# Patient Record
Sex: FEMALE | Race: WHITE | NOT HISPANIC OR LATINO | URBAN - METROPOLITAN AREA
[De-identification: names, ages, dates, MRNs, and addresses within clinical notes are randomized per-mention and may not be internally consistent; named-entity substitution may affect disease eponyms.]

---

## 2017-01-19 ENCOUNTER — OUTPATIENT (OUTPATIENT)
Dept: OUTPATIENT SERVICES | Facility: HOSPITAL | Age: 72
LOS: 1 days | Discharge: HOME | End: 2017-01-19

## 2017-03-08 ENCOUNTER — APPOINTMENT (OUTPATIENT)
Dept: VASCULAR SURGERY | Facility: CLINIC | Age: 72
End: 2017-03-08

## 2017-03-23 ENCOUNTER — RECORD ABSTRACTING (OUTPATIENT)
Age: 72
End: 2017-03-23

## 2017-03-23 DIAGNOSIS — Z87.09 PERSONAL HISTORY OF OTHER DISEASES OF THE RESPIRATORY SYSTEM: ICD-10-CM

## 2017-03-23 DIAGNOSIS — T14.90 INJURY, UNSPECIFIED: ICD-10-CM

## 2017-03-23 DIAGNOSIS — Z87.891 PERSONAL HISTORY OF NICOTINE DEPENDENCE: ICD-10-CM

## 2017-03-23 DIAGNOSIS — Z87.442 PERSONAL HISTORY OF URINARY CALCULI: ICD-10-CM

## 2017-03-28 ENCOUNTER — APPOINTMENT (OUTPATIENT)
Dept: VASCULAR SURGERY | Facility: CLINIC | Age: 72
End: 2017-03-28

## 2017-04-19 ENCOUNTER — APPOINTMENT (OUTPATIENT)
Dept: VASCULAR SURGERY | Facility: CLINIC | Age: 72
End: 2017-04-19

## 2017-04-19 VITALS
HEIGHT: 70 IN | WEIGHT: 126 LBS | DIASTOLIC BLOOD PRESSURE: 70 MMHG | SYSTOLIC BLOOD PRESSURE: 132 MMHG | BODY MASS INDEX: 18.04 KG/M2

## 2017-06-27 DIAGNOSIS — Z88.8 ALLERGY STATUS TO OTHER DRUGS, MEDICAMENTS AND BIOLOGICAL SUBSTANCES STATUS: ICD-10-CM

## 2017-06-27 DIAGNOSIS — Z88.0 ALLERGY STATUS TO PENICILLIN: ICD-10-CM

## 2017-06-27 DIAGNOSIS — J44.9 CHRONIC OBSTRUCTIVE PULMONARY DISEASE, UNSPECIFIED: ICD-10-CM

## 2017-06-27 DIAGNOSIS — I83.892 VARICOSE VEINS OF LEFT LOWER EXTREMITY WITH OTHER COMPLICATIONS: ICD-10-CM

## 2017-10-10 ENCOUNTER — OUTPATIENT (OUTPATIENT)
Dept: OUTPATIENT SERVICES | Facility: HOSPITAL | Age: 72
LOS: 1 days | Discharge: HOME | End: 2017-10-10

## 2017-10-10 DIAGNOSIS — M25.561 PAIN IN RIGHT KNEE: ICD-10-CM

## 2017-10-18 ENCOUNTER — APPOINTMENT (OUTPATIENT)
Age: 72
End: 2017-10-18
Payer: MEDICARE

## 2017-10-25 ENCOUNTER — APPOINTMENT (OUTPATIENT)
Dept: VASCULAR SURGERY | Facility: CLINIC | Age: 72
End: 2017-10-25
Payer: MEDICARE

## 2017-10-25 VITALS
DIASTOLIC BLOOD PRESSURE: 80 MMHG | SYSTOLIC BLOOD PRESSURE: 130 MMHG | WEIGHT: 128 LBS | HEIGHT: 70 IN | BODY MASS INDEX: 18.32 KG/M2

## 2017-10-25 PROCEDURE — 93971 EXTREMITY STUDY: CPT

## 2017-10-25 PROCEDURE — 99213 OFFICE O/P EST LOW 20 MIN: CPT

## 2017-10-25 RX ORDER — CEFDINIR 300 MG/1
300 CAPSULE ORAL
Qty: 14 | Refills: 0 | Status: ACTIVE | COMMUNITY
Start: 2017-05-02

## 2018-03-12 ENCOUNTER — OUTPATIENT (OUTPATIENT)
Dept: OUTPATIENT SERVICES | Facility: HOSPITAL | Age: 73
LOS: 1 days | Discharge: HOME | End: 2018-03-12

## 2018-03-12 DIAGNOSIS — E78.5 HYPERLIPIDEMIA, UNSPECIFIED: ICD-10-CM

## 2018-03-12 DIAGNOSIS — E11.9 TYPE 2 DIABETES MELLITUS WITHOUT COMPLICATIONS: ICD-10-CM

## 2018-03-12 DIAGNOSIS — I10 ESSENTIAL (PRIMARY) HYPERTENSION: ICD-10-CM

## 2018-08-24 ENCOUNTER — APPOINTMENT (OUTPATIENT)
Dept: VASCULAR SURGERY | Facility: CLINIC | Age: 73
End: 2018-08-24
Payer: MEDICARE

## 2018-08-24 VITALS — SYSTOLIC BLOOD PRESSURE: 120 MMHG | HEART RATE: 94 BPM | OXYGEN SATURATION: 97 % | DIASTOLIC BLOOD PRESSURE: 80 MMHG

## 2018-08-24 PROCEDURE — 99212 OFFICE O/P EST SF 10 MIN: CPT

## 2018-09-11 ENCOUNTER — FORM ENCOUNTER (OUTPATIENT)
Age: 73
End: 2018-09-11

## 2018-09-12 ENCOUNTER — OUTPATIENT (OUTPATIENT)
Dept: OUTPATIENT SERVICES | Facility: HOSPITAL | Age: 73
LOS: 1 days | Discharge: HOME | End: 2018-09-12

## 2018-09-12 VITALS
OXYGEN SATURATION: 100 % | DIASTOLIC BLOOD PRESSURE: 80 MMHG | HEIGHT: 70 IN | TEMPERATURE: 98 F | HEART RATE: 65 BPM | WEIGHT: 126.77 LBS | SYSTOLIC BLOOD PRESSURE: 144 MMHG | RESPIRATION RATE: 16 BRPM

## 2018-09-12 DIAGNOSIS — I83.893 VARICOSE VEINS OF BILATERAL LOWER EXTREMITIES WITH OTHER COMPLICATIONS: ICD-10-CM

## 2018-09-12 DIAGNOSIS — Z01.818 ENCOUNTER FOR OTHER PREPROCEDURAL EXAMINATION: ICD-10-CM

## 2018-09-12 DIAGNOSIS — Z98.890 OTHER SPECIFIED POSTPROCEDURAL STATES: Chronic | ICD-10-CM

## 2018-09-12 LAB
ALBUMIN SERPL ELPH-MCNC: 4.5 G/DL — SIGNIFICANT CHANGE UP (ref 3.5–5.2)
ALP SERPL-CCNC: 99 U/L — SIGNIFICANT CHANGE UP (ref 30–115)
ALT FLD-CCNC: 17 U/L — SIGNIFICANT CHANGE UP (ref 0–41)
ANION GAP SERPL CALC-SCNC: 12 MMOL/L — SIGNIFICANT CHANGE UP (ref 7–14)
APTT BLD: 38.9 SEC — SIGNIFICANT CHANGE UP (ref 27–39.2)
AST SERPL-CCNC: 21 U/L — SIGNIFICANT CHANGE UP (ref 0–41)
BASOPHILS # BLD AUTO: 0.02 K/UL — SIGNIFICANT CHANGE UP (ref 0–0.2)
BASOPHILS NFR BLD AUTO: 0.4 % — SIGNIFICANT CHANGE UP (ref 0–1)
BILIRUB SERPL-MCNC: 0.4 MG/DL — SIGNIFICANT CHANGE UP (ref 0.2–1.2)
BUN SERPL-MCNC: 23 MG/DL — HIGH (ref 10–20)
CALCIUM SERPL-MCNC: 9.3 MG/DL — SIGNIFICANT CHANGE UP (ref 8.5–10.1)
CHLORIDE SERPL-SCNC: 101 MMOL/L — SIGNIFICANT CHANGE UP (ref 98–110)
CO2 SERPL-SCNC: 30 MMOL/L — SIGNIFICANT CHANGE UP (ref 17–32)
CREAT SERPL-MCNC: 0.6 MG/DL — LOW (ref 0.7–1.5)
EOSINOPHIL # BLD AUTO: 0.04 K/UL — SIGNIFICANT CHANGE UP (ref 0–0.7)
EOSINOPHIL NFR BLD AUTO: 0.8 % — SIGNIFICANT CHANGE UP (ref 0–8)
GLUCOSE SERPL-MCNC: 85 MG/DL — SIGNIFICANT CHANGE UP (ref 70–99)
HCT VFR BLD CALC: 41.2 % — SIGNIFICANT CHANGE UP (ref 37–47)
HGB BLD-MCNC: 13.1 G/DL — SIGNIFICANT CHANGE UP (ref 12–16)
IMM GRANULOCYTES NFR BLD AUTO: 0.2 % — SIGNIFICANT CHANGE UP (ref 0.1–0.3)
INR BLD: 1.28 RATIO — SIGNIFICANT CHANGE UP (ref 0.65–1.3)
LYMPHOCYTES # BLD AUTO: 1.52 K/UL — SIGNIFICANT CHANGE UP (ref 1.2–3.4)
LYMPHOCYTES # BLD AUTO: 31.5 % — SIGNIFICANT CHANGE UP (ref 20.5–51.1)
MCHC RBC-ENTMCNC: 29.4 PG — SIGNIFICANT CHANGE UP (ref 27–31)
MCHC RBC-ENTMCNC: 31.8 G/DL — LOW (ref 32–37)
MCV RBC AUTO: 92.6 FL — SIGNIFICANT CHANGE UP (ref 81–99)
MONOCYTES # BLD AUTO: 0.41 K/UL — SIGNIFICANT CHANGE UP (ref 0.1–0.6)
MONOCYTES NFR BLD AUTO: 8.5 % — SIGNIFICANT CHANGE UP (ref 1.7–9.3)
NEUTROPHILS # BLD AUTO: 2.82 K/UL — SIGNIFICANT CHANGE UP (ref 1.4–6.5)
NEUTROPHILS NFR BLD AUTO: 58.6 % — SIGNIFICANT CHANGE UP (ref 42.2–75.2)
NRBC # BLD: 0 /100 WBCS — SIGNIFICANT CHANGE UP (ref 0–0)
PLATELET # BLD AUTO: 164 K/UL — SIGNIFICANT CHANGE UP (ref 130–400)
POTASSIUM SERPL-MCNC: 4.6 MMOL/L — SIGNIFICANT CHANGE UP (ref 3.5–5)
POTASSIUM SERPL-SCNC: 4.6 MMOL/L — SIGNIFICANT CHANGE UP (ref 3.5–5)
PROT SERPL-MCNC: 6.9 G/DL — SIGNIFICANT CHANGE UP (ref 6–8)
PROTHROM AB SERPL-ACNC: 13.8 SEC — HIGH (ref 9.95–12.87)
RBC # BLD: 4.45 M/UL — SIGNIFICANT CHANGE UP (ref 4.2–5.4)
RBC # FLD: 13.8 % — SIGNIFICANT CHANGE UP (ref 11.5–14.5)
SODIUM SERPL-SCNC: 143 MMOL/L — SIGNIFICANT CHANGE UP (ref 135–146)
WBC # BLD: 4.82 K/UL — SIGNIFICANT CHANGE UP (ref 4.8–10.8)
WBC # FLD AUTO: 4.82 K/UL — SIGNIFICANT CHANGE UP (ref 4.8–10.8)

## 2018-09-12 NOTE — H&P PST ADULT - HISTORY OF PRESENT ILLNESS
"HE'S OPERATING ON THE VEINS OF MY LEFT LEG"  PT CURRENTLY DENIES CHEST PAIN, PALPITATIONS, DYSURIA, RECENT ILLNESS  EXERCISE TOLERANCE 3-4 BLOCKS ONE FOS  PT DENIES ANY RASHES, ABRASION, OR OPEN WOUNDS OR CUTS    AS PER THE PT, THIS IS A COMPLETE MEDICAL AND SURGICAL HX, INCLUDING MEDICATIONS PRESCRIBED AND OVER THE COUNTER

## 2018-09-13 DIAGNOSIS — R01.0 BENIGN AND INNOCENT CARDIAC MURMURS: ICD-10-CM

## 2018-10-02 PROBLEM — R06.02 SHORTNESS OF BREATH: Chronic | Status: ACTIVE | Noted: 2018-09-12

## 2018-10-02 PROBLEM — R52 PAIN, UNSPECIFIED: Chronic | Status: ACTIVE | Noted: 2018-09-12

## 2018-10-16 ENCOUNTER — RESULT REVIEW (OUTPATIENT)
Age: 73
End: 2018-10-16

## 2018-10-16 ENCOUNTER — APPOINTMENT (OUTPATIENT)
Dept: VASCULAR SURGERY | Facility: HOSPITAL | Age: 73
End: 2018-10-16
Payer: MEDICARE

## 2018-10-16 ENCOUNTER — OUTPATIENT (OUTPATIENT)
Dept: OUTPATIENT SERVICES | Facility: HOSPITAL | Age: 73
LOS: 1 days | Discharge: HOME | End: 2018-10-16

## 2018-10-16 VITALS
WEIGHT: 126.99 LBS | RESPIRATION RATE: 18 BRPM | TEMPERATURE: 98 F | DIASTOLIC BLOOD PRESSURE: 84 MMHG | HEART RATE: 72 BPM | HEIGHT: 70 IN | SYSTOLIC BLOOD PRESSURE: 157 MMHG

## 2018-10-16 VITALS — SYSTOLIC BLOOD PRESSURE: 148 MMHG | DIASTOLIC BLOOD PRESSURE: 73 MMHG | RESPIRATION RATE: 18 BRPM | HEART RATE: 62 BPM

## 2018-10-16 DIAGNOSIS — Z98.890 OTHER SPECIFIED POSTPROCEDURAL STATES: Chronic | ICD-10-CM

## 2018-10-16 PROCEDURE — 37766 PHLEB VEINS - EXTREM 20+: CPT | Mod: LT

## 2018-10-16 RX ORDER — ONDANSETRON 8 MG/1
4 TABLET, FILM COATED ORAL ONCE
Qty: 0 | Refills: 0 | Status: COMPLETED | OUTPATIENT
Start: 2018-10-16 | End: 2018-10-16

## 2018-10-16 RX ORDER — OXYCODONE AND ACETAMINOPHEN 5; 325 MG/1; MG/1
2 TABLET ORAL ONCE
Qty: 0 | Refills: 0 | Status: DISCONTINUED | OUTPATIENT
Start: 2018-10-16 | End: 2018-10-17

## 2018-10-16 RX ORDER — MORPHINE SULFATE 50 MG/1
2 CAPSULE, EXTENDED RELEASE ORAL
Qty: 0 | Refills: 0 | Status: DISCONTINUED | OUTPATIENT
Start: 2018-10-16 | End: 2018-10-16

## 2018-10-16 RX ORDER — SODIUM CHLORIDE 9 MG/ML
1000 INJECTION, SOLUTION INTRAVENOUS
Qty: 0 | Refills: 0 | Status: DISCONTINUED | OUTPATIENT
Start: 2018-10-16 | End: 2018-10-17

## 2018-10-16 RX ADMIN — SODIUM CHLORIDE 75 MILLILITER(S): 9 INJECTION, SOLUTION INTRAVENOUS at 15:03

## 2018-10-16 RX ADMIN — MORPHINE SULFATE 2 MILLIGRAM(S): 50 CAPSULE, EXTENDED RELEASE ORAL at 14:15

## 2018-10-16 RX ADMIN — MORPHINE SULFATE 2 MILLIGRAM(S): 50 CAPSULE, EXTENDED RELEASE ORAL at 14:00

## 2018-10-16 RX ADMIN — ONDANSETRON 4 MILLIGRAM(S): 8 TABLET, FILM COATED ORAL at 16:13

## 2018-10-16 NOTE — ASU DISCHARGE PLAN (ADULT/PEDIATRIC). - DRESSING FT
Can take off dressing after 72 hrs. steristrips will fall off on their own. Rewarp ACE bandage. Can take off dressing after 72 hrs. steristrips will fall off on their own. Rewrap ACE bandage.

## 2018-10-16 NOTE — ASU DISCHARGE PLAN (ADULT/PEDIATRIC). - SPECIAL INSTRUCTIONS
Please call the office at [phone] to make an appointment for follow up with Dr. Carson in 2 weeks, if you do not have one already.   Take the outer dressing of ACE wrap and gauze off in 72 hours (3 days). Leave the white tape strips (called steri strips) alone, and these will fall off by themselves in 1-2 weeks.  When you take off the outer dressing, you can shower. Let the soapy water run over the strips and then pat dry, do not rub. Do not soak or swim.   When you take off the outer dressing, you should also start wearing the compression stockings (a prescription will be given to you before you leave) during the day. Take these off at night.   You may eat regularly today, but start lightly as anesthesia may make you nauseous.   Do not drive until off of pain medications and your motion is no longer limited by pain.     You are being given a prescription / a prescription was called in to your pharmacy for a pain medication called Percocet. It is a combination of acetaminophen (Tylenol) and oxycodone. You should take this as needed for pain not controlled by Tylenol alone.  You cannot drive while taking this medication.  Follow the directions on the bottle and given to you by your pharmacist. You may also take a stool softener like Colace (docusate) while you are taking this pain medicine as it may cause constipation.   You can switch to taking only Tylenol or other over the counter pain medications as your pain improves. Some people do not need to take Percocet at all.  Make sure the total amount of acetaminophen (Tylenol) you take is less than 3500mg total a day, so add up the amount in the Percocet and any Tylenol you may take.

## 2018-10-16 NOTE — ASU DISCHARGE PLAN (ADULT/PEDIATRIC). - ACTIVITY LEVEL
exercise elevate extremity/elevate your leg while sitting or laying down. Continue to walk daily/exercise

## 2018-10-16 NOTE — BRIEF OPERATIVE NOTE - PROCEDURE
<<-----Click on this checkbox to enter Procedure Phlebectomy, lower limb  10/16/2018  Stab phlebectomy of left lower limb.  Active  [id number]

## 2018-10-16 NOTE — ASU DISCHARGE PLAN (ADULT/PEDIATRIC). - NOTIFY
Numbness, color, or temperature change to extremity/Swelling that continues/Bleeding that does not stop Numbness, color, or temperature change to extremity/Fever greater than 101/Bleeding that does not stop/Anything else that concerns you/Swelling that continues

## 2018-10-16 NOTE — CHART NOTE - NSCHARTNOTEFT_GEN_A_CORE
PACU ANESTHESIA ADMISSION NOTE      Procedure: Phlebectomy, lower limb: Stab phlebectomy of left lower limb.    Post op diagnosis:  Varicose veins of left lower extremity with inflammation      ____  Intubated  TV:______       Rate: ______      FiO2: ______    ____  Patent Airway    ____  Full return of protective reflexes    ___x_  Full recovery from anesthesia / back to baseline     Vitals:   T:  97.4         R:  10                BP:  138/64                Sat: 98                  P: 67      Mental Status:  _x___ Awake   __x___ Alert   _____ Drowsy   _____ Sedated    Nausea/Vomiting:  x____ NO  ______Yes,   See Post - Op Orders          Pain Scale (0-10):  _____    Treatment: ____ None    _x___ See Post - Op/PCA Orders    Post - Operative Fluids:   ____ Oral   x____ See Post - Op Orders    Plan: Discharge:   __x__Home       _____Floor     _____Critical Care    _____  Other:_________________    Comments:

## 2018-10-19 LAB — SURGICAL PATHOLOGY STUDY: SIGNIFICANT CHANGE UP

## 2018-10-22 DIAGNOSIS — Z88.0 ALLERGY STATUS TO PENICILLIN: ICD-10-CM

## 2018-10-22 DIAGNOSIS — Z88.8 ALLERGY STATUS TO OTHER DRUGS, MEDICAMENTS AND BIOLOGICAL SUBSTANCES STATUS: ICD-10-CM

## 2018-10-22 DIAGNOSIS — I83.892 VARICOSE VEINS OF LEFT LOWER EXTREMITY WITH OTHER COMPLICATIONS: ICD-10-CM

## 2018-10-22 DIAGNOSIS — Z88.2 ALLERGY STATUS TO SULFONAMIDES: ICD-10-CM

## 2018-10-31 ENCOUNTER — APPOINTMENT (OUTPATIENT)
Dept: VASCULAR SURGERY | Facility: CLINIC | Age: 73
End: 2018-10-31
Payer: MEDICARE

## 2018-10-31 PROCEDURE — 99024 POSTOP FOLLOW-UP VISIT: CPT

## 2018-11-28 ENCOUNTER — APPOINTMENT (OUTPATIENT)
Dept: VASCULAR SURGERY | Facility: CLINIC | Age: 73
End: 2018-11-28
Payer: MEDICARE

## 2018-11-28 PROCEDURE — 29580 STRAPPING UNNA BOOT: CPT | Mod: 79,LT

## 2018-12-05 ENCOUNTER — APPOINTMENT (OUTPATIENT)
Dept: VASCULAR SURGERY | Facility: CLINIC | Age: 73
End: 2018-12-05
Payer: MEDICARE

## 2018-12-05 PROCEDURE — 29580 STRAPPING UNNA BOOT: CPT | Mod: 78,LT

## 2018-12-12 ENCOUNTER — APPOINTMENT (OUTPATIENT)
Dept: VASCULAR SURGERY | Facility: CLINIC | Age: 73
End: 2018-12-12
Payer: MEDICARE

## 2018-12-12 PROCEDURE — 29580 STRAPPING UNNA BOOT: CPT | Mod: 78,LT

## 2018-12-19 ENCOUNTER — APPOINTMENT (OUTPATIENT)
Dept: VASCULAR SURGERY | Facility: CLINIC | Age: 73
End: 2018-12-19
Payer: MEDICARE

## 2018-12-19 PROCEDURE — 29580 STRAPPING UNNA BOOT: CPT | Mod: 58,LT

## 2018-12-26 ENCOUNTER — APPOINTMENT (OUTPATIENT)
Dept: VASCULAR SURGERY | Facility: CLINIC | Age: 73
End: 2018-12-26
Payer: MEDICARE

## 2018-12-26 PROCEDURE — 29580 STRAPPING UNNA BOOT: CPT | Mod: 58,LT

## 2019-01-02 ENCOUNTER — APPOINTMENT (OUTPATIENT)
Dept: VASCULAR SURGERY | Facility: CLINIC | Age: 74
End: 2019-01-02
Payer: MEDICARE

## 2019-01-02 PROCEDURE — 99024 POSTOP FOLLOW-UP VISIT: CPT

## 2019-01-16 ENCOUNTER — OUTPATIENT (OUTPATIENT)
Dept: OUTPATIENT SERVICES | Facility: HOSPITAL | Age: 74
LOS: 1 days | Discharge: HOME | End: 2019-01-16

## 2019-01-16 DIAGNOSIS — E11.9 TYPE 2 DIABETES MELLITUS WITHOUT COMPLICATIONS: ICD-10-CM

## 2019-01-16 DIAGNOSIS — E55.9 VITAMIN D DEFICIENCY, UNSPECIFIED: ICD-10-CM

## 2019-01-16 DIAGNOSIS — N39.0 URINARY TRACT INFECTION, SITE NOT SPECIFIED: ICD-10-CM

## 2019-01-16 DIAGNOSIS — E78.00 PURE HYPERCHOLESTEROLEMIA, UNSPECIFIED: ICD-10-CM

## 2019-01-16 DIAGNOSIS — Z98.890 OTHER SPECIFIED POSTPROCEDURAL STATES: Chronic | ICD-10-CM

## 2019-01-24 ENCOUNTER — OUTPATIENT (OUTPATIENT)
Dept: OUTPATIENT SERVICES | Facility: HOSPITAL | Age: 74
LOS: 1 days | Discharge: HOME | End: 2019-01-24

## 2019-01-24 VITALS
SYSTOLIC BLOOD PRESSURE: 118 MMHG | RESPIRATION RATE: 18 BRPM | HEART RATE: 80 BPM | OXYGEN SATURATION: 97 % | TEMPERATURE: 99 F | DIASTOLIC BLOOD PRESSURE: 62 MMHG | WEIGHT: 126.32 LBS | HEIGHT: 70 IN

## 2019-01-24 DIAGNOSIS — Z01.818 ENCOUNTER FOR OTHER PREPROCEDURAL EXAMINATION: ICD-10-CM

## 2019-01-24 DIAGNOSIS — I83.893 VARICOSE VEINS OF BILATERAL LOWER EXTREMITIES WITH OTHER COMPLICATIONS: ICD-10-CM

## 2019-01-24 DIAGNOSIS — Z98.890 OTHER SPECIFIED POSTPROCEDURAL STATES: Chronic | ICD-10-CM

## 2019-01-24 LAB
ALBUMIN SERPL ELPH-MCNC: 4.3 G/DL — SIGNIFICANT CHANGE UP (ref 3.5–5.2)
ALP SERPL-CCNC: 104 U/L — SIGNIFICANT CHANGE UP (ref 30–115)
ALT FLD-CCNC: 20 U/L — SIGNIFICANT CHANGE UP (ref 0–41)
ANION GAP SERPL CALC-SCNC: 17 MMOL/L — HIGH (ref 7–14)
APTT BLD: 38.2 SEC — SIGNIFICANT CHANGE UP (ref 27–39.2)
AST SERPL-CCNC: 25 U/L — SIGNIFICANT CHANGE UP (ref 0–41)
BASOPHILS # BLD AUTO: 0.02 K/UL — SIGNIFICANT CHANGE UP (ref 0–0.2)
BASOPHILS NFR BLD AUTO: 0.4 % — SIGNIFICANT CHANGE UP (ref 0–1)
BILIRUB SERPL-MCNC: 0.3 MG/DL — SIGNIFICANT CHANGE UP (ref 0.2–1.2)
BUN SERPL-MCNC: 29 MG/DL — HIGH (ref 10–20)
CALCIUM SERPL-MCNC: 9.8 MG/DL — SIGNIFICANT CHANGE UP (ref 8.5–10.1)
CHLORIDE SERPL-SCNC: 103 MMOL/L — SIGNIFICANT CHANGE UP (ref 98–110)
CO2 SERPL-SCNC: 26 MMOL/L — SIGNIFICANT CHANGE UP (ref 17–32)
CREAT SERPL-MCNC: 0.6 MG/DL — LOW (ref 0.7–1.5)
EOSINOPHIL # BLD AUTO: 0.08 K/UL — SIGNIFICANT CHANGE UP (ref 0–0.7)
EOSINOPHIL NFR BLD AUTO: 1.7 % — SIGNIFICANT CHANGE UP (ref 0–8)
GLUCOSE SERPL-MCNC: 69 MG/DL — LOW (ref 70–99)
HCT VFR BLD CALC: 40 % — SIGNIFICANT CHANGE UP (ref 37–47)
HGB BLD-MCNC: 12.9 G/DL — SIGNIFICANT CHANGE UP (ref 12–16)
IMM GRANULOCYTES NFR BLD AUTO: 0.2 % — SIGNIFICANT CHANGE UP (ref 0.1–0.3)
INR BLD: 1.14 RATIO — SIGNIFICANT CHANGE UP (ref 0.65–1.3)
LYMPHOCYTES # BLD AUTO: 1.19 K/UL — LOW (ref 1.2–3.4)
LYMPHOCYTES # BLD AUTO: 25.8 % — SIGNIFICANT CHANGE UP (ref 20.5–51.1)
MCHC RBC-ENTMCNC: 29.7 PG — SIGNIFICANT CHANGE UP (ref 27–31)
MCHC RBC-ENTMCNC: 32.3 G/DL — SIGNIFICANT CHANGE UP (ref 32–37)
MCV RBC AUTO: 92.2 FL — SIGNIFICANT CHANGE UP (ref 81–99)
MONOCYTES # BLD AUTO: 0.44 K/UL — SIGNIFICANT CHANGE UP (ref 0.1–0.6)
MONOCYTES NFR BLD AUTO: 9.5 % — HIGH (ref 1.7–9.3)
NEUTROPHILS # BLD AUTO: 2.87 K/UL — SIGNIFICANT CHANGE UP (ref 1.4–6.5)
NEUTROPHILS NFR BLD AUTO: 62.4 % — SIGNIFICANT CHANGE UP (ref 42.2–75.2)
NRBC # BLD: 0 /100 WBCS — SIGNIFICANT CHANGE UP (ref 0–0)
PLATELET # BLD AUTO: 154 K/UL — SIGNIFICANT CHANGE UP (ref 130–400)
POTASSIUM SERPL-MCNC: 4.8 MMOL/L — SIGNIFICANT CHANGE UP (ref 3.5–5)
POTASSIUM SERPL-SCNC: 4.8 MMOL/L — SIGNIFICANT CHANGE UP (ref 3.5–5)
PROT SERPL-MCNC: 6.4 G/DL — SIGNIFICANT CHANGE UP (ref 6–8)
PROTHROM AB SERPL-ACNC: 13.1 SEC — HIGH (ref 9.95–12.87)
RBC # BLD: 4.34 M/UL — SIGNIFICANT CHANGE UP (ref 4.2–5.4)
RBC # FLD: 14 % — SIGNIFICANT CHANGE UP (ref 11.5–14.5)
SODIUM SERPL-SCNC: 146 MMOL/L — SIGNIFICANT CHANGE UP (ref 135–146)
WBC # BLD: 4.61 K/UL — LOW (ref 4.8–10.8)
WBC # FLD AUTO: 4.61 K/UL — LOW (ref 4.8–10.8)

## 2019-01-24 NOTE — H&P PST ADULT - PMH
Pain  knee  SOB (shortness of breath)    Varicose veins of both lower extremities, unspecified whether complicated

## 2019-01-24 NOTE — H&P PST ADULT - HISTORY OF PRESENT ILLNESS
Patient c/o varicose veins on BLE since 18 years old and progressively worsen now. On 10/2019 left Lower extremity Stab phlebectomy done. Patient denies any c/o cp, palpitations fever, cough or dysuria. Patient c/o BARAJAS. No CARLA.

## 2019-01-24 NOTE — H&P PST ADULT - ATTENDING COMMENTS
symptomatic varicose veins in the right leg   failed compression therapy  Plan: right leg stab phlebectomy

## 2019-01-24 NOTE — H&P PST ADULT - PSH
History of surgery  vascular surgery   ? variocose vein stripping?  Status post phlebectomy  10/2018 History of ovarian cystectomy  left  History of surgery  vascular surgery   ? variocose vein stripping?  Status post phlebectomy  10/2018

## 2019-01-24 NOTE — H&P PST ADULT - REASON FOR ADMISSION
74 yo f presents to PAST for Right lower extremity stab phlebectomy of varicose veins under GA on 2/7/2019 at [facility] by DR. Carson.

## 2019-01-31 PROBLEM — I83.93 ASYMPTOMATIC VARICOSE VEINS OF BILATERAL LOWER EXTREMITIES: Chronic | Status: ACTIVE | Noted: 2019-01-24

## 2019-02-07 ENCOUNTER — OUTPATIENT (OUTPATIENT)
Dept: OUTPATIENT SERVICES | Facility: HOSPITAL | Age: 74
LOS: 1 days | Discharge: HOME | End: 2019-02-07

## 2019-02-07 ENCOUNTER — APPOINTMENT (OUTPATIENT)
Dept: VASCULAR SURGERY | Facility: HOSPITAL | Age: 74
End: 2019-02-07
Payer: MEDICARE

## 2019-02-07 ENCOUNTER — RESULT REVIEW (OUTPATIENT)
Age: 74
End: 2019-02-07

## 2019-02-07 VITALS — DIASTOLIC BLOOD PRESSURE: 69 MMHG | SYSTOLIC BLOOD PRESSURE: 131 MMHG | RESPIRATION RATE: 18 BRPM | HEART RATE: 67 BPM

## 2019-02-07 VITALS
SYSTOLIC BLOOD PRESSURE: 140 MMHG | DIASTOLIC BLOOD PRESSURE: 77 MMHG | RESPIRATION RATE: 18 BRPM | TEMPERATURE: 98 F | HEIGHT: 70 IN | HEART RATE: 77 BPM | WEIGHT: 126.99 LBS

## 2019-02-07 DIAGNOSIS — Z98.890 OTHER SPECIFIED POSTPROCEDURAL STATES: Chronic | ICD-10-CM

## 2019-02-07 PROCEDURE — 37766 PHLEB VEINS - EXTREM 20+: CPT | Mod: RT

## 2019-02-07 RX ORDER — HYDROMORPHONE HYDROCHLORIDE 2 MG/ML
0.5 INJECTION INTRAMUSCULAR; INTRAVENOUS; SUBCUTANEOUS
Qty: 0 | Refills: 0 | Status: DISCONTINUED | OUTPATIENT
Start: 2019-02-07 | End: 2019-02-08

## 2019-02-07 RX ORDER — OXYCODONE HYDROCHLORIDE 5 MG/1
1 TABLET ORAL
Qty: 12 | Refills: 0
Start: 2019-02-07 | End: 2019-02-09

## 2019-02-07 RX ORDER — HYDROMORPHONE HYDROCHLORIDE 2 MG/ML
1 INJECTION INTRAMUSCULAR; INTRAVENOUS; SUBCUTANEOUS
Qty: 0 | Refills: 0 | Status: DISCONTINUED | OUTPATIENT
Start: 2019-02-07 | End: 2019-02-08

## 2019-02-07 RX ORDER — SODIUM CHLORIDE 9 MG/ML
1000 INJECTION, SOLUTION INTRAVENOUS
Qty: 0 | Refills: 0 | Status: DISCONTINUED | OUTPATIENT
Start: 2019-02-07 | End: 2019-02-08

## 2019-02-07 RX ADMIN — SODIUM CHLORIDE 100 MILLILITER(S): 9 INJECTION, SOLUTION INTRAVENOUS at 13:13

## 2019-02-07 NOTE — BRIEF OPERATIVE NOTE - OPERATION/FINDINGS
Stab phlebectomy of left lower extremity varicose veins, below knee anterior leg to ankle Stab phlebectomy of right lower extremity varicose veins, below knee anterior leg to ankle

## 2019-02-07 NOTE — ASU DISCHARGE PLAN (ADULT/PEDIATRIC). - ASU FOLLOWUP
[facility]:  [place] for Ambulatory Surgery... [facility]:  Endoscopy/Ambulatory Surgery [place]...

## 2019-02-07 NOTE — ASU DISCHARGE PLAN (ADULT/PEDIATRIC). - NOTIFY
Inability to Tolerate Liquids or Foods/redness, increasing pain, inability to move or feel your left leg, or anything else that concerns you/Numbness, color, or temperature change to extremity/Unable to Urinate/Pain not relieved by Medications/Swelling that continues/Bleeding that does not stop/Persistent Nausea and Vomiting/Fever greater than 101

## 2019-02-07 NOTE — BRIEF OPERATIVE NOTE - PROCEDURE
<<-----Click on this checkbox to enter Procedure Stab phlebectomy of varicose vein of lower extremity  02/07/2019  left leg  Active  ROMARIO Stab phlebectomy of varicose vein of lower extremity  02/07/2019  right leg  Active  Brenda Qureshi

## 2019-02-07 NOTE — ASU PATIENT PROFILE, ADULT - PSH
History of ovarian cystectomy  left  History of surgery  vascular surgery   ? variocose vein stripping?  Status post phlebectomy  10/2018

## 2019-02-07 NOTE — ASU DISCHARGE PLAN (ADULT/PEDIATRIC). - SPECIAL INSTRUCTIONS
You may shower after you take down your dressing in three days. Until then, you may use wet washcloths or wash but ensure the dressing stays dry. After you take off the outer dressing of the ACE compression wraps and gauze rolls and pads, you will see there are white tape strips called steri-strips underneath. These steri-strips will stay on until they fall off by themselves in 1-2 weeks.  You may shower after your remove the dressings in 3 days- let the soapy water run over your incisions and pat dry, do not rub.    When you remove the outer dressings in three days, you should start wearing your compression stockings again, every day. Remember to continue to walk daily, starting slowly today. Elevate your left leg whenever you are resting.     You may eat today, but start lightly as anesthesia may make you nauseous.   Call the office today to make an appointment for follow up with Dr. Carson in three weeks. You may also call with any questions or concerns and if the office is not open, seek medical attention for any concerns.     You should restart all your home medications.   A prescription was called in to your pharmacy for a pain medication called  oxycodone. You should take this as needed for pain not controlled by Tylenol alone.  You cannot drive while taking this medication.  Follow the directions on the bottle and given to you by your pharmacist. You may also take a stool softener like Colace (docusate) while you are taking this pain medicine as it may cause constipation.   You can switch to taking only Tylenol or other over the counter pain medications as your pain improves. Some people do not need to take oxycodone at all.  Make sure the total amount of acetaminophen (Tylenol) you take is less than 3500mg total a day- remember to add up the amount. You may shower after you take down your dressing in three days. Until then, you may use wet washcloths or wash but ensure the dressing stays dry. After you take off the outer dressing of the ACE compression wraps and gauze rolls and pads, you will see there are white tape strips called steri-strips underneath. These steri-strips will stay on until they fall off by themselves in 1-2 weeks.  You may shower after your remove the dressings in 3 days- let the soapy water run over your incisions and pat dry, do not rub.    When you remove the outer dressings in three days, you should start wearing your compression stockings again, every day. Remember to continue to walk daily, starting slowly today. Elevate your right leg whenever you are resting.     You may eat today, but start lightly as anesthesia may make you nauseous.   Call the office today to make an appointment for follow up with Dr. Carson in three weeks. You may also call with any questions or concerns and if the office is not open, seek medical attention for any concerns.     You should restart all your home medications.   A prescription was called in to your pharmacy for a pain medication called  oxycodone. You should take this as needed for pain not controlled by Tylenol alone.  You cannot drive while taking this medication.  Follow the directions on the bottle and given to you by your pharmacist. You may also take a stool softener like Colace (docusate) while you are taking this pain medicine as it may cause constipation.   You can switch to taking only Tylenol or other over the counter pain medications as your pain improves. Some people do not need to take oxycodone at all.  Make sure the total amount of acetaminophen (Tylenol) you take is less than 3500mg total a day- remember to add up the amount.

## 2019-02-07 NOTE — ASU DISCHARGE PLAN (ADULT/PEDIATRIC). - ITEMS TO FOLLOWUP WITH YOUR PHYSICIAN'S
Call Dr. Carson's office later today or tomorrow to make an appointment for follow up in three weeks at [phone]

## 2019-02-07 NOTE — ASU PATIENT PROFILE, ADULT - PAIN RATING AT REST
CHIEF COMPLAINT:   Patient presents with:  Cough: headache s/s for 2 weeks  Post Nasal Drip: congestion  Runny Nose      HPI:   Nicole Celis is a 50year old male who presents for cold symptoms for  2  weeks.  Patient was seen here 2 weeks ago initially /72 mmHg  Pulse 79  Temp(Src) 98.5 °F (36.9 °C) (Oral)  Resp 20  Ht 72\"  Wt 190 lb  BMI 25.76 kg/m2  SpO2 98%  GENERAL: well developed, well nourished,in no apparent distress  SKIN: no rashes,no suspicious lesions  HEAD: atraumatic, normocephalic, + The sinuses are air-filled spaces within the bones of the face. They connect to the inside of the nose. Sinusitis is an inflammation of the tissue lining the sinus cavity. Sinus inflammation can occur during a cold.  It can also be due to allergies to polle · Do not use nasal rinses or irrigation during an acute sinus infection, unless told to by your health care provider. Rinsing may spread the infection to other sinuses.   · Use acetaminophen or ibuprofen to control pain, unless another pain medicine was pre 0

## 2019-02-15 DIAGNOSIS — I83.811 VARICOSE VEINS OF RIGHT LOWER EXTREMITY WITH PAIN: ICD-10-CM

## 2019-02-15 DIAGNOSIS — Z88.5 ALLERGY STATUS TO NARCOTIC AGENT: ICD-10-CM

## 2019-02-15 DIAGNOSIS — Z88.2 ALLERGY STATUS TO SULFONAMIDES: ICD-10-CM

## 2019-02-15 DIAGNOSIS — Z88.1 ALLERGY STATUS TO OTHER ANTIBIOTIC AGENTS STATUS: ICD-10-CM

## 2019-02-27 ENCOUNTER — APPOINTMENT (OUTPATIENT)
Dept: VASCULAR SURGERY | Facility: CLINIC | Age: 74
End: 2019-02-27
Payer: MEDICARE

## 2019-02-27 DIAGNOSIS — I83.893 VARICOSE VEINS OF BILATERAL LOWER EXTREMITIES WITH OTHER COMPLICATIONS: ICD-10-CM

## 2019-02-27 PROCEDURE — 99024 POSTOP FOLLOW-UP VISIT: CPT

## 2019-03-20 ENCOUNTER — APPOINTMENT (OUTPATIENT)
Dept: VASCULAR SURGERY | Facility: CLINIC | Age: 74
End: 2019-03-20
Payer: MEDICARE

## 2019-03-20 PROCEDURE — 99024 POSTOP FOLLOW-UP VISIT: CPT

## 2019-06-05 ENCOUNTER — APPOINTMENT (OUTPATIENT)
Dept: VASCULAR SURGERY | Facility: CLINIC | Age: 74
End: 2019-06-05
Payer: MEDICARE

## 2019-06-05 DIAGNOSIS — I83.023 VARICOSE VEINS OF LEFT LOWER EXTREMITY WITH ULCER OF ANKLE: ICD-10-CM

## 2019-06-05 DIAGNOSIS — L97.329 VARICOSE VEINS OF LEFT LOWER EXTREMITY WITH ULCER OF ANKLE: ICD-10-CM

## 2019-06-05 PROCEDURE — 99212 OFFICE O/P EST SF 10 MIN: CPT

## 2019-07-17 ENCOUNTER — APPOINTMENT (OUTPATIENT)
Dept: VASCULAR SURGERY | Facility: CLINIC | Age: 74
End: 2019-07-17
Payer: MEDICARE

## 2019-07-17 DIAGNOSIS — I87.2 VENOUS INSUFFICIENCY (CHRONIC) (PERIPHERAL): ICD-10-CM

## 2019-07-17 PROCEDURE — 99212 OFFICE O/P EST SF 10 MIN: CPT

## 2021-01-01 ENCOUNTER — TRANSCRIPTION ENCOUNTER (OUTPATIENT)
Age: 76
End: 2021-01-01

## 2021-01-01 ENCOUNTER — APPOINTMENT (OUTPATIENT)
Dept: SURGERY | Facility: CLINIC | Age: 76
End: 2021-01-01

## 2021-01-01 ENCOUNTER — RESULT REVIEW (OUTPATIENT)
Age: 76
End: 2021-01-01

## 2021-01-01 ENCOUNTER — APPOINTMENT (OUTPATIENT)
Dept: SURGERY | Facility: CLINIC | Age: 76
End: 2021-01-01
Payer: MEDICARE

## 2021-01-01 ENCOUNTER — NON-APPOINTMENT (OUTPATIENT)
Age: 76
End: 2021-01-01

## 2021-01-01 ENCOUNTER — INPATIENT (INPATIENT)
Facility: HOSPITAL | Age: 76
LOS: 2 days | Discharge: HOME | End: 2021-09-06
Attending: HOSPITALIST | Admitting: HOSPITALIST
Payer: MEDICARE

## 2021-01-01 ENCOUNTER — APPOINTMENT (OUTPATIENT)
Dept: INFUSION THERAPY | Facility: CLINIC | Age: 76
End: 2021-01-01

## 2021-01-01 ENCOUNTER — APPOINTMENT (OUTPATIENT)
Dept: HEMATOLOGY ONCOLOGY | Facility: CLINIC | Age: 76
End: 2021-01-01

## 2021-01-01 ENCOUNTER — INPATIENT (INPATIENT)
Facility: HOSPITAL | Age: 76
LOS: 11 days | Discharge: NURSING, REHAB OR LONG-TERM CARE | End: 2021-12-04
Attending: STUDENT IN AN ORGANIZED HEALTH CARE EDUCATION/TRAINING PROGRAM | Admitting: STUDENT IN AN ORGANIZED HEALTH CARE EDUCATION/TRAINING PROGRAM
Payer: MEDICARE

## 2021-01-01 ENCOUNTER — OUTPATIENT (OUTPATIENT)
Dept: OUTPATIENT SERVICES | Facility: HOSPITAL | Age: 76
LOS: 1 days | Discharge: HOME | End: 2021-01-01
Payer: MEDICARE

## 2021-01-01 ENCOUNTER — INPATIENT (INPATIENT)
Facility: HOSPITAL | Age: 76
LOS: 8 days | Discharge: HOME HEALTH | End: 2021-10-09
Attending: INTERNAL MEDICINE | Admitting: INTERNAL MEDICINE
Payer: MEDICARE

## 2021-01-01 ENCOUNTER — LABORATORY RESULT (OUTPATIENT)
Age: 76
End: 2021-01-01

## 2021-01-01 ENCOUNTER — APPOINTMENT (OUTPATIENT)
Dept: PODIATRY | Facility: CLINIC | Age: 76
End: 2021-01-01
Payer: MEDICARE

## 2021-01-01 ENCOUNTER — APPOINTMENT (OUTPATIENT)
Dept: INFUSION THERAPY | Facility: CLINIC | Age: 76
End: 2021-01-01
Payer: MEDICARE

## 2021-01-01 ENCOUNTER — APPOINTMENT (OUTPATIENT)
Dept: HEMATOLOGY ONCOLOGY | Facility: CLINIC | Age: 76
End: 2021-01-01
Payer: MEDICARE

## 2021-01-01 ENCOUNTER — OUTPATIENT (OUTPATIENT)
Dept: OUTPATIENT SERVICES | Facility: HOSPITAL | Age: 76
LOS: 1 days | Discharge: HOME | End: 2021-01-01

## 2021-01-01 ENCOUNTER — APPOINTMENT (OUTPATIENT)
Dept: SURGERY | Facility: HOSPITAL | Age: 76
End: 2021-01-01

## 2021-01-01 ENCOUNTER — INPATIENT (INPATIENT)
Facility: HOSPITAL | Age: 76
LOS: 10 days | Discharge: NURSING, REHAB OR LONG-TERM CARE | End: 2021-08-13
Attending: SURGERY | Admitting: SURGERY
Payer: MEDICARE

## 2021-01-01 ENCOUNTER — INPATIENT (INPATIENT)
Facility: HOSPITAL | Age: 76
LOS: 9 days | Discharge: HOME HEALTH | End: 2021-08-23
Attending: PHYSICAL MEDICINE & REHABILITATION | Admitting: PHYSICAL MEDICINE & REHABILITATION
Payer: MEDICARE

## 2021-01-01 ENCOUNTER — INPATIENT (INPATIENT)
Facility: HOSPITAL | Age: 76
LOS: 6 days | Discharge: HOME HEALTH | End: 2021-09-14
Attending: HOSPITALIST | Admitting: HOSPITALIST
Payer: MEDICARE

## 2021-01-01 ENCOUNTER — APPOINTMENT (OUTPATIENT)
Dept: PODIATRY | Facility: CLINIC | Age: 76
End: 2021-01-01

## 2021-01-01 VITALS — HEIGHT: 67 IN | DIASTOLIC BLOOD PRESSURE: 80 MMHG | SYSTOLIC BLOOD PRESSURE: 99 MMHG | TEMPERATURE: 96.4 F

## 2021-01-01 VITALS
BODY MASS INDEX: 19.15 KG/M2 | HEIGHT: 67 IN | RESPIRATION RATE: 16 BRPM | WEIGHT: 122 LBS | HEART RATE: 100 BPM | TEMPERATURE: 97.9 F | SYSTOLIC BLOOD PRESSURE: 132 MMHG | DIASTOLIC BLOOD PRESSURE: 90 MMHG

## 2021-01-01 VITALS
HEART RATE: 100 BPM | DIASTOLIC BLOOD PRESSURE: 59 MMHG | TEMPERATURE: 98.2 F | SYSTOLIC BLOOD PRESSURE: 117 MMHG | WEIGHT: 115 LBS | BODY MASS INDEX: 18.05 KG/M2 | HEIGHT: 67 IN | RESPIRATION RATE: 14 BRPM

## 2021-01-01 VITALS
HEART RATE: 85 BPM | WEIGHT: 136.91 LBS | SYSTOLIC BLOOD PRESSURE: 110 MMHG | TEMPERATURE: 98 F | DIASTOLIC BLOOD PRESSURE: 59 MMHG | RESPIRATION RATE: 18 BRPM

## 2021-01-01 VITALS
DIASTOLIC BLOOD PRESSURE: 71 MMHG | HEART RATE: 92 BPM | TEMPERATURE: 97.1 F | RESPIRATION RATE: 18 BRPM | SYSTOLIC BLOOD PRESSURE: 134 MMHG

## 2021-01-01 VITALS
RESPIRATION RATE: 20 BRPM | HEIGHT: 68 IN | HEART RATE: 120 BPM | SYSTOLIC BLOOD PRESSURE: 133 MMHG | OXYGEN SATURATION: 99 % | TEMPERATURE: 97 F | DIASTOLIC BLOOD PRESSURE: 72 MMHG

## 2021-01-01 VITALS
WEIGHT: 124.34 LBS | HEIGHT: 68 IN | DIASTOLIC BLOOD PRESSURE: 78 MMHG | OXYGEN SATURATION: 98 % | RESPIRATION RATE: 15 BRPM | SYSTOLIC BLOOD PRESSURE: 134 MMHG | HEART RATE: 79 BPM | TEMPERATURE: 98 F

## 2021-01-01 VITALS
RESPIRATION RATE: 18 BRPM | DIASTOLIC BLOOD PRESSURE: 50 MMHG | TEMPERATURE: 98 F | HEART RATE: 64 BPM | SYSTOLIC BLOOD PRESSURE: 103 MMHG

## 2021-01-01 VITALS
DIASTOLIC BLOOD PRESSURE: 77 MMHG | BODY MASS INDEX: 19.62 KG/M2 | SYSTOLIC BLOOD PRESSURE: 132 MMHG | HEART RATE: 126 BPM | HEIGHT: 67 IN | WEIGHT: 125 LBS | WEIGHT: 125 LBS | TEMPERATURE: 98 F | DIASTOLIC BLOOD PRESSURE: 73 MMHG | BODY MASS INDEX: 19.62 KG/M2 | TEMPERATURE: 97.3 F | HEART RATE: 81 BPM | SYSTOLIC BLOOD PRESSURE: 120 MMHG | HEIGHT: 67 IN

## 2021-01-01 VITALS
HEART RATE: 71 BPM | SYSTOLIC BLOOD PRESSURE: 116 MMHG | DIASTOLIC BLOOD PRESSURE: 70 MMHG | WEIGHT: 125 LBS | HEIGHT: 67 IN | TEMPERATURE: 97.4 F | BODY MASS INDEX: 19.62 KG/M2

## 2021-01-01 VITALS
HEART RATE: 100 BPM | TEMPERATURE: 97.7 F | SYSTOLIC BLOOD PRESSURE: 155 MMHG | DIASTOLIC BLOOD PRESSURE: 82 MMHG | RESPIRATION RATE: 18 BRPM

## 2021-01-01 VITALS
OXYGEN SATURATION: 96 % | RESPIRATION RATE: 18 BRPM | WEIGHT: 125 LBS | HEART RATE: 100 BPM | SYSTOLIC BLOOD PRESSURE: 127 MMHG | HEIGHT: 68 IN | DIASTOLIC BLOOD PRESSURE: 66 MMHG | TEMPERATURE: 98 F

## 2021-01-01 VITALS
DIASTOLIC BLOOD PRESSURE: 70 MMHG | HEIGHT: 67 IN | WEIGHT: 125 LBS | SYSTOLIC BLOOD PRESSURE: 161 MMHG | HEART RATE: 100 BPM | WEIGHT: 125 LBS | SYSTOLIC BLOOD PRESSURE: 119 MMHG | BODY MASS INDEX: 19.62 KG/M2 | TEMPERATURE: 98 F | OXYGEN SATURATION: 96 % | TEMPERATURE: 97.8 F | HEART RATE: 112 BPM | DIASTOLIC BLOOD PRESSURE: 68 MMHG | RESPIRATION RATE: 18 BRPM | HEIGHT: 68 IN

## 2021-01-01 VITALS
HEIGHT: 67 IN | TEMPERATURE: 97.9 F | DIASTOLIC BLOOD PRESSURE: 84 MMHG | SYSTOLIC BLOOD PRESSURE: 122 MMHG | WEIGHT: 125 LBS | OXYGEN SATURATION: 90 % | HEART RATE: 120 BPM | BODY MASS INDEX: 19.62 KG/M2

## 2021-01-01 VITALS
HEART RATE: 80 BPM | TEMPERATURE: 98 F | RESPIRATION RATE: 18 BRPM | SYSTOLIC BLOOD PRESSURE: 102 MMHG | DIASTOLIC BLOOD PRESSURE: 58 MMHG

## 2021-01-01 VITALS
DIASTOLIC BLOOD PRESSURE: 76 MMHG | RESPIRATION RATE: 20 BRPM | TEMPERATURE: 96 F | HEART RATE: 113 BPM | SYSTOLIC BLOOD PRESSURE: 160 MMHG

## 2021-01-01 VITALS
TEMPERATURE: 96 F | HEART RATE: 94 BPM | SYSTOLIC BLOOD PRESSURE: 118 MMHG | DIASTOLIC BLOOD PRESSURE: 67 MMHG | RESPIRATION RATE: 20 BRPM

## 2021-01-01 VITALS
HEART RATE: 84 BPM | RESPIRATION RATE: 18 BRPM | SYSTOLIC BLOOD PRESSURE: 107 MMHG | TEMPERATURE: 96 F | DIASTOLIC BLOOD PRESSURE: 56 MMHG

## 2021-01-01 VITALS
SYSTOLIC BLOOD PRESSURE: 126 MMHG | OXYGEN SATURATION: 96 % | RESPIRATION RATE: 20 BRPM | TEMPERATURE: 97 F | HEART RATE: 103 BPM | DIASTOLIC BLOOD PRESSURE: 76 MMHG | HEIGHT: 68 IN

## 2021-01-01 VITALS
OXYGEN SATURATION: 98 % | TEMPERATURE: 97 F | RESPIRATION RATE: 20 BRPM | SYSTOLIC BLOOD PRESSURE: 101 MMHG | DIASTOLIC BLOOD PRESSURE: 68 MMHG | HEART RATE: 110 BPM

## 2021-01-01 VITALS
DIASTOLIC BLOOD PRESSURE: 81 MMHG | HEIGHT: 67 IN | TEMPERATURE: 96.2 F | HEART RATE: 101 BPM | SYSTOLIC BLOOD PRESSURE: 110 MMHG

## 2021-01-01 VITALS
RESPIRATION RATE: 18 BRPM | HEART RATE: 104 BPM | SYSTOLIC BLOOD PRESSURE: 106 MMHG | TEMPERATURE: 98 F | DIASTOLIC BLOOD PRESSURE: 81 MMHG

## 2021-01-01 DIAGNOSIS — R53.81 OTHER MALAISE: ICD-10-CM

## 2021-01-01 DIAGNOSIS — I10 ESSENTIAL (PRIMARY) HYPERTENSION: ICD-10-CM

## 2021-01-01 DIAGNOSIS — Z01.818 ENCOUNTER FOR OTHER PREPROCEDURAL EXAMINATION: ICD-10-CM

## 2021-01-01 DIAGNOSIS — N20.0 CALCULUS OF KIDNEY: Chronic | ICD-10-CM

## 2021-01-01 DIAGNOSIS — Z51.11 ENCOUNTER FOR ANTINEOPLASTIC CHEMOTHERAPY: ICD-10-CM

## 2021-01-01 DIAGNOSIS — R00.1 BRADYCARDIA, UNSPECIFIED: ICD-10-CM

## 2021-01-01 DIAGNOSIS — C25.0 MALIGNANT NEOPLASM OF HEAD OF PANCREAS: ICD-10-CM

## 2021-01-01 DIAGNOSIS — Z98.890 OTHER SPECIFIED POSTPROCEDURAL STATES: Chronic | ICD-10-CM

## 2021-01-01 DIAGNOSIS — Z79.01 LONG TERM (CURRENT) USE OF ANTICOAGULANTS: ICD-10-CM

## 2021-01-01 DIAGNOSIS — Z91.018 ALLERGY TO OTHER FOODS: ICD-10-CM

## 2021-01-01 DIAGNOSIS — M79.675 PAIN IN RIGHT TOE(S): ICD-10-CM

## 2021-01-01 DIAGNOSIS — E83.42 HYPOMAGNESEMIA: ICD-10-CM

## 2021-01-01 DIAGNOSIS — I48.20 CHRONIC ATRIAL FIBRILLATION, UNSPECIFIED: ICD-10-CM

## 2021-01-01 DIAGNOSIS — Z88.8 ALLERGY STATUS TO OTHER DRUGS, MEDICAMENTS AND BIOLOGICAL SUBSTANCES: ICD-10-CM

## 2021-01-01 DIAGNOSIS — Z85.07 PERSONAL HISTORY OF MALIGNANT NEOPLASM OF PANCREAS: ICD-10-CM

## 2021-01-01 DIAGNOSIS — E88.09 OTHER DISORDERS OF PLASMA-PROTEIN METABOLISM, NOT ELSEWHERE CLASSIFIED: ICD-10-CM

## 2021-01-01 DIAGNOSIS — R21 RASH AND OTHER NONSPECIFIC SKIN ERUPTION: ICD-10-CM

## 2021-01-01 DIAGNOSIS — I48.91 UNSPECIFIED ATRIAL FIBRILLATION: ICD-10-CM

## 2021-01-01 DIAGNOSIS — R33.9 RETENTION OF URINE, UNSPECIFIED: ICD-10-CM

## 2021-01-01 DIAGNOSIS — L89.151 PRESSURE ULCER OF SACRAL REGION, STAGE 1: ICD-10-CM

## 2021-01-01 DIAGNOSIS — Z90.89 ACQUIRED ABSENCE OF OTHER ORGANS: Chronic | ICD-10-CM

## 2021-01-01 DIAGNOSIS — Z88.9 ALLERGY STATUS TO UNSPECIFIED DRUGS, MEDICAMENTS AND BIOLOGICAL SUBSTANCES: ICD-10-CM

## 2021-01-01 DIAGNOSIS — L97.529 NON-PRESSURE CHRONIC ULCER OF OTHER PART OF LEFT FOOT WITH UNSPECIFIED SEVERITY: ICD-10-CM

## 2021-01-01 DIAGNOSIS — I83.015 VARICOSE VEINS OF RIGHT LOWER EXTREMITY WITH ULCER OTHER PART OF FOOT: ICD-10-CM

## 2021-01-01 DIAGNOSIS — D63.0 ANEMIA IN NEOPLASTIC DISEASE: ICD-10-CM

## 2021-01-01 DIAGNOSIS — D84.9 IMMUNODEFICIENCY, UNSPECIFIED: ICD-10-CM

## 2021-01-01 DIAGNOSIS — Z92.21 PERSONAL HISTORY OF ANTINEOPLASTIC CHEMOTHERAPY: ICD-10-CM

## 2021-01-01 DIAGNOSIS — L97.319 NON-PRESSURE CHRONIC ULCER OF RIGHT ANKLE WITH UNSPECIFIED SEVERITY: ICD-10-CM

## 2021-01-01 DIAGNOSIS — R63.8 OTHER SYMPTOMS AND SIGNS CONCERNING FOOD AND FLUID INTAKE: ICD-10-CM

## 2021-01-01 DIAGNOSIS — I83.93 ASYMPTOMATIC VARICOSE VEINS OF BILATERAL LOWER EXTREMITIES: ICD-10-CM

## 2021-01-01 DIAGNOSIS — N12 TUBULO-INTERSTITIAL NEPHRITIS, NOT SPECIFIED AS ACUTE OR CHRONIC: ICD-10-CM

## 2021-01-01 DIAGNOSIS — I83.025 VARICOSE VEINS OF LEFT LOWER EXTREMITY WITH ULCER OTHER PART OF FOOT: ICD-10-CM

## 2021-01-01 DIAGNOSIS — Z02.9 ENCOUNTER FOR ADMINISTRATIVE EXAMINATIONS, UNSPECIFIED: ICD-10-CM

## 2021-01-01 DIAGNOSIS — C25.9 MALIGNANT NEOPLASM OF PANCREAS, UNSPECIFIED: ICD-10-CM

## 2021-01-01 DIAGNOSIS — Z11.59 ENCOUNTER FOR SCREENING FOR OTHER VIRAL DISEASES: ICD-10-CM

## 2021-01-01 DIAGNOSIS — E87.6 HYPOKALEMIA: ICD-10-CM

## 2021-01-01 DIAGNOSIS — E87.2 ACIDOSIS: ICD-10-CM

## 2021-01-01 DIAGNOSIS — Z98.890 OTHER SPECIFIED POSTPROCEDURAL STATES: ICD-10-CM

## 2021-01-01 DIAGNOSIS — E46 UNSPECIFIED PROTEIN-CALORIE MALNUTRITION: ICD-10-CM

## 2021-01-01 DIAGNOSIS — E86.0 DEHYDRATION: ICD-10-CM

## 2021-01-01 DIAGNOSIS — R91.1 SOLITARY PULMONARY NODULE: ICD-10-CM

## 2021-01-01 DIAGNOSIS — A41.9 SEPSIS, UNSPECIFIED ORGANISM: ICD-10-CM

## 2021-01-01 DIAGNOSIS — N39.490 OVERFLOW INCONTINENCE: ICD-10-CM

## 2021-01-01 DIAGNOSIS — N20.0 CALCULUS OF KIDNEY: ICD-10-CM

## 2021-01-01 DIAGNOSIS — N39.0 URINARY TRACT INFECTION, SITE NOT SPECIFIED: ICD-10-CM

## 2021-01-01 DIAGNOSIS — C25.7 MALIGNANT NEOPLASM OF OTHER PARTS OF PANCREAS: ICD-10-CM

## 2021-01-01 DIAGNOSIS — Z90.49 ACQUIRED ABSENCE OF OTHER SPECIFIED PARTS OF DIGESTIVE TRACT: ICD-10-CM

## 2021-01-01 DIAGNOSIS — Z46.6 ENCOUNTER FOR FITTING AND ADJUSTMENT OF URINARY DEVICE: ICD-10-CM

## 2021-01-01 DIAGNOSIS — E87.1 HYPO-OSMOLALITY AND HYPONATREMIA: ICD-10-CM

## 2021-01-01 DIAGNOSIS — E43 UNSPECIFIED SEVERE PROTEIN-CALORIE MALNUTRITION: ICD-10-CM

## 2021-01-01 DIAGNOSIS — B37.7 CANDIDAL SEPSIS: ICD-10-CM

## 2021-01-01 DIAGNOSIS — Z88.1 ALLERGY STATUS TO OTHER ANTIBIOTIC AGENTS STATUS: ICD-10-CM

## 2021-01-01 DIAGNOSIS — E53.8 DEFICIENCY OF OTHER SPECIFIED B GROUP VITAMINS: ICD-10-CM

## 2021-01-01 DIAGNOSIS — M79.674 PAIN IN RIGHT TOE(S): ICD-10-CM

## 2021-01-01 DIAGNOSIS — E21.1 SECONDARY HYPERPARATHYROIDISM, NOT ELSEWHERE CLASSIFIED: ICD-10-CM

## 2021-01-01 DIAGNOSIS — R74.8 ABNORMAL LEVELS OF OTHER SERUM ENZYMES: ICD-10-CM

## 2021-01-01 DIAGNOSIS — A04.72 ENTEROCOLITIS DUE TO CLOSTRIDIUM DIFFICILE, NOT SPECIFIED AS RECURRENT: ICD-10-CM

## 2021-01-01 DIAGNOSIS — Z88.2 ALLERGY STATUS TO SULFONAMIDES: ICD-10-CM

## 2021-01-01 DIAGNOSIS — J90 PLEURAL EFFUSION, NOT ELSEWHERE CLASSIFIED: ICD-10-CM

## 2021-01-01 DIAGNOSIS — Z96.89 PRESENCE OF OTHER SPECIFIED FUNCTIONAL IMPLANTS: ICD-10-CM

## 2021-01-01 DIAGNOSIS — Z53.31 LAPAROSCOPIC SURGICAL PROCEDURE CONVERTED TO OPEN PROCEDURE: ICD-10-CM

## 2021-01-01 DIAGNOSIS — Z86.16 PERSONAL HISTORY OF COVID-19: ICD-10-CM

## 2021-01-01 DIAGNOSIS — R09.89 OTHER SPECIFIED SYMPTOMS AND SIGNS INVOLVING THE CIRCULATORY AND RESPIRATORY SYSTEMS: ICD-10-CM

## 2021-01-01 DIAGNOSIS — L97.519 NON-PRESSURE CHRONIC ULCER OF OTHER PART OF RIGHT FOOT WITH UNSPECIFIED SEVERITY: ICD-10-CM

## 2021-01-01 DIAGNOSIS — R18.8 OTHER ASCITES: ICD-10-CM

## 2021-01-01 DIAGNOSIS — Z20.822 CONTACT WITH AND (SUSPECTED) EXPOSURE TO COVID-19: ICD-10-CM

## 2021-01-01 DIAGNOSIS — Z88.8 ALLERGY STATUS TO OTHER DRUGS, MEDICAMENTS AND BIOLOGICAL SUBSTANCES STATUS: ICD-10-CM

## 2021-01-01 DIAGNOSIS — E55.9 VITAMIN D DEFICIENCY, UNSPECIFIED: ICD-10-CM

## 2021-01-01 DIAGNOSIS — I50.33 ACUTE ON CHRONIC DIASTOLIC (CONGESTIVE) HEART FAILURE: ICD-10-CM

## 2021-01-01 DIAGNOSIS — Z95.828 PRESENCE OF OTHER VASCULAR IMPLANTS AND GRAFTS: ICD-10-CM

## 2021-01-01 DIAGNOSIS — K76.89 OTHER SPECIFIED DISEASES OF LIVER: ICD-10-CM

## 2021-01-01 DIAGNOSIS — Z90.89 ACQUIRED ABSENCE OF OTHER ORGANS: ICD-10-CM

## 2021-01-01 DIAGNOSIS — Z87.442 PERSONAL HISTORY OF URINARY CALCULI: ICD-10-CM

## 2021-01-01 DIAGNOSIS — R73.03 PREDIABETES: ICD-10-CM

## 2021-01-01 DIAGNOSIS — I11.0 HYPERTENSIVE HEART DISEASE WITH HEART FAILURE: ICD-10-CM

## 2021-01-01 DIAGNOSIS — D64.9 ANEMIA, UNSPECIFIED: ICD-10-CM

## 2021-01-01 DIAGNOSIS — R73.9 HYPERGLYCEMIA, UNSPECIFIED: ICD-10-CM

## 2021-01-01 DIAGNOSIS — B35.1 TINEA UNGUIUM: ICD-10-CM

## 2021-01-01 DIAGNOSIS — B49 UNSPECIFIED MYCOSIS: ICD-10-CM

## 2021-01-01 DIAGNOSIS — R06.02 SHORTNESS OF BREATH: ICD-10-CM

## 2021-01-01 DIAGNOSIS — R22.33 LOCALIZED SWELLING, MASS AND LUMP, UPPER LIMB, BILATERAL: ICD-10-CM

## 2021-01-01 DIAGNOSIS — Z91.09 OTHER ALLERGY STATUS, OTHER THAN TO DRUGS AND BIOLOGICAL SUBSTANCES: ICD-10-CM

## 2021-01-01 DIAGNOSIS — M40.209 UNSPECIFIED KYPHOSIS, SITE UNSPECIFIED: ICD-10-CM

## 2021-01-01 DIAGNOSIS — Z82.49 FAMILY HISTORY OF ISCHEMIC HEART DISEASE AND OTHER DISEASES OF THE CIRCULATORY SYSTEM: ICD-10-CM

## 2021-01-01 DIAGNOSIS — C77.2 SECONDARY AND UNSPECIFIED MALIGNANT NEOPLASM OF INTRA-ABDOMINAL LYMPH NODES: ICD-10-CM

## 2021-01-01 DIAGNOSIS — K66.0 PERITONEAL ADHESIONS (POSTPROCEDURAL) (POSTINFECTION): ICD-10-CM

## 2021-01-01 DIAGNOSIS — R00.0 TACHYCARDIA, UNSPECIFIED: ICD-10-CM

## 2021-01-01 LAB
-  AMIKACIN: SIGNIFICANT CHANGE UP
-  AMOXICILLIN/CLAVULANIC ACID: SIGNIFICANT CHANGE UP
-  AMPHOTERICIN B: SIGNIFICANT CHANGE UP
-  AMPICILLIN/SULBACTAM: SIGNIFICANT CHANGE UP
-  AMPICILLIN: SIGNIFICANT CHANGE UP
-  ANIDULAFUNGIN: SIGNIFICANT CHANGE UP
-  AZTREONAM: SIGNIFICANT CHANGE UP
-  CANDIDA GLABRATA: SIGNIFICANT CHANGE UP
-  CASPOFUNGIN: SIGNIFICANT CHANGE UP
-  CEFAZOLIN: SIGNIFICANT CHANGE UP
-  CEFEPIME: SIGNIFICANT CHANGE UP
-  CEFOTAXIME: SIGNIFICANT CHANGE UP
-  CEFOXITIN: SIGNIFICANT CHANGE UP
-  CEFTAZIDIME: SIGNIFICANT CHANGE UP
-  CEFTRIAXONE: SIGNIFICANT CHANGE UP
-  CEFUROXIME: SIGNIFICANT CHANGE UP
-  CIPROFLOXACIN: SIGNIFICANT CHANGE UP
-  ERTAPENEM: SIGNIFICANT CHANGE UP
-  FLUCONAZOLE: SIGNIFICANT CHANGE UP
-  GENTAMICIN: SIGNIFICANT CHANGE UP
-  IMIPENEM: SIGNIFICANT CHANGE UP
-  INTERPRETATION: SIGNIFICANT CHANGE UP
-  LEVOFLOXACIN: SIGNIFICANT CHANGE UP
-  MEROPENEM: SIGNIFICANT CHANGE UP
-  MICAFUNGIN: SIGNIFICANT CHANGE UP
-  MINOCYCLINE: SIGNIFICANT CHANGE UP
-  NITROFURANTOIN: SIGNIFICANT CHANGE UP
-  PIPERACILLIN/TAZOBACTAM: SIGNIFICANT CHANGE UP
-  POSACONAZOLE: SIGNIFICANT CHANGE UP
-  TETRACYCLINE: SIGNIFICANT CHANGE UP
-  TIGECYCLINE: SIGNIFICANT CHANGE UP
-  TOBRAMYCIN: SIGNIFICANT CHANGE UP
-  TRIMETHOPRIM/SULFAMETHOXAZOLE: SIGNIFICANT CHANGE UP
-  VANCOMYCIN: SIGNIFICANT CHANGE UP
-  VORICONAZOLE: SIGNIFICANT CHANGE UP
24R-OH-CALCIDIOL SERPL-MCNC: 16 NG/ML — LOW (ref 30–80)
A1C WITH ESTIMATED AVERAGE GLUCOSE RESULT: 6.3 % — HIGH (ref 4–5.6)
ALBUMIN SERPL ELPH-MCNC: 1.8 G/DL — LOW (ref 3.5–5.2)
ALBUMIN SERPL ELPH-MCNC: 1.9 G/DL — LOW (ref 3.5–5.2)
ALBUMIN SERPL ELPH-MCNC: 1.9 G/DL — LOW (ref 3.5–5.2)
ALBUMIN SERPL ELPH-MCNC: 2 G/DL — LOW (ref 3.5–5.2)
ALBUMIN SERPL ELPH-MCNC: 2.1 G/DL
ALBUMIN SERPL ELPH-MCNC: 2.1 G/DL — LOW (ref 3.5–5.2)
ALBUMIN SERPL ELPH-MCNC: 2.2 G/DL — LOW (ref 3.5–5.2)
ALBUMIN SERPL ELPH-MCNC: 2.3 G/DL
ALBUMIN SERPL ELPH-MCNC: 2.3 G/DL — LOW (ref 3.5–5.2)
ALBUMIN SERPL ELPH-MCNC: 2.4 G/DL
ALBUMIN SERPL ELPH-MCNC: 2.4 G/DL — LOW (ref 3.5–5.2)
ALBUMIN SERPL ELPH-MCNC: 2.4 G/DL — LOW (ref 3.5–5.2)
ALBUMIN SERPL ELPH-MCNC: 2.5 G/DL — LOW (ref 3.5–5.2)
ALBUMIN SERPL ELPH-MCNC: 2.6 G/DL — LOW (ref 3.5–5.2)
ALBUMIN SERPL ELPH-MCNC: 2.7 G/DL — LOW (ref 3.5–5.2)
ALBUMIN SERPL ELPH-MCNC: 2.8 G/DL — LOW (ref 3.5–5.2)
ALBUMIN SERPL ELPH-MCNC: 2.8 G/DL — LOW (ref 3.5–5.2)
ALBUMIN SERPL ELPH-MCNC: 3 G/DL — LOW (ref 3.5–5.2)
ALBUMIN SERPL ELPH-MCNC: 3.4 G/DL
ALBUMIN SERPL ELPH-MCNC: 3.5 G/DL — SIGNIFICANT CHANGE UP (ref 3.5–5.2)
ALBUMIN SERPL ELPH-MCNC: 3.8 G/DL
ALBUMIN SERPL ELPH-MCNC: 3.9 G/DL
ALBUMIN SERPL ELPH-MCNC: 3.9 G/DL
ALP BLD-CCNC: 513 U/L
ALP BLD-CCNC: 545 U/L
ALP BLD-CCNC: 568 U/L
ALP BLD-CCNC: 612 U/L
ALP BLD-CCNC: 734 U/L
ALP BLD-CCNC: 742 U/L
ALP BLD-CCNC: 876 U/L
ALP SERPL-CCNC: 293 U/L — HIGH (ref 30–115)
ALP SERPL-CCNC: 304 U/L — HIGH (ref 30–115)
ALP SERPL-CCNC: 304 U/L — HIGH (ref 30–115)
ALP SERPL-CCNC: 330 U/L — HIGH (ref 30–115)
ALP SERPL-CCNC: 343 U/L — HIGH (ref 30–115)
ALP SERPL-CCNC: 355 U/L — HIGH (ref 30–115)
ALP SERPL-CCNC: 360 U/L — HIGH (ref 30–115)
ALP SERPL-CCNC: 368 U/L — HIGH (ref 30–115)
ALP SERPL-CCNC: 375 U/L — HIGH (ref 30–115)
ALP SERPL-CCNC: 378 U/L — HIGH (ref 30–115)
ALP SERPL-CCNC: 380 U/L — HIGH (ref 30–115)
ALP SERPL-CCNC: 398 U/L — HIGH (ref 30–115)
ALP SERPL-CCNC: 401 U/L — HIGH (ref 30–115)
ALP SERPL-CCNC: 413 U/L — HIGH (ref 30–115)
ALP SERPL-CCNC: 435 U/L — HIGH (ref 30–115)
ALP SERPL-CCNC: 450 U/L — HIGH (ref 30–115)
ALP SERPL-CCNC: 452 U/L — HIGH (ref 30–115)
ALP SERPL-CCNC: 454 U/L — HIGH (ref 30–115)
ALP SERPL-CCNC: 457 U/L — HIGH (ref 30–115)
ALP SERPL-CCNC: 459 U/L — HIGH (ref 30–115)
ALP SERPL-CCNC: 466 U/L — HIGH (ref 30–115)
ALP SERPL-CCNC: 467 U/L — HIGH (ref 30–115)
ALP SERPL-CCNC: 471 U/L — HIGH (ref 30–115)
ALP SERPL-CCNC: 471 U/L — HIGH (ref 30–115)
ALP SERPL-CCNC: 477 U/L — HIGH (ref 30–115)
ALP SERPL-CCNC: 478 U/L — HIGH (ref 30–115)
ALP SERPL-CCNC: 489 U/L — HIGH (ref 30–115)
ALP SERPL-CCNC: 506 U/L — HIGH (ref 30–115)
ALP SERPL-CCNC: 510 U/L — HIGH (ref 30–115)
ALP SERPL-CCNC: 513 U/L — HIGH (ref 30–115)
ALP SERPL-CCNC: 518 U/L — HIGH (ref 30–115)
ALP SERPL-CCNC: 526 U/L — HIGH (ref 30–115)
ALP SERPL-CCNC: 526 U/L — HIGH (ref 30–115)
ALP SERPL-CCNC: 541 U/L — HIGH (ref 30–115)
ALP SERPL-CCNC: 550 U/L — HIGH (ref 30–115)
ALP SERPL-CCNC: 556 U/L — HIGH (ref 30–115)
ALP SERPL-CCNC: 560 U/L — HIGH (ref 30–115)
ALP SERPL-CCNC: 594 U/L — HIGH (ref 30–115)
ALP SERPL-CCNC: 604 U/L — HIGH (ref 30–115)
ALP SERPL-CCNC: 621 U/L — HIGH (ref 30–115)
ALT FLD-CCNC: 11 U/L — SIGNIFICANT CHANGE UP (ref 0–41)
ALT FLD-CCNC: 13 U/L — SIGNIFICANT CHANGE UP (ref 0–41)
ALT FLD-CCNC: 14 U/L — SIGNIFICANT CHANGE UP (ref 0–41)
ALT FLD-CCNC: 15 U/L — SIGNIFICANT CHANGE UP (ref 0–41)
ALT FLD-CCNC: 16 U/L — SIGNIFICANT CHANGE UP (ref 0–41)
ALT FLD-CCNC: 17 U/L — SIGNIFICANT CHANGE UP (ref 0–41)
ALT FLD-CCNC: 17 U/L — SIGNIFICANT CHANGE UP (ref 0–41)
ALT FLD-CCNC: 18 U/L — SIGNIFICANT CHANGE UP (ref 0–41)
ALT FLD-CCNC: 19 U/L — SIGNIFICANT CHANGE UP (ref 0–41)
ALT FLD-CCNC: 20 U/L — SIGNIFICANT CHANGE UP (ref 0–41)
ALT FLD-CCNC: 21 U/L — SIGNIFICANT CHANGE UP (ref 0–41)
ALT FLD-CCNC: 23 U/L — SIGNIFICANT CHANGE UP (ref 0–41)
ALT FLD-CCNC: 23 U/L — SIGNIFICANT CHANGE UP (ref 0–41)
ALT FLD-CCNC: 25 U/L — SIGNIFICANT CHANGE UP (ref 0–41)
ALT FLD-CCNC: 27 U/L — SIGNIFICANT CHANGE UP (ref 0–41)
ALT FLD-CCNC: 28 U/L — SIGNIFICANT CHANGE UP (ref 0–41)
ALT FLD-CCNC: 31 U/L — SIGNIFICANT CHANGE UP (ref 0–41)
ALT FLD-CCNC: 42 U/L — HIGH (ref 0–41)
ALT FLD-CCNC: 43 U/L — HIGH (ref 0–41)
ALT FLD-CCNC: 44 U/L — HIGH (ref 0–41)
ALT SERPL-CCNC: 16 U/L
ALT SERPL-CCNC: 17 U/L
ALT SERPL-CCNC: 20 U/L
ALT SERPL-CCNC: 24 U/L
ALT SERPL-CCNC: 27 U/L
ALT SERPL-CCNC: 33 U/L
ALT SERPL-CCNC: 38 U/L
AMYLASE FLD-CCNC: 12 U/L — SIGNIFICANT CHANGE UP
AMYLASE FLD-CCNC: <3 U/L — SIGNIFICANT CHANGE UP
AMYLASE FLD-CCNC: <3 U/L — SIGNIFICANT CHANGE UP
ANION GAP SERPL CALC-SCNC: 10 MMOL/L — SIGNIFICANT CHANGE UP (ref 7–14)
ANION GAP SERPL CALC-SCNC: 11 MMOL/L
ANION GAP SERPL CALC-SCNC: 11 MMOL/L — SIGNIFICANT CHANGE UP (ref 7–14)
ANION GAP SERPL CALC-SCNC: 12 MMOL/L — SIGNIFICANT CHANGE UP (ref 7–14)
ANION GAP SERPL CALC-SCNC: 13 MMOL/L
ANION GAP SERPL CALC-SCNC: 13 MMOL/L — SIGNIFICANT CHANGE UP (ref 7–14)
ANION GAP SERPL CALC-SCNC: 14 MMOL/L — SIGNIFICANT CHANGE UP (ref 7–14)
ANION GAP SERPL CALC-SCNC: 15 MMOL/L — HIGH (ref 7–14)
ANION GAP SERPL CALC-SCNC: 16 MMOL/L — HIGH (ref 7–14)
ANION GAP SERPL CALC-SCNC: 16 MMOL/L — HIGH (ref 7–14)
ANION GAP SERPL CALC-SCNC: 17 MMOL/L
ANION GAP SERPL CALC-SCNC: 17 MMOL/L — HIGH (ref 7–14)
ANION GAP SERPL CALC-SCNC: 18 MMOL/L — HIGH (ref 7–14)
ANION GAP SERPL CALC-SCNC: 6 MMOL/L — LOW (ref 7–14)
ANION GAP SERPL CALC-SCNC: 6 MMOL/L — LOW (ref 7–14)
ANION GAP SERPL CALC-SCNC: 8 MMOL/L — SIGNIFICANT CHANGE UP (ref 7–14)
ANION GAP SERPL CALC-SCNC: 9 MMOL/L
ANION GAP SERPL CALC-SCNC: 9 MMOL/L — SIGNIFICANT CHANGE UP (ref 7–14)
ANISOCYTOSIS BLD QL: SLIGHT — SIGNIFICANT CHANGE UP
APPEARANCE UR: ABNORMAL
APPEARANCE UR: CLEAR — SIGNIFICANT CHANGE UP
APTT BLD: 29.7 SEC — SIGNIFICANT CHANGE UP (ref 27–39.2)
APTT BLD: 39 SEC — SIGNIFICANT CHANGE UP (ref 27–39.2)
APTT BLD: 40.3 SEC — HIGH (ref 27–39.2)
APTT BLD: 42.3 SEC — HIGH (ref 27–39.2)
APTT BLD: 43.3 SEC — HIGH (ref 27–39.2)
APTT BLD: 45.9 SEC — HIGH (ref 27–39.2)
AST SERPL-CCNC: 15 U/L — SIGNIFICANT CHANGE UP (ref 0–41)
AST SERPL-CCNC: 15 U/L — SIGNIFICANT CHANGE UP (ref 0–41)
AST SERPL-CCNC: 16 U/L — SIGNIFICANT CHANGE UP (ref 0–41)
AST SERPL-CCNC: 16 U/L — SIGNIFICANT CHANGE UP (ref 0–41)
AST SERPL-CCNC: 17 U/L — SIGNIFICANT CHANGE UP (ref 0–41)
AST SERPL-CCNC: 17 U/L — SIGNIFICANT CHANGE UP (ref 0–41)
AST SERPL-CCNC: 18 U/L
AST SERPL-CCNC: 18 U/L — SIGNIFICANT CHANGE UP (ref 0–41)
AST SERPL-CCNC: 19 U/L — SIGNIFICANT CHANGE UP (ref 0–41)
AST SERPL-CCNC: 20 U/L — SIGNIFICANT CHANGE UP (ref 0–41)
AST SERPL-CCNC: 20 U/L — SIGNIFICANT CHANGE UP (ref 0–41)
AST SERPL-CCNC: 22 U/L — SIGNIFICANT CHANGE UP (ref 0–41)
AST SERPL-CCNC: 22 U/L — SIGNIFICANT CHANGE UP (ref 0–41)
AST SERPL-CCNC: 23 U/L — SIGNIFICANT CHANGE UP (ref 0–41)
AST SERPL-CCNC: 24 U/L — SIGNIFICANT CHANGE UP (ref 0–41)
AST SERPL-CCNC: 24 U/L — SIGNIFICANT CHANGE UP (ref 0–41)
AST SERPL-CCNC: 25 U/L — SIGNIFICANT CHANGE UP (ref 0–41)
AST SERPL-CCNC: 26 U/L — SIGNIFICANT CHANGE UP (ref 0–41)
AST SERPL-CCNC: 27 U/L
AST SERPL-CCNC: 27 U/L — SIGNIFICANT CHANGE UP (ref 0–41)
AST SERPL-CCNC: 27 U/L — SIGNIFICANT CHANGE UP (ref 0–41)
AST SERPL-CCNC: 28 U/L
AST SERPL-CCNC: 28 U/L — SIGNIFICANT CHANGE UP (ref 0–41)
AST SERPL-CCNC: 29 U/L
AST SERPL-CCNC: 29 U/L — SIGNIFICANT CHANGE UP (ref 0–41)
AST SERPL-CCNC: 29 U/L — SIGNIFICANT CHANGE UP (ref 0–41)
AST SERPL-CCNC: 32 U/L — SIGNIFICANT CHANGE UP (ref 0–41)
AST SERPL-CCNC: 33 U/L
AST SERPL-CCNC: 34 U/L — SIGNIFICANT CHANGE UP (ref 0–41)
AST SERPL-CCNC: 40 U/L — SIGNIFICANT CHANGE UP (ref 0–41)
AST SERPL-CCNC: 48 U/L — HIGH (ref 0–41)
AST SERPL-CCNC: 54 U/L — HIGH (ref 0–41)
AST SERPL-CCNC: 58 U/L — HIGH (ref 0–41)
BACTERIA # UR AUTO: ABNORMAL
BASE EXCESS BLDV CALC-SCNC: 4.6 MMOL/L — HIGH (ref -2–3)
BASE EXCESS BLDV CALC-SCNC: 5.2 MMOL/L — HIGH (ref -2–3)
BASOPHILS # BLD AUTO: 0 K/UL — SIGNIFICANT CHANGE UP (ref 0–0.2)
BASOPHILS # BLD AUTO: 0 K/UL — SIGNIFICANT CHANGE UP (ref 0–0.2)
BASOPHILS # BLD AUTO: 0.01 K/UL — SIGNIFICANT CHANGE UP (ref 0–0.2)
BASOPHILS # BLD AUTO: 0.02 K/UL — SIGNIFICANT CHANGE UP (ref 0–0.2)
BASOPHILS # BLD AUTO: 0.03 K/UL
BASOPHILS # BLD AUTO: 0.03 K/UL — SIGNIFICANT CHANGE UP (ref 0–0.2)
BASOPHILS # BLD AUTO: 0.04 K/UL — SIGNIFICANT CHANGE UP (ref 0–0.2)
BASOPHILS # BLD AUTO: 0.05 K/UL
BASOPHILS # BLD AUTO: 0.05 K/UL — SIGNIFICANT CHANGE UP (ref 0–0.2)
BASOPHILS # BLD AUTO: 0.08 K/UL — SIGNIFICANT CHANGE UP (ref 0–0.2)
BASOPHILS # BLD AUTO: 0.09 K/UL — SIGNIFICANT CHANGE UP (ref 0–0.2)
BASOPHILS NFR BLD AUTO: 0 % — SIGNIFICANT CHANGE UP (ref 0–1)
BASOPHILS NFR BLD AUTO: 0 % — SIGNIFICANT CHANGE UP (ref 0–1)
BASOPHILS NFR BLD AUTO: 0.1 % — SIGNIFICANT CHANGE UP (ref 0–1)
BASOPHILS NFR BLD AUTO: 0.2 %
BASOPHILS NFR BLD AUTO: 0.2 % — SIGNIFICANT CHANGE UP (ref 0–1)
BASOPHILS NFR BLD AUTO: 0.3 % — SIGNIFICANT CHANGE UP (ref 0–1)
BASOPHILS NFR BLD AUTO: 0.4 % — SIGNIFICANT CHANGE UP (ref 0–1)
BASOPHILS NFR BLD AUTO: 0.5 %
BASOPHILS NFR BLD AUTO: 0.5 % — SIGNIFICANT CHANGE UP (ref 0–1)
BASOPHILS NFR BLD AUTO: 0.6 % — SIGNIFICANT CHANGE UP (ref 0–1)
BASOPHILS NFR BLD AUTO: 0.7 % — SIGNIFICANT CHANGE UP (ref 0–1)
BASOPHILS NFR BLD AUTO: 0.9 % — SIGNIFICANT CHANGE UP (ref 0–1)
BILIRUB DIRECT SERPL-MCNC: 0.2 MG/DL — SIGNIFICANT CHANGE UP (ref 0–0.2)
BILIRUB DIRECT SERPL-MCNC: 0.3 MG/DL
BILIRUB DIRECT SERPL-MCNC: 0.3 MG/DL
BILIRUB DIRECT SERPL-MCNC: 0.3 MG/DL — HIGH (ref 0–0.2)
BILIRUB DIRECT SERPL-MCNC: 0.3 MG/DL — HIGH (ref 0–0.2)
BILIRUB DIRECT SERPL-MCNC: 0.5 MG/DL
BILIRUB DIRECT SERPL-MCNC: 0.6 MG/DL — HIGH (ref 0–0.2)
BILIRUB DIRECT SERPL-MCNC: <0.2 MG/DL — SIGNIFICANT CHANGE UP (ref 0–0.2)
BILIRUB INDIRECT FLD-MCNC: 0.2 MG/DL — SIGNIFICANT CHANGE UP (ref 0.2–1.2)
BILIRUB INDIRECT FLD-MCNC: 0.2 MG/DL — SIGNIFICANT CHANGE UP (ref 0.2–1.2)
BILIRUB INDIRECT FLD-MCNC: 0.3 MG/DL — SIGNIFICANT CHANGE UP (ref 0.2–1.2)
BILIRUB INDIRECT FLD-MCNC: 0.3 MG/DL — SIGNIFICANT CHANGE UP (ref 0.2–1.2)
BILIRUB INDIRECT FLD-MCNC: 0.4 MG/DL — SIGNIFICANT CHANGE UP (ref 0.2–1.2)
BILIRUB INDIRECT FLD-MCNC: 0.5 MG/DL — SIGNIFICANT CHANGE UP (ref 0.2–1.2)
BILIRUB INDIRECT FLD-MCNC: 0.9 MG/DL — SIGNIFICANT CHANGE UP (ref 0.2–1.2)
BILIRUB INDIRECT FLD-MCNC: >0.1 MG/DL — LOW (ref 0.2–1.2)
BILIRUB INDIRECT SERPL-MCNC: 0.2 MG/DL
BILIRUB INDIRECT SERPL-MCNC: 0.3 MG/DL
BILIRUB INDIRECT SERPL-MCNC: 0.4 MG/DL
BILIRUB SERPL-MCNC: 0.2 MG/DL — SIGNIFICANT CHANGE UP (ref 0.2–1.2)
BILIRUB SERPL-MCNC: 0.3 MG/DL — SIGNIFICANT CHANGE UP (ref 0.2–1.2)
BILIRUB SERPL-MCNC: 0.4 MG/DL
BILIRUB SERPL-MCNC: 0.4 MG/DL — SIGNIFICANT CHANGE UP (ref 0.2–1.2)
BILIRUB SERPL-MCNC: 0.5 MG/DL
BILIRUB SERPL-MCNC: 0.5 MG/DL
BILIRUB SERPL-MCNC: 0.5 MG/DL — SIGNIFICANT CHANGE UP (ref 0.2–1.2)
BILIRUB SERPL-MCNC: 0.6 MG/DL
BILIRUB SERPL-MCNC: 0.6 MG/DL — SIGNIFICANT CHANGE UP (ref 0.2–1.2)
BILIRUB SERPL-MCNC: 0.6 MG/DL — SIGNIFICANT CHANGE UP (ref 0.2–1.2)
BILIRUB SERPL-MCNC: 0.7 MG/DL — SIGNIFICANT CHANGE UP (ref 0.2–1.2)
BILIRUB SERPL-MCNC: 0.9 MG/DL
BILIRUB SERPL-MCNC: 1.5 MG/DL — HIGH (ref 0.2–1.2)
BILIRUB SERPL-MCNC: <0.2 MG/DL — SIGNIFICANT CHANGE UP (ref 0.2–1.2)
BILIRUB UR-MCNC: NEGATIVE — SIGNIFICANT CHANGE UP
BLASTS # FLD: 0.9 % — HIGH (ref 0–0)
BLD GP AB SCN SERPL QL: SIGNIFICANT CHANGE UP
BLD GP AB SCN SERPL QL: SIGNIFICANT CHANGE UP
BUN SERPL-MCNC: 10 MG/DL — SIGNIFICANT CHANGE UP (ref 10–20)
BUN SERPL-MCNC: 11 MG/DL
BUN SERPL-MCNC: 11 MG/DL — SIGNIFICANT CHANGE UP (ref 10–20)
BUN SERPL-MCNC: 12 MG/DL — SIGNIFICANT CHANGE UP (ref 10–20)
BUN SERPL-MCNC: 13 MG/DL — SIGNIFICANT CHANGE UP (ref 10–20)
BUN SERPL-MCNC: 13 MG/DL — SIGNIFICANT CHANGE UP (ref 10–20)
BUN SERPL-MCNC: 14 MG/DL — SIGNIFICANT CHANGE UP (ref 10–20)
BUN SERPL-MCNC: 15 MG/DL — SIGNIFICANT CHANGE UP (ref 10–20)
BUN SERPL-MCNC: 16 MG/DL
BUN SERPL-MCNC: 16 MG/DL — SIGNIFICANT CHANGE UP (ref 10–20)
BUN SERPL-MCNC: 17 MG/DL
BUN SERPL-MCNC: 17 MG/DL — SIGNIFICANT CHANGE UP (ref 10–20)
BUN SERPL-MCNC: 17 MG/DL — SIGNIFICANT CHANGE UP (ref 10–20)
BUN SERPL-MCNC: 7 MG/DL — LOW (ref 10–20)
BUN SERPL-MCNC: 7 MG/DL — LOW (ref 10–20)
BUN SERPL-MCNC: 8 MG/DL — LOW (ref 10–20)
BUN SERPL-MCNC: 8 MG/DL — LOW (ref 10–20)
BUN SERPL-MCNC: 9 MG/DL
BUN SERPL-MCNC: 9 MG/DL — LOW (ref 10–20)
C DIFF BY PCR RESULT: POSITIVE
C DIFF TOX GENS STL QL NAA+PROBE: SIGNIFICANT CHANGE UP
CA-I SERPL-SCNC: 1.09 MMOL/L — LOW (ref 1.15–1.33)
CA-I SERPL-SCNC: 1.1 MMOL/L — LOW (ref 1.15–1.33)
CALCIUM SERPL-MCNC: 7.2 MG/DL — LOW (ref 8.5–10.1)
CALCIUM SERPL-MCNC: 7.3 MG/DL — LOW (ref 8.5–10.1)
CALCIUM SERPL-MCNC: 7.5 MG/DL — LOW (ref 8.4–10.5)
CALCIUM SERPL-MCNC: 7.5 MG/DL — LOW (ref 8.5–10.1)
CALCIUM SERPL-MCNC: 7.6 MG/DL — LOW (ref 8.5–10.1)
CALCIUM SERPL-MCNC: 7.7 MG/DL — LOW (ref 8.5–10.1)
CALCIUM SERPL-MCNC: 7.8 MG/DL
CALCIUM SERPL-MCNC: 7.8 MG/DL — LOW (ref 8.5–10.1)
CALCIUM SERPL-MCNC: 7.9 MG/DL — LOW (ref 8.5–10.1)
CALCIUM SERPL-MCNC: 8 MG/DL
CALCIUM SERPL-MCNC: 8 MG/DL — LOW (ref 8.5–10.1)
CALCIUM SERPL-MCNC: 8.1 MG/DL — LOW (ref 8.5–10.1)
CALCIUM SERPL-MCNC: 8.2 MG/DL
CALCIUM SERPL-MCNC: 8.2 MG/DL — LOW (ref 8.5–10.1)
CALCIUM SERPL-MCNC: 8.2 MG/DL — LOW (ref 8.5–10.1)
CALCIUM SERPL-MCNC: 8.3 MG/DL — LOW (ref 8.5–10.1)
CALCIUM SERPL-MCNC: 8.3 MG/DL — LOW (ref 8.5–10.1)
CALCIUM SERPL-MCNC: 8.4 MG/DL — LOW (ref 8.5–10.1)
CALCIUM SERPL-MCNC: 8.5 MG/DL — SIGNIFICANT CHANGE UP (ref 8.5–10.1)
CALCIUM SERPL-MCNC: 8.8 MG/DL — SIGNIFICANT CHANGE UP (ref 8.5–10.1)
CALCIUM SERPL-MCNC: 8.8 MG/DL — SIGNIFICANT CHANGE UP (ref 8.5–10.1)
CALCIUM SERPL-MCNC: 8.9 MG/DL
CANCER AG19-9 SERPL-ACNC: 73 U/ML
CANCER AG19-9 SERPL-ACNC: 92 U/ML
CEA SERPL-MCNC: 4.9 NG/ML
CHLORIDE SERPL-SCNC: 101 MMOL/L — SIGNIFICANT CHANGE UP (ref 98–110)
CHLORIDE SERPL-SCNC: 102 MMOL/L
CHLORIDE SERPL-SCNC: 102 MMOL/L
CHLORIDE SERPL-SCNC: 102 MMOL/L — SIGNIFICANT CHANGE UP (ref 98–110)
CHLORIDE SERPL-SCNC: 103 MMOL/L
CHLORIDE SERPL-SCNC: 103 MMOL/L
CHLORIDE SERPL-SCNC: 103 MMOL/L — SIGNIFICANT CHANGE UP (ref 98–110)
CHLORIDE SERPL-SCNC: 104 MMOL/L — SIGNIFICANT CHANGE UP (ref 98–110)
CHLORIDE SERPL-SCNC: 105 MMOL/L — SIGNIFICANT CHANGE UP (ref 98–110)
CHLORIDE SERPL-SCNC: 106 MMOL/L — SIGNIFICANT CHANGE UP (ref 98–110)
CHLORIDE SERPL-SCNC: 107 MMOL/L — SIGNIFICANT CHANGE UP (ref 98–110)
CHLORIDE SERPL-SCNC: 107 MMOL/L — SIGNIFICANT CHANGE UP (ref 98–110)
CHLORIDE SERPL-SCNC: 109 MMOL/L — SIGNIFICANT CHANGE UP (ref 98–110)
CHLORIDE SERPL-SCNC: 95 MMOL/L — LOW (ref 98–110)
CHLORIDE SERPL-SCNC: 97 MMOL/L — LOW (ref 98–110)
CHLORIDE SERPL-SCNC: 97 MMOL/L — LOW (ref 98–110)
CHLORIDE SERPL-SCNC: 99 MMOL/L — SIGNIFICANT CHANGE UP (ref 98–110)
CK MB CFR SERPL CALC: 3.8 NG/ML — SIGNIFICANT CHANGE UP (ref 0.6–6.3)
CK MB CFR SERPL CALC: 5.2 NG/ML — SIGNIFICANT CHANGE UP (ref 0.6–6.3)
CK MB CFR SERPL CALC: 5.2 NG/ML — SIGNIFICANT CHANGE UP (ref 0.6–6.3)
CK SERPL-CCNC: 137 U/L — SIGNIFICANT CHANGE UP (ref 0–225)
CK SERPL-CCNC: 327 U/L — HIGH (ref 0–225)
CK SERPL-CCNC: 342 U/L — HIGH (ref 0–225)
CK SERPL-CCNC: 392 U/L — HIGH (ref 0–225)
CO2 SERPL-SCNC: 15 MMOL/L — LOW (ref 17–32)
CO2 SERPL-SCNC: 19 MMOL/L — SIGNIFICANT CHANGE UP (ref 17–32)
CO2 SERPL-SCNC: 20 MMOL/L
CO2 SERPL-SCNC: 20 MMOL/L — SIGNIFICANT CHANGE UP (ref 17–32)
CO2 SERPL-SCNC: 20 MMOL/L — SIGNIFICANT CHANGE UP (ref 17–32)
CO2 SERPL-SCNC: 21 MMOL/L — SIGNIFICANT CHANGE UP (ref 17–32)
CO2 SERPL-SCNC: 22 MMOL/L — SIGNIFICANT CHANGE UP (ref 17–32)
CO2 SERPL-SCNC: 23 MMOL/L — SIGNIFICANT CHANGE UP (ref 17–32)
CO2 SERPL-SCNC: 24 MMOL/L
CO2 SERPL-SCNC: 24 MMOL/L — SIGNIFICANT CHANGE UP (ref 17–32)
CO2 SERPL-SCNC: 25 MMOL/L — SIGNIFICANT CHANGE UP (ref 17–32)
CO2 SERPL-SCNC: 26 MMOL/L — SIGNIFICANT CHANGE UP (ref 17–32)
CO2 SERPL-SCNC: 27 MMOL/L
CO2 SERPL-SCNC: 27 MMOL/L — SIGNIFICANT CHANGE UP (ref 17–32)
CO2 SERPL-SCNC: 27 MMOL/L — SIGNIFICANT CHANGE UP (ref 17–32)
CO2 SERPL-SCNC: 28 MMOL/L — SIGNIFICANT CHANGE UP (ref 17–32)
CO2 SERPL-SCNC: 29 MMOL/L
CO2 SERPL-SCNC: 29 MMOL/L — SIGNIFICANT CHANGE UP (ref 17–32)
CO2 SERPL-SCNC: 29 MMOL/L — SIGNIFICANT CHANGE UP (ref 17–32)
CO2 SERPL-SCNC: 31 MMOL/L — SIGNIFICANT CHANGE UP (ref 17–32)
COD CRY URNS QL: ABNORMAL
COLOR SPEC: SIGNIFICANT CHANGE UP
COLOR SPEC: YELLOW — SIGNIFICANT CHANGE UP
COMMENT - URINE: SIGNIFICANT CHANGE UP
COVID-19 NUCLEOCAPSID GAM AB INTERP: POSITIVE
COVID-19 NUCLEOCAPSID TOTAL GAM ANTIBODY RESULT: 95.4 INDEX — HIGH
COVID-19 SPIKE DOMAIN AB INTERP: POSITIVE
COVID-19 SPIKE DOMAIN ANTIBODY RESULT: 48.3 U/ML — HIGH
COVID-19 SPIKE DOMAIN ANTIBODY RESULT: 48.9 U/ML — HIGH
COVID-19 SPIKE DOMAIN ANTIBODY RESULT: 73.3 U/ML — HIGH
COVID-19 SPIKE DOMAIN ANTIBODY RESULT: 74 U/ML — HIGH
COVID-19 SPIKE DOMAIN ANTIBODY RESULT: 88.2 U/ML — HIGH
CREAT SERPL-MCNC: 0.5 MG/DL
CREAT SERPL-MCNC: 0.5 MG/DL
CREAT SERPL-MCNC: 0.5 MG/DL — LOW (ref 0.7–1.5)
CREAT SERPL-MCNC: 0.6 MG/DL
CREAT SERPL-MCNC: 0.6 MG/DL — LOW (ref 0.7–1.5)
CREAT SERPL-MCNC: 0.7 MG/DL — SIGNIFICANT CHANGE UP (ref 0.7–1.5)
CREAT SERPL-MCNC: 0.8 MG/DL — SIGNIFICANT CHANGE UP (ref 0.7–1.5)
CREAT SERPL-MCNC: <0.5 MG/DL
CREAT SERPL-MCNC: <0.5 MG/DL — LOW (ref 0.7–1.5)
CULTURE RESULTS: SIGNIFICANT CHANGE UP
DIFF PNL FLD: ABNORMAL
DIFF PNL FLD: NEGATIVE — SIGNIFICANT CHANGE UP
EOSINOPHIL # BLD AUTO: 0 K/UL — SIGNIFICANT CHANGE UP (ref 0–0.7)
EOSINOPHIL # BLD AUTO: 0.01 K/UL
EOSINOPHIL # BLD AUTO: 0.01 K/UL — SIGNIFICANT CHANGE UP (ref 0–0.7)
EOSINOPHIL # BLD AUTO: 0.03 K/UL — SIGNIFICANT CHANGE UP (ref 0–0.7)
EOSINOPHIL # BLD AUTO: 0.04 K/UL — SIGNIFICANT CHANGE UP (ref 0–0.7)
EOSINOPHIL # BLD AUTO: 0.05 K/UL — SIGNIFICANT CHANGE UP (ref 0–0.7)
EOSINOPHIL # BLD AUTO: 0.06 K/UL — SIGNIFICANT CHANGE UP (ref 0–0.7)
EOSINOPHIL # BLD AUTO: 0.07 K/UL — SIGNIFICANT CHANGE UP (ref 0–0.7)
EOSINOPHIL # BLD AUTO: 0.08 K/UL — SIGNIFICANT CHANGE UP (ref 0–0.7)
EOSINOPHIL # BLD AUTO: 0.08 K/UL — SIGNIFICANT CHANGE UP (ref 0–0.7)
EOSINOPHIL # BLD AUTO: 0.09 K/UL — SIGNIFICANT CHANGE UP (ref 0–0.7)
EOSINOPHIL # BLD AUTO: 0.09 K/UL — SIGNIFICANT CHANGE UP (ref 0–0.7)
EOSINOPHIL # BLD AUTO: 0.1 K/UL — SIGNIFICANT CHANGE UP (ref 0–0.7)
EOSINOPHIL # BLD AUTO: 0.1 K/UL — SIGNIFICANT CHANGE UP (ref 0–0.7)
EOSINOPHIL # BLD AUTO: 0.11 K/UL — SIGNIFICANT CHANGE UP (ref 0–0.7)
EOSINOPHIL # BLD AUTO: 0.12 K/UL
EOSINOPHIL # BLD AUTO: 0.13 K/UL — SIGNIFICANT CHANGE UP (ref 0–0.7)
EOSINOPHIL # BLD AUTO: 0.14 K/UL — SIGNIFICANT CHANGE UP (ref 0–0.7)
EOSINOPHIL # BLD AUTO: 0.15 K/UL — SIGNIFICANT CHANGE UP (ref 0–0.7)
EOSINOPHIL # BLD AUTO: 0.16 K/UL — SIGNIFICANT CHANGE UP (ref 0–0.7)
EOSINOPHIL # BLD AUTO: 0.16 K/UL — SIGNIFICANT CHANGE UP (ref 0–0.7)
EOSINOPHIL # BLD AUTO: 0.17 K/UL — SIGNIFICANT CHANGE UP (ref 0–0.7)
EOSINOPHIL # BLD AUTO: 0.18 K/UL — SIGNIFICANT CHANGE UP (ref 0–0.7)
EOSINOPHIL # BLD AUTO: 0.18 K/UL — SIGNIFICANT CHANGE UP (ref 0–0.7)
EOSINOPHIL # BLD AUTO: 0.19 K/UL — SIGNIFICANT CHANGE UP (ref 0–0.7)
EOSINOPHIL # BLD AUTO: 0.22 K/UL — SIGNIFICANT CHANGE UP (ref 0–0.7)
EOSINOPHIL # BLD AUTO: 0.23 K/UL — SIGNIFICANT CHANGE UP (ref 0–0.7)
EOSINOPHIL # BLD AUTO: 0.25 K/UL — SIGNIFICANT CHANGE UP (ref 0–0.7)
EOSINOPHIL # BLD AUTO: 0.28 K/UL — SIGNIFICANT CHANGE UP (ref 0–0.7)
EOSINOPHIL # BLD AUTO: 0.29 K/UL — SIGNIFICANT CHANGE UP (ref 0–0.7)
EOSINOPHIL # BLD AUTO: 0.33 K/UL — SIGNIFICANT CHANGE UP (ref 0–0.7)
EOSINOPHIL # BLD AUTO: 0.36 K/UL — SIGNIFICANT CHANGE UP (ref 0–0.7)
EOSINOPHIL # BLD AUTO: 0.38 K/UL — SIGNIFICANT CHANGE UP (ref 0–0.7)
EOSINOPHIL # BLD AUTO: 0.66 K/UL — SIGNIFICANT CHANGE UP (ref 0–0.7)
EOSINOPHIL # BLD AUTO: 0.69 K/UL — SIGNIFICANT CHANGE UP (ref 0–0.7)
EOSINOPHIL # BLD AUTO: 0.72 K/UL — HIGH (ref 0–0.7)
EOSINOPHIL # BLD AUTO: 0.74 K/UL — HIGH (ref 0–0.7)
EOSINOPHIL # BLD AUTO: 1.04 K/UL — HIGH (ref 0–0.7)
EOSINOPHIL # BLD AUTO: 1.19 K/UL — HIGH (ref 0–0.7)
EOSINOPHIL NFR BLD AUTO: 0 % — SIGNIFICANT CHANGE UP (ref 0–8)
EOSINOPHIL NFR BLD AUTO: 0.1 %
EOSINOPHIL NFR BLD AUTO: 0.1 % — SIGNIFICANT CHANGE UP (ref 0–8)
EOSINOPHIL NFR BLD AUTO: 0.3 % — SIGNIFICANT CHANGE UP (ref 0–8)
EOSINOPHIL NFR BLD AUTO: 0.6 % — SIGNIFICANT CHANGE UP (ref 0–8)
EOSINOPHIL NFR BLD AUTO: 0.7 % — SIGNIFICANT CHANGE UP (ref 0–8)
EOSINOPHIL NFR BLD AUTO: 0.7 % — SIGNIFICANT CHANGE UP (ref 0–8)
EOSINOPHIL NFR BLD AUTO: 0.8 % — SIGNIFICANT CHANGE UP (ref 0–8)
EOSINOPHIL NFR BLD AUTO: 0.8 % — SIGNIFICANT CHANGE UP (ref 0–8)
EOSINOPHIL NFR BLD AUTO: 0.9 % — SIGNIFICANT CHANGE UP (ref 0–8)
EOSINOPHIL NFR BLD AUTO: 0.9 % — SIGNIFICANT CHANGE UP (ref 0–8)
EOSINOPHIL NFR BLD AUTO: 1 % — SIGNIFICANT CHANGE UP (ref 0–8)
EOSINOPHIL NFR BLD AUTO: 1.1 % — SIGNIFICANT CHANGE UP (ref 0–8)
EOSINOPHIL NFR BLD AUTO: 1.3 %
EOSINOPHIL NFR BLD AUTO: 1.3 % — SIGNIFICANT CHANGE UP (ref 0–8)
EOSINOPHIL NFR BLD AUTO: 1.3 % — SIGNIFICANT CHANGE UP (ref 0–8)
EOSINOPHIL NFR BLD AUTO: 1.4 % — SIGNIFICANT CHANGE UP (ref 0–8)
EOSINOPHIL NFR BLD AUTO: 1.6 % — SIGNIFICANT CHANGE UP (ref 0–8)
EOSINOPHIL NFR BLD AUTO: 1.6 % — SIGNIFICANT CHANGE UP (ref 0–8)
EOSINOPHIL NFR BLD AUTO: 1.7 % — SIGNIFICANT CHANGE UP (ref 0–8)
EOSINOPHIL NFR BLD AUTO: 1.9 % — SIGNIFICANT CHANGE UP (ref 0–8)
EOSINOPHIL NFR BLD AUTO: 10.8 % — HIGH (ref 0–8)
EOSINOPHIL NFR BLD AUTO: 2 % — SIGNIFICANT CHANGE UP (ref 0–8)
EOSINOPHIL NFR BLD AUTO: 2.2 % — SIGNIFICANT CHANGE UP (ref 0–8)
EOSINOPHIL NFR BLD AUTO: 2.6 % — SIGNIFICANT CHANGE UP (ref 0–8)
EOSINOPHIL NFR BLD AUTO: 2.7 % — SIGNIFICANT CHANGE UP (ref 0–8)
EOSINOPHIL NFR BLD AUTO: 2.8 % — SIGNIFICANT CHANGE UP (ref 0–8)
EOSINOPHIL NFR BLD AUTO: 2.8 % — SIGNIFICANT CHANGE UP (ref 0–8)
EOSINOPHIL NFR BLD AUTO: 3 % — SIGNIFICANT CHANGE UP (ref 0–8)
EOSINOPHIL NFR BLD AUTO: 3 % — SIGNIFICANT CHANGE UP (ref 0–8)
EOSINOPHIL NFR BLD AUTO: 3.2 % — SIGNIFICANT CHANGE UP (ref 0–8)
EOSINOPHIL NFR BLD AUTO: 3.3 % — SIGNIFICANT CHANGE UP (ref 0–8)
EOSINOPHIL NFR BLD AUTO: 3.5 % — SIGNIFICANT CHANGE UP (ref 0–8)
EOSINOPHIL NFR BLD AUTO: 3.6 % — SIGNIFICANT CHANGE UP (ref 0–8)
EOSINOPHIL NFR BLD AUTO: 3.7 % — SIGNIFICANT CHANGE UP (ref 0–8)
EOSINOPHIL NFR BLD AUTO: 3.8 % — SIGNIFICANT CHANGE UP (ref 0–8)
EOSINOPHIL NFR BLD AUTO: 4 % — SIGNIFICANT CHANGE UP (ref 0–8)
EOSINOPHIL NFR BLD AUTO: 5 % — SIGNIFICANT CHANGE UP (ref 0–8)
EOSINOPHIL NFR BLD AUTO: 5.5 % — SIGNIFICANT CHANGE UP (ref 0–8)
EOSINOPHIL NFR BLD AUTO: 5.9 % — SIGNIFICANT CHANGE UP (ref 0–8)
EOSINOPHIL NFR BLD AUTO: 6.3 % — SIGNIFICANT CHANGE UP (ref 0–8)
EOSINOPHIL NFR BLD AUTO: 6.4 % — SIGNIFICANT CHANGE UP (ref 0–8)
EOSINOPHIL NFR BLD AUTO: 9.9 % — HIGH (ref 0–8)
EPI CELLS # UR: 0 /HPF — SIGNIFICANT CHANGE UP (ref 0–5)
EPI CELLS # UR: 1 /HPF — SIGNIFICANT CHANGE UP (ref 0–5)
EPI CELLS # UR: 1 /HPF — SIGNIFICANT CHANGE UP (ref 0–5)
EPI CELLS # UR: 2 /HPF — SIGNIFICANT CHANGE UP (ref 0–5)
ESTIMATED AVERAGE GLUCOSE: 134 MG/DL — HIGH (ref 68–114)
FOLATE SERPL-MCNC: 11.3 NG/ML
GAS PNL BLDA: SIGNIFICANT CHANGE UP
GAS PNL BLDA: SIGNIFICANT CHANGE UP
GAS PNL BLDV: 127 MMOL/L — LOW (ref 136–145)
GAS PNL BLDV: 132 MMOL/L — LOW (ref 136–145)
GAS PNL BLDV: SIGNIFICANT CHANGE UP
GAS PNL BLDV: SIGNIFICANT CHANGE UP
GGT SERPL-CCNC: 215 U/L — HIGH (ref 1–40)
GIANT PLATELETS BLD QL SMEAR: PRESENT — SIGNIFICANT CHANGE UP
GLUCOSE BLDC GLUCOMTR-MCNC: 100 MG/DL — HIGH (ref 70–99)
GLUCOSE BLDC GLUCOMTR-MCNC: 112 MG/DL — HIGH (ref 70–99)
GLUCOSE BLDC GLUCOMTR-MCNC: 120 MG/DL — HIGH (ref 70–99)
GLUCOSE BLDC GLUCOMTR-MCNC: 124 MG/DL — HIGH (ref 70–99)
GLUCOSE BLDC GLUCOMTR-MCNC: 125 MG/DL — HIGH (ref 70–99)
GLUCOSE BLDC GLUCOMTR-MCNC: 129 MG/DL — HIGH (ref 70–99)
GLUCOSE BLDC GLUCOMTR-MCNC: 130 MG/DL — HIGH (ref 70–99)
GLUCOSE BLDC GLUCOMTR-MCNC: 133 MG/DL — HIGH (ref 70–99)
GLUCOSE BLDC GLUCOMTR-MCNC: 133 MG/DL — HIGH (ref 70–99)
GLUCOSE BLDC GLUCOMTR-MCNC: 139 MG/DL — HIGH (ref 70–99)
GLUCOSE BLDC GLUCOMTR-MCNC: 140 MG/DL — HIGH (ref 70–99)
GLUCOSE BLDC GLUCOMTR-MCNC: 141 MG/DL — HIGH (ref 70–99)
GLUCOSE BLDC GLUCOMTR-MCNC: 144 MG/DL — HIGH (ref 70–99)
GLUCOSE BLDC GLUCOMTR-MCNC: 188 MG/DL — HIGH (ref 70–99)
GLUCOSE BLDC GLUCOMTR-MCNC: 79 MG/DL — SIGNIFICANT CHANGE UP (ref 70–99)
GLUCOSE BLDC GLUCOMTR-MCNC: 88 MG/DL — SIGNIFICANT CHANGE UP (ref 70–99)
GLUCOSE SERPL-MCNC: 100 MG/DL — HIGH (ref 70–99)
GLUCOSE SERPL-MCNC: 101 MG/DL
GLUCOSE SERPL-MCNC: 101 MG/DL — HIGH (ref 70–99)
GLUCOSE SERPL-MCNC: 102 MG/DL — HIGH (ref 70–99)
GLUCOSE SERPL-MCNC: 103 MG/DL — HIGH (ref 70–99)
GLUCOSE SERPL-MCNC: 104 MG/DL — HIGH (ref 70–99)
GLUCOSE SERPL-MCNC: 105 MG/DL — HIGH (ref 70–99)
GLUCOSE SERPL-MCNC: 111 MG/DL — HIGH (ref 70–99)
GLUCOSE SERPL-MCNC: 113 MG/DL
GLUCOSE SERPL-MCNC: 114 MG/DL — HIGH (ref 70–99)
GLUCOSE SERPL-MCNC: 116 MG/DL — HIGH (ref 70–99)
GLUCOSE SERPL-MCNC: 117 MG/DL — HIGH (ref 70–99)
GLUCOSE SERPL-MCNC: 117 MG/DL — HIGH (ref 70–99)
GLUCOSE SERPL-MCNC: 118 MG/DL — HIGH (ref 70–99)
GLUCOSE SERPL-MCNC: 118 MG/DL — HIGH (ref 70–99)
GLUCOSE SERPL-MCNC: 119 MG/DL — HIGH (ref 70–99)
GLUCOSE SERPL-MCNC: 120 MG/DL — HIGH (ref 70–99)
GLUCOSE SERPL-MCNC: 120 MG/DL — HIGH (ref 70–99)
GLUCOSE SERPL-MCNC: 125 MG/DL — HIGH (ref 70–99)
GLUCOSE SERPL-MCNC: 125 MG/DL — HIGH (ref 70–99)
GLUCOSE SERPL-MCNC: 126 MG/DL — HIGH (ref 70–99)
GLUCOSE SERPL-MCNC: 126 MG/DL — HIGH (ref 70–99)
GLUCOSE SERPL-MCNC: 129 MG/DL — HIGH (ref 70–99)
GLUCOSE SERPL-MCNC: 130 MG/DL
GLUCOSE SERPL-MCNC: 131 MG/DL — HIGH (ref 70–99)
GLUCOSE SERPL-MCNC: 131 MG/DL — HIGH (ref 70–99)
GLUCOSE SERPL-MCNC: 133 MG/DL — HIGH (ref 70–99)
GLUCOSE SERPL-MCNC: 136 MG/DL — HIGH (ref 70–99)
GLUCOSE SERPL-MCNC: 138 MG/DL — HIGH (ref 70–99)
GLUCOSE SERPL-MCNC: 139 MG/DL — HIGH (ref 70–99)
GLUCOSE SERPL-MCNC: 140 MG/DL — HIGH (ref 70–99)
GLUCOSE SERPL-MCNC: 145 MG/DL — HIGH (ref 70–99)
GLUCOSE SERPL-MCNC: 147 MG/DL
GLUCOSE SERPL-MCNC: 150 MG/DL — HIGH (ref 70–99)
GLUCOSE SERPL-MCNC: 162 MG/DL — HIGH (ref 70–99)
GLUCOSE SERPL-MCNC: 162 MG/DL — HIGH (ref 70–99)
GLUCOSE SERPL-MCNC: 163 MG/DL — HIGH (ref 70–99)
GLUCOSE SERPL-MCNC: 163 MG/DL — HIGH (ref 70–99)
GLUCOSE SERPL-MCNC: 81 MG/DL — SIGNIFICANT CHANGE UP (ref 70–99)
GLUCOSE SERPL-MCNC: 84 MG/DL — SIGNIFICANT CHANGE UP (ref 70–99)
GLUCOSE SERPL-MCNC: 87 MG/DL — SIGNIFICANT CHANGE UP (ref 70–99)
GLUCOSE SERPL-MCNC: 87 MG/DL — SIGNIFICANT CHANGE UP (ref 70–99)
GLUCOSE SERPL-MCNC: 92 MG/DL — SIGNIFICANT CHANGE UP (ref 70–99)
GLUCOSE SERPL-MCNC: 93 MG/DL — SIGNIFICANT CHANGE UP (ref 70–99)
GLUCOSE SERPL-MCNC: 94 MG/DL — SIGNIFICANT CHANGE UP (ref 70–99)
GLUCOSE SERPL-MCNC: 95 MG/DL — SIGNIFICANT CHANGE UP (ref 70–99)
GLUCOSE SERPL-MCNC: 97 MG/DL — SIGNIFICANT CHANGE UP (ref 70–99)
GLUCOSE SERPL-MCNC: 98 MG/DL — SIGNIFICANT CHANGE UP (ref 70–99)
GLUCOSE SERPL-MCNC: 98 MG/DL — SIGNIFICANT CHANGE UP (ref 70–99)
GLUCOSE SERPL-MCNC: 99 MG/DL — SIGNIFICANT CHANGE UP (ref 70–99)
GLUCOSE UR QL: NEGATIVE — SIGNIFICANT CHANGE UP
GRAM STN FLD: SIGNIFICANT CHANGE UP
HCO3 BLDV-SCNC: 31 MMOL/L — HIGH (ref 22–29)
HCO3 BLDV-SCNC: 31 MMOL/L — HIGH (ref 22–29)
HCT VFR BLD CALC: 29 % — LOW (ref 37–47)
HCT VFR BLD CALC: 29.3 %
HCT VFR BLD CALC: 30.6 %
HCT VFR BLD CALC: 31.7 % — LOW (ref 37–47)
HCT VFR BLD CALC: 32.4 %
HCT VFR BLD CALC: 32.9 % — LOW (ref 37–47)
HCT VFR BLD CALC: 33.4 % — LOW (ref 37–47)
HCT VFR BLD CALC: 33.6 % — LOW (ref 37–47)
HCT VFR BLD CALC: 33.7 % — LOW (ref 37–47)
HCT VFR BLD CALC: 33.8 % — LOW (ref 37–47)
HCT VFR BLD CALC: 33.9 %
HCT VFR BLD CALC: 33.9 % — LOW (ref 37–47)
HCT VFR BLD CALC: 34.1 % — LOW (ref 37–47)
HCT VFR BLD CALC: 34.4 % — LOW (ref 37–47)
HCT VFR BLD CALC: 34.4 % — LOW (ref 37–47)
HCT VFR BLD CALC: 34.5 % — LOW (ref 37–47)
HCT VFR BLD CALC: 34.6 % — LOW (ref 37–47)
HCT VFR BLD CALC: 34.7 %
HCT VFR BLD CALC: 35.2 %
HCT VFR BLD CALC: 35.3 % — LOW (ref 37–47)
HCT VFR BLD CALC: 35.4 % — LOW (ref 37–47)
HCT VFR BLD CALC: 35.7 % — LOW (ref 37–47)
HCT VFR BLD CALC: 36.1 % — LOW (ref 37–47)
HCT VFR BLD CALC: 36.1 % — LOW (ref 37–47)
HCT VFR BLD CALC: 36.3 % — LOW (ref 37–47)
HCT VFR BLD CALC: 36.5 % — LOW (ref 37–47)
HCT VFR BLD CALC: 36.8 % — LOW (ref 37–47)
HCT VFR BLD CALC: 36.9 % — LOW (ref 37–47)
HCT VFR BLD CALC: 37 % — SIGNIFICANT CHANGE UP (ref 37–47)
HCT VFR BLD CALC: 37.1 % — SIGNIFICANT CHANGE UP (ref 37–47)
HCT VFR BLD CALC: 37.2 % — SIGNIFICANT CHANGE UP (ref 37–47)
HCT VFR BLD CALC: 37.3 % — SIGNIFICANT CHANGE UP (ref 37–47)
HCT VFR BLD CALC: 37.4 % — SIGNIFICANT CHANGE UP (ref 37–47)
HCT VFR BLD CALC: 37.7 % — SIGNIFICANT CHANGE UP (ref 37–47)
HCT VFR BLD CALC: 37.8 %
HCT VFR BLD CALC: 37.8 % — SIGNIFICANT CHANGE UP (ref 37–47)
HCT VFR BLD CALC: 38 % — SIGNIFICANT CHANGE UP (ref 37–47)
HCT VFR BLD CALC: 38.1 % — SIGNIFICANT CHANGE UP (ref 37–47)
HCT VFR BLD CALC: 38.1 % — SIGNIFICANT CHANGE UP (ref 37–47)
HCT VFR BLD CALC: 38.2 %
HCT VFR BLD CALC: 38.3 % — SIGNIFICANT CHANGE UP (ref 37–47)
HCT VFR BLD CALC: 38.4 % — SIGNIFICANT CHANGE UP (ref 37–47)
HCT VFR BLD CALC: 38.5 % — SIGNIFICANT CHANGE UP (ref 37–47)
HCT VFR BLD CALC: 38.6 %
HCT VFR BLD CALC: 38.8 %
HCT VFR BLD CALC: 39 % — SIGNIFICANT CHANGE UP (ref 37–47)
HCT VFR BLD CALC: 39.2 % — SIGNIFICANT CHANGE UP (ref 37–47)
HCT VFR BLD CALC: 39.9 % — SIGNIFICANT CHANGE UP (ref 37–47)
HCT VFR BLD CALC: 40.5 % — SIGNIFICANT CHANGE UP (ref 37–47)
HCT VFR BLD CALC: 40.6 % — SIGNIFICANT CHANGE UP (ref 37–47)
HCT VFR BLD CALC: 40.7 % — SIGNIFICANT CHANGE UP (ref 37–47)
HCT VFR BLD CALC: 41.1 % — SIGNIFICANT CHANGE UP (ref 37–47)
HCT VFR BLD CALC: 41.1 % — SIGNIFICANT CHANGE UP (ref 37–47)
HCT VFR BLD CALC: 41.3 % — SIGNIFICANT CHANGE UP (ref 37–47)
HCT VFR BLD CALC: 41.4 % — SIGNIFICANT CHANGE UP (ref 37–47)
HCT VFR BLD CALC: 41.4 % — SIGNIFICANT CHANGE UP (ref 37–47)
HCT VFR BLD CALC: 42.5 % — SIGNIFICANT CHANGE UP (ref 37–47)
HCT VFR BLD CALC: 42.6 % — SIGNIFICANT CHANGE UP (ref 37–47)
HCT VFR BLD CALC: 42.7 % — SIGNIFICANT CHANGE UP (ref 37–47)
HCT VFR BLD CALC: 43.1 % — SIGNIFICANT CHANGE UP (ref 37–47)
HCT VFR BLD CALC: 44.3 % — SIGNIFICANT CHANGE UP (ref 37–47)
HCT VFR BLDA CALC: 35 % — SIGNIFICANT CHANGE UP (ref 34.5–46.5)
HCT VFR BLDA CALC: 44 % — SIGNIFICANT CHANGE UP (ref 34.5–46.5)
HGB BLD CALC-MCNC: 11.8 G/DL — SIGNIFICANT CHANGE UP (ref 11.7–16.1)
HGB BLD CALC-MCNC: 14.8 G/DL — SIGNIFICANT CHANGE UP (ref 11.7–16.1)
HGB BLD-MCNC: 10 G/DL
HGB BLD-MCNC: 10.2 G/DL — LOW (ref 12–16)
HGB BLD-MCNC: 10.4 G/DL
HGB BLD-MCNC: 10.4 G/DL — LOW (ref 12–16)
HGB BLD-MCNC: 10.5 G/DL — LOW (ref 12–16)
HGB BLD-MCNC: 10.6 G/DL — LOW (ref 12–16)
HGB BLD-MCNC: 10.6 G/DL — LOW (ref 12–16)
HGB BLD-MCNC: 10.7 G/DL — LOW (ref 12–16)
HGB BLD-MCNC: 10.8 G/DL
HGB BLD-MCNC: 10.8 G/DL — LOW (ref 12–16)
HGB BLD-MCNC: 10.9 G/DL
HGB BLD-MCNC: 10.9 G/DL — LOW (ref 12–16)
HGB BLD-MCNC: 10.9 G/DL — LOW (ref 12–16)
HGB BLD-MCNC: 11 G/DL — LOW (ref 12–16)
HGB BLD-MCNC: 11 G/DL — LOW (ref 12–16)
HGB BLD-MCNC: 11.1 G/DL — LOW (ref 12–16)
HGB BLD-MCNC: 11.2 G/DL
HGB BLD-MCNC: 11.2 G/DL — LOW (ref 12–16)
HGB BLD-MCNC: 11.5 G/DL — LOW (ref 12–16)
HGB BLD-MCNC: 11.6 G/DL
HGB BLD-MCNC: 11.6 G/DL — LOW (ref 12–16)
HGB BLD-MCNC: 11.7 G/DL — LOW (ref 12–16)
HGB BLD-MCNC: 11.8 G/DL — LOW (ref 12–16)
HGB BLD-MCNC: 11.9 G/DL — LOW (ref 12–16)
HGB BLD-MCNC: 12 G/DL — SIGNIFICANT CHANGE UP (ref 12–16)
HGB BLD-MCNC: 12.1 G/DL — SIGNIFICANT CHANGE UP (ref 12–16)
HGB BLD-MCNC: 12.2 G/DL
HGB BLD-MCNC: 12.2 G/DL
HGB BLD-MCNC: 12.3 G/DL
HGB BLD-MCNC: 12.4 G/DL — SIGNIFICANT CHANGE UP (ref 12–16)
HGB BLD-MCNC: 12.5 G/DL — SIGNIFICANT CHANGE UP (ref 12–16)
HGB BLD-MCNC: 12.7 G/DL — SIGNIFICANT CHANGE UP (ref 12–16)
HGB BLD-MCNC: 12.9 G/DL — SIGNIFICANT CHANGE UP (ref 12–16)
HGB BLD-MCNC: 13 G/DL — SIGNIFICANT CHANGE UP (ref 12–16)
HGB BLD-MCNC: 13.1 G/DL — SIGNIFICANT CHANGE UP (ref 12–16)
HGB BLD-MCNC: 13.2 G/DL — SIGNIFICANT CHANGE UP (ref 12–16)
HGB BLD-MCNC: 13.2 G/DL — SIGNIFICANT CHANGE UP (ref 12–16)
HGB BLD-MCNC: 13.5 G/DL — SIGNIFICANT CHANGE UP (ref 12–16)
HGB BLD-MCNC: 13.6 G/DL — SIGNIFICANT CHANGE UP (ref 12–16)
HGB BLD-MCNC: 13.9 G/DL — SIGNIFICANT CHANGE UP (ref 12–16)
HGB BLD-MCNC: 14 G/DL — SIGNIFICANT CHANGE UP (ref 12–16)
HGB BLD-MCNC: 8.8 G/DL — LOW (ref 12–16)
HGB BLD-MCNC: 9 G/DL
HGB BLD-MCNC: 9.8 G/DL — LOW (ref 12–16)
HYALINE CASTS # UR AUTO: 0 /LPF — SIGNIFICANT CHANGE UP (ref 0–7)
HYALINE CASTS # UR AUTO: 1 /LPF — SIGNIFICANT CHANGE UP (ref 0–7)
HYALINE CASTS # UR AUTO: 3 /LPF — SIGNIFICANT CHANGE UP (ref 0–7)
HYALINE CASTS # UR AUTO: 4 /LPF — SIGNIFICANT CHANGE UP (ref 0–7)
IMM GRANULOCYTES NFR BLD AUTO: 0.1 % — SIGNIFICANT CHANGE UP (ref 0.1–0.3)
IMM GRANULOCYTES NFR BLD AUTO: 0.2 % — SIGNIFICANT CHANGE UP (ref 0.1–0.3)
IMM GRANULOCYTES NFR BLD AUTO: 0.3 %
IMM GRANULOCYTES NFR BLD AUTO: 0.3 % — SIGNIFICANT CHANGE UP (ref 0.1–0.3)
IMM GRANULOCYTES NFR BLD AUTO: 0.4 %
IMM GRANULOCYTES NFR BLD AUTO: 0.4 % — HIGH (ref 0.1–0.3)
IMM GRANULOCYTES NFR BLD AUTO: 0.5 % — HIGH (ref 0.1–0.3)
IMM GRANULOCYTES NFR BLD AUTO: 0.6 % — HIGH (ref 0.1–0.3)
IMM GRANULOCYTES NFR BLD AUTO: 0.7 % — HIGH (ref 0.1–0.3)
IMM GRANULOCYTES NFR BLD AUTO: 0.8 % — HIGH (ref 0.1–0.3)
IMM GRANULOCYTES NFR BLD AUTO: 0.9 % — HIGH (ref 0.1–0.3)
INR BLD: 1.29 RATIO — SIGNIFICANT CHANGE UP (ref 0.65–1.3)
INR BLD: 1.82 RATIO — HIGH (ref 0.65–1.3)
INR BLD: 1.88 RATIO — HIGH (ref 0.65–1.3)
INR BLD: 1.98 RATIO — HIGH (ref 0.65–1.3)
INR BLD: 4.09 RATIO — HIGH (ref 0.65–1.3)
INR BLD: 4.09 RATIO — HIGH (ref 0.65–1.3)
KETONES UR-MCNC: NEGATIVE — SIGNIFICANT CHANGE UP
LACTATE BLDV-MCNC: 1.8 MMOL/L — SIGNIFICANT CHANGE UP (ref 0.5–2)
LACTATE BLDV-MCNC: 3 MMOL/L — HIGH (ref 0.5–2)
LACTATE SERPL-SCNC: 1.6 MMOL/L — SIGNIFICANT CHANGE UP (ref 0.7–2)
LACTATE SERPL-SCNC: 2.1 MMOL/L — HIGH (ref 0.7–2)
LACTATE SERPL-SCNC: 2.7 MMOL/L — HIGH (ref 0.7–2)
LEUKOCYTE ESTERASE UR-ACNC: ABNORMAL
LYMPHOCYTES # BLD AUTO: 0.71 K/UL — LOW (ref 1.2–3.4)
LYMPHOCYTES # BLD AUTO: 0.78 K/UL — LOW (ref 1.2–3.4)
LYMPHOCYTES # BLD AUTO: 0.8 K/UL — LOW (ref 1.2–3.4)
LYMPHOCYTES # BLD AUTO: 0.85 K/UL — LOW (ref 1.2–3.4)
LYMPHOCYTES # BLD AUTO: 0.94 K/UL — LOW (ref 1.2–3.4)
LYMPHOCYTES # BLD AUTO: 0.95 K/UL — LOW (ref 1.2–3.4)
LYMPHOCYTES # BLD AUTO: 0.96 K/UL — LOW (ref 1.2–3.4)
LYMPHOCYTES # BLD AUTO: 0.96 K/UL — LOW (ref 1.2–3.4)
LYMPHOCYTES # BLD AUTO: 1.02 K/UL — LOW (ref 1.2–3.4)
LYMPHOCYTES # BLD AUTO: 1.03 K/UL — LOW (ref 1.2–3.4)
LYMPHOCYTES # BLD AUTO: 1.06 K/UL — LOW (ref 1.2–3.4)
LYMPHOCYTES # BLD AUTO: 1.09 K/UL — LOW (ref 1.2–3.4)
LYMPHOCYTES # BLD AUTO: 1.1 K/UL — LOW (ref 1.2–3.4)
LYMPHOCYTES # BLD AUTO: 1.12 K/UL
LYMPHOCYTES # BLD AUTO: 1.14 K/UL — LOW (ref 1.2–3.4)
LYMPHOCYTES # BLD AUTO: 1.15 K/UL — LOW (ref 1.2–3.4)
LYMPHOCYTES # BLD AUTO: 1.16 K/UL — LOW (ref 1.2–3.4)
LYMPHOCYTES # BLD AUTO: 1.27 K/UL — SIGNIFICANT CHANGE UP (ref 1.2–3.4)
LYMPHOCYTES # BLD AUTO: 1.29 K/UL — SIGNIFICANT CHANGE UP (ref 1.2–3.4)
LYMPHOCYTES # BLD AUTO: 1.33 K/UL — SIGNIFICANT CHANGE UP (ref 1.2–3.4)
LYMPHOCYTES # BLD AUTO: 1.33 K/UL — SIGNIFICANT CHANGE UP (ref 1.2–3.4)
LYMPHOCYTES # BLD AUTO: 1.35 K/UL — SIGNIFICANT CHANGE UP (ref 1.2–3.4)
LYMPHOCYTES # BLD AUTO: 1.42 K/UL — SIGNIFICANT CHANGE UP (ref 1.2–3.4)
LYMPHOCYTES # BLD AUTO: 1.46 K/UL — SIGNIFICANT CHANGE UP (ref 1.2–3.4)
LYMPHOCYTES # BLD AUTO: 1.46 K/UL — SIGNIFICANT CHANGE UP (ref 1.2–3.4)
LYMPHOCYTES # BLD AUTO: 1.53 K/UL — SIGNIFICANT CHANGE UP (ref 1.2–3.4)
LYMPHOCYTES # BLD AUTO: 1.54 K/UL — SIGNIFICANT CHANGE UP (ref 1.2–3.4)
LYMPHOCYTES # BLD AUTO: 1.56 K/UL — SIGNIFICANT CHANGE UP (ref 1.2–3.4)
LYMPHOCYTES # BLD AUTO: 1.57 K/UL — SIGNIFICANT CHANGE UP (ref 1.2–3.4)
LYMPHOCYTES # BLD AUTO: 1.58 K/UL — SIGNIFICANT CHANGE UP (ref 1.2–3.4)
LYMPHOCYTES # BLD AUTO: 1.59 K/UL — SIGNIFICANT CHANGE UP (ref 1.2–3.4)
LYMPHOCYTES # BLD AUTO: 1.59 K/UL — SIGNIFICANT CHANGE UP (ref 1.2–3.4)
LYMPHOCYTES # BLD AUTO: 1.6 K/UL — SIGNIFICANT CHANGE UP (ref 1.2–3.4)
LYMPHOCYTES # BLD AUTO: 1.63 K/UL — SIGNIFICANT CHANGE UP (ref 1.2–3.4)
LYMPHOCYTES # BLD AUTO: 1.64 K/UL
LYMPHOCYTES # BLD AUTO: 1.66 K/UL — SIGNIFICANT CHANGE UP (ref 1.2–3.4)
LYMPHOCYTES # BLD AUTO: 1.69 K/UL — SIGNIFICANT CHANGE UP (ref 1.2–3.4)
LYMPHOCYTES # BLD AUTO: 1.74 K/UL — SIGNIFICANT CHANGE UP (ref 1.2–3.4)
LYMPHOCYTES # BLD AUTO: 1.75 K/UL — SIGNIFICANT CHANGE UP (ref 1.2–3.4)
LYMPHOCYTES # BLD AUTO: 1.76 K/UL — SIGNIFICANT CHANGE UP (ref 1.2–3.4)
LYMPHOCYTES # BLD AUTO: 1.8 K/UL — SIGNIFICANT CHANGE UP (ref 1.2–3.4)
LYMPHOCYTES # BLD AUTO: 1.81 K/UL — SIGNIFICANT CHANGE UP (ref 1.2–3.4)
LYMPHOCYTES # BLD AUTO: 1.91 K/UL — SIGNIFICANT CHANGE UP (ref 1.2–3.4)
LYMPHOCYTES # BLD AUTO: 10.6 % — LOW (ref 20.5–51.1)
LYMPHOCYTES # BLD AUTO: 11 % — LOW (ref 20.5–51.1)
LYMPHOCYTES # BLD AUTO: 15.3 % — LOW (ref 20.5–51.1)
LYMPHOCYTES # BLD AUTO: 15.8 % — LOW (ref 20.5–51.1)
LYMPHOCYTES # BLD AUTO: 16 % — LOW (ref 20.5–51.1)
LYMPHOCYTES # BLD AUTO: 16.2 % — LOW (ref 20.5–51.1)
LYMPHOCYTES # BLD AUTO: 17.7 % — LOW (ref 20.5–51.1)
LYMPHOCYTES # BLD AUTO: 17.9 % — LOW (ref 20.5–51.1)
LYMPHOCYTES # BLD AUTO: 19.7 % — LOW (ref 20.5–51.1)
LYMPHOCYTES # BLD AUTO: 2.02 K/UL — SIGNIFICANT CHANGE UP (ref 1.2–3.4)
LYMPHOCYTES # BLD AUTO: 2.02 K/UL — SIGNIFICANT CHANGE UP (ref 1.2–3.4)
LYMPHOCYTES # BLD AUTO: 2.17 K/UL — SIGNIFICANT CHANGE UP (ref 1.2–3.4)
LYMPHOCYTES # BLD AUTO: 2.24 K/UL — SIGNIFICANT CHANGE UP (ref 1.2–3.4)
LYMPHOCYTES # BLD AUTO: 20.3 % — LOW (ref 20.5–51.1)
LYMPHOCYTES # BLD AUTO: 20.3 % — LOW (ref 20.5–51.1)
LYMPHOCYTES # BLD AUTO: 21.2 % — SIGNIFICANT CHANGE UP (ref 20.5–51.1)
LYMPHOCYTES # BLD AUTO: 21.3 % — SIGNIFICANT CHANGE UP (ref 20.5–51.1)
LYMPHOCYTES # BLD AUTO: 21.8 % — SIGNIFICANT CHANGE UP (ref 20.5–51.1)
LYMPHOCYTES # BLD AUTO: 21.8 % — SIGNIFICANT CHANGE UP (ref 20.5–51.1)
LYMPHOCYTES # BLD AUTO: 22 % — SIGNIFICANT CHANGE UP (ref 20.5–51.1)
LYMPHOCYTES # BLD AUTO: 22.3 % — SIGNIFICANT CHANGE UP (ref 20.5–51.1)
LYMPHOCYTES # BLD AUTO: 24.1 % — SIGNIFICANT CHANGE UP (ref 20.5–51.1)
LYMPHOCYTES # BLD AUTO: 24.6 % — SIGNIFICANT CHANGE UP (ref 20.5–51.1)
LYMPHOCYTES # BLD AUTO: 24.8 % — SIGNIFICANT CHANGE UP (ref 20.5–51.1)
LYMPHOCYTES # BLD AUTO: 27.7 % — SIGNIFICANT CHANGE UP (ref 20.5–51.1)
LYMPHOCYTES # BLD AUTO: 28 % — SIGNIFICANT CHANGE UP (ref 20.5–51.1)
LYMPHOCYTES # BLD AUTO: 28 % — SIGNIFICANT CHANGE UP (ref 20.5–51.1)
LYMPHOCYTES # BLD AUTO: 29.3 % — SIGNIFICANT CHANGE UP (ref 20.5–51.1)
LYMPHOCYTES # BLD AUTO: 3.5 % — LOW (ref 20.5–51.1)
LYMPHOCYTES # BLD AUTO: 30.4 % — SIGNIFICANT CHANGE UP (ref 20.5–51.1)
LYMPHOCYTES # BLD AUTO: 30.6 % — SIGNIFICANT CHANGE UP (ref 20.5–51.1)
LYMPHOCYTES # BLD AUTO: 31.3 % — SIGNIFICANT CHANGE UP (ref 20.5–51.1)
LYMPHOCYTES # BLD AUTO: 31.6 % — SIGNIFICANT CHANGE UP (ref 20.5–51.1)
LYMPHOCYTES # BLD AUTO: 34 % — SIGNIFICANT CHANGE UP (ref 20.5–51.1)
LYMPHOCYTES # BLD AUTO: 34.5 % — SIGNIFICANT CHANGE UP (ref 20.5–51.1)
LYMPHOCYTES # BLD AUTO: 41.1 % — SIGNIFICANT CHANGE UP (ref 20.5–51.1)
LYMPHOCYTES # BLD AUTO: 42 % — SIGNIFICANT CHANGE UP (ref 20.5–51.1)
LYMPHOCYTES # BLD AUTO: 45.6 % — SIGNIFICANT CHANGE UP (ref 20.5–51.1)
LYMPHOCYTES # BLD AUTO: 5.2 % — LOW (ref 20.5–51.1)
LYMPHOCYTES # BLD AUTO: 6.4 % — LOW (ref 20.5–51.1)
LYMPHOCYTES # BLD AUTO: 7 % — LOW (ref 20.5–51.1)
LYMPHOCYTES # BLD AUTO: 7.7 % — LOW (ref 20.5–51.1)
LYMPHOCYTES # BLD AUTO: 8.5 % — LOW (ref 20.5–51.1)
LYMPHOCYTES # BLD AUTO: 8.7 % — LOW (ref 20.5–51.1)
LYMPHOCYTES # BLD AUTO: 9.1 % — LOW (ref 20.5–51.1)
LYMPHOCYTES # BLD AUTO: 9.2 % — LOW (ref 20.5–51.1)
LYMPHOCYTES # BLD AUTO: 9.6 % — LOW (ref 20.5–51.1)
LYMPHOCYTES # BLD AUTO: 9.6 % — LOW (ref 20.5–51.1)
LYMPHOCYTES # BLD AUTO: 9.9 % — LOW (ref 20.5–51.1)
LYMPHOCYTES NFR BLD AUTO: 18 %
LYMPHOCYTES NFR BLD AUTO: 7.6 %
MACROCYTES BLD QL: SLIGHT — SIGNIFICANT CHANGE UP
MAGNESIUM SERPL-MCNC: 1.5 MG/DL — LOW (ref 1.8–2.4)
MAGNESIUM SERPL-MCNC: 1.6 MG/DL — LOW (ref 1.8–2.4)
MAGNESIUM SERPL-MCNC: 1.7 MG/DL
MAGNESIUM SERPL-MCNC: 1.7 MG/DL — LOW (ref 1.8–2.4)
MAGNESIUM SERPL-MCNC: 1.8 MG/DL
MAGNESIUM SERPL-MCNC: 1.8 MG/DL — SIGNIFICANT CHANGE UP (ref 1.8–2.4)
MAGNESIUM SERPL-MCNC: 1.9 MG/DL — SIGNIFICANT CHANGE UP (ref 1.8–2.4)
MAGNESIUM SERPL-MCNC: 2 MG/DL — SIGNIFICANT CHANGE UP (ref 1.8–2.4)
MAGNESIUM SERPL-MCNC: 2.4 MG/DL — SIGNIFICANT CHANGE UP (ref 1.8–2.4)
MAN DIFF?: NORMAL
MAN DIFF?: NORMAL
MANUAL SMEAR VERIFICATION: SIGNIFICANT CHANGE UP
MCHC RBC-ENTMCNC: 25.8 PG — LOW (ref 27–31)
MCHC RBC-ENTMCNC: 26 PG — LOW (ref 27–31)
MCHC RBC-ENTMCNC: 26 PG — LOW (ref 27–31)
MCHC RBC-ENTMCNC: 26.2 PG — LOW (ref 27–31)
MCHC RBC-ENTMCNC: 26.2 PG — LOW (ref 27–31)
MCHC RBC-ENTMCNC: 26.3 PG — LOW (ref 27–31)
MCHC RBC-ENTMCNC: 26.3 PG — LOW (ref 27–31)
MCHC RBC-ENTMCNC: 26.4 PG — LOW (ref 27–31)
MCHC RBC-ENTMCNC: 26.4 PG — LOW (ref 27–31)
MCHC RBC-ENTMCNC: 26.5 PG — LOW (ref 27–31)
MCHC RBC-ENTMCNC: 26.6 PG — LOW (ref 27–31)
MCHC RBC-ENTMCNC: 26.7 PG — LOW (ref 27–31)
MCHC RBC-ENTMCNC: 26.7 PG — LOW (ref 27–31)
MCHC RBC-ENTMCNC: 26.8 PG — LOW (ref 27–31)
MCHC RBC-ENTMCNC: 26.8 PG — LOW (ref 27–31)
MCHC RBC-ENTMCNC: 27 PG — SIGNIFICANT CHANGE UP (ref 27–31)
MCHC RBC-ENTMCNC: 27.1 PG — SIGNIFICANT CHANGE UP (ref 27–31)
MCHC RBC-ENTMCNC: 27.2 PG
MCHC RBC-ENTMCNC: 27.2 PG — SIGNIFICANT CHANGE UP (ref 27–31)
MCHC RBC-ENTMCNC: 27.3 PG — SIGNIFICANT CHANGE UP (ref 27–31)
MCHC RBC-ENTMCNC: 27.5 PG — SIGNIFICANT CHANGE UP (ref 27–31)
MCHC RBC-ENTMCNC: 27.6 PG — SIGNIFICANT CHANGE UP (ref 27–31)
MCHC RBC-ENTMCNC: 27.7 PG
MCHC RBC-ENTMCNC: 27.7 PG — SIGNIFICANT CHANGE UP (ref 27–31)
MCHC RBC-ENTMCNC: 27.7 PG — SIGNIFICANT CHANGE UP (ref 27–31)
MCHC RBC-ENTMCNC: 27.8 PG — SIGNIFICANT CHANGE UP (ref 27–31)
MCHC RBC-ENTMCNC: 27.9 PG — SIGNIFICANT CHANGE UP (ref 27–31)
MCHC RBC-ENTMCNC: 27.9 PG — SIGNIFICANT CHANGE UP (ref 27–31)
MCHC RBC-ENTMCNC: 28 PG
MCHC RBC-ENTMCNC: 28 PG — SIGNIFICANT CHANGE UP (ref 27–31)
MCHC RBC-ENTMCNC: 28 PG — SIGNIFICANT CHANGE UP (ref 27–31)
MCHC RBC-ENTMCNC: 28.1 PG — SIGNIFICANT CHANGE UP (ref 27–31)
MCHC RBC-ENTMCNC: 28.3 PG — SIGNIFICANT CHANGE UP (ref 27–31)
MCHC RBC-ENTMCNC: 28.5 PG — SIGNIFICANT CHANGE UP (ref 27–31)
MCHC RBC-ENTMCNC: 28.6 PG — SIGNIFICANT CHANGE UP (ref 27–31)
MCHC RBC-ENTMCNC: 28.7 PG — SIGNIFICANT CHANGE UP (ref 27–31)
MCHC RBC-ENTMCNC: 28.7 PG — SIGNIFICANT CHANGE UP (ref 27–31)
MCHC RBC-ENTMCNC: 28.8 PG — SIGNIFICANT CHANGE UP (ref 27–31)
MCHC RBC-ENTMCNC: 28.8 PG — SIGNIFICANT CHANGE UP (ref 27–31)
MCHC RBC-ENTMCNC: 28.9 PG — SIGNIFICANT CHANGE UP (ref 27–31)
MCHC RBC-ENTMCNC: 29 PG — SIGNIFICANT CHANGE UP (ref 27–31)
MCHC RBC-ENTMCNC: 29 PG — SIGNIFICANT CHANGE UP (ref 27–31)
MCHC RBC-ENTMCNC: 29.4 PG — SIGNIFICANT CHANGE UP (ref 27–31)
MCHC RBC-ENTMCNC: 29.6 PG — SIGNIFICANT CHANGE UP (ref 27–31)
MCHC RBC-ENTMCNC: 29.7 PG
MCHC RBC-ENTMCNC: 29.9 PG
MCHC RBC-ENTMCNC: 30.2 PG
MCHC RBC-ENTMCNC: 30.3 G/DL — LOW (ref 32–37)
MCHC RBC-ENTMCNC: 30.4 G/DL
MCHC RBC-ENTMCNC: 30.4 G/DL — LOW (ref 32–37)
MCHC RBC-ENTMCNC: 30.4 PG
MCHC RBC-ENTMCNC: 30.5 G/DL — LOW (ref 32–37)
MCHC RBC-ENTMCNC: 30.7 G/DL
MCHC RBC-ENTMCNC: 30.7 G/DL — LOW (ref 32–37)
MCHC RBC-ENTMCNC: 30.8 G/DL — LOW (ref 32–37)
MCHC RBC-ENTMCNC: 30.9 G/DL — LOW (ref 32–37)
MCHC RBC-ENTMCNC: 31 G/DL — LOW (ref 32–37)
MCHC RBC-ENTMCNC: 31 G/DL — LOW (ref 32–37)
MCHC RBC-ENTMCNC: 31.1 G/DL
MCHC RBC-ENTMCNC: 31.1 G/DL — LOW (ref 32–37)
MCHC RBC-ENTMCNC: 31.1 G/DL — LOW (ref 32–37)
MCHC RBC-ENTMCNC: 31.2 G/DL — LOW (ref 32–37)
MCHC RBC-ENTMCNC: 31.3 G/DL — LOW (ref 32–37)
MCHC RBC-ENTMCNC: 31.4 G/DL — LOW (ref 32–37)
MCHC RBC-ENTMCNC: 31.5 G/DL — LOW (ref 32–37)
MCHC RBC-ENTMCNC: 31.6 G/DL
MCHC RBC-ENTMCNC: 31.6 G/DL — LOW (ref 32–37)
MCHC RBC-ENTMCNC: 31.7 G/DL
MCHC RBC-ENTMCNC: 31.7 G/DL — LOW (ref 32–37)
MCHC RBC-ENTMCNC: 31.7 G/DL — LOW (ref 32–37)
MCHC RBC-ENTMCNC: 31.8 G/DL
MCHC RBC-ENTMCNC: 31.8 G/DL — LOW (ref 32–37)
MCHC RBC-ENTMCNC: 31.9 G/DL — LOW (ref 32–37)
MCHC RBC-ENTMCNC: 32 G/DL — SIGNIFICANT CHANGE UP (ref 32–37)
MCHC RBC-ENTMCNC: 32 G/DL — SIGNIFICANT CHANGE UP (ref 32–37)
MCHC RBC-ENTMCNC: 32.1 G/DL
MCHC RBC-ENTMCNC: 32.2 G/DL
MCHC RBC-ENTMCNC: 32.3 G/DL
MCHC RBC-ENTMCNC: 32.3 G/DL — SIGNIFICANT CHANGE UP (ref 32–37)
MCHC RBC-ENTMCNC: 32.3 G/DL — SIGNIFICANT CHANGE UP (ref 32–37)
MCHC RBC-ENTMCNC: 32.4 G/DL — SIGNIFICANT CHANGE UP (ref 32–37)
MCHC RBC-ENTMCNC: 32.6 G/DL — SIGNIFICANT CHANGE UP (ref 32–37)
MCHC RBC-ENTMCNC: 32.7 G/DL
MCHC RBC-ENTMCNC: 32.8 G/DL — SIGNIFICANT CHANGE UP (ref 32–37)
MCV RBC AUTO: 80.7 FL — LOW (ref 81–99)
MCV RBC AUTO: 80.8 FL — LOW (ref 81–99)
MCV RBC AUTO: 81.2 FL — SIGNIFICANT CHANGE UP (ref 81–99)
MCV RBC AUTO: 81.2 FL — SIGNIFICANT CHANGE UP (ref 81–99)
MCV RBC AUTO: 81.3 FL — SIGNIFICANT CHANGE UP (ref 81–99)
MCV RBC AUTO: 81.7 FL — SIGNIFICANT CHANGE UP (ref 81–99)
MCV RBC AUTO: 81.9 FL — SIGNIFICANT CHANGE UP (ref 81–99)
MCV RBC AUTO: 82.1 FL — SIGNIFICANT CHANGE UP (ref 81–99)
MCV RBC AUTO: 82.5 FL — SIGNIFICANT CHANGE UP (ref 81–99)
MCV RBC AUTO: 82.5 FL — SIGNIFICANT CHANGE UP (ref 81–99)
MCV RBC AUTO: 83.4 FL — SIGNIFICANT CHANGE UP (ref 81–99)
MCV RBC AUTO: 85 FL — SIGNIFICANT CHANGE UP (ref 81–99)
MCV RBC AUTO: 85.1 FL — SIGNIFICANT CHANGE UP (ref 81–99)
MCV RBC AUTO: 85.4 FL — SIGNIFICANT CHANGE UP (ref 81–99)
MCV RBC AUTO: 85.7 FL — SIGNIFICANT CHANGE UP (ref 81–99)
MCV RBC AUTO: 85.7 FL — SIGNIFICANT CHANGE UP (ref 81–99)
MCV RBC AUTO: 85.8 FL — SIGNIFICANT CHANGE UP (ref 81–99)
MCV RBC AUTO: 85.9 FL — SIGNIFICANT CHANGE UP (ref 81–99)
MCV RBC AUTO: 86 FL
MCV RBC AUTO: 86.1 FL — SIGNIFICANT CHANGE UP (ref 81–99)
MCV RBC AUTO: 87.3 FL — SIGNIFICANT CHANGE UP (ref 81–99)
MCV RBC AUTO: 87.4 FL — SIGNIFICANT CHANGE UP (ref 81–99)
MCV RBC AUTO: 87.5 FL — SIGNIFICANT CHANGE UP (ref 81–99)
MCV RBC AUTO: 87.5 FL — SIGNIFICANT CHANGE UP (ref 81–99)
MCV RBC AUTO: 87.8 FL — SIGNIFICANT CHANGE UP (ref 81–99)
MCV RBC AUTO: 88 FL — SIGNIFICANT CHANGE UP (ref 81–99)
MCV RBC AUTO: 88.1 FL — SIGNIFICANT CHANGE UP (ref 81–99)
MCV RBC AUTO: 88.2 FL — SIGNIFICANT CHANGE UP (ref 81–99)
MCV RBC AUTO: 88.3 FL — SIGNIFICANT CHANGE UP (ref 81–99)
MCV RBC AUTO: 88.7 FL — SIGNIFICANT CHANGE UP (ref 81–99)
MCV RBC AUTO: 88.8 FL — SIGNIFICANT CHANGE UP (ref 81–99)
MCV RBC AUTO: 89 FL — SIGNIFICANT CHANGE UP (ref 81–99)
MCV RBC AUTO: 89.3 FL — SIGNIFICANT CHANGE UP (ref 81–99)
MCV RBC AUTO: 89.4 FL — SIGNIFICANT CHANGE UP (ref 81–99)
MCV RBC AUTO: 89.4 FL — SIGNIFICANT CHANGE UP (ref 81–99)
MCV RBC AUTO: 89.5 FL — SIGNIFICANT CHANGE UP (ref 81–99)
MCV RBC AUTO: 89.8 FL — SIGNIFICANT CHANGE UP (ref 81–99)
MCV RBC AUTO: 89.8 FL — SIGNIFICANT CHANGE UP (ref 81–99)
MCV RBC AUTO: 89.9 FL
MCV RBC AUTO: 89.9 FL — SIGNIFICANT CHANGE UP (ref 81–99)
MCV RBC AUTO: 90.4 FL — SIGNIFICANT CHANGE UP (ref 81–99)
MCV RBC AUTO: 90.9 FL — SIGNIFICANT CHANGE UP (ref 81–99)
MCV RBC AUTO: 90.9 FL — SIGNIFICANT CHANGE UP (ref 81–99)
MCV RBC AUTO: 91.2 FL
MCV RBC AUTO: 91.3 FL — SIGNIFICANT CHANGE UP (ref 81–99)
MCV RBC AUTO: 91.4 FL — SIGNIFICANT CHANGE UP (ref 81–99)
MCV RBC AUTO: 91.7 FL — SIGNIFICANT CHANGE UP (ref 81–99)
MCV RBC AUTO: 91.7 FL — SIGNIFICANT CHANGE UP (ref 81–99)
MCV RBC AUTO: 92 FL — SIGNIFICANT CHANGE UP (ref 81–99)
MCV RBC AUTO: 92.2 FL — SIGNIFICANT CHANGE UP (ref 81–99)
MCV RBC AUTO: 92.4 FL — SIGNIFICANT CHANGE UP (ref 81–99)
MCV RBC AUTO: 93 FL
MCV RBC AUTO: 93.1 FL
MCV RBC AUTO: 93.3 FL — SIGNIFICANT CHANGE UP (ref 81–99)
MCV RBC AUTO: 93.5 FL — SIGNIFICANT CHANGE UP (ref 81–99)
MCV RBC AUTO: 94.3 FL
MCV RBC AUTO: 94.4 FL
MCV RBC AUTO: 94.9 FL
MCV RBC AUTO: 95.8 FL
MCV RBC AUTO: 96.7 FL
MCV RBC AUTO: 97.6 FL — SIGNIFICANT CHANGE UP (ref 81–99)
METHOD TYPE: SIGNIFICANT CHANGE UP
MICROCYTES BLD QL: SLIGHT — SIGNIFICANT CHANGE UP
MONOCYTES # BLD AUTO: 0.38 K/UL — SIGNIFICANT CHANGE UP (ref 0.1–0.6)
MONOCYTES # BLD AUTO: 0.38 K/UL — SIGNIFICANT CHANGE UP (ref 0.1–0.6)
MONOCYTES # BLD AUTO: 0.39 K/UL — SIGNIFICANT CHANGE UP (ref 0.1–0.6)
MONOCYTES # BLD AUTO: 0.41 K/UL — SIGNIFICANT CHANGE UP (ref 0.1–0.6)
MONOCYTES # BLD AUTO: 0.43 K/UL — SIGNIFICANT CHANGE UP (ref 0.1–0.6)
MONOCYTES # BLD AUTO: 0.45 K/UL — SIGNIFICANT CHANGE UP (ref 0.1–0.6)
MONOCYTES # BLD AUTO: 0.46 K/UL — SIGNIFICANT CHANGE UP (ref 0.1–0.6)
MONOCYTES # BLD AUTO: 0.46 K/UL — SIGNIFICANT CHANGE UP (ref 0.1–0.6)
MONOCYTES # BLD AUTO: 0.47 K/UL — SIGNIFICANT CHANGE UP (ref 0.1–0.6)
MONOCYTES # BLD AUTO: 0.49 K/UL — SIGNIFICANT CHANGE UP (ref 0.1–0.6)
MONOCYTES # BLD AUTO: 0.52 K/UL — SIGNIFICANT CHANGE UP (ref 0.1–0.6)
MONOCYTES # BLD AUTO: 0.53 K/UL — SIGNIFICANT CHANGE UP (ref 0.1–0.6)
MONOCYTES # BLD AUTO: 0.54 K/UL — SIGNIFICANT CHANGE UP (ref 0.1–0.6)
MONOCYTES # BLD AUTO: 0.56 K/UL
MONOCYTES # BLD AUTO: 0.56 K/UL — SIGNIFICANT CHANGE UP (ref 0.1–0.6)
MONOCYTES # BLD AUTO: 0.57 K/UL — SIGNIFICANT CHANGE UP (ref 0.1–0.6)
MONOCYTES # BLD AUTO: 0.59 K/UL — SIGNIFICANT CHANGE UP (ref 0.1–0.6)
MONOCYTES # BLD AUTO: 0.6 K/UL — SIGNIFICANT CHANGE UP (ref 0.1–0.6)
MONOCYTES # BLD AUTO: 0.61 K/UL — HIGH (ref 0.1–0.6)
MONOCYTES # BLD AUTO: 0.64 K/UL — HIGH (ref 0.1–0.6)
MONOCYTES # BLD AUTO: 0.65 K/UL — HIGH (ref 0.1–0.6)
MONOCYTES # BLD AUTO: 0.65 K/UL — HIGH (ref 0.1–0.6)
MONOCYTES # BLD AUTO: 0.66 K/UL — HIGH (ref 0.1–0.6)
MONOCYTES # BLD AUTO: 0.68 K/UL — HIGH (ref 0.1–0.6)
MONOCYTES # BLD AUTO: 0.69 K/UL — HIGH (ref 0.1–0.6)
MONOCYTES # BLD AUTO: 0.7 K/UL
MONOCYTES # BLD AUTO: 0.71 K/UL — HIGH (ref 0.1–0.6)
MONOCYTES # BLD AUTO: 0.78 K/UL — HIGH (ref 0.1–0.6)
MONOCYTES # BLD AUTO: 0.82 K/UL — HIGH (ref 0.1–0.6)
MONOCYTES # BLD AUTO: 0.84 K/UL — HIGH (ref 0.1–0.6)
MONOCYTES # BLD AUTO: 0.9 K/UL — HIGH (ref 0.1–0.6)
MONOCYTES # BLD AUTO: 0.92 K/UL — HIGH (ref 0.1–0.6)
MONOCYTES # BLD AUTO: 1.02 K/UL — HIGH (ref 0.1–0.6)
MONOCYTES # BLD AUTO: 1.03 K/UL — HIGH (ref 0.1–0.6)
MONOCYTES # BLD AUTO: 1.03 K/UL — HIGH (ref 0.1–0.6)
MONOCYTES # BLD AUTO: 1.16 K/UL — HIGH (ref 0.1–0.6)
MONOCYTES # BLD AUTO: 1.18 K/UL — HIGH (ref 0.1–0.6)
MONOCYTES # BLD AUTO: 1.22 K/UL — HIGH (ref 0.1–0.6)
MONOCYTES # BLD AUTO: 1.34 K/UL — HIGH (ref 0.1–0.6)
MONOCYTES NFR BLD AUTO: 1.7 % — SIGNIFICANT CHANGE UP (ref 1.7–9.3)
MONOCYTES NFR BLD AUTO: 10 % — HIGH (ref 1.7–9.3)
MONOCYTES NFR BLD AUTO: 10.1 % — HIGH (ref 1.7–9.3)
MONOCYTES NFR BLD AUTO: 10.6 % — HIGH (ref 1.7–9.3)
MONOCYTES NFR BLD AUTO: 12.2 % — HIGH (ref 1.7–9.3)
MONOCYTES NFR BLD AUTO: 12.4 % — HIGH (ref 1.7–9.3)
MONOCYTES NFR BLD AUTO: 12.4 % — HIGH (ref 1.7–9.3)
MONOCYTES NFR BLD AUTO: 12.6 % — HIGH (ref 1.7–9.3)
MONOCYTES NFR BLD AUTO: 12.9 % — HIGH (ref 1.7–9.3)
MONOCYTES NFR BLD AUTO: 13.9 % — HIGH (ref 1.7–9.3)
MONOCYTES NFR BLD AUTO: 3.8 %
MONOCYTES NFR BLD AUTO: 4.5 % — SIGNIFICANT CHANGE UP (ref 1.7–9.3)
MONOCYTES NFR BLD AUTO: 5.1 % — SIGNIFICANT CHANGE UP (ref 1.7–9.3)
MONOCYTES NFR BLD AUTO: 5.4 % — SIGNIFICANT CHANGE UP (ref 1.7–9.3)
MONOCYTES NFR BLD AUTO: 6.1 % — SIGNIFICANT CHANGE UP (ref 1.7–9.3)
MONOCYTES NFR BLD AUTO: 6.3 % — SIGNIFICANT CHANGE UP (ref 1.7–9.3)
MONOCYTES NFR BLD AUTO: 6.3 % — SIGNIFICANT CHANGE UP (ref 1.7–9.3)
MONOCYTES NFR BLD AUTO: 6.4 % — SIGNIFICANT CHANGE UP (ref 1.7–9.3)
MONOCYTES NFR BLD AUTO: 6.5 % — SIGNIFICANT CHANGE UP (ref 1.7–9.3)
MONOCYTES NFR BLD AUTO: 6.9 % — SIGNIFICANT CHANGE UP (ref 1.7–9.3)
MONOCYTES NFR BLD AUTO: 7 % — SIGNIFICANT CHANGE UP (ref 1.7–9.3)
MONOCYTES NFR BLD AUTO: 7 % — SIGNIFICANT CHANGE UP (ref 1.7–9.3)
MONOCYTES NFR BLD AUTO: 7.2 % — SIGNIFICANT CHANGE UP (ref 1.7–9.3)
MONOCYTES NFR BLD AUTO: 7.4 % — SIGNIFICANT CHANGE UP (ref 1.7–9.3)
MONOCYTES NFR BLD AUTO: 7.5 % — SIGNIFICANT CHANGE UP (ref 1.7–9.3)
MONOCYTES NFR BLD AUTO: 7.7 %
MONOCYTES NFR BLD AUTO: 7.9 % — SIGNIFICANT CHANGE UP (ref 1.7–9.3)
MONOCYTES NFR BLD AUTO: 8 % — SIGNIFICANT CHANGE UP (ref 1.7–9.3)
MONOCYTES NFR BLD AUTO: 8 % — SIGNIFICANT CHANGE UP (ref 1.7–9.3)
MONOCYTES NFR BLD AUTO: 8.2 % — SIGNIFICANT CHANGE UP (ref 1.7–9.3)
MONOCYTES NFR BLD AUTO: 8.3 % — SIGNIFICANT CHANGE UP (ref 1.7–9.3)
MONOCYTES NFR BLD AUTO: 8.3 % — SIGNIFICANT CHANGE UP (ref 1.7–9.3)
MONOCYTES NFR BLD AUTO: 8.5 % — SIGNIFICANT CHANGE UP (ref 1.7–9.3)
MONOCYTES NFR BLD AUTO: 8.6 % — SIGNIFICANT CHANGE UP (ref 1.7–9.3)
MONOCYTES NFR BLD AUTO: 8.7 % — SIGNIFICANT CHANGE UP (ref 1.7–9.3)
MONOCYTES NFR BLD AUTO: 9 % — SIGNIFICANT CHANGE UP (ref 1.7–9.3)
MONOCYTES NFR BLD AUTO: 9.1 % — SIGNIFICANT CHANGE UP (ref 1.7–9.3)
MONOCYTES NFR BLD AUTO: 9.1 % — SIGNIFICANT CHANGE UP (ref 1.7–9.3)
MONOCYTES NFR BLD AUTO: 9.2 % — SIGNIFICANT CHANGE UP (ref 1.7–9.3)
MONOCYTES NFR BLD AUTO: 9.4 % — HIGH (ref 1.7–9.3)
MONOCYTES NFR BLD AUTO: 9.4 % — HIGH (ref 1.7–9.3)
MONOCYTES NFR BLD AUTO: 9.6 % — HIGH (ref 1.7–9.3)
MONOCYTES NFR BLD AUTO: 9.7 % — HIGH (ref 1.7–9.3)
MYELOCYTES NFR BLD: 1.7 % — HIGH (ref 0–0)
NEUTROPHILS # BLD AUTO: 1.59 K/UL — SIGNIFICANT CHANGE UP (ref 1.4–6.5)
NEUTROPHILS # BLD AUTO: 1.79 K/UL — SIGNIFICANT CHANGE UP (ref 1.4–6.5)
NEUTROPHILS # BLD AUTO: 12.4 K/UL — HIGH (ref 1.4–6.5)
NEUTROPHILS # BLD AUTO: 12.94 K/UL
NEUTROPHILS # BLD AUTO: 19.45 K/UL — HIGH (ref 1.4–6.5)
NEUTROPHILS # BLD AUTO: 2.2 K/UL — SIGNIFICANT CHANGE UP (ref 1.4–6.5)
NEUTROPHILS # BLD AUTO: 2.54 K/UL — SIGNIFICANT CHANGE UP (ref 1.4–6.5)
NEUTROPHILS # BLD AUTO: 2.92 K/UL — SIGNIFICANT CHANGE UP (ref 1.4–6.5)
NEUTROPHILS # BLD AUTO: 2.94 K/UL — SIGNIFICANT CHANGE UP (ref 1.4–6.5)
NEUTROPHILS # BLD AUTO: 21.22 K/UL — HIGH (ref 1.4–6.5)
NEUTROPHILS # BLD AUTO: 3.01 K/UL — SIGNIFICANT CHANGE UP (ref 1.4–6.5)
NEUTROPHILS # BLD AUTO: 3.04 K/UL — SIGNIFICANT CHANGE UP (ref 1.4–6.5)
NEUTROPHILS # BLD AUTO: 3.18 K/UL — SIGNIFICANT CHANGE UP (ref 1.4–6.5)
NEUTROPHILS # BLD AUTO: 3.3 K/UL — SIGNIFICANT CHANGE UP (ref 1.4–6.5)
NEUTROPHILS # BLD AUTO: 3.35 K/UL — SIGNIFICANT CHANGE UP (ref 1.4–6.5)
NEUTROPHILS # BLD AUTO: 3.37 K/UL — SIGNIFICANT CHANGE UP (ref 1.4–6.5)
NEUTROPHILS # BLD AUTO: 3.4 K/UL — SIGNIFICANT CHANGE UP (ref 1.4–6.5)
NEUTROPHILS # BLD AUTO: 4.21 K/UL — SIGNIFICANT CHANGE UP (ref 1.4–6.5)
NEUTROPHILS # BLD AUTO: 4.22 K/UL — SIGNIFICANT CHANGE UP (ref 1.4–6.5)
NEUTROPHILS # BLD AUTO: 4.24 K/UL — SIGNIFICANT CHANGE UP (ref 1.4–6.5)
NEUTROPHILS # BLD AUTO: 4.35 K/UL — SIGNIFICANT CHANGE UP (ref 1.4–6.5)
NEUTROPHILS # BLD AUTO: 4.54 K/UL — SIGNIFICANT CHANGE UP (ref 1.4–6.5)
NEUTROPHILS # BLD AUTO: 4.61 K/UL — SIGNIFICANT CHANGE UP (ref 1.4–6.5)
NEUTROPHILS # BLD AUTO: 4.64 K/UL — SIGNIFICANT CHANGE UP (ref 1.4–6.5)
NEUTROPHILS # BLD AUTO: 4.81 K/UL — SIGNIFICANT CHANGE UP (ref 1.4–6.5)
NEUTROPHILS # BLD AUTO: 4.96 K/UL — SIGNIFICANT CHANGE UP (ref 1.4–6.5)
NEUTROPHILS # BLD AUTO: 4.99 K/UL — SIGNIFICANT CHANGE UP (ref 1.4–6.5)
NEUTROPHILS # BLD AUTO: 5.22 K/UL — SIGNIFICANT CHANGE UP (ref 1.4–6.5)
NEUTROPHILS # BLD AUTO: 5.36 K/UL — SIGNIFICANT CHANGE UP (ref 1.4–6.5)
NEUTROPHILS # BLD AUTO: 5.56 K/UL — SIGNIFICANT CHANGE UP (ref 1.4–6.5)
NEUTROPHILS # BLD AUTO: 5.71 K/UL — SIGNIFICANT CHANGE UP (ref 1.4–6.5)
NEUTROPHILS # BLD AUTO: 6.19 K/UL — SIGNIFICANT CHANGE UP (ref 1.4–6.5)
NEUTROPHILS # BLD AUTO: 6.45 K/UL — SIGNIFICANT CHANGE UP (ref 1.4–6.5)
NEUTROPHILS # BLD AUTO: 6.55 K/UL — HIGH (ref 1.4–6.5)
NEUTROPHILS # BLD AUTO: 6.56 K/UL
NEUTROPHILS # BLD AUTO: 6.61 K/UL — HIGH (ref 1.4–6.5)
NEUTROPHILS # BLD AUTO: 6.99 K/UL — HIGH (ref 1.4–6.5)
NEUTROPHILS # BLD AUTO: 7.66 K/UL — HIGH (ref 1.4–6.5)
NEUTROPHILS # BLD AUTO: 7.87 K/UL — HIGH (ref 1.4–6.5)
NEUTROPHILS # BLD AUTO: 8.13 K/UL — HIGH (ref 1.4–6.5)
NEUTROPHILS # BLD AUTO: 8.19 K/UL — HIGH (ref 1.4–6.5)
NEUTROPHILS # BLD AUTO: 8.22 K/UL — HIGH (ref 1.4–6.5)
NEUTROPHILS # BLD AUTO: 8.4 K/UL — HIGH (ref 1.4–6.5)
NEUTROPHILS # BLD AUTO: 8.47 K/UL — HIGH (ref 1.4–6.5)
NEUTROPHILS # BLD AUTO: 8.79 K/UL — HIGH (ref 1.4–6.5)
NEUTROPHILS # BLD AUTO: 9.22 K/UL — HIGH (ref 1.4–6.5)
NEUTROPHILS # BLD AUTO: 9.31 K/UL — HIGH (ref 1.4–6.5)
NEUTROPHILS NFR BLD AUTO: 40.4 % — LOW (ref 42.2–75.2)
NEUTROPHILS NFR BLD AUTO: 41.9 % — LOW (ref 42.2–75.2)
NEUTROPHILS NFR BLD AUTO: 44.9 % — SIGNIFICANT CHANGE UP (ref 42.2–75.2)
NEUTROPHILS NFR BLD AUTO: 51.6 % — SIGNIFICANT CHANGE UP (ref 42.2–75.2)
NEUTROPHILS NFR BLD AUTO: 53.7 % — SIGNIFICANT CHANGE UP (ref 42.2–75.2)
NEUTROPHILS NFR BLD AUTO: 55.6 % — SIGNIFICANT CHANGE UP (ref 42.2–75.2)
NEUTROPHILS NFR BLD AUTO: 57 % — SIGNIFICANT CHANGE UP (ref 42.2–75.2)
NEUTROPHILS NFR BLD AUTO: 57 % — SIGNIFICANT CHANGE UP (ref 42.2–75.2)
NEUTROPHILS NFR BLD AUTO: 57.1 % — SIGNIFICANT CHANGE UP (ref 42.2–75.2)
NEUTROPHILS NFR BLD AUTO: 57.5 % — SIGNIFICANT CHANGE UP (ref 42.2–75.2)
NEUTROPHILS NFR BLD AUTO: 58.9 % — SIGNIFICANT CHANGE UP (ref 42.2–75.2)
NEUTROPHILS NFR BLD AUTO: 59.9 % — SIGNIFICANT CHANGE UP (ref 42.2–75.2)
NEUTROPHILS NFR BLD AUTO: 60.2 % — SIGNIFICANT CHANGE UP (ref 42.2–75.2)
NEUTROPHILS NFR BLD AUTO: 61.6 % — SIGNIFICANT CHANGE UP (ref 42.2–75.2)
NEUTROPHILS NFR BLD AUTO: 62.8 % — SIGNIFICANT CHANGE UP (ref 42.2–75.2)
NEUTROPHILS NFR BLD AUTO: 64 % — SIGNIFICANT CHANGE UP (ref 42.2–75.2)
NEUTROPHILS NFR BLD AUTO: 64.9 % — SIGNIFICANT CHANGE UP (ref 42.2–75.2)
NEUTROPHILS NFR BLD AUTO: 65.2 % — SIGNIFICANT CHANGE UP (ref 42.2–75.2)
NEUTROPHILS NFR BLD AUTO: 66.5 % — SIGNIFICANT CHANGE UP (ref 42.2–75.2)
NEUTROPHILS NFR BLD AUTO: 67.3 % — SIGNIFICANT CHANGE UP (ref 42.2–75.2)
NEUTROPHILS NFR BLD AUTO: 67.8 % — SIGNIFICANT CHANGE UP (ref 42.2–75.2)
NEUTROPHILS NFR BLD AUTO: 68.2 % — SIGNIFICANT CHANGE UP (ref 42.2–75.2)
NEUTROPHILS NFR BLD AUTO: 68.4 % — SIGNIFICANT CHANGE UP (ref 42.2–75.2)
NEUTROPHILS NFR BLD AUTO: 68.7 % — SIGNIFICANT CHANGE UP (ref 42.2–75.2)
NEUTROPHILS NFR BLD AUTO: 69.2 % — SIGNIFICANT CHANGE UP (ref 42.2–75.2)
NEUTROPHILS NFR BLD AUTO: 69.6 % — SIGNIFICANT CHANGE UP (ref 42.2–75.2)
NEUTROPHILS NFR BLD AUTO: 70.1 % — SIGNIFICANT CHANGE UP (ref 42.2–75.2)
NEUTROPHILS NFR BLD AUTO: 71.3 % — SIGNIFICANT CHANGE UP (ref 42.2–75.2)
NEUTROPHILS NFR BLD AUTO: 71.4 % — SIGNIFICANT CHANGE UP (ref 42.2–75.2)
NEUTROPHILS NFR BLD AUTO: 72.1 %
NEUTROPHILS NFR BLD AUTO: 73.1 % — SIGNIFICANT CHANGE UP (ref 42.2–75.2)
NEUTROPHILS NFR BLD AUTO: 74.1 % — SIGNIFICANT CHANGE UP (ref 42.2–75.2)
NEUTROPHILS NFR BLD AUTO: 74.7 % — SIGNIFICANT CHANGE UP (ref 42.2–75.2)
NEUTROPHILS NFR BLD AUTO: 74.7 % — SIGNIFICANT CHANGE UP (ref 42.2–75.2)
NEUTROPHILS NFR BLD AUTO: 76.6 % — HIGH (ref 42.2–75.2)
NEUTROPHILS NFR BLD AUTO: 76.7 % — HIGH (ref 42.2–75.2)
NEUTROPHILS NFR BLD AUTO: 76.8 % — HIGH (ref 42.2–75.2)
NEUTROPHILS NFR BLD AUTO: 76.9 % — HIGH (ref 42.2–75.2)
NEUTROPHILS NFR BLD AUTO: 78.5 % — HIGH (ref 42.2–75.2)
NEUTROPHILS NFR BLD AUTO: 80 % — HIGH (ref 42.2–75.2)
NEUTROPHILS NFR BLD AUTO: 80.6 % — HIGH (ref 42.2–75.2)
NEUTROPHILS NFR BLD AUTO: 83.3 % — HIGH (ref 42.2–75.2)
NEUTROPHILS NFR BLD AUTO: 83.8 % — HIGH (ref 42.2–75.2)
NEUTROPHILS NFR BLD AUTO: 86 % — HIGH (ref 42.2–75.2)
NEUTROPHILS NFR BLD AUTO: 87.8 % — HIGH (ref 42.2–75.2)
NEUTROPHILS NFR BLD AUTO: 88 %
NEUTROPHILS NFR BLD AUTO: 94.8 % — HIGH (ref 42.2–75.2)
NEUTS BAND # BLD: 0.9 % — SIGNIFICANT CHANGE UP (ref 0–6)
NITRITE UR-MCNC: NEGATIVE — SIGNIFICANT CHANGE UP
NITRITE UR-MCNC: POSITIVE
NRBC # BLD: 0 /100 WBCS — SIGNIFICANT CHANGE UP (ref 0–0)
NT-PROBNP SERPL-SCNC: 3664 PG/ML — HIGH (ref 0–300)
ORGANISM # SPEC MICROSCOPIC CNT: SIGNIFICANT CHANGE UP
PCO2 BLDV: 50 MMHG — HIGH (ref 39–42)
PCO2 BLDV: 51 MMHG — HIGH (ref 39–42)
PH BLDV: 7.39 — SIGNIFICANT CHANGE UP (ref 7.32–7.43)
PH BLDV: 7.4 — SIGNIFICANT CHANGE UP (ref 7.32–7.43)
PH UR: 5.5 — SIGNIFICANT CHANGE UP (ref 5–8)
PH UR: 6 — SIGNIFICANT CHANGE UP (ref 5–8)
PH UR: 6.5 — SIGNIFICANT CHANGE UP (ref 5–8)
PH UR: 7 — SIGNIFICANT CHANGE UP (ref 5–8)
PHOSPHATE SERPL-MCNC: 2.4 MG/DL — SIGNIFICANT CHANGE UP (ref 2.1–4.9)
PHOSPHATE SERPL-MCNC: 2.5 MG/DL — SIGNIFICANT CHANGE UP (ref 2.1–4.9)
PHOSPHATE SERPL-MCNC: 2.6 MG/DL — SIGNIFICANT CHANGE UP (ref 2.1–4.9)
PHOSPHATE SERPL-MCNC: 2.7 MG/DL — SIGNIFICANT CHANGE UP (ref 2.1–4.9)
PHOSPHATE SERPL-MCNC: 2.7 MG/DL — SIGNIFICANT CHANGE UP (ref 2.1–4.9)
PHOSPHATE SERPL-MCNC: 2.8 MG/DL — SIGNIFICANT CHANGE UP (ref 2.1–4.9)
PHOSPHATE SERPL-MCNC: 2.9 MG/DL — SIGNIFICANT CHANGE UP (ref 2.1–4.9)
PHOSPHATE SERPL-MCNC: 3 MG/DL — SIGNIFICANT CHANGE UP (ref 2.1–4.9)
PHOSPHATE SERPL-MCNC: 3.1 MG/DL — SIGNIFICANT CHANGE UP (ref 2.1–4.9)
PHOSPHATE SERPL-MCNC: 3.2 MG/DL — SIGNIFICANT CHANGE UP (ref 2.1–4.9)
PHOSPHATE SERPL-MCNC: 3.2 MG/DL — SIGNIFICANT CHANGE UP (ref 2.1–4.9)
PHOSPHATE SERPL-MCNC: 3.6 MG/DL
PHOSPHATE SERPL-MCNC: 3.7 MG/DL — SIGNIFICANT CHANGE UP (ref 2.1–4.9)
PHOSPHATE SERPL-MCNC: 3.8 MG/DL — SIGNIFICANT CHANGE UP (ref 2.1–4.9)
PHOSPHATE SERPL-MCNC: 3.9 MG/DL — SIGNIFICANT CHANGE UP (ref 2.1–4.9)
PHOSPHATE SERPL-MCNC: 4.4 MG/DL — SIGNIFICANT CHANGE UP (ref 2.1–4.9)
PHOSPHATE SERPL-MCNC: 4.4 MG/DL — SIGNIFICANT CHANGE UP (ref 2.1–4.9)
PLAT MORPH BLD: NORMAL — SIGNIFICANT CHANGE UP
PLATELET # BLD AUTO: 103 K/UL
PLATELET # BLD AUTO: 145 K/UL — SIGNIFICANT CHANGE UP (ref 130–400)
PLATELET # BLD AUTO: 164 K/UL — SIGNIFICANT CHANGE UP (ref 130–400)
PLATELET # BLD AUTO: 167 K/UL — SIGNIFICANT CHANGE UP (ref 130–400)
PLATELET # BLD AUTO: 180 K/UL — SIGNIFICANT CHANGE UP (ref 130–400)
PLATELET # BLD AUTO: 192 K/UL — SIGNIFICANT CHANGE UP (ref 130–400)
PLATELET # BLD AUTO: 196 K/UL — SIGNIFICANT CHANGE UP (ref 130–400)
PLATELET # BLD AUTO: 209 K/UL
PLATELET # BLD AUTO: 211 K/UL — SIGNIFICANT CHANGE UP (ref 130–400)
PLATELET # BLD AUTO: 214 K/UL
PLATELET # BLD AUTO: 214 K/UL — SIGNIFICANT CHANGE UP (ref 130–400)
PLATELET # BLD AUTO: 214 K/UL — SIGNIFICANT CHANGE UP (ref 130–400)
PLATELET # BLD AUTO: 215 K/UL — SIGNIFICANT CHANGE UP (ref 130–400)
PLATELET # BLD AUTO: 216 K/UL — SIGNIFICANT CHANGE UP (ref 130–400)
PLATELET # BLD AUTO: 216 K/UL — SIGNIFICANT CHANGE UP (ref 130–400)
PLATELET # BLD AUTO: 217 K/UL — SIGNIFICANT CHANGE UP (ref 130–400)
PLATELET # BLD AUTO: 219 K/UL — SIGNIFICANT CHANGE UP (ref 130–400)
PLATELET # BLD AUTO: 222 K/UL
PLATELET # BLD AUTO: 223 K/UL — SIGNIFICANT CHANGE UP (ref 130–400)
PLATELET # BLD AUTO: 225 K/UL — SIGNIFICANT CHANGE UP (ref 130–400)
PLATELET # BLD AUTO: 227 K/UL — SIGNIFICANT CHANGE UP (ref 130–400)
PLATELET # BLD AUTO: 228 K/UL
PLATELET # BLD AUTO: 229 K/UL — SIGNIFICANT CHANGE UP (ref 130–400)
PLATELET # BLD AUTO: 234 K/UL — SIGNIFICANT CHANGE UP (ref 130–400)
PLATELET # BLD AUTO: 235 K/UL — SIGNIFICANT CHANGE UP (ref 130–400)
PLATELET # BLD AUTO: 238 K/UL — SIGNIFICANT CHANGE UP (ref 130–400)
PLATELET # BLD AUTO: 241 K/UL — SIGNIFICANT CHANGE UP (ref 130–400)
PLATELET # BLD AUTO: 243 K/UL — SIGNIFICANT CHANGE UP (ref 130–400)
PLATELET # BLD AUTO: 246 K/UL — SIGNIFICANT CHANGE UP (ref 130–400)
PLATELET # BLD AUTO: 248 K/UL — SIGNIFICANT CHANGE UP (ref 130–400)
PLATELET # BLD AUTO: 255 K/UL
PLATELET # BLD AUTO: 257 K/UL — SIGNIFICANT CHANGE UP (ref 130–400)
PLATELET # BLD AUTO: 258 K/UL — SIGNIFICANT CHANGE UP (ref 130–400)
PLATELET # BLD AUTO: 263 K/UL — SIGNIFICANT CHANGE UP (ref 130–400)
PLATELET # BLD AUTO: 264 K/UL — SIGNIFICANT CHANGE UP (ref 130–400)
PLATELET # BLD AUTO: 264 K/UL — SIGNIFICANT CHANGE UP (ref 130–400)
PLATELET # BLD AUTO: 268 K/UL — SIGNIFICANT CHANGE UP (ref 130–400)
PLATELET # BLD AUTO: 269 K/UL — SIGNIFICANT CHANGE UP (ref 130–400)
PLATELET # BLD AUTO: 269 K/UL — SIGNIFICANT CHANGE UP (ref 130–400)
PLATELET # BLD AUTO: 271 K/UL — SIGNIFICANT CHANGE UP (ref 130–400)
PLATELET # BLD AUTO: 271 K/UL — SIGNIFICANT CHANGE UP (ref 130–400)
PLATELET # BLD AUTO: 272 K/UL — SIGNIFICANT CHANGE UP (ref 130–400)
PLATELET # BLD AUTO: 273 K/UL — SIGNIFICANT CHANGE UP (ref 130–400)
PLATELET # BLD AUTO: 275 K/UL — SIGNIFICANT CHANGE UP (ref 130–400)
PLATELET # BLD AUTO: 276 K/UL — SIGNIFICANT CHANGE UP (ref 130–400)
PLATELET # BLD AUTO: 277 K/UL — SIGNIFICANT CHANGE UP (ref 130–400)
PLATELET # BLD AUTO: 278 K/UL — SIGNIFICANT CHANGE UP (ref 130–400)
PLATELET # BLD AUTO: 280 K/UL — SIGNIFICANT CHANGE UP (ref 130–400)
PLATELET # BLD AUTO: 286 K/UL — SIGNIFICANT CHANGE UP (ref 130–400)
PLATELET # BLD AUTO: 292 K/UL — SIGNIFICANT CHANGE UP (ref 130–400)
PLATELET # BLD AUTO: 300 K/UL — SIGNIFICANT CHANGE UP (ref 130–400)
PLATELET # BLD AUTO: 304 K/UL — SIGNIFICANT CHANGE UP (ref 130–400)
PLATELET # BLD AUTO: 310 K/UL
PLATELET # BLD AUTO: 320 K/UL
PLATELET # BLD AUTO: 320 K/UL — SIGNIFICANT CHANGE UP (ref 130–400)
PLATELET # BLD AUTO: 321 K/UL — SIGNIFICANT CHANGE UP (ref 130–400)
PLATELET # BLD AUTO: 326 K/UL — SIGNIFICANT CHANGE UP (ref 130–400)
PLATELET # BLD AUTO: 328 K/UL — SIGNIFICANT CHANGE UP (ref 130–400)
PLATELET # BLD AUTO: 357 K/UL
PLATELET # BLD AUTO: 360 K/UL
PLATELET # BLD AUTO: 452 K/UL — HIGH (ref 130–400)
PMV BLD: 10.2 FL
PMV BLD: 10.2 FL
PMV BLD: 10.3 FL
PMV BLD: 10.6 FL
PMV BLD: 10.6 FL
PMV BLD: 9.4 FL
PMV BLD: 9.4 FL
PMV BLD: 9.8 FL
PO2 BLDV: 19 MMHG — SIGNIFICANT CHANGE UP
PO2 BLDV: 21 MMHG — SIGNIFICANT CHANGE UP
POIKILOCYTOSIS BLD QL AUTO: SLIGHT — SIGNIFICANT CHANGE UP
POLYCHROMASIA BLD QL SMEAR: SIGNIFICANT CHANGE UP
POLYCHROMASIA BLD QL SMEAR: SLIGHT — SIGNIFICANT CHANGE UP
POTASSIUM BLDV-SCNC: 3.8 MMOL/L — SIGNIFICANT CHANGE UP (ref 3.5–5.1)
POTASSIUM BLDV-SCNC: 3.8 MMOL/L — SIGNIFICANT CHANGE UP (ref 3.5–5.1)
POTASSIUM SERPL-MCNC: 3.2 MMOL/L — LOW (ref 3.5–5)
POTASSIUM SERPL-MCNC: 3.4 MMOL/L — LOW (ref 3.5–5)
POTASSIUM SERPL-MCNC: 3.5 MMOL/L — SIGNIFICANT CHANGE UP (ref 3.5–5)
POTASSIUM SERPL-MCNC: 3.7 MMOL/L — SIGNIFICANT CHANGE UP (ref 3.5–5)
POTASSIUM SERPL-MCNC: 3.8 MMOL/L — SIGNIFICANT CHANGE UP (ref 3.5–5)
POTASSIUM SERPL-MCNC: 3.9 MMOL/L — SIGNIFICANT CHANGE UP (ref 3.5–5)
POTASSIUM SERPL-MCNC: 4 MMOL/L — SIGNIFICANT CHANGE UP (ref 3.5–5)
POTASSIUM SERPL-MCNC: 4.1 MMOL/L — SIGNIFICANT CHANGE UP (ref 3.5–5)
POTASSIUM SERPL-MCNC: 4.2 MMOL/L — SIGNIFICANT CHANGE UP (ref 3.5–5)
POTASSIUM SERPL-MCNC: 4.4 MMOL/L — SIGNIFICANT CHANGE UP (ref 3.5–5)
POTASSIUM SERPL-MCNC: 4.4 MMOL/L — SIGNIFICANT CHANGE UP (ref 3.5–5)
POTASSIUM SERPL-MCNC: 4.5 MMOL/L — SIGNIFICANT CHANGE UP (ref 3.5–5)
POTASSIUM SERPL-MCNC: 4.5 MMOL/L — SIGNIFICANT CHANGE UP (ref 3.5–5)
POTASSIUM SERPL-MCNC: 4.6 MMOL/L — SIGNIFICANT CHANGE UP (ref 3.5–5)
POTASSIUM SERPL-MCNC: 4.6 MMOL/L — SIGNIFICANT CHANGE UP (ref 3.5–5)
POTASSIUM SERPL-MCNC: 4.7 MMOL/L — SIGNIFICANT CHANGE UP (ref 3.5–5)
POTASSIUM SERPL-MCNC: 4.9 MMOL/L — SIGNIFICANT CHANGE UP (ref 3.5–5)
POTASSIUM SERPL-MCNC: 4.9 MMOL/L — SIGNIFICANT CHANGE UP (ref 3.5–5)
POTASSIUM SERPL-MCNC: 5 MMOL/L — SIGNIFICANT CHANGE UP (ref 3.5–5)
POTASSIUM SERPL-MCNC: 5.3 MMOL/L — HIGH (ref 3.5–5)
POTASSIUM SERPL-MCNC: 6 MMOL/L — CRITICAL HIGH (ref 3.5–5)
POTASSIUM SERPL-MCNC: 6.1 MMOL/L — CRITICAL HIGH (ref 3.5–5)
POTASSIUM SERPL-SCNC: 3.2 MMOL/L — LOW (ref 3.5–5)
POTASSIUM SERPL-SCNC: 3.4 MMOL/L — LOW (ref 3.5–5)
POTASSIUM SERPL-SCNC: 3.5 MMOL/L
POTASSIUM SERPL-SCNC: 3.5 MMOL/L — SIGNIFICANT CHANGE UP (ref 3.5–5)
POTASSIUM SERPL-SCNC: 3.7 MMOL/L — SIGNIFICANT CHANGE UP (ref 3.5–5)
POTASSIUM SERPL-SCNC: 3.8 MMOL/L — SIGNIFICANT CHANGE UP (ref 3.5–5)
POTASSIUM SERPL-SCNC: 3.9 MMOL/L — SIGNIFICANT CHANGE UP (ref 3.5–5)
POTASSIUM SERPL-SCNC: 4 MMOL/L — SIGNIFICANT CHANGE UP (ref 3.5–5)
POTASSIUM SERPL-SCNC: 4.1 MMOL/L — SIGNIFICANT CHANGE UP (ref 3.5–5)
POTASSIUM SERPL-SCNC: 4.2 MMOL/L
POTASSIUM SERPL-SCNC: 4.2 MMOL/L
POTASSIUM SERPL-SCNC: 4.2 MMOL/L — SIGNIFICANT CHANGE UP (ref 3.5–5)
POTASSIUM SERPL-SCNC: 4.4 MMOL/L
POTASSIUM SERPL-SCNC: 4.4 MMOL/L — SIGNIFICANT CHANGE UP (ref 3.5–5)
POTASSIUM SERPL-SCNC: 4.4 MMOL/L — SIGNIFICANT CHANGE UP (ref 3.5–5)
POTASSIUM SERPL-SCNC: 4.5 MMOL/L — SIGNIFICANT CHANGE UP (ref 3.5–5)
POTASSIUM SERPL-SCNC: 4.5 MMOL/L — SIGNIFICANT CHANGE UP (ref 3.5–5)
POTASSIUM SERPL-SCNC: 4.6 MMOL/L — SIGNIFICANT CHANGE UP (ref 3.5–5)
POTASSIUM SERPL-SCNC: 4.6 MMOL/L — SIGNIFICANT CHANGE UP (ref 3.5–5)
POTASSIUM SERPL-SCNC: 4.7 MMOL/L — SIGNIFICANT CHANGE UP (ref 3.5–5)
POTASSIUM SERPL-SCNC: 4.9 MMOL/L — SIGNIFICANT CHANGE UP (ref 3.5–5)
POTASSIUM SERPL-SCNC: 4.9 MMOL/L — SIGNIFICANT CHANGE UP (ref 3.5–5)
POTASSIUM SERPL-SCNC: 5 MMOL/L — SIGNIFICANT CHANGE UP (ref 3.5–5)
POTASSIUM SERPL-SCNC: 5.3 MMOL/L — HIGH (ref 3.5–5)
POTASSIUM SERPL-SCNC: 6 MMOL/L — CRITICAL HIGH (ref 3.5–5)
POTASSIUM SERPL-SCNC: 6.1 MMOL/L — CRITICAL HIGH (ref 3.5–5)
PROT SERPL-MCNC: 3.9 G/DL — LOW (ref 6–8)
PROT SERPL-MCNC: 4 G/DL — LOW (ref 6–8)
PROT SERPL-MCNC: 4.1 G/DL — LOW (ref 6–8)
PROT SERPL-MCNC: 4.2 G/DL — LOW (ref 6–8)
PROT SERPL-MCNC: 4.3 G/DL — LOW (ref 6–8)
PROT SERPL-MCNC: 4.4 G/DL
PROT SERPL-MCNC: 4.4 G/DL — LOW (ref 6–8)
PROT SERPL-MCNC: 4.5 G/DL
PROT SERPL-MCNC: 4.5 G/DL — LOW (ref 6–8)
PROT SERPL-MCNC: 4.6 G/DL — LOW (ref 6–8)
PROT SERPL-MCNC: 4.6 G/DL — LOW (ref 6–8)
PROT SERPL-MCNC: 4.7 G/DL — LOW (ref 6–8)
PROT SERPL-MCNC: 4.8 G/DL — LOW (ref 6–8)
PROT SERPL-MCNC: 5 G/DL — LOW (ref 6–8)
PROT SERPL-MCNC: 5.1 G/DL — LOW (ref 6–8)
PROT SERPL-MCNC: 5.1 G/DL — LOW (ref 6–8)
PROT SERPL-MCNC: 5.2 G/DL
PROT SERPL-MCNC: 5.2 G/DL — LOW (ref 6–8)
PROT SERPL-MCNC: 5.6 G/DL — LOW (ref 6–8)
PROT SERPL-MCNC: 5.7 G/DL
PROT SERPL-MCNC: 5.9 G/DL
PROT SERPL-MCNC: 6 G/DL
PROT SERPL-MCNC: 6.1 G/DL
PROT UR-MCNC: ABNORMAL
PROT UR-MCNC: SIGNIFICANT CHANGE UP
PROTHROM AB SERPL-ACNC: 14.8 SEC — HIGH (ref 9.95–12.87)
PROTHROM AB SERPL-ACNC: 20.9 SEC — HIGH (ref 9.95–12.87)
PROTHROM AB SERPL-ACNC: 21.5 SEC — HIGH (ref 9.95–12.87)
PROTHROM AB SERPL-ACNC: 22.6 SEC — HIGH (ref 9.95–12.87)
PROTHROM AB SERPL-ACNC: >40 SEC — HIGH (ref 9.95–12.87)
PROTHROM AB SERPL-ACNC: >40 SEC — HIGH (ref 9.95–12.87)
PTH-INTACT FLD-MCNC: 96 PG/ML — HIGH (ref 15–65)
RBC # BLD: 2.97 M/UL — LOW (ref 4.2–5.4)
RBC # BLD: 3.03 M/UL
RBC # BLD: 3.29 M/UL
RBC # BLD: 3.44 M/UL — LOW (ref 4.2–5.4)
RBC # BLD: 3.48 M/UL
RBC # BLD: 3.56 M/UL — LOW (ref 4.2–5.4)
RBC # BLD: 3.59 M/UL
RBC # BLD: 3.63 M/UL — LOW (ref 4.2–5.4)
RBC # BLD: 3.68 M/UL — LOW (ref 4.2–5.4)
RBC # BLD: 3.71 M/UL
RBC # BLD: 3.71 M/UL — LOW (ref 4.2–5.4)
RBC # BLD: 3.72 M/UL — LOW (ref 4.2–5.4)
RBC # BLD: 3.72 M/UL — LOW (ref 4.2–5.4)
RBC # BLD: 3.73 M/UL — LOW (ref 4.2–5.4)
RBC # BLD: 3.75 M/UL — LOW (ref 4.2–5.4)
RBC # BLD: 3.82 M/UL — LOW (ref 4.2–5.4)
RBC # BLD: 3.83 M/UL — LOW (ref 4.2–5.4)
RBC # BLD: 3.84 M/UL — LOW (ref 4.2–5.4)
RBC # BLD: 3.86 M/UL
RBC # BLD: 3.86 M/UL — LOW (ref 4.2–5.4)
RBC # BLD: 4.01 M/UL
RBC # BLD: 4.01 M/UL — LOW (ref 4.2–5.4)
RBC # BLD: 4.05 M/UL
RBC # BLD: 4.06 M/UL — LOW (ref 4.2–5.4)
RBC # BLD: 4.09 M/UL — LOW (ref 4.2–5.4)
RBC # BLD: 4.11 M/UL — LOW (ref 4.2–5.4)
RBC # BLD: 4.12 M/UL — LOW (ref 4.2–5.4)
RBC # BLD: 4.14 M/UL — LOW (ref 4.2–5.4)
RBC # BLD: 4.17 M/UL — LOW (ref 4.2–5.4)
RBC # BLD: 4.18 M/UL — LOW (ref 4.2–5.4)
RBC # BLD: 4.19 M/UL
RBC # BLD: 4.19 M/UL — LOW (ref 4.2–5.4)
RBC # BLD: 4.22 M/UL — SIGNIFICANT CHANGE UP (ref 4.2–5.4)
RBC # BLD: 4.24 M/UL — SIGNIFICANT CHANGE UP (ref 4.2–5.4)
RBC # BLD: 4.28 M/UL — SIGNIFICANT CHANGE UP (ref 4.2–5.4)
RBC # BLD: 4.28 M/UL — SIGNIFICANT CHANGE UP (ref 4.2–5.4)
RBC # BLD: 4.32 M/UL — SIGNIFICANT CHANGE UP (ref 4.2–5.4)
RBC # BLD: 4.34 M/UL — SIGNIFICANT CHANGE UP (ref 4.2–5.4)
RBC # BLD: 4.35 M/UL — SIGNIFICANT CHANGE UP (ref 4.2–5.4)
RBC # BLD: 4.36 M/UL — SIGNIFICANT CHANGE UP (ref 4.2–5.4)
RBC # BLD: 4.37 M/UL — SIGNIFICANT CHANGE UP (ref 4.2–5.4)
RBC # BLD: 4.39 M/UL — SIGNIFICANT CHANGE UP (ref 4.2–5.4)
RBC # BLD: 4.39 M/UL — SIGNIFICANT CHANGE UP (ref 4.2–5.4)
RBC # BLD: 4.41 M/UL — SIGNIFICANT CHANGE UP (ref 4.2–5.4)
RBC # BLD: 4.49 M/UL
RBC # BLD: 4.5 M/UL — SIGNIFICANT CHANGE UP (ref 4.2–5.4)
RBC # BLD: 4.51 M/UL — SIGNIFICANT CHANGE UP (ref 4.2–5.4)
RBC # BLD: 4.57 M/UL — SIGNIFICANT CHANGE UP (ref 4.2–5.4)
RBC # BLD: 4.66 M/UL — SIGNIFICANT CHANGE UP (ref 4.2–5.4)
RBC # BLD: 4.83 M/UL — SIGNIFICANT CHANGE UP (ref 4.2–5.4)
RBC # BLD: 4.88 M/UL — SIGNIFICANT CHANGE UP (ref 4.2–5.4)
RBC # BLD: 4.92 M/UL — SIGNIFICANT CHANGE UP (ref 4.2–5.4)
RBC # BLD: 4.98 M/UL — SIGNIFICANT CHANGE UP (ref 4.2–5.4)
RBC # BLD: 4.99 M/UL — SIGNIFICANT CHANGE UP (ref 4.2–5.4)
RBC # BLD: 5.04 M/UL — SIGNIFICANT CHANGE UP (ref 4.2–5.4)
RBC # BLD: 5.07 M/UL — SIGNIFICANT CHANGE UP (ref 4.2–5.4)
RBC # BLD: 5.09 M/UL — SIGNIFICANT CHANGE UP (ref 4.2–5.4)
RBC # BLD: 5.2 M/UL — SIGNIFICANT CHANGE UP (ref 4.2–5.4)
RBC # BLD: 5.23 M/UL — SIGNIFICANT CHANGE UP (ref 4.2–5.4)
RBC # BLD: 5.25 M/UL — SIGNIFICANT CHANGE UP (ref 4.2–5.4)
RBC # BLD: 5.31 M/UL — SIGNIFICANT CHANGE UP (ref 4.2–5.4)
RBC # FLD: 13.5 %
RBC # FLD: 14.3 %
RBC # FLD: 15.1 %
RBC # FLD: 15.4 %
RBC # FLD: 16 %
RBC # FLD: 16.1 %
RBC # FLD: 16.9 % — HIGH (ref 11.5–14.5)
RBC # FLD: 16.9 % — HIGH (ref 11.5–14.5)
RBC # FLD: 17 % — HIGH (ref 11.5–14.5)
RBC # FLD: 17.1 % — HIGH (ref 11.5–14.5)
RBC # FLD: 17.2 % — HIGH (ref 11.5–14.5)
RBC # FLD: 17.2 % — HIGH (ref 11.5–14.5)
RBC # FLD: 17.4 % — HIGH (ref 11.5–14.5)
RBC # FLD: 17.5 % — HIGH (ref 11.5–14.5)
RBC # FLD: 17.5 % — HIGH (ref 11.5–14.5)
RBC # FLD: 17.7 %
RBC # FLD: 17.7 % — HIGH (ref 11.5–14.5)
RBC # FLD: 17.8 % — HIGH (ref 11.5–14.5)
RBC # FLD: 17.9 % — HIGH (ref 11.5–14.5)
RBC # FLD: 18 % — HIGH (ref 11.5–14.5)
RBC # FLD: 18.1 % — HIGH (ref 11.5–14.5)
RBC # FLD: 18.1 % — HIGH (ref 11.5–14.5)
RBC # FLD: 18.2 % — HIGH (ref 11.5–14.5)
RBC # FLD: 18.3 % — HIGH (ref 11.5–14.5)
RBC # FLD: 18.4 % — HIGH (ref 11.5–14.5)
RBC # FLD: 18.5 % — HIGH (ref 11.5–14.5)
RBC # FLD: 18.5 % — HIGH (ref 11.5–14.5)
RBC # FLD: 18.6 % — HIGH (ref 11.5–14.5)
RBC # FLD: 18.6 % — HIGH (ref 11.5–14.5)
RBC # FLD: 19.3 % — HIGH (ref 11.5–14.5)
RBC # FLD: 19.5 % — HIGH (ref 11.5–14.5)
RBC # FLD: 19.8 % — HIGH (ref 11.5–14.5)
RBC # FLD: 20.1 % — HIGH (ref 11.5–14.5)
RBC # FLD: 20.3 % — HIGH (ref 11.5–14.5)
RBC # FLD: 20.4 %
RBC # FLD: 20.5 % — HIGH (ref 11.5–14.5)
RBC # FLD: 20.7 % — HIGH (ref 11.5–14.5)
RBC # FLD: 20.8 % — HIGH (ref 11.5–14.5)
RBC # FLD: 20.8 % — HIGH (ref 11.5–14.5)
RBC # FLD: 21 % — HIGH (ref 11.5–14.5)
RBC # FLD: 21.2 %
RBC # FLD: 21.4 % — HIGH (ref 11.5–14.5)
RBC # FLD: 21.5 % — HIGH (ref 11.5–14.5)
RBC # FLD: 21.9 % — HIGH (ref 11.5–14.5)
RBC # FLD: 22.5 %
RBC BLD AUTO: ABNORMAL
RBC BLD AUTO: ABNORMAL
RBC BLD AUTO: NORMAL — SIGNIFICANT CHANGE UP
RBC CASTS # UR COMP ASSIST: 0 /HPF — SIGNIFICANT CHANGE UP (ref 0–4)
RBC CASTS # UR COMP ASSIST: 0 /HPF — SIGNIFICANT CHANGE UP (ref 0–4)
RBC CASTS # UR COMP ASSIST: 10 /HPF — HIGH (ref 0–4)
RBC CASTS # UR COMP ASSIST: 19 /HPF — HIGH (ref 0–4)
SAO2 % BLDV: 21.5 % — SIGNIFICANT CHANGE UP
SAO2 % BLDV: 27 % — SIGNIFICANT CHANGE UP
SARS-COV-2 IGG+IGM SERPL QL IA: 48.3 U/ML — HIGH
SARS-COV-2 IGG+IGM SERPL QL IA: 48.9 U/ML — HIGH
SARS-COV-2 IGG+IGM SERPL QL IA: 73.3 U/ML — HIGH
SARS-COV-2 IGG+IGM SERPL QL IA: 74 U/ML — HIGH
SARS-COV-2 IGG+IGM SERPL QL IA: 88.2 U/ML — HIGH
SARS-COV-2 IGG+IGM SERPL QL IA: 95.4 INDEX — HIGH
SARS-COV-2 IGG+IGM SERPL QL IA: POSITIVE
SARS-COV-2 RNA SPEC QL NAA+PROBE: SIGNIFICANT CHANGE UP
SODIUM SERPL-SCNC: 131 MMOL/L — LOW (ref 135–146)
SODIUM SERPL-SCNC: 132 MMOL/L — LOW (ref 135–146)
SODIUM SERPL-SCNC: 132 MMOL/L — LOW (ref 135–146)
SODIUM SERPL-SCNC: 133 MMOL/L — LOW (ref 135–146)
SODIUM SERPL-SCNC: 134 MMOL/L — LOW (ref 135–146)
SODIUM SERPL-SCNC: 135 MMOL/L — SIGNIFICANT CHANGE UP (ref 135–146)
SODIUM SERPL-SCNC: 136 MMOL/L — SIGNIFICANT CHANGE UP (ref 135–146)
SODIUM SERPL-SCNC: 137 MMOL/L
SODIUM SERPL-SCNC: 137 MMOL/L — SIGNIFICANT CHANGE UP (ref 135–146)
SODIUM SERPL-SCNC: 138 MMOL/L — SIGNIFICANT CHANGE UP (ref 135–146)
SODIUM SERPL-SCNC: 139 MMOL/L — SIGNIFICANT CHANGE UP (ref 135–146)
SODIUM SERPL-SCNC: 140 MMOL/L
SODIUM SERPL-SCNC: 140 MMOL/L — SIGNIFICANT CHANGE UP (ref 135–146)
SODIUM SERPL-SCNC: 141 MMOL/L
SODIUM SERPL-SCNC: 141 MMOL/L — SIGNIFICANT CHANGE UP (ref 135–146)
SODIUM SERPL-SCNC: 142 MMOL/L
SODIUM SERPL-SCNC: 142 MMOL/L — SIGNIFICANT CHANGE UP (ref 135–146)
SODIUM SERPL-SCNC: 145 MMOL/L — SIGNIFICANT CHANGE UP (ref 135–146)
SP GR SPEC: 1.01 — SIGNIFICANT CHANGE UP (ref 1.01–1.03)
SP GR SPEC: 1.02 — SIGNIFICANT CHANGE UP (ref 1.01–1.03)
SP GR SPEC: 1.02 — SIGNIFICANT CHANGE UP (ref 1.01–1.03)
SP GR SPEC: 1.04 — HIGH (ref 1.01–1.03)
SPECIMEN SOURCE: SIGNIFICANT CHANGE UP
SURGICAL PATHOLOGY STUDY: SIGNIFICANT CHANGE UP
TARGETS BLD QL SMEAR: SLIGHT — SIGNIFICANT CHANGE UP
TROPONIN T SERPL-MCNC: <0.01 NG/ML — SIGNIFICANT CHANGE UP
TSH SERPL-MCNC: 4.1 UIU/ML — SIGNIFICANT CHANGE UP (ref 0.27–4.2)
UROBILINOGEN FLD QL: SIGNIFICANT CHANGE UP
VIT B12 SERPL-MCNC: 1627 PG/ML
WBC # BLD: 10.21 K/UL — SIGNIFICANT CHANGE UP (ref 4.8–10.8)
WBC # BLD: 10.41 K/UL — SIGNIFICANT CHANGE UP (ref 4.8–10.8)
WBC # BLD: 10.57 K/UL — SIGNIFICANT CHANGE UP (ref 4.8–10.8)
WBC # BLD: 10.99 K/UL — HIGH (ref 4.8–10.8)
WBC # BLD: 11.6 K/UL — HIGH (ref 4.8–10.8)
WBC # BLD: 12.05 K/UL — HIGH (ref 4.8–10.8)
WBC # BLD: 12.07 K/UL — HIGH (ref 4.8–10.8)
WBC # BLD: 12.13 K/UL — HIGH (ref 4.8–10.8)
WBC # BLD: 14.49 K/UL — HIGH (ref 4.8–10.8)
WBC # BLD: 14.8 K/UL — HIGH (ref 4.8–10.8)
WBC # BLD: 15.91 K/UL — HIGH (ref 4.8–10.8)
WBC # BLD: 21.93 K/UL — HIGH (ref 4.8–10.8)
WBC # BLD: 22.38 K/UL — HIGH (ref 4.8–10.8)
WBC # BLD: 23.32 K/UL — HIGH (ref 4.8–10.8)
WBC # BLD: 28.7 K/UL — HIGH (ref 4.8–10.8)
WBC # BLD: 3.79 K/UL — LOW (ref 4.8–10.8)
WBC # BLD: 4.31 K/UL — LOW (ref 4.8–10.8)
WBC # BLD: 4.43 K/UL — LOW (ref 4.8–10.8)
WBC # BLD: 4.6 K/UL — LOW (ref 4.8–10.8)
WBC # BLD: 4.91 K/UL — SIGNIFICANT CHANGE UP (ref 4.8–10.8)
WBC # BLD: 5.07 K/UL — SIGNIFICANT CHANGE UP (ref 4.8–10.8)
WBC # BLD: 5.11 K/UL — SIGNIFICANT CHANGE UP (ref 4.8–10.8)
WBC # BLD: 5.16 K/UL — SIGNIFICANT CHANGE UP (ref 4.8–10.8)
WBC # BLD: 5.52 K/UL — SIGNIFICANT CHANGE UP (ref 4.8–10.8)
WBC # BLD: 5.6 K/UL — SIGNIFICANT CHANGE UP (ref 4.8–10.8)
WBC # BLD: 5.61 K/UL — SIGNIFICANT CHANGE UP (ref 4.8–10.8)
WBC # BLD: 5.73 K/UL — SIGNIFICANT CHANGE UP (ref 4.8–10.8)
WBC # BLD: 5.73 K/UL — SIGNIFICANT CHANGE UP (ref 4.8–10.8)
WBC # BLD: 6.27 K/UL — SIGNIFICANT CHANGE UP (ref 4.8–10.8)
WBC # BLD: 6.35 K/UL — SIGNIFICANT CHANGE UP (ref 4.8–10.8)
WBC # BLD: 6.49 K/UL — SIGNIFICANT CHANGE UP (ref 4.8–10.8)
WBC # BLD: 6.55 K/UL — SIGNIFICANT CHANGE UP (ref 4.8–10.8)
WBC # BLD: 6.72 K/UL — SIGNIFICANT CHANGE UP (ref 4.8–10.8)
WBC # BLD: 6.76 K/UL — SIGNIFICANT CHANGE UP (ref 4.8–10.8)
WBC # BLD: 7.17 K/UL — SIGNIFICANT CHANGE UP (ref 4.8–10.8)
WBC # BLD: 7.19 K/UL — SIGNIFICANT CHANGE UP (ref 4.8–10.8)
WBC # BLD: 7.19 K/UL — SIGNIFICANT CHANGE UP (ref 4.8–10.8)
WBC # BLD: 7.23 K/UL — SIGNIFICANT CHANGE UP (ref 4.8–10.8)
WBC # BLD: 7.25 K/UL — SIGNIFICANT CHANGE UP (ref 4.8–10.8)
WBC # BLD: 7.4 K/UL — SIGNIFICANT CHANGE UP (ref 4.8–10.8)
WBC # BLD: 7.63 K/UL — SIGNIFICANT CHANGE UP (ref 4.8–10.8)
WBC # BLD: 7.93 K/UL — SIGNIFICANT CHANGE UP (ref 4.8–10.8)
WBC # BLD: 7.99 K/UL — SIGNIFICANT CHANGE UP (ref 4.8–10.8)
WBC # BLD: 8.01 K/UL — SIGNIFICANT CHANGE UP (ref 4.8–10.8)
WBC # BLD: 8.42 K/UL — SIGNIFICANT CHANGE UP (ref 4.8–10.8)
WBC # BLD: 8.69 K/UL — SIGNIFICANT CHANGE UP (ref 4.8–10.8)
WBC # BLD: 8.71 K/UL — SIGNIFICANT CHANGE UP (ref 4.8–10.8)
WBC # BLD: 9.12 K/UL — SIGNIFICANT CHANGE UP (ref 4.8–10.8)
WBC # BLD: 9.21 K/UL — SIGNIFICANT CHANGE UP (ref 4.8–10.8)
WBC # BLD: 9.61 K/UL — SIGNIFICANT CHANGE UP (ref 4.8–10.8)
WBC # BLD: 9.84 K/UL — SIGNIFICANT CHANGE UP (ref 4.8–10.8)
WBC # FLD AUTO: 10.21 K/UL — SIGNIFICANT CHANGE UP (ref 4.8–10.8)
WBC # FLD AUTO: 10.41 K/UL — SIGNIFICANT CHANGE UP (ref 4.8–10.8)
WBC # FLD AUTO: 10.57 K/UL — SIGNIFICANT CHANGE UP (ref 4.8–10.8)
WBC # FLD AUTO: 10.99 K/UL — HIGH (ref 4.8–10.8)
WBC # FLD AUTO: 11.6 K/UL — HIGH (ref 4.8–10.8)
WBC # FLD AUTO: 12.05 K/UL — HIGH (ref 4.8–10.8)
WBC # FLD AUTO: 12.07 K/UL — HIGH (ref 4.8–10.8)
WBC # FLD AUTO: 12.13 K/UL — HIGH (ref 4.8–10.8)
WBC # FLD AUTO: 12.52 K/UL
WBC # FLD AUTO: 14.49 K/UL — HIGH (ref 4.8–10.8)
WBC # FLD AUTO: 14.71 K/UL
WBC # FLD AUTO: 14.8 K/UL — HIGH (ref 4.8–10.8)
WBC # FLD AUTO: 15.91 K/UL — HIGH (ref 4.8–10.8)
WBC # FLD AUTO: 21.93 K/UL — HIGH (ref 4.8–10.8)
WBC # FLD AUTO: 22.38 K/UL — HIGH (ref 4.8–10.8)
WBC # FLD AUTO: 22.95 K/UL
WBC # FLD AUTO: 23.32 K/UL — HIGH (ref 4.8–10.8)
WBC # FLD AUTO: 28.7 K/UL — HIGH (ref 4.8–10.8)
WBC # FLD AUTO: 3.79 K/UL — LOW (ref 4.8–10.8)
WBC # FLD AUTO: 4.31 K/UL — LOW (ref 4.8–10.8)
WBC # FLD AUTO: 4.41 K/UL
WBC # FLD AUTO: 4.43 K/UL — LOW (ref 4.8–10.8)
WBC # FLD AUTO: 4.6 K/UL — LOW (ref 4.8–10.8)
WBC # FLD AUTO: 4.91 K/UL — SIGNIFICANT CHANGE UP (ref 4.8–10.8)
WBC # FLD AUTO: 5.07 K/UL — SIGNIFICANT CHANGE UP (ref 4.8–10.8)
WBC # FLD AUTO: 5.09 K/UL
WBC # FLD AUTO: 5.11 K/UL — SIGNIFICANT CHANGE UP (ref 4.8–10.8)
WBC # FLD AUTO: 5.16 K/UL — SIGNIFICANT CHANGE UP (ref 4.8–10.8)
WBC # FLD AUTO: 5.26 K/UL
WBC # FLD AUTO: 5.52 K/UL — SIGNIFICANT CHANGE UP (ref 4.8–10.8)
WBC # FLD AUTO: 5.6 K/UL — SIGNIFICANT CHANGE UP (ref 4.8–10.8)
WBC # FLD AUTO: 5.61 K/UL — SIGNIFICANT CHANGE UP (ref 4.8–10.8)
WBC # FLD AUTO: 5.73 K/UL — SIGNIFICANT CHANGE UP (ref 4.8–10.8)
WBC # FLD AUTO: 5.73 K/UL — SIGNIFICANT CHANGE UP (ref 4.8–10.8)
WBC # FLD AUTO: 6.27 K/UL — SIGNIFICANT CHANGE UP (ref 4.8–10.8)
WBC # FLD AUTO: 6.35 K/UL — SIGNIFICANT CHANGE UP (ref 4.8–10.8)
WBC # FLD AUTO: 6.49 K/UL — SIGNIFICANT CHANGE UP (ref 4.8–10.8)
WBC # FLD AUTO: 6.55 K/UL — SIGNIFICANT CHANGE UP (ref 4.8–10.8)
WBC # FLD AUTO: 6.72 K/UL — SIGNIFICANT CHANGE UP (ref 4.8–10.8)
WBC # FLD AUTO: 6.76 K/UL — SIGNIFICANT CHANGE UP (ref 4.8–10.8)
WBC # FLD AUTO: 7.17 K/UL — SIGNIFICANT CHANGE UP (ref 4.8–10.8)
WBC # FLD AUTO: 7.19 K/UL — SIGNIFICANT CHANGE UP (ref 4.8–10.8)
WBC # FLD AUTO: 7.19 K/UL — SIGNIFICANT CHANGE UP (ref 4.8–10.8)
WBC # FLD AUTO: 7.23 K/UL — SIGNIFICANT CHANGE UP (ref 4.8–10.8)
WBC # FLD AUTO: 7.25 K/UL — SIGNIFICANT CHANGE UP (ref 4.8–10.8)
WBC # FLD AUTO: 7.4 K/UL — SIGNIFICANT CHANGE UP (ref 4.8–10.8)
WBC # FLD AUTO: 7.63 K/UL — SIGNIFICANT CHANGE UP (ref 4.8–10.8)
WBC # FLD AUTO: 7.93 K/UL — SIGNIFICANT CHANGE UP (ref 4.8–10.8)
WBC # FLD AUTO: 7.99 K/UL — SIGNIFICANT CHANGE UP (ref 4.8–10.8)
WBC # FLD AUTO: 8.01 K/UL — SIGNIFICANT CHANGE UP (ref 4.8–10.8)
WBC # FLD AUTO: 8.42 K/UL — SIGNIFICANT CHANGE UP (ref 4.8–10.8)
WBC # FLD AUTO: 8.46 K/UL
WBC # FLD AUTO: 8.59 K/UL
WBC # FLD AUTO: 8.69 K/UL — SIGNIFICANT CHANGE UP (ref 4.8–10.8)
WBC # FLD AUTO: 8.71 K/UL — SIGNIFICANT CHANGE UP (ref 4.8–10.8)
WBC # FLD AUTO: 9.11 K/UL
WBC # FLD AUTO: 9.12 K/UL — SIGNIFICANT CHANGE UP (ref 4.8–10.8)
WBC # FLD AUTO: 9.14 K/UL
WBC # FLD AUTO: 9.21 K/UL — SIGNIFICANT CHANGE UP (ref 4.8–10.8)
WBC # FLD AUTO: 9.61 K/UL — SIGNIFICANT CHANGE UP (ref 4.8–10.8)
WBC # FLD AUTO: 9.84 K/UL — SIGNIFICANT CHANGE UP (ref 4.8–10.8)
WBC UR QL: 13 /HPF — HIGH (ref 0–5)
WBC UR QL: 230 /HPF — HIGH (ref 0–5)
WBC UR QL: 49 /HPF — HIGH (ref 0–5)
WBC UR QL: 82 /HPF — HIGH (ref 0–5)
ZINC SERPL-MCNC: 34 UG/DL — LOW (ref 44–115)
ZINC SERPL-MCNC: 39 UG/DL — LOW (ref 44–115)

## 2021-01-01 PROCEDURE — 74178 CT ABD&PLV WO CNTR FLWD CNTR: CPT | Mod: 26,MH

## 2021-01-01 PROCEDURE — 99233 SBSQ HOSP IP/OBS HIGH 50: CPT

## 2021-01-01 PROCEDURE — 99024 POSTOP FOLLOW-UP VISIT: CPT

## 2021-01-01 PROCEDURE — 93010 ELECTROCARDIOGRAM REPORT: CPT

## 2021-01-01 PROCEDURE — 99291 CRITICAL CARE FIRST HOUR: CPT | Mod: 24,25

## 2021-01-01 PROCEDURE — 99215 OFFICE O/P EST HI 40 MIN: CPT

## 2021-01-01 PROCEDURE — 99221 1ST HOSP IP/OBS SF/LOW 40: CPT

## 2021-01-01 PROCEDURE — 99214 OFFICE O/P EST MOD 30 MIN: CPT

## 2021-01-01 PROCEDURE — 71045 X-RAY EXAM CHEST 1 VIEW: CPT | Mod: 26

## 2021-01-01 PROCEDURE — 74018 RADEX ABDOMEN 1 VIEW: CPT | Mod: 26

## 2021-01-01 PROCEDURE — 88313 SPECIAL STAINS GROUP 2: CPT | Mod: 26

## 2021-01-01 PROCEDURE — 99231 SBSQ HOSP IP/OBS SF/LOW 25: CPT

## 2021-01-01 PROCEDURE — 99231 SBSQ HOSP IP/OBS SF/LOW 25: CPT | Mod: 24

## 2021-01-01 PROCEDURE — 71045 X-RAY EXAM CHEST 1 VIEW: CPT | Mod: 26,76

## 2021-01-01 PROCEDURE — 93010 ELECTROCARDIOGRAM REPORT: CPT | Mod: 76

## 2021-01-01 PROCEDURE — 36573 INSJ PICC RS&I 5 YR+: CPT

## 2021-01-01 PROCEDURE — 99223 1ST HOSP IP/OBS HIGH 75: CPT | Mod: AI

## 2021-01-01 PROCEDURE — 88307 TISSUE EXAM BY PATHOLOGIST: CPT | Mod: 26

## 2021-01-01 PROCEDURE — 99232 SBSQ HOSP IP/OBS MODERATE 35: CPT

## 2021-01-01 PROCEDURE — 99497 ADVNCD CARE PLAN 30 MIN: CPT | Mod: 25

## 2021-01-01 PROCEDURE — 88305 TISSUE EXAM BY PATHOLOGIST: CPT | Mod: 26

## 2021-01-01 PROCEDURE — 74178 CT ABD&PLV WO CNTR FLWD CNTR: CPT | Mod: 26

## 2021-01-01 PROCEDURE — 93306 TTE W/DOPPLER COMPLETE: CPT | Mod: 26

## 2021-01-01 PROCEDURE — 47100 WEDGE BIOPSY OF LIVER: CPT

## 2021-01-01 PROCEDURE — 99239 HOSP IP/OBS DSCHRG MGMT >30: CPT

## 2021-01-01 PROCEDURE — 73630 X-RAY EXAM OF FOOT: CPT | Mod: 26,50

## 2021-01-01 PROCEDURE — 93308 TTE F-UP OR LMTD: CPT | Mod: 26

## 2021-01-01 PROCEDURE — 35221 RPR BLD VSL DIR INTRA-ABDL: CPT | Mod: 59

## 2021-01-01 PROCEDURE — 99285 EMERGENCY DEPT VISIT HI MDM: CPT | Mod: 25

## 2021-01-01 PROCEDURE — 99285 EMERGENCY DEPT VISIT HI MDM: CPT | Mod: 25,GC

## 2021-01-01 PROCEDURE — 99223 1ST HOSP IP/OBS HIGH 75: CPT

## 2021-01-01 PROCEDURE — 93970 EXTREMITY STUDY: CPT | Mod: 26

## 2021-01-01 PROCEDURE — 49905 OMENTAL FLAP INTRA-ABDOM: CPT

## 2021-01-01 PROCEDURE — 99222 1ST HOSP IP/OBS MODERATE 55: CPT

## 2021-01-01 PROCEDURE — 88309 TISSUE EXAM BY PATHOLOGIST: CPT | Mod: 26

## 2021-01-01 PROCEDURE — 99285 EMERGENCY DEPT VISIT HI MDM: CPT | Mod: CS

## 2021-01-01 PROCEDURE — 48150 PARTIAL REMOVAL OF PANCREAS: CPT | Mod: 22

## 2021-01-01 PROCEDURE — 99291 CRITICAL CARE FIRST HOUR: CPT | Mod: 24

## 2021-01-01 PROCEDURE — 36000 PLACE NEEDLE IN VEIN: CPT

## 2021-01-01 PROCEDURE — 93010 ELECTROCARDIOGRAM REPORT: CPT | Mod: 77

## 2021-01-01 PROCEDURE — 76705 ECHO EXAM OF ABDOMEN: CPT | Mod: 26

## 2021-01-01 PROCEDURE — 74177 CT ABD & PELVIS W/CONTRAST: CPT | Mod: 26,MA

## 2021-01-01 PROCEDURE — 11043 DBRDMT MUSC&/FSCA 1ST 20/<: CPT

## 2021-01-01 PROCEDURE — 74019 RADEX ABDOMEN 2 VIEWS: CPT | Mod: 26

## 2021-01-01 PROCEDURE — 99285 EMERGENCY DEPT VISIT HI MDM: CPT

## 2021-01-01 PROCEDURE — 71046 X-RAY EXAM CHEST 2 VIEWS: CPT | Mod: 26

## 2021-01-01 PROCEDURE — 88304 TISSUE EXAM BY PATHOLOGIST: CPT | Mod: 26

## 2021-01-01 PROCEDURE — 99232 SBSQ HOSP IP/OBS MODERATE 35: CPT | Mod: 24

## 2021-01-01 PROCEDURE — 93925 LOWER EXTREMITY STUDY: CPT | Mod: 26

## 2021-01-01 PROCEDURE — 99291 CRITICAL CARE FIRST HOUR: CPT | Mod: 25

## 2021-01-01 PROCEDURE — 78815 PET IMAGE W/CT SKULL-THIGH: CPT | Mod: 26,PS,MH

## 2021-01-01 PROCEDURE — 71260 CT THORAX DX C+: CPT | Mod: 26

## 2021-01-01 PROCEDURE — 99202 OFFICE O/P NEW SF 15 MIN: CPT

## 2021-01-01 RX ORDER — POTASSIUM CHLORIDE 20 MEQ
40 PACKET (EA) ORAL EVERY 4 HOURS
Refills: 0 | Status: COMPLETED | OUTPATIENT
Start: 2021-01-01 | End: 2021-01-01

## 2021-01-01 RX ORDER — MAGNESIUM SULFATE 500 MG/ML
2 VIAL (ML) INJECTION ONCE
Refills: 0 | Status: COMPLETED | OUTPATIENT
Start: 2021-01-01 | End: 2021-01-01

## 2021-01-01 RX ORDER — ERGOCALCIFEROL 1.25 MG/1
50000 CAPSULE ORAL
Refills: 0 | Status: DISCONTINUED | OUTPATIENT
Start: 2021-01-01 | End: 2021-01-01

## 2021-01-01 RX ORDER — DILTIAZEM HCL 120 MG
5 CAPSULE, EXT RELEASE 24 HR ORAL EVERY 8 HOURS
Refills: 0 | Status: DISCONTINUED | OUTPATIENT
Start: 2021-01-01 | End: 2021-01-01

## 2021-01-01 RX ORDER — METOCLOPRAMIDE HCL 10 MG
1 TABLET ORAL
Qty: 30 | Refills: 0
Start: 2021-01-01 | End: 2021-01-01

## 2021-01-01 RX ORDER — SACCHAROMYCES BOULARDII 250 MG
250 POWDER IN PACKET (EA) ORAL
Refills: 0 | Status: DISCONTINUED | OUTPATIENT
Start: 2021-01-01 | End: 2021-01-01

## 2021-01-01 RX ORDER — TAMSULOSIN HYDROCHLORIDE 0.4 MG/1
0.4 CAPSULE ORAL ONCE
Refills: 0 | Status: COMPLETED | OUTPATIENT
Start: 2021-01-01 | End: 2021-01-01

## 2021-01-01 RX ORDER — DILTIAZEM HCL 120 MG
30 CAPSULE, EXT RELEASE 24 HR ORAL EVERY 8 HOURS
Refills: 0 | Status: DISCONTINUED | OUTPATIENT
Start: 2021-01-01 | End: 2021-01-01

## 2021-01-01 RX ORDER — DEXAMETHASONE 0.5 MG/5ML
12 ELIXIR ORAL ONCE
Refills: 0 | Status: COMPLETED | OUTPATIENT
Start: 2021-01-01 | End: 2021-01-01

## 2021-01-01 RX ORDER — DILTIAZEM HCL 120 MG
20 CAPSULE, EXT RELEASE 24 HR ORAL EVERY 4 HOURS
Refills: 0 | Status: DISCONTINUED | OUTPATIENT
Start: 2021-01-01 | End: 2021-01-01

## 2021-01-01 RX ORDER — SODIUM CHLORIDE 9 MG/ML
500 INJECTION, SOLUTION INTRAVENOUS ONCE
Refills: 0 | Status: COMPLETED | OUTPATIENT
Start: 2021-01-01 | End: 2021-01-01

## 2021-01-01 RX ORDER — METRONIDAZOLE 500 MG
500 TABLET ORAL EVERY 8 HOURS
Refills: 0 | Status: COMPLETED | OUTPATIENT
Start: 2021-01-01 | End: 2021-01-01

## 2021-01-01 RX ORDER — POTASSIUM CHLORIDE 20 MEQ
20 PACKET (EA) ORAL ONCE
Refills: 0 | Status: COMPLETED | OUTPATIENT
Start: 2021-01-01 | End: 2021-01-01

## 2021-01-01 RX ORDER — TRAMADOL HYDROCHLORIDE 50 MG/1
25 TABLET ORAL ONCE
Refills: 0 | Status: DISCONTINUED | OUTPATIENT
Start: 2021-01-01 | End: 2021-01-01

## 2021-01-01 RX ORDER — METOCLOPRAMIDE HCL 10 MG
5 TABLET ORAL
Refills: 0 | Status: DISCONTINUED | OUTPATIENT
Start: 2021-01-01 | End: 2021-01-01

## 2021-01-01 RX ORDER — SODIUM CHLORIDE 9 MG/ML
1000 INJECTION, SOLUTION INTRAVENOUS
Refills: 0 | Status: DISCONTINUED | OUTPATIENT
Start: 2021-01-01 | End: 2021-01-01

## 2021-01-01 RX ORDER — CEFPODOXIME PROXETIL 100 MG
200 TABLET ORAL EVERY 12 HOURS
Refills: 0 | Status: COMPLETED | OUTPATIENT
Start: 2021-01-01 | End: 2021-01-01

## 2021-01-01 RX ORDER — OXYCODONE HYDROCHLORIDE 5 MG/1
5 TABLET ORAL EVERY 6 HOURS
Refills: 0 | Status: DISCONTINUED | OUTPATIENT
Start: 2021-01-01 | End: 2021-01-01

## 2021-01-01 RX ORDER — SODIUM CHLORIDE 9 MG/ML
1750 INJECTION INTRAMUSCULAR; INTRAVENOUS; SUBCUTANEOUS ONCE
Refills: 0 | Status: COMPLETED | OUTPATIENT
Start: 2021-01-01 | End: 2021-01-01

## 2021-01-01 RX ORDER — ACETAMINOPHEN 500 MG
650 TABLET ORAL EVERY 6 HOURS
Refills: 0 | Status: DISCONTINUED | OUTPATIENT
Start: 2021-01-01 | End: 2021-01-01

## 2021-01-01 RX ORDER — BUPIVACAINE 13.3 MG/ML
20 INJECTION, SUSPENSION, LIPOSOMAL INFILTRATION ONCE
Refills: 0 | Status: COMPLETED | OUTPATIENT
Start: 2021-01-01 | End: 2021-01-01

## 2021-01-01 RX ORDER — HEPARIN SODIUM 5000 [USP'U]/ML
0 INJECTION INTRAVENOUS; SUBCUTANEOUS
Qty: 0 | Refills: 0 | DISCHARGE
Start: 2021-01-01

## 2021-01-01 RX ORDER — FUROSEMIDE 40 MG
1 TABLET ORAL
Qty: 0 | Refills: 0 | DISCHARGE

## 2021-01-01 RX ORDER — CEFTRIAXONE 500 MG/1
2000 INJECTION, POWDER, FOR SOLUTION INTRAMUSCULAR; INTRAVENOUS ONCE
Refills: 0 | Status: DISCONTINUED | OUTPATIENT
Start: 2021-01-01 | End: 2021-01-01

## 2021-01-01 RX ORDER — PANTOPRAZOLE SODIUM 20 MG/1
1 TABLET, DELAYED RELEASE ORAL
Qty: 0 | Refills: 0 | DISCHARGE
Start: 2021-01-01

## 2021-01-01 RX ORDER — TRAMADOL HYDROCHLORIDE 50 MG/1
25 TABLET ORAL EVERY 6 HOURS
Refills: 0 | Status: DISCONTINUED | OUTPATIENT
Start: 2021-01-01 | End: 2021-01-01

## 2021-01-01 RX ORDER — PANTOPRAZOLE SODIUM 20 MG/1
40 TABLET, DELAYED RELEASE ORAL DAILY
Refills: 0 | Status: DISCONTINUED | OUTPATIENT
Start: 2021-01-01 | End: 2021-01-01

## 2021-01-01 RX ORDER — ACETAMINOPHEN 500 MG
1000 TABLET ORAL ONCE
Refills: 0 | Status: COMPLETED | OUTPATIENT
Start: 2021-01-01 | End: 2021-01-01

## 2021-01-01 RX ORDER — CEFTRIAXONE 500 MG/1
1000 INJECTION, POWDER, FOR SOLUTION INTRAMUSCULAR; INTRAVENOUS EVERY 24 HOURS
Refills: 0 | Status: DISCONTINUED | OUTPATIENT
Start: 2021-01-01 | End: 2021-01-01

## 2021-01-01 RX ORDER — CHLORHEXIDINE GLUCONATE 213 G/1000ML
1 SOLUTION TOPICAL
Refills: 0 | Status: DISCONTINUED | OUTPATIENT
Start: 2021-01-01 | End: 2021-01-01

## 2021-01-01 RX ORDER — CASPOFUNGIN ACETATE 7 MG/ML
50 INJECTION, POWDER, LYOPHILIZED, FOR SOLUTION INTRAVENOUS
Qty: 0 | Refills: 0 | DISCHARGE
Start: 2021-01-01 | End: 2021-01-01

## 2021-01-01 RX ORDER — PANTOPRAZOLE SODIUM 20 MG/1
40 TABLET, DELAYED RELEASE ORAL
Refills: 0 | Status: DISCONTINUED | OUTPATIENT
Start: 2021-01-01 | End: 2021-01-01

## 2021-01-01 RX ORDER — MAGNESIUM OXIDE 400 MG ORAL TABLET 241.3 MG
400 TABLET ORAL EVERY 4 HOURS
Refills: 0 | Status: DISCONTINUED | OUTPATIENT
Start: 2021-01-01 | End: 2021-01-01

## 2021-01-01 RX ORDER — POLYETHYLENE GLYCOL 3350 17 G/17G
17 POWDER, FOR SOLUTION ORAL DAILY
Refills: 0 | Status: DISCONTINUED | OUTPATIENT
Start: 2021-01-01 | End: 2021-01-01

## 2021-01-01 RX ORDER — SACCHAROMYCES BOULARDII 250 MG
1 POWDER IN PACKET (EA) ORAL
Qty: 0 | Refills: 0 | DISCHARGE
Start: 2021-01-01

## 2021-01-01 RX ORDER — TAMSULOSIN HYDROCHLORIDE 0.4 MG/1
0 CAPSULE ORAL
Qty: 0 | Refills: 0 | DISCHARGE
Start: 2021-01-01

## 2021-01-01 RX ORDER — ENOXAPARIN SODIUM 100 MG/ML
40 INJECTION SUBCUTANEOUS DAILY
Qty: 4 | Refills: 0 | Status: ACTIVE | COMMUNITY
Start: 2021-01-01 | End: 1900-01-01

## 2021-01-01 RX ORDER — GEMCITABINE 38 MG/ML
1350 INJECTION, SOLUTION INTRAVENOUS ONCE
Refills: 0 | Status: COMPLETED | OUTPATIENT
Start: 2021-01-01 | End: 2021-01-01

## 2021-01-01 RX ORDER — HYDROMORPHONE HYDROCHLORIDE 2 MG/ML
0.25 INJECTION INTRAMUSCULAR; INTRAVENOUS; SUBCUTANEOUS EVERY 6 HOURS
Refills: 0 | Status: DISCONTINUED | OUTPATIENT
Start: 2021-01-01 | End: 2021-01-01

## 2021-01-01 RX ORDER — POTASSIUM CHLORIDE 20 MEQ
20 PACKET (EA) ORAL
Refills: 0 | Status: COMPLETED | OUTPATIENT
Start: 2021-01-01 | End: 2021-01-01

## 2021-01-01 RX ORDER — LANOLIN ALCOHOL/MO/W.PET/CERES
5 CREAM (GRAM) TOPICAL AT BEDTIME
Refills: 0 | Status: COMPLETED | OUTPATIENT
Start: 2021-01-01 | End: 2021-01-01

## 2021-01-01 RX ORDER — MULTIVIT-MIN/FERROUS GLUCONATE 9 MG/15 ML
1 LIQUID (ML) ORAL DAILY
Refills: 0 | Status: DISCONTINUED | OUTPATIENT
Start: 2021-01-01 | End: 2021-01-01

## 2021-01-01 RX ORDER — HEPARIN SODIUM 5000 [USP'U]/ML
5000 INJECTION INTRAVENOUS; SUBCUTANEOUS EVERY 8 HOURS
Refills: 0 | Status: DISCONTINUED | OUTPATIENT
Start: 2021-01-01 | End: 2021-01-01

## 2021-01-01 RX ORDER — OXYCODONE HYDROCHLORIDE 5 MG/1
1 TABLET ORAL
Qty: 0 | Refills: 0 | DISCHARGE
Start: 2021-01-01

## 2021-01-01 RX ORDER — LIPASE/PROTEASE/AMYLASE 16-48-48K
1 CAPSULE,DELAYED RELEASE (ENTERIC COATED) ORAL
Qty: 90 | Refills: 0
Start: 2021-01-01 | End: 2021-01-01

## 2021-01-01 RX ORDER — FUROSEMIDE 40 MG
10 TABLET ORAL DAILY
Refills: 0 | Status: DISCONTINUED | OUTPATIENT
Start: 2021-01-01 | End: 2021-01-01

## 2021-01-01 RX ORDER — APIXABAN 2.5 MG/1
5 TABLET, FILM COATED ORAL
Refills: 0 | Status: DISCONTINUED | OUTPATIENT
Start: 2021-01-01 | End: 2021-01-01

## 2021-01-01 RX ORDER — SENNA PLUS 8.6 MG/1
2 TABLET ORAL AT BEDTIME
Refills: 0 | Status: DISCONTINUED | OUTPATIENT
Start: 2021-01-01 | End: 2021-01-01

## 2021-01-01 RX ORDER — DILTIAZEM HCL 120 MG
60 CAPSULE, EXT RELEASE 24 HR ORAL EVERY 6 HOURS
Refills: 0 | Status: DISCONTINUED | OUTPATIENT
Start: 2021-01-01 | End: 2021-01-01

## 2021-01-01 RX ORDER — TAMSULOSIN HYDROCHLORIDE 0.4 MG/1
0.4 CAPSULE ORAL AT BEDTIME
Refills: 0 | Status: DISCONTINUED | OUTPATIENT
Start: 2021-01-01 | End: 2021-01-01

## 2021-01-01 RX ORDER — DILTIAZEM HCL 120 MG
180 CAPSULE, EXT RELEASE 24 HR ORAL DAILY
Refills: 0 | Status: DISCONTINUED | OUTPATIENT
Start: 2021-01-01 | End: 2021-01-01

## 2021-01-01 RX ORDER — DILTIAZEM HCL 120 MG
120 CAPSULE, EXT RELEASE 24 HR ORAL DAILY
Refills: 0 | Status: DISCONTINUED | OUTPATIENT
Start: 2021-01-01 | End: 2021-01-01

## 2021-01-01 RX ORDER — PACLITAXEL 100 MG/20ML
150 INJECTION, POWDER, LYOPHILIZED, FOR SUSPENSION INTRAVENOUS ONCE
Refills: 0 | Status: COMPLETED | OUTPATIENT
Start: 2021-01-01 | End: 2021-01-01

## 2021-01-01 RX ORDER — TAMSULOSIN HYDROCHLORIDE 0.4 MG/1
CAPSULE ORAL
Refills: 0 | Status: DISCONTINUED | OUTPATIENT
Start: 2021-01-01 | End: 2021-01-01

## 2021-01-01 RX ORDER — MAGNESIUM OXIDE 400 MG ORAL TABLET 241.3 MG
1 TABLET ORAL
Qty: 60 | Refills: 0
Start: 2021-01-01 | End: 2021-01-01

## 2021-01-01 RX ORDER — CEFOTETAN DISODIUM 1 G
2 VIAL (EA) INJECTION EVERY 12 HOURS
Refills: 0 | Status: DISCONTINUED | OUTPATIENT
Start: 2021-01-01 | End: 2021-01-01

## 2021-01-01 RX ORDER — LACTOBACILLUS ACIDOPHILUS 100MM CELL
1 CAPSULE ORAL
Refills: 0 | Status: DISCONTINUED | OUTPATIENT
Start: 2021-01-01 | End: 2021-01-01

## 2021-01-01 RX ORDER — AZTREONAM 2 G
2000 VIAL (EA) INJECTION ONCE
Refills: 0 | Status: COMPLETED | OUTPATIENT
Start: 2021-01-01 | End: 2021-01-01

## 2021-01-01 RX ORDER — MAGNESIUM SULFATE 500 MG/ML
2 VIAL (ML) INJECTION EVERY 6 HOURS
Refills: 0 | Status: COMPLETED | OUTPATIENT
Start: 2021-01-01 | End: 2021-01-01

## 2021-01-01 RX ORDER — MAGNESIUM SULFATE 500 MG/ML
2 VIAL (ML) INJECTION ONCE
Refills: 0 | Status: DISCONTINUED | OUTPATIENT
Start: 2021-01-01 | End: 2021-01-01

## 2021-01-01 RX ORDER — IOHEXOL 300 MG/ML
50 INJECTION, SOLUTION INTRAVENOUS ONCE
Refills: 0 | Status: COMPLETED | OUTPATIENT
Start: 2021-01-01 | End: 2021-01-01

## 2021-01-01 RX ORDER — CHLORHEXIDINE GLUCONATE 213 G/1000ML
15 SOLUTION TOPICAL EVERY 12 HOURS
Refills: 0 | Status: DISCONTINUED | OUTPATIENT
Start: 2021-01-01 | End: 2021-01-01

## 2021-01-01 RX ORDER — CHOLECALCIFEROL (VITAMIN D3) 125 MCG
2000 CAPSULE ORAL DAILY
Refills: 0 | Status: DISCONTINUED | OUTPATIENT
Start: 2021-01-01 | End: 2021-01-01

## 2021-01-01 RX ORDER — APIXABAN 2.5 MG/1
1 TABLET, FILM COATED ORAL
Qty: 0 | Refills: 0 | DISCHARGE

## 2021-01-01 RX ORDER — DILTIAZEM HCL 120 MG
1 CAPSULE, EXT RELEASE 24 HR ORAL
Qty: 30 | Refills: 0
Start: 2021-01-01 | End: 2021-01-01

## 2021-01-01 RX ORDER — CASPOFUNGIN ACETATE 7 MG/ML
50 INJECTION, POWDER, LYOPHILIZED, FOR SOLUTION INTRAVENOUS EVERY 24 HOURS
Refills: 0 | Status: DISCONTINUED | OUTPATIENT
Start: 2021-01-01 | End: 2021-01-01

## 2021-01-01 RX ORDER — ZINC SULFATE TAB 220 MG (50 MG ZINC EQUIVALENT) 220 (50 ZN) MG
220 TAB ORAL DAILY
Refills: 0 | Status: DISCONTINUED | OUTPATIENT
Start: 2021-01-01 | End: 2021-01-01

## 2021-01-01 RX ORDER — CIPROFLOXACIN LACTATE 400MG/40ML
500 VIAL (ML) INTRAVENOUS EVERY 12 HOURS
Refills: 0 | Status: DISCONTINUED | OUTPATIENT
Start: 2021-01-01 | End: 2021-01-01

## 2021-01-01 RX ORDER — HEPARIN SODIUM 5000 [USP'U]/ML
5000 INJECTION INTRAVENOUS; SUBCUTANEOUS ONCE
Refills: 0 | Status: COMPLETED | OUTPATIENT
Start: 2021-01-01 | End: 2021-01-01

## 2021-01-01 RX ORDER — DILTIAZEM HCL 120 MG
20 CAPSULE, EXT RELEASE 24 HR ORAL ONCE
Refills: 0 | Status: COMPLETED | OUTPATIENT
Start: 2021-01-01 | End: 2021-01-01

## 2021-01-01 RX ORDER — CLINDAMYCIN HYDROCHLORIDE 300 MG/1
300 CAPSULE ORAL
Qty: 21 | Refills: 0 | Status: ACTIVE | COMMUNITY
Start: 2021-05-05

## 2021-01-01 RX ORDER — LACTOBACILLUS ACIDOPHILUS 100MM CELL
1 CAPSULE ORAL DAILY
Refills: 0 | Status: DISCONTINUED | OUTPATIENT
Start: 2021-01-01 | End: 2021-01-01

## 2021-01-01 RX ORDER — METRONIDAZOLE 500 MG
500 TABLET ORAL EVERY 8 HOURS
Refills: 0 | Status: DISCONTINUED | OUTPATIENT
Start: 2021-01-01 | End: 2021-01-01

## 2021-01-01 RX ORDER — DIATRIZOATE MEGLUMINE 180 MG/ML
30 INJECTION, SOLUTION INTRAVESICAL ONCE
Refills: 0 | Status: DISCONTINUED | OUTPATIENT
Start: 2021-01-01 | End: 2021-01-01

## 2021-01-01 RX ORDER — DILTIAZEM HCL 120 MG
1 CAPSULE, EXT RELEASE 24 HR ORAL
Qty: 0 | Refills: 0 | DISCHARGE

## 2021-01-01 RX ORDER — SACCHAROMYCES BOULARDII 250 MG
1 POWDER IN PACKET (EA) ORAL
Qty: 60 | Refills: 0
Start: 2021-01-01 | End: 2021-01-01

## 2021-01-01 RX ORDER — FUROSEMIDE 40 MG
1 TABLET ORAL
Qty: 30 | Refills: 0
Start: 2021-01-01 | End: 2021-01-01

## 2021-01-01 RX ORDER — PANTOPRAZOLE SODIUM 20 MG/1
40 TABLET, DELAYED RELEASE ORAL EVERY 24 HOURS
Refills: 0 | Status: DISCONTINUED | OUTPATIENT
Start: 2021-01-01 | End: 2021-01-01

## 2021-01-01 RX ORDER — FAMOTIDINE 10 MG/ML
20 INJECTION INTRAVENOUS ONCE
Refills: 0 | Status: COMPLETED | OUTPATIENT
Start: 2021-01-01 | End: 2021-01-01

## 2021-01-01 RX ORDER — FAMOTIDINE 10 MG/ML
20 INJECTION INTRAVENOUS DAILY
Refills: 0 | Status: DISCONTINUED | OUTPATIENT
Start: 2021-01-01 | End: 2021-01-01

## 2021-01-01 RX ORDER — CHOLECALCIFEROL (VITAMIN D3) 125 MCG
1 CAPSULE ORAL
Qty: 30 | Refills: 0
Start: 2021-01-01 | End: 2021-01-01

## 2021-01-01 RX ORDER — MORPHINE SULFATE 50 MG/1
2 CAPSULE, EXTENDED RELEASE ORAL ONCE
Refills: 0 | Status: DISCONTINUED | OUTPATIENT
Start: 2021-01-01 | End: 2021-01-01

## 2021-01-01 RX ORDER — ZINC SULFATE TAB 220 MG (50 MG ZINC EQUIVALENT) 220 (50 ZN) MG
1 TAB ORAL
Qty: 30 | Refills: 0
Start: 2021-01-01 | End: 2021-01-01

## 2021-01-01 RX ORDER — DILTIAZEM HCL 120 MG
30 CAPSULE, EXT RELEASE 24 HR ORAL
Refills: 0 | Status: COMPLETED | OUTPATIENT
Start: 2021-01-01 | End: 2021-01-01

## 2021-01-01 RX ORDER — CASPOFUNGIN ACETATE 7 MG/ML
INJECTION, POWDER, LYOPHILIZED, FOR SOLUTION INTRAVENOUS
Refills: 0 | Status: DISCONTINUED | OUTPATIENT
Start: 2021-01-01 | End: 2021-01-01

## 2021-01-01 RX ORDER — DILTIAZEM HCL 120 MG
80 CAPSULE, EXT RELEASE 24 HR ORAL EVERY 6 HOURS
Refills: 0 | Status: DISCONTINUED | OUTPATIENT
Start: 2021-01-01 | End: 2021-01-01

## 2021-01-01 RX ORDER — MAGNESIUM SULFATE 500 MG/ML
1 VIAL (ML) INJECTION ONCE
Refills: 0 | Status: COMPLETED | OUTPATIENT
Start: 2021-01-01 | End: 2021-01-01

## 2021-01-01 RX ORDER — ETHACRYNIC ACID 25 MG/1
50 TABLET ORAL ONCE
Refills: 0 | Status: COMPLETED | OUTPATIENT
Start: 2021-01-01 | End: 2021-01-01

## 2021-01-01 RX ORDER — PEGFILGRASTIM-CBQV 6 MG/.6ML
6 INJECTION, SOLUTION SUBCUTANEOUS ONCE
Refills: 0 | Status: COMPLETED | OUTPATIENT
Start: 2021-01-01 | End: 2021-01-01

## 2021-01-01 RX ORDER — LACTOBACILLUS ACIDOPHILUS 100MM CELL
1 CAPSULE ORAL
Qty: 0 | Refills: 0 | DISCHARGE
Start: 2021-01-01

## 2021-01-01 RX ORDER — SODIUM CHLORIDE 9 MG/ML
1000 INJECTION, SOLUTION INTRAVENOUS ONCE
Refills: 0 | Status: COMPLETED | OUTPATIENT
Start: 2021-01-01 | End: 2021-01-01

## 2021-01-01 RX ORDER — MORPHINE SULFATE 50 MG/1
2 CAPSULE, EXTENDED RELEASE ORAL EVERY 6 HOURS
Refills: 0 | Status: DISCONTINUED | OUTPATIENT
Start: 2021-01-01 | End: 2021-01-01

## 2021-01-01 RX ORDER — FUROSEMIDE 40 MG
20 TABLET ORAL ONCE
Refills: 0 | Status: COMPLETED | OUTPATIENT
Start: 2021-01-01 | End: 2021-01-01

## 2021-01-01 RX ORDER — DILTIAZEM HCL 120 MG
60 CAPSULE, EXT RELEASE 24 HR ORAL ONCE
Refills: 0 | Status: COMPLETED | OUTPATIENT
Start: 2021-01-01 | End: 2021-01-01

## 2021-01-01 RX ORDER — COLLAGENASE CLOSTRIDIUM HIST. 250 UNIT/G
1 OINTMENT (GRAM) TOPICAL DAILY
Refills: 0 | Status: DISCONTINUED | OUTPATIENT
Start: 2021-01-01 | End: 2021-01-01

## 2021-01-01 RX ORDER — FUROSEMIDE 40 MG
40 TABLET ORAL
Refills: 0 | Status: DISCONTINUED | OUTPATIENT
Start: 2021-01-01 | End: 2021-01-01

## 2021-01-01 RX ORDER — FUROSEMIDE 40 MG
20 TABLET ORAL DAILY
Refills: 0 | Status: DISCONTINUED | OUTPATIENT
Start: 2021-01-01 | End: 2021-01-01

## 2021-01-01 RX ORDER — DILTIAZEM HCL 120 MG
90 CAPSULE, EXT RELEASE 24 HR ORAL EVERY 6 HOURS
Refills: 0 | Status: DISCONTINUED | OUTPATIENT
Start: 2021-01-01 | End: 2021-01-01

## 2021-01-01 RX ORDER — LIPASE/PROTEASE/AMYLASE 16-48-48K
1 CAPSULE,DELAYED RELEASE (ENTERIC COATED) ORAL
Refills: 0 | Status: DISCONTINUED | OUTPATIENT
Start: 2021-01-01 | End: 2021-01-01

## 2021-01-01 RX ORDER — APIXABAN 2.5 MG/1
5 TABLET, FILM COATED ORAL EVERY 12 HOURS
Refills: 0 | Status: DISCONTINUED | OUTPATIENT
Start: 2021-01-01 | End: 2021-01-01

## 2021-01-01 RX ORDER — INFLUENZA VIRUS VACCINE 15; 15; 15; 15 UG/.5ML; UG/.5ML; UG/.5ML; UG/.5ML
0.5 SUSPENSION INTRAMUSCULAR ONCE
Refills: 0 | Status: DISCONTINUED | OUTPATIENT
Start: 2021-01-01 | End: 2021-01-01

## 2021-01-01 RX ORDER — POLYETHYLENE GLYCOL 3350 17 G/17G
17 POWDER, FOR SOLUTION ORAL
Qty: 1 | Refills: 0 | Status: ACTIVE | COMMUNITY
Start: 2021-01-01 | End: 1900-01-01

## 2021-01-01 RX ORDER — DILTIAZEM HCL 120 MG
30 CAPSULE, EXT RELEASE 24 HR ORAL THREE TIMES A DAY
Refills: 0 | Status: DISCONTINUED | OUTPATIENT
Start: 2021-01-01 | End: 2021-01-01

## 2021-01-01 RX ORDER — OCTREOTIDE ACETATE 200 UG/ML
50 INJECTION, SOLUTION INTRAVENOUS; SUBCUTANEOUS THREE TIMES A DAY
Refills: 0 | Status: DISCONTINUED | OUTPATIENT
Start: 2021-01-01 | End: 2021-01-01

## 2021-01-01 RX ORDER — LIPASE/PROTEASE/AMYLASE 16-48-48K
2 CAPSULE,DELAYED RELEASE (ENTERIC COATED) ORAL
Refills: 0 | Status: DISCONTINUED | OUTPATIENT
Start: 2021-01-01 | End: 2021-01-01

## 2021-01-01 RX ORDER — DILTIAZEM HCL 120 MG
1 CAPSULE, EXT RELEASE 24 HR ORAL
Qty: 0 | Refills: 0 | DISCHARGE
Start: 2021-01-01

## 2021-01-01 RX ORDER — DILTIAZEM HCL 120 MG
30 CAPSULE, EXT RELEASE 24 HR ORAL ONCE
Refills: 0 | Status: COMPLETED | OUTPATIENT
Start: 2021-01-01 | End: 2021-01-01

## 2021-01-01 RX ORDER — SODIUM CHLORIDE 9 MG/ML
250 INJECTION, SOLUTION INTRAVENOUS ONCE
Refills: 0 | Status: COMPLETED | OUTPATIENT
Start: 2021-01-01 | End: 2021-01-01

## 2021-01-01 RX ORDER — CEFTRIAXONE 500 MG/1
1000 INJECTION, POWDER, FOR SOLUTION INTRAMUSCULAR; INTRAVENOUS ONCE
Refills: 0 | Status: DISCONTINUED | OUTPATIENT
Start: 2021-01-01 | End: 2021-01-01

## 2021-01-01 RX ORDER — ERGOCALCIFEROL 1.25 MG/1
2000 CAPSULE ORAL
Qty: 60000 | Refills: 0
Start: 2021-01-01 | End: 2021-01-01

## 2021-01-01 RX ORDER — POTASSIUM CHLORIDE 20 MEQ
40 PACKET (EA) ORAL ONCE
Refills: 0 | Status: COMPLETED | OUTPATIENT
Start: 2021-01-01 | End: 2021-01-01

## 2021-01-01 RX ORDER — CASPOFUNGIN ACETATE 7 MG/ML
70 INJECTION, POWDER, LYOPHILIZED, FOR SOLUTION INTRAVENOUS ONCE
Refills: 0 | Status: COMPLETED | OUTPATIENT
Start: 2021-01-01 | End: 2021-01-01

## 2021-01-01 RX ORDER — CEFEPIME 1 G/1
2000 INJECTION, POWDER, FOR SOLUTION INTRAMUSCULAR; INTRAVENOUS EVERY 8 HOURS
Refills: 0 | Status: DISCONTINUED | OUTPATIENT
Start: 2021-01-01 | End: 2021-01-01

## 2021-01-01 RX ORDER — DILTIAZEM HCL 120 MG
5 CAPSULE, EXT RELEASE 24 HR ORAL
Qty: 125 | Refills: 0 | Status: DISCONTINUED | OUTPATIENT
Start: 2021-01-01 | End: 2021-01-01

## 2021-01-01 RX ORDER — HYDROMORPHONE HYDROCHLORIDE 2 MG/ML
0.5 INJECTION INTRAMUSCULAR; INTRAVENOUS; SUBCUTANEOUS
Refills: 0 | Status: DISCONTINUED | OUTPATIENT
Start: 2021-01-01 | End: 2021-01-01

## 2021-01-01 RX ORDER — POTASSIUM CHLORIDE 20 MEQ
20 PACKET (EA) ORAL ONCE
Refills: 0 | Status: DISCONTINUED | OUTPATIENT
Start: 2021-01-01 | End: 2021-01-01

## 2021-01-01 RX ORDER — LIPASE/PROTEASE/AMYLASE 16-48-48K
2 CAPSULE,DELAYED RELEASE (ENTERIC COATED) ORAL
Qty: 0 | Refills: 0 | DISCHARGE
Start: 2021-01-01

## 2021-01-01 RX ORDER — MAGNESIUM SULFATE 500 MG/ML
2 VIAL (ML) INJECTION
Refills: 0 | Status: DISCONTINUED | OUTPATIENT
Start: 2021-01-01 | End: 2021-01-01

## 2021-01-01 RX ORDER — HEPARIN SODIUM 5000 [USP'U]/ML
5000 INJECTION INTRAVENOUS; SUBCUTANEOUS EVERY 12 HOURS
Refills: 0 | Status: DISCONTINUED | OUTPATIENT
Start: 2021-01-01 | End: 2021-01-01

## 2021-01-01 RX ORDER — KETOROLAC TROMETHAMINE 30 MG/ML
15 SYRINGE (ML) INJECTION EVERY 8 HOURS
Refills: 0 | Status: DISCONTINUED | OUTPATIENT
Start: 2021-01-01 | End: 2021-01-01

## 2021-01-01 RX ORDER — CEFPODOXIME PROXETIL 100 MG
1 TABLET ORAL
Qty: 12 | Refills: 0
Start: 2021-01-01 | End: 2021-01-01

## 2021-01-01 RX ORDER — COLLAGENASE CLOSTRIDIUM HIST. 250 UNIT/G
1 OINTMENT (GRAM) TOPICAL
Qty: 0 | Refills: 0 | DISCHARGE
Start: 2021-01-01

## 2021-01-01 RX ORDER — METOCLOPRAMIDE HCL 10 MG
1 TABLET ORAL
Qty: 0 | Refills: 0 | DISCHARGE
Start: 2021-01-01

## 2021-01-01 RX ORDER — LACTOBACILLUS ACIDOPHILUS 100MM CELL
1 CAPSULE ORAL
Qty: 60 | Refills: 0
Start: 2021-01-01 | End: 2021-01-01

## 2021-01-01 RX ORDER — DILTIAZEM HYDROCHLORIDE 180 MG/1
180 CAPSULE, EXTENDED RELEASE ORAL
Qty: 30 | Refills: 0 | Status: ACTIVE | COMMUNITY
Start: 2021-01-01

## 2021-01-01 RX ORDER — PANTOPRAZOLE 40 MG/1
40 TABLET, DELAYED RELEASE ORAL DAILY
Qty: 30 | Refills: 5 | Status: ACTIVE | COMMUNITY
Start: 2021-01-01 | End: 1900-01-01

## 2021-01-01 RX ORDER — VANCOMYCIN HCL 1 G
1000 VIAL (EA) INTRAVENOUS ONCE
Refills: 0 | Status: COMPLETED | OUTPATIENT
Start: 2021-01-01 | End: 2021-01-01

## 2021-01-01 RX ORDER — ONDANSETRON 8 MG/1
4 TABLET, FILM COATED ORAL ONCE
Refills: 0 | Status: COMPLETED | OUTPATIENT
Start: 2021-01-01 | End: 2021-01-01

## 2021-01-01 RX ORDER — DILTIAZEM HCL 120 MG
180 CAPSULE, EXT RELEASE 24 HR ORAL ONCE
Refills: 0 | Status: COMPLETED | OUTPATIENT
Start: 2021-01-01 | End: 2021-01-01

## 2021-01-01 RX ORDER — FUROSEMIDE 40 MG
40 TABLET ORAL EVERY 24 HOURS
Refills: 0 | Status: DISCONTINUED | OUTPATIENT
Start: 2021-01-01 | End: 2021-01-01

## 2021-01-01 RX ORDER — HYDROMORPHONE HYDROCHLORIDE 2 MG/ML
0.25 INJECTION INTRAMUSCULAR; INTRAVENOUS; SUBCUTANEOUS EVERY 4 HOURS
Refills: 0 | Status: DISCONTINUED | OUTPATIENT
Start: 2021-01-01 | End: 2021-01-01

## 2021-01-01 RX ORDER — VERAPAMIL HCL 240 MG
1 CAPSULE, EXTENDED RELEASE PELLETS 24 HR ORAL
Qty: 60 | Refills: 0
Start: 2021-01-01 | End: 2022-01-01

## 2021-01-01 RX ORDER — MAGNESIUM OXIDE 400 MG ORAL TABLET 241.3 MG
400 TABLET ORAL
Refills: 0 | Status: DISCONTINUED | OUTPATIENT
Start: 2021-01-01 | End: 2021-01-01

## 2021-01-01 RX ORDER — DILTIAZEM HCL 120 MG
240 CAPSULE, EXT RELEASE 24 HR ORAL DAILY
Refills: 0 | Status: DISCONTINUED | OUTPATIENT
Start: 2021-01-01 | End: 2021-01-01

## 2021-01-01 RX ORDER — DILTIAZEM HCL 120 MG
30 CAPSULE, EXT RELEASE 24 HR ORAL EVERY 6 HOURS
Refills: 0 | Status: DISCONTINUED | OUTPATIENT
Start: 2021-01-01 | End: 2021-01-01

## 2021-01-01 RX ORDER — DILTIAZEM HCL 120 MG
5 CAPSULE, EXT RELEASE 24 HR ORAL ONCE
Refills: 0 | Status: COMPLETED | OUTPATIENT
Start: 2021-01-01 | End: 2021-01-01

## 2021-01-01 RX ORDER — DILTIAZEM HCL 120 MG
10 CAPSULE, EXT RELEASE 24 HR ORAL ONCE
Refills: 0 | Status: COMPLETED | OUTPATIENT
Start: 2021-01-01 | End: 2021-01-01

## 2021-01-01 RX ORDER — HYDROMORPHONE HYDROCHLORIDE 2 MG/ML
0.5 INJECTION INTRAMUSCULAR; INTRAVENOUS; SUBCUTANEOUS EVERY 6 HOURS
Refills: 0 | Status: DISCONTINUED | OUTPATIENT
Start: 2021-01-01 | End: 2021-01-01

## 2021-01-01 RX ORDER — VERAPAMIL HCL 240 MG
40 CAPSULE, EXTENDED RELEASE PELLETS 24 HR ORAL
Refills: 0 | Status: DISCONTINUED | OUTPATIENT
Start: 2021-01-01 | End: 2021-01-01

## 2021-01-01 RX ORDER — DILTIAZEM HYDROCHLORIDE 120 MG/1
120 CAPSULE, EXTENDED RELEASE ORAL
Qty: 90 | Refills: 0 | Status: ACTIVE | COMMUNITY
Start: 2021-01-01

## 2021-01-01 RX ORDER — TAMSULOSIN HYDROCHLORIDE 0.4 MG/1
1 CAPSULE ORAL
Qty: 0 | Refills: 0 | DISCHARGE
Start: 2021-01-01

## 2021-01-01 RX ORDER — POTASSIUM PHOSPHATE, MONOBASIC POTASSIUM PHOSPHATE, DIBASIC 236; 224 MG/ML; MG/ML
15 INJECTION, SOLUTION INTRAVENOUS ONCE
Refills: 0 | Status: COMPLETED | OUTPATIENT
Start: 2021-01-01 | End: 2021-01-01

## 2021-01-01 RX ORDER — PANTOPRAZOLE SODIUM 20 MG/1
1 TABLET, DELAYED RELEASE ORAL
Qty: 30 | Refills: 0
Start: 2021-01-01 | End: 2021-01-01

## 2021-01-01 RX ORDER — KETOROLAC TROMETHAMINE 30 MG/ML
15 SYRINGE (ML) INJECTION ONCE
Refills: 0 | Status: DISCONTINUED | OUTPATIENT
Start: 2021-01-01 | End: 2021-01-01

## 2021-01-01 RX ORDER — POTASSIUM CHLORIDE 20 MEQ
20 PACKET (EA) ORAL
Refills: 0 | Status: DISCONTINUED | OUTPATIENT
Start: 2021-01-01 | End: 2021-01-01

## 2021-01-01 RX ORDER — HYDROMORPHONE HYDROCHLORIDE 2 MG/ML
1 INJECTION INTRAMUSCULAR; INTRAVENOUS; SUBCUTANEOUS EVERY 4 HOURS
Refills: 0 | Status: DISCONTINUED | OUTPATIENT
Start: 2021-01-01 | End: 2021-01-01

## 2021-01-01 RX ORDER — VANCOMYCIN HCL 1 G
125 VIAL (EA) INTRAVENOUS EVERY 6 HOURS
Refills: 0 | Status: DISCONTINUED | OUTPATIENT
Start: 2021-01-01 | End: 2021-01-01

## 2021-01-01 RX ORDER — SODIUM CHLORIDE 9 MG/ML
500 INJECTION, SOLUTION INTRAVENOUS
Refills: 0 | Status: DISCONTINUED | OUTPATIENT
Start: 2021-01-01 | End: 2021-01-01

## 2021-01-01 RX ORDER — ENOXAPARIN SODIUM 100 MG/ML
40 INJECTION SUBCUTANEOUS DAILY
Refills: 0 | Status: DISCONTINUED | OUTPATIENT
Start: 2021-01-01 | End: 2021-01-01

## 2021-01-01 RX ORDER — METOCLOPRAMIDE HCL 10 MG
5 TABLET ORAL
Refills: 0 | Status: COMPLETED | OUTPATIENT
Start: 2021-01-01 | End: 2021-01-01

## 2021-01-01 RX ORDER — TRAMADOL HYDROCHLORIDE 50 MG/1
50 TABLET ORAL EVERY 6 HOURS
Refills: 0 | Status: DISCONTINUED | OUTPATIENT
Start: 2021-01-01 | End: 2021-01-01

## 2021-01-01 RX ORDER — VANCOMYCIN HCL 1 G
1 VIAL (EA) INTRAVENOUS
Qty: 28 | Refills: 0
Start: 2021-01-01 | End: 2021-01-01

## 2021-01-01 RX ORDER — VANCOMYCIN HYDROCHLORIDE 125 MG/1
125 CAPSULE ORAL
Qty: 28 | Refills: 0 | Status: ACTIVE | COMMUNITY
Start: 2021-01-01 | End: 1900-01-01

## 2021-01-01 RX ORDER — MAGNESIUM OXIDE 400 MG ORAL TABLET 241.3 MG
400 TABLET ORAL ONCE
Refills: 0 | Status: COMPLETED | OUTPATIENT
Start: 2021-01-01 | End: 2021-01-01

## 2021-01-01 RX ORDER — NITROFURANTOIN (MONOHYDRATE/MACROCRYSTALS) 25; 75 MG/1; MG/1
100 CAPSULE ORAL
Qty: 14 | Refills: 0 | Status: ACTIVE | COMMUNITY
Start: 2020-12-28

## 2021-01-01 RX ORDER — MULTIVIT-MIN/FERROUS GLUCONATE 9 MG/15 ML
1 LIQUID (ML) ORAL
Qty: 30 | Refills: 0
Start: 2021-01-01 | End: 2021-01-01

## 2021-01-01 RX ORDER — HYDROMORPHONE HYDROCHLORIDE 2 MG/ML
30 INJECTION INTRAMUSCULAR; INTRAVENOUS; SUBCUTANEOUS
Refills: 0 | Status: DISCONTINUED | OUTPATIENT
Start: 2021-01-01 | End: 2021-01-01

## 2021-01-01 RX ORDER — VERAPAMIL HCL 240 MG
1 CAPSULE, EXTENDED RELEASE PELLETS 24 HR ORAL
Qty: 60 | Refills: 0
Start: 2021-01-01 | End: 2021-01-01

## 2021-01-01 RX ORDER — VANCOMYCIN HCL 1 G
1 VIAL (EA) INTRAVENOUS
Qty: 40 | Refills: 0
Start: 2021-01-01 | End: 2021-01-01

## 2021-01-01 RX ORDER — HYDROMORPHONE HYDROCHLORIDE 2 MG/ML
1 INJECTION INTRAMUSCULAR; INTRAVENOUS; SUBCUTANEOUS
Refills: 0 | Status: DISCONTINUED | OUTPATIENT
Start: 2021-01-01 | End: 2021-01-01

## 2021-01-01 RX ORDER — METRONIDAZOLE 500 MG
500 TABLET ORAL ONCE
Refills: 0 | Status: COMPLETED | OUTPATIENT
Start: 2021-01-01 | End: 2021-01-01

## 2021-01-01 RX ORDER — CEFEPIME 1 G/1
2000 INJECTION, POWDER, FOR SOLUTION INTRAMUSCULAR; INTRAVENOUS ONCE
Refills: 0 | Status: COMPLETED | OUTPATIENT
Start: 2021-01-01 | End: 2021-01-01

## 2021-01-01 RX ORDER — HYDROMORPHONE HYDROCHLORIDE 2 MG/ML
0.5 INJECTION INTRAMUSCULAR; INTRAVENOUS; SUBCUTANEOUS ONCE
Refills: 0 | Status: DISCONTINUED | OUTPATIENT
Start: 2021-01-01 | End: 2021-01-01

## 2021-01-01 RX ORDER — MAGNESIUM SULFATE 500 MG/ML
2 VIAL (ML) INJECTION EVERY 8 HOURS
Refills: 0 | Status: COMPLETED | OUTPATIENT
Start: 2021-01-01 | End: 2021-01-01

## 2021-01-01 RX ADMIN — Medication 1 TABLET(S): at 11:36

## 2021-01-01 RX ADMIN — Medication 250 MILLIGRAM(S): at 06:14

## 2021-01-01 RX ADMIN — CHLORHEXIDINE GLUCONATE 1 APPLICATION(S): 213 SOLUTION TOPICAL at 05:26

## 2021-01-01 RX ADMIN — Medication 1000 MILLIGRAM(S): at 14:23

## 2021-01-01 RX ADMIN — Medication 1 TABLET(S): at 11:08

## 2021-01-01 RX ADMIN — SODIUM CHLORIDE 100 MILLILITER(S): 9 INJECTION, SOLUTION INTRAVENOUS at 06:33

## 2021-01-01 RX ADMIN — Medication 10 MILLIGRAM(S): at 18:11

## 2021-01-01 RX ADMIN — Medication 650 MILLIGRAM(S): at 18:06

## 2021-01-01 RX ADMIN — PANTOPRAZOLE SODIUM 40 MILLIGRAM(S): 20 TABLET, DELAYED RELEASE ORAL at 06:14

## 2021-01-01 RX ADMIN — PANTOPRAZOLE SODIUM 40 MILLIGRAM(S): 20 TABLET, DELAYED RELEASE ORAL at 05:07

## 2021-01-01 RX ADMIN — APIXABAN 5 MILLIGRAM(S): 2.5 TABLET, FILM COATED ORAL at 17:14

## 2021-01-01 RX ADMIN — Medication 1 APPLICATION(S): at 11:38

## 2021-01-01 RX ADMIN — OXYCODONE HYDROCHLORIDE 5 MILLIGRAM(S): 5 TABLET ORAL at 12:27

## 2021-01-01 RX ADMIN — APIXABAN 5 MILLIGRAM(S): 2.5 TABLET, FILM COATED ORAL at 05:10

## 2021-01-01 RX ADMIN — Medication 500 MILLIGRAM(S): at 21:39

## 2021-01-01 RX ADMIN — Medication 250 MILLIGRAM(S): at 06:37

## 2021-01-01 RX ADMIN — Medication 122 MILLIGRAM(S): at 10:23

## 2021-01-01 RX ADMIN — Medication 1 CAPSULE(S): at 12:25

## 2021-01-01 RX ADMIN — SODIUM CHLORIDE 100 MILLILITER(S): 9 INJECTION, SOLUTION INTRAVENOUS at 11:42

## 2021-01-01 RX ADMIN — CEFEPIME 100 MILLIGRAM(S): 1 INJECTION, POWDER, FOR SOLUTION INTRAMUSCULAR; INTRAVENOUS at 13:45

## 2021-01-01 RX ADMIN — Medication 250 MILLIGRAM(S): at 21:35

## 2021-01-01 RX ADMIN — Medication 120 MILLIGRAM(S): at 05:40

## 2021-01-01 RX ADMIN — Medication 15 MILLIGRAM(S): at 14:13

## 2021-01-01 RX ADMIN — APIXABAN 5 MILLIGRAM(S): 2.5 TABLET, FILM COATED ORAL at 05:07

## 2021-01-01 RX ADMIN — Medication 60 MILLIGRAM(S): at 13:53

## 2021-01-01 RX ADMIN — Medication 250 MILLIGRAM(S): at 05:55

## 2021-01-01 RX ADMIN — Medication 250 MILLIGRAM(S): at 06:22

## 2021-01-01 RX ADMIN — MAGNESIUM OXIDE 400 MG ORAL TABLET 400 MILLIGRAM(S): 241.3 TABLET ORAL at 08:35

## 2021-01-01 RX ADMIN — TRAMADOL HYDROCHLORIDE 25 MILLIGRAM(S): 50 TABLET ORAL at 22:45

## 2021-01-01 RX ADMIN — HYDROMORPHONE HYDROCHLORIDE 0.5 MILLIGRAM(S): 2 INJECTION INTRAMUSCULAR; INTRAVENOUS; SUBCUTANEOUS at 02:24

## 2021-01-01 RX ADMIN — Medication 30 MILLIGRAM(S): at 20:14

## 2021-01-01 RX ADMIN — TRAMADOL HYDROCHLORIDE 50 MILLIGRAM(S): 50 TABLET ORAL at 10:15

## 2021-01-01 RX ADMIN — GEMCITABINE 380.71 MILLIGRAM(S): 38 INJECTION, SOLUTION INTRAVENOUS at 13:10

## 2021-01-01 RX ADMIN — Medication 1000 MILLIGRAM(S): at 00:27

## 2021-01-01 RX ADMIN — PANTOPRAZOLE SODIUM 40 MILLIGRAM(S): 20 TABLET, DELAYED RELEASE ORAL at 06:49

## 2021-01-01 RX ADMIN — APIXABAN 5 MILLIGRAM(S): 2.5 TABLET, FILM COATED ORAL at 22:52

## 2021-01-01 RX ADMIN — HYDROMORPHONE HYDROCHLORIDE 0.25 MILLIGRAM(S): 2 INJECTION INTRAMUSCULAR; INTRAVENOUS; SUBCUTANEOUS at 03:07

## 2021-01-01 RX ADMIN — Medication 100 MILLIGRAM(S): at 05:08

## 2021-01-01 RX ADMIN — Medication 250 MILLIGRAM(S): at 05:34

## 2021-01-01 RX ADMIN — Medication 90 MILLIGRAM(S): at 12:09

## 2021-01-01 RX ADMIN — PANTOPRAZOLE SODIUM 40 MILLIGRAM(S): 20 TABLET, DELAYED RELEASE ORAL at 05:50

## 2021-01-01 RX ADMIN — APIXABAN 5 MILLIGRAM(S): 2.5 TABLET, FILM COATED ORAL at 06:03

## 2021-01-01 RX ADMIN — Medication 100 MILLIGRAM(S): at 22:06

## 2021-01-01 RX ADMIN — Medication 250 MILLIGRAM(S): at 06:32

## 2021-01-01 RX ADMIN — Medication 250 MILLIGRAM(S): at 18:02

## 2021-01-01 RX ADMIN — Medication 30 MILLIGRAM(S): at 12:10

## 2021-01-01 RX ADMIN — Medication 1 TABLET(S): at 11:45

## 2021-01-01 RX ADMIN — Medication 20 MILLIGRAM(S): at 05:44

## 2021-01-01 RX ADMIN — Medication 1 TABLET(S): at 18:09

## 2021-01-01 RX ADMIN — Medication 90 MILLIGRAM(S): at 06:42

## 2021-01-01 RX ADMIN — Medication 125 MILLIGRAM(S): at 05:07

## 2021-01-01 RX ADMIN — Medication 250 MILLIGRAM(S): at 17:05

## 2021-01-01 RX ADMIN — APIXABAN 5 MILLIGRAM(S): 2.5 TABLET, FILM COATED ORAL at 06:46

## 2021-01-01 RX ADMIN — CEFEPIME 100 MILLIGRAM(S): 1 INJECTION, POWDER, FOR SOLUTION INTRAMUSCULAR; INTRAVENOUS at 05:00

## 2021-01-01 RX ADMIN — Medication 50 GRAM(S): at 04:10

## 2021-01-01 RX ADMIN — Medication 650 MILLIGRAM(S): at 19:00

## 2021-01-01 RX ADMIN — MAGNESIUM OXIDE 400 MG ORAL TABLET 400 MILLIGRAM(S): 241.3 TABLET ORAL at 11:05

## 2021-01-01 RX ADMIN — Medication 1 CAPSULE(S): at 08:48

## 2021-01-01 RX ADMIN — PANTOPRAZOLE SODIUM 40 MILLIGRAM(S): 20 TABLET, DELAYED RELEASE ORAL at 18:37

## 2021-01-01 RX ADMIN — Medication 20 MILLIGRAM(S): at 02:45

## 2021-01-01 RX ADMIN — HEPARIN SODIUM 5000 UNIT(S): 5000 INJECTION INTRAVENOUS; SUBCUTANEOUS at 06:49

## 2021-01-01 RX ADMIN — CASPOFUNGIN ACETATE 260 MILLIGRAM(S): 7 INJECTION, POWDER, LYOPHILIZED, FOR SOLUTION INTRAVENOUS at 22:34

## 2021-01-01 RX ADMIN — Medication 1 TABLET(S): at 11:29

## 2021-01-01 RX ADMIN — Medication 1 CAPSULE(S): at 17:08

## 2021-01-01 RX ADMIN — Medication 200 MILLIGRAM(S): at 21:35

## 2021-01-01 RX ADMIN — CEFEPIME 100 MILLIGRAM(S): 1 INJECTION, POWDER, FOR SOLUTION INTRAMUSCULAR; INTRAVENOUS at 22:09

## 2021-01-01 RX ADMIN — Medication 200 MILLIGRAM(S): at 06:22

## 2021-01-01 RX ADMIN — Medication 2 CAPSULE(S): at 11:15

## 2021-01-01 RX ADMIN — Medication 250 MILLIGRAM(S): at 17:10

## 2021-01-01 RX ADMIN — APIXABAN 5 MILLIGRAM(S): 2.5 TABLET, FILM COATED ORAL at 17:48

## 2021-01-01 RX ADMIN — CEFEPIME 100 MILLIGRAM(S): 1 INJECTION, POWDER, FOR SOLUTION INTRAMUSCULAR; INTRAVENOUS at 05:51

## 2021-01-01 RX ADMIN — Medication 5 MILLIGRAM(S): at 23:34

## 2021-01-01 RX ADMIN — PANTOPRAZOLE SODIUM 40 MILLIGRAM(S): 20 TABLET, DELAYED RELEASE ORAL at 06:37

## 2021-01-01 RX ADMIN — PANTOPRAZOLE SODIUM 40 MILLIGRAM(S): 20 TABLET, DELAYED RELEASE ORAL at 05:40

## 2021-01-01 RX ADMIN — APIXABAN 5 MILLIGRAM(S): 2.5 TABLET, FILM COATED ORAL at 17:01

## 2021-01-01 RX ADMIN — APIXABAN 5 MILLIGRAM(S): 2.5 TABLET, FILM COATED ORAL at 17:22

## 2021-01-01 RX ADMIN — Medication 100 MILLIGRAM(S): at 13:16

## 2021-01-01 RX ADMIN — PANTOPRAZOLE SODIUM 40 MILLIGRAM(S): 20 TABLET, DELAYED RELEASE ORAL at 05:34

## 2021-01-01 RX ADMIN — APIXABAN 5 MILLIGRAM(S): 2.5 TABLET, FILM COATED ORAL at 06:44

## 2021-01-01 RX ADMIN — APIXABAN 5 MILLIGRAM(S): 2.5 TABLET, FILM COATED ORAL at 17:30

## 2021-01-01 RX ADMIN — MAGNESIUM OXIDE 400 MG ORAL TABLET 400 MILLIGRAM(S): 241.3 TABLET ORAL at 08:46

## 2021-01-01 RX ADMIN — Medication 5 MILLIGRAM(S): at 06:14

## 2021-01-01 RX ADMIN — SODIUM CHLORIDE 60 MILLILITER(S): 9 INJECTION, SOLUTION INTRAVENOUS at 22:51

## 2021-01-01 RX ADMIN — Medication 650 MILLIGRAM(S): at 05:11

## 2021-01-01 RX ADMIN — MAGNESIUM OXIDE 400 MG ORAL TABLET 400 MILLIGRAM(S): 241.3 TABLET ORAL at 17:36

## 2021-01-01 RX ADMIN — ZINC SULFATE TAB 220 MG (50 MG ZINC EQUIVALENT) 220 MILLIGRAM(S): 220 (50 ZN) TAB at 12:03

## 2021-01-01 RX ADMIN — Medication 90 MILLIGRAM(S): at 06:37

## 2021-01-01 RX ADMIN — Medication 180 MILLIGRAM(S): at 05:27

## 2021-01-01 RX ADMIN — MAGNESIUM OXIDE 400 MG ORAL TABLET 400 MILLIGRAM(S): 241.3 TABLET ORAL at 17:37

## 2021-01-01 RX ADMIN — MAGNESIUM OXIDE 400 MG ORAL TABLET 400 MILLIGRAM(S): 241.3 TABLET ORAL at 08:30

## 2021-01-01 RX ADMIN — Medication 125 MILLIGRAM(S): at 17:14

## 2021-01-01 RX ADMIN — CEFTRIAXONE 100 MILLIGRAM(S): 500 INJECTION, POWDER, FOR SOLUTION INTRAMUSCULAR; INTRAVENOUS at 05:34

## 2021-01-01 RX ADMIN — Medication 50 MILLIEQUIVALENT(S): at 02:30

## 2021-01-01 RX ADMIN — HEPARIN SODIUM 5000 UNIT(S): 5000 INJECTION INTRAVENOUS; SUBCUTANEOUS at 06:44

## 2021-01-01 RX ADMIN — Medication 180 MILLIGRAM(S): at 05:54

## 2021-01-01 RX ADMIN — Medication 1 TABLET(S): at 14:48

## 2021-01-01 RX ADMIN — PACLITAXEL 60 MILLIGRAM(S): 100 INJECTION, POWDER, LYOPHILIZED, FOR SUSPENSION INTRAVENOUS at 12:35

## 2021-01-01 RX ADMIN — Medication 1 TABLET(S): at 17:14

## 2021-01-01 RX ADMIN — Medication 1 TABLET(S): at 05:33

## 2021-01-01 RX ADMIN — APIXABAN 5 MILLIGRAM(S): 2.5 TABLET, FILM COATED ORAL at 06:17

## 2021-01-01 RX ADMIN — PANTOPRAZOLE SODIUM 40 MILLIGRAM(S): 20 TABLET, DELAYED RELEASE ORAL at 05:01

## 2021-01-01 RX ADMIN — Medication 5 MILLIGRAM(S): at 06:44

## 2021-01-01 RX ADMIN — Medication 1 TABLET(S): at 11:39

## 2021-01-01 RX ADMIN — Medication 1000 MILLIGRAM(S): at 01:19

## 2021-01-01 RX ADMIN — Medication 250 MILLIGRAM(S): at 17:37

## 2021-01-01 RX ADMIN — FAMOTIDINE 20 MILLIGRAM(S): 10 INJECTION INTRAVENOUS at 12:04

## 2021-01-01 RX ADMIN — PANTOPRAZOLE SODIUM 40 MILLIGRAM(S): 20 TABLET, DELAYED RELEASE ORAL at 05:09

## 2021-01-01 RX ADMIN — PANTOPRAZOLE SODIUM 40 MILLIGRAM(S): 20 TABLET, DELAYED RELEASE ORAL at 05:59

## 2021-01-01 RX ADMIN — Medication 250 MILLIGRAM(S): at 06:17

## 2021-01-01 RX ADMIN — Medication 90 MILLIGRAM(S): at 11:56

## 2021-01-01 RX ADMIN — Medication 500 MILLIGRAM(S): at 21:14

## 2021-01-01 RX ADMIN — HEPARIN SODIUM 5000 UNIT(S): 5000 INJECTION INTRAVENOUS; SUBCUTANEOUS at 06:23

## 2021-01-01 RX ADMIN — HEPARIN SODIUM 5000 UNIT(S): 5000 INJECTION INTRAVENOUS; SUBCUTANEOUS at 17:16

## 2021-01-01 RX ADMIN — Medication 250 MILLIGRAM(S): at 19:26

## 2021-01-01 RX ADMIN — Medication 1 APPLICATION(S): at 11:09

## 2021-01-01 RX ADMIN — Medication 90 MILLIGRAM(S): at 23:22

## 2021-01-01 RX ADMIN — PANTOPRAZOLE SODIUM 40 MILLIGRAM(S): 20 TABLET, DELAYED RELEASE ORAL at 06:19

## 2021-01-01 RX ADMIN — ZINC SULFATE TAB 220 MG (50 MG ZINC EQUIVALENT) 220 MILLIGRAM(S): 220 (50 ZN) TAB at 11:08

## 2021-01-01 RX ADMIN — Medication 25 GRAM(S): at 00:08

## 2021-01-01 RX ADMIN — Medication 1 TABLET(S): at 17:22

## 2021-01-01 RX ADMIN — Medication 2000 UNIT(S): at 11:07

## 2021-01-01 RX ADMIN — Medication 90 MILLIGRAM(S): at 23:42

## 2021-01-01 RX ADMIN — Medication 1 CAPSULE(S): at 12:27

## 2021-01-01 RX ADMIN — PANTOPRAZOLE SODIUM 40 MILLIGRAM(S): 20 TABLET, DELAYED RELEASE ORAL at 05:13

## 2021-01-01 RX ADMIN — CEFEPIME 100 MILLIGRAM(S): 1 INJECTION, POWDER, FOR SOLUTION INTRAMUSCULAR; INTRAVENOUS at 21:10

## 2021-01-01 RX ADMIN — Medication 20 MILLIGRAM(S): at 05:46

## 2021-01-01 RX ADMIN — Medication 50 GRAM(S): at 13:11

## 2021-01-01 RX ADMIN — TRAMADOL HYDROCHLORIDE 50 MILLIGRAM(S): 50 TABLET ORAL at 10:56

## 2021-01-01 RX ADMIN — Medication 90 MILLIGRAM(S): at 11:00

## 2021-01-01 RX ADMIN — CEFEPIME 100 MILLIGRAM(S): 1 INJECTION, POWDER, FOR SOLUTION INTRAMUSCULAR; INTRAVENOUS at 22:07

## 2021-01-01 RX ADMIN — Medication 1 TABLET(S): at 11:12

## 2021-01-01 RX ADMIN — Medication 100 MILLIGRAM(S): at 05:11

## 2021-01-01 RX ADMIN — POTASSIUM PHOSPHATE, MONOBASIC POTASSIUM PHOSPHATE, DIBASIC 62.5 MILLIMOLE(S): 236; 224 INJECTION, SOLUTION INTRAVENOUS at 02:10

## 2021-01-01 RX ADMIN — Medication 250 MILLIGRAM(S): at 06:48

## 2021-01-01 RX ADMIN — Medication 90 MILLIGRAM(S): at 18:06

## 2021-01-01 RX ADMIN — Medication 60 MILLIGRAM(S): at 05:30

## 2021-01-01 RX ADMIN — MORPHINE SULFATE 2 MILLIGRAM(S): 50 CAPSULE, EXTENDED RELEASE ORAL at 09:48

## 2021-01-01 RX ADMIN — Medication 250 MILLIGRAM(S): at 21:05

## 2021-01-01 RX ADMIN — Medication 250 MILLIGRAM(S): at 05:40

## 2021-01-01 RX ADMIN — Medication 1 TABLET(S): at 07:54

## 2021-01-01 RX ADMIN — Medication 200 MILLIGRAM(S): at 06:45

## 2021-01-01 RX ADMIN — Medication 2 CAPSULE(S): at 18:04

## 2021-01-01 RX ADMIN — Medication 1 APPLICATION(S): at 11:23

## 2021-01-01 RX ADMIN — Medication 650 MILLIGRAM(S): at 12:37

## 2021-01-01 RX ADMIN — APIXABAN 5 MILLIGRAM(S): 2.5 TABLET, FILM COATED ORAL at 17:03

## 2021-01-01 RX ADMIN — HEPARIN SODIUM 5000 UNIT(S): 5000 INJECTION INTRAVENOUS; SUBCUTANEOUS at 06:43

## 2021-01-01 RX ADMIN — OXYCODONE HYDROCHLORIDE 5 MILLIGRAM(S): 5 TABLET ORAL at 13:00

## 2021-01-01 RX ADMIN — Medication 250 MILLIGRAM(S): at 05:22

## 2021-01-01 RX ADMIN — Medication 1 TABLET(S): at 18:29

## 2021-01-01 RX ADMIN — Medication 1 CAPSULE(S): at 14:47

## 2021-01-01 RX ADMIN — Medication 1 TABLET(S): at 11:16

## 2021-01-01 RX ADMIN — Medication 1 CAPSULE(S): at 17:44

## 2021-01-01 RX ADMIN — SODIUM CHLORIDE 1000 MILLILITER(S): 9 INJECTION, SOLUTION INTRAVENOUS at 23:27

## 2021-01-01 RX ADMIN — MAGNESIUM OXIDE 400 MG ORAL TABLET 400 MILLIGRAM(S): 241.3 TABLET ORAL at 18:02

## 2021-01-01 RX ADMIN — Medication 250 MILLIGRAM(S): at 17:24

## 2021-01-01 RX ADMIN — TAMSULOSIN HYDROCHLORIDE 0.4 MILLIGRAM(S): 0.4 CAPSULE ORAL at 21:47

## 2021-01-01 RX ADMIN — Medication 60 MILLIGRAM(S): at 17:35

## 2021-01-01 RX ADMIN — Medication 1 APPLICATION(S): at 12:51

## 2021-01-01 RX ADMIN — Medication 12 MILLIGRAM(S): at 14:45

## 2021-01-01 RX ADMIN — ENOXAPARIN SODIUM 40 MILLIGRAM(S): 100 INJECTION SUBCUTANEOUS at 11:01

## 2021-01-01 RX ADMIN — TRAMADOL HYDROCHLORIDE 25 MILLIGRAM(S): 50 TABLET ORAL at 14:14

## 2021-01-01 RX ADMIN — Medication 5 MILLIGRAM(S): at 05:51

## 2021-01-01 RX ADMIN — MAGNESIUM OXIDE 400 MG ORAL TABLET 400 MILLIGRAM(S): 241.3 TABLET ORAL at 17:43

## 2021-01-01 RX ADMIN — Medication 20 MILLIGRAM(S): at 06:37

## 2021-01-01 RX ADMIN — OXYCODONE HYDROCHLORIDE 5 MILLIGRAM(S): 5 TABLET ORAL at 11:59

## 2021-01-01 RX ADMIN — Medication 1 CAPSULE(S): at 08:57

## 2021-01-01 RX ADMIN — TRAMADOL HYDROCHLORIDE 25 MILLIGRAM(S): 50 TABLET ORAL at 21:55

## 2021-01-01 RX ADMIN — CEFTRIAXONE 100 MILLIGRAM(S): 500 INJECTION, POWDER, FOR SOLUTION INTRAMUSCULAR; INTRAVENOUS at 05:13

## 2021-01-01 RX ADMIN — Medication 180 MILLIGRAM(S): at 05:08

## 2021-01-01 RX ADMIN — Medication 50 GRAM(S): at 06:25

## 2021-01-01 RX ADMIN — Medication 5 MILLIGRAM(S): at 11:00

## 2021-01-01 RX ADMIN — APIXABAN 5 MILLIGRAM(S): 2.5 TABLET, FILM COATED ORAL at 17:18

## 2021-01-01 RX ADMIN — ENOXAPARIN SODIUM 40 MILLIGRAM(S): 100 INJECTION SUBCUTANEOUS at 11:24

## 2021-01-01 RX ADMIN — OCTREOTIDE ACETATE 50 MICROGRAM(S): 200 INJECTION, SOLUTION INTRAVENOUS; SUBCUTANEOUS at 22:06

## 2021-01-01 RX ADMIN — Medication 60 MILLIGRAM(S): at 18:11

## 2021-01-01 RX ADMIN — Medication 100 GRAM(S): at 12:15

## 2021-01-01 RX ADMIN — Medication 1 TABLET(S): at 16:32

## 2021-01-01 RX ADMIN — Medication 1 TABLET(S): at 07:55

## 2021-01-01 RX ADMIN — MAGNESIUM OXIDE 400 MG ORAL TABLET 400 MILLIGRAM(S): 241.3 TABLET ORAL at 17:16

## 2021-01-01 RX ADMIN — Medication 60 MILLIGRAM(S): at 17:24

## 2021-01-01 RX ADMIN — HYDROMORPHONE HYDROCHLORIDE 1 MILLIGRAM(S): 2 INJECTION INTRAMUSCULAR; INTRAVENOUS; SUBCUTANEOUS at 16:51

## 2021-01-01 RX ADMIN — Medication 500 MILLIGRAM(S): at 15:17

## 2021-01-01 RX ADMIN — Medication 50 MILLIEQUIVALENT(S): at 01:58

## 2021-01-01 RX ADMIN — Medication 2 CAPSULE(S): at 12:07

## 2021-01-01 RX ADMIN — Medication 5 MILLIGRAM(S): at 12:31

## 2021-01-01 RX ADMIN — Medication 1 CAPSULE(S): at 18:09

## 2021-01-01 RX ADMIN — PANTOPRAZOLE SODIUM 40 MILLIGRAM(S): 20 TABLET, DELAYED RELEASE ORAL at 05:39

## 2021-01-01 RX ADMIN — Medication 25 GRAM(S): at 06:58

## 2021-01-01 RX ADMIN — Medication 100 MILLIGRAM(S): at 04:59

## 2021-01-01 RX ADMIN — Medication 1 TABLET(S): at 17:37

## 2021-01-01 RX ADMIN — Medication 100 MILLIGRAM(S): at 14:33

## 2021-01-01 RX ADMIN — PEGFILGRASTIM-CBQV 6 MILLIGRAM(S): 6 INJECTION, SOLUTION SUBCUTANEOUS at 15:45

## 2021-01-01 RX ADMIN — APIXABAN 5 MILLIGRAM(S): 2.5 TABLET, FILM COATED ORAL at 17:09

## 2021-01-01 RX ADMIN — Medication 125 MILLIGRAM(S): at 05:11

## 2021-01-01 RX ADMIN — Medication 100 MILLIGRAM(S): at 21:29

## 2021-01-01 RX ADMIN — ENOXAPARIN SODIUM 40 MILLIGRAM(S): 100 INJECTION SUBCUTANEOUS at 12:51

## 2021-01-01 RX ADMIN — Medication 90 MILLIGRAM(S): at 05:49

## 2021-01-01 RX ADMIN — Medication 1 APPLICATION(S): at 12:32

## 2021-01-01 RX ADMIN — Medication 1 APPLICATION(S): at 14:52

## 2021-01-01 RX ADMIN — Medication 100 MILLIGRAM(S): at 05:49

## 2021-01-01 RX ADMIN — Medication 200 MILLIGRAM(S): at 05:39

## 2021-01-01 RX ADMIN — Medication 122 MILLIGRAM(S): at 12:15

## 2021-01-01 RX ADMIN — TAMSULOSIN HYDROCHLORIDE 0.4 MILLIGRAM(S): 0.4 CAPSULE ORAL at 21:48

## 2021-01-01 RX ADMIN — Medication 250 MILLIGRAM(S): at 06:45

## 2021-01-01 RX ADMIN — TAMSULOSIN HYDROCHLORIDE 0.4 MILLIGRAM(S): 0.4 CAPSULE ORAL at 21:50

## 2021-01-01 RX ADMIN — Medication 1 CAPSULE(S): at 08:04

## 2021-01-01 RX ADMIN — Medication 90 MILLIGRAM(S): at 06:14

## 2021-01-01 RX ADMIN — APIXABAN 5 MILLIGRAM(S): 2.5 TABLET, FILM COATED ORAL at 05:16

## 2021-01-01 RX ADMIN — Medication 1 CAPSULE(S): at 17:01

## 2021-01-01 RX ADMIN — SODIUM CHLORIDE 100 MILLILITER(S): 9 INJECTION, SOLUTION INTRAVENOUS at 18:46

## 2021-01-01 RX ADMIN — Medication 1 CAPSULE(S): at 08:12

## 2021-01-01 RX ADMIN — MORPHINE SULFATE 2 MILLIGRAM(S): 50 CAPSULE, EXTENDED RELEASE ORAL at 08:08

## 2021-01-01 RX ADMIN — Medication 250 MILLIGRAM(S): at 16:57

## 2021-01-01 RX ADMIN — HYDROMORPHONE HYDROCHLORIDE 0.5 MILLIGRAM(S): 2 INJECTION INTRAMUSCULAR; INTRAVENOUS; SUBCUTANEOUS at 03:34

## 2021-01-01 RX ADMIN — OXYCODONE HYDROCHLORIDE 5 MILLIGRAM(S): 5 TABLET ORAL at 22:15

## 2021-01-01 RX ADMIN — HYDROMORPHONE HYDROCHLORIDE 0.25 MILLIGRAM(S): 2 INJECTION INTRAMUSCULAR; INTRAVENOUS; SUBCUTANEOUS at 07:01

## 2021-01-01 RX ADMIN — Medication 20 MILLIEQUIVALENT(S): at 23:54

## 2021-01-01 RX ADMIN — CEFEPIME 100 MILLIGRAM(S): 1 INJECTION, POWDER, FOR SOLUTION INTRAMUSCULAR; INTRAVENOUS at 13:16

## 2021-01-01 RX ADMIN — Medication 1 CAPSULE(S): at 18:02

## 2021-01-01 RX ADMIN — Medication 1 TABLET(S): at 12:27

## 2021-01-01 RX ADMIN — Medication 650 MILLIGRAM(S): at 14:12

## 2021-01-01 RX ADMIN — Medication 20 MILLIGRAM(S): at 05:53

## 2021-01-01 RX ADMIN — Medication 120 MILLIGRAM(S): at 05:30

## 2021-01-01 RX ADMIN — Medication 62.5 MILLIMOLE(S): at 07:17

## 2021-01-01 RX ADMIN — Medication 200 MILLIGRAM(S): at 18:09

## 2021-01-01 RX ADMIN — Medication 5 MILLIGRAM(S): at 15:36

## 2021-01-01 RX ADMIN — Medication 60 MILLIGRAM(S): at 05:05

## 2021-01-01 RX ADMIN — HEPARIN SODIUM 5000 UNIT(S): 5000 INJECTION INTRAVENOUS; SUBCUTANEOUS at 23:15

## 2021-01-01 RX ADMIN — SODIUM CHLORIDE 50 MILLILITER(S): 9 INJECTION, SOLUTION INTRAVENOUS at 22:40

## 2021-01-01 RX ADMIN — ONDANSETRON 4 MILLIGRAM(S): 8 TABLET, FILM COATED ORAL at 16:54

## 2021-01-01 RX ADMIN — Medication 50 GRAM(S): at 04:18

## 2021-01-01 RX ADMIN — CHLORHEXIDINE GLUCONATE 1 APPLICATION(S): 213 SOLUTION TOPICAL at 05:48

## 2021-01-01 RX ADMIN — Medication 20 MILLIGRAM(S): at 05:29

## 2021-01-01 RX ADMIN — Medication 400 MILLIGRAM(S): at 00:49

## 2021-01-01 RX ADMIN — HEPARIN SODIUM 5000 UNIT(S): 5000 INJECTION INTRAVENOUS; SUBCUTANEOUS at 07:18

## 2021-01-01 RX ADMIN — Medication 500 MILLIGRAM(S): at 15:34

## 2021-01-01 RX ADMIN — Medication 250 MILLIGRAM(S): at 05:59

## 2021-01-01 RX ADMIN — SODIUM CHLORIDE 1000 MILLILITER(S): 9 INJECTION, SOLUTION INTRAVENOUS at 19:01

## 2021-01-01 RX ADMIN — Medication 1 CAPSULE(S): at 17:20

## 2021-01-01 RX ADMIN — Medication 90 MILLIGRAM(S): at 17:37

## 2021-01-01 RX ADMIN — Medication 5 MILLIGRAM(S): at 11:26

## 2021-01-01 RX ADMIN — OXYCODONE HYDROCHLORIDE 5 MILLIGRAM(S): 5 TABLET ORAL at 00:05

## 2021-01-01 RX ADMIN — APIXABAN 5 MILLIGRAM(S): 2.5 TABLET, FILM COATED ORAL at 17:12

## 2021-01-01 RX ADMIN — Medication 90 MILLIGRAM(S): at 18:20

## 2021-01-01 RX ADMIN — SODIUM CHLORIDE 500 MILLILITER(S): 9 INJECTION, SOLUTION INTRAVENOUS at 12:53

## 2021-01-01 RX ADMIN — Medication 200 MILLIGRAM(S): at 19:26

## 2021-01-01 RX ADMIN — Medication 90 MILLIGRAM(S): at 12:36

## 2021-01-01 RX ADMIN — ZINC SULFATE TAB 220 MG (50 MG ZINC EQUIVALENT) 220 MILLIGRAM(S): 220 (50 ZN) TAB at 12:25

## 2021-01-01 RX ADMIN — CEFEPIME 100 MILLIGRAM(S): 1 INJECTION, POWDER, FOR SOLUTION INTRAMUSCULAR; INTRAVENOUS at 21:54

## 2021-01-01 RX ADMIN — TRAMADOL HYDROCHLORIDE 25 MILLIGRAM(S): 50 TABLET ORAL at 08:29

## 2021-01-01 RX ADMIN — Medication 180 MILLIGRAM(S): at 06:15

## 2021-01-01 RX ADMIN — Medication 90 MILLIGRAM(S): at 17:15

## 2021-01-01 RX ADMIN — APIXABAN 5 MILLIGRAM(S): 2.5 TABLET, FILM COATED ORAL at 06:15

## 2021-01-01 RX ADMIN — Medication 5 MILLIGRAM(S): at 06:27

## 2021-01-01 RX ADMIN — SODIUM CHLORIDE 3000 MILLILITER(S): 9 INJECTION, SOLUTION INTRAVENOUS at 23:27

## 2021-01-01 RX ADMIN — Medication 1 TABLET(S): at 07:56

## 2021-01-01 RX ADMIN — PANTOPRAZOLE SODIUM 40 MILLIGRAM(S): 20 TABLET, DELAYED RELEASE ORAL at 05:55

## 2021-01-01 RX ADMIN — Medication 1 TABLET(S): at 14:17

## 2021-01-01 RX ADMIN — Medication 500 MILLIGRAM(S): at 05:38

## 2021-01-01 RX ADMIN — Medication 1 APPLICATION(S): at 16:31

## 2021-01-01 RX ADMIN — Medication 250 MILLIGRAM(S): at 05:39

## 2021-01-01 RX ADMIN — CASPOFUNGIN ACETATE 260 MILLIGRAM(S): 7 INJECTION, POWDER, LYOPHILIZED, FOR SOLUTION INTRAVENOUS at 17:53

## 2021-01-01 RX ADMIN — Medication 250 MILLIGRAM(S): at 18:20

## 2021-01-01 RX ADMIN — Medication 1 CAPSULE(S): at 12:02

## 2021-01-01 RX ADMIN — Medication 1 CAPSULE(S): at 08:26

## 2021-01-01 RX ADMIN — Medication 100 MILLIGRAM(S): at 05:05

## 2021-01-01 RX ADMIN — Medication 1 TABLET(S): at 11:15

## 2021-01-01 RX ADMIN — APIXABAN 5 MILLIGRAM(S): 2.5 TABLET, FILM COATED ORAL at 17:24

## 2021-01-01 RX ADMIN — Medication 90 MILLIGRAM(S): at 18:09

## 2021-01-01 RX ADMIN — Medication 180 MILLIGRAM(S): at 05:07

## 2021-01-01 RX ADMIN — SODIUM CHLORIDE 75 MILLILITER(S): 9 INJECTION, SOLUTION INTRAVENOUS at 23:16

## 2021-01-01 RX ADMIN — PANTOPRAZOLE SODIUM 40 MILLIGRAM(S): 20 TABLET, DELAYED RELEASE ORAL at 05:53

## 2021-01-01 RX ADMIN — ZINC SULFATE TAB 220 MG (50 MG ZINC EQUIVALENT) 220 MILLIGRAM(S): 220 (50 ZN) TAB at 13:19

## 2021-01-01 RX ADMIN — Medication 125 MILLIGRAM(S): at 11:45

## 2021-01-01 RX ADMIN — CEFEPIME 100 MILLIGRAM(S): 1 INJECTION, POWDER, FOR SOLUTION INTRAMUSCULAR; INTRAVENOUS at 17:21

## 2021-01-01 RX ADMIN — Medication 125 MILLIGRAM(S): at 17:12

## 2021-01-01 RX ADMIN — Medication 1 TABLET(S): at 13:19

## 2021-01-01 RX ADMIN — HEPARIN SODIUM 5000 UNIT(S): 5000 INJECTION INTRAVENOUS; SUBCUTANEOUS at 05:39

## 2021-01-01 RX ADMIN — CHLORHEXIDINE GLUCONATE 1 APPLICATION(S): 213 SOLUTION TOPICAL at 05:08

## 2021-01-01 RX ADMIN — Medication 200 MILLIGRAM(S): at 18:11

## 2021-01-01 RX ADMIN — Medication 1 APPLICATION(S): at 11:39

## 2021-01-01 RX ADMIN — Medication 180 MILLIGRAM(S): at 05:59

## 2021-01-01 RX ADMIN — Medication 60 MILLIGRAM(S): at 00:18

## 2021-01-01 RX ADMIN — GEMCITABINE 380.71 MILLIGRAM(S): 38 INJECTION, SOLUTION INTRAVENOUS at 13:15

## 2021-01-01 RX ADMIN — Medication 250 MILLIGRAM(S): at 05:50

## 2021-01-01 RX ADMIN — CEFEPIME 100 MILLIGRAM(S): 1 INJECTION, POWDER, FOR SOLUTION INTRAMUSCULAR; INTRAVENOUS at 05:53

## 2021-01-01 RX ADMIN — Medication 250 MILLIGRAM(S): at 18:09

## 2021-01-01 RX ADMIN — Medication 5 MILLIGRAM(S): at 12:19

## 2021-01-01 RX ADMIN — TAMSULOSIN HYDROCHLORIDE 0.4 MILLIGRAM(S): 0.4 CAPSULE ORAL at 21:16

## 2021-01-01 RX ADMIN — Medication 1 CAPSULE(S): at 08:09

## 2021-01-01 RX ADMIN — Medication 200 MILLIGRAM(S): at 05:08

## 2021-01-01 RX ADMIN — Medication 20 MILLIGRAM(S): at 05:55

## 2021-01-01 RX ADMIN — Medication 1 TABLET(S): at 11:02

## 2021-01-01 RX ADMIN — Medication 1 TABLET(S): at 12:28

## 2021-01-01 RX ADMIN — Medication 1 TABLET(S): at 12:07

## 2021-01-01 RX ADMIN — OXYCODONE HYDROCHLORIDE 5 MILLIGRAM(S): 5 TABLET ORAL at 03:19

## 2021-01-01 RX ADMIN — Medication 1 TABLET(S): at 11:58

## 2021-01-01 RX ADMIN — Medication 5 MILLIGRAM(S): at 17:16

## 2021-01-01 RX ADMIN — Medication 120 MILLIGRAM(S): at 05:13

## 2021-01-01 RX ADMIN — APIXABAN 5 MILLIGRAM(S): 2.5 TABLET, FILM COATED ORAL at 17:05

## 2021-01-01 RX ADMIN — Medication 1 CAPSULE(S): at 17:16

## 2021-01-01 RX ADMIN — Medication 1 APPLICATION(S): at 11:32

## 2021-01-01 RX ADMIN — CASPOFUNGIN ACETATE 260 MILLIGRAM(S): 7 INJECTION, POWDER, LYOPHILIZED, FOR SOLUTION INTRAVENOUS at 17:18

## 2021-01-01 RX ADMIN — Medication 90 MILLIGRAM(S): at 11:24

## 2021-01-01 RX ADMIN — Medication 125 MILLIGRAM(S): at 11:02

## 2021-01-01 RX ADMIN — PANTOPRAZOLE SODIUM 40 MILLIGRAM(S): 20 TABLET, DELAYED RELEASE ORAL at 05:11

## 2021-01-01 RX ADMIN — Medication 60 MILLIGRAM(S): at 18:08

## 2021-01-01 RX ADMIN — Medication 1 TABLET(S): at 08:10

## 2021-01-01 RX ADMIN — Medication 125 MILLIGRAM(S): at 21:14

## 2021-01-01 RX ADMIN — MORPHINE SULFATE 2 MILLIGRAM(S): 50 CAPSULE, EXTENDED RELEASE ORAL at 02:36

## 2021-01-01 RX ADMIN — Medication 1 CAPSULE(S): at 17:14

## 2021-01-01 RX ADMIN — Medication 1 CAPSULE(S): at 13:19

## 2021-01-01 RX ADMIN — Medication 250 MILLIGRAM(S): at 05:43

## 2021-01-01 RX ADMIN — Medication 1 CAPSULE(S): at 11:39

## 2021-01-01 RX ADMIN — Medication 250 MILLIGRAM(S): at 06:27

## 2021-01-01 RX ADMIN — Medication 90 MILLIGRAM(S): at 21:26

## 2021-01-01 RX ADMIN — Medication 1 TABLET(S): at 17:24

## 2021-01-01 RX ADMIN — Medication 60 MILLIGRAM(S): at 13:09

## 2021-01-01 RX ADMIN — Medication 15 MILLIGRAM(S): at 14:51

## 2021-01-01 RX ADMIN — Medication 5 MILLIGRAM(S): at 16:32

## 2021-01-01 RX ADMIN — TRAMADOL HYDROCHLORIDE 25 MILLIGRAM(S): 50 TABLET ORAL at 14:29

## 2021-01-01 RX ADMIN — Medication 180 MILLIGRAM(S): at 05:29

## 2021-01-01 RX ADMIN — HEPARIN SODIUM 5000 UNIT(S): 5000 INJECTION INTRAVENOUS; SUBCUTANEOUS at 06:15

## 2021-01-01 RX ADMIN — HEPARIN SODIUM 5000 UNIT(S): 5000 INJECTION INTRAVENOUS; SUBCUTANEOUS at 06:18

## 2021-01-01 RX ADMIN — CEFEPIME 100 MILLIGRAM(S): 1 INJECTION, POWDER, FOR SOLUTION INTRAMUSCULAR; INTRAVENOUS at 22:45

## 2021-01-01 RX ADMIN — Medication 20 MILLIEQUIVALENT(S): at 21:40

## 2021-01-01 RX ADMIN — Medication 1 CAPSULE(S): at 17:02

## 2021-01-01 RX ADMIN — Medication 250 MILLIGRAM(S): at 05:07

## 2021-01-01 RX ADMIN — APIXABAN 5 MILLIGRAM(S): 2.5 TABLET, FILM COATED ORAL at 17:53

## 2021-01-01 RX ADMIN — Medication 90 MILLIGRAM(S): at 18:10

## 2021-01-01 RX ADMIN — Medication 100 MILLIGRAM(S): at 21:01

## 2021-01-01 RX ADMIN — GEMCITABINE 1350 MILLIGRAM(S): 38 INJECTION, SOLUTION INTRAVENOUS at 16:50

## 2021-01-01 RX ADMIN — Medication 1 APPLICATION(S): at 12:24

## 2021-01-01 RX ADMIN — Medication 2 CAPSULE(S): at 10:08

## 2021-01-01 RX ADMIN — CEFEPIME 100 MILLIGRAM(S): 1 INJECTION, POWDER, FOR SOLUTION INTRAMUSCULAR; INTRAVENOUS at 21:30

## 2021-01-01 RX ADMIN — Medication 250 MILLIGRAM(S): at 17:36

## 2021-01-01 RX ADMIN — APIXABAN 5 MILLIGRAM(S): 2.5 TABLET, FILM COATED ORAL at 17:36

## 2021-01-01 RX ADMIN — PANTOPRAZOLE SODIUM 40 MILLIGRAM(S): 20 TABLET, DELAYED RELEASE ORAL at 05:46

## 2021-01-01 RX ADMIN — Medication 15 MILLIGRAM(S): at 13:26

## 2021-01-01 RX ADMIN — Medication 100 MILLIGRAM(S): at 05:53

## 2021-01-01 RX ADMIN — ETHACRYNIC ACID 50 MILLIGRAM(S): 25 TABLET ORAL at 02:33

## 2021-01-01 RX ADMIN — MAGNESIUM OXIDE 400 MG ORAL TABLET 400 MILLIGRAM(S): 241.3 TABLET ORAL at 14:14

## 2021-01-01 RX ADMIN — Medication 100 MILLIGRAM(S): at 14:23

## 2021-01-01 RX ADMIN — Medication 50 GRAM(S): at 13:45

## 2021-01-01 RX ADMIN — Medication 100 MILLIGRAM(S): at 13:01

## 2021-01-01 RX ADMIN — CEFEPIME 100 MILLIGRAM(S): 1 INJECTION, POWDER, FOR SOLUTION INTRAMUSCULAR; INTRAVENOUS at 21:24

## 2021-01-01 RX ADMIN — PANTOPRAZOLE SODIUM 40 MILLIGRAM(S): 20 TABLET, DELAYED RELEASE ORAL at 06:45

## 2021-01-01 RX ADMIN — ENOXAPARIN SODIUM 40 MILLIGRAM(S): 100 INJECTION SUBCUTANEOUS at 13:57

## 2021-01-01 RX ADMIN — Medication 250 MILLIGRAM(S): at 05:48

## 2021-01-01 RX ADMIN — HYDROMORPHONE HYDROCHLORIDE 0.25 MILLIGRAM(S): 2 INJECTION INTRAMUSCULAR; INTRAVENOUS; SUBCUTANEOUS at 20:57

## 2021-01-01 RX ADMIN — OXYCODONE HYDROCHLORIDE 5 MILLIGRAM(S): 5 TABLET ORAL at 20:15

## 2021-01-01 RX ADMIN — Medication 1 TABLET(S): at 12:05

## 2021-01-01 RX ADMIN — Medication 250 MILLIGRAM(S): at 17:16

## 2021-01-01 RX ADMIN — OXYCODONE HYDROCHLORIDE 5 MILLIGRAM(S): 5 TABLET ORAL at 15:17

## 2021-01-01 RX ADMIN — CEFEPIME 100 MILLIGRAM(S): 1 INJECTION, POWDER, FOR SOLUTION INTRAMUSCULAR; INTRAVENOUS at 13:59

## 2021-01-01 RX ADMIN — Medication 100 MILLIGRAM(S): at 21:10

## 2021-01-01 RX ADMIN — APIXABAN 5 MILLIGRAM(S): 2.5 TABLET, FILM COATED ORAL at 17:37

## 2021-01-01 RX ADMIN — Medication 1 TABLET(S): at 08:15

## 2021-01-01 RX ADMIN — APIXABAN 5 MILLIGRAM(S): 2.5 TABLET, FILM COATED ORAL at 05:22

## 2021-01-01 RX ADMIN — Medication 125 MILLIGRAM(S): at 05:06

## 2021-01-01 RX ADMIN — CEFEPIME 100 MILLIGRAM(S): 1 INJECTION, POWDER, FOR SOLUTION INTRAMUSCULAR; INTRAVENOUS at 14:04

## 2021-01-01 RX ADMIN — Medication 1 TABLET(S): at 07:51

## 2021-01-01 RX ADMIN — Medication 30 MILLIGRAM(S): at 05:59

## 2021-01-01 RX ADMIN — APIXABAN 5 MILLIGRAM(S): 2.5 TABLET, FILM COATED ORAL at 05:42

## 2021-01-01 RX ADMIN — APIXABAN 5 MILLIGRAM(S): 2.5 TABLET, FILM COATED ORAL at 19:26

## 2021-01-01 RX ADMIN — APIXABAN 5 MILLIGRAM(S): 2.5 TABLET, FILM COATED ORAL at 05:40

## 2021-01-01 RX ADMIN — Medication 650 MILLIGRAM(S): at 06:42

## 2021-01-01 RX ADMIN — Medication 90 MILLIGRAM(S): at 23:59

## 2021-01-01 RX ADMIN — CHLORHEXIDINE GLUCONATE 1 APPLICATION(S): 213 SOLUTION TOPICAL at 05:42

## 2021-01-01 RX ADMIN — Medication 20 MILLIEQUIVALENT(S): at 17:25

## 2021-01-01 RX ADMIN — Medication 500 MILLIGRAM(S): at 21:26

## 2021-01-01 RX ADMIN — PACLITAXEL 150 MILLIGRAM(S): 100 INJECTION, POWDER, LYOPHILIZED, FOR SUSPENSION INTRAVENOUS at 13:10

## 2021-01-01 RX ADMIN — TAMSULOSIN HYDROCHLORIDE 0.4 MILLIGRAM(S): 0.4 CAPSULE ORAL at 22:12

## 2021-01-01 RX ADMIN — APIXABAN 5 MILLIGRAM(S): 2.5 TABLET, FILM COATED ORAL at 18:09

## 2021-01-01 RX ADMIN — Medication 1 CAPSULE(S): at 11:59

## 2021-01-01 RX ADMIN — Medication 5 MG/HR: at 12:01

## 2021-01-01 RX ADMIN — MAGNESIUM OXIDE 400 MG ORAL TABLET 400 MILLIGRAM(S): 241.3 TABLET ORAL at 10:08

## 2021-01-01 RX ADMIN — Medication 200 MILLIGRAM(S): at 18:19

## 2021-01-01 RX ADMIN — PACLITAXEL 60 MILLIGRAM(S): 100 INJECTION, POWDER, LYOPHILIZED, FOR SUSPENSION INTRAVENOUS at 14:45

## 2021-01-01 RX ADMIN — Medication 90 MILLIGRAM(S): at 06:22

## 2021-01-01 RX ADMIN — Medication 1 TABLET(S): at 17:15

## 2021-01-01 RX ADMIN — PANTOPRAZOLE SODIUM 40 MILLIGRAM(S): 20 TABLET, DELAYED RELEASE ORAL at 05:49

## 2021-01-01 RX ADMIN — Medication 20 MILLIGRAM(S): at 06:15

## 2021-01-01 RX ADMIN — Medication 60 MILLIGRAM(S): at 00:15

## 2021-01-01 RX ADMIN — Medication 90 MILLIGRAM(S): at 05:39

## 2021-01-01 RX ADMIN — Medication 90 MILLIGRAM(S): at 23:03

## 2021-01-01 RX ADMIN — Medication 200 MILLIGRAM(S): at 05:55

## 2021-01-01 RX ADMIN — Medication 1 TABLET(S): at 17:09

## 2021-01-01 RX ADMIN — PANTOPRAZOLE SODIUM 40 MILLIGRAM(S): 20 TABLET, DELAYED RELEASE ORAL at 05:33

## 2021-01-01 RX ADMIN — Medication 650 MILLIGRAM(S): at 06:15

## 2021-01-01 RX ADMIN — Medication 120 MILLIGRAM(S): at 06:44

## 2021-01-01 RX ADMIN — Medication 180 MILLIGRAM(S): at 05:06

## 2021-01-01 RX ADMIN — TAMSULOSIN HYDROCHLORIDE 0.4 MILLIGRAM(S): 0.4 CAPSULE ORAL at 21:14

## 2021-01-01 RX ADMIN — Medication 100 MILLIGRAM(S): at 05:40

## 2021-01-01 RX ADMIN — Medication 60 MILLIGRAM(S): at 00:10

## 2021-01-01 RX ADMIN — PANTOPRAZOLE SODIUM 40 MILLIGRAM(S): 20 TABLET, DELAYED RELEASE ORAL at 05:26

## 2021-01-01 RX ADMIN — Medication 125 MILLIGRAM(S): at 00:19

## 2021-01-01 RX ADMIN — ERGOCALCIFEROL 50000 UNIT(S): 1.25 CAPSULE ORAL at 11:37

## 2021-01-01 RX ADMIN — PACLITAXEL 150 MILLIGRAM(S): 100 INJECTION, POWDER, LYOPHILIZED, FOR SUSPENSION INTRAVENOUS at 15:45

## 2021-01-01 RX ADMIN — Medication 5 MILLIGRAM(S): at 11:24

## 2021-01-01 RX ADMIN — PEGFILGRASTIM-CBQV 6 MILLIGRAM(S): 6 INJECTION, SOLUTION SUBCUTANEOUS at 10:44

## 2021-01-01 RX ADMIN — MAGNESIUM OXIDE 400 MG ORAL TABLET 400 MILLIGRAM(S): 241.3 TABLET ORAL at 08:08

## 2021-01-01 RX ADMIN — PANTOPRAZOLE SODIUM 40 MILLIGRAM(S): 20 TABLET, DELAYED RELEASE ORAL at 05:51

## 2021-01-01 RX ADMIN — PANTOPRAZOLE SODIUM 40 MILLIGRAM(S): 20 TABLET, DELAYED RELEASE ORAL at 11:25

## 2021-01-01 RX ADMIN — Medication 15 MILLIGRAM(S): at 18:11

## 2021-01-01 RX ADMIN — APIXABAN 5 MILLIGRAM(S): 2.5 TABLET, FILM COATED ORAL at 05:26

## 2021-01-01 RX ADMIN — Medication 5 MG/HR: at 18:46

## 2021-01-01 RX ADMIN — Medication 250 MILLIGRAM(S): at 18:04

## 2021-01-01 RX ADMIN — Medication 1 TABLET(S): at 17:54

## 2021-01-01 RX ADMIN — Medication 650 MILLIGRAM(S): at 00:13

## 2021-01-01 RX ADMIN — Medication 250 MILLIGRAM(S): at 17:20

## 2021-01-01 RX ADMIN — Medication 40 MILLIGRAM(S): at 17:21

## 2021-01-01 RX ADMIN — CEFEPIME 100 MILLIGRAM(S): 1 INJECTION, POWDER, FOR SOLUTION INTRAMUSCULAR; INTRAVENOUS at 05:39

## 2021-01-01 RX ADMIN — Medication 650 MILLIGRAM(S): at 06:03

## 2021-01-01 RX ADMIN — PACLITAXEL 60 MILLIGRAM(S): 100 INJECTION, POWDER, LYOPHILIZED, FOR SUSPENSION INTRAVENOUS at 10:45

## 2021-01-01 RX ADMIN — HEPARIN SODIUM 5000 UNIT(S): 5000 INJECTION INTRAVENOUS; SUBCUTANEOUS at 06:27

## 2021-01-01 RX ADMIN — CASPOFUNGIN ACETATE 260 MILLIGRAM(S): 7 INJECTION, POWDER, LYOPHILIZED, FOR SOLUTION INTRAVENOUS at 19:43

## 2021-01-01 RX ADMIN — OXYCODONE HYDROCHLORIDE 5 MILLIGRAM(S): 5 TABLET ORAL at 21:29

## 2021-01-01 RX ADMIN — Medication 1 APPLICATION(S): at 11:00

## 2021-01-01 RX ADMIN — Medication 1 CAPSULE(S): at 17:12

## 2021-01-01 RX ADMIN — HEPARIN SODIUM 5000 UNIT(S): 5000 INJECTION INTRAVENOUS; SUBCUTANEOUS at 18:10

## 2021-01-01 RX ADMIN — CEFEPIME 100 MILLIGRAM(S): 1 INJECTION, POWDER, FOR SOLUTION INTRAMUSCULAR; INTRAVENOUS at 05:30

## 2021-01-01 RX ADMIN — Medication 1 TABLET(S): at 11:22

## 2021-01-01 RX ADMIN — Medication 1 CAPSULE(S): at 11:41

## 2021-01-01 RX ADMIN — Medication 1 TABLET(S): at 12:20

## 2021-01-01 RX ADMIN — Medication 12 MILLIGRAM(S): at 12:33

## 2021-01-01 RX ADMIN — Medication 40 MILLIGRAM(S): at 09:20

## 2021-01-01 RX ADMIN — Medication 2 CAPSULE(S): at 08:35

## 2021-01-01 RX ADMIN — PANTOPRAZOLE SODIUM 40 MILLIGRAM(S): 20 TABLET, DELAYED RELEASE ORAL at 07:41

## 2021-01-01 RX ADMIN — MAGNESIUM OXIDE 400 MG ORAL TABLET 400 MILLIGRAM(S): 241.3 TABLET ORAL at 21:05

## 2021-01-01 RX ADMIN — Medication 5 MILLIGRAM(S): at 23:54

## 2021-01-01 RX ADMIN — CEFEPIME 100 MILLIGRAM(S): 1 INJECTION, POWDER, FOR SOLUTION INTRAMUSCULAR; INTRAVENOUS at 14:33

## 2021-01-01 RX ADMIN — CASPOFUNGIN ACETATE 260 MILLIGRAM(S): 7 INJECTION, POWDER, LYOPHILIZED, FOR SOLUTION INTRAVENOUS at 17:26

## 2021-01-01 RX ADMIN — Medication 250 MILLIGRAM(S): at 06:46

## 2021-01-01 RX ADMIN — MAGNESIUM OXIDE 400 MG ORAL TABLET 400 MILLIGRAM(S): 241.3 TABLET ORAL at 08:58

## 2021-01-01 RX ADMIN — Medication 5 MG/HR: at 03:34

## 2021-01-01 RX ADMIN — Medication 50 GRAM(S): at 22:27

## 2021-01-01 RX ADMIN — CHLORHEXIDINE GLUCONATE 1 APPLICATION(S): 213 SOLUTION TOPICAL at 06:17

## 2021-01-01 RX ADMIN — HYDROMORPHONE HYDROCHLORIDE 0.25 MILLIGRAM(S): 2 INJECTION INTRAMUSCULAR; INTRAVENOUS; SUBCUTANEOUS at 14:54

## 2021-01-01 RX ADMIN — PANTOPRAZOLE SODIUM 40 MILLIGRAM(S): 20 TABLET, DELAYED RELEASE ORAL at 06:03

## 2021-01-01 RX ADMIN — Medication 200 MILLIGRAM(S): at 05:29

## 2021-01-01 RX ADMIN — Medication 5 MILLIGRAM(S): at 12:37

## 2021-01-01 RX ADMIN — SODIUM CHLORIDE 75 MILLILITER(S): 9 INJECTION, SOLUTION INTRAVENOUS at 22:06

## 2021-01-01 RX ADMIN — Medication 100 GRAM(S): at 05:21

## 2021-01-01 RX ADMIN — CHLORHEXIDINE GLUCONATE 1 APPLICATION(S): 213 SOLUTION TOPICAL at 05:10

## 2021-01-01 RX ADMIN — PANTOPRAZOLE SODIUM 40 MILLIGRAM(S): 20 TABLET, DELAYED RELEASE ORAL at 06:22

## 2021-01-01 RX ADMIN — Medication 500 MILLIGRAM(S): at 06:49

## 2021-01-01 RX ADMIN — ZINC SULFATE TAB 220 MG (50 MG ZINC EQUIVALENT) 220 MILLIGRAM(S): 220 (50 ZN) TAB at 14:48

## 2021-01-01 RX ADMIN — HEPARIN SODIUM 5000 UNIT(S): 5000 INJECTION INTRAVENOUS; SUBCUTANEOUS at 18:08

## 2021-01-01 RX ADMIN — Medication 1 TABLET(S): at 18:27

## 2021-01-01 RX ADMIN — CEFEPIME 100 MILLIGRAM(S): 1 INJECTION, POWDER, FOR SOLUTION INTRAMUSCULAR; INTRAVENOUS at 05:49

## 2021-01-01 RX ADMIN — APIXABAN 5 MILLIGRAM(S): 2.5 TABLET, FILM COATED ORAL at 05:06

## 2021-01-01 RX ADMIN — Medication 200 MILLIGRAM(S): at 17:19

## 2021-01-01 RX ADMIN — Medication 5 MILLIGRAM(S): at 11:46

## 2021-01-01 RX ADMIN — Medication 20 MILLIEQUIVALENT(S): at 05:11

## 2021-01-01 RX ADMIN — PANTOPRAZOLE SODIUM 40 MILLIGRAM(S): 20 TABLET, DELAYED RELEASE ORAL at 05:48

## 2021-01-01 RX ADMIN — Medication 200 MILLIGRAM(S): at 17:16

## 2021-01-01 RX ADMIN — Medication 1 TABLET(S): at 11:27

## 2021-01-01 RX ADMIN — CEFEPIME 100 MILLIGRAM(S): 1 INJECTION, POWDER, FOR SOLUTION INTRAMUSCULAR; INTRAVENOUS at 20:00

## 2021-01-01 RX ADMIN — Medication 180 MILLIGRAM(S): at 05:48

## 2021-01-01 RX ADMIN — Medication 650 MILLIGRAM(S): at 05:50

## 2021-01-01 RX ADMIN — Medication 60 MILLIGRAM(S): at 11:38

## 2021-01-01 RX ADMIN — MAGNESIUM OXIDE 400 MG ORAL TABLET 400 MILLIGRAM(S): 241.3 TABLET ORAL at 11:37

## 2021-01-01 RX ADMIN — Medication 5 MILLIGRAM(S): at 06:23

## 2021-01-01 RX ADMIN — Medication 5 MILLIGRAM(S): at 06:49

## 2021-01-01 RX ADMIN — CASPOFUNGIN ACETATE 260 MILLIGRAM(S): 7 INJECTION, POWDER, LYOPHILIZED, FOR SOLUTION INTRAVENOUS at 17:21

## 2021-01-01 RX ADMIN — Medication 1 APPLICATION(S): at 12:10

## 2021-01-01 RX ADMIN — SODIUM CHLORIDE 75 MILLILITER(S): 9 INJECTION, SOLUTION INTRAVENOUS at 12:01

## 2021-01-01 RX ADMIN — Medication 62.5 MILLIMOLE(S): at 05:09

## 2021-01-01 RX ADMIN — CEFEPIME 100 MILLIGRAM(S): 1 INJECTION, POWDER, FOR SOLUTION INTRAMUSCULAR; INTRAVENOUS at 21:14

## 2021-01-01 RX ADMIN — Medication 200 MILLIGRAM(S): at 06:17

## 2021-01-01 RX ADMIN — Medication 1 TABLET(S): at 08:13

## 2021-01-01 RX ADMIN — Medication 250 MILLIGRAM(S): at 05:08

## 2021-01-01 RX ADMIN — MAGNESIUM OXIDE 400 MG ORAL TABLET 400 MILLIGRAM(S): 241.3 TABLET ORAL at 17:09

## 2021-01-01 RX ADMIN — Medication 650 MILLIGRAM(S): at 00:23

## 2021-01-01 RX ADMIN — Medication 60 MILLIGRAM(S): at 05:07

## 2021-01-01 RX ADMIN — Medication 250 MILLIGRAM(S): at 17:09

## 2021-01-01 RX ADMIN — Medication 1 TABLET(S): at 05:13

## 2021-01-01 RX ADMIN — PANTOPRAZOLE SODIUM 40 MILLIGRAM(S): 20 TABLET, DELAYED RELEASE ORAL at 06:16

## 2021-01-01 RX ADMIN — PANTOPRAZOLE SODIUM 40 MILLIGRAM(S): 20 TABLET, DELAYED RELEASE ORAL at 05:41

## 2021-01-01 RX ADMIN — APIXABAN 5 MILLIGRAM(S): 2.5 TABLET, FILM COATED ORAL at 21:35

## 2021-01-01 RX ADMIN — Medication 1 TABLET(S): at 19:26

## 2021-01-01 RX ADMIN — Medication 25 GRAM(S): at 08:29

## 2021-01-01 RX ADMIN — Medication 200 MILLIGRAM(S): at 06:27

## 2021-01-01 RX ADMIN — Medication 2000 UNIT(S): at 11:14

## 2021-01-01 RX ADMIN — Medication 25 GRAM(S): at 17:37

## 2021-01-01 RX ADMIN — APIXABAN 5 MILLIGRAM(S): 2.5 TABLET, FILM COATED ORAL at 05:34

## 2021-01-01 RX ADMIN — Medication 650 MILLIGRAM(S): at 21:39

## 2021-01-01 RX ADMIN — PANTOPRAZOLE SODIUM 40 MILLIGRAM(S): 20 TABLET, DELAYED RELEASE ORAL at 05:30

## 2021-01-01 RX ADMIN — HYDROMORPHONE HYDROCHLORIDE 0.25 MILLIGRAM(S): 2 INJECTION INTRAMUSCULAR; INTRAVENOUS; SUBCUTANEOUS at 21:38

## 2021-01-01 RX ADMIN — Medication 90 MILLIGRAM(S): at 23:43

## 2021-01-01 RX ADMIN — Medication 90 MILLIGRAM(S): at 18:28

## 2021-01-01 RX ADMIN — MORPHINE SULFATE 2 MILLIGRAM(S): 50 CAPSULE, EXTENDED RELEASE ORAL at 18:59

## 2021-01-01 RX ADMIN — Medication 1 CAPSULE(S): at 11:37

## 2021-01-01 RX ADMIN — Medication 250 MILLIGRAM(S): at 05:29

## 2021-01-01 RX ADMIN — Medication 90 MILLIGRAM(S): at 06:27

## 2021-01-01 RX ADMIN — Medication 90 MILLIGRAM(S): at 06:32

## 2021-01-01 RX ADMIN — Medication 100 MILLIGRAM(S): at 13:22

## 2021-01-01 RX ADMIN — Medication 1 CAPSULE(S): at 11:38

## 2021-01-01 RX ADMIN — Medication 1 TABLET(S): at 10:57

## 2021-01-01 RX ADMIN — Medication 180 MILLIGRAM(S): at 08:29

## 2021-01-01 RX ADMIN — HEPARIN SODIUM 5000 UNIT(S): 5000 INJECTION INTRAVENOUS; SUBCUTANEOUS at 18:20

## 2021-01-01 RX ADMIN — Medication 90 MILLIGRAM(S): at 05:46

## 2021-01-01 RX ADMIN — Medication 50 GRAM(S): at 14:46

## 2021-01-01 RX ADMIN — HYDROMORPHONE HYDROCHLORIDE 0.5 MILLIGRAM(S): 2 INJECTION INTRAMUSCULAR; INTRAVENOUS; SUBCUTANEOUS at 02:28

## 2021-01-01 RX ADMIN — Medication 12 MILLIGRAM(S): at 12:36

## 2021-01-01 RX ADMIN — HEPARIN SODIUM 5000 UNIT(S): 5000 INJECTION INTRAVENOUS; SUBCUTANEOUS at 18:30

## 2021-01-01 RX ADMIN — Medication 1 TABLET(S): at 09:32

## 2021-01-01 RX ADMIN — Medication 100 MILLIGRAM(S): at 21:24

## 2021-01-01 RX ADMIN — Medication 125 MILLIGRAM(S): at 11:39

## 2021-01-01 RX ADMIN — Medication 125 MILLIGRAM(S): at 00:00

## 2021-01-01 RX ADMIN — Medication 1 APPLICATION(S): at 17:19

## 2021-01-01 RX ADMIN — Medication 90 MILLIGRAM(S): at 00:02

## 2021-01-01 RX ADMIN — APIXABAN 5 MILLIGRAM(S): 2.5 TABLET, FILM COATED ORAL at 18:04

## 2021-01-01 RX ADMIN — Medication 180 MILLIGRAM(S): at 05:26

## 2021-01-01 RX ADMIN — Medication 90 MILLIGRAM(S): at 23:13

## 2021-01-01 RX ADMIN — PACLITAXEL 60 MILLIGRAM(S): 100 INJECTION, POWDER, LYOPHILIZED, FOR SUSPENSION INTRAVENOUS at 12:37

## 2021-01-01 RX ADMIN — Medication 40 MILLIEQUIVALENT(S): at 17:12

## 2021-01-01 RX ADMIN — Medication 90 MILLIGRAM(S): at 23:12

## 2021-01-01 RX ADMIN — CEFEPIME 100 MILLIGRAM(S): 1 INJECTION, POWDER, FOR SOLUTION INTRAMUSCULAR; INTRAVENOUS at 05:11

## 2021-01-01 RX ADMIN — MORPHINE SULFATE 2 MILLIGRAM(S): 50 CAPSULE, EXTENDED RELEASE ORAL at 18:57

## 2021-01-01 RX ADMIN — Medication 100 MILLIGRAM(S): at 15:19

## 2021-01-01 RX ADMIN — Medication 200 MILLIGRAM(S): at 05:07

## 2021-01-01 RX ADMIN — APIXABAN 5 MILLIGRAM(S): 2.5 TABLET, FILM COATED ORAL at 17:08

## 2021-01-01 RX ADMIN — POTASSIUM PHOSPHATE, MONOBASIC POTASSIUM PHOSPHATE, DIBASIC 62.5 MILLIMOLE(S): 236; 224 INJECTION, SOLUTION INTRAVENOUS at 05:29

## 2021-01-01 RX ADMIN — Medication 180 MILLIGRAM(S): at 05:33

## 2021-01-01 RX ADMIN — PANTOPRAZOLE SODIUM 40 MILLIGRAM(S): 20 TABLET, DELAYED RELEASE ORAL at 05:16

## 2021-01-01 RX ADMIN — CASPOFUNGIN ACETATE 260 MILLIGRAM(S): 7 INJECTION, POWDER, LYOPHILIZED, FOR SOLUTION INTRAVENOUS at 17:38

## 2021-01-01 RX ADMIN — Medication 20 MILLIGRAM(S): at 05:22

## 2021-01-01 RX ADMIN — Medication 200 MILLIGRAM(S): at 15:33

## 2021-01-01 RX ADMIN — Medication 650 MILLIGRAM(S): at 11:01

## 2021-01-01 RX ADMIN — Medication 180 MILLIGRAM(S): at 06:16

## 2021-01-01 RX ADMIN — Medication 250 MILLIGRAM(S): at 05:46

## 2021-01-01 RX ADMIN — Medication 90 MILLIGRAM(S): at 12:16

## 2021-01-01 RX ADMIN — Medication 122 MILLIGRAM(S): at 12:02

## 2021-01-01 RX ADMIN — TRAMADOL HYDROCHLORIDE 25 MILLIGRAM(S): 50 TABLET ORAL at 09:01

## 2021-01-01 RX ADMIN — Medication 60 MILLIGRAM(S): at 11:25

## 2021-01-01 RX ADMIN — Medication 500 MILLIGRAM(S): at 13:18

## 2021-01-01 RX ADMIN — OXYCODONE HYDROCHLORIDE 5 MILLIGRAM(S): 5 TABLET ORAL at 03:57

## 2021-01-01 RX ADMIN — Medication 250 MILLIGRAM(S): at 05:13

## 2021-01-01 RX ADMIN — Medication 250 MILLIGRAM(S): at 12:53

## 2021-01-01 RX ADMIN — Medication 500 MILLIGRAM(S): at 06:27

## 2021-01-01 RX ADMIN — Medication 50 MILLIEQUIVALENT(S): at 00:30

## 2021-01-01 RX ADMIN — FAMOTIDINE 20 MILLIGRAM(S): 10 INJECTION INTRAVENOUS at 14:47

## 2021-01-01 RX ADMIN — PANTOPRAZOLE SODIUM 40 MILLIGRAM(S): 20 TABLET, DELAYED RELEASE ORAL at 06:51

## 2021-01-01 RX ADMIN — Medication 30 MILLIGRAM(S): at 21:40

## 2021-01-01 RX ADMIN — Medication 180 MILLIGRAM(S): at 06:52

## 2021-01-01 RX ADMIN — Medication 1 CAPSULE(S): at 08:23

## 2021-01-01 RX ADMIN — HYDROMORPHONE HYDROCHLORIDE 1 MILLIGRAM(S): 2 INJECTION INTRAMUSCULAR; INTRAVENOUS; SUBCUTANEOUS at 17:17

## 2021-01-01 RX ADMIN — APIXABAN 5 MILLIGRAM(S): 2.5 TABLET, FILM COATED ORAL at 06:16

## 2021-01-01 RX ADMIN — ZINC SULFATE TAB 220 MG (50 MG ZINC EQUIVALENT) 220 MILLIGRAM(S): 220 (50 ZN) TAB at 14:30

## 2021-01-01 RX ADMIN — Medication 200 MILLIGRAM(S): at 18:43

## 2021-01-01 RX ADMIN — Medication 125 MILLIGRAM(S): at 17:03

## 2021-01-01 RX ADMIN — HEPARIN SODIUM 5000 UNIT(S): 5000 INJECTION INTRAVENOUS; SUBCUTANEOUS at 17:15

## 2021-01-01 RX ADMIN — Medication 120 MILLIGRAM(S): at 05:42

## 2021-01-01 RX ADMIN — Medication 1 APPLICATION(S): at 13:20

## 2021-01-01 RX ADMIN — Medication 40 MILLIEQUIVALENT(S): at 12:16

## 2021-01-01 RX ADMIN — Medication 250 MILLIGRAM(S): at 18:29

## 2021-01-01 RX ADMIN — TRAMADOL HYDROCHLORIDE 25 MILLIGRAM(S): 50 TABLET ORAL at 14:30

## 2021-01-01 RX ADMIN — APIXABAN 5 MILLIGRAM(S): 2.5 TABLET, FILM COATED ORAL at 05:54

## 2021-01-01 RX ADMIN — Medication 20 MILLIGRAM(S): at 05:07

## 2021-01-01 RX ADMIN — Medication 5 MILLIGRAM(S): at 06:55

## 2021-01-01 RX ADMIN — Medication 250 MILLIGRAM(S): at 17:22

## 2021-01-01 RX ADMIN — Medication 100 MILLIGRAM(S): at 22:44

## 2021-01-01 RX ADMIN — Medication 20 MILLIGRAM(S): at 05:11

## 2021-01-01 RX ADMIN — CHLORHEXIDINE GLUCONATE 1 APPLICATION(S): 213 SOLUTION TOPICAL at 05:16

## 2021-01-01 RX ADMIN — PANTOPRAZOLE SODIUM 40 MILLIGRAM(S): 20 TABLET, DELAYED RELEASE ORAL at 06:27

## 2021-01-01 RX ADMIN — Medication 120 MILLIGRAM(S): at 05:22

## 2021-01-01 RX ADMIN — Medication 1 TABLET(S): at 18:21

## 2021-01-01 RX ADMIN — Medication 1 TABLET(S): at 13:33

## 2021-01-01 RX ADMIN — Medication 60 MILLIGRAM(S): at 05:40

## 2021-01-01 RX ADMIN — Medication 2000 UNIT(S): at 12:07

## 2021-01-01 RX ADMIN — Medication 1 TABLET(S): at 08:11

## 2021-01-01 RX ADMIN — APIXABAN 5 MILLIGRAM(S): 2.5 TABLET, FILM COATED ORAL at 17:20

## 2021-01-01 RX ADMIN — CEFEPIME 100 MILLIGRAM(S): 1 INJECTION, POWDER, FOR SOLUTION INTRAMUSCULAR; INTRAVENOUS at 13:01

## 2021-01-01 RX ADMIN — HYDROMORPHONE HYDROCHLORIDE 0.5 MILLIGRAM(S): 2 INJECTION INTRAMUSCULAR; INTRAVENOUS; SUBCUTANEOUS at 03:38

## 2021-01-01 RX ADMIN — Medication 15 MILLIGRAM(S): at 05:02

## 2021-01-01 RX ADMIN — Medication 125 MILLIGRAM(S): at 17:08

## 2021-01-01 RX ADMIN — Medication 40 MILLIGRAM(S): at 11:37

## 2021-01-01 RX ADMIN — Medication 1 TABLET(S): at 21:35

## 2021-01-01 RX ADMIN — APIXABAN 5 MILLIGRAM(S): 2.5 TABLET, FILM COATED ORAL at 05:29

## 2021-01-01 RX ADMIN — Medication 200 MILLIGRAM(S): at 05:42

## 2021-01-01 RX ADMIN — Medication 5 MILLIGRAM(S): at 14:08

## 2021-01-01 RX ADMIN — Medication 40 MILLIGRAM(S): at 05:22

## 2021-01-01 RX ADMIN — Medication 5 MILLIGRAM(S): at 11:41

## 2021-01-01 RX ADMIN — APIXABAN 5 MILLIGRAM(S): 2.5 TABLET, FILM COATED ORAL at 17:23

## 2021-01-01 RX ADMIN — Medication 5 MILLIGRAM(S): at 13:53

## 2021-01-01 RX ADMIN — Medication 1 CAPSULE(S): at 08:59

## 2021-01-01 RX ADMIN — Medication 500 MILLIGRAM(S): at 06:22

## 2021-01-01 RX ADMIN — Medication 650 MILLIGRAM(S): at 08:09

## 2021-01-01 RX ADMIN — Medication 5 MILLIGRAM(S): at 17:10

## 2021-01-01 RX ADMIN — Medication 90 MILLIGRAM(S): at 18:27

## 2021-01-01 RX ADMIN — Medication 40 MILLIGRAM(S): at 05:40

## 2021-01-01 RX ADMIN — Medication 25 GRAM(S): at 06:00

## 2021-01-01 RX ADMIN — Medication 50 GRAM(S): at 12:23

## 2021-01-01 RX ADMIN — Medication 50 GRAM(S): at 11:29

## 2021-01-01 RX ADMIN — Medication 1 TABLET(S): at 11:41

## 2021-01-01 RX ADMIN — ZINC SULFATE TAB 220 MG (50 MG ZINC EQUIVALENT) 220 MILLIGRAM(S): 220 (50 ZN) TAB at 11:12

## 2021-01-01 RX ADMIN — Medication 125 MILLIGRAM(S): at 11:41

## 2021-01-01 RX ADMIN — Medication 60 MILLIGRAM(S): at 12:02

## 2021-01-01 RX ADMIN — Medication 400 MILLIGRAM(S): at 22:47

## 2021-01-01 RX ADMIN — Medication 650 MILLIGRAM(S): at 23:13

## 2021-01-01 RX ADMIN — SODIUM CHLORIDE 500 MILLILITER(S): 9 INJECTION, SOLUTION INTRAVENOUS at 14:23

## 2021-01-01 RX ADMIN — Medication 120 MILLIGRAM(S): at 05:50

## 2021-01-01 RX ADMIN — APIXABAN 5 MILLIGRAM(S): 2.5 TABLET, FILM COATED ORAL at 05:41

## 2021-01-01 RX ADMIN — PANTOPRAZOLE SODIUM 40 MILLIGRAM(S): 20 TABLET, DELAYED RELEASE ORAL at 05:08

## 2021-01-01 RX ADMIN — Medication 90 MILLIGRAM(S): at 06:45

## 2021-01-01 RX ADMIN — Medication 500 MILLIGRAM(S): at 12:15

## 2021-01-01 RX ADMIN — Medication 500 MILLIGRAM(S): at 06:14

## 2021-01-01 RX ADMIN — APIXABAN 5 MILLIGRAM(S): 2.5 TABLET, FILM COATED ORAL at 05:56

## 2021-01-01 RX ADMIN — CEFEPIME 100 MILLIGRAM(S): 1 INJECTION, POWDER, FOR SOLUTION INTRAMUSCULAR; INTRAVENOUS at 05:05

## 2021-01-01 RX ADMIN — PEGFILGRASTIM-CBQV 6 MILLIGRAM(S): 6 INJECTION, SOLUTION SUBCUTANEOUS at 11:06

## 2021-01-01 RX ADMIN — Medication 120 MILLIGRAM(S): at 13:17

## 2021-01-01 RX ADMIN — Medication 200 MILLIGRAM(S): at 06:14

## 2021-01-01 RX ADMIN — Medication 125 MILLIGRAM(S): at 17:30

## 2021-01-01 RX ADMIN — Medication 122 MILLIGRAM(S): at 14:22

## 2021-01-01 RX ADMIN — Medication 50 GRAM(S): at 04:03

## 2021-01-01 RX ADMIN — IOHEXOL 30 MILLILITER(S): 300 INJECTION, SOLUTION INTRAVENOUS at 13:59

## 2021-01-01 RX ADMIN — Medication 90 MILLIGRAM(S): at 12:37

## 2021-01-01 RX ADMIN — POLYETHYLENE GLYCOL 3350 17 GRAM(S): 17 POWDER, FOR SOLUTION ORAL at 13:55

## 2021-01-01 RX ADMIN — Medication 650 MILLIGRAM(S): at 13:00

## 2021-01-01 RX ADMIN — APIXABAN 5 MILLIGRAM(S): 2.5 TABLET, FILM COATED ORAL at 05:48

## 2021-01-01 RX ADMIN — Medication 120 MILLIGRAM(S): at 05:54

## 2021-01-01 RX ADMIN — Medication 180 MILLIGRAM(S): at 10:15

## 2021-01-01 RX ADMIN — HEPARIN SODIUM 5000 UNIT(S): 5000 INJECTION INTRAVENOUS; SUBCUTANEOUS at 14:08

## 2021-01-01 RX ADMIN — Medication 5 MILLIGRAM(S): at 05:39

## 2021-01-01 RX ADMIN — Medication 100 MILLIGRAM(S): at 13:59

## 2021-01-01 RX ADMIN — Medication 30 MILLIGRAM(S): at 12:31

## 2021-01-01 RX ADMIN — Medication 1 TABLET(S): at 07:33

## 2021-01-01 RX ADMIN — Medication 1 APPLICATION(S): at 11:13

## 2021-01-01 RX ADMIN — GEMCITABINE 380.71 MILLIGRAM(S): 38 INJECTION, SOLUTION INTRAVENOUS at 10:58

## 2021-01-01 RX ADMIN — Medication 40 MILLIGRAM(S): at 12:15

## 2021-01-01 RX ADMIN — Medication 1 TABLET(S): at 12:24

## 2021-01-01 RX ADMIN — Medication 90 MILLIGRAM(S): at 16:57

## 2021-01-01 RX ADMIN — SODIUM CHLORIDE 500 MILLILITER(S): 9 INJECTION, SOLUTION INTRAVENOUS at 00:03

## 2021-01-01 RX ADMIN — Medication 125 MILLIGRAM(S): at 05:28

## 2021-01-01 RX ADMIN — Medication 90 MILLIGRAM(S): at 11:42

## 2021-01-01 RX ADMIN — Medication 1 TABLET(S): at 13:53

## 2021-01-01 RX ADMIN — OXYCODONE HYDROCHLORIDE 5 MILLIGRAM(S): 5 TABLET ORAL at 11:18

## 2021-01-01 RX ADMIN — SODIUM CHLORIDE 1750 MILLILITER(S): 9 INJECTION INTRAMUSCULAR; INTRAVENOUS; SUBCUTANEOUS at 16:09

## 2021-01-01 RX ADMIN — Medication 125 MILLIGRAM(S): at 05:26

## 2021-01-01 RX ADMIN — Medication 10 MILLIGRAM(S): at 05:05

## 2021-01-01 RX ADMIN — Medication 50 GRAM(S): at 01:58

## 2021-01-01 RX ADMIN — HEPARIN SODIUM 5000 UNIT(S): 5000 INJECTION INTRAVENOUS; SUBCUTANEOUS at 16:57

## 2021-01-01 RX ADMIN — APIXABAN 5 MILLIGRAM(S): 2.5 TABLET, FILM COATED ORAL at 05:09

## 2021-01-01 RX ADMIN — OXYCODONE HYDROCHLORIDE 5 MILLIGRAM(S): 5 TABLET ORAL at 11:05

## 2021-01-01 RX ADMIN — Medication 5 MILLIGRAM(S): at 16:25

## 2021-01-01 RX ADMIN — CHLORHEXIDINE GLUCONATE 1 APPLICATION(S): 213 SOLUTION TOPICAL at 05:56

## 2021-01-01 RX ADMIN — Medication 200 MILLIGRAM(S): at 17:53

## 2021-01-01 RX ADMIN — Medication 125 MILLIGRAM(S): at 11:55

## 2021-01-01 RX ADMIN — Medication 60 MILLIGRAM(S): at 21:23

## 2021-01-01 RX ADMIN — Medication 500 MILLIGRAM(S): at 14:43

## 2021-01-01 RX ADMIN — ENOXAPARIN SODIUM 40 MILLIGRAM(S): 100 INJECTION SUBCUTANEOUS at 11:38

## 2021-01-01 RX ADMIN — Medication 60 MILLIGRAM(S): at 23:13

## 2021-01-01 RX ADMIN — Medication 5 MILLIGRAM(S): at 06:29

## 2021-01-01 RX ADMIN — HYDROMORPHONE HYDROCHLORIDE 0.25 MILLIGRAM(S): 2 INJECTION INTRAMUSCULAR; INTRAVENOUS; SUBCUTANEOUS at 14:53

## 2021-01-01 RX ADMIN — HEPARIN SODIUM 5000 UNIT(S): 5000 INJECTION INTRAVENOUS; SUBCUTANEOUS at 05:47

## 2021-01-01 RX ADMIN — Medication 200 MILLIGRAM(S): at 17:37

## 2021-01-01 RX ADMIN — Medication 90 MILLIGRAM(S): at 06:49

## 2021-01-01 RX ADMIN — Medication 100 MILLIGRAM(S): at 22:09

## 2021-01-01 RX ADMIN — Medication 100 MILLIGRAM(S): at 05:51

## 2021-01-01 RX ADMIN — CEFEPIME 100 MILLIGRAM(S): 1 INJECTION, POWDER, FOR SOLUTION INTRAMUSCULAR; INTRAVENOUS at 14:01

## 2021-01-01 RX ADMIN — Medication 40 MILLIGRAM(S): at 05:49

## 2021-01-01 RX ADMIN — Medication 100 MILLIGRAM(S): at 21:54

## 2021-01-01 RX ADMIN — APIXABAN 5 MILLIGRAM(S): 2.5 TABLET, FILM COATED ORAL at 05:08

## 2021-01-01 RX ADMIN — OXYCODONE HYDROCHLORIDE 5 MILLIGRAM(S): 5 TABLET ORAL at 01:46

## 2021-01-01 RX ADMIN — TAMSULOSIN HYDROCHLORIDE 0.4 MILLIGRAM(S): 0.4 CAPSULE ORAL at 21:26

## 2021-01-01 RX ADMIN — Medication 30 MILLIGRAM(S): at 12:28

## 2021-01-01 RX ADMIN — Medication 2 CAPSULE(S): at 11:08

## 2021-01-01 RX ADMIN — Medication 30 MILLIGRAM(S): at 13:00

## 2021-01-01 RX ADMIN — CEFTRIAXONE 100 MILLIGRAM(S): 500 INJECTION, POWDER, FOR SOLUTION INTRAMUSCULAR; INTRAVENOUS at 23:41

## 2021-01-01 RX ADMIN — GEMCITABINE 1350 MILLIGRAM(S): 38 INJECTION, SOLUTION INTRAVENOUS at 14:15

## 2021-01-01 RX ADMIN — Medication 1 TABLET(S): at 17:05

## 2021-01-01 RX ADMIN — Medication 20 MILLIEQUIVALENT(S): at 14:44

## 2021-01-01 RX ADMIN — Medication 180 MILLIGRAM(S): at 05:09

## 2021-01-01 RX ADMIN — MAGNESIUM OXIDE 400 MG ORAL TABLET 400 MILLIGRAM(S): 241.3 TABLET ORAL at 08:26

## 2021-01-01 RX ADMIN — Medication 650 MILLIGRAM(S): at 17:35

## 2021-01-01 RX ADMIN — HEPARIN SODIUM 5000 UNIT(S): 5000 INJECTION INTRAVENOUS; SUBCUTANEOUS at 06:38

## 2021-01-01 RX ADMIN — HEPARIN SODIUM 5000 UNIT(S): 5000 INJECTION INTRAVENOUS; SUBCUTANEOUS at 05:22

## 2021-01-01 RX ADMIN — CHLORHEXIDINE GLUCONATE 15 MILLILITER(S): 213 SOLUTION TOPICAL at 17:17

## 2021-01-01 RX ADMIN — Medication 250 MILLIGRAM(S): at 06:16

## 2021-01-01 RX ADMIN — Medication 50 MILLIEQUIVALENT(S): at 06:40

## 2021-01-01 RX ADMIN — Medication 1 APPLICATION(S): at 11:14

## 2021-01-01 RX ADMIN — Medication 650 MILLIGRAM(S): at 06:19

## 2021-01-01 RX ADMIN — SODIUM CHLORIDE 90 MILLILITER(S): 9 INJECTION, SOLUTION INTRAVENOUS at 09:50

## 2021-01-01 RX ADMIN — APIXABAN 5 MILLIGRAM(S): 2.5 TABLET, FILM COATED ORAL at 05:13

## 2021-01-01 RX ADMIN — ENOXAPARIN SODIUM 40 MILLIGRAM(S): 100 INJECTION SUBCUTANEOUS at 13:10

## 2021-01-01 RX ADMIN — HYDROMORPHONE HYDROCHLORIDE 0.5 MILLIGRAM(S): 2 INJECTION INTRAMUSCULAR; INTRAVENOUS; SUBCUTANEOUS at 20:23

## 2021-01-01 RX ADMIN — Medication 400 MILLIGRAM(S): at 01:23

## 2021-01-01 RX ADMIN — Medication 1 TABLET(S): at 07:36

## 2021-01-01 RX ADMIN — Medication 90 MILLIGRAM(S): at 11:15

## 2021-01-01 RX ADMIN — Medication 90 MILLIGRAM(S): at 06:19

## 2021-01-01 RX ADMIN — Medication 5 MILLIGRAM(S): at 05:48

## 2021-01-01 RX ADMIN — Medication 650 MILLIGRAM(S): at 00:15

## 2021-01-01 RX ADMIN — ZINC SULFATE TAB 220 MG (50 MG ZINC EQUIVALENT) 220 MILLIGRAM(S): 220 (50 ZN) TAB at 12:07

## 2021-01-01 RX ADMIN — Medication 120 MILLIGRAM(S): at 05:56

## 2021-01-01 RX ADMIN — Medication 1 TABLET(S): at 16:57

## 2021-01-01 RX ADMIN — HYDROMORPHONE HYDROCHLORIDE 30 MILLILITER(S): 2 INJECTION INTRAMUSCULAR; INTRAVENOUS; SUBCUTANEOUS at 18:00

## 2021-01-01 RX ADMIN — Medication 500 MILLIGRAM(S): at 06:45

## 2021-01-01 RX ADMIN — Medication 100 MILLIGRAM(S): at 16:56

## 2021-01-01 RX ADMIN — Medication 650 MILLIGRAM(S): at 00:43

## 2021-01-01 RX ADMIN — Medication 250 MILLIGRAM(S): at 17:43

## 2021-01-01 RX ADMIN — GEMCITABINE 380.71 MILLIGRAM(S): 38 INJECTION, SOLUTION INTRAVENOUS at 15:50

## 2021-01-01 RX ADMIN — Medication 5 MILLIGRAM(S): at 11:15

## 2021-01-01 RX ADMIN — MAGNESIUM OXIDE 400 MG ORAL TABLET 400 MILLIGRAM(S): 241.3 TABLET ORAL at 18:04

## 2021-01-01 RX ADMIN — Medication 500 MILLIGRAM(S): at 21:48

## 2021-01-01 RX ADMIN — Medication 5 MILLIGRAM(S): at 21:14

## 2021-01-01 RX ADMIN — HEPARIN SODIUM 5000 UNIT(S): 5000 INJECTION INTRAVENOUS; SUBCUTANEOUS at 17:37

## 2021-01-01 RX ADMIN — Medication 650 MILLIGRAM(S): at 05:49

## 2021-01-01 RX ADMIN — ZINC SULFATE TAB 220 MG (50 MG ZINC EQUIVALENT) 220 MILLIGRAM(S): 220 (50 ZN) TAB at 11:37

## 2021-01-01 RX ADMIN — Medication 1 CAPSULE(S): at 08:08

## 2021-01-01 RX ADMIN — Medication 90 MILLIGRAM(S): at 12:15

## 2021-01-01 RX ADMIN — Medication 250 MILLIGRAM(S): at 05:30

## 2021-01-01 RX ADMIN — Medication 1 CAPSULE(S): at 17:37

## 2021-01-01 RX ADMIN — Medication 20 MILLIGRAM(S): at 12:19

## 2021-01-01 RX ADMIN — APIXABAN 5 MILLIGRAM(S): 2.5 TABLET, FILM COATED ORAL at 05:49

## 2021-01-01 RX ADMIN — Medication 1 TABLET(S): at 10:58

## 2021-01-01 RX ADMIN — PANTOPRAZOLE SODIUM 40 MILLIGRAM(S): 20 TABLET, DELAYED RELEASE ORAL at 06:42

## 2021-01-01 RX ADMIN — Medication 5 MILLIGRAM(S): at 17:35

## 2021-01-01 RX ADMIN — TAMSULOSIN HYDROCHLORIDE 0.4 MILLIGRAM(S): 0.4 CAPSULE ORAL at 18:08

## 2021-01-01 RX ADMIN — Medication 500 MILLIGRAM(S): at 21:50

## 2021-01-01 RX ADMIN — TAMSULOSIN HYDROCHLORIDE 0.4 MILLIGRAM(S): 0.4 CAPSULE ORAL at 21:49

## 2021-01-01 RX ADMIN — Medication 30 MILLIGRAM(S): at 18:16

## 2021-01-01 RX ADMIN — HEPARIN SODIUM 5000 UNIT(S): 5000 INJECTION INTRAVENOUS; SUBCUTANEOUS at 18:28

## 2021-01-01 RX ADMIN — CEFEPIME 100 MILLIGRAM(S): 1 INJECTION, POWDER, FOR SOLUTION INTRAMUSCULAR; INTRAVENOUS at 21:55

## 2021-01-01 RX ADMIN — Medication 15 MILLIGRAM(S): at 18:20

## 2021-01-01 RX ADMIN — Medication 90 MILLIGRAM(S): at 00:13

## 2021-01-01 RX ADMIN — Medication 1 TABLET(S): at 05:34

## 2021-01-01 RX ADMIN — ERGOCALCIFEROL 50000 UNIT(S): 1.25 CAPSULE ORAL at 11:12

## 2021-01-01 RX ADMIN — Medication 650 MILLIGRAM(S): at 07:00

## 2021-01-01 RX ADMIN — APIXABAN 5 MILLIGRAM(S): 2.5 TABLET, FILM COATED ORAL at 05:27

## 2021-01-01 RX ADMIN — PANTOPRAZOLE SODIUM 40 MILLIGRAM(S): 20 TABLET, DELAYED RELEASE ORAL at 05:28

## 2021-01-01 RX ADMIN — Medication 200 MILLIGRAM(S): at 06:49

## 2021-01-01 RX ADMIN — Medication 5 MILLIGRAM(S): at 16:39

## 2021-01-01 RX ADMIN — PANTOPRAZOLE SODIUM 40 MILLIGRAM(S): 20 TABLET, DELAYED RELEASE ORAL at 06:17

## 2021-01-01 RX ADMIN — Medication 650 MILLIGRAM(S): at 17:36

## 2021-01-01 RX ADMIN — Medication 122 MILLIGRAM(S): at 10:40

## 2021-01-01 RX ADMIN — Medication 1 TABLET(S): at 11:01

## 2021-01-01 RX ADMIN — Medication 1 APPLICATION(S): at 12:05

## 2021-01-01 RX ADMIN — PANTOPRAZOLE SODIUM 40 MILLIGRAM(S): 20 TABLET, DELAYED RELEASE ORAL at 08:10

## 2021-01-01 RX ADMIN — Medication 50 GRAM(S): at 17:22

## 2021-01-01 RX ADMIN — Medication 200 MILLIGRAM(S): at 21:50

## 2021-01-01 RX ADMIN — Medication 250 MILLIGRAM(S): at 17:18

## 2021-01-01 RX ADMIN — SODIUM CHLORIDE 3000 MILLILITER(S): 9 INJECTION, SOLUTION INTRAVENOUS at 05:14

## 2021-01-01 RX ADMIN — PANTOPRAZOLE SODIUM 40 MILLIGRAM(S): 20 TABLET, DELAYED RELEASE ORAL at 05:29

## 2021-01-01 RX ADMIN — CASPOFUNGIN ACETATE 260 MILLIGRAM(S): 7 INJECTION, POWDER, LYOPHILIZED, FOR SOLUTION INTRAVENOUS at 16:30

## 2021-01-01 RX ADMIN — Medication 20 MILLIGRAM(S): at 06:27

## 2021-01-01 RX ADMIN — Medication 5 MILLIGRAM(S): at 12:51

## 2021-01-01 RX ADMIN — HYDROMORPHONE HYDROCHLORIDE 0.5 MILLIGRAM(S): 2 INJECTION INTRAMUSCULAR; INTRAVENOUS; SUBCUTANEOUS at 00:08

## 2021-01-01 RX ADMIN — Medication 5 MG/HR: at 22:44

## 2021-01-01 RX ADMIN — Medication 25 GRAM(S): at 23:54

## 2021-01-01 RX ADMIN — Medication 40 MILLIGRAM(S): at 11:38

## 2021-01-01 RX ADMIN — Medication 250 MILLIGRAM(S): at 17:54

## 2021-01-01 RX ADMIN — APIXABAN 5 MILLIGRAM(S): 2.5 TABLET, FILM COATED ORAL at 05:59

## 2021-01-01 RX ADMIN — Medication 1 TABLET(S): at 10:48

## 2021-01-01 RX ADMIN — Medication 15 MILLIGRAM(S): at 22:45

## 2021-01-01 RX ADMIN — ENOXAPARIN SODIUM 40 MILLIGRAM(S): 100 INJECTION SUBCUTANEOUS at 21:13

## 2021-01-01 RX ADMIN — Medication 500 MILLIGRAM(S): at 05:46

## 2021-01-01 RX ADMIN — Medication 650 MILLIGRAM(S): at 13:54

## 2021-01-01 RX ADMIN — Medication 180 MILLIGRAM(S): at 05:16

## 2021-01-01 RX ADMIN — Medication 125 MILLIGRAM(S): at 05:16

## 2021-01-01 RX ADMIN — Medication 50 GRAM(S): at 14:19

## 2021-01-01 RX ADMIN — CHLORHEXIDINE GLUCONATE 1 APPLICATION(S): 213 SOLUTION TOPICAL at 05:09

## 2021-01-01 RX ADMIN — Medication 180 MILLIGRAM(S): at 05:11

## 2021-01-01 RX ADMIN — Medication 5 MILLIGRAM(S): at 05:29

## 2021-01-01 RX ADMIN — APIXABAN 5 MILLIGRAM(S): 2.5 TABLET, FILM COATED ORAL at 18:02

## 2021-01-01 RX ADMIN — TAMSULOSIN HYDROCHLORIDE 0.4 MILLIGRAM(S): 0.4 CAPSULE ORAL at 21:39

## 2021-01-01 RX ADMIN — Medication 5 MILLIGRAM(S): at 18:28

## 2021-01-01 RX ADMIN — Medication 250 MILLIGRAM(S): at 18:11

## 2021-01-01 RX ADMIN — Medication 250 MILLIGRAM(S): at 17:08

## 2021-01-01 RX ADMIN — Medication 20 MILLIGRAM(S): at 06:45

## 2021-01-01 RX ADMIN — ENOXAPARIN SODIUM 40 MILLIGRAM(S): 100 INJECTION SUBCUTANEOUS at 12:32

## 2021-01-01 RX ADMIN — Medication 125 MILLIGRAM(S): at 23:54

## 2021-01-01 RX ADMIN — APIXABAN 5 MILLIGRAM(S): 2.5 TABLET, FILM COATED ORAL at 05:33

## 2021-01-01 RX ADMIN — Medication 180 MILLIGRAM(S): at 06:17

## 2021-01-01 RX ADMIN — SODIUM CHLORIDE 1000 MILLILITER(S): 9 INJECTION, SOLUTION INTRAVENOUS at 15:19

## 2021-01-01 RX ADMIN — Medication 40 MILLIGRAM(S): at 12:02

## 2021-01-01 RX ADMIN — ENOXAPARIN SODIUM 40 MILLIGRAM(S): 100 INJECTION SUBCUTANEOUS at 11:36

## 2021-01-01 RX ADMIN — Medication 125 MILLIGRAM(S): at 11:16

## 2021-01-01 RX ADMIN — SODIUM CHLORIDE 1000 MILLILITER(S): 9 INJECTION, SOLUTION INTRAVENOUS at 13:55

## 2021-01-01 RX ADMIN — Medication 1 CAPSULE(S): at 11:45

## 2021-01-01 RX ADMIN — Medication 20 MILLIGRAM(S): at 05:40

## 2021-01-01 RX ADMIN — GEMCITABINE 380.71 MILLIGRAM(S): 38 INJECTION, SOLUTION INTRAVENOUS at 10:44

## 2021-01-01 RX ADMIN — PANTOPRAZOLE SODIUM 40 MILLIGRAM(S): 20 TABLET, DELAYED RELEASE ORAL at 05:22

## 2021-01-01 RX ADMIN — Medication 125 MILLIGRAM(S): at 11:22

## 2021-01-01 RX ADMIN — Medication 500 MILLIGRAM(S): at 15:36

## 2021-01-01 RX ADMIN — Medication 250 MILLIGRAM(S): at 20:12

## 2021-01-01 RX ADMIN — CEFEPIME 100 MILLIGRAM(S): 1 INJECTION, POWDER, FOR SOLUTION INTRAMUSCULAR; INTRAVENOUS at 05:08

## 2021-01-01 RX ADMIN — Medication 90 MILLIGRAM(S): at 21:24

## 2021-01-01 RX ADMIN — HYDROMORPHONE HYDROCHLORIDE 0.25 MILLIGRAM(S): 2 INJECTION INTRAMUSCULAR; INTRAVENOUS; SUBCUTANEOUS at 06:12

## 2021-01-01 RX ADMIN — Medication 50 GRAM(S): at 02:33

## 2021-01-01 RX ADMIN — Medication 5 MILLIGRAM(S): at 05:49

## 2021-01-01 RX ADMIN — Medication 50 GRAM(S): at 17:18

## 2021-01-01 RX ADMIN — Medication 250 MILLIGRAM(S): at 17:01

## 2021-01-01 RX ADMIN — CHLORHEXIDINE GLUCONATE 15 MILLILITER(S): 213 SOLUTION TOPICAL at 05:21

## 2021-01-01 RX ADMIN — Medication 250 MILLIGRAM(S): at 05:33

## 2021-01-01 RX ADMIN — APIXABAN 5 MILLIGRAM(S): 2.5 TABLET, FILM COATED ORAL at 17:43

## 2021-01-01 RX ADMIN — Medication 60 MILLIGRAM(S): at 11:01

## 2021-01-01 RX ADMIN — Medication 2 CAPSULE(S): at 17:36

## 2021-01-01 RX ADMIN — PACLITAXEL 60 MILLIGRAM(S): 100 INJECTION, POWDER, LYOPHILIZED, FOR SUSPENSION INTRAVENOUS at 11:00

## 2021-01-01 RX ADMIN — Medication 250 MILLIGRAM(S): at 06:52

## 2021-01-01 RX ADMIN — Medication 125 MILLIGRAM(S): at 17:54

## 2021-01-01 RX ADMIN — Medication 1 TABLET(S): at 17:18

## 2021-01-01 RX ADMIN — Medication 650 MILLIGRAM(S): at 01:00

## 2021-01-01 RX ADMIN — Medication 100 MILLIGRAM(S): at 13:45

## 2021-01-01 RX ADMIN — Medication 25 GRAM(S): at 12:14

## 2021-01-01 RX ADMIN — Medication 1 APPLICATION(S): at 18:09

## 2021-01-01 RX ADMIN — CHLORHEXIDINE GLUCONATE 1 APPLICATION(S): 213 SOLUTION TOPICAL at 05:27

## 2021-01-01 RX ADMIN — Medication 10 MILLIGRAM(S): at 17:16

## 2021-01-01 RX ADMIN — APIXABAN 5 MILLIGRAM(S): 2.5 TABLET, FILM COATED ORAL at 06:52

## 2021-01-01 RX ADMIN — Medication 20 MILLIGRAM(S): at 06:32

## 2021-01-01 RX ADMIN — Medication 1 TABLET(S): at 11:55

## 2021-01-01 RX ADMIN — Medication 1 CAPSULE(S): at 08:11

## 2021-01-01 RX ADMIN — Medication 125 MILLIGRAM(S): at 23:06

## 2021-01-01 RX ADMIN — Medication 90 MILLIGRAM(S): at 12:22

## 2021-01-01 RX ADMIN — Medication 100 MILLIGRAM(S): at 21:13

## 2021-01-01 RX ADMIN — OCTREOTIDE ACETATE 50 MICROGRAM(S): 200 INJECTION, SOLUTION INTRAVENOUS; SUBCUTANEOUS at 05:54

## 2021-01-01 RX ADMIN — APIXABAN 5 MILLIGRAM(S): 2.5 TABLET, FILM COATED ORAL at 17:16

## 2021-01-01 RX ADMIN — SODIUM CHLORIDE 90 MILLILITER(S): 9 INJECTION, SOLUTION INTRAVENOUS at 08:03

## 2021-01-01 RX ADMIN — Medication 40 MILLIGRAM(S): at 05:55

## 2021-01-01 RX ADMIN — Medication 1 APPLICATION(S): at 13:10

## 2021-01-01 RX ADMIN — Medication 1 CAPSULE(S): at 17:09

## 2021-01-01 RX ADMIN — Medication 60 MILLIGRAM(S): at 05:11

## 2021-01-01 RX ADMIN — Medication 20 MILLIGRAM(S): at 05:39

## 2021-01-01 RX ADMIN — POLYETHYLENE GLYCOL 3350 17 GRAM(S): 17 POWDER, FOR SOLUTION ORAL at 11:00

## 2021-01-01 RX ADMIN — APIXABAN 5 MILLIGRAM(S): 2.5 TABLET, FILM COATED ORAL at 17:19

## 2021-01-01 RX ADMIN — Medication 1 TABLET(S): at 11:38

## 2021-01-01 RX ADMIN — Medication 25 GRAM(S): at 21:47

## 2021-01-01 RX ADMIN — Medication 100 MILLIGRAM(S): at 13:08

## 2021-01-01 RX ADMIN — Medication 1 CAPSULE(S): at 08:30

## 2021-01-01 RX ADMIN — MAGNESIUM OXIDE 400 MG ORAL TABLET 400 MILLIGRAM(S): 241.3 TABLET ORAL at 08:48

## 2021-01-01 RX ADMIN — FAMOTIDINE 20 MILLIGRAM(S): 10 INJECTION INTRAVENOUS at 12:03

## 2021-01-01 RX ADMIN — SODIUM CHLORIDE 100 MILLILITER(S): 9 INJECTION, SOLUTION INTRAVENOUS at 12:15

## 2021-01-01 RX ADMIN — Medication 400 MILLIGRAM(S): at 23:32

## 2021-01-01 RX ADMIN — Medication 200 MILLIGRAM(S): at 17:24

## 2021-01-01 RX ADMIN — Medication 90 MILLIGRAM(S): at 23:28

## 2021-01-01 RX ADMIN — Medication 100 MILLIGRAM(S): at 05:28

## 2021-01-01 RX ADMIN — FAMOTIDINE 20 MILLIGRAM(S): 10 INJECTION INTRAVENOUS at 07:55

## 2021-01-01 RX ADMIN — PANTOPRAZOLE SODIUM 40 MILLIGRAM(S): 20 TABLET, DELAYED RELEASE ORAL at 06:32

## 2021-01-01 RX ADMIN — Medication 180 MILLIGRAM(S): at 05:41

## 2021-01-01 RX ADMIN — Medication 40 MILLIEQUIVALENT(S): at 12:23

## 2021-01-01 RX ADMIN — Medication 25 GRAM(S): at 18:27

## 2021-01-01 RX ADMIN — Medication 10 MILLIGRAM(S): at 05:28

## 2021-01-01 RX ADMIN — Medication 50 MILLIEQUIVALENT(S): at 03:52

## 2021-01-01 RX ADMIN — Medication 125 MILLIGRAM(S): at 23:20

## 2021-01-01 RX ADMIN — Medication 125 MILLIGRAM(S): at 17:19

## 2021-01-01 RX ADMIN — Medication 500 MILLIGRAM(S): at 21:47

## 2021-01-01 RX ADMIN — Medication 125 MILLIGRAM(S): at 05:09

## 2021-01-01 RX ADMIN — Medication 50 MILLIEQUIVALENT(S): at 04:08

## 2021-01-01 RX ADMIN — Medication 5 MILLIGRAM(S): at 16:57

## 2021-01-01 RX ADMIN — Medication 1000 MILLIGRAM(S): at 00:05

## 2021-01-01 RX ADMIN — APIXABAN 5 MILLIGRAM(S): 2.5 TABLET, FILM COATED ORAL at 08:29

## 2021-01-01 RX ADMIN — PACLITAXEL 150 MILLIGRAM(S): 100 INJECTION, POWDER, LYOPHILIZED, FOR SUSPENSION INTRAVENOUS at 13:05

## 2021-01-01 RX ADMIN — HYDROMORPHONE HYDROCHLORIDE 0.5 MILLIGRAM(S): 2 INJECTION INTRAMUSCULAR; INTRAVENOUS; SUBCUTANEOUS at 19:50

## 2021-01-01 RX ADMIN — Medication 100 MILLIGRAM(S): at 14:01

## 2021-01-01 RX ADMIN — Medication 500 MILLIGRAM(S): at 14:26

## 2021-01-01 RX ADMIN — Medication 1 TABLET(S): at 11:24

## 2021-03-01 ENCOUNTER — APPOINTMENT (OUTPATIENT)
Dept: UROLOGY | Facility: CLINIC | Age: 76
End: 2021-03-01

## 2021-03-05 ENCOUNTER — INPATIENT (INPATIENT)
Facility: HOSPITAL | Age: 76
LOS: 4 days | Discharge: HOME | End: 2021-03-10
Attending: HOSPITALIST | Admitting: HOSPITALIST
Payer: MEDICARE

## 2021-03-05 VITALS
WEIGHT: 128.09 LBS | RESPIRATION RATE: 18 BRPM | SYSTOLIC BLOOD PRESSURE: 121 MMHG | HEIGHT: 70 IN | TEMPERATURE: 98 F | OXYGEN SATURATION: 98 % | HEART RATE: 113 BPM | DIASTOLIC BLOOD PRESSURE: 70 MMHG

## 2021-03-05 DIAGNOSIS — Z98.890 OTHER SPECIFIED POSTPROCEDURAL STATES: Chronic | ICD-10-CM

## 2021-03-05 LAB
ALBUMIN SERPL ELPH-MCNC: 2.7 G/DL — LOW (ref 3.5–5.2)
ALP SERPL-CCNC: 1856 U/L — HIGH (ref 30–115)
ALT FLD-CCNC: 72 U/L — HIGH (ref 0–41)
ANION GAP SERPL CALC-SCNC: 12 MMOL/L — SIGNIFICANT CHANGE UP (ref 7–14)
APPEARANCE UR: CLEAR — SIGNIFICANT CHANGE UP
APTT BLD: 38.4 SEC — SIGNIFICANT CHANGE UP (ref 27–39.2)
AST SERPL-CCNC: 86 U/L — HIGH (ref 0–41)
BACTERIA # UR AUTO: NEGATIVE — SIGNIFICANT CHANGE UP
BASE EXCESS BLDV CALC-SCNC: 7.9 MMOL/L — HIGH (ref -2–2)
BASOPHILS # BLD AUTO: 0.02 K/UL — SIGNIFICANT CHANGE UP (ref 0–0.2)
BASOPHILS NFR BLD AUTO: 0.2 % — SIGNIFICANT CHANGE UP (ref 0–1)
BILIRUB DIRECT SERPL-MCNC: 8.6 MG/DL — HIGH (ref 0–0.2)
BILIRUB INDIRECT FLD-MCNC: 1.9 MG/DL — HIGH (ref 0.2–1.2)
BILIRUB SERPL-MCNC: 10.5 MG/DL — HIGH (ref 0.2–1.2)
BILIRUB UR-MCNC: ABNORMAL
BLD GP AB SCN SERPL QL: SIGNIFICANT CHANGE UP
BUN SERPL-MCNC: 11 MG/DL — SIGNIFICANT CHANGE UP (ref 10–20)
CA-I SERPL-SCNC: 1.13 MMOL/L — SIGNIFICANT CHANGE UP (ref 1.12–1.3)
CALCIUM SERPL-MCNC: 8.1 MG/DL — LOW (ref 8.5–10.1)
CHLORIDE SERPL-SCNC: 97 MMOL/L — LOW (ref 98–110)
CO2 SERPL-SCNC: 26 MMOL/L — SIGNIFICANT CHANGE UP (ref 17–32)
COLOR SPEC: ABNORMAL
CREAT SERPL-MCNC: <0.5 MG/DL — LOW (ref 0.7–1.5)
DIFF PNL FLD: NEGATIVE — SIGNIFICANT CHANGE UP
EOSINOPHIL # BLD AUTO: 0 K/UL — SIGNIFICANT CHANGE UP (ref 0–0.7)
EOSINOPHIL NFR BLD AUTO: 0 % — SIGNIFICANT CHANGE UP (ref 0–8)
EPI CELLS # UR: 8 /HPF — HIGH (ref 0–5)
GAS PNL BLDV: 135 MMOL/L — LOW (ref 136–145)
GAS PNL BLDV: SIGNIFICANT CHANGE UP
GAS PNL BLDV: SIGNIFICANT CHANGE UP
GLUCOSE SERPL-MCNC: 123 MG/DL — HIGH (ref 70–99)
GLUCOSE UR QL: NEGATIVE — SIGNIFICANT CHANGE UP
HCO3 BLDV-SCNC: 33 MMOL/L — HIGH (ref 22–29)
HCT VFR BLD CALC: 31.9 % — LOW (ref 37–47)
HCT VFR BLDA CALC: 35.4 % — SIGNIFICANT CHANGE UP (ref 34–44)
HGB BLD CALC-MCNC: 11.6 G/DL — LOW (ref 14–18)
HGB BLD-MCNC: 10.9 G/DL — LOW (ref 12–16)
HYALINE CASTS # UR AUTO: 6 /LPF — SIGNIFICANT CHANGE UP (ref 0–7)
IMM GRANULOCYTES NFR BLD AUTO: 0.6 % — HIGH (ref 0.1–0.3)
INR BLD: 2.81 RATIO — HIGH (ref 0.65–1.3)
KETONES UR-MCNC: NEGATIVE — SIGNIFICANT CHANGE UP
LACTATE BLDV-MCNC: 1 MMOL/L — SIGNIFICANT CHANGE UP (ref 0.5–1.6)
LACTATE SERPL-SCNC: 1.1 MMOL/L — SIGNIFICANT CHANGE UP (ref 0.7–2)
LEUKOCYTE ESTERASE UR-ACNC: NEGATIVE — SIGNIFICANT CHANGE UP
LIDOCAIN IGE QN: 9 U/L — SIGNIFICANT CHANGE UP (ref 7–60)
LYMPHOCYTES # BLD AUTO: 1 K/UL — LOW (ref 1.2–3.4)
LYMPHOCYTES # BLD AUTO: 7.7 % — LOW (ref 20.5–51.1)
MCHC RBC-ENTMCNC: 29.1 PG — SIGNIFICANT CHANGE UP (ref 27–31)
MCHC RBC-ENTMCNC: 34.2 G/DL — SIGNIFICANT CHANGE UP (ref 32–37)
MCV RBC AUTO: 85.1 FL — SIGNIFICANT CHANGE UP (ref 81–99)
MONOCYTES # BLD AUTO: 1.05 K/UL — HIGH (ref 0.1–0.6)
MONOCYTES NFR BLD AUTO: 8 % — SIGNIFICANT CHANGE UP (ref 1.7–9.3)
NEUTROPHILS # BLD AUTO: 10.9 K/UL — HIGH (ref 1.4–6.5)
NEUTROPHILS NFR BLD AUTO: 83.5 % — HIGH (ref 42.2–75.2)
NITRITE UR-MCNC: NEGATIVE — SIGNIFICANT CHANGE UP
NRBC # BLD: 0 /100 WBCS — SIGNIFICANT CHANGE UP (ref 0–0)
PCO2 BLDV: 46 MMHG — SIGNIFICANT CHANGE UP (ref 41–51)
PH BLDV: 7.46 — HIGH (ref 7.26–7.43)
PH UR: 7 — SIGNIFICANT CHANGE UP (ref 5–8)
PLATELET # BLD AUTO: 253 K/UL — SIGNIFICANT CHANGE UP (ref 130–400)
PO2 BLDV: 35 MMHG — SIGNIFICANT CHANGE UP (ref 20–40)
POTASSIUM BLDV-SCNC: 3 MMOL/L — LOW (ref 3.3–5.6)
POTASSIUM SERPL-MCNC: 3.3 MMOL/L — LOW (ref 3.5–5)
POTASSIUM SERPL-SCNC: 3.3 MMOL/L — LOW (ref 3.5–5)
PROT SERPL-MCNC: 5 G/DL — LOW (ref 6–8)
PROT UR-MCNC: SIGNIFICANT CHANGE UP
PROTHROM AB SERPL-ACNC: 32.3 SEC — HIGH (ref 9.95–12.87)
RBC # BLD: 3.75 M/UL — LOW (ref 4.2–5.4)
RBC # FLD: 18.6 % — HIGH (ref 11.5–14.5)
RBC CASTS # UR COMP ASSIST: 3 /HPF — SIGNIFICANT CHANGE UP (ref 0–4)
SAO2 % BLDV: 66 % — SIGNIFICANT CHANGE UP
SARS-COV-2 RNA SPEC QL NAA+PROBE: SIGNIFICANT CHANGE UP
SODIUM SERPL-SCNC: 135 MMOL/L — SIGNIFICANT CHANGE UP (ref 135–146)
SP GR SPEC: >1.05 (ref 1.01–1.03)
UROBILINOGEN FLD QL: ABNORMAL
WBC # BLD: 13.05 K/UL — HIGH (ref 4.8–10.8)
WBC # FLD AUTO: 13.05 K/UL — HIGH (ref 4.8–10.8)
WBC UR QL: 5 /HPF — SIGNIFICANT CHANGE UP (ref 0–5)

## 2021-03-05 PROCEDURE — 76705 ECHO EXAM OF ABDOMEN: CPT | Mod: 26

## 2021-03-05 PROCEDURE — 74177 CT ABD & PELVIS W/CONTRAST: CPT | Mod: 26

## 2021-03-05 PROCEDURE — 99223 1ST HOSP IP/OBS HIGH 75: CPT

## 2021-03-05 PROCEDURE — 99285 EMERGENCY DEPT VISIT HI MDM: CPT | Mod: CS,GC

## 2021-03-05 RX ORDER — SODIUM CHLORIDE 9 MG/ML
1000 INJECTION INTRAMUSCULAR; INTRAVENOUS; SUBCUTANEOUS ONCE
Refills: 0 | Status: COMPLETED | OUTPATIENT
Start: 2021-03-05 | End: 2021-03-05

## 2021-03-05 RX ORDER — CHLORHEXIDINE GLUCONATE 213 G/1000ML
1 SOLUTION TOPICAL
Refills: 0 | Status: DISCONTINUED | OUTPATIENT
Start: 2021-03-05 | End: 2021-03-10

## 2021-03-05 RX ORDER — ENOXAPARIN SODIUM 100 MG/ML
60 INJECTION SUBCUTANEOUS EVERY 12 HOURS
Refills: 0 | Status: DISCONTINUED | OUTPATIENT
Start: 2021-03-05 | End: 2021-03-10

## 2021-03-05 RX ORDER — MAGNESIUM SULFATE 500 MG/ML
2 VIAL (ML) INJECTION ONCE
Refills: 0 | Status: COMPLETED | OUTPATIENT
Start: 2021-03-05 | End: 2021-03-05

## 2021-03-05 RX ORDER — AMLODIPINE BESYLATE 2.5 MG/1
1 TABLET ORAL
Qty: 0 | Refills: 0 | DISCHARGE

## 2021-03-05 RX ORDER — PANTOPRAZOLE SODIUM 20 MG/1
40 TABLET, DELAYED RELEASE ORAL
Refills: 0 | Status: DISCONTINUED | OUTPATIENT
Start: 2021-03-05 | End: 2021-03-10

## 2021-03-05 RX ORDER — FUROSEMIDE 40 MG
20 TABLET ORAL DAILY
Refills: 0 | Status: DISCONTINUED | OUTPATIENT
Start: 2021-03-05 | End: 2021-03-10

## 2021-03-05 RX ORDER — POTASSIUM CHLORIDE 20 MEQ
20 PACKET (EA) ORAL ONCE
Refills: 0 | Status: COMPLETED | OUTPATIENT
Start: 2021-03-05 | End: 2021-03-05

## 2021-03-05 RX ORDER — APIXABAN 2.5 MG/1
5 TABLET, FILM COATED ORAL EVERY 12 HOURS
Refills: 0 | Status: DISCONTINUED | OUTPATIENT
Start: 2021-03-05 | End: 2021-03-05

## 2021-03-05 RX ORDER — DILTIAZEM HCL 120 MG
120 CAPSULE, EXT RELEASE 24 HR ORAL DAILY
Refills: 0 | Status: DISCONTINUED | OUTPATIENT
Start: 2021-03-05 | End: 2021-03-10

## 2021-03-05 RX ADMIN — SODIUM CHLORIDE 1000 MILLILITER(S): 9 INJECTION INTRAMUSCULAR; INTRAVENOUS; SUBCUTANEOUS at 10:57

## 2021-03-05 RX ADMIN — ENOXAPARIN SODIUM 60 MILLIGRAM(S): 100 INJECTION SUBCUTANEOUS at 18:02

## 2021-03-05 RX ADMIN — SODIUM CHLORIDE 1000 MILLILITER(S): 9 INJECTION INTRAMUSCULAR; INTRAVENOUS; SUBCUTANEOUS at 12:00

## 2021-03-05 RX ADMIN — Medication 50 GRAM(S): at 12:07

## 2021-03-05 RX ADMIN — Medication 50 MILLIEQUIVALENT(S): at 12:07

## 2021-03-05 RX ADMIN — Medication 2 GRAM(S): at 13:40

## 2021-03-05 NOTE — ED PROVIDER NOTE - NS ED ROS FT
Eyes:  No visual changes, eye pain or discharge. +jaundice eyes.   ENMT:  No hearing changes, pain, discharge or infections. No neck pain or stiffness.  Cardiac:  No chest pain, SOB or edema. No chest pain with exertion.  Respiratory:  No cough or respiratory distress. No hemoptysis. No history of asthma or RAD.  GI:  + nausea, no vomiting, no abdominal pain.  :  No dysuria, frequency or burning. +dark urine  MS:  No myalgia, muscle weakness, joint pain or back pain.  Neuro:  No headache.  No LOC.  Skin:  +jaundice skin  Endocrine: No history of thyroid disease or diabetes.

## 2021-03-05 NOTE — ED PROVIDER NOTE - OBJECTIVE STATEMENT
The patient is a 75 year old female with a history of afib (on eliquis), HTN, varicose veins presenting for "jaundice." Pt states she has been having progressively worsening jaundice which initially began ~12/2020. States she noticed her eyes were yellowing and her urine was darker. Saw PMD who thought it might be a UTI and treated her for it but UA was negative for UTI. States she had a recent appointment with cardiologist who noticed her jaundiced skin and sent her to be evaluated by GI (Dr. 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 Pt has a CT A/P (to r/o pancreateic cancer) and Abdomen US this week but patient states jaundice symptoms are getting much worse so came to ED. Associated with early satiety, sweats/chills, fatigue, and swelling around b/l ankles which have worsened x 1-2 weeks. Patient had episode of green loose stools this morning. Denies chest pain, abdominal pain. No alcohol use/smoking history.

## 2021-03-05 NOTE — H&P ADULT - HISTORY OF PRESENT ILLNESS
This is a 75 year old F patient with PMHx of Afib on eliquis ( Cardiologist VEE Medrano ), HTN, Varicose vein in the lower extremities, Nephrolithiasis presented to the ED for yellow discoloration of the skin. Patient started to notice this Jaundice in December 2020. It was associated with dark urine and light colored stool. Patient went to her PMD who treated her for UTI. Patient went to her cardiologist for her stress test and he noticed that she looks jaundiced and referred her to Dr. Grayson who performed an endoscopy on Wednesday 03/03/2021 which was basically normal, He ordered a CT scan to visualize the CT scan but it was never done. Patient stated that this discoloration got much worse over the last week. Patient endorsed having an intermittent change in the consistency of her stool ( Sometimes its solid other time its loose, and green in color ). She also complained of boating, nausea and early satiety but stated that she actually gained weight in the last few weeks. Patient also mentioned night sweats and chills for the last month that happens almost daily.     In the ED patient had a CT abdomen Severe intrahepatic and extrahepatic biliary ductal dilatation. Abrupt cut off ofthe CBD at the level of the pancreatic head. Pancreatic duct also dilated with abrupt cut off at the level of the pancreatic head. Pancreatic head/neck is ill-defined, raising suspicion for underlying ill-defined mass.   In the ED patient was seen and evaluated by the advance GI team who decided to proceed with EUS on Monday.     Vital Signs Last 24 Hrs  T(C): 36.8 (05 Mar 2021 16:06), Max: 36.8 (05 Mar 2021 16:06)  T(F): 98.2 (05 Mar 2021 16:06), Max: 98.2 (05 Mar 2021 16:06)  HR: 96 (05 Mar 2021 16:06) (96 - 113)  BP: 120/71 (05 Mar 2021 16:06) (120/71 - 121/70)  RR: 18 (05 Mar 2021 16:06) (18 - 18)  SpO2: 99% (05 Mar 2021 16:06) (98% - 99%)

## 2021-03-05 NOTE — H&P ADULT - ASSESSMENT
This is a 75 year old F patient with PMHx of Afib on eliquis ( Cardiologist VEE Medrano ), HTN, Varicose vein in the lower extremities, Nephrolithiasis presented to the ED for yellow discoloration of the skin.     #Painless jaundice   -DDx: Pancreatic cancer, Gallbladder cancer, PSC, PBC    -Patient is not anemic, will check hemolytic panel just to rule out Hemolytic anemia.   -Patient had a recent Endoscopy which was practically normal   -In the ED patient labs showed Direct hyperbilirubinemia, Severely elevated Alk phos  -CT head showed taper and then cut off at the pancreatic head of the CBD and pancreatic duct with poor visualization of the pancreatic head.   -Advanced GI team consulted, Will plan EUS and ERCP Monday or Tuesday    - Continue to hold Eliquis, consider Lovenox if needed  - INR 2.88, Daily INR  - Consider CT chest, CT abdomen and pelvis for Mets?     #Bilateral lower extremities edema   -Low suspicion for DVT   -Chronic skin changes are evident   -Continue Lasix 20 mg PO daily     #Afib   -On AC Eliquis 5 mg BID  -Rate controlled Diltiazem 120 mg PO daily     #HTN   -Continue Cardizem and Furosemide   -Normotensive on admission     #DVT ppx: Lovenox BID   #GI ppx: Protonix   #Diet: DASH   #Acute  This is a 75 year old F patient with PMHx of Afib on eliquis ( Cardiologist VEE Medrano ), HTN, Varicose vein in the lower extremities, Nephrolithiasis presented to the ED for yellow discoloration of the skin.     #Painless jaundice   -DDx: Pancreatic cancer, Gallbladder cancer, PSC, PBC    -Patient is not anemic, will check hemolytic panel just to rule out Hemolytic anemia.   -Patient had a recent Endoscopy which was practically normal   -In the ED patient labs showed Direct hyperbilirubinemia, Severely elevated Alk phos  -CT head showed taper and then cut off at the pancreatic head of the CBD and pancreatic duct with poor visualization of the pancreatic head.   -Advanced GI team consulted, Will plan EUS and ERCP Monday or Tuesday    - Continue to hold Eliquis, consider Lovenox if needed  - INR 2.88, Daily INR  - Consider CT chest, CT abdomen and pelvis for Mets?     #Hypokalemia   -On admission, it was 3.3, Repleted  -Follow up BMP    #Hypoalbuminemia   -CMP showed Albumin of 2.7, Hepatic dysfunction? INR is elevated as well. And elevated direct bilirubin   -Daily CMP     #Bilateral lower extremities edema   -Low suspicion for DVT   -Chronic skin changes are evident   -Continue Lasix 20 mg PO daily     #Afib   -On AC Eliquis 5 mg BID  -Rate controlled Diltiazem 120 mg PO daily     #HTN   -Continue Cardizem and Furosemide   -Normotensive on admission     #DVT ppx: Lovenox BID   #GI ppx: Protonix   #Diet: DASH   #Acute

## 2021-03-05 NOTE — ED ADULT NURSE NOTE - INTERVENTIONS DEFINITIONS
[place] to call system/Call bell, personal items and telephone within reach/Instruct patient to call for assistance/Non-slip footwear when patient is off stretcher/Physically safe environment: no spills, clutter or unnecessary equipment/Stretcher in lowest position, wheels locked, appropriate side rails in place/Provide visual cue, wrist band, yellow gown, etc./Monitor for mental status changes and reorient to person, place, and time/Review medications for side effects contributing to fall risk/Reinforce activity limits and safety measures with patient and family

## 2021-03-05 NOTE — CONSULT NOTE ADULT - ATTENDING COMMENTS
Patient seen and examined 3/5/21 at the ER bed 1A, spoke to her about findings and plan for EUS-ERCP

## 2021-03-05 NOTE — ED ADULT NURSE NOTE - OBJECTIVE STATEMENT
Pt. came to ED for c/o jaundice, decreased appetite, LE swelling, weakness x 1 week. Pt. c/o diarrhea today. Denies N/V, cp, sob, fever, chills.

## 2021-03-05 NOTE — H&P ADULT - NSHPPHYSICALEXAM_GEN_ALL_CORE
General: in bed, mild distress due to chills  head and neck: Jaundiced sclera, yellow discoloration of the skin, No JVD   Heart: S1,S2 appreciated, no added sounds  Lung: Bilateral decreased breath sounds at the bases, equal air movement bilaterally, No wheeze, crackles or crepitations   Abdomen: Soft, distended, nontender, BS+ve  LE: bilateral lower extremities swelling, Pitting, Tender.   Neuro: A&O*3, Nonfocal exam   Psych: normal affect

## 2021-03-05 NOTE — H&P ADULT - NSHPLABSRESULTS_GEN_ALL_CORE
10.9   13.05 )-----------( 253      ( 05 Mar 2021 10:28 )             31.9       03-05    135  |  97<L>  |  11  ----------------------------<  123<H>  3.3<L>   |  26  |  <0.5<L>    Ca    8.1<L>      05 Mar 2021 10:28    TPro  5.0<L>  /  Alb  2.7<L>  /  TBili  10.5<H>  /  DBili  8.6<H>  /  AST  86<H>  /  ALT  72<H>  /  AlkPhos  1856<H>  03-05              Urinalysis Basic - ( 05 Mar 2021 12:32 )    Color: Lacy / Appearance: Clear / SG: >1.050 / pH: x  Gluc: x / Ketone: Negative  / Bili: Moderate / Urobili: 3 mg/dL   Blood: x / Protein: Trace / Nitrite: Negative   Leuk Esterase: Negative / RBC: 3 /HPF / WBC 5 /HPF   Sq Epi: x / Non Sq Epi: 8 /HPF / Bacteria: Negative        PT/INR - ( 05 Mar 2021 10:28 )   PT: 32.30 sec;   INR: 2.81 ratio         PTT - ( 05 Mar 2021 10:28 )  PTT:38.4 sec      EXAM:  CT ABDOMEN AND PELVIS IC            PROCEDURE DATE:  03/05/2021            INTERPRETATION:  CLINICAL STATEMENT: Painless jaundice.    TECHNIQUE: Contiguous axial CT images were obtained from the lower chest to the pubic symphysis following administration of 100cc Isovue-370 intravenous contrast.  Oral contrast was not administered.  Reformatted images in the coronal and sagittal planes were acquired.    COMPARISON CT: CT abdomen/pelvis dated 1/27/2011.      FINDINGS:    LOWER CHEST: Cardiomegaly. Bibasilar atelectasis.    HEPATOBILIARY: Severe intrahepatic and extra hepatic biliary ductal dilatation. The CBD measures up to 1.9 cm with abrupt cut off at the level of the pancreatic head. Markedly distended gallbladder (601/17) measuring up to 16.6 cm craniocaudally. Left hepatic lobe cyst. Patent main portal vein.    SPLEEN: Unremarkable.    PANCREAS: Pancreatic duct dilated to 1.2 cm with abrupt cut off at the level of the pancreatic head. The pancreatic head/neck is not well delineated raising suspicion for underlying ill-defined mass (4/126). There is a hyperattenuating structure adjacent to duodenum (4/140), possibly representing portion of normal pancreatic tissue. If this is the case, appearance of the remainder of the pancreas would be considered to BE hypoenhancing.    ADRENAL GLANDS: Unremarkable.    KIDNEYS: Left renal cyst. Bilateral renal hypodensities too small to characterize. No hydronephrosis.    ABDOMINOPELVIC NODES: Limited evaluation due to generalized mesenteric congestion.    PELVIC ORGANS: Moderate volume pelvic ascites. Urinary bladder is unremarkable.    PERITONEUM/MESENTERY/BOWEL: Generalized mesenteric edema. Small volume ascites. Bowel is difficult to trace. No definite evidence of bowel obstruction or free air.    BONES/SOFT TISSUES: Anasarca. Scoliosis. Degenerative changes of the spine.    OTHER: Atherosclerotic calcifications of aorta.      IMPRESSION:    Severe intrahepatic and extrahepatic biliary ductal dilatation. Abrupt cut off ofthe CBD at the level of the pancreatic head. Pancreatic duct also dilated with abrupt cut off at the level of the pancreatic head. Pancreatic head/neck is ill-defined, raising suspicion for underlying ill-defined mass. GI consultation recommended.    Moderate volume ascites.

## 2021-03-05 NOTE — H&P ADULT - ATTENDING COMMENTS
I saw and evaluated the patient. I have reviewed and agree with the findings and plan of care as documented above in the resident’s note (unless indicated differently below). Any necessary changes were made in the body of the text.

## 2021-03-05 NOTE — ED PROVIDER NOTE - ATTENDING CONTRIBUTION TO CARE
74 y/o F with PMH of Afib on Eliquis and HTN p/w jaundice. Pt c/o new onset jaundice approximately 1 week ago associated with generalized fatigue, b/l LE swelling, and early satiety. Seen by GI 2 days ago for endoscopy which showed esophagitis and benign stomach neoplasm. No abdominal pain, CP, trouble breathing, f/c, n/v/d, or urinary SX.     CONSTITUTIONAL: Well-developed; well-nourished; in no acute distress, nontoxic appearing. SKIN: (+) diffuse jaundice, skin exam is warm and dry. HEAD: Normocephalic; atraumatic. EYES: (+) scleral icterus, PERRL, 3 mm bilateral, no nystagmus, EOM intact; conjunctiva and sclera clear. ENT: MMM, no nasal congestion. NECK: Supple; non tender.+ full passive ROM in all directions. No JVD. CARD: S1, S2 normal, no murmur. RESP: No wheezes, rales or rhonchi. Good air movement bilaterally. ABD: soft; non-distended; non-tender. No Rebound, No guarding. EXT: Normal ROM. No cyanosis. (+) b/l LE edema. Dp Pulses intact. NEURO: awake, alert, following commands, oriented, grossly unremarkable. No Focal deficits. GCS 15. PSYCH: Cooperative, appropriate.

## 2021-03-05 NOTE — CONSULT NOTE ADULT - ASSESSMENT
Patient is a 76 y/o with PMHx of HTN, A-Fib ( On Eliquis- Follows Dr Dillard- was due for Persantine stress test), prior varicose veins, nephrolithiasis, whom presented for jaundice. Please see my HPI above for time range and specifics. CT scan notable for CBD and PD dilation along with compression at level of Pancreatic head. This picture would be concerning for malignancy. Patient currently comfortable.    Jaundice/ CBD and PD dilation/ Cutoff- taper around Pancreatic head   - CBD 15mm and PD 8mm , concern for Pancreatic malignancy in region of Pancreatic head   - Continue to hold Eliquis, consider Lovenox if needed  - INR 2.88, please try to have to 1.5  - Will plan EUS and ERCP Monday or Tuesday   - Consider obtaining dedicated CT imaging to rule out additional mets sites  - Consider guided tap of ascitic fluid and can send for cytology- as may be malignant ascites  - Will continue to follow

## 2021-03-05 NOTE — H&P ADULT - NSICDXFAMILYHX_GEN_ALL_CORE_FT
FAMILY HISTORY:  Sibling  Still living? No  CAD (coronary artery disease), Age at diagnosis: Age Unknown

## 2021-03-05 NOTE — H&P ADULT - NSICDXPASTSURGICALHX_GEN_ALL_CORE_FT
PAST SURGICAL HISTORY:  History of ovarian cystectomy left    History of surgery vascular surgery   ? variocose vein stripping?    Status post phlebectomy 10/2018

## 2021-03-05 NOTE — ED PROVIDER NOTE - CLINICAL SUMMARY MEDICAL DECISION MAKING FREE TEXT BOX
I personally evaluated the patient. I reviewed the Resident’s or Physician Assistant’s note (as assigned above), and agree with the findings and plan except as documented in my note. Patient evaluated for jaundice. Labs, CXR, RUQ sono, CT abd/pelvis performed in ED. No abd pain or tenderness. GI/interventional GI consulted. Admitted to medicine for further evaluation and treatment.

## 2021-03-05 NOTE — ED PROVIDER NOTE - PHYSICAL EXAMINATION
CONSTITUTIONAL: elderly female laying in stretcher; in no acute distress.   SKIN: +jaundice of eyes, face, back, chest , b/l arms, abdomen  HEAD: Normocephalic; atraumatic.  EYES EOMI, +scleral icterus   ENT: No nasal discharge; airway clear.  NECK: Supple; non tender.  CARD: S1, S2 normal; no murmurs, gallops, or rubs. Regular rate and rhythm.   RESP: No wheezes, rales or rhonchi.  ABD: soft ntnd  EXT: Normal ROM.  No clubbing, cyanosis or edema.   LYMPH: No acute cervical adenopathy.  NEURO: Alert, oriented, grossly unremarkable  PSYCH: Cooperative, appropriate.

## 2021-03-05 NOTE — ED PROVIDER NOTE - PROGRESS NOTE DETAILS
TA: Spoke with GI Fellow Rosita; recommended speaking with intervential GI for CT/US showing pancreatic mass causing obstruction; spoke with interventional GI who will see patient in ED. TA: Spoke with intervential GI; stated to admit to medicine; patient will get EUS and ERCP. ATTENDING NOTE:   74 y/o F with PMH of Afib on Eliquis and HTN p/w jaundice. Pt c/o new onset jaundice approximately 1 week ago associated with generalized fatigue, b/l LE swelling, and early satiety. Seen by GI 2 days ago for endoscopy which showed esophagitis and benign stomach neoplasm. No abdominal pain, CP, trouble breathing, f/c, n/v/d, or urinary SX.   CONSTITUTIONAL: Well-developed; well-nourished; in no acute distress, nontoxic appearing. SKIN: (+) diffuse jaundice, skin exam is warm and dry. HEAD: Normocephalic; atraumatic. EYES: (+) scleral icterus, PERRL, 3 mm bilateral, no nystagmus, EOM intact; conjunctiva and sclera clear. ENT: MMM, no nasal congestion. NECK: Supple; non tender.+ full passive ROM in all directions. No JVD. CARD: S1, S2 normal, no murmur. RESP: No wheezes, rales or rhonchi. Good air movement bilaterally. ABD: soft; non-distended; non-tender. No Rebound, No guarding. EXT: Normal ROM. No cyanosis. (+) b/l LE edema. Dp Pulses intact. NEURO: awake, alert, following commands, oriented, grossly unremarkable. No Focal deficits. GCS 15. PSYCH: Cooperative, appropriate.

## 2021-03-05 NOTE — H&P ADULT - NSICDXPASTMEDICALHX_GEN_ALL_CORE_FT
PAST MEDICAL HISTORY:  Pain knee    SOB (shortness of breath)     Varicose veins of both lower extremities, unspecified whether complicated

## 2021-03-06 LAB
APTT BLD: 48.6 SEC — HIGH (ref 27–39.2)
CANCER AG125 SERPL-ACNC: 84 U/ML — HIGH
CANCER AG19-9 SERPL-ACNC: 2637 U/ML — HIGH
INR BLD: 2.26 RATIO — HIGH (ref 0.65–1.3)
PROTHROM AB SERPL-ACNC: 26 SEC — HIGH (ref 9.95–12.87)

## 2021-03-06 PROCEDURE — 74177 CT ABD & PELVIS W/CONTRAST: CPT | Mod: 26

## 2021-03-06 PROCEDURE — 71260 CT THORAX DX C+: CPT | Mod: 26

## 2021-03-06 PROCEDURE — 99233 SBSQ HOSP IP/OBS HIGH 50: CPT

## 2021-03-06 PROCEDURE — 99223 1ST HOSP IP/OBS HIGH 75: CPT

## 2021-03-06 PROCEDURE — 93970 EXTREMITY STUDY: CPT | Mod: 26

## 2021-03-06 RX ORDER — PHYTONADIONE (VIT K1) 5 MG
5 TABLET ORAL ONCE
Refills: 0 | Status: COMPLETED | OUTPATIENT
Start: 2021-03-06 | End: 2021-03-06

## 2021-03-06 RX ORDER — METOPROLOL TARTRATE 50 MG
50 TABLET ORAL ONCE
Refills: 0 | Status: COMPLETED | OUTPATIENT
Start: 2021-03-06 | End: 2021-03-06

## 2021-03-06 RX ORDER — IOHEXOL 300 MG/ML
30 INJECTION, SOLUTION INTRAVENOUS ONCE
Refills: 0 | Status: DISCONTINUED | OUTPATIENT
Start: 2021-03-06 | End: 2021-03-06

## 2021-03-06 RX ADMIN — ENOXAPARIN SODIUM 60 MILLIGRAM(S): 100 INJECTION SUBCUTANEOUS at 05:13

## 2021-03-06 RX ADMIN — PANTOPRAZOLE SODIUM 40 MILLIGRAM(S): 20 TABLET, DELAYED RELEASE ORAL at 05:13

## 2021-03-06 RX ADMIN — Medication 50 MILLIGRAM(S): at 22:11

## 2021-03-06 RX ADMIN — ENOXAPARIN SODIUM 60 MILLIGRAM(S): 100 INJECTION SUBCUTANEOUS at 17:24

## 2021-03-06 RX ADMIN — Medication 20 MILLIGRAM(S): at 05:13

## 2021-03-06 RX ADMIN — Medication 5 MILLIGRAM(S): at 14:58

## 2021-03-06 RX ADMIN — Medication 120 MILLIGRAM(S): at 05:12

## 2021-03-06 NOTE — PROGRESS NOTE ADULT - SUBJECTIVE AND OBJECTIVE BOX
75y old Female who presents with a chief complaint of Jaundice ,  admitted to medicine with the primary diagnosis of Pancreatic mass and painless jaundice.   Today pt is comfortable, she is very concerned about her skin color, denies abdominal pain, nausea and vomiting.        PAST MEDICAL & SURGICAL HISTORY  Varicose veins of both lower extremities, unspecified whether complicated    Pain  knee    SOB (shortness of breath)    History of ovarian cystectomy  left    Status post phlebectomy  10/2018    History of surgery  vascular surgery   ? variocose vein stripping?      ALLERGIES  Augmentin (Rash)  Novocain (Angioedema)  Steroids: Excessive swelling (Flushing; Other (Mild to Mod))  sulfa drugs (Fever)    Vital Signs Last 24 Hrs  T(C): 36.3 (06 Mar 2021 12:46), Max: 37.3 (05 Mar 2021 22:30)  T(F): 97.3 (06 Mar 2021 12:46), Max: 99.1 (05 Mar 2021 22:30)  HR: 117 (06 Mar 2021 12:46) (96 - 117)  BP: 101/57 (06 Mar 2021 12:46) (101/57 - 127/73)  BP(mean): --  RR: 18 (06 Mar 2021 12:46) (18 - 18)  SpO2: 97% (05 Mar 2021 22:30) (97% - 99%)           Physical Exam:   General: AAOx3 , pleasant with temporal muscle waisting, noticeable jaundice   head and neck: supple, no JDV  Heart: S1,S2 appreciated, no added sounds  Lung: Bilateral decreased breath sounds at the bases  Abdomen: Soft, distended, nontender, BS present , no rebound, no guarding   LE: bilateral lower extremities swelling, Pitting, Tender.   Neuro: AAOx 3 ,Nonfocal exam     Labs:                          10.9   13.05 )-----------( 253      ( 05 Mar 2021 10:28 )             31.9   03-05    135  |  97<L>  |  11  ----------------------------<  123<H>  3.3<L>   |  26  |  <0.5<L>    Ca    8.1<L>      05 Mar 2021 10:28    TPro  5.0<L>  /  Alb  2.7<L>  /  TBili  10.5<H>  /  DBili  8.6<H>  /  AST  86<H>  /  ALT  72<H>  /  AlkPhos  1856<H>  03-05  PT/INR - ( 06 Mar 2021 07:26 )   PT: 26.00 sec;   INR: 2.26 ratio         PTT - ( 06 Mar 2021 07:26 )  PTT:48.6 sec       PTT - ( 06 Mar 2021 07:26 )  PTT:48.6 sec  Urinalysis Basic - ( 05 Mar 2021 12:32 )    Color: Lacy / Appearance: Clear / SG: >1.050 / pH: x  Gluc: x / Ketone: Negative  / Bili: Moderate / Urobili: 3 mg/dL   Blood: x / Protein: Trace / Nitrite: Negative   Leuk Esterase: Negative / RBC: 3 /HPF / WBC 5 /HPF   Sq Epi: x / Non Sq Epi: 8 /HPF / Bacteria: Negative    RADIOLOGY:  < from: CT Abdomen and Pelvis w/ IV Cont (03.05.21 @ 11:22) >  IMPRESSION:    Severe intrahepatic and extrahepatic biliary ductal dilatation. Abrupt cut off ofthe CBD at the level of the pancreatic head. Pancreatic duct also dilated with abrupt cut off at the level of the pancreatic head. Pancreatic head/neck is ill-defined, raising suspicion for underlying ill-defined mass. GI consultation recommended.    Moderate volume ascites.    < end of copied text >    < from: US Abdomen Limited (03.05.21 @ 13:23) >  IMPRESSION:    Dilated common bile duct measuring 15 mm with moderate intrahepatic biliary duct dilatation and a dilated pancreatic duct measuring 8 mm.    Findings are highly suspicious for a pancreatic head mass causing obstruction, not well evaluated on this examination due to bowel gas    Mild perihepatic ascites.    < end of copied text >    MEDICATIONS  (STANDING):  chlorhexidine 4% Liquid 1 Application(s) Topical <User Schedule>  diltiazem    milliGRAM(s) Oral daily  enoxaparin Injectable 60 milliGRAM(s) SubCutaneous every 12 hours  furosemide    Tablet 20 milliGRAM(s) Oral daily  iohexol 300 mG (iodine)/mL Oral Solution 30 milliLiter(s) Oral once  pantoprazole    Tablet 40 milliGRAM(s) Oral before breakfast  phytonadione   Solution 5 milliGRAM(s) Oral once                -----------------------------------------------------------------------------------------------------------------------------------------------------------------------------------------------

## 2021-03-06 NOTE — PROGRESS NOTE ADULT - ASSESSMENT
This is a 75 year old F patient with PMHx of Afib on eliquis ( Cardiologist VEE Medrano ), HTN, Varicose vein in the lower extremities, Nephrolithiasis presented to the ED for yellow discoloration of the skin.     #Painless obstructive  jaundice / suspected pancreatic malignancy ( pancreatic mass)/ coagulopathy     - consulted by GI, EUS/ERCP was scheduled Monday or Tuesday   - monitor LFTs, TB and DB  - avoid hepatotoxic meds   - consulted by surgery, recommendations noted   - hold Eliquis   - give Vit K 5 mg x one dose to reverse INR   - check INR daily     #Hypokalemia   - replete potassium     #Hypoalbuminemia   - add Ensure and protein supplements to meals   - dietitian consult     #Bilateral lower extremities edema   - get LE Doppler   -Chronic skin changes are evident   -Continue Lasix 20 mg PO daily     #Afib   - Eliquis held for ERSP / EUS guided biopsy   -Rate controlled Diltiazem 120 mg PO daily     #HTN   -Continue Cardizem and Furosemide   -Normotensive on admission     #DVT ppx: Lovenox BID   #GI ppx: Protonix   #Diet: DASH       #Progress Note Handoff  Pending (specify): monitor LFTS, NPO on Sunday after midnight for ERSP/EUS on Monday ( confirm with GI), give one dose of Vit K 5 mg today, hold Eliquis, consult dietitian   Family discussion: I spoke with pt, she agreed with a plan of care   Disposition: Home_x __/SNF___/Other________/Unknown at this time________

## 2021-03-06 NOTE — PROGRESS NOTE ADULT - ASSESSMENT
This is a 75 year old F patient with PMHx of Afib on eliquis ( Cardiologist VEE Medrano ), HTN, Varicose vein in the lower extremities, Nephrolithiasis presented to the ED for yellow discoloration of the skin.     #Painless jaundice with weight loss, bloating,   -DDx: Pancreatic cancer, Gallbladder cancer, PSC, PBC    -Patient is not anemic, will check hemolytic panel just to rule out Hemolytic anemia.   -Patient had a recent Endoscopy as per Dr. Grayson which was practically normal   -In the ED patient labs showed Direct hyperbilirubinemia, Severely elevated Alk phos  -CT head showed taper and then cut off at the pancreatic head of the CBD and pancreatic duct with poor visualization of the pancreatic head.   - Continue to hold Eliquis, consider Lovenox if needed  - INR 2.88, Daily INR  _ GI consult appreciated:     #Hypokalemia   -On admission, it was 3.3, Repleted  -Follow up BMP    #Hypoalbuminemia   -CMP showed Albumin of 2.7, Hepatic dysfunction? INR is elevated as well. And elevated direct bilirubin   -Daily CMP     #Bilateral lower extremities edema   -Low suspicion for DVT   -Chronic skin changes are evident   -Continue Lasix 20 mg PO daily     #Afib   -On AC Eliquis 5 mg BID  -Rate controlled Diltiazem 120 mg PO daily     #HTN   -Continue Cardizem and Furosemide   -Normotensive on admission     #DVT ppx: Lovenox BID   #GI ppx: Protonix   #Diet: DASH   #Acute  This is a 75 year old F patient with PMHx of Afib on eliquis ( Cardiologist VEE Medrano ), HTN, Varicose vein in the lower extremities, Nephrolithiasis presented to the ED for yellow discoloration of the skin.     #Painless jaundice with weight loss, bloating,   -DDx: Pancreatic cancer, Gallbladder cancer, PSC, PBC    -Patient is not anemic, will check hemolytic panel just to rule out Hemolytic anemia.   -Patient had a recent Endoscopy as per Dr. Grayson which was practically normal   -In the ED patient labs showed Direct hyperbilirubinemia, Severely elevated Alk phos  -CT head showed taper and then cut off at the pancreatic head of the CBD and pancreatic duct with poor visualization of the pancreatic head.   - Continue to hold Eliquis, consider Lovenox if needed  - INR 2.88, Daily INR  _ GI consult appreciated: Plan for EUS and ERCP monday - Will plan EUS and ERCP Monday or Tuesday   - Consider obtaining dedicated CT imaging to rule out additional mets sites  - Consider guided tap of ascitic fluid and can send for cytology- as may be malignant ascites  - Surgical Oncology on board -> -F/U EUS and ERCP next week as well as pathology   -Trend LFTs  -Surgical Oncology to follow, and pt to follow-up in the office as outpatient for discussion and planning for surgical resection if possible       #Hypokalemia   -On admission, it was 3.3, Repleted  -Follow up BMP    #Hypoalbuminemia   -CMP showed Albumin of 2.7, Hepatic dysfunction? INR is elevated as well. And elevated direct bilirubin   -Daily CMP     #Bilateral lower extremities edema   -Low suspicion for DVT   -Chronic skin changes are evident   -Continue Lasix 20 mg PO daily     #Afib   -On AC Eliquis 5 mg BID  -Rate controlled Diltiazem 120 mg PO daily     #HTN   -Continue Cardizem and Furosemide   -Normotensive on admission     #DVT ppx: Lovenox BID   #GI ppx: Protonix   #Diet: DASH   #Acute

## 2021-03-06 NOTE — PROGRESS NOTE ADULT - SUBJECTIVE AND OBJECTIVE BOX
----------Daily Progress Note----------    HISTORY OF PRESENT ILLNESS:  Patient is a 75y old Female who presents with a chief complaint of Jaundice (06 Mar 2021 00:35)    Currently admitted to medicine with the primary diagnosis of Pancreatic mass       Today is hospital day 1d.     INTERVAL HOSPITAL COURSE / OVERNIGHT EVENTS:    Patient was examined and seen at bedside. This morning she is resting comfortably in bed and reports no new issues or overnight events.     Review of Systems: Otherwise unremarkable     <<<<<PAST MEDICAL & SURGICAL HISTORY>>>>>  Varicose veins of both lower extremities, unspecified whether complicated    Pain  knee    SOB (shortness of breath)    History of ovarian cystectomy  left    Status post phlebectomy  10/2018    History of surgery  vascular surgery   ? variocose vein stripping?      ALLERGIES  Augmentin (Rash)  Novocain (Angioedema)  Steroids: Excessive swelling (Flushing; Other (Mild to Mod))  sulfa drugs (Fever)    MEDICATIONS  STANDING MEDICATIONS  chlorhexidine 4% Liquid 1 Application(s) Topical <User Schedule>  diltiazem    milliGRAM(s) Oral daily  enoxaparin Injectable 60 milliGRAM(s) SubCutaneous every 12 hours  furosemide    Tablet 20 milliGRAM(s) Oral daily  pantoprazole    Tablet 40 milliGRAM(s) Oral before breakfast    PRN MEDICATIONS    VITALS:  T(F): 96.3  HR: 104  BP: 111/71  RR: 18  SpO2: 97%    <<<<<LABS>>>>>                        10.9   13.05 )-----------( 253      ( 05 Mar 2021 10:28 )             31.9     03-05    135  |  97<L>  |  11  ----------------------------<  123<H>  3.3<L>   |  26  |  <0.5<L>    Ca    8.1<L>      05 Mar 2021 10:28    TPro  5.0<L>  /  Alb  2.7<L>  /  TBili  10.5<H>  /  DBili  8.6<H>  /  AST  86<H>  /  ALT  72<H>  /  AlkPhos  1856<H>  03-05    PT/INR - ( 06 Mar 2021 07:26 )   PT: 26.00 sec;   INR: 2.26 ratio         PTT - ( 06 Mar 2021 07:26 )  PTT:48.6 sec  Urinalysis Basic - ( 05 Mar 2021 12:32 )    Color: Lacy / Appearance: Clear / SG: >1.050 / pH: x  Gluc: x / Ketone: Negative  / Bili: Moderate / Urobili: 3 mg/dL   Blood: x / Protein: Trace / Nitrite: Negative   Leuk Esterase: Negative / RBC: 3 /HPF / WBC 5 /HPF   Sq Epi: x / Non Sq Epi: 8 /HPF / Bacteria: Negative        Lactate, Blood: 1.1 mmol/L (03-05-21 @ 10:28)    [id number]        <<<<<RADIOLOGY>>>>>    <<<<<PHYSICAL EXAM>>>>>  GENERAL: Well developed, well nourished and in no acute distress. Resting comfortably in bed.  HEENT: Normocephalic, atraumatic, mucous membranes moist, EOMI, PERRLA, bilateral sclera anicteric, no conjunctival injection  Neck: Supple, non-tender, no lymphadenopathy.  PULMONARY: Clear to auscultation bilaterally. No rales, rhonchi, or wheezing.  CARDIOVASCULAR: Regular rate and rhythm, S1-S2, no murmurs  GASTROINTESTINAL: Soft, non-tender, non-distended, no guarding.  RENAL: No CVA tenderness.  SKIN/EXTREMITIES: No clubbing or edema  NEUROLOGIC/MUSCULOSKELETAL: AOx4, grossly moving all extremities, no focal deficits.      ----------------------------------------------------------------------------------------------------------------------------------------------------------------------------------------------- ----------Daily Progress Note----------    HISTORY OF PRESENT ILLNESS:  Patient is a 75y old Female who presents with a chief complaint of Jaundice (06 Mar 2021 00:35)    Currently admitted to medicine with the primary diagnosis of Pancreatic mass       Today is hospital day 1d.     INTERVAL HOSPITAL COURSE / OVERNIGHT EVENTS:    Patient was examined and seen at bedside. This morning she is resting comfortably in bed and reports no new issues or overnight events.     Review of Systems: Otherwise unremarkable     <<<<<PAST MEDICAL & SURGICAL HISTORY>>>>>  Varicose veins of both lower extremities, unspecified whether complicated    Pain  knee    SOB (shortness of breath)    History of ovarian cystectomy  left    Status post phlebectomy  10/2018    History of surgery  vascular surgery   ? variocose vein stripping?      ALLERGIES  Augmentin (Rash)  Novocain (Angioedema)  Steroids: Excessive swelling (Flushing; Other (Mild to Mod))  sulfa drugs (Fever)    MEDICATIONS  STANDING MEDICATIONS  chlorhexidine 4% Liquid 1 Application(s) Topical <User Schedule>  diltiazem    milliGRAM(s) Oral daily  enoxaparin Injectable 60 milliGRAM(s) SubCutaneous every 12 hours  furosemide    Tablet 20 milliGRAM(s) Oral daily  pantoprazole    Tablet 40 milliGRAM(s) Oral before breakfast    PRN MEDICATIONS    VITALS:  T(F): 96.3  HR: 104  BP: 111/71  RR: 18  SpO2: 97%    <<<<<LABS>>>>>                        10.9   13.05 )-----------( 253      ( 05 Mar 2021 10:28 )             31.9     03-05    135  |  97<L>  |  11  ----------------------------<  123<H>  3.3<L>   |  26  |  <0.5<L>    Ca    8.1<L>      05 Mar 2021 10:28    TPro  5.0<L>  /  Alb  2.7<L>  /  TBili  10.5<H>  /  DBili  8.6<H>  /  AST  86<H>  /  ALT  72<H>  /  AlkPhos  1856<H>  03-05    PT/INR - ( 06 Mar 2021 07:26 )   PT: 26.00 sec;   INR: 2.26 ratio         PTT - ( 06 Mar 2021 07:26 )  PTT:48.6 sec  Urinalysis Basic - ( 05 Mar 2021 12:32 )    Color: Lacy / Appearance: Clear / SG: >1.050 / pH: x  Gluc: x / Ketone: Negative  / Bili: Moderate / Urobili: 3 mg/dL   Blood: x / Protein: Trace / Nitrite: Negative   Leuk Esterase: Negative / RBC: 3 /HPF / WBC 5 /HPF   Sq Epi: x / Non Sq Epi: 8 /HPF / Bacteria: Negative        Lactate, Blood: 1.1 mmol/L (03-05-21 @ 10:28)    [id number]        <<<<<RADIOLOGY>>>>>  < from: CT Abdomen and Pelvis w/ IV Cont (03.05.21 @ 11:22) >  IMPRESSION:    Severe intrahepatic and extrahepatic biliary ductal dilatation. Abrupt cut off ofthe CBD at the level of the pancreatic head. Pancreatic duct also dilated with abrupt cut off at the level of the pancreatic head. Pancreatic head/neck is ill-defined, raising suspicion for underlying ill-defined mass. GI consultation recommended.    Moderate volume ascites.    < end of copied text >    < from: US Abdomen Limited (03.05.21 @ 13:23) >  IMPRESSION:    Dilated common bile duct measuring 15 mm with moderate intrahepatic biliary duct dilatation and a dilated pancreatic duct measuring 8 mm.    Findings are highly suspicious for a pancreatic head mass causing obstruction, not well evaluated on this examination due to bowel gas    Mild perihepatic ascites.    < end of copied text >        Physical Exam: General: in bed, mild distress due to chills  head and neck: Jaundiced sclera, yellow discoloration of the skin, No JVD   Heart: S1,S2 appreciated, no added sounds  Lung: Bilateral decreased breath sounds at the bases, equal air movement bilaterally, No wheeze, crackles or crepitations   Abdomen: Soft, distended, nontender, BS+ve  LE: bilateral lower extremities swelling, Pitting, Tender.   Neuro: A&O*3, Nonfocal exam   Psych: normal affect      -----------------------------------------------------------------------------------------------------------------------------------------------------------------------------------------------

## 2021-03-06 NOTE — CHART NOTE - NSCHARTNOTEFT_GEN_A_CORE
Patient is a 75 year old female with PMHx of Afib on eliquis ( Cardiologist VEE Medrano ), HTN, Varicose vein in the lower extremities, Nephrolithiasis presented to the ED for yellow discoloration of the skin. Patient started to notice this Jaundice in December 2020. It was associated with dark urine and light colored stool. On CT abdomen that demonstrated severe intrahepatic and extrahepatic biliary ductal dilatation. Abrupt cut off of the CBD at the level of the pancreatic head. Pancreatic duct also dilated with abrupt cut off at the level of the pancreatic head. Pancreatic head/neck is ill-defined, raising suspicion for underlying ill-defined mass.     Surgical oncology consulted for potential resectability. Patient is going with GI for EUS and ERCP on 3/8 or 3/9. We will follow-up the biopsy results, planning for a Whipple next month. While patient is in the hospital, recommend: CT chest to r/o metastasis, CT abdomen/pelvis with pancreatic protocol for further evaluation of mass, echo, bilateral lower extremity venous duplex, and MRSA swab.

## 2021-03-06 NOTE — CONSULT NOTE ADULT - ASSESSMENT
75 year old F patient with PMHx of Afib on eliquis, HTN, varicose veins in the lower extremities, nephrolithiasis, who presented to the ED for diffuse jaundice with imaging, laboratory values, and clinical presentation concerning for possible pancreatic head/neck mass.     Plan:   -F/U EUS and ERCP next week as well as pathology   -Appreciate GI recommendations  -Trend LFTs  -Surgical Oncology to follow, patient will follow-up in the office as outpatient for discussion and planning for surgical resection if possible   -Plan discussed with Dr. Nino  75 year old F patient with PMHx of Afib on eliquis, HTN, varicose veins in the lower extremities, nephrolithiasis, who presented to the ED for diffuse jaundice with imaging, laboratory values, and clinical presentation concerning for possible pancreatic head/neck mass.     Plan:   -F/U EUS and ERCP next week as well as pathology   -Appreciate GI recommendations  -Trend LFTs  -Surgical Oncology to follow, patient will follow-up in the office as outpatient for discussion and planning for surgical resection if possible   -Plan discussed with Dr. Nino       obtain CT chest/abdome/pelvis pancreas protocol with hydration to avoid contrast induced nephropathy  -need vit k 10mg iv/daily X 3 days  -obtain CA 19-9, will be good negative test, if markedly elevated we will need to repeat it after normalization of her bilirubin  -will follow up her ERCP/EUS  -Discussed possible etiology, possible malignancy and different treatment approach including neoadjuvant vs upfront surgery  -she was given my contact to see me as outpatient after ERCP/EUS

## 2021-03-07 LAB
ALBUMIN SERPL ELPH-MCNC: 2.5 G/DL — LOW (ref 3.5–5.2)
ALBUMIN SERPL ELPH-MCNC: 2.6 G/DL — LOW (ref 3.5–5.2)
ALP SERPL-CCNC: 1753 U/L — HIGH (ref 30–115)
ALP SERPL-CCNC: 1858 U/L — HIGH (ref 30–115)
ALT FLD-CCNC: 70 U/L — HIGH (ref 0–41)
ALT FLD-CCNC: 79 U/L — HIGH (ref 0–41)
ANION GAP SERPL CALC-SCNC: 12 MMOL/L — SIGNIFICANT CHANGE UP (ref 7–14)
ANION GAP SERPL CALC-SCNC: 12 MMOL/L — SIGNIFICANT CHANGE UP (ref 7–14)
APTT BLD: 42.8 SEC — HIGH (ref 27–39.2)
AST SERPL-CCNC: 92 U/L — HIGH (ref 0–41)
AST SERPL-CCNC: 93 U/L — HIGH (ref 0–41)
BASOPHILS # BLD AUTO: 0.03 K/UL — SIGNIFICANT CHANGE UP (ref 0–0.2)
BASOPHILS NFR BLD AUTO: 0.2 % — SIGNIFICANT CHANGE UP (ref 0–1)
BILIRUB SERPL-MCNC: 15.1 MG/DL — CRITICAL HIGH (ref 0.2–1.2)
BUN SERPL-MCNC: 12 MG/DL — SIGNIFICANT CHANGE UP (ref 10–20)
BUN SERPL-MCNC: 7 MG/DL — LOW (ref 10–20)
CALCIUM SERPL-MCNC: 8.1 MG/DL — LOW (ref 8.5–10.1)
CALCIUM SERPL-MCNC: 8.3 MG/DL — LOW (ref 8.5–10.1)
CHLORIDE SERPL-SCNC: 95 MMOL/L — LOW (ref 98–110)
CHLORIDE SERPL-SCNC: 95 MMOL/L — LOW (ref 98–110)
CO2 SERPL-SCNC: 26 MMOL/L — SIGNIFICANT CHANGE UP (ref 17–32)
CO2 SERPL-SCNC: 27 MMOL/L — SIGNIFICANT CHANGE UP (ref 17–32)
CREAT SERPL-MCNC: 0.6 MG/DL — LOW (ref 0.7–1.5)
CREAT SERPL-MCNC: <0.5 MG/DL — LOW (ref 0.7–1.5)
CULTURE RESULTS: SIGNIFICANT CHANGE UP
EOSINOPHIL # BLD AUTO: 0.04 K/UL — SIGNIFICANT CHANGE UP (ref 0–0.7)
EOSINOPHIL NFR BLD AUTO: 0.3 % — SIGNIFICANT CHANGE UP (ref 0–8)
GLUCOSE SERPL-MCNC: 164 MG/DL — HIGH (ref 70–99)
GLUCOSE SERPL-MCNC: 89 MG/DL — SIGNIFICANT CHANGE UP (ref 70–99)
HCT VFR BLD CALC: 35.8 % — LOW (ref 37–47)
HCT VFR BLD CALC: 37.7 % — SIGNIFICANT CHANGE UP (ref 37–47)
HGB BLD-MCNC: 12.4 G/DL — SIGNIFICANT CHANGE UP (ref 12–16)
HGB BLD-MCNC: 13.2 G/DL — SIGNIFICANT CHANGE UP (ref 12–16)
IMM GRANULOCYTES NFR BLD AUTO: 0.8 % — HIGH (ref 0.1–0.3)
INR BLD: 1.47 RATIO — HIGH (ref 0.65–1.3)
LYMPHOCYTES # BLD AUTO: 1.71 K/UL — SIGNIFICANT CHANGE UP (ref 1.2–3.4)
LYMPHOCYTES # BLD AUTO: 13.3 % — LOW (ref 20.5–51.1)
MAGNESIUM SERPL-MCNC: 1.7 MG/DL — LOW (ref 1.8–2.4)
MAGNESIUM SERPL-MCNC: 2.1 MG/DL — SIGNIFICANT CHANGE UP (ref 1.8–2.4)
MCHC RBC-ENTMCNC: 29.7 PG — SIGNIFICANT CHANGE UP (ref 27–31)
MCHC RBC-ENTMCNC: 29.7 PG — SIGNIFICANT CHANGE UP (ref 27–31)
MCHC RBC-ENTMCNC: 34.6 G/DL — SIGNIFICANT CHANGE UP (ref 32–37)
MCHC RBC-ENTMCNC: 35 G/DL — SIGNIFICANT CHANGE UP (ref 32–37)
MCV RBC AUTO: 84.7 FL — SIGNIFICANT CHANGE UP (ref 81–99)
MCV RBC AUTO: 85.6 FL — SIGNIFICANT CHANGE UP (ref 81–99)
MONOCYTES # BLD AUTO: 1.26 K/UL — HIGH (ref 0.1–0.6)
MONOCYTES NFR BLD AUTO: 9.8 % — HIGH (ref 1.7–9.3)
MRSA PCR RESULT.: NEGATIVE — SIGNIFICANT CHANGE UP
NEUTROPHILS # BLD AUTO: 9.67 K/UL — HIGH (ref 1.4–6.5)
NEUTROPHILS NFR BLD AUTO: 75.6 % — HIGH (ref 42.2–75.2)
NRBC # BLD: 0 /100 WBCS — SIGNIFICANT CHANGE UP (ref 0–0)
NRBC # BLD: 0 /100 WBCS — SIGNIFICANT CHANGE UP (ref 0–0)
PLATELET # BLD AUTO: 262 K/UL — SIGNIFICANT CHANGE UP (ref 130–400)
PLATELET # BLD AUTO: 299 K/UL — SIGNIFICANT CHANGE UP (ref 130–400)
POTASSIUM SERPL-MCNC: 3.1 MMOL/L — LOW (ref 3.5–5)
POTASSIUM SERPL-MCNC: 3.2 MMOL/L — LOW (ref 3.5–5)
POTASSIUM SERPL-SCNC: 3.1 MMOL/L — LOW (ref 3.5–5)
POTASSIUM SERPL-SCNC: 3.2 MMOL/L — LOW (ref 3.5–5)
PROT SERPL-MCNC: 4.7 G/DL — LOW (ref 6–8)
PROT SERPL-MCNC: 4.9 G/DL — LOW (ref 6–8)
PROTHROM AB SERPL-ACNC: 16.9 SEC — HIGH (ref 9.95–12.87)
RBC # BLD: 4.18 M/UL — LOW (ref 4.2–5.4)
RBC # BLD: 4.45 M/UL — SIGNIFICANT CHANGE UP (ref 4.2–5.4)
RBC # FLD: 19.4 % — HIGH (ref 11.5–14.5)
RBC # FLD: 19.4 % — HIGH (ref 11.5–14.5)
SODIUM SERPL-SCNC: 133 MMOL/L — LOW (ref 135–146)
SODIUM SERPL-SCNC: 134 MMOL/L — LOW (ref 135–146)
SPECIMEN SOURCE: SIGNIFICANT CHANGE UP
WBC # BLD: 12.81 K/UL — HIGH (ref 4.8–10.8)
WBC # BLD: 14.12 K/UL — HIGH (ref 4.8–10.8)
WBC # FLD AUTO: 12.81 K/UL — HIGH (ref 4.8–10.8)
WBC # FLD AUTO: 14.12 K/UL — HIGH (ref 4.8–10.8)

## 2021-03-07 PROCEDURE — 99233 SBSQ HOSP IP/OBS HIGH 50: CPT

## 2021-03-07 PROCEDURE — 93010 ELECTROCARDIOGRAM REPORT: CPT

## 2021-03-07 PROCEDURE — 93306 TTE W/DOPPLER COMPLETE: CPT | Mod: 26

## 2021-03-07 RX ORDER — SODIUM CHLORIDE 9 MG/ML
1000 INJECTION, SOLUTION INTRAVENOUS
Refills: 0 | Status: DISCONTINUED | OUTPATIENT
Start: 2021-03-07 | End: 2021-03-08

## 2021-03-07 RX ORDER — POTASSIUM CHLORIDE 20 MEQ
40 PACKET (EA) ORAL EVERY 4 HOURS
Refills: 0 | Status: COMPLETED | OUTPATIENT
Start: 2021-03-07 | End: 2021-03-07

## 2021-03-07 RX ORDER — MAGNESIUM SULFATE 500 MG/ML
2 VIAL (ML) INJECTION ONCE
Refills: 0 | Status: COMPLETED | OUTPATIENT
Start: 2021-03-07 | End: 2021-03-07

## 2021-03-07 RX ADMIN — ENOXAPARIN SODIUM 60 MILLIGRAM(S): 100 INJECTION SUBCUTANEOUS at 05:13

## 2021-03-07 RX ADMIN — Medication 40 MILLIEQUIVALENT(S): at 21:53

## 2021-03-07 RX ADMIN — Medication 120 MILLIGRAM(S): at 05:13

## 2021-03-07 RX ADMIN — Medication 20 MILLIGRAM(S): at 05:13

## 2021-03-07 RX ADMIN — Medication 50 GRAM(S): at 15:02

## 2021-03-07 RX ADMIN — ENOXAPARIN SODIUM 60 MILLIGRAM(S): 100 INJECTION SUBCUTANEOUS at 17:29

## 2021-03-07 RX ADMIN — Medication 40 MILLIEQUIVALENT(S): at 15:01

## 2021-03-07 RX ADMIN — PANTOPRAZOLE SODIUM 40 MILLIGRAM(S): 20 TABLET, DELAYED RELEASE ORAL at 05:13

## 2021-03-07 NOTE — PROGRESS NOTE ADULT - SUBJECTIVE AND OBJECTIVE BOX
75y old Female who presents with a chief complaint of Jaundice ,  admitted to medicine with the primary diagnosis of Pancreatic mass and painless jaundice.   Today pt is comfortable, denies any specific complaints, asking about procedure.        PAST MEDICAL & SURGICAL HISTORY  Varicose veins of both lower extremities, unspecified whether complicated    Pain  knee    SOB (shortness of breath)    History of ovarian cystectomy  left    Status post phlebectomy  10/2018    History of surgery  vascular surgery   ? variocose vein stripping?      ALLERGIES  Augmentin (Rash)  Novocain (Angioedema)  Steroids: Excessive swelling (Flushing; Other (Mild to Mod))  sulfa drugs (Fever)    Vital Signs Last 24 Hrs  T(C): 36.6 (07 Mar 2021 12:55), Max: 36.7 (06 Mar 2021 20:53)  T(F): 97.8 (07 Mar 2021 12:55), Max: 98.1 (06 Mar 2021 20:53)  HR: 103 (07 Mar 2021 12:55) (96 - 115)  BP: 104/64 (07 Mar 2021 12:55) (104/64 - 113/64)  BP(mean): --  RR: 18 (07 Mar 2021 12:55) (18 - 18)  SpO2: 97% (06 Mar 2021 19:43) (97% - 97%)           Physical Exam:   General: AAOx3 , pleasant with temporal muscle waisting, noticeable jaundice   head and neck: supple, no JDV  Heart: S1,S2 appreciated, no added sounds  Lung: Bilateral decreased breath sounds at the bases  Abdomen: Soft, distended, nontender, BS present , no rebound, no guarding   LE: bilateral lower extremities swelling, Pitting, Tender.   Neuro: AAOx 3 ,Nonfocal exam     Labs:                                     12.4   12.81 )-----------( 262      ( 07 Mar 2021 07:06 )             35.8   03-07    134<L>  |  95<L>  |  7<L>  ----------------------------<  89  3.1<L>   |  27  |  <0.5<L>    Ca    8.1<L>      07 Mar 2021 07:06  Mg     1.7     03-07    TPro  4.7<L>  /  Alb  2.5<L>  /  TBili  15.1<HH>  /  DBili  x   /  AST  93<H>  /  ALT  70<H>  /  AlkPhos  1753<H>  03-07      TPro  5.0<L>  /  Alb  2.7<L>  /  TBili  10.5<H>  /  DBili  8.6<H>  /  AST  86<H>  /  ALT  72<H>  /  AlkPhos  1856<H>  03-05  PT/INR - ( 06 Mar 2021 07:26 )   PT: 26.00 sec;   INR: 2.26 ratio         PTT - ( 06 Mar 2021 07:26 )  PTT:48.6 sec       PTT - ( 06 Mar 2021 07:26 )  PTT:48.6 sec  Urinalysis Basic - ( 05 Mar 2021 12:32 )    Color: Lacy / Appearance: Clear / SG: >1.050 / pH: x  Gluc: x / Ketone: Negative  / Bili: Moderate / Urobili: 3 mg/dL   Blood: x / Protein: Trace / Nitrite: Negative   Leuk Esterase: Negative / RBC: 3 /HPF / WBC 5 /HPF   Sq Epi: x / Non Sq Epi: 8 /HPF / Bacteria: Negative    RADIOLOGY:  < from: CT Abdomen and Pelvis w/ IV Cont (03.05.21 @ 11:22) >  IMPRESSION:    Severe intrahepatic and extrahepatic biliary ductal dilatation. Abrupt cut off ofthe CBD at the level of the pancreatic head. Pancreatic duct also dilated with abrupt cut off at the level of the pancreatic head. Pancreatic head/neck is ill-defined, raising suspicion for underlying ill-defined mass. GI consultation recommended.    Moderate volume ascites.    < end of copied text >    < from: US Abdomen Limited (03.05.21 @ 13:23) >  IMPRESSION:    Dilated common bile duct measuring 15 mm with moderate intrahepatic biliary duct dilatation and a dilated pancreatic duct measuring 8 mm.    Findings are highly suspicious for a pancreatic head mass causing obstruction, not well evaluated on this examination due to bowel gas    Mild perihepatic ascites.    < end of copied text >    < from: [facility] Duplex Lower Ext Vein Scan, Bilat (03.06.21 @ 16:02) >  Impression:    No evidence of deep venous thrombosis or superficial thrombophlebitis in the bilateral lower extremities.    < end of copied text >  MEDICATIONS  (STANDING):  chlorhexidine 4% Liquid 1 Application(s) Topical <User Schedule>  diltiazem    milliGRAM(s) Oral daily  enoxaparin Injectable 60 milliGRAM(s) SubCutaneous every 12 hours  furosemide    Tablet 20 milliGRAM(s) Oral daily  pantoprazole    Tablet 40 milliGRAM(s) Oral before breakfast  potassium chloride    Tablet ER 40 milliEquivalent(s) Oral every 4 hours

## 2021-03-07 NOTE — PROGRESS NOTE ADULT - ASSESSMENT
This is a 75 year old F patient with PMHx of Afib on eliquis ( Cardiologist VEE Medrano ), HTN, Varicose vein in the lower extremities, Nephrolithiasis presented to the ED for yellow discoloration of the skin.     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)     #Hypokalemia   - replete potassium     #Hypoalbuminemia   - add Ensure and protein supplements to meals   - dietitian consult     #Bilateral lower extremities edema   -  LE Doppler is negative for DVt   -Chronic skin changes are evident   -Continue Lasix 20 mg PO daily     #Afib   - Eliquis held for ERSP / EUS guided biopsy   -Rate controlled Diltiazem 120 mg PO daily     #HTN   -Continue Cardizem and Furosemide   -Normotensive on admission     #DVT ppx: Lovenox BID   #GI ppx: Protonix   #Diet: DASH       #Progress Note Handoff  Pending (specify): monitor LFTS, NPO  after midnight for ERSP/EUS on Monday , replete potassium, repeat blood work at 8 PM   Family discussion: I spoke with pt, she agreed with a plan of care   Disposition: Home_x __/SNF___/Other________/Unknown at this time________

## 2021-03-07 NOTE — PROGRESS NOTE ADULT - SUBJECTIVE AND OBJECTIVE BOX
Progress Note: General Surgery  Patient: VIKASH LI , 75y (1945)Female   MRN: [id number]  Location: [place] [address] A  Visit: 03-05-21 Inpatient  Date: 03-07-21 @ 02:31    Admit Diagnosis/Chief Complaint: abdominal pain and jaundice    Procedure/Diagnosis:  Planning whipple in April, f/u EUS on mOnday    Events/ 24h: No acute events overnight. Pain controlled.      T(C): 36.7 (03-06-21 @ 20:53), Max: 36.7 (03-06-21 @ 20:53)  HR: 115 (03-06-21 @ 20:53) (104 - 117)  BP: 113/64 (03-06-21 @ 20:53) (101/57 - 113/64)  RR: 18 (03-06-21 @ 12:46) (18 - 18)  SpO2: 97% (03-06-21 @ 19:43) (97% - 97%)    Diet: Diet, DASH/TLC:   Sodium & Cholesterol Restricted (03-05-21 @ 17:35)    IV Fluids:     Physical Examination:  General Appearance: NAD ***  HEENT: EOMI, sclera non-icteric.  Heart: RRR   Lungs: CTABL.   Abdomen:  Soft,  mildly tender, nondistended.   MSK/Extremities: Warm & well-perfused.   Skin: Warm, dry. jaundiced      Medications: [Standing]  chlorhexidine 4% Liquid 1 Application(s) Topical <User Schedule>  diltiazem    milliGRAM(s) Oral daily  enoxaparin Injectable 60 milliGRAM(s) SubCutaneous every 12 hours  furosemide    Tablet 20 milliGRAM(s) Oral daily  pantoprazole    Tablet 40 milliGRAM(s) Oral before breakfast    DVT Prophylaxis: enoxaparin Injectable 60 milliGRAM(s) SubCutaneous every 12 hours    GI Prophylaxis: pantoprazole    Tablet 40 milliGRAM(s) Oral before breakfast    Antibiotics:   Anticoagulation:   Medications:[PRN]      Labs:                        10.9   13.05 )-----------( 253      ( 05 Mar 2021 10:28 )             31.9     03-05    135  |  97<L>  |  11  ----------------------------<  123<H>  3.3<L>   |  26  |  <0.5<L>    Ca    8.1<L>      05 Mar 2021 10:28    TPro  5.0<L>  /  Alb  2.7<L>  /  TBili  10.5<H>  /  DBili  8.6<H>  /  AST  86<H>  /  ALT  72<H>  /  AlkPhos  1856<H>  03-05    LIVER FUNCTIONS - ( 05 Mar 2021 10:28 )  Alb: 2.7 g/dL / Pro: 5.0 g/dL / ALK PHOS: 1856 U/L / ALT: 72 U/L / AST: 86 U/L / GGT: x           PT/INR - ( 06 Mar 2021 07:26 )   PT: 26.00 sec;   INR: 2.26 ratio         PTT - ( 06 Mar 2021 07:26 )  PTT:48.6 sec          Urine/Micro:    Urinalysis Basic - ( 05 Mar 2021 12:32 )    Color: Lacy / Appearance: Clear / SG: >1.050 / pH: x  Gluc: x / Ketone: Negative  / Bili: Moderate / Urobili: 3 mg/dL   Blood: x / Protein: Trace / Nitrite: Negative   Leuk Esterase: Negative / RBC: 3 /HPF / WBC 5 /HPF   Sq Epi: x / Non Sq Epi: 8 /HPF / Bacteria: Negative        Imaging:   ***        < from: VA Duplex Lower Ext Vein Scan, Bilat (03.06.21 @ 16:02) >  Impression:    No evidence of deep venous thrombosis or superficial thrombophlebitis in the bilateral lower extremities.    < end of copied text >
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
## 2021-03-08 ENCOUNTER — TRANSCRIPTION ENCOUNTER (OUTPATIENT)
Age: 76
End: 2021-03-08

## 2021-03-08 ENCOUNTER — RESULT REVIEW (OUTPATIENT)
Age: 76
End: 2021-03-08

## 2021-03-08 LAB
ANION GAP SERPL CALC-SCNC: 9 MMOL/L — SIGNIFICANT CHANGE UP (ref 7–14)
BASOPHILS # BLD AUTO: 0.02 K/UL — SIGNIFICANT CHANGE UP (ref 0–0.2)
BASOPHILS NFR BLD AUTO: 0.2 % — SIGNIFICANT CHANGE UP (ref 0–1)
BILIRUB SERPL-MCNC: 16.3 MG/DL — CRITICAL HIGH (ref 0.2–1.2)
BUN SERPL-MCNC: 9 MG/DL — LOW (ref 10–20)
CALCIUM SERPL-MCNC: 8.1 MG/DL — LOW (ref 8.5–10.1)
CHLORIDE SERPL-SCNC: 99 MMOL/L — SIGNIFICANT CHANGE UP (ref 98–110)
CO2 SERPL-SCNC: 28 MMOL/L — SIGNIFICANT CHANGE UP (ref 17–32)
CREAT SERPL-MCNC: 0.5 MG/DL — LOW (ref 0.7–1.5)
EOSINOPHIL # BLD AUTO: 0.03 K/UL — SIGNIFICANT CHANGE UP (ref 0–0.7)
EOSINOPHIL NFR BLD AUTO: 0.2 % — SIGNIFICANT CHANGE UP (ref 0–8)
GLUCOSE SERPL-MCNC: 126 MG/DL — HIGH (ref 70–99)
HCT VFR BLD CALC: 32.3 % — LOW (ref 37–47)
HGB BLD-MCNC: 11.5 G/DL — LOW (ref 12–16)
IMM GRANULOCYTES NFR BLD AUTO: 0.8 % — HIGH (ref 0.1–0.3)
LYMPHOCYTES # BLD AUTO: 1.01 K/UL — LOW (ref 1.2–3.4)
LYMPHOCYTES # BLD AUTO: 8 % — LOW (ref 20.5–51.1)
MAGNESIUM SERPL-MCNC: 1.9 MG/DL — SIGNIFICANT CHANGE UP (ref 1.8–2.4)
MCHC RBC-ENTMCNC: 29.9 PG — SIGNIFICANT CHANGE UP (ref 27–31)
MCHC RBC-ENTMCNC: 35.6 G/DL — SIGNIFICANT CHANGE UP (ref 32–37)
MCV RBC AUTO: 83.9 FL — SIGNIFICANT CHANGE UP (ref 81–99)
MONOCYTES # BLD AUTO: 1.17 K/UL — HIGH (ref 0.1–0.6)
MONOCYTES NFR BLD AUTO: 9.3 % — SIGNIFICANT CHANGE UP (ref 1.7–9.3)
NEUTROPHILS # BLD AUTO: 10.31 K/UL — HIGH (ref 1.4–6.5)
NEUTROPHILS NFR BLD AUTO: 81.5 % — HIGH (ref 42.2–75.2)
NRBC # BLD: 0 /100 WBCS — SIGNIFICANT CHANGE UP (ref 0–0)
PLATELET # BLD AUTO: 270 K/UL — SIGNIFICANT CHANGE UP (ref 130–400)
POTASSIUM SERPL-MCNC: 4.2 MMOL/L — SIGNIFICANT CHANGE UP (ref 3.5–5)
POTASSIUM SERPL-SCNC: 4.2 MMOL/L — SIGNIFICANT CHANGE UP (ref 3.5–5)
RBC # BLD: 3.85 M/UL — LOW (ref 4.2–5.4)
RBC # FLD: 19 % — HIGH (ref 11.5–14.5)
SODIUM SERPL-SCNC: 136 MMOL/L — SIGNIFICANT CHANGE UP (ref 135–146)
WBC # BLD: 12.64 K/UL — HIGH (ref 4.8–10.8)
WBC # FLD AUTO: 12.64 K/UL — HIGH (ref 4.8–10.8)

## 2021-03-08 PROCEDURE — 99233 SBSQ HOSP IP/OBS HIGH 50: CPT

## 2021-03-08 PROCEDURE — 88173 CYTOPATH EVAL FNA REPORT: CPT | Mod: 26

## 2021-03-08 PROCEDURE — 99223 1ST HOSP IP/OBS HIGH 75: CPT | Mod: 25

## 2021-03-08 PROCEDURE — 88172 CYTP DX EVAL FNA 1ST EA SITE: CPT | Mod: 26

## 2021-03-08 PROCEDURE — 43274 ERCP DUCT STENT PLACEMENT: CPT

## 2021-03-08 PROCEDURE — 88305 TISSUE EXAM BY PATHOLOGIST: CPT | Mod: 26

## 2021-03-08 PROCEDURE — 74328 X-RAY BILE DUCT ENDOSCOPY: CPT | Mod: 26,XU

## 2021-03-08 PROCEDURE — 99232 SBSQ HOSP IP/OBS MODERATE 35: CPT

## 2021-03-08 PROCEDURE — 43262 ENDO CHOLANGIOPANCREATOGRAPH: CPT | Mod: XU

## 2021-03-08 PROCEDURE — 43238 EGD US FINE NEEDLE BX/ASPIR: CPT | Mod: XU

## 2021-03-08 RX ORDER — POTASSIUM CHLORIDE 20 MEQ
20 PACKET (EA) ORAL
Refills: 0 | Status: COMPLETED | OUTPATIENT
Start: 2021-03-08 | End: 2021-03-08

## 2021-03-08 RX ADMIN — Medication 50 MILLIEQUIVALENT(S): at 01:27

## 2021-03-08 RX ADMIN — Medication 20 MILLIGRAM(S): at 05:19

## 2021-03-08 RX ADMIN — Medication 50 MILLIEQUIVALENT(S): at 05:19

## 2021-03-08 RX ADMIN — Medication 120 MILLIGRAM(S): at 05:19

## 2021-03-08 RX ADMIN — PANTOPRAZOLE SODIUM 40 MILLIGRAM(S): 20 TABLET, DELAYED RELEASE ORAL at 05:19

## 2021-03-08 RX ADMIN — SODIUM CHLORIDE 75 MILLILITER(S): 9 INJECTION, SOLUTION INTRAVENOUS at 00:46

## 2021-03-08 NOTE — PROGRESS NOTE ADULT - ASSESSMENT
This is a 75 year old F patient with PMHx of Afib on eliquis ( Cardiologist VEE Medrano ), HTN, Varicose vein in the lower extremities, Nephrolithiasis presented to the ED for yellow discoloration of the skin.     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)   - will consult medical oncology after tissue biopsy     #Hypoalbuminemia / moderated malnutrition   - Ensure and protein supplements   - dietitian consulted, recommendations noted     #Bilateral lower extremities edema   -  LE Doppler is negative for DVt   -Chronic skin changes are evident   -Continue Lasix 20 mg PO daily     #Afib   - Eliquis held for ERSP / EUS guided biopsy   -Rate controlled Diltiazem 120 mg PO daily     #HTN   -Continue Cardizem and Furosemide   -Normotensive on admission     #DVT ppx: Lovenox BID   #GI ppx: Protonix   #Diet: DASH       #Progress Note Handoff  Pending (specify):  ERSP/EUS today, report is pending, monitor LFTs, GI and surgery follow up, consult medical oncology   Family discussion: I spoke with pt, she agreed with a plan of care   Disposition: Home_x __/SNF___/Other________/Unknown at this time________

## 2021-03-08 NOTE — DIETITIAN INITIAL EVALUATION ADULT. - ADD RECOMMEND
RD will monitor diet order, energy intake, nutrition related labs, body composition, NFPF (PO tolerance, GI s/s)

## 2021-03-08 NOTE — DIETITIAN INITIAL EVALUATION ADULT. - ORAL INTAKE PTA/DIET HISTORY
Pt reports at baseline she consumes 2 meals/day (breakfast & dinner) + multiple snacks between meals. Over last 1 month, pt unable to tolerate usual intake d/t worsening abd pain & nausea. Pt pescatarian at baseline x20yrs but was unable to tolerate usual intake during this time. Reports consuming mainly toast w. jelly, cheese, yogurt, pasta, etc. Allergy to chocolate (migraines). Takes one-a-day multivitamin for women. No cultural/Sikhism preferences.

## 2021-03-08 NOTE — DIETITIAN INITIAL EVALUATION ADULT. - OTHER INFO
Pt p/w worsening Jaundice, up-trending LFTs. Per MD: concern for possible pancreatic mass; s/p EUS/ERCP today w. GI, possible need for Whipple procedure in future. Hypoalbuminemia. Hypokalemia--improved s/p supplementation. Edema to b/l lower extremities--improving w. lasix in place. Afib. HTN.

## 2021-03-08 NOTE — DIETITIAN NUTRITION RISK NOTIFICATION - TREATMENT: THE FOLLOWING DIET HAS BEEN RECOMMENDED
Diet, DASH/TLC:   Sodium & Cholesterol Restricted  [employer] Vegetarian (Accepts Fish)     Qty per Day:  Vanilla Ensure Only  Supplement Feeding Modality:  Oral  Ensure Compact Cans or Servings Per Day:  1       Frequency:  Two Times a day (03-08-21 @ 15:01) [Pending Verification By Attending]  Diet, NPO after Midnight:      NPO Start Date: 07-Mar-2021,   NPO Start Time: 23:59 (03-07-21 @ 14:07) [Active]  Diet, DASH/TLC:   Sodium & Cholesterol Restricted (03-05-21 @ 17:35) [Active]

## 2021-03-08 NOTE — DIETITIAN INITIAL EVALUATION ADULT. - OTHER CALCULATIONS
est kcal needs = 4203-0115 kcal/day (MSJ x1.2-1.4 d/t moderate PCM w. concern for pancreatic mass/ r/o malignancy); est protein needs = 70-81g/day (1.2-1.4 g/kg CBW, reasons mentioned above); est fluid needs = per LIP

## 2021-03-08 NOTE — PROGRESS NOTE ADULT - SUBJECTIVE AND OBJECTIVE BOX
GENERAL SURGERY PROGRESS NOTE     LI, VIKASH  75y  Female  Hospital day :3d  POD:  Procedure:   OVERNIGHT EVENTS: no acute events    T(F): 99 (03-08-21 @ 05:19), Max: 99 (03-08-21 @ 05:19)  HR: 103 (03-08-21 @ 05:19) (103 - 114)  BP: 121/75 (03-08-21 @ 05:19) (104/64 - 122/72)  ABP: --  ABP(mean): --  RR: 18 (03-08-21 @ 05:19) (18 - 18)  SpO2: 95% (03-07-21 @ 19:30) (95% - 95%)    DIET/FLUIDS: dextrose 5% + sodium chloride 0.45%. 1000 milliLiter(s) IV Continuous <Continuous>    NG:                                                                              DRAINS:   BM:   EMESIS:   URINE:    GI proph:  pantoprazole    Tablet 40 milliGRAM(s) Oral before breakfast    AC/ proph: enoxaparin Injectable 60 milliGRAM(s) SubCutaneous every 12 hours    ABx:     PHYSICAL EXAM:  GENERAL: NAD, well-appearing  CHEST/LUNG: Clear to auscultation bilaterally  HEART: Regular rate and rhythm  ABDOMEN: Soft, Nondistended  EXTREMITIES:  No clubbing, cyanosis, or edema      LABS  Labs:  CAPILLARY BLOOD GLUCOSE                              13.2   14.12 )-----------( 299      ( 07 Mar 2021 20:00 )             37.7         03-07    133<L>  |  95<L>  |  12  ----------------------------<  164<H>  3.2<L>   |  26  |  0.6<L>      Calcium, Total Serum: 8.3 mg/dL (03-07-21 @ 20:00)      LFTs:             4.9  | 16.3 | 92       ------------------[1858    ( 07 Mar 2021 20:00 )  2.6  | x    | 79          Lipase:x      Amylase:x         Lactate, Blood: 1.1 mmol/L (03-05-21 @ 10:28)  Blood Gas Venous - Lactate: 1.0 mmoL/L (03-05-21 @ 10:28)      Coags:     16.90  ----< 1.47    ( 07 Mar 2021 20:00 )     42.8                    Culture - Urine (collected 05 Mar 2021 12:32)  Source: .Urine Clean Catch (Midstream)  Final Report (07 Mar 2021 08:09):    >=3 organisms. Probable collection contamination.          RADIOLOGY & ADDITIONAL TESTS:

## 2021-03-08 NOTE — PROGRESS NOTE ADULT - ASSESSMENT
This is a 75 year old F patient with PMHx of Afib on eliquis ( Cardiologist VEE Medrano ), HTN, Varicose vein in the lower extremities, Nephrolithiasis presented to the ED for yellow discoloration of the skin.     #Painless jaundice with weight loss, bloating,   -DDx: Pancreatic cancer, Gallbladder cancer, PSC, PBC    -Patient is not anemic, will check hemolytic panel just to rule out Hemolytic anemia.   -Patient had a recent Endoscopy as per Dr. Grayson which was practically normal   -In the ED patient labs showed Direct hyperbilirubinemia, Severely elevated Alk phos  -CT head showed taper and then cut off at the pancreatic head of the CBD and pancreatic duct with poor visualization of the pancreatic head.   - Continue to hold Eliquis, consider Lovenox if needed  - INR 1.44 s/p 5 mg of vit K, Daily INR  _ GI consult appreciated: Plan for EUS and ERCP today - follow up results and recommendations   - F/u dedicated pancreatic CT protocol after GI procedure today to r/o additional mets sites  - Liver enzymes (ALK: 1858, AST: 92, ALT 79), bilirubin (16) uptrending, tumors markers significantly elevated correlating to clinical picture (Ca 19-9: 2637, Ca 125: 84)  - MRSA negative 3/6/2021   - Surgical oncology to follow  for possible surgical resection Whipple's at earliest time     #Hypokalemia   -On admission, it was 3.3, followed by yesterday 3.2, given 2 k riders  - f/u repeat BMP    #Hypoalbuminemia   -CMP showed Albumin of 2.7, Hepatic dysfunction? INR is elevated as well. And elevated direct bilirubin   -Daily CMP     #Bilateral lower extremities edema   -Low suspicion for DVT   -Chronic skin changes are evident   -Continue Lasix 20 mg PO daily   - Duplex LE negative 3/6/2021    #Afib   -On AC Eliquis 5 mg BID (currently on hold as INR supratherapeutiic)   -Rate controlled Diltiazem 120 mg PO daily   - ECHO 3/6: EF 56%, boderline pulm HTN and moderated MR    #HTN   -Continue Cardizem and Furosemide   -Normotensive on admission     #DVT ppx: lovenox  #GI ppx: Protonix   #Diet: DASH   #Acute from home: awaiting EUS/ECRP and surg oncology recommendations

## 2021-03-08 NOTE — DIETITIAN INITIAL EVALUATION ADULT. - PERSON TAUGHT/METHOD
RD discussed DASH/TLC diet w. recommendations for small/frequent meals. Pt reports this is usually how she eats at baseline so did not need full detailed diet ed. Agreeable to Ensure Compact. Denied need for informational packet at this time. no further d/c ed  at this time./verbal instruction/patient instructed

## 2021-03-08 NOTE — PROGRESS NOTE ADULT - SUBJECTIVE AND OBJECTIVE BOX
----------Daily Progress Note----------    HISTORY OF PRESENT ILLNESS:  Patient is a 75y old Female who presents with a chief complaint of Jaundice (08 Mar 2021 08:38)    Currently admitted to medicine with the primary diagnosis of Pancreatic mass       Today is hospital day 3d.     INTERVAL HOSPITAL COURSE / OVERNIGHT EVENTS:    Patient was examined and seen at bedside. This morning she is resting comfortably in bed and reports no new issues or overnight events. Patient NPO for EUS/ERCP today morning     Review of Systems: Otherwise unremarkable     <<<<<PAST MEDICAL & SURGICAL HISTORY>>>>>  Varicose veins of both lower extremities, unspecified whether complicated    Pain  knee    SOB (shortness of breath)    History of ovarian cystectomy  left    Status post phlebectomy  10/2018    History of surgery  vascular surgery   ? variocose vein stripping?      ALLERGIES  Augmentin (Rash)  Novocain (Angioedema)  Steroids: Excessive swelling (Flushing; Other (Mild to Mod))  sulfa drugs (Fever)    MEDICATIONS  STANDING MEDICATIONS  chlorhexidine 4% Liquid 1 Application(s) Topical <User Schedule>  diltiazem    milliGRAM(s) Oral daily  enoxaparin Injectable 60 milliGRAM(s) SubCutaneous every 12 hours  furosemide    Tablet 20 milliGRAM(s) Oral daily  pantoprazole    Tablet 40 milliGRAM(s) Oral before breakfast    PRN MEDICATIONS    VITALS:  T(F): 97.7  HR: 105  BP: 129/86  RR: 18  SpO2: 95%    <<<<<LABS>>>>>                        11.5   12.64 )-----------( 270      ( 08 Mar 2021 08:08 )             32.3     03-07    133<L>  |  95<L>  |  12  ----------------------------<  164<H>  3.2<L>   |  26  |  0.6<L>    Ca    8.3<L>      07 Mar 2021 20:00  Mg     2.1     03-07    TPro  4.9<L>  /  Alb  2.6<L>  /  TBili  16.3<HH>  /  DBili  x   /  AST  92<H>  /  ALT  79<H>  /  AlkPhos  1858<H>  03-07    PT/INR - ( 07 Mar 2021 20:00 )   PT: 16.90 sec;   INR: 1.47 ratio         PTT - ( 07 Mar 2021 20:00 )  PTT:42.8 sec          Culture - Urine (collected 05 Mar 2021 12:32)  Source: .Urine Clean Catch (Midstream)  Final Report (07 Mar 2021 08:09):    >=3 organisms. Probable collection contamination.    [id number]      <<<<<RADIOLOGY>>>>>  < from: CT Abdomen and Pelvis w/ IV Cont (03.05.21 @ 11:22) >  IMPRESSION:    Severe intrahepatic and extrahepatic biliary ductal dilatation. Abrupt cut off ofthe CBD at the level of the pancreatic head. Pancreatic duct also dilated with abrupt cut off at the level of the pancreatic head. Pancreatic head/neck is ill-defined, raising suspicion for underlying ill-defined mass. GI consultation recommended.    Moderate volume ascites.    < end of copied text >    < from: US Abdomen Limited (03.05.21 @ 13:23) >  IMPRESSION:    Dilated common bile duct measuring 15 mm with moderate intrahepatic biliary duct dilatation and a dilated pancreatic duct measuring 8 mm.    Findings are highly suspicious for a pancreatic head mass causing obstruction, not well evaluated on this examination due to bowel gas    Mild perihepatic ascites.    < end of copied text >        Physical Exam: General: in bed, mild distress due to chills  head and neck: Jaundiced sclera, yellow discoloration of the skin, No JVD   Heart: S1,S2 appreciated, no added sounds  Lung: Bilateral decreased breath sounds at the bases, equal air movement bilaterally, No wheeze, crackles or crepitations   Abdomen: Soft, distended, nontender, BS+ve  LE: bilateral lower extremities swelling, Pitting, Tender.   Neuro: A&O*3, Nonfocal exam   Psych: normal affect

## 2021-03-08 NOTE — DIETITIAN INITIAL EVALUATION ADULT. - PHYSCIAL ASSESSMENT
Pt reports -125#, noticed wt gain PTA likely d/t edema but prior to this pt reported wt loss. Ptr unable to quantify recent loss & no recent EMR wt hx. Edema likely masking loss. full NFPE completed, see below.  skin otherwise intact.

## 2021-03-08 NOTE — CHART NOTE - NSCHARTNOTEFT_GEN_A_CORE
PACU ANESTHESIA ADMISSION NOTE      Procedure:   Post op diagnosis:      ____  Intubated  TV:______       Rate: ______      FiO2: ______    __x__  Patent Airway    __x__  Full return of protective reflexes    __x__  Full recovery from anesthesia / back to baseline     Vitals:   See Anesthesia record  T- 98.4 P- 103 R- 18 B/P- 100/61 SpO2- 95%  on RA    Mental Status:  __x__ Awake   __x___ Alert   _____ Drowsy   _____ Sedated    Nausea/Vomiting:  __x__ NO  ______Yes,   See Post - Op Orders          Pain Scale (0-10):  _____0    Treatment: ____ None    ____ See Post - Op/PCA Orders    Post - Operative Fluids:   ____ Oral   __x__ See Post - Op Orders    Plan: Discharge:   ____Home       __x___Floor     _____Critical Care    _____  Other:_________________    Comments: No anesthesia complications/issues noted. Discharge/ READMIT to FLOOR when criteria met.

## 2021-03-08 NOTE — DIETITIAN INITIAL EVALUATION ADULT. - FACTORS AFF FOOD INTAKE
no trouble chewing/swallowing. jaundice observed. abd discomfort improved, denies n/v. +BM 3/7, denies diarrhea

## 2021-03-08 NOTE — PROGRESS NOTE ADULT - ASSESSMENT
A/P    Assessment:  75 year old F patient with PMHx of Afib on eliquis, HTN, varicose veins in the lower extremities, nephrolithiasis, who presented to the ED for diffuse jaundice with imaging, laboratory values, and clinical presentation concerning for possible pancreatic head/neck mass.     Plan:   -F/U EUS and ERCP next week as well as pathology   -GI recommendations for EUS/ERCP on 3/8  -f/u CT reads  -Plan for whipple next month

## 2021-03-08 NOTE — PROGRESS NOTE ADULT - SUBJECTIVE AND OBJECTIVE BOX
75y old Female who presents with a chief complaint of Jaundice ,  admitted to medicine with the primary diagnosis of Pancreatic mass and painless jaundice.   Today pt is comfortable, asking about procedure.        PAST MEDICAL & SURGICAL HISTORY  Varicose veins of both lower extremities, unspecified whether complicated    Pain  knee    SOB (shortness of breath)    History of ovarian cystectomy  left    Status post phlebectomy  10/2018    History of surgery  vascular surgery   ? variocose vein stripping?      ALLERGIES  Augmentin (Rash)  Novocain (Angioedema)  Steroids: Excessive swelling (Flushing; Other (Mild to Mod))  sulfa drugs (Fever)    Vital Signs Last 24 Hrs  T(C): 36.9 (08 Mar 2021 13:00), Max: 37.2 (08 Mar 2021 05:19)  T(F): 98.4 (08 Mar 2021 13:00), Max: 99 (08 Mar 2021 05:19)  HR: 100 (08 Mar 2021 13:00) (100 - 114)  BP: 96/57 (08 Mar 2021 13:00) (96/57 - 129/86)  BP(mean): --  RR: 19 (08 Mar 2021 13:00) (17 - 19)  SpO2: 95% (08 Mar 2021 13:00) (94% - 96%)           Physical Exam:   General: AAOx3 , pleasant with temporal muscle waisting, noticeable jaundice   head and neck: supple, no JDV  Heart: S1,S2 appreciated, no added sounds  Lung: Bilateral decreased breath sounds at the bases  Abdomen: Soft, distended, nontender, BS present , no rebound, no guarding   LE: bilateral lower extremities swelling, Pitting, Tender.   Neuro: AAOx 3 ,Nonfocal exam     Labs:                                              11.5   12.64 )-----------( 270      ( 08 Mar 2021 08:08 )             32.3   03-08    136  |  99  |  9<L>  ----------------------------<  126<H>  4.2   |  28  |  0.5<L>    Ca    8.1<L>      08 Mar 2021 08:08  Mg     1.9     03-08    TPro  4.9<L>  /  Alb  2.6<L>  /  TBili  16.3<HH>  /  DBili  x   /  AST  92<H>  /  ALT  79<H>  /  AlkPhos  1858<H>  03-07         PTT - ( 06 Mar 2021 07:26 )  PTT:48.6 sec       PTT - ( 06 Mar 2021 07:26 )  PTT:48.6 sec  Urinalysis Basic - ( 05 Mar 2021 12:32 )    Color: Lacy / Appearance: Clear / SG: >1.050 / pH: x  Gluc: x / Ketone: Negative  / Bili: Moderate / Urobili: 3 mg/dL   Blood: x / Protein: Trace / Nitrite: Negative   Leuk Esterase: Negative / RBC: 3 /HPF / WBC 5 /HPF   Sq Epi: x / Non Sq Epi: 8 /HPF / Bacteria: Negative    RADIOLOGY:  < from: CT Abdomen and Pelvis w/ IV Cont (03.05.21 @ 11:22) >  IMPRESSION:    Severe intrahepatic and extrahepatic biliary ductal dilatation. Abrupt cut off ofthe CBD at the level of the pancreatic head. Pancreatic duct also dilated with abrupt cut off at the level of the pancreatic head. Pancreatic head/neck is ill-defined, raising suspicion for underlying ill-defined mass. GI consultation recommended.    Moderate volume ascites.    < end of copied text >    < from: US Abdomen Limited (03.05.21 @ 13:23) >  IMPRESSION:    Dilated common bile duct measuring 15 mm with moderate intrahepatic biliary duct dilatation and a dilated pancreatic duct measuring 8 mm.    Findings are highly suspicious for a pancreatic head mass causing obstruction, not well evaluated on this examination due to bowel gas    Mild perihepatic ascites.    < end of copied text >    < from: VA Duplex Lower Ext Vein Scan, Bilat (03.06.21 @ 16:02) >  Impression:    No evidence of deep venous thrombosis or superficial thrombophlebitis in the bilateral lower extremities.        MEDICATIONS  (STANDING):  chlorhexidine 4% Liquid 1 Application(s) Topical <User Schedule>  diltiazem    milliGRAM(s) Oral daily  enoxaparin Injectable 60 milliGRAM(s) SubCutaneous every 12 hours  furosemide    Tablet 20 milliGRAM(s) Oral daily  pantoprazole    Tablet 40 milliGRAM(s) Oral before breakfast

## 2021-03-08 NOTE — DIETITIAN INITIAL EVALUATION ADULT. - CONTINUE CURRENT NUTRITION CARE PLAN
1. As medically able adjust diet to DASH/TLC, Pescatarian (accepts fish, eggs & dairy) w. emphasis on small/frequent meal intake for optimal tolerance. 2. Add Ensure compact (vanilla only) BID. 3. Consider antiemetic prior to meals if nausea persists.

## 2021-03-08 NOTE — DIETITIAN INITIAL EVALUATION ADULT. - MALNUTRITION
moderate PCM in setting of acute on suspected chronic illness (r/t jaundice, abd pain, concern for pancreatic mass) AEB <75% energy intake x1 month PTA, moderate muscle wasting (temporal, shoulder, thigh & calf regions) & moderate subcutaneous fat loss (orbital, buccal & tricep regions).

## 2021-03-08 NOTE — DIETITIAN INITIAL EVALUATION ADULT. - ENERGY INTAKE
Currently NPO < 5 days NPO this morning, cleared for intake as per pt. Pt received pudding, antonio lamb & ginger ale s/p procedure & tolerated w.out issue. Agreeable to RD recs at this time & Ensure compact. Recs d/w LIP (Dr. Renteria).

## 2021-03-09 ENCOUNTER — TRANSCRIPTION ENCOUNTER (OUTPATIENT)
Age: 76
End: 2021-03-09

## 2021-03-09 LAB
ALBUMIN SERPL ELPH-MCNC: 2.6 G/DL — LOW (ref 3.5–5.2)
ALP SERPL-CCNC: 1644 U/L — HIGH (ref 30–115)
ALT FLD-CCNC: 68 U/L — HIGH (ref 0–41)
ANION GAP SERPL CALC-SCNC: 10 MMOL/L — SIGNIFICANT CHANGE UP (ref 7–14)
AST SERPL-CCNC: 66 U/L — HIGH (ref 0–41)
BASOPHILS # BLD AUTO: 0.03 K/UL — SIGNIFICANT CHANGE UP (ref 0–0.2)
BASOPHILS NFR BLD AUTO: 0.3 % — SIGNIFICANT CHANGE UP (ref 0–1)
BILIRUB DIRECT SERPL-MCNC: 8.6 MG/DL — HIGH (ref 0–0.2)
BILIRUB INDIRECT FLD-MCNC: 3 MG/DL — HIGH (ref 0.2–1.2)
BILIRUB SERPL-MCNC: 11.6 MG/DL — HIGH (ref 0.2–1.2)
BUN SERPL-MCNC: 10 MG/DL — SIGNIFICANT CHANGE UP (ref 10–20)
CALCIUM SERPL-MCNC: 8 MG/DL — LOW (ref 8.5–10.1)
CEA SERPL-MCNC: 4.6 NG/ML — HIGH (ref 0–3.8)
CHLORIDE SERPL-SCNC: 99 MMOL/L — SIGNIFICANT CHANGE UP (ref 98–110)
CO2 SERPL-SCNC: 27 MMOL/L — SIGNIFICANT CHANGE UP (ref 17–32)
CREAT SERPL-MCNC: <0.5 MG/DL — LOW (ref 0.7–1.5)
EOSINOPHIL # BLD AUTO: 0.09 K/UL — SIGNIFICANT CHANGE UP (ref 0–0.7)
EOSINOPHIL NFR BLD AUTO: 0.8 % — SIGNIFICANT CHANGE UP (ref 0–8)
GLUCOSE SERPL-MCNC: 94 MG/DL — SIGNIFICANT CHANGE UP (ref 70–99)
HCT VFR BLD CALC: 35.8 % — LOW (ref 37–47)
HGB BLD-MCNC: 12.5 G/DL — SIGNIFICANT CHANGE UP (ref 12–16)
IMM GRANULOCYTES NFR BLD AUTO: 1.3 % — HIGH (ref 0.1–0.3)
LYMPHOCYTES # BLD AUTO: 1.84 K/UL — SIGNIFICANT CHANGE UP (ref 1.2–3.4)
LYMPHOCYTES # BLD AUTO: 15.3 % — LOW (ref 20.5–51.1)
MAGNESIUM SERPL-MCNC: 1.8 MG/DL — SIGNIFICANT CHANGE UP (ref 1.8–2.4)
MCHC RBC-ENTMCNC: 30.1 PG — SIGNIFICANT CHANGE UP (ref 27–31)
MCHC RBC-ENTMCNC: 34.9 G/DL — SIGNIFICANT CHANGE UP (ref 32–37)
MCV RBC AUTO: 86.3 FL — SIGNIFICANT CHANGE UP (ref 81–99)
MONOCYTES # BLD AUTO: 1.11 K/UL — HIGH (ref 0.1–0.6)
MONOCYTES NFR BLD AUTO: 9.3 % — SIGNIFICANT CHANGE UP (ref 1.7–9.3)
NEUTROPHILS # BLD AUTO: 8.78 K/UL — HIGH (ref 1.4–6.5)
NEUTROPHILS NFR BLD AUTO: 73 % — SIGNIFICANT CHANGE UP (ref 42.2–75.2)
NON-GYNECOLOGICAL CYTOLOGY STUDY: SIGNIFICANT CHANGE UP
NRBC # BLD: 0 /100 WBCS — SIGNIFICANT CHANGE UP (ref 0–0)
PLATELET # BLD AUTO: 277 K/UL — SIGNIFICANT CHANGE UP (ref 130–400)
POTASSIUM SERPL-MCNC: 4.3 MMOL/L — SIGNIFICANT CHANGE UP (ref 3.5–5)
POTASSIUM SERPL-SCNC: 4.3 MMOL/L — SIGNIFICANT CHANGE UP (ref 3.5–5)
PROT SERPL-MCNC: 4.9 G/DL — LOW (ref 6–8)
RBC # BLD: 4.15 M/UL — LOW (ref 4.2–5.4)
RBC # FLD: 19.9 % — HIGH (ref 11.5–14.5)
SODIUM SERPL-SCNC: 136 MMOL/L — SIGNIFICANT CHANGE UP (ref 135–146)
WBC # BLD: 12 K/UL — HIGH (ref 4.8–10.8)
WBC # FLD AUTO: 12 K/UL — HIGH (ref 4.8–10.8)

## 2021-03-09 PROCEDURE — 99233 SBSQ HOSP IP/OBS HIGH 50: CPT

## 2021-03-09 PROCEDURE — 99222 1ST HOSP IP/OBS MODERATE 55: CPT

## 2021-03-09 PROCEDURE — 99232 SBSQ HOSP IP/OBS MODERATE 35: CPT

## 2021-03-09 RX ADMIN — Medication 120 MILLIGRAM(S): at 05:48

## 2021-03-09 RX ADMIN — ENOXAPARIN SODIUM 60 MILLIGRAM(S): 100 INJECTION SUBCUTANEOUS at 05:48

## 2021-03-09 RX ADMIN — PANTOPRAZOLE SODIUM 40 MILLIGRAM(S): 20 TABLET, DELAYED RELEASE ORAL at 05:49

## 2021-03-09 RX ADMIN — CHLORHEXIDINE GLUCONATE 1 APPLICATION(S): 213 SOLUTION TOPICAL at 05:47

## 2021-03-09 RX ADMIN — ENOXAPARIN SODIUM 60 MILLIGRAM(S): 100 INJECTION SUBCUTANEOUS at 19:31

## 2021-03-09 RX ADMIN — Medication 20 MILLIGRAM(S): at 05:48

## 2021-03-09 NOTE — PROGRESS NOTE ADULT - ASSESSMENT
This is a 75 year old F patient with PMHx of Afib on eliquis ( Cardiologist VEE Medrano ), HTN, Varicose vein in the lower extremities, Nephrolithiasis presented to the ED for yellow discoloration of the skin.     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)   - will get abdominal US to evaluate ascites pt might need therapeutic tap before discharge ( if there is evidence of large ascites  , will hold Eliquis)       #Hypoalbuminemia / moderated malnutrition   - Ensure and protein supplements   - dietitian consulted, recommendations noted     #Bilateral lower extremities edema   -  LE Doppler is negative for DVt   -Chronic skin changes are evident   -Continue Lasix 20 mg PO daily     #Afib   - resume Eliquis   -Rate controlled Diltiazem 120 mg PO daily     #HTN   -Continue Cardizem and Furosemide   -Normotensive on admission     #DVT ppx: Lovenox BID   #GI ppx: Protonix   #Diet: DASH       #Progress Note Handoff  Pending (specify):  f/u pathology report  monitor LFTs ,consult medical oncology , obtain abdominal US to evaluate ascites   Family discussion: I spoke with pt, she agreed with a plan of care   Disposition: Home_x __/SNF___/Other________/Unknown at this time________

## 2021-03-09 NOTE — PROGRESS NOTE ADULT - SUBJECTIVE AND OBJECTIVE BOX
75y old Female who presents with a chief complaint of Jaundice ,  admitted to medicine with the primary diagnosis of Pancreatic mass and painless jaundice, pt was consulted by advanced GI, underwent ERCP with palliative stent placement and EUS guided biopsy.   Today pt feels OK, c/o worsening abdominal distention, likely due to ascites.         PAST MEDICAL & SURGICAL HISTORY  Varicose veins of both lower extremities, unspecified whether complicated  Pain  knee  SOB (shortness of breath)  History of ovarian cystectomy  left  Status post phlebectomy  10/2018  History of surgery  vascular surgery   ? variocose vein stripping?      ALLERGIES  Augmentin (Rash)  Novocain (Angioedema)  Steroids: Excessive swelling (Flushing; Other (Mild to Mod))  sulfa drugs (Fever)    Vital Signs Last 24 Hrs  T(C): 36.6 (09 Mar 2021 13:41), Max: 36.6 (09 Mar 2021 13:41)  T(F): 97.9 (09 Mar 2021 13:41), Max: 97.9 (09 Mar 2021 13:41)  HR: 94 (09 Mar 2021 13:41) (94 - 106)  BP: 101/61 (09 Mar 2021 13:41) (101/61 - 137/88)  BP(mean): --  RR: 18 (09 Mar 2021 13:41) (18 - 18)             Physical Exam:   General: AAOx3 , pleasant with temporal muscle waisting, noticeable jaundice   head and neck: supple, no JDV  Heart: S1,S2 appreciated, no added sounds  Lung: Bilateral decreased breath sounds at the bases  Abdomen: Soft, more  distended, nontender, BS present , no rebound, no guarding   LE: bilateral lower extremities swelling, Pitting, Tender.   Neuro: AAOx 3 ,Nonfocal exam     Labs:                          12.5   12.00 )-----------( 277      ( 09 Mar 2021 05:49 )             35.8   03-09    136  |  99  |  10  ----------------------------<  94  4.3   |  27  |  <0.5<L>    Ca    8.0<L>      09 Mar 2021 05:49  Mg     1.8     03-09    TPro  4.9<L>  /  Alb  2.6<L>  /  TBili  11.6<H>  /  DBili  8.6<H>  /  AST  66<H>  /  ALT  68<H>  /  AlkPhos  1644<H>  03-09       PTT - ( 06 Mar 2021 07:26 )  PTT:48.6 sec       PTT - ( 06 Mar 2021 07:26 )  PTT:48.6 sec  Urinalysis Basic - ( 05 Mar 2021 12:32 )    Color: Lacy / Appearance: Clear / SG: >1.050 / pH: x  Gluc: x / Ketone: Negative  / Bili: Moderate / Urobili: 3 mg/dL   Blood: x / Protein: Trace / Nitrite: Negative   Leuk Esterase: Negative / RBC: 3 /HPF / WBC 5 /HPF   Sq Epi: x / Non Sq Epi: 8 /HPF / Bacteria: Negative    RADIOLOGY:  < from: CT Abdomen and Pelvis w/ IV Cont (03.05.21 @ 11:22) >  IMPRESSION:    Severe intrahepatic and extrahepatic biliary ductal dilatation. Abrupt cut off ofthe CBD at the level of the pancreatic head. Pancreatic duct also dilated with abrupt cut off at the level of the pancreatic head. Pancreatic head/neck is ill-defined, raising suspicion for underlying ill-defined mass. GI consultation recommended.    Moderate volume ascites.    < end of copied text >    < from: US Abdomen Limited (03.05.21 @ 13:23) >  IMPRESSION:    Dilated common bile duct measuring 15 mm with moderate intrahepatic biliary duct dilatation and a dilated pancreatic duct measuring 8 mm.    Findings are highly suspicious for a pancreatic head mass causing obstruction, not well evaluated on this examination due to bowel gas    Mild perihepatic ascites.    < end of copied text >    < from: VA Duplex Lower Ext Vein Scan, Bilat (03.06.21 @ 16:02) >  Impression:    No evidence of deep venous thrombosis or superficial thrombophlebitis in the bilateral lower extremities.  ch    < from: TTE Echo Complete w/o Contrast w/ Doppler (03.07.21 @ 09:23) >    Summary:   1. Normal global left ventricular systolic function.   2. Moderate to severe right atrial enlargement.   3. LV Ejection Fraction by Nick's Method with a biplane EF of 56 %.   4. Moderately enlarged left atrium.  5. Mild Mitral valve prolapse.   6. Mild to moderate mitral valve regurgitation.   7. Moderate tricuspid regurgitation.   8. Mild aortic regurgitation.   9. Moderate aortic valve thickening with normal leaflet opening.  10. Mild pulmonic valve regurgitation.  11. Estimated pulmonary artery systolic pressure is 39.3 mmHg assuming a right atrial pressure of 3 mmHg, which is consistent with borderline pulmonary hypertension.    < end of copied text >    MEDICATIONS  (STANDING):  chlorhexidine 4% Liquid 1 Application(s) Topical <User Schedule>  diltiazem    milliGRAM(s) Oral daily  enoxaparin Injectable 60 milliGRAM(s) SubCutaneous every 12 hours  furosemide    Tablet 20 milliGRAM(s) Oral daily  pantoprazole    Tablet 40 milliGRAM(s) Oral before breakfast

## 2021-03-09 NOTE — ADVANCED PRACTICE NURSE CONSULT - RECOMMEDATIONS
1. Scab right foot- Apply Cavilon no-sting barrier film daily to provide protective layer, maintain clean, dry scab tissue and  allow scab to unroof naturally.  -Assess skin/wound qshift, report changes to primary provider.      Plan of Care: Primary RN Chandni made aware of above recs. Spoke w/ covering/primary MD Stanley (at [phone]) in regards to above. Signing off on patient, no further needs/recs from [facility] service at this time. Staff RN to perform routine skin/wound assessment and manage wound care. Questions or concerns or if wound worsens and reconsult needed, please contact [facility] #[phone].        -Continue turning/positioning patient from side-to-side q2h while in bed, q1h when/if OOB chair, or in accordance w/ pt's plan of care. Utilize pillows and/or z-george fluidized positioner to assist w/ turning/positioning. When/if OOB chair, utilize pillows or chair cushion to offload pressure.   -Continue to offload heels from bed surface with soft pillow under calfs or by applying offloading boots to BLEs.   -Continue applying Coloplast Jaci Protect moisture barrier cream to buttock and perineal area daily and prn after each incontinent episode.    -Continue utilizing one underpad underneath patient to contain incontinence episodes; change pad when saturated/soiled.   -Consider utilizing female incontinence device/PrimaFit (if patient candidate) to contain urinary incontinence.  -Consider utilizing condom catheter (if patient candidate) to contain any urinary incontinence.   -Consider utilizing external fecal  (if patient candidate) or fecal management system/rectal tube/DigniShield (if patient candidate; MD order required) to contain loose fecal incontinence.   -Continue nutrition consult for optimal wound healing & nutritional status.   -Assess skin/wound qshift, report changes to primary provider.     Plan of Care: Primary RN made aware of above recs. Spoke w/  covering/primary MD  (at ) in regards to above. No further needs/recs from [facility] service at this time. Staff RN to perform routine skin/wound assessment and manage wound care. Questions or concerns or if wound worsens and reconsult needed, please contact [employer] [facility] #[phone].

## 2021-03-09 NOTE — DISCHARGE NOTE PROVIDER - CARE PROVIDER_API CALL
Jeromy Nino)  Complex General Surgical Oncology; Surgery  [address] [place] [address]  Phone: [phone]  Fax: [phone]  Established Patient  Follow Up Time: 1 week    Cesar Gavin)  Gastroenterology; Internal Medicine  [address] [place] [address]  Phone: [phone]  Fax: [phone]  Established Patient  Follow Up Time: 1 week   Jeromy Nino)  Complex General Surgical Oncology; Surgery  [address] [place] [address]  Phone: [phone]  Fax: [phone]  Established Patient  Follow Up Time: 1 week    Cesar Gavin)  Gastroenterology; Internal Medicine  [address] [place] [address]  Phone: [phone]  Fax: [phone]  Established Patient  Follow Up Time: 1 week    Zena Jonhson)  Hematology; Internal Medicine; Medical Oncology  [address], [place] [address]  Phone: [phone]  Fax: [phone]  Established Patient  Follow Up Time: 1 week

## 2021-03-09 NOTE — PROGRESS NOTE ADULT - SUBJECTIVE AND OBJECTIVE BOX
----------Daily Progress Note----------    HISTORY OF PRESENT ILLNESS:  Patient is a 75y old Female who presents with a chief complaint of Jaundice (09 Mar 2021 08:55)    Currently admitted to medicine with the primary diagnosis of Pancreatic mass       Today is hospital day 4d.     INTERVAL HOSPITAL COURSE / OVERNIGHT EVENTS:    Patient was examined and seen at bedside. This morning she is resting comfortably in bed and reports no new issues or overnight events.     Review of Systems: Otherwise unremarkable     <<<<<PAST MEDICAL & SURGICAL HISTORY>>>>>  Varicose veins of both lower extremities, unspecified whether complicated    Pain  knee    SOB (shortness of breath)    History of ovarian cystectomy  left    Status post phlebectomy  10/2018    History of surgery  vascular surgery   ? variocose vein stripping?      ALLERGIES  Augmentin (Rash)  chocolate (Headache)  Novocain (Angioedema)  Steroids: Excessive swelling (Flushing; Other (Mild to Mod))  sulfa drugs (Fever)    MEDICATIONS  STANDING MEDICATIONS  chlorhexidine 4% Liquid 1 Application(s) Topical <User Schedule>  diltiazem    milliGRAM(s) Oral daily  enoxaparin Injectable 60 milliGRAM(s) SubCutaneous every 12 hours  furosemide    Tablet 20 milliGRAM(s) Oral daily  pantoprazole    Tablet 40 milliGRAM(s) Oral before breakfast    PRN MEDICATIONS    VITALS:  T(F): 96.9  HR: 96  BP: 118/77  RR: 18  SpO2: 95%    <<<<<LABS>>>>>                        12.5   12.00 )-----------( 277      ( 09 Mar 2021 05:49 )             35.8     03-09    136  |  99  |  10  ----------------------------<  94  4.3   |  27  |  <0.5<L>    Ca    8.0<L>      09 Mar 2021 05:49  Mg     1.8     03-09    TPro  4.9<L>  /  Alb  2.6<L>  /  TBili  11.6<H>  /  DBili  8.6<H>  /  AST  66<H>  /  ALT  68<H>  /  AlkPhos  1644<H>  03-09    PT/INR - ( 07 Mar 2021 20:00 )   PT: 16.90 sec;   INR: 1.47 ratio         PTT - ( 07 Mar 2021 20:00 )  PTT:42.8 sec        [id number]        <<<<<RADIOLOGY>>>>>  < from: CT Abdomen and Pelvis w/ IV Cont (03.05.21 @ 11:22) >  IMPRESSION:    Severe intrahepatic and extrahepatic biliary ductal dilatation. Abrupt cut off ofthe CBD at the level of the pancreatic head. Pancreatic duct also dilated with abrupt cut off at the level of the pancreatic head. Pancreatic head/neck is ill-defined, raising suspicion for underlying ill-defined mass. GI consultation recommended.    Moderate volume ascites.    < end of copied text >    < from: US Abdomen Limited (03.05.21 @ 13:23) >  IMPRESSION:    Dilated common bile duct measuring 15 mm with moderate intrahepatic biliary duct dilatation and a dilated pancreatic duct measuring 8 mm.    Findings are highly suspicious for a pancreatic head mass causing obstruction, not well evaluated on this examination due to bowel gas    Mild perihepatic ascites.    < end of copied text >        Physical Exam: General: in bed, mild distress due to chills  head and neck: Jaundiced sclera, yellow discoloration of the skin, No JVD   Heart: S1,S2 appreciated, no added sounds  Lung: Bilateral decreased breath sounds at the bases, equal air movement bilaterally, No wheeze, crackles or crepitations   Abdomen: Soft, distended, nontender, BS+ve  LE: bilateral lower extremities swelling, Pitting, Tender.   Neuro: A&O*3, Nonfocal exam   Psych: normal affect    -----------------------------------------------------------------------------------------------------------------------------------------------------------------------------------------------

## 2021-03-09 NOTE — ADVANCED PRACTICE NURSE CONSULT - ASSESSMENT
75 year old F patient with PMHx of Afib on eliquis ( Cardiologist VEE Medrano ), HTN, Varicose vein in the lower extremities, Nephrolithiasis presented to the ED (3/5) for yellow discoloration of the skin. Patient started to notice this Jaundice in December 2020. It was associated with dark urine and light colored stool. Patient went to her PMD who treated her for UTI. Patient went to her cardiologist for her stress test and he noticed that she looks jaundiced and referred her to Dr. Grayson who performed an endoscopy on Wednesday 03/03/2021 which was basically normal, He ordered a CT scan to visualize the CT scan but it was never done. Patient stated that this discoloration got much worse over the last week. Patient endorsed having an intermittent change in the consistency of her stool ( Sometimes its solid other time its loose, and green in color ). She also complained of boating, nausea and early satiety but stated that she actually gained weight in the last few weeks. Patient also mentioned night sweats and chills for the last month that happens almost daily.   In the ED patient had a CT abdomen Severe intrahepatic and extrahepatic biliary ductal dilatation. Abrupt cut off ofthe CBD at the level of the pancreatic head. Pancreatic duct also dilated with abrupt cut off at the level of the pancreatic head. Pancreatic head/neck is ill-defined, raising suspicion for underlying ill-defined mass.   Currently admitted to medicine with the primary diagnosis of Pancreatic mass; on 3B, being managed for Painless jaundice with weight loss, bloating; Hypokalemia; Hypoalbuminemia; Bilateral lower extremities edema; A-fib; HTN.    Received patient on 3B, sitting up in bed, awake, A&Ox3, jaundiced made aware of purpose of WOCN visit, agreeable to consult. Patient reports can of food fell on her right foot several weeks ago w/ resultant wound that has been healing. Patient reports wound previously managed at home w/ neosporin w/ significant improvement.    Scabbed over ulceration 0.5cm x 0.5cm x 0cm to anterior right foot, no open areas or drainage present. Periwound intact, no erythema present.

## 2021-03-09 NOTE — PROGRESS NOTE ADULT - SUBJECTIVE AND OBJECTIVE BOX
GENERAL SURGERY PROGRESS NOTE     VIKASH IL  75y  Female  Hospital day :4d  POD:  Procedure:   OVERNIGHT EVENTS: went with GI for EUS and ERCP    T(F): 96.9 (03-09-21 @ 04:38), Max: 98.4 (03-08-21 @ 12:45)  HR: 96 (03-09-21 @ 04:38) (96 - 106)  BP: 118/77 (03-09-21 @ 04:38) (96/57 - 137/88)  ABP: --  ABP(mean): --  RR: 18 (03-09-21 @ 04:38) (18 - 19)  SpO2: 95% (03-08-21 @ 13:00) (94% - 96%)    DIET/FLUIDS:   NG:                                                                              DRAINS:   BM:   EMESIS:   URINE:    GI proph:  pantoprazole    Tablet 40 milliGRAM(s) Oral before breakfast    AC/ proph: enoxaparin Injectable 60 milliGRAM(s) SubCutaneous every 12 hours    ABx:     PHYSICAL EXAM:  GENERAL: NAD, well-appearing  CHEST/LUNG: Clear to auscultation bilaterally  HEART: Regular rate and rhythm  ABDOMEN: Soft, Nontender, Nondistended;   EXTREMITIES:  No clubbing, cyanosis, or edema      LABS  Labs:  CAPILLARY BLOOD GLUCOSE                              12.5   12.00 )-----------( 277      ( 09 Mar 2021 05:49 )             35.8       Auto Immature Granulocyte %: 1.3 % (03-09-21 @ 05:49)  Auto Neutrophil %: 73.0 % (03-09-21 @ 05:49)    03-09    136  |  99  |  10  ----------------------------<  94  4.3   |  27  |  <0.5<L>      Calcium, Total Serum: 8.0 mg/dL (03-09-21 @ 05:49)      LFTs:             4.9  | 11.6 | 66       ------------------[1644    ( 09 Mar 2021 05:49 )  2.6  | 8.6  | 68          Lipase:x      Amylase:x             Coags:     16.90  ----< 1.47    ( 07 Mar 2021 20:00 )     42.8                        RADIOLOGY & ADDITIONAL TESTS:

## 2021-03-09 NOTE — DISCHARGE NOTE PROVIDER - HOSPITAL COURSE
This is a 75 year old F patient with PMHx of Afib on eliquis ( Cardiologist VEE Medrano ), HTN, Varicose vein in the lower extremities, Nephrolithiasis presented to the ED for yellow discoloration of the skin. Patient started to notice this Jaundice in December 2020. It was associated with dark urine and light colored stool. Patient went to her PMD who treated her for UTI. Patient went to her cardiologist for her stress test and he noticed that she looks jaundiced and referred her to Dr. Grayson who performed an endoscopy on Wednesday 03/03/2021 which was basically normal, He ordered a CT scan to visualize the CT scan but it was never done. Patient stated that this discoloration got much worse over the last week. Patient endorsed having an intermittent change in the consistency of her stool ( Sometimes its solid other time its loose, and green in color ). She also complained of boating, nausea and early satiety but stated that she actually gained weight in the last few weeks. Patient also mentioned night sweats and chills for the last month that happens almost daily.     In the ED patient had a CT abdomen Severe intrahepatic and extrahepatic biliary ductal dilatation. Abrupt cut off ofthe CBD at the level of the pancreatic head. Pancreatic duct also dilated with abrupt cut off at the level of the pancreatic head. Pancreatic head/neck is ill-defined, raising suspicion for underlying ill-defined mass.   Patient was admitted to medicine for further workup of possible pancreatic adenocarcinoma. During admission tumor markers elevated - Liver enzymes (ALK: 1858, AST: 92, ALT 79), bilirubin (16) uptrending, tumors markers significantly elevated correlating to clinical picture (Ca 19-9: 2637, Ca 125: 84). Patient had an EUS/ERCP on 3/9/2021  with sphincterotomy and large plastic CBD stent placement. Patient to follow up with Dr. Nino in 1 week for further surgical intervention recommendations.   This is a 75 year old F patient with PMHx of Afib on eliquis ( Cardiologist VEE Medrano ), HTN, Varicose vein in the lower extremities, Nephrolithiasis presented to the ED for yellow discoloration of the skin. Patient started to notice this Jaundice in December 2020. It was associated with dark urine and light colored stool. Patient went to her PMD who treated her for UTI. Patient went to her cardiologist for her stress test and he noticed that she looks jaundiced and referred her to Dr. Grayson who performed an endoscopy on Wednesday 03/03/2021 which was basically normal, He ordered a CT scan to visualize the CT scan but it was never done. Patient stated that this discoloration got much worse over the last week. Patient endorsed having an intermittent change in the consistency of her stool ( Sometimes its solid other time its loose, and green in color ). She also complained of boating, nausea and early satiety but stated that she actually gained weight in the last few weeks. Patient also mentioned night sweats and chills for the last month that happens almost daily.     In the ED patient had a CT abdomen Severe intrahepatic and extrahepatic biliary ductal dilatation. Abrupt cut off ofthe CBD at the level of the pancreatic head. Pancreatic duct also dilated with abrupt cut off at the level of the pancreatic head. Pancreatic head/neck is ill-defined, raising suspicion for underlying ill-defined mass.   Patient was admitted to medicine for further workup of possible pancreatic adenocarcinoma. During admission tumor markers elevated - Liver enzymes (ALK: 1858, AST: 92, ALT 79), bilirubin (16) uptrending, tumors markers significantly elevated correlating to clinical picture (Ca 19-9: 2637, Ca 125: 84). Patient had an EUS/ERCP on 3/9/2021  with sphincterotomy and large plastic CBD stent placement. Patient to follow up with Dr. Nino in 1 week for further surgical intervention recommendations.     Attending addendum: Patient seen and examined. Patient is stable for discharge with outpatient follow up with oncology and GI. Med rec reviewed.

## 2021-03-09 NOTE — DISCHARGE NOTE PROVIDER - NSDCCPCAREPLAN_GEN_ALL_CORE_FT
PRINCIPAL DISCHARGE DIAGNOSIS  Diagnosis: Pancreatic mass  Assessment and Plan of Treatment:        PRINCIPAL DISCHARGE DIAGNOSIS  Diagnosis: Pancreatic mass  Assessment and Plan of Treatment: You were admitted because you noticed worsening yellowing discoloration of your skin. We did a few blood test and imaging tests on you which were all significant correlated to possible pancreatic cancer. We had the surgical oncology, gastroenterology and oncology team on board in which you had a endoscopic ultrasound done and a stent was placed. You are to follow up with your surgical oncologist Dr. Nino in 1 week to discuss the results of the biopsy reports and possible surgical resection. Please follow up thorougly with the surgical oncologist, gastroenterologist, medical oncologist and your primary care physician all in 1-2 weeks outpatient. If you experience worsening symptoms please go to your nearest emergency department       PRINCIPAL DISCHARGE DIAGNOSIS  Diagnosis: Pancreatic mass  Assessment and Plan of Treatment: You were admitted because you noticed worsening yellowing discoloration of your skin. We did a few blood test and imaging tests on you which were all significant correlated to possible pancreatic cancer. We had the surgical oncology, gastroenterology and oncology team on board in which you had a endoscopic ultrasound done and a stent was placed. You are to follow up with your surgical oncologist Dr. Nino in 1 week to discuss the results of the biopsy reports and possible surgical resection. Please follow up thorougly with the surgical oncologist, gastroenterologist, medical oncologist and your primary care physician all in 1-2 weeks outpatient. Please get your PET SCAN done outpatient at earliest time available. If you experience worsening symptoms please go to your nearest emergency department.

## 2021-03-09 NOTE — DISCHARGE NOTE PROVIDER - NSDCMRMEDTOKEN_GEN_ALL_CORE_FT
calcium,magnesium and Zinc: 1 tab(s) orally once a day  dilTIAZem 120 mg/24 hours oral capsule, extended release: 1 cap(s) orally once a day  Eliquis 5 mg oral tablet: 1 tab(s) orally 2 times a day  furosemide 20 mg oral tablet: 1 tab(s) orally once a day  oxyCODONE 5 mg oral tablet: 1 tab(s) orally every 6 hours, As Needed -for moderate pain not controlled by Tylenol MDD:4  Women&#x27;s daily vitamins: 1 tab(s) orally once a day   apixaban 5 mg oral tablet: 1 tab(s) orally 2 times a day  calcium,magnesium and Zinc: 1 tab(s) orally once a day  dilTIAZem 120 mg/24 hours oral capsule, extended release: 1 cap(s) orally once a day  furosemide 20 mg oral tablet: 1 tab(s) orally once a day  oxyCODONE 5 mg oral tablet: 1 tab(s) orally every 6 hours, As Needed -for moderate pain not controlled by Tylenol MDD:4  Women&#x27;s daily vitamins: 1 tab(s) orally once a day

## 2021-03-09 NOTE — PROGRESS NOTE ADULT - ASSESSMENT
This is a 75 year old F patient with PMHx of Afib on eliquis ( Cardiologist VEE Medrano ), HTN, Varicose vein in the lower extremities, Nephrolithiasis presented to the ED for yellow discoloration of the skin.     #Painless jaundice with weight loss, bloating,   -DDx: Pancreatic cancer, Gallbladder cancer, PSC, PBC    -Patient is not anemic, will check hemolytic panel just to rule out Hemolytic anemia.   -Patient had a recent Endoscopy as per Dr. Grayson which was practically normal   -In the ED patient labs showed Direct hyperbilirubinemia, Severely elevated Alk phos  -CT head showed taper and then cut off at the pancreatic head of the CBD and pancreatic duct with poor visualization of the pancreatic head.   - Continue to hold Eliquis, consider Lovenox if needed  - INR 1.44 s/p 5 mg of vit K, Daily INR  _ GI consult appreciated: Plan for EUS and ERCP today - follow up results and recommendations   - F/u dedicated pancreatic CT protocol after GI procedure today to r/o additional mets sites  - Liver enzymes (ALK: 1858, AST: 92, ALT 79), bilirubin (16) uptrending, tumors markers significantly elevated correlating to clinical picture (Ca 19-9: 2637, Ca 125: 84)  - MRSA negative 3/6/2021   - S/P EUS with FNA of Pancreatic Head Mass- Await Pathology   - S/P ERCP with sphincterotomy and large plastic CBD stent placement  - Resume eliquis this evening   - f/u Heme/onc consult  - Patient to follow up with Dr. Nino in 1 week for further surgical intervention recommendations       #Hypokalemia   -On admission, it was 3.3, followed by yesterday 3.2, given 2 k riders  - f/u repeat BMP    #Hypoalbuminemia   -CMP showed Albumin of 2.7, Hepatic dysfunction? INR is elevated as well. And elevated direct bilirubin   -Daily CMP     #Bilateral lower extremities edema   -Low suspicion for DVT   -Chronic skin changes are evident   -Continue Lasix 20 mg PO daily   - Duplex LE negative 3/6/2021    #Afib   -On AC Eliquis 5 mg BID (currently on hold as INR supratherapeutiic)   -Rate controlled Diltiazem 120 mg PO daily   - ECHO 3/6: EF 56%, boderline pulm HTN and moderated MR    #HTN   -Continue Cardizem and Furosemide   -Normotensive on admission     #DVT ppx: resume eliquis today evening  #GI ppx: Protonix   #Diet: DASH   #Acute from home: awaiting heme/onc recommendations, possible discharge 24-48 hrs to home

## 2021-03-09 NOTE — PROGRESS NOTE ADULT - NSHPATTENDINGPLANDISCUSS_GEN_ALL_CORE
house staff and GI fellow
house staff, residents
house staff and medical residents during rounds
house staff and medical residents during rounds

## 2021-03-09 NOTE — DISCHARGE NOTE PROVIDER - PROVIDER TOKENS
PROVIDER:[TOKEN:[[id number]],FOLLOWUP:[1 week],ESTABLISHEDPATIENT:[T]],PROVIDER:[TOKEN:[[id number]],FOLLOWUP:[1 week],ESTABLISHEDPATIENT:[T]] PROVIDER:[TOKEN:[[id number]],FOLLOWUP:[1 week],ESTABLISHEDPATIENT:[T]],PROVIDER:[TOKEN:[[id number]],FOLLOWUP:[1 week],ESTABLISHEDPATIENT:[T]],PROVIDER:[TOKEN:[[id number]],FOLLOWUP:[1 week],ESTABLISHEDPATIENT:[T]]

## 2021-03-09 NOTE — PROGRESS NOTE ADULT - SUBJECTIVE AND OBJECTIVE BOX
Patient is a 76 y/o with PMHx of HTN, A-Fib ( On Eliquis- Follows Dr Dillard- was due for Persantine stress test), prior varicose veins, nephrolithiasis, whom presented for jaundice. Patient notes she first started noticing that her urine was dark in december ( She was treated for a UTI by her PMD at the time but told UA was negative). She than went to her Cardiologist who noticed her skin was a bit yellow and was referred to Dr Grayson ( GI- EGD done and relatively normal- he ordered CT scan ut never done). S/P EUS with FNA of Pancreatic Mass, and ERCP with sphincterotomy and placement of a large plastic CBD stent. Patient feels well this morning.       PAST MEDICAL & SURGICAL HISTORY:  Varicose veins of both lower extremities, unspecified whether complicated  HTN  A-Fib  SOB (shortness of breath)  History of ovarian cystectomy  Status post phlebectomy      Home medications  Diltiazem  Lasix  Eliquis         Allergies  Augmentin (Rash)  Novocain (Angioedema)  Steroids: Excessive swelling (Flushing; Other (Mild to Mod))  sulfa drugs (Fever)      Review of Systems  General:  See HPI  HEENT: Denies Trouble Swallowing ,Denies  Sore Throat , Denies Change in hearing/vision/speech ,Denies Dizziness    Cardio: Denies  Chest Pain , Palpitations    Respiratory: Denies worsening of SOB, Denies Cough  Abdomen: See detailed HPI  Neuro: Denies Headache Denies Dizziness, Denies Paresthesias  MSK: Denies pain in Bones/Joints/Muscles   Psych: Patient denies depression, denies suicidal or homicidal ideations  Integ: See HPI         Vital Signs   T(F): 96.9 (09 Mar 2021 04:38), Max: 98.4 (08 Mar 2021 12:45)  HR: 96 (09 Mar 2021 04:38) (96 - 106)  BP: 118/77 (09 Mar 2021 04:38) (96/57 - 137/88)  RR: 18 (09 Mar 2021 04:38) (17 - 19)  SpO2: 95% (08 Mar 2021 13:00) (94% - 96%)  Physical Exam  Gen: NAD  HEENT: NC/AT, Mucosal Membranes Dry, Icteric sclera   Cardio: S1/S2 Irregular  Resp: CTA B/L  Abdomen: Soft, ND/NT  Neuro: AAOx3  Extremities: FROM x 4  Skin: No jaundice       LABS:                        11.5   12.64 )-----------( 270      ( 08 Mar 2021 08:08 )             32.3     03-08    136  |  99  |  9<L>  ----------------------------<  126<H>  4.2   |  28  |  0.5<L>    Ca    8.1<L>      08 Mar 2021 08:08  Mg     1.9     03-08    TPro  4.9<L>  /  Alb  2.6<L>  /  TBili  16.3<HH>  /  DBili  x   /  AST  92<H>  /  ALT  79<H>  /  AlkPhos  1858<H>  03-07        RADIOLOGY & ADDITIONAL STUDIES:  US Abdomen Limited 03.05.21   IMPRESSION:    Dilated common bile duct measuring 15 mm with moderate intrahepatic biliary duct dilatation and a dilated pancreatic duct measuring 8 mm.    Findings are highly suspicious for a pancreatic head mass causing obstruction, not well evaluated on this examination due to bowel gas    Mild perihepatic ascites.      CT Abdomen and Pelvis w/ IV Cont 03.05.21   IMPRESSION:    Severe intrahepatic and extrahepatic biliary ductal dilatation. Abrupt cut off ofthe CBD at the level of the pancreatic head. Pancreatic duct also dilated with abrupt cut off at the level of the pancreatic head. Pancreatic head/neck is ill-defined, raising suspicion for underlying ill-defined mass. GI consultation recommended.    Moderate volume ascites.

## 2021-03-09 NOTE — PROGRESS NOTE ADULT - ASSESSMENT
Patient is a 76 y/o with PMHx of HTN, A-Fib ( On Eliquis- Follows Dr Dillard- was due for Persantine stress test), prior varicose veins, nephrolithiasis, whom presented for jaundice. Patient notes she first started noticing that her urine was dark in december ( She was treated for a UTI by her PMD at the time but told UA was negative). She than went to her Cardiologist who noticed her skin was a bit yellow and was referred to Dr Grayson ( GI- EGD done and relatively normal- he ordered CT scan ut never done). S/P EUS with FNA of Pancreatic Mass, and ERCP with sphincterotomy and placement of a large plastic CBD stent. Patient feels well this morning. Can resume AC this evening, can advanced to clear liquid diet and further advance latter today. Anticipate discharge soon.       Jaundice/ CBD and PD dilation/ Cutoff- taper around Pancreatic head   - S/P EUS with FNA of Pancreatic Head Mass- Await Pathology   - S/P ERCP with sphincterotomy and large plastic CBD stent placement  - Appreciate Surgical Oncology evaluation  - While there is no pathology back yet would get Heme/Onc so outpatient follow up can be arranged   - Eliquis can be resumed this evening   - May start clear liquid diet and advance as tolerated  - LFT today- if improved consider discharge latter today or tomorrow if the above done

## 2021-03-09 NOTE — DISCHARGE NOTE PROVIDER - DETAILS OF MALNUTRITION DIAGNOSIS/DIAGNOSES
This patient has been assessed with a concern for Malnutrition and was treated during this hospitalization for the following Nutrition diagnosis/diagnoses:     -  03/08/2021: Moderate protein-calorie malnutrition   -  03/08/2021: Underweight (BMI < 19)   This patient has been assessed with a concern for Malnutrition and was treated during this hospitalization for the following Nutrition diagnosis/diagnoses:     -  03/08/2021: Moderate protein-calorie malnutrition   -  03/08/2021: Underweight (BMI < 19)    This patient has been assessed with a concern for Malnutrition and was treated during this hospitalization for the following Nutrition diagnosis/diagnoses:     -  03/08/2021: Moderate protein-calorie malnutrition   -  03/08/2021: Underweight (BMI < 19)   This patient has been assessed with a concern for Malnutrition and was treated during this hospitalization for the following Nutrition diagnosis/diagnoses:     -  03/08/2021: Moderate protein-calorie malnutrition   -  03/08/2021: Underweight (BMI < 19)    This patient has been assessed with a concern for Malnutrition and was treated during this hospitalization for the following Nutrition diagnosis/diagnoses:     -  03/08/2021: Moderate protein-calorie malnutrition   -  03/08/2021: Underweight (BMI < 19)    This patient has been assessed with a concern for Malnutrition and was treated during this hospitalization for the following Nutrition diagnosis/diagnoses:     -  03/08/2021: Moderate protein-calorie malnutrition   -  03/08/2021: Underweight (BMI < 19)   This patient has been assessed with a concern for Malnutrition and was treated during this hospitalization for the following Nutrition diagnosis/diagnoses:     -  03/08/2021: Moderate protein-calorie malnutrition   -  03/08/2021: Underweight (BMI < 19)    This patient has been assessed with a concern for Malnutrition and was treated during this hospitalization for the following Nutrition diagnosis/diagnoses:     -  03/08/2021: Moderate protein-calorie malnutrition   -  03/08/2021: Underweight (BMI < 19)    This patient has been assessed with a concern for Malnutrition and was treated during this hospitalization for the following Nutrition diagnosis/diagnoses:     -  03/08/2021: Moderate protein-calorie malnutrition   -  03/08/2021: Underweight (BMI < 19)    This patient has been assessed with a concern for Malnutrition and was treated during this hospitalization for the following Nutrition diagnosis/diagnoses:     -  03/08/2021: Moderate protein-calorie malnutrition   -  03/08/2021: Underweight (BMI < 19)

## 2021-03-09 NOTE — DISCHARGE NOTE PROVIDER - CARE PROVIDERS DIRECT ADDRESSES
,DirectAddress_Unknown,[email or web address] ,DirectAddress_Unknown,[email or web address]

## 2021-03-10 ENCOUNTER — TRANSCRIPTION ENCOUNTER (OUTPATIENT)
Age: 76
End: 2021-03-10

## 2021-03-10 VITALS
SYSTOLIC BLOOD PRESSURE: 117 MMHG | TEMPERATURE: 97 F | HEART RATE: 100 BPM | DIASTOLIC BLOOD PRESSURE: 62 MMHG | RESPIRATION RATE: 18 BRPM

## 2021-03-10 LAB
ALBUMIN SERPL ELPH-MCNC: 2.7 G/DL — LOW (ref 3.5–5.2)
ALP SERPL-CCNC: 1600 U/L — HIGH (ref 30–115)
ALT FLD-CCNC: 59 U/L — HIGH (ref 0–41)
ANION GAP SERPL CALC-SCNC: 9 MMOL/L — SIGNIFICANT CHANGE UP (ref 7–14)
AST SERPL-CCNC: 45 U/L — HIGH (ref 0–41)
BASOPHILS # BLD AUTO: 0.06 K/UL — SIGNIFICANT CHANGE UP (ref 0–0.2)
BASOPHILS NFR BLD AUTO: 0.5 % — SIGNIFICANT CHANGE UP (ref 0–1)
BILIRUB DIRECT SERPL-MCNC: 5.8 MG/DL — HIGH (ref 0–0.2)
BILIRUB INDIRECT FLD-MCNC: 2.7 MG/DL — HIGH (ref 0.2–1.2)
BILIRUB SERPL-MCNC: 8.5 MG/DL — HIGH (ref 0.2–1.2)
BUN SERPL-MCNC: 10 MG/DL — SIGNIFICANT CHANGE UP (ref 10–20)
CALCIUM SERPL-MCNC: 8.3 MG/DL — LOW (ref 8.5–10.1)
CHLORIDE SERPL-SCNC: 99 MMOL/L — SIGNIFICANT CHANGE UP (ref 98–110)
CO2 SERPL-SCNC: 28 MMOL/L — SIGNIFICANT CHANGE UP (ref 17–32)
CREAT SERPL-MCNC: 0.5 MG/DL — LOW (ref 0.7–1.5)
EOSINOPHIL # BLD AUTO: 0.17 K/UL — SIGNIFICANT CHANGE UP (ref 0–0.7)
EOSINOPHIL NFR BLD AUTO: 1.4 % — SIGNIFICANT CHANGE UP (ref 0–8)
GLUCOSE SERPL-MCNC: 100 MG/DL — HIGH (ref 70–99)
HCT VFR BLD CALC: 36.3 % — LOW (ref 37–47)
HGB BLD-MCNC: 12 G/DL — SIGNIFICANT CHANGE UP (ref 12–16)
IMM GRANULOCYTES NFR BLD AUTO: 1.6 % — HIGH (ref 0.1–0.3)
LYMPHOCYTES # BLD AUTO: 1.84 K/UL — SIGNIFICANT CHANGE UP (ref 1.2–3.4)
LYMPHOCYTES # BLD AUTO: 14.8 % — LOW (ref 20.5–51.1)
MAGNESIUM SERPL-MCNC: 1.7 MG/DL — LOW (ref 1.8–2.4)
MCHC RBC-ENTMCNC: 29.2 PG — SIGNIFICANT CHANGE UP (ref 27–31)
MCHC RBC-ENTMCNC: 33.1 G/DL — SIGNIFICANT CHANGE UP (ref 32–37)
MCV RBC AUTO: 88.3 FL — SIGNIFICANT CHANGE UP (ref 81–99)
MONOCYTES # BLD AUTO: 0.93 K/UL — HIGH (ref 0.1–0.6)
MONOCYTES NFR BLD AUTO: 7.5 % — SIGNIFICANT CHANGE UP (ref 1.7–9.3)
NEUTROPHILS # BLD AUTO: 9.24 K/UL — HIGH (ref 1.4–6.5)
NEUTROPHILS NFR BLD AUTO: 74.2 % — SIGNIFICANT CHANGE UP (ref 42.2–75.2)
NRBC # BLD: 0 /100 WBCS — SIGNIFICANT CHANGE UP (ref 0–0)
PLATELET # BLD AUTO: 292 K/UL — SIGNIFICANT CHANGE UP (ref 130–400)
POTASSIUM SERPL-MCNC: 4.1 MMOL/L — SIGNIFICANT CHANGE UP (ref 3.5–5)
POTASSIUM SERPL-SCNC: 4.1 MMOL/L — SIGNIFICANT CHANGE UP (ref 3.5–5)
PROT SERPL-MCNC: 5.2 G/DL — LOW (ref 6–8)
RBC # BLD: 4.11 M/UL — LOW (ref 4.2–5.4)
RBC # FLD: 20 % — HIGH (ref 11.5–14.5)
SODIUM SERPL-SCNC: 136 MMOL/L — SIGNIFICANT CHANGE UP (ref 135–146)
WBC # BLD: 12.44 K/UL — HIGH (ref 4.8–10.8)
WBC # FLD AUTO: 12.44 K/UL — HIGH (ref 4.8–10.8)

## 2021-03-10 PROCEDURE — 76705 ECHO EXAM OF ABDOMEN: CPT | Mod: 26

## 2021-03-10 PROCEDURE — 99232 SBSQ HOSP IP/OBS MODERATE 35: CPT

## 2021-03-10 RX ORDER — APIXABAN 2.5 MG/1
5 TABLET, FILM COATED ORAL
Refills: 0 | Status: DISCONTINUED | OUTPATIENT
Start: 2021-03-10 | End: 2021-03-10

## 2021-03-10 RX ORDER — APIXABAN 2.5 MG/1
1 TABLET, FILM COATED ORAL
Qty: 0 | Refills: 0 | DISCHARGE

## 2021-03-10 RX ORDER — MAGNESIUM SULFATE 500 MG/ML
2 VIAL (ML) INJECTION ONCE
Refills: 0 | Status: COMPLETED | OUTPATIENT
Start: 2021-03-10 | End: 2021-03-10

## 2021-03-10 RX ORDER — APIXABAN 2.5 MG/1
1 TABLET, FILM COATED ORAL
Qty: 0 | Refills: 0 | DISCHARGE
Start: 2021-03-10

## 2021-03-10 RX ADMIN — Medication 120 MILLIGRAM(S): at 05:27

## 2021-03-10 RX ADMIN — APIXABAN 5 MILLIGRAM(S): 2.5 TABLET, FILM COATED ORAL at 17:27

## 2021-03-10 RX ADMIN — CHLORHEXIDINE GLUCONATE 1 APPLICATION(S): 213 SOLUTION TOPICAL at 05:27

## 2021-03-10 RX ADMIN — PANTOPRAZOLE SODIUM 40 MILLIGRAM(S): 20 TABLET, DELAYED RELEASE ORAL at 06:03

## 2021-03-10 RX ADMIN — Medication 20 MILLIGRAM(S): at 05:27

## 2021-03-10 RX ADMIN — ENOXAPARIN SODIUM 60 MILLIGRAM(S): 100 INJECTION SUBCUTANEOUS at 05:26

## 2021-03-10 NOTE — CONSULT NOTE ADULT - SUBJECTIVE AND OBJECTIVE BOX
Patient is a 75y old  Female who presents with a chief complaint of Jaundice (10 Mar 2021 11:11)      HPI:  This is a 75 year old F patient with PMHx of Afib on eliquis ( Cardiologist VEE Medrano ), HTN, Varicose vein in the lower extremities, Nephrolithiasis presented to the ED for painless jaundice and found to have pancreatic adenocarcinoma. She noticed dark urine in December 2020 and light colored stool. She went to her PMD who treated her for UTI, then developed jaundice and had EGD 3/3, he ordered a CT but it was not performed. Her jaundice significantly worsened over the past week, and also complains of boating, nausea and early satiety but stated that she actually gained weight in the last few weeks. Patient also mentioned night sweats and chills for the last month that happens almost daily.     Tbili sarah to 16.4, Alk phosph 1800, ca 19-9 = 2637, CT AP showed the below:    1.  Arterially enhancing 3.5 x 2.8 x 3.4 cm area in the pancreatic head with associated pancreatic and intra-/extra hepatic biliary ductal dilation, highly suspicious for a pancreatic head neoplasm; given enhancement pattern, pancreatic neuroendocrine tumor is a possibility. Endoscopic ultrasound and tissue sampling is recommended for further assessment.    2.  Nonspecific mild edema/stranding surrounding the celiac axis and SMA, possibly secondary to mesenteric edema; tumor encasement cannot be excluded.    3.  Few subcentimeter arterially enhancing foci in the liver, likely representing perfusional abnormalities/shunts. However, if neuroendocrine tumor is confirmed, arterially enhancing metastases would also be in the differential. Further evaluation can be obtained at that time with a liver protocol MRI.    4.  Prominent periportal lymph node.    Ct chest showed bilateral effusions but no suspicious LN or nodules.     She then underwent ERCP with stent placement. TB now improved to 8.5, DB 5.8    Pathology returned showing:  Cytopathology - Non Gyn Report:   ACCESSION No:  [id number]    VIKASH LI                       1        Cytopathology Report            Final Diagnosis  PANCREATIC HEAD MASS, EUS-GUIDED FINE NEEDLE ASPIRATION BIOPSY:  - POSITIVE FOR MALIGNANT CELLS.  - Adenocarcinoma.  - Cell block confirmatory.  - Dr. Gavin is notified.        Vital Signs Last 24 Hrs  T(C): 36.8 (05 Mar 2021 16:06), Max: 36.8 (05 Mar 2021 16:06)  T(F): 98.2 (05 Mar 2021 16:06), Max: 98.2 (05 Mar 2021 16:06)  HR: 96 (05 Mar 2021 16:06) (96 - 113)  BP: 120/71 (05 Mar 2021 16:06) (120/71 - 121/70)  RR: 18 (05 Mar 2021 16:06) (18 - 18)  SpO2: 99% (05 Mar 2021 16:06) (98% - 99%) (05 Mar 2021 17:16)       ROS negative except for the above.     PAST MEDICAL & SURGICAL HISTORY:  Varicose veins of both lower extremities, unspecified whether complicated    Pain  knee    SOB (shortness of breath)    History of ovarian cystectomy  left    Status post phlebectomy  10/2018    History of surgery  vascular surgery   ? variocose vein stripping?        SOCIAL HISTORY:    FAMILY HISTORY:  CAD (coronary artery disease) (Sibling)  3  siblings ( 2 living and 1 passed away)        MEDICATIONS  (STANDING):  apixaban 5 milliGRAM(s) Oral two times a day  chlorhexidine 4% Liquid 1 Application(s) Topical <User Schedule>  diltiazem    milliGRAM(s) Oral daily  furosemide    Tablet 20 milliGRAM(s) Oral daily  magnesium sulfate  IVPB 2 Gram(s) IV Intermittent once  pantoprazole    Tablet 40 milliGRAM(s) Oral before breakfast    MEDICATIONS  (PRN):      Allergies    Augmentin (Rash)  chocolate (Headache)  Novocain (Angioedema)  Steroids: Excessive swelling (Flushing; Other (Mild to Mod))  sulfa drugs (Fever)    Intolerances        Vital Signs Last 24 Hrs  T(C): 35.9 (10 Mar 2021 05:18), Max: 35.9 (10 Mar 2021 05:18)  T(F): 96.6 (10 Mar 2021 05:18), Max: 96.6 (10 Mar 2021 05:18)  HR: 102 (10 Mar 2021 05:18) (96 - 102)  BP: 123/70 (10 Mar 2021 05:18) (123/70 - 136/83)  BP(mean): --  RR: 18 (10 Mar 2021 05:18) (18 - 18)  SpO2: --    PHYSICAL EXAM  General: NAD  HEENT: NCAT, EOMI  Neck: supple  CV: Regular rate and rhythm  Lungs: Clear to ascultation bilaterally, no respiratory distress  Abdomen: soft non-tender non-distended  Ext: No edema  Skin: no rashes and no petechiae  Neuro: alert and oriented, no focal deficits      LABS:                          12.0   12.44 )-----------( 292      ( 10 Mar 2021 06:14 )             36.3         Mean Cell Volume : 88.3 fL  Mean Cell Hemoglobin : 29.2 pg  Mean Cell Hemoglobin Concentration : 33.1 g/dL  Auto Neutrophil # : 9.24 K/uL  Auto Lymphocyte # : 1.84 K/uL  Auto Monocyte # : 0.93 K/uL  Auto Eosinophil # : 0.17 K/uL  Auto Basophil # : 0.06 K/uL  Auto Neutrophil % : 74.2 %  Auto Lymphocyte % : 14.8 %  Auto Monocyte % : 7.5 %  Auto Eosinophil % : 1.4 %  Auto Basophil % : 0.5 %      Serial CBC's  03-10 @ 06:14  Hct-36.3 / Hgb-12.0 / Plat-292 / RBC-4.11 / WBC-12.44  Serial CBC's  03-09 @ 05:49  Hct-35.8 / Hgb-12.5 / Plat-277 / RBC-4.15 / WBC-12.00  Serial CBC's  03-08 @ 08:08  Hct-32.3 / Hgb-11.5 / Plat-270 / RBC-3.85 / WBC-12.64  Serial CBC's  03-07 @ 20:00  Hct-37.7 / Hgb-13.2 / Plat-299 / RBC-4.45 / WBC-14.12  Serial CBC's  03-07 @ 07:06  Hct-35.8 / Hgb-12.4 / Plat-262 / RBC-4.18 / WBC-12.81      03-10    136  |  99  |  10  ----------------------------<  100<H>  4.1   |  28  |  0.5<L>    Ca    8.3<L>      10 Mar 2021 06:14  Mg     1.7     03-10    TPro  5.2<L>  /  Alb  2.7<L>  /  TBili  8.5<H>  /  DBili  5.8<H>  /  AST  45<H>  /  ALT  59<H>  /  AlkPhos  1600<H>  03-10      BLOOD SMEAR INTERPRETATION:       RADIOLOGY & ADDITIONAL STUDIES:  < from: CT Abdomen and Pelvis w/ Oral Cont and w/ IV Cont (03.06.21 @ 18:29) >  FINDINGS:    HEPATOBILIARY: Severe intra and extrahepatic biliary ductal dilatation again noted. Dilated common bile duct measuring up to 1.9 cm with abrupt cut off at the level of the pancreatic head. Hydropicgallbladder. Few indeterminate punctate peripheral arterially enhancing foci seen in the liver, without definite corresponding abnormalities on the portal venous phase images.    SPLEEN: Unremarkable.    PANCREAS: A 3.5 x 2.8 x 3.4 cm arterially enhancing area seen in the region of the pancreatic head with upstream dilation of the main pancreatic duct to 1.2 cm. The lesion broadly contacts the medial duodenal wall. Patent superior mesenteric and splenic veins without evidence for tumor encasement. Mild edema/stranding seen surrounding the celiac and superior mesenteric arteries. Bilobed cystic lesion seen in the pancreatic uncinate process measuring 1.8 cm (series 22 image 17).    ADRENAL GLANDS: Unremarkable.    KIDNEYS: Left renal cyst. Bilateral symmetric renal enhancement. No hydronephrosis. Subcentimeter hypodensities in both kidneys, too small to characterize.    ABDOMINOPELVIC NODES: Nonspecific prominent periportal lymph node.    PELVIC ORGANS: Excreted contrast seen within the urinary bladder from previous administration.    PERITONEUM/MESENTERY/BOWEL: Diffuse generalized mesenteric edema. Small volume abdominopelvic ascites. No evidence of bowel obstruction or pneumoperitoneum.    BONES/SOFT TISSUES: Soft tissue anasarca. Osteopenia. No suspicious osseous lesions stable degenerative changes.    OTHER: Aortic calcification.    < from: CT Chest w/ IV Cont (03.06.21 @ 18:29) >  IMPRESSION:  No comparison CT chest available,    Bilateral pleural effusions moderate on the right and small on the left; with adjacent atelectasis/consolidation.    No suspicious appearing pulmonary noduleor mass as described above.    No enlarged mediastinal, hilar or axillary lymph nodes. Nonenlarged prevascular lymph nodes.    < end of copied text >      IMPRESSION:  1.  Arterially enhancing 3.5 x 2.8 x 3.4 cm area in the pancreatic head with associated pancreatic and intra-/extra hepatic biliary ductal dilation, highly suspicious for a pancreatic head neoplasm; given enhancement pattern, pancreatic neuroendocrine tumor is a possibility. Endoscopic ultrasound and tissue sampling is recommended for further assessment.    2.  Nonspecific mild edema/stranding surrounding the celiac axis and SMA, possibly secondary to mesenteric edema; tumor encasement cannot be excluded.    3.  Few subcentimeter arterially enhancing foci in the liver, likely representing perfusional abnormalities/shunts. However, if neuroendocrine tumor is confirmed, arterially enhancing metastases would also be in the differential. Further evaluation can be obtained at that time with a liver protocol MRI.    4.  Prominent periportal lymph node.    5.  Small volume abdominopelvic ascites and mesenteric edema.    6.  Hydropic gallbladder.    < end of copied text >    
VIKASH LI [id number]  75y Female  1d    HPI:  This is a 75 year old F patient with PMHx of Afib on eliquis ( Cardiologist VEE Medrano ), HTN, Varicose vein in the lower extremities, Nephrolithiasis presented to the ED for yellow discoloration of the skin. Patient started to notice this Jaundice in December 2020. It was associated with dark urine and light colored stool. Patient went to her PMD who treated her for UTI. Patient went to her cardiologist for her stress test and he noticed that she looks jaundiced and referred her to Dr. Grayson who performed an endoscopy on Wednesday 03/03/2021 which was basically normal, He ordered a CT scan to visualize the CT scan but it was never done. Patient stated that this discoloration got much worse over the last week. Patient endorsed having an intermittent change in the consistency of her stool ( Sometimes its solid other time its loose, and green in color ). She also complained of boating, nausea and early satiety but stated that she actually gained weight in the last few weeks. Patient also mentioned night sweats and chills for the last month that happens almost daily.     In the ED patient had a CT abdomen that demonstrated severe intrahepatic and extrahepatic biliary ductal dilatation. Abrupt cut off of the CBD at the level of the pancreatic head. Pancreatic duct also dilated with abrupt cut off at the level of the pancreatic head. Pancreatic head/neck is ill-defined, raising suspicion for underlying ill-defined mass. In the ED patient was seen and evaluated by the advance GI team who decided to proceed with EUS on Monday.     Reason for Consult: Finding of potential pancreatic head/neck mass in setting of painless jaundice and elevated hepatic function panel (Tbili 10.5, Dbili 8.6, alk phos 1856). Surgical Oncology consulted for recommendations regarding potential resectability.     Vital Signs Last 24 Hrs  T(C): 36.8 (05 Mar 2021 16:06), Max: 36.8 (05 Mar 2021 16:06)  T(F): 98.2 (05 Mar 2021 16:06), Max: 98.2 (05 Mar 2021 16:06)  HR: 96 (05 Mar 2021 16:06) (96 - 113)  BP: 120/71 (05 Mar 2021 16:06) (120/71 - 121/70)  RR: 18 (05 Mar 2021 16:06) (18 - 18)  SpO2: 99% (05 Mar 2021 16:06) (98% - 99%) (05 Mar 2021 17:16)    PAST MEDICAL & SURGICAL HISTORY:  Varicose veins of both lower extremities, unspecified whether complicated  Pain  knee  SOB (shortness of breath)  History of ovarian cystectomy  left  Status post phlebectomy  10/2018  History of surgery  vascular surgery   ? variocose vein stripping?    MEDICATIONS  (STANDING):  chlorhexidine 4% Liquid 1 Application(s) Topical <User Schedule>  diltiazem    milliGRAM(s) Oral daily  enoxaparin Injectable 60 milliGRAM(s) SubCutaneous every 12 hours  furosemide    Tablet 20 milliGRAM(s) Oral daily  pantoprazole    Tablet 40 milliGRAM(s) Oral before breakfast    MEDICATIONS  (PRN):    Allergies  Augmentin (Rash)  Novocain (Angioedema)  Steroids: Excessive swelling (Flushing; Other (Mild to Mod))  sulfa drugs (Fever)    REVIEW OF SYSTEMS    [ X ] A ten-point review of systems was otherwise negative except as noted.  [ ] Due to altered mental status/intubation, subjective information were not able to be obtained from the patient. History was obtained, to the extent possible, from review of the chart and collateral sources of information.    Vital Signs Last 24 Hrs  T(C): 37.3 (05 Mar 2021 22:30), Max: 37.3 (05 Mar 2021 22:30)  T(F): 99.1 (05 Mar 2021 22:30), Max: 99.1 (05 Mar 2021 22:30)  HR: 98 (05 Mar 2021 22:30) (96 - 113)  BP: 127/73 (05 Mar 2021 22:30) (120/71 - 127/73)  RR: 18 (05 Mar 2021 22:30) (18 - 18)  SpO2: 97% (05 Mar 2021 22:30) (97% - 99%)    PHYSICAL EXAM:  GENERAL: NAD, diffuse jaundice   CHEST/LUNG: Clear to auscultation bilaterally  HEART: Regular rate and rhythm  ABDOMEN: Soft, Nontender, nondistended   EXTREMITIES:  No clubbing, cyanosis, or edema    LABS:                        10.9   13.05 )-----------( 253      ( 05 Mar 2021 10:28 )             31.9       Auto Neutrophil %: 83.5 % (03-05-21 @ 10:28)  Auto Immature Granulocyte %: 0.6 % (03-05-21 @ 10:28)    03-05    135  |  97<L>  |  11  ----------------------------<  123<H>  3.3<L>   |  26  |  <0.5<L>    Calcium, Total Serum: 8.1 mg/dL (03-05-21 @ 10:28)    LFTs:             5.0  | 10.5 | 86       ------------------[1856    ( 05 Mar 2021 10:28 )  2.7  | 8.6  | 72          Lipase:9        Lactate, Blood: 1.1 mmol/L (03-05-21 @ 10:28)  Blood Gas Venous - Lactate: 1.0 mmoL/L (03-05-21 @ 10:28)    Coags:     32.30  ----< 2.81    ( 05 Mar 2021 10:28 )     38.4      Urinalysis Basic - ( 05 Mar 2021 12:32 )    Color: Lacy / Appearance: Clear / SG: >1.050 / pH: x  Gluc: x / Ketone: Negative  / Bili: Moderate / Urobili: 3 mg/dL   Blood: x / Protein: Trace / Nitrite: Negative   Leuk Esterase: Negative / RBC: 3 /HPF / WBC 5 /HPF   Sq Epi: x / Non Sq Epi: 8 /HPF / Bacteria: Negative    RADIOLOGY & ADDITIONAL STUDIES:  < from: CT Abdomen and Pelvis w/ IV Cont (03.05.21 @ 11:22) >  IMPRESSION:  Severe intrahepatic and extrahepatic biliary ductal dilatation. Abrupt cut off ofthe CBD at the level of the pancreatic head. Pancreatic duct also dilated with abrupt cut off at the level of the pancreatic head. Pancreatic head/neck is ill-defined, raising suspicion for underlying ill-defined mass. GI consultation recommended.  Moderate volume ascites.    < from: US Abdomen Limited (03.05.21 @ 13:23) >  IMPRESSION:  Dilated common bile duct measuring 15 mm with moderate intrahepatic biliary duct dilatation and a dilated pancreatic duct measuring 8 mm.  Findings are highly suspicious for a pancreatic head mass causing obstruction, not well evaluated on this examination due to bowel gas  Mild perihepatic ascites.      
Patient is a 76 y/o with PMHx of HTN, A-Fib ( On Eliquis- Follows Dr Dillard- was due for Persantine stress test), prior varicose veins, nephrolithiasis, whom presented for jaundice. Patient notes she first started noticing that her urine was dark in december ( She was treated for a UTI by her PMD at the time but told UA was negative). She than went to her Cardiologist who noticed her skin was a bit yellow and was referred to Dr Grayson ( GI- EGD done and relatively normal- he ordered CT scan ut never done). She noted worsening jaundice and presented to the ED. She notes some lack of appetite and nausea, occasional light stools.        PAST MEDICAL & SURGICAL HISTORY:  Varicose veins of both lower extremities, unspecified whether complicated  HTN  A-Fib  SOB (shortness of breath)  History of ovarian cystectomy  Status post phlebectomy      Home medications  Diltiazem  Lasix  Eliquis         Allergies  Augmentin (Rash)  Novocain (Angioedema)  Steroids: Excessive swelling (Flushing; Other (Mild to Mod))  sulfa drugs (Fever)      Review of Systems  General:  See HPI  HEENT: Denies Trouble Swallowing ,Denies  Sore Throat , Denies Change in hearing/vision/speech ,Denies Dizziness    Cardio: Denies  Chest Pain , Palpitations    Respiratory: Denies worsening of SOB, Denies Cough  Abdomen: See detailed HPI  Neuro: Denies Headache Denies Dizziness, Denies Paresthesias  MSK: Denies pain in Bones/Joints/Muscles   Psych: Patient denies depression, denies suicidal or homicidal ideations  Integ: See HPI         Vital Signs  T(F): 97.8 (05 Mar 2021 09:52), Max: 97.8 (05 Mar 2021 09:52)  HR: 113 (05 Mar 2021 09:52) (113 - 113)  BP: 121/70 (05 Mar 2021 09:52) (121/70 - 121/70)  RR: 18 (05 Mar 2021 09:52) (18 - 18)  SpO2: 98% (05 Mar 2021 09:52) (98% - 98%)  Physical Exam  Gen: NAD  HEENT: NC/AT, Mucosal Membranes Dry, Icteric sclera   Cardio: S1/S2 Irregular  Resp: CTA B/L  Abdomen: Soft, ND/NT  Neuro: AAOx3  Extremities: FROM x 4  Skin: No jaundice       LABS:  Labs:  CAPILLARY BLOOD GLUCOSE                              10.9   13.05 )-----------( 253      ( 05 Mar 2021 10:28 )             31.9       Auto Neutrophil %: 83.5 % (03-05-21 @ 10:28)  Auto Immature Granulocyte %: 0.6 % (03-05-21 @ 10:28)    03-05    135  |  97<L>  |  11  ----------------------------<  123<H>  3.3<L>   |  26  |  <0.5<L>      Calcium, Total Serum: 8.1 mg/dL (03-05-21 @ 10:28)      LFTs:             5.0  | 10.5 | 86       ------------------[1856    ( 05 Mar 2021 10:28 )  2.7  | 8.6  | 72          Lipase:9      Amylase:x         Lactate, Blood: 1.1 mmol/L (03-05-21 @ 10:28)  Blood Gas Venous - Lactate: 1.0 mmoL/L (03-05-21 @ 10:28)      Coags:     32.30  ----< 2.81    ( 05 Mar 2021 10:28 )     38.4        Urinalysis Basic - ( 05 Mar 2021 12:32 )    Color: Lacy / Appearance: Clear / SG: >1.050 / pH: x  Gluc: x / Ketone: Negative  / Bili: Moderate / Urobili: 3 mg/dL   Blood: x / Protein: Trace / Nitrite: Negative   Leuk Esterase: Negative / RBC: 3 /HPF / WBC 5 /HPF   Sq Epi: x / Non Sq Epi: 8 /HPF / Bacteria: Negative      RADIOLOGY & ADDITIONAL STUDIES:  US Abdomen Limited 03.05.21   IMPRESSION:    Dilated common bile duct measuring 15 mm with moderate intrahepatic biliary duct dilatation and a dilated pancreatic duct measuring 8 mm.    Findings are highly suspicious for a pancreatic head mass causing obstruction, not well evaluated on this examination due to bowel gas    Mild perihepatic ascites.      CT Abdomen and Pelvis w/ IV Cont 03.05.21   IMPRESSION:    Severe intrahepatic and extrahepatic biliary ductal dilatation. Abrupt cut off ofthe CBD at the level of the pancreatic head. Pancreatic duct also dilated with abrupt cut off at the level of the pancreatic head. Pancreatic head/neck is ill-defined, raising suspicion for underlying ill-defined mass. GI consultation recommended.    Moderate volume ascites.

## 2021-03-10 NOTE — CONSULT NOTE ADULT - CONSULT REASON
New pancreatic mass
Pancreatic ca
Jaundice   Abnormal Liver Function Studies   Abnormal Abdominal Imaging

## 2021-03-10 NOTE — PROGRESS NOTE ADULT - ASSESSMENT
This is a 75 year old F patient with PMHx of Afib on eliquis ( Cardiologist VEE Medrano ), HTN, Varicose vein in the lower extremities, Nephrolithiasis presented to the ED for yellow discoloration of the skin.     #Painless obstructive jaundice with hx of weight loss, popeye colored stools s/o pancreatic malignancy with associated small volume malignant ascites      - Reports symptoms started in December when she noticed discolored urine and skin discoloration  -Patient had a recent Endoscopy as per Dr. Grayson which was practically normal last month  -In the ED patient labs showed Direct hyperbilirubinemia, Severely elevated Alk phos  -CT head showed taper and then cut off at the pancreatic head of the CBD and pancreatic duct with poor visualization of the pancreatic head.   -INR on admission 2.81 (hx of afib on eliquis) repeat INR 1.44 s/p 5 mg of vit K for EUS  - MRSA negative 3/6/2021   - S/P EUS with FNA of Pancreatic Head Mass- with pathology reports significant for adenocarcinoma  - S/P ERCP with sphincterotomy and large plastic CBD stent placement 3/9/2021  - Liver enzymes downtrending today 3/10/2021 (ALK: 1600, AST: 66, ALT 68), bilirubin (8.5), tumors markers significantly elevated correlating to clinical picture (Ca 19-9: 2637, Ca 125: 84)  - f/u Heme/onc consult for newly diagnosed pancreatic adenocarcinoma to initiate plan   - Patient to follow up with Dr. Nino in 1 week for further surgical intervention recommendations and GI outpatient  - ABD ultrasound order to determine amount of ascites for possible therapeutic tap:  Mild abdominopelvic ascites; Bilateral pleural effusions  - Patient educated on importance of expediting and staying persistent on all medical visits and fully understands prognosis and plan    #hypomagnesemia  - 3/10/2021 mg 1.7   - s/p 2 mg IV magnesium sulphate  - f/u repeat mag levels and replete if needed     #Hypokalemia (resolved)  -On admission, it was 3.3, followed by yesterday 3.2, given 2 k riders  - f/u repeat BMP K 4.2    #Hypoalbuminemia   -CMP showed Albumin of 2.7, Hepatic dysfunction? INR is elevated as well. And elevated direct bilirubin   -Daily CMP     #Bilateral lower extremities edema   -Low suspicion for DVT   -Chronic skin changes are evident   -Continue Lasix 20 mg PO daily   - Duplex LE negative 3/6/2021    #Afib   -On AC Eliquis 5 mg BID (currently on hold for possible therapeutic tap)  -Rate controlled Diltiazem 120 mg PO daily   - ECHO 3/6: EF 56%, boderline pulm HTN and moderated MR    #HTN   -Continue Cardizem and Furosemide   -Normotensive on admission     #DVT ppx: resume eliquis today   #GI ppx: Protonix   #Diet: DASH   #Acute from home: awaiting heme/onc recommendations, possible discharge today

## 2021-03-10 NOTE — PROGRESS NOTE ADULT - ASSESSMENT
Patient is a 74 y/o with PMHx of HTN, A-Fib ( On Eliquis- Follows Dr Dillard- was due for Persantine stress test), prior varicose veins, nephrolithiasis, whom presented for jaundice. Patient notes she first started noticing that her urine was dark in december ( She was treated for a UTI by her PMD at the time but told UA was negative). She than went to her Cardiologist who noticed her skin was a bit yellow and was referred to Dr Grayson ( GI- EGD done and relatively normal- he ordered CT scan ut never done). S/P EUS with FNA of Pancreatic Mass, and ERCP with sphincterotomy and placement of a large plastic CBD stent. Patient feels well this morning. Would give Miralax for constipation. Pathology returned and concerns suspicion.       Jaundice/ CBD and PD dilation/ Cutoff- taper around Pancreatic head   - S/P EUS with FNA of Pancreatic Head Mass- Await Pathology   - S/P ERCP with sphincterotomy and large plastic CBD stent placement  - Appreciate Surgical Oncology evaluation  - Hematology follow up needed- Adenocarcinoma on FNA  - Miralax 17gram daily  - Recall GI as needed

## 2021-03-10 NOTE — PROGRESS NOTE ADULT - PROVIDER SPECIALTY LIST ADULT
Surgery
Gastroenterology
Internal Medicine
Surgery
Surgery
Gastroenterology
Hospitalist
Internal Medicine
Hospitalist

## 2021-03-10 NOTE — CONSULT NOTE ADULT - ASSESSMENT
This is a 75 year old F patient with PMHx of Afib on Eliquis, HTN, varicose vein in the lower extremities, nephrolithiasis presented to the ED for painless jaundice and found to have pancreatic adenocarcinoma.     Pancreatic adenocarcinoma: Needs PET scan to complete staging, if no evidence of additional disease then likely she will get upfront surgery followed by adjuvant chemo, however we will defer the decision for neoadjuvant treatment to surgical oncology. CT read  encasement of SMA cannot be ruled out, however this finding may possibly be from edema. Additionally, she should have her cardiac status optimized and if her pleural effusions are not improved, then would recommend pulmonology for thoracentesis to rule out malignant effusion which would make her stage IV. As of now she appears to be T3 (largest dimension 3.5cm), and possibly N1 (periportal LN?) without no evidence of metastatic disease.   - PET scan as outpatient  - Cardiac optimization (also will need cardiac clearance for surgery) and if no resolution of pleural effusion then she will need a thoracentesis  - Surgical oncology followup  - Medical oncology followup in 2 weeks    Pleural effusion: Either related to cardiac dysfunction vs malignancy  - Cardiac optimization, if not resolved then thoracentesis to r/o malignancy This is a 75 year old F patient with PMHx of Afib on Eliquis, HTN, varicose vein in the lower extremities, nephrolithiasis presented to the ED for painless jaundice and found to have pancreatic adenocarcinoma.     Pancreatic adenocarcinoma: Needs PET scan to complete staging, if no evidence of additional disease then likely she will get upfront surgery followed by adjuvant chemo,  CT read  encasement of SMA cannot be ruled out, however this finding may possibly be from edema. Additionally, she should have her cardiac status optimized and if her pleural effusions are not improved, then would recommend pulmonology for thoracentesis to rule out malignant effusion which would make her stage IV. As of now she appears to be T3 (largest dimension 3.5cm), and possibly N1 (periportal LN?) with no evidence of metastatic disease.   - PET scan as outpatient  - Cardiac optimization (also will need cardiac clearance for surgery) and if no resolution of pleural effusion then she will need a thoracentesis  - Surgical oncology followup  - Medical oncology followup in 2 weeks    Pleural effusion: Either related to cardiac dysfunction vs malignancy  - Cardiac optimization, if not resolved then thoracentesis to r/o malignancy

## 2021-03-10 NOTE — DISCHARGE NOTE NURSING/CASE MANAGEMENT/SOCIAL WORK - PATIENT PORTAL LINK FT
You can access the FollowMyHealth Patient Portal offered by [facility] by registering at the following website: [email or web address]. By joining [employer]’s FollowMyHealth portal, you will also be able to view your health information using other applications (apps) compatible with our system.

## 2021-03-10 NOTE — PROGRESS NOTE ADULT - SUBJECTIVE AND OBJECTIVE BOX
Patient is a 74 y/o with PMHx of HTN, A-Fib ( On Eliquis- Follows Dr Dillard- was due for Persantine stress test), prior varicose veins, nephrolithiasis, whom presented for jaundice. Patient notes she first started noticing that her urine was dark in december ( She was treated for a UTI by her PMD at the time but told UA was negative). She than went to her Cardiologist who noticed her skin was a bit yellow and was referred to Dr Grayson ( GI- EGD done and relatively normal- he ordered CT scan ut never done). S/P EUS with FNA of Pancreatic Mass, and ERCP with sphincterotomy and placement of a large plastic CBD stent. Patient felt bloated yesterday but improved. She notes her abdomen is soft now. Pathology back and notes Adenocarcinoma.       PAST MEDICAL & SURGICAL HISTORY:  Varicose veins of both lower extremities, unspecified whether complicated  HTN  A-Fib  SOB (shortness of breath)  History of ovarian cystectomy  Status post phlebectomy      Home medications  Diltiazem  Lasix  Eliquis         Allergies  Augmentin (Rash)  Novocain (Angioedema)  Steroids: Excessive swelling (Flushing; Other (Mild to Mod))  sulfa drugs (Fever)      Review of Systems  General:  See HPI  HEENT: Denies Trouble Swallowing ,Denies  Sore Throat , Denies Change in hearing/vision/speech ,Denies Dizziness    Cardio: Denies  Chest Pain , Palpitations    Respiratory: Denies worsening of SOB, Denies Cough  Abdomen: See detailed HPI  Neuro: Denies Headache Denies Dizziness, Denies Paresthesias  MSK: Denies pain in Bones/Joints/Muscles   Psych: Patient denies depression, denies suicidal or homicidal ideations  Integ: See HPI         Vital Signs   T(F): 96.6 (10 Mar 2021 05:18), Max: 97.9 (09 Mar 2021 13:41)  HR: 102 (10 Mar 2021 05:18) (94 - 102)  BP: 123/70 (10 Mar 2021 05:18) (101/61 - 136/83)  RR: 18 (10 Mar 2021 05:18) (18 - 18)  Physical Exam  Gen: NAD  HEENT: NC/AT, Mucosal Membranes Dry, Icteric sclera   Cardio: S1/S2 Irregular  Resp: CTA B/L  Abdomen: Soft, slight distended, non tender  Neuro: AAOx3  Extremities: FROM x 4  Skin: No jaundice       LABS:                        12.0   12.44 )-----------( 292      ( 10 Mar 2021 06:14 )             36.3     03-10    136  |  99  |  10  ----------------------------<  100<H>  4.1   |  28  |  0.5<L>    Ca    8.3<L>      10 Mar 2021 06:14  Mg     1.7     03-10    TPro  4.9<L>  /  Alb  2.6<L>  /  TBili  11.6<H>  /  DBili  8.6<H>  /  AST  66<H>  /  ALT  68<H>  /  AlkPhos  1644<H>  03-09        RADIOLOGY & ADDITIONAL STUDIES:  US Abdomen Limited 03.05.21   IMPRESSION:    Dilated common bile duct measuring 15 mm with moderate intrahepatic biliary duct dilatation and a dilated pancreatic duct measuring 8 mm.    Findings are highly suspicious for a pancreatic head mass causing obstruction, not well evaluated on this examination due to bowel gas    Mild perihepatic ascites.      CT Abdomen and Pelvis w/ IV Cont 03.05.21   IMPRESSION:    Severe intrahepatic and extrahepatic biliary ductal dilatation. Abrupt cut off ofthe CBD at the level of the pancreatic head. Pancreatic duct also dilated with abrupt cut off at the level of the pancreatic head. Pancreatic head/neck is ill-defined, raising suspicion for underlying ill-defined mass. GI consultation recommended.    Moderate volume ascites.

## 2021-03-10 NOTE — PROGRESS NOTE ADULT - REASON FOR ADMISSION
Jaundice

## 2021-03-16 ENCOUNTER — OUTPATIENT (OUTPATIENT)
Dept: OUTPATIENT SERVICES | Facility: HOSPITAL | Age: 76
LOS: 1 days | Discharge: HOME | End: 2021-03-16
Payer: MEDICARE

## 2021-03-16 ENCOUNTER — RESULT REVIEW (OUTPATIENT)
Age: 76
End: 2021-03-16

## 2021-03-16 DIAGNOSIS — C25.0 MALIGNANT NEOPLASM OF HEAD OF PANCREAS: ICD-10-CM

## 2021-03-16 DIAGNOSIS — Z98.890 OTHER SPECIFIED POSTPROCEDURAL STATES: Chronic | ICD-10-CM

## 2021-03-16 PROCEDURE — 78815 PET IMAGE W/CT SKULL-THIGH: CPT | Mod: 26,PI
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
## 2021-03-17 DIAGNOSIS — R18.0 MALIGNANT ASCITES: ICD-10-CM

## 2021-03-17 DIAGNOSIS — K86.9 DISEASE OF PANCREAS, UNSPECIFIED: ICD-10-CM

## 2021-03-17 DIAGNOSIS — Z88.8 ALLERGY STATUS TO OTHER DRUGS, MEDICAMENTS AND BIOLOGICAL SUBSTANCES: ICD-10-CM

## 2021-03-17 DIAGNOSIS — R79.1 ABNORMAL COAGULATION PROFILE: ICD-10-CM

## 2021-03-17 DIAGNOSIS — K83.1 OBSTRUCTION OF BILE DUCT: ICD-10-CM

## 2021-03-17 DIAGNOSIS — E87.6 HYPOKALEMIA: ICD-10-CM

## 2021-03-17 DIAGNOSIS — T45.515A ADVERSE EFFECT OF ANTICOAGULANTS, INITIAL ENCOUNTER: ICD-10-CM

## 2021-03-17 DIAGNOSIS — I10 ESSENTIAL (PRIMARY) HYPERTENSION: ICD-10-CM

## 2021-03-17 DIAGNOSIS — R91.8 OTHER NONSPECIFIC ABNORMAL FINDING OF LUNG FIELD: ICD-10-CM

## 2021-03-17 DIAGNOSIS — K86.89 OTHER SPECIFIED DISEASES OF PANCREAS: ICD-10-CM

## 2021-03-17 DIAGNOSIS — E44.0 MODERATE PROTEIN-CALORIE MALNUTRITION: ICD-10-CM

## 2021-03-17 DIAGNOSIS — K83.8 OTHER SPECIFIED DISEASES OF BILIARY TRACT: ICD-10-CM

## 2021-03-17 DIAGNOSIS — J90 PLEURAL EFFUSION, NOT ELSEWHERE CLASSIFIED: ICD-10-CM

## 2021-03-17 DIAGNOSIS — E88.09 OTHER DISORDERS OF PLASMA-PROTEIN METABOLISM, NOT ELSEWHERE CLASSIFIED: ICD-10-CM

## 2021-03-17 DIAGNOSIS — C25.0 MALIGNANT NEOPLASM OF HEAD OF PANCREAS: ICD-10-CM

## 2021-03-17 DIAGNOSIS — I48.91 UNSPECIFIED ATRIAL FIBRILLATION: ICD-10-CM

## 2021-03-17 DIAGNOSIS — Z88.4 ALLERGY STATUS TO ANESTHETIC AGENT: ICD-10-CM

## 2021-03-17 DIAGNOSIS — K44.9 DIAPHRAGMATIC HERNIA WITHOUT OBSTRUCTION OR GANGRENE: ICD-10-CM

## 2021-03-17 DIAGNOSIS — Z91.018 ALLERGY TO OTHER FOODS: ICD-10-CM

## 2021-03-17 DIAGNOSIS — Z88.2 ALLERGY STATUS TO SULFONAMIDES: ICD-10-CM

## 2021-03-17 DIAGNOSIS — I34.0 NONRHEUMATIC MITRAL (VALVE) INSUFFICIENCY: ICD-10-CM

## 2021-03-17 DIAGNOSIS — K20.90 ESOPHAGITIS, UNSPECIFIED WITHOUT BLEEDING: ICD-10-CM

## 2021-03-17 DIAGNOSIS — R19.7 DIARRHEA, UNSPECIFIED: ICD-10-CM

## 2021-03-17 DIAGNOSIS — Z88.1 ALLERGY STATUS TO OTHER ANTIBIOTIC AGENTS STATUS: ICD-10-CM

## 2021-03-17 DIAGNOSIS — E83.42 HYPOMAGNESEMIA: ICD-10-CM

## 2021-03-17 DIAGNOSIS — Z87.442 PERSONAL HISTORY OF URINARY CALCULI: ICD-10-CM

## 2021-03-17 DIAGNOSIS — Z79.01 LONG TERM (CURRENT) USE OF ANTICOAGULANTS: ICD-10-CM

## 2021-03-18 ENCOUNTER — APPOINTMENT (OUTPATIENT)
Dept: SURGERY | Facility: CLINIC | Age: 76
End: 2021-03-18
Payer: MEDICARE

## 2021-03-18 VITALS
SYSTOLIC BLOOD PRESSURE: 120 MMHG | HEIGHT: 70 IN | WEIGHT: 115 LBS | DIASTOLIC BLOOD PRESSURE: 90 MMHG | OXYGEN SATURATION: 98 % | TEMPERATURE: 97.4 F | HEART RATE: 94 BPM | BODY MASS INDEX: 16.46 KG/M2

## 2021-03-18 DIAGNOSIS — Z82.49 FAMILY HISTORY OF ISCHEMIC HEART DISEASE AND OTHER DISEASES OF THE CIRCULATORY SYSTEM: ICD-10-CM

## 2021-03-18 DIAGNOSIS — I10 ESSENTIAL (PRIMARY) HYPERTENSION: ICD-10-CM

## 2021-03-18 DIAGNOSIS — G89.3 NEOPLASM RELATED PAIN (ACUTE) (CHRONIC): ICD-10-CM

## 2021-03-18 DIAGNOSIS — I48.91 UNSPECIFIED ATRIAL FIBRILLATION: ICD-10-CM

## 2021-03-18 LAB
ALBUMIN SERPL ELPH-MCNC: 3.9 G/DL
ALP BLD-CCNC: 1199 U/L
ALT SERPL-CCNC: 41 U/L
AST SERPL-CCNC: 45 U/L
BILIRUB DIRECT SERPL-MCNC: 3.6 MG/DL
BILIRUB INDIRECT SERPL-MCNC: 1.8 MG/DL
BILIRUB SERPL-MCNC: 5.4 MG/DL
PROT SERPL-MCNC: 6.9 G/DL

## 2021-03-18 PROCEDURE — 99215 OFFICE O/P EST HI 40 MIN: CPT

## 2021-03-18 RX ORDER — APIXABAN 5 MG/1
5 TABLET, FILM COATED ORAL
Refills: 0 | Status: ACTIVE | COMMUNITY

## 2021-03-18 RX ORDER — DILTIAZEM HYDROCHLORIDE 120 MG/1
120 TABLET ORAL
Refills: 0 | Status: ACTIVE | COMMUNITY

## 2021-03-18 NOTE — ASSESSMENT
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 presented with obstructive jaundice\par s/p ERCP/plastic stent/EUS 3/9/2021\par trending down of her bilirubin\par \par PET showed left upper lung avid lesion, bilateral pleural effusion more on right side, hx of chronic lung disease and return to work shortly after 9/11 with possible dust exposure, she saw pulmonologist for long time.\par \par will arrange for IR left lung bx and right thoracentesis cytology, hold eliquis for now\par repeat LFT and obtain CA 19-9\par \par Discussed in length treatment options upfront surgery vs neoadjuvant approach\par \par will see her march 30 2021

## 2021-03-18 NOTE — REVIEW OF SYSTEMS
[Negative] : Heme/Lymph [FreeTextEntry2] : loss of weight and fatigue [FreeTextEntry8] : some abdominal discomfort

## 2021-03-18 NOTE — HISTORY OF PRESENT ILLNESS
[de-identified] : VIKASH LI  is a pleasant 75 year year old woman  who came in 03/18/2021 for newly diagnosed pancreas cancer 3/2021. She has cardiologist Dr Garrett Garvey for a fib. Dr Carson vascular surgeon for her varicose veins.\par \par she noticed jaundice in december 2020. treated for UTI. her cardiologist noticed jaundice during her stress test and send her to Dr Grant who scope her 3/3/2021 with negative results and ordered CT which was not done as outpatient. she came to [facility] ED with worsening jaundice and she had CT and EUS/ERCP and plastic stent by Dr Gavin with bx showed adenocarcinoma at head of pancreas. 3/9/2021 EUS showed resectable lesion and possible reactive LN. t mable 16 and went down after stent to 8\par \par \par

## 2021-03-20 LAB
CANCER AG19-9 SERPL-ACNC: 330 U/ML
INR PPP: 1.83 RATIO
PT BLD: 21 SEC

## 2021-03-30 ENCOUNTER — APPOINTMENT (OUTPATIENT)
Dept: SURGERY | Facility: CLINIC | Age: 76
End: 2021-03-30
Payer: MEDICARE

## 2021-03-30 VITALS
BODY MASS INDEX: 16.89 KG/M2 | HEIGHT: 70 IN | DIASTOLIC BLOOD PRESSURE: 78 MMHG | SYSTOLIC BLOOD PRESSURE: 120 MMHG | TEMPERATURE: 97.2 F | HEART RATE: 86 BPM | WEIGHT: 118 LBS | OXYGEN SATURATION: 97 %

## 2021-03-30 PROCEDURE — 99215 OFFICE O/P EST HI 40 MIN: CPT

## 2021-03-31 ENCOUNTER — APPOINTMENT (OUTPATIENT)
Dept: GASTROENTEROLOGY | Facility: CLINIC | Age: 76
End: 2021-03-31

## 2021-03-31 NOTE — HISTORY OF PRESENT ILLNESS
[de-identified] : VIKASH LI  is a pleasant 75 year year old woman  who came in 03/18/2021 for newly diagnosed pancreas cancer 3/2021. She has cardiologist Dr Garrett Garvey for a fib. Dr Carson vascular surgeon for her varicose veins.\par \par she noticed jaundice in december 2020. treated for UTI. her cardiologist noticed jaundice during her stress test and send her to Dr Grant who scope her 3/3/2021 with negative results and ordered CT which was not done as outpatient. she came to [facility] ED with worsening jaundice and she had CT and EUS/ERCP and plastic stent by Dr Gavin with bx showed adenocarcinoma at head of pancreas. 3/9/2021 EUS showed resectable lesion and possible reactive LN. t mable 16 and went down after stent to 8\par \par \par

## 2021-03-31 NOTE — ASSESSMENT
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 presented with obstructive jaundice\par s/p ERCP/plastic stent/EUS 3/9/2021\par trending down of her bilirubin\par \par PET showed left upper lung avid lesion, bilateral pleural effusion more on right side, hx of chronic lung disease and return to work shortly after 9/11 with possible dust exposure, she saw pulmonologist for long time.\par \par discussed with IR dr Mancuso her PET lung findings and given that was developed in 10 days, he suggested no bx and repeat imaging\par \par repeat LFT and obtain CA 19-9 showed bilirubin of 5 and CA 19-9 of 300s, will repeat again labs to get CA 19-9 with normalized bilirubin\par \par Discussed in length treatment options upfront surgery vs neoadjuvant approach\par \par she likes to proceed with neoadjuvant chemo, will see her in April 13 and arrange for mediport.\par she will see Dr Johnson to discuss her neoadjuvant

## 2021-03-31 NOTE — CONSULT LETTER
[Dear  ___] : Dear  [unfilled], [Consult Letter:] : I had the pleasure of evaluating your patient, [unfilled]. [Please see my note below.] : Please see my note below. [Consult Closing:] : Thank you very much for allowing me to participate in the care of this patient.  If you have any questions, please do not hesitate to contact me. [DrValentino  ___] : Dr. JAQUEZ

## 2021-04-05 ENCOUNTER — NON-APPOINTMENT (OUTPATIENT)
Age: 76
End: 2021-04-05

## 2021-04-08 ENCOUNTER — APPOINTMENT (OUTPATIENT)
Dept: HEMATOLOGY ONCOLOGY | Facility: CLINIC | Age: 76
End: 2021-04-08
Payer: MEDICARE

## 2021-04-08 ENCOUNTER — LABORATORY RESULT (OUTPATIENT)
Age: 76
End: 2021-04-08

## 2021-04-08 VITALS
TEMPERATURE: 97.9 F | WEIGHT: 121 LBS | DIASTOLIC BLOOD PRESSURE: 81 MMHG | HEIGHT: 70 IN | BODY MASS INDEX: 17.32 KG/M2 | HEART RATE: 110 BPM | SYSTOLIC BLOOD PRESSURE: 145 MMHG

## 2021-04-08 PROCEDURE — 99215 OFFICE O/P EST HI 40 MIN: CPT

## 2021-04-08 RX ORDER — FUROSEMIDE 20 MG/1
20 TABLET ORAL
Refills: 0 | Status: ACTIVE | COMMUNITY

## 2021-04-09 ENCOUNTER — TRANSCRIPTION ENCOUNTER (OUTPATIENT)
Age: 76
End: 2021-04-09

## 2021-04-09 LAB
ALBUMIN SERPL ELPH-MCNC: 4 G/DL
ALP BLD-CCNC: 772 U/L
ALT SERPL-CCNC: 34 U/L
ANION GAP SERPL CALC-SCNC: 13 MMOL/L
APTT BLD: 40.2 SEC
AST SERPL-CCNC: 32 U/L
BILIRUB SERPL-MCNC: 2.7 MG/DL
BUN SERPL-MCNC: 23 MG/DL
CALCIUM SERPL-MCNC: 9.3 MG/DL
CANCER AG19-9 SERPL-ACNC: 51 U/ML
CHLORIDE SERPL-SCNC: 105 MMOL/L
CO2 SERPL-SCNC: 26 MMOL/L
CREAT SERPL-MCNC: 0.6 MG/DL
GLUCOSE SERPL-MCNC: 76 MG/DL
HCT VFR BLD CALC: 39.3 %
HGB BLD-MCNC: 12.6 G/DL
INR PPP: 1.64 RATIO
MCHC RBC-ENTMCNC: 30.7 PG
MCHC RBC-ENTMCNC: 32.1 G/DL
MCV RBC AUTO: 95.6 FL
PLATELET # BLD AUTO: 204 K/UL
PMV BLD: 10.2 FL
POTASSIUM SERPL-SCNC: 4.2 MMOL/L
PROT SERPL-MCNC: 6.7 G/DL
PT BLD: 18.9 SEC
RBC # BLD: 4.11 M/UL
RBC # FLD: 17.2 %
SODIUM SERPL-SCNC: 144 MMOL/L
WBC # FLD AUTO: 9.54 K/UL

## 2021-04-09 RX ORDER — OMEPRAZOLE 40 MG/1
40 CAPSULE, DELAYED RELEASE ORAL
Qty: 30 | Refills: 2 | Status: ACTIVE | COMMUNITY
Start: 2021-04-09 | End: 1900-01-01

## 2021-04-09 RX ORDER — ONDANSETRON 8 MG/1
8 TABLET, ORALLY DISINTEGRATING ORAL EVERY 8 HOURS
Qty: 30 | Refills: 5 | Status: ACTIVE | COMMUNITY
Start: 2021-04-09 | End: 1900-01-01

## 2021-04-09 RX ORDER — PROCHLORPERAZINE MALEATE 10 MG/1
10 TABLET ORAL EVERY 6 HOURS
Qty: 60 | Refills: 1 | Status: ACTIVE | COMMUNITY
Start: 2021-04-09 | End: 1900-01-01

## 2021-04-09 NOTE — RESULTS/DATA
[FreeTextEntry1] : from: CT Abdomen and Pelvis w/ Oral Cont and w/ IV Cont (03.06.21 @ 18:29) >\par FINDINGS:\par \par HEPATOBILIARY: Severe intra and extrahepatic biliary ductal dilatation again\par noted. Dilated common bile duct measuring up to 1.9 cm with abrupt cut off at\par the level of the pancreatic head. Hydropicgallbladder. Few indeterminate\par punctate peripheral arterially enhancing foci seen in the liver, without\par definite corresponding abnormalities on the portal venous phase images.\par \par SPLEEN: Unremarkable.\par \par PANCREAS: A 3.5 x 2.8 x 3.4 cm arterially enhancing area seen in the region of\par the pancreatic head with upstream dilation of the main pancreatic duct to 1.2\par cm. The lesion broadly contacts the medial duodenal wall. Patent superior\par mesenteric and splenic veins without evidence for tumor encasement. Mild\par edema/stranding seen surrounding the celiac and superior mesenteric arteries.\par Bilobed cystic lesion seen in the pancreatic uncinate process measuring 1.8 cm\par (series 22 image 17).\par \par ADRENAL GLANDS: Unremarkable.\par KIDNEYS: Left renal cyst. Bilateral symmetric renal enhancement. No\par hydronephrosis. Subcentimeter hypodensities in both kidneys, too small to\par characterize.\par \par ABDOMINOPELVIC NODES: Nonspecific prominent periportal lymph node.\par \par PELVIC ORGANS: Excreted contrast seen within the urinary bladder from previous\par administration.\par \par PERITONEUM/MESENTERY/BOWEL: Diffuse generalized mesenteric edema. Small volume\par abdominopelvic ascites. No evidence of bowel obstruction or pneumoperitoneum.\par \par BONES/SOFT TISSUES: Soft tissue anasarca. Osteopenia. No suspicious osseous\par lesions stable degenerative changes.\par \par OTHER: Aortic calcification.\par \par < from: CT Chest w/ IV Cont (03.06.21 @ 18:29) >\par IMPRESSION:\par No comparison CT chest available,\par \par Bilateral pleural effusions moderate on the right and small on the left; with\par adjacent atelectasis/consolidation.\par \par No suspicious appearing pulmonary noduleor mass as described above.No enlarged mediastinal, hilar or axillary lymph nodes. Nonenlarged prevascular\par lymph nodes.\par \par < end of copied text >\par \par \par IMPRESSION:\par 1. Arterially enhancing 3.5 x 2.8 x 3.4 cm area in the pancreatic head with\par associated pancreatic and intra-/extra hepatic biliary ductal dilation, highly\par suspicious for a pancreatic head neoplasm; given enhancement pattern,\par pancreatic neuroendocrine tumor is a possibility. Endoscopic ultrasound and\par tissue sampling is recommended for further assessment.\par \par 2. Nonspecific mild edema/stranding surrounding the celiac axis and SMA,\par possibly secondary to mesenteric edema; tumor encasement cannot be excluded.\par \par 3. Few subcentimeter arterially enhancing foci in the liver, likely\par representing perfusional abnormalities/shunts. However, if neuroendocrine tumor\par is confirmed, arterially enhancing metastases would also be in the\par differential. Further evaluation can be obtained at that time with a liver\par protocol MRI.\par \par 4. Prominent periportal lymph node. Small volume abdominopelvic ascites and mesenteric edema.\par \par 6. Hydropic gallbladder

## 2021-04-09 NOTE — CONSULT LETTER
[DrValentino  ___] : Dr. JAQUEZ [DrValentino ___] : Dr. JAQUEZ [Dear  ___] : Dear  [unfilled], [Consult Letter:] : I had the pleasure of evaluating your patient, [unfilled]. [Please see my note below.] : Please see my note below. [Consult Closing:] : Thank you very much for allowing me to participate in the care of this patient.  If you have any questions, please do not hesitate to contact me. [Sincerely,] : Sincerely, [FreeTextEntry3] : Zena Johnson MD\par Professor [employer]\par Jeannette[facility]Promise\par Attending Physician\par [facility]\par [phone]\par \par

## 2021-04-09 NOTE — HISTORY OF PRESENT ILLNESS
[de-identified] : Patient is a 75y old Female who presented to ER with a chief complaint of Jaundice on 10 Mar 2021\par \par She has a  PMHx of Afib on eliquis , HTN, Varicose vein in the lower extremities, Nephrolithiasis presented to the ED for painless jaundice and found to have pancreatic\par adenocarcinoma. She noticed dark urine in December 2020 and light colored\par stool. She went to her PMD who treated her for UTI, then developed jaundice and\par had EGD 3/3, he ordered a CT but it was not performed. Her jaundice\par significantly worsened along with boating,nausea and early satiety. Patient also mentioned night sweats and chills for the last month\par that happens almost daily Tbili sarah to 16.4, Alk phosph 1800, ca 19-9 = 2637, CT AP showed the below:\par \par 1. Arterially enhancing 3.5 x 2.8 x 3.4 cm area in the pancreatic head with\par associated pancreatic and intra-/extra hepatic biliary ductal dilation, highly\par suspicious for a pancreatic head neoplasm; given enhancement pattern,\par pancreatic neuroendocrine tumor is a possibility. Endoscopic ultrasound and\par tissue sampling is recommended for further assessment.\par \par 2. Nonspecific mild edema/stranding surrounding the celiac axis and SMA,\par possibly secondary to mesenteric edema; tumor encasement cannot be excluded.\par \par 3. Few subcentimeter arterially enhancing foci in the liver, likely\par representing perfusional abnormalities/shunts. However, if neuroendocrine tumor\par is confirmed, arterially enhancing metastases would also be in the\par differential. Further evaluation can be obtained at that time with a liver\par protocol MRI.\par \par 4. Prominent periportal lymph node.\par \par Ct chest showed bilateral effusions but no suspicious LN or nodules.\par \par She then underwent ERCP with stent placement. TB now improved to 8.5, DB 5.8 \par \par Cytopathology Report\par \par Final Diagnosis\par PANCREATIC HEAD MASS, EUS-GUIDED FINE NEEDLE ASPIRATION BIOPSY:\par - POSITIVE FOR MALIGNANT CELLS.\par - Adenocarcinoma.\par \par She has since then had a PET scan. She feels better with jaundice improved.\par She was seen by Dr Nino and was recommended chemo before possible surgery.\par  [de-identified] : 4/8/21\par Pt denies any SOB, chest pain or palpitations.\par No fever

## 2021-04-09 NOTE — PHYSICAL EXAM
[Ambulatory and capable of all self care but unable to carry out any work activities] : Status 2- Ambulatory and capable of all self care but unable to carry out any work activities. Up and about more than 50% of waking hours [Normal] : affect appropriate [de-identified] : mild icterus [de-identified] : LLE chronic edema [de-identified] : irregular HR [de-identified] : distended mildly

## 2021-04-09 NOTE — ASSESSMENT
[FreeTextEntry1] : This is a 75 year old F patient with PMHx of Afib on Eliquis, HTN, varicose vein in the lower extremities, diagnosed with  pancreatic adenocarcinoma.\par PET scan showing a JEREMI nodular opacity hat is suspicious. In addition there is a moderate rt pleural effusion. Therefore not clear if pt has localized or disseminated disease.\par \par RECOMMENDATIONS\par I discussed the natural history, treatment and prognosis of pancreatic cancer.\par Given that there is concern for distant disease we will hold off on surgery and start pt on chemotherapy with Gemzar and Abraxane. Given her PS will need to lower the dose and modify the administration frequency to 2 weeks on 1 week off.\par Discussed expected side effects and their management with the pt incl but not limited to nausea, vomiting, hair loss, neuropathy, allergic reactions, myelosuppression, risk of sepsis, need for GCSF etc.\par \par Pt will need a port placement which is scheduled for next week.\par Labs were ordered today.\par Pt was informed that after 2-3 cycles of chemo imaging will be repeated to assess disease status and treatment response.\par The chemotherapy dose was adjusted according to pt's height, weight, labs and anticipated tolerance.\par The high risk of complications and complexity associated with antineoplastic therapy administration has been explained to the pt and family. Chemotherapy will be given with adequate precautions including premedications, hydration and close monitoring during treatment.\par Chemotherapy will be administered under my direct supervision\par \par SUPPORTIVE MEASURES\par Zofran 8mg Q 8 hrs prn for emesis and nausea.\par Compazine  Q 6hrs prn\par Encourage adequate fluid untake,\par GI prophylaxis with PPI\par \par Due to COVID 19, pre-visit patient instructions were explained to the patient and their symptoms were checked upon arrival. \par Masks were used by the health care providers and staff and the examination room was cleaned before and after the patient visit was completed.\par Encouraged pt to complete Covid vaccination\par \par \par

## 2021-04-12 ENCOUNTER — OUTPATIENT (OUTPATIENT)
Dept: OUTPATIENT SERVICES | Facility: HOSPITAL | Age: 76
LOS: 1 days | Discharge: HOME | End: 2021-04-12

## 2021-04-12 DIAGNOSIS — Z98.890 OTHER SPECIFIED POSTPROCEDURAL STATES: Chronic | ICD-10-CM

## 2021-04-12 DIAGNOSIS — Z11.59 ENCOUNTER FOR SCREENING FOR OTHER VIRAL DISEASES: ICD-10-CM

## 2021-04-13 ENCOUNTER — APPOINTMENT (OUTPATIENT)
Dept: SURGERY | Facility: CLINIC | Age: 76
End: 2021-04-13
Payer: MEDICARE

## 2021-04-13 VITALS
SYSTOLIC BLOOD PRESSURE: 118 MMHG | TEMPERATURE: 97.3 F | HEART RATE: 83 BPM | BODY MASS INDEX: 17.32 KG/M2 | HEIGHT: 70 IN | DIASTOLIC BLOOD PRESSURE: 88 MMHG | WEIGHT: 121 LBS

## 2021-04-13 PROCEDURE — 99215 OFFICE O/P EST HI 40 MIN: CPT

## 2021-04-13 RX ORDER — PANCRELIPASE 60000; 12000; 38000 [USP'U]/1; [USP'U]/1; [USP'U]/1
12000-38000 CAPSULE, DELAYED RELEASE PELLETS ORAL 3 TIMES DAILY
Qty: 90 | Refills: 5 | Status: ACTIVE | COMMUNITY
Start: 2021-04-13 | End: 1900-01-01

## 2021-04-16 ENCOUNTER — OUTPATIENT (OUTPATIENT)
Dept: OUTPATIENT SERVICES | Facility: HOSPITAL | Age: 76
LOS: 1 days | Discharge: HOME | End: 2021-04-16

## 2021-04-16 DIAGNOSIS — Z98.890 OTHER SPECIFIED POSTPROCEDURAL STATES: Chronic | ICD-10-CM

## 2021-04-16 DIAGNOSIS — Z11.59 ENCOUNTER FOR SCREENING FOR OTHER VIRAL DISEASES: ICD-10-CM

## 2021-04-17 LAB — SARS-COV-2 N GENE NPH QL NAA+PROBE: DETECTED

## 2021-04-17 NOTE — PHYSICAL EXAM
[Normal] : supple, no neck mass and thyroid not enlarged [Normal Supraclavicular Lymph Nodes] : normal supraclavicular lymph nodes [Normal Neck Lymph Nodes] : normal neck lymph nodes  [Normal Groin Lymph Nodes] : normal groin lymph nodes [Normal Axillary Lymph Nodes] : normal axillary lymph nodes [Normal] : oriented to person, place and time, with appropriate affect

## 2021-04-17 NOTE — ASSESSMENT
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 presented with obstructive jaundice\par s/p ERCP/plastic stent/EUS 3/9/2021\par trending down of her bilirubin\par \par PET showed left upper lung avid lesion, bilateral pleural effusion more on right side, hx of chronic lung disease and return to work shortly after 9/11 with possible dust exposure, she saw pulmonologist for long time.\par \par discussed with IR dr Mancuso her PET lung findings and given that was developed in 10 days, he suggested no bx and repeat imaging\par \par repeat LFT and obtain CA 19-9 showed bilirubin of 5 and CA 19-9 of 300s, will repeat again labs to get CA 19-9 with normalized bilirubin\par \par Discussed in length treatment options upfront surgery vs neoadjuvant approach\par \par she likes to proceed with neoadjuvant chemo, saw  her in April 13, 2021 with finding of recent covid test positive, will repeat covid test and arrange for mediport.\par she will see Dr Johnson to discuss her neoadjuvant

## 2021-04-17 NOTE — HISTORY OF PRESENT ILLNESS
[de-identified] : VIKASH LI  is a pleasant 75 year year old woman  who came in 03/18/2021 for newly diagnosed pancreas cancer 3/2021. She has cardiologist Dr Garrett Garvey for a fib. Dr Carson vascular surgeon for her varicose veins.\par \par she noticed jaundice in december 2020. treated for UTI. her cardiologist noticed jaundice during her stress test and send her to Dr Grant who scope her 3/3/2021 with negative results and ordered CT which was not done as outpatient. she came to [facility] ED with worsening jaundice and she had CT and EUS/ERCP and plastic stent by Dr Gavin with bx showed adenocarcinoma at head of pancreas. 3/9/2021 EUS showed resectable lesion and possible reactive LN. t mable 16 and went down after stent to 8\par \par \par

## 2021-04-17 NOTE — REVIEW OF SYSTEMS
[Negative] : Endocrine [FreeTextEntry2] : loss of weight and fatigue [FreeTextEntry8] : some abdominal discomfort

## 2021-04-21 ENCOUNTER — RESULT REVIEW (OUTPATIENT)
Age: 76
End: 2021-04-21

## 2021-04-21 ENCOUNTER — OUTPATIENT (OUTPATIENT)
Dept: OUTPATIENT SERVICES | Facility: HOSPITAL | Age: 76
LOS: 1 days | Discharge: HOME | End: 2021-04-21
Payer: MEDICARE

## 2021-04-21 ENCOUNTER — APPOINTMENT (OUTPATIENT)
Dept: SURGERY | Facility: HOSPITAL | Age: 76
End: 2021-04-21

## 2021-04-21 VITALS
HEART RATE: 98 BPM | DIASTOLIC BLOOD PRESSURE: 95 MMHG | RESPIRATION RATE: 16 BRPM | OXYGEN SATURATION: 97 % | SYSTOLIC BLOOD PRESSURE: 149 MMHG

## 2021-04-21 VITALS
TEMPERATURE: 97 F | SYSTOLIC BLOOD PRESSURE: 151 MMHG | DIASTOLIC BLOOD PRESSURE: 95 MMHG | WEIGHT: 125 LBS | OXYGEN SATURATION: 98 % | HEIGHT: 69 IN | RESPIRATION RATE: 17 BRPM | HEART RATE: 114 BPM

## 2021-04-21 DIAGNOSIS — Z90.89 ACQUIRED ABSENCE OF OTHER ORGANS: Chronic | ICD-10-CM

## 2021-04-21 DIAGNOSIS — N20.0 CALCULUS OF KIDNEY: Chronic | ICD-10-CM

## 2021-04-21 DIAGNOSIS — Z98.890 OTHER SPECIFIED POSTPROCEDURAL STATES: Chronic | ICD-10-CM

## 2021-04-21 PROCEDURE — 36561 INSERT TUNNELED CV CATH: CPT

## 2021-04-21 PROCEDURE — 71045 X-RAY EXAM CHEST 1 VIEW: CPT | Mod: 26

## 2021-04-21 RX ORDER — OXYCODONE AND ACETAMINOPHEN 5; 325 MG/1; MG/1
1 TABLET ORAL ONCE
Refills: 0 | Status: DISCONTINUED | OUTPATIENT
Start: 2021-04-21 | End: 2021-04-21

## 2021-04-21 RX ORDER — ONDANSETRON 8 MG/1
4 TABLET, FILM COATED ORAL ONCE
Refills: 0 | Status: DISCONTINUED | OUTPATIENT
Start: 2021-04-21 | End: 2021-04-21

## 2021-04-21 RX ORDER — SODIUM CHLORIDE 9 MG/ML
1000 INJECTION, SOLUTION INTRAVENOUS
Refills: 0 | Status: DISCONTINUED | OUTPATIENT
Start: 2021-04-21 | End: 2021-04-21

## 2021-04-21 RX ORDER — HYDROMORPHONE HYDROCHLORIDE 2 MG/ML
0.5 INJECTION INTRAMUSCULAR; INTRAVENOUS; SUBCUTANEOUS
Refills: 0 | Status: DISCONTINUED | OUTPATIENT
Start: 2021-04-21 | End: 2021-04-21

## 2021-04-21 NOTE — H&P ADULT - CLICK TO LAUNCH ORM
----- Message from ASHANTI Noriega, OD sent at 8/22/2019  4:28 PM CDT -----  Call to confirm if she ever got any ointment to use?  If so, continue ointment 2x/ daily into eye, and call Monday w/ report.    If no ointment,  I'll call in drops to use instead.        ----- Message -----  From: Kerrie Gamboa  Sent: 8/22/2019   4:11 PM  To: ASHANTI Noriega, ANUJ        ----- Message -----  From: Sharita Metzger  Sent: 8/22/2019   3:02 PM  To: Leann Bean Staff    Type: Needs Medical Advice    Who Called:  Patient  Pharmacy name and phone #:    [id number] - [place], [place] - [address] AT [facility] [address] [facility] [address]  [address]  [place] [address]  Phone: [phone] Fax: [phone]  Best Call Back Number: [phone]  Additional Information: tobramycin-dexamethasone 0.3-0.1% (TOBRADEX) 0.3-0.1 % Oint is on back order at her pharmacy, she has finished Bactrim on Monday, contact to advise what she should continue to do, eye is still red and itching    
.
lock

## 2021-04-21 NOTE — H&P ADULT - NSHPPHYSICALEXAM_GEN_ALL_CORE
VITALS:  T(F): 96.6 (04-21-21 @ 08:02), Max: 96.6 (04-21-21 @ 08:02)  HR: 114 (04-21-21 @ 09:01) (114 - 114)  BP: 151/95 (04-21-21 @ 09:01) (151/95 - 151/95)  RR: 17 (04-21-21 @ 09:01) (17 - 17)  SpO2: 98% (04-21-21 @ 09:01) (98% - 98%)    PHYSICAL EXAM:  General: NAD, AAOx3, calm and cooperative  HEENT: NCAT, JEFFRY, EOMI, Trachea ML, Neck supple  Cardiac: RRR S1, S2, no Murmurs, rubs or gallops  Respiratory: CTAB, normal respiratory effort, breath sounds equal BL, no wheeze, rhonchi or crackles  Abdomen: Soft, non-distended, non-tender, no rebound, no guarding. +BS.  Musculoskeletal: Strength 5/5 BL UE/LE, ROM intact, compartments soft  Neuro: Sensation grossly intact and equal throughout, no focal deficits  Vascular: Pulses 2+ throughout, extremities well perfused  Skin: Warm/dry, normal color, no jaundice  Incision/wound: healing well, dressings in place, clean, dry and intact

## 2021-04-21 NOTE — ASU DISCHARGE PLAN (ADULT/PEDIATRIC) - CALL YOUR DOCTOR IF YOU HAVE ANY OF THE FOLLOWING:
Bleeding that does not stop/Swelling that gets worse/Fever greater than (need to indicate Fahrenheit or Celsius)/Wound/Surgical Site with redness, or foul smelling discharge or pus/Numbness, tingling, color or temperature change to extremity/Nausea and vomiting that does not stop/Unable to urinate/Excessive diarrhea/Inability to tolerate liquids or foods/Increased irritability or sluggishness

## 2021-04-21 NOTE — H&P ADULT - NSICDXPASTMEDICALHX_GEN_ALL_CORE_FT
No PAST MEDICAL HISTORY:  Atrial fibrillation, unspecified type 2019 on Eliquis    Hypertension, unspecified type 2019    Jaundice March 5, 2021    Malignant neoplasm of pancreas, unspecified location of malignancy Pancreatic Cancer    Pain knee    SOB (shortness of breath)     Varicose veins of both lower extremities, unspecified whether complicated

## 2021-04-21 NOTE — H&P ADULT - NSICDXPASTSURGICALHX_GEN_ALL_CORE_FT
PAST SURGICAL HISTORY:  History of ovarian cystectomy left    History of surgery vascular surgery   ? variocose vein stripping?    History of tonsillectomy 1959    Kidney stone 2017    Status post phlebectomy 10/2018

## 2021-04-21 NOTE — ASU PREOP CHECKLIST - SELECT TESTS ORDERED
pt needs type and screen with confirmation/Results in MD note pt needs type and screen with confirmation/Results in MD note/COVID-19

## 2021-04-21 NOTE — H&P ADULT - ASSESSMENT
75 year old F patient with PMHx of Afib on Eliquis, HTN, varicose vein in the lower extremities, nephrolithiasis presented to the ED previously for painless jaundice and found to have pancreatic adenocarcinoma.       PLAN:  - placement of mediport

## 2021-04-21 NOTE — H&P ADULT - HISTORY OF PRESENT ILLNESS
75 year old F patient with PMHx of Afib on Eliquis, HTN, varicose vein in the lower extremities, nephrolithiasis presented to the ED for painless jaundice and found to have pancreatic adenocarcinoma.   Pancreatic adenocarcinoma: Needs PET scan to complete staging, if no evidence of additional disease then likely she will get upfront surgery followed by adjuvant chemo, however we will defer the decision for neoadjuvant treatment to surgical oncology. CT read encasement of SMA cannot be ruled out, however this finding may possibly be from edema. Additionally, she should have her cardiac status optimized and if her pleural effusions are not improved, then would recommend pulmonology for thoracentesis to rule out malignant effusion which would make her stage IV. As of now she appears to be T3 (largest dimension 3.5cm), and possibly N1 (periportal LN?) without no evidence of metastatic disease.

## 2021-04-21 NOTE — ASU PATIENT PROFILE, ADULT - PMH
Pain  knee  SOB (shortness of breath)    Varicose veins of both lower extremities, unspecified whether complicated     Atrial fibrillation, unspecified type  2019 on Eliquis  Hypertension, unspecified type  2019  Jaundice  March 5, 2021  Malignant neoplasm of pancreas, unspecified location of malignancy  Pancreatic Cancer  Pain  knee  SOB (shortness of breath)    Varicose veins of both lower extremities, unspecified whether complicated

## 2021-04-21 NOTE — ASU PATIENT PROFILE, ADULT - PSH
History of ovarian cystectomy  left  History of surgery  vascular surgery   ? variocose vein stripping?  Status post phlebectomy  10/2018   History of ovarian cystectomy  left  History of surgery  vascular surgery   ? variocose vein stripping?  History of tonsillectomy  1959  Kidney stone  2017  Status post phlebectomy  10/2018

## 2021-04-21 NOTE — ASU DISCHARGE PLAN (ADULT/PEDIATRIC) - CARE PROVIDER_API CALL
Jeromy Nino (MD)  Complex General Surgical Oncology; Surgery  [address] [facility], [address] [place] [address]  Phone: [phone]  Fax: [phone]  Follow Up Time:

## 2021-04-21 NOTE — BRIEF OPERATIVE NOTE - NSICDXBRIEFPROCEDURE_GEN_ALL_CORE_FT
PROCEDURES:  Assessment, insertion site of central venous catheter with subcutaneous port 21-Apr-2021 13:15:21  Dexter De La Fuente

## 2021-04-22 ENCOUNTER — APPOINTMENT (OUTPATIENT)
Dept: INFUSION THERAPY | Facility: CLINIC | Age: 76
End: 2021-04-22

## 2021-04-22 ENCOUNTER — APPOINTMENT (OUTPATIENT)
Dept: HEMATOLOGY ONCOLOGY | Facility: CLINIC | Age: 76
End: 2021-04-22

## 2021-04-22 PROBLEM — I48.91 UNSPECIFIED ATRIAL FIBRILLATION: Chronic | Status: ACTIVE | Noted: 2021-04-21

## 2021-04-22 PROBLEM — C25.9 MALIGNANT NEOPLASM OF PANCREAS, UNSPECIFIED: Chronic | Status: ACTIVE | Noted: 2021-04-21

## 2021-04-22 PROBLEM — R17 UNSPECIFIED JAUNDICE: Chronic | Status: ACTIVE | Noted: 2021-04-21

## 2021-04-22 PROBLEM — I10 ESSENTIAL (PRIMARY) HYPERTENSION: Chronic | Status: ACTIVE | Noted: 2021-04-21

## 2021-04-29 ENCOUNTER — APPOINTMENT (OUTPATIENT)
Dept: INFUSION THERAPY | Facility: CLINIC | Age: 76
End: 2021-04-29

## 2021-04-29 ENCOUNTER — LABORATORY RESULT (OUTPATIENT)
Age: 76
End: 2021-04-29

## 2021-04-29 VITALS — BODY MASS INDEX: 19.93 KG/M2 | WEIGHT: 127 LBS | HEIGHT: 67 IN

## 2021-04-29 DIAGNOSIS — Z00.00 ENCOUNTER FOR GENERAL ADULT MEDICAL EXAMINATION W/OUT ABNORMAL FINDINGS: ICD-10-CM

## 2021-04-29 LAB
HCT VFR BLD CALC: 39.3 %
HGB BLD-MCNC: 12.8 G/DL
MCHC RBC-ENTMCNC: 31.2 PG
MCHC RBC-ENTMCNC: 32.6 G/DL
MCV RBC AUTO: 95.9 FL
PLATELET # BLD AUTO: 147 K/UL
PMV BLD: 11.3 FL
RBC # BLD: 4.1 M/UL
RBC # FLD: 14.5 %
WBC # FLD AUTO: 7.71 K/UL

## 2021-04-29 RX ORDER — GEMCITABINE 38 MG/ML
1350 INJECTION, SOLUTION INTRAVENOUS ONCE
Refills: 0 | Status: COMPLETED | OUTPATIENT
Start: 2021-04-29 | End: 2021-04-29

## 2021-04-29 RX ORDER — DEXAMETHASONE 0.5 MG/5ML
12 ELIXIR ORAL ONCE
Refills: 0 | Status: COMPLETED | OUTPATIENT
Start: 2021-04-29 | End: 2021-04-29

## 2021-04-29 RX ORDER — PACLITAXEL 100 MG/20ML
150 INJECTION, POWDER, LYOPHILIZED, FOR SUSPENSION INTRAVENOUS ONCE
Refills: 0 | Status: COMPLETED | OUTPATIENT
Start: 2021-04-29 | End: 2021-04-29

## 2021-04-29 RX ADMIN — Medication 122 MILLIGRAM(S): at 11:05

## 2021-04-29 RX ADMIN — GEMCITABINE 380.71 MILLIGRAM(S): 38 INJECTION, SOLUTION INTRAVENOUS at 11:20

## 2021-04-29 RX ADMIN — PACLITAXEL 60 MILLIGRAM(S): 100 INJECTION, POWDER, LYOPHILIZED, FOR SUSPENSION INTRAVENOUS at 11:19

## 2021-04-30 DIAGNOSIS — Z88.8 ALLERGY STATUS TO OTHER DRUGS, MEDICAMENTS AND BIOLOGICAL SUBSTANCES STATUS: ICD-10-CM

## 2021-04-30 DIAGNOSIS — Z88.1 ALLERGY STATUS TO OTHER ANTIBIOTIC AGENTS STATUS: ICD-10-CM

## 2021-04-30 DIAGNOSIS — Z79.01 LONG TERM (CURRENT) USE OF ANTICOAGULANTS: ICD-10-CM

## 2021-04-30 DIAGNOSIS — Z88.4 ALLERGY STATUS TO ANESTHETIC AGENT: ICD-10-CM

## 2021-04-30 DIAGNOSIS — Z88.2 ALLERGY STATUS TO SULFONAMIDES: ICD-10-CM

## 2021-04-30 DIAGNOSIS — I48.91 UNSPECIFIED ATRIAL FIBRILLATION: ICD-10-CM

## 2021-04-30 DIAGNOSIS — C25.0 MALIGNANT NEOPLASM OF HEAD OF PANCREAS: ICD-10-CM

## 2021-04-30 DIAGNOSIS — I10 ESSENTIAL (PRIMARY) HYPERTENSION: ICD-10-CM

## 2021-04-30 DIAGNOSIS — Z87.442 PERSONAL HISTORY OF URINARY CALCULI: ICD-10-CM

## 2021-05-04 ENCOUNTER — INPATIENT (INPATIENT)
Facility: HOSPITAL | Age: 76
LOS: 0 days | Discharge: HOME | End: 2021-05-05
Attending: INTERNAL MEDICINE | Admitting: INTERNAL MEDICINE
Payer: MEDICARE

## 2021-05-04 ENCOUNTER — APPOINTMENT (OUTPATIENT)
Dept: SURGERY | Facility: CLINIC | Age: 76
End: 2021-05-04
Payer: MEDICARE

## 2021-05-04 ENCOUNTER — EMERGENCY (EMERGENCY)
Facility: HOSPITAL | Age: 76
LOS: 0 days | Discharge: HOME | End: 2021-05-04
Admitting: INTERNAL MEDICINE

## 2021-05-04 VITALS
DIASTOLIC BLOOD PRESSURE: 70 MMHG | OXYGEN SATURATION: 100 % | TEMPERATURE: 98 F | SYSTOLIC BLOOD PRESSURE: 131 MMHG | WEIGHT: 126.99 LBS | RESPIRATION RATE: 19 BRPM | HEART RATE: 111 BPM | HEIGHT: 69 IN

## 2021-05-04 VITALS
BODY MASS INDEX: 20.09 KG/M2 | HEIGHT: 67 IN | WEIGHT: 128 LBS | OXYGEN SATURATION: 97 % | DIASTOLIC BLOOD PRESSURE: 68 MMHG | SYSTOLIC BLOOD PRESSURE: 126 MMHG | TEMPERATURE: 97.9 F | HEART RATE: 123 BPM

## 2021-05-04 DIAGNOSIS — Z98.890 OTHER SPECIFIED POSTPROCEDURAL STATES: Chronic | ICD-10-CM

## 2021-05-04 DIAGNOSIS — C25.9 MALIGNANT NEOPLASM OF PANCREAS, UNSPECIFIED: ICD-10-CM

## 2021-05-04 DIAGNOSIS — Z79.01 LONG TERM (CURRENT) USE OF ANTICOAGULANTS: ICD-10-CM

## 2021-05-04 DIAGNOSIS — Z88.8 ALLERGY STATUS TO OTHER DRUGS, MEDICAMENTS AND BIOLOGICAL SUBSTANCES STATUS: ICD-10-CM

## 2021-05-04 DIAGNOSIS — I10 ESSENTIAL (PRIMARY) HYPERTENSION: ICD-10-CM

## 2021-05-04 DIAGNOSIS — Z92.21 PERSONAL HISTORY OF ANTINEOPLASTIC CHEMOTHERAPY: ICD-10-CM

## 2021-05-04 DIAGNOSIS — I48.91 UNSPECIFIED ATRIAL FIBRILLATION: ICD-10-CM

## 2021-05-04 DIAGNOSIS — Z88.2 ALLERGY STATUS TO SULFONAMIDES: ICD-10-CM

## 2021-05-04 DIAGNOSIS — U07.1 COVID-19: ICD-10-CM

## 2021-05-04 DIAGNOSIS — L03.116 CELLULITIS OF LEFT LOWER LIMB: ICD-10-CM

## 2021-05-04 DIAGNOSIS — N20.0 CALCULUS OF KIDNEY: Chronic | ICD-10-CM

## 2021-05-04 DIAGNOSIS — Z88.4 ALLERGY STATUS TO ANESTHETIC AGENT: ICD-10-CM

## 2021-05-04 DIAGNOSIS — Z90.89 ACQUIRED ABSENCE OF OTHER ORGANS: Chronic | ICD-10-CM

## 2021-05-04 DIAGNOSIS — L03.119 CELLULITIS OF UNSPECIFIED PART OF LIMB: ICD-10-CM

## 2021-05-04 DIAGNOSIS — R60.0 LOCALIZED EDEMA: ICD-10-CM

## 2021-05-04 DIAGNOSIS — Z88.1 ALLERGY STATUS TO OTHER ANTIBIOTIC AGENTS STATUS: ICD-10-CM

## 2021-05-04 DIAGNOSIS — Z85.07 PERSONAL HISTORY OF MALIGNANT NEOPLASM OF PANCREAS: ICD-10-CM

## 2021-05-04 DIAGNOSIS — I48.20 CHRONIC ATRIAL FIBRILLATION, UNSPECIFIED: ICD-10-CM

## 2021-05-04 LAB
ALBUMIN SERPL ELPH-MCNC: 3.8 G/DL — SIGNIFICANT CHANGE UP (ref 3.5–5.2)
ALP SERPL-CCNC: 451 U/L — HIGH (ref 30–115)
ALT FLD-CCNC: 28 U/L — SIGNIFICANT CHANGE UP (ref 0–41)
ANION GAP SERPL CALC-SCNC: 11 MMOL/L — SIGNIFICANT CHANGE UP (ref 7–14)
APTT BLD: 34.1 SEC — SIGNIFICANT CHANGE UP (ref 27–39.2)
AST SERPL-CCNC: 24 U/L — SIGNIFICANT CHANGE UP (ref 0–41)
BASOPHILS # BLD AUTO: 0.02 K/UL — SIGNIFICANT CHANGE UP (ref 0–0.2)
BASOPHILS NFR BLD AUTO: 0.2 % — SIGNIFICANT CHANGE UP (ref 0–1)
BILIRUB SERPL-MCNC: 1.7 MG/DL — HIGH (ref 0.2–1.2)
BUN SERPL-MCNC: 15 MG/DL — SIGNIFICANT CHANGE UP (ref 10–20)
CALCIUM SERPL-MCNC: 9 MG/DL — SIGNIFICANT CHANGE UP (ref 8.5–10.1)
CHLORIDE SERPL-SCNC: 101 MMOL/L — SIGNIFICANT CHANGE UP (ref 98–110)
CO2 SERPL-SCNC: 25 MMOL/L — SIGNIFICANT CHANGE UP (ref 17–32)
CREAT SERPL-MCNC: 0.5 MG/DL — LOW (ref 0.7–1.5)
EOSINOPHIL # BLD AUTO: 0.02 K/UL — SIGNIFICANT CHANGE UP (ref 0–0.7)
EOSINOPHIL NFR BLD AUTO: 0.2 % — SIGNIFICANT CHANGE UP (ref 0–8)
GLUCOSE SERPL-MCNC: 109 MG/DL — HIGH (ref 70–99)
HCT VFR BLD CALC: 38.2 % — SIGNIFICANT CHANGE UP (ref 37–47)
HGB BLD-MCNC: 12 G/DL — SIGNIFICANT CHANGE UP (ref 12–16)
IMM GRANULOCYTES NFR BLD AUTO: 0.4 % — HIGH (ref 0.1–0.3)
INR BLD: 2.63 RATIO — HIGH (ref 0.65–1.3)
LACTATE SERPL-SCNC: 1.2 MMOL/L — SIGNIFICANT CHANGE UP (ref 0.7–2)
LYMPHOCYTES # BLD AUTO: 1.23 K/UL — SIGNIFICANT CHANGE UP (ref 1.2–3.4)
LYMPHOCYTES # BLD AUTO: 13.4 % — LOW (ref 20.5–51.1)
MCHC RBC-ENTMCNC: 30 PG — SIGNIFICANT CHANGE UP (ref 27–31)
MCHC RBC-ENTMCNC: 31.4 G/DL — LOW (ref 32–37)
MCV RBC AUTO: 95.5 FL — SIGNIFICANT CHANGE UP (ref 81–99)
MONOCYTES # BLD AUTO: 0.09 K/UL — LOW (ref 0.1–0.6)
MONOCYTES NFR BLD AUTO: 1 % — LOW (ref 1.7–9.3)
NEUTROPHILS # BLD AUTO: 7.79 K/UL — HIGH (ref 1.4–6.5)
NEUTROPHILS NFR BLD AUTO: 84.8 % — HIGH (ref 42.2–75.2)
NRBC # BLD: 0 /100 WBCS — SIGNIFICANT CHANGE UP (ref 0–0)
PLATELET # BLD AUTO: 134 K/UL — SIGNIFICANT CHANGE UP (ref 130–400)
POTASSIUM SERPL-MCNC: 3.9 MMOL/L — SIGNIFICANT CHANGE UP (ref 3.5–5)
POTASSIUM SERPL-SCNC: 3.9 MMOL/L — SIGNIFICANT CHANGE UP (ref 3.5–5)
PROT SERPL-MCNC: 6.1 G/DL — SIGNIFICANT CHANGE UP (ref 6–8)
PROTHROM AB SERPL-ACNC: 30.3 SEC — HIGH (ref 9.95–12.87)
RBC # BLD: 4 M/UL — LOW (ref 4.2–5.4)
RBC # FLD: 13.4 % — SIGNIFICANT CHANGE UP (ref 11.5–14.5)
SARS-COV-2 RNA SPEC QL NAA+PROBE: DETECTED
SODIUM SERPL-SCNC: 137 MMOL/L — SIGNIFICANT CHANGE UP (ref 135–146)
WBC # BLD: 9.19 K/UL — SIGNIFICANT CHANGE UP (ref 4.8–10.8)
WBC # FLD AUTO: 9.19 K/UL — SIGNIFICANT CHANGE UP (ref 4.8–10.8)

## 2021-05-04 PROCEDURE — 71045 X-RAY EXAM CHEST 1 VIEW: CPT | Mod: 26

## 2021-05-04 PROCEDURE — 73590 X-RAY EXAM OF LOWER LEG: CPT | Mod: 26,LT

## 2021-05-04 PROCEDURE — 99285 EMERGENCY DEPT VISIT HI MDM: CPT | Mod: CS

## 2021-05-04 PROCEDURE — 93970 EXTREMITY STUDY: CPT | Mod: 26

## 2021-05-04 PROCEDURE — 99024 POSTOP FOLLOW-UP VISIT: CPT

## 2021-05-04 PROCEDURE — 99223 1ST HOSP IP/OBS HIGH 75: CPT

## 2021-05-04 RX ORDER — SODIUM CHLORIDE 9 MG/ML
1000 INJECTION, SOLUTION INTRAVENOUS ONCE
Refills: 0 | Status: COMPLETED | OUTPATIENT
Start: 2021-05-04 | End: 2021-05-04

## 2021-05-04 RX ORDER — CHLORHEXIDINE GLUCONATE 213 G/1000ML
1 SOLUTION TOPICAL
Refills: 0 | Status: DISCONTINUED | OUTPATIENT
Start: 2021-05-04 | End: 2021-05-05

## 2021-05-04 RX ORDER — VANCOMYCIN HCL 1 G
1000 VIAL (EA) INTRAVENOUS ONCE
Refills: 0 | Status: COMPLETED | OUTPATIENT
Start: 2021-05-04 | End: 2021-05-04

## 2021-05-04 RX ORDER — MULTIVIT-MIN/FERROUS GLUCONATE 9 MG/15 ML
1 LIQUID (ML) ORAL DAILY
Refills: 0 | Status: DISCONTINUED | OUTPATIENT
Start: 2021-05-04 | End: 2021-05-05

## 2021-05-04 RX ORDER — APIXABAN 2.5 MG/1
5 TABLET, FILM COATED ORAL
Refills: 0 | Status: DISCONTINUED | OUTPATIENT
Start: 2021-05-04 | End: 2021-05-05

## 2021-05-04 RX ORDER — CEFTRIAXONE 500 MG/1
INJECTION, POWDER, FOR SOLUTION INTRAMUSCULAR; INTRAVENOUS
Refills: 0 | Status: DISCONTINUED | OUTPATIENT
Start: 2021-05-04 | End: 2021-05-04

## 2021-05-04 RX ORDER — DILTIAZEM HCL 120 MG
120 CAPSULE, EXT RELEASE 24 HR ORAL DAILY
Refills: 0 | Status: DISCONTINUED | OUTPATIENT
Start: 2021-05-04 | End: 2021-05-05

## 2021-05-04 RX ORDER — ACETAMINOPHEN 500 MG
650 TABLET ORAL EVERY 6 HOURS
Refills: 0 | Status: DISCONTINUED | OUTPATIENT
Start: 2021-05-04 | End: 2021-05-05

## 2021-05-04 RX ORDER — CEFTRIAXONE 500 MG/1
1000 INJECTION, POWDER, FOR SOLUTION INTRAMUSCULAR; INTRAVENOUS EVERY 24 HOURS
Refills: 0 | Status: DISCONTINUED | OUTPATIENT
Start: 2021-05-04 | End: 2021-05-04

## 2021-05-04 RX ORDER — FUROSEMIDE 40 MG
20 TABLET ORAL DAILY
Refills: 0 | Status: DISCONTINUED | OUTPATIENT
Start: 2021-05-04 | End: 2021-05-05

## 2021-05-04 RX ADMIN — Medication 250 MILLIGRAM(S): at 15:16

## 2021-05-04 RX ADMIN — Medication 100 MILLIGRAM(S): at 22:08

## 2021-05-04 RX ADMIN — Medication 100 MILLIGRAM(S): at 16:34

## 2021-05-04 RX ADMIN — Medication 650 MILLIGRAM(S): at 16:34

## 2021-05-04 RX ADMIN — SODIUM CHLORIDE 1000 MILLILITER(S): 9 INJECTION, SOLUTION INTRAVENOUS at 12:24

## 2021-05-04 RX ADMIN — APIXABAN 5 MILLIGRAM(S): 2.5 TABLET, FILM COATED ORAL at 22:07

## 2021-05-04 NOTE — H&P ADULT - ATTENDING COMMENTS
Patient is a 75 year old F patient with PMHx of Afib on eliquis ( Cardiologist VEE Medrano ), HTN, Varicose vein in the lower extremities, Nephrolithiasis, pancreatic adenocarcinoma on Chemo being followed by Dr Johnson presented with LLE erythema and swelling x 4 days.    #LLE cellulitis  Sepsis ruled out  Pt endorses chronic wound in left foot after procedure for varicose vein  - Cont clindamycin IV 600mg TID. Monitor for sx resolution  - Tylenol for pain  - Monitor WBC, fevers    #Chronic Afib  - Cont cardizem CD 120mg qD  - Cont Eliquis    #HTN  - Cont cardizem as above  - Cont Lasix 20mg qD    #h/o varicose vein  - Outpt vascular f/u    #Pancreatic CA  - Outpt oncology f/u    DVT PPX, Eliquis    #Progress Note Handoff  Pending (specify): IV abx therapy and clinical resolution of cellulitis  Family discussion: d/w pt regarding cont IV clindamycin. Transition to PO when pain subsided and able to ambulate  Disposition: Home

## 2021-05-04 NOTE — H&P ADULT - NSHPPHYSICALEXAM_GEN_ALL_CORE
GENERAL: NAD, lying in bed comfortably  EYES: EOMI, PERRLA, sclera + jaundice  ENT: Moist mucous membranes  CHEST/LUNG: Clear to auscultation bilaterally; No rales, rhonchi, wheezing, or rubs. Unlabored respirations  HEART: Regular rate and rhythm; No murmurs, rubs, or gallops  ABDOMEN: Bowel sounds present; Soft, Nontender, Nondistended.   EXTREMITIES:  2+ Peripheral Pulses, brisk capillary refill. bilateral edema with varicose veins. LLE is diffusely swollen, erythematous and tender to the touch  NERVOUS SYSTEM:  Alert & Oriented X3, speech clear. No deficits   MSK: FROM all 4 extremities, full and equal strength

## 2021-05-04 NOTE — ED ADULT NURSE NOTE - OBJECTIVE STATEMENT
patient states had first dose chemo last week for pancreatic cancer via port. now has b/lle redness .. left leg worse with redness and warm and swelling. denies chest pain denies sob

## 2021-05-04 NOTE — H&P ADULT - NSHPLABSRESULTS_GEN_ALL_CORE
Labs:  CAPILLARY BLOOD GLUCOSE                              12.0   9.19  )-----------( 134      ( 04 May 2021 12:01 )             38.2       Auto Neutrophil %: 84.8 % (05-04-21 @ 12:01)  Auto Immature Granulocyte %: 0.4 % (05-04-21 @ 12:01)    05-04    137  |  101  |  15  ----------------------------<  109<H>  3.9   |  25  |  0.5<L>      Calcium, Total Serum: 9.0 mg/dL (05-04-21 @ 12:01)      LFTs:             6.1  | 1.7  | 24       ------------------[451     ( 04 May 2021 12:01 )  3.8  | x    | 28          Lipase:x      Amylase:x         Lactate, Blood: 1.2 mmol/L (05-04-21 @ 12:01)      Coags:     30.30  ----< 2.63    ( 04 May 2021 12:01 )     34.1                    < from: 12 Lead ECG (03.07.21 @ 22:17) >    Diagnosis Line Atrial fibrillation with rapid ventricular response  AbnormalECG< from: Xray Chest 1 View AP/PA (05.04.21 @ 13:27) >    Impression:    No consolidation, effusion or pneumothorax.    < end of copied text >    < from: Xray Tibia + Fibula 2 Views, Left (05.04.21 @ 13:28) >    IMPRESSION:    Soft tissue swelling without evidence for acute osseous abnormality.    < end of copied text >

## 2021-05-04 NOTE — ED PROVIDER NOTE - NS ED ROS FT
Constitutional: (-) fever  Eyes/ENT: (-) visual changes   Cardiovascular: (-) chest pain, (-) syncope  Respiratory: (-) cough, (-) shortness of breath  Gastrointestinal: (-) vomiting, (-) diarrhea  Genitourinary: (-) dysuria, (-) hesitancy, (-) frequency   Musculoskeletal: (-) neck pain, (-) back pain, (-) joint pain  Integumentary: (-) rash, (+) edema/erythema LLE  Neurological: (-) headache, (-) altered mental status  Allergic/Immunologic: (-) pruritus

## 2021-05-04 NOTE — H&P ADULT - ASSESSMENT
Patient is a 75 year old F patient with PMHx of Afib on eliquis ( Cardiologist VEE Medrano ), HTN, Varicose vein in the lower extremities, Nephrolithiasis, pancreatic adenocarcinoma on Chemo being followed by Dr Johnson presented with LLE erythema and swelling x 4 days.     #LLE erythema and swelling, likely cellulitis  -Sepsis ruled out on admission. No fever, WBC is normal. no neutropenia. tachy but she has afib  -DVT duplex on admission is negative for DVTs, LE xray showed no fracture but there is soft tissue swelling.  -tylenol PRN for pain  -no role for blood cultures in this pt. trend fever curve  -in light of recent chemo, would do IV Clindamycin 600mg q8 for abx (pt is allergic to augmentin).  consider ID eval if pt's clinical statues deteriorates.   -MRSA nares    #Pancreatic adenocarcinoma    - f/u Heme/onc Dr Johnson for chemo, has Chemo port by Dr Nino  - last chemo was last thursday (4/29)  - Oncology eval while inpatient. Follow up chemo planning    #Bilateral lower extremities edema   -Continue Lasix 20 mg PO daily   - Duplex LE negative on admission    #Afib   -c/w AC Eliquis 5 mg BID (currently on hold for possible therapeutic tap)  -Rate controlled Diltiazem 120 mg PO daily   - ECHO 3/6: EF 56%, boderline pulm HTN and moderated MR    #HTN   -Continue Cardizem and Furosemide   -Normotensive on admission     #DVT ppx: eliquis  #GI ppx: Protonix   #Diet: DASH   #code statues: full  #Acute from home: awaiting heme/onc recommendations, possible discharge today     Patient is a 75 year old F patient with PMHx of Afib on eliquis ( Cardiologist VEE Medrano ), HTN, Varicose vein in the lower extremities, Nephrolithiasis, pancreatic adenocarcinoma on Chemo being followed by Dr Johnson presented with LLE erythema and swelling x 4 days.     #LLE erythema and swelling, likely cellulitis  -Sepsis ruled out on admission. No fever, WBC is normal. no neutropenia. tachy but she has afib  -DVT duplex on admission is negative for DVTs, LE xray showed no fracture but there is soft tissue swelling.  -tylenol PRN for pain  -no role for blood cultures in this pt. trend fever curve  -in light of recent chemo, would do IV Clindamycin 600mg q8 for abx (pt is allergic to augmentin).  consider ID eval if pt's clinical statues deteriorates.   -MRSA nares    #Pancreatic adenocarcinoma    - f/u Heme/onc Dr Johnson for chemo, has Chemo port by Dr Nino  - last chemo was last thursday (4/29)  - Oncology eval while inpatient. Follow up chemo planning    #Bilateral lower extremities edema   -Continue Lasix 20 mg PO daily   - Duplex LE negative on admission    #Afib   -c/w AC Eliquis 5 mg BID (currently on hold for possible therapeutic tap)  -Rate controlled Diltiazem 120 mg PO daily   - ECHO 3/6: EF 56%, boderline pulm HTN and moderated MR    #HTN   -Continue Cardizem and Furosemide   -Normotensive on admission     #DVT ppx: eliquis  #GI ppx: not indicated  #Diet: DASH   #code statues: full  #Acute from home: awaiting heme/onc recommendations, possible discharge today     Patient is a 75 year old F patient with PMHx of Afib on eliquis ( Cardiologist VEE Medrano ), HTN, Varicose vein in the lower extremities, Nephrolithiasis, pancreatic adenocarcinoma on Chemo being followed by Dr Johnson presented with LLE erythema and swelling x 4 days.     #LLE erythema and swelling, likely cellulitis  -Sepsis ruled out on admission. No fever, WBC is normal. no neutropenia. tachy but she has afib  -DVT duplex on admission is negative for DVTs, LE xray showed no fracture but there is soft tissue swelling.  -tylenol PRN for pain  -no role for blood cultures in this pt. trend fever curve  -in light of recent chemo, would do IV Clindamycin 600mg q8 for abx (pt is allergic to augmentin).  consider ID eval if pt's clinical statues deteriorates.   -MRSA nares    #Pancreatic adenocarcinoma on chemo  - f/u Heme/onc Dr Johnson for chemo, has Chemo port by Dr Nino,   - last chemo was last thursday (4/29), next session thursday  - Oncology eval while inpatient. Follow up chemo planning    #Bilateral lower extremities edema   -Continue Lasix 20 mg PO daily   - Duplex LE negative on admission    #Afib   -c/w AC Eliquis 5 mg BID (currently on hold for possible therapeutic tap)  -Rate controlled Diltiazem 120 mg PO daily   - ECHO 3/6: EF 56%, boderline pulm HTN and moderated MR    #HTN   -Continue Cardizem and Furosemide   -Normotensive on admission     #DVT ppx: eliquis  #GI ppx: not indicated  #Diet: DASH   #code statues: full  #Acute from home: awaiting heme/onc recommendations, possible discharge today     Patient is a 75 year old F patient with PMHx of Afib on eliquis ( Cardiologist VEE Medrano ), HTN, Varicose vein in the lower extremities, Nephrolithiasis, pancreatic adenocarcinoma on Chemo being followed by Dr Johnson presented with LLE erythema and swelling x 4 days.     #LLE erythema and swelling, likely cellulitis  -Sepsis ruled out on admission. No fever, WBC is normal. no neutropenia. tachy but she has afib  -DVT duplex on admission is negative for DVTs, LE xray showed no fracture but there is soft tissue swelling.  -tylenol PRN for pain  -no role for blood cultures in this pt. trend fever curve  -in light of recent chemo, would do IV Clindamycin 600mg q8 for abx (pt is allergic to augmentin).  consider ID eval if pt's clinical statues deteriorates.   -MRSA nares    #Pancreatic adenocarcinoma on chemo  - f/u Heme/onc Dr Johnson for chemo, has Chemo port by Dr Nino,   - last chemo was last thursday (4/29), next session this thursday  - Oncology eval while inpatient. Follow up chemo planning    #Bilateral lower extremities edema   -Continue Lasix 20 mg PO daily   - Duplex LE negative on admission    #Afib   -c/w AC Eliquis 5 mg BID (currently on hold for possible therapeutic tap)  -Rate controlled Diltiazem 120 mg PO daily   - ECHO 3/6: EF 56%, boderline pulm HTN and moderated MR    #HTN   -Continue Cardizem and Furosemide   -Normotensive on admission     #DVT ppx: eliquis  #GI ppx: not indicated  #Diet: DASH   #code statues: full  #Acute from home: awaiting heme/onc recommendations, possible discharge today

## 2021-05-04 NOTE — ED PROVIDER NOTE - PHYSICAL EXAMINATION
Gen: NAD, AOx3  Head: NCAT  HEENT: PERRL, oral mucosa moist, normal conjunctiva, oropharynx clear without exudate or erythema  Lung: CTAB, no respiratory distress, no wheezing, rales, rhonchi  CV: normal s1/s2, rrr, Normal perfusion, pulses 2+ throughout  Abd: soft, NTND, no CVA tenderness  Genitourinary: no pelvic tenderness  MSK: LLE edema/erythema/streaking w/ no calf tenderness/TTP/drainage, no visible deformities, full range of motion in all 4 extremities  Neuro: No focal neurologic deficits  Skin: No rash   Psych: normal affect

## 2021-05-04 NOTE — ED PROVIDER NOTE - PROGRESS NOTE DETAILS
ATTENDING NOTE: I personally evaluated the patient. I reviewed the Physician Assistant’s note (as assigned above), and agree with the findings and plan except as documented in my note. 74 y/o F PMH as above, recent chemo, here with worsening swelling, redness and warmth to the L leg which now extends from the ankle above the knee posteriorly. Pt seen by her DR HOOD concern for DVT. On my exam I am concerned for cellulitis however will evaluate for DVT. Labs ultrasound XR and reevaluate. ATTENDING NOTE: I personally evaluated the patient. I reviewed the Physician Assistant’s note (as assigned above), and agree with the findings and plan except as documented in my note. 74 y/o F PMH as above, recent chemo, here with worsening swelling, redness and warmth to the L leg which now extends from the ankle above the knee posteriorly. Pt seen by her Dr HOOD concern for DVT. On my exam I am concerned for cellulitis however will evaluate for DVT. Labs ultrasound XR and reevaluate. prelim duplex for dvt is negative. Given immunocompromised state and extensive cellulitis will admit

## 2021-05-04 NOTE — ED PROVIDER NOTE - OBJECTIVE STATEMENT
76 yo female with a pmh of HTN, afib on eliquis, and pancreatic CA currently ungergoing chemotherapy presents s/o LLE edema/erythema for 5 days that has been gradually worsening. denies any other symptoms including fevers, chill, headache, recent illness/travel, cough, abdominal pain, chest pain, or SOB.

## 2021-05-04 NOTE — H&P ADULT - HISTORY OF PRESENT ILLNESS
Patient is a 75 year old F patient with PMHx of Afib on eliquis ( Cardiologist VEE Medrano ), HTN, Varicose vein in the lower extremities, Nephrolithiasis, pancreatic adenocarcinoma on Chemo being followed by Dr Johnson presented with LLE erythema and swelling x 4 days. Patient states she got her chemotherapy last Thursday, she tolerated it well. however on Saturday patient developed swelling, erythema and pain in the Left lower extremity. Her LLE swelling erythema and pain got progressively worse over the past couple of days which prompt her to go to her PCP. Patient was seen by her PCP for evaluation, her PCP send her to the ED for further evaluation. Patient denies any fever or chills in the past 4 days. No nausea/vomiting, SOB, palpitations.    In ED  T Max: 37.8 (05-04-21 @ 15:10)  HR: 108  BP: 138/67  RR: 17   SpO2: 99%   Pt c/o  LLE pain, Left lower extremity is diffusely erythematous and swollen. exquisitely tender to the touch. Patient received 1L of LR bolus and 1 dose of 1g Vancomycin in the ED

## 2021-05-04 NOTE — ASSESSMENT
[FreeTextEntry1] : Unable to complete full assessment due to urgent nature of pts LLE. pt presented with LLE swelling and redness x3 days. Pt denies chest pain or SOB. Updated pts son.  PT escorted to the emergency room via wheelchair with office staff and her son. notified Black box dr in the emergency room artem. instructed to consult Alli Maxwell and MD stone to see pt in the emergency room

## 2021-05-04 NOTE — ED ADULT NURSE NOTE - PMH
Atrial fibrillation, unspecified type  2019 on Eliquis  Hypertension, unspecified type  2019  Jaundice  March 5, 2021  Malignant neoplasm of pancreas, unspecified location of malignancy  Pancreatic Cancer  Pain  knee  Pancreatic cancer    SOB (shortness of breath)    Varicose veins of both lower extremities, unspecified whether complicated

## 2021-05-05 ENCOUNTER — TRANSCRIPTION ENCOUNTER (OUTPATIENT)
Age: 76
End: 2021-05-05

## 2021-05-05 VITALS
DIASTOLIC BLOOD PRESSURE: 60 MMHG | OXYGEN SATURATION: 99 % | TEMPERATURE: 98 F | RESPIRATION RATE: 17 BRPM | SYSTOLIC BLOOD PRESSURE: 125 MMHG | HEART RATE: 99 BPM

## 2021-05-05 LAB
ALBUMIN SERPL ELPH-MCNC: 3.1 G/DL — LOW (ref 3.5–5.2)
ALP SERPL-CCNC: 424 U/L — HIGH (ref 30–115)
ALT FLD-CCNC: 22 U/L — SIGNIFICANT CHANGE UP (ref 0–41)
ANION GAP SERPL CALC-SCNC: 10 MMOL/L — SIGNIFICANT CHANGE UP (ref 7–14)
APTT BLD: 38.9 SEC — SIGNIFICANT CHANGE UP (ref 27–39.2)
AST SERPL-CCNC: 21 U/L — SIGNIFICANT CHANGE UP (ref 0–41)
BASOPHILS # BLD AUTO: 0.02 K/UL — SIGNIFICANT CHANGE UP (ref 0–0.2)
BASOPHILS NFR BLD AUTO: 0.4 % — SIGNIFICANT CHANGE UP (ref 0–1)
BILIRUB SERPL-MCNC: 1.5 MG/DL — HIGH (ref 0.2–1.2)
BUN SERPL-MCNC: 11 MG/DL — SIGNIFICANT CHANGE UP (ref 10–20)
CALCIUM SERPL-MCNC: 8.2 MG/DL — LOW (ref 8.5–10.1)
CHLORIDE SERPL-SCNC: 102 MMOL/L — SIGNIFICANT CHANGE UP (ref 98–110)
CO2 SERPL-SCNC: 23 MMOL/L — SIGNIFICANT CHANGE UP (ref 17–32)
COVID-19 SPIKE DOMAIN AB INTERP: POSITIVE
COVID-19 SPIKE DOMAIN ANTIBODY RESULT: 32.8 U/ML — HIGH
CREAT SERPL-MCNC: <0.5 MG/DL — LOW (ref 0.7–1.5)
EOSINOPHIL # BLD AUTO: 0.05 K/UL — SIGNIFICANT CHANGE UP (ref 0–0.7)
EOSINOPHIL NFR BLD AUTO: 1.1 % — SIGNIFICANT CHANGE UP (ref 0–8)
GLUCOSE SERPL-MCNC: 105 MG/DL — HIGH (ref 70–99)
HCT VFR BLD CALC: 31.8 % — LOW (ref 37–47)
HGB BLD-MCNC: 10.3 G/DL — LOW (ref 12–16)
IMM GRANULOCYTES NFR BLD AUTO: 0.9 % — HIGH (ref 0.1–0.3)
INR BLD: 2.57 RATIO — HIGH (ref 0.65–1.3)
LYMPHOCYTES # BLD AUTO: 1.18 K/UL — LOW (ref 1.2–3.4)
LYMPHOCYTES # BLD AUTO: 26 % — SIGNIFICANT CHANGE UP (ref 20.5–51.1)
MAGNESIUM SERPL-MCNC: 1.6 MG/DL — LOW (ref 1.8–2.4)
MCHC RBC-ENTMCNC: 30.4 PG — SIGNIFICANT CHANGE UP (ref 27–31)
MCHC RBC-ENTMCNC: 32.4 G/DL — SIGNIFICANT CHANGE UP (ref 32–37)
MCV RBC AUTO: 93.8 FL — SIGNIFICANT CHANGE UP (ref 81–99)
MONOCYTES # BLD AUTO: 0.21 K/UL — SIGNIFICANT CHANGE UP (ref 0.1–0.6)
MONOCYTES NFR BLD AUTO: 4.6 % — SIGNIFICANT CHANGE UP (ref 1.7–9.3)
NEUTROPHILS # BLD AUTO: 3.04 K/UL — SIGNIFICANT CHANGE UP (ref 1.4–6.5)
NEUTROPHILS NFR BLD AUTO: 67 % — SIGNIFICANT CHANGE UP (ref 42.2–75.2)
NRBC # BLD: 0 /100 WBCS — SIGNIFICANT CHANGE UP (ref 0–0)
PHOSPHATE SERPL-MCNC: 2.7 MG/DL — SIGNIFICANT CHANGE UP (ref 2.1–4.9)
PLATELET # BLD AUTO: 109 K/UL — LOW (ref 130–400)
POTASSIUM SERPL-MCNC: 4.1 MMOL/L — SIGNIFICANT CHANGE UP (ref 3.5–5)
POTASSIUM SERPL-SCNC: 4.1 MMOL/L — SIGNIFICANT CHANGE UP (ref 3.5–5)
PROT SERPL-MCNC: 5.1 G/DL — LOW (ref 6–8)
PROTHROM AB SERPL-ACNC: 29.6 SEC — HIGH (ref 9.95–12.87)
RBC # BLD: 3.39 M/UL — LOW (ref 4.2–5.4)
RBC # FLD: 13.3 % — SIGNIFICANT CHANGE UP (ref 11.5–14.5)
SARS-COV-2 IGG+IGM SERPL QL IA: 32.8 U/ML — HIGH
SARS-COV-2 IGG+IGM SERPL QL IA: POSITIVE
SODIUM SERPL-SCNC: 135 MMOL/L — SIGNIFICANT CHANGE UP (ref 135–146)
WBC # BLD: 4.54 K/UL — LOW (ref 4.8–10.8)
WBC # FLD AUTO: 4.54 K/UL — LOW (ref 4.8–10.8)

## 2021-05-05 PROCEDURE — 99239 HOSP IP/OBS DSCHRG MGMT >30: CPT | Mod: CS

## 2021-05-05 RX ORDER — POLYETHYLENE GLYCOL 3350 17 G/17G
17 POWDER, FOR SOLUTION ORAL DAILY
Refills: 0 | Status: DISCONTINUED | OUTPATIENT
Start: 2021-05-05 | End: 2021-05-05

## 2021-05-05 RX ORDER — ACETAMINOPHEN 500 MG
2 TABLET ORAL
Qty: 0 | Refills: 0 | DISCHARGE
Start: 2021-05-05

## 2021-05-05 RX ORDER — SENNA PLUS 8.6 MG/1
2 TABLET ORAL AT BEDTIME
Refills: 0 | Status: DISCONTINUED | OUTPATIENT
Start: 2021-05-05 | End: 2021-05-05

## 2021-05-05 RX ORDER — MAGNESIUM SULFATE 500 MG/ML
2 VIAL (ML) INJECTION ONCE
Refills: 0 | Status: COMPLETED | OUTPATIENT
Start: 2021-05-05 | End: 2021-05-05

## 2021-05-05 RX ADMIN — Medication 100 MILLIGRAM(S): at 13:04

## 2021-05-05 RX ADMIN — Medication 25 GRAM(S): at 14:44

## 2021-05-05 RX ADMIN — APIXABAN 5 MILLIGRAM(S): 2.5 TABLET, FILM COATED ORAL at 05:24

## 2021-05-05 RX ADMIN — CHLORHEXIDINE GLUCONATE 1 APPLICATION(S): 213 SOLUTION TOPICAL at 05:24

## 2021-05-05 RX ADMIN — Medication 120 MILLIGRAM(S): at 05:24

## 2021-05-05 RX ADMIN — POLYETHYLENE GLYCOL 3350 17 GRAM(S): 17 POWDER, FOR SOLUTION ORAL at 11:11

## 2021-05-05 RX ADMIN — Medication 20 MILLIGRAM(S): at 05:24

## 2021-05-05 RX ADMIN — Medication 100 MILLIGRAM(S): at 05:24

## 2021-05-05 RX ADMIN — APIXABAN 5 MILLIGRAM(S): 2.5 TABLET, FILM COATED ORAL at 17:10

## 2021-05-05 NOTE — DISCHARGE NOTE NURSING/CASE MANAGEMENT/SOCIAL WORK - PATIENT PORTAL LINK FT
You can access the FollowMyHealth Patient Portal offered by [facility] by registering at the following website: [email or web address]. By joining [employer]’s FollowMyHealth portal, you will also be able to view your health information using other applications (apps) compatible with our system.

## 2021-05-05 NOTE — DISCHARGE NOTE PROVIDER - NSDCFUSCHEDAPPT_GEN_ALL_CORE_FT
LI, VIKASH ; 05/06/2021 ; [facility] [address] [facility]  LI, VIKASH ; 05/20/2021 ; [facility]c [address] [facility]  LI, VIKASH ; 05/20/2021 ; [facility] [address] [facility]  LI, VIKASH ; 05/27/2021 ; [facility] Chemo [facility] Inf[facility] [address]

## 2021-05-05 NOTE — DISCHARGE NOTE PROVIDER - NSDCCPCAREPLAN_GEN_ALL_CORE_FT
PRINCIPAL DISCHARGE DIAGNOSIS  Diagnosis: Cellulitis of leg  Assessment and Plan of Treatment: You were admitted to the hospital with cellulitis of the left leg. You were treated in the hospital with IV clindamycin, after the treatment with antibiotics in the hospital the swelling/redness has improved. You are being discharged with total 7 days course of oral clindamycin. Please take the medications exactly as prescribed.  FOLLOW UP WITH YOUR PRIMARY CARE PHYSICIAN IN 2 WEEKS.   SEEK IMMEDIATE MEDICAL ATTENTION IF YOU HAVE PERSISTENT FEVER, INCREASING SWELLING/PAIN/REDNESS OF THE LOWER EXTREMITY.         SECONDARY DISCHARGE DIAGNOSES  Diagnosis: 2019 novel coronavirus disease (COVID-19)  Assessment and Plan of Treatment: Coronavirus disease 2019 (COVID-19) is a respiratory illness  that can spread from person to person. The virus that causes  COVID-19 is a novel coronavirus that was first identified during  an investigation into an outbreak in [place], [place].  The virus that causes COVID-19 probably emerged from an  animal source, but is now spreading from person to person.  The virus is thought to spread mainly between people who  are in close contact with one another (within about 6 feet)  through respiratory droplets produced when an infected  person coughs or sneezes. It also may be possible that a person  can get COVID-19 by touching a surface or object that has  the virus on it and then touching their own mouth, nose, or  possibly their eyes, but this is not thought to be the main  way the virus spreads.  Please stay home and avoid contact with others for at least a week after symptoms resolve and follow government guidelines.   Patients with COVID-19 have had mild to severe respiratory  illness with symptoms of  • fever  • cough  • shortness of breath  People can help protect themselves from respiratory illness with  everyday preventive actions.    • Avoid close contact with people who are sick.  • Avoid touching your eyes, nose, and mouth with  unwashed hands.  • Wash your hands often with soap and water for at least 20   seconds. Use an alcohol-based hand  that contains at  least 60% alcohol if soap and water are not available.   Stay home when you are sick.  • Cover your cough or sneeze with a tissue, then throw the  tissue in the trash.  • Clean and disinfect frequently touched objects  and surfaces.  Call 911 and inform them you are covid positive before you decide to go to the emergency room if you have chest pain, difficulty breathing, high fevers, worsening of your symptoms, feel unwell, or have nausea and vomiting.     PRINCIPAL DISCHARGE DIAGNOSIS  Diagnosis: Cellulitis of leg  Assessment and Plan of Treatment: You were admitted to the hospital with cellulitis of the left leg. You were treated in the hospital with IV clindamycin, after the treatment with antibiotics in the hospital the swelling/redness has improved. You are being discharged with total 7 days course of oral clindamycin. Please take the medications exactly as prescribed.  FOLLOW UP WITH YOUR PRIMARY CARE PHYSICIAN IN 2 WEEKS.   SEEK IMMEDIATE MEDICAL ATTENTION IF YOU HAVE PERSISTENT FEVER, INCREASING SWELLING/PAIN/REDNESS OF THE LOWER EXTREMITY.         SECONDARY DISCHARGE DIAGNOSES  Diagnosis: 2019 novel coronavirus disease (COVID-19)  Assessment and Plan of Treatment: Coronavirus disease 2019 (COVID-19) is a respiratory illness  that can spread from person to person. The virus that causes  COVID-19 is a novel coronavirus that was first identified during  an investigation into an outbreak in [place], [place].  The virus that causes COVID-19 probably emerged from an  animal source, but is now spreading from person to person.  The virus is thought to spread mainly between people who  are in close contact with one another (within about 6 feet)  through respiratory droplets produced when an infected  person coughs or sneezes. It also may be possible that a person  can get COVID-19 by touching a surface or object that has  the virus on it and then touching their own mouth, nose, or  possibly their eyes, but this is not thought to be the main  way the virus spreads.  Please stay home and avoid contact with others for at least a week after symptoms resolve and follow government guidelines.   Patients with COVID-19 have had mild to severe respiratory  illness with symptoms of  • fever  • cough  • shortness of breath  People can help protect themselves from respiratory illness with  everyday preventive actions.    • Avoid close contact with people who are sick.  • Avoid touching your eyes, nose, and mouth with  unwashed hands.  • Wash your hands often with soap and water for at least 20   seconds. Use an alcohol-based hand  that contains at  least 60% alcohol if soap and water are not available.   Stay home when you are sick.  • Cover your cough or sneeze with a tissue, then throw the  tissue in the trash.  • Clean and disinfect frequently touched objects  and surfaces.  Call 911 and inform them you are covid positive before you decide to go to the emergency room if you have chest pain, difficulty breathing, high fevers, worsening of your symptoms, feel unwell, or have nausea and vomiting.    Diagnosis: Pancreatic adenocarcinoma  Assessment and Plan of Treatment: Follow up with your oncologist within one week.    Diagnosis: Onychomycosis  Assessment and Plan of Treatment: Please follow up outpatient with a podiatrist to have your feet evaluated.    Diagnosis: Atrial fibrillation  Assessment and Plan of Treatment: Continue home medications. Follow up with your cardiologist.    Diagnosis: Hypertension  Assessment and Plan of Treatment: Continue your antihypertensive medications at home. Follow up with your primary care physician.     PRINCIPAL DISCHARGE DIAGNOSIS  Diagnosis: Cellulitis of leg  Assessment and Plan of Treatment: You were admitted to the hospital with cellulitis of the left leg. You were treated in the hospital with IV clindamycin, after the treatment with antibiotics in the hospital the swelling/redness has improved. You are being discharged with total 7 days course of oral clindamycin. Please take the medications exactly as prescribed.  FOLLOW UP WITH YOUR PRIMARY CARE PHYSICIAN IN 2 WEEKS.   SEEK IMMEDIATE MEDICAL ATTENTION IF YOU HAVE PERSISTENT FEVER, INCREASING SWELLING/PAIN/REDNESS OF THE LOWER EXTREMITY.   First + Covid PCR (5/4). quarantine until 5/14.        SECONDARY DISCHARGE DIAGNOSES  Diagnosis: 2019 novel coronavirus disease (COVID-19)  Assessment and Plan of Treatment: Coronavirus disease 2019 (COVID-19) is a respiratory illness  that can spread from person to person. The virus that causes  COVID-19 is a novel coronavirus that was first identified during  an investigation into an outbreak in [place], [place].  The virus that causes COVID-19 probably emerged from an  animal source, but is now spreading from person to person.  The virus is thought to spread mainly between people who  are in close contact with one another (within about 6 feet)  through respiratory droplets produced when an infected  person coughs or sneezes. It also may be possible that a person  can get COVID-19 by touching a surface or object that has  the virus on it and then touching their own mouth, nose, or  possibly their eyes, but this is not thought to be the main  way the virus spreads.  Please stay home and avoid contact with others for at least a week after symptoms resolve and follow government guidelines.   Patients with COVID-19 have had mild to severe respiratory  illness with symptoms of  • fever  • cough  • shortness of breath  People can help protect themselves from respiratory illness with  everyday preventive actions.    • Avoid close contact with people who are sick.  • Avoid touching your eyes, nose, and mouth with  unwashed hands.  • Wash your hands often with soap and water for at least 20   seconds. Use an alcohol-based hand  that contains at  least 60% alcohol if soap and water are not available.   Stay home when you are sick.  • Cover your cough or sneeze with a tissue, then throw the  tissue in the trash.  • Clean and disinfect frequently touched objects  and surfaces.  Call 911 and inform them you are covid positive before you decide to go to the emergency room if you have chest pain, difficulty breathing, high fevers, worsening of your symptoms, feel unwell, or have nausea and vomiting.    Diagnosis: Onychomycosis  Assessment and Plan of Treatment: Please follow up outpatient with a podiatrist to have your feet evaluated.    Diagnosis: Atrial fibrillation  Assessment and Plan of Treatment: Continue home medications. Follow up with your cardiologist.    Diagnosis: Hypertension  Assessment and Plan of Treatment: Continue your antihypertensive medications at home. Follow up with your primary care physician.    Diagnosis: Pancreatic adenocarcinoma  Assessment and Plan of Treatment: Follow up with your oncologist within one week.

## 2021-05-05 NOTE — CONSULT NOTE ADULT - ASSESSMENT
Patient is a 75 year old F patient with PMHx of Afib on eliquis ( Cardiologist VEE Medrano ), HTN, Varicose vein in the lower extremities, Nephrolithiasis, pancreatic adenocarcinoma on Chemo being followed by Dr Johnson presented with LLE erythema and swelling x 4 days, admitted for cellulitis of the left lower extremity.    # Cellulitis of left lower extremity, improving, w/ possible underlying chronic venous stasis  COVID+ 5/4/21; WBC 4.5 <-- 9.2; Tmax 100.4 F  Per pt swelling in left leg is much better; she can now walk w/ cane w/o pain  VA duplex negative for evidence of DVT  Okay to c/w Lasix 20 mg PO once daily  C/w clindamycin 600 mg IV q8h, f/u ID  Encourage ambulation as tolerated    # Pancreatic adenocarcinoma s/p sphincterotomy and stent placement, s/p gemcitabine/nabpaclitaxel/Neulasta  Patient tolerated chemo well on Thursday; had been scheduled for second dose of chemo 5/6/21.  Will f/u to reschedule second dose.  Note incomplete, pending attending's revisions/additions. Thank you for the courtesy of this consult.

## 2021-05-05 NOTE — DISCHARGE NOTE PROVIDER - PROVIDER TOKENS
PROVIDER:[TOKEN:[[id number]],FOLLOWUP:[1 week]],PROVIDER:[TOKEN:[[id number]],FOLLOWUP:[1 month]]

## 2021-05-05 NOTE — DISCHARGE NOTE PROVIDER - CARE PROVIDERS DIRECT ADDRESSES
,[email or web address],DirectAddress_Unknown

## 2021-05-05 NOTE — PROGRESS NOTE ADULT - SUBJECTIVE AND OBJECTIVE BOX
** This is an incomplete note - pending AM rounds**     SUBJECTIVE:   LENGTH OF HOSPITAL STAY: 1d    CHIEF COMPLAINT:  Patient is a 75y old  Female who presents with a chief complaint of cellulitis (04 May 2021 15:35)      Events over the past 24 hours:      REVIEW OF SYSTEMS  Negative Except as mentioned above.    ALLERGIES:  Augmentin (Rash)  chocolate (Headache)  digoxin (Hives)  Metoprolol Succinate ER (Hives)  Novocain (Angioedema)  Steroids: Excessive swelling (Flushing; Other (Mild to Mod))  sulfa drugs (Fever)  Xarelto (Hives)        MEDICATIONS:  STANDING MEDICATIONS  apixaban 5 milliGRAM(s) Oral two times a day  chlorhexidine 4% Liquid 1 Application(s) Topical <User Schedule>  clindamycin IVPB 600 milliGRAM(s) IV Intermittent every 8 hours  clindamycin IVPB      diltiazem    milliGRAM(s) Oral daily  furosemide    Tablet 20 milliGRAM(s) Oral daily  multivitamin/minerals 1 Tablet(s) Oral daily    PRN MEDICATIONS  acetaminophen   Tablet .. 650 milliGRAM(s) Oral every 6 hours PRN        OBJECTIVE:  VITALS:   T(F): 100.4 (05-04-21 @ 23:00), Max: 100.4 (05-04-21 @ 23:00)  HR: 108 (05-04-21 @ 23:00) (82 - 111)  BP: 147/83 (05-04-21 @ 23:00) (126/77 - 147/83)  BP(mean): --  RR: 16 (05-04-21 @ 23:00) (16 - 19)  SpO2: 98% (05-04-21 @ 23:00) (98% - 100%)    I&O's:   I&O's Summary        PHYSICAL EXAM:      LABS:                        12.0   9.19  )-----------( 134      ( 04 May 2021 12:01 )             38.2             05-04    137  |  101  |  15  ----------------------------<  109<H>  3.9   |  25  |  0.5<L>    Ca    9.0      04 May 2021 12:01    TPro  6.1  /  Alb  3.8  /  TBili  1.7<H>  /  DBili  x   /  AST  24  /  ALT  28  /  AlkPhos  451<H>  05-04    LIVER FUNCTIONS - ( 04 May 2021 12:01 )  Alb: 3.8 g/dL / Pro: 6.1 g/dL / ALK PHOS: 451 U/L / ALT: 28 U/L / AST: 24 U/L / GGT: x                 PT/INR - ( 04 May 2021 12:01 )   PT: 30.30 sec;   INR: 2.63 ratio         PTT - ( 04 May 2021 12:01 )  PTT:34.1 sec            Blood Glucose:    Inflammatory Markers:  CRP:    Procalcitonin:    Ferritin:    LDH:    D-Dimer:    CK:     RADIOLOGY & ADDITIONAL TESTS:  Xray Chest 1 View AP/PA:   EXAM:  XR CHEST 1 VIEW            PROCEDURE DATE:  05/04/2021            INTERPRETATION:  Clinical History / Reason for exam: Tachycardia    Comparison : Chest radiograph April 21, 2021.    Technique/Positioning: Single AP view of the chest.    Findings:    Support devices: Right chest wall Mediport    Cardiac/mediastinum/hilum: Unchanged.    Lung parenchyma/Pleura: No consolidation, effusion or pneumothorax.    Skeleton/soft tissues: Unchanged.    Impression:    No consolidation, effusion or pneumothorax.                  ELLIOT LANDAU MD; Attending Radiologist  This document has been electronically signed. May  4 2021  1:38PM (05-04-21 @ 13:27)  Xray Chest 1 View- PORTABLE-Urgent:   EXAM:  XR CHEST PORTABLE URGENT 1V            PROCEDURE DATE:  04/21/2021            INTERPRETATION:  INDICATION: Post Mediport placement. Evaluation for pneumothorax.    TECHNIQUE/POSITIONING: Single frontal view of the chest was obtained.    CORRELATION: Chest radiograph 9/12/2018, PET CT 3/16/2021    FINDINGS:    SUPPORT DEVICES: Interval placement of a right subclavian Port-A-Cath.    CARDIAC/MEDIASTINUM/HILUM: Stable enlarged cardiac silhouette.    LUNG PARENCHYMA/PLEURA: No focal consolidation, pleural effusion, or pneumothorax.    SKELETON/SOFT TISSUES: Stable bony degenerative changes.      IMPRESSION:    Interval placement of a right subclavian Port-A-Cath. No pneumothorax.            GODFREY ARGUETA M.D., RESIDENT RADIOLOGIST  Thisdocument has been electronically signed.  ELLIOT LANDAU MD; Attending Radiologist  This document has been electronically signed. Apr 21 2021  4:44PM (04-21-21 @ 14:33)    CT Chest w/ IV Cont:   EXAM:  CT CHEST IC            PROCEDURE DATE:  03/06/2021            INTERPRETATION:  CLINICAL INDICATION: Metastasis    TECHNIQUE:  CT of the thorax was performed after administration of intravenous contrast. Sagittal and coronal reformats were performed as well as MIP reconstructions.    COMPARISON: None.    INTERPRETATION:    AIRWAYS, LUNGS AND PLEURA: The central tracheobronchial tree is patent. There are bilateral pleural effusions moderate on the right and small on the left; with adjacentatelectasis/consolidation. No suspicious appearing pulmonary nodule or mass in the aerated lung. Biapical scarring. There is no pneumothorax.    MEDIASTINUM: There are no enlarged mediastinal, hilar or axillary lymph nodes. Nonenlarged prevascular lymph nodes. The visualized portion of the thyroid gland is heterogeneous and nodular.    HEART AND VESSELS: The heart is enlarged. There is no pericardial effusion.    BONES AND SOFT TISSUES: There are degenerative changes of the spine.  The soft tissues are unremarkable. Thoracic curvature.    IMPRESSION:  No comparison CT chest available,    Bilateral pleural effusions moderate on the right and small on the left; with adjacent atelectasis/consolidation.    No suspicious appearing pulmonary noduleor mass as described above.    No enlarged mediastinal, hilar or axillary lymph nodes. Nonenlarged prevascular lymph nodes.   SUBJECTIVE:   LENGTH OF HOSPITAL STAY: 1d    CHIEF COMPLAINT:  Patient is a 75y old  Female who presents with a chief complaint of cellulitis (04 May 2021 15:35)    Events over the past 24 hours:  Patient was seen at the bedside this morning. She is lying comfortably on the bed. There were no acute events overnight.   She says the swelling and redness of leg has improved significantly.     REVIEW OF SYSTEMS  Negative Except as mentioned above.    ALLERGIES:  Augmentin (Rash)  chocolate (Headache)  digoxin (Hives)  Metoprolol Succinate ER (Hives)  Novocain (Angioedema)  Steroids: Excessive swelling (Flushing; Other (Mild to Mod))  sulfa drugs (Fever)  Xarelto (Hives)        MEDICATIONS:  STANDING MEDICATIONS  apixaban 5 milliGRAM(s) Oral two times a day  chlorhexidine 4% Liquid 1 Application(s) Topical <User Schedule>  clindamycin IVPB 600 milliGRAM(s) IV Intermittent every 8 hours  clindamycin IVPB      diltiazem    milliGRAM(s) Oral daily  furosemide    Tablet 20 milliGRAM(s) Oral daily  multivitamin/minerals 1 Tablet(s) Oral daily    PRN MEDICATIONS  acetaminophen   Tablet .. 650 milliGRAM(s) Oral every 6 hours PRN        OBJECTIVE:  VITALS:   T(F): 100.4 (05-04-21 @ 23:00), Max: 100.4 (05-04-21 @ 23:00)  HR: 108 (05-04-21 @ 23:00) (82 - 111)  BP: 147/83 (05-04-21 @ 23:00) (126/77 - 147/83)  BP(mean): --  RR: 16 (05-04-21 @ 23:00) (16 - 19)  SpO2: 98% (05-04-21 @ 23:00) (98% - 100%)    I&O's:   I&O's Summary        PHYSICAL EXAM:  General: Not in distress; Pallor (-), Icterus (-), Cyanosis (-), Clubbing (-)  HEENT: Pupils equal, round and reactive to light symmetrically, EOM - Normal bilaterally; Hearing - b/l normal; No external discharge noted, JVD (-), Lymphadenopathy(-)  PULM: Bilaterally equal and clear breath sounds, no wheeze, rubs or crackles.   CVS: Normal S1 and S2, no murmurs, rubs, or gallops.   GI: Soft, nondistended, nontender, BS +  MSK: Edema (-), no joint or muscle tenderness  SKIN: Warm and well perfused, Erythema and tenderness of the LLE extending from ankle to knee, increased local temp  NEURO:  Alert and Oriented x 3; No gross focal neurological deficit noted.      LABS:                        12.0   9.19  )-----------( 134      ( 04 May 2021 12:01 )             38.2             05-04    137  |  101  |  15  ----------------------------<  109<H>  3.9   |  25  |  0.5<L>    Ca    9.0      04 May 2021 12:01    TPro  6.1  /  Alb  3.8  /  TBili  1.7<H>  /  DBili  x   /  AST  24  /  ALT  28  /  AlkPhos  451<H>  05-04    LIVER FUNCTIONS - ( 04 May 2021 12:01 )  Alb: 3.8 g/dL / Pro: 6.1 g/dL / ALK PHOS: 451 U/L / ALT: 28 U/L / AST: 24 U/L / GGT: x                 PT/INR - ( 04 May 2021 12:01 )   PT: 30.30 sec;   INR: 2.63 ratio         PTT - ( 04 May 2021 12:01 )  PTT:34.1 sec            Blood Glucose:    Inflammatory Markers:  CRP:    Procalcitonin:    Ferritin:    LDH:    D-Dimer:    CK:     RADIOLOGY & ADDITIONAL TESTS:  Xray Chest 1 View AP/PA:   EXAM:  XR CHEST 1 VIEW            PROCEDURE DATE:  05/04/2021            INTERPRETATION:  Clinical History / Reason for exam: Tachycardia    Comparison : Chest radiograph April 21, 2021.    Technique/Positioning: Single AP view of the chest.    Findings:    Support devices: Right chest wall Mediport    Cardiac/mediastinum/hilum: Unchanged.    Lung parenchyma/Pleura: No consolidation, effusion or pneumothorax.    Skeleton/soft tissues: Unchanged.    Impression:    No consolidation, effusion or pneumothorax.                  ELLIOT LANDAU MD; Attending Radiologist  This document has been electronically signed. May  4 2021  1:38PM (05-04-21 @ 13:27)  Xray Chest 1 View- PORTABLE-Urgent:   EXAM:  XR CHEST PORTABLE URGENT 1V            PROCEDURE DATE:  04/21/2021            INTERPRETATION:  INDICATION: Post Mediport placement. Evaluation for pneumothorax.    TECHNIQUE/POSITIONING: Single frontal view of the chest was obtained.    CORRELATION: Chest radiograph 9/12/2018, PET CT 3/16/2021    FINDINGS:    SUPPORT DEVICES: Interval placement of a right subclavian Port-A-Cath.    CARDIAC/MEDIASTINUM/HILUM: Stable enlarged cardiac silhouette.    LUNG PARENCHYMA/PLEURA: No focal consolidation, pleural effusion, or pneumothorax.    SKELETON/SOFT TISSUES: Stable bony degenerative changes.      IMPRESSION:    Interval placement of a right subclavian Port-A-Cath. No pneumothorax.            GODFERY ARGUETA M.D., RESIDENT RADIOLOGIST  Thisdocument has been electronically signed.  ELLIOT LANDAU MD; Attending Radiologist  This document has been electronically signed. Apr 21 2021  4:44PM (04-21-21 @ 14:33)    CT Chest w/ IV Cont:   EXAM:  CT CHEST IC            PROCEDURE DATE:  03/06/2021            INTERPRETATION:  CLINICAL INDICATION: Metastasis    TECHNIQUE:  CT of the thorax was performed after administration of intravenous contrast. Sagittal and coronal reformats were performed as well as MIP reconstructions.    COMPARISON: None.    INTERPRETATION:    AIRWAYS, LUNGS AND PLEURA: The central tracheobronchial tree is patent. There are bilateral pleural effusions moderate on the right and small on the left; with adjacentatelectasis/consolidation. No suspicious appearing pulmonary nodule or mass in the aerated lung. Biapical scarring. There is no pneumothorax.    MEDIASTINUM: There are no enlarged mediastinal, hilar or axillary lymph nodes. Nonenlarged prevascular lymph nodes. The visualized portion of the thyroid gland is heterogeneous and nodular.    HEART AND VESSELS: The heart is enlarged. There is no pericardial effusion.    BONES AND SOFT TISSUES: There are degenerative changes of the spine.  The soft tissues are unremarkable. Thoracic curvature.    IMPRESSION:  No comparison CT chest available,    Bilateral pleural effusions moderate on the right and small on the left; with adjacent atelectasis/consolidation.    No suspicious appearing pulmonary noduleor mass as described above.    No enlarged mediastinal, hilar or axillary lymph nodes. Nonenlarged prevascular lymph nodes.

## 2021-05-05 NOTE — DISCHARGE NOTE PROVIDER - NSDCMRMEDTOKEN_GEN_ALL_CORE_FT
acetaminophen 325 mg oral tablet: 2 tab(s) orally every 6 hours, As needed, Temp greater or equal to 38C (100.4F), Mild Pain (1 - 3)  apixaban 5 mg oral tablet: 1 tab(s) orally 2 times a day  calcium,magnesium and Zinc: 1 tab(s) orally once a day  clindamycin 300 mg oral capsule: 1 cap(s) orally every 8 hours for 7 days  dilTIAZem 120 mg/24 hours oral capsule, extended release: 1 cap(s) orally once a day  furosemide 20 mg oral tablet: 1 tab(s) orally once a day  Women&#x27;s daily vitamins: 1 tab(s) orally once a day

## 2021-05-05 NOTE — PROGRESS NOTE ADULT - ASSESSMENT
Patient is a 75 year old F patient with PMHx of Afib on eliquis ( Cardiologist VEE Medrano ), HTN, Varicose vein in the lower extremities, Nephrolithiasis, pancreatic adenocarcinoma on Chemo being followed by Dr Johnson presented with LLE erythema and swelling x 4 days.     # LLE erythema and swelling, likely cellulitis  -Sepsis ruled out on admission. No fever, WBC is normal. no neutropenia. tachy but she has afib  - DVT duplex on admission is negative for DVTs, LE xray showed no fracture but there is soft tissue swelling.  - tylenol PRN for pain  - continue with IV Clindamycin 600mg q8 (pt is allergic to augmentin).   - MRSA nares  - Follow up Blood culture    # Pancreatic adenocarcinoma on chemo  - f/u Heme/onc Dr Johnson for chemo, has Chemo port by Dr Nino,   - last chemo was last thursday (4/29), next session this thursday  - Oncology eval while inpatient. Follow up chemo planning    # COVID 19 Infection  - asymptomatic  - CXR - No consolidation, effusion or pneumothorax  - Conservative management    # Bilateral lower extremities edema   - Continue Lasix 20 mg PO daily   - Duplex LE negative on admission    # Afib   - c/w AC Eliquis 5 mg BID  - Rate controlled Diltiazem  mg PO daily   - ECHO 3/6: EF 56%, boderline pulm HTN and moderated MR    # HTN   - Continue Cardizem and Furosemide   - Well controlled    # DVT ppx: eliquis  # GI ppx: not indicated  # Diet: DASH   # code statues: full  # Dispo: Acute   Patient is a 75 year old F patient with PMHx of Afib on eliquis ( Cardiologist VEE Medrano ), HTN, Varicose vein in the lower extremities, Nephrolithiasis, pancreatic adenocarcinoma on Chemo being followed by Dr Johnson presented with LLE erythema and swelling x 4 days.     # LLE erythema and swelling, likely cellulitis  -Sepsis ruled out on admission. No fever, WBC is normal. no neutropenia. tachy but she has afib  - DVT duplex on admission is negative for DVTs, LE xray showed no fracture but there is soft tissue swelling.  - tylenol PRN for pain  - continue with IV Clindamycin 600mg q8 (pt is allergic to augmentin).   - MRSA nares  - Follow up Blood culture    # Pancreatic adenocarcinoma on chemo  - f/u Heme/onc Dr Johnson for chemo, has Chemo port by Dr Nino,   - last chemo was last thursday (4/29), next session this thursday  - Oncology eval while inpatient. Follow up chemo planning    # COVID 19 Infection  - asymptomatic  - CXR - No consolidation, effusion or pneumothorax  - Conservative management    # Bilateral lower extremities edema   - Continue Lasix 20 mg PO daily   - Duplex LE negative on admission    # Afib   - c/w AC Eliquis 5 mg BID  - Rate controlled Diltiazem  mg PO daily   - ECHO 3/6: EF 56%, boderline pulm HTN and moderated MR    # HTN   - Continue Cardizem and Furosemide   - Well controlled    # H/o varicose vein  - outpt vascular Follow up    # DVT ppx: eliquis  # GI ppx: not indicated  # Diet: DASH   # code statues: full  # Dispo: Acute   Patient is a 75 year old F patient with PMHx of Afib on eliquis ( Cardiologist VEE Medrano ), HTN, Varicose vein in the lower extremities, Nephrolithiasis, pancreatic adenocarcinoma on Chemo being followed by Dr Johnson presented with LLE erythema and swelling x 4 days.     # LLE cellulitis - improved  - Sepsis ruled out on admission. No fever, WBC is normal. no neutropenia. tachy but she has afib  - DVT duplex on admission is negative for DVTs, LE xray showed no fracture but there is soft tissue swelling.  - tylenol PRN for pain  - continue with IV Clindamycin 600mg q8 (pt is allergic to augmentin) - may switch to doxy on discharge  - MRSA nares  - Follow up Blood culture    # Pancreatic adenocarcinoma on chemo  - f/u Heme/onc Dr Johnson for chemo, has Chemo port by Dr Nino,   - last chemo was last thursday (4/29), next session this thursday  - Oncology eval while inpatient. Follow up chemo planning    # COVID 19 Infection  - asymptomatic  - CXR - No consolidation, effusion or pneumothorax  - Conservative management    # Bilateral lower extremities edema   - Continue Lasix 20 mg PO daily   - Duplex LE negative on admission    # Afib   - c/w AC Eliquis 5 mg BID  - Rate controlled Diltiazem  mg PO daily   - ECHO 3/6: EF 56%, boderline pulm HTN and moderated MR    # HTN   - Continue Cardizem and Furosemide   - Well controlled    # H/o varicose vein  - outpt vascular Follow up    # DVT ppx: eliquis  # GI ppx: not indicated  # Diet: DASH   # code statues: full  # Dispo: planning

## 2021-05-05 NOTE — DISCHARGE NOTE PROVIDER - HOSPITAL COURSE
Patient is a 75 year old F patient with PMHx of Afib on eliquis ( Cardiologist VEE Medrano ), HTN, Varicose vein in the lower extremities, Nephrolithiasis, pancreatic adenocarcinoma on Chemo being followed by Dr Johnson presented with LLE erythema and swelling x 4 days.  Patient was seen by her PCP for evaluation, her PCP send her to the ED for further evaluation. Patient denies any fever or chills in the past 4 days. No nausea/vomiting, SOB, palpitations. Left lower extremity was diffusely erythematous and swollen and exquisitely tender to the touch.   Patient was admitted with diagnosis of LLE cellulitis which improved after IV clindamycin therapy. Patient being discharged on oral clindamycin for total course of 7 days. Duplex on admission is negative for DVTs, LE xray showed no fracture but there is soft tissue swelling.  Patient was found to be COVID + on admission but she is asymptomatic and didn't require any therapy for COVID. Patient is a 75 year old F patient with PMHx of Afib on eliquis ( Cardiologist VEE Medrano ), HTN, Varicose vein in the lower extremities, Nephrolithiasis, pancreatic adenocarcinoma on Chemo being followed by Dr Johnson presented with LLE erythema and swelling x 4 days.  Patient was seen by her PCP for evaluation, her PCP send her to the ED for further evaluation. Patient denies any fever or chills in the past 4 days. No nausea/vomiting, SOB, palpitations. Left lower extremity was diffusely erythematous and swollen and exquisitely tender to the touch. uplex on admission is negative for DVTs, LE xray showed no fracture but there is soft tissue swelling.  Patient was admitted with diagnosis of LLE cellulitis which improved after IV clindamycin therapy. Patient being discharged on oral clindamycin for total course of 7 days.   Patient was found to be COVID + on admission but she is asymptomatic and didn't require any therapy for COVID. Patient is a 75 year old F patient with PMHx of Afib on eliquis ( Cardiologist VEE Medrano ), HTN, Varicose vein in the lower extremities, Nephrolithiasis, pancreatic adenocarcinoma on Chemo being followed by Dr Johnson presented with LLE erythema and swelling x 4 days.  Patient was seen by her PCP for evaluation, her PCP send her to the ED for further evaluation. Patient denies any fever or chills in the past 4 days. No nausea/vomiting, SOB, palpitations. Left lower extremity was diffusely erythematous and swollen and exquisitely tender to the touch. uplex on admission is negative for DVTs, LE xray showed no fracture but there is soft tissue swelling.  Patient was admitted with diagnosis of LLE cellulitis which improved after IV clindamycin therapy. Patient being discharged on oral clindamycin for total course of 7 days.   Patient was found to be COVID + on admission but she is asymptomatic and didn't require any therapy for COVID.     Attending Note:  Patient seen and examined independently. I personally had a face-to-face encounter with the patient, examined the patient myself and reviewed the plan of care with the housestaff. Agree with resident's note but my note supersedes that of the resident in the matters hereby listed.   LLE cellulitis - d/c on po clinda. f/u PCP in 1-2 weeks.   First + Covid PCR (5/4). quarantine until 5/14.

## 2021-05-05 NOTE — DISCHARGE NOTE PROVIDER - CARE PROVIDER_API CALL
Zena Johnson)  Hematology; Internal Medicine; Medical Oncology  [address] [place] [address]  Phone: [phone]  Fax: [phone]  Follow Up Time: 1 week    Sg Beltran)  Podiatric Medicine and Surgery  [address], [address], [address]  [place] [address]  Phone: [phone]  Fax: [phone]  Follow Up Time: 1 month

## 2021-05-05 NOTE — CONSULT NOTE ADULT - SUBJECTIVE AND OBJECTIVE BOX
HEMATOLOGY ONCOLOGY CONSULT     Patient is a 75y old  Female who presents with a chief complaint of cellulitis (05 May 2021 05:40)      HPI:   Patient is a 75 year old F patient with PMHx of Afib on eliquis ( Cardiologist VEE Medrano ), HTN, Varicose vein in the lower extremities, Nephrolithiasis, pancreatic adenocarcinoma on Chemo being followed by Dr Johnson presented with LLE erythema and swelling x 4 days. Patient states she got her chemotherapy last Thursday, she tolerated it well. That day she began having diffuse back pain, bilateral wrist pain, and neck pain. On Saturday patient developed swelling, erythema and pain in the Left lower extremity. Her LLE swelling erythema and pain got progressively worse over the past couple of days which prompt her to go to her PCP. Patient was seen by her PCP for evaluation, her PCP send her to the ED for further evaluation. Patient denies any recent fever or chills, chest pain or pressure, difficulty breathing, or falls. No nausea/vomiting, SOB, palpitations.     She states both legs occasionally swell up which has been happening since treatment varicose veins. Notably, she tested positive for COVID-19 in April and was diagnosed with pancreatic adenocarcinoma after presenting to [facility] for painless jaundice. She follows w/ Dr. Johnson and had her first chemotherapy treatment 4/29/21 (Gemzar, Abraxane, Neulasta.)    In ED  T Max: 37.8 (05-04-21 @ 15:10)  HR: 108  BP: 138/67  RR: 17   SpO2: 99%   Pt c/o  LLE pain, Left lower extremity is diffusely erythematous and swollen. exquisitely tender to the touch. Patient received 1L of LR bolus and 1 dose of 1g Vancomycin in the ED   (04 May 2021 15:35)    < from: [facility] Duplex Lower Ext Vein Scan, Bilat (05.04.21 @ 13:21) >    Impression:    No evidence of deep venous thrombosis or superficial thrombophlebitis in the bilateral lower extremities.    ICD-10:M79.89    ROS:  Negative except for:    PAST MEDICAL & SURGICAL HISTORY:  SOB (shortness of breath)    Pain  knee    Varicose veins of both lower extremities, unspecified whether complicated    Atrial fibrillation, unspecified type  2019 on Eliquis    Jaundice  March 5, 2021    Malignant neoplasm of pancreas, unspecified location of malignancy  Pancreatic Cancer    Hypertension, unspecified type  2019    Pancreatic cancer    History of surgery  vascular surgery   ? variocose vein stripping?    Status post phlebectomy  10/2018    History of ovarian cystectomy  left    History of tonsillectomy  1959    Kidney stone  2017        SOCIAL HISTORY:    FAMILY HISTORY:  CAD (coronary artery disease) (Sibling)  3  siblings ( 2 living and 1 passed away)        MEDICATIONS  (STANDING):  apixaban 5 milliGRAM(s) Oral two times a day  chlorhexidine 4% Liquid 1 Application(s) Topical <User Schedule>  clindamycin IVPB 600 milliGRAM(s) IV Intermittent every 8 hours  clindamycin IVPB      diltiazem    milliGRAM(s) Oral daily  furosemide    Tablet 20 milliGRAM(s) Oral daily  magnesium sulfate  IVPB 2 Gram(s) IV Intermittent once  multivitamin/minerals 1 Tablet(s) Oral daily  polyethylene glycol 3350 17 Gram(s) Oral daily  senna 2 Tablet(s) Oral at bedtime    MEDICATIONS  (PRN):  acetaminophen   Tablet .. 650 milliGRAM(s) Oral every 6 hours PRN Temp greater or equal to 38C (100.4F), Mild Pain (1 - 3)    Allergies    Augmentin (Rash)  chocolate (Headache)  digoxin (Hives)  Metoprolol Succinate ER (Hives)  Novocain (Angioedema)  Steroids: Excessive swelling (Flushing; Other (Mild to Mod))  sulfa drugs (Fever)  Xarelto (Hives)    Intolerances    Vital Signs Last 24 Hrs  T(C): 36.6 (05 May 2021 12:49), Max: 38 (04 May 2021 23:00)  T(F): 97.8 (05 May 2021 12:49), Max: 100.4 (04 May 2021 23:00)  HR: 99 (05 May 2021 12:49) (82 - 112)  BP: 125/60 (05 May 2021 12:49) (125/60 - 147/83)  BP(mean): --  RR: 17 (05 May 2021 12:49) (16 - 18)  SpO2: 99% (05 May 2021 12:49) (98% - 99%)    PHYSICAL EXAM  General: NAD, thin body habitus, AAOx3  HEENT: clear oropharynx, anicteric sclerae, pink conjunctivae  Neck: supple  CV: normal S1/S2 with no murmur rubs or gallops  Lungs: positive air movement b/l ant lungs,clear to auscultation, no wheezes, no rales  Abdomen: soft, non-tender, non-distended, no hepatosplenomegaly  Ext: no clubbing cyanosis or edema; hyperpigmentation of bilateral distal lower extremities w/ erythema extending more proximally, left > right; no discharge or excoriation  Musculoskeletal: no focal atrophy  Neuro: alert and oriented X 4, no focal deficits      05-04-21 @ 07:01  -  05-05-21 @ 07:00  --------------------------------------------------------  IN: 50 mL / OUT: 0 mL / NET: 50 mL      LABS:                          10.3   4.54  )-----------( 109      ( 05 May 2021 07:02 )             31.8       Mean Cell Volume : 93.8 fL  Mean Cell Hemoglobin : 30.4 pg  Mean Cell Hemoglobin Concentration : 32.4 g/dL  Auto Neutrophil # : 3.04 K/uL  Auto Lymphocyte # : 1.18 K/uL  Auto Monocyte # : 0.21 K/uL  Auto Eosinophil # : 0.05 K/uL  Auto Basophil # : 0.02 K/uL  Auto Neutrophil % : 67.0 %  Auto Lymphocyte % : 26.0 %  Auto Monocyte % : 4.6 %  Auto Eosinophil % : 1.1 %  Auto Basophil % : 0.4 %      05-05    135  |  102  |  11  ----------------------------<  105<H>  4.1   |  23  |  <0.5<L>    Ca    8.2<L>      05 May 2021 07:02  Phos  2.7     05-05  Mg     1.6     05-05    TPro  5.1<L>  /  Alb  3.1<L>  /  TBili  1.5<H>  /  DBili  x   /  AST  21  /  ALT  22  /  AlkPhos  424<H>  05-05    PT/INR - ( 05 May 2021 07:02 )   PT: 29.60 sec;   INR: 2.57 ratio      PTT - ( 05 May 2021 07:02 )  PTT:38.9 sec    ---------------------------------------------------  RADIOLOGY & ADDITIONAL STUDIES:    < from: ERCP (03.08.21 @ 08:30) >    Impressions:    EUS: 2.9x2.9cm ill defined hypoechoic mass SP FNB with 22G core needle in 3  passes under doppler with GEORGETTE. There was associated CBD and PD dilation.    ERCP: successful ERCP with sphincetrotomy and placement of a [id number] plastic  biliary stent.     1< from: CT Abdomen and Pelvis w/ Oral Cont and w/ IV Cont (03.06.21 @ 18:29) >  IMPRESSION:  1.  Arterially enhancing 3.5 x 2.8 x 3.4 cm area in the pancreatic head with associated pancreatic and intra-/extra hepatic biliary ductal dilation, highly suspicious for a pancreatic head neoplasm; given enhancement pattern, pancreatic neuroendocrine tumor is a possibility. Endoscopic ultrasound and tissue sampling is recommended for further assessment.  2.  Nonspecific mild edema/stranding surrounding the celiac axis and SMA, possibly secondary to mesenteric edema; tumor encasement cannot be excluded.  3.  Few subcentimeter arterially enhancing foci in the liver, likely representing perfusional abnormalities/shunts. However, if neuroendocrine tumor is confirmed, arterially enhancing metastases would also be in the differential. Further evaluation can be obtained at that time with a liver protocol MRI.  4.  Prominent periportal lymph node.  5.  Small volume abdominopelvic ascites and mesenteric edema.  6.  Hydropic gallbladder.      < from: NM PET/CT Onc FDG Skull to Thigh, Inital (03.16.21 @ 14:59) >  IMPRESSION:  FDG uptake in the pancreatic head has an SUV max of 5.3 (axial image 136) corresponding to the lesion on prior CT.  Ill-defined linear appearing FDG uptake seen in the pancreatic body and tail, loosely correlating with the pancreatic duct, SUV max up to 5.4 (axial image 153). This is of uncertain clinical significance. No definite evidence of lymphadenopathy or hepatic metastatic disease in the abdomen and pelvis. Note that this study is not tailored to assess for vascular invasion.  FDG avid 1.2 cm left upper lobe nodular opacity, SUV max 3.7, is new since 3/6/2021, indeterminate for biologic tumor activity.  Small to moderate volume right pleural effusion is not reliably FDG avid.  A few bilateral borderline FDG avid cervical chain lymph nodes SUV max up to 5.3 on the right, measuring up to 6 mm. Favor inflammatory etiology.

## 2021-05-06 ENCOUNTER — TRANSCRIPTION ENCOUNTER (OUTPATIENT)
Age: 76
End: 2021-05-06

## 2021-05-13 PROBLEM — C25.9 MALIGNANT NEOPLASM OF PANCREAS, UNSPECIFIED: Chronic | Status: ACTIVE | Noted: 2021-05-04

## 2021-05-26 NOTE — PHYSICAL EXAM
[Ambulatory and capable of all self care but unable to carry out any work activities] : Status 2- Ambulatory and capable of all self care but unable to carry out any work activities. Up and about more than 50% of waking hours [Normal] : affect appropriate [de-identified] : mild icterus [de-identified] : irregular HR [de-identified] : LLE chronic edema [de-identified] : distended mildly

## 2021-05-26 NOTE — REVIEW OF SYSTEMS
[Negative] : Allergic/Immunologic [FreeTextEntry7] : Constipation occasionally [FreeTextEntry9] : Bone pain after neulasta injection

## 2021-05-26 NOTE — HISTORY OF PRESENT ILLNESS
[de-identified] : Patient is a 75y old Female who presented to ER with a chief complaint of Jaundice on 10 Mar 2021\par \par She has a  PMHx of Afib on eliquis , HTN, Varicose vein in the lower extremities, Nephrolithiasis presented to the ED for painless jaundice and found to have pancreatic\par adenocarcinoma. She noticed dark urine in December 2020 and light colored\par stool. She went to her PMD who treated her for UTI, then developed jaundice and\par had EGD 3/3, he ordered a CT but it was not performed. Her jaundice\par significantly worsened along with boating,nausea and early satiety. Patient also mentioned night sweats and chills for the last month\par that happens almost daily Tbili sarah to 16.4, Alk phosph 1800, ca 19-9 = 2637, CT AP showed the below:\par \par 1. Arterially enhancing 3.5 x 2.8 x 3.4 cm area in the pancreatic head with\par associated pancreatic and intra-/extra hepatic biliary ductal dilation, highly\par suspicious for a pancreatic head neoplasm; given enhancement pattern,\par pancreatic neuroendocrine tumor is a possibility. Endoscopic ultrasound and\par tissue sampling is recommended for further assessment.\par \par 2. Nonspecific mild edema/stranding surrounding the celiac axis and SMA,\par possibly secondary to mesenteric edema; tumor encasement cannot be excluded.\par \par 3. Few subcentimeter arterially enhancing foci in the liver, likely\par representing perfusional abnormalities/shunts. However, if neuroendocrine tumor\par is confirmed, arterially enhancing metastases would also be in the\par differential. Further evaluation can be obtained at that time with a liver\par protocol MRI.\par \par 4. Prominent periportal lymph node.\par \par Ct chest showed bilateral effusions but no suspicious LN or nodules.\par \par She then underwent ERCP with stent placement. TB now improved to 8.5, DB 5.8 \par \par Cytopathology Report\par \par Final Diagnosis\par PANCREATIC HEAD MASS, EUS-GUIDED FINE NEEDLE ASPIRATION BIOPSY:\par - POSITIVE FOR MALIGNANT CELLS.\par - Adenocarcinoma.\par \par She has since then had a PET scan. She feels better with jaundice improved.\par She was seen by Dr Nino and was recommended chemo before possible surgery.\par \par 5/26/2021: \par The patient is here for follow up..  [de-identified] : 4/8/21\par Pt denies any SOB, chest pain or palpitations.\par No fever\par \par 5/26/2021: The patient is here for follow up. She completed cycle 1 of [place]/abraxane . She reported fatigue and weakness few days after chemotherapy , and diffuse bony pain after neulasta. She feels better today , denies fever, chills, nausea , diarrhea or constipation , no bone pain. She was admitted to the hospital few weeks back for LLE cellulitis treated with clindamycin.

## 2021-05-26 NOTE — REASON FOR VISIT
Subjective:       Patient ID:  Haritha Valle is a 70 y.o. male who presents for   Chief Complaint   Patient presents with    Skin Check     nose, x 6 months, bled, scaly, growing, not healing, no tx.....face, x yrs, asym, no tx     HPI  69 yo M presents for evaluation of a spot on his nose x 6 months.  Last OV 5/5/17.  It has been growing, bleeding, and won't heal.  No prior treatments  Derm Hx: BCC on L forehead 2013  Past Medical History:   Diagnosis Date    Basal cell carcinoma     Depression     Hepatitis B     Hepatitis C antibody test positive     HIV infection     Hypertension     Immune disorder     Mild cognitive impairment 10/1/2010     Review of Systems   Skin: Positive for activity-related sunscreen use. Negative for daily sunscreen use and recent sunburn.   Hematologic/Lymphatic: Does not bruise/bleed easily.        Objective:    Physical Exam   Constitutional: He appears well-developed and well-nourished. No distress.   Neurological: He is alert and oriented to person, place, and time. He is not disoriented.   Psychiatric: He has a normal mood and affect.   Skin:   Areas Examined (abnormalities noted in diagram):   Head / Face Inspection Performed  Neck Inspection Performed               Diagram Legend     Erythematous scaling macule/papule c/w actinic keratosis       Vascular papule c/w angioma      Pigmented verrucoid papule/plaque c/w seborrheic keratosis      Yellow umbilicated papule c/w sebaceous hyperplasia      Irregularly shaped tan macule c/w lentigo     1-2 mm smooth white papules consistent with Milia      Movable subcutaneous cyst with punctum c/w epidermal inclusion cyst      Subcutaneous movable cyst c/w pilar cyst      Firm pink to brown papule c/w dermatofibroma      Pedunculated fleshy papule(s) c/w skin tag(s)      Evenly pigmented macule c/w junctional nevus     Mildly variegated pigmented, slightly irregular-bordered macule c/w mildly atypical nevus      Flesh colored  to evenly pigmented papule c/w intradermal nevus       Pink pearly papule/plaque c/w basal cell carcinoma      Erythematous hyperkeratotic cursted plaque c/w SCC      Surgical scar with no sign of skin cancer recurrence      Open and closed comedones      Inflammatory papules and pustules      Verrucoid papule consistent consistent with wart     Erythematous eczematous patches and plaques     Dystrophic onycholytic nail with subungual debris c/w onychomycosis     Umbilicated papule    Erythematous-base heme-crusted tan verrucoid plaque consistent with inflamed seborrheic keratosis     Erythematous Silvery Scaling Plaque c/w Psoriasis     See annotation      Assessment / Plan:      Pathology Orders:     Normal Orders This Visit    Tissue Specimen To Pathology, Dermatology     Questions:    Directional Terms:  Other(comment)    Clinical information:  r/o BCC    Specific Site:  L nasal tip        Neoplasm of uncertain behavior of skin  -     Tissue Specimen To Pathology, Dermatology    Rhytides  -     tretinoin (RETIN-A) 0.025 % cream; Apply topically every evening.  Dispense: 20 g; Refill: 1    Shave biopsy procedure note:    Shave biopsy performed after verbal consent including risk of infection, scar, recurrence, need for additional treatment of site. Area prepped with alcohol, anesthetized with approximately 1.0cc of 1% lidocaine with epinephrine. Lesional tissue shaved with razor blade. Hemostasis achieved with application of aluminum chloride followed by hyfrecation. No complications. Dressing applied. Wound care explained.    If +, Mohs         Follow-up for pending Pathology.   [Follow-Up Visit] : a follow-up

## 2021-05-26 NOTE — ASSESSMENT
[FreeTextEntry1] : This is a 75 year old F patient with PMHx of Afib on Eliquis, HTN, varicose vein in the lower extremities, diagnosed with  pancreatic adenocarcinoma.\par PET scan showing a JEREMI nodular opacity hat is suspicious. In addition there is a moderate rt pleural effusion. Therefore not clear if pt has localized or disseminated disease.\par \par RECOMMENDATIONS\par I discussed the natural history, treatment and prognosis of pancreatic cancer.\par Given that there is concern for distant disease we will hold off on surgery and start pt on chemotherapy with Gemzar and Abraxane. Given her PS will need to lower the dose and modify the administration frequency to 2 weeks on 1 week off.\par Discussed expected side effects and their management with the pt incl but not limited to nausea, vomiting, hair loss, neuropathy, allergic reactions, myelosuppression, risk of sepsis, need for GCSF etc.\par The patient received cycle 1 and tolerated fairly well with no major side effects. However she complained of bone pain after her neulasta patch. \par Her cbc reviewed. Her platelets were 103K . We will proceed with C2 D1 , and we will recheck cbc on day 8 . \par Pt was informed that after 2-3 cycles of chemo imaging will be repeated to assess disease status and treatment response.\par The chemotherapy dose was adjusted according to pt's height, weight, labs and anticipated tolerance.\par The high risk of complications and complexity associated with antineoplastic therapy administration has been explained to the pt and family. Chemotherapy will be given with adequate precautions including premedications, hydration and close monitoring during treatment.\par Chemotherapy will be administered under my direct supervision\par \par SUPPORTIVE MEASURES\par Zofran 8mg Q 8 hrs prn for emesis and nausea.\par Compazine  Q 6hrs prn\par Encourage adequate fluid untake,\par GI prophylaxis with PPI\par Tylenol for bone pain after neulasta\par \par Due to COVID 19, pre-visit patient instructions were explained to the patient and their symptoms were checked upon arrival. \par Masks were used by the health care providers and staff and the examination room was cleaned before and after the patient visit was completed.\par Encouraged pt to complete Covid vaccination\par \par The patient was seen and examined by Dr Kraft ( covering Dr Johnson) who agreed with the above plan. \par RTC in 3 weeks \par \par

## 2021-06-15 NOTE — PHYSICAL EXAM
[Ambulatory and capable of all self care but unable to carry out any work activities] : Status 2- Ambulatory and capable of all self care but unable to carry out any work activities. Up and about more than 50% of waking hours [Normal] : RRR, normal S1S2, no murmurs, rubs, gallops [de-identified] : LLE chronic edema [de-identified] : distended mildly

## 2021-06-15 NOTE — HISTORY OF PRESENT ILLNESS
[de-identified] : Patient is a 75y old Female who presented to ER with a chief complaint of Jaundice on 10 Mar 2021\par \par She has a  PMHx of Afib on eliquis , HTN, Varicose vein in the lower extremities, Nephrolithiasis presented to the ED for painless jaundice and found to have pancreatic\par adenocarcinoma. She noticed dark urine in December 2020 and light colored\par stool. She went to her PMD who treated her for UTI, then developed jaundice and\par had EGD 3/3, he ordered a CT but it was not performed. Her jaundice\par significantly worsened along with boating,nausea and early satiety. Patient also mentioned night sweats and chills for the last month\par that happens almost daily Tbili sarah to 16.4, Alk phosph 1800, ca 19-9 = 2637, CT AP showed the below:\par \par 1. Arterially enhancing 3.5 x 2.8 x 3.4 cm area in the pancreatic head with\par associated pancreatic and intra-/extra hepatic biliary ductal dilation, highly\par suspicious for a pancreatic head neoplasm; given enhancement pattern,\par pancreatic neuroendocrine tumor is a possibility. Endoscopic ultrasound and\par tissue sampling is recommended for further assessment.\par \par 2. Nonspecific mild edema/stranding surrounding the celiac axis and SMA,\par possibly secondary to mesenteric edema; tumor encasement cannot be excluded.\par \par 3. Few subcentimeter arterially enhancing foci in the liver, likely\par representing perfusional abnormalities/shunts. However, if neuroendocrine tumor\par is confirmed, arterially enhancing metastases would also be in the\par differential. Further evaluation can be obtained at that time with a liver\par protocol MRI.\par \par 4. Prominent periportal lymph node.\par \par Ct chest showed bilateral effusions but no suspicious LN or nodules.\par \par She then underwent ERCP with stent placement. TB now improved to 8.5, DB 5.8 \par \par Cytopathology Report\par \par Final Diagnosis\par PANCREATIC HEAD MASS, EUS-GUIDED FINE NEEDLE ASPIRATION BIOPSY:\par - POSITIVE FOR MALIGNANT CELLS.\par - Adenocarcinoma.\par \par She has since then had a PET scan. She feels better with jaundice improved.\par She was seen by Dr Nino and was recommended chemo before possible surgery.\par \par 5/26/2021: \par The patient is here for follow up..  [de-identified] : 4/8/21\par Pt denies any SOB, chest pain or palpitations.\par No fever\par \par 5/26/2021: The patient is here for follow up. She completed cycle 1 of [place]/abraxane . She reported fatigue and weakness few days after chemotherapy , and diffuse bony pain after neulasta. She feels better today , denies fever, chills, nausea , diarrhea or constipation , no bone pain. She was admitted to the hospital few weeks back for LLE cellulitis treated with clindamycin. \par \par 6/15/21\par Pt is here for a follow up.\par She is s/p 2 cycles of chemo.\par Has been feeling bloated. Appetite is reduced.\par C/O pain post Neulasta.\par Day 8 of chemo was postponed d/t cellulitis

## 2021-06-15 NOTE — ASSESSMENT
[FreeTextEntry1] : This is a 76 year old F patient with PMHx of Afib on Eliquis, HTN, varicose vein in the lower extremities, diagnosed with  pancreatic adenocarcinoma.\par PET scan showing a JEREMI nodular opacity that is suspicious. In addition there is a moderate rt pleural effusion. Therefore not clear if pt has localized or disseminated disease.\par \par RECOMMENDATIONS\par I discussed the natural history, treatment and prognosis of pancreatic cancer.\par Given that there is concern for distant disease we will hold off on surgery and start pt on chemotherapy with Gemzar and Abraxane. Given her PS we lowered the dose and modified the administration frequency to 2 weeks on 1 week off.\par Discussed expected side effects and their management with the pt incl but not limited to nausea, vomiting, hair loss, neuropathy, allergic reactions, myelosuppression, risk of sepsis, need for GCSF etc.\par The patient received cycle 2 and tolerated fairly well with no major side effects. However she complained of bone pain after neulasta  \par Her cbc reviewed is adequate.  She will proceed with C3 D1 ,next week.\par Pt was informed that after 3 cycles of chemo imaging will be repeated to assess disease status and treatment response.\par The chemotherapy dose was adjusted according to pt's height, weight, labs and anticipated tolerance.\par The high risk of complications and complexity associated with antineoplastic therapy administration has been explained to the pt and family. Chemotherapy will be given with adequate precautions including premedications, hydration and close monitoring during treatment.\par Chemotherapy will be administered under my direct supervision\par \par Monitoring will be with each cycle Q 3 weeks\par CBC (with differential and platelet count), serum electrolytes, serum creatinine and BUN, CrCl, LFTs; signs/symptoms of hypersensitivity reactions.\par \par Pt advised to go for PET scan prior to next follow up.\par \par SUPPORTIVE MEASURES\par Zofran 8mg Q 8 hrs prn for emesis and nausea.\par Compazine  Q 6hrs prn\par Encourage adequate fluid untake,\par GI prophylaxis with PPI\par Tylenol for bone pain after neulasta\par \par Due to COVID 19, pre-visit patient instructions were explained to the patient and their symptoms were checked upon arrival. \par Masks were used by the health care providers and staff and the examination room was cleaned before and after the patient visit was completed.\par Encouraged pt to complete Covid vaccination\par \par \par RTC in 3 weeks \par \par

## 2021-06-19 NOTE — ASU PREOP CHECKLIST - LATEX ALLERGY
Subjective:  Valeria is a healthy 6 yo female who presents for evaluation of cough and sore throat.  Symptoms started on Monday with a cough that is productive of white mucus.  She shortly thereafter developed sore throat and nasal discharge.  She has been having difficulty sleeping due to her cough and nasal congestion.  She denies any ear pain.  She has not had fevers, chills, nausea, vomiting, abdominal pain, diarrhea, eye irritation, rash.  She has been using ephedrine and Robitussin without much relief.  She is staying well-hydrated and has not had loss of appetite.  Both of her sisters are sick with the same symptoms.  They recently returned from a trip to [place] over the weekend where they spend much time swimming.  She is up-to-date on vaccinations.    Objective:  /74 (Patient Site: Right Arm, Patient Position: Sitting, Cuff Size: Adult Regular)  Pulse 85  Temp 97.9  F (36.6  C) (Tympanic)   Resp 20  Wt (!) 152 lb (68.9 kg)  SpO2 98%  Physical Exam   Constitutional: She appears well-developed. No distress.   HENT:   Right Ear: Tympanic membrane normal.   Left Ear: Tympanic membrane normal.   Mouth/Throat: Mucous membranes are moist. No tonsillar exudate. Oropharynx is clear.   Eyes: Pupils are equal, round, and reactive to light. Right eye exhibits no discharge. Left eye exhibits no discharge.   Neck: No adenopathy.   Cardiovascular: Regular rhythm, S1 normal and S2 normal.    Pulmonary/Chest: Effort normal and breath sounds normal.   Neurological: She is alert.   Skin: Skin is warm and dry. No rash noted.     Assessment & Plan:    Acute URI:    Her vitals are stable and she is nontoxic-appearing.  ROS negative for concerning symptoms.  History and exam is consistent with viral respiratory infection.  Recommended ongoing symptomatic cares, including use of Vicks vapor rub, warm liquids, soups, honey, cough syrup as needed.  Discussed that she is likely at the climax of symptoms and  things should improve from here on out.  Patient and her mother verbalized understanding and are in agreement with the treatment plan.    The patient was seen and assessed with the attending physician, Dr. Golden, who agrees with the assessment and plan.    Kaycee Lerner MD PGY 2    I have personally reviewed the history and examined the patient.  I agree with the above documentation.    Felicia Golden M.D.     yes

## 2021-06-28 NOTE — HISTORY OF PRESENT ILLNESS
[de-identified] : VIKASH LI  is a pleasant 75 year year old woman  who came in 03/18/2021 for newly diagnosed pancreas cancer 3/2021. She has cardiologist Dr Garrett Garvey for a fib. Dr Carson vascular surgeon for her varicose veins.\par \par she noticed jaundice in december 2020. treated for UTI. her cardiologist noticed jaundice during her stress test and send her to Dr Grant who scope her 3/3/2021 with negative results and ordered CT which was not done as outpatient. she came to [facility] ED with worsening jaundice and she had CT and EUS/ERCP and plastic stent by Dr Gavin with bx showed adenocarcinoma at head of pancreas. 3/9/2021 EUS showed resectable lesion and possible reactive LN. t mable 16 and went down after stent to 8\par \par she had three cycles of [place]/abraxane\par \par 6/24/2021 she reported lower ext ulcers and picture of dermatitis\par

## 2021-06-28 NOTE — ASSESSMENT
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 presented with obstructive jaundice\par s/p ERCP/plastic stent/EUS 3/9/2021\par trending down of her bilirubin\par \par PET showed left upper lung avid lesion, bilateral pleural effusion more on right side, hx of chronic lung disease and return to work shortly after 9/11 with possible dust exposure, she saw pulmonologist for long time.\par \par discussed with IR dr Mancuso her PET lung findings and given that was developed in 10 days, he suggested no bx and repeat imaging\par \par 6/24/20201 s/p 3 cycles gem/abraxane, will repeat PET/CT and evaluate her response to treatment, and decide for more chemo vs surgery, discussed with Dr Johnson\par

## 2021-07-16 NOTE — ASSESSMENT
[FreeTextEntry1] : This is a 76 year old F patient with PMHx of Afib on Eliquis, HTN, varicose vein in the lower extremities, diagnosed with  pancreatic adenocarcinoma.\par initial PET scan had shown  a JEREIM nodular opacity that is suspicious. In addition there was a moderate rt pleural effusion. Therefore not clear if pt had localized or disseminated disease.\par \par these findings have resolved on her most recent PET scan 7/21. In addition the pancreatic lesion also has improved.\par \par Pt has small skin ulcers on both ankles\par \par RECOMMENDATIONS\par I discussed the natural history, treatment and prognosis of pancreatic cancer.\par \par \par The results of PET scan and CT scan were d/w pt.\par She will proceed with surgery as advised by Surg ONC.\par Will hold off on chemo until after the procedure.\par \par Pt was advised to see Podiatry and will also be referred to wound care team.\par \par Advised to keep lower extremities elevated, reduce salt intake.\par \par Due to COVID 19, pre-visit patient instructions were explained to the patient and their symptoms were checked upon arrival. \par Masks were used by the health care providers and staff and the examination room was cleaned before and after the patient visit was completed.\par Encouraged pt to complete Covid vaccination\par \par \par RTC in 3 weeks \par \par

## 2021-07-16 NOTE — PHYSICAL EXAM
[Ambulatory and capable of all self care but unable to carry out any work activities] : Status 2- Ambulatory and capable of all self care but unable to carry out any work activities. Up and about more than 50% of waking hours [Normal] : affect appropriate [de-identified] : LLE chronic edema [de-identified] : distended mildly [de-identified] : an area of skin breakdown on both ankles. No e/o infection.

## 2021-07-16 NOTE — HISTORY OF PRESENT ILLNESS
[de-identified] : Patient is a 75y old Female who presented to ER with a chief complaint of Jaundice on 10 Mar 2021\par \par She has a  PMHx of Afib on eliquis , HTN, Varicose vein in the lower extremities, Nephrolithiasis presented to the ED for painless jaundice and found to have pancreatic\par adenocarcinoma. She noticed dark urine in December 2020 and light colored\par stool. She went to her PMD who treated her for UTI, then developed jaundice and\par had EGD 3/3, he ordered a CT but it was not performed. Her jaundice\par significantly worsened along with boating,nausea and early satiety. Patient also mentioned night sweats and chills for the last month\par that happens almost daily Tbili sarah to 16.4, Alk phosph 1800, ca 19-9 = 2637, CT AP showed the below:\par \par 1. Arterially enhancing 3.5 x 2.8 x 3.4 cm area in the pancreatic head with\par associated pancreatic and intra-/extra hepatic biliary ductal dilation, highly\par suspicious for a pancreatic head neoplasm; given enhancement pattern,\par pancreatic neuroendocrine tumor is a possibility. Endoscopic ultrasound and\par tissue sampling is recommended for further assessment.\par \par 2. Nonspecific mild edema/stranding surrounding the celiac axis and SMA,\par possibly secondary to mesenteric edema; tumor encasement cannot be excluded.\par \par 3. Few subcentimeter arterially enhancing foci in the liver, likely\par representing perfusional abnormalities/shunts. However, if neuroendocrine tumor\par is confirmed, arterially enhancing metastases would also be in the\par differential. Further evaluation can be obtained at that time with a liver\par protocol MRI.\par \par 4. Prominent periportal lymph node.\par \par Ct chest showed bilateral effusions but no suspicious LN or nodules.\par \par She then underwent ERCP with stent placement. TB now improved to 8.5, DB 5.8 \par \par Cytopathology Report\par \par Final Diagnosis\par PANCREATIC HEAD MASS, EUS-GUIDED FINE NEEDLE ASPIRATION BIOPSY:\par - POSITIVE FOR MALIGNANT CELLS.\par - Adenocarcinoma.\par \par She has since then had a PET scan. She feels better with jaundice improved.\par She was seen by Dr Nino and was recommended chemo before possible surgery.\par \par 5/26/2021: \par The patient is here for follow up..  [de-identified] : 4/8/21\par Pt denies any SOB, chest pain or palpitations.\par No fever\par \par 5/26/2021: The patient is here for follow up. She completed cycle 1 of [place]/abraxane . She reported fatigue and weakness few days after chemotherapy , and diffuse bony pain after neulasta. She feels better today , denies fever, chills, nausea , diarrhea or constipation , no bone pain. She was admitted to the hospital few weeks back for LLE cellulitis treated with clindamycin. \par \par 6/15/21\par Pt is here for a follow up.\par She is s/p 2 cycles of chemo.\par Has been feeling bloated. Appetite is reduced.\par C/O pain post Neulasta.\par Day 8 of chemo was postponed d/t cellulitis\par \par 7/15/21\par Pt is here for follow up.\par Feeling well except has small sores on both ankles.\par She had been evaluated by Dr Nino and was advised wound care which was going to be set up at home.\par No abdominal pain. No n, V, D.\par Has H/o varicose veins and frequently has LE edema and weeping sores.\par No fever.\par Had PET scan and Ct scan.\par Has met with surg Onc with plan to proceed with surgery in 8/21

## 2021-07-17 NOTE — HISTORY OF PRESENT ILLNESS
[de-identified] : VIKASH LI  is a pleasant 75 year year old woman  who came in 03/18/2021 for newly diagnosed pancreas cancer 3/2021. She has cardiologist Dr Garrett Garvey for a fib. Dr Carson vascular surgeon for her varicose veins.\par \par she noticed jaundice in december 2020. treated for UTI. her cardiologist noticed jaundice during her stress test and send her to Dr Grant who scope her 3/3/2021 with negative results and ordered CT which was not done as outpatient. she came to [facility] ED with worsening jaundice and she had CT and EUS/ERCP and plastic stent by Dr Gavin with bx showed adenocarcinoma at head of pancreas. 3/9/2021 EUS showed resectable lesion and possible reactive LN. t mable 16 and went down after stent to 8\par \par she had three cycles of [place]/abraxane\par \par 6/24/2021 she reported lower ext ulcers and picture of dermatitis\par 7/13/2021 she came to follow up after

## 2021-07-17 NOTE — ASSESSMENT
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 presented with obstructive jaundice\par s/p ERCP/plastic stent/EUS 3/9/2021\par trending down of her bilirubin\par \par 3/2021 PET showed left upper lung avid lesion, bilateral pleural effusion more on right side, hx of chronic lung disease and return to work shortly after 9/11 with possible dust exposure, she saw pulmonologist for long time.\par \par discussed with IR dr Mancuso her PET lung findings and given that was developed in 10 days, he suggested no bx and repeat imaging\par \par 6/24/20201 s/p 3 cycles gem/abraxane, will repeat PET/CT and evaluate her response to treatment, and decide for more chemo vs surgery, discussed with Dr Johnson\par \par 7/6/2021 PET showed no new lesion, resolved left upper lung avid lesion and pancreas region avidity\par \par 7/10/2021 CT no change in her pancreas head lesion\par \par will see her in 7/27/2021 to discuss her Whipple procedure, she needs local wound care for her LE wounds

## 2021-07-19 NOTE — H&P PST ADULT - EXTREMITIES COMMENTS
B/L KNEE  EDEMA- 2 + EDEMA    B/L ANKLE VARICOSE VEINS- LEAKING FLUID FOR 1 YEAR - NOT LEAKING PRESENTLY  COVERED BY 4 X4'S.  PT HAS APPOINTMENT WITH PMD AND IS FOLLOWING UP WITH A WOUND SPECIALIST.

## 2021-07-19 NOTE — H&P PST ADULT - HISTORY OF PRESENT ILLNESS
AS PER THE PT, THIS IS HIS/HER COMPLETE MEDICAL AND SURGICAL HX, INCLUDING MEDICATIONS PRESCRIBED AND OVER THE COUNTER  PT PRESENTS TO PAST WITH NO SOB, CP, PALPITATIONS, DYSURIA, UTI OR URI AT PRESENT.   PT ABLE TO WALK UP  1/2  FLIGHTS OF STEPS WITH NO SOB.- VERY SLOW  PT AMBULATES WITH A CANE.   pt denies any covid s/s,    YES- tested positive in the past -4/21.   pt advised self quarantine till day of procedure  denies travel outside the [place] in the past 30 days  Anesthesia Alert  NO--Difficult Airway  NO--History of neck surgery or radiation  NO--Limited ROM of neck  NO--History of Malignant hyperthermia  NO--Personal or family history of Pseudocholinesterase deficiency  NO--Prior Anesthesia Complication  NO--Latex Allergy  NO--Loose teeth  NO--History of Rheumatoid Arthritis  NO--CARLA  PT RECEIVED 5-6 DOSES OF CHEMO. STOPPED X1 WEEK AGO.  NO BLEEDING RISK  NO--Other_____

## 2021-07-19 NOTE — H&P PST ADULT - REASON FOR ADMISSION
Patient is a  76 year old  female presenting to PAST in preparation for diagnostic laparoscopy possible whipple  on 8/2/21  under   gen  anesthesia by Dr PABON  .  PT REPORTS--I HAVE A MASS ON MY PANCREASE.  I WENT FOR A CT SCAN WHEN IT WAS 1 WEEK AGO. I HAVE BEEN JAUNDICE FOR 3-4 MONTHS.   I HAVE JOINT PAIN THAT IS 7/10.  IT RADIATES TO MY KNEES AND BACK. IT IS A DULL LASTING PAIN.

## 2021-07-19 NOTE — H&P PST ADULT - MALLAMPATI CLASS
PT DENIES HAVING ANY LOOSE TEETH/Class III - visualization of the soft palate and the base of the uvula

## 2021-07-19 NOTE — H&P PST ADULT - NSICDXPASTMEDICALHX_GEN_ALL_CORE_FT
PAST MEDICAL HISTORY:  Atrial fibrillation, unspecified type 2019 on Eliquis    Hypertension, unspecified type 2019    Jaundice March 5, 2021    Kidney stones     Malignant neoplasm of pancreas, unspecified location of malignancy Pancreatic Cancer    Pain knee    Pancreatic cancer     SOB (shortness of breath)     Varicose veins of both lower extremities, unspecified whether complicated

## 2021-07-22 PROBLEM — N20.0 CALCULUS OF KIDNEY: Chronic | Status: ACTIVE | Noted: 2021-01-01

## 2021-07-27 PROBLEM — M79.674 PAIN IN TOES OF BOTH FEET: Status: ACTIVE | Noted: 2021-01-01

## 2021-07-27 PROBLEM — B35.1 ONYCHOMYCOSIS: Status: ACTIVE | Noted: 2021-01-01

## 2021-07-27 NOTE — HISTORY OF PRESENT ILLNESS
[FreeTextEntry1] : 76 year F presents to clinic with c/c of thickened dystrophic nails. Patient relates pain and discomfort secondary to nail thickness. \par

## 2021-07-27 NOTE — ASSESSMENT
[FreeTextEntry1] : -Aseptic debridement of all fungal nails.  Patient has pain about the toes secondary to nail pressure and relates improvement with periodic debridement.\par -return 2 months

## 2021-07-27 NOTE — PHYSICAL EXAM
[General Appearance - Alert] : alert [General Appearance - In No Acute Distress] : in no acute distress [Varicose Veins Of Lower Extremities] : present [Oriented To Time, Place, And Person] : oriented to person, place, and time [Impaired Insight] : insight and judgment were intact [de-identified] : right bunion, second crossover toe deformity  [FreeTextEntry1] : thick, dystrophic, discolored nails with subungual debris x 10\par \par \par

## 2021-07-31 NOTE — HISTORY OF PRESENT ILLNESS
[de-identified] : VIKASH LI  is a pleasant 75 year year old woman  who came in 03/18/2021 for newly diagnosed pancreas cancer 3/2021. She has cardiologist Dr Garrett Garvey for a fib. Dr Carson vascular surgeon for her varicose veins.\par \par she noticed jaundice in december 2020. treated for UTI. her cardiologist noticed jaundice during her stress test and send her to Dr Grant who scope her 3/3/2021 with negative results and ordered CT which was not done as outpatient. she came to [facility] ED with worsening jaundice and she had CT and EUS/ERCP and plastic stent by Dr Gavin with bx showed adenocarcinoma at head of pancreas. 3/9/2021 EUS showed resectable lesion and possible reactive LN. t mable 16 and went down after stent to 8\par \par she had three cycles of [place]/abraxane\par \par 6/24/2021 she reported lower ext ulcers and picture of dermatitis\par 7/13/2021 she came to f/u \par 7/27/2021 she came to discuss whipple procedure

## 2021-07-31 NOTE — REVIEW OF SYSTEMS
[Negative] : Heme/Lymph [FreeTextEntry8] : some abdominal discomfort [FreeTextEntry2] : loss of weight and fatigue

## 2021-07-31 NOTE — ASSESSMENT
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 presented with obstructive jaundice\par s/p ERCP/plastic stent/EUS 3/9/2021\par trending down of her bilirubin\par \par 3/2021 PET showed left upper lung avid lesion, bilateral pleural effusion more on right side, hx of chronic lung disease and return to work shortly after 9/11 with possible dust exposure, she saw pulmonologist for long time.\par \par discussed with IR dr Mancuso her PET lung findings and given that was developed in 10 days, he suggested no bx and repeat imaging\par \par 6/24/20201 s/p 3 cycles gem/abraxane, will repeat PET/CT and evaluate her response to treatment, and decide for more chemo vs surgery, discussed with Dr Johnson\par \par 7/6/2021 PET showed no new lesion, resolved left upper lung avid lesion and pancreas region avidity\par \par 7/10/2021 CT no change in her pancreas head lesion\par \par 7/27/2021 we discussed her Whipple procedure, she needs local wound care for her LE wounds

## 2021-08-02 NOTE — CONSULT NOTE ADULT - ASSESSMENT
Assessment & Plan    76y Female  s/p     NEURO:    Acute pain-controlled with     RESP:     Oxygenation-wean off NC to RA as tolerate    Activity-      08-02 @ 15:20 -- 7.38 / 36 / 273 / 21 / --        SAT  --  Lac 1.0   08-02 @ 12:42 -- 7.42 / 36 / 294 / 24 / --        SAT  --  Lac 0.9       Rx-      CARDS:     Imaging:     Labs:     Rx-    GI/NUTR:     Diet, NPO      Diet, NPO (08-02-21 @ 16:29) [Active]          GI Prophylaxis-    Bowel regimen-last bowel movement      Drug Dosing Weight  Height (cm): 172.7 (02 Aug 2021 07:17)  Weight (kg): 56.7 (02 Aug 2021 07:17)  BMI (kg/m2): 19 (02 Aug 2021 07:17)  BSA (m2): 1.67 (02 Aug 2021 07:17)          /RENAL:      Monitor UO-ariza in place  Volume Status:  Labs:          BUN/Cr-          Electrolytes-      HEME/ONC:       DVT prophylaxis-, SCDs    Labs: Hb/Hct:                       Plts:                  PTT/INR:                  T&S:    ID:  WBC-  Temp trend- 24hrs T(F): 97.8 (08-02 @ 07:12), Max: 97.8 (08-02 @ 07:12)  Antibiotics-  Cultures:           ENDO:         HA1C     LINES/DRAINS:  Juliette WAGGONER Foley     Advanced Directives: Presumed Full Code    HCP/Emergency Contact-    DISPO:    SICU Assessment & Plan    76y Female  s/p pancreaticoduodenectomy, 1 ISABELL in place    NEURO:    Acute pain-controlled with PCA    RESP:     Oxygenation-wean off NC to RA as tolerate    Activity-increase as tolerated      CARDS:     hx of Afib-cardizem gtt due to metoprolol allergy and strict NPO (home cardizem 120mg ER)    hx of HTN-continue lasix 10mg IV (PO 20mg at home)    Imaging: EKG afib    Labs: Trops 3 sets pending    GI/NUTR:     s/p whipple-strict NPO    Diet, NPO-NG tube in place    GI Prophylaxis-PPI    Bowel regimen-last bowel movement  unknown       /RENAL:      Monitor UO-ariza in place  Labs:          BUN/Cr- 10/0.6  -->          Electrolytes-Na 139 // K 4.0 // Mg 1.5 //  Phos 3.8 (08-02 @ 17:15)        HEME/ONC:       DVT prophylaxis-heparin   Injectable,, SCDs    Labs: Hb/Hct:  13.1/41.3                Plts:  269  -->                 PTT/INR:  29.7/1.29  (08-02 @ 17:15) --->                 T&S:    ID:  WBC- 21.93    Temp trend- 24hrs T(F): 97.5 (08-02 @ 16:35), Max: 97.8 (08-02 @ 07:12)  Antibiotics-Cefotetan 2g q12       ENDO:     FSG q6 while strict NPO      LINES/DRAINS:  PIV, [place], Ariza, right subclavian chemo port    Advanced Directives: Presumed Full Code    DISPO:    SICU

## 2021-08-02 NOTE — CHART NOTE - NSCHARTNOTEFT_GEN_A_CORE
Post Operative Note  Patient: VIKASH LI 76y (1945) Female   MRN: [id number]  [facility]  Visit: 08-02-21 Inpatient  Date: 08-02-21 @ 22:25    Procedure: POD 0  S/P Pylorus-sparing pancreaticoduodenectomy, Unroofing of hepatic cyst, Liver biopsy, wedge, Diagnostic laparoscopy, Open repair of superior mesenteric vein    Subjective:   Patient seen bedside in BICU, sleeping comfortably in bed and NAD, arousable but somnolent.  On cardizem gtt 2/2 afib.  A-line in place, currently tachy to 110s, pressures stable at 129/60, saturating 100% on 2LNC.  Patient with ISABELL drain in place, 65cc serosanguinous output post-op.  NGT to LCWS and w/ 30cc bilious output.  Ibarra in place with only 10cc brandi colored urine since arriving in BICU, discussed w/ ICU team and plan to increase fluids and follow closely.    Objective:  Vitals: T(F): 96.8 (08-02-21 @ 21:00), Max: 97.8 (08-02-21 @ 07:12)  HR: 110 (08-02-21 @ 22:00)  BP: 129/62 (08-02-21 @ 22:00) (124/63 - 166/75)  RR: 15 (08-02-21 @ 22:00)  SpO2: 99% (08-02-21 @ 22:00)  Vent Settings:     In:   08-02-21 @ 07:01  -  08-02-21 @ 22:25  --------------------------------------------------------  IN: 250 mL      IV Fluids: lactated ringers. 1000 milliLiter(s) (100 mL/Hr) IV Continuous <Continuous>      Out:   08-02-21 @ 07:01  -  08-02-21 @ 22:25  --------------------------------------------------------  OUT: 875 mL      EBL:     Voided Urine:   08-02-21 @ 07:01  -  08-02-21 @ 22:25  --------------------------------------------------------  OUT: 875 mL      Ibarra Catheter: yes no   Drains:   ISABELL:   08-02-21 @ 07:01  -  08-02-21 @ 22:25  --------------------------------------------------------  OUT: 25 mL           Physical Examination:  General Appearance: NAD  HEENT: EOMI, sclera non-icteric.  Heart: tachycardic  Lungs: unlabored breathing at rest  Abdomen:  Soft, appropriately tender, nondistended. No rigidity, guarding, or rebound tenderness. Abdominal binder in place.  MSK/Extremities: Warm & well-perfused. Peripheral pulses intact.  Skin: Warm, dry. No jaundice.   Incisions/Wounds: Dressings in place over midline incision and adjacent drain insertion site, clean, dry and intact, no signs of infection/active bleeding/drainage    Medications: [Standing]  cefoTEtan  IVPB 2 Gram(s) IV Intermittent every 12 hours  chlorhexidine 0.12% Liquid 15 milliLiter(s) Oral Mucosa every 12 hours  diltiazem Infusion 5 mG/Hr IV Continuous <Continuous>  furosemide   Injectable 10 milliGRAM(s) IV Push daily  heparin   Injectable 5000 Unit(s) SubCutaneous every 8 hours  HYDROmorphone PCA (1 mG/mL) 30 milliLiter(s) PCA Continuous PCA Continuous  lactated ringers. 1000 milliLiter(s) IV Continuous <Continuous>  pantoprazole  Injectable 40 milliGRAM(s) IV Push daily    Medications: [PRN]  cefoTEtan  IVPB 2 Gram(s) IV Intermittent every 12 hours  chlorhexidine 0.12% Liquid 15 milliLiter(s) Oral Mucosa every 12 hours  diltiazem Infusion 5 mG/Hr IV Continuous <Continuous>  furosemide   Injectable 10 milliGRAM(s) IV Push daily  heparin   Injectable 5000 Unit(s) SubCutaneous every 8 hours  HYDROmorphone PCA (1 mG/mL) 30 milliLiter(s) PCA Continuous PCA Continuous  lactated ringers. 1000 milliLiter(s) IV Continuous <Continuous>  pantoprazole  Injectable 40 milliGRAM(s) IV Push daily      DVT PROPHYLAXIS: heparin   Injectable 5000 Unit(s) SubCutaneous every 8 hours    GI PROPHYLAXIS: pantoprazole  Injectable 40 milliGRAM(s) IV Push daily    ANTICOAGULATION:   ANTIBIOTICS:  cefoTEtan  IVPB 2 Gram(s)        Labs:                        13.1   21.93 )-----------( 269      ( 02 Aug 2021 17:15 )             41.3     08-02    139  |  109  |  10  ----------------------------<  163<H>  4.0   |  19  |  0.6<L>    Ca    8.1<L>      02 Aug 2021 17:15  Phos  3.8     08-02  Mg     1.5     08-02      PT/INR - ( 02 Aug 2021 17:15 )   PT: 14.80 sec;   INR: 1.29 ratio         PTT - ( 02 Aug 2021 17:15 )  PTT:29.7 sec  CARDIAC MARKERS ( 02 Aug 2021 20:13 )  x     / <0.01 ng/mL / 137 U/L / x     / x      CARDIAC MARKERS ( 02 Aug 2021 17:15 )  x     / x     / x     / x     / 3.8 ng/mL      Assessment:  76F POD 0 s/p pylorus-sparing pancreaticoduodenectomy, admitted to ICU for post-operative monitoring.    Plan:    - FU UOP  - Monitor vitals  - Monitor post-op labs and replete as necessary  - FU NGT, ISABELL drain output  - Continue Pain Medications as ordered  - DVT and GI Prophylaxis  - Care as per BICU team      Date/Time: 08-02-21 @ 22:25

## 2021-08-02 NOTE — ASU PATIENT PROFILE, ADULT - PMH
Atrial fibrillation, unspecified type  2019 on Eliquis  Hypertension, unspecified type  2019  Jaundice  March 5, 2021  Kidney stones    Malignant neoplasm of pancreas, unspecified location of malignancy  Pancreatic Cancer  Pain  knee  Pancreatic cancer    SOB (shortness of breath)    Varicose veins of both lower extremities, unspecified whether complicated

## 2021-08-02 NOTE — CONSULT NOTE ADULT - SUBJECTIVE AND OBJECTIVE BOX
Podiatry Consult Note    Subjective:  VIKASH LI is a pleasant well-nourished, well developed 76y old  Female who presents with a chief complaint of BL feet chronic ulcer; unable to communicate;     HPI:  Consulted from [facility] for bilateral chronic ulcer; eval BL feet;     PAST MEDICAL & SURGICAL HISTORY:  SOB (shortness of breath)  Pain  knee  Varicose veins of both lower extremities, unspecified whether complicated  Atrial fibrillation, unspecified type  2019 on Eliquis  Jaundice  March 5, 2021  Malignant neoplasm of pancreas, unspecified location of malignancy  Pancreatic Cancer  Hypertension, unspecified type  2019  Pancreatic cancer  Kidney stones    History of surgery  vascular surgery   Status post phlebectomy  10/2018  History of ovarian cystectomy  left  History of tonsillectomy  1959  Kidney stone  2017    Objective:  Vital Signs Last 24 Hrs  T(C): 36 (02 Aug 2021 21:00), Max: 36.6 (02 Aug 2021 07:12)  T(F): 96.8 (02 Aug 2021 21:00), Max: 97.8 (02 Aug 2021 07:12)  HR: 110 (02 Aug 2021 22:00) (99 - 121)  BP: 129/62 (02 Aug 2021 22:00) (124/63 - 166/75)  BP(mean): 87 (02 Aug 2021 22:00) (86 - 114)  RR: 15 (02 Aug 2021 22:00) (10 - 18)  SpO2: 99% (02 Aug 2021 22:00) (96% - 100%)                        13.1   21.93 )-----------( 269      ( 02 Aug 2021 17:15 )             41.3                 08-02    139  |  109  |  10  ----------------------------<  163<H>  4.0   |  19  |  0.6<L>    Ca    8.1<L>      02 Aug 2021 17:15  Phos  3.8     08-02  Mg     1.5     08-02    Physical Exam - Lower Extremity Focused:   Derm:   R medial aspect of rearfoot and L lateral aspect of rearfoot superficial ulcer; fibrotic wound base without active bleeding or drainage; stable;    Vascular: DP and PT Pulses severely diminished; cold feet noted; bilateral LE spider vein noted;     Assessment:  R medial aspect rearfoot and L lateral aspect rearfoot superficial ulcer; stable;   BL lower extremities possible vascular insufficiency;     Plan:  Chart reviewed and Patient evaluated. All Questions and Concerns Addressed and Answered  Discussed diagnosis and treatment with patient  Wound Flushed w/ betadine / DSD / Kerlix; q24  Local Wound Care; As Stated Above; q24  Ordered BL feet xray; will f/u;   Ordered arterial duplex of BL LE; cold feet w/ week pulses; possible vascular insufficiency; if abnormality found, request vascular consult;   No sx intervention from podiatry; will continue w/ local wound care for now; will f/u w/ vascular;   Weight bearing status; WBAT BL feet;   Discussed Plan w/ Dr. Tovar;     Podiatry

## 2021-08-02 NOTE — CONSULT NOTE ADULT - ATTENDING COMMENTS
76y Female  s/p pancreaticoduodenectomy, 1 ISABELL in place, extubated         Acute post op pain- controlled with PCA    RESP:     Oxygenation-wean off NC to RA as tolerate    Activity-increase as tolerated           hx of Afib-   cardizem gtt due to metoprolol allergy and strict NPO (home cardizem 120mg ER)    hx of HTN-continue lasix 10mg IV (PO 20mg at home)    Imaging: EKG afib    Labs: Trops 3 sets pending      s/p whipple-strict NPO    Diet, NPO-NG tube in place    GI Prophylaxis-PPI    Bowel regimen-last bowel movement  unknown        Monitor UO-ariza in place           BUN/Cr- 10/0.6  -->          Electrolytes-Na 139 // K 4.0 // Mg 1.5 //  Phos 3.8 (08-02 @ 17:15)        DVT prophylaxis-heparin   Injectable,, SCDs     ID:  WBC- 21.93    Temp trend- 24hrs T(F): 97.5 (08-02 @ 16:35), Max: 97.8 (08-02 @ 07:12)  Antibiotics-Cefotetan 2g q12       FSG q6 while strict NPO      LINES/DRAINS:  PIV, [place], Ariza, right subclavian chemo port

## 2021-08-02 NOTE — BRIEF OPERATIVE NOTE - OPERATION/FINDINGS
Hepatic cyst deroofing, large pancreatic head mass, margin positive x 2 requiring resection of approximately 70% of pancreas, small accessory duct noted, pancreaticojejunostomy with invagination, pediatric feeding tube as stent secured with chromic, uncomplicated hepaticojejunostomy with minesh drain as stent secured with chromic, gastrojejunostomy performed w/ ANTON 80 antecolic fashion Hepatic cyst deroofing, large pancreatic head mass, margin positive x 2 requiring resection of approximately 70% of pancreas, small accessory duct noted, pancreaticojejunostomy with invagination, pediatric feeding tube as stent secured with chromic, uncomplicated hepaticojejunostomy with minesh drain as stent secured with chromic, gastrojejunostomy performed w/ ANTON 80 antecolic fashion, b/l TAP block

## 2021-08-02 NOTE — ASU PATIENT PROFILE, ADULT - PSH
History of ovarian cystectomy  left  History of surgery  vascular surgery   ? variocose vein stripping?  History of tonsillectomy  1959  Kidney stone  2017  Status post phlebectomy  10/2018

## 2021-08-02 NOTE — BRIEF OPERATIVE NOTE - NSICDXBRIEFPROCEDURE_GEN_ALL_CORE_FT
PROCEDURES:  Pylorus-sparing pancreaticoduodenectomy 02-Aug-2021 16:19:35  Frankie Jordan  Unroofing of hepatic cyst 02-Aug-2021 16:20:18  Frankie Jordan   PROCEDURES:  Pylorus-sparing pancreaticoduodenectomy 02-Aug-2021 16:19:35  Frankie Jordan  Unroofing of hepatic cyst 02-Aug-2021 16:20:18  Frankie Jordan  Liver biopsy, wedge 02-Aug-2021 16:48:23  Frankie Jordan  Diagnostic laparoscopy 02-Aug-2021 16:48:43  Frankie Jordan  Open repair of superior mesenteric vein 02-Aug-2021 16:49:49  Frankie Jordan

## 2021-08-02 NOTE — ASU PATIENT PROFILE, ADULT - BLOOD AVOIDANCE/RESTRICTIONS, PROFILE
SUBJECTIVE:  Pratibha is a 23 year old female who comes in for follow-up for medical conditions and medications. Has been having increased anxiety due to different things going on in her life. Is not able to get in with behavioral health until October. Was wondering if there was someone else that she could possibly see. Does not want medications at this time.    Patient would also like to try Wellbutrin instead of Chantix to help her quit smoking. Reports not being very good at taking medication daily, realizes that she would need to take the Wellbutrin daily as well. However, has also found that if she does not take Chantix with food makes her stomach upset. She does not always eat breakfast. Therefore, this is been difficult for her. She also has had a lot of issues with vivid dreams.    Has also followed up with the allergist. Feels that her allergies are quite a bit better at this time. Upcoming appointment in July.    REVIEW OF SYSTEMS:  Otherwise negative.    ALLERGIES:  No Known Allergies    Current Outpatient Medications   Medication Sig Dispense Refill   • ketotifen (ZADITOR) 0.025 % ophthalmic solution Place 1 drop into both eyes 2 times daily. 5 mL 4   • cetirizine (ZYRTEC ALLERGY) 10 MG tablet Take 1 tablet by mouth daily. 30 tablet 4   • fluticasone (FLONASE) 50 MCG/ACT nasal spray Spray 2 sprays in each nostril daily. 16 g 4   • albuterol 108 (90 Base) MCG/ACT inhaler Inhale 2 puffs into the lungs every 4 hours as needed for Shortness of Breath or Wheezing. As per Asthma Action Plan. 8.5 g 2   • IUD'S IU      • buPROPion (WELLBUTRIN SR) 150 MG 12 hr tablet Take 1 tablet by mouth daily for 3 days, then take 1 tablet by mouth twice daily. 60 tablet 1     No current facility-administered medications for this visit.        Past Medical History:   Diagnosis Date   • Bilateral arm fractures    • Morphea    • Psoriasis    • Vitiligo        No past surgical history on file.    Social History     Socioeconomic  History   • Marital status: Single     Spouse name: Not on file   • Number of children: Not on file   • Years of education: Not on file   • Highest education level: Not on file   Occupational History   • Not on file   Social Needs   • Financial resource strain: Not on file   • Food insecurity:     Worry: Not on file     Inability: Not on file   • Transportation needs:     Medical: Not on file     Non-medical: Not on file   Tobacco Use   • Smoking status: Former Smoker     Packs/day: 0.30     Years: 3.00     Pack years: 0.90     Types: Cigarettes     Last attempt to quit: 6/16/2019   • Smokeless tobacco: Never Used   Substance and Sexual Activity   • Alcohol use: Yes   • Drug use: No   • Sexual activity: Not on file   Lifestyle   • Physical activity:     Days per week: Not on file     Minutes per session: Not on file   • Stress: Not on file   Relationships   • Social connections:     Talks on phone: Not on file     Gets together: Not on file     Attends [employer] service: Not on file     Active member of club or organization: Not on file     Attends meetings of clubs or organizations: Not on file     Relationship status: Not on file   • Intimate partner violence:     Fear of current or ex partner: Not on file     Emotionally abused: Not on file     Physically abused: Not on file     Forced sexual activity: Not on file   Other Topics Concern   • Not on file   Social History Narrative   • Not on file       Family History   Problem Relation Age of Onset   • Hypertension Maternal Grandmother    • Diabetes Maternal Grandmother    • Cancer, Prostate Maternal Grandfather        OBJECTIVE:  Visit Vitals  /78 ([place] [facility] [place]itting, Cuff Size: Regular)   Pulse 72   Ht 5' 6\" (1.676 m)   BMI 37.12 kg/m²     GENERAL: Alert and oriented times 3; no apparent distress.  SKIN: Warm, dry to touch with good skin turgor.  HEART: S1 and S2 clear and regular. No murmur, thrills or extra heart sounds.  LUNGS:   Clear throughout to ascultation. No rales, wheezes or rhonchi. Breathing easily.  ABDOMEN: Active BS(bowel sounds) times 4. Soft, nontender. No hepatomegaly. No splenomegaly. No masses.  EXTREMITIES: No pedal or lower leg edema.  MOOD: Calm and appropriate.  NEUROLOGIC: Non-focal.     ASSESSMENT/PLAN:  1. Laboratory examination ordered as part of a routine general medical examination  Orders as noted below for in September, prior to physical exam.  - BASIC METABOLIC PANEL; Future  - LIPID PANEL WITH REFLEX; Future  - CBC NO DIFFERENTIAL; Future  - THYROID STIMULATING HORMONE REFLEX; Future  - SGPT; Future  - SGOT; Future    2. Non-seasonal allergic rhinitis due to other allergic trigger  Followed by allergist.    3. Anxiety and depression  Denies the medication. Like to get in with behavioral health sooner. Currently scheduled to see someone in [place], but not able to be seen until the end of October. Will give her referral to Dr. Pastora Alex.     4. Tobacco abuse  Try Chantix, difficulty with medication. Will prescribe Wellbutrin as noted below. Discussed with patient potential side effect of increasing her anxiety. Should this happen she will call some let us know.  - buPROPion (WELLBUTRIN SR) 150 MG 12 hr tablet; Take 1 tablet by mouth daily for 3 days, then take 1 tablet by mouth twice daily.  Dispense: 60 tablet; Refill: 1    Follow-up in September for annual exam with pre-clinic fasting labs; sooner if needed. Patient verbalized an understanding.    Ami Harden NP     none

## 2021-08-02 NOTE — CONSULT NOTE ADULT - SUBJECTIVE AND OBJECTIVE BOX
VIKASH LI     76y Female    MRN-[id number]    Admit Date: 08-02-21  Hospital LOS:   ICU Admission Date:  OR #1-        ============================   HPI   75 year old F patient with PMHx of Afib on eliquis ( Cardiologist VEE Medrano ), HTN, Varicose vein in the lower extremities, Nephrolithiasis presented to the ED March 2021 for yellow discoloration of the skin. Patient started to notice this Jaundice in December 2020. It was associated with dark urine and light colored stool. Patient went to her PMD who treated her for UTI. Patient went to her cardiologist for her stress test and he noticed that she looks jaundiced and referred her to Dr. Grayson who performed an endoscopy in March 2021 which was normal. Patient endorsed having an intermittent change in the consistency of her stool ( Sometimes its solid other time its loose, and green in color ). She also complained of boating, nausea and early satiety but stated that she actually gained weight in the last few weeks. Patient also mentioned night sweats and chills for the last month that happens almost daily.     In the ED patient had a CT abdomen that demonstrated severe intrahepatic and extrahepatic biliary ductal dilatation. Abrupt cut off of the CBD at the level of the pancreatic head. Pancreatic duct also dilated with abrupt cut off at the level of the pancreatic head. Pancreatic head/neck is ill-defined, raising suspicion for underlying ill-defined mass. In the ED patient was seen and evaluated by the advance GI team who decided to proceed with EUS on Monday.     Reason for Consult: Finding of potential pancreatic head/neck mass in setting of painless jaundice and elevated hepatic function panel (Tbili 10.5, Dbili 8.6, alk phos 1856). Surgical Oncology consulted for recommendations regarding potential resectability.     NM PET/CT Onc FDG Skull to Thigh, Subsq (07.06.21 @ 12:47) >  IMPRESSION:  Since March 16, 2021:  1. No new sites of pathologic FDG uptake.  2. Interval resolution of previously described abnormal pancreatic FDG uptake (prior max SUV 5.4) Residual mildly FDG avid focus, max SUV 3.2, previously 7.5 in peripancreatic region, may represent interposed bowel versus residual focus of biological tumor activity.  3. Previously seen FDG avid left upper lobe nodular opacity has resolvedand no longer present on CT.  4. Interval resolution of previously seen, likely reactive cervical lymph nodes to non-FDG avid status.  5. Interval resolution of right pleural effusion.    TTE Echo Complete w/o Contrast w/ Doppler (03.07.21 @ 09:23) >  Summary:   1. Normal global left ventricular systolic function.   2. Moderate to severe right atrial enlargement.   3. LV Ejection Fraction by Nick's Method with a biplane EF of 56 %.   4. Moderately enlarged left atrium.  5. Mild Mitral valve prolapse.   6. Mild to moderate mitral valve regurgitation.   7. Moderate tricuspid regurgitation.   8. Mild aortic regurgitation.   9. Moderate aortic valve thickening with normal leaflet opening.  10. Mild pulmonic valve regurgitation.  11. Estimated pulmonary artery systolic pressure is 39.3 mmHg assuming a right atrial pressure of 3 mmHg, which is consistent with borderline pulmonary hypertension.      CT Abdomen and Pelvis w/ Oral Cont and w/wo IV Cont (07.10.21 @ 10:44) >  IMPRESSION:  Overall, no significant change in size of the pancreatic head mass.  No definite evidence of distal metastatic disease.  Mild to moderate intra and extrahepatic biliary dilatation improved since the prior exam. The distal biliary stent terminates in the third portion of the duodenum.    Procedure:  OR Stats  OR Time:               EBL: 300cc          IV Fluids: 5L (500 albumin)       Blood Products:   2PBRC             UOP:      Findings-    PMH  PAST MEDICAL & SURGICAL HISTORY:  SOB (shortness of breath)    Pain  knee    Varicose veins of both lower extremities, unspecified whether complicated    Atrial fibrillation, unspecified type  2019 on Eliquis    Jaundice  March 5, 2021    Malignant neoplasm of pancreas, unspecified location of malignancy  Pancreatic Cancer    Hypertension, unspecified type  2019    Pancreatic cancer    Kidney stones    History of surgery  vascular surgery   ? variocose vein stripping?    Status post phlebectomy  10/2018    History of ovarian cystectomy  left    History of tonsillectomy  1959    Kidney stone  2017        Home Meds:  Home Medications:  acetaminophen 325 mg oral tablet: 2 tab(s) orally every 6 hours, As needed, Temp greater or equal to 38C (100.4F), Mild Pain (1 - 3) (02 Aug 2021 07:06)  calcium,magnesium and Zinc: 1 tab(s) orally once a day (02 Aug 2021 07:06)  dilTIAZem 120 mg/24 hours oral capsule, extended release: 1 cap(s) orally once a day (02 Aug 2021 07:06)  furosemide 20 mg oral tablet: 1 tab(s) orally once a day (02 Aug 2021 07:06)  Women&#x27;s daily vitamins: 1 tab(s) orally once a day (02 Aug 2021 07:06)       Allergies  Allergies    Augmentin (Rash)  chocolate (Headache)  digoxin (Hives)  Metoprolol Succinate ER (Hives)  Novocain (Angioedema)  Steroids: Excessive swelling (Flushing; Other (Mild to Mod))  sulfa drugs (Fever)  Xarelto (Hives)    Intolerances          24 Hour Events  -Admission under SICU service        [X] A ten-point review of systems was otherwise negative except as noted above.  [  ] Due to altered mental status/intubation, subjective information was not attained from the patient. History was obtained, to the extent possible, from review of the chart and collateral sources of information.    ==========================    Current Medications:  BUpivacaine liposome 1.3% Injectable 20 milliLiter(s) Local Injection once  chlorhexidine 0.12% Liquid 15 milliLiter(s) Oral Mucosa every 12 hours  HYDROmorphone  Injectable 0.5 milliGRAM(s) IV Push every 10 minutes PRN Moderate Pain (4 - 6)  HYDROmorphone  Injectable 1 milliGRAM(s) IV Push every 10 minutes PRN Severe Pain (7 - 10)  lactated ringers. 1000 milliLiter(s) (120 mL/Hr) IV Continuous <Continuous>  lactated ringers. 1000 milliLiter(s) (100 mL/Hr) IV Continuous <Continuous>  ondansetron Injectable 4 milliGRAM(s) IV Push once PRN Nausea and/or Vomiting      VITAL SIGNS, INS/OUTS (Last 24hours):    ICU Vital Signs Last 24 Hrs  T(C): 36.6 (02 Aug 2021 07:12), Max: 36.6 (02 Aug 2021 07:12)  T(F): 97.8 (02 Aug 2021 07:12), Max: 97.8 (02 Aug 2021 07:12)  HR: 100 (02 Aug 2021 07:12) (100 - 100)  BP: 127/66 (02 Aug 2021 07:12) (127/66 - 127/66)  BP(mean): --  ABP: --  ABP(mean): --  RR: 18 (02 Aug 2021 07:12) (18 - 18)  SpO2: 96% (02 Aug 2021 07:12) (96% - 96%)    I&O's Summary          Physical Exam:   ----------------------------------------------------------------------------------------------------------  GCS:      Exam: A&Ox3, no focal deficits    RESPIRATORY:  Normal expansion/effort  Mechanical Ventilation:     CARDIOVASCULAR:   S1/S2.  RRR  No peripheral edema    GASTROINTESTINAL:  Abdomen soft, non-tender, non-distended    MUSCULOSKELETAL:  Extremities warm, pink, well-perfused.    DERM:  No skin breakdown     :   Exam: Ibarra catheter in place.     Height (cm): 172.7 (08-02-21)  Weight (kg): 56.7 (08-02-21)  BMI (kg/m2): 19 (08-02-21)  BSA (m2): 1.67 (08-02-21)    Tubes/Lines/Drains   ----------------------------------------------------------------------------------------------------------  [x] Peripheral IV  [X] Central Venous Line          IJ/Femoral             Date Placed:    [X] Arterial Line		      Radial/Femoral    Date Placed:   [X] PICC:         	  [X] Midline		                                  Date Placed:   [X] Urinary Catheter Ibarra                                             Date Placed:       LABS  ----------------------------------------------------------------------------------------------------------       GUILLERMO, VIKASH     76y Female    MRN-[id number]    Admit Date: 08-02-21  Hospital LOS:   ICU Admission Date:  OR #1-s/p         ============================  HPI   75 year old F patient with PMHx of pancreatic ca (March 2021, s/p Chemo 2 rounds 6/2021 via R Subclavian chemo port),  Afib on eliquis ( Cardiologist VEE Medrano ), HTN, Varicose vein in the lower extremities, Nephrolithiasis presented to the ED March 2021 for yellow discoloration of the skin. Patient started to notice this Jaundice in December 2020. It was associated with dark urine and light colored stool. Patient went to her PMD who treated her for UTI. Patient went to her cardiologist for her stress test and he noticed that she looks jaundiced and referred her to Dr. Grayson who performed an endoscopy in March 2021 which was normal. CT abdomen that demonstrated severe intrahepatic and extrahepatic biliary ductal dilatation. Pancreatic head/neck is ill-defined, raising suspicion for underlying ill-defined mass.    GI EUS 3/2021-EUS with FNA of Pancreatic Mass, and ERCP with sphincterotomy and placement of a large plastic CBD stent. pathology significant for adenocarcinoma    Patient seen by surg-onc and underwent pancreaticoduodenectomy with Dr. Nino. Patient tolerated the procedure well with no known intraop complication. Patient extubated emilie to PACU. SICU consulted in the setting of extensive length of surgery, hemodynamic monitoring and known afib.      Procedure:  OR Stats  OR Time:               EBL: 300cc          IV Fluids: 5L (500 albumin)       Blood Products:   2PBRC             UOP:      Findings-Hepatic cyst deroofing, large pancreatic head mass, margin positive x 2 requiring resection of approximately 70% of pancreas, small accessory duct noted, pancreaticojejunostomy with invagination, pediatric feeding tube as stent secured with chromic, uncomplicated hepaticojejunostomy with minesh drain as stent secured with chromic, gastrojejunostomy performed w/ ANTON 80 antecolic fashion, b/l TAP block        IMAGING  NM PET/CT Onc FDG Skull to Thigh, Subsq (07.06.21 @ 12:47) >  IMPRESSION:  Since March 16, 2021:  1. No new sites of pathologic FDG uptake.  2. Interval resolution of previously described abnormal pancreatic FDG uptake (prior max SUV 5.4) Residual mildly FDG avid focus, max SUV 3.2, previously 7.5 in peripancreatic region, may represent interposed bowel versus residual focus of biological tumor activity.  3. Previously seen FDG avid left upper lobe nodular opacity has resolvedand no longer present on CT.  4. Interval resolution of previously seen, likely reactive cervical lymph nodes to non-FDG avid status.  5. Interval resolution of right pleural effusion.    TTE Echo Complete w/o Contrast w/ Doppler (03.07.21 @ 09:23) >  Summary:   1. Normal global left ventricular systolic function.   2. Moderate to severe right atrial enlargement.   3. LV Ejection Fraction by Nick's Method with a biplane EF of 56 %.   4. Moderately enlarged left atrium.  5. Mild Mitral valve prolapse.   6. Mild to moderate mitral valve regurgitation.   7. Moderate tricuspid regurgitation.   8. Mild aortic regurgitation.   9. Moderate aortic valve thickening with normal leaflet opening.  10. Mild pulmonic valve regurgitation.  11. Estimated pulmonary artery systolic pressure is 39.3 mmHg assuming a right atrial pressure of 3 mmHg, which is consistent with borderline pulmonary hypertension.      PMH  PAST MEDICAL & SURGICAL HISTORY:  SOB (shortness of breath)  Pain  knee  Varicose veins of both lower extremities, unspecified whether complicated  Atrial fibrillation, unspecified type  2019 on Eliquis  Jaundice  March 5, 2021  Malignant neoplasm of pancreas, unspecified location of malignancy  Pancreatic Cancer  Hypertension, unspecified type  2019  Pancreatic cancer  Kidney stones  History of surgery  vascular surgery   ? variocose vein stripping?  Status post phlebectomy  10/2018  History of ovarian cystectomy  left  History of tonsillectomy  1959  Kidney stone  2017    Home Meds:  Home Medications:  acetaminophen 325 mg oral tablet: 2 tab(s) orally every 6 hours, As needed, Temp greater or equal to 38C (100.4F), Mild Pain (1 - 3) (02 Aug 2021 07:06)  calcium,magnesium and Zinc: 1 tab(s) orally once a day (02 Aug 2021 07:06)  dilTIAZem 120 mg/24 hours oral capsule, extended release: 1 cap(s) orally once a day (02 Aug 2021 07:06)  furosemide 20 mg oral tablet: 1 tab(s) orally once a day (02 Aug 2021 07:06)  Women&#x27;s daily vitamins: 1 tab(s) orally once a day (02 Aug 2021 07:06)      Allergies  Augmentin (Rash)  chocolate (Headache)  digoxin (Hives)  Metoprolol Succinate ER (Hives)  Novocain (Angioedema)  Steroids: Excessive swelling (Flushing; Other (Mild to Mod))  sulfa drugs (Fever)  Xarelto (Hives)     24 Hour Events  -Admission under SICU service for hemodynamic monitoring        [X] A ten-point review of systems was otherwise negative except as noted above.  [  ] Due to altered mental status/intubation, subjective information was not attained from the patient. History was obtained, to the extent possible, from review of the chart and collateral sources of information.    ==========================    Current Medications:  BUpivacaine liposome 1.3% Injectable 20 milliLiter(s) Local Injection once  chlorhexidine 0.12% Liquid 15 milliLiter(s) Oral Mucosa every 12 hours  HYDROmorphone  Injectable 0.5 milliGRAM(s) IV Push every 10 minutes PRN Moderate Pain (4 - 6)  HYDROmorphone  Injectable 1 milliGRAM(s) IV Push every 10 minutes PRN Severe Pain (7 - 10)  lactated ringers. 1000 milliLiter(s) (120 mL/Hr) IV Continuous <Continuous>  lactated ringers. 1000 milliLiter(s) (100 mL/Hr) IV Continuous <Continuous>  ondansetron Injectable 4 milliGRAM(s) IV Push once PRN Nausea and/or Vomiting      VITAL SIGNS, INS/OUTS (Last 24hours):    ICU Vital Signs Last 24 Hrs  T(C): 36.6 (02 Aug 2021 07:12), Max: 36.6 (02 Aug 2021 07:12)  T(F): 97.8 (02 Aug 2021 07:12), Max: 97.8 (02 Aug 2021 07:12)  HR: 100 (02 Aug 2021 07:12) (100 - 100)  BP: 127/66 (02 Aug 2021 07:12) (127/66 - 127/66)  BP(mean): --  ABP: --  ABP(mean): --  RR: 18 (02 Aug 2021 07:12) (18 - 18)  SpO2: 96% (02 Aug 2021 07:12) (96% - 96%)    I&O's Summary          Physical Exam:   ----------------------------------------------------------------------------------------------------------  GCS:      Exam: A&Ox3, no focal deficits    RESPIRATORY:  Normal expansion/effort    CARDIOVASCULAR:  afib    GASTROINTESTINAL:  Abdomen appropriately tender, lower abdomen ISABELL drain in place, NG tube in place  soft, binder in place    MUSCULOSKELETAL:  Extremities warm, pink, well-perfused.    DERM:  lower extremity wounds    :   Exam: Ibarra catheter in place.     Height (cm): 172.7 (08-02-21)  Weight (kg): 56.7 (08-02-21)  BMI (kg/m2): 19 (08-02-21)  BSA (m2): 1.67 (08-02-21)  ---------------------------------------------------------------------------------------------------       LI, VIKASH     76y Female    MRN-[id number]    Admit Date: 08-02-21  Hospital LOS:   ICU Admission Date:  OR #1-s/p         ============================  HPI   75 year old F patient with PMHx of pancreatic ca (March 2021, s/p Chemo 2 rounds 6/2021 via R Subclavian chemo port),  Afib on eliquis ( Cardiologist VEE Medrano ), HTN, Varicose vein in the lower extremities, Nephrolithiasis presented to the ED March 2021 for yellow discoloration of the skin. Patient started to notice this Jaundice in December 2020. It was associated with dark urine and light colored stool. Patient went to her PMD who treated her for UTI. Patient went to her cardiologist for her stress test and he noticed that she looks jaundiced and referred her to Dr. Grayson who performed an endoscopy in March 2021 which was normal. CT abdomen that demonstrated severe intrahepatic and extrahepatic biliary ductal dilatation. Pancreatic head/neck is ill-defined, raising suspicion for underlying ill-defined mass.    GI EUS 3/2021-EUS with FNA of Pancreatic Mass, and ERCP with sphincterotomy and placement of a large plastic CBD stent. pathology significant for adenocarcinoma    Patient seen by surg-onc and underwent pancreaticoduodenectomy with Dr. Nino. Patient tolerated the procedure well with no known intraop complication. Patient extubated emilie to PACU. SICU consulted in the setting of extensive length of surgery, hemodynamic monitoring and known afib.      Procedure: s/p pancreaticoduodenectomy  OR Stats  OR Time:               EBL: 300cc          IV Fluids: 5L (500 albumin)       Blood Products:   2PBRC             UOP:      Findings-Hepatic cyst deroofing, large pancreatic head mass, margin positive x 2 requiring resection of approximately 70% of pancreas, small accessory duct noted, pancreaticojejunostomy with invagination, pediatric feeding tube as stent secured with chromic, uncomplicated hepaticojejunostomy with minesh drain as stent secured with chromic, gastrojejunostomy performed w/ ANTON 80 antecolic fashion, b/l TAP block        IMAGING  NM PET/CT Onc FDG Skull to Thigh, Subsq (07.06.21 @ 12:47) >  IMPRESSION:  Since March 16, 2021:  1. No new sites of pathologic FDG uptake.  2. Interval resolution of previously described abnormal pancreatic FDG uptake (prior max SUV 5.4) Residual mildly FDG avid focus, max SUV 3.2, previously 7.5 in peripancreatic region, may represent interposed bowel versus residual focus of biological tumor activity.  3. Previously seen FDG avid left upper lobe nodular opacity has resolvedand no longer present on CT.  4. Interval resolution of previously seen, likely reactive cervical lymph nodes to non-FDG avid status.  5. Interval resolution of right pleural effusion.    TTE Echo Complete w/o Contrast w/ Doppler (03.07.21 @ 09:23) >  Summary:   1. Normal global left ventricular systolic function.   2. Moderate to severe right atrial enlargement.   3. LV Ejection Fraction by Nick's Method with a biplane EF of 56 %.   4. Moderately enlarged left atrium.  5. Mild Mitral valve prolapse.   6. Mild to moderate mitral valve regurgitation.   7. Moderate tricuspid regurgitation.   8. Mild aortic regurgitation.   9. Moderate aortic valve thickening with normal leaflet opening.  10. Mild pulmonic valve regurgitation.  11. Estimated pulmonary artery systolic pressure is 39.3 mmHg assuming a right atrial pressure of 3 mmHg, which is consistent with borderline pulmonary hypertension.      PMH  PAST MEDICAL & SURGICAL HISTORY:  SOB (shortness of breath)  Pain  knee  Varicose veins of both lower extremities, unspecified whether complicated  Atrial fibrillation, unspecified type  2019 on Eliquis  Jaundice  March 5, 2021  Malignant neoplasm of pancreas, unspecified location of malignancy  Pancreatic Cancer  Hypertension, unspecified type  2019  Pancreatic cancer  Kidney stones  History of surgery  vascular surgery   ? variocose vein stripping?  Status post phlebectomy  10/2018  History of ovarian cystectomy  left  History of tonsillectomy  1959  Kidney stone  2017    Home Meds:  Home Medications:  acetaminophen 325 mg oral tablet: 2 tab(s) orally every 6 hours, As needed, Temp greater or equal to 38C (100.4F), Mild Pain (1 - 3) (02 Aug 2021 07:06)  calcium,magnesium and Zinc: 1 tab(s) orally once a day (02 Aug 2021 07:06)  dilTIAZem 120 mg/24 hours oral capsule, extended release: 1 cap(s) orally once a day (02 Aug 2021 07:06)  furosemide 20 mg oral tablet: 1 tab(s) orally once a day (02 Aug 2021 07:06)  Women&#x27;s daily vitamins: 1 tab(s) orally once a day (02 Aug 2021 07:06)      Allergies  Augmentin (Rash)  chocolate (Headache)  digoxin (Hives)  Metoprolol Succinate ER (Hives)  Novocain (Angioedema)  Steroids: Excessive swelling (Flushing; Other (Mild to Mod))  sulfa drugs (Fever)  Xarelto (Hives)     24 Hour Events  -Admission under SICU service for hemodynamic monitoring        [X] A ten-point review of systems was otherwise negative except as noted above.  [  ] Due to altered mental status/intubation, subjective information was not attained from the patient. History was obtained, to the extent possible, from review of the chart and collateral sources of information.    ==========================    Current Medications:  BUpivacaine liposome 1.3% Injectable 20 milliLiter(s) Local Injection once  chlorhexidine 0.12% Liquid 15 milliLiter(s) Oral Mucosa every 12 hours  HYDROmorphone  Injectable 0.5 milliGRAM(s) IV Push every 10 minutes PRN Moderate Pain (4 - 6)  HYDROmorphone  Injectable 1 milliGRAM(s) IV Push every 10 minutes PRN Severe Pain (7 - 10)  lactated ringers. 1000 milliLiter(s) (120 mL/Hr) IV Continuous <Continuous>  lactated ringers. 1000 milliLiter(s) (100 mL/Hr) IV Continuous <Continuous>  ondansetron Injectable 4 milliGRAM(s) IV Push once PRN Nausea and/or Vomiting      VITAL SIGNS, INS/OUTS (Last 24hours):    ICU Vital Signs Last 24 Hrs  T(C): 36.6 (02 Aug 2021 07:12), Max: 36.6 (02 Aug 2021 07:12)  T(F): 97.8 (02 Aug 2021 07:12), Max: 97.8 (02 Aug 2021 07:12)  HR: 100 (02 Aug 2021 07:12) (100 - 100)  BP: 127/66 (02 Aug 2021 07:12) (127/66 - 127/66)  BP(mean): --  ABP: --  ABP(mean): --  RR: 18 (02 Aug 2021 07:12) (18 - 18)  SpO2: 96% (02 Aug 2021 07:12) (96% - 96%)    I&O's Summary          Physical Exam:   ----------------------------------------------------------------------------------------------------------  GCS:      Exam: A&Ox3, no focal deficits    RESPIRATORY:  Normal expansion/effort    CARDIOVASCULAR:  afib    GASTROINTESTINAL:  Abdomen appropriately tender, lower abdomen ISABELL drain in place, NG tube in place  soft, binder in place    MUSCULOSKELETAL:  Extremities warm, pink, well-perfused.    DERM:  lower extremity wounds    :   Exam: Ibarra catheter in place.     Height (cm): 172.7 (08-02-21)  Weight (kg): 56.7 (08-02-21)  BMI (kg/m2): 19 (08-02-21)  BSA (m2): 1.67 (08-02-21)  ---------------------------------------------------------------------------------------------------       GUILLERMO, VIKASH     76y Female    MRN-[id number]    Admit Date: 08-02-21  Hospital LOS: 0  ICU Admission Date: 8/2  OR #1-s/p whipple procedure        ============================  HPI   75 year old F patient with PMHx of pancreatic ca (March 2021, s/p Chemo 2 rounds 6/2021 via R Subclavian chemo port),  Afib on eliquis ( Cardiologist VEE Medrano ), HTN, Varicose vein in the lower extremities, Nephrolithiasis presented to the ED March 2021 for yellow discoloration of the skin. Patient started to notice this Jaundice in December 2020. It was associated with dark urine and light colored stool. Patient went to her PMD who treated her for UTI. Patient went to her cardiologist for her stress test and he noticed that she looks jaundiced and referred her to Dr. Grayson who performed an endoscopy in March 2021 which was normal. CT abdomen that demonstrated severe intrahepatic and extrahepatic biliary ductal dilatation. Pancreatic head/neck is ill-defined, raising suspicion for underlying ill-defined mass.    GI EUS 3/2021-EUS with FNA of Pancreatic Mass, and ERCP with sphincterotomy and placement of a large plastic CBD stent. pathology significant for adenocarcinoma    Patient seen by surg-onc and underwent pancreaticoduodenectomy with Dr. Nino. Patient tolerated the procedure well with no known intraop complication. Patient extubated emilie to PACU. SICU consulted in the setting of extensive length of surgery, hemodynamic monitoring and known afib.      Procedure: s/p pancreaticoduodenectomy  OR Stats  OR Time:               EBL: 300cc          IV Fluids: 5L (500 albumin)       Blood Products:   2PBRC             UOP:      Findings-Hepatic cyst deroofing, large pancreatic head mass, margin positive x 2 requiring resection of approximately 70% of pancreas, small accessory duct noted, pancreaticojejunostomy with invagination, pediatric feeding tube as stent secured with chromic, uncomplicated hepaticojejunostomy with minesh drain as stent secured with chromic, gastrojejunostomy performed w/ ANTON 80 antecolic fashion, b/l TAP block        IMAGING  NM PET/CT Onc FDG Skull to Thigh, Subsq (07.06.21 @ 12:47) >  IMPRESSION:  Since March 16, 2021:  1. No new sites of pathologic FDG uptake.  2. Interval resolution of previously described abnormal pancreatic FDG uptake (prior max SUV 5.4) Residual mildly FDG avid focus, max SUV 3.2, previously 7.5 in peripancreatic region, may represent interposed bowel versus residual focus of biological tumor activity.  3. Previously seen FDG avid left upper lobe nodular opacity has resolvedand no longer present on CT.  4. Interval resolution of previously seen, likely reactive cervical lymph nodes to non-FDG avid status.  5. Interval resolution of right pleural effusion.    TTE Echo Complete w/o Contrast w/ Doppler (03.07.21 @ 09:23) >  Summary:   1. Normal global left ventricular systolic function.   2. Moderate to severe right atrial enlargement.   3. LV Ejection Fraction by Nick's Method with a biplane EF of 56 %.   4. Moderately enlarged left atrium.  5. Mild Mitral valve prolapse.   6. Mild to moderate mitral valve regurgitation.   7. Moderate tricuspid regurgitation.   8. Mild aortic regurgitation.   9. Moderate aortic valve thickening with normal leaflet opening.  10. Mild pulmonic valve regurgitation.  11. Estimated pulmonary artery systolic pressure is 39.3 mmHg assuming a right atrial pressure of 3 mmHg, which is consistent with borderline pulmonary hypertension.      PMH  PAST MEDICAL & SURGICAL HISTORY:  SOB (shortness of breath)  Pain  knee  Varicose veins of both lower extremities, unspecified whether complicated  Atrial fibrillation, unspecified type  2019 on Eliquis  Jaundice  March 5, 2021  Malignant neoplasm of pancreas, unspecified location of malignancy  Pancreatic Cancer  Hypertension, unspecified type  2019  Pancreatic cancer  Kidney stones  History of surgery  vascular surgery   ? variocose vein stripping?  Status post phlebectomy  10/2018  History of ovarian cystectomy  left  History of tonsillectomy  1959  Kidney stone  2017    Home Meds:  Home Medications:  acetaminophen 325 mg oral tablet: 2 tab(s) orally every 6 hours, As needed, Temp greater or equal to 38C (100.4F), Mild Pain (1 - 3) (02 Aug 2021 07:06)  calcium,magnesium and Zinc: 1 tab(s) orally once a day (02 Aug 2021 07:06)  dilTIAZem 120 mg/24 hours oral capsule, extended release: 1 cap(s) orally once a day (02 Aug 2021 07:06)  furosemide 20 mg oral tablet: 1 tab(s) orally once a day (02 Aug 2021 07:06)  Women&#x27;s daily vitamins: 1 tab(s) orally once a day (02 Aug 2021 07:06)      Allergies  Augmentin (Rash)  chocolate (Headache)  digoxin (Hives)  Metoprolol Succinate ER (Hives)  Novocain (Angioedema)  Steroids: Excessive swelling (Flushing; Other (Mild to Mod))  sulfa drugs (Fever)  Xarelto (Hives)     24 Hour Events  -Admission under SICU service for hemodynamic monitoring        [X] A ten-point review of systems was otherwise negative except as noted above.  [  ] Due to altered mental status/intubation, subjective information was not attained from the patient. History was obtained, to the extent possible, from review of the chart and collateral sources of information.    ==========================    Current Medications:  BUpivacaine liposome 1.3% Injectable 20 milliLiter(s) Local Injection once  chlorhexidine 0.12% Liquid 15 milliLiter(s) Oral Mucosa every 12 hours  HYDROmorphone  Injectable 0.5 milliGRAM(s) IV Push every 10 minutes PRN Moderate Pain (4 - 6)  HYDROmorphone  Injectable 1 milliGRAM(s) IV Push every 10 minutes PRN Severe Pain (7 - 10)  lactated ringers. 1000 milliLiter(s) (120 mL/Hr) IV Continuous <Continuous>  lactated ringers. 1000 milliLiter(s) (100 mL/Hr) IV Continuous <Continuous>  ondansetron Injectable 4 milliGRAM(s) IV Push once PRN Nausea and/or Vomiting      VITAL SIGNS, INS/OUTS (Last 24hours):    ICU Vital Signs Last 24 Hrs  T(C): 36.6 (02 Aug 2021 07:12), Max: 36.6 (02 Aug 2021 07:12)  T(F): 97.8 (02 Aug 2021 07:12), Max: 97.8 (02 Aug 2021 07:12)  HR: 100 (02 Aug 2021 07:12) (100 - 100)  BP: 127/66 (02 Aug 2021 07:12) (127/66 - 127/66)  BP(mean): --  ABP: --  ABP(mean): --  RR: 18 (02 Aug 2021 07:12) (18 - 18)  SpO2: 96% (02 Aug 2021 07:12) (96% - 96%)    I&O's Summary          Physical Exam:   ----------------------------------------------------------------------------------------------------------  GCS:      Exam: A&Ox3, no focal deficits    RESPIRATORY:  Normal expansion/effort    CARDIOVASCULAR:  afib    GASTROINTESTINAL:  Abdomen appropriately tender, lower abdomen ISABELL drain in place, NG tube in place  soft, binder in place    MUSCULOSKELETAL:  Extremities warm, pink, well-perfused.    DERM:  lower extremity wounds    :   Exam: Ibarra catheter in place.     Height (cm): 172.7 (08-02-21)  Weight (kg): 56.7 (08-02-21)  BMI (kg/m2): 19 (08-02-21)  BSA (m2): 1.67 (08-02-21)  ---------------------------------------------------------------------------------------------------

## 2021-08-03 NOTE — PHYSICAL THERAPY INITIAL EVALUATION ADULT - GENERAL OBSERVATIONS, REHAB EVAL
9:00-9:30. chart reviewed. Pt received semi-dotson at B/S, alert, oriented, able to follow multi-step instructions and agreeable to PT evaluation. + IV, + A -line, + monitoring, + NG tube, + foly cath, + ISABELL drain, + abdominal binder, + R chest wall chemo port. CC abdominal pain/Nausea, however, Pt is able to participate in PT. VSS, NAD.

## 2021-08-03 NOTE — PHYSICAL THERAPY INITIAL EVALUATION ADULT - GAIT TRAINING, PT EVAL
Pt will ambulate using RW or least restrictive AD for 100 ft with supervision by discharge to facilitate return to PLOF. negotiate 3 steps using 1 HR under supervision

## 2021-08-03 NOTE — PROGRESS NOTE ADULT - SUBJECTIVE AND OBJECTIVE BOX
GENERAL SURGERY PROGRESS NOTE    Patient: VIKASH LI , 76y ([birth year])Female   MRN: [id number]  [facility]  Visit: 08-02-21 Inpatient  Date: 08-03-21 @ 10:04    Hospital Day #: 2  Post-Op Day #: 1    Procedure/Dx/Injuries:   S/P DIAGONISTIC LAP, WHIPPLE, HEPATIC CYST DEROOFING, LIVER WEDGE RESECTION, ANTECOLIC GJ, HEPATICOJEJ W/ 19F BYRON STENT, PANCREATICOJEJ W/ PEDS FEEDING TUBE STENT    Events of past 24 hours:     24 Hour Events  8/2  Night  UOP 10--> NICOM 250 15% --> 500LR 38% --> 500 LR  UOP up to 30 cc/hr  Bc to 40 then tachy to 130 --> cards called, Vasovagal response to nausea/gag, DC gtt switch to IV push 5 q8  NICOM 10.4%  - EKG afib,  --> 2g Mg  - Trop (-) x3  - CK Trending up 392 <- 342 <- 137, f/u 11 AM      -DAY  > s/p Whipple, admitted to SICU  > Lasix ordered  > cardizem gtt ordered (pt on home cardizem, allergic to metoprolol, strict NPO), HR goal 90s      PAST MEDICAL & SURGICAL HISTORY:  SOB (shortness of breath)    Pain  knee    Varicose veins of both lower extremities, unspecified whether complicated    Atrial fibrillation, unspecified type  2019 on Eliquis    Jaundice  March 5, 2021    Malignant neoplasm of pancreas, unspecified location of malignancy  Pancreatic Cancer    Hypertension, unspecified type  2019    Pancreatic cancer    Kidney stones    History of surgery  vascular surgery   ? variocose vein stripping?    Status post phlebectomy  10/2018    History of ovarian cystectomy  left    History of tonsillectomy  1959    Kidney stone  2017        Vitals:   T(F): 96.3 (08-03-21 @ 08:00), Max: 97.5 (08-02-21 @ 16:35)  HR: 92 (08-03-21 @ 08:00)  BP: 121/59 (08-03-21 @ 08:00)  RR: 17 (08-03-21 @ 06:00)  SpO2: 100% (08-03-21 @ 08:00)      Diet, NPO      Fluids: lactated ringers.: Solution, 1000 milliLiter(s) infuse at 100 mL/Hr      I & O's:    08-02-21 @ 07:01  -  08-03-21 @ 07:00  --------------------------------------------------------  IN:    Diltiazem: 45 mL    IV PiggyBack: 50 mL    Lactated Ringers: 1050 mL    Lactated Ringers: 100 mL    Lactated Ringers Bolus: 1500 mL  Total IN: 2745 mL    OUT:    Bulb (mL): 340 mL    Indwelling Catheter - Urethral (mL): 1066 mL    Nasogastric/Oral tube (mL): 150 mL  Total OUT: 1556 mL    Total NET: 1189 mL        Bowel Movement: : [] YES [] NO  Flatus: : [] YES [] NO    PHYSICAL EXAM:  General: NAD, AAOx3, calm and cooperative  HEENT: NCAT, JEFFRY, EOMI, Trachea ML, Neck supple  Cardiac: RRR S1, S2, no Murmurs, rubs or gallops  Respiratory: CTAB, normal respiratory effort, breath sounds equal BL, no wheeze, rhonchi or crackles  Abdomen: Soft, non-distended, non-tender, no rebound, no guarding. +BS.  Rectal: Good tone, +stool, no blood, no zion-anal masses/lesions, no fistulas, fissures, hemorrhoids  Musculoskeletal: Strength 5/5 BL UE/LE, ROM intact, compartments soft  Neuro: Sensation grossly intact and equal throughout, no focal deficits  Vascular: Pulses 2+ throughout, extremities well perfused  Skin: Warm/dry, normal color, no jaundice  Incision/wound: healing well, dressings in place, clean, dry and intact    MEDICATIONS  (STANDING):  cefoTEtan  IVPB 2 Gram(s) IV Intermittent every 12 hours  diltiazem Injectable 5 milliGRAM(s) IV Push every 8 hours  famotidine Injectable 20 milliGRAM(s) IV Push daily  furosemide   Injectable 10 milliGRAM(s) IV Push daily  heparin   Injectable 5000 Unit(s) SubCutaneous every 8 hours  HYDROmorphone PCA (1 mG/mL) 30 milliLiter(s) PCA Continuous PCA Continuous  lactated ringers. 1000 milliLiter(s) (100 mL/Hr) IV Continuous <Continuous>    MEDICATIONS  (PRN):      DVT PROPHYLAXIS: heparin   Injectable 5000 Unit(s) SubCutaneous every 8 hours    GI PROPHYLAXIS:   ANTICOAGULATION:   ANTIBIOTICS:  cefoTEtan  IVPB 2 Gram(s)            LAB/STUDIES:  Labs:  CAPILLARY BLOOD GLUCOSE      POCT Blood Glucose.: 141 mg/dL (03 Aug 2021 06:41)  POCT Blood Glucose.: 139 mg/dL (02 Aug 2021 23:23)                          11.6   28.70 )-----------( 241      ( 03 Aug 2021 04:50 )             37.0       Auto Neutrophil %: 87.8 % (08-02-21 @ 17:15)  Band Neutrophils %: 0.9 % (08-02-21 @ 17:15)    08-03    139  |  107  |  11  ----------------------------<  145<H>  4.0   |  20  |  0.6<L>      Calcium, Total Serum: 8.4 mg/dL (08-03-21 @ 04:50)      LFTs:             4.4  | 1.5  | 54       ------------------[360     ( 03 Aug 2021 04:50 )  2.8  | 0.6  | 44          Lipase:x      Amylase:x         Lactate, Blood: 2.1 mmol/L (08-02-21 @ 17:07)  Blood Gas Arterial, Lactate: 1.0 mmoL/L (08-02-21 @ 15:20)  Blood Gas Arterial, Lactate: 0.9 mmoL/L (08-02-21 @ 12:42)    ABG - ( 02 Aug 2021 15:20 )  pH: 7.38  /  pCO2: 36    /  pO2: 273   / HCO3: 21    / Base Excess: -3.3  /  SaO2: x               ABG - ( 02 Aug 2021 12:42 )  pH: 7.42  /  pCO2: 36    /  pO2: 294   / HCO3: 24    / Base Excess: -0.8  /  SaO2: x                 Coags:     14.80  ----< 1.29    ( 02 Aug 2021 17:15 )     29.7        CARDIAC MARKERS ( 03 Aug 2021 04:50 )  x     / <0.01 ng/mL / 392 U/L / x     / 5.2 ng/mL  CARDIAC MARKERS ( 02 Aug 2021 23:32 )  x     / <0.01 ng/mL / 342 U/L / x     / 5.2 ng/mL  CARDIAC MARKERS ( 02 Aug 2021 20:13 )  x     / <0.01 ng/mL / 137 U/L / x     / x      CARDIAC MARKERS ( 02 Aug 2021 17:15 )  x     / x     / x     / x     / 3.8 ng/mL              IMAGING:        ACCESS/ DEVICES:  [x] Peripheral IV  [] Central Venous Line	[ ] R	[ ] L	[ ] IJ	[ ] Fem	[ ] SC	Placed:   [x] Arterial Line		[ ] R	[ ] L	[ ] Fem	[ ] Rad	[ ] Ax	Placed:   [ ] PICC:					[ ] Mediport  [x] Urinary Catheter,  Date Placed:   [ ] Chest tube: [ ] Right, [ ] Left  [x] ISABELL/Byron Drains  Mediport R subclavian

## 2021-08-03 NOTE — PHYSICAL THERAPY INITIAL EVALUATION ADULT - LEVEL OF INDEPENDENCE: SIT/STAND, REHAB EVAL
HISTORY OF PRESENT ILLNESS  Madison Maguire is a 60 y.o. female. HPI   Follow up on chronic medical problems. Overall feeling well. Hypertension Review:  Compliant w/ low salt diet, and some exercise. Tolerating Norvasc without problems. No home bp monitoring. No swelling, headache or dizziness. No chest pain, SOB, palpitations. Sleeping better with using the xanax. Glucose intolerance reveiw:  She has IGT. Diabetic ROS - further diabetic ROS: no polyuria or polydipsia, no chest pain, dyspnea or TIA's, no numbness, tingling or pain in extremities, no unusual visual symptoms. Lab review: orders written for new lab studies as appropriate; see orders. Recent b/l mastectomy for hx of breast cancer October 2015. Overall recovering well. 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  mometasone (NASONEX) 50 mcg/actuation nasal spray 2 Sprays by Both Nostrils route daily as needed.  CALCIUM CARBONATE/VITAMIN D3 (CALCIUM CHEWABLE PLUS PO) Take 1 Tab by mouth daily.  docusate sodium (COLACE) 100 mg capsule TAKE 1 CAPSULE BY MOUTH TWICE A DAY AS NEEDED FOR CONSTIPATION  0    acetaminophen (TYLENOL EXTRA STRENGTH) 500 mg tablet Take 1,000 mg by mouth every six (6) hours as needed for Pain.       LORATADINE/PSEUDOEPHEDRINE SUL (CLARITIN-D 24 HOUR PO) Take 1 Tab by mouth daily as needed. Allergies   Allergen Reactions    Compazine [Prochlorperazine] Itching    Fentanyl Nausea Only    Neulasta [Pegfilgrastim] Other (comments)     Severe pain    Percocet [Oxycodone-Acetaminophen] Other (comments)     Hallucinations    Reglan [Metoclopramide] Other (comments)     Muscle tension    Sulfa (Sulfonamide Antibiotics) Nausea Only    Zofran [Ondansetron Hcl (Pf)] Itching         Past Medical History:   Diagnosis Date    Breast cancer genetic susceptibility     [facility] 1991    right breast    Cancer ([facility] 1994    reoccurence in the lymph nodes.  Cancer [facility]) 2008    left breast    Chemotherapy     2008 and 1994    Hypertension          Past Surgical History:   Procedure Laterality Date    ENDOSCOPY, COLON, DIAGNOSTIC  4/10    manetas. repeat in5 yrs.     HC STEM CELL TRNSPL AUTOLOGOUS  1994    HX BREAST LUMPECTOMY  1994    right    HX BREAST LUMPECTOMY  2008    left breast    HX BREAST RECONSTRUCTION  10/5/2015    HX BREAST RECONSTRUCTION  12/07/2016    completed    HX MASTECTOMY  10/5/2015    Bilateral    HX OOPHORECTOMY  1/28/2013         Family History   Problem Relation Age of Onset    Heart Disease Mother     Hypertension Mother     No Known Problems Father     Diabetes Maternal Grandmother     No Known Problems Maternal Grandfather     No Known Problems Paternal Grandmother     No Known Problems Paternal Grandfather        Social History     Tobacco Use    Smoking status: Never Smoker    Smokeless tobacco: Never Used   Substance Use Topics    Alcohol use: No     Alcohol/week: 0.0 standard drinks        Lab Results   Component Value Date/Time    WBC 4.3 12/14/2018 08:54 AM    HGB 12.0 12/14/2018 08:54 AM    HCT 35.9 12/14/2018 08:54 AM    PLATELET 131 53/98/8078 08:54 AM    MCV 88 12/14/2018 08:54 AM       Lab Results   Component Value Date/Time    Cholesterol, total 201 (H) 06/11/2018 11:29 AM HDL Cholesterol 85 06/11/2018 11:29 AM    LDL, calculated 104 (H) 06/11/2018 11:29 AM    Triglyceride 58 06/11/2018 11:29 AM       Lab Results   Component Value Date/Time    Sodium 141 12/14/2018 08:54 AM    Potassium 4.8 12/14/2018 08:54 AM    Chloride 105 12/14/2018 08:54 AM    CO2 27 12/14/2018 08:54 AM    Glucose 89 12/14/2018 08:54 AM    BUN 13 12/14/2018 08:54 AM    Creatinine 0.72 12/14/2018 08:54 AM    BUN/Creatinine ratio 18 12/14/2018 08:54 AM    GFR est  12/14/2018 08:54 AM    GFR est non-AA 93 12/14/2018 08:54 AM    Calcium 9.7 12/14/2018 08:54 AM    Bilirubin, total 0.3 12/14/2018 08:54 AM    ALT (SGPT) 9 12/14/2018 08:54 AM    AST (SGOT) 18 12/14/2018 08:54 AM    Alk. phosphatase 68 12/14/2018 08:54 AM    Protein, total 6.8 12/14/2018 08:54 AM    Albumin 4.2 12/14/2018 08:54 AM    A-G Ratio 1.6 12/14/2018 08:54 AM      Lab Results   Component Value Date/Time    Hemoglobin A1c 5.9 (H) 02/25/2014 09:06 AM    Hemoglobin A1c (POC) 5.6 12/14/2018 08:54 AM         Review of Systems   Constitutional: Negative for malaise/fatigue. HENT: Negative for congestion. Eyes: Negative for blurred vision. Respiratory: Negative for cough and shortness of breath. Cardiovascular: Negative for chest pain, palpitations and leg swelling. Gastrointestinal: Negative for abdominal pain, constipation and heartburn. Genitourinary: Negative for dysuria, frequency and urgency. Musculoskeletal: Negative for back pain and joint pain. Neurological: Negative for dizziness, tingling and headaches. Endo/Heme/Allergies: Positive for environmental allergies. Psychiatric/Behavioral: Negative for depression. The patient does not have insomnia. Has been more irritable and mood has been up and down. Not feeling depressed. Sleeping well on most night. Has been having some hot flashes that comes and goes. Physical Exam   Constitutional: She appears well-developed and well-nourished.    /84 Pulse 66   Temp 96.6 °F (35.9 °C) (Oral)   Resp 16   Ht 5' 6\" (1.676 m)   Wt 197 lb (89.4 kg)   LMP 01/01/1994   SpO2 100%   BMI 31.80 kg/m²    HENT:   Right Ear: Tympanic membrane and ear canal normal.   Left Ear: Tympanic membrane and ear canal normal.   Nose: No mucosal edema or rhinorrhea. Mouth/Throat: Oropharynx is clear and moist and mucous membranes are normal.   Neck: Normal range of motion. Neck supple. No thyromegaly present. Cardiovascular: Normal rate, regular rhythm and normal heart sounds. Exam reveals no gallop. Pulmonary/Chest: Effort normal and breath sounds normal.   Abdominal: Soft. Normal appearance and bowel sounds are normal. She exhibits no mass. There is no tenderness. Musculoskeletal: Normal range of motion. She exhibits no edema. Lymphadenopathy:     She has no cervical adenopathy. Skin: Skin is warm and dry. Psychiatric: She has a normal mood and affect. Nursing note and vitals reviewed. ASSESSMENT and PLAN  Diagnoses and all orders for this visit:    1. Essential hypertension  Discussed sodium restriction, high k rich diet, maintaining ideal body weight and regular exercise program such as daily walking 30 min perday 4-5 times per week, as physiologic means to achieve blood pressure control.  Medication compliance advised. -     LIPID PANEL    2. IGT (impaired glucose tolerance)  -     AMB POC HEMOGLOBIN A1C    3. Environmental allergies  Stable with prn claritin and flonase as needed. 4. Primary insomnia  Stable with prn xanax    5. Encounter for medication monitoring  -     METABOLIC PANEL, COMPREHENSIVE  -     CBC W/O DIFF    6. Encounter for immunization  -     INFLUENZA VIRUS VAC QUAD,SPLIT,PRESV FREE SYRINGE IM  -     VT IMMUNIZ ADMIN,1 SINGLE/COMB VAC/TOXOID    7. Non morbid obesity  I have reviewed/discussed the above normal BMI with the patient.   I have recommended the following interventions: dietary management education, guidance, and counseling . 8. Post menopausal syndrome  We discussed meditation, increase exercise, sleep hygiene. Can  add black cohosh as she did not want any rx medication for now. 9. Need for shingles vaccine  -     varicella-zoster recombinant, PF, (SHINGRIX) 50 mcg/0.5 mL susr injection; 0.5 mL by IntraMUSCular route once for 1 dose. Repeat second dose in 6 months. Follow-up and Dispositions    · Return in about 6 months (around 4/15/2020). reviewed diet, exercise and weight control  cardiovascular risk and specific lipid/LDL goals reviewed  reviewed medications and side effects in detail  specific diabetic recommendations: low cholesterol diet, weight control and daily exercise discussed and glycohemoglobin and other lab monitoring discussed     I have discussed diagnosis listed in this note with pt and/or family. I have discussed treatment plans and options and the risk/benefit analysis of those options, including safe use of medications and possible medication side effects. Through the use of shared decision making we have agreed to the above plan. The patient has received an after-visit summary and questions were answered concerning future plans and follow up. Advise pt of any urgent changes then to proceed to the ER. moderate assist (50% patients effort)

## 2021-08-03 NOTE — PROGRESS NOTE ADULT - ASSESSMENT
ASSESSMENT:  76y F s/p Whipple procedure     PLAN:  - pain control   - EKG afib   - GI and DVT PPx   - ISABELL outpt   - monitor UOP  - monitor labs - replete Electrolytes as needed  - monitor NGT outpt  - Monitor vitals - pt current getting IV 5mg q8 push  - continue ABx Cefapine flagyl   - NPO       Lines/Tubes: PIV, Central Line, Byron/ISABELL drains, Ibarra Catheter.    BLUE TEAM SPECTRA: [id number]

## 2021-08-03 NOTE — PHYSICAL THERAPY INITIAL EVALUATION ADULT - DID THE PATIENT HAVE SURGERY?
Pylorus-sparing pancreaticoduodenectomy, Unroofing of hepatic cyst, Diagnostic laparoscopy, Open repair of superior mesenteric vein/yes

## 2021-08-03 NOTE — PHYSICAL THERAPY INITIAL EVALUATION ADULT - TRANSFER TRAINING, PT EVAL
Pt will transfer from bed to chair and reverse using RW with supervision to facilitate return to PLOF.

## 2021-08-03 NOTE — CONSULT NOTE ADULT - SUBJECTIVE AND OBJECTIVE BOX
HPI: 76y Female with pancreatic cancer admitted to [facility] on 8/2 and underwent  s/p pancreaticoduodenectomy, 1 ISABELL in place. Prior to hospitalization she was ambulating independent, uses cane outside. Post op rehab consulted for eval      PAST MEDICAL & SURGICAL HISTORY:  SOB (shortness of breath)    Pain  knee    Varicose veins of both lower extremities, unspecified whether complicated    Atrial fibrillation, unspecified type  2019 on Eliquis    Jaundice  March 5, 2021    Malignant neoplasm of pancreas, unspecified location of malignancy  Pancreatic Cancer    Hypertension, unspecified type  2019    Pancreatic cancer    Kidney stones    History of surgery  vascular surgery   ? varicose  vein stripping?    Status post phlebectomy  10/2018    History of ovarian cystectomy  left    History of tonsillectomy  1959    Kidney stone  2017        Hospital Course:    TODAY'S SUBJECTIVE & REVIEW OF SYMPTOMS:     Constitutional WNL   Cardio WNL   Resp WNL   GI WNL  Heme WNL  Endo WNL  Skin WNL  MSK pain  Neuro WNL  Cognitive WNL  Psych WNL      MEDICATIONS  (STANDING):  cefoTEtan  IVPB 2 Gram(s) IV Intermittent every 12 hours  diltiazem Injectable 5 milliGRAM(s) IV Push every 8 hours  famotidine Injectable 20 milliGRAM(s) IV Push daily  furosemide   Injectable 10 milliGRAM(s) IV Push daily  heparin   Injectable 5000 Unit(s) SubCutaneous every 8 hours  HYDROmorphone PCA (1 mG/mL) 30 milliLiter(s) PCA Continuous PCA Continuous  lactated ringers. 1000 milliLiter(s) (100 mL/Hr) IV Continuous <Continuous>    MEDICATIONS  (PRN):      FAMILY HISTORY:  CAD (coronary artery disease) (Sibling)  3  siblings ( 2 living and 1 passed away)        Allergies    Augmentin (Rash)  chocolate (Headache)  digoxin (Hives)  Metoprolol Succinate ER (Hives)  Novocain (Angioedema)  Steroids: Excessive swelling (Flushing; Other (Mild to Mod))  sulfa drugs (Fever)  Xarelto (Hives)    Intolerances        SOCIAL HISTORY:    [  ] Etoh  [  ] Smoking  [  ] Substance abuse     Home Environment:  [   ] Home Alone  [ x  ] Lives with Family  [   ] Home Health Aid    Dwelling:  [   ] Apartment  [ x  ] Private House  [   ] Adult Home  [   ] Skilled Nursing Facility      [   ] Short Term  [   ] Long Term  [x   ] Stairs       Elevator [   ]    FUNCTIONAL STATUS PTA: (Check all that apply)  Ambulation: [  x  ]Independent    [   ] Dependent     [   ] Non-Ambulatory  Assistive Device: [ x] SA Cane  [   ]  Q Cane  [   ] Walker  [   ]  Wheelchair  ADL : [ x  ] Independent  [    ]  Dependent       Vital Signs Last 24 Hrs  T(C): 35.7 (03 Aug 2021 08:00), Max: 36.4 (02 Aug 2021 16:35)  T(F): 96.3 (03 Aug 2021 08:00), Max: 97.5 (02 Aug 2021 16:35)  HR: 103 (03 Aug 2021 10:00) (86 - 121)  BP: 127/61 (03 Aug 2021 10:00) (96/53 - 166/75)  BP(mean): 85 (03 Aug 2021 10:00) (69 - 114)  RR: 17 (03 Aug 2021 06:00) (10 - 18)  SpO2: 98% (03 Aug 2021 10:00) (98% - 100%)      PHYSICAL EXAM: Awake & Alert  GENERAL: NAD  HEAD:  Normocephalic  CHEST/LUNG: Clear   HEART: S1S2+  ABDOMEN: Soft, Nontender  EXTREMITIES:  no calf tenderness    NERVOUS SYSTEM:  Cranial Nerves 2-12 intact [   ] Abnormal  [   ]  ROM: WFL all extremities [ x  ]  Abnormal [   ]  Motor Strength: WFL all extremities  [   ]  Abnormal [ x  ]4-5/5 all ext  Sensation: intact to light touch [ x  ] Abnormal [   ]    FUNCTIONAL STATUS:  Bed Mobility: Independent [   ]  Supervision [   ]  Needs Assistance [x   ]  N/A [   ]  Transfers: Independent [   ]  Supervision [   ]  Needs Assistance [  x ]  N/A [   ]   Ambulation: Independent [   ]  Supervision [   ]  Needs Assistance [ x  ]  N/A [   ]  ADL: Independent [   ] Requires Assistance [   ] N/A [   ]      LABS:                        11.6   28.70 )-----------( 241      ( 03 Aug 2021 04:50 )             37.0     08-03    139  |  107  |  11  ----------------------------<  145<H>  4.0   |  20  |  0.6<L>    Ca    8.4<L>      03 Aug 2021 04:50  Phos  4.4     08-03  Mg     1.8     08-03    TPro  4.4<L>  /  Alb  2.8<L>  /  TBili  1.5<H>  /  DBili  0.6<H>  /  AST  54<H>  /  ALT  44<H>  /  AlkPhos  360<H>  08-03    PT/INR - ( 02 Aug 2021 17:15 )   PT: 14.80 sec;   INR: 1.29 ratio         PTT - ( 02 Aug 2021 17:15 )  PTT:29.7 sec      RADIOLOGY & ADDITIONAL STUDIES:

## 2021-08-03 NOTE — PROGRESS NOTE ADULT - SUBJECTIVE AND OBJECTIVE BOX
76y Female    [id number]    Admit Date: 08-02-21  Hospital LOS: 0  ICU Admission Date: 8/2  OR #1-s/p whipple procedure        ============================  PMH  PAST MEDICAL & SURGICAL HISTORY:  SOB (shortness of breath)  Pain  knee  Varicose veins of both lower extremities, unspecified whether complicated  Atrial fibrillation, unspecified type  2019 on Eliquis  Jaundice  March 5, 2021  Malignant neoplasm of pancreas, unspecified location of malignancy  Pancreatic Cancer  Hypertension, unspecified type  2019  Pancreatic cancer  Kidney stones  History of surgery  vascular surgery   ? variocose vein stripping?  Status post phlebectomy  10/2018  History of ovarian cystectomy  left  History of tonsillectomy  1959  Kidney stone  2017    Home Meds:  Home Medications:  acetaminophen 325 mg oral tablet: 2 tab(s) orally every 6 hours, As needed, Temp greater or equal to 38C (100.4F), Mild Pain (1 - 3) (02 Aug 2021 07:06)  calcium,magnesium and Zinc: 1 tab(s) orally once a day (02 Aug 2021 07:06)  dilTIAZem 120 mg/24 hours oral capsule, extended release: 1 cap(s) orally once a day (02 Aug 2021 07:06)  furosemide 20 mg oral tablet: 1 tab(s) orally once a day (02 Aug 2021 07:06)  Women&#x27;s daily vitamins: 1 tab(s) orally once a day (02 Aug 2021 07:06)      Allergies  Augmentin (Rash)  chocolate (Headache)  digoxin (Hives)  Metoprolol Succinate ER (Hives)  Novocain (Angioedema)  Steroids: Excessive swelling (Flushing; Other (Mild to Mod))  sulfa drugs (Fever)  Xarelto (Hives)     24 Hour Events  8/2  Night  UOP 10--> NICOM 250 15% --> 500LR 38% --> 500 LR  UOP up to 30 cc/hr  Bc to 40 then tachy to 130 --> cards called, Vasovagal response to nausea/gag, DC gtt switch to IV push 5 q8  NICOM 10.4%  - EKG afib,  --> 2g Mg  - Trop (-) x3  - CK Trending up 392 <- 342 <- 137, f/u 11 AM      -DAY  > s/p Margarita, admitted to SICU  > Lasix ordered  > cardizem gtt ordered (pt on home cardizem, allergic to metoprolol, strict NPO), HR goal 90s        [X] A ten-point review of systems was otherwise negative except as noted above.  [  ] Due to altered mental status/intubation, subjective information was not attained from the patient. History was obtained, to the extent possible, from review of the chart and collateral sources of information.       76y Female    [id number]    Admit Date: 08-02-21  Hospital LOS: 0  ICU Admission Date: 8/2  OR #1-s/p whipple procedure        ============================  PMH  PAST MEDICAL & SURGICAL HISTORY:  SOB (shortness of breath)  Pain  knee  Varicose veins of both lower extremities, unspecified whether complicated  Atrial fibrillation, unspecified type  2019 on Eliquis  Jaundice  March 5, 2021  Malignant neoplasm of pancreas, unspecified location of malignancy  Pancreatic Cancer  Hypertension, unspecified type  2019  Pancreatic cancer  Kidney stones  History of surgery  vascular surgery   ? variocose vein stripping?  Status post phlebectomy  10/2018  History of ovarian cystectomy  left  History of tonsillectomy  1959  Kidney stone  2017    Home Meds:  Home Medications:  acetaminophen 325 mg oral tablet: 2 tab(s) orally every 6 hours, As needed, Temp greater or equal to 38C (100.4F), Mild Pain (1 - 3) (02 Aug 2021 07:06)  calcium,magnesium and Zinc: 1 tab(s) orally once a day (02 Aug 2021 07:06)  dilTIAZem 120 mg/24 hours oral capsule, extended release: 1 cap(s) orally once a day (02 Aug 2021 07:06)  furosemide 20 mg oral tablet: 1 tab(s) orally once a day (02 Aug 2021 07:06)  Women&#x27;s daily vitamins: 1 tab(s) orally once a day (02 Aug 2021 07:06)      Allergies  Augmentin (Rash)  chocolate (Headache)  digoxin (Hives)  Metoprolol Succinate ER (Hives)  Novocain (Angioedema)  Steroids: Excessive swelling (Flushing; Other (Mild to Mod))  sulfa drugs (Fever)  Xarelto (Hives)     24 Hour Events  8/2  Night  UOP 10--> NICOM 250 15% --> 500LR 38% --> 500 LR  UOP up to 30 cc/hr  Bc to 40 then tachy to 130 --> cards called, Vasovagal response to nausea/gag, DC gtt switch to IV push 5 q8  NICOM 10.4%  - EKG afib,  --> 2g Mg  - Trop (-) x3  - CK Trending up 392 <- 342 <- 137, f/u 11 AM      -DAY  > s/p Margarita, admitted to SICU  > Lasix ordered  > cardizem gtt ordered (pt on home cardizem, allergic to metoprolol, strict NPO), HR goal 90s        [X] A ten-point review of systems was otherwise negative except as noted above.  [  ] Due to altered mental status/intubation, subjective information was not attained from the patient. History was obtained, to the extent possible, from review of the chart and collateral sources of information.       76y Female    [id number]    Admit Date: 08-02-21  Hospital LOS: 0  ICU Admission Date: 8/2  OR #1-s/p whipple procedure        ============================  PMH  PAST MEDICAL & SURGICAL HISTORY:  SOB (shortness of breath)  Pain  knee  Varicose veins of both lower extremities, unspecified whether complicated  Atrial fibrillation, unspecified type  2019 on Eliquis  Jaundice  March 5, 2021  Malignant neoplasm of pancreas, unspecified location of malignancy  Pancreatic Cancer  Hypertension, unspecified type  2019  Pancreatic cancer  Kidney stones  History of surgery  vascular surgery   ? variocose vein stripping?  Status post phlebectomy  10/2018  History of ovarian cystectomy  left  History of tonsillectomy  1959  Kidney stone  2017    Home Meds:  Home Medications:  acetaminophen 325 mg oral tablet: 2 tab(s) orally every 6 hours, As needed, Temp greater or equal to 38C (100.4F), Mild Pain (1 - 3) (02 Aug 2021 07:06)  calcium,magnesium and Zinc: 1 tab(s) orally once a day (02 Aug 2021 07:06)  dilTIAZem 120 mg/24 hours oral capsule, extended release: 1 cap(s) orally once a day (02 Aug 2021 07:06)  furosemide 20 mg oral tablet: 1 tab(s) orally once a day (02 Aug 2021 07:06)  Women&#x27;s daily vitamins: 1 tab(s) orally once a day (02 Aug 2021 07:06)      Allergies  Augmentin (Rash)  chocolate (Headache)  digoxin (Hives)  Metoprolol Succinate ER (Hives)  Novocain (Angioedema)  Steroids: Excessive swelling (Flushing; Other (Mild to Mod))  sulfa drugs (Fever)  Xarelto (Hives)     24 Hour Events  8/2  Night  UOP 10--> NICOM 250 15% --> 500LR 38% --> 500 LR  UOP up to 30 cc/hr  Bc to 40 then tachy to 130 --> cards called, Vasovagal response to nausea/gag, DC gtt switch to IV push 5 q8  NICOM 10.4%  - EKG afib,  --> 2g Mg  - Trop (-) x3  - CK Trending up 392 <- 342 <- 137, f/u 11 AM      -DAY  > s/p Margarita, admitted to SICU  > Lasix ordered  > cardizem gtt ordered (pt on home cardizem, allergic to metoprolol, strict NPO), HR goal 90s        [X] A ten-point review of systems was otherwise negative except as noted above.  [  ] Due to altered mental status/intubation, subjective information was not attained from the patient. History was obtained, to the extent possible, from review of the chart and collateral sources of information.    Daily     Daily     Diet, NPO (08-02-21 @ 16:29)      CURRENT MEDS:  Neurologic Medications  HYDROmorphone PCA (1 mG/mL) 30 milliLiter(s) PCA Continuous PCA Continuous    Respiratory Medications    Cardiovascular Medications  diltiazem Injectable 5 milliGRAM(s) IV Push every 8 hours  furosemide   Injectable 10 milliGRAM(s) IV Push daily    Gastrointestinal Medications  famotidine Injectable 20 milliGRAM(s) IV Push daily  lactated ringers. 1000 milliLiter(s) IV Continuous <Continuous>    Genitourinary Medications    Hematologic/Oncologic Medications  heparin   Injectable 5000 Unit(s) SubCutaneous every 8 hours    Antimicrobial/Immunologic Medications  cefoTEtan  IVPB 2 Gram(s) IV Intermittent every 12 hours    Endocrine/Metabolic Medications    Topical/Other Medications  chlorhexidine 0.12% Liquid 15 milliLiter(s) Oral Mucosa every 12 hours      ICU Vital Signs Last 24 Hrs  T(C): 36.1 (02 Aug 2021 23:00), Max: 36.4 (02 Aug 2021 16:35)  T(F): 97 (02 Aug 2021 23:00), Max: 97.5 (02 Aug 2021 16:35)  HR: 86 (03 Aug 2021 07:00) (86 - 121)  BP: 119/58 (03 Aug 2021 07:00) (96/53 - 166/75)  BP(mean): 83 (03 Aug 2021 07:00) (69 - 114)  ABP: 118/58 (02 Aug 2021 19:30) (118/58 - 164/88)  ABP(mean): 82 (02 Aug 2021 19:30) (82 - 118)  RR: 17 (03 Aug 2021 06:00) (10 - 18)  SpO2: 100% (03 Aug 2021 07:00) (99% - 100%)      Adult Advanced Hemodynamics Last 24 Hrs  CVP(mm Hg): --  CVP(cm H2O): --  CO: --  CI: --  PA: --  PA(mean): --  PCWP: --  SVR: --  SVRI: --  PVR: --  PVRI: --      ABG - ( 02 Aug 2021 15:20 )  pH, Arterial: 7.38  pH, Blood: x     /  pCO2: 36    /  pO2: 273   / HCO3: 21    / Base Excess: -3.3  /  SaO2: x                   I&O's Summary    02 Aug 2021 07:01  -  03 Aug 2021 07:00  --------------------------------------------------------  IN: 2745 mL / OUT: 1556 mL / NET: 1189 mL      I&O's Detail    02 Aug 2021 07:01  -  03 Aug 2021 07:00  --------------------------------------------------------  IN:    Diltiazem: 45 mL    IV PiggyBack: 50 mL    Lactated Ringers: 1050 mL    Lactated Ringers: 100 mL    Lactated Ringers Bolus: 1500 mL  Total IN: 2745 mL    OUT:    Bulb (mL): 340 mL    Indwelling Catheter - Urethral (mL): 1066 mL    Nasogastric/Oral tube (mL): 150 mL  Total OUT: 1556 mL    Total NET: 1189 mL          PHYSICAL EXAM:     awake  follows commands, LAWSON  no acute distress  equal chest rise b/l  abdomen janeth drain in place, soft, incision c/d/i  extremities soft  urinary cath in place

## 2021-08-03 NOTE — PHYSICAL THERAPY INITIAL EVALUATION ADULT - TINETTI BALANCE TEST, REHAB EVAL
Area H Indication Text: Tumors in this location are included in Area H (eyelids, eyebrows, nose, lips, chin, ear, pre-auricular, post-auricular, temple, genitalia, hands, feet, ankles and areola).  Tissue conservation is critical in these anatomic locations. 12/28

## 2021-08-03 NOTE — CONSULT NOTE ADULT - ASSESSMENT
IMPRESSION: Rehab of deconditioning / s/p whipple for pancreatic CA    PRECAUTIONS: [   ] Cardiac  [   ] Respiratory  [   ] Seizures [   ] Contact Isolation  [   ] Droplet Isolation  [   ] Other    Weight Bearing Status:     RECOMMENDATION:    Out of Bed to Chair     DVT/Decubiti Prophylaxis    REHAB PLAN:     [  x  ] Bedside P/T 3-5 times a week   [  x  ]   Bedside O/T  2-3 times a week             [    ] Speech Therapy               [    ]  No Rehab Therapy Indicated   Conditioning/ROM                                    ADL  Bed Mobility                                               Conditioning/ROM  Transfers                                                     Bed Mobility  Sitting /Standing Balance                         Transfers                                        Gait Training                                               Sitting/Standing Balance  Stair Training [   ]Applicable                    Home equipment Eval                                                                        Splinting  [   ] Only      GOALS:   ADL   [  x  ]   Independent                    Transfers  [   x ] Independent                          Ambulation  [ x   ] Independent     [   x  ] With device                            [    ]  CG                                                         [    ]  CG                                                                  [    ] CG                            [    ] Min A                                                   [    ] Min A                                                              [    ] Min  A          DISCHARGE PLAN:   [    ]  Good candidate for Intensive Rehabilitation/Hospital based                                             Will tolerate 3hrs Intensive Rehab Daily                                       [     ]  Short Term Rehab in Skilled Nursing Facility                                       [     ]  Home with Outpatient or  services                                         [  x   ]  Possible Candidate for Intensive Hospital based Rehab

## 2021-08-03 NOTE — PHYSICAL THERAPY INITIAL EVALUATION ADULT - PERTINENT HX OF CURRENT PROBLEM, REHAB EVAL
75 year old F patient with PMHx of pancreatic ca (March 2021, s/p Chemo 2 rounds 6/2021 via R Subclavian chemo port),  Afib on eliquis ( Cardiologist VEE Medrano ), HTN, Varicose vein in the lower extremities, Nephrolithiasis presented to the ED March 2021 for yellow discoloration of the skin. Patient started to notice this Jaundice in December 2020.

## 2021-08-03 NOTE — PROGRESS NOTE ADULT - SUBJECTIVE AND OBJECTIVE BOX
Podiatry Consult Note    Subjective:  VIKASH LI is a pleasant well-nourished, well developed 76y Female in no acute distress, alert awake, and oriented to person, place and time.   Patient is a 76y old  Female who presents with a chief complaint of   HPI:      Past Medical History and Surgical History  PAST MEDICAL & SURGICAL HISTORY:  SOB (shortness of breath)    Pain  knee    Varicose veins of both lower extremities, unspecified whether complicated    Atrial fibrillation, unspecified type  2019 on Eliquis    Jaundice  March 5, 2021    Malignant neoplasm of pancreas, unspecified location of malignancy  Pancreatic Cancer    Hypertension, unspecified type  2019    Pancreatic cancer    Kidney stones    History of surgery  vascular surgery   ? variocose vein stripping?    Status post phlebectomy  10/2018    History of ovarian cystectomy  left    History of tonsillectomy  1959    Kidney stone  2017         Objective:  Vital Signs Last 24 Hrs  T(C): 35.7 (03 Aug 2021 08:00), Max: 36.4 (02 Aug 2021 16:35)  T(F): 96.3 (03 Aug 2021 08:00), Max: 97.5 (02 Aug 2021 16:35)  HR: 92 (03 Aug 2021 08:00) (86 - 121)  BP: 121/59 (03 Aug 2021 08:00) (96/53 - 166/75)  BP(mean): 83 (03 Aug 2021 08:00) (69 - 114)  RR: 17 (03 Aug 2021 06:00) (10 - 18)  SpO2: 100% (03 Aug 2021 08:00) (99% - 100%)                        11.6   28.70 )-----------( 241      ( 03 Aug 2021 04:50 )             37.0                 08-03    139  |  107  |  11  ----------------------------<  145<H>  4.0   |  20  |  0.6<L>    Ca    8.4<L>      03 Aug 2021 04:50  Phos  4.4     08-03  Mg     1.8     08-03    TPro  4.4<L>  /  Alb  2.8<L>  /  TBili  1.5<H>  /  DBili  0.6<H>  /  AST  54<H>  /  ALT  44<H>  /  AlkPhos  360<H>  08-03            Physical Exam - Lower Extremity Focused:   Derm:   R medial aspect of rearfoot and L lateral aspect of rearfoot superficial ulcer; fibrotic wound base without active bleeding or drainage; stable;    Vascular: DP and PT Pulses severely diminished; cold feet noted; bilateral LE spider vein noted;     Assessment:  R medial aspect rearfoot and L lateral aspect rearfoot superficial ulcer; stable;   BL lower extremities possible vascular insufficiency;     Plan:  Chart reviewed and Patient evaluated. All Questions and Concerns Addressed and Answered  Discussed diagnosis and treatment with patient  Wound Flushed w/ betadine / DSD / Kerlix; q24  Local Wound Care; As Stated Above; q24  Ordered BL feet xray; pending read.   Ordered arterial duplex of BL LE; cold feet w/ week pulses; possible vascular insufficiency; if abnormality found, request vascular consult;   No sx intervention from podiatry; will continue w/ local wound care for now; will f/u w/ vascular;   Weight bearing status; WBAT BL feet;   Discussed Plan w/ Dr. Tovar;     Podiatry         Podiatry        Podiatry Consult Note    Subjective:  VIKASH LI is a pleasant well-nourished, well developed 76y Female in no acute distress, alert awake, and oriented to person, place and time.   Patient is a 76y old  Female who presents with a chief complaint of   HPI:      Past Medical History and Surgical History  PAST MEDICAL & SURGICAL HISTORY:  SOB (shortness of breath)    Pain  knee    Varicose veins of both lower extremities, unspecified whether complicated    Atrial fibrillation, unspecified type  2019 on Eliquis    Jaundice  March 5, 2021    Malignant neoplasm of pancreas, unspecified location of malignancy  Pancreatic Cancer    Hypertension, unspecified type  2019    Pancreatic cancer    Kidney stones    History of surgery  vascular surgery   ? variocose vein stripping?    Status post phlebectomy  10/2018    History of ovarian cystectomy  left    History of tonsillectomy  1959    Kidney stone  2017         Objective:  Vital Signs Last 24 Hrs  T(C): 35.7 (03 Aug 2021 08:00), Max: 36.4 (02 Aug 2021 16:35)  T(F): 96.3 (03 Aug 2021 08:00), Max: 97.5 (02 Aug 2021 16:35)  HR: 92 (03 Aug 2021 08:00) (86 - 121)  BP: 121/59 (03 Aug 2021 08:00) (96/53 - 166/75)  BP(mean): 83 (03 Aug 2021 08:00) (69 - 114)  RR: 17 (03 Aug 2021 06:00) (10 - 18)  SpO2: 100% (03 Aug 2021 08:00) (99% - 100%)                        11.6   28.70 )-----------( 241      ( 03 Aug 2021 04:50 )             37.0                 08-03    139  |  107  |  11  ----------------------------<  145<H>  4.0   |  20  |  0.6<L>    Ca    8.4<L>      03 Aug 2021 04:50  Phos  4.4     08-03  Mg     1.8     08-03    TPro  4.4<L>  /  Alb  2.8<L>  /  TBili  1.5<H>  /  DBili  0.6<H>  /  AST  54<H>  /  ALT  44<H>  /  AlkPhos  360<H>  08-03            Physical Exam - Lower Extremity Focused:   Derm:   R medial aspect of rearfoot and L lateral aspect of rearfoot superficial ulcer; fibrotic wound base without active bleeding or drainage; stable;    Vascular: DP and PT Pulses severely diminished; cold feet noted; bilateral LE spider vein noted;     Assessment:  R medial aspect rearfoot and L lateral aspect rearfoot superficial ulcer; stable;       Plan:  Chart reviewed and Patient evaluated. All Questions and Concerns Addressed and Answered  Discussed diagnosis and treatment with patient  Wound Flushed w/ betadine / DSD / Kerlix; q24  Local Wound Care; As Stated Above; q24  Ordered BL feet xray; pending read.   Ordered arterial duplex of BL    No sx intervention from podiatry; will continue w/ local wound care for now; will f/u w/ vascular;   Weight bearing status; WBAT BL feet;       Podiatry         Podiatry

## 2021-08-03 NOTE — PROGRESS NOTE ADULT - ASSESSMENT
Assessment & Plan    76y Female  s/p pancreaticoduodenectomy, 1 ISABELL in place    NEURO:    Acute pain-controlled with PCA    RESP:     Oxygenation-wean off NC to RA as tolerate    Activity-increase as tolerated      CARDS:     hx of Afib-cardizem gtt DCed --> Cardizem 5mg q8 due to metoprolol allergy and strict NPO (home cardizem 120mg ER)   8/2- Bc to 40's called cards-> vasovagal response, DC cardizem gtt start IV push 5mg q8    hx of HTN-holding home lasix 10mg IV (PO 20mg at home)    Imaging: EKG afib    CE neg x2 f/u x1    GI/NUTR:     s/p whipple-strict NPO    Diet, NPO-NG tube in place    GI Prophylaxis-PPI    Bowel regimen-last bowel movement  unknown    1JP in place       /RENAL:      Monitor UO-ariza in place  Labs:          BUN/Cr- 10/0.6  -->,  11/0.6  -->          Electrolytes-Na 139 // K 4.0 // Mg 1.8 //  Phos 4.4 (08-03 @ 04:50)      HEME/ONC:       DVT prophylaxis-heparin   Injectable,, SCDs    Labs:   Hb/Hct:  13.1/41.3  -->,  11.6/37.0  -->  Plts:  269  -->,  241  -->    PTT/INR: 29.7/1.29 (08-02)    T&S:      ID:  WBC- 21.93  --->>,  28.70  --->>  Temp trend- 24hrs T(F): 97 (08-02 @ 23:00), Max: 97.8 (08-02 @ 07:12)  Antibiotics-Cefotetan 2g q12       ENDO:     FSG q6 while strict NPO      LINES/DRAINS:  PIV, Juliette, Ariza, right subclavian chemo port    Advanced Directives: Presumed Full Code    DISPO:    SICU       Assessment & Plan    76y Female  s/p pancreaticoduodenectomy, 1 ISABELL in place    NEURO:    Acute pain-controlled with dilaudid PCA, start Tylenol PO via NG tube if primary team advances diet    RESP:     Oxygenation-wean off NC to RA as tolerate    Activity-increase as tolerated      CARDS:     hx of Afib-cardizem gtt DCed --> Cardizem 5mg q8 IV push due to metoprolol allergy and strict NPO (home cardizem 120mg ER)    8/2- Bc to 40's called cards-> vasovagal response, DC cardizem gtt start IV push 5mg q8    hx of HTN-holding home lasix 10mg IV (PO 20mg at home)    Imaging: EKG afib    CE neg x3    GI/NUTR:     s/p whipple-strict NPO    Diet, NPO-NG tube in place    GI Prophylaxis-PPI    Bowel regimen-no     1JP in place       /RENAL:      Monitor UO-ariza in place  Labs:          BUN/Cr- 10/0.6  -->,  11/0.6  -->          Electrolytes-Na 139 // K 4.0 // Mg 1.8 //  Phos 4.4 (08-03 @ 04:50)      HEME/ONC:       DVT prophylaxis-heparin   Injectable,, SCDs    Labs:   Hb/Hct:  13.1/41.3  -->,  11.6/37.0  -->  Plts:  269  -->,  241  -->    PTT/INR: 29.7/1.29 (08-02)    T&S:      ID:  WBC- 21.93  --->>,  28.70  --->>  Temp trend- 24hrs T(F): 97 (08-02 @ 23:00), Max: 97.8 (08-02 @ 07:12)  Antibiotics-Cefotetan 2g q12       ENDO:     FSG q6 while strict NPO      LINES/DRAINS:  PIV, Juliette, Ariza, right subclavian chemo port    Advanced Directives: Presumed Full Code    DISPO:    SICU       Assessment & Plan    76y Female  s/p pancreaticoduodenectomy, 1 ISABELL in place    NEURO:    Acute pain-controlled with dilaudid PCA, start Tylenol PO via NG tube if primary team advances diet    RESP:     Oxygenation-wean off NC to RA as tolerate    Activity-increase as tolerated      CARDS:     hx of Afib-cardizem gtt DCed --> Cardizem 5mg q8 IV push due to metoprolol allergy and strict NPO (home cardizem 120mg ER)    8/2- Bc to 40's called cards-> vasovagal response, DC cardizem gtt start IV push 5mg q8    hx of HTN-holding home lasix 10mg IV (PO 20mg at home)    Imaging: EKG afib    CE neg x3    GI/NUTR:     s/p whipple-strict NPO    Diet, NPO-NG tube in place    GI Prophylaxis-PPI    Bowel regimen-no     1JP in place       /RENAL:      Monitor UO-ariza in place  Labs:          BUN/Cr- 10/0.6  -->,  11/0.6            Electrolytes-Na 139 // K 4.0 // Mg 1.8 //  Phos 4.4 (08-03 @ 04:50)          HEME/ONC:     DVT prophylaxis-heparin   Injectable  , SCDs    Labs: Hb/Hct:  13.1/41.3  -->,  11.6/37.0  -->                      Plts:  241  -->,  269  -->                 PTT/INR:  29.7/1.29  (08-02 @ 17:15) --->                 T&S:    ID:  WBC- 21.93  --->>,  28.70  --->>  Temp trend- 24hrs T(F): 96.3 (08-03 @ 08:00), Max: 97.5 (08-02 @ 16:35)  Antibiotics-cefoTEtan  IVPB 2 every 12 hours             ENDO:     FSG q6 while strict NPO      LINES/DRAINS:  PIV, Juliette, Ariza, right subclavian chemo port    Advanced Directives: Presumed Full Code    DISPO:    SICU

## 2021-08-04 NOTE — PROGRESS NOTE ADULT - ASSESSMENT
ASSESSMENT:  76y F s/p Whipple procedure     PLAN:  - pain control  - f/u cardiology  - f/u ID  - GI and DVT PPx   - ISABELL outpt   - monitor UOP  - monitor labs - replete Electrolytes as needed  - monitor NGT outpt  - continue ABx Cefapine flagyl   - NPO       Lines/Tubes: PIV, Central Line, Byron/ISABELL drains, Ibarra Catheter, midline    BLUE TEAM SPECTRA: [id number]

## 2021-08-04 NOTE — DIETITIAN INITIAL EVALUATION ADULT. - ADD RECOMMEND
1. Start clear liquid diet as soon as medically feasible and advance to regular diet as tolerated 2. beneprotein supplement TID 3. daily weight 1. Start clear liquid diet as soon as medically feasible and advance to regular diet as tolerated 2. Prosource gelatin SF supplement x2 BID 3. daily weight

## 2021-08-04 NOTE — DIETITIAN INITIAL EVALUATION ADULT. - OTHER INFO
Nutrition hx continued- She was infected with COVID in Apr 2021, with which she lost her sense of taste and smell. She lost interest in eating, but she has been able to maintain her weight at her UBW of 125# as she started consuming more high-kcal food such as cookies and cakes. Prior to the surgery, as instructed by MD, she increased her Ensure supplement intake to 4 bottles a day. She noted her allergy to chocolate a long time ago with allergic reaction being headache. She denies any cultural/Hindu food preferences. She denies baseline swallowing problem. She is edentulous but has well-fitting dentures to use when needed. She takes MVI and Mg-Ca-Zn supplements.    Pertinent medical info: 76y F diagnosed with pancreatic CA now s/p Whipple procedure.     Presently, pt remains NPO with NGT for decompression. +draining ISABELL with output 745ml (8/3-4). I/O 3575/2006. She denies n/v. No BMs, no gas.

## 2021-08-04 NOTE — DIETITIAN INITIAL EVALUATION ADULT. - PERSON TAUGHT/METHOD
Diet s/p rafal instructions provided/verbal instruction/written material/patient instructed/son instructed

## 2021-08-04 NOTE — OCCUPATIONAL THERAPY INITIAL EVALUATION ADULT - PERTINENT HX OF CURRENT PROBLEM, REHAB EVAL
76y Female with pancreatic cancer admitted to [facility] on 8/2 and underwent  s/p pancreaticoduodenectomy, 1 ISABELL in place. Prior to hospitalization she was ambulating independent, uses cane outside. Post op rehab consulted for eval

## 2021-08-04 NOTE — DIETITIAN INITIAL EVALUATION ADULT. - PHYSCIAL ASSESSMENT
Skin; surgical incision, no edema; GI: +ISABELL drain, hypoactive BS; Cognition: AAOx4 mild global muscle and fat wasting which pt and family report is baseline

## 2021-08-04 NOTE — DIETITIAN INITIAL EVALUATION ADULT. - ORAL INTAKE PTA/DIET HISTORY
Hx obtained from pt at bedside. Pt reports she has always been a picky eater and watches her salt intake. She has always been thin per pt and family present at interview. At baseline, she always takes Ensure Enlive in addition to her normal diet. Since Dec 2020, she started experiencing bloating when she ate yogurt and vegetables, so she has been avoiding to eat those foods.

## 2021-08-04 NOTE — PROGRESS NOTE ADULT - ASSESSMENT
Assessment & Plan    76y Female  s/p pancreaticoduodenectomy, 1 ISAEBLL in place    NEURO:    Acute pain-controlled with Dilaudid PCA, Tylenol IV    RESP:     Saturating well on RA    Activity-increase as tolerated      CARDS:     hx of Afib-cardizem gtt DCed --> Cardizem 5mg q8 due to metoprolol allergy and strict NPO (home cardizem 120mg ER). F/U Cards ([place]) for further reccs   8/2- Bc to 40's called cards-> vasovagal response, DC cardizem gtt start IV push 5mg q8    hx of HTN-  lasix 10mg IV (PO 20mg at home)    Imaging: EKG afib    CE neg x3    GI/NUTR:     s/p whipple-strict NPO    Diet, NPO-NG tube in place in jejunum     GI Prophylaxis-PPI    Bowel regimen-last bowel movement  unknown    1JP in place       /RENAL:      Monitor UO-ariza in place  Labs:          BUN/Cr- 10/0.6  -->,  11/0.6  -->,  17/0.6  -->          Electrolytes-Na 138 // K 4.1 // Mg 2.0 //  Phos 3.7 (08-03 @ 23:30)    HEME/ONC:     DVT prophylaxis-enoxaparin Injectable  , SCDs    Labs: Hb/Hct:  11.6/37.0  -->,  11.5/36.3  -->                      Plts:  234  -->,  241  -->    ID:  WBC- 21.93  --->>,  28.70  --->>,  23.32  --->>  Temp trend- 24hrs T(F): 96.2 (08-03 @ 20:00), Max: 98 (08-03 @ 15:23)  Antibiotics-cefepime   IVPB 2000 every 8 hours  metroNIDAZOLE  IVPB 500 every 8 hours    ENDO:     FSG q6 while strict NPO      LINES/DRAINS:  PIV, Brinkley, Ariza, right subclavian chemo port    Advanced Directives: Presumed Full Code    DISPO:    SICU

## 2021-08-04 NOTE — CONSULT NOTE ADULT - SUBJECTIVE AND OBJECTIVE BOX
LI, VIKASH  76y, Female  Allergy: Augmentin (Rash)  chocolate (Headache)  digoxin (Hives)  Metoprolol Succinate ER (Hives)  Novocain (Angioedema)  Steroids: Excessive swelling (Flushing; Other (Mild to Mod))  sulfa drugs (Fever)  Xarelto (Hives)      All historical available data reviewed.    HPI:     76y Female with pancreatic cancer admitted to [facility] on 8/2 and underwent  s/p pancreaticoduodenectomy, 1 ISABELL in place. Prior to hospitalization she was ambulating independent, uses cane outside. Post op rehab consulted for eval    FAMILY HISTORY:  CAD (coronary artery disease) (Sibling)  3  siblings ( 2 living and 1 passed away)      PAST MEDICAL & SURGICAL HISTORY:  SOB (shortness of breath)    Pain  knee    Varicose veins of both lower extremities, unspecified whether complicated    Atrial fibrillation, unspecified type  2019 on Eliquis    Jaundice  March 5, 2021    Malignant neoplasm of pancreas, unspecified location of malignancy  Pancreatic Cancer    Hypertension, unspecified type  2019    Pancreatic cancer    Kidney stones    History of surgery  vascular surgery   ? variocose vein stripping?    Status post phlebectomy  10/2018    History of ovarian cystectomy  left    History of tonsillectomy  1959    Kidney stone  2017          VITALS:  T(F): 95.8, Max: 96.7 (08-04-21 @ 07:28)  HR: 108  BP: 127/71  RR: 18Vital Signs Last 24 Hrs  T(C): 35.4 (04 Aug 2021 13:00), Max: 35.9 (04 Aug 2021 07:28)  T(F): 95.8 (04 Aug 2021 13:00), Max: 96.7 (04 Aug 2021 07:28)  HR: 108 (04 Aug 2021 14:00) (98 - 123)  BP: 127/71 (04 Aug 2021 14:00) (123/61 - 157/82)  BP(mean): 92 (04 Aug 2021 14:00) (82 - 111)  RR: 18 (04 Aug 2021 11:00) (16 - 20)  SpO2: 98% (04 Aug 2021 14:00) (96% - 99%)    TESTS & MEASUREMENTS:                        11.5   23.32 )-----------( 234      ( 03 Aug 2021 23:30 )             36.3     08-03    138  |  107  |  17  ----------------------------<  136<H>  4.1   |  23  |  0.6<L>    Ca    8.5      03 Aug 2021 23:30  Phos  3.7     08-03  Mg     2.0     08-03    TPro  4.6<L>  /  Alb  2.7<L>  /  TBili  0.7  /  DBili  0.3<H>  /  AST  40  /  ALT  42<H>  /  AlkPhos  304<H>  08-03    LIVER FUNCTIONS - ( 03 Aug 2021 23:30 )  Alb: 2.7 g/dL / Pro: 4.6 g/dL / ALK PHOS: 304 U/L / ALT: 42 U/L / AST: 40 U/L / GGT: x             Culture - Surgical Swab (collected 08-03-21 @ 09:30)  Source: .Surgical Swab None  Preliminary Report (08-04-21 @ 12:29):    Moderate Gram Negative Rods    Numerous Yeast like cells    Culture - Surgical Swab (collected 08-02-21 @ 09:49)  Source: .Surgical Swab 2) BILE DUCT CULTURE  Preliminary Report (08-04-21 @ 14:21):    Numerous Enterococcus casseliflavus    Rare Gram Negative Rods    Culture - Surgical Swab (collected 08-02-21 @ 09:49)  Source: .Surgical Swab 1) BIILE DUCT CULTURE  Preliminary Report (08-04-21 @ 14:02):    Numerous Enterococcus casseliflavus    Rare Mixed gram negative rods            RADIOLOGY & ADDITIONAL TESTS:  Personal review of radiological diagnostics performed  Echo and EKG results noted when applicable.     MEDICATIONS:  cefepime   IVPB 2000 milliGRAM(s) IV Intermittent every 8 hours  collagenase Ointment 1 Application(s) Topical daily  dextrose 5% + sodium chloride 0.45%. 1000 milliLiter(s) IV Continuous <Continuous>  diltiazem    Tablet 30 milliGRAM(s) Oral every 6 hours  enoxaparin Injectable 40 milliGRAM(s) SubCutaneous daily  famotidine Injectable 20 milliGRAM(s) IV Push daily  furosemide   Injectable 10 milliGRAM(s) IV Push daily  HYDROmorphone  Injectable 0.25 milliGRAM(s) IV Push every 4 hours PRN  HYDROmorphone  Injectable 0.5 milliGRAM(s) IV Push every 6 hours PRN  metroNIDAZOLE  IVPB 500 milliGRAM(s) IV Intermittent every 8 hours      ANTIBIOTICS:  cefepime   IVPB 2000 milliGRAM(s) IV Intermittent every 8 hours  metroNIDAZOLE  IVPB 500 milliGRAM(s) IV Intermittent every 8 hours

## 2021-08-04 NOTE — PROGRESS NOTE ADULT - SUBJECTIVE AND OBJECTIVE BOX
GENERAL SURGERY PROGRESS NOTE    Patient: VIKASH LI , 76y ([birth year])Female   MRN: [id number]  Location: [facility]  A  Visit: 08-02-21 Inpatient  Date: 08-03-21 @ 10:04    Hospital Day #: 3  Post-Op Day #: 2    Procedure/Dx/Injuries:   S/P DIAGONISTIC LAP, WHIPPLE, HEPATIC CYST DEROOFING, LIVER WEDGE RESECTION, ANTECOLIC GJ, HEPATICOJEJ W/ 19F ERIBERTO STENT, PANCREATICOJEJ W/ PEDS FEEDING TUBE STENT    Events of past 24 hours:    24 Hour Events  8/3  Night  - IV tylenol  - switch HSQ to lovenox per primary team  - Received additional 5 of Cardizem 2/2 tachycardia, minimal response, gave 500 LR  - f/u AM EKG for QTC    DAY  - per Dr. Nino, cefotetan switched to cefepime and flagyl  - ID consult  - Arterial duplex for ulcerations as per podiatry, normal arterial flow      PAST MEDICAL & SURGICAL HISTORY:  SOB (shortness of breath)    Pain  knee    Varicose veins of both lower extremities, unspecified whether complicated    Atrial fibrillation, unspecified type  2019 on Eliquis    Jaundice  March 5, 2021    Malignant neoplasm of pancreas, unspecified location of malignancy  Pancreatic Cancer    Hypertension, unspecified type  2019    Pancreatic cancer    Kidney stones    History of surgery  vascular surgery   ? variocose vein stripping?    Status post phlebectomy  10/2018    History of ovarian cystectomy  left    History of tonsillectomy  1959    Kidney stone  2017      Vitals:   T(F): 96.2 (08-03-21 @ 20:00), Max: 98 (08-03-21 @ 15:23)  HR: 120 (08-04-21 @ 07:00)  BP: 151/95 (08-04-21 @ 07:00)  RR: 19 (08-04-21 @ 04:00)  SpO2: 98% (08-04-21 @ 07:00)      Diet, NPO      Fluids:     I & O's:    08-03-21 @ 07:01  -  08-04-21 @ 07:00  --------------------------------------------------------  IN:    IV PiggyBack: 875 mL    IV PiggyBack: 200 mL    IV PiggyBack: 100 mL    Lactated Ringers: 2400 mL  Total IN: 3575 mL    OUT:    Bulb (mL): 745 mL    Indwelling Catheter - Urethral (mL): 1011 mL    Nasogastric/Oral tube (mL): 250 mL  Total OUT: 2006 mL    Total NET: 1569 mL        Bowel Movement: : [] YES [x] NO  Flatus: : [] YES [x] NO    PHYSICAL EXAM:  General Appearance: NAD  HEENT: EOMI, sclera non-icteric.  Heart: RRR   Lungs: No increased work of breathing or accessory muscle use. Symmetric chest wall rise and fall.   Abdomen:  Soft, nontender, nondistended.   MSK/Extremities: Warm & well-perfused.   Skin: Warm, dry. No jaundice.   Incision/wound: healing well, dressings in place, clean, dry and intact. ISABELL with SS drainage    MEDICATIONS  (STANDING):  cefepime   IVPB 2000 milliGRAM(s) IV Intermittent every 8 hours  collagenase Ointment 1 Application(s) Topical daily  diltiazem Injectable 5 milliGRAM(s) IV Push every 8 hours  enoxaparin Injectable 40 milliGRAM(s) SubCutaneous daily  famotidine Injectable 20 milliGRAM(s) IV Push daily  furosemide   Injectable 10 milliGRAM(s) IV Push daily  HYDROmorphone PCA (1 mG/mL) 30 milliLiter(s) PCA Continuous PCA Continuous  lactated ringers. 1000 milliLiter(s) (100 mL/Hr) IV Continuous <Continuous>  metroNIDAZOLE  IVPB 500 milliGRAM(s) IV Intermittent every 8 hours    MEDICATIONS  (PRN):      DVT PROPHYLAXIS: enoxaparin Injectable 40 milliGRAM(s) SubCutaneous daily    GI PROPHYLAXIS:   ANTICOAGULATION:   ANTIBIOTICS:  cefepime   IVPB 2000 milliGRAM(s)  metroNIDAZOLE  IVPB 500 milliGRAM(s)            LAB/STUDIES:  Labs:  CAPILLARY BLOOD GLUCOSE      POCT Blood Glucose.: 133 mg/dL (03 Aug 2021 23:30)  POCT Blood Glucose.: 140 mg/dL (03 Aug 2021 10:09)                          11.5   23.32 )-----------( 234      ( 03 Aug 2021 23:30 )             36.3         08-03    138  |  107  |  17  ----------------------------<  136<H>  4.1   |  23  |  0.6<L>      Calcium, Total Serum: 8.5 mg/dL (08-03-21 @ 23:30)      LFTs:             4.6  | 0.7  | 40       ------------------[304     ( 03 Aug 2021 23:30 )  2.7  | 0.3  | 42             Lactate, Blood: 2.1 mmol/L (08-02-21 @ 17:07)  Blood Gas Arterial, Lactate: 1.0 mmoL/L (08-02-21 @ 15:20)  Blood Gas Arterial, Lactate: 0.9 mmoL/L (08-02-21 @ 12:42)    ABG - ( 02 Aug 2021 15:20 )  pH: 7.38  /  pCO2: 36    /  pO2: 273   / HCO3: 21    / Base Excess: -3.3  /  SaO2: x               ABG - ( 02 Aug 2021 12:42 )  pH: 7.42  /  pCO2: 36    /  pO2: 294   / HCO3: 24    / Base Excess: -0.8  /  SaO2: x                 Coags:     14.80  ----< 1.29    ( 02 Aug 2021 17:15 )     29.7        CARDIAC MARKERS ( 03 Aug 2021 11:00 )  x     / x     / 327 U/L / x     / x      CARDIAC MARKERS ( 03 Aug 2021 04:50 )  x     / <0.01 ng/mL / 392 U/L / x     / 5.2 ng/mL  CARDIAC MARKERS ( 02 Aug 2021 23:32 )  x     / <0.01 ng/mL / 342 U/L / x     / 5.2 ng/mL  CARDIAC MARKERS ( 02 Aug 2021 20:13 )  x     / <0.01 ng/mL / 137 U/L / x     / x      CARDIAC MARKERS ( 02 Aug 2021 17:15 )  x     / x     / x     / x     / 3.8 ng/mL          BLUE TEAM SPECTRA #[id number]    ACCESS/ DEVICES:  [x] Peripheral IV  [] Central Venous Line	[ ] R	[ ] L	[ ] IJ	[ ] Fem	[ ] SC	Placed:   [x] Arterial Line		[ ] R	[ ] L	[ ] Fem	[ ] Rad	[ ] Ax	Placed:   [ ] PICC:					[x ] Mediport  [x] Urinary Catheter,  Date Placed:   [ ] Chest tube: [ ] Right, [ ] Left  [x] ISABELL/Eriberto Drains  Mediport R subclavian  [x] midline

## 2021-08-04 NOTE — CONSULT NOTE ADULT - ASSESSMENT
75 yo S/P DIAGONISTIC LAP, WHIPPLE, HEPATIC CYST DEROOFING, LIVER WEDGE RESECTION, ANTECOLIC GJ, HEPATICOJEJ W/ 19F ERIBERTO STENT, PANCREATICOJEJ W/ PEDS FEEDING TUBE STENT    IMPRESSION;  S/p whipple's  Peritonitis  no PNA    RECOMMENDATIONS;  F/u OR cultures  Cefepime 2 gm iv q8h  Flagyl 500 mg iv q8h

## 2021-08-04 NOTE — PROGRESS NOTE ADULT - SUBJECTIVE AND OBJECTIVE BOX
76y Female    [id number]    Admit Date: 08-02-21  Hospital LOS: 0  ICU Admission Date: 8/2  OR #1-s/p whipple procedure        ============================  PMH  PAST MEDICAL & SURGICAL HISTORY:  SOB (shortness of breath)  Pain  knee  Varicose veins of both lower extremities, unspecified whether complicated  Atrial fibrillation, unspecified type  2019 on Eliquis  Jaundice  March 5, 2021  Malignant neoplasm of pancreas, unspecified location of malignancy  Pancreatic Cancer  Hypertension, unspecified type  2019  Pancreatic cancer  Kidney stones  History of surgery  vascular surgery   ? variocose vein stripping?  Status post phlebectomy  10/2018  History of ovarian cystectomy  left  History of tonsillectomy  1959  Kidney stone  2017    Home Meds:  Home Medications:  acetaminophen 325 mg oral tablet: 2 tab(s) orally every 6 hours, As needed, Temp greater or equal to 38C (100.4F), Mild Pain (1 - 3) (02 Aug 2021 07:06)  calcium,magnesium and Zinc: 1 tab(s) orally once a day (02 Aug 2021 07:06)  dilTIAZem 120 mg/24 hours oral capsule, extended release: 1 cap(s) orally once a day (02 Aug 2021 07:06)  furosemide 20 mg oral tablet: 1 tab(s) orally once a day (02 Aug 2021 07:06)  Women&#x27;s daily vitamins: 1 tab(s) orally once a day (02 Aug 2021 07:06)      Allergies  Augmentin (Rash)  chocolate (Headache)  digoxin (Hives)  Metoprolol Succinate ER (Hives)  Novocain (Angioedema)  Steroids: Excessive swelling (Flushing; Other (Mild to Mod))  sulfa drugs (Fever)  Xarelto (Hives)     24 Hour Events  8/3  Night  - IV tylenol  - switch HSQ to lovenox per primary team  - Received additional 5 of Cardizem 2/2 tachycardia, minimal response, gave 500 LR  - f/u AM EKG for QTC    DAY  - per Dr. Nino, cefotetan switched to cefepime and flagyl  - ID consult  - Arterial duplex for ulcerations as per podiatry, normal arterial flow      [X] A ten-point review of systems was otherwise negative except as noted above.  [  ] Due to altered mental status/intubation, subjective information was not attained from the patient. History was obtained, to the extent possible, from review of the chart and collateral sources of information. 76y Female    [id number]    Admit Date: 08-02-21  Hospital LOS: 0  ICU Admission Date: 8/2  OR #1-s/p whipple procedure        ============================  PMH  PAST MEDICAL & SURGICAL HISTORY:  SOB (shortness of breath)  Pain  knee  Varicose veins of both lower extremities, unspecified whether complicated  Atrial fibrillation, unspecified type  2019 on Eliquis  Jaundice  March 5, 2021  Malignant neoplasm of pancreas, unspecified location of malignancy  Pancreatic Cancer  Hypertension, unspecified type  2019  Pancreatic cancer  Kidney stones  History of surgery  vascular surgery   ? variocose vein stripping?  Status post phlebectomy  10/2018  History of ovarian cystectomy  left  History of tonsillectomy  1959  Kidney stone  2017    Home Meds:  Home Medications:  acetaminophen 325 mg oral tablet: 2 tab(s) orally every 6 hours, As needed, Temp greater or equal to 38C (100.4F), Mild Pain (1 - 3) (02 Aug 2021 07:06)  calcium,magnesium and Zinc: 1 tab(s) orally once a day (02 Aug 2021 07:06)  dilTIAZem 120 mg/24 hours oral capsule, extended release: 1 cap(s) orally once a day (02 Aug 2021 07:06)  furosemide 20 mg oral tablet: 1 tab(s) orally once a day (02 Aug 2021 07:06)  Women&#x27;s daily vitamins: 1 tab(s) orally once a day (02 Aug 2021 07:06)      Allergies  Augmentin (Rash)  chocolate (Headache)  digoxin (Hives)  Metoprolol Succinate ER (Hives)  Novocain (Angioedema)  Steroids: Excessive swelling (Flushing; Other (Mild to Mod))  sulfa drugs (Fever)  Xarelto (Hives)     24 Hour Events  8/3  Night  - IV tylenol  - switch HSQ to lovenox per primary team  - Received additional 5 of Cardizem 2/2 tachycardia, minimal response, gave 500 LR  - f/u AM EKG for QTC    DAY  - per Dr. Nino, cefotetan switched to cefepime and flagyl  - ID consult  - Arterial duplex for ulcerations as per podiatry, normal arterial flow      [X] A ten-point review of systems was otherwise negative except as noted above.  [  ] Due to altered mental status/intubation, subjective information was not attained from the patient. History was obtained, to the extent possible, from review of the chart and collateral sources of information.          Daily     Daily     Diet, NPO (08-02-21 @ 16:29)      CURRENT MEDS:  Neurologic Medications  HYDROmorphone PCA (1 mG/mL) 30 milliLiter(s) PCA Continuous PCA Continuous    Respiratory Medications    Cardiovascular Medications  diltiazem Injectable 5 milliGRAM(s) IV Push every 8 hours  furosemide   Injectable 10 milliGRAM(s) IV Push daily    Gastrointestinal Medications  famotidine Injectable 20 milliGRAM(s) IV Push daily  lactated ringers. 1000 milliLiter(s) IV Continuous <Continuous>    Genitourinary Medications    Hematologic/Oncologic Medications  enoxaparin Injectable 40 milliGRAM(s) SubCutaneous daily    Antimicrobial/Immunologic Medications  cefepime   IVPB 2000 milliGRAM(s) IV Intermittent every 8 hours  metroNIDAZOLE  IVPB 500 milliGRAM(s) IV Intermittent every 8 hours    Endocrine/Metabolic Medications    Topical/Other Medications  collagenase Ointment 1 Application(s) Topical daily      ICU Vital Signs Last 24 Hrs  T(C): 35.7 (03 Aug 2021 20:00), Max: 36.7 (03 Aug 2021 15:23)  T(F): 96.2 (03 Aug 2021 20:00), Max: 98 (03 Aug 2021 15:23)  HR: 120 (04 Aug 2021 07:00) (92 - 123)  BP: 151/95 (04 Aug 2021 07:00) (121/59 - 157/82)  BP(mean): 111 (04 Aug 2021 07:00) (82 - 111)  ABP: 144/72 (04 Aug 2021 07:00) (126/61 - 155/77)  ABP(mean): 98 (04 Aug 2021 07:00) (83 - 106)  RR: 19 (04 Aug 2021 04:00) (16 - 20)  SpO2: 98% (04 Aug 2021 07:00) (96% - 100%)      Adult Advanced Hemodynamics Last 24 Hrs  CVP(mm Hg): --  CVP(cm H2O): --  CO: --  CI: --  PA: --  PA(mean): --  PCWP: --  SVR: --  SVRI: --  PVR: --  PVRI: --      ABG - ( 02 Aug 2021 15:20 )  pH, Arterial: 7.38  pH, Blood: x     /  pCO2: 36    /  pO2: 273   / HCO3: 21    / Base Excess: -3.3  /  SaO2: x                   I&O's Summary    03 Aug 2021 07:01  -  04 Aug 2021 07:00  --------------------------------------------------------  IN: 3575 mL / OUT: 2006 mL / NET: 1569 mL    04 Aug 2021 07:01  -  04 Aug 2021 07:36  --------------------------------------------------------  IN: 125 mL / OUT: 0 mL / NET: 125 mL      I&O's Detail    03 Aug 2021 07:01  -  04 Aug 2021 07:00  --------------------------------------------------------  IN:    IV PiggyBack: 875 mL    IV PiggyBack: 200 mL    IV PiggyBack: 100 mL    Lactated Ringers: 2400 mL  Total IN: 3575 mL    OUT:    Bulb (mL): 745 mL    Indwelling Catheter - Urethral (mL): 1011 mL    Nasogastric/Oral tube (mL): 250 mL  Total OUT: 2006 mL    Total NET: 1569 mL      04 Aug 2021 07:01  -  04 Aug 2021 07:36  --------------------------------------------------------  IN:    IV PiggyBack: 25 mL    Lactated Ringers: 100 mL  Total IN: 125 mL    OUT:  Total OUT: 0 mL    Total NET: 125 mL          PHYSICAL EXAM:     a&ox3  follows PADDY mazariegosE  no acute distress  equal chest rise b/l  abdomen soft, janeth drain in place,   ng tube in place  extremities soft  urinary cath in place

## 2021-08-04 NOTE — PROGRESS NOTE ADULT - SUBJECTIVE AND OBJECTIVE BOX
76y Female    [id number]    Admit Date: 08-02-21  Hospital LOS: 0  ICU Admission Date: 8/2  OR #1-s/p whipple procedure        ============================  PMH  PAST MEDICAL & SURGICAL HISTORY:  SOB (shortness of breath)  Pain  knee  Varicose veins of both lower extremities, unspecified whether complicated  Atrial fibrillation, unspecified type  2019 on Eliquis  Jaundice  March 5, 2021  Malignant neoplasm of pancreas, unspecified location of malignancy  Pancreatic Cancer  Hypertension, unspecified type  2019  Pancreatic cancer  Kidney stones  History of surgery  vascular surgery   ? variocose vein stripping?  Status post phlebectomy  10/2018  History of ovarian cystectomy  left  History of tonsillectomy  1959  Kidney stone  2017    Home Meds:  Home Medications:  acetaminophen 325 mg oral tablet: 2 tab(s) orally every 6 hours, As needed, Temp greater or equal to 38C (100.4F), Mild Pain (1 - 3) (02 Aug 2021 07:06)  calcium,magnesium and Zinc: 1 tab(s) orally once a day (02 Aug 2021 07:06)  dilTIAZem 120 mg/24 hours oral capsule, extended release: 1 cap(s) orally once a day (02 Aug 2021 07:06)  furosemide 20 mg oral tablet: 1 tab(s) orally once a day (02 Aug 2021 07:06)  Women&#x27;s daily vitamins: 1 tab(s) orally once a day (02 Aug 2021 07:06)      Allergies  Augmentin (Rash)  chocolate (Headache)  digoxin (Hives)  Metoprolol Succinate ER (Hives)  Novocain (Angioedema)  Steroids: Excessive swelling (Flushing; Other (Mild to Mod))  sulfa drugs (Fever)  Xarelto (Hives)     24 Hour Events  8/3  Night  - IV tylenol  - switch HSQ to lovenox per primary team  - Received additional 5 of Cardizem 2/2 tachycardia, minimal response, gave 500 LR    DAY  - per Dr. Nino, cefotetan switched to cefepime and flagyl  - ID consult  - CK Downtrended 327 <- 392      [X] A ten-point review of systems was otherwise negative except as noted above.  [  ] Due to altered mental status/intubation, subjective information was not attained from the patient. History was obtained, to the extent possible, from review of the chart and collateral sources of information.

## 2021-08-04 NOTE — CONSULT NOTE ADULT - ASSESSMENT
pancreatic  CA       AFIB RVR    started   on PO cardizem   on heparin      can add  metoprolol 25 Q12 if need better   HR  control

## 2021-08-04 NOTE — DIETITIAN INITIAL EVALUATION ADULT. - PERTINENT LABORATORY DATA
(8/5) FS [id number] [phone], gluc 136, Cr 0.6, ALT 42, alk phos 304, lytes WNL (8/4) FS [id number] [phone], gluc 136, Cr 0.6, ALT 42, alk phos 304, lytes WNL

## 2021-08-04 NOTE — DIETITIAN INITIAL EVALUATION ADULT. - OTHER CALCULATIONS
Est kcal needs: MSJ*SF 1.2-1.4= 4404-3834 (post-op, ISABELL drain); est protein needs: 1.2-1.4g/kg= 68-80g (as above); est fluid needs 1ml/kcal=1321-1541ml

## 2021-08-04 NOTE — PROGRESS NOTE ADULT - ASSESSMENT
Assessment & Plan    76y Female  s/p pancreaticoduodenectomy, 1 ISABELL in place    NEURO:    Acute pain-controlled with Dilaudid PCA, Tylenol IV    RESP:     Saturating well on RA    Activity-increase as tolerated      CARDS:     hx of Afib-cardizem gtt DCed --> Cardizem 5mg q8 due to metoprolol allergy and strict NPO (home cardizem 120mg ER). F/U Cards ([place]) for further reccs   8/2- Bc to 40's called cards-> vasovagal response, DC cardizem gtt start IV push 5mg q8    hx of HTN-  lasix 10mg IV (PO 20mg at home)    Imaging: EKG afib    CE neg x3    GI/NUTR:     s/p whipple-strict NPO    Diet, NPO-NG tube in place in jejunum     GI Prophylaxis-PPI    Bowel regimen-last bowel movement  unknown    1JP in place       /RENAL:      Monitor UO-ariza in place  Labs:          BUN/Cr- 10/0.6  -->,  11/0.6  -->,  17/0.6  -->          Electrolytes-Na 138 // K 4.1 // Mg 2.0 //  Phos 3.7 (08-03 @ 23:30)    HEME/ONC:     DVT prophylaxis-enoxaparin Injectable  , SCDs    Labs: Hb/Hct:  11.6/37.0  -->,  11.5/36.3  -->                      Plts:  234  -->,  241  -->    ID:  WBC- 21.93  --->>,  28.70  --->>,  23.32  --->>  Temp trend- 24hrs T(F): 96.2 (08-03 @ 20:00), Max: 98 (08-03 @ 15:23)  Antibiotics-cefepime   IVPB 2000 every 8 hours  metroNIDAZOLE  IVPB 500 every 8 hours    ENDO:     FSG q6 while strict NPO    DERM:  b/l ulcerations on the lateral aspect of the left foot/ankle and medial aspect of the right foot/ankle  fibrotic base. does not appear to be acutely infected      LINES/DRAINS:  PIV, [place], Ariza, right subclavian chemo port    Advanced Directives: Presumed Full Code    DISPO:    SICU     Assessment & Plan    76y Female  s/p pancreaticoduodenectomy, 1 ISABELL in place    NEURO:    Acute pain-controlled with Dilaudid PCA, Tylenol IV    RESP:     Saturating well on RA    Activity-increase as tolerated      CARDS:     hx of Afib-cardizem gtt DCed --> Cardizem 5mg q8 due to metoprolol allergy and strict NPO (home cardizem 120mg ER). F/U Cards ([place]ma) for further reccs   8/2- Bc to 40's called cards-> vasovagal response, DC cardizem gtt start IV push 5mg q8    hx of HTN-  lasix 10mg IV (PO 20mg at home)    Imaging: EKG afib    CE neg x3    Consults: Dr. Adler recalled for IV afib rate control recs    GI/NUTR:     s/p whipple-strict NPO    Diet, NPO-NG tube in place in jejunum     Output 250cc/hr     GI Prophylaxis-PPI    Bowel regimen-last bowel movement  unknown    1JP in place output 750cc/24 hrs increased from 300cc previous day       /RENAL:      Monitor UO-ariza in place, 40cc/hr  Labs:          BUN/Cr- 10/0.6  -->,  11/0.6  -->,  17/0.6  -->          Electrolytes-Na 138 // K 4.1 // Mg 2.0 //  Phos 3.7 (08-03 @ 23:30)    HEME/ONC:     DVT prophylaxis-enoxaparin Injectable, SCDs (previously Hep Sub Q)    Labs: Hb/Hct:  11.6/37.0  -->,  11.5/36.3  -->                      Plts:  234  -->,  241  -->                   ID:    C- 21.93  --->>,  28.70  --->>,  23.32  --->>    Temp trend- 24hrs T(F): 96.2 (08-03 @ 20:00), Max: 98 (08-03 @ 15:23)     Antibiotics-cefepime   IVPB 2000 every 8 hours (8/3)    metroNIDAZOLE  IVPB 500 every 8 hours (start 8/3)    ID Consult-awaiting recs    OR Cultures: receiving, no results      ENDO:     FSG q6 while strict NPO    DERM:  b/l ulcerations on the lateral aspect of the left foot/ankle and medial aspect of the right foot/ankle  fibrotic base. does not appear to be acutely infected      LINES/DRAINS:  PIV, [place], Ariza, right subclavian chemo port    Advanced Directives: FULL CODE    DISPO:    Downgrade

## 2021-08-04 NOTE — DIETITIAN INITIAL EVALUATION ADULT. - PERTINENT MEDS FT
Lovenox, cefepime, Flagyl Lovenox, cefepime, Flagyl, D5+.45NS @100ml/hr, cardizem, dilaudid, omnipaque, lasix D5+.45NS @100ml/hr, lasix

## 2021-08-04 NOTE — OCCUPATIONAL THERAPY INITIAL EVALUATION ADULT - PLANNED THERAPY INTERVENTIONS, OT EVAL
ADL retraining/IADL retraining/balance training/bed mobility training/parent/caregiver training.../transfer training

## 2021-08-04 NOTE — CONSULT NOTE ADULT - SUBJECTIVE AND OBJECTIVE BOX
Patient is a 76y old  Female who presents with a chief complaint of Diagnostic Laparoscopy possible whipple (03 Aug 2021 10:02)      HPI:      PAST MEDICAL & SURGICAL HISTORY:  SOB (shortness of breath)    Pain  knee    Varicose veins of both lower extremities, unspecified whether complicated    Atrial fibrillation, unspecified type  2019 on Eliquis    Jaundice  March 5, 2021    Malignant neoplasm of pancreas, unspecified location of malignancy  Pancreatic Cancer    Hypertension, unspecified type  2019    Pancreatic cancer    Kidney stones    History of surgery  vascular surgery   ? variocose vein stripping?    Status post phlebectomy  10/2018    History of ovarian cystectomy  left    History of tonsillectomy  1959    Kidney stone  2017        PREVIOUS DIAGNOSTIC TESTING:      ECHO  FINDINGS:    STRESS  FINDINGS:    CATHETERIZATION  FINDINGS:    MEDICATIONS  (STANDING):  cefepime   IVPB 2000 milliGRAM(s) IV Intermittent every 8 hours  collagenase Ointment 1 Application(s) Topical daily  dextrose 5% + sodium chloride 0.45%. 1000 milliLiter(s) (100 mL/Hr) IV Continuous <Continuous>  diltiazem    Tablet 30 milliGRAM(s) Oral every 6 hours  enoxaparin Injectable 40 milliGRAM(s) SubCutaneous daily  famotidine Injectable 20 milliGRAM(s) IV Push daily  furosemide   Injectable 10 milliGRAM(s) IV Push daily  iohexol 300 mG (iodine)/mL Oral Solution 50 milliLiter(s) Oral once  metroNIDAZOLE  IVPB 500 milliGRAM(s) IV Intermittent every 8 hours    MEDICATIONS  (PRN):  HYDROmorphone  Injectable 0.25 milliGRAM(s) IV Push every 4 hours PRN Mild Pain (1 - 3)  HYDROmorphone  Injectable 0.5 milliGRAM(s) IV Push every 6 hours PRN Moderate Pain (4 - 6)      FAMILY HISTORY:  CAD (coronary artery disease) (Sibling)  3  siblings ( 2 living and 1 passed away)        SOCIAL HISTORY:  CIGARETTES:    ALCOHOL:    REVIEW OF SYSTEMS:  CONSTITUTIONAL: No fever, weight loss, or fatigue  NECK: No pain or stiffness  RESPIRATORY: No cough, wheezing, chills or hemoptysis; No shortness of breath  CARDIOVASCULAR: No chest pain, palpitations, dizziness, or leg swelling  GASTROINTESTINAL: No abdominal or epigastric pain. No nausea, vomiting, or hematemesis; No diarrhea or constipation. No melena or hematochezia.  GENITOURINARY: No dysuria, frequency, hematuria, or incontinence  NEUROLOGICAL: No headaches, memory loss, loss of strength, numbness, or tremors  SKIN: No itching, burning, rashes, or lesions   ENDOCRINE: No heat or cold intolerance; No hair loss  MUSCULOSKELETAL: No joint pain or swelling; No muscle, back, or extremity pain  HEME/LYMPH: No easy bruising, or bleeding gums          Vital Signs Last 24 Hrs  T(C): 35.9 (04 Aug 2021 08:00), Max: 36.7 (03 Aug 2021 15:23)  T(F): 96.7 (04 Aug 2021 08:00), Max: 98 (03 Aug 2021 15:23)  HR: 115 (04 Aug 2021 12:00) (98 - 123)  BP: 135/72 (04 Aug 2021 12:00) (123/61 - 157/82)  BP(mean): 97 (04 Aug 2021 12:00) (82 - 111)  RR: 18 (04 Aug 2021 11:00) (16 - 20)  SpO2: 97% (04 Aug 2021 12:00) (96% - 99%)        PHYSICAL EXAM:  GENERAL: NAD, well-groomed, well-developed  HEAD:  Atraumatic, Normocephalic  NECK: Supple, No JVD, Normal thyroid  NERVOUS SYSTEM:  Alert & Oriented X3, Good concentration  CHEST/LUNG: Clear to percussion bilaterally; No rales, rhonchi, wheezing, or rubs  HEART: Regular rate and rhythm; No murmurs, rubs, or gallops  ABDOMEN: Soft, Nontender, Nondistended; Bowel sounds present  EXTREMITIES:  2+ Peripheral Pulses, No clubbing, cyanosis, or edema  SKIN: No rashes or lesions    INTERPRETATION OF TELEMETRY:    ECG:    I&O's Detail    03 Aug 2021 07:01  -  04 Aug 2021 07:00  --------------------------------------------------------  IN:    IV PiggyBack: 875 mL    IV PiggyBack: 200 mL    IV PiggyBack: 100 mL    Lactated Ringers: 2400 mL  Total IN: 3575 mL    OUT:    Bulb (mL): 745 mL    Indwelling Catheter - Urethral (mL): 1011 mL    Nasogastric/Oral tube (mL): 250 mL  Total OUT: 2006 mL    Total NET: 1569 mL      04 Aug 2021 07:01  -  04 Aug 2021 12:23  --------------------------------------------------------  IN:    IV PiggyBack: 50 mL    Lactated Ringers: 500 mL  Total IN: 550 mL    OUT:    Bulb (mL): 105 mL    Indwelling Catheter - Urethral (mL): 195 mL    Nasogastric/Oral tube (mL): 150 mL  Total OUT: 450 mL    Total NET: 100 mL          LABS:                        11.5   23.32 )-----------( 234      ( 03 Aug 2021 23:30 )             36.3     08-03    138  |  107  |  17  ----------------------------<  136<H>  4.1   |  23  |  0.6<L>    Ca    8.5      03 Aug 2021 23:30  Phos  3.7     08-03  Mg     2.0     08-03    TPro  4.6<L>  /  Alb  2.7<L>  /  TBili  0.7  /  DBili  0.3<H>  /  AST  40  /  ALT  42<H>  /  AlkPhos  304<H>  08-03    CARDIAC MARKERS ( 03 Aug 2021 11:00 )  x     / x     / 327 U/L / x     / x      CARDIAC MARKERS ( 03 Aug 2021 04:50 )  x     / <0.01 ng/mL / 392 U/L / x     / 5.2 ng/mL  CARDIAC MARKERS ( 02 Aug 2021 23:32 )  x     / <0.01 ng/mL / 342 U/L / x     / 5.2 ng/mL  CARDIAC MARKERS ( 02 Aug 2021 20:13 )  x     / <0.01 ng/mL / 137 U/L / x     / x      CARDIAC MARKERS ( 02 Aug 2021 17:15 )  x     / x     / x     / x     / 3.8 ng/mL      PT/INR - ( 02 Aug 2021 17:15 )   PT: 14.80 sec;   INR: 1.29 ratio         PTT - ( 02 Aug 2021 17:15 )  PTT:29.7 sec    I&O's Summary    03 Aug 2021 07:01  -  04 Aug 2021 07:00  --------------------------------------------------------  IN: 3575 mL / OUT: 2006 mL / NET: 1569 mL    04 Aug 2021 07:01  -  04 Aug 2021 12:23  --------------------------------------------------------  IN: 550 mL / OUT: 450 mL / NET: 100 mL        RADIOLOGY & ADDITIONAL STUDIES:

## 2021-08-05 NOTE — PROGRESS NOTE ADULT - ASSESSMENT
ASSESSMENT:  76y F s/p whipple procedure.    PLAN:  -Daily dressing change  -DC ariza, f/u TOV  -Will send ISABELL amylase  -Continue CLD, mIVF  -F/u podiatry  -F/u OR cx    Lines/Tubes: PIV, A-Line, Byron/ISABELL drains, Ariza Catheter.    BLUE TEAM SPECTRA [id number]

## 2021-08-05 NOTE — PROGRESS NOTE ADULT - SUBJECTIVE AND OBJECTIVE BOX
GENERAL SURGERY PROGRESS NOTE    Patient: VIKASH LI , 76y ([birth year])Female   MRN: [id number]  [facility]  Visit: 08-02-21 Inpatient  Date: 08-05-21 @ 10:23    Hospital Day #: 4  Post-Op Day #: 3    Procedure/Dx/Injuries: s/p diagnostic laparoscopy, Whipple, hepatic cyst deroofing, liver wedge resection, antecolic GJ, hepaticojejunostomy w/ 19F byron stent, pancreaticojejunostomy w/ pediatric feeding tube stent.    Events of past 24 hours: Dilaudid PCA discontinued, receiving Dilaudid pushes for pain prn. Echo done with EF 50-55%. On CLD, switched to maintenance IVFs. Gastrograffin study within normal limits. Electrolytes repleted overnight.    PAST MEDICAL & SURGICAL HISTORY:  SOB (shortness of breath)  Pain - knee  Varicose veins of both lower extremities, unspecified whether complicated  Atrial fibrillation, unspecified type - 2019 on Eliquis  Jaundice - March 5, 2021  Malignant neoplasm of pancreas, unspecified location of malignancy  Pancreatic Cancer  Hypertension, unspecified type 2019  Kidney stones  History of surgery - vascular surgery ? variocose vein stripping?  Status post phlebectomy - 10/2018  History of ovarian cystectomy - left  History of tonsillectomy - 1959  Kidney stone - 2017    Vitals:   T(F): 95.6 (08-05-21 @ 08:00), Max: 97 (08-04-21 @ 23:00)  HR: 95 (08-05-21 @ 10:00)  BP: 125/91 (08-04-21 @ 18:00)  RR: 18 (08-04-21 @ 11:00)  SpO2: 98% (08-05-21 @ 10:00)    Diet, Clear Liquid    Fluids: IVL    I & O's:  08-04-21 @ 07:01  -  08-05-21 @ 07:00  --------------------------------------------------------  IN:    dextrose 5% + sodium chloride 0.45%: 1700 mL    IV PiggyBack: 50 mL    IV PiggyBack: 150 mL    IV PiggyBack: 300 mL    IV PiggyBack: 200 mL    IV PiggyBack: 250 mL    Lactated Ringers: 400 mL    Oral Fluid: 450 mL  Total IN: 3500 mL    OUT:    Bulb (mL): 660 mL    Indwelling Catheter - Urethral (mL): 1742 mL    Nasogastric/Oral tube (mL): 150 mL  Total OUT: 2552 mL    Total NET: 948 mL    Bowel Movement: : [] YES [X] NO  Flatus: : [X] YES [] NO    PHYSICAL EXAM:  General: NAD, AAOx3, calm and cooperative.  HEENT: JEFFRY, EOMI.  Cardiac: Irregularly irregular.  Respiratory: CTAB, normal respiratory effort.  Abdomen: Soft, non-distended, non-tender, no rebound, no guarding. +BS.  Vascular: Pulses 2+ throughout, extremities well perfused  Skin: Warm/dry, normal color, no jaundice.  Incision/wound: healing well, dressing in place, mildly saturated and intact. ISABELL drain in place with serosanguinous output. Bilateral heels wrapped in kerlix.    MEDICATIONS  (STANDING):  cefepime   IVPB 2000 milliGRAM(s) IV Intermittent every 8 hours  collagenase Ointment 1 Application(s) Topical daily  diltiazem    Tablet 60 milliGRAM(s) Oral every 6 hours  enoxaparin Injectable 40 milliGRAM(s) SubCutaneous daily  famotidine Injectable 20 milliGRAM(s) IV Push daily  metroNIDAZOLE  IVPB 500 milliGRAM(s) IV Intermittent every 8 hours  multivitamin 1 Tablet(s) Oral daily    MEDICATIONS  (PRN):  HYDROmorphone  Injectable 0.25 milliGRAM(s) IV Push every 4 hours PRN Mild Pain (1 - 3)  HYDROmorphone  Injectable 0.5 milliGRAM(s) IV Push every 6 hours PRN Moderate Pain (4 - 6)    DVT PROPHYLAXIS: enoxaparin Injectable 40 milliGRAM(s) SubCutaneous daily    GI PROPHYLAXIS: PEPCID  ANTICOAGULATION: LOVENOX  ANTIBIOTICS:  cefepime   IVPB 2000 milliGRAM(s)  metroNIDAZOLE  IVPB 500 milliGRAM(s)    LAB/STUDIES:  CAPILLARY BLOOD GLUCOSE  POCT Blood Glucose.: 130 mg/dL (04 Aug 2021 23:04)                        11.7   15.91 )-----------( 225      ( 04 Aug 2021 23:00 )             36.8       08-04  137  |  105  |  16  ----------------------------<  163<H>  3.9   |  23  |  <0.5<L>    Calcium, Total Serum: 8.1 mg/dL (08-04-21 @ 23:00)    LFTs:          4.5  | 0.7  | 32       ------------------[293     ( 04 Aug 2021 23:00 )  2.7  | 0.2  | 43          Culture - Surgical Swab (collected 03 Aug 2021 09:30)  Source: .Surgical Swab None  Preliminary Report (04 Aug 2021 12:29):    Moderate Gram Negative Rods    Numerous Yeast like cells    IMAGING:  < from: Xray Chest 1 View- PORTABLE-Routine (Xray Chest 1 View- PORTABLE-Routine in AM.) (08.05.21 @ 06:20) >  Impression:    1.) Stable bilateral pleural effusion/opacities.  2.) Interval removal of enteric tube.  < end of copied text >    < from: Xray Kidney Ureter Bladder (08.04.21 @ 17:09) >  IMPRESSION:    1.) Nonobstructive bowel gas pattern.  2.) Enteric tube remains in place.  < end of copied text >    ACCESS/ DEVICES:  [ X ] Peripheral IV  [ ] Central Venous Line	[ ] R	[ ] L	[ ] IJ	[ ] Fem	[ ] SC	Placed:   [ X ] Arterial Line		[ ] R	[ ] L	[ ] Fem	[ ] Rad	[ ] Ax	Placed:   [ ] PICC:					[ X ] Mediport  [ X ] Urinary Catheter,  Date Placed:   [ ] Chest tube: [ ] Right, [ ] Left  [ X ] ISABELL/Byron Drains

## 2021-08-05 NOTE — PROGRESS NOTE ADULT - ASSESSMENT
77 yo S/P DIAGONISTIC LAP, WHIPPLE, HEPATIC CYST DEROOFING, LIVER WEDGE RESECTION, ANTECOLIC GJ, HEPATICOJEJ W/ 19F ERIBERTO STENT, PANCREATICOJEJ W/ PEDS FEEDING TUBE STENT    IMPRESSION;  S/p whipple's  Peritonitis  no PNA  8/2 WCx E casseiflavens, GNR, Yeast    RECOMMENDATIONS;  F/u final OR cultures  Cefepime 2 gm iv q8h  Flagyl 500 mg iv q8h  Add Diflucan 400 mg iv q24h

## 2021-08-05 NOTE — PROGRESS NOTE ADULT - ASSESSMENT
Assessment & Plan    76y Female  s/p pancreaticoduodenectomy, 1 ISABELL in place    NEURO:    Acute pain-controlled with Tylenol IV, Dilaudid PRN    RESP:     Saturating well on RA    Activity-increase as tolerated      CARDS:     hx of Afib-cardizem gtt DCed --> Cardizem 5mg q8 due to metoprolol allergy and strict NPO (home cardizem 120mg ER). F/U Cards ([place]) for further reccs   8/2- Bc to 40's called cards-> vasovagal response, DC cardizem gtt start IV push 5mg q8  8/4 - Stopped Cardizem pushes, now 60 q6 PO    hx of HTN-  lasix 10mg IV (PO 20mg at home)    Imaging: EKG afib    CE neg x3    GI/NUTR:     s/p whipple-strict NPO    Diet, NPO  NGT Removed 8/4    GI Prophylaxis-PPI    Bowel regimen- contraindicated  last bowel movement  unknown    1JP in place       /RENAL:      Monitor UO-ariza in place  Labs:          BUN/Cr- 11/0.6  -->,  17/0.6  -->,  16/<0.5  -->          Electrolytes-Na 137 // K 3.9 // Mg 1.8 //  Phos 2.5 (08-04 @ 23:00)    HEME/ONC:     DVT prophylaxis-enoxaparin Injectable  , SCDs    Labs: Hb/Hct:  11.5/36.3  -->,  11.7/36.8  -->                      Plts:  225  -->,  234  -->                 PTT/INR:                  T&S: (08-04)    ID:  WBC- 28.70  --->>,  23.32  --->>,  15.91  --->>  Temp trend- 24hrs T(F): 97 (08-04 @ 23:00), Max: 97 (08-04 @ 23:00)  Antibiotics-cefepime   IVPB 2000 every 8 hours  metroNIDAZOLE  IVPB 500 every 8 hours    ENDO:     FSG q6 while strict NPO    DERM:  b/l ulcerations on the lateral aspect of the left foot/ankle and medial aspect of the right foot/ankle  fibrotic base. does not appear to be acutely infected      LINES/DRAINS:  PIV, Midline Juliette, Ariza, right subclavian chemo port    Advanced Directives: Full Code    DISPO:    SICU

## 2021-08-05 NOTE — PROGRESS NOTE ADULT - SUBJECTIVE AND OBJECTIVE BOX
LI, VIKASH  76y, Female    All available historical data reviewed    OVERNIGHT EVENTS:  no fevers  feels well and has no complaints  no overt abdominal pain    ROS:  General: Denies rigors, nightsweats  HEENT: Denies headache, rhinorrhea, sore throat, eye pain  CV: Denies CP, palpitations  PULM: Denies wheezing, hemoptysis  GI: Denies hematemesis, hematochezia, melena  : Denies discharge, hematuria  MSK: Denies arthralgias, myalgias  SKIN: Denies rash, lesions  NEURO: Denies paresthesias, weakness  PSYCH: Denies depression, anxiety    VITALS:  T(F): 95.6, Max: 97 (08-04-21 @ 23:00)  HR: 93  BP: 125/91  RR: 18Vital Signs Last 24 Hrs  T(C): 35.3 (05 Aug 2021 08:00), Max: 36.1 (04 Aug 2021 23:00)  T(F): 95.6 (05 Aug 2021 08:00), Max: 97 (04 Aug 2021 23:00)  HR: 93 (05 Aug 2021 09:00) (93 - 129)  BP: 125/91 (04 Aug 2021 18:00) (125/91 - 141/80)  BP(mean): 103 (04 Aug 2021 18:00) (92 - 103)  RR: 18 (04 Aug 2021 11:00) (18 - 18)  SpO2: 100% (05 Aug 2021 09:00) (96% - 100%)    TESTS & MEASUREMENTS:                        11.7   15.91 )-----------( 225      ( 04 Aug 2021 23:00 )             36.8     08-04    137  |  105  |  16  ----------------------------<  163<H>  3.9   |  23  |  <0.5<L>    Ca    8.1<L>      04 Aug 2021 23:00  Phos  2.5     08-04  Mg     1.8     08-04    TPro  4.5<L>  /  Alb  2.7<L>  /  TBili  0.7  /  DBili  0.2  /  AST  32  /  ALT  43<H>  /  AlkPhos  293<H>  08-04    LIVER FUNCTIONS - ( 04 Aug 2021 23:00 )  Alb: 2.7 g/dL / Pro: 4.5 g/dL / ALK PHOS: 293 U/L / ALT: 43 U/L / AST: 32 U/L / GGT: x             Culture - Surgical Swab (collected 08-03-21 @ 09:30)  Source: .Surgical Swab None  Preliminary Report (08-04-21 @ 12:29):    Moderate Gram Negative Rods    Numerous Yeast like cells    Culture - Surgical Swab (collected 08-02-21 @ 09:49)  Source: .Surgical Swab 2) BILE DUCT CULTURE  Preliminary Report (08-04-21 @ 14:21):    Numerous Enterococcus casseliflavus    Rare Gram Negative Rods    Culture - Surgical Swab (collected 08-02-21 @ 09:49)  Source: .Surgical Swab 1) BIILE DUCT CULTURE  Preliminary Report (08-04-21 @ 14:02):    Numerous Enterococcus casseliflavus    Rare Mixed gram negative rods            RADIOLOGY & ADDITIONAL TESTS:  Personal review of radiological diagnostics performed  Echo and EKG results noted when applicable.     MEDICATIONS:  cefepime   IVPB 2000 milliGRAM(s) IV Intermittent every 8 hours  collagenase Ointment 1 Application(s) Topical daily  diltiazem    Tablet 60 milliGRAM(s) Oral every 6 hours  enoxaparin Injectable 40 milliGRAM(s) SubCutaneous daily  famotidine Injectable 20 milliGRAM(s) IV Push daily  HYDROmorphone  Injectable 0.25 milliGRAM(s) IV Push every 4 hours PRN  HYDROmorphone  Injectable 0.5 milliGRAM(s) IV Push every 6 hours PRN  metroNIDAZOLE  IVPB 500 milliGRAM(s) IV Intermittent every 8 hours  multivitamin 1 Tablet(s) Oral daily      ANTIBIOTICS:  cefepime   IVPB 2000 milliGRAM(s) IV Intermittent every 8 hours  metroNIDAZOLE  IVPB 500 milliGRAM(s) IV Intermittent every 8 hours

## 2021-08-05 NOTE — PROGRESS NOTE ADULT - SUBJECTIVE AND OBJECTIVE BOX
76y Female    MRN-[id number]    Admit Date: 08-02-21  Hospital LOS:   ICU Admission Date: 8/2  OR #1-s/p whipple procedure        ============================  PMH  PAST MEDICAL & SURGICAL HISTORY:  SOB (shortness of breath)  Pain  knee  Varicose veins of both lower extremities, unspecified whether complicated  Atrial fibrillation, unspecified type  2019 on Eliquis  Jaundice  March 5, 2021  Malignant neoplasm of pancreas, unspecified location of malignancy  Pancreatic Cancer  Hypertension, unspecified type  2019  Pancreatic cancer  Kidney stones  History of surgery  vascular surgery   ? variocose vein stripping?  Status post phlebectomy  10/2018  History of ovarian cystectomy  left  History of tonsillectomy  1959  Kidney stone  2017    Home Meds:  Home Medications:  acetaminophen 325 mg oral tablet: 2 tab(s) orally every 6 hours, As needed, Temp greater or equal to 38C (100.4F), Mild Pain (1 - 3) (02 Aug 2021 07:06)  calcium,magnesium and Zinc: 1 tab(s) orally once a day (02 Aug 2021 07:06)  dilTIAZem 120 mg/24 hours oral capsule, extended release: 1 cap(s) orally once a day (02 Aug 2021 07:06)  furosemide 20 mg oral tablet: 1 tab(s) orally once a day (02 Aug 2021 07:06)  Women&#x27;s daily vitamins: 1 tab(s) orally once a day (02 Aug 2021 07:06)      Allergies  Augmentin (Rash)  chocolate (Headache)  digoxin (Hives)  Metoprolol Succinate ER (Hives)  Novocain (Angioedema)  Steroids: Excessive swelling (Flushing; Other (Mild to Mod))  sulfa drugs (Fever)  Xarelto (Hives)     24 Hour Events  8/4   Night  - IV tylenol  - 15 Kphos, 2g Mg    DAY  -d/c PCA, start Dilaudid prn  -ECHO: EF 50-55%, mod-severe JERI, severe LAE, mildly reduced RV fcn, mild MVP, mild-mod MR, mod TR, mild AR, mild PA, borderline pHTN  -change IV to PO Cardizem 30mg q6h  -continue Lasix as IV  -change IVF to d5 1/2NS  -grastrograffin study  -->clear  -Sx swab-  GNR, num yeast like cells; Bile duct cx- GPC in pairs/chains, rare mixed GNR          [X] A ten-point review of systems was otherwise negative except as noted above.  [  ] Due to altered mental status/intubation, subjective information was not attained from the patient. History was obtained, to the extent possible, from review of the chart and collateral sources of information.           76y Female    [id number]    Admit Date: 08-02-21  Hospital LOS:   ICU Admission Date: 8/2  OR #1-s/p whipple procedure        ============================  PMH  PAST MEDICAL & SURGICAL HISTORY:  SOB (shortness of breath)  Pain  knee  Varicose veins of both lower extremities, unspecified whether complicated  Atrial fibrillation, unspecified type  2019 on Eliquis  Jaundice  March 5, 2021  Malignant neoplasm of pancreas, unspecified location of malignancy  Pancreatic Cancer  Hypertension, unspecified type  2019  Pancreatic cancer  Kidney stones  History of surgery  vascular surgery   ? variocose vein stripping?  Status post phlebectomy  10/2018  History of ovarian cystectomy  left  History of tonsillectomy  1959  Kidney stone  2017    Home Meds:  Home Medications:  acetaminophen 325 mg oral tablet: 2 tab(s) orally every 6 hours, As needed, Temp greater or equal to 38C (100.4F), Mild Pain (1 - 3) (02 Aug 2021 07:06)  calcium,magnesium and Zinc: 1 tab(s) orally once a day (02 Aug 2021 07:06)  dilTIAZem 120 mg/24 hours oral capsule, extended release: 1 cap(s) orally once a day (02 Aug 2021 07:06)  furosemide 20 mg oral tablet: 1 tab(s) orally once a day (02 Aug 2021 07:06)  Women&#x27;s daily vitamins: 1 tab(s) orally once a day (02 Aug 2021 07:06)      Allergies  Augmentin (Rash)  chocolate (Headache)  digoxin (Hives)  Metoprolol Succinate ER (Hives)  Novocain (Angioedema)  Steroids: Excessive swelling (Flushing; Other (Mild to Mod))  sulfa drugs (Fever)  Xarelto (Hives)     24 Hour Events  8/4   Night  - IV tylenol  - 15 Kphos, 2g Mg    DAY  -d/c PCA, start Dilaudid prn  -ECHO: EF 50-55%, mod-severe JERI, severe LAE, mildly reduced RV fcn, mild MVP, mild-mod MR, mod TR, mild AR, mild LA, borderline pHTN  -change IV to PO Cardizem 30mg q6h  -continue Lasix as IV  -change IVF to d5 1/2NS  -grastrograffin study  -->clear  -Sx swab-  GNR, num yeast like cells; Bile duct cx- GPC in pairs/chains, rare mixed GNR          [X] A ten-point review of systems was otherwise negative except as noted above.  [  ] Due to altered mental status/intubation, subjective information was not attained from the patient. History was obtained, to the extent possible, from review of the chart and collateral sources of information.      Daily Height in cm: 172.72 (04 Aug 2021 12:47)    Daily     Diet, Clear Liquid (08-04-21 @ 17:15)      CURRENT MEDS:  Neurologic Medications  HYDROmorphone  Injectable 0.25 milliGRAM(s) IV Push every 4 hours PRN Mild Pain (1 - 3)  HYDROmorphone  Injectable 0.5 milliGRAM(s) IV Push every 6 hours PRN Moderate Pain (4 - 6)    Respiratory Medications    Cardiovascular Medications  diltiazem    Tablet 60 milliGRAM(s) Oral every 6 hours  furosemide   Injectable 10 milliGRAM(s) IV Push daily    Gastrointestinal Medications  dextrose 5% + sodium chloride 0.45%. 1000 milliLiter(s) IV Continuous <Continuous>  famotidine Injectable 20 milliGRAM(s) IV Push daily    Genitourinary Medications    Hematologic/Oncologic Medications  enoxaparin Injectable 40 milliGRAM(s) SubCutaneous daily    Antimicrobial/Immunologic Medications  cefepime   IVPB 2000 milliGRAM(s) IV Intermittent every 8 hours  metroNIDAZOLE  IVPB 500 milliGRAM(s) IV Intermittent every 8 hours    Endocrine/Metabolic Medications    Topical/Other Medications  collagenase Ointment 1 Application(s) Topical daily      ICU Vital Signs Last 24 Hrs  T(C): 36.1 (04 Aug 2021 23:00), Max: 36.1 (04 Aug 2021 23:00)  T(F): 97 (04 Aug 2021 23:00), Max: 97 (04 Aug 2021 23:00)  HR: 97 (05 Aug 2021 08:00) (95 - 129)  BP: 125/91 (04 Aug 2021 18:00) (125/91 - 146/80)  BP(mean): 103 (04 Aug 2021 18:00) (92 - 103)  ABP: 147/75 (05 Aug 2021 08:00) (112/59 - 165/83)  ABP(mean): 102 (05 Aug 2021 08:00) (78 - 113)  RR: 18 (04 Aug 2021 11:00) (18 - 18)  SpO2: 98% (05 Aug 2021 08:00) (96% - 100%)      Adult Advanced Hemodynamics Last 24 Hrs  CVP(mm Hg): --  CVP(cm H2O): --  CO: --  CI: --  PA: --  PA(mean): --  PCWP: --  SVR: --  SVRI: --  PVR: --  PVRI: --          I&O's Summary    04 Aug 2021 07:01  -  05 Aug 2021 07:00  --------------------------------------------------------  IN: 3500 mL / OUT: 2552 mL / NET: 948 mL      I&O's Detail    04 Aug 2021 07:01  -  05 Aug 2021 07:00  --------------------------------------------------------  IN:    dextrose 5% + sodium chloride 0.45%: 1700 mL    IV PiggyBack: 50 mL    IV PiggyBack: 150 mL    IV PiggyBack: 300 mL    IV PiggyBack: 200 mL    IV PiggyBack: 250 mL    Lactated Ringers: 400 mL    Oral Fluid: 450 mL  Total IN: 3500 mL    OUT:    Bulb (mL): 660 mL    Indwelling Catheter - Urethral (mL): 1742 mL    Nasogastric/Oral tube (mL): 150 mL  Total OUT: 2552 mL    Total NET: 948 mL          PHYSICAL EXAM:    GEN: wdwn female in bed in NAD    NEURO: NAD, alert, oriented x 3, no focal deficits    RESP: lungs clear to auscultation, normal expansion/effort      CV: irregularly irregular. Afib on monitor. HR  on monitor    GI: abdomen soft, nontender, nondistended,    Current diet:  clears    EXTREMITIES: extremities warm, pink, well perfused    LABS:  CAPILLARY BLOOD GLUCOSE      POCT Blood Glucose.: 130 mg/dL (04 Aug 2021 23:04)  POCT Blood Glucose.: 144 mg/dL (04 Aug 2021 17:15)  POCT Blood Glucose.: 124 mg/dL (04 Aug 2021 13:22)  POCT Blood Glucose.: 120 mg/dL (04 Aug 2021 08:31)                          11.7   15.91 )-----------( 225      ( 04 Aug 2021 23:00 )             36.8       08-04    137  |  105  |  16  ----------------------------<  163<H>  3.9   |  23  |  <0.5<L>    Ca    8.1<L>      04 Aug 2021 23:00  Phos  2.5     08-04  Mg     1.8     08-04    TPro  4.5<L>  /  Alb  2.7<L>  /  TBili  0.7  /  DBili  0.2  /  AST  32  /  ALT  43<H>  /  AlkPhos  293<H>  08-04        CARDIAC MARKERS ( 03 Aug 2021 11:00 )  x     / x     / 327 U/L / x     / x              Culture - Surgical Swab (collected 03 Aug 2021 09:30)  Source: .Surgical Swab None  Preliminary Report (04 Aug 2021 12:29):    Moderate Gram Negative Rods    Numerous Yeast like cells    Culture - Surgical Swab (collected 02 Aug 2021 09:49)  Source: .Surgical Swab 2) BILE DUCT CULTURE  Preliminary Report (04 Aug 2021 14:21):    Numerous Enterococcus casseliflavus    Rare Gram Negative Rods    Culture - Surgical Swab (collected 02 Aug 2021 09:49)  Source: .Surgical Swab 1) BIILE DUCT CULTURE  Preliminary Report (04 Aug 2021 14:02):    Numerous Enterococcus casseliflavus    Rare Mixed gram negative rods

## 2021-08-05 NOTE — CHART NOTE - NSCHARTNOTEFT_GEN_A_CORE
** NOTE INCOMPLETE**    SICU Transfer Note:    Transfer from: SICU  Transfer to:  ( x ) Surgery    (  ) Telemetry    (  ) Medicine    (  ) Palliative    (  ) Stroke Unit    (  ) _______________    HPI / SICU COURSE:  76F with PMHx of pancreatic ca (March 2021, s/p Chemo 2 rounds 6/2021 via R Subclavian chemo port),  Afib on eliquis (cardiologist VEE Medrano), HTN, Varicose vein in the lower extremities, Nephrolithiasis presented to the ED March 2021 for yellow discoloration of the skin. Patient started to notice this Jaundice in December 2020. It was associated with dark urine and light colored stool. Patient went to her PMD who treated her for UTI. Patient went to her cardiologist for her stress test and he noticed that she looks jaundiced and referred her to Dr. Grayson who performed an endoscopy in March 2021 which was normal. CT abdomen that demonstrated severe intrahepatic and extrahepatic biliary ductal dilatation. Pancreatic head/neck is ill-defined, raising suspicion for underlying ill-defined mass.    GI EUS 3/2021-EUS with FNA of Pancreatic Mass, and ERCP with sphincterotomy and placement of a large plastic CBD stent. pathology significant for adenocarcinoma    Patient seen by surg-onc and underwent pancreaticoduodenectomy with Dr. Nino. Patient tolerated the procedure well with no known intraop complication. Patient extubated emilie to PACU. SICU consulted in the setting of extensive length of surgery, hemodynamic monitoring and known afib.      PAST MEDICAL & SURGICAL HISTORY:  Pancreatic adenocarcinoma  Afib on Eliquis  HTN  Varicose veins s/p phlebectomy  Nephrolithiasis  Hx of tonsillectomy  Hx of ovarian cystectomy (L)    Allergies  Augmentin (Rash)  chocolate (Headache)  digoxin (Hives)  Metoprolol Succinate ER (Hives)  Novocain (Angioedema)  Steroids: Excessive swelling (Flushing; Other (Mild to Mod))  sulfa drugs (Fever)  Xarelto (Hives)      MEDICATIONS  (STANDING):  cefepime   IVPB 2000 milliGRAM(s) IV Intermittent every 8 hours  collagenase Ointment 1 Application(s) Topical daily  diltiazem    Tablet 60 milliGRAM(s) Oral every 6 hours  enoxaparin Injectable 40 milliGRAM(s) SubCutaneous daily  famotidine Injectable 20 milliGRAM(s) IV Push daily  metroNIDAZOLE  IVPB 500 milliGRAM(s) IV Intermittent every 8 hours  multivitamin 1 Tablet(s) Oral daily    MEDICATIONS  (PRN):  HYDROmorphone  Injectable 0.25 milliGRAM(s) IV Push every 4 hours PRN Mild Pain (1 - 3)  HYDROmorphone  Injectable 0.5 milliGRAM(s) IV Push every 6 hours PRN Moderate Pain (4 - 6)    Vital Signs Last 24 Hrs  T(C): 35.3 (05 Aug 2021 08:00), Max: 36.1 (04 Aug 2021 23:00)  T(F): 95.6 (05 Aug 2021 08:00), Max: 97 (04 Aug 2021 23:00)  HR: 93 (05 Aug 2021 09:00) (93 - 129)  BP: 125/91 (04 Aug 2021 18:00) (125/91 - 141/80)  BP(mean): 103 (04 Aug 2021 18:00) (92 - 103)  RR: 18 (04 Aug 2021 11:00) (18 - 18)  SpO2: 100% (05 Aug 2021 09:00) (96% - 100%)  I&O's Summary    04 Aug 2021 07:01  -  05 Aug 2021 07:00  --------------------------------------------------------  IN: 3500 mL / OUT: 2552 mL / NET: 948 mL    05 Aug 2021 07:01  -  05 Aug 2021 09:47  --------------------------------------------------------  IN: 75 mL / OUT: 290 mL / NET: -215 mL      LABS                                            11.7                  Neurophils% (auto):   x      (08-04 @ 23:00):    15.91)-----------(225          Lymphocytes% (auto):  x                                             36.8                   Eosinphils% (auto):   x        Manual%: Neutrophils x    ; Lymphocytes x    ; Eosinophils x    ; Bands%: x    ; Blasts x                                    137    |  105    |  16                  Calcium: 8.1   / iCa: x      (08-04 @ 23:00)    ----------------------------<  163       Magnesium: 1.8                              3.9     |  23     |  <0.5             Phosphorous: 2.5      TPro  4.5    /  Alb  2.7    /  TBili  0.7    /  DBili  0.2    /  AST  32     /  ALT  43     /  AlkPhos  293    04 Aug 2021 23:00          ASSESSMENT/PLAN:   76F    Disposition:      For Follow-Up: ** NOTE INCOMPLETE**    SICU Transfer Note:    Transfer from: SICU  Transfer to:  ( x ) Surgery    (  ) Telemetry    (  ) Medicine    (  ) Palliative    (  ) Stroke Unit    (  ) _______________    HPI / SICU COURSE:  76F with PMHx of pancreatic ca (March 2021, s/p Chemo 2 rounds 6/2021 via R Subclavian chemo port),  Afib on eliquis (cardiologist VEE Medrano), HTN, Varicose vein in the lower extremities, Nephrolithiasis presented to the ED March 2021 for yellow discoloration of the skin. Patient started to notice this Jaundice in December 2020. It was associated with dark urine and light colored stool. Patient went to her PMD who treated her for UTI. Patient went to her cardiologist for her stress test and he noticed that she looks jaundiced and referred her to Dr. Grayson who performed an endoscopy in March 2021 which was normal. CT abdomen that demonstrated severe intrahepatic and extrahepatic biliary ductal dilatation. Pancreatic head/neck is ill-defined, raising suspicion for underlying ill-defined mass.    GI EUS 3/2021-EUS with FNA of Pancreatic Mass, and ERCP with sphincterotomy and placement of a large plastic CBD stent. pathology significant for adenocarcinoma    Patient seen by surg-onc and underwent pancreaticoduodenectomy with Dr. Nino. Patient tolerated the procedure well with no known intraop complication. Patient extubated emilie to PACU. SICU consulted in the setting of extensive length of surgery, hemodynamic monitoring and known afib.    In SICU, pt was initially started on cardizem gtt gien her NPO status and allergy to metoprolol. Cardiology was consulted.   On 8/4 pt finished gastrograffin study and was advanced to clear liquid diet. Surgery cultures grew gram negative rods; ID was consulted and pt is currently on cefepime and flagyl. Cardizem was s      8/5  DAY  > restart home lasix PO 20mg  > dc ariza - TOV  > dc yuliana  > dg    8/4   Night  - IV tylenol  - 15 Kphos, 2g Mg    DAY  -d/c PCA, start Dilaudid prn  -ECHO: EF 50-55%, mod-severe JERI, severe LAE, mildly reduced RV fcn, mild MVP, mild-mod MR, mod TR, mild AR, mild LA, borderline pHTN  -change IV to PO Cardizem 30mg q6h  -continue Lasix as IV  -change IVF to d5 1/2NS  -grastrograffin study  -->clear  -Sx swab-  GNR, num yeast like cells; Bile duct cx- GPC in pairs/chains, rare mixed GNR      8/3  Night  - IV tylenol  - switch HSQ to lovenox per primary team  - Received additional 5 of Cardizem 2/2 tachycardia, minimal response, gave 500 LR  - AM EKG for  --> 2g Mg    DAY  - per Dr. Nino, cefotetan switched to cefepime and flagyl  - ID consult  - Arterial duplex for ulcerations as per podiatry, normal arterial flow    8/2  Night  UOP 10--> NICOM 250 15% --> 500LR 38% --> 500 LR  UOP up to 30 cc/hr  Bc to 40 then tachy to 130 --> cards called, Vasovagal response to nausea/gag, DC gtt switch to IV push 5 q8  NICOM 10.4%  - EKG afib,  --> 2g Mg  - Trop (-) x3  - CK Trending up 392 <- 342 <- 137, f/u 11 AM    DAY  > s/p Whipple, admitted to SICU  > Lasix ordered  > cardizem gtt ordered (pt on home cardizem, allergic to metoprolol, strict NPO), HR goal 90s  -cefotetan 2/2 PCN allergy with pip-tazo        PAST MEDICAL & SURGICAL HISTORY:  Pancreatic adenocarcinoma  Afib on Eliquis  HTN  Varicose veins s/p phlebectomy  Nephrolithiasis  Hx of tonsillectomy  Hx of ovarian cystectomy (L)    Allergies  Augmentin (Rash)  chocolate (Headache)  digoxin (Hives)  Metoprolol Succinate ER (Hives)  Novocain (Angioedema)  Steroids: Excessive swelling (Flushing; Other (Mild to Mod))  sulfa drugs (Fever)  Xarelto (Hives)      MEDICATIONS  (STANDING):  cefepime   IVPB 2000 milliGRAM(s) IV Intermittent every 8 hours  collagenase Ointment 1 Application(s) Topical daily  diltiazem    Tablet 60 milliGRAM(s) Oral every 6 hours  enoxaparin Injectable 40 milliGRAM(s) SubCutaneous daily  famotidine Injectable 20 milliGRAM(s) IV Push daily  metroNIDAZOLE  IVPB 500 milliGRAM(s) IV Intermittent every 8 hours  multivitamin 1 Tablet(s) Oral daily    MEDICATIONS  (PRN):  HYDROmorphone  Injectable 0.25 milliGRAM(s) IV Push every 4 hours PRN Mild Pain (1 - 3)  HYDROmorphone  Injectable 0.5 milliGRAM(s) IV Push every 6 hours PRN Moderate Pain (4 - 6)    Vital Signs Last 24 Hrs  T(C): 35.3 (05 Aug 2021 08:00), Max: 36.1 (04 Aug 2021 23:00)  T(F): 95.6 (05 Aug 2021 08:00), Max: 97 (04 Aug 2021 23:00)  HR: 93 (05 Aug 2021 09:00) (93 - 129)  BP: 125/91 (04 Aug 2021 18:00) (125/91 - 141/80)  BP(mean): 103 (04 Aug 2021 18:00) (92 - 103)  RR: 18 (04 Aug 2021 11:00) (18 - 18)  SpO2: 100% (05 Aug 2021 09:00) (96% - 100%)  I&O's Summary    04 Aug 2021 07:01  -  05 Aug 2021 07:00  --------------------------------------------------------  IN: 3500 mL / OUT: 2552 mL / NET: 948 mL    05 Aug 2021 07:01  -  05 Aug 2021 09:47  --------------------------------------------------------  IN: 75 mL / OUT: 290 mL / NET: -215 mL      LABS                                            11.7                  Neurophils% (auto):   x      (08-04 @ 23:00):    15.91)-----------(225          Lymphocytes% (auto):  x                                             36.8                   Eosinphils% (auto):   x        Manual%: Neutrophils x    ; Lymphocytes x    ; Eosinophils x    ; Bands%: x    ; Blasts x                                    137    |  105    |  16                  Calcium: 8.1   / iCa: x      (08-04 @ 23:00)    ----------------------------<  163       Magnesium: 1.8                              3.9     |  23     |  <0.5             Phosphorous: 2.5      TPro  4.5    /  Alb  2.7    /  TBili  0.7    /  DBili  0.2    /  AST  32     /  ALT  43     /  AlkPhos  293    04 Aug 2021 23:00          ASSESSMENT/PLAN:   76F    Disposition:      For Follow-Up: ** NOTE INCOMPLETE**    SICU Transfer Note:    Transfer from: SICU  Transfer to:  ( x ) Surgery    (  ) Telemetry    (  ) Medicine    (  ) Palliative    (  ) Stroke Unit    (  ) _______________    HPI / SICU COURSE:  76F with PMHx of pancreatic ca (March 2021, s/p Chemo 2 rounds 6/2021 via R Subclavian chemo port),  Afib on eliquis (cardiologist VEE Medrano), HTN, Varicose vein in the lower extremities, Nephrolithiasis presented to the ED March 2021 for yellow discoloration of the skin. Patient started to notice this Jaundice in December 2020. It was associated with dark urine and light colored stool. Patient went to her PMD who treated her for UTI. Patient went to her cardiologist for her stress test and he noticed that she looks jaundiced and referred her to Dr. Grayson who performed an endoscopy in March 2021 which was normal. CT abdomen that demonstrated severe intrahepatic and extrahepatic biliary ductal dilatation. Pancreatic head/neck is ill-defined, raising suspicion for underlying ill-defined mass.    GI EUS 3/2021-EUS with FNA of Pancreatic Mass, and ERCP with sphincterotomy and placement of a large plastic CBD stent. pathology significant for adenocarcinoma    Patient seen by surg-onc and underwent pancreaticoduodenectomy with Dr. Nino. Patient tolerated the procedure well with no known intraop complication. Patient extubated emilie to PACU. SICU consulted in the setting of extensive length of surgery, hemodynamic monitoring and known afib.    In SICU, pt was initially started on cardizem drip for her known Afib, HR ranged 90s-130s. Pt on cardizem PO at home due to metoprolol allergy. Cardiology was consulted. CK initially uptrended 342 > 392 but then downtrended to 327.   DVT PPX was switched from HSQ to Lovenox.   on cardizem gtt gien her NPO status and allergy to metoprolol. Cardiology was consulted.  On 8/4 pt finished gastrograffin study and was advanced to clear liquid diet. Surgery cultures grew gram negative rods; ID was consulted and pt is currently on cefepime and flagyl. For pain control, pt's PCA was discontinued and she is currently on Tylenol with Dilauded prn. Pt was transitioned from cardizem IV to PO cardizem 30mg q6h on 8/4. Her echo revealed EF 50-55%. Pt has remained in stable Afib. Pt was transitioned from lasix 10mg iv to her home lasix 20mg PO.  Pt was seen and examined during rounds with SICU attending Dr. Cortez. She is stable and ready for downgrade.    PAST MEDICAL & SURGICAL HISTORY:  Pancreatic adenocarcinoma  Afib on Eliquis  HTN  Varicose veins s/p phlebectomy  Nephrolithiasis  Hx of tonsillectomy  Hx of ovarian cystectomy (L)    Allergies  Augmentin (Rash)  chocolate (Headache)  digoxin (Hives)  Metoprolol Succinate ER (Hives)  Novocain (Angioedema)  Steroids: Excessive swelling (Flushing; Other (Mild to Mod))  sulfa drugs (Fever)  Xarelto (Hives)      MEDICATIONS  (STANDING):  cefepime   IVPB 2000 milliGRAM(s) IV Intermittent every 8 hours  collagenase Ointment 1 Application(s) Topical daily  diltiazem    Tablet 60 milliGRAM(s) Oral every 6 hours  enoxaparin Injectable 40 milliGRAM(s) SubCutaneous daily  famotidine Injectable 20 milliGRAM(s) IV Push daily  metroNIDAZOLE  IVPB 500 milliGRAM(s) IV Intermittent every 8 hours  multivitamin 1 Tablet(s) Oral daily    MEDICATIONS  (PRN):  HYDROmorphone  Injectable 0.25 milliGRAM(s) IV Push every 4 hours PRN Mild Pain (1 - 3)  HYDROmorphone  Injectable 0.5 milliGRAM(s) IV Push every 6 hours PRN Moderate Pain (4 - 6)    Vital Signs Last 24 Hrs  T(C): 35.3 (05 Aug 2021 08:00), Max: 36.1 (04 Aug 2021 23:00)  T(F): 95.6 (05 Aug 2021 08:00), Max: 97 (04 Aug 2021 23:00)  HR: 93 (05 Aug 2021 09:00) (93 - 129)  BP: 125/91 (04 Aug 2021 18:00) (125/91 - 141/80)  BP(mean): 103 (04 Aug 2021 18:00) (92 - 103)  RR: 18 (04 Aug 2021 11:00) (18 - 18)  SpO2: 100% (05 Aug 2021 09:00) (96% - 100%)  I&O's Summary    04 Aug 2021 07:01  -  05 Aug 2021 07:00  --------------------------------------------------------  IN: 3500 mL / OUT: 2552 mL / NET: 948 mL    05 Aug 2021 07:01  -  05 Aug 2021 09:47  --------------------------------------------------------  IN: 75 mL / OUT: 290 mL / NET: -215 mL      LABS                                            11.7                  Neurophils% (auto):   x      (08-04 @ 23:00):    15.91)-----------(225          Lymphocytes% (auto):  x                                             36.8                   Eosinphils% (auto):   x        Manual%: Neutrophils x    ; Lymphocytes x    ; Eosinophils x    ; Bands%: x    ; Blasts x                                    137    |  105    |  16                  Calcium: 8.1   / iCa: x      (08-04 @ 23:00)    ----------------------------<  163       Magnesium: 1.8                              3.9     |  23     |  <0.5             Phosphorous: 2.5      TPro  4.5    /  Alb  2.7    /  TBili  0.7    /  DBili  0.2    /  AST  32     /  ALT  43     /  AlkPhos  293    04 Aug 2021 23:00          ASSESSMENT/PLAN:   76F    ECHO: EF 50-55%, mod-severe JERI, severe LAE, mildly reduced RV fcn, mild MVP, mild-mod MR, mod TR, mild AR, mild IL, borderline pHTN    Disposition:      For Follow-Up: ** NOTE INCOMPLETE**    SICU Transfer Note:    Transfer from: SICU  Transfer to:  ( x ) Surgery    (  ) Telemetry    (  ) Medicine    (  ) Palliative    (  ) Stroke Unit    (  ) _______________    HPI / SICU COURSE:  76F with PMHx of pancreatic ca (March 2021, s/p Chemo 2 rounds 6/2021 via R Subclavian chemo port),  Afib on eliquis (cardiologist VEE Medrano), HTN, Varicose vein in the lower extremities, Nephrolithiasis presented to the ED March 2021 for yellow discoloration of the skin. Patient started to notice this Jaundice in December 2020. It was associated with dark urine and light colored stool. Patient went to her PMD who treated her for UTI. Patient went to her cardiologist for her stress test and he noticed that she looks jaundiced and referred her to Dr. Grayson who performed an endoscopy in March 2021 which was normal. CT abdomen that demonstrated severe intrahepatic and extrahepatic biliary ductal dilatation. Pancreatic head/neck is ill-defined, raising suspicion for underlying ill-defined mass.    GI EUS 3/2021-EUS with FNA of Pancreatic Mass, and ERCP with sphincterotomy and placement of a large plastic CBD stent. pathology significant for adenocarcinoma    Patient seen by surg-onc and underwent pancreaticoduodenectomy with Dr. Nino. Patient tolerated the procedure well with no known intraop complication. Patient extubated emilie to PACU. SICU consulted in the setting of extensive length of surgery, hemodynamic monitoring and known afib.    In SICU, pt was initially started on cardizem drip for her known Afib, HR ranged 90s-130s. Pt on cardizem PO at home due to metoprolol allergy. Cardiology was consulted. CK initially uptrended 342 > 392 but then downtrended to 327.   DVT PPX was switched from HSQ to Lovenox.   on cardizem gtt gien her NPO status and allergy to metoprolol. Cardiology was consulted.  On 8/4 pt finished gastrograffin study and was advanced to clear liquid diet. Surgery cultures grew gram negative rods; ID was consulted and pt is currently on cefepime and flagyl. For pain control, pt's PCA was discontinued and she is currently on Tylenol with Dilauded prn. Pt was transitioned from cardizem IV to PO cardizem 30mg q6h on 8/4. Her echo revealed EF 50-55%. Pt has remained in stable Afib. Pt was transitioned from lasix 10mg iv to her home lasix 20mg PO.  Pt was seen and examined during rounds with SICU attending Dr. Cortez. She is stable and ready for downgrade.    PAST MEDICAL & SURGICAL HISTORY:  Pancreatic adenocarcinoma  Afib on Eliquis  HTN  Varicose veins s/p phlebectomy  Nephrolithiasis  Hx of tonsillectomy  Hx of ovarian cystectomy (L)    Allergies  Augmentin (Rash)  chocolate (Headache)  digoxin (Hives)  Metoprolol Succinate ER (Hives)  Novocain (Angioedema)  Steroids: Excessive swelling (Flushing; Other (Mild to Mod))  sulfa drugs (Fever)  Xarelto (Hives)      MEDICATIONS  (STANDING):  cefepime   IVPB 2000 milliGRAM(s) IV Intermittent every 8 hours  collagenase Ointment 1 Application(s) Topical daily  diltiazem    Tablet 60 milliGRAM(s) Oral every 6 hours  enoxaparin Injectable 40 milliGRAM(s) SubCutaneous daily  famotidine Injectable 20 milliGRAM(s) IV Push daily  metroNIDAZOLE  IVPB 500 milliGRAM(s) IV Intermittent every 8 hours  multivitamin 1 Tablet(s) Oral daily    MEDICATIONS  (PRN):  HYDROmorphone  Injectable 0.25 milliGRAM(s) IV Push every 4 hours PRN Mild Pain (1 - 3)  HYDROmorphone  Injectable 0.5 milliGRAM(s) IV Push every 6 hours PRN Moderate Pain (4 - 6)    Vital Signs Last 24 Hrs  T(C): 35.3 (05 Aug 2021 08:00), Max: 36.1 (04 Aug 2021 23:00)  T(F): 95.6 (05 Aug 2021 08:00), Max: 97 (04 Aug 2021 23:00)  HR: 93 (05 Aug 2021 09:00) (93 - 129)  BP: 125/91 (04 Aug 2021 18:00) (125/91 - 141/80)  BP(mean): 103 (04 Aug 2021 18:00) (92 - 103)  RR: 18 (04 Aug 2021 11:00) (18 - 18)  SpO2: 100% (05 Aug 2021 09:00) (96% - 100%)  I&O's Summary    04 Aug 2021 07:01  -  05 Aug 2021 07:00  --------------------------------------------------------  IN: 3500 mL / OUT: 2552 mL / NET: 948 mL    05 Aug 2021 07:01  -  05 Aug 2021 09:47  --------------------------------------------------------  IN: 75 mL / OUT: 290 mL / NET: -215 mL      LABS                                            11.7                  Neurophils% (auto):   x      (08-04 @ 23:00):    15.91)-----------(225          Lymphocytes% (auto):  x                                             36.8                   Eosinphils% (auto):   x        Manual%: Neutrophils x    ; Lymphocytes x    ; Eosinophils x    ; Bands%: x    ; Blasts x                                    137    |  105    |  16                  Calcium: 8.1   / iCa: x      (08-04 @ 23:00)    ----------------------------<  163       Magnesium: 1.8                              3.9     |  23     |  <0.5             Phosphorous: 2.5      TPro  4.5    /  Alb  2.7    /  TBili  0.7    /  DBili  0.2    /  AST  32     /  ALT  43     /  AlkPhos  293    04 Aug 2021 23:00          ASSESSMENT/PLAN:   76y Female  s/p pancreaticoduodenectomy, 1 ISABELL in place    NEURO:    Acute pain-controlled with Tylenol IV, Dilaudid PRN    RESP:     Saturating well on RA    Activity-increase as tolerated      CARDS:     hx of Afib-cardizem gtt DCed --> Cardizem 5mg q8 due to metoprolol allergy and strict NPO (home cardizem 120mg ER). F/U Cards (Gilma) for further reccs   8/2- Bc to 40's called cards-> vasovagal response, DC cardizem gtt start IV push 5mg q8  8/4 - Stopped Cardizem pushes, now 60 q6 PO    hx of HTN-  lasix 10mg IV (PO 20mg at home)    Imaging: EKG afib    CE neg x3    GI/NUTR:     s/p whipple-strict NPO    Diet, NPO  NGT Removed 8/4    GI Prophylaxis-PPI    Bowel regimen- contraindicated  last bowel movement  unknown    1JP in place       /RENAL:      Monitor UO-ariza in place  Labs:          BUN/Cr- 11/0.6  -->,  17/0.6  -->,  16/<0.5  -->          Electrolytes-Na 137 // K 3.9 // Mg 1.8 //  Phos 2.5 (08-04 @ 23:00)    HEME/ONC:     DVT prophylaxis-enoxaparin Injectable  , SCDs    Labs: Hb/Hct:  11.5/36.3  -->,  11.7/36.8  -->                      Plts:  225  -->,  234  -->                 PTT/INR:                  T&S: (08-04)    ID:  WBC- 28.70  --->>,  23.32  --->>,  15.91  --->>  Temp trend- 24hrs T(F): 97 (08-04 @ 23:00), Max: 97 (08-04 @ 23:00)  Antibiotics-cefepime   IVPB 2000 every 8 hours  metroNIDAZOLE  IVPB 500 every 8 hours    ENDO:     FSG q6 while strict NPO    DERM:  b/l ulcerations on the lateral aspect of the left foot/ankle and medial aspect of the right foot/ankle  fibrotic base. does not appear to be acutely infected      LINES/DRAINS:  PIV, Midline [place], [place], right subclavian chemo port    Advanced Directives: Full Code    DISPO:    SICU        ECHO: EF 50-55%, mod-severe JERI, severe LAE, mildly reduced RV fcn, mild MVP, mild-mod MR, mod TR, mild AR, mild RI, borderline pHTN    Disposition:      For Follow-Up: SICU Transfer Note:    Transfer from: SICU  Transfer to:  ( x ) Surgery    (  ) Telemetry    (  ) Medicine    (  ) Palliative    (  ) Stroke Unit    (  ) _______________    HPI / SICU COURSE:  76F with PMHx of pancreatic ca (March 2021, s/p Chemo 2 rounds 6/2021 via R Subclavian chemo port),  Afib on eliquis (cardiologist VEE Medrano), HTN, Varicose vein in the lower extremities, Nephrolithiasis presented to the ED March 2021 for yellow discoloration of the skin. Patient started to notice this Jaundice in December 2020. It was associated with dark urine and light colored stool. Patient went to her PMD who treated her for UTI. Patient went to her cardiologist for her stress test and he noticed that she looks jaundiced and referred her to Dr. Grayson who performed an endoscopy in March 2021 which was normal. CT abdomen that demonstrated severe intrahepatic and extrahepatic biliary ductal dilatation. Pancreatic head/neck is ill-defined, raising suspicion for underlying ill-defined mass.    GI EUS 3/2021-EUS with FNA of Pancreatic Mass, and ERCP with sphincterotomy and placement of a large plastic CBD stent. pathology significant for adenocarcinoma    Patient seen by surg-onc and underwent pancreaticoduodenectomy with Dr. Nino. Patient tolerated the procedure well with no known intraop complication. Patient extubated emilie to PACU. SICU consulted in the setting of extensive length of surgery, hemodynamic monitoring and known afib.    In SICU, pt was initially started on cardizem drip for her known Afib, HR ranged 90s-130s. Pt on cardizem PO at home due to metoprolol allergy. Cardiology was consulted. CK initially uptrended 342 > 392 but then downtrended to 327.   DVT PPX was switched from HSQ to Lovenox.   on cardizem gtt gien her NPO status and allergy to metoprolol. Cardiology was consulted.  On 8/4 pt finished gastrograffin study and was advanced to clear liquid diet. Surgery cultures grew gram negative rods; ID was consulted and pt is currently on cefepime and flagyl. For pain control, pt's PCA was discontinued and she is currently on Tylenol with Dilauded prn. Pt was transitioned from cardizem IV to PO cardizem 30mg q6h on 8/4. Her echo revealed EF 50-55%. Pt has remained in stable Afib. Pt was transitioned from lasix 10mg iv to her home lasix 20mg PO.  Pt was seen and examined during rounds with SICU attending Dr. Cortez. She is stable and ready for downgrade.    PAST MEDICAL & SURGICAL HISTORY:  Pancreatic adenocarcinoma  Afib on Eliquis  HTN  Varicose veins s/p phlebectomy  Nephrolithiasis  Hx of tonsillectomy  Hx of ovarian cystectomy (L)    Allergies  Augmentin (Rash)  chocolate (Headache)  digoxin (Hives)  Metoprolol Succinate ER (Hives)  Novocain (Angioedema)  Steroids: Excessive swelling (Flushing; Other (Mild to Mod))  sulfa drugs (Fever)  Xarelto (Hives)      MEDICATIONS  (STANDING):  cefepime   IVPB 2000 milliGRAM(s) IV Intermittent every 8 hours  collagenase Ointment 1 Application(s) Topical daily  diltiazem    Tablet 60 milliGRAM(s) Oral every 6 hours  enoxaparin Injectable 40 milliGRAM(s) SubCutaneous daily  famotidine Injectable 20 milliGRAM(s) IV Push daily  metroNIDAZOLE  IVPB 500 milliGRAM(s) IV Intermittent every 8 hours  multivitamin 1 Tablet(s) Oral daily    MEDICATIONS  (PRN):  HYDROmorphone  Injectable 0.25 milliGRAM(s) IV Push every 4 hours PRN Mild Pain (1 - 3)  HYDROmorphone  Injectable 0.5 milliGRAM(s) IV Push every 6 hours PRN Moderate Pain (4 - 6)    Vital Signs Last 24 Hrs  T(C): 35.3 (05 Aug 2021 08:00), Max: 36.1 (04 Aug 2021 23:00)  T(F): 95.6 (05 Aug 2021 08:00), Max: 97 (04 Aug 2021 23:00)  HR: 93 (05 Aug 2021 09:00) (93 - 129)  BP: 125/91 (04 Aug 2021 18:00) (125/91 - 141/80)  BP(mean): 103 (04 Aug 2021 18:00) (92 - 103)  RR: 18 (04 Aug 2021 11:00) (18 - 18)  SpO2: 100% (05 Aug 2021 09:00) (96% - 100%)  I&O's Summary    04 Aug 2021 07:01  -  05 Aug 2021 07:00  --------------------------------------------------------  IN: 3500 mL / OUT: 2552 mL / NET: 948 mL    05 Aug 2021 07:01  -  05 Aug 2021 09:47  --------------------------------------------------------  IN: 75 mL / OUT: 290 mL / NET: -215 mL      LABS                                            11.7                  Neurophils% (auto):   x      (08-04 @ 23:00):    15.91)-----------(225          Lymphocytes% (auto):  x                                             36.8                   Eosinphils% (auto):   x        Manual%: Neutrophils x    ; Lymphocytes x    ; Eosinophils x    ; Bands%: x    ; Blasts x                                    137    |  105    |  16                  Calcium: 8.1   / iCa: x      (08-04 @ 23:00)    ----------------------------<  163       Magnesium: 1.8                              3.9     |  23     |  <0.5             Phosphorous: 2.5      TPro  4.5    /  Alb  2.7    /  TBili  0.7    /  DBili  0.2    /  AST  32     /  ALT  43     /  AlkPhos  293    04 Aug 2021 23:00          ASSESSMENT/PLAN:   76y Female PMHx Afib, HTN, pancreatic adenocarcinoma s/p pancreaticoduodenectomy, 1 ISABELL in place    NEURO: no chronic disease  # Acute pain: controlled with Tylenol IV, Dilaudid PRN    RESP: no chronic disease  # Saturating 99% on RA    CARDS: hx of Afib on home cardizem (allergic to metoprolol), HTN  # Afib  > cardizem gtt > cardizem 5mg q8 iv > home cardizem 30mg q6h started 8/4  > remains in Afib, HR , controlled  > cardiologist Dr. Garvey consulted  > ECHO: EF 50-55%, mod-severe JERI, severe LAE, mildly reduced RV fcn, mild MVP, mild-mod MR, mod TR, mild AR, mild RI, borderline pHTN  # HTN  > lasix 10mg IVP > home lasix 20mg PO started today 8/5    hx of Afib-cardizem gtt DCed --> Cardizem 5mg q8 due to metoprolol allergy and strict NPO (home cardizem 120mg ER). [facility] [place] for further reccs  # post operative trop negative x3    GI/NUTR: hx of pancreatic adenocarcinoma  # s/p whipple  > completed gastrograffin study on 8/4 > transitioned to clear liquid diet  > NGT removed 8/4  # GI Prophylaxis  > Pepcid 20mg ivp daily  # Bowel regimen: contraindicated  # 1 ISABELL in place    /RENAL: no chronic disease  # Monitor UO: Ariza in place --> to be dc today  >fu TOV today 8/5  Labs:          BUN/Cr- 11/0.6  -->,  17/0.6  -->,  16/<0.5  -->          Electrolytes-Na 137 // K 3.9 // Mg 1.8 //  Phos 2.5 (08-04 @ 23:00)    HEME/ONC: no chronic disease  # DVT ppx: Lovenox, SCDs    Labs: Hb/Hct:  11.5/36.3  -->,  11.7/36.8  -->                      Plts:  225  -->,  234  -->                 PTT/INR:                  T&S: (08-04)    ID: no chronic disease  # WBC- 28.70  --->>,  23.32  --->>,  15.91  --->>  # afebrile - Temp trend- 24hrs T(F): 97 (08-04 @ 23:00), Max: 97 (08-04 @ 23:00)  # ID on consult: cefepime 2000 q8h and metronidazole 500 q8h  > 8/3 culture grew gram neg rods  > f/u final culture results    ENDO: no chronic disease  > FSG q6 while strict NPO    DERM:  b/l ulcerations on the lateral aspect of the left foot/ankle and medial aspect of the right foot/ankle  fibrotic base. does not appear to be acutely infected      LINES/DRAINS:  PIV, Midline, right subclavian chemo port    Advanced Directives: Full Code    DISPO:    Surgery floor    FOLLOW-UP:   > final 8/3 surgery cultures  > TOV (ariza removed 8/5 AM)    Patient was seen and examined with attending Dr. Cortez prior to transfer from SICU to floor.    T(F): 95.6 (08-05-21 @ 08:00), Max: 95.6 (08-05-21 @ 08:00)  HR: 95 (08-05-21 @ 10:00)  BP: --  RR: --  SpO2: 98% (08-05-21 @ 10:00)    Patient is stable for transfer, the receiving team was called at 08-05-21 @ 10:56  Provider notified:       For Follow-Up: SICU Transfer Note:    Transfer from: SICU  Transfer to:  ( x ) Surgery    (  ) Telemetry    (  ) Medicine    (  ) Palliative    (  ) Stroke Unit    (  ) _______________    HPI / SICU COURSE:  76F with PMHx of pancreatic ca (March 2021, s/p Chemo 2 rounds 6/2021 via R Subclavian chemo port),  Afib on eliquis (cardiologist VEE Medrano), HTN, Varicose vein in the lower extremities, Nephrolithiasis presented to the ED March 2021 for yellow discoloration of the skin. Patient started to notice this Jaundice in December 2020. It was associated with dark urine and light colored stool. Patient went to her PMD who treated her for UTI. Patient went to her cardiologist for her stress test and he noticed that she looks jaundiced and referred her to Dr. Grayson who performed an endoscopy in March 2021 which was normal. CT abdomen that demonstrated severe intrahepatic and extrahepatic biliary ductal dilatation. Pancreatic head/neck is ill-defined, raising suspicion for underlying ill-defined mass.    GI EUS 3/2021-EUS with FNA of Pancreatic Mass, and ERCP with sphincterotomy and placement of a large plastic CBD stent. pathology significant for adenocarcinoma    Patient seen by surg-onc and underwent pancreaticoduodenectomy with Dr. Nino. Patient tolerated the procedure well with no known intraop complication. Patient extubated emilie to PACU. SICU consulted in the setting of extensive length of surgery, hemodynamic monitoring and known afib.    In SICU, pt was initially started on cardizem drip for her known Afib, HR ranged 90s-130s. Pt on cardizem PO at home due to metoprolol allergy. Cardiology was consulted. CK initially uptrended 342 > 392 but then downtrended to 327.   DVT PPX was switched from HSQ to Lovenox.   on cardizem gtt gien her NPO status and allergy to metoprolol. Cardiology was consulted.  On 8/4 pt finished gastrograffin study and was advanced to clear liquid diet. Surgery cultures grew gram negative rods; ID was consulted and pt is currently on cefepime and flagyl. For pain control, pt's PCA was discontinued and she is currently on Tylenol with Dilauded prn. Pt was transitioned from cardizem IV to PO cardizem 30mg q6h on 8/4. Her echo revealed EF 50-55%. Pt has remained in stable Afib. Pt was transitioned from lasix 10mg iv to her home lasix 20mg PO.  Pt was seen and examined during rounds with SICU attending Dr. Cortez. She is stable and ready for downgrade.    PAST MEDICAL & SURGICAL HISTORY:  Pancreatic adenocarcinoma  Afib on Eliquis  HTN  Varicose veins s/p phlebectomy  Nephrolithiasis  Hx of tonsillectomy  Hx of ovarian cystectomy (L)    Allergies  Augmentin (Rash)  chocolate (Headache)  digoxin (Hives)  Metoprolol Succinate ER (Hives)  Novocain (Angioedema)  Steroids: Excessive swelling (Flushing; Other (Mild to Mod))  sulfa drugs (Fever)  Xarelto (Hives)      MEDICATIONS  (STANDING):  cefepime   IVPB 2000 milliGRAM(s) IV Intermittent every 8 hours  collagenase Ointment 1 Application(s) Topical daily  diltiazem    Tablet 60 milliGRAM(s) Oral every 6 hours  enoxaparin Injectable 40 milliGRAM(s) SubCutaneous daily  famotidine Injectable 20 milliGRAM(s) IV Push daily  metroNIDAZOLE  IVPB 500 milliGRAM(s) IV Intermittent every 8 hours  multivitamin 1 Tablet(s) Oral daily    MEDICATIONS  (PRN):  HYDROmorphone  Injectable 0.25 milliGRAM(s) IV Push every 4 hours PRN Mild Pain (1 - 3)  HYDROmorphone  Injectable 0.5 milliGRAM(s) IV Push every 6 hours PRN Moderate Pain (4 - 6)    Vital Signs Last 24 Hrs  T(C): 35.3 (05 Aug 2021 08:00), Max: 36.1 (04 Aug 2021 23:00)  T(F): 95.6 (05 Aug 2021 08:00), Max: 97 (04 Aug 2021 23:00)  HR: 93 (05 Aug 2021 09:00) (93 - 129)  BP: 125/91 (04 Aug 2021 18:00) (125/91 - 141/80)  BP(mean): 103 (04 Aug 2021 18:00) (92 - 103)  RR: 18 (04 Aug 2021 11:00) (18 - 18)  SpO2: 100% (05 Aug 2021 09:00) (96% - 100%)  I&O's Summary    04 Aug 2021 07:01  -  05 Aug 2021 07:00  --------------------------------------------------------  IN: 3500 mL / OUT: 2552 mL / NET: 948 mL    05 Aug 2021 07:01  -  05 Aug 2021 09:47  --------------------------------------------------------  IN: 75 mL / OUT: 290 mL / NET: -215 mL      LABS                                            11.7                  Neurophils% (auto):   x      (08-04 @ 23:00):    15.91)-----------(225          Lymphocytes% (auto):  x                                             36.8                   Eosinphils% (auto):   x        Manual%: Neutrophils x    ; Lymphocytes x    ; Eosinophils x    ; Bands%: x    ; Blasts x                                    137    |  105    |  16                  Calcium: 8.1   / iCa: x      (08-04 @ 23:00)    ----------------------------<  163       Magnesium: 1.8                              3.9     |  23     |  <0.5             Phosphorous: 2.5      TPro  4.5    /  Alb  2.7    /  TBili  0.7    /  DBili  0.2    /  AST  32     /  ALT  43     /  AlkPhos  293    04 Aug 2021 23:00          ASSESSMENT/PLAN:   76y Female PMHx Afib, HTN, pancreatic adenocarcinoma s/p pancreaticoduodenectomy, 1 ISABELL in place    NEURO: no chronic disease  # Acute pain: controlled with Tylenol IV, Dilaudid PRN    RESP: no chronic disease  # Saturating 99% on RA    CARDS: hx of Afib on home cardizem (allergic to metoprolol), HTN  # Afib  > cardizem gtt > cardizem 5mg q8 iv > home cardizem 30mg q6h started 8/4  > remains in Afib, HR , controlled  > cardiologist Dr. Garvey consulted  > ECHO: EF 50-55%, mod-severe JERI, severe LAE, mildly reduced RV fcn, mild MVP, mild-mod MR, mod TR, mild AR, mild FL, borderline pHTN  # HTN  > lasix 10mg IVP > home lasix 20mg PO started today 8/5    hx of Afib-cardizem gtt DCed --> Cardizem 5mg q8 due to metoprolol allergy and strict NPO (home cardizem 120mg ER). [facility] [place] for further reccs  # post operative trop negative x3    GI/NUTR: hx of pancreatic adenocarcinoma  # s/p whipple  > completed gastrograffin study on 8/4 > transitioned to clear liquid diet  > NGT removed 8/4  # GI Prophylaxis  > Pepcid 20mg ivp daily  # Bowel regimen: contraindicated  # 1 ISABELL in place    /RENAL: no chronic disease  # Monitor UO: Ariza in place --> to be dc today  >fu TOV today 8/5  Labs:          BUN/Cr- 11/0.6  -->,  17/0.6  -->,  16/<0.5  -->          Electrolytes-Na 137 // K 3.9 // Mg 1.8 //  Phos 2.5 (08-04 @ 23:00)    HEME/ONC: no chronic disease  # DVT ppx: Lovenox, SCDs    Labs: Hb/Hct:  11.5/36.3  -->,  11.7/36.8  -->                      Plts:  225  -->,  234  -->                 PTT/INR:                  T&S: (08-04)    ID: no chronic disease  # WBC- 28.70  --->>,  23.32  --->>,  15.91  --->>  # afebrile - Temp trend- 24hrs T(F): 97 (08-04 @ 23:00), Max: 97 (08-04 @ 23:00)  # ID on consult: cefepime 2000 q8h and metronidazole 500 q8h  > 8/3 culture grew gram neg rods  > f/u final culture results    ENDO: no chronic disease  > FSG q6 while strict NPO    DERM:  b/l ulcerations on the lateral aspect of the left foot/ankle and medial aspect of the right foot/ankle  fibrotic base. does not appear to be acutely infected      LINES/DRAINS:  PIV, Midline, right subclavian chemo port    Advanced Directives: Full Code    DISPO:    Surgery floor    FOLLOW-UP:   > final 8/3 surgery cultures  > TOV (ariza removed 8/5 AM)    Patient was seen and examined with attending Dr. Cortez prior to transfer from SICU to floor.    T(F): 95.6 (08-05-21 @ 08:00), Max: 95.6 (08-05-21 @ 08:00)  HR: 95 (08-05-21 @ 10:00)  BP: --  RR: --  SpO2: 98% (08-05-21 @ 10:00)    Patient is stable for transfer, the receiving team was called at 08-05-21 @ 10:56  Provider notified: Dr. Dixon

## 2021-08-06 NOTE — PROGRESS NOTE ADULT - SUBJECTIVE AND OBJECTIVE BOX
GENERAL SURGERY PROGRESS NOTE    Patient: VIKASH LI , 76y ([birth year])Female   MRN: [id number]  [facility]  Visit: 08-02-21 Inpatient    Hospital Day #: 5  Post-Op Day #: 4    Procedure/Dx/Injuries: s/p diagnostic laparoscopy, Whipple, hepatic cyst deroofing, liver wedge resection, antecolic GJ, hepaticojejunostomy w/ 19F minesh stent, pancreaticojejunostomy w/ pediatric feeding tube stent.    Events of past 24 hours: downgraded overnight, doing well tolerating liquids, pain controlled     PAST MEDICAL & SURGICAL HISTORY:  SOB (shortness of breath)  Pain - knee  Varicose veins of both lower extremities, unspecified whether complicated  Atrial fibrillation, unspecified type - 2019 on Eliquis  Jaundice - March 5, 2021  Malignant neoplasm of pancreas, unspecified location of malignancy  Pancreatic Cancer  Hypertension, unspecified type 2019  Kidney stones  History of surgery - vascular surgery ? variocose vein stripping?  Status post phlebectomy - 10/2018  History of ovarian cystectomy - left  History of tonsillectomy - 1959  Kidney stone - 2017        Vitals:   T(F): 97 (08-06-21 @ 07:39), Max: 98 (08-05-21 @ 22:56)  HR: 89 (08-06-21 @ 07:39)  BP: 124/68 (08-06-21 @ 07:39)  RR: 19 (08-06-21 @ 07:39)  SpO2: 98% (08-06-21 @ 05:54)      Diet, Soft:   DASH/TLC Sodium & Cholesterol Restricted      Fluids:     I & O's:    08-05-21 @ 07:01  -  08-06-21 @ 07:00  --------------------------------------------------------  IN:    dextrose 5% + sodium chloride 0.45%: 75 mL    dextrose 5% + sodium chloride 0.45%: 350 mL    IV PiggyBack: 200 mL    IV PiggyBack: 100 mL    Oral Fluid: 250 mL  Total IN: 975 mL    OUT:    Bulb (mL): 645 mL    Indwelling Catheter - Urethral (mL): 250 mL    Voided (mL): 300 mL  Total OUT: 1195 mL    Total NET: -220 mL        Bowel Movement: : [] YES [x] NO  Flatus: : [x] YES [] NO    PHYSICAL EXAM:  General Appearance: NAD  HEENT: EOMI, sclera non-icteric.  Heart: RRR   Lungs: No increased work of breathing or accessory muscle use. Symmetric chest wall rise and fall.   Abdomen:  Soft, nontender, nondistended.   MSK/Extremities: Warm & well-perfused.   Skin: Warm, dry. No jaundice.   Incision/wound: healing well, dressing in place, mildly saturated and intact. ISABELL drain in place with serous output. Bilateral heels wrapped in kerlix.    MEDICATIONS  (STANDING):  cefepime   IVPB 2000 milliGRAM(s) IV Intermittent every 8 hours  collagenase Ointment 1 Application(s) Topical daily  diltiazem    Tablet 60 milliGRAM(s) Oral every 6 hours  enoxaparin Injectable 40 milliGRAM(s) SubCutaneous daily  furosemide    Tablet 20 milliGRAM(s) Oral daily  metoclopramide 5 milliGRAM(s) Oral three times a day before meals  metroNIDAZOLE  IVPB 500 milliGRAM(s) IV Intermittent every 8 hours  multivitamin 1 Tablet(s) Oral daily  pantoprazole    Tablet 40 milliGRAM(s) Oral before breakfast    MEDICATIONS  (PRN):  HYDROmorphone  Injectable 0.25 milliGRAM(s) IV Push every 4 hours PRN Mild Pain (1 - 3)  HYDROmorphone  Injectable 0.5 milliGRAM(s) IV Push every 6 hours PRN Moderate Pain (4 - 6)      DVT PROPHYLAXIS: enoxaparin Injectable 40 milliGRAM(s) SubCutaneous daily    GI PROPHYLAXIS: pantoprazole    Tablet 40 milliGRAM(s) Oral before breakfast    ANTICOAGULATION:   ANTIBIOTICS:  cefepime   IVPB 2000 milliGRAM(s)  metroNIDAZOLE  IVPB 500 milliGRAM(s)            LAB/STUDIES:  Labs:  CAPILLARY BLOOD GLUCOSE  POCT Blood Glucose.: 125 mg/dL (05 Aug 2021 17:38)  POCT Blood Glucose.: 133 mg/dL (05 Aug 2021 12:47)                          11.6   14.49 )-----------( 216      ( 06 Aug 2021 01:05 )             37.7         08-06    135  |  102  |  12  ----------------------------<  117<H>  4.1   |  22  |  <0.5<L>      Calcium, Total Serum: 7.8 mg/dL (08-06-21 @ 01:05)      LFTs:             4.2  | 0.7  | 25       ------------------[304     ( 06 Aug 2021 01:05 )  2.5  | 0.3  | 31            ABG - ( 02 Aug 2021 15:20 )  pH: 7.38  /  pCO2: 36    /  pO2: 273   / HCO3: 21    / Base Excess: -3.3  /  SaO2: x         ABG - ( 02 Aug 2021 12:42 )  pH: 7.42  /  pCO2: 36    /  pO2: 294   / HCO3: 24    / Base Excess: -0.8  /  SaO2: x                               IMAGING:  < from: Xray Chest 1 View- PORTABLE-Routine (Xray Chest 1 View- PORTABLE-Routine in AM.) (08.05.21 @ 06:20) >  Impression:    Stable bilateral pleural effusion/opacities.  Interval removal of enteric tube.    --- End of Report ---    < end of copied text >      BLUE TEAM SPECTRA #[id number]      ACCESS/ DEVICES:  [ X ] Peripheral IV  [ ] Central Venous Line	[ ] R	[ ] L	[ ] IJ	[ ] Fem	[ ] SC	Placed:   [ X ] Arterial Line		[ ] R	[ ] L	[ ] Fem	[ ] Rad	[ ] Ax	Placed:   [ ] PICC:					[ X ] Mediport  [ X ] Urinary Catheter,  Date Placed:   [ ] Chest tube: [ ] Right, [ ] Left  [ X ] ISABELL/Minesh Drains

## 2021-08-06 NOTE — PROGRESS NOTE ADULT - ASSESSMENT
· Assessment	    75 yo S/P DIAGONISTIC LAP, WHIPPLE, HEPATIC CYST DEROOFING, LIVER WEDGE RESECTION, ANTECOLIC GJ, HEPATICOJEJ W/ 19F ERIBERTO STENT, PANCREATICOJEJ W/ PEDS FEEDING TUBE STENT    IMPRESSION;  S/p whipple's  Peritonitis  no PNA  8/2 WCx E casseiflavens, E coli, Klebsiella , Yeast    RECOMMENDATIONS;  D/c cefepime   Rocephin 2 gm iv q24h  Once cleared by Sx could change iv to po Vantin 200 mg q12h and po Flagyl 500 mg q8h till 8/20  recall prn please

## 2021-08-06 NOTE — PROGRESS NOTE ADULT - SUBJECTIVE AND OBJECTIVE BOX
LI, VIKASH  76y, Female    All available historical data reviewed    OVERNIGHT EVENTS:  no fevers  feels well and has no complaints     ROS:  General: Denies rigors, nightsweats  HEENT: Denies headache, rhinorrhea, sore throat, eye pain  CV: Denies CP, palpitations  PULM: Denies wheezing, hemoptysis  GI: Denies hematemesis, hematochezia, melena  : Denies discharge, hematuria  MSK: Denies arthralgias, myalgias  SKIN: Denies rash, lesions  NEURO: Denies paresthesias, weakness  PSYCH: Denies depression, anxiety    VITALS:  T(F): 97, Max: 98 (08-05-21 @ 22:56)  HR: 89  BP: 124/68  RR: 19Vital Signs Last 24 Hrs  T(C): 36.1 (06 Aug 2021 07:39), Max: 36.7 (05 Aug 2021 22:56)  T(F): 97 (06 Aug 2021 07:39), Max: 98 (05 Aug 2021 22:56)  HR: 89 (06 Aug 2021 07:39) (86 - 110)  BP: 124/68 (06 Aug 2021 07:39) (124/68 - 124/68)  BP(mean): --  RR: 19 (06 Aug 2021 07:39) (18 - 19)  SpO2: 98% (06 Aug 2021 05:54) (98% - 99%)    TESTS & MEASUREMENTS:                        11.6   14.49 )-----------( 216      ( 06 Aug 2021 01:05 )             37.7     08-06    135  |  102  |  12  ----------------------------<  117<H>  4.1   |  22  |  <0.5<L>    Ca    7.8<L>      06 Aug 2021 01:05  Phos  2.6     08-06  Mg     1.7     08-06    TPro  4.2<L>  /  Alb  2.5<L>  /  TBili  0.7  /  DBili  0.3<H>  /  AST  25  /  ALT  31  /  AlkPhos  304<H>  08-06    LIVER FUNCTIONS - ( 06 Aug 2021 01:05 )  Alb: 2.5 g/dL / Pro: 4.2 g/dL / ALK PHOS: 304 U/L / ALT: 31 U/L / AST: 25 U/L / GGT: x             Culture - Surgical Swab (collected 08-03-21 @ 09:30)  Source: .Surgical Swab None  Final Report (08-05-21 @ 10:38):    Culture yields >4 types of aerobic and/or anaerobic bacteria    Call client services within 7 days if further workup is clinically    indicated. Culture includes    Moderate Escherichia coli    Moderate Klebsiella pneumoniae    Moderate Bacteroides thetaiotamcron group "Susceptibilities not performed"    Numerous Candida glabrata "Susceptibilities not performed"  Organism: Escherichia coli  Klebsiella pneumoniae (08-05-21 @ 10:38)  Organism: Klebsiella pneumoniae (08-05-21 @ 10:38)      -  Amikacin: S <=16      -  Amoxicillin/Clavulanic Acid: S <=8/4      -  Ampicillin: R >16 These ampicillin results predict results for amoxicillin      -  Ampicillin/Sulbactam: S <=4/2 Enterobacter, Citrobacter, and Serratia may develop resistance during prolonged therapy (3-4 days)      -  Aztreonam: S <=4      -  Cefazolin: S <=2 Enterobacter, Citrobacter, and Serratia may develop resistance during prolonged therapy (3-4 days)      -  Cefepime: S <=2      -  Cefoxitin: S <=8      -  Ceftriaxone: S <=1 Enterobacter, Citrobacter, and Serratia may develop resistance during prolonged therapy      -  Ciprofloxacin: S <=0.25      -  Ertapenem: S <=0.5      -  Gentamicin: S <=2      -  Imipenem: S <=1      -  Levofloxacin: S <=0.5      -  Meropenem: S <=1      -  Piperacillin/Tazobactam: S <=8      -  Tobramycin: S <=2      -  Trimethoprim/Sulfamethoxazole: S <=0.5/9.5      Method Type: MAXINE  Organism: Escherichia coli (08-05-21 @ 10:38)      -  Amikacin: S <=16      -  Amoxicillin/Clavulanic Acid: S <=8/4      -  Ampicillin: S <=8 These ampicillin results predict results for amoxicillin      -  Ampicillin/Sulbactam: S <=4/2 Enterobacter, Citrobacter, and Serratia may develop resistance during prolonged therapy (3-4 days)      -  Aztreonam: S <=4      -  Cefazolin: S <=2 Enterobacter, Citrobacter, and Serratia may develop resistance during prolonged therapy (3-4 days)      -  Cefepime: S <=2      -  Cefoxitin: S <=8      -  Ceftriaxone: S <=1 Enterobacter, Citrobacter, and Serratia may develop resistance during prolonged therapy      -  Ciprofloxacin: S <=0.25      -  Ertapenem: S <=0.5      -  Gentamicin: S <=2      -  Imipenem: S <=1      -  Levofloxacin: S <=0.5      -  Meropenem: S <=1      -  Piperacillin/Tazobactam: S <=8      -  Tobramycin: S <=2      -  Trimethoprim/Sulfamethoxazole: S <=0.5/9.5      Method Type: MAXINE    Culture - Surgical Swab (collected 08-02-21 @ 09:49)  Source: .Surgical Swab 2) BILE DUCT CULTURE  Preliminary Report (08-06-21 @ 09:49):    Numerous Enterococcus casseliflavus Susceptibility to follow.    Rare Escherichia coli    Rare Klebsiella oxytoca/Raoutella ornithinolytica    Numerous Bacteroides thetaiotamcron group "Susceptibilities not performed"    Culture - Surgical Swab (collected 08-02-21 @ 09:49)  Source: .Surgical Swab 1) BIILE DUCT CULTURE  Final Report (08-05-21 @ 10:35):    Culture yields >4 types of aerobic and/or anaerobic bacteria    Call client services within 7 days if further workup is clinically    indicated. Culture includes    Numerous Enterococcus casseliflavus    Numerous Bacteroides thetaiotamcron group "Susceptibilities not performed"    Rare Mixed gram negative rods            RADIOLOGY & ADDITIONAL TESTS:  Personal review of radiological diagnostics performed  Echo and EKG results noted when applicable.     MEDICATIONS:  cefepime   IVPB 2000 milliGRAM(s) IV Intermittent every 8 hours  collagenase Ointment 1 Application(s) Topical daily  diltiazem    Tablet 60 milliGRAM(s) Oral every 6 hours  enoxaparin Injectable 40 milliGRAM(s) SubCutaneous daily  furosemide    Tablet 20 milliGRAM(s) Oral daily  HYDROmorphone  Injectable 0.25 milliGRAM(s) IV Push every 4 hours PRN  HYDROmorphone  Injectable 0.5 milliGRAM(s) IV Push every 6 hours PRN  metoclopramide 5 milliGRAM(s) Oral three times a day before meals  metroNIDAZOLE  IVPB 500 milliGRAM(s) IV Intermittent every 8 hours  multivitamin 1 Tablet(s) Oral daily  pantoprazole    Tablet 40 milliGRAM(s) Oral before breakfast      ANTIBIOTICS:  cefepime   IVPB 2000 milliGRAM(s) IV Intermittent every 8 hours  metroNIDAZOLE  IVPB 500 milliGRAM(s) IV Intermittent every 8 hours

## 2021-08-06 NOTE — PROGRESS NOTE ADULT - ASSESSMENT
ASSESSMENT:  76y F s/p whipple procedure POD# 4    PLAN:  -Daily dressing change  -f/u janeth amylase  -F/u podiatry  -F/u OR cx  - soft diet, IVL  - reglan   - ptx , d/c famotidine  - transfer to   - heel care/nursing wound care as instructed by podiatry    Lines/Tubes: PIV    BLUE TEAM SPECTRA [id number]

## 2021-08-07 NOTE — PROGRESS NOTE ADULT - ASSESSMENT
ASSESSMENT:  76y F s/p whipple procedure POD# 5    PLAN:  -Daily dressing change  -jnaeth amylase 12   -F/u podiatry   -F?U ID recommended changing ceferpime to rocephin 2gm iv q24h  -F/u OR cx  - soft diet, IVL  - reglan   - ptx , d/c famotidine  - heel care/nursing wound care as instructed by podiatry    Lines/Tubes: EDILSON    BLUE TEAM SPECTRA [id number]

## 2021-08-07 NOTE — PROGRESS NOTE ADULT - CRITICAL CARE SERVICES
Pt presents to ED c/o hiatal hernia that is hurting at this time. Pt denies recent change in diet.     LOC: Patient name and date of birth verified.  The patient is awake, alert and aware of environment with an appropriate affect, the patient is oriented x 3 and speaking appropriately.  Pt in NAD.    APPEARANCE: Patient resting comfortably and in no acute distress, patient is clean and well groomed, patient's clothing is properly fastened.  SKIN: The skin is warm and dry, color consistent with ethnicity, patient has normal skin turgor and moist mucus membranes, skin intact, no breakdown or brusing noted.  MUSCULOSKELETAL: Patient moving all extremities well, no obvious swelling or deformities noted.  RESPIRATORY: Airway is open and patent, respirations are spontaneous, patient has a normal effort and rate, no accessory muscle use noted.  CARDIAC: Patient has a normal rate and rhythm, no periphreal edema noted, capillary refill < 3 seconds.  ABDOMEN: Soft and non tender to palpation, no distention noted. Bowel sounds present in all four quadrants.  NEUROLOGIC: Eyes open spontaneously, behavior appropriate to situation, follows commands, facial expression symmetrical, bilateral hand grasp equal and even, purposeful motor response noted, normal sensation in all extremities when touched with a finger.    
36

## 2021-08-07 NOTE — PROGRESS NOTE ADULT - SUBJECTIVE AND OBJECTIVE BOX
GENERAL SURGERY PROGRESS NOTE    Patient: VIKASH LI , 76y ([birth year])Female   MRN: [id number]  Location: [address]  Visit: 08-02-21 Inpatient  Date: 08-07-21 @ 03:55    Hospital Day #: 6  Post-Op Day #: 5    Procedure/Dx/Injuries: s/p whipple procedure    Events of past 24 hours:  pt seen and examined at bedside no acute events overnight.     PAST MEDICAL & SURGICAL HISTORY:  SOB (shortness of breath)    Pain  knee    Varicose veins of both lower extremities, unspecified whether complicated    Atrial fibrillation, unspecified type  2019 on Eliquis    Jaundice  March 5, 2021    Malignant neoplasm of pancreas, unspecified location of malignancy  Pancreatic Cancer    Hypertension, unspecified type  2019    Pancreatic cancer    Kidney stones    History of surgery  vascular surgery   ? variocose vein stripping?    Status post phlebectomy  10/2018    History of ovarian cystectomy  left    History of tonsillectomy  1959    Kidney stone  2017        Vitals:   T(F): 96.8 (08-07-21 @ 01:33), Max: 97.8 (08-06-21 @ 20:53)  HR: 98 (08-07-21 @ 01:33)  BP: 149/68 (08-07-21 @ 01:33)  RR: 18 (08-07-21 @ 01:33)  SpO2: 96% (08-07-21 @ 01:33)      Diet, Soft:   DASH/TLC Sodium & Cholesterol Restricted      Fluids:     I & O's:    08-05-21 @ 07:01  -  08-06-21 @ 07:00  --------------------------------------------------------  IN:    dextrose 5% + sodium chloride 0.45%: 75 mL    dextrose 5% + sodium chloride 0.45%: 350 mL    IV PiggyBack: 200 mL    IV PiggyBack: 100 mL    Oral Fluid: 250 mL  Total IN: 975 mL    OUT:    Bulb (mL): 645 mL    Indwelling Catheter - Urethral (mL): 250 mL    Voided (mL): 300 mL  Total OUT: 1195 mL    Total NET: -220 mL          PHYSICAL EXAM:  General: NAD, AAOx3, calm and cooperative  Cardiac: RRR   Respiratory: CTAB, normal respiratory effort,   Abdomen: Soft, non-distended, mild midline tenderness around incision., no rebound, no guarding. +BS.  Skin: Warm/dry, normal color, no jaundice  Incision/wound: healing well, dressings in place, clean, dry and intact    MEDICATIONS  (STANDING):  cefepime   IVPB 2000 milliGRAM(s) IV Intermittent every 8 hours  collagenase Ointment 1 Application(s) Topical daily  diltiazem    Tablet 60 milliGRAM(s) Oral every 6 hours  enoxaparin Injectable 40 milliGRAM(s) SubCutaneous daily  furosemide    Tablet 20 milliGRAM(s) Oral daily  metoclopramide 5 milliGRAM(s) Oral three times a day before meals  metroNIDAZOLE  IVPB 500 milliGRAM(s) IV Intermittent every 8 hours  multivitamin 1 Tablet(s) Oral daily  pantoprazole    Tablet 40 milliGRAM(s) Oral before breakfast  sodium phosphate IVPB 15 milliMole(s) IV Intermittent once    MEDICATIONS  (PRN):  HYDROmorphone  Injectable 0.25 milliGRAM(s) IV Push every 4 hours PRN Mild Pain (1 - 3)  HYDROmorphone  Injectable 0.5 milliGRAM(s) IV Push every 6 hours PRN Moderate Pain (4 - 6)      DVT PROPHYLAXIS: enoxaparin Injectable 40 milliGRAM(s) SubCutaneous daily    GI PROPHYLAXIS: pantoprazole    Tablet 40 milliGRAM(s) Oral before breakfast    ANTICOAGULATION:   ANTIBIOTICS:  cefepime   IVPB 2000 milliGRAM(s)  metroNIDAZOLE  IVPB 500 milliGRAM(s)            LAB/STUDIES:  Labs:  CAPILLARY BLOOD GLUCOSE                              11.6   10.57 )-----------( 223      ( 06 Aug 2021 20:00 )             36.9       Auto Immature Granulocyte %: 0.4 % (08-06-21 @ 20:00)  Auto Neutrophil %: 76.9 % (08-06-21 @ 20:00)    08-06    141  |  104  |  9<L>  ----------------------------<  114<H>  3.4<L>   |  24  |  <0.5<L>      Calcium, Total Serum: 7.8 mg/dL (08-06-21 @ 20:00)      LFTs:             4.4  | 0.7  | 23       ------------------[330     ( 06 Aug 2021 20:00 )  2.7  | 0.2  | 28          Lipase:x      Amylase:x           ABG - ( 02 Aug 2021 15:20 )  pH: 7.38  /  pCO2: 36    /  pO2: 273   / HCO3: 21    / Base Excess: -3.3  /  SaO2: x               ABG - ( 02 Aug 2021 12:42 )  pH: 7.42  /  pCO2: 36    /  pO2: 294   / HCO3: 24    / Base Excess: -0.8  /  SaO2: x                 Coags:                        IMAGING:      ACCESS/ DEVICES:  [x] Peripheral IV  [ ] Central Venous Line	[ ] R	[ ] L	[ ] IJ	[ ] Fem	[ ] SC	Placed:   [ ] Arterial Line		[ ] R	[ ] L	[ ] Fem	[ ] Rad	[ ] Ax	Placed:   [ ] PICC:					[ ] Mediport  [ ] Urinary Catheter,  Date Placed:   [ ] Chest tube: [ ] Right, [ ] Left  [ ] ISABELL/Byron Drains

## 2021-08-08 NOTE — PROGRESS NOTE ADULT - ASSESSMENT
ASSESSMENT:  76y F s/p whipple procedure POD# 6    PLAN:  -Daily dressing change  -F/u podiatry   -F/u OR cx  - soft diet, IVL  - reglan   - ptx , d/c famotidine

## 2021-08-08 NOTE — PROGRESS NOTE ADULT - SUBJECTIVE AND OBJECTIVE BOX
`GENERAL SURGERY PROGRESS NOTE    Patient: GUILLERMO, VIKASH , 76y ([birth year])Female   MRN: [id number]  Location: [address]  Visit: 08-02-21 Inpatient  Date: 08-08-21 @ 07:13        Admitted :08-02-21 (6d)  LOS: 6d    Events of past 24 hours: Patient seen and examined at bedside. No acute events overnight. Afebrile, VSS. Patient complaining of pain, controlled after PRN pain medications.  -Gas, -BM.  ISABELL drain w/ 850cc ss output.    PAST MEDICAL & SURGICAL HISTORY:  SOB (shortness of breath)    Pain  knee    Varicose veins of both lower extremities, unspecified whether complicated    Atrial fibrillation, unspecified type  2019 on Eliquis    Jaundice  March 5, 2021    Malignant neoplasm of pancreas, unspecified location of malignancy  Pancreatic Cancer    Hypertension, unspecified type  2019    Pancreatic cancer    Kidney stones    History of surgery  vascular surgery   ? variocose vein stripping?    Status post phlebectomy  10/2018    History of ovarian cystectomy  left    History of tonsillectomy  1959    Kidney stone  2017        Vitals:   T(F): 96.3 (08-08-21 @ 04:30), Max: 98.5 (08-07-21 @ 13:42)  HR: 88 (08-08-21 @ 04:30)  BP: 134/77 (08-08-21 @ 04:30)  RR: 18 (08-08-21 @ 04:30)  SpO2: 95% (08-08-21 @ 04:30)      Diet, Soft:   DASH/TLC Sodium & Cholesterol Restricted      Fluids:     I & O's:    08-07-21 @ 07:01  -  08-08-21 @ 07:00  --------------------------------------------------------  IN:    Oral Fluid: 250 mL  Total IN: 250 mL    OUT:    Bulb (mL): 1180 mL    Stool (mL): 0 mL  Total OUT: 1180 mL    Total NET: -930 mL        Bowel Movement: :   Flatus: :     PHYSICAL EXAM:  General: NAD, AAOx3, calm and cooperative  Cardiac: RRR   Respiratory: CTAB, normal respiratory effort,   Abdomen: Soft, non-distended, mild midline tenderness around incision., no rebound, no guarding. +BS.  Skin: Warm/dry, normal color, no jaundice  Incision/wound: healing well, dressings in place, clean, dry and intact      MEDICATIONS  (STANDING):  acetaminophen   Tablet .. 650 milliGRAM(s) Oral every 6 hours  cefepime   IVPB 2000 milliGRAM(s) IV Intermittent every 8 hours  collagenase Ointment 1 Application(s) Topical daily  diltiazem    Tablet 60 milliGRAM(s) Oral every 6 hours  enoxaparin Injectable 40 milliGRAM(s) SubCutaneous daily  furosemide    Tablet 20 milliGRAM(s) Oral daily  metroNIDAZOLE  IVPB 500 milliGRAM(s) IV Intermittent every 8 hours  multivitamin 1 Tablet(s) Oral daily  pantoprazole    Tablet 40 milliGRAM(s) Oral before breakfast  polyethylene glycol 3350 17 Gram(s) Oral daily  potassium chloride    Tablet ER 20 milliEquivalent(s) Oral two times a day    MEDICATIONS  (PRN):  traMADol 25 milliGRAM(s) Oral every 6 hours PRN Moderate Pain (4 - 6)      DVT PROPHYLAXIS: enoxaparin Injectable 40 milliGRAM(s) SubCutaneous daily    GI PROPHYLAXIS: pantoprazole    Tablet 40 milliGRAM(s) Oral before breakfast    ANTICOAGULATION:   ANTIBIOTICS:  cefepime   IVPB 2000 milliGRAM(s)  metroNIDAZOLE  IVPB 500 milliGRAM(s)            LAB/STUDIES:  Labs:  CAPILLARY BLOOD GLUCOSE                              13.1   10.99 )-----------( 264      ( 07 Aug 2021 23:26 )             41.4       Auto Neutrophil %: 74.7 % (08-07-21 @ 23:26)  Auto Immature Granulocyte %: 0.4 % (08-07-21 @ 23:26)    08-07    139  |  105  |  9<L>  ----------------------------<  118<H>  3.5   |  23  |  0.5<L>      Calcium, Total Serum: 8.0 mg/dL (08-07-21 @ 23:26)      LFTs:             4.4  | 0.7  | 24       ------------------[380     ( 07 Aug 2021 23:26 )  2.7  | 0.2  | 25          Lipase:x      Amylase:x           ABG - ( 02 Aug 2021 15:20 )  pH: 7.38  /  pCO2: 36    /  pO2: 273   / HCO3: 21    / Base Excess: -3.3  /  SaO2: x               ABG - ( 02 Aug 2021 12:42 )  pH: 7.42  /  pCO2: 36    /  pO2: 294   / HCO3: 24    / Base Excess: -0.8  /  SaO2: x                     Lines/Tubes: PIV,     BLUE TEAM SPECTRA [id number]

## 2021-08-09 NOTE — PROGRESS NOTE ADULT - SUBJECTIVE AND OBJECTIVE BOX
GENERAL SURGERY PROGRESS NOTE    Patient: VIKASH LI , 76y ([birth year])Female   MRN: [id number]  Location: [address]   Visit: 08-02-21 Inpatient  Date: 08-09-21 @ 08:26    Hospital Day #: 8  Post-Op Day #: 7    Procedure/Dx/Injuries: s/p whipple procedure    Events of past 24 hours: Overnight patient complaining of worsening abdominal pain and bloating. Patient had NGT replaced. Patient was also hypertensive and tachycardic, EKG performed and found to be in afib with RVR. Patient was started on cardizem gtt and transferred to .     PAST MEDICAL & SURGICAL HISTORY:  SOB (shortness of breath)    Pain  knee    Varicose veins of both lower extremities, unspecified whether complicated    Atrial fibrillation, unspecified type  2019 on Eliquis    Jaundice  March 5, 2021    Malignant neoplasm of pancreas, unspecified location of malignancy  Pancreatic Cancer    Hypertension, unspecified type  2019    Pancreatic cancer    Kidney stones    History of surgery  vascular surgery   ? variocose vein stripping?    Status post phlebectomy  10/2018    History of ovarian cystectomy  left    History of tonsillectomy  1959    Kidney stone  2017        Vitals:   T(F): 96.8 (08-09-21 @ 05:09), Max: 96.8 (08-09-21 @ 05:09)  HR: 107 (08-09-21 @ 05:09)  BP: 128/77 (08-09-21 @ 05:09)  RR: 18 (08-09-21 @ 03:00)  SpO2: 94% (08-09-21 @ 03:00)      Diet, NPO:   Except Medications      Fluids: dextrose 5% + sodium chloride 0.45%.: Solution, 1000 milliLiter(s) infuse at 75 mL/Hr      I & O's:    08-08-21 @ 07:01  -  08-09-21 @ 07:00  --------------------------------------------------------  IN:    dextrose 5% + sodium chloride 0.45%: 600 mL    IV PiggyBack: 50 mL    IV PiggyBack: 100 mL    IV PiggyBack: 100 mL  Total IN: 850 mL    OUT:    Bulb (mL): 710 mL    Nasogastric/Oral tube (mL): 350 mL    Voided (mL): 300 mL  Total OUT: 1360 mL    Total NET: -510 mL        Bowel Movement: : [] YES [x] NO  Flatus: : [] YES [x] NO    PHYSICAL EXAM:  General: NAD, AAOx3, calm and cooperative  Abdomen: Soft, non-distended, RUQ tenderness, no rebound, no guarding. +BS.  Incision/wound: healing well, dressings in place, clean, dry and intact    MEDICATIONS  (STANDING):  cefepime   IVPB 2000 milliGRAM(s) IV Intermittent every 8 hours  collagenase Ointment 1 Application(s) Topical daily  dextrose 5% + sodium chloride 0.45%. 1000 milliLiter(s) (75 mL/Hr) IV Continuous <Continuous>  diltiazem Infusion 5 mG/Hr (5 mL/Hr) IV Continuous <Continuous>  enoxaparin Injectable 40 milliGRAM(s) SubCutaneous daily  ketorolac   Injectable 15 milliGRAM(s) IV Push every 8 hours  metroNIDAZOLE  IVPB 500 milliGRAM(s) IV Intermittent every 8 hours  pantoprazole  Injectable 40 milliGRAM(s) IV Push every 24 hours    MEDICATIONS  (PRN):      DVT PROPHYLAXIS: enoxaparin Injectable 40 milliGRAM(s) SubCutaneous daily    GI PROPHYLAXIS: pantoprazole  Injectable 40 milliGRAM(s) IV Push every 24 hours    ANTICOAGULATION:   ANTIBIOTICS:  cefepime   IVPB 2000 milliGRAM(s)  metroNIDAZOLE  IVPB 500 milliGRAM(s)      LAB/STUDIES:               13.6   11.60 )-----------( 292      ( 08 Aug 2021 20:57 )             42.7       Auto Neutrophil %: 73.1 % (08-08-21 @ 20:57)  Auto Immature Granulocyte %: 0.3 % (08-08-21 @ 20:57)    08-08    139  |  104  |  10  ----------------------------<  120<H>  3.5   |  22  |  0.5<L>      Calcium, Total Serum: 8.0 mg/dL (08-08-21 @ 20:57)      LFTs:             4.4  | 0.6  | 25       ------------------[435     ( 08 Aug 2021 20:57 )  2.5  | 0.2  | 23          Lipase:x      Amylase:x           ABG - ( 02 Aug 2021 15:20 )  pH: 7.38  /  pCO2: 36    /  pO2: 273   / HCO3: 21    / Base Excess: -3.3  /  SaO2: x               ABG - ( 02 Aug 2021 12:42 )  pH: 7.42  /  pCO2: 36    /  pO2: 294   / HCO3: 24    / Base Excess: -0.8  /  SaO2: x           IMAGING:  < from: Xray Chest 1 View-PORTABLE IMMEDIATE (Xray Chest 1 View-PORTABLE IMMEDIATE .) (08.08.21 @ 22:55) >  Impression:  Enteric tube courses below the diaphragm.  Stable bilateral opacities and effusions.  < end of copied text >      ACCESS/ DEVICES:  [x] Peripheral IV  [ ] Central Venous Line	[ ] R	[ ] L	[ ] IJ	[ ] Fem	[ ] SC	Placed:   [ ] Arterial Line		[ ] R	[ ] L	[ ] Fem	[ ] Rad	[ ] Ax	Placed:   [ ] PICC:					[ ] Mediport  [ ] Urinary Catheter,  Date Placed:   [ ] Chest tube: [ ] Right, [ ] Left  [x ] ISABELL/Byron Drains

## 2021-08-09 NOTE — CHART NOTE - NSCHARTNOTEFT_GEN_A_CORE
Called by surgery service for evaluating Afib w/ RVR. In brief, this is a 77yo F hx of pancreatic cancer and afib. Patient with afib RVR on EKG. Agree with starting Cardizem for additional HR control (no acute signs suggesting decompensated heart failure at present). Goal HR should be <110 optimally in this case. Patient will need full AC given elevated risk of CVA, when tolerating from surgery perspective. Will also benefit from transfer to tele for further monitoring.    Lenard Olivas PGY4  Cardiology Fellow

## 2021-08-09 NOTE — PHARMACOTHERAPY INTERVENTION NOTE - COMMENTS
s/w md - recommended to add duration to therapy (ex: stop after 1 or 2 doses) rather than keep as standing order

## 2021-08-09 NOTE — CHART NOTE - NSCHARTNOTEFT_GEN_A_CORE
Spoke with Dr. Garvey at 14:25 regarding patient going into afib with rvr overnight. Patient was placed on a cardizem gtt overnight. HR improved to low 100s throughout the day. Patient improved from surgical standpoint and plan to d/c to 4A tomorrow. Called Dr. Garvey in regards to converting cardizem gtt to PO. He recommended to switch to cardizem 30mg q8 hours.

## 2021-08-09 NOTE — PROVIDER CONTACT NOTE (OTHER) - ACTION/TREATMENT ORDERED:
MD Hill notified will get back to RN
MD Hill notified twice, awaiting to hear back
Maurice is aware, will see if we can order Zofran for nausea.
MD Hill notified

## 2021-08-09 NOTE — PROGRESS NOTE ADULT - ASSESSMENT
ASSESSMENT:  76y F s/p whipple procedure POD# 7.    PLAN:  - gastrograffin challenge  - KUB 1 hr after contrast  - daily EKG  - monitor HR  - pain control  daily dressing changes  - continue reglan  - d/c octreotide    Lines/Tubes: PIV    SPECTRA: [id number]

## 2021-08-09 NOTE — PROVIDER CONTACT NOTE (OTHER) - SITUATION
pt recent vitals /96  complaining of nausea and abd pain at a rate of 10
Pt is nauseous, refusing PO meds including cardizem, no zofran is currently ordered.
pt complaining of abd pain, PRN ordered for Tramadol, pt stated doesn't help
pt s/p NGT placement is due for PO midnight meds was experiencing nausea prior to NGT placement

## 2021-08-09 NOTE — CHART NOTE - NSCHARTNOTEFT_GEN_A_CORE
Registered Dietitian Follow-Up     Patient Profile Reviewed                           Yes [x]   No []     Nutrition History Previously Obtained        Yes [x]  No []       Pertinent Subjective Information: Patient was NPO now just advanced to clear liquids, will monitor tolerance of clear liquids. Would recommend to add Prousource gelatein  to optimize energy and protein intake      Pertinent Medical Interventions:   Per surgery PA note:  --- complaining of whipple surgery   --o-vernight patient complaining of worsening abdominal pain and bloating. Patient had NGT replaced. Patient was also hypertensive and tachycardic, EKG performed and found to be in afib with RVR. Patient was started on cardizem gtt and transferred to .     Diet order: Diet, Clear Liquid (08-09-21 @ 12:21) [Active]     Anthropometrics:  - Ht. 172.72 cm   - Wt. 54.2 kg --- dosing weight   8/5: 57.6 kg  8/8: 63.5 kg ?? accuracy of weight gain as patient has not been consuming meals and has poor PO intake   - %wt change  - BMI 18.2  - IBW 63.6 kg     Pertinent Lab Data: 8/8: Creatinine: 0.5, glucose: 120, calcium: 8.0    MEDICATIONS  (STANDING):  dextrose 5% + sodium chloride 0.45%. 1000 milliLiter(s) (75 mL/Hr) IV Continuous <Continuous>  pantoprazole  Injectable 40 milliGRAM(s) IV Push every 24 hours      Physical Findings:  - Appearance: alert and oriented x 4 per RN flow sheets   - GI function: abdominal fullness and discomfort , LBM: 8/9 per RN flow sheets   - Tubes: n/a  - Oral/Mouth cavity: No chewing/swallowing difficulty at this time   - Skin: skin intact/ no edema noted per RN flow sheets      Nutrition Requirements  Weight Used: Will use IBW due to inconsistent weights recorded --- 63.6 kg IBW     Estimated Energy Needs    Continue []  Adjust [X]  0722-8547  kcal/day ( 25-30 kcal/kg of IBW--- IBW used for reasons mentioned above)     Estimated Protein Needs    Continue []  Adjust [x]   63-77 gm/day (1.0-1.2 g/kg of IBW)    Estimated Fluid Needs        Continue [X]  Adjust []  1ML/KCAL OR per LIP       Nutrient Intake: Patient not meeting estimated energy and protein needs through diet order      [] Previous Nutrition Diagnosis:  Increased Nutrient Needs...             [x] Ongoing          [] Resolved       Nutrition Intervention meals and snacks, medical nutrition supplement, Vitamin/mineral supplement      Goal/Expected Outcome: Patients diet to advance when medically feasible and to meet ~75% of estimated energy and protein needs in the next 3 days      Indicator/Monitoring: RD to monitor: diet order, body composition, energy intake, nutrition focused physical finding: renal profile, glucose profile. at risk will f/u in 4 days. Discussed with LIP    Recommendations:    1) Continue current diet order of clear liquids and advance when medically feasible as tolerated to GI soft/ low residue/ low fat diet order  2) Add pro source gelatein TID to diet to optimize energy and protein intake ( suitable for clear liquid diet order)  3) Provide Multivitamin daily Registered Dietitian Follow-Up     Patient Profile Reviewed                           Yes [x]   No []     Nutrition History Previously Obtained        Yes [x]  No []       Pertinent Subjective Information: Patient was NPO now just advanced to full liquids, will monitor tolerance of clear liquids. Would recommend to add Prousource gelatein  to optimize energy and protein intake, If patient tolerates prosource gelatein will advance nutrition supplement to a more nutrition dense supplement      Pertinent Medical Interventions:   Per surgery PA note:  --- complaining of whipple surgery   --o-vernight patient complaining of worsening abdominal pain and bloating. Patient had NGT replaced. Patient was also hypertensive and tachycardic, EKG performed and found to be in afib with RVR. Patient was started on cardizem gtt and transferred to .     Diet order: full liquid      Anthropometrics:  - Ht. 172.72 cm   - Wt. 54.2 kg --- dosing weight   8/5: 57.6 kg  8/8: 63.5 kg ?? accuracy of weight gain as patient has not been consuming meals and has poor PO intake   - %wt change  - BMI 18.2  - IBW 63.6 kg     Pertinent Lab Data: 8/8: Creatinine: 0.5, glucose: 120, calcium: 8.0    MEDICATIONS  (STANDING):  dextrose 5% + sodium chloride 0.45%. 1000 milliLiter(s) (75 mL/Hr) IV Continuous <Continuous>  pantoprazole  Injectable 40 milliGRAM(s) IV Push every 24 hours      Physical Findings:  - Appearance: alert and oriented x 4 per RN flow sheets   - GI function: abdominal fullness and discomfort , LBM: 8/9 per RN flow sheets   - Tubes: n/a  - Oral/Mouth cavity: No chewing/swallowing difficulty at this time   - Skin: skin intact/ no edema noted per RN flow sheets      Nutrition Requirements  Weight Used: Will use IBW due to inconsistent weights recorded --- 63.6 kg IBW     Estimated Energy Needs    Continue []  Adjust [X]  8602-5583  kcal/day ( 25-30 kcal/kg of IBW--- IBW used for reasons mentioned above)     Estimated Protein Needs    Continue []  Adjust [x]   63-77 gm/day (1.0-1.2 g/kg of IBW)    Estimated Fluid Needs        Continue [X]  Adjust []  1ML/KCAL OR per LIP       Nutrient Intake: Patient not meeting estimated energy and protein needs through diet order      [] Previous Nutrition Diagnosis:  Increased Nutrient Needs...             [x] Ongoing          [] Resolved       Nutrition Intervention meals and snacks, medical nutrition supplement, Vitamin/mineral supplement      Goal/Expected Outcome: Patients diet to advance when medically feasible and to meet ~75% of estimated energy and protein needs in the next 3 days      Indicator/Monitoring: RD to monitor: diet order, body composition, energy intake, nutrition focused physical finding: renal profile, glucose profile. at risk will f/u in 4 days. Discussed with LIP    Recommendations:    1) Continue current diet order of  liquids and advance when medically feasible as tolerated to GI soft/ low residue/ low fat diet order  2) Add pro source gelatein TID to diet to optimize energy and protein intake ( suitable for clear liquid diet order)  3) Provide Multivitamin daily

## 2021-08-09 NOTE — PROVIDER CONTACT NOTE (OTHER) - REASON
tachycardia, hypertensive, nausea and pain
Pt refused cardizem and is nauseous
pain medication
PO meds for midnight

## 2021-08-10 NOTE — PROGRESS NOTE ADULT - ASSESSMENT
ASSESSMENT:  76y F s/p whipple procedure POD# 8 hd#9.    PLAN:  - adv diet  - daily EKG  - monitor HR  - pain control  - daily dressing changes  - continue reglan  - d/c octreotide  - dispo CM/SW, f/u 4a and outside rehabs    Lines/Tubes: PIV    SPECTRA: [id number]

## 2021-08-10 NOTE — PROGRESS NOTE ADULT - SUBJECTIVE AND OBJECTIVE BOX
Patient is a 76y old  Female who presents with a chief complaint of s/p whipple procedure       HPI: 76y F s/p whipple procedure for pancreatic CA          PHYSICAL EXAM    Vital Signs Last 24 Hrs  T(C): 35.8 (10 Aug 2021 07:59), Max: 36.6 (09 Aug 2021 20:38)  T(F): 96.4 (10 Aug 2021 07:59), Max: 97.9 (09 Aug 2021 20:38)  HR: 95 (10 Aug 2021 07:59) (95 - 107)  BP: 145/81 (10 Aug 2021 07:59) (114/79 - 154/83)  BP(mean): --  RR: 19 (10 Aug 2021 07:59) (18 - 19)  SpO2: 99% (10 Aug 2021 08:10) (95% - 99%)    Constitutional - NAD  Chest - CTA  Cardiovascular - S1S2+  Abdomen -  Soft  Extremities -  No calf tenderness   Function : bed mobility / transfer mod A                 ambulate 15'RW mod A    acetaminophen  Suppository .. 650 milliGRAM(s) Rectal every 6 hours  cefepime   IVPB 2000 milliGRAM(s) IV Intermittent every 8 hours  collagenase Ointment 1 Application(s) Topical daily  diltiazem Infusion 5 mG/Hr IV Continuous <Continuous>  enoxaparin Injectable 40 milliGRAM(s) SubCutaneous daily  ketorolac   Injectable 15 milliGRAM(s) IV Push every 8 hours  metoclopramide 5 milliGRAM(s) Oral three times a day before meals  metroNIDAZOLE  IVPB 500 milliGRAM(s) IV Intermittent every 8 hours  pantoprazole    Tablet 40 milliGRAM(s) Oral before breakfast      RECENT LABS/IMAGING                        12.5   12.07 )-----------( 300      ( 09 Aug 2021 21:20 )             39.9     08-09    137  |  103  |  11  ----------------------------<  126<H>  3.8   |  26  |  0.5<L>    Ca    7.9<L>      09 Aug 2021 21:20  Phos  3.0     08-09  Mg     1.8     08-09    TPro  4.3<L>  /  Alb  2.5<L>  /  TBili  0.4  /  DBili  0.2  /  AST  16  /  ALT  19  /  AlkPhos  355<H>  08-09

## 2021-08-10 NOTE — PROGRESS NOTE ADULT - SUBJECTIVE AND OBJECTIVE BOX
GENERAL SURGERY PROGRESS NOTE    Patient: VIKASH LI , 76y ([birth year])Female   MRN: [id number]  Location: [address]   Visit: 08-02-21 Inpatient    Hospital Day #: 9  Post-Op Day #: 8    Procedure/Dx/Injuries: s/p whipple procedure    Events of past 24 hours: Overnight patient complaining of worsening abdominal pain and bloating. Patient had NGT replaced. Patient was also hypertensive and tachycardic, EKG performed and found to be in afib with RVR. Patient was started on cardizem gtt and transferred to . still on drip    PAST MEDICAL & SURGICAL HISTORY:  SOB (shortness of breath)    Pain  knee    Varicose veins of both lower extremities, unspecified whether complicated    Atrial fibrillation, unspecified type  2019 on Eliquis    Jaundice  March 5, 2021    Malignant neoplasm of pancreas, unspecified location of malignancy  Pancreatic Cancer    Hypertension, unspecified type  2019    Pancreatic cancer    Kidney stones    History of surgery  vascular surgery   ? variocose vein stripping?    Status post phlebectomy  10/2018    History of ovarian cystectomy  left    History of tonsillectomy  1959    Kidney stone  2017      Vitals:   T(F): 97.5 (08-10-21 @ 00:57), Max: 97.9 (08-09-21 @ 20:38)  HR: 104 (08-10-21 @ 00:57)  BP: 125/76 (08-10-21 @ 00:57)  RR: 19 (08-10-21 @ 00:57)  SpO2: 95% (08-09-21 @ 20:56)      Diet, Full Liquid      Fluids:     I & O's:    08-08-21 @ 07:01  -  08-09-21 @ 07:00  --------------------------------------------------------  IN:    dextrose 5% + sodium chloride 0.45%: 600 mL    IV PiggyBack: 50 mL    IV PiggyBack: 100 mL    IV PiggyBack: 100 mL  Total IN: 850 mL    OUT:    Bulb (mL): 710 mL    Nasogastric/Oral tube (mL): 350 mL    Voided (mL): 300 mL  Total OUT: 1360 mL    Total NET: -510 mL        Bowel Movement: : [] YES [] NO  Flatus: : [] YES [] NO    PHYSICAL EXAM:  General Appearance: NAD  HEENT: EOMI, sclera non-icteric.  Heart: RRR   Lungs: No increased work of breathing or accessory muscle use. Symmetric chest wall rise and fall.   Abdomen:  Soft, nontender, nondistended.   MSK/Extremities: Warm & well-perfused.   Skin: Warm, dry. No jaundice.   Incision/wound: healing well, dressings in place, clean, dry and intact    MEDICATIONS  (STANDING):  acetaminophen  Suppository .. 650 milliGRAM(s) Rectal every 6 hours  cefepime   IVPB 2000 milliGRAM(s) IV Intermittent every 8 hours  collagenase Ointment 1 Application(s) Topical daily  diltiazem Infusion 5 mG/Hr (5 mL/Hr) IV Continuous <Continuous>  enoxaparin Injectable 40 milliGRAM(s) SubCutaneous daily  ketorolac   Injectable 15 milliGRAM(s) IV Push every 8 hours  metoclopramide 5 milliGRAM(s) Oral three times a day before meals  metroNIDAZOLE  IVPB 500 milliGRAM(s) IV Intermittent every 8 hours  pantoprazole  Injectable 40 milliGRAM(s) IV Push every 24 hours    MEDICATIONS  (PRN):      DVT PROPHYLAXIS: enoxaparin Injectable 40 milliGRAM(s) SubCutaneous daily    GI PROPHYLAXIS: pantoprazole  Injectable 40 milliGRAM(s) IV Push every 24 hours    ANTICOAGULATION:   ANTIBIOTICS:  cefepime   IVPB 2000 milliGRAM(s)  metroNIDAZOLE  IVPB 500 milliGRAM(s)            LAB/STUDIES:  Labs:  CAPILLARY BLOOD GLUCOSE                              12.5   12.07 )-----------( 300      ( 09 Aug 2021 21:20 )             39.9       Auto Neutrophil %: 67.8 % (08-09-21 @ 21:20)  Auto Immature Granulocyte %: 0.5 % (08-09-21 @ 21:20)    08-09    137  |  103  |  11  ----------------------------<  126<H>  3.8   |  26  |  0.5<L>      Calcium, Total Serum: 7.9 mg/dL (08-09-21 @ 21:20)      LFTs:             4.3  | 0.4  | 16       ------------------[355     ( 09 Aug 2021 21:20 )  2.5  | 0.2  | 19            BLUE TEAM SPECTRA #[id number]      ACCESS/ DEVICES:  [x] Peripheral IV  [ ] Central Venous Line	[ ] R	[ ] L	[ ] IJ	[ ] Fem	[ ] SC	Placed:   [ ] Arterial Line		[ ] R	[ ] L	[ ] Fem	[ ] Rad	[ ] Ax	Placed:   [ ] PICC:					[ ] Mediport  [ ] Urinary Catheter,  Date Placed:   [ ] Chest tube: [ ] Right, [ ] Left  [x ] ISABELL/Byron Drains

## 2021-08-10 NOTE — PROGRESS NOTE ADULT - ASSESSMENT
Imp : debilitation / s/p whipple / on tele / A fib  Plan : continue bedside therapy as tolerated           continue to advance diet / will follow

## 2021-08-10 NOTE — PROGRESS NOTE ADULT - NSICDXPILOT_GEN_ALL_CORE
[place]

## 2021-08-11 NOTE — PROGRESS NOTE ADULT - SUBJECTIVE AND OBJECTIVE BOX
INTERVAL HISTORY: No overnight events.  	  MEDICATIONS:  acetaminophen   Tablet .. 650 milliGRAM(s) Oral every 6 hours  cefepime   IVPB 2000 milliGRAM(s) IV Intermittent every 8 hours  collagenase Ointment 1 Application(s) Topical daily  diltiazem    Tablet 60 milliGRAM(s) Oral every 6 hours  enoxaparin Injectable 40 milliGRAM(s) SubCutaneous daily  magnesium sulfate  IVPB 2 Gram(s) IV Intermittent once  metoclopramide 5 milliGRAM(s) Oral three times a day before meals  metroNIDAZOLE  IVPB 500 milliGRAM(s) IV Intermittent every 8 hours  pantoprazole    Tablet 40 milliGRAM(s) Oral before breakfast      REVIEW OF SYSTEMS:  CONSTITUTIONAL: No fever, weight loss, or fatigue  NECK: No pain or stiffness  RESPIRATORY: No cough, wheezing, chills or hemoptysis; No shortness of breath  CARDIOVASCULAR: No chest pain, palpitations, dizziness, or leg swelling  GASTROINTESTINAL: No abdominal or epigastric pain. No nausea, vomiting, or hematemesis; No diarrhea or constipation. No melena or hematochezia.  GENITOURINARY: No dysuria, frequency, hematuria, or incontinence  NEUROLOGICAL: No headaches, memory loss, loss of strength, numbness, or tremors  SKIN: No itching, burning, rashes, or lesions   ENDOCRINE: No heat or cold intolerance; No hair loss  MUSCULOSKELETAL: No joint pain or swelling; No muscle, back, or extremity pain  HEME/LYMPH: No easy bruising, or bleeding gums    PHYSICAL EXAM:  T(C): 36.2 (08-11-21 @ 11:23), Max: 37.1 (08-10-21 @ 22:15)  HR: 110 (08-11-21 @ 11:23) (90 - 122)  BP: 133/62 (08-11-21 @ 11:23) (116/68 - 139/70)  RR: 18 (08-11-21 @ 11:23) (18 - 18)  SpO2: 98% (08-11-21 @ 09:52) (98% - 98%)  Wt(kg): --  I&O's Summary    10 Aug 2021 07:01  -  11 Aug 2021 07:00  --------------------------------------------------------  IN: 250 mL / OUT: 1801 mL / NET: -1551 mL    11 Aug 2021 07:01  -  11 Aug 2021 12:19  --------------------------------------------------------  IN: 0 mL / OUT: 430 mL / NET: -430 mL          GENERAL: NAD, well-groomed, well-developed  HEAD:  Atraumatic, Normocephalic  NECK: Supple, No JVD, Normal thyroid  NERVOUS SYSTEM:  Alert & Oriented X3, Good concentration  CHEST/LUNG: Clear to percussion bilaterally; No rales, rhonchi, wheezing, or rubs  HEART: Regular rate and rhythm; No murmurs, rubs, or gallops  ABDOMEN: Soft, Nontender, Nondistended; Bowel sounds present  EXTREMITIES:  2+ Peripheral Pulses, No clubbing, cyanosis, or edema  SKIN: No rashes or lesions    LABS:	 	                              11.8   10.41 )-----------( 248      ( 11 Aug 2021 01:10 )             38.0     08-11    139  |  105  |  16  ----------------------------<  125<H>  3.9   |  25  |  <0.5<L>    Ca    7.8<L>      11 Aug 2021 01:10  Phos  2.7     08-11  Mg     1.7     08-11    TPro  4.3<L>  /  Alb  2.5<L>  /  TBili  0.5  /  DBili  0.2  /  AST  19  /  ALT  16  /  AlkPhos  401<H>  08-11    proBNP:   Lipid Profile:

## 2021-08-11 NOTE — PROGRESS NOTE ADULT - REASON FOR ADMISSION
s/p rafal
s/p whipple procedure
Diagnostic Laparoscopy possible whipple
abd pain
debilitation / s/p rafal

## 2021-08-11 NOTE — PROGRESS NOTE ADULT - ASSESSMENT
Afib RVR    off  iv cardizem    on cardizem 60 Q8    can increase 60 Q8    if need better HR  control    anticoag

## 2021-08-11 NOTE — PROGRESS NOTE ADULT - ASSESSMENT
ASSESSMENT:  76y F s/p whipple procedure POD# 8 hd#9.    PLAN:  - Cardizem gtt d/c started PO 60mg q6hr  - adv diet  - daily EKG  - monitor HR  - pain control  - daily dressing changes  - continue reglan  - dispo CM/SW, f/u 4a and outside rehabs    Lines/Tubes: PIV    SPECTRA: [id number]

## 2021-08-11 NOTE — PROGRESS NOTE ADULT - SUBJECTIVE AND OBJECTIVE BOX
GENERAL SURGERY PROGRESS NOTE    Patient: VIKASH LI , 76y ([birth year])Female   MRN: [id number]  Location: [address]   Visit: 08-02-21 Inpatient  Date: 08-11-21 @ 03:32    Hospital Day #: 10  Post-Op Day #:  9    Procedure/Dx/Injuries:  s/p Whipple procedure    Events of past 24 hours:  Pt seen and examined at bedside. Pt had x2 dark bloody BM today as per PCA. Pt has been VSS,, hgb has been stable, without pain. Cardizem gtt stop and switched to PO 60mg q6hrs     PAST MEDICAL & SURGICAL HISTORY:  SOB (shortness of breath)    Pain  knee    Varicose veins of both lower extremities, unspecified whether complicated    Atrial fibrillation, unspecified type  2019 on Eliquis    Jaundice  March 5, 2021    Malignant neoplasm of pancreas, unspecified location of malignancy  Pancreatic Cancer    Hypertension, unspecified type  2019    Pancreatic cancer    Kidney stones    History of surgery  vascular surgery   ? variocose vein stripping?    Status post phlebectomy  10/2018    History of ovarian cystectomy  left    History of tonsillectomy  1959    Kidney stone  2017        Vitals:   T(F): 98.1 (08-11-21 @ 00:03), Max: 98.8 (08-10-21 @ 22:15)  HR: 100 (08-11-21 @ 00:03)  BP: 123/62 (08-11-21 @ 00:03)  RR: 18 (08-11-21 @ 00:03)  SpO2: 99% (08-10-21 @ 08:10)      Diet, Soft      Fluids:     I & O's:    08-09-21 @ 07:01  -  08-10-21 @ 07:00  --------------------------------------------------------  IN:    Diltiazem: 105 mL    IV PiggyBack: 50 mL    IV PiggyBack: 100 mL    IV PiggyBack: 50 mL  Total IN: 305 mL    OUT:    Bulb (mL): 1015 mL    Stool (mL): 4 mL    Voided (mL): 1050 mL  Total OUT: 2069 mL    Total NET: -1764 mL        Bowel Movement: : [x] YES [] NO  Flatus: : [x] YES [] NO    PHYSICAL EXAM:  General Appearance: NAD  HEENT: EOMI, sclera non-icteric.  Heart: RRR   Lungs: No increased work of breathing or accessory muscle use. Symmetric chest wall rise and fall.   Abdomen:  Soft, nontender, nondistended.   MSK/Extremities: Warm & well-perfused.   Skin: Warm, dry. No jaundice.   Incision/wound: healing well, dressings in place, clean, dry and intact      MEDICATIONS  (STANDING):  acetaminophen   Tablet .. 650 milliGRAM(s) Oral every 6 hours  cefepime   IVPB 2000 milliGRAM(s) IV Intermittent every 8 hours  collagenase Ointment 1 Application(s) Topical daily  diltiazem    Tablet 60 milliGRAM(s) Oral every 6 hours  enoxaparin Injectable 40 milliGRAM(s) SubCutaneous daily  magnesium sulfate  IVPB 2 Gram(s) IV Intermittent once  metoclopramide 5 milliGRAM(s) Oral three times a day before meals  metroNIDAZOLE  IVPB 500 milliGRAM(s) IV Intermittent every 8 hours  pantoprazole    Tablet 40 milliGRAM(s) Oral before breakfast  potassium phosphate IVPB 15 milliMole(s) IV Intermittent once    MEDICATIONS  (PRN):      DVT PROPHYLAXIS: enoxaparin Injectable 40 milliGRAM(s) SubCutaneous daily    GI PROPHYLAXIS: pantoprazole    Tablet 40 milliGRAM(s) Oral before breakfast    ANTICOAGULATION:   ANTIBIOTICS:  cefepime   IVPB 2000 milliGRAM(s)  metroNIDAZOLE  IVPB 500 milliGRAM(s)            LAB/STUDIES:  Labs:  CAPILLARY BLOOD GLUCOSE                              11.8   10.41 )-----------( 248      ( 11 Aug 2021 01:10 )             38.0       Auto Neutrophil %: 80.6 % (08-11-21 @ 01:10)  Auto Immature Granulocyte %: 0.6 % (08-11-21 @ 01:10)    08-11    139  |  105  |  16  ----------------------------<  125<H>  3.9   |  25  |  <0.5<L>      Calcium, Total Serum: 7.8 mg/dL (08-11-21 @ 01:10)      LFTs:             4.3  | 0.5  | 19       ------------------[401     ( 11 Aug 2021 01:10 )  2.5  | 0.2  | 16          Lipase:x      Amylase:x             Coags:              IMAGING:      ACCESS/ DEVICES:  [ ] Peripheral IV  [ ] Central Venous Line	[ ] R	[ ] L	[ ] IJ	[ ] Fem	[ ] SC	Placed:   [ ] Arterial Line		[ ] R	[ ] L	[ ] Fem	[ ] Rad	[ ] Ax	Placed:   [ ] PICC:					[ ] Mediport  [ ] Urinary Catheter,  Date Placed:   [ ] Chest tube: [ ] Right, [ ] Left  [ ] ISABELL/Byron Drains

## 2021-08-12 NOTE — PROGRESS NOTE ADULT - SUBJECTIVE AND OBJECTIVE BOX
GENERAL SURGERY PROGRESS NOTE    Patient: VIKASH LI , 76y ([birth year])Female   MRN: [id number]  Location: [address]   Visit: 08-02-21 Inpatient    Hospital Day #: 11  Post-Op Day #:  10    Procedure/Dx/Injuries:  s/p Whipple procedure    Events of past 24 hours:  Pt seen and examined at bedside. increased pain overnight. janeth out put now dark     PAST MEDICAL & SURGICAL HISTORY:  SOB (shortness of breath)  Pain  knee  Varicose veins of both lower extremities, unspecified whether complicated  Atrial fibrillation, unspecified type  2019 on Eliquis  Jaundice  March 5, 2021  Malignant neoplasm of pancreas, unspecified location of malignancy  Pancreatic Cancer  Hypertension, unspecified type  2019  Pancreatic cancer  Kidney stones  History of surgery  vascular surgery   ? variocose vein stripping?  Status post phlebectomy  10/2018  History of ovarian cystectomy  left  History of tonsillectomy  1959  Kidney stone  2017        Vitals:   T(F): 97 (08-12-21 @ 05:30), Max: 97.3 (08-11-21 @ 14:09)  HR: 94 (08-12-21 @ 05:30)  BP: 151/68 (08-12-21 @ 05:30)  RR: 18 (08-12-21 @ 05:30)  SpO2: --      Diet, NPO:   Except Medications      Fluids: dextrose 5% + sodium chloride 0.45%.: Solution, 1000 milliLiter(s) infuse at 90 mL/Hr      I & O's:    08-11-21 @ 07:01  -  08-12-21 @ 07:00  --------------------------------------------------------  IN:    Oral Fluid: 240 mL  Total IN: 240 mL    OUT:    Bulb (mL): 780 mL    Voided (mL): 1500 mL  Total OUT: 2280 mL    Total NET: -2040 mL        Bowel Movement: : [x] YES [] NO  Flatus: : [x] YES [] NO    PHYSICAL EXAM:  General Appearance: NAD  HEENT: EOMI, sclera non-icteric.  Heart: RRR   Lungs: No increased work of breathing or accessory muscle use. Symmetric chest wall rise and fall.   Abdomen:  tender diffusely, distentionin LLQ  MSK/Extremities: Warm & well-perfused.   Skin: Warm, dry. No jaundice.   Incision/wound: JANETH drain now with dark output from serous, and dressings soaked with serous fluid    MEDICATIONS  (STANDING):  acetaminophen   Tablet .. 650 milliGRAM(s) Oral every 6 hours  cefepime   IVPB 2000 milliGRAM(s) IV Intermittent every 8 hours  collagenase Ointment 1 Application(s) Topical daily  dextrose 5% + sodium chloride 0.45%. 1000 milliLiter(s) (90 mL/Hr) IV Continuous <Continuous>  diatrizoate meglumine/diatrizoate sodium. 30 milliLiter(s) Oral once  diltiazem    Tablet 90 milliGRAM(s) Oral every 6 hours  metoclopramide 5 milliGRAM(s) Oral three times a day before meals  metroNIDAZOLE  IVPB 500 milliGRAM(s) IV Intermittent every 8 hours  pantoprazole    Tablet 40 milliGRAM(s) Oral before breakfast    MEDICATIONS  (PRN):  oxyCODONE    IR 5 milliGRAM(s) Oral every 6 hours PRN Severe Pain (7 - 10)      DVT PROPHYLAXIS:   GI PROPHYLAXIS: pantoprazole    Tablet 40 milliGRAM(s) Oral before breakfast    ANTICOAGULATION:   ANTIBIOTICS:  cefepime   IVPB 2000 milliGRAM(s)  metroNIDAZOLE  IVPB 500 milliGRAM(s)            LAB/STUDIES:  Labs:  CAPILLARY BLOOD GLUCOSE      POCT Blood Glucose.: 129 mg/dL (11 Aug 2021 17:05)                          12.1   14.80 )-----------( 273      ( 11 Aug 2021 21:29 )             38.3       Auto Neutrophil %: 83.8 % (08-11-21 @ 21:29)  Auto Immature Granulocyte %: 0.8 % (08-11-21 @ 21:29)    08-11    138  |  102  |  13  ----------------------------<  139<H>  4.5   |  26  |  <0.5<L>      Calcium, Total Serum: 7.9 mg/dL (08-11-21 @ 21:29)      LFTs:             4.3  | 0.5  | 24       ------------------[454     ( 11 Aug 2021 21:29 )  2.6  | 0.2  | 17                            IMAGING:      ACCESS/ DEVICES:  [x] Peripheral IV  [ ] Central Venous Line	[ ] R	[ ] L	[ ] IJ	[ ] Fem	[ ] SC	Placed:   [ ] Arterial Line		[ ] R	[ ] L	[ ] Fem	[ ] Rad	[ ] Ax	Placed:   [ ] PICC:					[ ] Mediport  [ ] Urinary Catheter,  Date Placed:   [ ] Chest tube: [ ] Right, [ ] Left  [ ] JANETH/Byron Drains  [ ] Wound vac      BLUE TEAM SPECTRA #[id number]

## 2021-08-12 NOTE — PROGRESS NOTE ADULT - ASSESSMENT
ASSESSMENT:  76y F s/p whipple procedure POD #11 hd #10. increased pain overnight    PLAN:  - Cardizem 90 q6  - npo  - will get CT a/p and CT chest  - ariza for urinary retention  - daily EKG  - monitor HR  - pain control  - daily dressing changes      Lines/Tubes: PIV    SPECTRA: [id number]

## 2021-08-13 NOTE — H&P ADULT - NSHPREVIEWOFSYSTEMS_GEN_ALL_CORE
Constiutional:    [   ] WNL           [   ] poor appetite   [   ] insomnia   [   ] tired    [ X ]  Lethargy   Cardio:                [ X ] WNL           [   ] CP   [   ] BARAJAS   [   ] palpitations               Resp:                   [ X ] WNL           [   ] SOB   [   ] cough   [   ] wheezing   GI:                        [  X ] WNL           [   ] constipation   [   ] diarrhea   [   ] abdominal pain   [   ] nausea   [   ] emesis                                :                      [   ] WNL           [ X ] SMALLS  [   ] dysuria   [ X ] difficulty voiding             Endo:                   [  X ] WNL          [   ] polyuria   [   ] temperature intolerance                 Skin:                     [   ] WNL          [   ] pain   [  X  ] wound   [   ] rash   MSK:                    [ X  ] WNL          [   ] muscle pain   [   ] joint pain/ stiffness   [   ] muscle tenderness   [   ] swelling   Neuro:                 [X ] WNL          [   ] HA   [   ] change in vision   [   ] tremor   [   ] weakness   [   ]dysphagia              Cognitive:           [ X ] WNL           [   ]confusion      Psych:                  [ X ] WNL           [   ] hallucinations   [   ]agitation   [   ] delusion   [   ]depression Constiutional:    [   ] WNL           [   ] poor appetite   [   ] insomnia   [  x ] tired    [  ]  Lethargy   Cardio:                [ X ] WNL           [   ] CP   [   ] BARAJAS   [   ] palpitations               Resp:                   [ X ] WNL           [   ] SOB   [   ] cough   [   ] wheezing   GI:                        [  X ] WNL           [   ] constipation   [   ] diarrhea   [   ] abdominal pain   [   ] nausea   [   ] emesis                                :                      [   ] WNL           [ X ] SMALLS  [   ] dysuria   [ X ] difficulty voiding             Endo:                   [  X ] WNL          [   ] polyuria   [   ] temperature intolerance                 Skin:                     [   ] WNL          [   ] pain   [  X  ] wound   [   ] rash   MSK:                    [ X  ] WNL          [   ] muscle pain   [   ] joint pain/ stiffness   [   ] muscle tenderness   [   ] swelling   Neuro:                 [X ] WNL          [   ] HA   [   ] change in vision   [   ] tremor   [   ] weakness   [   ]dysphagia              Cognitive:           [ X ] WNL           [   ]confusion      Psych:                  [ X ] WNL           [   ] hallucinations   [   ]agitation   [   ] delusion   [   ]depression

## 2021-08-13 NOTE — DISCHARGE NOTE PROVIDER - PROVIDER TOKENS
PROVIDER:[TOKEN:[[id number]]],PROVIDER:[TOKEN:[[id number]]],PROVIDER:[TOKEN:[[id number]]]

## 2021-08-13 NOTE — DISCHARGE NOTE PROVIDER - CARE PROVIDER_API CALL
Jeromy Nino (MD)  Complex General Surgical Oncology; Surgery  [address] [facility] [place] [address]  Phone: [phone]  Fax: [phone]  Follow Up Time:     Garrett Garvey  CARDIOVASCULAR DISEASE  [address] [facility] [facility] [address]  [place] [address]  Phone: [phone]  Fax: [phone]  Follow Up Time:     Hao Tovar (JOSTIN)  Foot Surgery; Podiatric Medicine  [address] [facility] [place] [address]  Phone: [phone]  Fax: [phone]  Follow Up Time:

## 2021-08-13 NOTE — CHART NOTE - NSCHARTNOTESELECT_GEN_ALL_CORE
Cardiology event note/Event Note
Event Note
RD nutrition RD f/u/Event Note
Cardiology/Event Note
Event Note
Transfer Note

## 2021-08-13 NOTE — PROGRESS NOTE ADULT - PROVIDER SPECIALTY LIST ADULT
Podiatry
SICU
Surgery
Surgery
Physiatry
SICU
SICU
Surgery
SICU
Surgery
Cardiology
Surgery
Surgery
Infectious Disease
Infectious Disease

## 2021-08-13 NOTE — PROGRESS NOTE ADULT - SUBJECTIVE AND OBJECTIVE BOX
Procedure: s/p Whipple procedure  POD# 11    PAST MEDICAL & SURGICAL HISTORY:  SOB (shortness of breath)    Pain  knee    Varicose veins of both lower extremities, unspecified whether complicated    Atrial fibrillation, unspecified type  2019 on Eliquis    Jaundice  2021    Malignant neoplasm of pancreas, unspecified location of malignancy  Pancreatic Cancer    Hypertension, unspecified type  2019    Pancreatic cancer    Kidney stones    History of surgery  vascular surgery   ? variocose vein stripping?    Status post phlebectomy  10/2018    History of ovarian cystectomy  left    History of tonsillectomy      Kidney stone          24H EVENTS    Vital Signs Last 24 Hrs  T(C): 36.1 (13 Aug 2021 05:21), Max: 36.6 (12 Aug 2021 13:45)  T(F): 97 (13 Aug 2021 05:21), Max: 97.9 (12 Aug 2021 13:45)  HR: 80 (13 Aug 2021 06:50) (80 - 100)  BP: 105/57 (13 Aug 2021 06:50) (98/54 - 114/64)  BP(mean): --  RR: 18 (13 Aug 2021 05:21) (16 - 18)  SpO2: --        I&O's Detail    12 Aug 2021 07:01  -  13 Aug 2021 07:00  --------------------------------------------------------  IN:    dextrose 5% + sodium chloride 0.45%: 2070 mL    IV PiggyBack: 100 mL    Oral Fluid: 400 mL  Total IN: 2570 mL    OUT:    Bulb (mL): 78 mL    Indwelling Catheter - Urethral (mL): 600 mL    Voided (mL): 650 mL  Total OUT: 1328 mL    Total NET: 1242 mL      13 Aug 2021 07:01  -  13 Aug 2021 11:35  --------------------------------------------------------  IN:  Total IN: 0 mL    OUT:    Bulb (mL): 15 mL  Total OUT: 15 mL    Total NET: -15 mL                         10.4   9.61  )-----------( 214      (  @ 05:33 )             33.9                10.5   12.05 )-----------( 268      (  @ 22:05 )             34.5                12.1   14.80 )-----------( 273      (  @ 21:29 )             38.3                    134   |  103   |  14                 Ca: 7.9    BMP:   ----------------------------< 133    M.4   (21 @ 05:33)             4.4    |  23    | <0.5               Ph: x        LFT:     TPro: 4.2 / Alb: 2.3 / TBili: 0.3 / DBili: x / AST: 19 / ALT: 13 / AlkPhos: 343   (21 @ 05:33)      MEDICATIONS  (STANDING):  acetaminophen   Tablet .. 650 milliGRAM(s) Oral every 6 hours  collagenase Ointment 1 Application(s) Topical daily  dextrose 5% + sodium chloride 0.45%. 1000 milliLiter(s) (90 mL/Hr) IV Continuous <Continuous>  diatrizoate meglumine/diatrizoate sodium. 30 milliLiter(s) Oral once  diltiazem    Tablet 90 milliGRAM(s) Oral every 6 hours  heparin   Injectable 5000 Unit(s) SubCutaneous every 12 hours  metoclopramide 5 milliGRAM(s) Oral three times a day before meals  pantoprazole    Tablet 40 milliGRAM(s) Oral before breakfast  tamsulosin      tamsulosin 0.4 milliGRAM(s) Oral at bedtime    MEDICATIONS  (PRN):  oxyCODONE    IR 5 milliGRAM(s) Oral every 6 hours PRN Severe Pain (7 - 10)      Diet, Soft (21 @ 13:40)      PHYSICAL EXAM:  General Appearance:, NAD  Chest: Equal expansion bilaterally, equal breath sounds  CV: S1, S2, RRR  Abdomen: Soft, NT, ND +drain in place                    incision d/i   Extremities: + ROM  Neuro: A&Ox3

## 2021-08-13 NOTE — H&P ADULT - NSHPPHYSICALEXAM_GEN_ALL_CORE
PHYSICAL EXAMINATION   VItals: T(C): 36.4 (08-13-21 @ 14:28), Max: 36.4 (08-13-21 @ 14:28)  HR: 80 (08-13-21 @ 14:28) (80 - 91)  BP: 102/58 (08-13-21 @ 14:28) (98/54 - 111/57)  RR: 18 (08-13-21 @ 14:28) (18 - 18)  SpO2: --    General:[ X  ] NAD, Resting Comfortable,   [   ] other:                                HEENT: [   ] NC/AT, EOMI, PERRL , Normal Conjunctivae,   [ X  ] other: Slightly jaundice colored sclera.   Cardio: [   ] RRR, no murmer,   [ X  ] other:   Irregularly irregular HR with no murmurs, gallops, or rubs                            Pulm: [  X ] No Respiratory Distress,  Lungs CTAB,   [   ] other:                       Abdomen: [   ]ND/NT, Soft,   [  X ] other:  Right lateral ISABELL drain and midline surgical incision, Soft, NOn-Tender, decreased Bowel sounds, and no rigidity or guarding  : [   ] NO SMALLS CATHETER, [  X ] SMALLS CATHETER- no meatal tear, no discharge, [   ] other:                                            MSK: [ X  ] No joint swelling, Full ROM,   [   ] other:                                         Ext: [   ]No C/C/E, No calf tenderness,   [ X  ]other:  B/L LE varicose veins  Skin: [   ]intact,   [  X ] other:  B/L LE varicose veins & Right lateral ISABELL drain and midline surgical incision.    Neurological Examination:  Cognitive: [  X  ] AAO x 3,   [    ]  other:                                                                      Attention:  [  X  ] intact,   [    ]  other:                            Memory: [    ] intact,    [  X  ]  other:   Recalled only 2 of 3 words with delayed recall  Mood/Affect: [  X  ] wnl,    [    ]  other:                                                                             Communication: [ X   ]Fluent, no dysarthria, following commands:  [    ] other:   CN II - XII:  [  X ] intact,  [    ] other:                                                                                        Motor:   RIGHT UE: [   ] WNL,  [ X  ] other: 4+/5  LEFT    UE: [   ] WNL,  [ X  ] other: 4+/5  RIGHT LE: [   ] WNL,  [  X ] other: 4/5   LEFT    LE: [   ] WNL,  [ X  ] other:4/5    Tone: [  X  ] wnl,   [    ]  other:  DTRs: [  X ]symmetric, [   ] other:  Coordination:   [ X   ] intact,   [    ] other:                                                                           Sensory: [  X  ] Intact to light touch,   [    ] other: PHYSICAL EXAMINATION   VItals: T(C): 36.4 (08-13-21 @ 14:28), Max: 36.4 (08-13-21 @ 14:28)  HR: 80 (08-13-21 @ 14:28) (80 - 91)  BP: 102/58 (08-13-21 @ 14:28) (98/54 - 111/57)  RR: 18 (08-13-21 @ 14:28) (18 - 18)  SpO2: --    General:[ X  ] NAD, Resting Comfortable,   [   ] other:                                HEENT: [   ] NC/AT, EOMI, PERRL , Normal Conjunctivae,   [ X  ] other: Slightly jaundice colored sclera.   Cardio: [   ] RRR, no murmer,   [ X  ] other:   Irregularly irregular HR with no murmurs, gallops, or rubs                            Pulm: [  X ] No Respiratory Distress,  Lungs CTAB,   [   ] other:                       Abdomen: [   ]ND/NT, Soft,   [  X ] other:  Right lateral ISABELL drain and midline surgical incision, Soft, NOn-Tender, decreased Bowel sounds, and no rigidity or guarding  : [   ] NO SMALLS CATHETER, [  X ] SMALLS CATHETER- no meatal tear, no discharge, [   ] other:                                            MSK: [ X  ] No joint swelling, Full ROM,   [   ] other:                                         Ext: [   ]No C/C/E, No calf tenderness,   [ X  ]other:  B/L LE varicose veins  Skin: [   ]intact,   [  X ] other:  B/L LE varicose veins & Right lateral ISABELL drain and midline surgical incision.    Neurological Examination:  Cognitive: [  X  ] AAO x 3,   [    ]  other:                                                                      Attention:  [  X  ] intact,   [    ]  other:                            Memory: [    ] intact,    [  X  ]  other:   Recalled only 2 of 3 words with delayed recall  Mood/Affect: [  X  ] wnl,    [    ]  other:                                                                             Communication: [ X   ]Fluent, no dysarthria, following commands:  [    ] other:   CN II - XII:  [  X ] intact,  [    ] other:                                                                                        Motor:   RIGHT UE: [   ] WNL,  [ X  ] other: 4+/5  LEFT    UE: [   ] WNL,  [ X  ] other: 4+/5  RIGHT LE: [   ] WNL,  [  X ] other: 4/5 prox, 5/5 distally  LEFT    LE: [   ] WNL,  [ X  ] other:4/5 prox, 5/5 distally    Tone: [  X  ] wnl,   [    ]  other:  DTRs: [  X ]symmetric, [   ] other:  Coordination:   [ X   ] intact,   [    ] other:                                                                           Sensory: [  X  ] Intact to light touch,   [    ] other:

## 2021-08-13 NOTE — PROGRESS NOTE ADULT - ASSESSMENT
A/P 76y F s/p whipple procedure POD# 11 hd# 12  pt doing well.   Discharge to [facility] ( per Dr Nino)   pt advised to follow up with Dr Nino   resume home medications  continue drain care

## 2021-08-13 NOTE — DISCHARGE NOTE PROVIDER - NSDCFUSCHEDAPPT_GEN_ALL_CORE_FT
LI, VIKASH ; 08/17/2021 ; [facility] [address]  LI, VIKASH ; 09/22/2021 ; NPP [facility] [address]

## 2021-08-13 NOTE — H&P ADULT - REASON FOR ADMISSION
Rehab of Debility 2/2 Pancreaticoduodenectomy Rehab of Debility 2/2 Pancreatic Cancer/ Pancreaticoduodenectomy

## 2021-08-13 NOTE — DISCHARGE NOTE PROVIDER - NSDCMRMEDTOKEN_GEN_ALL_CORE_FT
acetaminophen 325 mg oral tablet: 2 tab(s) orally every 6 hours, As needed, Temp greater or equal to 38C (100.4F), Mild Pain (1 - 3)  calcium,magnesium and Zinc: 1 tab(s) orally once a day  dilTIAZem 120 mg/24 hours oral capsule, extended release: 1 cap(s) orally once a day  furosemide 20 mg oral tablet: 1 tab(s) orally once a day  Women&#x27;s daily vitamins: 1 tab(s) orally once a day   acetaminophen 325 mg oral tablet: 2 tab(s) orally every 6 hours, As needed, Temp greater or equal to 38C (100.4F), Mild Pain (1 - 3)  calcium,magnesium and Zinc: 1 tab(s) orally once a day  collagenase 250 units/g topical ointment: 1 application topically once a day  dilTIAZem 120 mg/24 hours oral capsule, extended release: 1 cap(s) orally once a day  furosemide 20 mg oral tablet: 1 tab(s) orally once a day  heparin:   metoclopramide 5 mg oral tablet: 1 tab(s) orally 3 times a day (before meals)  oxyCODONE 5 mg oral tablet: 1 tab(s) orally every 6 hours, As needed, Severe Pain (7 - 10)  pantoprazole 40 mg oral delayed release tablet: 1 tab(s) orally once a day (before a meal)  tamsulosin 0.4 mg oral capsule:  orally   Women&#x27;s daily vitamins: 1 tab(s) orally once a day

## 2021-08-13 NOTE — H&P ADULT - ASSESSMENT
ASSESSMENT/PLAN    Rehab of DEBILITY 2/2 pancreatoduodenectomy  -Patient to receive 3 hours of therapy daily with 90 minuites of PT and 90 minutes of OT for at least 5-6 days within a 7 day week period.  -Pain control  -Surgery team to change dressing and ISABELL drain removal when time.     #  Atrial fibrillation   - Monitor vitals   -Diltiazem 90mg [id number]     #Overflow Incontinence  - Indwelling ariza catheter   -Tamsulosin 0.4mg daily qhs    -Pain control: Oxycodone IR 5mg q6 hours PRN for severe pain, Tylenol 650mg [id number]     -GI/Bowel Mgmt: As needed    -Bladder management: Indwelling ariza catheter     -Skin:  ISABELL drain on right lateral abdominal wall and midline of anterior abdomen    -FEN   Monitor with IVF as D5 1/2NS    - Diet: Soft diet     Precautions / PROPHYLAXIS:    - Falls  - Ortho: Weight bearing status: WBAT  - DVT prophylaxis: Heparin 5000 units [id number] + SCDS      MEDICAL PROGNOSIS: GOOD            REHAB POTENTIAL: GOOD             ESTIMATED DISPOSITION: HOME WITH HOME CARE       [ x ]  The goals of the IRF admission were discussed with the patient and or family member, who agreed             ELOS:  [ X ] 7-14    [    ]  14-21    [    ]  Other    THERAPY ORDERS and INITIAL INDIVIDUALIZED PLAN OF CARE:  This initial individualized interdisciplinary plan of care, which was established by me (the attending physiatrist), is based on elements from the post admission evaluation. The interdisciplinary therapy program is to be at least 3 hrs a day, at least 5 days per week from from physical, occupational and/ or speech therapies as ordered by me below.      [ x  ] P.T. 90 mins. /day at least 5 out of 7 days:  [  x ] superficial  modalities prn, [ x  ] A/AAROM, [ x  ] PREs, [ x  ] transfer training,            [ x  ] progressive ambulation, [x   ] stairs                                               [ x  ] O.T. 90 mins. /day at least 5 out of 7 days::  [ x  ] modalities prn,  [ x  ]A/AAROM, [ x  ] PREs, [  x ] functional transfer training, [ x  ] ADL training           [   ] cognitive/ perceptual eval and training, [   ] splint eval                                                  [   ] S.L.P:  [   ] speech eval, [   ] swallow eval     [   ] Neuropsychology eval     [ x  ] Individualized rec. therapy      RATIONALE FOR INPATIENT ADMISSION - Patient demonstrates the following: (check all that apply)  [X] Medically appropriate for acute rehabilitation admission. Requires interdisiplinary therapy consisting of at least PT and OT, at least 3 hrs. a day at least 5 days a week  [X] Has attainable rehab goals with an appropriate initial discharge plan  [X] Has rehabilitation potential (expected to make a significant improvement within a reasonable period of time)  [X] Requires close medical management by a rehab physician, rehab nursing care,  and comprehensive interdisciplinary team (including PT, OT)    [X] Requires evaluation by a physiatrist at least 3 days a week to evaluate and manage and coordinate rehab and medical problems   ASSESSMENT/PLAN    Rehab of DEBILITY 2/2 pancreatic cancer/pancreatoduodenectomy  -Patient requires and to receive 3 hours of therapy daily with 90 minuites of PT and 90 minutes of OT for at least 5-6 days within a 7 day week period.  -Pain control  -Surgery team to change dressing and ISABELL drain removal when time.     #  Atrial fibrillation   - Monitor vitals   -Diltiazem 90mg [id number]   - Discuss risks and benefits of A/C with Cardiologist and Surgical team    # Urinary retention/ Overflow Incontinence  - Indwelling ariza catheter placed  -Tamsulosin 0.4mg daily qhs  - will give TOV and straight cath as needed    -Pain control: Oxycodone IR 5mg q6 hours PRN for severe pain, Tylenol 650mg [id number]     -GI/Bowel Mgmt: Stool Softeners As needed    - Anemia: likely chronic disease and acute loss. Monitor    - HTN: monitor     -Skin:  ISABELL drain on right lateral abdominal wall and midline of anterior abdomen  - monitor incision    -FEN   Monitor lytes    - Diet: Soft diet     Precautions / PROPHYLAXIS:    - Falls  - Ortho: Weight bearing status: WBAT  - DVT prophylaxis: Heparin 5000 units [id number] + SCDS      MEDICAL PROGNOSIS: GOOD            REHAB POTENTIAL: GOOD             ESTIMATED DISPOSITION: HOME WITH HOME CARE       [ x ]  The goals of the IRF admission were discussed with the patient and or family member, who agreed             ELOS:  [ X ] 7-14    [    ]  14-21    [    ]  Other    THERAPY ORDERS and INITIAL INDIVIDUALIZED PLAN OF CARE:  This initial individualized interdisciplinary plan of care, which was established by me (the attending physiatrist), is based on elements from the post admission evaluation. The interdisciplinary therapy program is to be at least 3 hrs a day, at least 5 days per week from from physical, occupational and/ or speech therapies as ordered by me below.      [ x  ] P.T. 90 mins. /day at least 5 out of 7 days:  [  x ] superficial  modalities prn, [ x  ] A/AAROM, [ x  ] PREs, [ x  ] transfer training,            [ x  ] progressive ambulation, [x   ] stairs                                               [ x  ] O.T. 90 mins. /day at least 5 out of 7 days::  [ x  ] modalities prn,  [ x  ]A/AAROM, [ x  ] PREs, [  x ] functional transfer training, [ x  ] ADL training           [   ] cognitive/ perceptual eval and training, [   ] splint eval                                                  [   ] S.L.P:  [   ] speech eval, [   ] swallow eval     [   ] Neuropsychology eval     [ x  ] Individualized rec. therapy      RATIONALE FOR INPATIENT ADMISSION - Patient demonstrates the following: (check all that apply)  [X] Medically appropriate for acute rehabilitation admission. Requires interdisiplinary therapy consisting of at least PT and OT, at least 3 hrs. a day at least 5 days a week  [X] Has attainable rehab goals with an appropriate initial discharge plan  [X] Has rehabilitation potential (expected to make a significant improvement within a reasonable period of time)  [X] Requires close medical management by a rehab physician, rehab nursing care,  and comprehensive interdisciplinary team (including PT, OT)    [X] Requires evaluation by a physiatrist at least 3 days a week to evaluate and manage and coordinate rehab and medical problems

## 2021-08-13 NOTE — H&P ADULT - NSHPSOCIALHISTORY_GEN_ALL_CORE
Lives With: children: Son in a private home, has a stair lift indoor and 3 steps to enter. Also has a cane to maneuveur around the outside of the house,    Previous Level of Function:     Ambulation Skills: Independent: Cane for outdoor ambulation  Transfer Skills: Independent  ADL Skills	 Independent    Tobacco Use: Denies   Alcohol Use: Deneis   Illicit Drug Use: Denies

## 2021-08-13 NOTE — H&P ADULT - ATTENDING COMMENTS
I reviewed the chart and examined the patient with the resident and we discussed the findings and treatment plan. I agree with the findings and treatment plan above, which I modified as indicated. The patient requires 3 hrs a day of acute inpatient rehab.    Rehab of DEBILITY 2/2 pancreatic cancer/pancreatoduodenectomy  -Patient requires and to receive 3 hours of therapy daily with 90 minutes of PT and 90 minutes of OT for at least 5-6 days within a 7 day week period.  -Pain control  -Surgery team to change dressing and ISABELL drain removal when time.     #  Atrial fibrillation   - Monitor vitals   -Diltiazem 90mg [id number]   - Discuss risks and benefits of A/C with Cardiologist and Surgical team    # Urinary retention/ Overflow Incontinence  - Indwelling ariza catheter placed  -Tamsulosin 0.4mg daily qhs  - will give TOV and straight cath as needed    -Pain control: Oxycodone IR 5mg q6 hours PRN for severe pain, Tylenol 650mg [id number]     -GI/Bowel Mgmt: Stool Softeners As needed    - Anemia: likely chronic disease and acute loss. Monitor    - HTN: monitor     -Skin:  ISABELL drain on right lateral abdominal wall and midline of anterior abdomen  - monitor incision    -FEN   Monitor lytes    - Diet: Soft diet     Precautions / PROPHYLAXIS:    - Falls  - Ortho: Weight bearing status: WBAT  - DVT prophylaxis: Heparin 5000 units [id number] + SCDS

## 2021-08-13 NOTE — H&P ADULT - NSHPLABSRESULTS_GEN_ALL_CORE
10.4   9.61  )-----------( 214      ( 13 Aug 2021 05:33 )             33.9     08-13    134<L>  |  103  |  14  ----------------------------<  133<H>  4.4   |  23  |  <0.5<L>    Ca    7.9<L>      13 Aug 2021 05:33  Phos  2.9     08-12  Mg     2.4     08-13    TPro  4.2<L>  /  Alb  2.3<L>  /  TBili  0.3  /  DBili  x   /  AST  19  /  ALT  13  /  AlkPhos  343<H>  08-13        POCT Blood Glucose.: 129 mg/dL (08-11-21 @ 17:05)  POCT Blood Glucose.: 112 mg/dL (08-11-21 @ 07:58)    8/12/2021  CT ABDOMEN AND PELVIS with PO and IV Contrast       COMPARISON: July 10, 2021.    FINDINGS:  HEPATOBILIARY/PANCREAS: Interval cholecystectomy and Whipple procedure. Expected pneumobilia noted. CBD stent in place. Unchanged left hepatic cysts. Mild hepatic capsular hyperenhancement noted which may be inflammatory. Pancreatic duct stent.  ADRENAL GLANDS: Unremarkable.  KIDNEYS: Excreted contrast is seen in bilateral Symmetric renal enhancement bilaterally. No hydronephrosis.  Subcentimeter hypodensities in both kidneys, too small to characterize.  ABDOMINOPELVIC NODES: No lymphadenopathy.  PELVIC ORGANS: Urinary bladder decompressed with a catheter in place.  PERITONEUM/MESENTERY/BOWEL: New pneumoperitoneum compatible with recent surgical history. No bowel obstruction. No definite evidence for contrast extravasation. New mesenteric edema.  BONES/SOFT TISSUES: Diffuse soft tissue anasarca. Degenerative changes of the spine noted. New abdominal skin staples and subcutaneous air.  OTHER: Atherosclerotic vascular calcifications.    IMPRESSION:  Since July 10, 2021, interval Whipple procedure with new pneumoperitoneum and additional postsurgical changes as above; no definite evidence of contrast extravasation.    8/12/2021   CT Chest with IV Contrast    COMPARISON CT: 3/6/2021    INTERPRETATION:  AIRWAYS, LUNGS AND PLEURA: There is partial opacification of the left lower lobe central bronchi compatible with secretions. The central tracheobronchial tree is otherwise patent. Biapical scarring. There are small to moderate bilateral pleural effusions with adjacent consolidation/atelectasis, right greater than left.  There is no pneumothorax.  MEDIASTINUM: There are no enlarged mediastinal, hilar or axillary lymph nodes. There are left thyroid lobe nodules which are incompletely characterized although without significant change since prior examination.  HEART AND VESSELS: The heart is stable in size with biatrial dilatation. There is no pericardial effusion.  BONES AND SOFT TISSUES: There are degenerative changes of the spine.  There is evidence of cachexia. Thoracic curvature. Thoracic vertebral body hemangioma.    IMPRESSION:  Small to moderate bilateral pleural effusions with adjacent consolidation/atelectasis, right greater than left.  Partial opacification of the left lower lobe central bronchi most compatible with secretions.    8/4/2021   TTE without contrast and with Doppler:  Summary:   1. Left ventricular ejection fraction, by visual estimation, is 50 to 55%.   2. Normal global left ventricular systolic function.   3. Moderate to severe right atrial enlargement.   4. Severely enlarged left atrium.   5. Mildly reduced RV systolic function.   6. Moderately enlarged right ventricle.   7. Degenerative mitral valve.   8. Mild Mitral valve prolapse.   9. Mild to moderate mitral valve regurgitation.  10. Thickening and calcification of the anterior and posterior mitral valve leaflets.  11. Moderate tricuspid regurgitation.  12. Mild aortic regurgitation.  13. Moderate aortic valve thickening with normal leaflet opening.  14. Mild pulmonic valve regurgitation.  15. Estimated pulmonary artery systolic pressure is 36.2 mmHg assuming a right atrial pressure of 3 mmHg, which is consistent with borderline pulmonary hypertension.

## 2021-08-13 NOTE — DISCHARGE NOTE PROVIDER - NSDCFUADDINST_GEN_ALL_CORE_FT
Follow up with Dr Nino in 1 week call office for appointment   follow up with Dr Wood ( cardiology) call office for appointment  follow up with podiatry /vascular  follow up with your PMD    no strenuous activity  keep wound clean and dry  continue drain and feet care  resume home medications    if experience fever, shortness of breath, chest pain, dizziness, vomiting, bleeding or drainage from wound call MD or return to [facility]

## 2021-08-13 NOTE — CHART NOTE - NSCHARTNOTEFT_GEN_A_CORE
S/w Dr. Zapata of cardiology at 11:19AM [phone]  discuss that patient is now on 90 q6h of Cardizem (increased from 60 q6h) and heart rate now in 80s and better controlled. Pt will be going off telemetry with D/C today to [facility] for rehab  he agrees with this plan and said pt to follow up after d/c from hospital  will give pt follow up instructions

## 2021-08-13 NOTE — DISCHARGE NOTE NURSING/CASE MANAGEMENT/SOCIAL WORK - PATIENT PORTAL LINK FT
You can access the FollowMyHealth Patient Portal offered by [facility] by registering at the following website: [email or web address]. By joining [facility]’s FollowMyHealth portal, you will also be able to view your health information using other applications (apps) compatible with our system.

## 2021-08-13 NOTE — H&P ADULT - HISTORY OF PRESENT ILLNESS
Patient is a 75 year old female patient who has a PMHx of pancreatic ca (March 2021, s/p Chemo 2 rounds 6/2021 via R Subclavian chemo port),  Afib on eliquis ( Cardiologist VEE Medrano ), HTN, Varicose veins in bilateral lower extremities, Nephrolithiasis, and being COVID + on 5/4/2021 via PCR with self quarantine till 5/14/2021. The patient originally presented to the ED March 2021 for yellow discoloration of the skin as her daughter was insisting she go to the hospital for having yellow colored skin and urine for days. Patient started to notice this Jaundice in December 2020. It was associated with dark urine and light colored stool. Patient went to her PMD who treated her for UTI by prescribing an antibiotic; but she had no relief. Patient states she then went to her cardiologist for her stress test and he noticed that she looks jaundiced and referred her to Dr. Grayson (Gastroenterologist) who performed an endoscopy in March 2021 which was normal. However, during a visit to the ED in 7/2021 she had a CT abdomen and Pelvis performed which demonstrated; severe intrahepatic and extrahepatic biliary ductal dilatation. Pancreatic head/neck is ill-defined, raising suspicion for underlying ill-defined mass.  GI EUS 3/2021-EUS with FNA of Pancreatic Mass, and ERCP with sphincterotomy and placement of a large plastic CBD stent. pathology significant for adenocarcinoma  Patient seen by surg-onc and underwent pancreaticoduodenectomy with Dr. Nino on 8/2/21 . Patient tolerated the procedure well received 2 units of pRBC, 500mL of Albumin, and 4L of Crystalloid IVF. Patient was extubated brought to PACU. SICU consulted in the setting of extensive length of surgery, hemodynamic monitoring and known afib.  Patient was also evaluated by podiatry for chronic feet ulcer, She was downgraded to floor 8/5/21. On 8/13/2021 patient verbalized no complaints and states she is tolerating her diet well and voided via ariza catheter.    She is tolerating bedside PT and OT and is able to perform bed mobility with moderate assistance using bed rails and demonstrated decreased strength and impaired balance. Additionally, she was minimum assist for transfer from sit to stand and rolling walking to bed transfers. She ambulated 10 feet twice with a RW and contact guard while demonstrating decreased sean;  decreased step length;  decreased strength;  pain;  decreased endurance     She was evaluated by the rehab team/ Physiatrist,  and she is deemed to be a good candidate for acute inpatient rehab admission to .

## 2021-08-13 NOTE — DISCHARGE NOTE PROVIDER - NSDCCPCAREPLAN_GEN_ALL_CORE_FT
PRINCIPAL DISCHARGE DIAGNOSIS  Diagnosis: Pancreatic cancer  Assessment and Plan of Treatment:

## 2021-08-13 NOTE — DISCHARGE NOTE PROVIDER - CARE PROVIDERS DIRECT ADDRESSES
,DirectAddress_Unknown,DirectAddress_Unknown,[email or web address]

## 2021-08-13 NOTE — DISCHARGE NOTE PROVIDER - HOSPITAL COURSE
75 year old F patient with PMHx of pancreatic ca (March 2021, s/p Chemo 2 rounds 6/2021 via R Subclavian chemo port),  Afib on eliquis ( Cardiologist VEE Medrano ), HTN, Varicose vein in the lower extremities, Nephrolithiasis presented to the ED March 2021 for yellow discoloration of the skin. Patient started to notice this Jaundice in December 2020. It was associated with dark urine and light colored stool. Patient went to her PMD who treated her for UTI. Patient went to her cardiologist for her stress test and he noticed that she looks jaundiced and referred her to Dr. Grayson who performed an endoscopy in March 2021 which was normal. CT abdomen that demonstrated severe intrahepatic and extrahepatic biliary ductal dilatation. Pancreatic head/neck is ill-defined, raising suspicion for underlying ill-defined mass.  GI EUS 3/2021-EUS with FNA of Pancreatic Mass, and ERCP with sphincterotomy and placement of a large plastic CBD stent. pathology significant for adenocarcinoma  Patient seen by surg-onc and underwent pancreaticoduodenectomy with Dr. Nino on 8/2/21 . Patient tolerated the procedure well with no known intraop complication. Patient extubated brought to PACU. SICU consulted in the setting of extensive length of surgery, hemodynamic monitoring and known afib.  pt was evaluated by podiatry for chronic feet ulcer, PT/rehab, cardiology,  and ID .  pt downgraded to floor 8/5/21. diet advanced as tolerated.   On 8/13/2021 pt without complaints. tolerated po diet, voided . vital signs stable and afebrile. labs reviewed. pt discharged to  in stable condition. pt advised to follow up with Dr Nino in 1 week, cardiology, and podiatry. Resume home medications. continue drain care and bilateral feet care. . Precaution provided.     75 year old F patient with PMHx of pancreatic ca (March 2021, s/p Chemo 2 rounds 6/2021 via R Subclavian chemo port),  Afib on eliquis ( Cardiologist VEE Medrano ), HTN, Varicose vein in the lower extremities, Nephrolithiasis presented to the ED March 2021 for yellow discoloration of the skin. Patient started to notice this Jaundice in December 2020. It was associated with dark urine and light colored stool. Patient went to her PMD who treated her for UTI. Patient went to her cardiologist for her stress test and he noticed that she looks jaundiced and referred her to Dr. Grayson who performed an endoscopy in March 2021 which was normal. CT abdomen that demonstrated severe intrahepatic and extrahepatic biliary ductal dilatation. Pancreatic head/neck is ill-defined, raising suspicion for underlying ill-defined mass.  GI EUS 3/2021-EUS with FNA of Pancreatic Mass, and ERCP with sphincterotomy and placement of a large plastic CBD stent. pathology significant for adenocarcinoma  Patient seen by surg-onc and underwent pancreaticoduodenectomy with Dr. Nino on 8/2/21 . Patient tolerated the procedure well with no known intraop complication. Patient extubated brought to PACU. SICU consulted in the setting of extensive length of surgery, hemodynamic monitoring and known afib.  pt was evaluated by podiatry for chronic feet ulcer, PT/rehab, cardiology,  and ID .  pt downgraded to floor 8/5/21. diet advanced as tolerated.   On 8/13/2021 pt without complaints. tolerated po diet, voided . vital signs stable and afebrile. labs reviewed. pt discharged to  for rehab in stable condition. pt advised to follow up with Dr Nino in 1 week, cardiology, and podiatry. Resume home medications. continue drain and foot care. . Precaution provided.

## 2021-08-13 NOTE — DISCHARGE NOTE PROVIDER - NSDCCPTREATMENT_GEN_ALL_CORE_FT
PRINCIPAL PROCEDURE  Procedure: Pylorus-sparing pancreaticoduodenectomy  Findings and Treatment:       SECONDARY PROCEDURE  Procedure: Unroofing of hepatic cyst  Findings and Treatment: liver biopsy wedge, diagnostic laparoscopy, open repair of superior mesenteric vein, unroofing of hepatic cyst

## 2021-08-14 NOTE — CHART NOTE - NSCHARTNOTEFT_GEN_A_CORE
Patient is a 75 year old female patient who has a PMHx of pancreatic ca (March 2021, s/p Chemo 2 rounds 6/2021 via R Subclavian chemo port),  Afib on eliquis ( Cardiologist VEE Garvey ), HTN, Varicose veins in bilateral lower extremities, Nephrolithiasis, and COVID + on 5/4/2021 via PCR with self quarantine till 5/14/2021. The patient originally presented to the ED March 2021 for yellow discoloration of the skin as her daughter was insisting she go to the hospital for having yellow colored skin and urine for days. Patient started to notice this Jaundice in December 2020. It was associated with dark urine and light colored stool. Patient went to her PMD who treated her for UTI by prescribing an antibiotic; but she had no relief. Patient stated she then went to her cardiologist for her stress test and he noticed that she looked jaundiced and referred her to Dr. Grayson (Gastroenterologist) who performed an endoscopy in March 2021 which was normal. However, during a visit to the ED in 7/2021 she had a CT abdomen and Pelvis performed which demonstrated; severe intrahepatic and extrahepatic biliary ductal dilatation. Pancreatic head/neck is ill-defined, raising suspicion for underlying ill-defined mass.  GI EUS 3/2021-EUS with FNA of Pancreatic Mass, and ERCP with sphincterotomy and placement of a large plastic CBD stent. Pathology was significant for adenocarcinoma  Patient was seen by surg-onc and underwent pancreaticoduodenectomy with Dr. Nino on 8/2/21 . Patient tolerated the procedure well received 2 units of pRBC, 500mL of Albumin, and 4L of Crystalloid IVF. Patient was extubated brought to PACU. SICU consulted in the setting of extensive length of surgery, hemodynamic monitoring and known afib (was on apixaban but now on sc heparin).  Patient was also evaluated by podiatry for chronic b/l foot ulcers, being treated with betadine-soaked gauze + DSD + kerlix daily.  She was downgraded to floor 8/5/21. On 8/13/2021 patient verbalized no complaints and states she is tolerating her diet well and voided via ariza catheter, which was replaced on 8/12/21 due to retention.    The patient was seen by ID on 8/4 and 8/6 Patient is a 75 year old female patient who has a PMHx of pancreatic ca (March 2021, s/p Chemo 2 rounds 6/2021 via R Subclavian chemo port),  Afib on eliquis ( Cardiologist VEE Garvey ), HTN, Varicose veins in bilateral lower extremities, Nephrolithiasis, and COVID + on 5/4/2021 via PCR with self quarantine till 5/14/2021. The patient originally presented to the ED March 2021 for yellow discoloration of the skin as her daughter was insisting she go to the hospital for having yellow colored skin and urine for days. Patient started to notice this Jaundice in December 2020. It was associated with dark urine and light colored stool. Patient went to her PMD who treated her for UTI by prescribing an antibiotic; but she had no relief. Patient stated she then went to her cardiologist for her stress test and he noticed that she looked jaundiced and referred her to Dr. Grayson (Gastroenterologist) who performed an endoscopy in March 2021 which was normal. However, during a visit to the ED in 7/2021 she had a CT abdomen and Pelvis performed which demonstrated; severe intrahepatic and extrahepatic biliary ductal dilatation. Pancreatic head/neck is ill-defined, raising suspicion for underlying ill-defined mass.  GI EUS 3/2021-EUS with FNA of Pancreatic Mass, and ERCP with sphincterotomy and placement of a large plastic CBD stent. Pathology was significant for adenocarcinoma  Patient was seen by surg-onc and underwent pancreaticoduodenectomy with Dr. Nino on 8/2/21 . Patient tolerated the procedure well received 2 units of pRBC, 500mL of Albumin, and 4L of Crystalloid IVF. Patient was extubated brought to PACU. SICU consulted in the setting of extensive length of surgery, hemodynamic monitoring and known afib (was on apixaban but now on sc heparin).    Patient was also evaluated by podiatry for chronic b/l foot ulcers, being treated with betadine-soaked gauze + DSD + kerlix daily.    She was downgraded to floor 8/5/21. On 8/13/2021 patient verbalized no complaints and states she is tolerating her diet well and voided via ariza catheter, which was replaced on 8/12/21 due to retention.  She was started on flomax on 8/12.    The patient was on IV ABX since 8/2 (cefotetan/flagyl); changed to cefepime/flagyl on 8/3; was seen by ID on 8/4 and 8/6; recommended changing cefepime to rocephin and then when cleared by Surgery,  change ABX to po Vantin 200mg q12h and po flagyl 500mg q8h until 8/20. However, cefepime and flagyl were continued until 8/12 and then d/c'd. She has leukocytosis today (WBC = 12.13 with 77% neutrophils, WBC was 9.61 yesterday). Spoke to ID today, who recommended continuing the po ABX, so started her today on the Vantin and Flagyl po as per his recommendations. Added probiotics as well. She is having bm's, had bm x2 today.    Ariza catheter was d/c'd today at 6AM; she has not voided yet but PVR is low < 200cc; she will try to increase po fluid intake. Her diet is being advanced slowly as tolerated, advised to eat and drink small   amounts at a time and more often. She does not have abdominal pain from the surgery, has a little pain after she eats, but felt better after having BM today.    Allergies    Augmentin (Rash)  chocolate (Headache)  digoxin (Hives)  Metoprolol Succinate ER (Hives)  Novocain (Angioedema)  Steroids: Excessive swelling (Flushing; Other (Mild to Mod))  sulfa drugs (Fever)  Xarelto (Hives)      MEDICATIONS  (STANDING):  acetaminophen   Tablet .. 650 milliGRAM(s) Oral every 6 hours  cefpodoxime 200 milliGRAM(s) Oral every 12 hours until 8/20/21  diltiazem    Tablet 90 milliGRAM(s) Oral every 6 hours  heparin   Injectable 5000 Unit(s) SubCutaneous every 12 hours  lactobacillus acidophilus 1 Tablet(s) Oral two times a day with meals  metoclopramide 5 milliGRAM(s) Oral three times a day before meals  metroNIDAZOLE    Tablet 500 milliGRAM(s) Oral every 8 hours until 8/20/21  pantoprazole    Tablet 40 milliGRAM(s) Oral before breakfast  saccharomyces boulardii 250 milliGRAM(s) Oral two times a day  tamsulosin 0.4 milliGRAM(s) Oral at bedtime    MEDICATIONS  (PRN):  oxyCODONE    IR 5 milliGRAM(s) Oral every 6 hours PRN Severe Pain (7 - 10)    Vital Signs Last 24 Hrs  T(C): 36.1 (14 Aug 2021 14:41), Max: 36.9 (13 Aug 2021 21:43)  T(F): 97 (14 Aug 2021 14:41), Max: 98.4 (13 Aug 2021 21:43)  HR: 85 (14 Aug 2021 14:41) (85 - 101)  BP: 105/60 (14 Aug 2021 14:41) (105/60 - 110/59)  BP(mean): --  RR: 18 (14 Aug 2021 14:41) (18 - 18)  SpO2: --    PE the patient is thin, looks chronically ill, but is alert, oriented x 3 and in NAD  Lungs clear  Chest--R chest harley cath  Heart  irregular  Abdomen soft, bs+, laparotomy incision is clean and dry with staples intact, non-tender; + ISABELL drain RUQ with clear yellow drainage,   ISABELL site is clean and dry  Extremities-- + Midline IV LUE (was placed on 8/4/21), mild b/l UE edema, stasis changes and varicose veins b/l LE's, small dry ulcer L lateral hindfoot area and open ulcer with yellow fibrous tissue  R medial hindfoot area    LABS:             10.9   12.13 )-----------( 321      ( 14 Aug 2021 08:37 )             35.4     08-14    136  |  102  |  10  ----------------------------<  114<H>  4.2   |  23  |  <0.5<L>    Ca    8.0<L>      14 Aug 2021 08:37  Phos  2.8     08-14  Mg     1.6     08-14  ****will give mag rider x 2 today***    TPro  4.3<L>  /  Alb  2.4<L>  /  TBili  0.4  /  DBili  0.2  /  AST  17  /  ALT  11  /  AlkPhos  398<H>  08-14      Assessment/Plans:  Rehab of DEBILITY 2/2 pancreatic cancer/pancreatoduodenectomy  -Patient requires and to receive 3 hours of therapy daily with 90 minutes of PT and 90 minutes of OT for at least 5-6 days within a 7 day week period.  -Pain control  - po Vantin and Flagyl until 8/20 as per ID  -Surgery team to change dressing and ISBAELL drain removal when time.     #  Atrial fibrillation   - Monitor vitals   -Diltiazem 90mg [id number]   - Discuss risks and benefits of A/C with Cardiologist and Surgical team    # Urinary retention/ Overflow Incontinence  - Indwelling ariza catheter placed 8/12--d/c'd today, having TOV  -Tamsulosin 0.4mg daily qhs started 8/12  - straight cath as needed for PVR over 300cc    -Pain control: Oxycodone IR 5mg q6 hours PRN for severe pain, Tylenol 650mg [id number]     -GI/Bowel Mgmt: Stool Softeners As needed    - Anemia: likely chronic disease and acute loss. Monitor    - HTN: monitor     -Skin:  ISABELL drain on right lateral abdominal wall and midline of anterior abdomen  - monitor incision    -FEN   Monitor lytes    - Diet: Soft diet, added yogurt and probiotics due to being on po ABX     Precautions / PROPHYLAXIS:    - Falls  - Ortho: Weight bearing status: WBAT  - DVT prophylaxis: Heparin 5000 units [id number] + SCDS.    Discussed with Dr. Katz

## 2021-08-14 NOTE — PROGRESS NOTE ADULT - SUBJECTIVE AND OBJECTIVE BOX
T(C): 35.6 (08-14-21 @ 06:17), Max: 36.9 (08-13-21 @ 21:43)  HR: 101 (08-14-21 @ 06:17) (80 - 101)  BP: 108/52 (08-14-21 @ 06:17) (102/58 - 111/57)  RR: 18 (08-14-21 @ 06:17) (18 - 18)  SpO2: --      Patient was stable overnight and expresses no new complaints     PE:    Alert   LUNGS- clear  COR- RRR  ABD- SOFT, NT  EXTR- w/o edema  NEURO- stable    08-13    134<L>  |  103  |  14  ----------------------------<  133<H>  4.4   |  23  |  <0.5<L>    Ca    7.9<L>      13 Aug 2021 05:33  Phos  2.9     08-12  Mg     2.4     08-13    TPro  4.2<L>  /  Alb  2.3<L>  /  TBili  0.3  /  DBili  x   /  AST  19  /  ALT  13  /  AlkPhos  343<H>  08-13                            10.4   9.61  )-----------( 214      ( 13 Aug 2021 05:33 )             33.9

## 2021-08-15 NOTE — PROGRESS NOTE ADULT - SUBJECTIVE AND OBJECTIVE BOX
GENERAL SURGERY PROGRESS NOTE    Patient: VIKASH LI , 76y ([birth year])Female   MRN: [id number]  Location: [address]  Visit: 08-13-21 Inpatient  Date: 08-15-21 @ 09:18    Hospital Day #: 14  Post-Op Day #: 13    Procedure/Dx/Injuries:  whipple procedure    Events of past 24 hours: WBC increased from 9 to 12. patient started on Vantin and Flagyl. Pain is well controlled, she is ambulating, tolerating soft diet    PAST MEDICAL & SURGICAL HISTORY:  SOB (shortness of breath)    Pain  knee    Varicose veins of both lower extremities, unspecified whether complicated    Atrial fibrillation, unspecified type  2019 on Eliquis    Jaundice  March 5, 2021    Malignant neoplasm of pancreas, unspecified location of malignancy  Pancreatic Cancer    Hypertension, unspecified type  2019    Pancreatic cancer    Kidney stones    History of surgery  vascular surgery   ? variocose vein stripping?    Status post phlebectomy  10/2018    History of ovarian cystectomy  left    History of tonsillectomy  1959    Kidney stone  2017        Vitals:   T(F): 97.3 (08-15-21 @ 06:08), Max: 97.3 (08-15-21 @ 06:08)  HR: 91 (08-15-21 @ 06:08)  BP: 116/60 (08-15-21 @ 06:08)  RR: 18 (08-15-21 @ 06:08)  SpO2: --      Diet, Soft:      Qty per Day:  YOGURT WITH MEALS     Qty per Day:  PLEASE      Fluids:     I & O's:    08-14-21 @ 07:01  -  08-15-21 @ 07:00  --------------------------------------------------------  IN:  Total IN: 0 mL    OUT:    Bulb (mL): 170 mL    Voided (mL): 1 mL  Total OUT: 171 mL    Total NET: -171 mL        Bowel Movement: : [] YES [] NO  Flatus: : [] YES [] NO    PHYSICAL EXAM:  General: NAD, AAOx3, calm and cooperative  Cardiac: S1, S2,   Respiratory: CTAB,   Abdomen: Soft, non-distended, non-tender, midline incision with staples, healing well, no discharge or erythema noted. janeth drain with serous fluid    MEDICATIONS  (STANDING):  acetaminophen   Tablet .. 650 milliGRAM(s) Oral every 6 hours  cefpodoxime 200 milliGRAM(s) Oral every 12 hours  diltiazem    Tablet 90 milliGRAM(s) Oral every 6 hours  heparin   Injectable 5000 Unit(s) SubCutaneous every 12 hours  lactobacillus acidophilus 1 Tablet(s) Oral two times a day with meals  magnesium sulfate  IVPB 2 Gram(s) IV Intermittent every 8 hours  metoclopramide 5 milliGRAM(s) Oral three times a day before meals  metroNIDAZOLE    Tablet 500 milliGRAM(s) Oral every 8 hours  pantoprazole    Tablet 40 milliGRAM(s) Oral before breakfast  saccharomyces boulardii 250 milliGRAM(s) Oral two times a day  tamsulosin 0.4 milliGRAM(s) Oral at bedtime    MEDICATIONS  (PRN):  oxyCODONE    IR 5 milliGRAM(s) Oral every 6 hours PRN Severe Pain (7 - 10)      DVT PROPHYLAXIS: heparin   Injectable 5000 Unit(s) SubCutaneous every 12 hours    GI PROPHYLAXIS: pantoprazole    Tablet 40 milliGRAM(s) Oral before breakfast    ANTICOAGULATION:   ANTIBIOTICS:  cefpodoxime 200 milliGRAM(s)  metroNIDAZOLE    Tablet 500 milliGRAM(s)      LAB/STUDIES:  Labs:  CAPILLARY BLOOD GLUCOSE                          10.9   12.13 )-----------( 321      ( 14 Aug 2021 08:37 )             35.4         08-14    136  |  102  |  10  ----------------------------<  114<H>  4.2   |  23  |  <0.5<L>          LFTs:             4.3  | 0.4  | 17       ------------------[398     ( 14 Aug 2021 08:37 )  2.4  | 0.2  | 11          Lipase:x      Amylase:x        IMAGING:  none    ACCESS/ DEVICES:  [x ] Peripheral IV  [ ] Central Venous Line	[ ] R	[ ] L	[ ] IJ	[ ] Fem	[ ] SC	Placed:   [ ] Arterial Line		[ ] R	[ ] L	[ ] Fem	[ ] Rad	[ ] Ax	Placed:   [ ] PICC:					[ ] Mediport  [ ] Urinary Catheter,  Date Placed:   [ ] Chest tube: [ ] Right, [ ] Left  [ ] JANETH/Byron Drains

## 2021-08-15 NOTE — PROGRESS NOTE ADULT - SUBJECTIVE AND OBJECTIVE BOX
T(C): 36.3 (08-15-21 @ 06:08), Max: 36.3 (08-15-21 @ 06:08)  HR: 91 (08-15-21 @ 06:08) (84 - 91)  BP: 116/60 (08-15-21 @ 06:08) (95/54 - 116/60)  RR: 18 (08-15-21 @ 06:08) (18 - 18)  SpO2: --      Patient was stable overnight and expresses no new complaints     PE:    Alert   LUNGS- clear  COR- RRR  ABD- SOFT, NT  EXTR- w/o edema  NEURO- stable    08-14    136  |  102  |  10  ----------------------------<  114<H>  4.2   |  23  |  <0.5<L>    Ca    8.0<L>      14 Aug 2021 08:37  Phos  2.8     08-14  Mg     1.6     08-14    TPro  4.3<L>  /  Alb  2.4<L>  /  TBili  0.4  /  DBili  0.2  /  AST  17  /  ALT  11  /  AlkPhos  398<H>  08-14                            10.9   12.13 )-----------( 321      ( 14 Aug 2021 08:37 )             35.4

## 2021-08-15 NOTE — PROGRESS NOTE ADULT - ASSESSMENT
ASSESSMENT:  75 y/o F s/p whipple procedure POD #13. Patient currently in rehab.  Pain is well controlled, she is ambulating, tolerating soft diet  WBC increased from 9 to 12. patient started on Vantin and Flagyl.    PLAN:  - trend CBC  - continue antibiotics as needed  - follow up janeth output  - will continue to follow    spectra [phone]

## 2021-08-15 NOTE — PROGRESS NOTE ADULT - ASSESSMENT
Rehab of debilitation / s/p [facility]    Continue acute rehab program.

## 2021-08-16 NOTE — PROGRESS NOTE ADULT - ASSESSMENT
75 y/o F s/p whipple procedure POD #14. Patient currently in rehab.  Pain is well controlled, she is ambulating, tolerating soft diet  WBC decreased to 7 from 6 from 12.  patient started on Vantin and Flagyl.    PLAN:  - trend CBC  - continue antibiotics as needed  - follow up janeth output  - will continue to follow    spectra [phone] 77 y/o F s/p whipple procedure POD #14. Patient currently in rehab.  Pain is well controlled, she is ambulating, tolerating soft diet  WBC decreased to 7 from 6 from 12.  patient started on Vantin and Flagyl.    PLAN:  - trend CBC  - continue antibiotics as needed  - follow up janeth output  - please change dressing around JANETH drain 1-2 times a day, with gauze and tape, avoid bulky dressings. please strip drain with dressing change so it doesn't get clogged  - restart home lasix  - will continue to follow    spectra [phone]

## 2021-08-16 NOTE — PROGRESS NOTE ADULT - ASSESSMENT
ASSESSMENT/PLAN    Rehab of DEBILITY 2/2 pancreatic cancer/pancreatoduodenectomy  -Patient requires and to receive 3 hours of therapy daily with 90 minuites of PT and 90 minutes of OT for at least 5-6 days within a 7 day week period.  -Pain control  -Surgery team to change dressing and ISABELL drain removal when time.     #  Atrial fibrillation   - Monitor vitals   -Diltiazem 90mg [id number]   - Discuss risks and benefits of A/C with Cardiologist and Surgical team    # Urinary retention/ Overflow Incontinence  - Indwelling ariza catheter placed  -Tamsulosin 0.4mg daily qhs  - will give TOV and straight cath as needed    -Pain control: Oxycodone IR 5mg q6 hours PRN for severe pain, Tylenol 650mg [id number]     -GI/Bowel Mgmt: Stool Softeners As needed    - Anemia: likely chronic disease and acute loss. Monitor    - HTN: monitor     -Skin:  - ISABELL drain on right lateral abdominal wall and midline of anterior abdomen  - monitor incision    -FEN   Monitor lytes    - Diet:  Dysphagia 2 Mechanical Soft with Thin Liquids     Precautions / PROPHYLAXIS:    - Falls  - Ortho: Weight bearing status: WBAT  - DVT prophylaxis: Heparin 5000 units [id number] + SCDS ASSESSMENT/PLAN    Rehab of DEBILITY 2/2 pancreatic cancer/pancreatoduodenectomy  -Patient requires and to receive 3 hours of therapy daily with 90 minuites of PT and 90 minutes of OT for at least 5-6 days within a 7 day week period.  -Pain control  -Surgery team to change dressing and ISABELL drain removal when time.     # Chronic Atrial fibrillation   - Monitor vitals   -Diltiazem 90mg [id number]   - Discuss risks and benefits of A/C with Cardiologist and Surgical team    # Urinary retention/ Overflow Incontinence  - Indwelling ariza catheter placed  -Tamsulosin 0.4mg daily qhs  - will give TOV and straight cath as needed    -Pain control: Oxycodone IR 5mg q6 hours PRN for severe pain, Tylenol 650mg [id number]     -GI/Bowel Mgmt: Stool Softeners As needed    - Anemia: likely chronic disease and acute loss. Monitor    - HTN: monitor     -Skin:  - ISABELL drain on right lateral abdominal wall and midline of anterior abdomen  - monitor incision    -FEN   Monitor lytes    - Diet:  Dysphagia 2 Mechanical Soft with Thin Liquids     Precautions / PROPHYLAXIS:    - Falls  - Ortho: Weight bearing status: WBAT  - DVT prophylaxis: Heparin 5000 units [id number] + SCDS

## 2021-08-16 NOTE — PROGRESS NOTE ADULT - SUBJECTIVE AND OBJECTIVE BOX
Hospital Day #: 15  Post-Op Day #: 14    Procedure/Dx/Injuries:  whipple procedure    Events of past 24 hours: WBC decreased to 7. Pain is well controlled, she is ambulating, tolerating soft diet      Vitals:   T(F): 96.9 (08-16-21 @ 06:42), Max: 97.3 (08-15-21 @ 12:59)  HR: 85 (08-16-21 @ 06:42)  BP: 115/54 (08-16-21 @ 06:42)  RR: 18 (08-16-21 @ 06:42)  SpO2: --      Diet, Soft:      Qty per Day:  YOGURT WITH MEALS     Qty per Day:  PLEASE      Fluids:     I & O's:    08-15-21 @ 07:01  -  08-16-21 @ 07:00  --------------------------------------------------------  IN:    Oral Fluid: 240 mL  Total IN: 240 mL    OUT:    Bulb (mL): 120 mL  Total OUT: 120 mL    Total NET: 120 mL        Bowel Movement: : [x] YES [] NO  Flatus: : [x] YES [] NO    PHYSICAL EXAM:  General Appearance: NAD  HEENT: EOMI, sclera non-icteric.  Heart: RRR   Lungs: No increased work of breathing or accessory muscle use. Symmetric chest wall rise and fall.   Abdomen:  Soft, nontender, nondistended.   MSK/Extremities: Warm & well-perfused.   Skin: Warm, dry. No jaundice.   Incision/wound: healing well, dressings in place, clean, dry and intact    MEDICATIONS  (STANDING):  acetaminophen   Tablet .. 650 milliGRAM(s) Oral every 6 hours  cefpodoxime 200 milliGRAM(s) Oral every 12 hours  diltiazem    Tablet 90 milliGRAM(s) Oral every 6 hours  heparin   Injectable 5000 Unit(s) SubCutaneous every 12 hours  lactobacillus acidophilus 1 Tablet(s) Oral two times a day with meals  metoclopramide 5 milliGRAM(s) Oral three times a day before meals  metroNIDAZOLE    Tablet 500 milliGRAM(s) Oral every 8 hours  pantoprazole    Tablet 40 milliGRAM(s) Oral before breakfast  saccharomyces boulardii 250 milliGRAM(s) Oral two times a day  tamsulosin 0.4 milliGRAM(s) Oral at bedtime    MEDICATIONS  (PRN):  oxyCODONE    IR 5 milliGRAM(s) Oral every 6 hours PRN Severe Pain (7 - 10)      DVT PROPHYLAXIS: heparin   Injectable 5000 Unit(s) SubCutaneous every 12 hours    GI PROPHYLAXIS: pantoprazole    Tablet 40 milliGRAM(s) Oral before breakfast    ANTICOAGULATION:   ANTIBIOTICS:  cefpodoxime 200 milliGRAM(s)  metroNIDAZOLE    Tablet 500 milliGRAM(s)            LAB/STUDIES:                        11.1   7.17  )-----------( 328      ( 16 Aug 2021 04:30 )             35.7            Magnesium, Serum: 1.9 mg/dL (08-16-21 @ 04:30)        BLUE TEAM SPECTRA #[id number]    ACCESS/ DEVICES:  [x ] Peripheral IV  [x ] ISABELL/Byron Drains

## 2021-08-16 NOTE — PROGRESS NOTE ADULT - SUBJECTIVE AND OBJECTIVE BOX
Patient is a 76y old  Female who presents with a chief complaint of Rehab of Debility 2/2 Pancreatic Cancer/ Pancreaticoduodenectomy (16 Aug 2021 08:40)      HPI:  Patient is a 75 year old female patient who has a PMHx of pancreatic ca (March 2021, s/p Chemo 2 rounds 6/2021 via R Subclavian chemo port),  Afib on eliquis ( Cardiologist VEE Medrano ), HTN, Varicose veins in bilateral lower extremities, Nephrolithiasis, and being COVID + on 5/4/2021 via PCR with self quarantine till 5/14/2021. The patient originally presented to the ED March 2021 for yellow discoloration of the skin as her daughter was insisting she go to the hospital for having yellow colored skin and urine for days. Patient started to notice this Jaundice in December 2020. It was associated with dark urine and light colored stool. Patient went to her PMD who treated her for UTI by prescribing an antibiotic; but she had no relief. Patient states she then went to her cardiologist for her stress test and he noticed that she looks jaundiced and referred her to Dr. Grayson (Gastroenterologist) who performed an endoscopy in March 2021 which was normal. However, during a visit to the ED in 7/2021 she had a CT abdomen and Pelvis performed which demonstrated; severe intrahepatic and extrahepatic biliary ductal dilatation. Pancreatic head/neck is ill-defined, raising suspicion for underlying ill-defined mass.  GI EUS 3/2021-EUS with FNA of Pancreatic Mass, and ERCP with sphincterotomy and placement of a large plastic CBD stent. pathology significant for adenocarcinoma  Patient seen by surg-onc and underwent pancreaticoduodenectomy with Dr. Nino on 8/2/21 . Patient tolerated the procedure well received 2 units of pRBC, 500mL of Albumin, and 4L of Crystalloid IVF. Patient was extubated brought to PACU. SICU consulted in the setting of extensive length of surgery, hemodynamic monitoring and known afib.  Patient was also evaluated by podiatry for chronic feet ulcer, She was downgraded to floor 8/5/21. On 8/13/2021 patient verbalized no complaints and states she is tolerating her diet well and voided via smalls catheter.    She is tolerating bedside PT and OT and is able to perform bed mobility with moderate assistance using bed rails and demonstrated decreased strength and impaired balance. Additionally, she was minimum assist for transfer from sit to stand and rolling walking to bed transfers. She ambulated 10 feet twice with a RW and contact guard while demonstrating decreased sean;  decreased step length;  decreased strength;  pain;  decreased endurance     She was evaluated by the rehab team/ Physiatrist,  and she is deemed to be a good candidate for acute inpatient rehab admission to .   (13 Aug 2021 17:19)      I examined the patient and reviewed the chart. There have been no significant changes since my history and physical except where documented below.    TODAY'S SUBJECTIVE & REVIEW OF SYMPTOMS:    Constiutional:    [   ] WNL           [   ] poor appetite   [   ] insomnia   [  x ] tired    [  ]  Lethargy   Cardio:                [ X ] WNL           [   ] CP   [   ] BARAJAS   [   ] palpitations               Resp:                   [ X ] WNL           [   ] SOB   [   ] cough   [   ] wheezing   GI:                        [  X ] WNL           [   ] constipation   [   ] diarrhea   [   ] abdominal pain   [   ] nausea   [   ] emesis                                :                      [   ] WNL           [ X ] SMALLS  [   ] dysuria   [ X ] difficulty voiding             Endo:                   [  X ] WNL          [   ] polyuria   [   ] temperature intolerance                 Skin:                     [   ] WNL          [   ] pain   [  X  ] wound   [   ] rash   MSK:                    [ X  ] WNL          [   ] muscle pain   [   ] joint pain/ stiffness   [   ] muscle tenderness   [   ] swelling   Neuro:                 [X ] WNL          [   ] HA   [   ] change in vision   [   ] tremor   [   ] weakness   [   ]dysphagia              Cognitive:           [ X ] WNL           [   ]confusion      Psych:                  [ X ] WNL           [   ] hallucinations   [   ]agitation   [   ] delusion   [   ]depression      PHYSICAL EXAM    Vital Signs Last 24 Hrs  T(C): 36.1 (16 Aug 2021 06:42), Max: 36.2 (15 Aug 2021 20:45)  T(F): 96.9 (16 Aug 2021 06:42), Max: 97.1 (15 Aug 2021 20:45)  HR: 95 (16 Aug 2021 12:23) (85 - 95)  BP: 108/55 (16 Aug 2021 12:23) (105/56 - 115/54)  BP(mean): --  RR: 18 (16 Aug 2021 06:42) (18 - 18)  SpO2: --    General:[ X  ] NAD, Resting Comfortable,   [   ] other:                                HEENT: [   ] NC/AT, EOMI, PERRL , Normal Conjunctivae,   [ X  ] other: Slightly jaundice colored sclera.   Cardio: [   ] RRR, no murmer,   [ X  ] other:   Irregularly irregular HR with no murmurs, gallops, or rubs                            Pulm: [  X ] No Respiratory Distress,  Lungs CTAB,   [   ] other:                       Abdomen: [   ]ND/NT, Soft,   [  X ] other:  Right lateral ISABELL drain noted to have clear fluid of 25cc and midline surgical incision, Soft, NOn-Tender, decreased Bowel sounds, and no rigidity or guarding  : [   ] NO SMALLS CATHETER, [  X ] SMALLS CATHETER- no meatal tear, no discharge, [   ] other:                                            MSK: [ X  ] No joint swelling, Full ROM,   [   ] other:                                         Ext: [   ]No C/C/E, No calf tenderness,   [ X  ]other:  B/L LE varicose veins  Skin: [   ]intact,   [  X ] other:  B/L LE varicose veins & Right lateral ISABELL drain and midline surgical incision.    Neurological Examination:  Cognitive: [  X  ] AAO x 3,   [    ]  other:                                                                      Attention:  [  X  ] intact,   [    ]  other:                            Memory: [    ] intact,    [  X  ]  other:   Recalled only 2 of 3 words with delayed recall  Mood/Affect: [  X  ] wnl,    [    ]  other:                                                                             Communication: [ X   ]Fluent, no dysarthria, following commands:  [    ] other:   CN II - XII:  [  X ] intact,  [    ] other:                                                                                        Motor:   RIGHT UE: [   ] WNL,  [ X  ] other: 4+/5  LEFT    UE: [   ] WNL,  [ X  ] other: 4+/5  RIGHT LE: [   ] WNL,  [  X ] other: 4/5 prox, 5/5 distally  LEFT    LE: [   ] WNL,  [ X  ] other:4/5 prox, 5/5 distally    Tone: [  X  ] wnl,   [    ]  other:  DTRs: [  X ]symmetric, [   ] other:  Coordination:   [ X   ] intact,   [    ] other:                                                                           Sensory: [  X  ] Intact to light touch,   [    ] other:                          11.1   7.17  )-----------( 328      ( 16 Aug 2021 04:30 )             35.7     08-16    137  |  101  |  7<L>  ----------------------------<  114<H>  4.5   |  25  |  <0.5<L>    Ca    7.9<L>      16 Aug 2021 04:30  Phos  3.2     08-16  Mg     1.9     08-16    TPro  4.5<L>  /  Alb  2.5<L>  /  TBili  0.3  /  DBili  <0.2  /  AST  23  /  ALT  13  /  AlkPhos  452<H>  08-16        MEDICATIONS  (STANDING):  acetaminophen   Tablet .. 650 milliGRAM(s) Oral every 6 hours  cefpodoxime 200 milliGRAM(s) Oral every 12 hours  diltiazem    Tablet 90 milliGRAM(s) Oral every 6 hours  furosemide    Tablet 20 milliGRAM(s) Oral daily  heparin   Injectable 5000 Unit(s) SubCutaneous every 12 hours  lactobacillus acidophilus 1 Tablet(s) Oral two times a day with meals  metoclopramide 5 milliGRAM(s) Oral three times a day before meals  metroNIDAZOLE    Tablet 500 milliGRAM(s) Oral every 8 hours  pantoprazole    Tablet 40 milliGRAM(s) Oral before breakfast  saccharomyces boulardii 250 milliGRAM(s) Oral two times a day  tamsulosin 0.4 milliGRAM(s) Oral at bedtime    MEDICATIONS  (PRN):  oxyCODONE    IR 5 milliGRAM(s) Oral every 6 hours PRN Severe Pain (7 - 10)

## 2021-08-17 NOTE — PROGRESS NOTE ADULT - ASSESSMENT
75 y/o F s/p whipple procedure POD #15. Patient currently in rehab.  Pain is well controlled, she is ambulating, tolerating soft diet    PLAN:  - trend CBC  - continue antibiotics as needed  - follow up janeth output  - please change dressing around JANETH drain 1-2 times a day, with gauze and tape, avoid bulky dressings. please strip drain with dressing change so it doesn't get clogged. if you have any questions please call  - restarted home Lasix  - will continue to follow    spectra [phone]

## 2021-08-17 NOTE — PROGRESS NOTE ADULT - SUBJECTIVE AND OBJECTIVE BOX
Hospital Day #: 16  Post-Op Day #: 15    Procedure/Dx/Injuries:  whipple procedure    Events of past 24 hours: Patient fell when on the commode, no acute injuries. feeling much better. drain with minimal output.      Vitals:   T(F): 97.1 (08-17-21 @ 05:51), Max: 97.1 (08-17-21 @ 05:51)  HR: 91 (08-17-21 @ 05:51)  BP: 136/75 (08-17-21 @ 05:51)  RR: 20 (08-17-21 @ 05:51)  SpO2: --      Diet, Dysphagia 2 Mechanical Soft-Thin Liquids:   Fiber/Residue Restricted  No Beef  No Carbonated Beverages  No Chocolate  No Fish  Beneprotein ([facility] Only)     Qty per Day:  2 each meal  Prosource Gelatein 20 Sugar Free     Qty per Day:  3     Qty per Day:  magic cup(van)x3/day  Supplement Feeding Modality:  Oral      Fluids:     I & O's:    08-16-21 @ 07:01  -  08-17-21 @ 07:00  --------------------------------------------------------  IN:    Oral Fluid: 460 mL  Total IN: 460 mL    OUT:    Blood Loss (mL): 15 mL    Bulb (mL): 95 mL  Total OUT: 110 mL    Total NET: 350 mL        Bowel Movement: : [x] YES [] NO  Flatus: : [x] YES [] NO    PHYSICAL EXAM:  General Appearance: NAD  HEENT: EOMI, sclera non-icteric.  Heart: RRR   Lungs: No increased work of breathing or accessory muscle use. Symmetric chest wall rise and fall.   Abdomen:  Soft, nontender, nondistended.   MSK/Extremities: Warm & well-perfused.   Skin: Warm, dry. No jaundice.   Incision/wound: healing well, dressings in place, clean, dry and intact. ISABELL drain with minimal serous output    MEDICATIONS  (STANDING):  acetaminophen   Tablet .. 650 milliGRAM(s) Oral every 6 hours  cefpodoxime 200 milliGRAM(s) Oral every 12 hours  diltiazem    Tablet 90 milliGRAM(s) Oral every 6 hours  furosemide    Tablet 20 milliGRAM(s) Oral daily  heparin   Injectable 5000 Unit(s) SubCutaneous every 12 hours  lactobacillus acidophilus 1 Tablet(s) Oral two times a day with meals  metoclopramide 5 milliGRAM(s) Oral three times a day before meals  metroNIDAZOLE    Tablet 500 milliGRAM(s) Oral every 8 hours  pantoprazole    Tablet 40 milliGRAM(s) Oral before breakfast  saccharomyces boulardii 250 milliGRAM(s) Oral two times a day  tamsulosin 0.4 milliGRAM(s) Oral at bedtime    MEDICATIONS  (PRN):  oxyCODONE    IR 5 milliGRAM(s) Oral every 6 hours PRN Severe Pain (7 - 10)      DVT PROPHYLAXIS: heparin   Injectable 5000 Unit(s) SubCutaneous every 12 hours    GI PROPHYLAXIS: pantoprazole    Tablet 40 milliGRAM(s) Oral before breakfast    ANTICOAGULATION:   ANTIBIOTICS:  cefpodoxime 200 milliGRAM(s)  metroNIDAZOLE    Tablet 500 milliGRAM(s)            LAB/STUDIES:                     11.1   7.17  )-----------( 328      ( 16 Aug 2021 04:30 )             35.7         08-16    137  |  101  |  7<L>  ----------------------------<  114<H>  4.5   |  25  |  <0.5<L>          LFTs:          4.5  | 0.3  | 23       ------------------[452     ( 16 Aug 2021 04:30 )  2.5  | <0.2 | 13              BLUE TEAM SPECTRA #[id number]    ACCESS/ DEVICES:  [x ] Peripheral IV  [x ] ISABELL/Byron Drains

## 2021-08-17 NOTE — PROGRESS NOTE ADULT - SUBJECTIVE AND OBJECTIVE BOX
Patient is a 76y old  Female who presents with a chief complaint of Rehab of Debility 2/2 Pancreatic Cancer/ Pancreaticoduodenectomy      HPI:  Patient is a 75 year old female patient who has a PMHx of pancreatic ca (March 2021, s/p Chemo 2 rounds 6/2021 via R Subclavian chemo port),  Afib on eliquis ( Cardiologist VEE Medrano ), HTN, Varicose veins in bilateral lower extremities, Nephrolithiasis, and being COVID + on 5/4/2021 via PCR with self quarantine till 5/14/2021. The patient originally presented to the ED March 2021 for yellow discoloration of the skin as her daughter was insisting she go to the hospital for having yellow colored skin and urine for days. Patient started to notice this Jaundice in December 2020. It was associated with dark urine and light colored stool. Patient went to her PMD who treated her for UTI by prescribing an antibiotic; but she had no relief. Patient states she then went to her cardiologist for her stress test and he noticed that she looks jaundiced and referred her to Dr. Grayson (Gastroenterologist) who performed an endoscopy in March 2021 which was normal. However, during a visit to the ED in 7/2021 she had a CT abdomen and Pelvis performed which demonstrated; severe intrahepatic and extrahepatic biliary ductal dilatation. Pancreatic head/neck is ill-defined, raising suspicion for underlying ill-defined mass.  GI EUS 3/2021-EUS with FNA of Pancreatic Mass, and ERCP with sphincterotomy and placement of a large plastic CBD stent. pathology significant for adenocarcinoma  Patient seen by surg-onc and underwent pancreaticoduodenectomy with Dr. Nino on 8/2/21 . Patient tolerated the procedure well received 2 units of pRBC, 500mL of Albumin, and 4L of Crystalloid IVF. Patient was extubated brought to PACU. SICU consulted in the setting of extensive length of surgery, hemodynamic monitoring and known afib.  Patient was also evaluated by podiatry for chronic feet ulcer, She was downgraded to floor 8/5/21. On 8/13/2021 patient verbalized no complaints and states she is tolerating her diet well and voided via smalls catheter.    She is tolerating bedside PT and OT and is able to perform bed mobility with moderate assistance using bed rails and demonstrated decreased strength and impaired balance. Additionally, she was minimum assist for transfer from sit to stand and rolling walking to bed transfers. She ambulated 10 feet twice with a RW and contact guard while demonstrating decreased sean;  decreased step length;  decreased strength;  pain;  decreased endurance     She was evaluated by the rehab team/ Physiatrist,  and she is deemed to be a good candidate for acute inpatient rehab admission to .    I examined the patient and reviewed the chart. There have been no significant changes since my history and physical except where documented below.    Today the patient was seen by the rehab team during rounds and had no acute complaints or concerns at this time other than that her right ISABELL drain (15cc output today) dressing was wet. RN was informed and dressing change was performed.     TODAY'S SUBJECTIVE & REVIEW OF SYMPTOMS:    Constiutional:    [   ] WNL           [   ] poor appetite   [   ] insomnia   [  x ] tired    [  ]  Lethargy   Cardio:                [ X ] WNL           [   ] CP   [   ] BARAJAS   [   ] palpitations               Resp:                   [ X ] WNL           [   ] SOB   [   ] cough   [   ] wheezing   GI:                        [  X ] WNL           [   ] constipation   [   ] diarrhea   [   ] abdominal pain   [   ] nausea   [   ] emesis                                :                      [   ] WNL           [ X ] SMALLS  [   ] dysuria   [ X ] difficulty voiding             Endo:                   [  X ] WNL          [   ] polyuria   [   ] temperature intolerance                 Skin:                     [   ] WNL          [   ] pain   [  X  ] wound   [   ] rash   MSK:                    [ X  ] WNL          [   ] muscle pain   [   ] joint pain/ stiffness   [   ] muscle tenderness   [   ] swelling   Neuro:                 [X ] WNL          [   ] HA   [   ] change in vision   [   ] tremor   [   ] weakness   [   ]dysphagia              Cognitive:           [ X ] WNL           [   ]confusion      Psych:                  [ X ] WNL           [   ] hallucinations   [   ]agitation   [   ] delusion   [   ]depression      PHYSICAL EXAM    Vital Signs Last 24 Hrs  T(C): 36.2 (17 Aug 2021 12:51), Max: 36.2 (17 Aug 2021 05:51)  T(F): 97.1 (17 Aug 2021 12:51), Max: 97.1 (17 Aug 2021 05:51)  HR: 94 (17 Aug 2021 12:51) (78 - 94)  BP: 114/65 (17 Aug 2021 12:51) (113/58 - 136/75)  BP(mean): --  RR: 18 (17 Aug 2021 12:51) (18 - 20)  SpO2: --    General:[ X  ] NAD, Resting Comfortable,   [   ] other:                                HEENT: [   ] NC/AT, EOMI, PERRL , Normal Conjunctivae,   [ X  ] other: Slightly jaundice colored sclera.   Cardio: [   ] RRR, no murmer,   [ X  ] other:   Irregularly irregular HR with no murmurs, gallops, or rubs                            Pulm: [  X ] No Respiratory Distress,  Lungs CTAB,   [   ] other:                       Abdomen: [   ]ND/NT, Soft,   [  X ] other:  Right lateral ISABELL drain noted to have clear fluid of 15cc and midline surgical incision, Soft, NOn-Tender, decreased Bowel sounds, and no rigidity or guarding  : [   ] NO SMALLS CATHETER, [  X ] SMLALS CATHETER- no meatal tear, no discharge, [   ] other:                                            MSK: [ X  ] No joint swelling, Full ROM,   [   ] other:                                         Ext: [   ]No C/C/E, No calf tenderness,   [ X  ]other:  B/L LE varicose veins  Skin: [   ]intact,   [  X ] other:  B/L LE varicose veins & Right lateral ISABELL drain and midline surgical incision.    Neurological Examination:  Cognitive: [  X  ] AAO x 3,   [    ]  other:                                                                      Attention:  [  X  ] intact,   [    ]  other:                            Memory: [    ] intact,    [  X  ]  other:   Recalled only 2 of 3 words with delayed recall  Mood/Affect: [  X  ] wnl,    [    ]  other:                                                                             Communication: [ X   ]Fluent, no dysarthria, following commands:  [    ] other:   CN II - XII:  [  X ] intact,  [    ] other:                                                                                        Motor:   RIGHT UE: [   ] WNL,  [ X  ] other: 4+/5  LEFT    UE: [   ] WNL,  [ X  ] other: 4+/5  RIGHT LE: [   ] WNL,  [  X ] other: 4/5 prox, 5/5 distally  LEFT    LE: [   ] WNL,  [ X  ] other:4/5 prox, 5/5 distally    Tone: [  X  ] wnl,   [    ]  other:  DTRs: [  X ]symmetric, [   ] other:  Coordination:   [ X   ] intact,   [    ] other:                                                                           Sensory: [  X  ] Intact to light touch,   [    ] other:                          11.1   7.17  )-----------( 109      ( 16 Aug 2021 04:30 )             35.7     08-16    137  |  101  |  7<L>  ----------------------------<  114<H>  4.5   |  25  |  <0.5<L>    Ca    7.9<L>      16 Aug 2021 04:30  Phos  3.2     08-16  Mg     1.9     08-16    TPro  4.5<L>  /  Alb  2.5<L>  /  TBili  0.3  /  DBili  <0.2  /  AST  23  /  ALT  13  /  AlkPhos  452<H>  08-16      MEDICATIONS  (STANDING):  acetaminophen   Tablet .. 650 milliGRAM(s) Oral every 6 hours  cefpodoxime 200 milliGRAM(s) Oral every 12 hours  diltiazem    Tablet 90 milliGRAM(s) Oral every 6 hours  furosemide    Tablet 20 milliGRAM(s) Oral daily  heparin   Injectable 5000 Unit(s) SubCutaneous every 12 hours  lactobacillus acidophilus 1 Tablet(s) Oral two times a day with meals  metoclopramide 5 milliGRAM(s) Oral three times a day before meals  metroNIDAZOLE    Tablet 500 milliGRAM(s) Oral every 8 hours  pantoprazole    Tablet 40 milliGRAM(s) Oral before breakfast  saccharomyces boulardii 250 milliGRAM(s) Oral two times a day  tamsulosin 0.4 milliGRAM(s) Oral at bedtime    MEDICATIONS  (PRN):  oxyCODONE    IR 5 milliGRAM(s) Oral every 6 hours PRN Severe Pain (7 - 10)

## 2021-08-17 NOTE — PROGRESS NOTE ADULT - ASSESSMENT
ASSESSMENT/PLAN    Rehab of DEBILITY 2/2 pancreatic cancer/pancreatoduodenectomy  -Patient requires and to receive 3 hours of therapy daily with 90 minuites of PT and 90 minutes of OT for at least 5-6 days within a 7 day week period.  -Pain control  -Surgery team to change dressing and ISABELL drain removal when time.     # Chronic Atrial fibrillation   - Monitor vitals   -Diltiazem 90mg [id number]   - Discuss risks and benefits of A/C with Cardiologist and Surgical team    # Urinary retention/ Overflow Incontinence  - Indwelling ariza catheter placed  -Tamsulosin 0.4mg daily qhs  - TOV passed    -Pain control: Oxycodone IR 5mg q6 hours PRN for severe pain, Tylenol 650mg [id number]     -GI/Bowel Mgmt: Stool Softeners As needed    - Anemia: likely chronic disease and acute loss. Monitor    - HTN: monitor     -Skin:  - ISABELL drain on right lateral abdominal wall and midline of anterior abdomen  - monitor incision    -FEN   Monitor lytes    - Diet:  Dysphagia 2 Mechanical Soft with Thin Liquids     Precautions / PROPHYLAXIS:    - Falls  - Ortho: Weight bearing status: WBAT  - DVT prophylaxis: Heparin 5000 units [id number] + SCDS

## 2021-08-18 NOTE — PROGRESS NOTE ADULT - ASSESSMENT
ASSESSMENT/PLAN    Rehab of DEBILITY 2/2 pancreatic cancer/pancreatoduodenectomy  -Patient requires and to receive 3 hours of therapy daily with 90 minuites of PT and 90 minutes of OT for at least 5-6 days within a 7 day week period.  -Pain control  -Surgery team to change dressing and ISABELL drain removal when time.     # Chronic Atrial fibrillation   - Monitor vitals   -Diltiazem 90mg [id number]   - Discuss risks and benefits of A/C with Cardiologist and Surgical team    # Urinary retention/ Overflow Incontinence  - Indwelling ariza catheter placed  -Tamsulosin 0.4mg daily qhs  - TOV passed    -Pain control: Oxycodone IR 5mg q6 hours PRN for severe pain, Tylenol 650mg [id number]     -GI/Bowel Mgmt: Stool Softeners As needed    - Anemia: likely chronic disease and acute loss. Monitor    - HTN: monitor     -Skin:  Midline vertical incision has staples removed and is C/D with intact skin & without any openings.    -FEN   Monitor lytes    - Diet:  Dysphagia 2 Mechanical Soft with Thin Liquids     Precautions / PROPHYLAXIS:    - Falls  - Ortho: Weight bearing status: WBAT  - DVT prophylaxis: Heparin 5000 units [id number] + SCDS

## 2021-08-18 NOTE — CONSULT NOTE ADULT - ASSESSMENT
CURRENT COGNITIVE FUNCTIONING  Orientation: to Self / Place / Date: Month-Date-Year-Day / Time / Event   Cognitive Domain 	Qualitative Range	Comments   	WFL	Mild 	Mod	Severe	   Attention	X	 	 	 	   Processing Speed	X	 	 	 	   Memory                         			X		0/4 spontaneous; 1/4 with semantic cues and 1/4 with multiple choice  Language                    Comprehension    Expression      Repetition		 	 	 	   	X	 	 	 	   	X				   	X				  Visuo-spatial /Constructional	X	 	 	 	  Executive Functioning	X	 	 	 	  Judgment	X	 	 	 	  WFL per treatment team for daily living activities.    Cognition: ? Baseline Functioning per patient, will follow up with family.  Diagnostic Impression:   R/O  Mild Neurocognitive Disorder Unspecified  secondary to medical issues/chemotherapy    TREATMENT PLAN AND DURATION:     ? Neuropsychology services recommended 1-2 / 2-3 times weekly 30 min - 120 min    TREATMENT GOALS:  Family Contact and Education  Cognitive Evaluation if indicated based on family contact    All Goals Mutually agreed upon with Patient: ? Yes             Prognosis: Good  Special Considerations: ? None Indicated

## 2021-08-18 NOTE — PROGRESS NOTE ADULT - SUBJECTIVE AND OBJECTIVE BOX
Patient is a 76y old  Female who presents with a chief complaint of Rehab of Debility 2/2 Pancreatic Cancer/ Pancreaticoduodenectomy    HPI:  Patient is a 75 year old female patient who has a PMHx of pancreatic ca (March 2021, s/p Chemo 2 rounds 6/2021 via R Subclavian chemo port),  Afib on eliquis ( Cardiologist VEE Medrano ), HTN, Varicose veins in bilateral lower extremities, Nephrolithiasis, and being COVID + on 5/4/2021 via PCR with self quarantine till 5/14/2021. The patient originally presented to the ED March 2021 for yellow discoloration of the skin as her daughter was insisting she go to the hospital for having yellow colored skin and urine for days. Patient started to notice this Jaundice in December 2020. It was associated with dark urine and light colored stool. Patient went to her PMD who treated her for UTI by prescribing an antibiotic; but she had no relief. Patient states she then went to her cardiologist for her stress test and he noticed that she looks jaundiced and referred her to Dr. Grayson (Gastroenterologist) who performed an endoscopy in March 2021 which was normal. However, during a visit to the ED in 7/2021 she had a CT abdomen and Pelvis performed which demonstrated; severe intrahepatic and extrahepatic biliary ductal dilatation. Pancreatic head/neck is ill-defined, raising suspicion for underlying ill-defined mass.  GI EUS 3/2021-EUS with FNA of Pancreatic Mass, and ERCP with sphincterotomy and placement of a large plastic CBD stent. pathology significant for adenocarcinoma  Patient seen by surg-onc and underwent pancreaticoduodenectomy with Dr. Nino on 8/2/21 . Patient tolerated the procedure well received 2 units of pRBC, 500mL of Albumin, and 4L of Crystalloid IVF. Patient was extubated brought to PACU. SICU consulted in the setting of extensive length of surgery, hemodynamic monitoring and known afib.  Patient was also evaluated by podiatry for chronic feet ulcer, She was downgraded to floor 8/5/21. On 8/13/2021 patient verbalized no complaints and states she is tolerating her diet well and voided via smalls catheter.    She is tolerating bedside PT and OT and is able to perform bed mobility with moderate assistance using bed rails and demonstrated decreased strength and impaired balance. Additionally, she was minimum assist for transfer from sit to stand and rolling walking to bed transfers. She ambulated 10 feet twice with a RW and contact guard while demonstrating decreased sean;  decreased step length;  decreased strength;  pain;  decreased endurance     She was evaluated by the rehab team/ Physiatrist,  and she is deemed to be a good candidate for acute inpatient rehab admission to .    I examined the patient and reviewed the chart. There have been no significant changes since my history and physical except where documented below.    On 8/17/2021 the patient was seen by the rehab team during rounds and had no acute complaints or concerns at this time.    On 8/18/2021 the patient was seen by the rehab team during rounds and had no acute complaints or concerns at this time; except that the area where her ISABELL Drain was removed from on her RLQ abdomen was slightly wet at the moment. Additionally she stated the surgical team removed the vertical staples on 8/17/2021. RN was informed and dressing change was performed. Additionally, after team meeting Neuropsychology was consulted to help patient with coping with her life after procedure.     TODAY'S SUBJECTIVE & REVIEW OF SYMPTOMS:    Constiutional:    [   ] WNL           [   ] poor appetite   [   ] insomnia   [  x ] tired    [  ]  Lethargy   Cardio:                [ X ] WNL           [   ] CP   [   ] BARAJAS   [   ] palpitations               Resp:                   [ X ] WNL           [   ] SOB   [   ] cough   [   ] wheezing   GI:                        [  X ] WNL           [   ] constipation   [   ] diarrhea   [   ] abdominal pain   [   ] nausea   [   ] emesis                                :                      [   ] WNL           [   ] SMALLS  [   ] dysuria   [ X ] difficulty voiding             Endo:                   [  X ] WNL          [   ] polyuria   [   ] temperature intolerance                 Skin:                     [   ] WNL          [   ] pain   [  X  ] wound   [   ] rash   MSK:                    [ X  ] WNL          [   ] muscle pain   [   ] joint pain/ stiffness   [   ] muscle tenderness   [   ] swelling   Neuro:                 [X ] WNL          [   ] HA   [   ] change in vision   [   ] tremor   [   ] weakness   [   ]dysphagia              Cognitive:           [ X ] WNL           [   ]confusion      Psych:                  [ X ] WNL           [   ] hallucinations   [   ]agitation   [   ] delusion   [   ]depression      PHYSICAL EXAM    Vital Signs Last 24 Hrs  T(C): 35.9 (18 Aug 2021 13:16), Max: 36.1 (17 Aug 2021 22:01)  T(F): 96.6 (18 Aug 2021 13:16), Max: 96.9 (17 Aug 2021 22:01)  HR: 88 (18 Aug 2021 13:16) (88 - 100)  BP: 103/60 (18 Aug 2021 13:16) (103/60 - 131/61)  BP(mean): --  RR: 18 (18 Aug 2021 13:16) (18 - 18)  SpO2: --    General:[ X  ] NAD, Resting Comfortable,   [   ] other:                                HEENT: [ X ] NC/AT, EOMI, PERRL , Normal Conjunctivae,   [   ] other:   Cardio: [   ] RRR, no murmer,   [ X  ] other:   Irregularly irregular HR with no murmurs, gallops, or rubs                            Pulm: [  X ] No Respiratory Distress,  Lungs CTAB,   [   ] other:                       Abdomen: [   ]ND/NT, Soft,   [  X ] other:  Normal bowel sounds auscultated and patient's   : [ X  ] NO SMALLS CATHETER, [   ] SMALLS CATHETER- no meatal tear, no discharge, [   ] other:                                            MSK: [ X  ] No joint swelling, Full ROM,   [   ] other:                                         Ext: [   ]No C/C/E, No calf tenderness,   [ X  ]other:  B/L LE varicose veins  Skin: [   ]intact,   [  X ] other:  B/L LE varicose veins & Right lateral ISABELL drain and midline surgical incision.    Neurological Examination:  Cognitive: [  X  ] AAO x 3,   [    ]  other:                                                                      Attention:  [  X  ] intact,   [    ]  other:                            Memory: [    ] intact,    [  X  ]  other:   Recalled only 2 of 3 words with delayed recall  Mood/Affect: [  X  ] wnl,    [    ]  other:                                                                             Communication: [ X   ]Fluent, no dysarthria, following commands:  [    ] other:   CN II - XII:  [  X ] intact,  [    ] other:                                                                                        Motor:   RIGHT UE: [   ] WNL,  [ X  ] other: 4+/5  LEFT    UE: [   ] WNL,  [ X  ] other: 4+/5  RIGHT LE: [   ] WNL,  [  X ] other: 4/5 prox, 5/5 distally  LEFT    LE: [   ] WNL,  [ X  ] other:4/5 prox, 5/5 distally    Tone: [  X  ] wnl,   [    ]  other:  DTRs: [  X ]symmetric, [   ] other:  Coordination:   [ X   ] intact,   [    ] other:                                                                           Sensory: [  X  ] Intact to light touch,   [    ] other:    Current Level of Function:  Bed Mobility: Touch Assistance   Transfers: Independent   Ambulation: 50 feet with RW and touch assist with reciprocal gait.    MEDICATIONS  (STANDING):  acetaminophen   Tablet .. 650 milliGRAM(s) Oral every 6 hours  cefpodoxime 200 milliGRAM(s) Oral every 12 hours  diltiazem    Tablet 90 milliGRAM(s) Oral every 6 hours  furosemide    Tablet 20 milliGRAM(s) Oral daily  heparin   Injectable 5000 Unit(s) SubCutaneous every 12 hours  lactobacillus acidophilus 1 Tablet(s) Oral two times a day with meals  metoclopramide 5 milliGRAM(s) Oral three times a day before meals  metroNIDAZOLE    Tablet 500 milliGRAM(s) Oral every 8 hours  pantoprazole    Tablet 40 milliGRAM(s) Oral before breakfast  saccharomyces boulardii 250 milliGRAM(s) Oral two times a day  tamsulosin 0.4 milliGRAM(s) Oral at bedtime    MEDICATIONS  (PRN):  oxyCODONE    IR 5 milliGRAM(s) Oral every 6 hours PRN Severe Pain (7 - 10)

## 2021-08-18 NOTE — CONSULT NOTE ADULT - SUBJECTIVE AND OBJECTIVE BOX
PRE-MORBID COGNITIVE AND PSYCHIATRIC FUNCTIONING  Prior Cognitive Functioning: WFL     ADLs: Independent prior  IADLs: Independent with all areas ?Cooks  ? Drives ? Pays Bills ?Manages Medication ? Manages appointments and schedule  Psychiatric History: Denied      Intervention: N/A  Previous Coping: Watches old movies, TV, reads, and finds comfort in [employer].   ETOH use: Denied                 Nicotine Use: Denied                     Drug Use: Denied  CURRENT BEHAVIORAL AND EMOTIONAL FUNCTIONING  Primary Language: English       Used: No        ID #: N/A  Pain: Location: Denied currently, sometimes has pain in the left lower rib area, MD aware  Current Intensity (0-10):   0/10             Intervention: N/A  Arousal Level: Alert    Affect Range:  WFL          Mood Congruent with Affect: Yes   Behavioral Observation: Pleasant and Co-operative         Insight into illness/deficits: Good   Emotional and Behavioral Functioning	Yes	No	Comments  Perceptual/Thought Disturbances		X	  Sadness/Depression		X	  Anxiety		X	  Hopelessness		X	  Suicidal Ideation/Plan 		X	    Current Stressors endorsed: Has been in the hospital since 2nd of August. She is coping better with the cancer diagnosis. She felt that the surgery was successful and felt positive. She is more concerned about world events than her personal health currently. Overall coping within functional limits.

## 2021-08-18 NOTE — PROGRESS NOTE ADULT - ASSESSMENT
75 y/o F s/p whipple procedure POD #16. Patient currently in rehab.  Pain is well controlled, she is ambulating, tolerating soft diet. stalpes removed 8/17 and ISABELL drain    PLAN:  - trend CBC  - continue antibiotics as needed  - please change dressing at least daily and PRN when soaked   - will continue to follow    BLUE TEAM spectra [id number]

## 2021-08-18 NOTE — CHART NOTE - NSCHARTNOTEFT_GEN_A_CORE
Registered Dietitian Follow-Up     Patient Profile Reviewed                           Yes [X]   No []     Nutrition History Previously Obtained        Yes [X]  No []       Pertinent Subjective Information: met pt during therapy, states appetite is much improved, accepting Magic Cup and ProSource Gelatein well, discussed how to properly add BeneProtein to her food, which she verbalized understanding of. No updated preferences reported at this time.     Pertinent Medical Interventions: Rehab of Debility 2/2 Pancreatic Cancer/ Pancreaticoduodenectomy. Post-op day 16. Per surgery progress note: patient seen & examined at bedside. In NAD. Tolerating diet, +ambulation, +BM/gas, voiding feeling well. ISABELL drain  removed yesterday.     Diet order:  Diet, Dysphagia 2 Mechanical Soft-Thin Liquids:   Fiber/Residue Restricted  No Beef  No Carbonated Beverages  No Chocolate  No Fish  Beneprotein ([facility] Only)     Qty per Day:  2 each meal  Prosource Gelatein 20 Sugar Free     Qty per Day:  3     Qty per Day:  magic cup(van)x3/day  Supplement Feeding Modality:  Oral (08-15-21 @ 13:36) [Active]     Anthropometrics:  - Ht.   - Wt.  - %wt change  - BMI  - IBW     Pertinent Lab Data:     Pertinent Meds:     Physical Findings:  - Appearance:  - GI function:  - Tubes:  - Oral/Mouth cavity:  - Skin:     Nutrition Requirements  Weight Used:     Estimated Energy Needs    Continue []  Adjust []  Adjusted Energy Recommendations:   kcal/day        Estimated Protein Needs    Continue []  Adjust []  Adjusted Protein Recommendations:   gm/day        Estimated Fluid Needs        Continue []  Adjust []  Adjusted Fluid Recommendations:   mL/day     Nutrient Intake:        [] Previous Nutrition Diagnosis:            [] Ongoing          [] Resolved    [] No active nutrition diagnosis identified at this time     Nutrition Diagnostic #1  Problem:  Etiology:  Statement:     Nutrition Diagnostic #2  Problem:  Etiology:  Statement:     Nutrition Intervention      Goal/Expected Outcome:     Indicator/Monitoring: Registered Dietitian Follow-Up     Patient Profile Reviewed                           Yes [X]   No []     Nutrition History Previously Obtained        Yes [X]  No []       Pertinent Subjective Information: met pt during therapy, states appetite is much improved, accepting Magic Cup and ProSource Gelatein well, discussed how to properly add BeneProtein to her food, which she verbalized understanding of. No updated preferences reported at this time.     Pertinent Medical Interventions: Rehab of Debility 2/2 Pancreatic Cancer/ Pancreaticoduodenectomy. Post-op day 16. Per surgery progress note: patient seen & examined at bedside. In NAD. Tolerating diet, +ambulation, +BM/gas, voiding feeling well. ISABELL drain  removed yesterday.     Diet order:  Diet, Dysphagia 2 Mechanical Soft-Thin Liquids:   Fiber/Residue Restricted  No Beef  No Carbonated Beverages  No Chocolate  No Fish  Beneprotein ([facility] Only)     Qty per Day:  2 each meal  Prosource Gelatein 20 Sugar Free     Qty per Day:  3     Qty per Day:  magic cup(van)x3/day  Supplement Feeding Modality:  Oral (08-15-21 @ 13:36) [Active]     Anthropometrics:  - Ht. 68 inches  - Wt. UBW 57 kg, CBW 61.2 kg per previous assessment, no updated weight  - IBW 63.6 kg     Pertinent Lab Data: 8/16 H/H 11.1/35.7 L, BUN 7 L, glu 114 H, Ca 7.9 L, alk phos 452 H     Pertinent Meds: heparin, cefpodoxime, flagyl, lasix, lactobacillus acidophilus, Florastor, Reglan, oxycodone     Physical Findings:  - Appearance: up in chair, participating in therapy, A/O x 4  - GI function: no N/V/D/C  - Tubes: ISABELL drain removed yesterday  - Oral/Mouth cavity: good dentition  - Skin: surgical site to abdomen     Nutrition Requirements  Weight Used: UBW 57 kg     Estimated Energy Needs    Continue [X]  Adjust []  Adjusted Energy Recommendations: 2178-7569  kcal/day  MSJ x AF 1.1-1.2      Estimated Protein Needs    Continue [X]  Adjust []  Adjusted Protein Recommendations:  68-80 gm/day  1.2-1.4 g/kg      Estimated Fluid Needs        Continue [X]  Adjust []  Adjusted Fluid Recommendations: 1710  mL/day  30 mL/kg or per LIP     Nutrient Intake: improving per pt        [] Previous Nutrition Diagnosis:            [X] Ongoing          [] Resolved    Nutrition Diagnostic Terminology #1: Inadequate kcal intake     Nutrition Intervention: no further intervention indicated at this time.     Goal/Expected Outcome: meal/supplement intake >50% in 3-5 days     Indicator/Monitoring: meal/supplement intake, diet order, weight, labs

## 2021-08-18 NOTE — PROGRESS NOTE ADULT - SUBJECTIVE AND OBJECTIVE BOX
Hospital Day #: 17  Post-Op Day #: 16    Procedure/Dx/Injuries:  whipple procedure    Events of past 24 hours:  Patient seen & examined at bedside. In NAD. Tolerating diet, +ambulation, +BM/gas, voiding feeling well. ISABELL drain  removed yesterday.        Vitals:   T(F): 96.9 (08-17-21 @ 22:01), Max: 97.1 (08-17-21 @ 05:51)  HR: 100 (08-17-21 @ 22:01)  BP: 130/74 (08-17-21 @ 22:01)  RR: 18 (08-17-21 @ 22:01)  SpO2: --      Diet, Dysphagia 2 Mechanical Soft-Thin Liquids:   Fiber/Residue Restricted  No Beef  No Carbonated Beverages  No Chocolate  No Fish  Beneprotein ([facility] Only)     Qty per Day:  2 each meal  Prosource Gelatein 20 Sugar Free     Qty per Day:  3     Qty per Day:  magic cup(van)x3/day  Supplement Feeding Modality:  Oral      Fluids:     I & O's:    08-16-21 @ 07:01  -  08-17-21 @ 07:00  --------------------------------------------------------  IN:    Oral Fluid: 460 mL  Total IN: 460 mL    OUT:    Blood Loss (mL): 15 mL    Bulb (mL): 95 mL  Total OUT: 110 mL    Total NET: 350 mL        Bowel Movement: : [x] YES [] NO  Flatus: : [x] YES [] NO    PHYSICAL EXAM:  General Appearance: NAD  HEENT: EOMI, sclera non-icteric.  Heart: RRR   Lungs: No increased work of breathing or accessory muscle use. Symmetric chest wall rise and fall.   Abdomen:  Soft, nontender, nondistended.   MSK/Extremities: Warm & well-perfused.   Skin: Warm, dry. No jaundice.   Incision/wound: healing well, dressings in place, clean, dry and intact    MEDICATIONS  (STANDING):  acetaminophen   Tablet .. 650 milliGRAM(s) Oral every 6 hours  cefpodoxime 200 milliGRAM(s) Oral every 12 hours  diltiazem    Tablet 90 milliGRAM(s) Oral every 6 hours  furosemide    Tablet 20 milliGRAM(s) Oral daily  heparin   Injectable 5000 Unit(s) SubCutaneous every 12 hours  lactobacillus acidophilus 1 Tablet(s) Oral two times a day with meals  metoclopramide 5 milliGRAM(s) Oral three times a day before meals  metroNIDAZOLE    Tablet 500 milliGRAM(s) Oral every 8 hours  pantoprazole    Tablet 40 milliGRAM(s) Oral before breakfast  saccharomyces boulardii 250 milliGRAM(s) Oral two times a day  tamsulosin 0.4 milliGRAM(s) Oral at bedtime    MEDICATIONS  (PRN):  oxyCODONE    IR 5 milliGRAM(s) Oral every 6 hours PRN Severe Pain (7 - 10)      DVT PROPHYLAXIS: heparin   Injectable 5000 Unit(s) SubCutaneous every 12 hours    GI PROPHYLAXIS: pantoprazole    Tablet 40 milliGRAM(s) Oral before breakfast    ANTICOAGULATION:   ANTIBIOTICS:  cefpodoxime 200 milliGRAM(s)  metroNIDAZOLE    Tablet 500 milliGRAM(s)            LAB/STUDIES:                        11.1   7.17  )-----------( 328      ( 16 Aug 2021 04:30 )             35.7         08-16    137  |  101  |  7<L>  ----------------------------<  114<H>  4.5   |  25  |  <0.5<L>        LFTs:           4.5  | 0.3  | 23       ------------------[452     ( 16 Aug 2021 04:30 )  2.5  | <0.2 | 13            BLUE [employer] SPECTRA #[id number]    ACCESS/ DEVICES:  [x ] Peripheral IV  [x ] ISABELL/Byron Drains

## 2021-08-18 NOTE — CHART NOTE - NSCHARTNOTEFT_GEN_A_CORE
Letter of medical necessity  for Hospital  Bed       Patient is a 75 year old female patient who has a PMHx of pancreatic ca (March 2021, s/p Chemo 2 rounds 6/2021 via R Subclavian chemo port),  Afib on eliquis  HTN, Varicose veins in bilateral lower extremities, Nephrolithiasis, and being COVID + on 5/4/2021 , She was found to  have Pancreatic Cancer and she underwent Pancreaticoduodenectomy.   SHe is Kyphotic and with sacrum with stage 1 decubiti and weight loss more than 20 lbs   and  also has chronic feet ulcers .   She is in Acute rehab due to debilty from above, unable to ambulate independently  also need  assistance for transfers and bed mobility .  She also need  head of bed elevated  more than 30 degress   most of the time   due to Lingering Sob  from Having Covid Pneumonia recently and  needs increased  respiratory effort  while lying supine  and  In  high risk for aspiration .  She also needs  frequent  positioning  of the body  in ways  not feasible   with an  ordinary bed  due to  sacral decubiti   and Kyphotic spine  to alleviate pain .  Pillows and wedges have been  considered and  ruled out .         Gel overlay mattress -  Patient will need a gel overlay  to help  with healing current  skin breakdown and to prevent further skin breakdown . It is medically  necessary  for patient to receive a hospital bed  and  gel overlay  due to  her  medical diagnosis

## 2021-08-19 NOTE — PROGRESS NOTE ADULT - ASSESSMENT
ASSESSMENT/PLAN    Rehab of DEBILITY 2/2 pancreatic cancer/pancreatoduodenectomy  -Patient requires and to receive 3 hours of therapy daily with 90 minuites of PT and 90 minutes of OT for at least 5-6 days within a 7 day week period.  -Pain control  -Surgery team to change dressing and ISABELL drain removal when time.     # Chronic Atrial fibrillation   - Monitor vitals   -Diltiazem 90mg [id number]   - Discuss risks and benefits of A/C with Cardiologist and Surgical team    # Urinary retention/ Overflow Incontinence  - Indwelling ariza catheter placed  -Tamsulosin 0.4mg daily qhs  - TOV passed    -Pain control: Oxycodone IR 5mg q6 hours PRN for severe pain, Tylenol 650mg [id number]     -GI/Bowel Mgmt: Stool Softeners As needed    - Anemia: likely chronic disease and acute loss. Monitor    - HTN: monitor     -Skin:  Midline vertical incision has staples removed and is C/D with intact skin & without any openings.    -FEN   Monitor lytes    - Diet:  Dysphagia 2 Mechanical Soft with Thin Liquids     Precautions / PROPHYLAXIS:    - Falls  - Ortho: Weight bearing status: WBAT  - DVT prophylaxis: Heparin 5000 units [id number] + SCDS

## 2021-08-19 NOTE — PROGRESS NOTE ADULT - ASSESSMENT
Primary Contact Name: Yuli Roger               Relationship: Daughter    Pertinent Social History: The daughter noted no concerns with cognition, mood, or behavior. Her only concern is about home care after discharge. The daughter indicated that she doesn’t live close enough to consistently assist the patient. Although the patient’s son is closer, the daughter had some reservations due to his neurodevelopmental history. Finding a home care aide was discussed. The daughter plans on reaching out to the  to discuss what the patient’s insurance will cover.    Premorbid Functioning:  ADLs: Independent  IADLs: Independent  Psychiatric History/Treatment: None.  Previous Coping: Family, reading, Advent, and watching old movies  Cognition: WFL  Substance Use: None    Current Status:  Mood Changes: ? Denied    Cognition Changes: ? Denied   Behavior Changes: ? Denied    Patient’s Primary Language: English  a) Changes in understanding primary language after Neurological event. ? No     b) Preferred language for health care information?     Family Education: Daughter was educated about the rehabilitation process, current status, and family education sessions. Discharge planning initiated.    Family Concerns: Having appropriate homecare when the patient is discharged.  Family Goals: To be a close to physically independent as she can before discharge.

## 2021-08-19 NOTE — PROGRESS NOTE ADULT - ASSESSMENT
75 y/o F s/p whipple procedure POD #16. Patient currently in rehab.  Pain is well controlled, she is ambulating, tolerating soft diet. stalpes removed 8/17 and ISABELL drain    PLAN:  - trend CBC  - continue antibiotics as needed  - please change dressing at least daily and PRN when soaked   - will continue to follow  -continue to work with rehab    BLUE TEAM spectra [id number]

## 2021-08-19 NOTE — CHART NOTE - NSCHARTNOTEFT_GEN_A_CORE
Letter of medical necessity  for Manual  wheel chair       Patient is a 75 year old female patient who has a PMH of pancreatic ca (March 2021, s/p Chemo 2 rounds 6/2021 via R Subclavian chemo port),  Afib on Eliquis  HTN, Varicose veins in bilateral lower extremities, Nephrolithiasis, and being COVID + on 5/4/2021.  She was found to  have Pancreatic Cancer and she underwent Pancreaticoduodenectomy.   She is Kyphotic and with sacrum with stage 1 decubiti and weight loss more than 20 lbs   and  also has chronic feet ulcers .   She is in Acute rehab due to debility from above, unable to ambulate independently  also need  assistance for transfers and bed mobility .    Because of Chronic ulcers and debility from cancer and due to recent surgery she has  mobility limitation that significantly  impairs her ability  to participate in  one or more  MRAD L's such as toileting, dressing  bathing  in customary  locations in the home .   Patient's home provides adequate access  between rooms for the use of manual  wheelchair  . The wheel chair will significantly improve her  ability to participate in MRAD L's  and will be used on a regular basis  in the home.  Patient's mobility limitation cannot be resolved by the use of a walker or cane . Patient has sufficient  upper extremity  function to safely propel the manual wheelchair  in the home during a typical day . The patient has agreed to use the manual wheelchair  that  will be provided  in  the home .      It is medically  necessary  for patient to receive a manual wheel chair

## 2021-08-19 NOTE — PROGRESS NOTE ADULT - SUBJECTIVE AND OBJECTIVE BOX
Patient is a 76y old  Female who presents with a chief complaint of Rehab of Debility 2/2 Pancreatic Cancer/ Pancreaticoduodenectomy    HPI:  Patient is a 75 year old female patient who has a PMHx of pancreatic ca (March 2021, s/p Chemo 2 rounds 6/2021 via R Subclavian chemo port),  Afib on eliquis ( Cardiologist VEE Medrano ), HTN, Varicose veins in bilateral lower extremities, Nephrolithiasis, and being COVID + on 5/4/2021 via PCR with self quarantine till 5/14/2021. The patient originally presented to the ED March 2021 for yellow discoloration of the skin as her daughter was insisting she go to the hospital for having yellow colored skin and urine for days. Patient started to notice this Jaundice in December 2020. It was associated with dark urine and light colored stool. Patient went to her PMD who treated her for UTI by prescribing an antibiotic; but she had no relief. Patient states she then went to her cardiologist for her stress test and he noticed that she looks jaundiced and referred her to Dr. Grayson (Gastroenterologist) who performed an endoscopy in March 2021 which was normal. However, during a visit to the ED in 7/2021 she had a CT abdomen and Pelvis performed which demonstrated; severe intrahepatic and extrahepatic biliary ductal dilatation. Pancreatic head/neck is ill-defined, raising suspicion for underlying ill-defined mass.  GI EUS 3/2021-EUS with FNA of Pancreatic Mass, and ERCP with sphincterotomy and placement of a large plastic CBD stent. pathology significant for adenocarcinoma  Patient seen by surg-onc and underwent pancreaticoduodenectomy with Dr. Nino on 8/2/21 . Patient tolerated the procedure well received 2 units of pRBC, 500mL of Albumin, and 4L of Crystalloid IVF. Patient was extubated brought to PACU. SICU consulted in the setting of extensive length of surgery, hemodynamic monitoring and known afib.  Patient was also evaluated by podiatry for chronic feet ulcer, She was downgraded to floor 8/5/21. On 8/13/2021 patient verbalized no complaints and states she is tolerating her diet well and voided via smalls catheter.    She is tolerating bedside PT and OT and is able to perform bed mobility with moderate assistance using bed rails and demonstrated decreased strength and impaired balance. Additionally, she was minimum assist for transfer from sit to stand and rolling walking to bed transfers. She ambulated 10 feet twice with a RW and contact guard while demonstrating decreased sean;  decreased step length;  decreased strength;  pain;  decreased endurance     She was evaluated by the rehab team/ Physiatrist,  and she is deemed to be a good candidate for acute inpatient rehab admission to .    I examined the patient and reviewed the chart. There have been no significant changes since my history and physical except where documented below.    On 8/17/2021 the patient was seen by the rehab team during rounds and had no acute complaints or concerns at this time.    On 8/18/2021 the patient was seen by the rehab team during rounds and had no acute complaints or concerns at this time; except that the area where her ISABELL Drain was removed from on her RLQ abdomen was slightly wet at the moment. Additionally she stated the surgical team removed the vertical staples on 8/17/2021. RN was informed and dressing change was performed. Additionally, after team meeting Neuropsychology was consulted to help patient with coping with her life after procedure.     On 8/19/2021 patient was seen and evalauted by rehab team during rounds. Of note surgical team addressed patients dressign in morning and noted less drainage. Patient herself is doing well.    TODAY'S SUBJECTIVE & REVIEW OF SYMPTOMS:    Constiutional:    [   ] WNL           [   ] poor appetite   [   ] insomnia   [  x ] tired    [  ]  Lethargy   Cardio:                [ X ] WNL           [   ] CP   [   ] BARAJAS   [   ] palpitations               Resp:                   [ X ] WNL           [   ] SOB   [   ] cough   [   ] wheezing   GI:                        [  X ] WNL           [   ] constipation   [   ] diarrhea   [   ] abdominal pain   [   ] nausea   [   ] emesis                                :                      [   ] WNL           [   ] SMALLS  [   ] dysuria   [ X ] difficulty voiding             Endo:                   [  X ] WNL          [   ] polyuria   [   ] temperature intolerance                 Skin:                     [   ] WNL          [   ] pain   [  X  ] wound   [   ] rash   MSK:                    [ X  ] WNL          [   ] muscle pain   [   ] joint pain/ stiffness   [   ] muscle tenderness   [   ] swelling   Neuro:                 [X ] WNL          [   ] HA   [   ] change in vision   [   ] tremor   [   ] weakness   [   ]dysphagia              Cognitive:           [ X ] WNL           [   ]confusion      Psych:                  [ X ] WNL           [   ] hallucinations   [   ]agitation   [   ] delusion   [   ]depression      PHYSICAL EXAM    Vital Signs Last 24 Hrs  T(C): 36.3 (19 Aug 2021 04:55), Max: 36.6 (18 Aug 2021 20:03)  T(F): 97.4 (19 Aug 2021 04:55), Max: 97.9 (18 Aug 2021 20:03)  HR: 86 (19 Aug 2021 04:55) (86 - 91)  BP: 117/64 (19 Aug 2021 04:55) (103/60 - 117/64)  BP(mean): --  RR: 19 (19 Aug 2021 04:55) (18 - 20)  SpO2: --    General:[ X  ] NAD, Resting Comfortable,   [   ] other:                                HEENT: [ X ] NC/AT, EOMI, PERRL , Normal Conjunctivae,   [   ] other:   Cardio: [   ] RRR, no murmer,   [ X  ] other:   Irregularly irregular HR with no murmurs, gallops, or rubs                            Pulm: [  X ] No Respiratory Distress,  Lungs CTAB,   [   ] other:                       Abdomen: [   ]ND/NT, Soft,   [  X ] other:  Normal bowel sounds auscultated and patient's   : [ X  ] NO SMALLS CATHETER, [   ] SMALLS CATHETER- no meatal tear, no discharge, [   ] other:                                            MSK: [ X  ] No joint swelling, Full ROM,   [   ] other:                                         Ext: [   ]No C/C/E, No calf tenderness,   [ X  ]other:  B/L LE varicose veins  Skin: [   ]intact,   [  X ] other:  B/L LE varicose veins & Right lateral ISABELL drain and midline surgical incision.    Neurological Examination:  Cognitive: [  X  ] AAO x 3,   [    ]  other:                                                                      Attention:  [  X  ] intact,   [    ]  other:                            Memory: [    ] intact,    [  X  ]  other:   Recalled only 2 of 3 words with delayed recall  Mood/Affect: [  X  ] wnl,    [    ]  other:                                                                             Communication: [ X   ]Fluent, no dysarthria, following commands:  [    ] other:   CN II - XII:  [  X ] intact,  [    ] other:                                                                                        Motor:   RIGHT UE: [   ] WNL,  [ X  ] other: 4+/5  LEFT    UE: [   ] WNL,  [ X  ] other: 4+/5  RIGHT LE: [   ] WNL,  [  X ] other: 4/5 prox, 5/5 distally  LEFT    LE: [   ] WNL,  [ X  ] other:4/5 prox, 5/5 distally    Tone: [  X  ] wnl,   [    ]  other:  DTRs: [  X ]symmetric, [   ] other:  Coordination:   [ X   ] intact,   [    ] other:                                                                           Sensory: [  X  ] Intact to light touch,   [    ] other:    Current Level of Function:  Bed Mobility: Touch Assistance   Transfers: Independent   Ambulation: 50 feet with RW and touch assist with reciprocal gait.    MEDICATIONS  (STANDING):  acetaminophen   Tablet .. 650 milliGRAM(s) Oral every 6 hours  cefpodoxime 200 milliGRAM(s) Oral every 12 hours  diltiazem    Tablet 90 milliGRAM(s) Oral every 6 hours  furosemide    Tablet 20 milliGRAM(s) Oral daily  heparin   Injectable 5000 Unit(s) SubCutaneous every 12 hours  lactobacillus acidophilus 1 Tablet(s) Oral two times a day with meals  metoclopramide 5 milliGRAM(s) Oral three times a day before meals  metroNIDAZOLE    Tablet 500 milliGRAM(s) Oral every 8 hours  pantoprazole    Tablet 40 milliGRAM(s) Oral before breakfast  saccharomyces boulardii 250 milliGRAM(s) Oral two times a day  tamsulosin 0.4 milliGRAM(s) Oral at bedtime    MEDICATIONS  (PRN):  oxyCODONE    IR 5 milliGRAM(s) Oral every 6 hours PRN Severe Pain (7 - 10)

## 2021-08-19 NOTE — PROGRESS NOTE ADULT - SUBJECTIVE AND OBJECTIVE BOX
GENERAL SURGERY PROGRESS NOTE    Patient: LI, VIKASH , 76y ([birth year])Female   MRN: [id number]  Location: [address]  Visit: 08-13-21 Inpatient  Date: 08-19-21 @ 02:10    Hospital Day #:18  Post-Op Day #:17    Procedure/Dx/Injuries: Whipple      Events of past 24 hours: Patient seen & examined at bedside. In NAD. Tolerating diet, +ambulation, +BM/gas, voiding feeling well.    PAST MEDICAL & SURGICAL HISTORY:  SOB (shortness of breath)    Pain  knee    Varicose veins of both lower extremities, unspecified whether complicated    Atrial fibrillation, unspecified type  2019 on Eliquis    Jaundice  March 5, 2021    Malignant neoplasm of pancreas, unspecified location of malignancy  Pancreatic Cancer    Hypertension, unspecified type  2019    Pancreatic cancer    Kidney stones    History of surgery  vascular surgery   ? variocose vein stripping?    Status post phlebectomy  10/2018    History of ovarian cystectomy  left    History of tonsillectomy  1959    Kidney stone  2017        Vitals:   T(F): 97.9 (08-18-21 @ 20:03), Max: 97.9 (08-18-21 @ 20:03)  HR: 91 (08-18-21 @ 20:03)  BP: 114/60 (08-18-21 @ 20:03)  RR: 20 (08-18-21 @ 20:03)  SpO2: --      Diet, Dysphagia 2 Mechanical Soft-Thin Liquids:   Fiber/Residue Restricted  No Beef  No Carbonated Beverages  No Chocolate  No Fish  Beneprotein ([facility] Only)     Qty per Day:  2 each meal  Prosource Gelatein 20 Sugar Free     Qty per Day:  3     Qty per Day:  magic cup(van)x3/day  Supplement Feeding Modality:  Oral      Fluids:     I & O's:    Bowel Movement: : [x] YES [] NO  Flatus: : [x] YES [] NO    PHYSICAL EXAM:  General Appearance: NAD  HEENT: EOMI, sclera non-icteric.  Heart: RRR   Lungs: No increased work of breathing or accessory muscle use. Symmetric chest wall rise and fall.   Abdomen:  Soft, nontender, nondistended.   MSK/Extremities: Warm & well-perfused.   Skin: Warm, dry. No jaundice.   Incision/wound: healing well, dressings in place, clean, dry and intact      MEDICATIONS  (STANDING):  acetaminophen   Tablet .. 650 milliGRAM(s) Oral every 6 hours  cefpodoxime 200 milliGRAM(s) Oral every 12 hours  diltiazem    Tablet 90 milliGRAM(s) Oral every 6 hours  furosemide    Tablet 20 milliGRAM(s) Oral daily  heparin   Injectable 5000 Unit(s) SubCutaneous every 12 hours  lactobacillus acidophilus 1 Tablet(s) Oral two times a day with meals  metoclopramide 5 milliGRAM(s) Oral three times a day before meals  metroNIDAZOLE    Tablet 500 milliGRAM(s) Oral every 8 hours  pantoprazole    Tablet 40 milliGRAM(s) Oral before breakfast  saccharomyces boulardii 250 milliGRAM(s) Oral two times a day  tamsulosin 0.4 milliGRAM(s) Oral at bedtime    MEDICATIONS  (PRN):  oxyCODONE    IR 5 milliGRAM(s) Oral every 6 hours PRN Severe Pain (7 - 10)      DVT PROPHYLAXIS: heparin   Injectable 5000 Unit(s) SubCutaneous every 12 hours    GI PROPHYLAXIS: pantoprazole    Tablet 40 milliGRAM(s) Oral before breakfast    ANTICOAGULATION:   ANTIBIOTICS:  cefpodoxime 200 milliGRAM(s)  metroNIDAZOLE    Tablet 500 milliGRAM(s)            LAB/STUDIES:  Labs:  CAPILLARY BLOOD GLUCOSE                      LFTs:         Coags:                        IMAGING:      ACCESS/ DEVICES:  [ ] Peripheral IV  [ ] Central Venous Line	[ ] R	[ ] L	[ ] IJ	[ ] Fem	[ ] SC	Placed:   [ ] Arterial Line		[ ] R	[ ] L	[ ] Fem	[ ] Rad	[ ] Ax	Placed:   [ ] PICC:					[ ] Mediport  [ ] Urinary Catheter,  Date Placed:   [ ] Chest tube: [ ] Right, [ ] Left  [ ] ISABELL/Byron Drains

## 2021-08-20 NOTE — PROGRESS NOTE ADULT - SUBJECTIVE AND OBJECTIVE BOX
Patient is a 76y old  Female who presents with a chief complaint of Rehab of Debility 2/2 Pancreatic Cancer/ Pancreaticoduodenectomy    HPI:  Patient is a 75 year old female patient who has a PMHx of pancreatic ca (March 2021, s/p Chemo 2 rounds 6/2021 via R Subclavian chemo port),  Afib on eliquis ( Cardiologist VEE Medrano ), HTN, Varicose veins in bilateral lower extremities, Nephrolithiasis, and being COVID + on 5/4/2021 via PCR with self quarantine till 5/14/2021. The patient originally presented to the ED March 2021 for yellow discoloration of the skin as her daughter was insisting she go to the hospital for having yellow colored skin and urine for days. Patient started to notice this Jaundice in December 2020. It was associated with dark urine and light colored stool. Patient went to her PMD who treated her for UTI by prescribing an antibiotic; but she had no relief. Patient states she then went to her cardiologist for her stress test and he noticed that she looks jaundiced and referred her to Dr. Grayson (Gastroenterologist) who performed an endoscopy in March 2021 which was normal. However, during a visit to the ED in 7/2021 she had a CT abdomen and Pelvis performed which demonstrated; severe intrahepatic and extrahepatic biliary ductal dilatation. Pancreatic head/neck is ill-defined, raising suspicion for underlying ill-defined mass.  GI EUS 3/2021-EUS with FNA of Pancreatic Mass, and ERCP with sphincterotomy and placement of a large plastic CBD stent. pathology significant for adenocarcinoma  Patient seen by surg-onc and underwent pancreaticoduodenectomy with Dr. Nino on 8/2/21 . Patient tolerated the procedure well received 2 units of pRBC, 500mL of Albumin, and 4L of Crystalloid IVF. Patient was extubated brought to PACU. SICU consulted in the setting of extensive length of surgery, hemodynamic monitoring and known afib.  Patient was also evaluated by podiatry for chronic feet ulcer, She was downgraded to floor 8/5/21. On 8/13/2021 patient verbalized no complaints and states she is tolerating her diet well and voided via smalls catheter.    She is tolerating bedside PT and OT and is able to perform bed mobility with moderate assistance using bed rails and demonstrated decreased strength and impaired balance. Additionally, she was minimum assist for transfer from sit to stand and rolling walking to bed transfers. She ambulated 10 feet twice with a RW and contact guard while demonstrating decreased sean;  decreased step length;  decreased strength;  pain;  decreased endurance     She was evaluated by the rehab team/ Physiatrist,  and she is deemed to be a good candidate for acute inpatient rehab admission to .    I examined the patient and reviewed the chart. There have been no significant changes since my history and physical except where documented below.    On 8/17/2021 the patient was seen by the rehab team during rounds and had no acute complaints or concerns at this time.    On 8/18/2021 the patient was seen by the rehab team during rounds and had no acute complaints or concerns at this time; except that the area where her ISABELL Drain was removed from on her RLQ abdomen was slightly wet at the moment. Additionally she stated the surgical team removed the vertical staples on 8/17/2021. RN was informed and dressing change was performed. Additionally, after team meeting Neuropsychology was consulted to help patient with coping with her life after procedure.     On 8/19/2021 patient was seen and evalauted by rehab team during rounds. Of note surgical team addressed patients dressign in morning and noted less drainage. Patient herself is doing well.    On 8/20/2021 patient was seen and evalauted by rehab team during rounds. Of note surgical team addressed patients dressing in morning and noted less drainage. Patient herself is doing well.    TODAY'S SUBJECTIVE & REVIEW OF SYMPTOMS:    Constiutional:    [   ] WNL           [   ] poor appetite   [   ] insomnia   [  x ] tired    [  ]  Lethargy   Cardio:                [ X ] WNL           [   ] CP   [   ] BARAJAS   [   ] palpitations               Resp:                   [ X ] WNL           [   ] SOB   [   ] cough   [   ] wheezing   GI:                        [  X ] WNL           [   ] constipation   [   ] diarrhea   [   ] abdominal pain   [   ] nausea   [   ] emesis                                :                      [   ] WNL           [   ] SMALLS  [   ] dysuria   [ X ] difficulty voiding             Endo:                   [  X ] WNL          [   ] polyuria   [   ] temperature intolerance                 Skin:                     [   ] WNL          [   ] pain   [  X  ] wound   [   ] rash   MSK:                    [ X  ] WNL          [   ] muscle pain   [   ] joint pain/ stiffness   [   ] muscle tenderness   [   ] swelling   Neuro:                 [X ] WNL          [   ] HA   [   ] change in vision   [   ] tremor   [   ] weakness   [   ]dysphagia              Cognitive:           [ X ] WNL           [   ]confusion      Psych:                  [ X ] WNL           [   ] hallucinations   [   ]agitation   [   ] delusion   [   ]depression      PHYSICAL EXAM    Vital Signs Last 24 Hrs  T(C): 35.9 (20 Aug 2021 05:08), Max: 36.4 (19 Aug 2021 13:22)  T(F): 96.6 (20 Aug 2021 05:08), Max: 97.5 (19 Aug 2021 13:22)  HR: 76 (20 Aug 2021 05:08) (76 - 90)  BP: 117/59 (20 Aug 2021 05:08) (117/59 - 130/66)  BP(mean): --  RR: 18 (20 Aug 2021 05:08) (18 - 18)  SpO2: --    General:[ X  ] NAD, Resting Comfortable,   [   ] other:                                HEENT: [ X ] NC/AT, EOMI, PERRL , Normal Conjunctivae,   [   ] other:   Cardio: [   ] RRR, no murmer,   [ X  ] other:   Irregularly irregular HR with no murmurs, gallops, or rubs                            Pulm: [  X ] No Respiratory Distress,  Lungs CTAB,   [   ] other:                       Abdomen: [   ]ND/NT, Soft,   [  X ] other:  Normal bowel sounds auscultated and patient's   : [ X  ] NO SMALLS CATHETER, [   ] SMALLS CATHETER- no meatal tear, no discharge, [   ] other:                                            MSK: [ X  ] No joint swelling, Full ROM,   [   ] other:                                         Ext: [   ]No C/C/E, No calf tenderness,   [ X  ]other:  B/L LE varicose veins  Skin: [   ]intact,   [  X ] other:  B/L LE varicose veins & Right lateral ISABELL drain and midline surgical incision.    Neurological Examination:  Cognitive: [  X  ] AAO x 3,   [    ]  other:                                                                      Attention:  [  X  ] intact,   [    ]  other:                            Memory: [    ] intact,    [  X  ]  other:   Recalled only 2 of 3 words with delayed recall  Mood/Affect: [  X  ] wnl,    [    ]  other:                                                                             Communication: [ X   ]Fluent, no dysarthria, following commands:  [    ] other:   CN II - XII:  [  X ] intact,  [    ] other:                                                                                        Motor:   RIGHT UE: [   ] WNL,  [ X  ] other: 4+/5  LEFT    UE: [   ] WNL,  [ X  ] other: 4+/5  RIGHT LE: [   ] WNL,  [  X ] other: 4/5 prox, 5/5 distally  LEFT    LE: [   ] WNL,  [ X  ] other:4/5 prox, 5/5 distally    Tone: [  X  ] wnl,   [    ]  other:  DTRs: [  X ]symmetric, [   ] other:  Coordination:   [ X   ] intact,   [    ] other:                                                                           Sensory: [  X  ] Intact to light touch,   [    ] other:    Current Level of Function:  Bed Mobility: Touch Assistance   Transfers: Independent   Ambulation: 50 feet with RW and touch assist with reciprocal gait.    MEDICATIONS  (STANDING):  acetaminophen   Tablet .. 650 milliGRAM(s) Oral every 6 hours  cefpodoxime 200 milliGRAM(s) Oral every 12 hours  diltiazem    Tablet 90 milliGRAM(s) Oral every 6 hours  furosemide    Tablet 20 milliGRAM(s) Oral daily  heparin   Injectable 5000 Unit(s) SubCutaneous every 12 hours  lactobacillus acidophilus 1 Tablet(s) Oral two times a day with meals  metoclopramide 5 milliGRAM(s) Oral three times a day before meals  metroNIDAZOLE    Tablet 500 milliGRAM(s) Oral every 8 hours  pantoprazole    Tablet 40 milliGRAM(s) Oral before breakfast  saccharomyces boulardii 250 milliGRAM(s) Oral two times a day  tamsulosin 0.4 milliGRAM(s) Oral at bedtime    MEDICATIONS  (PRN):  oxyCODONE    IR 5 milliGRAM(s) Oral every 6 hours PRN Severe Pain (7 - 10)

## 2021-08-20 NOTE — PROGRESS NOTE ADULT - ASSESSMENT
ASSESSMENT/PLAN    Rehab of DEBILITY 2/2 pancreatic cancer/pancreatoduodenectomy  -Patient requires and to receive 3 hours of therapy daily with 90 minuites of PT and 90 minutes of OT for at least 5-6 days within a 7 day week period.  -Pain control  -Surgery team to change dressing and ISABELL drain removal when time.     # Chronic Atrial fibrillation   - Monitor vitals   -Diltiazem 90mg [id number]   - Discuss risks and benefits of A/C with Cardiologist and Surgical team    # Urinary retention/ Overflow Incontinence  - Indwelling ariza catheter placed  -Tamsulosin 0.4mg daily qhs  - TOV passed    -Pain control: Oxycodone IR 5mg q6 hours PRN for severe pain, Tylenol 650mg [id number]     -GI/Bowel Mgmt: Stool Softeners As needed    - Anemia: likely chronic disease and acute loss. Monitor    - HTN: monitor     -Skin:  Midline vertical incision has staples removed and is C/D with intact skin & without any openings.    -FEN   Monitor lytes    - Diet:  Dysphagia 2 Mechanical Soft with Thin Liquids     Precautions / PROPHYLAXIS:    - Falls  - Ortho: Weight bearing status: WBAT  - DVT prophylaxis: Heparin 5000 units d89mgpvm + SCDS

## 2021-08-20 NOTE — PROGRESS NOTE ADULT - ASSESSMENT
75 y/o F s/p whipple procedure POD #18. Patient currently in rehab.  Pain is well controlled, she is ambulating, tolerating diet.       PLAN:  - continue antibiotics as needed  - please change dressing 2x daily and PRN when soaked   - will continue to follow  - continue to work with rehab  - OOB/ IS  - DVT and GI PPX    lines: PIV    BLUE TEAM spectra [id number]

## 2021-08-20 NOTE — CDI QUERY NOTE - NSCDIOTHERTXTBX_GEN_ALL_CORE_HH
________________________________________________________________    75  F with Diagnosis:  Adenocarcinoma of the Pancreas   Pathology  Report:  8/5/21:  -  Metastatic adenocarcinoma in five of twenty six regional lymph nodes (5/26).   Tumor staging: pyT2, pN2.  Frozen section: PROCEDURE:  Pancreaticoduodenectomy  TUMOR SITE:   Pancreatic head and uncinate process, HISTOLOGIC TYPE:  Ductal carcinoma  poorly differentiated  TUMOR SIZE:  4 cm    Based on your professional judgment and pathology report can the diagnosis of Adenocarcinoma of the Pancreas be further specified as:    [ ] Metastatic Adenocarcinoma of the Pancreas  to Regional Lymph nodes  [ ] Other (please specify)  [ ]  Clinically unable to determine    Thank you.

## 2021-08-20 NOTE — PROGRESS NOTE ADULT - SUBJECTIVE AND OBJECTIVE BOX
GENERAL SURGERY PROGRESS NOTE    Hospital Day #:19  Post-Op Day #:18    Procedure/Dx/Injuries: Whipple      Events of past 24 hours: Patient seen & examined at bedside. In NAD.   Tolerating diet, +ambulation, +BM/gas, voiding feeling well.    Vitals:   T(F): 96.6 (08-20-21 @ 05:08), Max: 97.5 (08-19-21 @ 13:22)  HR: 76 (08-20-21 @ 05:08)  BP: 117/59 (08-20-21 @ 05:08)  RR: 18 (08-20-21 @ 05:08)      Diet, Dysphagia 2 Mechanical Soft-Thin Liquids:   Fiber/Residue Restricted  No Beef  No Carbonated Beverages  No Chocolate  No Fish  Beneprotein ([facility] Only)     Qty per Day:  2 each meal  Prosource Gelatein 20 Sugar Free     Qty per Day:  3     Qty per Day:  magic cup(van)x3/day  Supplement Feeding Modality:  Oral      Bowel Movement: : [x] YES [] NO  Flatus: : [x] YES [] NO    PHYSICAL EXAM:  General Appearance: NAD  HEENT: EOMI, sclera non-icteric.  Heart: RRR   Lungs: No increased work of breathing or accessory muscle use. Symmetric chest wall rise and fall.   Abdomen:  Soft, nontender, nondistended.   MSK/Extremities: Warm & well-perfused.   Skin: Warm, dry. No jaundice.   Incision/wound: healing well, dressings in place, clean, dry and intact    MEDICATIONS  (STANDING):  acetaminophen   Tablet .. 650 milliGRAM(s) Oral every 6 hours  cefpodoxime 200 milliGRAM(s) Oral every 12 hours  diltiazem    Tablet 90 milliGRAM(s) Oral every 6 hours  furosemide    Tablet 20 milliGRAM(s) Oral daily  heparin   Injectable 5000 Unit(s) SubCutaneous every 12 hours  lactobacillus acidophilus 1 Tablet(s) Oral two times a day with meals  metoclopramide 5 milliGRAM(s) Oral three times a day before meals  metroNIDAZOLE    Tablet 500 milliGRAM(s) Oral every 8 hours  pantoprazole    Tablet 40 milliGRAM(s) Oral before breakfast  saccharomyces boulardii 250 milliGRAM(s) Oral two times a day  tamsulosin 0.4 milliGRAM(s) Oral at bedtime    MEDICATIONS  (PRN):  oxyCODONE    IR 5 milliGRAM(s) Oral every 6 hours PRN Severe Pain (7 - 10)      DVT PROPHYLAXIS: heparin   Injectable 5000 Unit(s) SubCutaneous every 12 hours    GI PROPHYLAXIS: pantoprazole    Tablet 40 milliGRAM(s) Oral before breakfast    ANTICOAGULATION:   ANTIBIOTICS:  cefpodoxime 200 milliGRAM(s)  metroNIDAZOLE    Tablet 500 milliGRAM(s)      LAB/STUDIES:  Labs: none      ACCESS/ DEVICES:  [ x ] Peripheral IV    BLUE TEAM SPECTRA #[id number]

## 2021-08-21 NOTE — PROGRESS NOTE ADULT - ASSESSMENT
ASSESSMENT:  77 y/o F s/p whipple procedure POD #18. Patient currently in rehab.  Pain is well controlled, she is ambulating, tolerating diet.       PLAN:  - Staples removed  - continue antibiotics as needed  - please change dressing 2x daily and PRN when soaked   - will continue to follow  - continue to work with rehab  - OOB/ IS  - DVT and GI PPX    BLUE TEAM SPECTRA [id number]

## 2021-08-21 NOTE — DISCHARGE NOTE PROVIDER - CARE PROVIDER_API CALL
Danie Chavarria)  [address]  [address] [place] [address]  Phone: [phone]  Fax: [phone]  Established Patient  Follow Up Time: 1 week    Jeromy Nino)  Complex General Surgical Oncology; Surgery  [address] [place] [address], [address] [place] [address]  Phone: [phone]  Fax: [phone]  Established Patient  Follow Up Time: 1 week    Garrett Garvey  CARDIOVASCULAR DISEASE  [address]  [place] [address]  Phone: [phone]  Fax: [phone]  Established Patient  Follow Up Time: 1 week    Zena Johnson)  Hematology; Internal Medicine; Medical Oncology  [address] [place] [address]  Phone: [phone]  Fax: [phone]  Established Patient  Follow Up Time: 2 weeks    Hamlet Grayson  GASTROENTEROLOGY  [address] [place] [address]  Phone: [phone]  Fax: [phone]  Established Patient  Follow Up Time: 1 month    Hao Tovar)  Foot Surgery; Podiatric Medicine  [address] [place] [address], [address] [place] [address]  Phone: [phone]  Fax: [phone]  Established Patient  Scheduled Appointment: 09/22/2021

## 2021-08-21 NOTE — DISCHARGE NOTE PROVIDER - NSDCCPCAREPLAN_GEN_ALL_CORE_FT
PRINCIPAL DISCHARGE DIAGNOSIS  Diagnosis: Cancer of pancreas  Assessment and Plan of Treatment:       SECONDARY DISCHARGE DIAGNOSES  Diagnosis: Atrial fibrillation  Assessment and Plan of Treatment:      PRINCIPAL DISCHARGE DIAGNOSIS  Diagnosis: Cancer of pancreas  Assessment and Plan of Treatment: you had surgery to remove pancreas  and adjusent organs, please follow up with your surgeon in 2 weeks  , and continue dressing on abdomen with dry donna and tape, notify doctor if delay in wound healing with or with out increase drainage or pain ,  fever  .. may take pain meds as nedded ,  pelase follow up with  surgical oncologist  , pmd in  2 to 4 weeks .      SECONDARY DISCHARGE DIAGNOSES  Diagnosis: Atrial fibrillation  Assessment and Plan of Treatment: continue eds as advised , diet as advised ,    Diagnosis: Hypertension  Assessment and Plan of Treatment: you neded an increase in meds to control blood pressure during hospitalization , buy today your blood pressure dropped  in the morning needed medication adjustment . please  follow up with your medical doctor/ cardiologist in a week or two .  may stop or hold water pill for  a week and may stop flomax for now , and may get help from tour doctor if any issue with urination     PRINCIPAL DISCHARGE DIAGNOSIS  Diagnosis: Cancer of pancreas  Assessment and Plan of Treatment: you had surgery to remove pancreas  and adjusent organs, please follow up with your surgeon in 2 weeks  , and continue dressing on abdomen with dry donna and tape, notify doctor if delay in wound healing with or with out increase drainage or pain ,  fever  .. may take pain meds as nedded ,  pelase follow up with  surgical oncologist  , pmd in  2 to 4 weeks .      SECONDARY DISCHARGE DIAGNOSES  Diagnosis: Atrial fibrillation  Assessment and Plan of Treatment: continue  meds   as advised. see  changes in meds , decreased cardizem dose  to 120 mg , please discuss with your cardiologist and surgeon regarding the benefit  and risks of blood thinner  elequis  before restarting  , continue  diet as advised.    Diagnosis: Hypertension  Assessment and Plan of Treatment: you neeed an increase in meds to control blood pressure during hospitalization , but  today your blood pressure dropped  in the morning needed medication adjustment . please  follow up with your medical doctor/ cardiologist in a week or two .  may stop or hold water pill for  a week and may stop flomax for now , and may get help from your doctor if any issue with urination

## 2021-08-21 NOTE — DISCHARGE NOTE PROVIDER - NSDCFUADDINST_GEN_ALL_CORE_FT
***PLEASE BRING THESE DISCHARGE PAPERS WITH YOU TO ALL DOCTOR APPOINTMENTS AND SHOW THEM TO ALL YOUR DOCTORS AND CAREGIVERS***    ***AMBULATE WITH THE ROLLING WALKER AND ASSISTANCE/SUPERVISION FOR YOUR SAFETY***    ***CHANGE THE ABDOMINAL DRESSING TWICE A DAY WITH DRY STERILE DRESSING; CHANGE MORE OFTEN IF NEEDED (IF IT BECOMES WET OR SOILED)***

## 2021-08-21 NOTE — DISCHARGE NOTE PROVIDER - CARE PROVIDERS DIRECT ADDRESSES
,DirectAddress_Unknown,DirectAddress_Unknown,DirectAddress_Unknown,[email or web address],DirectAddress_Unknown,[email or web address]

## 2021-08-21 NOTE — DISCHARGE NOTE PROVIDER - PROVIDER TOKENS
PROVIDER:[TOKEN:[[id number]],FOLLOWUP:[1 week],ESTABLISHEDPATIENT:[T]],PROVIDER:[TOKEN:[[id number]],FOLLOWUP:[1 week],ESTABLISHEDPATIENT:[T]],PROVIDER:[TOKEN:[[id number]],FOLLOWUP:[1 week],ESTABLISHEDPATIENT:[T]],PROVIDER:[TOKEN:[[id number]],FOLLOWUP:[2 weeks],ESTABLISHEDPATIENT:[T]],PROVIDER:[TOKEN:[[id number]],FOLLOWUP:[1 month],ESTABLISHEDPATIENT:[T]],PROVIDER:[TOKEN:[[id number]],SCHEDULEDAPPT:[09/22/2021],ESTABLISHEDPATIENT:[T]]

## 2021-08-21 NOTE — DISCHARGE NOTE PROVIDER - NSDCMRMEDTOKEN_GEN_ALL_CORE_FT
acetaminophen 325 mg oral tablet: 2 tab(s) orally every 6 hours, As needed, for fever or mild pain (1 to 3 out of 10)  calcium,magnesium and Zinc: 1 tab(s) orally once a day  dilTIAZem 90 mg oral tablet: 1 tab(s) orally every 6 hours  furosemide 20 mg oral tablet: 1 tab(s) orally once a day  lactobacillus acidophilus oral capsule: 1 cap(s) orally 2 times a day with meals  metoclopramide 5 mg oral tablet: 1 tab(s) orally 3 times a day (before meals)  oxyCODONE 5 mg oral tablet: 1 tab(s) orally every 6 hours, As needed, Severe Pain (7 - 10)  pantoprazole 40 mg oral delayed release tablet: 1 tab(s) orally once a day (before a meal)  saccharomyces boulardii lyo 250 mg oral capsule: 1 cap(s) orally 2 times a day with meals  tamsulosin 0.4 mg oral capsule: 1 cap(s) orally once a day (at bedtime)  Women&#x27;s daily vitamins: 1 tab(s) orally once a day   acetaminophen 325 mg oral tablet: 2 tab(s) orally every 6 hours, As needed, for fever or mild pain (1 to 3 out of 10)  calcium,magnesium and Zinc: 1 tab(s) orally once a day  dilTIAZem 120 mg/24 hours oral capsule, extended release: 1 cap(s) orally once a day  furosemide 20 mg oral tablet: 1 tab(s) orally once a day  , hold for a week and then as per your medical doctor restart it   lactobacillus acidophilus oral capsule: 1 cap(s) orally 2 times a day with meals  metoclopramide 5 mg oral tablet: 1 tab(s) orally 3 times a day (before meals)  oxyCODONE 5 mg oral tablet: 1 tab(s) orally every 6 hours, As needed, Severe Pain (7 - 10)  pantoprazole 40 mg oral delayed release tablet: 1 tab(s) orally once a day (before a meal)  saccharomyces boulardii lyo 250 mg oral capsule: 1 cap(s) orally 2 times a day with meals  tamsulosin 0.4 mg oral capsule: 1 cap(s) orally once a day (at bedtime)  Women&#x27;s daily vitamins: 1 tab(s) orally once a day   acetaminophen 325 mg oral tablet: 2 tab(s) orally every 6 hours, As needed, for fever or mild pain (1 to 3 out of 10)  calcium,magnesium and Zinc: 1 tab(s) orally once a day  dilTIAZem 120 mg/24 hours oral capsule, extended release: 1 cap(s) orally once a day  furosemide 20 mg oral tablet: 1 tab(s) orally once a day  , hold for a week and then as per your medical doctor restart it   lactobacillus acidophilus oral capsule: 1 cap(s) orally 2 times a day with meals  metoclopramide 5 mg oral tablet: 1 tab(s) orally 3 times a day (before meals), As Needed  for nausea and vomitting  oxyCODONE 5 mg oral tablet: 1 tab(s) orally every 6 hours, As needed, Severe Pain (7 - 10)  pantoprazole 40 mg oral delayed release tablet: 1 tab(s) orally once a day (before a meal)  saccharomyces boulardii lyo 250 mg oral capsule: 1 cap(s) orally 2 times a day with meals  Women&#x27;s daily vitamins: 1 tab(s) orally once a day

## 2021-08-21 NOTE — PROGRESS NOTE ADULT - SUBJECTIVE AND OBJECTIVE BOX
`GENERAL SURGERY PROGRESS NOTE    Patient: GUILLERMO, VIKASH , 76y ([birth year])Female   MRN: [id number]  Location: [address]  Visit: 08-13-21 Inpatient  Date: 08-21-21 @ 05:20        Admitted :08-13-21 (8d)  LOS: 8d      Events of past 24 hours: Patient seen and examined at bedside. No acute events overnight. Afebrile, VSS.    PAST MEDICAL & SURGICAL HISTORY:  SOB (shortness of breath)    Pain  knee    Varicose veins of both lower extremities, unspecified whether complicated    Atrial fibrillation, unspecified type  2019 on Eliquis    Jaundice  March 5, 2021    Malignant neoplasm of pancreas, unspecified location of malignancy  Pancreatic Cancer    Hypertension, unspecified type  2019    Pancreatic cancer    Kidney stones    History of surgery  vascular surgery   ? variocose vein stripping?    Status post phlebectomy  10/2018    History of ovarian cystectomy  left    History of tonsillectomy  1959    Kidney stone  2017        Vitals:   T(F): 97.2 (08-20-21 @ 20:31), Max: 97.2 (08-20-21 @ 20:31)  HR: 81 (08-20-21 @ 20:31)  BP: 115/59 (08-20-21 @ 20:31)  RR: 18 (08-20-21 @ 20:31)  SpO2: --      Diet, Dysphagia 2 Mechanical Soft-Thin Liquids:   Fiber/Residue Restricted  No Beef  No Carbonated Beverages  No Chocolate  No Fish  Beneprotein ([facility] Only)     Qty per Day:  2 each meal  Prosource Gelatein 20 Sugar Free     Qty per Day:  3     Qty per Day:  magic cup(van)x3/day  Supplement Feeding Modality:  Oral      Fluids:     I & O's:    Bowel Movement: :   Flatus: :     PHYSICAL EXAM:  General Appearance: NAD  HEENT: EOMI, sclera non-icteric.  Heart: RRR   Lungs: No increased work of breathing or accessory muscle use. Symmetric chest wall rise and fall.   Abdomen:  Soft, nontender, nondistended.       MEDICATIONS  (STANDING):  acetaminophen   Tablet .. 650 milliGRAM(s) Oral every 6 hours  diltiazem    Tablet 90 milliGRAM(s) Oral every 6 hours  furosemide    Tablet 20 milliGRAM(s) Oral daily  heparin   Injectable 5000 Unit(s) SubCutaneous every 12 hours  lactobacillus acidophilus 1 Tablet(s) Oral two times a day with meals  metoclopramide 5 milliGRAM(s) Oral three times a day before meals  metroNIDAZOLE    Tablet 500 milliGRAM(s) Oral every 8 hours  pantoprazole    Tablet 40 milliGRAM(s) Oral before breakfast  saccharomyces boulardii 250 milliGRAM(s) Oral two times a day  tamsulosin 0.4 milliGRAM(s) Oral at bedtime    MEDICATIONS  (PRN):  oxyCODONE    IR 5 milliGRAM(s) Oral every 6 hours PRN Severe Pain (7 - 10)      DVT PROPHYLAXIS: heparin   Injectable 5000 Unit(s) SubCutaneous every 12 hours    GI PROPHYLAXIS: pantoprazole    Tablet 40 milliGRAM(s) Oral before breakfast    ANTICOAGULATION:   ANTIBIOTICS:  metroNIDAZOLE    Tablet 500 milliGRAM(s)            LAB/STUDIES:  Labs:  CAPILLARY BLOOD GLUCOSE

## 2021-08-21 NOTE — PROGRESS NOTE ADULT - SUBJECTIVE AND OBJECTIVE BOX
T(C): 35.9 (08-21-21 @ 05:28), Max: 36.2 (08-20-21 @ 20:31)  HR: 100 (08-21-21 @ 05:28) (81 - 100)  BP: 119/73 (08-21-21 @ 05:28) (111/58 - 119/73)  RR: 18 (08-21-21 @ 05:28) (18 - 18)  SpO2: --      Patient was stable overnight and expresses no new complaints     PE: ptn  seen and exam at  bed  side no  new  c/o  doing  well    Alert ox3  LUNGS- clear  COR- RRR  ABD- SOFT, NT  EXTR- w/o edema  NEURO- stable    Assess:  Rehab of debility  pan  ca        Continue acute rehab program. pt ot and current care

## 2021-08-21 NOTE — DISCHARGE NOTE PROVIDER - HOSPITAL COURSE
Patient is a 75 year old female patient who has a PMHx of pancreatic ca (March 2021, s/p Chemo 2 rounds 6/2021 via R Subclavian chemo port),  Afib on eliquis ( Cardiologist VEE Garvey ), HTN, Varicose veins in bilateral lower extremities, Nephrolithiasis, and COVID + on 5/4/2021 via PCR with self quarantine till 5/14/2021. The patient originally presented to the ED March 2021 for yellow discoloration of the skin as her daughter was insisting she go to the hospital for having yellow colored skin and urine for days. Patient started to notice this Jaundice in December 2020. It was associated with dark urine and light colored stool. Patient went to her PMD who treated her for UTI by prescribing an antibiotic; but she had no relief. Patient stated she then went to her cardiologist for her stress test and he noticed that she looked jaundiced and referred her to Dr. Grayson (Gastroenterologist) who performed an endoscopy in March 2021 which was normal. However, during a visit to the ED in 7/2021 she had a CT abdomen and Pelvis performed which demonstrated severe intrahepatic and extrahepatic biliary ductal dilatation. Pancreatic head/neck is ill-defined, raising suspicion for underlying ill-defined mass.  GI EUS 3/2021-EUS with FNA of Pancreatic Mass, and ERCP with sphincterotomy and placement of a large plastic CBD stent. Pathology was significant for adenocarcinoma  Patient was seen by surg-onc and underwent pancreaticoduodenectomy with Dr. Nino on 8/2/21 . Patient tolerated the procedure well received 2 units of pRBC, 500mL of Albumin, and 4L of Crystalloid IVF. Patient was extubated brought to PACU. SICU consulted in the setting of extensive length of surgery, hemodynamic monitoring and known afib (was on apixaban but now on sc heparin).    Patient was also evaluated by podiatry for chronic b/l foot ulcers, being treated with betadine-soaked gauze + DSD + kerlix daily, and they are healing well.  The patient was admitted to Rehab medicine service on 8/13/21, where she participated in 3 hours of interdisciplinary therapy daily at least 5 days a week.  Current level of function:     Bed Mobility: Touch Assistance     Transfers: Independent     Ambulation: 50 feet with RW and touch assist with reciprocal gait.    The patient was treated with IV cefipime and IV flagyl while on surgery service, then after discussion with ID, ABX were continued po with Vantin and flagyl until 8/20/21.  She had a ariza catheter due to retention and was started on flomax on 8/12/21. The ariza catheter was d/c'd on 8/15 and she has been voiding well, no need for further CIC.    Allergies:  Augmentin (Rash)  chocolate (Headache)  digoxin (Hives)  Metoprolol Succinate ER (Hives)  Novocain (Angioedema)  Steroids: Excessive swelling (Flushing; Other (Mild to Mod))  sulfa drugs (Fever)  Xarelto (Hives)     ASSESSMENT/PLANS:  Rehab of DEBILITY 2/2 pancreatic cancer/pancreatoduodenectomy  -Patient requires and to receive 3 hours of therapy daily with 90 minutes of PT and 90 minutes of OT for at least 5-6 days within a 7 day week period.  -Pain control  -Surgery team removed ISABELL drain and staples on 8/17; continue dressing change twice a day and more often if needed     #  Atrial fibrillation   - Monitor vitals   -Diltiazem 90mg [id number]   - Discuss risks and benefits of A/C with Cardiologist and Surgical team    # Urinary retention/ Overflow Incontinence--resolved  -- Tamsulosin 0.4mg daily qhs  -  patient is voiding well    -Pain control: Oxycodone IR 5mg q6 hours PRN for severe pain, Tylenol 650mg [id number]     -GI/Bowel Mgmt: Stool Softeners As needed    - Anemia: likely chronic disease and acute loss. Monitor    - HTN: monitor     - Diet: Soft diet     Precautions / PROPHYLAXIS:    - Falls  - Ortho: Weight bearing status: WBAT  - DVT prophylaxis: Heparin 5000 units [id number] + SCDS.    The patient is being discharged home with home care on 8/23/21.  She will follow up with her PCP Dr. Chavarria, also with her Surgeon Dr. Nino, her Oncologist Dr. Johnson,  Cardiologist Dr. Garvey, GI Dr. Grayson,  and Podiatrist Dr. Tovar.  COVID swab was negative on 8/19/21.           Patient is a 75 year old female patient who has a PMHx of pancreatic ca (March 2021, s/p Chemo 2 rounds 6/2021 via R Subclavian chemo port),  Afib on eliquis ( Cardiologist VEE Garvey ), HTN, Varicose veins in bilateral lower extremities, Nephrolithiasis, and COVID + on 5/4/2021 via PCR with self quarantine till 5/14/2021. The patient originally presented to the ED March 2021 for yellow discoloration of the skin as her daughter was insisting she go to the hospital for having yellow colored skin and urine for days. Patient started to notice this Jaundice in December 2020. It was associated with dark urine and light colored stool. Patient went to her PMD who treated her for UTI by prescribing an antibiotic; but she had no relief. Patient stated she then went to her cardiologist for her stress test and he noticed that she looked jaundiced and referred her to Dr. Grayson (Gastroenterologist) who performed an endoscopy in March 2021 which was normal. However, during a visit to the ED in 7/2021 she had a CT abdomen and Pelvis performed which demonstrated severe intrahepatic and extrahepatic biliary ductal dilatation. Pancreatic head/neck is ill-defined, raising suspicion for underlying ill-defined mass.  GI EUS 3/2021-EUS with FNA of Pancreatic Mass, and ERCP with sphincterotomy and placement of a large plastic CBD stent. Pathology was significant for adenocarcinoma  Patient was seen by surg-onc and underwent pancreaticoduodenectomy with Dr. Nino on 8/2/21 . Patient tolerated the procedure well received 2 units of pRBC, 500mL of Albumin, and 4L of Crystalloid IVF. Patient was extubated brought to PACU. SICU consulted in the setting of extensive length of surgery, hemodynamic monitoring and known afib (was on apixaban but now on sc heparin).    Patient was also evaluated by podiatry for chronic b/l foot ulcers, being treated with betadine-soaked gauze + DSD + kerlix daily, and they are healing well.  The patient was admitted to Rehab medicine service on 8/13/21, where she participated in 3 hours of interdisciplinary therapy daily at least 5 days a week.  Current level of function:     Bed Mobility: Touch Assistance     Transfers: Independent     Ambulation: 50 feet with RW and touch assist with reciprocal gait.    The patient was treated with IV cefipime and IV flagyl while on surgery service, then after discussion with ID, ABX were continued po with Vantin and flagyl until 8/20/21.  She had a ariza catheter due to retention and was started on flomax on 8/12/21. The ariza catheter was d/c'd on 8/15 and she has been voiding well, no need for further CIC.  continued flomax  for now     Allergies:  Augmentin (Rash)  chocolate (Headache)  digoxin (Hives)  Metoprolol Succinate ER (Hives)  Novocain (Angioedema)  Steroids: Excessive swelling (Flushing; Other (Mild to Mod))  sulfa drugs (Fever)  Xarelto (Hives)     ASSESSMENT/PLANS:  Rehab of DEBILITY 2/2 pancreatic cancer/pancreatoduodenectomy  -Patient requires and to receive 3 hours of therapy daily with 90 minutes of PT and 90 minutes of OT for at least 5-6 days within a 7 day week period.  -Pain control  -Surgery team removed ISABELL drain and staples on 8/17; continue dressing change twice a day and more often if needed     #  Atrial fibrillation   - Monitor vitals   - Was on Diltiazem 90mg   q 6h , tody due to low bp with dizziness dose decreased to  cardizem cd 120 mg  per day( also will hold lasix for few days pt's home dose )  - Discuss risks and benefits of A/C with Cardiologist and Surgical team    # Urinary retention/ Overflow Incontinence--resolved  -- Tamsulosin 0.4mg daily qhs  -  patient is voiding well    -Pain control: Oxycodone IR 5mg q6 hours PRN for severe pain, Tylenol 650mg [id number]     -GI/Bowel Mgmt: Stool Softeners As needed    - Anemia: likely chronic disease and acute loss. Monitor    - HTN: monitor     - Diet: Soft diet     Precautions / PROPHYLAXIS:    - Falls  - Ortho: Weight bearing status: WBAT  - DVT prophylaxis: Heparin 5000 units [id number] + SCDS.    The patient is being discharged home with home care on 8/23/21.  She will follow up with her PCP Dr. Chavarria, also with her Surgeon Dr. Nino, her Oncologist Dr. Johnson,  Cardiologist Dr. Garvey, GI Dr. Grayson,  and Podiatrist Dr. Tovar.  COVID swab was negative on 8/19/21.

## 2021-08-22 NOTE — PROGRESS NOTE ADULT - ASSESSMENT
ASSESSMENT:  75 y/o F s/p whipple procedure POD #20. Patient currently in rehab.  Pain is well controlled, she is ambulating, tolerating diet. +bm, rehab planning to dc      PLAN:  - please change dressing 2x daily and PRN when soaked   - will continue to follow  - continue to work with rehab  - OOB/ IS  - DVT and GI PPX  - f/u dr sotne office    BLUE TEAM SPECTRA [id number]

## 2021-08-22 NOTE — PROGRESS NOTE ADULT - SUBJECTIVE AND OBJECTIVE BOX
T(C): 36.1 (08-22-21 @ 05:34), Max: 36.4 (08-21-21 @ 20:38)  HR: 89 (08-22-21 @ 05:34) (85 - 89)  BP: 123/71 (08-22-21 @ 05:34) (107/62 - 123/71)  RR: 18 (08-22-21 @ 05:34) (18 - 18)  SpO2: --      Patient was stable overnight and expresses no new complaints     PE:ptn  seen and exam at  bed  side with  rehab resident  oob  to  wc  doing  well    Alert   LUNGS- clear  COR- RRR  ABD- SOFT, NT  EXTR- w/o edema  NEURO- stable        Assess:  Rehab of debility        Continue acute rehab program. pt ot and  current care

## 2021-08-22 NOTE — PROGRESS NOTE ADULT - SUBJECTIVE AND OBJECTIVE BOX
GENERAL SURGERY PROGRESS NOTE    Events of past 24 hours: Patient seen and examined at bedside. No acute events overnight. Afebrile, VSS.    Vitals:   T(F): 97.6 (08-21-21 @ 20:38), Max: 97.6 (08-21-21 @ 20:38)  HR: 89 (08-21-21 @ 20:38)  BP: 108/58 (08-21-21 @ 20:38)  RR: 18 (08-21-21 @ 20:38)    Diet, Dysphagia 2 Mechanical Soft-Thin Liquids:   Fiber/Residue Restricted  No Beef  No Carbonated Beverages  No Chocolate  No Fish  Beneprotein ([facility] Only)     Qty per Day:  2 each meal  Prosource Gelatein 20 Sugar Free     Qty per Day:  3     Qty per Day:  magic cup(van)x3/day  Supplement Feeding Modality:  Oral      Bowel Movement: : [x] YES [] NO  Flatus: : [x] YES [] NO    PHYSICAL EXAM:  General Appearance: NAD  HEENT: EOMI, sclera non-icteric.  Heart: RRR   Lungs: No increased work of breathing or accessory muscle use. Symmetric chest wall rise and fall.   Abdomen:  Soft, nontender, nondistended.   MSK/Extremities: Warm & well-perfused.   Skin: Warm, dry. No jaundice.   Incision/wound: healing well, dressings in place, clean, dry and intact    MEDICATIONS  (STANDING):  diltiazem    Tablet 90 milliGRAM(s) Oral every 6 hours  furosemide    Tablet 20 milliGRAM(s) Oral daily  heparin   Injectable 5000 Unit(s) SubCutaneous every 12 hours  lactobacillus acidophilus 1 Tablet(s) Oral two times a day with meals  metoclopramide 5 milliGRAM(s) Oral three times a day before meals  pantoprazole    Tablet 40 milliGRAM(s) Oral before breakfast  saccharomyces boulardii 250 milliGRAM(s) Oral two times a day  tamsulosin 0.4 milliGRAM(s) Oral at bedtime    MEDICATIONS  (PRN):  acetaminophen   Tablet .. 650 milliGRAM(s) Oral every 6 hours PRN Temp greater or equal to 38C (100.4F), Mild Pain (1 - 3), Moderate Pain (4 - 6)  oxyCODONE    IR 5 milliGRAM(s) Oral every 6 hours PRN Severe Pain (7 - 10)    DVT PROPHYLAXIS: heparin   Injectable 5000 Unit(s) SubCutaneous every 12 hours    GI PROPHYLAXIS: pantoprazole    Tablet 40 milliGRAM(s) Oral before breakfast    LAB/STUDIES:  none     ACCESS/ DEVICES:  [x ] Peripheral IV    BLUE TEAM SPECTRA #[id number]

## 2021-08-23 NOTE — DISCHARGE NOTE NURSING/CASE MANAGEMENT/SOCIAL WORK - NSDCPEFALRISK_GEN_ALL_CORE
For information on Fall & injury Prevention, visit [email or web address]

## 2021-08-23 NOTE — PROGRESS NOTE ADULT - REASON FOR ADMISSION
Rehab of Debility 2/2 Pancreatic Cancer/ Pancreaticoduodenectomy

## 2021-08-23 NOTE — PROGRESS NOTE ADULT - ATTENDING COMMENTS
I reviewed the chart and examined the patient with the resident and we discussed the findings and treatment plan. I agree with the findings and treatment plan above, which I modified as indicated. The patient requires 3 hrs a day of acute inpatient rehab.    Rehab of DEBILITY 2/2 pancreatic cancer/pancreatoduodenectomy  -Patient requires and to receive 3 hours of therapy daily with 90 minutes of PT and 90 minutes of OT for at least 5-6 days within a 7 day week period.  -Pain control  -Surgery team to change dressing and ISABELL drain removal when time.     #  Atrial fibrillation   - Monitor vitals   -Diltiazem 90mg [id number]   - Discuss risks and benefits of A/C with Cardiologist and Surgical team    # Urinary retention/ Overflow Incontinence  - Indwelling ariza catheter placed  -Tamsulosin 0.4mg daily qhs  - will give TOV and straight cath as needed    -Pain control: Oxycodone IR 5mg q6 hours PRN for severe pain, Tylenol 650mg [id number]     -GI/Bowel Mgmt: Stool Softeners As needed    - Anemia: likely chronic disease and acute loss. Monitor    - HTN: monitor     -Skin:  ISABELL drain on right lateral abdominal wall and midline of anterior abdomen  - monitor incision    -FEN   Monitor lytes    - Diet: Soft diet     Precautions / PROPHYLAXIS:    - Falls  - Ortho: Weight bearing status: WBAT  - DVT prophylaxis: Heparin 5000 units [id number] + SCDS
I reviewed the chart and examined the patient with the resident and we discussed the findings and treatment plan. I agree with the findings and treatment plan above, which I modified as indicated. The patient requires 3 hrs a day of acute inpatient rehab.    Rehab of DEBILITY 2/2 pancreatic cancer/pancreatoduodenectomy  -Patient requires and to receive 3 hours of therapy daily with 90 minutes of PT and 90 minutes of OT for at least 5-6 days within a 7 day week period.  -Pain control  -Surgery team to change dressing and ISABELL drain removal when time.     #  Atrial fibrillation   - Monitor vitals   -Diltiazem 90mg [id number]   - Discuss risks and benefits of A/C with Cardiologist and Surgical team    # Urinary retention/ Overflow Incontinence  - Indwelling ariza catheter placed  -Tamsulosin 0.4mg daily qhs  - will give TOV and straight cath as needed    -Pain control: Oxycodone IR 5mg q6 hours PRN for severe pain, Tylenol 650mg [id number]     -GI/Bowel Mgmt: Stool Softeners As needed    - Anemia: likely chronic disease and acute loss. Monitor    - HTN: monitor     -Skin:  ISABELL drain on right lateral abdominal wall and midline of anterior abdomen  - monitor incision    -FEN   Monitor lytes    - Diet: Soft diet     Precautions / PROPHYLAXIS:    - Falls  - Ortho: Weight bearing status: WBAT  - DVT prophylaxis: Heparin 5000 units [id number] + SCDS
I reviewed the chart and examined the patient with the resident and we discussed the findings and treatment plan. I agree with the findings and treatment plan above, which I modified as indicated. The patient requires 3 hrs a day of acute inpatient rehab.    Rehab of DEBILITY 2/2 pancreatic cancer/pancreatoduodenectomy  -Patient requires and to receive 3 hours of therapy daily with 90 minutes of PT and 90 minutes of OT for at least 5-6 days within a 7 day week period.  -Pain control  -Surgery team to change dressing and ISABELL drain removal when time.     #  Atrial fibrillation   - Monitor vitals   -Diltiazem 90mg [id number]   - Discuss risks and benefits of A/C with Cardiologist and Surgical team    # Urinary retention/ Overflow Incontinence  - Indwelling ariza catheter placed  -Tamsulosin 0.4mg daily qhs  - will give TOV and straight cath as needed    -Pain control: Oxycodone IR 5mg q6 hours PRN for severe pain, Tylenol 650mg [id number]     -GI/Bowel Mgmt: Stool Softeners As needed    - Anemia: likely chronic disease and acute loss. Monitor    - HTN: monitor     -Skin:  ISABELL drain on right lateral abdominal wall and midline of anterior abdomen  - monitor incision    -FEN   Monitor lytes    - Diet: Soft diet     Precautions / PROPHYLAXIS:    - Falls  - Ortho: Weight bearing status: WBAT  - DVT prophylaxis: Heparin 5000 units [id number] + SCDS
I reviewed the chart and examined the patient with the resident and we discussed the findings and treatment plan. I agree with the findings and treatment plan above, which I modified as indicated. The patient requires 3 hrs a day of acute inpatient rehab.    Rehab of DEBILITY 2/2 pancreatic cancer/pancreatoduodenectomy  -Patient requires and to receive 3 hours of therapy daily with 90 minutes of PT and 90 minutes of OT for at least 5-6 days within a 7 day week period.  -Pain control  -Surgery team to change dressing and ISABELL drain removal when time.     #  Atrial fibrillation   - Monitor vitals   -Diltiazem 90mg [id number]   - Discuss risks and benefits of A/C with Cardiologist and Surgical team    # Urinary retention/ Overflow Incontinence  - Indwelling ariza catheter placed  -Tamsulosin 0.4mg daily qhs  - will give TOV and straight cath as needed    -Pain control: Oxycodone IR 5mg q6 hours PRN for severe pain, Tylenol 650mg [id number]     -GI/Bowel Mgmt: Stool Softeners As needed    - Anemia: likely chronic disease and acute loss. Monitor    - HTN: monitor     -Skin:  ISABELL drain on right lateral abdominal wall and midline of anterior abdomen  - monitor incision    -FEN   Monitor lytes    - Diet: Soft diet     Precautions / PROPHYLAXIS:    - Falls  - Ortho: Weight bearing status: WBAT  - DVT prophylaxis: Heparin 5000 units [id number] + SCDS
I reviewed the chart and examined the patient with the resident and we discussed the findings and treatment plan. I agree with the findings and treatment plan above, which I modified as indicated. The patient requires 3 hrs a day of acute inpatient rehab.    Rehab of DEBILITY 2/2 pancreatic cancer/pancreatoduodenectomy  -Patient requires and to receive 3 hours of therapy daily with 90 minutes of PT and 90 minutes of OT for at least 5-6 days within a 7 day week period.  -Pain control  -Surgery team to change dressing and ISABELL drain removal when time.     #  Atrial fibrillation   - Monitor vitals   -Diltiazem 90mg [id number]   - Discuss risks and benefits of A/C with Cardiologist and Surgical team    # Urinary retention/ Overflow Incontinence  - Indwelling ariza catheter placed  -Tamsulosin 0.4mg daily qhs  - will give TOV and straight cath as needed    -Pain control: Oxycodone IR 5mg q6 hours PRN for severe pain, Tylenol 650mg [id number]     -GI/Bowel Mgmt: Stool Softeners As needed    - Anemia: likely chronic disease and acute loss. Monitor    - HTN: monitor     -Skin:  ISABELL drain on right lateral abdominal wall and midline of anterior abdomen  - monitor incision    -FEN   Monitor lytes    - Diet: Soft diet     Precautions / PROPHYLAXIS:    - Falls  - Ortho: Weight bearing status: WBAT  - DVT prophylaxis: Heparin 5000 units [id number] + SCDS
Pt. seen and examined this am with surgical team.  Pt feels well, tolerating diet, afebrile, having bowel movements, no pain.  ISABELL serous.    AVSS  A&OX3, NAD  Abd soft, NT, ND, incision C/D/I, no erythema or drainage or fluctuance    WBC 12 yesterday, 9 the day before, but had been 12 and 14K the days prior.    No secondary signs of infection.  Pt is well-appearing.  Recheck CBC today.  Continue Abx as per primary team.
Patient is a 76F POD15 s/p whipple procedure for ductal adenocarcinoma no in rehab    Patient seen and examined.  No acute events over night.  Patient tolerating a diet    Abdomen - soft, non distended, appropriately tender.  Incision intact.  No erythema induration or purulence.  ISABELL drain with serous output    WBC7    PLAN:  - trend WBC  - antibiotics per primary team  - Continue soft diet  - surgery to follow
Patient seen and examined. We discussed the case. I have directed the care.  Rehab of DEBILITY 2/2 pancreatic cancer/pancreatoduodenectomy  -Patient requires and to receive 3 hours of therapy daily with 90 minuites of PT and 90 minutes of OT for at least 5-6 days within a 7 day week period.  -Pain control  -Surgery team following   -Dressing changes prn  -Preparing for discharge today    # Chronic Atrial fibrillation   - Monitor vitals   - Discuss risks and benefits of A/C with Cardiologist and Surgical team  - Diltiazem originally 90mg [id number], patient had episode of soft BP today and mild dizziness         - Diltiazem changed to 120mg qd. Subsequent BP has been stable and patient asymptomatic.      # Urinary retention/ Overflow Incontinence  - Indwelling ariza catheter placed previously   - Tamsulosin 0.4mg daily qhs  - TOV passed, patient voiding freely     -Pain control: Oxycodone IR 5mg q6 hours PRN for severe pain, Tylenol 650mg [id number]     -GI/Bowel Mgmt: Stool Softeners As needed    - Anemia: likely chronic disease and acute loss. Monitor    - HTN: monitor     -Skin:  Midline vertical incision has staples removed and is C/D with intact skin & without any openings.    -FEN   Monitor lytes    - Diet:  Dysphagia 2 Mechanical Soft with Thin Liquids     Precautions / PROPHYLAXIS:    - Falls  - Ortho: Weight bearing status: WBAT  - DVT prophylaxis: Heparin 5000 units [id number] + SCDS

## 2021-08-23 NOTE — PROGRESS NOTE ADULT - ASSESSMENT
ASSESSMENT/PLAN    Rehab of DEBILITY 2/2 pancreatic cancer/pancreatoduodenectomy  -Patient requires and to receive 3 hours of therapy daily with 90 minuites of PT and 90 minutes of OT for at least 5-6 days within a 7 day week period.  -Pain control  -Surgery team to change dressing and ISABELL drain removal when time.     # Chronic Atrial fibrillation   - Monitor vitals   -Diltiazem 90mg [id number]   - Discuss risks and benefits of A/C with Cardiologist and Surgical team    # Urinary retention/ Overflow Incontinence  - Indwelling ariza catheter placed  -Tamsulosin 0.4mg daily qhs  - TOV passed    -Pain control: Oxycodone IR 5mg q6 hours PRN for severe pain, Tylenol 650mg [id number]     -GI/Bowel Mgmt: Stool Softeners As needed    - Anemia: likely chronic disease and acute loss. Monitor    - HTN: monitor     -Skin:  Midline vertical incision has staples removed and is C/D with intact skin & without any openings.    -FEN   Monitor lytes    - Diet:  Dysphagia 2 Mechanical Soft with Thin Liquids     Precautions / PROPHYLAXIS:    - Falls  - Ortho: Weight bearing status: WBAT  - DVT prophylaxis: Heparin 5000 units [id number] + SCDS ASSESSMENT/PLAN    Rehab of DEBILITY 2/2 pancreatic cancer/pancreatoduodenectomy  -Patient requires and to receive 3 hours of therapy daily with 90 minuites of PT and 90 minutes of OT for at least 5-6 days within a 7 day week period.  -Pain control  -Surgery team following   -Dressing changes prn  -Preparing for discharge today    # Chronic Atrial fibrillation   - Monitor vitals   - Discuss risks and benefits of A/C with Cardiologist and Surgical team  - Diltiazem originally 90mg [id number], patient had episode of soft BP today and mild dizziness         - Diltiazem changed to 120mg qd. Subsequent BP has been stable and patient asymptomatic.      # Urinary retention/ Overflow Incontinence  - Indwelling ariza catheter placed previously   - Tamsulosin 0.4mg daily qhs  - TOV passed, patient voiding freely     -Pain control: Oxycodone IR 5mg q6 hours PRN for severe pain, Tylenol 650mg [id number]     -GI/Bowel Mgmt: Stool Softeners As needed    - Anemia: likely chronic disease and acute loss. Monitor    - HTN: monitor     -Skin:  Midline vertical incision has staples removed and is C/D with intact skin & without any openings.    -FEN   Monitor lytes    - Diet:  Dysphagia 2 Mechanical Soft with Thin Liquids     Precautions / PROPHYLAXIS:    - Falls  - Ortho: Weight bearing status: WBAT  - DVT prophylaxis: Heparin 5000 units [id number] + SCDS

## 2021-08-23 NOTE — PROGRESS NOTE ADULT - PROVIDER SPECIALTY LIST ADULT
Physiatry
Surgery
Surgery
Physiatry
Physiatry
Surgery
Physiatry
Surgery
Surgery
Neuropsychology
Physiatry
Surgery
Physiatry

## 2021-08-23 NOTE — DISCHARGE NOTE NURSING/CASE MANAGEMENT/SOCIAL WORK - PATIENT PORTAL LINK FT
You can access the FollowMyHealth Patient Portal offered by [facility] by registering at the following website: [email or web address]. By joining [employer]’s FollowMyHealth portal, you will also be able to view your health information using other applications (apps) compatible with our system.

## 2021-08-23 NOTE — PROGRESS NOTE ADULT - SUBJECTIVE AND OBJECTIVE BOX
Patient is a 76y old  Female who presents with a chief complaint of Rehab of Debility 2/2 Pancreatic Cancer/ Pancreaticoduodenectomy    HPI:  Patient is a 75 year old female patient who has a PMHx of pancreatic ca (March 2021, s/p Chemo 2 rounds 6/2021 via R Subclavian chemo port),  Afib on eliquis ( Cardiologist VEE Medrano ), HTN, Varicose veins in bilateral lower extremities, Nephrolithiasis, and being COVID + on 5/4/2021 via PCR with self quarantine till 5/14/2021. The patient originally presented to the ED March 2021 for yellow discoloration of the skin as her daughter was insisting she go to the hospital for having yellow colored skin and urine for days. Patient started to notice this Jaundice in December 2020. It was associated with dark urine and light colored stool. Patient went to her PMD who treated her for UTI by prescribing an antibiotic; but she had no relief. Patient states she then went to her cardiologist for her stress test and he noticed that she looks jaundiced and referred her to Dr. Grayson (Gastroenterologist) who performed an endoscopy in March 2021 which was normal. However, during a visit to the ED in 7/2021 she had a CT abdomen and Pelvis performed which demonstrated; severe intrahepatic and extrahepatic biliary ductal dilatation. Pancreatic head/neck is ill-defined, raising suspicion for underlying ill-defined mass.  GI EUS 3/2021-EUS with FNA of Pancreatic Mass, and ERCP with sphincterotomy and placement of a large plastic CBD stent. pathology significant for adenocarcinoma  Patient seen by surg-onc and underwent pancreaticoduodenectomy with Dr. Nino on 8/2/21 . Patient tolerated the procedure well received 2 units of pRBC, 500mL of Albumin, and 4L of Crystalloid IVF. Patient was extubated brought to PACU. SICU consulted in the setting of extensive length of surgery, hemodynamic monitoring and known afib.  Patient was also evaluated by podiatry for chronic feet ulcer, She was downgraded to floor 8/5/21. On 8/13/2021 patient verbalized no complaints and states she is tolerating her diet well and voided via smalls catheter.    She is tolerating bedside PT and OT and is able to perform bed mobility with moderate assistance using bed rails and demonstrated decreased strength and impaired balance. Additionally, she was minimum assist for transfer from sit to stand and rolling walking to bed transfers. She ambulated 10 feet twice with a RW and contact guard while demonstrating decreased sean;  decreased step length;  decreased strength;  pain;  decreased endurance     She was evaluated by the rehab team/ Physiatrist,  and she is deemed to be a good candidate for acute inpatient rehab admission to .    I examined the patient and reviewed the chart. There have been no significant changes since my history and physical except where documented below.    On 8/17/2021 the patient was seen by the rehab team during rounds and had no acute complaints or concerns at this time.    On 8/18/2021 the patient was seen by the rehab team during rounds and had no acute complaints or concerns at this time; except that the area where her ISABELL Drain was removed from on her RLQ abdomen was slightly wet at the moment. Additionally she stated the surgical team removed the vertical staples on 8/17/2021. RN was informed and dressing change was performed. Additionally, after team meeting Neuropsychology was consulted to help patient with coping with her life after procedure.     On 8/19/2021 patient was seen and evalauted by rehab team during rounds. Of note surgical team addressed patients dressign in morning and noted less drainage. Patient herself is doing well.    On 8/20/2021 patient was seen and evalauted by rehab team during rounds. Of note surgical team addressed patients dressing in morning and noted less drainage. Patient herself is doing well.    TODAY'S SUBJECTIVE & REVIEW OF SYMPTOMS:  Patient feeling well. No major complaints. Making gains in therapies. Preparing for discharge today.   No acute events.     Constiutional:    [   ] WNL           [   ] poor appetite   [   ] insomnia   [  x ] tired    [  ]  Lethargy   Cardio:                [ X ] WNL           [   ] CP   [   ] BARAJAS   [   ] palpitations               Resp:                   [ X ] WNL           [   ] SOB   [   ] cough   [   ] wheezing   GI:                        [  X ] WNL           [   ] constipation   [   ] diarrhea   [   ] abdominal pain   [   ] nausea   [   ] emesis                                :                      [   ] WNL           [   ] SMALLS  [   ] dysuria   [ X ] difficulty voiding             Endo:                   [  X ] WNL          [   ] polyuria   [   ] temperature intolerance                 Skin:                     [   ] WNL          [   ] pain   [  X  ] wound   [   ] rash   MSK:                    [ X  ] WNL          [   ] muscle pain   [   ] joint pain/ stiffness   [   ] muscle tenderness   [   ] swelling   Neuro:                 [X ] WNL          [   ] HA   [   ] change in vision   [   ] tremor   [   ] weakness   [   ]dysphagia              Cognitive:           [ X ] WNL           [   ]confusion      Psych:                  [ X ] WNL           [   ] hallucinations   [   ]agitation   [   ] delusion   [   ]depression      PHYSICAL EXAM    Vital Signs Last 24 Hrs  T(C): 35.9 (20 Aug 2021 05:08), Max: 36.4 (19 Aug 2021 13:22)  T(F): 96.6 (20 Aug 2021 05:08), Max: 97.5 (19 Aug 2021 13:22)  HR: 76 (20 Aug 2021 05:08) (76 - 90)  BP: 117/59 (20 Aug 2021 05:08) (117/59 - 130/66)  BP(mean): --  RR: 18 (20 Aug 2021 05:08) (18 - 18)  SpO2: --    General:[ X  ] NAD, Resting Comfortable,   [   ] other:                                HEENT: [ X ] NC/AT, EOMI, PERRL , Normal Conjunctivae,   [   ] other:   Cardio: [   ] RRR, no murmer,   [ X  ] other:   Irregularly irregular HR with no murmurs, gallops, or rubs                            Pulm: [  X ] No Respiratory Distress,  Lungs CTAB,   [   ] other:                       Abdomen: [   ]ND/NT, Soft,   [  X ] other:  Normal bowel sounds auscultated and patient's   : [ X  ] NO SMALLS CATHETER, [   ] SMALLS CATHETER- no meatal tear, no discharge, [   ] other:                                            MSK: [ X  ] No joint swelling, Full ROM,   [   ] other:                                         Ext: [   ]No C/C/E, No calf tenderness,   [ X  ]other:  B/L LE varicose veins  Skin: [   ]intact,   [  X ] other:  B/L LE varicose veins & Right lateral ISABELL drain and midline surgical incision.    Neurological Examination:  Cognitive: [  X  ] AAO x 3,   [    ]  other:                                                                      Attention:  [  X  ] intact,   [    ]  other:                            Memory: [    ] intact,    [  X  ]  other:   Recalled only 2 of 3 words with delayed recall  Mood/Affect: [  X  ] wnl,    [    ]  other:                                                                             Communication: [ X   ]Fluent, no dysarthria, following commands:  [    ] other:   CN II - XII:  [  X ] intact,  [    ] other:                                                                                        Motor:   RIGHT UE: [   ] WNL,  [ X  ] other: 4+/5  LEFT    UE: [   ] WNL,  [ X  ] other: 4+/5  RIGHT LE: [   ] WNL,  [  X ] other: 4/5 prox, 5/5 distally  LEFT    LE: [   ] WNL,  [ X  ] other:4/5 prox, 5/5 distally    Tone: [  X  ] wnl,   [    ]  other:  DTRs: [  X ]symmetric, [   ] other:  Coordination:   [ X   ] intact,   [    ] other:                                                                           Sensory: [  X  ] Intact to light touch,   [    ] other:    Current Level of Function:  Bed Mobility: Touch Assistance   Transfers: Independent   Ambulation: 50 feet with RW and touch assist with reciprocal gait.    MEDICATIONS  (STANDING):  acetaminophen   Tablet .. 650 milliGRAM(s) Oral every 6 hours  cefpodoxime 200 milliGRAM(s) Oral every 12 hours  diltiazem    Tablet 90 milliGRAM(s) Oral every 6 hours  furosemide    Tablet 20 milliGRAM(s) Oral daily  heparin   Injectable 5000 Unit(s) SubCutaneous every 12 hours  lactobacillus acidophilus 1 Tablet(s) Oral two times a day with meals  metoclopramide 5 milliGRAM(s) Oral three times a day before meals  metroNIDAZOLE    Tablet 500 milliGRAM(s) Oral every 8 hours  pantoprazole    Tablet 40 milliGRAM(s) Oral before breakfast  saccharomyces boulardii 250 milliGRAM(s) Oral two times a day  tamsulosin 0.4 milliGRAM(s) Oral at bedtime    MEDICATIONS  (PRN):  oxyCODONE    IR 5 milliGRAM(s) Oral every 6 hours PRN Severe Pain (7 - 10)     Patient is a 76y old  Female who presents with a chief complaint of Rehab of Debility 2/2 Pancreatic Cancer/ Pancreaticoduodenectomy    HPI:  Patient is a 75 year old female patient who has a PMHx of pancreatic ca (March 2021, s/p Chemo 2 rounds 6/2021 via R Subclavian chemo port),  Afib on eliquis ( Cardiologist VEE Medrano ), HTN, Varicose veins in bilateral lower extremities, Nephrolithiasis, and being COVID + on 5/4/2021 via PCR with self quarantine till 5/14/2021. The patient originally presented to the ED March 2021 for yellow discoloration of the skin as her daughter was insisting she go to the hospital for having yellow colored skin and urine for days. Patient started to notice this Jaundice in December 2020. It was associated with dark urine and light colored stool. Patient went to her PMD who treated her for UTI by prescribing an antibiotic; but she had no relief. Patient states she then went to her cardiologist for her stress test and he noticed that she looks jaundiced and referred her to Dr. Grayson (Gastroenterologist) who performed an endoscopy in March 2021 which was normal. However, during a visit to the ED in 7/2021 she had a CT abdomen and Pelvis performed which demonstrated; severe intrahepatic and extrahepatic biliary ductal dilatation. Pancreatic head/neck is ill-defined, raising suspicion for underlying ill-defined mass.  GI EUS 3/2021-EUS with FNA of Pancreatic Mass, and ERCP with sphincterotomy and placement of a large plastic CBD stent. pathology significant for adenocarcinoma  Patient seen by surg-onc and underwent pancreaticoduodenectomy with Dr. Nino on 8/2/21 . Patient tolerated the procedure well received 2 units of pRBC, 500mL of Albumin, and 4L of Crystalloid IVF. Patient was extubated brought to PACU. SICU consulted in the setting of extensive length of surgery, hemodynamic monitoring and known afib.  Patient was also evaluated by podiatry for chronic feet ulcer, She was downgraded to floor 8/5/21. On 8/13/2021 patient verbalized no complaints and states she is tolerating her diet well and voided via smalls catheter.    She is tolerating bedside PT and OT and is able to perform bed mobility with moderate assistance using bed rails and demonstrated decreased strength and impaired balance. Additionally, she was minimum assist for transfer from sit to stand and rolling walking to bed transfers. She ambulated 10 feet twice with a RW and contact guard while demonstrating decreased sean;  decreased step length;  decreased strength;  pain;  decreased endurance     She was evaluated by the rehab team/ Physiatrist,  and she is deemed to be a good candidate for acute inpatient rehab admission to .    I examined the patient and reviewed the chart. There have been no significant changes since my history and physical except where documented below.    On 8/17/2021 the patient was seen by the rehab team during rounds and had no acute complaints or concerns at this time.    On 8/18/2021 the patient was seen by the rehab team during rounds and had no acute complaints or concerns at this time; except that the area where her ISABELL Drain was removed from on her RLQ abdomen was slightly wet at the moment. Additionally she stated the surgical team removed the vertical staples on 8/17/2021. RN was informed and dressing change was performed. Additionally, after team meeting Neuropsychology was consulted to help patient with coping with her life after procedure.     On 8/19/2021 patient was seen and evalauted by rehab team during rounds. Of note surgical team addressed patients dressign in morning and noted less drainage. Patient herself is doing well.    On 8/20/2021 patient was seen and evalauted by rehab team during rounds. Of note surgical team addressed patients dressing in morning and noted less drainage. Patient herself is doing well.    TODAY'S SUBJECTIVE & REVIEW OF SYMPTOMS:  Patient feeling well. No major complaints. Making gains in therapies. Preparing for discharge today.   No acute events.     Constiutional:    [ x  ] WNL           [   ] poor appetite   [   ] insomnia   [   ] tired    [  ]  Lethargy   Cardio:                [ X ] WNL           [   ] CP   [   ] BARAJAS   [   ] palpitations               Resp:                   [ X ] WNL           [   ] SOB   [   ] cough   [   ] wheezing   GI:                        [  X ] WNL           [   ] constipation   [   ] diarrhea   [   ] abdominal pain   [   ] nausea   [   ] emesis                                :                      [   ] WNL           [   ] SMALLS  [   ] dysuria   [ X ] difficulty voiding             Endo:                   [  X ] WNL          [   ] polyuria   [   ] temperature intolerance                 Skin:                     [   ] WNL          [   ] pain   [  X  ] wound   [   ] rash   MSK:                    [ X  ] WNL          [   ] muscle pain   [   ] joint pain/ stiffness   [   ] muscle tenderness   [   ] swelling   Neuro:                 [X ] WNL          [   ] HA   [   ] change in vision   [   ] tremor   [   ] weakness   [   ]dysphagia              Cognitive:           [ X ] WNL           [   ]confusion      Psych:                  [ X ] WNL           [   ] hallucinations   [   ]agitation   [   ] delusion   [   ]depression      PHYSICAL EXAM    Vital Signs Last 24 Hrs  T(C): 35.6 (23 Aug 2021 13:42), Max: 36.1 (22 Aug 2021 20:14)  T(F): 96.1 (23 Aug 2021 13:42), Max: 96.9 (22 Aug 2021 20:14)  HR: 84 (23 Aug 2021 13:42) (84 - 91)  BP: 107/56 (23 Aug 2021 13:42) (107/56 - 129/76)  BP(mean): --  RR: 18 (23 Aug 2021 13:42) (18 - 18)  SpO2: --    General:[ X  ] NAD, Resting Comfortable,   [   ] other:                                HEENT: [ X ] NC/AT, EOMI, PERRL , Normal Conjunctivae,   [   ] other:   Cardio: [   ] RRR, no murmer,   [ X  ] other:   Irregularly irregular HR with no murmurs, gallops, or rubs                            Pulm: [  X ] No Respiratory Distress,  Lungs CTAB,   [   ] other:                       Abdomen: [   ]ND/NT, Soft,   [  X ] other:  Normal bowel sounds auscultated and patient's   : [ X  ] NO SMALLS CATHETER, [   ] SMALLS CATHETER- no meatal tear, no discharge, [   ] other:                                            MSK: [ X  ] No joint swelling, Full ROM,   [   ] other:                                         Ext: [   ]No C/C/E, No calf tenderness,   [ X  ]other:  B/L LE varicose veins  Skin: [   ]intact,   [  X ] other:  B/L LE varicose veins & Right lateral ISABELL drain and midline surgical incision.    Neurological Examination:  Cognitive: [  X  ] AAO x 3,   [    ]  other:                                                                      Attention:  [  X  ] intact,   [    ]  other:                            Memory: [    ] intact,    [  X  ]  other:   Recalled only 2 of 3 words with delayed recall  Mood/Affect: [  X  ] wnl,    [    ]  other:                                                                             Communication: [ X   ]Fluent, no dysarthria, following commands:  [    ] other:   CN II - XII:  [  X ] intact,  [    ] other:                                                                                        Motor:   RIGHT UE: [   ] WNL,  [ X  ] other: 4+/5  LEFT    UE: [   ] WNL,  [ X  ] other: 4+/5  RIGHT LE: [   ] WNL,  [  X ] other: 4/5 prox, 5/5 distally  LEFT    LE: [   ] WNL,  [ X  ] other:4/5 prox, 5/5 distally    Tone: [  X  ] wnl,   [    ]  other:  DTRs: [  X ]symmetric, [   ] other:  Coordination:   [ X   ] intact,   [    ] other:                                                                           Sensory: [  X  ] Intact to light touch,   [    ] other:    Current Level of Function:  Bed Mobility: Touch Assistance   Transfers: Independent   Ambulation: 50 feet with RW and touch assist with reciprocal gait.    MEDICATIONS  (STANDING):  diltiazem    milliGRAM(s) Oral daily  furosemide    Tablet 20 milliGRAM(s) Oral daily  heparin   Injectable 5000 Unit(s) SubCutaneous every 12 hours  lactobacillus acidophilus 1 Tablet(s) Oral two times a day with meals  metoclopramide 5 milliGRAM(s) Oral three times a day before meals  pantoprazole    Tablet 40 milliGRAM(s) Oral before breakfast  saccharomyces boulardii 250 milliGRAM(s) Oral two times a day  tamsulosin 0.4 milliGRAM(s) Oral at bedtime    MEDICATIONS  (PRN):  acetaminophen   Tablet .. 650 milliGRAM(s) Oral every 6 hours PRN Temp greater or equal to 38C (100.4F), Mild Pain (1 - 3), Moderate Pain (4 - 6)  oxyCODONE    IR 5 milliGRAM(s) Oral every 6 hours PRN Severe Pain (7 - 10)

## 2021-08-23 NOTE — CHART NOTE - NSCHARTNOTEFT_GEN_A_CORE
Registered Dietitian Follow-Up     Patient Profile Reviewed                           Yes [X]   No []     Nutrition History Previously Obtained        Yes [X]  No []       Pertinent Subjective Information: Pt states she is being dc'd today.     Diet order:  Diet, Dysphagia 2 Mechanical Soft-Thin Liquids:   Fiber/Residue Restricted  No Beef  No Carbonated Beverages  No Chocolate  No Fish  Beneprotein ([facility] Only)     Qty per Day:  2 each meal  [employer] Gelatein 20 Sugar Free     Qty per Day:  3     Qty per Day:  magic cup(van)x3/day  Supplement Feeding Modality:  Oral (08-15-21 @ 13:36) [Active]     Anthropometrics:  - Ht. 68 inches  - Wt. UBW 57 kg, CBW 61.2 kg per previous assessment, no updated weight  - IBW 63.6 kg     Pertinent Lab Data: No new labs     Pertinent Meds: heparin, flagyl, lasix, lactobacillus acidophilus, Florastor, Reglan, oxycodone     Physical Findings:  - Appearance: up in chair, participating in therapy, A/O x 4  - GI function: no N/V/D/C  - Tubes: n/a  - Oral/Mouth cavity: good dentition  - Skin: surgical site to abdomen     Nutrition Requirements  Weight Used: UBW 57 kg     Estimated Energy Needs    Continue [X]  Adjust []  Adjusted Energy Recommendations: 1051-2430  kcal/day  MSJ x AF 1.1-1.2      Estimated Protein Needs    Continue [X]  Adjust []  Adjusted Protein Recommendations:  68-80 gm/day  1.2-1.4 g/kg      Estimated Fluid Needs        Continue [X]  Adjust []  Adjusted Fluid Recommendations: 1710  mL/day  30 mL/kg or per LIP     Nutrient Intake: improving per pt        [] Previous Nutrition Diagnosis:            [] Ongoing          [X] Resolved     Goal/Expected Outcome: meal/supplement intake >50% in 3-5 days     Indicator/Monitoring: meal/supplement intake, diet order, weight, labs.    Recommendations: f/u PRN

## 2021-09-03 NOTE — ED PROVIDER NOTE - OBJECTIVE STATEMENT
Pt with PMH HTN, pancreatic adenocarcinoma diagnosed march 2021 s/p chemo x2, ERCP with CBD stent, pancreaticoduodenoectomy 8/21 with Dr. Nino, AF on eliquis (Dr. Wood) who presents to [facility] for chills last night, feeling "dehydrated" and noted low blood pressure 100/60s after taking her meds this morning. Had some orange juice at home and last meal was dinner last night. Reports nausea earlier this morning that resolved completely on its own. Denies CP, SOB, abd pain, vomiting, urinary sx. diarrhea. She has a f/u apt with Dr. Nino on Thursday,.

## 2021-09-03 NOTE — H&P ADULT - NSHPPHYSICALEXAM_GEN_ALL_CORE
PHYSICAL EXAM:  GENERAL: NAD, lying in bed comfortably  HEAD:  Atraumatic, Normocephalic  EYES: EOMI, PERRLA, conjunctiva and sclera clear  ENT: Moist mucous membranes  NECK: Supple, No JVD  CHEST/LUNG: Clear to auscultation bilaterally; No rales, rhonchi, wheezing, or rubs. Unlabored respirations  HEART: Regular rate and rhythm; No murmurs, rubs, or gallops  ABDOMEN: Bowel sounds present; Soft, Nontender, Nondistended. No hepatomegally  EXTREMITIES:  2+ Peripheral Pulses, brisk capillary refill. No clubbing, cyanosis, or edema  NERVOUS SYSTEM:  Alert & Oriented X3, speech clear. No deficits   MSK: FROM all 4 extremities, full and equal strength  SKIN: b/l chronic venous stasis changes; no evidence of cellulitis

## 2021-09-03 NOTE — ED PROVIDER NOTE - PHYSICAL EXAMINATION
Gen - NAD, Head - NCAT, Pharynx - clear, MMM, Heart - RRR, no m/g/r, Lungs - CTAB, no w/c/r, Abdomen - soft, NT, ND, Skin - No rash, Extremities - FROM, no edema, erythema, ecchymosis, brisk cap refill, Neuro - A&O x3, equal strength and sensation, non-focal exam

## 2021-09-03 NOTE — H&P ADULT - ATTENDING COMMENTS
Patient seen and examined on 09/03/2021 at 22:03    HPI:  75 yo woman with a pmhx of HTN, pancreatic adenocarcinoma diagnosed march 2021 s/p chemo x2, ERCP with CBD stent, s/p Whipple procedure  8/2/21 with Dr. Nino, AF on eliquis admitted for SIRS + Cystitis. She noted being hypotensive today, SBP 77 after taking her diltiazem and reported significant fatigue and malaise. On the night prior to admission she notes significant chills. Otherwise she denied and fevers, CP, SOB, palpitations, abdo pain, dysuria, urinary frequency, malodorous urine, cough, sick contacts, n/v/d.   Noted to have WBC 22 and  in ED with UA +     REVIEW OF SYSTEMS:  CONSTITUTIONAL:  +malaise, chills, and fatigue. No weakness, fevers, night sweats, weight loss  EYES/ENT: No visual changes. No vertigo or dysphagia  NECK: No neck pain or stiffness  RESPIRATORY: No cough, wheezing, hemoptysis. No shortness of breath  CARDIOVASCULAR: No chest pain or palpitations. No lower extremity edema  GASTROINTESTINAL: No abdominal pain. No nausea, vomiting, diarrhea, or hematemesis  GENITOURINARY: No dysuria or hematuria   NEUROLOGICAL: No focal numbness or weakness  SKIN: No rashes or itching  HEMATOLOGIC: No easy bruising or prolonged bleeding.      PHYSICAL EXAM:  GENERAL: NAD, thin, Non-toxic, stated age   HEAD:  Atraumatic, Normocephalic  EYES: EOMI, Sclera White   NECK: Supple, No JVD  CHEST/LUNG: Clear to auscultation bilaterally; No wheezing, rhonchi, or crackles  HEART: Regular rate and rhythm; s1, s2, No murmurs, rubs, or gallops  ABDOMEN: Soft, Nontender, Nondistended; Bowel sounds present, No rebound or guarding noted   EXTREMITIES:  No lower extremity edema or calf tenderness to palpation.  No clubbing or cyanosis  PSYCH: AAOx3, pleasant, cooperative, not anxious  NEUROLOGY: 5/5 strength in all extremities, no downward drift. Sensation grossly intact.   SKIN: No rashes or lesions      ASSESSMENT AND PLAN:  Cystitis: SIRS present on admission  Bacteruria   -Cont with rocephin 1g  -Follow up blood and urine cultures, procal    Hypotension: resolved  -s/p 2L IVF   -Reduced diltiazem to 60mg qD, and increase as tolerated     Normocytic anemia: check iron studies, b12, and folate  -No clinical bleeding     Hyperglycemia: check hba1c    Pancreatic Adenocarcinoma s/p Whipple.  Elevated Alk Phos   -Last chemo 5 weeks ago   -Out patient follow up with heme-onc and Surgery   -Cont with reglan PRN     Hyperkalemia: hemolyzed, normal on repeat VBG    Chronic Atrial fibrillation: on eliquis  -Diltiazem reduced to 60mg, can increase as tolerated     DVT ppx: on eliquis   GI ppx: Not indicated  GOC: Full code.    My note supersedes the residents in the event of a discrepancy. Patient seen and examined on 09/03/2021 at 22:03    HPI:  75 yo woman with a pmhx of HTN, pancreatic adenocarcinoma diagnosed march 2021 s/p chemo x2, ERCP with CBD stent, s/p Whipple procedure  8/2/21 with Dr. Nino, MARIO on eliquis admitted for SIRS + Cystitis. She noted being hypotensive today, SBP 77 after taking her diltiazem and reported significant fatigue and malaise. On the night prior to admission she notes significant chills. Otherwise she denied and fevers, CP, SOB, palpitations, abdo pain, dysuria, urinary frequency, malodorous urine, cough, sick contacts, n/v/d.   Noted to have WBC 22 and  in ED with UA +     REVIEW OF SYSTEMS:  CONSTITUTIONAL:  +malaise, chills, and fatigue. No weakness, fevers, night sweats, weight loss  EYES/ENT: No visual changes. No vertigo or dysphagia  NECK: No neck pain or stiffness  RESPIRATORY: No cough, wheezing, hemoptysis. No shortness of breath  CARDIOVASCULAR: No chest pain or palpitations. No lower extremity edema  GASTROINTESTINAL: No abdominal pain. No nausea, vomiting, diarrhea, or hematemesis  GENITOURINARY: No dysuria or hematuria   NEUROLOGICAL: No focal numbness or weakness  SKIN: No rashes or itching  HEMATOLOGIC: No easy bruising or prolonged bleeding.      PHYSICAL EXAM:  GENERAL: NAD, thin, Non-toxic, stated age   HEAD:  Atraumatic, Normocephalic  EYES: EOMI, Sclera White   NECK: Supple, No JVD  CHEST/LUNG: Clear to auscultation bilaterally; No wheezing, rhonchi, or crackles  HEART: Regular rate and rhythm; s1, s2, No murmurs, rubs, or gallops  ABDOMEN: Soft, Nontender, Nondistended; Bowel sounds present, No rebound or guarding noted   EXTREMITIES:  No lower extremity edema or calf tenderness to palpation.  No clubbing or cyanosis  PSYCH: AAOx3, pleasant, cooperative, not anxious  NEUROLOGY: 5/5 strength in all extremities, no downward drift. Sensation grossly intact.   SKIN: No rashes or lesions      ASSESSMENT AND PLAN:  Cystitis: SIRS present on admission  Bacteruria   -Cont with rocephin 1g  -Follow up blood and urine cultures, procal    Hypotension: resolved  -s/p 2L IVF     Normocytic anemia: check iron studies, b12, and folate  -No clinical bleeding     Hyperglycemia: check hba1c    Pancreatic Adenocarcinoma s/p Whipple.  Elevated Alk Phos   -Last chemo 5 weeks ago   -Out patient follow up with heme-onc and Surgery   -Cont with reglan PRN     Hyperkalemia: hemolyzed, normal on repeat VBG    Chronic Atrial fibrillation: on eliquis  -Diltiazem 120mg    DVT ppx: on eliquis   GI ppx: Not indicated  GOC: Full code.    My note supersedes the residents in the event of a discrepancy.

## 2021-09-03 NOTE — ED ADULT TRIAGE NOTE - CHIEF COMPLAINT QUOTE
pt felt light headed this am, didn't check BP prior to taking antihypertensive medications. pt in 90's systolic as per EMS initially

## 2021-09-03 NOTE — H&P ADULT - NSHPLABSRESULTS_GEN_ALL_CORE
LABS:  cret                        11.7   22.38 )-----------( 164      ( 03 Sep 2021 13:51 )             37.1     09-03    135  |  99  |  16  ----------------------------<  162<H>  6.1<HH>   |  26  |  0.8    Ca    8.0<L>      03 Sep 2021 13:51  Phos  4.4     09-03  Mg     1.8     09-03    TPro  5.1<L>  /  Alb  2.7<L>  /  TBili  0.4  /  DBili  x   /  AST  58<H>  /  ALT  15  /  AlkPhos  506<H>  09-03    imaging:   - CXR wnl   - EKG: afib

## 2021-09-03 NOTE — ED ADULT NURSE NOTE - OBJECTIVE STATEMENT
Patient presents to ED with complaints of dizziness that started this morning prior to taking BP meds. Patient reports recent surgery the last week of August and denies fever, drainage from wound site.

## 2021-09-03 NOTE — H&P ADULT - ASSESSMENT
75 yo F w/ pmhx of HTN, pancreatic adenocarcinoma diagnosed march 2021 s/p chemo x2, ERCP with CBD stent, pancreaticoduodenoectomy 8/21 with Dr. Nino, AF on eliquis presents to ED for chills and myalgias. Admitted for UTI.     #UTI, suspected pyelonephritis  #sepsis present on admission   - labs: WBC 23.38, u/a shows WENDI large, many bacteria, WBC 49, negative nitrites  - HR was as high as 110; has been afebrile   - s/p cefepime 2g, LR 2L boluses total   - f/u bcx, ucx  - c/w ceftriaxone 1g daily    #HTN   #afib  - anticoagulation: eliquis 5mg bid  - rate control: diltazem cd 120mg daily; do holding parameters due to sepsis    #pancreatic ca s/p ERCP w/ CBD stenta nd pancreaticoduodenoectomy   - f/u o/p heme onc and surgical onc   - c/w probiotics   - c/w protonix   - c/w zofran     DVT ppx: eliquis  GI ppx: protonix  Diet: dash   Code Status: full code   Dispo: from home, likely no needs; pending ucx/bcx

## 2021-09-03 NOTE — ED PROVIDER NOTE - NS ED ROS FT
Constitutional: See HPI.  Eyes: No visual changes, eye pain or discharge. No Photophobia  ENMT: No hearing changes, pain, discharge or infections. No neck pain or stiffness. No limited ROM  Cardiac: No SOB or edema. No chest pain with exertion.  Respiratory: No cough or respiratory distress. No hemoptysis. No history of asthma or RAD.  GI: + nausea, No vomiting, diarrhea or abdominal pain.  : No dysuria, frequency or burning. No Discharge  MS: No myalgia, muscle weakness, joint pain or back pain.  Neuro: No headache or weakness. No LOC.  Skin: No skin rash.  Except as documented in the HPI, all other systems are negative.

## 2021-09-03 NOTE — H&P ADULT - HISTORY OF PRESENT ILLNESS
75 yo F w/ pmhx of HTN, pancreatic adenocarcinoma diagnosed march 2021 s/p chemo x2, ERCP with CBD stent, pancreaticoduodenoectomy 8/21 with MARIO Gill on eliquis presents to ED for chills and myalgias. These constitutional symptoms started last night. Symptoms associated with nausea. She notes that her blood pressure was low 100/60s after taking her usual meds in the morning. Upon further questioning patient states she has had urinary frequency despite poor po intake and being off lasix. However, denies dysuria or hematuria.  Denies CP, SOB, cough, abd pain, vomiting, diarrhea.     ED course:   vitals: T98.9, /60, HR 89 (but was at 110 during ED), 98% RA   labs: WBC 23.38, u/a shows WENDI large, many bacteria, WBC 49, negative nitrites  imaging:   - CXR wnl   - EKG: afib   given: cefepime 2g, LR 2L boluses total

## 2021-09-03 NOTE — ED PROVIDER NOTE - CLINICAL SUMMARY MEDICAL DECISION MAKING FREE TEXT BOX
Pt with PMH HTN, pancreatic adenocarcinoma diagnosed march 2021 s/p chemo x2, ERCP with CBD stent, pancreaticoduodenoectomy 8/21 with Dr. Nino, AF on eliquis (Dr. Wood) who presents to [facility] for chills last night. On arrival patient tachycardic to 100-110bpm. Labs and imaging reviewed WBC noted, pt given cefepime 2g and admitted for concern for sepsis. BCx sent, LA 1.8. Pt updated at the bedside.

## 2021-09-04 NOTE — PROGRESS NOTE ADULT - SUBJECTIVE AND OBJECTIVE BOX
LI, VIKASH  76y  Female  ***My note supersedes ALL resident notes that I sign.  My corrections for their notes are in my note.***    I can be reached directly on [employer] [id number]. My office number is [phone]. My personal cell number is [phone].    INTERVAL EVENTS: Here for f/u of flank pain. Pain is gone. Dysuria is gone. Pt feeling better. Able to eat/drink. MS perfect. Able to walk.    T(F): 98.2 (21 @ 04:03), Max: 98.9 (21 @ 19:53)  HR: 95 (21 @ 04:03) (89 - 97)  BP: 107/61 (21 @ 04:03) (106/61 - 115/60)  RR: 18 (21 @ 04:03) (18 - 20)  SpO2: 98% (21 @ 19:53) (98% - 98%)    Gen: NAD  HEENT: PERRL, EOMI, mouth clr, nose clr  Neck: no nodes, no JVD, thyroid nl  lungs: clr  hrt: s1 s2 irreg, mild tachy, no murmur  abd: soft, NT/ND, no HS megaly; surg site well healed  back: no pain  ext: no edema, no c/c  neuro: aa ox3, cn intact, can move all 4 ext    LABS:                      11.8    (    89.0   9.21  )-----------( ---------      145      ( 04 Sep 2021 04:30 )             38.1    (    18.6     WBC Count: 9.21 K/uL (21 @ 04:30) - markedly better  WBC Count: 22.38 K/uL (21 @ 13:51)    131   (   97   (   87      21 @ 04:30  ----------------------               4.0   (   22   (   14                             -----                        0.6  Ca  8.0   Mg  1.6    P   --     LFT  4.3  (  0.4  (  34       21 @ 04:30  -------------------------  2.3  (  457  (  14    CARDIAC MARKERS ( 03 Sep 2021 13:51 )  x     / <0.01 ng/mL / x     / x     / x        Urinalysis Basic - ( 03 Sep 2021 19:07 )    Color: Yellow / Appearance: Slightly Turbid / S.022 / pH: x  Gluc: x / Ketone: Negative  / Bili: Negative / Urobili: <2 mg/dL   Blood: x / Protein: 30 mg/dL / Nitrite: Negative   Leuk Esterase: Large / RBC: 0 /HPF / WBC 49 /HPF   Sq Epi: x / Non Sq Epi: 1 /HPF / Bacteria: Many    CAPILLARY BLOOD GLUCOSE  POCT Blood Glucose.: 189 (21 @ 13:23)    RADIOLOGY & ADDITIONAL TESTS:  < from: Xray Chest 1 View- PORTABLE-Urgent (21 @ 14:13) >  Impression:    No focal consolidation, pneumothorax or pleural effusion.    < end of copied text >    MEDICATIONS:  cefTRIAXone   IVPB 1000 milliGRAM(s) IV Intermittent every 24 hours    acetaminophen   Tablet .. 650 milliGRAM(s) Oral every 6 hours PRN  apixaban 5 milliGRAM(s) Oral two times a day  chlorhexidine 4% Liquid 1 Application(s) Topical <User Schedule>  diltiazem    milliGRAM(s) Oral daily  lactobacillus acidophilus 1 Tablet(s) Oral two times a day  metoclopramide 5 milliGRAM(s) Oral three times a day before meals PRN  multivitamin 1 Tablet(s) Oral daily  pantoprazole    Tablet 40 milliGRAM(s) Oral before breakfast  saccharomyces boulardii 250 milliGRAM(s) Oral two times a day

## 2021-09-05 NOTE — DIETITIAN INITIAL EVALUATION ADULT. - OTHER INFO
pertinent medical information:   --77 yo F w/ pmhx of HTN, pancreatic adenocarcinoma diagnosed march 2021 s/p chemo x2, ERCP with CBD stent, pancreaticoduodenoectomy 8/21 with Dr. Nino, AF on eliquis presents to ED for chills and myalgias. Admitted for UTI.   # UTI, suspected pyelonephritis; sepsis present on admission -has been afebrile; little tachy still  # hyponatremia- can fluid restrict a little (1.5 - 2 L/day)    pertinent subjective information: Po/appetite <50% at admit as well. No chewing/swallowing difficulty reported. Discussed addition of nutritional supplements - agreeable to add.

## 2021-09-05 NOTE — CONSULT NOTE ADULT - SUBJECTIVE AND OBJECTIVE BOX
GENERAL SURGERY CONSULT NOTE    Patient: VIKASH LI , 76y (41)Female   MRN: [id number]  Location: [address]  Visit: 21 Inpatient  Date: 21 @ 09:21    HPI:  77 yo F w/ pmhx of HTN, pancreatic adenocarcinoma diagnosed 2021 s/p chemo x2, ERCP with CBD stent, pancreaticoduodenoectomy  with Dr. Nino, AF on eliquis presents to ED for chills and myalgias. These constitutional symptoms started last night. Symptoms associated with nausea. She notes that her blood pressure was low 100/60s after taking her usual meds in the morning. Upon further questioning patient states she has had urinary frequency despite poor po intake and being off lasix. However, denies dysuria or hematuria.  Denies CP, SOB, cough, abd pain, vomiting, diarrhea.     ED course:   vitals: T98.9, /60, HR 89 (but was at 110 during ED), 98% RA   labs: WBC 23.38, u/a shows WENDI large, many bacteria, WBC 49, negative nitrites  imaging:   - CXR wnl   - EKG: afib   given: cefepime 2g, LR 2L boluses total  (03 Sep 2021 23:33)    Surgical Oncology following patient is recently s/p whipple on 2021 she tolerated the procedure well received 2 units of pRBC, 500mL of albumin and 4L of crystalloid IVF. Patient was monitored in the SICU following her surgery. She was admitted to Rehab medicine service on 2021 and discharged home with home care on 2021.     PAST MEDICAL & SURGICAL HISTORY:  SOB (shortness of breath)    Pain  knee    Varicose veins of both lower extremities, unspecified whether complicated    Atrial fibrillation, unspecified type  2019 on Eliquis    Jaundice  2021    Malignant neoplasm of pancreas, unspecified location of malignancy  Pancreatic Cancer    Hypertension, unspecified type  2019    Pancreatic cancer    Kidney stones    History of surgery  vascular surgery   ? variocose vein stripping?    Status post phlebectomy  10/2018    History of ovarian cystectomy  left    History of tonsillectomy  1959    Kidney stone  2017    Home Medications:  calcium,magnesium and Zinc: 1 tab(s) orally once a day (03 Sep 2021 23:41)  Eliquis 5 mg oral tablet: 1 tab(s) orally 2 times a day (03 Sep 2021 23:41)  Women&#x27;s daily vitamins: 1 tab(s) orally once a day (03 Sep 2021 23:41)    VITALS:  T(F): 96.3 (21 @ 05:00), Max: 96.7 (21 @ 19:40)  HR: 99 (21 @ 05:00) (99 - 134)  BP: 118/78 (21 @ 05:00) (118/74 - 133/87)  RR: 18 (21 @ 05:00) (18 - 18)  SpO2: --    PHYSICAL EXAM:  General: NAD, AAOx3, calm and cooperative  HEENT: NCAT, JEFFRY, EOMI   Cardiac: RRR   Respiratory: CTAB, normal respiratory effort, no labored breathing   Abdomen: Soft, non-distended, non-tender, no rebound, no guarding.   Vascular: Pulses 2+ throughout, extremities well perfused  Skin: Warm/dry, normal color, no jaundice  Incision/wound: healing well, steri strips falling off appropriately      MEDICATIONS  (STANDING):  apixaban 5 milliGRAM(s) Oral two times a day  cefTRIAXone   IVPB 1000 milliGRAM(s) IV Intermittent every 24 hours  chlorhexidine 4% Liquid 1 Application(s) Topical <User Schedule>  diltiazem    milliGRAM(s) Oral daily  lactobacillus acidophilus 1 Tablet(s) Oral two times a day  multivitamin 1 Tablet(s) Oral daily  pantoprazole    Tablet 40 milliGRAM(s) Oral before breakfast  saccharomyces boulardii 250 milliGRAM(s) Oral two times a day    MEDICATIONS  (PRN):  acetaminophen   Tablet .. 650 milliGRAM(s) Oral every 6 hours PRN Temp greater or equal to 38.5C (101.3F), Mild Pain (1 - 3)  metoclopramide 5 milliGRAM(s) Oral three times a day before meals PRN nausea or vomiting    LAB/STUDIES:                        11.9   6.27  )-----------( 180      ( 05 Sep 2021 04:30 )             37.4         138  |  103  |  12  ----------------------------<  117<H>  3.4<L>   |  25  |  0.5<L>    Ca    7.9<L>      05 Sep 2021 04:30  Phos  4.4       Mg     2.0         TPro  4.3<L>  /  Alb  2.3<L>  /  TBili  0.3  /  DBili  x   /  AST  29  /  ALT  16  /  AlkPhos  477<H>        LIVER FUNCTIONS - ( 05 Sep 2021 04:30 )  Alb: 2.3 g/dL / Pro: 4.3 g/dL / ALK PHOS: 477 U/L / ALT: 16 U/L / AST: 29 U/L / GGT: x           Urinalysis Basic - ( 03 Sep 2021 19:07 )    Color: Yellow / Appearance: Slightly Turbid / S.022 / pH: x  Gluc: x / Ketone: Negative  / Bili: Negative / Urobili: <2 mg/dL   Blood: x / Protein: 30 mg/dL / Nitrite: Negative   Leuk Esterase: Large / RBC: 0 /HPF / WBC 49 /HPF   Sq Epi: x / Non Sq Epi: 1 /HPF / Bacteria: Many      CARDIAC MARKERS ( 03 Sep 2021 13:51 )  x     / <0.01 ng/mL / x     / x     / x        Culture - Blood (collected 03 Sep 2021 16:15)  Source: .Blood Blood-Peripheral  Preliminary Report (04 Sep 2021 23:02):    No growth to date.    Culture - Blood (collected 03 Sep 2021 16:15)  Source: .Blood Blood-Peripheral  Preliminary Report (04 Sep 2021 23:02):    No growth to date.      IMAGING:      ACCESS DEVICES:  [x] Peripheral IV  [ ] Central Venous Line	[ ] R	[ ] L	[ ] IJ	[ ] Fem	[ ] SC	Placed:   [ ] Arterial Line		[ ] R	[ ] L	[ ] Fem	[ ] Rad	[ ] Ax	Placed:   [ ] PICC:					[ ] Mediport  [ ] Urinary Catheter, Date Placed:       Lines/Tubes: PIV.

## 2021-09-05 NOTE — DIETITIAN NUTRITION RISK NOTIFICATION - ADDITIONAL COMMENTS/DIETITIAN RECOMMENDATIONS
PES: severe PCM in context of chronic illness r/t loss of appetite/poor po, loss of smell/taste 2/2 covid 19 and pancreatic adenocarcinoma AEB <75% of estimated energy in requirements in >1 month and severe clavicle and shoulder wasting    Recommendations: 1. Add ensure enlive daily (Only Vanilla/strawberry -- allergic to chocolate). 2. Add ensure pudding BID. 3. cont w/ current diet order. 4. cont w/ daily MVI    Goal: pt to consume/tolerate >75% of meals/snacks and po supplements in 4 days.

## 2021-09-05 NOTE — DIETITIAN INITIAL EVALUATION ADULT. - ADD RECOMMEND
1. Add ensure enlive daily (Only Vanilla/strawberry -- allergic to chocolate). 2. Add ensure pudding BID. 3. cont w/ current diet order. 4. cont w/ daily MVI

## 2021-09-05 NOTE — CONSULT NOTE ADULT - ASSESSMENT
ASSESSMENT:  76yF w/ PMHx of Afib on eliquis, HTN, pancreatic adenocarcinoma diagnosed march 2021 s/p chemo x2, ERCP with CBD stent, s/p whipple 8/02/2021 with Dr. Nino, who presented to ED for chills and myalgias. Upon further workup patient was found to have pyelonephritis. Patient's symptoms have improved and she is tolerating PO intake. Physical exam findings, imaging, and labs as documented above.     PLAN:  -Encourage PO intake with supplemental ensures  -Continue to monitor Afib  -Patient progressing appropriately after whipple - no surgical intervention at this time.     Above plan discussed with Attending Surgeon Dr. Nino , patient, patient family, and Primary team  09-05-21 @ 09:21   ASSESSMENT:  76yF w/ PMHx of Afib on eliquis, HTN, pancreatic adenocarcinoma diagnosed march 2021 s/p chemo x2, ERCP with CBD stent, s/p whipple 8/02/2021 with Dr. Nino, who presented to ED for chills and myalgias. Upon further workup patient was found to have pyelonephritis. Patient's symptoms have improved and she is tolerating PO intake. Physical exam findings, imaging, and labs as documented above.     PLAN:  -Encourage PO intake with supplemental ensures  -Continue to monitor Afib  -Patient progressing appropriately after whipple - no surgical intervention at this time.     Senior Resident Addendum  As above, admitted for pyelonephritis, patient progressing as expected s/p Whipple, abdomen soft, nondistended, surgical wounds healing appropriately, tolerating diet however with poor appetite which the patient partially attributes to loss of taste 2/2 COVID. Recommend encouraging PO intake w/ ensures, consider nutrition consult for adequate caloric intake.    No further recommendations from surgical team at this time. Please recall as necessary, [id number].  Above plan discussed with Attending Surgeon Dr. Nino , patient, patient family, and Primary team  09-05-21 @ 09:21

## 2021-09-05 NOTE — DIETITIAN INITIAL EVALUATION ADULT. - ORAL INTAKE PTA/DIET HISTORY
PTA po/appetite has been <50% overall since April 21 after being dx'd with covid-19. Pt reports losing her taste/smell and that has highly affected her appetite PTA po/appetite has been <50% overall since April 21 after being dx'd with covid-19. Pt reports losing her taste/smell and that has highly affected her appetite. Pt was also dx'd w/ cancer in april 21 and that might've affected appetite also. UBW: 56.8kg vs. wt at admit: [place] bedscale: Allergic to chocolate. takes daily multivitamins. reports drink 3-4 ensures on day she doesn't feel like eating. NO cul/rel or personal food rest/prefs. PTA po/appetite has been <50% overall since April 21 after being dx'd with covid-19. Pt reports losing her taste/smell and that has highly affected her appetite. Pt was also dx'd w/ cancer in april 21 and that might've affected appetite also. UBW: 56.8kg vs. wt at admit: Rd bedscale: 56.8kg -- no loss noted. Allergic to chocolate. takes daily multivitamins. reports drink 3-4 ensures on day she doesn't feel like eating. NO cul/rel or personal food rest/prefs.

## 2021-09-05 NOTE — DIETITIAN INITIAL EVALUATION ADULT. - OTHER CALCULATIONS
using bedscale wt 56.8kg; Energy: 1710-1995kcal (30-35kcal -- aim towards upper end d/t ca + PCM); Protein: 68-81g/day (1.2-1.5g/kg -- same reason as above); Fluid: 1mL/kcal or LIP

## 2021-09-05 NOTE — DIETITIAN INITIAL EVALUATION ADULT. - ETIOLOGY
r/t increased metabolic demand in setting of catabolic disease r/t loss of appetite 2/2 loss of smell/taste

## 2021-09-05 NOTE — DIETITIAN NUTRITION RISK NOTIFICATION - TREATMENT: THE FOLLOWING DIET HAS BEEN RECOMMENDED
Diet, DASH/TLC:   Sodium & Cholesterol Restricted  No Chocolate     Qty per Day:  Vanilla/Strawberry     Qty per Day:  ONLY  Supplement Feeding Modality:  Oral  Ensure Enlive Cans or Servings Per Day:  1       Frequency:  Two Times a day  Ensure Pudding Cans or Servings Per Day:  1       Frequency:  Two Times a day (09-05-21 @ 11:47) [Pending Verification By Attending]  Diet, DASH/TLC:   Sodium & Cholesterol Restricted  Supplement Feeding Modality:  Oral  Ensure Enlive Cans or Servings Per Day:  1       Frequency:  Three Times a day (09-05-21 @ 09:27) [Active]

## 2021-09-05 NOTE — DIETITIAN INITIAL EVALUATION ADULT. - PHYSCIAL ASSESSMENT
WDL; no edema noted; GI: no discomfort, LBM 9/3 per pt; skin: scar, WDL; WDL; no edema noted; GI: no discomfort, LBM 9/3 per pt; skin: scar, WDL; no other wts in EMR

## 2021-09-05 NOTE — DIETITIAN INITIAL EVALUATION ADULT. - MALNUTRITION
severe pCM in context of chronic illness r/t loss of appetite/poor po, loss of smell/taste 2/2 covid 19 and pancreatic adenocarcinoma AEB <75% of estimated energy in requirements in >1 month and severe clavicle and shoulder wasting.

## 2021-09-05 NOTE — PROGRESS NOTE ADULT - SUBJECTIVE AND OBJECTIVE BOX
LI, VIKASH  76y  Female  ***My note supersedes ALL resident notes that I sign.  My corrections for their notes are in my note.***    I can be reached directly on [id number]. My office number is [phone]. My personal cell number is [phone].    INTERVAL EVENTS: Here for f/u of UTI. Pt feeling much better. She'd like to go home tomorrow. PT does not feel incr HR. No complaints. Pt likes ENSURE.    T(F): 96.3 (21 @ 05:00), Max: 96.7 (21 @ 19:40)  HR: 101 (21 @ 12:23) (99 - 134)  BP: 125/78 (21 @ 12:23) (118/74 - 133/87)  RR: 18 (21 @ 05:00) (18 - 18)  SpO2: --    Gen: NAD  HEENT: PERRL, EOMI, mouth clr, nose clr  Neck: no nodes, no JVD, thyroid nl  lungs: clr  hrt: s1 s2 irreg, mild tachy, no murmur  abd: soft, NT/ND, no HS megaly; surg site well healed  back: no pain  ext: no edema, no c/c  neuro: aa ox3, cn intact, can move all 4 ext    LABS:                      11.9    (    85.8   6.27  )-----------( ---------      180      ( 05 Sep 2021 04:30 )             37.4    (    18.3     138   (   103   (   117      21 @ 04:30  ----------------------               3.4   (   25   (   12                             -----                        0.5  Ca  7.9   Mg  2.0    P   --     LFT  4.3  (  0.3  (  29       21 @ 04:30  -------------------------  2.3  (  477  (  16    CARDIAC MARKERS ( 03 Sep 2021 13:51 )  x     / <0.01 ng/mL / x     / x     / x        Urinalysis Basic - ( 03 Sep 2021 19:07 )    Color: Yellow / Appearance: Slightly Turbid / S.022 / pH: x  Gluc: x / Ketone: Negative  / Bili: Negative / Urobili: <2 mg/dL   Blood: x / Protein: 30 mg/dL / Nitrite: Negative   Leuk Esterase: Large / RBC: 0 /HPF / WBC 49 /HPF   Sq Epi: x / Non Sq Epi: 1 /HPF / Bacteria: Many    CAPILLARY BLOOD GLUCOSE  POCT Blood Glucose.: 189 (21 @ 13:23)    Culture - Blood (collected 21 @ 16:15)  Source: .Blood Blood-Peripheral  Preliminary Report (21 @ 23:02):    No growth to date.    Culture - Blood (collected 21 @ 16:15)  Source: .Blood Blood-Peripheral  Preliminary Report (21 @ 23:02):    No growth to date.    RADIOLOGY & ADDITIONAL TESTS:    MEDICATIONS:  cefTRIAXone   IVPB 1000 milliGRAM(s) IV Intermittent every 24 hours    acetaminophen   Tablet .. 650 milliGRAM(s) Oral every 6 hours PRN  apixaban 5 milliGRAM(s) Oral two times a day  chlorhexidine 4% Liquid 1 Application(s) Topical <User Schedule>  diltiazem    Tablet 30 milliGRAM(s) Oral <User Schedule>  lactobacillus acidophilus 1 Tablet(s) Oral two times a day  metoclopramide 5 milliGRAM(s) Oral three times a day before meals PRN  multivitamin 1 Tablet(s) Oral daily  pantoprazole    Tablet 40 milliGRAM(s) Oral before breakfast  saccharomyces boulardii 250 milliGRAM(s) Oral two times a day

## 2021-09-05 NOTE — DIETITIAN INITIAL EVALUATION ADULT. - PERTINENT MEDS FT
MEDICATIONS  (STANDING):  apixaban 5 milliGRAM(s) Oral two times a day  cefTRIAXone   IVPB 1000 milliGRAM(s) IV Intermittent every 24 hours  lactobacillus acidophilus 1 Tablet(s) Oral two times a day  multivitamin 1 Tablet(s) Oral daily  pantoprazole    Tablet 40 milliGRAM(s) Oral before breakfast  saccharomyces boulardii 250 milliGRAM(s) Oral two times a day    MEDICATIONS  (PRN):  metoclopramide 5 milliGRAM(s) Oral three times a day before meals PRN nausea or vomiting

## 2021-09-06 NOTE — DISCHARGE NOTE PROVIDER - NSDCMRMEDTOKEN_GEN_ALL_CORE_FT
calcium,magnesium and Zinc: 1 tab(s) orally once a day  Cardizem  mg/24 hours oral capsule, extended release: 1 cap(s) orally once a day   cefpodoxime 200 mg oral tablet: 1 tab(s) orally 2 times a day until 9/12  Eliquis 5 mg oral tablet: 1 tab(s) orally 2 times a day  lactobacillus acidophilus oral capsule: 1 cap(s) orally 2 times a day with meals  metoclopramide 5 mg oral tablet: 1 tab(s) orally 3 times a day (before meals), As Needed  for nausea and vomitting  pantoprazole 40 mg oral delayed release tablet: 1 tab(s) orally once a day (before a meal)  saccharomyces boulardii lyo 250 mg oral capsule: 1 cap(s) orally 2 times a day with meals  Women&#x27;s daily vitamins: 1 tab(s) orally once a day

## 2021-09-06 NOTE — DISCHARGE NOTE PROVIDER - NSDCFUSCHEDAPPT_GEN_ALL_CORE_FT
LI, VIKASH ; 09/09/2021 ; [facility] [address]  LI, VIKASH ; 09/22/2021 ; [facility] [address]

## 2021-09-06 NOTE — DISCHARGE NOTE PROVIDER - DETAILS OF MALNUTRITION DIAGNOSIS/DIAGNOSES
This patient has been assessed with a concern for Malnutrition and was treated during this hospitalization for the following Nutrition diagnosis/diagnoses:     -  09/05/2021: Severe protein-calorie malnutrition

## 2021-09-06 NOTE — DISCHARGE NOTE PROVIDER - CARE PROVIDER_API CALL
Danie Chavarria)  [facility]  [address] [place] [address]  Phone: [phone]  Fax: [phone]  Follow Up Time: 2 weeks

## 2021-09-06 NOTE — DISCHARGE NOTE PROVIDER - HOSPITAL COURSE
75 yo F w/ pmhx of HTN, pancreatic adenocarcinoma diagnosed March 2021 s/p chemo x2, ERCP with CBD stent, pancreaticoduodenoectomy 8/21 with Dr. Nino, AF on eliquis presents to ED for chills and myalgias. Admitted for UTI.     # UTI, suspected pyelonephritis; sepsis present on admission (WBC and HR)  - You were septic on admission  - Urinalysis was positive and you had symptoms  - Over the course of your stay, you have been afebrile; but still a little tachycardic  - You were started on antibiotic: rocephin 1gm IV daily. On discharge, take vantin 200mg orally every 12 hours until 9/12  - Your blood cultures were negative (twice)  - Your urine cultures were contaminated and unhelpful    On discharge:  Take Vantain 200mg orally every 12 hours until 9/12    # chr afib w/ mild RVR (HR better now); HTN    On discharge:  - anticoagulation: Take eliquis 5mg twice a daily  - rate control: increased diltazem cd 180mg daily (increased dose from your home medication)      # pancreatic ca s/p ERCP w/ CBD stenta and pancreaticoduodenoectomy   had emanuel adj chemo  Sx Onc 8/2/21  f/u o/p heme onc and surgical onc   c/w probiotics   c/w protonix   c/w reglan prn  c/w MVI    # hyponatremia - better  # hypomagnesemia - better  # hypokalemia - better  - can fluid restrict a little (1.5 - 2 L/day)  - k-dur 20 meq po q2 x3 doses    # high AP  has been elevated since March  was much worse in past  likely related to Panc Ca, prior chemo and recent sx and current infection  no further w/u needed for now

## 2021-09-06 NOTE — DISCHARGE NOTE NURSING/CASE MANAGEMENT/SOCIAL WORK - PATIENT PORTAL LINK FT
You can access the FollowMyHealth Patient Portal offered by [facility] by registering at the following website: [email or web address]. By joining [facility]’s FollowMyHealth portal, you will also be able to view your health information using other applications (apps) compatible with our system.

## 2021-09-06 NOTE — PROGRESS NOTE ADULT - ASSESSMENT
77 yo F w/ pmhx of HTN, pancreatic adenocarcinoma diagnosed March 2021 s/p chemo x2, ERCP with CBD stent, pancreaticoduodenoectomy 8/21 with Dr. Nino, AF on eliquis presents to ED for chills and myalgias. Admitted for UTI.     # UTI, suspected pyelonephritis; sepsis present on admission (WBC and HR)  too good a drop in WBC (wonder if first value was real)  U/A + and had symptoms  has been afebrile; little tachy still  abx: rocephin 1gm iv q24 - can prob do oral upon d/c  bcx neg x2  F/U Ucx    # chr afib w/ mild RVR; HTN  anticoagulation: eliquis 5mg bid  rate control: incr diltazem cd 180mg daily (will give card 30mg po q12 today to bring today's dose up to 180)  f/u HR  tele not needed - pt asymp and very stable    # pancreatic ca s/p ERCP w/ CBD stenta and pancreaticoduodenoectomy   had emanuel adj chemo  Sx Onc 8/2/21  f/u o/p heme onc and surgical onc   c/w probiotics   c/w protonix   c/w reglan prn  c/w MVI    # hyponatremia - better  can fluid restrict a little (1.5 - 2 L/day)    # hypomagnesemia - better    # hypokalemia  k-dur 20 meq po q2 x3 doses    # high AP  has been elevated since March  was much worse in past  likely related to Panc Ca, prior chemo and recent sx and current infection  no further w/u needed for now    # DVT ppx: eliquis    # GI ppx: protonix    # Code Status: full code     Dispo: pending ucx; IV abx; fix K;   eventually home w/ no needs on oral abx (likely) - prob d/c in 24 hrs or so  
77 yo F w/ pmhx of HTN, pancreatic adenocarcinoma diagnosed march 2021 s/p chemo x2, ERCP with CBD stent, pancreaticoduodenoectomy 8/21 with Dr. Nino, AF on eliquis presents to ED for chills and myalgias. Admitted for UTI.     # UTI, suspected pyelonephritis; sepsis present on admission (WBC and HR)  too good a drop in WBC (wonder if first value was real)  U/A + and had symptoms  has been afebrile; little tachy still  abx: rocephin 1gm iv q24 - can prob do oral upon d/c  f/u bcx, ucx    # chr afib w/ mild RVR; HTN  anticoagulation: eliquis 5mg bid  rate control: diltazem cd 120mg daily  f/u HR    # pancreatic ca s/p ERCP w/ CBD stenta and pancreaticoduodenoectomy   had emanuel adj chemo  Sx Onc 8/2/21  f/u o/p heme onc and surgical onc   c/w probiotics   c/w protonix   c/w reglan prn  c/w MVI    # hyponatremia  can fluid restrict a little (1.5 - 2 L/day)  rpt BMP    # hypomagnesemia  MG Sulf 2gm q6 x2 doses  f/u lvl    # high AP  has been elevated since March  was much worse in past  likely related to Panc Ca, prior chemo and recent sx and current infection  no further w/u needed for now    # DVT ppx: eliquis    # GI ppx: protonix    # Code Status: full code     Dispo: pending ucx/bcx; IV abx; fix lytes;   eventually home w/ no needs on oral abx (likely) - prob d/c in 48 hrs or so  
ASSESSMENT & PLAN    77 yo F w/ pmhx of HTN, pancreatic adenocarcinoma diagnosed March 2021 s/p chemo x2, ERCP with CBD stent, pancreaticoduodenoectomy 8/21 with Dr. Nino, AF on eliquis presents to ED for chills and myalgias. Admitted for UTI.     # UTI, suspected pyelonephritis; sepsis present on admission (WBC and HR): afebrile, not tachycardic now with abx and adjusted CB meds  U/A + and had symptoms, so far NGTD on Bcx x2  - c/w rocephin 1gm iv q24 - switch to vantin upon d/c  - F/U Ucx    # chr afib w/ mild RVR; HTN: HR controlled so far  anticoagulation: eliquis 5mg bid  c/w diltazem cd 180mg QD    # pancreatic ca s/p ERCP w/ CBD stenta and pancreaticoduodenoectomy   had emanuel adj chemo  Sx Onc 8/2/21  f/u o/p heme onc and surgical onc   c/w probiotics   c/w protonix   c/w reglan prn  c/w MVI    # hyponatremia - resolved: 140      # hypomagnesemia - better    # hypokalemia  - resolved to K = 4.7  s/p k-dur 20 meq po q2 x3 doses    # Elevated Alk Phos  - likely related to Panc Ca, prior chemo and recent sx and current infection. Was previously 3402-450s last month  no further w/u needed for now    # DVT ppx: eliquis    # GI ppx: protonix    # Code Status: full code     Dispo: pending ucx; IV abx;  eventually home w/ no needs on Vantin PO) - prob d/c in 24 hrs or so
77 yo F w/ pmhx of HTN, pancreatic adenocarcinoma diagnosed March 2021 s/p chemo x2, ERCP with CBD stent, pancreaticoduodenoectomy 8/21 with Dr. Nino, AF on eliquis presents to ED for chills and myalgias. Admitted for UTI.     # UTI, suspected pyelonephritis; sepsis present on admission (WBC and HR)  too good a drop in WBC (wonder if first value was real)  U/A + and had symptoms  has been afebrile; little tachy still  abx: rocephin 1gm iv q24 - can change to vantin 200mg po q12 and tx 10 days (9/3 - 9/12)  bcx neg x2  Ucx: contaminated and unhelpful    # chr afib w/ mild RVR (HR better now); HTN  anticoagulation: eliquis 5mg bid  rate control: incr diltazem cd 180mg daily  tele not needed - pt asymp and very stable    # pancreatic ca s/p ERCP w/ CBD stenta and pancreaticoduodenoectomy   had emanuel adj chemo  Sx Onc 8/2/21  f/u o/p heme onc and surgical onc   c/w probiotics   c/w protonix   c/w reglan prn  c/w MVI    # hyponatremia - better  can fluid restrict a little (1.5 - 2 L/day)    # hypomagnesemia - better    # hypokalemia - better  k-dur 20 meq po q2 x3 doses    # high AP  has been elevated since March  was much worse in past  likely related to Panc Ca, prior chemo and recent sx and current infection  no further w/u needed for now    # DVT ppx: eliquis    # GI ppx: protonix    # Code Status: full code     Dispo: abx as above; OK to send home today   case d/w PGY 1 on-call

## 2021-09-06 NOTE — DISCHARGE NOTE PROVIDER - NSDCCPCAREPLAN_GEN_ALL_CORE_FT
PRINCIPAL DISCHARGE DIAGNOSIS  Diagnosis: Sepsis  Assessment and Plan of Treatment: - You were septic on admission, you had a urinary tract infection  - Urinalysis was positive and you had symptoms  - Over the course of your stay, you have been afebrile; but still a little tachycardic  - You were started on antibiotic: rocephin 1gm IV daily. On discharge, take vantin 200mg orally every 12 hours until 9/12  - Your blood cultures were negative (twice)  - Your urine cultures were contaminated and unhelpful  On discharge:  Take Vantain 200mg orally every 12 hours until 9/12      SECONDARY DISCHARGE DIAGNOSES  Diagnosis: Chronic atrial fibrillation  Assessment and Plan of Treatment: Atrial fibrillation is a type of irregular heartbeat (arrhythmia) where the heart quivers continuously in a chaotic pattern that makes the heart unable to pump blood normally. This can increase the risk for stroke, heart failure, and other heart-related conditions. Atrial fibrillation can be caused by a variety of conditions and may be temporary, intermittent, or permanent. Symptoms include feeling that your heart is beating rapidly or irregularly, chest discomfort, shortness of breath, or dizziness/lightheadedness that may be worse with exertion.   On discharge:  - anticoagulation: Take eliquis 5mg twice a daily  - rate control: increased diltazem cd 180mg daily (increased dose from your home medication)  SEEK IMMEDIATE MEDICAL CARE IF YOU HAVE ANY OF THE FOLLOWING SYMPTOMS: chest pain, shortness of breath, abdominal pain, sweating, vomiting, blood in vomit/bowel movements/urine, dizziness/lightheadedness, weakness or numbness to face/arm/leg, trouble speaking or understanding, facial droop.

## 2021-09-06 NOTE — PROGRESS NOTE ADULT - SUBJECTIVE AND OBJECTIVE BOX
LI, VIKASH  76y  Female  ***My note supersedes ALL resident notes that I sign.  My corrections for their notes are in my note.***    I can be reached directly on [employer]. My office number is [phone]. My personal cell number is [phone].    INTERVAL EVENTS: Here for f/u of UTI. Pt feels well and is ready to go home. Eats/drinks. Able to walk.    T(F): 97.1 (09-06-21 @ 13:05), Max: 97.1 (09-06-21 @ 13:05)  HR: 92 (09-06-21 @ 13:05) (88 - 108)  BP: 124/70 (09-06-21 @ 13:05) (120/65 - 164/75)  RR: 18 (09-06-21 @ 13:05) (18 - 18)  SpO2: 97% (09-05-21 @ 19:30) (97% - 97%)    Gen: NAD  HEENT: PERRL, EOMI, mouth clr, nose clr  Neck: no nodes, no JVD, thyroid nl  lungs: clr  hrt: s1 s2 irreg, mild tachy, no murmur  abd: soft, NT/ND, no HS megaly; surg site well healed  back: no pain  ext: no edema, no c/c  neuro: aa ox3, cn intact, can move all 4 ext    LABS:                      11.1    (    87.8   5.73  )-----------( ---------      167      ( 06 Sep 2021 05:28 )             36.1    (    18.2     140   (   106   (   119      09-06-21 @ 05:28  ----------------------               4.7   (   25   (   10                             -----                        0.5  Ca  8.1   Mg  1.8    P   --     LFT  4.3  (  0.3  (  20       09-06-21 @ 05:28  -------------------------  2.4  (  489  (  14    Culture - Urine (collected 09-04-21 @ 21:50)  Source: Clean Catch Clean Catch (Midstream)  Final Report (09-06-21 @ 14:24):    >=3 organisms. Probable collection contamination.    Culture - Blood (collected 09-03-21 @ 16:15)  Source: .Blood Blood-Peripheral  Preliminary Report (09-04-21 @ 23:02):    No growth to date.    Culture - Blood (collected 09-03-21 @ 16:15)  Source: .Blood Blood-Peripheral  Preliminary Report (09-04-21 @ 23:02):    No growth to date.    RADIOLOGY & ADDITIONAL TESTS:    MEDICATIONS:  cefTRIAXone   IVPB 1000 milliGRAM(s) IV Intermittent every 24 hours    acetaminophen   Tablet .. 650 milliGRAM(s) Oral every 6 hours PRN  apixaban 5 milliGRAM(s) Oral two times a day  chlorhexidine 4% Liquid 1 Application(s) Topical <User Schedule>  diltiazem    milliGRAM(s) Oral daily  lactobacillus acidophilus 1 Tablet(s) Oral two times a day  metoclopramide 5 milliGRAM(s) Oral three times a day before meals PRN  multivitamin 1 Tablet(s) Oral daily  pantoprazole    Tablet 40 milliGRAM(s) Oral before breakfast  saccharomyces boulardii 250 milliGRAM(s) Oral two times a day

## 2021-09-06 NOTE — PROGRESS NOTE ADULT - SUBJECTIVE AND OBJECTIVE BOX
VIKASH LI 76y Female  MRN#: [id number]   CODE STATUS: full code    Hospital Day: 3d    Pt is currently admitted with the primary diagnosis of     SUBJECTIVE  Hospital Course    Overnight events     Subjective complaints     Present Today:   - Ibarra:  No [  ], Yes [   ] : Indication:     - Type of IV Access:       .. CVC/Piccline:  No [  ], Yes [   ] : Indication:       .. Midline: No [  ], Yes [   ] : Indication:                                             ----------------------------------------------------------  OBJECTIVE  PAST MEDICAL & SURGICAL HISTORY  SOB (shortness of breath)    Pain  knee    Varicose veins of both lower extremities, unspecified whether complicated    Atrial fibrillation, unspecified type  2019 on Eliquis    Jaundice  March 5, 2021    Malignant neoplasm of pancreas, unspecified location of malignancy  Pancreatic Cancer    Hypertension, unspecified type  2019    Pancreatic cancer    Kidney stones    History of surgery  vascular surgery   ? variocose vein stripping?    Status post phlebectomy  10/2018    History of ovarian cystectomy  left    History of tonsillectomy  1959    Kidney stone  2017                                              -----------------------------------------------------------  ALLERGIES:  Augmentin (Rash)  chocolate (Headache)  digoxin (Hives)  Metoprolol Succinate ER (Hives)  Novocain (Angioedema)  Steroids: Excessive swelling (Flushing; Other (Mild to Mod))  sulfa drugs (Fever)  Xarelto (Hives)                                            ------------------------------------------------------------    HOME MEDICATIONS  Home Medications:  calcium,magnesium and Zinc: 1 tab(s) orally once a day (03 Sep 2021 23:41)  Eliquis 5 mg oral tablet: 1 tab(s) orally 2 times a day (03 Sep 2021 23:41)  Women&#x27;s daily vitamins: 1 tab(s) orally once a day (03 Sep 2021 23:41)                           MEDICATIONS:  STANDING MEDICATIONS  apixaban 5 milliGRAM(s) Oral two times a day  cefTRIAXone   IVPB 1000 milliGRAM(s) IV Intermittent every 24 hours  chlorhexidine 4% Liquid 1 Application(s) Topical <User Schedule>  diltiazem    milliGRAM(s) Oral daily  lactobacillus acidophilus 1 Tablet(s) Oral two times a day  multivitamin 1 Tablet(s) Oral daily  pantoprazole    Tablet 40 milliGRAM(s) Oral before breakfast  saccharomyces boulardii 250 milliGRAM(s) Oral two times a day    PRN MEDICATIONS  acetaminophen   Tablet .. 650 milliGRAM(s) Oral every 6 hours PRN  metoclopramide 5 milliGRAM(s) Oral three times a day before meals PRN                                            ------------------------------------------------------------  VITAL SIGNS: Last 24 Hours  T(C): 35.8 (06 Sep 2021 05:33), Max: 36 (05 Sep 2021 19:45)  T(F): 96.5 (06 Sep 2021 05:33), Max: 96.8 (05 Sep 2021 19:45)  HR: 88 (06 Sep 2021 08:43) (88 - 108)  BP: 164/75 (06 Sep 2021 05:33) (120/65 - 164/75)  BP(mean): --  RR: 18 (06 Sep 2021 05:33) (18 - 18)  SpO2: 97% (05 Sep 2021 19:30) (97% - 97%)                                             --------------------------------------------------------------  LABS:                        11.1   5.73  )-----------( 167      ( 06 Sep 2021 05:28 )             36.1     09-06    140  |  106  |  10  ----------------------------<  119<H>  4.7   |  25  |  0.5<L>    Ca    8.1<L>      06 Sep 2021 05:28  Mg     1.8     09-06    TPro  4.3<L>  /  Alb  2.4<L>  /  TBili  0.3  /  DBili  x   /  AST  20  /  ALT  14  /  AlkPhos  489<H>  09-06                Culture - Blood (collected 03 Sep 2021 16:15)  Source: .Blood Blood-Peripheral  Preliminary Report (04 Sep 2021 23:02):    No growth to date.    Culture - Blood (collected 03 Sep 2021 16:15)  Source: .Blood Blood-Peripheral  Preliminary Report (04 Sep 2021 23:02):    No growth to date.                                                    -------------------------------------------------------------  RADIOLOGY:                                            --------------------------------------------------------------    PHYSICAL EXAM:  General:   HEENT:  LUNGS:  HEART:  ABDOMEN:  EXT:  NEURO:  SKIN:                                           --------------------------------------------------------------    ASSESSMENT & PLAN    Past medical history and hospital course                                                                                                           ----------------------------------------------------  # DVT prophylaxis     # GI prophylaxis     # Diet     # Activity Score (AM-PAC)    # Code status     # Disposition                                                                              --------------------------------------------------------    Jaci Munoz      VIKASH LI 76y Female  MRN#: [id number]   CODE STATUS: full code    Hospital Day: 3d    Pt is currently admitted with the primary diagnosis of UTI    SUBJECTIVE  Hospital Course    Overnight events: No acute events overnight. Patient rested comfortably in morning. Denied chest pain, abdominal pain, shortness of breath, dysuria    Subjective complaints     Present Today:   - Ibarra:  No [ X ], Yes [   ] : Indication:     - Type of IV Access:       .. CVC/Piccline:  No [ X ], Yes [   ] : Indication:       .. Midline: No [  X], Yes [   ] : Indication:                                             ----------------------------------------------------------  OBJECTIVE  PAST MEDICAL & SURGICAL HISTORY  SOB (shortness of breath)    Pain  knee    Varicose veins of both lower extremities, unspecified whether complicated    Atrial fibrillation, unspecified type  2019 on Eliquis    Jaundice  March 5, 2021    Malignant neoplasm of pancreas, unspecified location of malignancy  Pancreatic Cancer    Hypertension, unspecified type  2019    Pancreatic cancer    Kidney stones    History of surgery  vascular surgery   ? variocose vein stripping?    Status post phlebectomy  10/2018    History of ovarian cystectomy  left    History of tonsillectomy  1959    Kidney stone  2017                                              -----------------------------------------------------------  ALLERGIES:  Augmentin (Rash)  chocolate (Headache)  digoxin (Hives)  Metoprolol Succinate ER (Hives)  Novocain (Angioedema)  Steroids: Excessive swelling (Flushing; Other (Mild to Mod))  sulfa drugs (Fever)  Xarelto (Hives)                                            ------------------------------------------------------------    HOME MEDICATIONS  Home Medications:  calcium,magnesium and Zinc: 1 tab(s) orally once a day (03 Sep 2021 23:41)  Eliquis 5 mg oral tablet: 1 tab(s) orally 2 times a day (03 Sep 2021 23:41)  Women&#x27;s daily vitamins: 1 tab(s) orally once a day (03 Sep 2021 23:41)                           MEDICATIONS:  STANDING MEDICATIONS  apixaban 5 milliGRAM(s) Oral two times a day  cefTRIAXone   IVPB 1000 milliGRAM(s) IV Intermittent every 24 hours  chlorhexidine 4% Liquid 1 Application(s) Topical <User Schedule>  diltiazem    milliGRAM(s) Oral daily  lactobacillus acidophilus 1 Tablet(s) Oral two times a day  multivitamin 1 Tablet(s) Oral daily  pantoprazole    Tablet 40 milliGRAM(s) Oral before breakfast  saccharomyces boulardii 250 milliGRAM(s) Oral two times a day    PRN MEDICATIONS  acetaminophen   Tablet .. 650 milliGRAM(s) Oral every 6 hours PRN  metoclopramide 5 milliGRAM(s) Oral three times a day before meals PRN                                            ------------------------------------------------------------  VITAL SIGNS: Last 24 Hours  T(C): 35.8 (06 Sep 2021 05:33), Max: 36 (05 Sep 2021 19:45)  T(F): 96.5 (06 Sep 2021 05:33), Max: 96.8 (05 Sep 2021 19:45)  HR: 88 (06 Sep 2021 08:43) (88 - 108)  BP: 164/75 (06 Sep 2021 05:33) (120/65 - 164/75)  BP(mean): --  RR: 18 (06 Sep 2021 05:33) (18 - 18)  SpO2: 97% (05 Sep 2021 19:30) (97% - 97%)                                             --------------------------------------------------------------  LABS:                        11.1   5.73  )-----------( 167      ( 06 Sep 2021 05:28 )             36.1     09-06    140  |  106  |  10  ----------------------------<  119<H>  4.7   |  25  |  0.5<L>    Ca    8.1<L>      06 Sep 2021 05:28  Mg     1.8     09-06    TPro  4.3<L>  /  Alb  2.4<L>  /  TBili  0.3  /  DBili  x   /  AST  20  /  ALT  14  /  AlkPhos  489<H>  09-06                Culture - Blood (collected 03 Sep 2021 16:15)  Source: .Blood Blood-Peripheral  Preliminary Report (04 Sep 2021 23:02):    No growth to date.    Culture - Blood (collected 03 Sep 2021 16:15)  Source: .Blood Blood-Peripheral  Preliminary Report (04 Sep 2021 23:02):    No growth to date.                                                    -------------------------------------------------------------  RADIOLOGY:                                            --------------------------------------------------------------    PHYSICAL EXAM:  General: Well appearing, comfortable, not in acute distress   LUNGS: CTAB, on RA, no rales  HEART: irregular rhythm, RR, no murmur heard  ABDOMEN: NBS, soft, nontender to palpation  EXT: no peripheral pitting edema                                            --------------------------------------------------------------

## 2021-09-06 NOTE — PROGRESS NOTE ADULT - NUTRITIONAL ASSESSMENT
This patient has been assessed with a concern for Malnutrition and has been determined to have a diagnosis/diagnoses of Severe protein-calorie malnutrition.    This patient is being managed with:   Diet DASH/TLC-  Sodium & Cholesterol Restricted  No Chocolate     Qty per Day:  Vanilla/Strawberry     Qty per Day:  ONLY  Supplement Feeding Modality:  Oral  Ensure Enlive Cans or Servings Per Day:  1       Frequency:  Two Times a day  Ensure Pudding Cans or Servings Per Day:  1       Frequency:  Two Times a day  Entered: Sep  5 2021 11:47AM    
This patient has been assessed with a concern for Malnutrition and has been determined to have a diagnosis/diagnoses of Severe protein-calorie malnutrition.    This patient is being managed with:   Diet DASH/TLC-  Sodium & Cholesterol Restricted  No Chocolate     Qty per Day:  Vanilla/Strawberry     Qty per Day:  ONLY  Supplement Feeding Modality:  Oral  Ensure Enlive Cans or Servings Per Day:  1       Frequency:  Two Times a day  Ensure Pudding Cans or Servings Per Day:  1       Frequency:  Two Times a day  Entered: Sep  5 2021 11:47AM    Diet DASH/TLC-  Sodium & Cholesterol Restricted  Supplement Feeding Modality:  Oral  Ensure Enlive Cans or Servings Per Day:  1       Frequency:  Three Times a day  Entered: Sep  5 2021  9:27AM    The following pending diet order is being considered for treatment of Severe protein-calorie malnutrition:null
This patient has been assessed with a concern for Malnutrition and has been determined to have a diagnosis/diagnoses of Severe protein-calorie malnutrition.    This patient is being managed with:   Diet DASH/TLC-  Sodium & Cholesterol Restricted  No Chocolate     Qty per Day:  Vanilla/Strawberry     Qty per Day:  ONLY  Supplement Feeding Modality:  Oral  Ensure Enlive Cans or Servings Per Day:  1       Frequency:  Two Times a day  Ensure Pudding Cans or Servings Per Day:  1       Frequency:  Two Times a day  Entered: Sep  5 2021 11:47AM

## 2021-09-07 NOTE — ED PROVIDER NOTE - NS ED ROS FT
Constitutional: (-) fever (-) chills (-) (-) lightheadedness   Eyes/ENT: (-) blurry vision, (-) epistaxis (-) rhinorrhea (-) nasal congestion  Cardiovascular: (-) chest pain, (-) syncope (-) palpitations   Respiratory: (-) cough, (-) shortness of breath (-) pleurisy   Gastrointestinal: (-) vomiting, (-) diarrhea (-) abdominal pain (-) nausea (-) anorexia  Musculoskeletal: (-) neck pain, (-) back pain, (-) joint pain (-) joint swelling (-) painful ROM  Integumentary: (-) rash, (-) edema (-) lacerations (-) pruritis   Neurological: (-) headache, (-) altered mental status (-) LOC (-) dizziness (-) paresthesias (-) gait abnormalities

## 2021-09-07 NOTE — H&P ADULT - ATTENDING COMMENTS
75 YO F with a PMH of HTN, pancreatic adenoCA currently on CT s/p CBD stent, pancreaticoduodenectomy, chronic Afib (Apixaban) and recent admission to [facility] for possibly pyelo/UTI who was asked to present to the hospital for eval of positive fungal cultures (Candida g.). Pt currently denies any symptoms. ROS is negative except as above.    FMHx: Reviewed, not relevant    Physical exam shows pt in NAD. VSS, afebrile, not hypoxic on RA. A&Ox3. Neuro exam without deficits, motor/sensory intact, no dysarthria, no facial asymmetry. Muscle strength/sensation intact. CTA B/L with no W/C/R. RRR, no M/G/R. ABD is soft and non-tender, normoactive BSs. LEs without swelling. No rashes. Chemoport in place. Labs and radiology as above.     Positive fungal cultures growing Candida; No sepsis present on admission. Repeat cultures. Caspofungin. ID consult.     Hypoalbuminemia, from poor oral intake. Nutrition eval.     Hx of HTN, pancreatic adenoCA currently on CT s/p CBD stent, pancreaticoduodenectomy, chronic Afib (Apixaban). Restart home meds, except as stated above. DVT PPX. Inform PCP of pt's admission to hospital. My note supersedes the residents note.

## 2021-09-07 NOTE — H&P ADULT - NSHPSOCIALHISTORY_GEN_ALL_CORE
Tobacco Use: denies  Alcohol Use: denies  Drug Use: denies  Sexual History: declined to answer  Functional Status: fully functional in all ADLs and IADLs

## 2021-09-07 NOTE — H&P ADULT - ASSESSMENT
Patient is a 75yo female with PMH of hypertension, pancreatic adenocarcinoma diagnosed March 2021 s/p chemotherapy x2, ERCP with CBD stent, pancreaticoduodenectomy 8/2/2021 with Dr. Nino, and atrial fibrillation on Eliquis with recent admission to [facility] 9/3/2021-9/6/2021 for pyelonephritis who returned to the hospital after blood cultures drawn on 9/3/2021 grew Candida glabrata.     #Fungemia (Candida glabrata)  - No sepsis present on admission (no leukocytosis, tachycardia, no tachypnea, afebrile)  - ID consult placed  - Start caspofungin IV (70mg IV STAT then 50mg IV Q24H)  - Echo to evaluate for valvular vegetations  - Follow repeat blood cultures drawn on admission and tomorrow  - Patient's chemoport does not appear infected, but will need to discuss need for removal and replacement with ID    #UTI, suspected pyelonephritis (diagnosed on previous admission)  - Urine Cx 9/3/2021: contaminated  - Blood Cx 9/3/2021: Candida glabrata  - Patient denies any urinary symptoms today  - Continue with cefpodoxime 200mg PO Q12H until 9/12/2021    #Chronic trial fibrillation with rapid ventricular response  - Continue with Eliquis 5mg PO twice daily   - Patient states she has always been asymptomatic and does not know when she is in RVR  - Continue with diltiazem 180mg PO once daily (was increased from 120mg last admission)    #Pancreatic adenocarcinoma diagnosed March 2021 s/p chemotherapy x2, ERCP with CBD stent, pancreaticoduodenectomy 8/2/2021 with Dr. Nino  - S/p neoadjuvant chemotherapy  - Follow outpatient with oncology (Dr. Johnson) and surgical oncology (Dr. Nino)  - Continue with probiotics, multivitamin, and pantoprazole  - Continue with metoclopramide 5mg PO three times daily with meals PRN    #Elevated alkaline phosphatase  - Has been elevated since March 2021  - Likely secondary to pancreatic cancer, previous chemotherapy, and recent Whipple procedure  - No further workup indicated    #Misc  - DVT Prophylaxis: Eliquis 5mg PO twice daily   - GI Prophylaxis: pantoprazole 40mg PO once daily   - Diet: DASH/TLC with Ensure  - Activity: increase as tolerated  - IV Fluids: encourage PO intake  - Code Status: Full Code

## 2021-09-07 NOTE — ED PROVIDER NOTE - NSICDXPASTSURGICALHX_GEN_ALL_CORE_FT
PAST SURGICAL HISTORY:  History of ovarian cystectomy left    History of surgery vascular surgery   ? variocose vein stripping?    History of tonsillectomy 1959    Kidney stone 2017    Status post phlebectomy 10/2018     PAST SURGICAL HISTORY:  History of ovarian cystectomy left    History of surgery Whipple procedure 8/2/2021 with Dr. Nino at [facility]    History of tonsillectomy 1959    Kidney stone 2017    Status post phlebectomy 10/2018

## 2021-09-07 NOTE — ED PROVIDER NOTE - ATTENDING CONTRIBUTION TO CARE
77 yo female PMH a.fib, pancreatic cancer, kidney stones, HTN d/c from the hospital yesterday was called back to return for positive blood culture.   Patient was recently admitted for UTI, blood culture drawn on 9/3 grew yeast .  She denies any new complaints since her discharge.   Chronically ill appearing elderly female in NAD, head AT/NC, PERRL, pink conjunctivae,  mmm, nml oropharynx, nml phonation without drooling or trismus, supple neck without midline spine ttp, nml work of breathing, lungs CTA b/l, equal air entry, irreg irreg rate, well-perfused extremities, distal pulses intact, abdomen soft, NT/ND, BS present in all quadrants, no midline spine or CVA ttp, no leg edema or unilateral calf swelling, A&Ox3, no gross neuro deficits, nml mood and affect.  labs were sent, case d/w heme-onc advised to hold antifungals until evaluated by ID.  Admit.

## 2021-09-07 NOTE — ED PROVIDER NOTE - CLINICAL SUMMARY MEDICAL DECISION MAKING FREE TEXT BOX
75 yo female PMH a.fib, pancreatic cancer, kidney stones, HTN d/c from the hospital yesterday was called back to return for positive blood culture.   Patient was recently admitted for UTI, blood culture drawn on 9/3 grew yeast .  She denies any new complaints since her discharge.   Chronically ill appearing elderly female in NAD, head AT/NC, PERRL, pink conjunctivae,  mmm, nml oropharynx, nml phonation without drooling or trismus, supple neck without midline spine ttp, nml work of breathing, lungs CTA b/l, equal air entry, irreg irreg rate, well-perfused extremities, distal pulses intact, abdomen soft, NT/ND, BS present in all quadrants, no midline spine or CVA ttp, no leg edema or unilateral calf swelling, A&Ox3, no gross neuro deficits, nml mood and affect.  labs were sent, case d/w heme-onc advised to hold antifungals until evaluated by ID.  Admit.

## 2021-09-07 NOTE — ED PROVIDER NOTE - OBJECTIVE STATEMENT
Patient is a 75yo female with PMH of hypertension, pancreatic adenocarcinoma diagnosed March 2021 s/p chemotherapy x2, ERCP with CBD stent, pancreaticoduodenectomy 8/2/2021 with Dr. Nino, and atrial fibrillation on Eliquis with recent admission to [facility] 9/3/2021-9/6/2021 for pyelonephritis who returned to the hospital after blood cultures drawn on 9/3/2021 grew Candida glabrata. During the previous admission, blood cultures were negative as of 9/6/2021. She was discharged on a course of oral cefpodoxime for pyelonephritis. On 9/7/2021 at around 2am, the attending on call was notified that blood cultures were positive for Candida glabrata. The patient was informed of the results, and she decided to return to the hospital to be readmitted for treatment.

## 2021-09-07 NOTE — H&P ADULT - NSICDXPASTSURGICALHX_GEN_ALL_CORE_FT
PAST SURGICAL HISTORY:  History of ovarian cystectomy left    History of surgery Whipple procedure 8/2/2021 with Dr. Nino at [facility]    History of tonsillectomy 1959    Kidney stone 2017    Status post phlebectomy 10/2018

## 2021-09-07 NOTE — H&P ADULT - HISTORY OF PRESENT ILLNESS
Patient is a 75yo female with PMH of hypertension, pancreatic adenocarcinoma diagnosed March 2021 s/p chemotherapy x2, ERCP with CBD stent, pancreaticoduodenectomy 8/2/2021 with Dr. Nino, and atrial fibrillation on Eliquis with recent admission to [facility] 9/3/2021-9/6/2021 for pyelonephritis who returned to the hospital after blood cultures drawn on 9/3/2021 grew Candida glabrata. During the previous admission, blood cultures were negative as of 9/6/2021. She was discharged on a course of oral cefpodoxime for pyelonephritis. On 9/7/2021 at around 2am, the attending on call was notified that blood cultures were positive for Candida glabrata. The patient was informed of the results, and she decided to return to the hospital to be readmitted for treatment.     In the ED, vital signs were Tmax 98F, , /68, RR 18, and SpO2 96%. Labs were significant for alkaline phosphatase 604. She was given a 1.75L bolus of NS 0.9%. Repeat blood cultures were drawn.

## 2021-09-08 NOTE — PROGRESS NOTE ADULT - SUBJECTIVE AND OBJECTIVE BOX
SUBJECTIVE:    Patient is a 76y old Female who presents with a chief complaint of fungemia (07 Sep 2021 17:22)    Currently admitted to medicine with the primary diagnosis of Systemic fungal infection       Today is hospital day 1d. This morning she is resting comfortably in bed and reports no new issues or overnight events.     INTERVAL EVENTS:     PAST MEDICAL & SURGICAL HISTORY  SOB (shortness of breath)    Pain  knee    Varicose veins of both lower extremities, unspecified whether complicated    Atrial fibrillation, unspecified type  2019 on Eliquis    Jaundice  March 5, 2021    Malignant neoplasm of pancreas, unspecified location of malignancy  Pancreatic Cancer    Hypertension, unspecified type  2019    Pancreatic cancer    Kidney stones    History of surgery  Whipple procedure 8/2/2021 with Dr. Nino at [facility]    Status post phlebectomy  10/2018    History of ovarian cystectomy  left    History of tonsillectomy  1959    Kidney stone  2017        ALLERGIES:  Augmentin (Rash)  chocolate (Headache)  digoxin (Hives)  Metoprolol Succinate ER (Hives)  Novocain (Angioedema)  Steroids: Excessive swelling (Flushing; Other (Mild to Mod))  sulfa drugs (Fever)  Xarelto (Hives)    MEDICATIONS:  STANDING MEDICATIONS  apixaban 5 milliGRAM(s) Oral two times a day  caspofungin IVPB      caspofungin IVPB 50 milliGRAM(s) IV Intermittent every 24 hours  cefpodoxime 200 milliGRAM(s) Oral every 12 hours  chlorhexidine 4% Liquid 1 Application(s) Topical <User Schedule>  diltiazem    milliGRAM(s) Oral daily  lactobacillus acidophilus 1 Tablet(s) Oral two times a day with meals  multivitamin 1 Tablet(s) Oral daily  pantoprazole    Tablet 40 milliGRAM(s) Oral before breakfast  saccharomyces boulardii 250 milliGRAM(s) Oral two times a day    PRN MEDICATIONS  metoclopramide 5 milliGRAM(s) Oral three times a day before meals PRN    VITALS:   T(F): 96.7  HR: 98  BP: 128/72  RR: 18  SpO2: 100%    LABS:                        11.2   5.16  )-----------( 192      ( 08 Sep 2021 07:07 )             36.5     09-07    140  |  104  |  9<L>  ----------------------------<  103<H>  4.9   |  28  |  0.5<L>    Ca    8.8      07 Sep 2021 16:15    TPro  5.1<L>  /  Alb  3.0<L>  /  TBili  0.3  /  DBili  x   /  AST  23  /  ALT  18  /  AlkPhos  604<H>  09-07    PT/INR - ( 07 Sep 2021 16:15 )   PT: 22.60 sec;   INR: 1.98 ratio         PTT - ( 07 Sep 2021 16:15 )  PTT:40.3 sec              RADIOLOGY:    PHYSICAL EXAM:  CONSTITUTIONAL: No acute distress, thin, borderline cachectic, AAOx3, chemoport in right chest wall without sign of infection  HEAD: Atraumatic, normocephalic  EYES: EOM intact, PERRLA, conjunctiva and sclera clear  ENT: Supple, no masses, no thyromegaly, no bruits, no JVD; moist mucous membranes  PULMONARY: Clear to auscultation bilaterally; no wheezes, rales, or rhonchi  CARDIOVASCULAR: Irregularly irregular rate and rhythm; no murmurs, rubs, or gallops  GASTROINTESTINAL: Soft, non-tender, non-distended; bowel sounds present, multiple stitches present on abdominal wall with well healing surgical incision  MUSCULOSKELETAL: 2+ peripheral pulses; no clubbing, no cyanosis, no edema  NEUROLOGY: non-focal  SKIN: No rashes or lesions; warm and dry     SUBJECTIVE:    Patient is a 76y old Female who presents with a chief complaint of fungemia (07 Sep 2021 17:22) recently discharged and was called back due to fungemia reported in bc.     Currently admitted to medicine with the primary diagnosis of Systemic fungal infection     Today is hospital day 1d. This morning she is resting comfortably in bed and reports no new issues or overnight events.     INTERVAL EVENTS: no new events.    PAST MEDICAL & SURGICAL HISTORY  SOB (shortness of breath)    Pain  knee    Varicose veins of both lower extremities, unspecified whether complicated    Atrial fibrillation, unspecified type  2019 on Eliquis    Jaundice  March 5, 2021    Malignant neoplasm of pancreas, unspecified location of malignancy  Pancreatic Cancer    Hypertension, unspecified type  2019    Pancreatic cancer    Kidney stones    History of surgery  Whipple procedure 8/2/2021 with Dr. Nino at [facility]    Status post phlebectomy  10/2018    History of ovarian cystectomy  left    History of tonsillectomy  1959    Kidney stone  2017        ALLERGIES:  Augmentin (Rash)  chocolate (Headache)  digoxin (Hives)  Metoprolol Succinate ER (Hives)  Novocain (Angioedema)  Steroids: Excessive swelling (Flushing; Other (Mild to Mod))  sulfa drugs (Fever)  Xarelto (Hives)    MEDICATIONS:  STANDING MEDICATIONS  apixaban 5 milliGRAM(s) Oral two times a day  caspofungin IVPB      caspofungin IVPB 50 milliGRAM(s) IV Intermittent every 24 hours  cefpodoxime 200 milliGRAM(s) Oral every 12 hours  chlorhexidine 4% Liquid 1 Application(s) Topical <User Schedule>  diltiazem    milliGRAM(s) Oral daily  lactobacillus acidophilus 1 Tablet(s) Oral two times a day with meals  multivitamin 1 Tablet(s) Oral daily  pantoprazole    Tablet 40 milliGRAM(s) Oral before breakfast  saccharomyces boulardii 250 milliGRAM(s) Oral two times a day    PRN MEDICATIONS  metoclopramide 5 milliGRAM(s) Oral three times a day before meals PRN    VITALS:   T(F): 96.7  HR: 98  BP: 128/72  RR: 18  SpO2: 100%    LABS:                        11.2   5.16  )-----------( 192      ( 08 Sep 2021 07:07 )             36.5     09-07    140  |  104  |  9<L>  ----------------------------<  103<H>  4.9   |  28  |  0.5<L>    Ca    8.8      07 Sep 2021 16:15    TPro  5.1<L>  /  Alb  3.0<L>  /  TBili  0.3  /  DBili  x   /  AST  23  /  ALT  18  /  AlkPhos  604<H>  09-07    PT/INR - ( 07 Sep 2021 16:15 )   PT: 22.60 sec;   INR: 1.98 ratio         PTT - ( 07 Sep 2021 16:15 )  PTT:40.3 sec              RADIOLOGY:    PHYSICAL EXAM:  CONSTITUTIONAL: No acute distress, thin, borderline cachectic, AAOx3, chemoport in right chest wall without sign of infection  HEAD: Atraumatic, normocephalic  EYES: EOM intact, PERRLA, conjunctiva and sclera clear  ENT: Supple, no masses, no thyromegaly, no bruits, no JVD; moist mucous membranes  PULMONARY: Clear to auscultation bilaterally; no wheezes, rales, or rhonchi  CARDIOVASCULAR: Irregularly irregular rate and rhythm; no murmurs, rubs, or gallops  GASTROINTESTINAL: Soft, non-tender, non-distended; bowel sounds present, multiple stitches present on abdominal wall with well healing surgical incision  MUSCULOSKELETAL: 2+ peripheral pulses; no clubbing, no cyanosis, no edema  NEUROLOGY: non-focal  SKIN: No rashes or lesions; warm and dry

## 2021-09-08 NOTE — CONSULT NOTE ADULT - SUBJECTIVE AND OBJECTIVE BOX
HPI:  77 y/o female with PMHx of pancreatic adenocarcinoma diagnosed March 2021 s/p chemotherapy x2, ERCP with CBD stent, and pancreaticoduodenectomy 8/2/2021 with Dr. Nino, discharged from the hospital 2 days ago and called back to return yesterday for + blood cultures. Pt presented to the hospital on 9/3/2021 with constitutional SX (chills, myalgia), nausea, and urinary frequency. She had SIRS on admission and pyruia on UA so was admitted for cystitis. She had a 3 day hospital course (9/3/21 to 9/6/21) during which she received 3 days of Rocephin 1g IV q24h and was discharged home on PO Vantin 200 mg q12h. Yesterday her blood cultures drawn 9/3/21 grew Dania Glabrata. She has had no new complaints since her discharge. No fever, chills, HA, visual changes, odynophagia, or abd pain.     In the ED, VS stable. Labs remarkable only for alk phos of 602 (new baseline post whipple). Repeat blood cultures were drawn. She received 1 dose of Flagyl 500 mg IV and a loading dose of Caspofungin 70 mg IV. She is now receiving Caspofungin 50 mg IV q24h (started 9/8/21).       Past Medical & Surgical History:  Varicose veins of both lower extremities  Atrial fibrillation, unspecified type     2019 on Eliquis  Malignant neoplasm of pancreas, unspecified location of malignancy  Pancreatic Cancer  Hypertension  Kidney stones  Whipple procedure 8/2/2021 with Dr. Nino at [facility]  Status post phlebectomy 10/2018  History of left ovarian cystectomy  History of tonsillectomy 1959      Allergy:   Augmentin (Rash)  chocolate (Headache)  digoxin (Hives)  Metoprolol Succinate ER (Hives)  Novocain (Angioedema)  Steroids: Excessive swelling (Flushing; Other (Mild to Mod))  sulfa drugs (Fever)  Xarelto (Hives)    Social History:  Tobacco Use: denies  Alcohol Use: denies  Drug Use: denies  Sexual History: declined to answer  Functional Status: fully functional in all ADLs and IADLs (07 Sep 2021 17:22)      ROS  General: Denies rigors, nightsweats  HEENT: Denies headache, rhinorrhea, sore throat, eye pain  CV: Denies CP, palpitations  PULM: Denies wheezing, hemoptysis  GI: Denies hematemesis, hematochezia, melena  : Denies discharge, hematuria  MSK: Denies arthralgias, myalgias  SKIN: Denies rash, lesions  NEURO: Denies paresthesias, weakness  PSYCH: Denies depression, anxiety    VITALS:  T(F): 97.1, Max: 98 (09-07-21 @ 14:03)  HR: 89  BP: 119/71  RR: 18Vital Signs Last 24 Hrs  T(C): 36.2 (08 Sep 2021 08:11), Max: 36.7 (07 Sep 2021 14:03)  T(F): 97.1 (08 Sep 2021 08:11), Max: 98 (07 Sep 2021 14:03)  HR: 89 (08 Sep 2021 08:11) (89 - 112)  BP: 119/71 (08 Sep 2021 08:11) (119/71 - 161/68)  BP(mean): --  RR: 18 (08 Sep 2021 08:11) (18 - 18)  SpO2: 99% (08 Sep 2021 08:11) (96% - 100%)    PHYSICAL EXAM:  Gen: NAD, resting in bed  HEENT: Normocephalic, atraumatic  Neck: supple, no lymphadenopathy  CV: Regular rate & regular rhythm  Lungs: CTAB  Abdomen: Soft, BS present  Ext: Warm, well perfused  Neuro: non focal, awake  Skin: no rash, no lesions  Lines: no phlebitis    TESTS & MEASUREMENTS:                        11.2   5.16  )-----------( 192      ( 08 Sep 2021 07:07 )             36.5     09-08    138  |  104  |  8<L>  ----------------------------<  97  4.2   |  26  |  <0.5<L>    Ca    8.2<L>      08 Sep 2021 07:07  Mg     1.7     09-08    TPro  4.5<L>  /  Alb  2.6<L>  /  TBili  0.4  /  DBili  x   /  AST  25  /  ALT  16  /  AlkPhos  526<H>  09-08    eGFR if Non African American: 100 mL/min/1.73M2 (09-08-21 @ 07:07)  eGFR if : 116 mL/min/1.73M2 (09-08-21 @ 07:07)  eGFR if Non : 93 mL/min/1.73M2 (09-07-21 @ 16:15)  eGFR if : 107 mL/min/1.73M2 (09-07-21 @ 16:15)    LIVER FUNCTIONS - ( 08 Sep 2021 07:07 )  Alb: 2.6 g/dL / Pro: 4.5 g/dL / ALK PHOS: 526 U/L / ALT: 16 U/L / AST: 25 U/L / GGT: x               Culture - Urine (collected 09-04-21 @ 21:50)  Source: Clean Catch Clean Catch (Midstream)  Final Report (09-06-21 @ 14:24):    >=3 organisms. Probable collection contamination.    Culture - Blood (collected 09-03-21 @ 16:15)  Source: .Blood Blood-Peripheral  Preliminary Report (09-04-21 @ 23:02):    No growth to date.    Culture - Blood (collected 09-03-21 @ 16:15)  Source: .Blood Blood-Peripheral  Gram Stain (09-06-21 @ 20:50):    Growth in aerobic bottle: Yeast  Preliminary Report (09-07-21 @ 20:51):    Growth in aerobic bottle: Candida glabrata    Referred to Mycology    ***Blood Panel PCR results on this specimen are available    approximately 3 hours after the Gram stain result.***    Gram stain, PCR, and/or culture results may not always    correspond due to difference in methodologies.    ************************************************************    This PCR assay was performed by multiplex PCR. This    Assay tests for 66 bacterial and resistance gene targets.    Please refer to the [facility] Labstest directory    at [email or web address] for details.  Organism: Blood Culture PCR (09-07-21 @ 01:10)  Organism: Blood Culture PCR (09-07-21 @ 01:10)      -  Candida glabrata: Detec      Method Type: PCR        Blood Gas Venous - Lactate: 1.80 mmol/L (09-03-21 @ 15:05)      INFECTIOUS DISEASES TESTING  MRSA PCR Result.: Negative (03-06-21 @ 13:37)      RADIOLOGY & ADDITIONAL TESTS:  I have personally reviewed the last available Chest xray  CXR      CT      CARDIOLOGY TESTING  12 Lead ECG:   Ventricular Rate 111 BPM    Atrial Rate 141 BPM    QRS Duration 84 ms    Q-T Interval 364 ms    QTC Calculation(Bazett) 495 ms    R Axis 72 degrees    T Axis 81 degrees    Diagnosis Line Atrial fibrillation with rapid ventricular response  ST & T wave abnormality, consider anterior ischemia  Abnormal ECG    Confirmed by Torri Collins MD ([id number]) on 9/5/2021 8:16:02 AM (09-05-21 @ 01:46)  12 Lead ECG:   Ventricular Rate 100 BPM    Atrial Rate 102 BPM    QRS Duration 86 ms    Q-T Interval 352 ms    QTC Calculation(Bazett) 454 ms    R Axis 81 degrees    T Axis 70 degrees    Diagnosis Line Atrial fibrillation  Nonspecific T wave abnormality  AbnormalECG    Confirmed by Vinicius Rose ([id number]) on 9/3/2021 3:03:42 PM (09-03-21 @ 13:26)      All available historical records have been reviewed    MEDICATIONS  apixaban 5  caspofungin IVPB   caspofungin IVPB 50  cefpodoxime 200  chlorhexidine 4% Liquid 1  diltiazem     lactobacillus acidophilus 1  multivitamin 1  pantoprazole    Tablet 40  saccharomyces boulardii 250      ANTIBIOTICS:  caspofungin IVPB      caspofungin IVPB 50 milliGRAM(s) IV Intermittent every 24 hours  cefpodoxime 200 milliGRAM(s) Oral every 12 hours      All available historical data has been reviewed    ASSESSMENT  76yFemale with a PMH of....... (fill in)    IMPRESSION  # (?) Sepsis on admission (2 or more of the following T<96.8F, T>101F, Pulse>90, Resp Rate>20, WBC>12, wbc<4, Bands>10%), PLUS (+) lactic acidosis, OR metabolic encephalopathy, OR LORI, OR bacteremia . Otherwise just SIRS on admission, sepsis ruled out  #  #    RECOMMENDATIONS  - f/u pending cultures  - ***    This is a pended note. All final recommendations to follow pending discussion with ID Attending      HPI:  75 y/o female with PMHx of pancreatic adenocarcinoma diagnosed March 2021 s/p chemotherapy x2, ERCP with CBD stent, and pancreaticoduodenectomy 8/2/2021 with Dr. Nino, discharged from the hospital 2 days ago and called back to return yesterday for + blood cultures. Pt presented to the hospital on 9/3/2021 with constitutional SX (chills, myalgia), nausea, and urinary frequency. She had SIRS on admission and pyruia on UA so was admitted for cystitis. She had a 3 day hospital course (9/3/21 to 9/6/21) during which she received 3 days of Rocephin 1g IV q24h and was discharged home on PO Vantin 200 mg q12h. Yesterday her blood cultures drawn 9/3/21 grew Dania Glabrata. She has had no new complaints since her discharge. No fever, chills, HA, visual changes, odynophagia, or abd pain.     In the ED, VS stable. Labs remarkable only for alk phos of 602 (new baseline post whipple). Repeat blood cultures were drawn. She received 1 dose of Flagyl 500 mg IV and a loading dose of Caspofungin 70 mg IV. She is now receiving Caspofungin 50 mg IV q24h (started 9/8/21).       Past Medical & Surgical History:  Varicose veins of both lower extremities  Atrial fibrillation, unspecified type     2019 on Eliquis  Malignant neoplasm of pancreas, unspecified location of malignancy  Pancreatic Cancer  Hypertension  Kidney stones  Whipple procedure 8/2/2021 with Dr. Nino at [facility]  Status post phlebectomy 10/2018  History of left ovarian cystectomy  History of tonsillectomy 1959    Allergy:   Augmentin (Rash)  chocolate (Headache)  digoxin (Hives)  Metoprolol Succinate ER (Hives)  Novocain (Angioedema)  Steroids: Excessive swelling (Flushing; Other (Mild to Mod))  sulfa drugs (Fever)  Xarelto (Hives)    Social History:  Tobacco Use: denies  Alcohol Use: denies  Drug Use: denies  Sexual History: declined to answer  Functional Status: fully functional in all ADLs and IADLs (07 Sep 2021 17:22)    ROS  General: Denies rigors, nightsweats  HEENT: Denies headache, visual changes, sore throat, odynophagia  CV: Denies CP, palpitations  PULM: Denies SOB, wheezing, hemoptysis  GI: Denies abd pain, nausea, vomiting  : Denies dysuria or hematuria   SKIN: Denies rash, lesions    VITALS:  T(F): 97.1, Max: 98 (09-07-21 @ 14:03)  HR: 89  BP: 119/71  RR: 18Vital Signs Last 24 Hrs  T(C): 36.2 (08 Sep 2021 08:11), Max: 36.7 (07 Sep 2021 14:03)  T(F): 97.1 (08 Sep 2021 08:11), Max: 98 (07 Sep 2021 14:03)  HR: 89 (08 Sep 2021 08:11) (89 - 112)  BP: 119/71 (08 Sep 2021 08:11) (119/71 - 161/68)  BP(mean): --  RR: 18 (08 Sep 2021 08:11) (18 - 18)  SpO2: 99% (08 Sep 2021 08:11) (96% - 100%)    PHYSICAL EXAM:  Gen: NAD, cachetic female resting comfortably in bed  HEENT: No oropharyngeal erythema or white plaques   Neck: Supple, no lymphadenopathy  CV: Regular rate & regular rhythm  Lungs: CTAB  Abdomen: Soft, BS present, No tednerness  Ext: Warm, well perfused  Neuro: Non focal, awake  Skin: Chemoport in right upper chest wall no erythema or swelling; midline abdominal incision clean, no drainage or induration, soft mass palpated underneath incision  Lines: No phlebitis    TESTS & MEASUREMENTS:                        11.2   5.16  )-----------( 192      ( 08 Sep 2021 07:07 )             36.5     09-08    138  |  104  |  8<L>  ----------------------------<  97  4.2   |  26  |  <0.5<L>    Ca    8.2<L>      08 Sep 2021 07:07  Mg     1.7     09-08    TPro  4.5<L>  /  Alb  2.6<L>  /  TBili  0.4  /  DBili  x   /  AST  25  /  ALT  16  /  AlkPhos  526<H>  09-08    eGFR if Non African American: 100 mL/min/1.73M2 (09-08-21 @ 07:07)  eGFR if : 116 mL/min/1.73M2 (09-08-21 @ 07:07)  eGFR if Non : 93 mL/min/1.73M2 (09-07-21 @ 16:15)  eGFR if : 107 mL/min/1.73M2 (09-07-21 @ 16:15)    LIVER FUNCTIONS - ( 08 Sep 2021 07:07 )  Alb: 2.6 g/dL / Pro: 4.5 g/dL / ALK PHOS: 526 U/L / ALT: 16 U/L / AST: 25 U/L / GGT: x             Culture - Urine (collected 09-04-21 @ 21:50)  Source: Clean Catch Clean Catch (Midstream)  Final Report (09-06-21 @ 14:24):    >=3 organisms. Probable collection contamination.    Culture - Blood (collected 09-03-21 @ 16:15)  Source: .Blood Blood-Peripheral  Preliminary Report (09-04-21 @ 23:02):    No growth to date.    Culture - Blood (collected 09-03-21 @ 16:15)  Source: .Blood Blood-Peripheral  Gram Stain (09-06-21 @ 20:50):    Growth in aerobic bottle: Yeast  Preliminary Report (09-07-21 @ 20:51):    Growth in aerobic bottle: Candida glabrata    Referred to Mycology    ***Blood Panel PCR results on this specimen are available    approximately 3 hours after the Gram stain result.***    Gram stain, PCR, and/or culture results may not always    correspond due to difference in methodologies.    ************************************************************    This PCR assay was performed by multiplex PCR. This    Assay tests for 66 bacterial and resistance gene targets.    Please refer to the [facility] Labstest directory    at [email or web address] for details.  Organism: Blood Culture PCR (09-07-21 @ 01:10)  Organism: Blood Culture PCR (09-07-21 @ 01:10)      -  Candida glabrata: Detec      Method Type: PCR      Blood Gas Venous - Lactate: 1.80 mmol/L (09-03-21 @ 15:05)      INFECTIOUS DISEASES TESTING  MRSA PCR Result.: Negative (03-06-21 @ 13:37)      CARDIOLOGY TESTING  12 Lead ECG:   Ventricular Rate 111 BPM  Atrial Rate 141 BPM  QRS Duration 84 ms  Q-T Interval 364 ms  QTC Calculation(Bazett) 495 ms  R Axis 72 degrees  T Axis 81 degrees  Diagnosis Line Atrial fibrillation with rapid ventricular response  ST & T wave abnormality, consider anterior ischemia  Abnormal ECG      All available historical records have been reviewed    MEDICATIONS  apixaban 5  caspofungin IVPB   caspofungin IVPB 50  cefpodoxime 200  chlorhexidine 4% Liquid 1  diltiazem     lactobacillus acidophilus 1  multivitamin 1  pantoprazole    Tablet 40  saccharomyces boulardii 250      ANTIBIOTICS:  caspofungin IVPB      caspofungin IVPB 50 milliGRAM(s) IV Intermittent every 24 hours  cefpodoxime 200 milliGRAM(s) Oral every 12 hours      All available historical data has been reviewed

## 2021-09-08 NOTE — CONSULT NOTE ADULT - ASSESSMENT
75 y/o female with PMHx of pancreatic adenocarcinoma diagnosed March 2021 s/p chemotherapy x2, ERCP with CBD stent, and pancreaticoduodenectomy 8/2/2021,  recent admission to [facility] 9/3/2021-9/6/2021 for pyelonephritis who returned to the hospital after blood cultures drawn on 9/3/2021 grew Candida glabrata.    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  - Asymptomatic; no evidence of localized mucocutaneous infection or phlebitis; no clear identifiable foci of primary infection  - Must consider mediport and CBD stent as potential foci of infection   ·	Mediport on physical exam does not appear infected  ·	No abdominal pain or clinical/laboratory evidence of cholangitis to suggest CBD stent infection   - r/o endocarditis   - r/o endophthalmitis       RECOMMENDATIONS  - Repeat blood cultures  - Further recommendations of mediport removal will be based upon whether or not repeat blood cultures are positive (if blood cx still positive recommend removing mediport)  - Echocardiogram   - Opthalmology consult   - Continue Caspofungin 50 mg IV q24h; duration of therapy based upon resolution of candidemia --> minimum of 2 weeks from date of first negative blood culture    This is a pended note. All final recommendations to follow pending discussion with ID Attending      75 y/o female with PMHx of pancreatic adenocarcinoma diagnosed March 2021 s/p chemotherapy x2, ERCP with CBD stent, and pancreaticoduodenectomy 8/2/2021,  recent admission to [facility] 9/3/2021-9/6/2021 for pyelonephritis who returned to the hospital after blood cultures drawn on 9/3/2021 grew Candida glabrata.    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  - Asymptomatic; no evidence of localized mucocutaneous infection or phlebitis; no clear identifiable foci of primary infection  - Must consider mediport and CBD stent as potential foci of infection   ·	Mediport on physical exam does not appear infected  ·	No abdominal pain or clinical/laboratory evidence of cholangitis to suggest CBD stent infection   - r/o endocarditis   - r/o endophthalmitis       RECOMMENDATIONS  - Repeat blood cultures  - Further recommendations of mediport removal will be based upon whether or not repeat blood cultures are positive (if blood cx still positive recommend removing mediport)  - Echocardiogram   - Opthalmology consult   - Continue Caspofungin 50 mg IV q24h; duration of therapy based upon resolution of candidemia --> minimum of 2 weeks from date of first negative blood culture

## 2021-09-08 NOTE — PROGRESS NOTE ADULT - ASSESSMENT
Patient is a 75yo female with PMH of hypertension, pancreatic adenocarcinoma diagnosed March 2021 s/p chemotherapy x2, ERCP with CBD stent, pancreaticoduodenectomy 8/2/2021 with Dr. Nino, and atrial fibrillation on Eliquis with recent admission to [facility] 9/3/2021-9/6/2021 for pyelonephritis who returned to the hospital after blood cultures drawn on 9/3/2021 grew Candida glabrata.     #Fungemia (Candida glabrata)  - No sepsis present on admission (no leukocytosis, tachycardia, no tachypnea, afebrile)  - ID consult placed  - Start caspofungin IV (70mg IV STAT then 50mg IV Q24H)  - Echo to evaluate for valvular vegetations  - Follow repeat blood cultures drawn on admission and tomorrow  - Patient's chemoport does not appear infected, but will need to discuss need for removal and replacement with ID    #UTI, suspected pyelonephritis (diagnosed on previous admission)  - Urine Cx 9/3/2021: contaminated  - Blood Cx 9/3/2021: Candida glabrata  - Patient denies any urinary symptoms today  - Continue with cefpodoxime 200mg PO Q12H until 9/12/2021    #Chronic trial fibrillation with rapid ventricular response  - Continue with Eliquis 5mg PO twice daily   - Patient states she has always been asymptomatic and does not know when she is in RVR  - Continue with diltiazem 180mg PO once daily (was increased from 120mg last admission)    #Pancreatic adenocarcinoma diagnosed March 2021 s/p chemotherapy x2, ERCP with CBD stent, pancreaticoduodenectomy 8/2/2021 with Dr. Nino  - S/p neoadjuvant chemotherapy  - Follow outpatient with oncology (Dr. Johnson) and surgical oncology (Dr. Nino)  - Continue with probiotics, multivitamin, and pantoprazole  - Continue with metoclopramide 5mg PO three times daily with meals PRN    #Elevated alkaline phosphatase  - Has been elevated since March 2021  - Likely secondary to pancreatic cancer, previous chemotherapy, and recent Whipple procedure  - No further workup indicated    #Misc  - DVT Prophylaxis: Eliquis 5mg PO twice daily   - GI Prophylaxis: pantoprazole 40mg PO once daily   - Diet: DASH/TLC with Ensure  - Activity: increase as tolerated  - IV Fluids: encourage PO intake  - Code Status: Full Code

## 2021-09-09 NOTE — CONSULT NOTE ADULT - ASSESSMENT
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

## 2021-09-09 NOTE — CONSULT NOTE ADULT - SUBJECTIVE AND OBJECTIVE BOX
VIKASH LI  MRN-[id number]  76y Female      Patient is a 76y old  Female who presents with a chief complaint of fungemia (09 Sep 2021 11:11). Ophthalmology consulted to rule out candida endophthalmitis.     HEALTH ISSUES - R/O PROBLEM Dx:    HPI:  Patient is a 77yo female with PMH of hypertension, pancreatic adenocarcinoma diagnosed March 2021 s/p chemotherapy x2, ERCP with CBD stent, pancreaticoduodenectomy 8/2/2021 with Dr. Nino, and atrial fibrillation on Eliquis with recent admission to [facility] 9/3/2021-9/6/2021 for pyelonephritis who returned to the hospital after blood cultures drawn on 9/3/2021 grew Candida glabrata. During the previous admission, blood cultures were negative as of 9/6/2021. She was discharged on a course of oral cefpodoxime for pyelonephritis. On 9/7/2021 at around 2am, the attending on call was notified that blood cultures were positive for Candida glabrata. The patient was informed of the results, and she decided to return to the hospital to be readmitted for treatment.     In the ED, vital signs were Tmax 98F, , /68, RR 18, and SpO2 96%. Labs were significant for alkaline phosphatase 604. She was given a 1.75L bolus of NS 0.9%. Repeat blood cultures were drawn. (07 Sep 2021 17:22)      Extended HPI:  (-)burning, itching, redness, tearing OD/OS  (-)flashes, floaters, eye pain OD/OS    PAST MEDICAL & SURGICAL HISTORY:  SOB (shortness of breath)    Pain  knee    Varicose veins of both lower extremities, unspecified whether complicated    Atrial fibrillation, unspecified type  2019 on Eliquis    Jaundice  March 5, 2021    Malignant neoplasm of pancreas, unspecified location of malignancy  Pancreatic Cancer    Hypertension, unspecified type  2019    Pancreatic cancer    Kidney stones    History of surgery  Whipple procedure 8/2/2021 with Dr. Nino at [facility]    Status post phlebectomy  10/2018    History of ovarian cystectomy  left    History of tonsillectomy  1959    Kidney stone  2017      MEDICATIONS  (STANDING):  apixaban 5 milliGRAM(s) Oral two times a day  caspofungin IVPB      caspofungin IVPB 50 milliGRAM(s) IV Intermittent every 24 hours  cefpodoxime 200 milliGRAM(s) Oral every 12 hours  chlorhexidine 4% Liquid 1 Application(s) Topical <User Schedule>  diltiazem    milliGRAM(s) Oral daily  influenza   Vaccine 0.5 milliLiter(s) IntraMuscular once  lactobacillus acidophilus 1 Tablet(s) Oral two times a day with meals  magnesium sulfate  IVPB 2 Gram(s) IV Intermittent once  multivitamin 1 Tablet(s) Oral daily  pantoprazole    Tablet 40 milliGRAM(s) Oral before breakfast  saccharomyces boulardii 250 milliGRAM(s) Oral two times a day    MEDICATIONS  (PRN):  metoclopramide 5 milliGRAM(s) Oral three times a day before meals PRN Nausea and/or Vomiting    Allergies    Augmentin (Rash)  chocolate (Headache)  digoxin (Hives)  Metoprolol Succinate ER (Hives)  Novocain (Angioedema)  Steroids: Excessive swelling (Flushing; Other (Mild to Mod))  sulfa drugs (Fever)  Xarelto (Hives)    Intolerances        POHx:  WENDI: ~1 year ago  (+) s/p CE/PCIOL OU with YAG OD    (-)injuries    Ocular Medications: none    FOHx: Non-contributory    VISUAL ACUITY  OD: 20/25+1 sc PH: 20/20  OS 20/20 sc    NEURO TESTING  Pupils: 	PERRL, (-) APD OD/OS  EOMS: 	FULL OD/OS  CVF: 	Full to red light OD/OS    ANTERIOR SEGMENT EVALUATION:   lids/lashes: 	clear OD/OS  conjunctiva: 	clear OD/OS  cornea: 	clear OD/OS  anterior chamber: deep & quiet OD/OS  iris: 	flat and even OD/OS    INTRAOCULAR PRESSURE  [employer]  OD: 11 mmHg  OS: 11 mmHg  @3:00pm    POSTERIOR SEGMENT EVALUATION  Dilated: Tropicamide 1%, Phenylephrine 2.5% OD/OS  Lens: 		PCIOL, clear and centered OD/OS  Vitreous: 	clear OD/OS  Optic nerve:	distinct, pink and healthy OD/OS; c/d: 0.50R OD/OS   Macula: 	flat, even OD/OS  Vessels: 	2:3 OD/OS  Periphery: 	flat, (-)holes/breaks/tears 360 OD/OS

## 2021-09-09 NOTE — PROGRESS NOTE ADULT - SUBJECTIVE AND OBJECTIVE BOX
VIKASH LI 76y Female  MRN#: [id number]   CODE STATUS: Full    Hospital Day: 2d    Pt is currently admitted with the primary diagnosis of Fungemia    SUBJECTIVE  Hospital Course  Pt seen and examined at bedside. No complaints. BCx pending for repeat fungal cultures. Currently on caspo 50mg IVPB q24h. As per ID, if repeat BCx still +ve for candida will need to remove mediport. Pending Echo and ophthalmo consult. Pt needs to take caspofungin for a minimum of 2 weeks after the 1st -ve BCx.    Overnight events   None    Subjective complaints   None    Present Today:   - Ibarra:  No [x], Yes [   ] : Indication:     - Type of IV Access:       .. CVC/Piccline:  No [  ], Yes [   ] : Indication:       .. Midline: No [  ], Yes [   ] : Indication:                                             ----------------------------------------------------------  OBJECTIVE  PAST MEDICAL & SURGICAL HISTORY  SOB (shortness of breath)    Pain  knee    Varicose veins of both lower extremities, unspecified whether complicated    Atrial fibrillation, unspecified type  2019 on Eliquis    Jaundice  March 5, 2021    Malignant neoplasm of pancreas, unspecified location of malignancy  Pancreatic Cancer    Hypertension, unspecified type  2019    Pancreatic cancer    Kidney stones    History of surgery  Whipple procedure 8/2/2021 with Dr. Nino at [facility]    Status post phlebectomy  10/2018    History of ovarian cystectomy  left    History of tonsillectomy  1959    Kidney stone  2017                                              -----------------------------------------------------------  ALLERGIES:  Augmentin (Rash)  chocolate (Headache)  digoxin (Hives)  Metoprolol Succinate ER (Hives)  Novocain (Angioedema)  Steroids: Excessive swelling (Flushing; Other (Mild to Mod))  sulfa drugs (Fever)  Xarelto (Hives)                                            ------------------------------------------------------------    HOME MEDICATIONS  Home Medications:  calcium,magnesium and Zinc: 1 tab(s) orally once a day (03 Sep 2021 23:41)  Eliquis 5 mg oral tablet: 1 tab(s) orally 2 times a day (03 Sep 2021 23:41)  Women&#x27;s daily vitamins: 1 tab(s) orally once a day (03 Sep 2021 23:41)                           MEDICATIONS:  STANDING MEDICATIONS  apixaban 5 milliGRAM(s) Oral two times a day  caspofungin IVPB      caspofungin IVPB 50 milliGRAM(s) IV Intermittent every 24 hours  cefpodoxime 200 milliGRAM(s) Oral every 12 hours  chlorhexidine 4% Liquid 1 Application(s) Topical <User Schedule>  diltiazem    milliGRAM(s) Oral daily  influenza   Vaccine 0.5 milliLiter(s) IntraMuscular once  lactobacillus acidophilus 1 Tablet(s) Oral two times a day with meals  multivitamin 1 Tablet(s) Oral daily  pantoprazole    Tablet 40 milliGRAM(s) Oral before breakfast  saccharomyces boulardii 250 milliGRAM(s) Oral two times a day    PRN MEDICATIONS  metoclopramide 5 milliGRAM(s) Oral three times a day before meals PRN                                            ------------------------------------------------------------  VITAL SIGNS: Last 24 Hours  T(C): 36.2 (08 Sep 2021 22:55), Max: 36.9 (08 Sep 2021 20:52)  T(F): 97.1 (08 Sep 2021 22:55), Max: 98.5 (08 Sep 2021 20:52)  HR: 104 (09 Sep 2021 05:18) (99 - 104)  BP: 155/84 (09 Sep 2021 05:18) (131/74 - 155/84)  BP(mean): --  RR: 18 (09 Sep 2021 05:18) (17 - 18)  SpO2: 96% (08 Sep 2021 22:55) (96% - 99%)                                             --------------------------------------------------------------  LABS:                        11.7   5.07  )-----------( 227      ( 09 Sep 2021 07:46 )             37.2     09-09    141  |  104  |  7<L>  ----------------------------<  99  4.2   |  29  |  0.5<L>    Ca    8.4<L>      09 Sep 2021 07:46  Phos  3.7     09-09  Mg     1.6     09-09    TPro  4.7<L>  /  Alb  2.6<L>  /  TBili  0.4  /  DBili  x   /  AST  26  /  ALT  17  /  AlkPhos  526<H>  09-09    PT/INR - ( 07 Sep 2021 16:15 )   PT: 22.60 sec;   INR: 1.98 ratio         PTT - ( 07 Sep 2021 16:15 )  PTT:40.3 sec            Culture - Fungal, Blood (collected 08 Sep 2021 07:07)  Source: .Blood Blood  Preliminary Report (09 Sep 2021 10:35):    Testing in progress    Culture - Blood (collected 07 Sep 2021 16:15)  Source: .Blood Blood-Peripheral  Preliminary Report (08 Sep 2021 23:02):    No growth to date.    Culture - Blood (collected 07 Sep 2021 16:15)  Source: .Blood Blood-Peripheral  Preliminary Report (08 Sep 2021 23:02):    No growth to date.                                                    -------------------------------------------------------------  RADIOLOGY:                                            --------------------------------------------------------------    PHYSICAL EXAM:  General: NAD, AOx3   HEENT: Normocephalic, atraumatic  LUNGS: Normal breath sounds, no wheezes/crackles  HEART: Irregular rate and rhythm, no murmurs, rubs, or gallops  ABDOMEN: Soft, NT/ND. No rigidity/guarding. Chemoport in rt chest wall  EXT: Peripheral pulses +2, no cyanosis/edema.   NEURO: Grossly normal motor exam  SKIN: No rashes or bruises                                           --------------------------------------------------------------

## 2021-09-09 NOTE — PROGRESS NOTE ADULT - ASSESSMENT
ASSESSMENT & PLAN    Patient is a 75yo female with PMH of hypertension, pancreatic adenocarcinoma diagnosed March 2021 s/p chemotherapy x2, ERCP with CBD stent, pancreaticoduodenectomy 8/2/2021 with Dr. Nino, and atrial fibrillation on Eliquis with recent admission to [facility] 9/3/2021-9/6/2021 for pyelonephritis who returned to the hospital after blood cultures drawn on 9/3/2021 grew Candida glabrata.     #Fungemia (Candida glabrata)  - No sepsis present on admission (no leukocytosis, tachycardia, no tachypnea, afebrile)  - Started caspofungin IV (70mg IV STAT then 50mg IV Q24H)  - ID consult placed - Caspofungin for at least 2 weeks after 1st -ve BCx.   - Echo to evaluate for valvular vegetations  - Ophthalmo consult  - Follow repeat blood cultures drawn on admission and tomorrow  - Patient's chemoport does not appear infected, Will have to remove mediport if repeat BCx is +ve for candida again.    #UTI, suspected pyelonephritis (diagnosed on previous admission)  - Urine Cx 9/3/2021: contaminated  - Blood Cx 9/3/2021: Candida glabrata  - Patient denies any urinary symptoms  - Continue with cefpodoxime 200mg PO Q12H until 9/12/2021    #Chronic trial fibrillation with rapid ventricular response  - Continue with Eliquis 5mg PO twice daily   - Patient states she has always been asymptomatic and does not know when she is in RVR  - Continue with diltiazem 180mg PO once daily (was increased from 120mg last admission)    #Pancreatic adenocarcinoma diagnosed March 2021 s/p chemotherapy x2, ERCP with CBD stent, pancreaticoduodenectomy 8/2/2021 with Dr. Nino  - S/p neoadjuvant chemotherapy  - Follow outpatient with oncology (Dr. Johnson) and surgical oncology (Dr. Nino)  - Continue with probiotics, multivitamin, and pantoprazole  - Continue with metoclopramide 5mg PO three times daily with meals PRN    #Elevated alkaline phosphatase  - Has been elevated since March 2021  - Likely secondary to pancreatic cancer, previous chemotherapy, and recent Whipple procedure  - No further workup indicated      #Handoff: F/u repeat BCx to determine management as per ID. c/w caspofungin 50mg IVPB q24h. F/u Echo and ophthalmo consult.                                                                              ----------------------------------------------------  # DVT prophylaxis Eliquis    # GI prophylaxis Protonix PO    # Diet DASH/TLC + Ensure    # Activity Score (AM-PAC)    # Code status Full    # Disposition Acute

## 2021-09-09 NOTE — PROGRESS NOTE ADULT - ASSESSMENT
ASSESSMENT:  76Female PMHX of hypertension, pancreatic adenocarcinoma s/p Whipple 8/2/2021, atrial fibrillation on Eliquis, pyelonephritis presenting with positive blood cultures for Candida Glabrata.     PLAN:  - Continue management per primary team  - F/U echo  - F/u blood cultures  - Continue antifungal and abx  - Continue Eliquis and GI prophylaxis  - Strict ins and outs    Lines/Tubes: PIV    BLUE TEAM SPECTRA: [id number]

## 2021-09-09 NOTE — PROGRESS NOTE ADULT - SUBJECTIVE AND OBJECTIVE BOX
GENERAL SURGERY PROGRESS NOTE    Patient: VIKASH LI , 76y ([birth year])Female   MRN: [id number]  Location: [facility], [place] [id number]  Visit: 09-07-21 Inpatient  Date: 09-09-21 @ 12:38    Hospital Day #: 3  Post-Op Day #: none    Procedure/Dx/Injuries: 76F hx of Whipple done 8/2/2021 presenting with fungemia.    Events of past 24 hours: Echo and blood cultures ordered by primary team    PAST MEDICAL & SURGICAL HISTORY:  SOB (shortness of breath)    Pain  knee    Varicose veins of both lower extremities, unspecified whether complicated    Atrial fibrillation, unspecified type  2019 on Eliquis    Jaundice  March 5, 2021    Malignant neoplasm of pancreas, unspecified location of malignancy  Pancreatic Cancer    Hypertension, unspecified type  2019    Pancreatic cancer    Kidney stones    History of surgery  Whipple procedure 8/2/2021 with Dr. Nino at [facility]    Status post phlebectomy  10/2018    History of ovarian cystectomy  left    History of tonsillectomy  1959    Kidney stone  2017        Vitals:   T(F): 97.1 (09-08-21 @ 22:55), Max: 98.5 (09-08-21 @ 20:52)  HR: 104 (09-09-21 @ 05:18)  BP: 155/84 (09-09-21 @ 05:18)  RR: 18 (09-09-21 @ 05:18)  SpO2: 96% (09-08-21 @ 22:55)      Diet, DASH/TLC:   Sodium & Cholesterol Restricted  No Chocolate     Qty per Day:  Vanilla/Strawberry     Qty per Day:  ONLY  Supplement Feeding Modality:  Oral  Ensure Enlive Cans or Servings Per Day:  1       Frequency:  Two Times a day  Ensure Pudding Cans or Servings Per Day:  1       Frequency:  Two Times a day      Fluids:     I & O's:    Bowel Movement: : [x] YES [] NO  Flatus: : [x] YES [] NO    PHYSICAL EXAM:  General: NAD, AAOx3, calm and cooperative  HEENT: EOMI, Trachea ML, Neck supple  Cardiac: RRR S1, S2, no Murmurs, rubs or gallops  Respiratory: CTAB, normal respiratory effort  Abdomen: Soft, non-distended, non-tender, no rebound, no guarding  Vascular: Pulses 2+ throughout, extremities well perfused  Skin: Warm/dry, normal color, no jaundice    MEDICATIONS  (STANDING):  apixaban 5 milliGRAM(s) Oral two times a day  caspofungin IVPB      caspofungin IVPB 50 milliGRAM(s) IV Intermittent every 24 hours  cefpodoxime 200 milliGRAM(s) Oral every 12 hours  chlorhexidine 4% Liquid 1 Application(s) Topical <User Schedule>  diltiazem    milliGRAM(s) Oral daily  influenza   Vaccine 0.5 milliLiter(s) IntraMuscular once  lactobacillus acidophilus 1 Tablet(s) Oral two times a day with meals  multivitamin 1 Tablet(s) Oral daily  pantoprazole    Tablet 40 milliGRAM(s) Oral before breakfast  saccharomyces boulardii 250 milliGRAM(s) Oral two times a day    MEDICATIONS  (PRN):  metoclopramide 5 milliGRAM(s) Oral three times a day before meals PRN Nausea and/or Vomiting      DVT PROPHYLAXIS:   GI PROPHYLAXIS: pantoprazole    Tablet 40 milliGRAM(s) Oral before breakfast    ANTICOAGULATION:   ANTIBIOTICS:  caspofungin IVPB    caspofungin IVPB 50 milliGRAM(s)  cefpodoxime 200 milliGRAM(s)      LAB/STUDIES:  Labs:  CAPILLARY BLOOD GLUCOSE                          11.7   5.07  )-----------( 227      ( 09 Sep 2021 07:46 )             37.2       Auto Neutrophil %: 59.9 % (09-09-21 @ 07:46)  Auto Immature Granulocyte %: 0.4 % (09-09-21 @ 07:46)    09-09    141  |  104  |  7<L>  ----------------------------<  99  4.2   |  29  |  0.5<L>      Calcium, Total Serum: 8.4 mg/dL (09-09-21 @ 07:46)      LFTs:             4.7  | 0.4  | 26       ------------------[526     ( 09 Sep 2021 07:46 )  2.6  | x    | 17          Lipase:x      Amylase:x             Coags:     22.60  ----< 1.98    ( 07 Sep 2021 16:15 )     40.3        Culture - Fungal, Blood (collected 08 Sep 2021 07:07)  Source: .Blood Blood  Preliminary Report (09 Sep 2021 10:35):    Testing in progress    Culture - Blood (collected 07 Sep 2021 16:15)  Source: .Blood Blood-Peripheral  Preliminary Report (08 Sep 2021 23:02):    No growth to date.    Culture - Blood (collected 07 Sep 2021 16:15)  Source: .Blood Blood-Peripheral  Preliminary Report (08 Sep 2021 23:02):    No growth to date.    ACCESS/ DEVICES:  [x ] Peripheral IV

## 2021-09-10 NOTE — PROGRESS NOTE ADULT - ASSESSMENT
ASSESSMENT:  76Female PMHX of hypertension, pancreatic adenocarcinoma s/p Whipple 8/2/2021, atrial fibrillation on Eliquis, pyelonephritis presenting with positive blood cultures for Candida Glabrata.       PLAN:  - Continue management per primary team  - F/U echo  - F/u blood cultures  - Continue antifungal and abx  - Continue Eliquis and GI prophylaxis  - Strict ins and outs      Lines/Tubes: PIV    BLUE TEAM SPECTRA: [id number]

## 2021-09-10 NOTE — PROGRESS NOTE ADULT - ASSESSMENT
ASSESSMENT & PLAN    Patient is a 75yo female with PMH of hypertension, pancreatic adenocarcinoma diagnosed March 2021 s/p chemotherapy x2, ERCP with CBD stent, pancreaticoduodenectomy 8/2/2021 with Dr. Nino, and atrial fibrillation on Eliquis with recent admission to [facility] 9/3/2021-9/6/2021 for pyelonephritis who returned to the hospital after blood cultures drawn on 9/3/2021 grew Candida glabrata.     #Fungemia (Candida glabrata)  - No sepsis present on admission (no leukocytosis, tachycardia, no tachypnea, afebrile)  - Started caspofungin IV (70mg IV STAT then 50mg IV Q24H)  - ID consult placed - Caspofungin until 9/21  - Echo: EF 50%, bi-atrial enlargement, no vegetations. Recommended TRINIDAD if high suspicion for endocarditis; will wait for fungal cultures to determine if that is necessary (won't proceed if negative fungal BCx).  - Ophthalmo consult: No endophthalmitis  - Follow repeat blood cultures drawn on admission and tomorrow  - Patient's chemoport does not appear infected, Will have to remove mediport if repeat BCx is +ve for candida again.    #UTI, suspected pyelonephritis (diagnosed on previous admission)  - Urine Cx 9/3/2021: contaminated  - Blood Cx 9/3/2021: Candida glabrata  - BCx 9/7 -ve x2, Pending fungal BCx results  - Patient denies any urinary symptoms  - Continue with cefpodoxime 200mg PO Q12H until 9/12/2021    #Chronic trial fibrillation with rapid ventricular response  - Continue with Eliquis 5mg PO twice daily   - Patient states she has always been asymptomatic and does not know when she is in RVR  - Continue with diltiazem 180mg PO once daily (was increased from 120mg last admission)    #Pancreatic adenocarcinoma diagnosed March 2021 s/p chemotherapy x2, ERCP with CBD stent, pancreaticoduodenectomy 8/2/2021 with Dr. Nino  - S/p neoadjuvant chemotherapy  - Follow outpatient with oncology (Dr. Johnson) and surgical oncology (Dr. Nino)  - Continue with probiotics, multivitamin, and pantoprazole  - Continue with metoclopramide 5mg PO three times daily with meals PRN    #Elevated alkaline phosphatase  - Has been elevated since March 2021  - Likely secondary to pancreatic cancer, previous chemotherapy, and recent Whipple procedure  - No further workup indicated      #Handoff: F/u repeat fungal BCx to determine management (midport removal vs midline caspofungin for 2 weeks until 9/21 at home). c/w caspofungin 50mg IVPB q24h.                                                                            ----------------------------------------------------  # DVT prophylaxis Eliquis    # GI prophylaxis Protonix PO    # Diet DASH/TLC + Ensure    # Activity Score (AM-PAC)    # Code status Full    # Disposition Acute

## 2021-09-10 NOTE — PROGRESS NOTE ADULT - SUBJECTIVE AND OBJECTIVE BOX
VIKASH LI 76y Female  MRN#: [id number]   CODE STATUS: Full    Hospital Day: 3d    Pt is currently admitted with the primary diagnosis of fungemia    SUBJECTIVE  Hospital Course  Pt seen and examined at bedside. No complaints. Echo shows EF 50% and bi-atrial enlargement, no vegetations. Recommended TRINIDAD for better vegetation evaluation if clinical suspicion is high; will wait for fungal BCx before proceeding with TRINIDAD. Ophtho saw pt, no endophthalmitis. C/w caspofungin until fungal BCx come back to determine management (port removal vs midline caspo for 2 weeks starting 9/7)    Overnight events   None    Subjective complaints   None    Present Today:   - Ibarra:  No [x], Yes [   ] : Indication:     - Type of IV Access:       .. CVC/Piccline:  No [  ], Yes [   ] : Indication:       .. Midline: No [  ], Yes [   ] : Indication:                                             ----------------------------------------------------------  OBJECTIVE  PAST MEDICAL & SURGICAL HISTORY  SOB (shortness of breath)    Pain  knee    Varicose veins of both lower extremities, unspecified whether complicated    Atrial fibrillation, unspecified type  2019 on Eliquis    Jaundice  March 5, 2021    Malignant neoplasm of pancreas, unspecified location of malignancy  Pancreatic Cancer    Hypertension, unspecified type  2019    Pancreatic cancer    Kidney stones    History of surgery  Whipple procedure 8/2/2021 with Dr. Nino at [facility]    Status post phlebectomy  10/2018    History of ovarian cystectomy  left    History of tonsillectomy  1959    Kidney stone  2017                                              -----------------------------------------------------------  ALLERGIES:  Augmentin (Rash)  chocolate (Headache)  digoxin (Hives)  Metoprolol Succinate ER (Hives)  Novocain (Angioedema)  Steroids: Excessive swelling (Flushing; Other (Mild to Mod))  sulfa drugs (Fever)  Xarelto (Hives)                                            ------------------------------------------------------------    HOME MEDICATIONS  Home Medications:  calcium,magnesium and Zinc: 1 tab(s) orally once a day (03 Sep 2021 23:41)  Eliquis 5 mg oral tablet: 1 tab(s) orally 2 times a day (03 Sep 2021 23:41)  Women&#x27;s daily vitamins: 1 tab(s) orally once a day (03 Sep 2021 23:41)                           MEDICATIONS:  STANDING MEDICATIONS  apixaban 5 milliGRAM(s) Oral two times a day  caspofungin IVPB      caspofungin IVPB 50 milliGRAM(s) IV Intermittent every 24 hours  cefpodoxime 200 milliGRAM(s) Oral every 12 hours  chlorhexidine 4% Liquid 1 Application(s) Topical <User Schedule>  diltiazem    milliGRAM(s) Oral daily  influenza   Vaccine 0.5 milliLiter(s) IntraMuscular once  lactobacillus acidophilus 1 Tablet(s) Oral two times a day with meals  magnesium sulfate  IVPB 2 Gram(s) IV Intermittent once  multivitamin 1 Tablet(s) Oral daily  pantoprazole    Tablet 40 milliGRAM(s) Oral before breakfast  saccharomyces boulardii 250 milliGRAM(s) Oral two times a day    PRN MEDICATIONS  metoclopramide 5 milliGRAM(s) Oral three times a day before meals PRN                                            ------------------------------------------------------------  VITAL SIGNS: Last 24 Hours  T(C): 35.6 (10 Sep 2021 05:40), Max: 36.2 (09 Sep 2021 22:15)  T(F): 96.1 (10 Sep 2021 05:40), Max: 97.2 (09 Sep 2021 22:15)  HR: 94 (10 Sep 2021 05:40) (90 - 114)  BP: 140/73 (10 Sep 2021 05:40) (130/77 - 152/84)  BP(mean): --  RR: 18 (10 Sep 2021 05:40) (18 - 18)  SpO2: 97% (09 Sep 2021 19:30) (97% - 97%)      09-09-21 @ 07:01  -  09-10-21 @ 07:00  --------------------------------------------------------  IN: 0 mL / OUT: 2 mL / NET: -2 mL                                             --------------------------------------------------------------  LABS:                        12.0   5.73  )-----------( 246      ( 10 Sep 2021 04:30 )             38.5     09-10    141  |  105  |  8<L>  ----------------------------<  126<H>  3.9   |  27  |  0.5<L>    Ca    8.3<L>      10 Sep 2021 04:30  Phos  3.7     09-09  Mg     1.7     09-10    TPro  4.7<L>  /  Alb  2.6<L>  /  TBili  0.4  /  DBili  x   /  AST  26  /  ALT  17  /  AlkPhos  526<H>  09-09                Culture - Fungal, Blood (collected 08 Sep 2021 07:07)  Source: .Blood Blood  Preliminary Report (09 Sep 2021 10:35):    Testing in progress    Culture - Blood (collected 07 Sep 2021 16:15)  Source: .Blood Blood-Peripheral  Preliminary Report (08 Sep 2021 23:02):    No growth to date.    Culture - Blood (collected 07 Sep 2021 16:15)  Source: .Blood Blood-Peripheral  Preliminary Report (08 Sep 2021 23:02):    No growth to date.                                            -------------------------------------------------------------  RADIOLOGY:                                            --------------------------------------------------------------    PHYSICAL EXAM:  General: NAD, AOx3   HEENT: Normocephalic, atraumatic  LUNGS: Normal breath sounds, no wheezes/crackles  HEART: Irregular rate and rhythm, no murmurs, rubs, or gallops  ABDOMEN: Soft, NT/ND. No rigidity/guarding. Chemoport in rt chest wall  EXT: Peripheral pulses +2, no cyanosis/edema.   NEURO: Grossly normal motor exam  SKIN: No rashes or bruises                                           --------------------------------------------------------------     VIKASH LI 76y Female  MRN#: [id number]   CODE STATUS: Full    Hospital Day: 3d    Pt is currently admitted with the primary diagnosis of fungemia    SUBJECTIVE  Hospital Course  Pt seen and examined at bedside. No complaints. Echo shows EF 50% and bi-atrial enlargement, no vegetations. Recommended TRINIDAD for better vegetation evaluation if clinical suspicion is high; will wait for fungal BCx before proceeding with TRINIDAD. Ophtho saw pt, no endophthalmitis. C/w caspofungin until fungal BCx come back to determine management (port removal vs midline caspo for 2 weeks starting 9/7)    Overnight events   None    Subjective complaints   None    Present Today:   - Ibarra:  No [x], Yes [   ] : Indication:     - Type of IV Access:       .. CVC/Piccline:  No [  ], Yes [   ] : Indication:       .. Midline: No [  ], Yes [   ] : Indication:                                             ----------------------------------------------------------  OBJECTIVE  PAST MEDICAL & SURGICAL HISTORY  SOB (shortness of breath)    Pain  knee    Varicose veins of both lower extremities, unspecified whether complicated    Atrial fibrillation, unspecified type  2019 on Eliquis    Jaundice  March 5, 2021    Malignant neoplasm of pancreas, unspecified location of malignancy  Pancreatic Cancer    Hypertension, unspecified type  2019    Pancreatic cancer    Kidney stones    History of surgery  Whipple procedure 8/2/2021 with Dr. Nino at [facility]    Status post phlebectomy  10/2018    History of ovarian cystectomy  left    History of tonsillectomy  1959    Kidney stone  2017                                              -----------------------------------------------------------  ALLERGIES:  Augmentin (Rash)  chocolate (Headache)  digoxin (Hives)  Metoprolol Succinate ER (Hives)  Novocain (Angioedema)  Steroids: Excessive swelling (Flushing; Other (Mild to Mod))  sulfa drugs (Fever)  Xarelto (Hives)                                            ------------------------------------------------------------    HOME MEDICATIONS  Home Medications:  calcium,magnesium and Zinc: 1 tab(s) orally once a day (03 Sep 2021 23:41)  Eliquis 5 mg oral tablet: 1 tab(s) orally 2 times a day (03 Sep 2021 23:41)  Women&#x27;s daily vitamins: 1 tab(s) orally once a day (03 Sep 2021 23:41)                           MEDICATIONS:  STANDING MEDICATIONS  apixaban 5 milliGRAM(s) Oral two times a day  caspofungin IVPB      caspofungin IVPB 50 milliGRAM(s) IV Intermittent every 24 hours  cefpodoxime 200 milliGRAM(s) Oral every 12 hours  chlorhexidine 4% Liquid 1 Application(s) Topical <User Schedule>  diltiazem    milliGRAM(s) Oral daily  influenza   Vaccine 0.5 milliLiter(s) IntraMuscular once  lactobacillus acidophilus 1 Tablet(s) Oral two times a day with meals  magnesium sulfate  IVPB 2 Gram(s) IV Intermittent once  multivitamin 1 Tablet(s) Oral daily  pantoprazole    Tablet 40 milliGRAM(s) Oral before breakfast  saccharomyces boulardii 250 milliGRAM(s) Oral two times a day    PRN MEDICATIONS  metoclopramide 5 milliGRAM(s) Oral three times a day before meals PRN                                            ------------------------------------------------------------  VITAL SIGNS: Last 24 Hours  T(C): 35.6 (10 Sep 2021 05:40), Max: 36.2 (09 Sep 2021 22:15)  T(F): 96.1 (10 Sep 2021 05:40), Max: 97.2 (09 Sep 2021 22:15)  HR: 94 (10 Sep 2021 05:40) (90 - 114)  BP: 140/73 (10 Sep 2021 05:40) (130/77 - 152/84)  BP(mean): --  RR: 18 (10 Sep 2021 05:40) (18 - 18)  SpO2: 97% (09 Sep 2021 19:30) (97% - 97%)      09-09-21 @ 07:01  -  09-10-21 @ 07:00  --------------------------------------------------------  IN: 0 mL / OUT: 2 mL / NET: -2 mL                                             --------------------------------------------------------------  LABS:                        12.0   5.73  )-----------( 246      ( 10 Sep 2021 04:30 )             38.5     09-10    141  |  105  |  8<L>  ----------------------------<  126<H>  3.9   |  27  |  0.5<L>    Ca    8.3<L>      10 Sep 2021 04:30  Phos  3.7     09-09  Mg     1.7     09-10    TPro  4.7<L>  /  Alb  2.6<L>  /  TBili  0.4  /  DBili  x   /  AST  26  /  ALT  17  /  AlkPhos  526<H>  09-09                Culture - Fungal, Blood (collected 08 Sep 2021 07:07)  Source: .Blood Blood  Preliminary Report (09 Sep 2021 10:35):    Testing in progress    Culture - Blood (collected 07 Sep 2021 16:15)  Source: .Blood Blood-Peripheral  Preliminary Report (08 Sep 2021 23:02):    No growth to date.    Culture - Blood (collected 07 Sep 2021 16:15)  Source: .Blood Blood-Peripheral  Preliminary Report (08 Sep 2021 23:02):    No growth to date.                                            -------------------------------------------------------------  RADIOLOGY:                                            --------------------------------------------------------------    PHYSICAL EXAM:  General: NAD  HEENT: Normocephalic, atraumatic  LUNGS: Normal breath sounds, no wheezes/crackles  HEART: Irregular rate and rhythm, no murmurs, rubs, or gallops  ABDOMEN: Soft, NT/ND. No rigidity/guarding. Chemoport in rt chest wall  EXT: Peripheral pulses +2, no cyanosis/edema.   NEURO: Grossly normal motor exam  SKIN: No rashes or bruises  PSYCH: AAOx3                                           --------------------------------------------------------------

## 2021-09-10 NOTE — PROGRESS NOTE ADULT - SUBJECTIVE AND OBJECTIVE BOX
LI, VIKASH  76y, Female    All available historical data reviewed    OVERNIGHT EVENTS:  no fevers  feels well and has no new complaints     ROS:  General: Denies rigors, nightsweats  HEENT: Denies headache, rhinorrhea, sore throat, eye pain  CV: Denies CP, palpitations  PULM: Denies wheezing, hemoptysis  GI: Denies hematemesis, hematochezia, melena  : Denies discharge, hematuria  MSK: Denies arthralgias, myalgias  SKIN: Denies rash, lesions  NEURO: Denies paresthesias, weakness  PSYCH: Denies depression, anxiety    VITALS:  T(F): 96.1, Max: 97.2 (09-09-21 @ 22:15)  HR: 94  BP: 140/73  RR: 18Vital Signs Last 24 Hrs  T(C): 35.6 (10 Sep 2021 05:40), Max: 36.2 (09 Sep 2021 22:15)  T(F): 96.1 (10 Sep 2021 05:40), Max: 97.2 (09 Sep 2021 22:15)  HR: 94 (10 Sep 2021 05:40) (90 - 114)  BP: 140/73 (10 Sep 2021 05:40) (130/77 - 152/84)  BP(mean): --  RR: 18 (10 Sep 2021 05:40) (18 - 18)  SpO2: 97% (09 Sep 2021 19:30) (97% - 97%)    TESTS & MEASUREMENTS:                        12.0   5.73  )-----------( 246      ( 10 Sep 2021 04:30 )             38.5     09-09    141  |  104  |  7<L>  ----------------------------<  99  4.2   |  29  |  0.5<L>    Ca    8.4<L>      09 Sep 2021 07:46  Phos  3.7     09-09  Mg     1.6     09-09    TPro  4.7<L>  /  Alb  2.6<L>  /  TBili  0.4  /  DBili  x   /  AST  26  /  ALT  17  /  AlkPhos  526<H>  09-09    LIVER FUNCTIONS - ( 09 Sep 2021 07:46 )  Alb: 2.6 g/dL / Pro: 4.7 g/dL / ALK PHOS: 526 U/L / ALT: 17 U/L / AST: 26 U/L / GGT: x             Culture - Fungal, Blood (collected 09-08-21 @ 07:07)  Source: .Blood Blood  Preliminary Report (09-09-21 @ 10:35):    Testing in progress    Culture - Blood (collected 09-07-21 @ 16:15)  Source: .Blood Blood-Peripheral  Preliminary Report (09-08-21 @ 23:02):    No growth to date.    Culture - Blood (collected 09-07-21 @ 16:15)  Source: .Blood Blood-Peripheral  Preliminary Report (09-08-21 @ 23:02):    No growth to date.    Culture - Urine (collected 09-04-21 @ 21:50)  Source: Clean Catch Clean Catch (Midstream)  Final Report (09-06-21 @ 14:24):    >=3 organisms. Probable collection contamination.    Culture - Blood (collected 09-03-21 @ 16:15)  Source: .Blood Blood-Peripheral  Final Report (09-08-21 @ 23:01):    No Growth Final    Culture - Blood (collected 09-03-21 @ 16:15)  Source: .Blood Blood-Peripheral  Gram Stain (09-06-21 @ 20:50):    Growth in aerobic bottle: Yeast  Preliminary Report (09-07-21 @ 20:51):    Growth in aerobic bottle: Candida glabrata    Referred to Mycology    ***Blood Panel PCR results on this specimen are available    approximately 3 hours after the Gram stain result.***    Gram stain, PCR, and/or culture results may not always    correspond due to difference in methodologies.    ************************************************************    This PCR assay was performed by multiplex PCR. This    Assay tests for 66 bacterial and resistance gene targets.    Please refer to the [facility] directory    at [email or web address] for details.  Organism: Blood Culture PCR (09-07-21 @ 01:10)  Organism: Blood Culture PCR (09-07-21 @ 01:10)      -  Candida glabrata: Detec      Method Type: PCR            RADIOLOGY & ADDITIONAL TESTS:  Personal review of radiological diagnostics performed  Echo and EKG results noted when applicable.     MEDICATIONS:  apixaban 5 milliGRAM(s) Oral two times a day  caspofungin IVPB      caspofungin IVPB 50 milliGRAM(s) IV Intermittent every 24 hours  cefpodoxime 200 milliGRAM(s) Oral every 12 hours  chlorhexidine 4% Liquid 1 Application(s) Topical <User Schedule>  diltiazem    milliGRAM(s) Oral daily  influenza   Vaccine 0.5 milliLiter(s) IntraMuscular once  lactobacillus acidophilus 1 Tablet(s) Oral two times a day with meals  metoclopramide 5 milliGRAM(s) Oral three times a day before meals PRN  multivitamin 1 Tablet(s) Oral daily  pantoprazole    Tablet 40 milliGRAM(s) Oral before breakfast  saccharomyces boulardii 250 milliGRAM(s) Oral two times a day      ANTIBIOTICS:  caspofungin IVPB      caspofungin IVPB 50 milliGRAM(s) IV Intermittent every 24 hours  cefpodoxime 200 milliGRAM(s) Oral every 12 hours

## 2021-09-10 NOTE — PROGRESS NOTE ADULT - SUBJECTIVE AND OBJECTIVE BOX
GENERAL SURGERY PROGRESS NOTE    Patient: VIKASH LI , 76y ([birth year])Female   MRN: [id number]  Location: [facility] [id number] 017 A  Visit: 09-07-21 Inpatient  Date: 09-10-21 @ 04:36    Hospital Day #: 4    Procedure/Dx/Injuries: s/p whipple on 8/2/21    Events of past 24 hours: no acute events overnight. Patient stable and had no complaints.     PAST MEDICAL & SURGICAL HISTORY:  SOB (shortness of breath)    Pain  knee    Varicose veins of both lower extremities, unspecified whether complicated    Atrial fibrillation, unspecified type  2019 on Eliquis    Jaundice  March 5, 2021    Malignant neoplasm of pancreas, unspecified location of malignancy  Pancreatic Cancer    Hypertension, unspecified type  2019    Pancreatic cancer    Kidney stones    History of surgery  Whipple procedure 8/2/2021 with Dr. Nino at [facility]    Status post phlebectomy  10/2018    History of ovarian cystectomy  left    History of tonsillectomy  1959    Kidney stone  2017        Vitals:   T(F): 97.2 (09-09-21 @ 22:15), Max: 97.2 (09-09-21 @ 22:15)  HR: 90 (09-09-21 @ 23:28)  BP: 147/87 (09-09-21 @ 22:15)  RR: 18 (09-09-21 @ 22:15)  SpO2: 97% (09-09-21 @ 19:30)      Diet, DASH/TLC:   Sodium & Cholesterol Restricted  No Chocolate     Qty per Day:  Vanilla/Strawberry     Qty per Day:  ONLY  Supplement Feeding Modality:  Oral  Ensure Enlive Cans or Servings Per Day:  1       Frequency:  Two Times a day  Ensure Pudding Cans or Servings Per Day:  1       Frequency:  Two Times a day      Fluids:     I & O's:    Bowel Movement: : [+] YES [] NO  Flatus: : [+] YES [] NO    PHYSICAL EXAM:  General: NAD, AAOx3, calm and cooperative  HEENT: EOMI, Trachea ML, Neck supple  Cardiac: RRR S1, S2, no Murmurs, rubs or gallops  Respiratory: CTAB, normal respiratory effort  Abdomen: Soft, non-distended, non-tender, no rebound, no guarding  Vascular: Pulses 2+ throughout, extremities well perfused  Skin: Warm/dry, normal color, no jaundice      MEDICATIONS  (STANDING):  apixaban 5 milliGRAM(s) Oral two times a day  caspofungin IVPB      caspofungin IVPB 50 milliGRAM(s) IV Intermittent every 24 hours  cefpodoxime 200 milliGRAM(s) Oral every 12 hours  chlorhexidine 4% Liquid 1 Application(s) Topical <User Schedule>  diltiazem    milliGRAM(s) Oral daily  influenza   Vaccine 0.5 milliLiter(s) IntraMuscular once  lactobacillus acidophilus 1 Tablet(s) Oral two times a day with meals  multivitamin 1 Tablet(s) Oral daily  pantoprazole    Tablet 40 milliGRAM(s) Oral before breakfast  saccharomyces boulardii 250 milliGRAM(s) Oral two times a day    MEDICATIONS  (PRN):  metoclopramide 5 milliGRAM(s) Oral three times a day before meals PRN Nausea and/or Vomiting      DVT PROPHYLAXIS:   GI PROPHYLAXIS: pantoprazole    Tablet 40 milliGRAM(s) Oral before breakfast    ANTICOAGULATION:   ANTIBIOTICS:  caspofungin IVPB    caspofungin IVPB 50 milliGRAM(s)  cefpodoxime 200 milliGRAM(s)            LAB/STUDIES:  Labs:  CAPILLARY BLOOD GLUCOSE                              11.7   5.07  )-----------( 227      ( 09 Sep 2021 07:46 )             37.2       Auto Neutrophil %: 59.9 % (09-09-21 @ 07:46)  Auto Immature Granulocyte %: 0.4 % (09-09-21 @ 07:46)    09-09    141  |  104  |  7<L>  ----------------------------<  99  4.2   |  29  |  0.5<L>      Calcium, Total Serum: 8.4 mg/dL (09-09-21 @ 07:46)      LFTs:             4.7  | 0.4  | 26       ------------------[526     ( 09 Sep 2021 07:46 )  2.6  | x    | 17          Lipase:x      Amylase:x             Coags:                Culture - Fungal, Blood (collected 08 Sep 2021 07:07)  Source: .Blood Blood  Preliminary Report (09 Sep 2021 10:35):    Testing in progress    Culture - Blood (collected 07 Sep 2021 16:15)  Source: .Blood Blood-Peripheral  Preliminary Report (08 Sep 2021 23:02):    No growth to date.    Culture - Blood (collected 07 Sep 2021 16:15)  Source: .Blood Blood-Peripheral  Preliminary Report (08 Sep 2021 23:02):    No growth to date.          ACCESS/ DEVICES:  [ x] Peripheral IV

## 2021-09-10 NOTE — PROGRESS NOTE ADULT - ASSESSMENT
· Assessment	  75 y/o female with PMHx of pancreatic adenocarcinoma diagnosed March 2021 s/p chemotherapy x2, ERCP with CBD stent, and pancreaticoduodenectomy 8/2/2021,  recent admission to [facility] 9/3/2021-9/6/2021 for pyelonephritis who returned to the hospital after blood cultures drawn on 9/3/2021 grew Candida glabrata.    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 If then positive then the port will need to be removed  f/u with Dr Hendrix [phone] at [address] on tuesday via [email or web address] . Pt will be called  between 10-12.30 am.  [address]  [phone]  Fax [phone]  recall prn please

## 2021-09-11 NOTE — DIETITIAN INITIAL EVALUATION ADULT. - OTHER INFO
Dx: 77y/o female with pmhx noted above admitted with positive blood cultures for candida glabrata. Skin is intact (Ruben Score-19).

## 2021-09-11 NOTE — DIETITIAN INITIAL EVALUATION ADULT. - PHYSCIAL ASSESSMENT
The patient displays physical signs of severe muscle wasting located at the bitemporal, pectoralis, interosseous, therna, anterior thigh, patellar and posterior calf regions; severe fat loss located at the upper arm regions. BMI-19

## 2021-09-11 NOTE — DIETITIAN INITIAL EVALUATION ADULT. - COLLABORATION WITH OTHER PROVIDERS
I spoke to the patient's physician name Dr. Singh ([facility] #[id number]) regarding my nutritional recommendations.

## 2021-09-11 NOTE — DIETITIAN INITIAL EVALUATION ADULT. - PERTINENT LABORATORY DATA
(9/11) Na-141, K-4.1, CL-104, BUN-10, Cr<0.5, Glucose-93mg/dL, Corrected Ca-9.3 (WNL), Mg-1.8, H/H-11/35.3

## 2021-09-11 NOTE — PROGRESS NOTE ADULT - SUBJECTIVE AND OBJECTIVE BOX
GENERAL SURGERY PROGRESS NOTE    Patient: VIKASH LI , 76y ([birth year])Female   MRN: [id number]  Location: [facility] [id number] A  Visit: 09-07-21 Inpatient  Date: 09-11-21 @ 08:40    Hospital Day #: 5  Post-Op Day #:    Procedure/Dx/Injuries: s/p whipple 8/2/21    Events of past 24 hours: Patient doing well today tolerating diet, no nause/vomiting, denies pain, has had bowel movement.    PAST MEDICAL & SURGICAL HISTORY:  SOB (shortness of breath)    Pain  knee    Varicose veins of both lower extremities, unspecified whether complicated    Atrial fibrillation, unspecified type  2019 on Eliquis    Jaundice  March 5, 2021    Malignant neoplasm of pancreas, unspecified location of malignancy  Pancreatic Cancer    Hypertension, unspecified type  2019    Pancreatic cancer    Kidney stones    History of surgery  Whipple procedure 8/2/2021 with Dr. Nino at [facility]    Status post phlebectomy  10/2018    History of ovarian cystectomy  left    History of tonsillectomy  1959    Kidney stone  2017        Vitals:   T(F): 96.4 (09-11-21 @ 05:07), Max: 97 (09-10-21 @ 13:16)  HR: 96 (09-11-21 @ 04:26)  BP: 144/87 (09-11-21 @ 04:26)  RR: 18 (09-11-21 @ 04:26)  SpO2: 97% (09-10-21 @ 19:30)      Diet, DASH/TLC:   Sodium & Cholesterol Restricted  No Chocolate     Qty per Day:  Vanilla/Strawberry     Qty per Day:  ONLY  Supplement Feeding Modality:  Oral  Ensure Enlive Cans or Servings Per Day:  1       Frequency:  Two Times a day  Ensure Pudding Cans or Servings Per Day:  1       Frequency:  Two Times a day      Fluids:     I & O's:    Bowel Movement: : [x] YES [] NO  Flatus: : [x] YES [] NO    PHYSICAL EXAM:  General: NAD, AAOx3, calm and cooperative  HEENT: NCAT, JEFFRY, EOMI, Trachea ML, Neck supple  Cardiac: RRR S1, S2, no Murmurs, rubs or gallops  Respiratory: CTAB, normal respiratory effort, breath sounds equal BL, no wheeze, rhonchi or crackles  Abdomen: Soft, non-distended, non-tender    MEDICATIONS  (STANDING):  apixaban 5 milliGRAM(s) Oral two times a day  caspofungin IVPB      caspofungin IVPB 50 milliGRAM(s) IV Intermittent every 24 hours  cefpodoxime 200 milliGRAM(s) Oral every 12 hours  chlorhexidine 4% Liquid 1 Application(s) Topical <User Schedule>  diltiazem    milliGRAM(s) Oral daily  influenza   Vaccine 0.5 milliLiter(s) IntraMuscular once  lactobacillus acidophilus 1 Tablet(s) Oral two times a day with meals  multivitamin 1 Tablet(s) Oral daily  pantoprazole    Tablet 40 milliGRAM(s) Oral before breakfast  saccharomyces boulardii 250 milliGRAM(s) Oral two times a day    MEDICATIONS  (PRN):  metoclopramide 5 milliGRAM(s) Oral three times a day before meals PRN Nausea and/or Vomiting      DVT PROPHYLAXIS:   GI PROPHYLAXIS: pantoprazole    Tablet 40 milliGRAM(s) Oral before breakfast    ANTICOAGULATION:   ANTIBIOTICS:  caspofungin IVPB    caspofungin IVPB 50 milliGRAM(s)  cefpodoxime 200 milliGRAM(s)            LAB/STUDIES:  Labs:  CAPILLARY BLOOD GLUCOSE                              12.0   5.73  )-----------( 246      ( 10 Sep 2021 04:30 )             38.5         09-10    141  |  105  |  8<L>  ----------------------------<  126<H>  3.9   |  27  |  0.5<L>          LFTs:         Coags:                        IMAGING:      ACCESS/ DEVICES:  [X] Peripheral IV

## 2021-09-11 NOTE — PROGRESS NOTE ADULT - SUBJECTIVE AND OBJECTIVE BOX
VIKASH LI 76y Female  MRN#: [id number]   CODE STATUS: Full    Hospital Day: 4d    Pt is currently admitted with the primary diagnosis of Fungemia    SUBJECTIVE  Hospital Course  Pt seen and examined at bedside. No complaints. Went over the importance of getting a midline to get IV abx as there are no alternatives for fungemia. Pt was more agreeable today and asked to speak with CM to discuss options of SNF vs [facility]. She is worried she might mess up the abx administration if she does it herself even if a VNS teaches her how to do it. Will wait for CM follow up to decide d/c planning. Will place midline today.    Overnight events   None    Subjective complaints   None    Present Today:   - Ibarra:  No [x], Yes [   ] : Indication:     - Type of IV Access:       .. CVC/Piccline:  No [  ], Yes [   ] : Indication:       .. Midline: No [  ], Yes [   ] : Indication:                                             ----------------------------------------------------------  OBJECTIVE  PAST MEDICAL & SURGICAL HISTORY  SOB (shortness of breath)    Pain  knee    Varicose veins of both lower extremities, unspecified whether complicated    Atrial fibrillation, unspecified type  2019 on Eliquis    Jaundice  March 5, 2021    Malignant neoplasm of pancreas, unspecified location of malignancy  Pancreatic Cancer    Hypertension, unspecified type  2019    Pancreatic cancer    Kidney stones    History of surgery  Whipple procedure 8/2/2021 with Dr. Nino at [facility]    Status post phlebectomy  10/2018    History of ovarian cystectomy  left    History of tonsillectomy  1959    Kidney stone  2017                                              -----------------------------------------------------------  ALLERGIES:  Augmentin (Rash)  chocolate (Headache)  digoxin (Hives)  Metoprolol Succinate ER (Hives)  Novocain (Angioedema)  Steroids: Excessive swelling (Flushing; Other (Mild to Mod))  sulfa drugs (Fever)  Xarelto (Hives)                                            ------------------------------------------------------------    HOME MEDICATIONS  Home Medications:  calcium,magnesium and Zinc: 1 tab(s) orally once a day (03 Sep 2021 23:41)  Eliquis 5 mg oral tablet: 1 tab(s) orally 2 times a day (03 Sep 2021 23:41)  Women&#x27;s daily vitamins: 1 tab(s) orally once a day (03 Sep 2021 23:41)                           MEDICATIONS:  STANDING MEDICATIONS  apixaban 5 milliGRAM(s) Oral two times a day  caspofungin IVPB      caspofungin IVPB 50 milliGRAM(s) IV Intermittent every 24 hours  cefpodoxime 200 milliGRAM(s) Oral every 12 hours  chlorhexidine 4% Liquid 1 Application(s) Topical <User Schedule>  diltiazem    milliGRAM(s) Oral daily  influenza   Vaccine 0.5 milliLiter(s) IntraMuscular once  lactobacillus acidophilus 1 Tablet(s) Oral two times a day with meals  multivitamin 1 Tablet(s) Oral daily  pantoprazole    Tablet 40 milliGRAM(s) Oral before breakfast  saccharomyces boulardii 250 milliGRAM(s) Oral two times a day    PRN MEDICATIONS  metoclopramide 5 milliGRAM(s) Oral three times a day before meals PRN                                            ------------------------------------------------------------  VITAL SIGNS: Last 24 Hours  T(C): 35.8 (11 Sep 2021 05:07), Max: 36.1 (10 Sep 2021 13:16)  T(F): 96.4 (11 Sep 2021 05:07), Max: 97 (10 Sep 2021 13:16)  HR: 96 (11 Sep 2021 04:26) (89 - 99)  BP: 144/87 (11 Sep 2021 04:26) (121/67 - 144/87)  BP(mean): --  RR: 18 (11 Sep 2021 04:26) (18 - 18)  SpO2: 97% (10 Sep 2021 19:30) (97% - 97%)                                             --------------------------------------------------------------  LABS:                        11.0   5.60  )-----------( 263      ( 11 Sep 2021 08:09 )             35.3     09-11    141  |  104  |  10  ----------------------------<  93  4.1   |  31  |  <0.5<L>    Ca    8.2<L>      11 Sep 2021 08:09  Mg     1.8     09-11                                              -------------------------------------------------------------  RADIOLOGY:                                            --------------------------------------------------------------    PHYSICAL EXAM:  General: NAD, AOx3  HEENT: Normocephalic, atraumatic  LUNGS: Normal breath sounds, no wheezes/crackles  HEART: Irregular rate and rhythm, no murmurs, rubs, or gallops  ABDOMEN: Soft, NT/ND. No rigidity/guarding. Chemoport in rt chest wall  EXT: Peripheral pulses +2, no cyanosis/edema.   NEURO: Grossly normal motor exam  SKIN: No rashes or bruises                                           --------------------------------------------------------------     VIKASH LI 76y Female  MRN#: [id number]   CODE STATUS: Full    Hospital Day: 4d    Pt is currently admitted with the primary diagnosis of Fungemia    SUBJECTIVE  Hospital Course  Pt seen and examined at bedside. No complaints. Went over the importance of getting a midline to get IV abx as there are no alternatives for fungemia. Pt was more agreeable today and asked to speak with CM to discuss options of SNF vs [facility]. She is worried she might mess up the abx administration if she does it herself even if a VNS teaches her how to do it. Will wait for CM follow up to decide d/c planning. Will place midline today if ID defers waiting for final BCx results.    Overnight events   None    Subjective complaints   None    Present Today:   - Ibarra:  No [x], Yes [   ] : Indication:     - Type of IV Access:       .. CVC/Piccline:  No [  ], Yes [   ] : Indication:       .. Midline: No [  ], Yes [   ] : Indication:                                             ----------------------------------------------------------  OBJECTIVE  PAST MEDICAL & SURGICAL HISTORY  SOB (shortness of breath)    Pain  knee    Varicose veins of both lower extremities, unspecified whether complicated    Atrial fibrillation, unspecified type  2019 on Eliquis    Jaundice  March 5, 2021    Malignant neoplasm of pancreas, unspecified location of malignancy  Pancreatic Cancer    Hypertension, unspecified type  2019    Pancreatic cancer    Kidney stones    History of surgery  Whipple procedure 8/2/2021 with Dr. Nino at [facility]    Status post phlebectomy  10/2018    History of ovarian cystectomy  left    History of tonsillectomy  1959    Kidney stone  2017                                              -----------------------------------------------------------  ALLERGIES:  Augmentin (Rash)  chocolate (Headache)  digoxin (Hives)  Metoprolol Succinate ER (Hives)  Novocain (Angioedema)  Steroids: Excessive swelling (Flushing; Other (Mild to Mod))  sulfa drugs (Fever)  Xarelto (Hives)                                            ------------------------------------------------------------    HOME MEDICATIONS  Home Medications:  calcium,magnesium and Zinc: 1 tab(s) orally once a day (03 Sep 2021 23:41)  Eliquis 5 mg oral tablet: 1 tab(s) orally 2 times a day (03 Sep 2021 23:41)  Women&#x27;s daily vitamins: 1 tab(s) orally once a day (03 Sep 2021 23:41)                           MEDICATIONS:  STANDING MEDICATIONS  apixaban 5 milliGRAM(s) Oral two times a day  caspofungin IVPB      caspofungin IVPB 50 milliGRAM(s) IV Intermittent every 24 hours  cefpodoxime 200 milliGRAM(s) Oral every 12 hours  chlorhexidine 4% Liquid 1 Application(s) Topical <User Schedule>  diltiazem    milliGRAM(s) Oral daily  influenza   Vaccine 0.5 milliLiter(s) IntraMuscular once  lactobacillus acidophilus 1 Tablet(s) Oral two times a day with meals  multivitamin 1 Tablet(s) Oral daily  pantoprazole    Tablet 40 milliGRAM(s) Oral before breakfast  saccharomyces boulardii 250 milliGRAM(s) Oral two times a day    PRN MEDICATIONS  metoclopramide 5 milliGRAM(s) Oral three times a day before meals PRN                                            ------------------------------------------------------------  VITAL SIGNS: Last 24 Hours  T(C): 35.8 (11 Sep 2021 05:07), Max: 36.1 (10 Sep 2021 13:16)  T(F): 96.4 (11 Sep 2021 05:07), Max: 97 (10 Sep 2021 13:16)  HR: 96 (11 Sep 2021 04:26) (89 - 99)  BP: 144/87 (11 Sep 2021 04:26) (121/67 - 144/87)  BP(mean): --  RR: 18 (11 Sep 2021 04:26) (18 - 18)  SpO2: 97% (10 Sep 2021 19:30) (97% - 97%)                                             --------------------------------------------------------------  LABS:                        11.0   5.60  )-----------( 263      ( 11 Sep 2021 08:09 )             35.3     09-11    141  |  104  |  10  ----------------------------<  93  4.1   |  31  |  <0.5<L>    Ca    8.2<L>      11 Sep 2021 08:09  Mg     1.8     09-11                                              -------------------------------------------------------------  RADIOLOGY:                                            --------------------------------------------------------------    PHYSICAL EXAM:  General: NAD, AOx3  HEENT: Normocephalic, atraumatic  LUNGS: Normal breath sounds, no wheezes/crackles  HEART: Irregular rate and rhythm, no murmurs, rubs, or gallops  ABDOMEN: Soft, NT/ND. No rigidity/guarding. Chemoport in rt chest wall  EXT: Peripheral pulses +2, no cyanosis/edema.   NEURO: Grossly normal motor exam  SKIN: No rashes or bruises                                           --------------------------------------------------------------

## 2021-09-11 NOTE — DIETITIAN INITIAL EVALUATION ADULT. - FACTORS AFF FOOD INTAKE
The patient reports consuming >75% of meals and oral supplements w/o chewing and swallowing difficulty. Reports having a bowel movement (9/11).

## 2021-09-11 NOTE — DIETITIAN NUTRITION RISK NOTIFICATION - TREATMENT: THE FOLLOWING DIET HAS BEEN RECOMMENDED
Diet, Regular:   Supplement Feeding Modality:  Oral  Ensure Enlive Cans or Servings Per Day:  1       Frequency:  Three Times a day  Ensure Pudding Cans or Servings Per Day:  1       Frequency:  Three Times a day (09-11-21 @ 21:20) [Pending Verification By Attending]  Diet, DASH/TLC:   Sodium & Cholesterol Restricted  No Chocolate     Qty per Day:  Vanilla/Strawberry     Qty per Day:  ONLY  Supplement Feeding Modality:  Oral  Ensure Enlive Cans or Servings Per Day:  1       Frequency:  Two Times a day  Ensure Pudding Cans or Servings Per Day:  1       Frequency:  Two Times a day (09-07-21 @ 17:21) [Active]

## 2021-09-11 NOTE — PROGRESS NOTE ADULT - ASSESSMENT
ASSESSMENT & PLAN    Patient is a 77yo female with PMH of hypertension, pancreatic adenocarcinoma diagnosed March 2021 s/p chemotherapy x2, ERCP with CBD stent, pancreaticoduodenectomy 8/2/2021 with Dr. Nino, and atrial fibrillation on Eliquis with recent admission to [facility] 9/3/2021-9/6/2021 for pyelonephritis who returned to the hospital after blood cultures drawn on 9/3/2021 grew Candida glabrata.     #Fungemia (Candida glabrata)  - No sepsis present on admission (no leukocytosis, tachycardia, no tachypnea, afebrile)  - Started caspofungin IV (70mg IV STAT then 50mg IV Q24H)  - ID consult placed - Caspofungin until 9/21  - Echo: EF 50%, bi-atrial enlargement, no vegetations. Recommended TRINIDAD if high suspicion for endocarditis; will wait for fungal cultures to determine if that is necessary (won't proceed if negative fungal BCx).  - Ophthalmo consult: No endophthalmitis  - Follow repeat blood cultures drawn on admission and tomorrow  - Patient's chemoport does not appear infected, Will have to remove mediport if repeat BCx is +ve for candida again.    #UTI, suspected pyelonephritis (diagnosed on previous admission)  - Urine Cx 9/3/2021: contaminated  - Blood Cx 9/3/2021: Candida glabrata  - BCx 9/7 -ve x2, Pending fungal BCx results  - Patient denies any urinary symptoms  - Continue with cefpodoxime 200mg PO Q12H until 9/12/2021    #Chronic trial fibrillation with rapid ventricular response  - Continue with Eliquis 5mg PO twice daily   - Patient states she has always been asymptomatic and does not know when she is in RVR  - Continue with diltiazem 180mg PO once daily (was increased from 120mg last admission)    #Pancreatic adenocarcinoma diagnosed March 2021 s/p chemotherapy x2, ERCP with CBD stent, pancreaticoduodenectomy 8/2/2021 with Dr. Nino  - S/p neoadjuvant chemotherapy  - Follow outpatient with oncology (Dr. Johnson) and surgical oncology (Dr. Nino)  - Continue with probiotics, multivitamin, and pantoprazole  - Continue with metoclopramide 5mg PO three times daily with meals PRN    #Elevated alkaline phosphatase  - Has been elevated since March 2021  - Likely secondary to pancreatic cancer, previous chemotherapy, and recent Whipple procedure  - No further workup indicated    #Handoff: F/u repeat fungal BCx to determine management (midport removal vs midline caspofungin for 2 weeks until 9/21 outpatient). c/w caspofungin 50mg IVPB q24h. Discussed with pt importance of IV abx, she was agreeable today. Pt wants to speak to CM to discuss SNF vs home w/ home care with a VNS that teaches her how to self-administer the abx. Will place midline today.                                                                            ----------------------------------------------------  # DVT prophylaxis Eliquis    # GI prophylaxis Protonix PO    # Diet DASH/TLC + Ensure    # Activity Score (AM-PAC)    # Code status Full    # Disposition SNF vs home w/ home care   ASSESSMENT & PLAN    Patient is a 75yo female with PMH of hypertension, pancreatic adenocarcinoma diagnosed March 2021 s/p chemotherapy x2, ERCP with CBD stent, pancreaticoduodenectomy 8/2/2021 with Dr. Nino, and atrial fibrillation on Eliquis with recent admission to [facility] 9/3/2021-9/6/2021 for pyelonephritis who returned to the hospital after blood cultures drawn on 9/3/2021 grew Candida glabrata.     #Fungemia (Candida glabrata)  - No sepsis present on admission (no leukocytosis, tachycardia, no tachypnea, afebrile)  - Started caspofungin IV (70mg IV STAT then 50mg IV Q24H)  - ID consult placed - Caspofungin until 9/21  - Echo: EF 50%, bi-atrial enlargement, no vegetations. Recommended TRINIDAD if high suspicion for endocarditis; will wait for fungal cultures to determine if that is necessary (won't proceed if negative fungal BCx).  - Ophthalmo consult: No endophthalmitis  - Follow repeat blood cultures drawn on admission and tomorrow  - Patient's chemoport does not appear infected, Will have to remove mediport if repeat BCx is +ve for candida again.    #UTI, suspected pyelonephritis (diagnosed on previous admission)  - Urine Cx 9/3/2021: contaminated  - Blood Cx 9/3/2021: Candida glabrata  - BCx 9/7 -ve x2, Pending fungal BCx results  - Patient denies any urinary symptoms  - Continue with cefpodoxime 200mg PO Q12H until 9/12/2021    #Chronic trial fibrillation with rapid ventricular response  - Continue with Eliquis 5mg PO twice daily   - Patient states she has always been asymptomatic and does not know when she is in RVR  - Continue with diltiazem 180mg PO once daily (was increased from 120mg last admission)    #Pancreatic adenocarcinoma diagnosed March 2021 s/p chemotherapy x2, ERCP with CBD stent, pancreaticoduodenectomy 8/2/2021 with Dr. Nino  - S/p neoadjuvant chemotherapy  - Follow outpatient with oncology (Dr. Johnson) and surgical oncology (Dr. Nino)  - Continue with probiotics, multivitamin, and pantoprazole  - Continue with metoclopramide 5mg PO three times daily with meals PRN    #Elevated alkaline phosphatase  - Has been elevated since March 2021  - Likely secondary to pancreatic cancer, previous chemotherapy, and recent Whipple procedure  - No further workup indicated    #Handoff: F/u repeat fungal BCx to determine management (midport removal vs midline caspofungin for 2 weeks until 9/21 outpatient). c/w caspofungin 50mg IVPB q24h. Discussed with pt importance of IV abx, she was agreeable today. Pt wants to speak to CM to discuss SNF vs home w/ home care with a VNS that teaches her how to self-administer the abx. Will place midline today if ID wants to defer waiting for complete fungal BCx results.                                                                            ----------------------------------------------------  # DVT prophylaxis Eliquis    # GI prophylaxis Protonix PO    # Diet DASH/TLC + Ensure    # Activity Score (AM-PAC)    # Code status Full    # Disposition SNF vs home w/ home care

## 2021-09-11 NOTE — DIETITIAN INITIAL EVALUATION ADULT. - MALNUTRITION
Severe Protein Calorie Malnutrition in the context of chronic illness Severe protein calorie malnutrition in the context of chronic illness related to pancreatic cancer as evidenced by severe muscle wasting and fat loss.

## 2021-09-11 NOTE — DIETITIAN INITIAL EVALUATION ADULT. - ORAL INTAKE PTA/DIET HISTORY
The patient reports following a regular diet at home; consumed small frequent meals at >75%; took multivitamin gummies daily.

## 2021-09-11 NOTE — DIETITIAN INITIAL EVALUATION ADULT. - NS FNS WEIGHT CHANGE REASON
The patient reports unintentional weight loss but does not know the specific amount lost and time frame./unintentional
DISPLAY PLAN FREE TEXT

## 2021-09-11 NOTE — DIETITIAN INITIAL EVALUATION ADULT. - RD TO REMAIN AVAILABLE
Intervention: 1.Meals and Snacks 2.Medical Food Supplement    Monitor/Evaluate: 1.Diet order, energy intake, nutrition focused physical findings, anemia profile/yes

## 2021-09-11 NOTE — PROGRESS NOTE ADULT - ASSESSMENT
ASSESSMENT:  76y F w/ PMHx of pancreatic cancer s/p whipple 8/2/21, kidney stones, afib on eliquis, HTN, tonsillectomy, and phlebectomy is on hospital day 5 for candidemia.    PLAN:  - Patient will need midline for capsofungin  - Blood Cxs once off abx  - ECHO negative for source  - Continue with eliquis  - Management per primary team    Spectra: [phone]

## 2021-09-11 NOTE — PROGRESS NOTE ADULT - ATTENDING COMMENTS
follow up fungal culture
patient feels better, waiting ID input
port site showed no evidence of infection, f/u fungal culture
Pt seen and examined at bedside. Denies Pain. ROS otherwise unremarkable. Pending optho eval, echo. Subsequent cultures negative. Will likely need midline and IV caspofungin.    PHYSICAL EXAM:  GENERAL: NAD, speaks in full sentences, no signs of respiratory distress  HEAD:  Atraumatic, Normocephalic  EYES: EOMI, PERRLA, conjunctiva and sclera clear  NECK: Supple, No JVD  CHEST/LUNG: Clear to auscultation bilaterally; No wheeze; No crackles; No accessory muscles used  HEART: Regular rate and rhythm; No murmurs;   ABDOMEN: Soft, Nontender, Nondistended; Bowel sounds present; No guarding  EXTREMITIES:  2+ Peripheral Pulses, No cyanosis or edema  PSYCH: AAOx3  NEUROLOGY: non-focal  SKIN: No rashes or lesions    #Progress Note Handoff:  Pending (specify):  Consults_________, Tests________, Test Results_______, Other_________  Family discussion: Discussed pending optho, echo, likely midline with IV meds at home  Disposition: Home___/SNF___/Other________/Unknown at this time________
Pt seen and examined at bedside. Pending final culture. Will need midline for caspofungin. Pt states she will be unable to administer meds herself but is also declining SNF. CM to speak with pt for options.    #Progress Note Handoff:  Pending (specify):  Finalized cultures, midline  Family discussion: Discussed IV caspofungin at home vs SNF  Disposition: Home___/SNF___/Other________/Unknown at this time___x_____
Pt seen and examined at bedside. Pt requesting SNF placement. Awaiting final cultures. Denies Pain. ROS otherwise negative    PHYSICAL EXAM:  GENERAL: NAD, speaks in full sentences, no signs of respiratory distress  HEAD:  Atraumatic, Normocephalic  EYES: Anicteric  NECK: Supple, No JVD  CHEST/LUNG: Clear to auscultation bilaterally; No wheeze; No crackles; No accessory muscles used  HEART: Regular rate and rhythm; No murmurs;   ABDOMEN: Soft, Nontender, Nondistended; Bowel sounds present; No guarding  EXTREMITIES:  2+ Peripheral Pulses, No cyanosis or edema  PSYCH: AAOx3  NEUROLOGY: non-focal  SKIN: No rashes or lesions    Progress Note Handoff:  Pending (specify):  placement, final cultuers  Family discussion:  discussed dipso planning  Disposition: Home___/SNF_x__/Other________/Unknown at this time________
pt is asymptomatic, no fever, chills, no malaise.     dw ID, they will see pt, for now cont caspofungin, repeat bc.   on vantin for Pyelo until 9/12    #Progress Note Handoff  Pending (specify):  Consults__ID consult pending, Test: echo, repeat bc.   Family discussion: discussed plan of care with pt.   Disposition: Home_

## 2021-09-11 NOTE — DIETITIAN INITIAL EVALUATION ADULT. - OTHER CALCULATIONS
Estimated Calorie Needs: [id number] x AF 1.3-1.9=6043-9051apxe/day -Due to PCM and cancer;  Estimated Protein Needs: 74-85grams/day (1.3-1.5grams/kg of admit weight) -Due to PCM and cancer;  Estimated Fluid Needs: 1431-1652mL/day (1mL/kcal)

## 2021-09-12 NOTE — PROGRESS NOTE ADULT - ASSESSMENT
75 yo F PMHx hypertension, pancreatic adenocarcinoma diagnosed March 2021 s/p chemotherapy x2, ERCP with CBD stent, pancreaticoduodenectomy 8/2/2021 with Dr. Nino, and atrial fibrillation on Eliquis with recent admission to [facility] 9/3/2021-9/6/2021 for pyelonephritis who returned to the hospital after blood cultures drawn on 9/3/2021 grew Candida glabrata.     Candida glabrata fungemia  - No sepsis present on admission (no leukocytosis, tachycardia, no tachypnea, afebrile)  - Caspofungin until 9/21  - Echo: EF 50%, bi-atrial enlargement, no vegetations.   - Ophthalmo consult: No endophthalmitis  - Follow repeat blood cultures   - Patient's chemoport does not appear infected, Will have to remove mediport if repeat BCx is +ve for candida again.    UTI, suspected pyelonephritis (diagnosed on previous admission)  - Continue with cefpodoxime 200mg PO Q12H until 9/12/2021    Chronic afib   - Continue with Eliquis, cardizem    Pancreatic adenocarcinoma   diagnosed March 2021 s/p chemotherapy x2, E  RCP with CBD stent,   pancreaticoduodenectomy 8/2/2021 with Dr. Nino  - S/p neoadjuvant chemotherapy  - Follow outpatient with oncology (Dr. Johnson) and surgical oncology (Dr. Nino)  - Continue with probiotics, multivitamin, and pantoprazole  - Continue with metoclopramide 5mg PO three times daily with meals PRN  - as per pt no further chemo planned    Elevated alkaline phosphatase  - Has been elevated since March 2021  - Likely secondary to pancreatic cancer, previous chemotherapy, and recent Whipple procedure  - No further workup indicated    #Progress Note Handoff:  Pending (specify):  placement vs home  Family discussion: pt does not want to go home with abx, does not want to go to SNF. I psoke with hematology-they are unable to infuse abx in [place].   Disposition: Home___/SNF___/Other________/Unknown at this time__x______  x

## 2021-09-12 NOTE — PROGRESS NOTE ADULT - SUBJECTIVE AND OBJECTIVE BOX
CHIEF COMPLAINT:    Patient is a 76y old  Female who presents with a chief complaint of fungemia     INTERVAL HPI/OVERNIGHT EVENTS:    Patient seen and examined at bedside. No acute overnight events occurred.    ROS: Denies pain. All other systems are negative.    Vital Signs:    T(F): 96.6 (09-12-21 @ 14:16), Max: 97.4 (09-11-21 @ 20:00)  HR: 89 (09-12-21 @ 14:16) (88 - 89)  BP: 120/63 (09-12-21 @ 14:16) (120/63 - 152/86)  RR: 16 (09-12-21 @ 14:16) (16 - 18)  SpO2: --  I&O's Summary    Daily Height in cm: 172.72 (11 Sep 2021 20:53)    Daily   CAPILLARY BLOOD GLUCOSE          PHYSICAL EXAM:  GENERAL:  NAD  SKIN: No rashes or lesions  HEENT: Atraumatic. Normocephalic. Anicteric  NECK:  No JVD.   PULMONARY: Clear to ausculation bilaterally. No wheezing. No rales  CVS: Normal S1, S2. Regular rate and rhythm. No murmurs.  ABDOMEN/GI: Soft, Nontender, Nondistended; Bowel sounds are present  EXTREMITIES:  No edema B/L LE.  NEUROLOGIC:  No motor deficit.  PSYCH: Alert & oriented x 3, normal affect    Consultant(s) Notes Reviewed:  [x ] YES  [ ] NO  Care Discussed with Consultants/Other Providers [ x] YES  [ ] NO - hematology    LABS:                        11.0   5.60  )-----------( 263      ( 11 Sep 2021 08:09 )             35.3     09-11    141  |  104  |  10  ----------------------------<  93  4.1   |  31  |  <0.5<L>    Ca    8.2<L>      11 Sep 2021 08:09  Mg     1.8     09-11                RADIOLOGY & ADDITIONAL TESTS:      Medications:  Standing  apixaban 5 milliGRAM(s) Oral two times a day  caspofungin IVPB      caspofungin IVPB 50 milliGRAM(s) IV Intermittent every 24 hours  cefpodoxime 200 milliGRAM(s) Oral every 12 hours  chlorhexidine 4% Liquid 1 Application(s) Topical <User Schedule>  diltiazem    milliGRAM(s) Oral daily  influenza   Vaccine 0.5 milliLiter(s) IntraMuscular once  lactobacillus acidophilus 1 Tablet(s) Oral two times a day with meals  pantoprazole    Tablet 40 milliGRAM(s) Oral before breakfast  saccharomyces boulardii 250 milliGRAM(s) Oral two times a day    PRN Meds  metoclopramide 5 milliGRAM(s) Oral three times a day before meals PRN      Case discussed with resident  Care discussed with pt

## 2021-09-13 NOTE — PROGRESS NOTE ADULT - SUBJECTIVE AND OBJECTIVE BOX
CHIEF COMPLAINT:    Patient is a 76y old  Female who presents with a chief complaint of fungemia     INTERVAL HPI/OVERNIGHT EVENTS:    Patient seen and examined at bedside. No acute overnight events occurred.    ROS: Denies pain. All other systems are negative.    Vital Signs:    T(F): 97 (09-13-21 @ 05:00), Max: 98.1 (09-12-21 @ 21:14)  HR: 88 (09-13-21 @ 05:00) (85 - 89)  BP: 160/91 (09-13-21 @ 05:00) (118/61 - 160/91)  RR: 18 (09-13-21 @ 05:00) (16 - 18)      PHYSICAL EXAM:  GENERAL:  NAD  SKIN: No rashes or lesions  HEENT: Atraumatic. Normocephalic. Anicteric  NECK:  No JVD.   PULMONARY: Clear to ausculation bilaterally. No wheezing. No rales  CVS: Normal S1, S2. Regular rate and rhythm. No murmurs. chest wall port in place  ABDOMEN/GI: Soft, Nontender, Nondistended; Bowel sounds are present  EXTREMITIES:  No edema B/L LE.  NEUROLOGIC:  No motor deficit.  PSYCH: Alert & oriented x 3, normal affect      LABS:                        11.8   5.61  )-----------( 258      ( 13 Sep 2021 05:39 )             37.8     09-13    141  |  103  |  10  ----------------------------<  100<H>  3.7   |  28  |  0.5<L>    Ca    8.4<L>      13 Sep 2021 05:39  Mg     1.6     09-13          RADIOLOGY & ADDITIONAL TESTS:      Medications:  Standing  apixaban 5 milliGRAM(s) Oral two times a day  caspofungin IVPB      caspofungin IVPB 50 milliGRAM(s) IV Intermittent every 24 hours  chlorhexidine 4% Liquid 1 Application(s) Topical <User Schedule>  diltiazem    milliGRAM(s) Oral daily  influenza   Vaccine 0.5 milliLiter(s) IntraMuscular once  lactobacillus acidophilus 1 Tablet(s) Oral two times a day with meals  pantoprazole    Tablet 40 milliGRAM(s) Oral before breakfast  saccharomyces boulardii 250 milliGRAM(s) Oral two times a day    PRN Meds  metoclopramide 5 milliGRAM(s) Oral three times a day before meals PRN      Case discussed with resident  Care discussed with pt

## 2021-09-13 NOTE — DISCHARGE NOTE NURSING/CASE MANAGEMENT/SOCIAL WORK - PATIENT PORTAL LINK FT
You can access the FollowMyHealth Patient Portal offered by [facility] by registering at the following website: [email or web address]. By joining [facility]’s FollowMyHealth portal, you will also be able to view your health information using other applications (apps) compatible with our system.

## 2021-09-13 NOTE — PROGRESS NOTE ADULT - ASSESSMENT
Patient is a 77yo female with PMH of hypertension, pancreatic adenocarcinoma diagnosed March 2021 s/p chemotherapy x2, ERCP with CBD stent, pancreaticoduodenectomy 8/2/2021 with Dr. Nino, and atrial fibrillation on Eliquis with recent admission to [facility] 9/3/2021-9/6/2021 for pyelonephritis who returned to the hospital after blood cultures drawn on 9/3/2021 grew Candida glabrata.     Fungemia secondary to candida glabrata  -cultures on 9/7/21 positive for candida glabrata  -pt started on caspo IVPB 50 q24 hrs  -pt will be discharged, likely tomorrow, with PICC line with caspo until 9/21   Patient is a 75yo female with PMH of hypertension, pancreatic adenocarcinoma diagnosed March 2021 s/p chemotherapy x2, ERCP with CBD stent, pancreaticoduodenectomy 8/2/2021 with Dr. Nino, and atrial fibrillation on Eliquis with recent admission to [facility] 9/3/2021-9/6/2021 for pyelonephritis who returned to the hospital after blood cultures drawn on 9/3/2021 grew Candida glabrata.     Fungemia secondary to candida glabrata  -cultures on 9/7/21 positive for candida glabrata  -pt started on caspo IVPB 50 q24 hrs  -pt will be discharged, likely tomorrow, with PICC line with caspo until 9/21      Chronic atrial fibrillation  -continue eliquis  -continue cardizem    DVT ppx: eliquis  GI ppx: protonix

## 2021-09-13 NOTE — PROGRESS NOTE ADULT - PROVIDER SPECIALTY LIST ADULT
Surgery
Internal Medicine
Surgery
Surgery
Internal Medicine
Infectious Disease

## 2021-09-13 NOTE — PROGRESS NOTE ADULT - SUBJECTIVE AND OBJECTIVE BOX
----------Daily Progress Note----------    HISTORY OF PRESENT ILLNESS:  Patient is a 76y old Female who presents with a chief complaint of fungemia (13 Sep 2021 12:32)    Currently admitted to medicine with the primary diagnosis of Systemic fungal infection       Today is hospital day 6d.     INTERVAL HOSPITAL COURSE / OVERNIGHT EVENTS:    Patient was examined and seen at bedside. This morning she is resting comfortably in bed and reports no new issues or overnight events.     Review of Systems: Otherwise unremarkable     <<<<<PAST MEDICAL & SURGICAL HISTORY>>>>>  SOB (shortness of breath)    Pain  knee    Varicose veins of both lower extremities, unspecified whether complicated    Atrial fibrillation, unspecified type  2019 on Eliquis    Jaundice  March 5, 2021    Malignant neoplasm of pancreas, unspecified location of malignancy  Pancreatic Cancer    Hypertension, unspecified type  2019    Pancreatic cancer    Kidney stones    History of surgery  Whipple procedure 8/2/2021 with Dr. Nino at [facility]    Status post phlebectomy  10/2018    History of ovarian cystectomy  left    History of tonsillectomy  1959    Kidney stone  2017      ALLERGIES  Augmentin (Rash)  chocolate (Headache)  digoxin (Hives)  Metoprolol Succinate ER (Hives)  Novocain (Angioedema)  Steroids: Excessive swelling (Flushing; Other (Mild to Mod))  sulfa drugs (Fever)  Xarelto (Hives)      Home Medications:  calcium,magnesium and Zinc: 1 tab(s) orally once a day (03 Sep 2021 23:41)  Eliquis 5 mg oral tablet: 1 tab(s) orally 2 times a day (03 Sep 2021 23:41)  Women&#x27;s daily vitamins: 1 tab(s) orally once a day (03 Sep 2021 23:41)        MEDICATIONS  STANDING MEDICATIONS  apixaban 5 milliGRAM(s) Oral two times a day  caspofungin IVPB      caspofungin IVPB 50 milliGRAM(s) IV Intermittent every 24 hours  chlorhexidine 4% Liquid 1 Application(s) Topical <User Schedule>  diltiazem    milliGRAM(s) Oral daily  influenza   Vaccine 0.5 milliLiter(s) IntraMuscular once  lactobacillus acidophilus 1 Tablet(s) Oral two times a day with meals  pantoprazole    Tablet 40 milliGRAM(s) Oral before breakfast  saccharomyces boulardii 250 milliGRAM(s) Oral two times a day    PRN MEDICATIONS  metoclopramide 5 milliGRAM(s) Oral three times a day before meals PRN    VITALS:  T(F): 97.6  HR: 80  BP: 128/76  RR: 19  SpO2: --    <<<<<LABS>>>>>                        11.8   5.61  )-----------( 258      ( 13 Sep 2021 05:39 )             37.8     09-13    141  |  103  |  10  ----------------------------<  100<H>  3.7   |  28  |  0.5<L>    Ca    8.4<L>      13 Sep 2021 05:39  Mg     1.6     09-13              [id number]        <<<<<RADIOLOGY>>>>>    < from: Xray Chest 1 View- PORTABLE-Urgent (09.03.21 @ 14:13) >  EXAM:  XR CHEST PORTABLE URGENT 1V            PROCEDURE DATE:  09/03/2021            INTERPRETATION:  STUDY INDICATION: Shortness of breath hx pleural effusions    Comparison: 8/17/2021.    Technique/Positioning: Frontal view of the chest.    Findings:    Support devices: Unchanged right subclavian Mediport. Overlying telemetry leads.    Cardiac/mediastinum/hilum: Stable appearance of the cardiomediastinal silhouette.    Lung parenchyma/Pleura: No focal consolidation, pneumothorax or pleural effusion on frontal view.    Skeleton/soft tissues: Unchanged.    Impression:    No focal consolidation, pneumothorax or pleural effusion.    < end of copied text >      <<<<<PHYSICAL EXAM>>>>>  GENERAL: No acute distress. Resting comfortably in bed.  PULMONARY: Clear to auscultation bilaterally. No rales, rhonchi, or wheezing.  CARDIOVASCULAR: Regular rate and rhythm, S1-S2, no murmurs  GASTROINTESTINAL: Soft, non-tender, non-distended, no guarding.  EXTREMITIES: No LE edema b/l        -----------------------------------------------------------------------------------------------------------------------------------------------------------------------------------------------

## 2021-09-13 NOTE — PROGRESS NOTE ADULT - NUTRITIONAL ASSESSMENT
This patient has been assessed with a concern for Malnutrition and has been determined to have a diagnosis/diagnoses of Severe protein-calorie malnutrition.    This patient is being managed with:   Diet Regular-  Supplement Feeding Modality:  Oral  Ensure Enlive Cans or Servings Per Day:  1       Frequency:  Three Times a day  Ensure Pudding Cans or Servings Per Day:  1       Frequency:  Three Times a day  Entered: Sep 11 2021  9:30PM    Diet Regular-  Supplement Feeding Modality:  Oral  Ensure Enlive Cans or Servings Per Day:  1       Frequency:  Three Times a day  Ensure Pudding Cans or Servings Per Day:  1       Frequency:  Three Times a day  Entered: Sep 11 2021  9:20PM    The following pending diet order is being considered for treatment of Severe protein-calorie malnutrition:null

## 2021-09-13 NOTE — PROGRESS NOTE ADULT - ASSESSMENT
75 yo F PMHx hypertension, pancreatic adenocarcinoma diagnosed March 2021 s/p chemotherapy x2, ERCP with CBD stent, pancreaticoduodenectomy 8/2/2021 with Dr. Nino, and atrial fibrillation on Eliquis with recent admission to [facility] 9/3/2021-9/6/2021 for pyelonephritis who returned to the hospital after blood cultures drawn on 9/3/2021 grew Candida glabrata.     Candida glabrata fungemia  - No sepsis present on admission (no leukocytosis, tachycardia, no tachypnea, afebrile)  - Caspofungin until 9/21  - Echo: EF 50%, bi-atrial enlargement, no vegetations.   - Ophthalmo consult: No endophthalmitis  - Follow repeat blood cultures   - currently pending dc plan for antifungal administration (pt does not feel comfortable going home and does not want to go to SNF)    UTI, suspected pyelonephritis (diagnosed on previous admission)  - Continue with cefpodoxime 200mg PO Q12H until 9/12/2021    Chronic afib   - Continue with Eliquis, cardizem    Pancreatic adenocarcinoma   diagnosed March 2021 s/p chemotherapy x2,   RCP with CBD stent,   pancreaticoduodenectomy 8/2/2021 with Dr. Nino  - S/p neoadjuvant chemotherapy  - Follow outpatient with oncology (Dr. Johnson) and surgical oncology (Dr. Nino)  - Continue with probiotics, multivitamin, and pantoprazole  - Continue with metoclopramide 5mg PO three times daily with meals PRN  - as per pt no further chemo planned    Elevated alkaline phosphatase  - Has been elevated since March 2021  - Likely secondary to pancreatic cancer, previous chemotherapy, and recent Whipple procedure  - No further workup indicated    #Progress Note Handoff:  Pending (specify):  placement vs home  Family discussion: pt does not want to go home with abx, does not want to go to SNF.  Disposition: Home___/SNF___/Other________/Unknown at this time__x______  x

## 2021-09-14 NOTE — PROCEDURE NOTE - ADDITIONAL PROCEDURE DETAILS
Successful placement of 42cm 5fr single lumen Bard Powerpicc in left arm. Tip in SVC. PICC ok to use. Pt tolerated the procedure well.

## 2021-09-14 NOTE — PROCEDURE NOTE - NSPOSTPRCRAD_GEN_A_CORE
chest radiograph/depth of insertion/fluoroscopy/line adjusted to depth of insertion/line in appropriate postion/ultrasound

## 2021-09-14 NOTE — DISCHARGE NOTE PROVIDER - CARE PROVIDER_API CALL
Maribel Hendrix)  Internal Medicine  [address] [place] [id number]  Phone: [phone]  Fax: [phone]  Follow Up Time: 1-3 days

## 2021-09-14 NOTE — DISCHARGE NOTE PROVIDER - HOSPITAL COURSE
Patient is a 77yo female with PMH of hypertension, pancreatic adenocarcinoma diagnosed March 2021 s/p chemotherapy x2, ERCP with CBD stent, pancreaticoduodenectomy 8/2/2021 with Dr. Nino, and atrial fibrillation on Eliquis with recent admission to [facility] 9/3/2021-9/6/2021 for pyelonephritis who returned to the hospital after blood cultures drawn on 9/3/2021 grew Candida glabrata. During the previous admission, blood cultures were negative as of 9/6/2021. She was discharged on a course of oral cefpodoxime for pyelonephritis. On 9/7/2021 at around 2am, the attending on call was notified that blood cultures were positive for Candida glabrata. The patient was informed of the results, and she decided to return to the hospital to be readmitted for treatment.     In the ED, vital signs were Tmax 98F, , /68, RR 18, and SpO2 96%. Labs were significant for alkaline phosphatase 604. She was given a 1.75L bolus of NS 0.9%. Repeat blood cultures were drawn.    ID consulted, was started on caspofungin 50mg IV q24 hours. As per ID, will continue caspo 50mg q24 until 9/21, via PICC line which was inserted during admission. Pt stable at the time of discharge.      Patient is a 75yo female with PMH of hypertension, pancreatic adenocarcinoma diagnosed March 2021 s/p chemotherapy x2, ERCP with CBD stent, pancreaticoduodenectomy 8/2/2021 with Dr. Nino, and atrial fibrillation on Eliquis with recent admission to [facility] 9/3/2021-9/6/2021 for pyelonephritis who returned to the hospital after blood cultures drawn on 9/3/2021 grew Candida glabrata. During the previous admission, blood cultures were negative as of 9/6/2021. She was discharged on a course of oral cefpodoxime for pyelonephritis. On 9/7/2021 at around 2am, the attending on call was notified that blood cultures were positive for Candida glabrata. The patient was informed of the results, and she decided to return to the hospital to be readmitted for treatment.     In the ED, vital signs were Tmax 98F, , /68, RR 18, and SpO2 96%. Labs were significant for alkaline phosphatase 604. She was given a 1.75L bolus of NS 0.9%. Repeat blood cultures were drawn.    ID consulted, was started on caspofungin 50mg IV q24 hours. As per ID, will continue caspo 50mg q24 until 9/21, via PICC line which was inserted during admission. Pt stable at the time of discharge.       Attending addendum: Patient seen and examined. Patient is stable for discharge. Med rec reviewed.

## 2021-09-14 NOTE — DISCHARGE NOTE PROVIDER - DETAILS OF MALNUTRITION DIAGNOSIS/DIAGNOSES
This patient has been assessed with a concern for Malnutrition and was treated during this hospitalization for the following Nutrition diagnosis/diagnoses:     -  09/11/2021: Severe protein-calorie malnutrition

## 2021-09-14 NOTE — DISCHARGE NOTE PROVIDER - NSDCMRMEDTOKEN_GEN_ALL_CORE_FT
calcium,magnesium and Zinc: 1 tab(s) orally once a day  Cardizem  mg/24 hours oral capsule, extended release: 1 cap(s) orally once a day   cefpodoxime 200 mg oral tablet: 1 tab(s) orally 2 times a day until 9/12  Eliquis 5 mg oral tablet: 1 tab(s) orally 2 times a day  lactobacillus acidophilus oral capsule: 1 cap(s) orally 2 times a day with meals  metoclopramide 5 mg oral tablet: 1 tab(s) orally 3 times a day (before meals), As Needed  for nausea and vomitting  pantoprazole 40 mg oral delayed release tablet: 1 tab(s) orally once a day (before a meal)  saccharomyces boulardii lyo 250 mg oral capsule: 1 cap(s) orally 2 times a day with meals  Women&#x27;s daily vitamins: 1 tab(s) orally once a day   calcium,magnesium and Zinc: 1 tab(s) orally once a day  Cardizem  mg/24 hours oral capsule, extended release: 1 cap(s) orally once a day   caspofungin: 50 milligram(s) intravenous every 24 hours  Eliquis 5 mg oral tablet: 1 tab(s) orally 2 times a day  lactobacillus acidophilus oral capsule: 1 cap(s) orally 2 times a day with meals  metoclopramide 5 mg oral tablet: 1 tab(s) orally 3 times a day (before meals), As Needed  for nausea and vomitting  pantoprazole 40 mg oral delayed release tablet: 1 tab(s) orally once a day (before a meal)  saccharomyces boulardii lyo 250 mg oral capsule: 1 cap(s) orally 2 times a day with meals  Women&#x27;s daily vitamins: 1 tab(s) orally once a day

## 2021-09-14 NOTE — DISCHARGE NOTE PROVIDER - NSDCCPCAREPLAN_GEN_ALL_CORE_FT
PRINCIPAL DISCHARGE DIAGNOSIS  Diagnosis: Systemic fungal infection  Assessment and Plan of Treatment: After your previous discharge, you were found to have a fungal infection in your blood. Sometimes we are able to determine the source of infection, however we were not able to determine the source of your fungal infection. We have you on an antifungal medication that you will continue until 9/21. It is important to remain compliant with this medication for clearance of this infection. Please followup with you primary care doctor and Infectious diseases specialist for further managment and care.   SEEK IMMEDIATE MEDICAL CARE IF YOU HAVE ANY OF THE FOLLOWING SYMPTOMS: chest pain, shortness of breath, abdominal pain, sweating, vomiting, blood in vomit/bowel movements/urine, dizziness/lightheadedness, weakness or numbness to face/arm/leg, trouble speaking or understanding, facial droop.

## 2021-09-30 NOTE — ED PROVIDER NOTE - OBJECTIVE STATEMENT
76 y.o. female with a PMH of HTN, s/p colon resection for mass, s/p TAHBSO presented to the ER c/o sudden onset of vomiting since 1 AM.  Pt reports having had diffuse abdominal pain for the past 3-4 days that she was "hoping would go away."  Denies fever, chills, diarrhea, constipation, dysuria, hematuria, chest pain, dizziness, SOB.  Last BM this AM.  No other complaints. 76 y.o. female with a PMH of HTN, A.fib (compliant with Eliquis), and pancreatic ca. s/p whipple 8/21 (in remission- last chemo prior to sx) presented to the ER c/o worsening fatigue and diarrhea for the past 4 days.  Pt admitted on 9/3 and treated for fungicemia.  Blood cx (+) for candida glabrata.  Pt completed full course of IV meds for full 21 day course and PICC line d/c'd by visiting nurse.  Pt has not had the chance to f/u with Dr. Hendrix.  Pt states that she presented a month ago with fatigue and mild diarrhea.  Denies fever, chills, chest pain, dizziness, dysuria, hematuria, cough, SOB.  No other complaints.

## 2021-09-30 NOTE — H&P ADULT - HISTORY OF PRESENT ILLNESS
77 yo F with PMHx of HTN, AFib on Eliquis, Pancreatic Ca s/p whipple 8/21 (in remission) presents with worsening fatigue and diarrhea.  Pt recently admitted for fungemia, treated with IV ABx via PICC line, completed 9/21.  In the ED, T 96.7, HR 72, /72, RR 20, SpO2 99% on RA. Lab work revealed Lactate 2.7, positive UA. CT Abd revealed generalized anasarca with presence of ascites within abdomen; moderate R sided and trace L sided pleural effusion.  75 yo F with PMHx of Chronic AFib on Eliquis, Pancreatic Ca s/p whipple 8/1/21 (in remission) presents with worsening fatigue and diarrhea.  Pt recently admitted for fungemia, treated with IV ABx via PICC line, completed 9/21. She states that she has been having watery stools ever since her Whipple procedure, however, since the completion of her antifungal tx, the diarrhea has become more frequent and more severe. Reports watery stools immediately after any PO intake. Primarily eats a limited amount of bland foods due to poor appetite and minimal tolerance. Denies any blood or mucus in stools. Denies having abdominal pain but did feel slight discomfort on presentation to ED. Also reports nausea for past 1 day and vomited day prior after eating. Was advised by Dr. Nino to take Imodium, which she reports slightly reduced diarrhea.  In the ED, T 96.7, HR 72, /72, RR 20, SpO2 99% on RA. Lab work revealed Lactate 2.7, positive UA. CT Abd revealed generalized anasarca with presence of ascites within abdomen; moderate R sided and trace L sided pleural effusion. Given Vancomycin, Aztreonam and 1.5 LR fluid bolus in ED.

## 2021-09-30 NOTE — H&P ADULT - NSHPLABSRESULTS_GEN_ALL_CORE
LABS:                        13.9   10.21 )-----------( 271      ( 30 Sep 2021 11:54 )             43.1     09-30    134<L>  |  95<L>  |  16  ----------------------------<  140<H>  3.8   |  22  |  0.7    Ca    8.3<L>      30 Sep 2021 11:54    TPro  5.2<L>  /  Alb  2.8<L>  /  TBili  0.5  /  DBili  x   /  AST  28  /  ALT  18  /  AlkPhos  459<H>  09-30    PT/INR - ( 30 Sep 2021 11:54 )   PT: >40.00 sec;   INR: 4.09 ratio    PTT - ( 30 Sep 2021 11:54 )  PTT:43.3 sec    Lactate, Blood: 2.7: Elevated lactate. Consider ordering follow-up lactate to trend. mmol/L (09.30.21 @ 11:54)  Lactate, Blood: 1.6 mmol/L (09.30.21 @ 15:20)   Troponin T, Serum: <0.01 ng/mL (09.30.21 @ 15:20)     Urinalysis (09.30.21 @ 18:40)   pH Urine: 6.5   Glucose Qualitative, Urine: Negative   Blood, Urine: Small   Color: Yellow   Urine Appearance: Clear   Bilirubin: Negative   Ketone - Urine: Negative   Specific Gravity: 1.043   Protein, Urine: 30 mg/dL   Urobilinogen: <2 mg/dL   Nitrite: Positive   Leukocyte Esterase Concentration: Large Urine Microscopic-Add On (NC) (09.30.21 @ 18:40)   Bacteria: Many   Epithelial Cells: 1 /HPF   Red Blood Cell - Urine: 19 /HPF   White Blood Cell - Urine: 82 /HPF   Hyaline Casts: 4 /LPF < from:     Xray Chest 1 View- PORTABLE-Urgent (Xray Chest 1 View- PORTABLE-Urgent .) (09.30.21 @ 13:51)     Impression:    Increased right pleural effusion/basilar opacity.    CT Abdomen and Pelvis w/ IV Cont (09.30.21 @ 15:08)     IMPRESSION:    1. Status post Whipple procedure. No evidence of focal upper abdominal fluid collections oranastomotic strictures. No evidence of tumor recurrence (specifically in the region of the portal-superior mesenteric artery confluence).    2. Generalized anasarca is noted with presence of ascites seen within the abdomen or pelvis.

## 2021-09-30 NOTE — H&P ADULT - NSHPREVIEWOFSYSTEMS_GEN_ALL_CORE
REVIEW OF SYSTEMS:    CONSTITUTIONAL: + weakness, no fevers or chills  EYES/ENT: No visual changes;  No vertigo or throat pain   NECK: No pain or stiffness  RESPIRATORY: No cough, wheezing, hemoptysis; No shortness of breath  CARDIOVASCULAR: No chest pain or palpitations  GASTROINTESTINAL: mild abdominal pain, vomiting x1, no melena, hematochezia, hematemesis  GENITOURINARY: No dysuria, + retention  NEUROLOGICAL: No numbness or weakness  SKIN: No itching, rashes

## 2021-09-30 NOTE — H&P ADULT - ATTENDING COMMENTS
76 yr old female with hx of Chronic AFib on Eliquis, Pancreatic Ca s/p whipple 8/1/21 (in remission) admitted for diarrhoea.     # Diarrhea  # Lactic Acidosis - likely secondary to dehydration   - hemodynamically stable   -lactate 2.7 with improvement to 1.6  - pt endorses having 8 - 10 BM per day for past 4 days  - hold abx for now   - start NS@65  - check GI pcr     # Malnutrition  - dietitian consult    # Asymptomatic Bacteruria   - monitor for now     # Chronic AFib on Eliquis  - rate controlled   - rate controlled on Cardizem  - c/w Eliquis    # Pancreatic Ca s/p Whipple in August  - s/p chemotherapy tx, 6 cycles completed in July  - In remission  - outpt oncology f/u    # DVT ppx  - c/w Eliquis    # Diet: Soft    # Dispo  - anticipated for discharge to home with home care services     # Full code

## 2021-09-30 NOTE — ED PROVIDER NOTE - ATTENDING CONTRIBUTION TO CARE
75 yo f with pmh of pancreatic ca s/p whipple with dr. stone, presents with c/o weakness and dehydration.  pt says she has been having diarrhea for the past few weeks, but worse in the last 5 days.  pt admits feels bloated and has lower abd pain.  pt had been started on capsofungin for fungemia.  admits myalgias and chills.  no cp, no sob, no cough.  exam: nad, ncat, perrl, eomi, dry mm, reg tachy, ctab, abd soft, ttp RLQ, nd aox3, imp: pt with diarrhea and abd pain, low grade fever ,r/o sepsis, labs, ivf, abx and ct a/p

## 2021-09-30 NOTE — ED ADULT NURSE REASSESSMENT NOTE - NS ED NURSE REASSESS COMMENT FT1
pt assessed, A&Ox4, VSS. pt is resting on stretcher and denies pain or discomfort at this time. Pt states last BM was 4am this morning and states it was diarrhea, MD Multani [phone] notified and does not want to r/o C-diff. AND Jonathan also aware since prior admitting bed was taken away due to diarrhea concern.  Pt awaiting medicine bed. No distress noted at this time. Safety and comfort measures maintained. Will continue to monitor.

## 2021-09-30 NOTE — ED ADULT TRIAGE NOTE - SOURCE OF INFORMATION
No changes to her original H&P. Patient's diagnosis was again reviewed. All risks, benefits, complications and prognosis were discussed. Informed consent was obtained.   
EMS

## 2021-09-30 NOTE — ED ADULT NURSE NOTE - DRUG PRE-SCREENING (DAST -1)
I am recommending that you see Physical Medicine and Rehabilitation physician for a \"functional capacity assessment\" to determine you status and help plan goals. This will help us see if you are ultimately able to do things safely on your own. Statement Selected

## 2021-09-30 NOTE — H&P ADULT - ASSESSMENT
77 yo F with PMHx of Chronic AFib on Eliquis, Pancreatic Ca s/p whipple 8/1/21 (in remission) presents with worsening fatigue and diarrhea.    #Diarrhea  #Recently hospitalized for Candida fungemia, s/p Caspofungin tx completed 9/21  - Possibly infectious, will obtain GI PCR  - Rule out CDiff given recent hospitalizations   - If cause non infectious, may be related to Whipple procedure  - HAGMA on presentation, lactate 2.7 with improvement to 1.6  - s/p 1.5 LR fluid bolus, will c/w gentle hydration for now  - PO intake as tolerated    #Chronic AFib on Eliquis  #CHADSVASC 3  - Rate controlled on Cardizem  - C/w Eliquis    #Pancreatic Ca s/p Whipple in August  - Sees Dr. Johnson and Dr. Nino  - s/p chemotherapy tx, 6 cycles completed in July  - Never had radiation  - In remission    DVT ppx: Eliquis  GI ppx: PPI  Diet: Soft  Activity: AAT  Full Code  Dispo; Acute 75 yo F with PMHx of Chronic AFib on Eliquis, Pancreatic Ca s/p whipple 8/1/21 (in remission) presents with worsening fatigue and diarrhea.    #Diarrhea  #Recently hospitalized for Candida fungemia, s/p Caspofungin tx completed 9/21  - Possibly infectious, will obtain GI PCR  - Rule out CDiff given recent hospitalizations   - If cause non infectious, may be related to Whipple procedure  - HAGMA on presentation, lactate 2.7 with improvement to 1.6  - s/p 1.5 LR fluid bolus, will c/w gentle hydration for now  - PO intake as tolerated    #Acute Cystitis  - Pos nitrites, LE on UA  - Pt denies dysuria although did have difficulties urinating on presentation  - Will initiate Rocephin, f/u Urine and Blood Cx    #Chronic AFib on Eliquis  #CHADSVASC 3  - Rate controlled on Cardizem  - C/w Eliquis    #Pancreatic Ca s/p Whipple in August  - Sees Dr. Johnson and Dr. Nino  - s/p chemotherapy tx, 6 cycles completed in July  - Never had radiation  - In remission    DVT ppx: Eliquis  GI ppx: PPI  Diet: Soft  Activity: AAT  Full Code  Dispo; Acute

## 2021-09-30 NOTE — H&P ADULT - NSHPPHYSICALEXAM_GEN_ALL_CORE
VITALS:   T(C): 36.4 (09-30-21 @ 19:55), Max: 37.4 (09-30-21 @ 11:00)  HR: 94 (09-30-21 @ 19:55) (94 - 120)  BP: 95/55 (09-30-21 @ 19:55) (95/55 - 133/72)  RR: 18 (09-30-21 @ 19:55) (18 - 20)  SpO2: 97% (09-30-21 @ 19:55) (97% - 99%)    GENERAL: NAD, lying in bed comfortably, cachectic   HEAD:  Atraumatic, normocephalic  EYES: EOMI, PERRLA, conjunctiva and sclera clear  ENT: Dry, pale mucous membranes  NECK: Supple, no JVD  HEART: irregularly irreg, systolic murmur apprec  LUNGS: Unlabored respirations.  Clear to auscultation bilaterally, no crackles, wheezing, or rhonchi  ABDOMEN: Soft, nontender, nondistended, +BS  EXTREMITIES: 2+ peripheral pulses bilaterally. No clubbing, cyanosis, or edema  NERVOUS SYSTEM:  A&Ox4

## 2021-10-01 NOTE — ED ADULT NURSE REASSESSMENT NOTE - NS ED NURSE REASSESS COMMENT FT1
Pt aaox3, ambulatory with walker and one person assist. Respirations even and unlabored. Pt speaks clear, full sentences. Pt here for dehydration

## 2021-10-01 NOTE — PROGRESS NOTE ADULT - SUBJECTIVE AND OBJECTIVE BOX
VIKASH LI 76y Female  MRN#: [id number]   CODE STATUS: Full    Hospital Day: 1d    Pt is currently admitted with the primary diagnosis of Diarrhea s/p whipple procedure      SUBJECTIVE  Hospital Course  HPI:  75 yo F with PMHx of Chronic AFib on Eliquis, Pancreatic Ca s/p whipple 21 (in remission) presents with worsening fatigue and diarrhea.  Pt recently admitted for fungemia, treated with IV ABx via PICC line, completed . She states that she has been having watery stools ever since her Whipple procedure, however, since the completion of her antifungal tx, the diarrhea has become more frequent and more severe. Reports watery stools immediately after any PO intake. Primarily eats a limited amount of bland foods due to poor appetite and minimal tolerance. Denies any blood or mucus in stools. Denies having abdominal pain but did feel slight discomfort on presentation to ED. Also reports nausea for past 1 day and vomited day prior after eating. Was advised by Dr. Nino to take Imodium, which she reports slightly reduced diarrhea.  In the ED, T 96.7, HR 72, /72, RR 20, SpO2 99% on RA. Lab work revealed Lactate 2.7, positive UA. CT Abd revealed generalized anasarca with presence of ascites within abdomen; moderate R sided and trace L sided pleural effusion. Given Vancomycin, Aztreonam and 1.5 LR fluid bolus in ED.  (30 Sep 2021 20:25)      Overnight events/Subjective complaints  Patient was seen at the bedside this morning. She is lying comfortably on the bed. There were no acute events overnight. She denies any chest pain, shortness of breath, cough, fever, chills, abdominal pain, nausea, vomiting, diarrhea, dizziness or headache.                                                 ----------------------------------------------------------  OBJECTIVE  PAST MEDICAL & SURGICAL HISTORY  SOB (shortness of breath)    Pain  knee    Varicose veins of both lower extremities, unspecified whether complicated    Atrial fibrillation, unspecified type  2019 on Eliquis    Jaundice  2021    Malignant neoplasm of pancreas, unspecified location of malignancy  Pancreatic Cancer    Hypertension, unspecified type  2019    Pancreatic cancer    Kidney stones    History of surgery  Whipple procedure 2021 with Dr. Nino at [facility]    Status post phlebectomy  10/2018    History of ovarian cystectomy  left    History of tonsillectomy  1959    Kidney stone                                                -----------------------------------------------------------  ALLERGIES:  Augmentin (Rash)  chocolate (Headache)  digoxin (Hives)  Metoprolol Succinate ER (Hives)  Novocain (Angioedema)  Steroids: Excessive swelling (Flushing; Other (Mild to Mod))  sulfa drugs (Fever)  Xarelto (Hives)                                            ------------------------------------------------------------    HOME MEDICATIONS  Home Medications:  calcium,magnesium and Zinc: 1 tab(s) orally once a day (03 Sep 2021 23:41)  Eliquis 5 mg oral tablet: 1 tab(s) orally 2 times a day (03 Sep 2021 23:41)  Women&#x27;s daily vitamins: 1 tab(s) orally once a day (03 Sep 2021 23:41)                           MEDICATIONS:  STANDING MEDICATIONS  apixaban 5 milliGRAM(s) Oral two times a day  chlorhexidine 4% Liquid 1 Application(s) Topical <User Schedule>  diltiazem    milliGRAM(s) Oral daily  lactated ringers. 1000 milliLiter(s) IV Continuous <Continuous>  lactobacillus acidophilus 1 Tablet(s) Oral daily  pantoprazole    Tablet 40 milliGRAM(s) Oral before breakfast    PRN MEDICATIONS                                            ------------------------------------------------------------  VITAL SIGNS: Last 24 Hours  T(C): 36.2 (01 Oct 2021 13:35), Max: 36.6 (30 Sep 2021 15:32)  T(F): 97.1 (01 Oct 2021 13:35), Max: 97.8 (30 Sep 2021 15:32)  HR: 104 (01 Oct 2021 13:35) (94 - 112)  BP: 98/54 (01 Oct 2021 13:35) (95/55 - 124/78)  BP(mean): --  RR: 18 (01 Oct 2021 13:35) (18 - 20)  SpO2: 97% (01 Oct 2021 13:35) (96% - 99%)      21 @ 07:01  -  10-01- @ 07:00  --------------------------------------------------------  IN: 0 mL / OUT: 700 mL / NET: -700 mL        Daily Height in cm: 172.72 (30 Sep 2021 10:23), Height in cm: 172.72 (30 Sep 2021 10:23)    Daily                                         --------------------------------------------------------------  LABS:                        12.7   7.99  )-----------( 243      ( 01 Oct 2021 05:36 )             39.2                         13.9   10.21 )-----------( 271      ( 30 Sep 2021 11:54 )             43.1     10-01 @ 05:36    135  |  99  |  15  ----------------------------<  116<H>  4.0   |  24  |  0.6<L>   @ 11:54    134<L>  |  95<L>  |  16  ----------------------------<  140<H>  3.8   |  22  |  0.7    Ca    7.8<L>      10-01 @ 05:36  Ca    8.3<L>       @ 11:54  Phos  3.7      @ 07:46  Phos  4.4      @ 13:51  Mg     1.5     10-01    TPro  4.0<L>  /  Alb  2.2<L>  /  TBili  0.4  /  DBili  x   /  AST  16  /  ALT  15  /  AlkPhos  360<H>  10-01  TPro  5.2<L>  /  Alb  2.8<L>  /  TBili  0.5  /  DBili  x   /  AST  28  /  ALT  18  /  AlkPhos  459<H>    TPro  4.8<L>  /  Alb  2.7<L>  /  TBili  0.4  /  DBili  x   /  AST  29  /  ALT  23  /  AlkPhos  621<H>      PT/INR - ( 01 Oct 2021 05:36 )   PT: >40.00 sec;   INR: 4.09 ratio         PT/INR - ( 30 Sep 2021 11:54 )   PT: >40.00 sec;   INR: 4.09 ratio         PTT - ( 01 Oct 2021 05:36 )  PTT:45.9 sec, PTT - ( 30 Sep 2021 11:54 )  PTT:43.3 sec  Urinalysis Basic - ( 30 Sep 2021 18:40 )    Color: Yellow / Appearance: Clear / S.043 / pH: x  Gluc: x / Ketone: Negative  / Bili: Negative / Urobili: <2 mg/dL   Blood: x / Protein: 30 mg/dL / Nitrite: Positive   Leuk Esterase: Large / RBC: 19 /HPF / WBC 82 /HPF   Sq Epi: x / Non Sq Epi: 1 /HPF / Bacteria: Many        Lactate, Blood: 1.6 ( @ 15:20)          CARDIAC MARKERS ( 21 @ 15:20 )  x     / <0.01 ng/mL / x     / x     / x                                                  -------------------------------------------------------------  RADIOLOGY:  < from: CT Abdomen and Pelvis w/ IV Cont (21 @ 15:08) >  IMPRESSION:    1. Status post Whipple procedure. No evidence of focal upper abdominal fluid collections oranastomotic strictures. No evidence of tumor recurrence (specifically in the region of the portal-superior mesenteric artery confluence).    2. Generalized anasarca is noted with presence of ascites seen within the abdomen or pelvis.    < end of copied text >    < from: Xray Chest 1 View- PORTABLE-Urgent (Xray Chest 1 View- PORTABLE-Urgent .) (21 @ 13:51) >  Impression:    Increased right pleural effusion/basilar opacity.    < end of copied text >                                            --------------------------------------------------------------    PHYSICAL EXAM:  General: cachectic, No acute distress; Pallor (-), Icterus (-), Cyanosis (-), Clubbing (-)  HEENT: Normocephalic, atraumatic, PERRLA, EOMI  PULM: Bilaterally equal and clear breath sounds, wheeze (-), rubs (-), crackles (-)  CVS: Normal S1 and S2, murmurs (-), rubs (-), gallops (-)   GI: Soft, distended, nontender, BS +  MSK: Edema (-), no muscle, bone or joint tenderness noted  SKIN: Warm and well perfused, dry skin  NEURO:  Alert and Oriented x 3; No gross focal neurological deficit noted                                           --------------------------------------------------------------

## 2021-10-01 NOTE — PROGRESS NOTE ADULT - ASSESSMENT
75 yo F with PMHx of Chronic AFib on Eliquis, Pancreatic Ca s/p whipple 8/1/21 (in remission) presents with worsening fatigue and diarrhea.    # Diarrhea  # Recently hospitalized for Candida fungemia, s/p Caspofungin tx completed 9/21  - r/o infectious etiology - GI PCR/C. diff  - If cause non infectious, may be related to Whipple procedure  - If infection ruled out - start immodium PRN for diarrhea  - s/p 1.5 LR fluid bolus, will c/w gentle hydration for now  - PO intake as tolerated    # Hypoalbuminemia  # Abdominal anasarca  # Pleural effusion  - Likely malnutrition secondary to malignancy/whipple procedure  - Registered dietician macy    # Hypomagnesemia  - repleted  - Follow up AM Mg    # Asymptomatic bacteriuria  - Pos nitrites, LE on UA  - Pt denies dysuria/fever  - dc antibiotics    # Chronic AFib on Eliquis  # CHADSVASC 3  - Rate controlled on Cardizem  - C/w Eliquis    # Pancreatic Ca s/p Whipple in August  - Sees Dr. Johnson and Dr. Nino  - s/p chemotherapy tx, 6 cycles completed in July  - Never had radiation  - In remission    # DVT ppx: Eliquis  # GI ppx: PPI  # Diet: Soft  # Activity: IAT  # Full Code

## 2021-10-01 NOTE — PROGRESS NOTE ADULT - CONVERSATION DETAILS
Discussed the diagnosis, prognosis and treatment options with the patient. Discussed code status, described the process of CPR and intubation/mechanical ventilation. Patient understands and wishes to remain full code.

## 2021-10-02 NOTE — PROGRESS NOTE ADULT - ASSESSMENT
Ms Armendariz is a 76 yr old woman with hx of Chronic AFib on Eliquis, Pancreatic Ca s/p whipple 8/1/21 (in remission) admitted for diarrhea.     #Diarrhea 2/2 Clostridium Difficile   #Lactic Acidosis - likely secondary to dehydration   C. Diff PCR positive   hemodynamically stable   lactate 2.7 with improvement to 1.6  pt endorses having 8 - 10 BM per day for past 4 days  hold abx for now   start NS@65  Contact Isolation  start Po Vanc 125 q6    #Malnutrition  dietitian consult    #Asymptomatic Bacteruria   monitor for now     #Chronic AFib on Eliquis  rate controlled   rate controlled on Cardizem  c/w Eliquis    #Pancreatic Ca s/p Whipple in August  s/p chemotherapy tx, 6 cycles completed in July  In remission  outpt oncology f/u    #DVT ppx  c/w Eliquis    #Diet: Soft    #Dispo  anticipated for discharge to home with home care services     #Full code

## 2021-10-02 NOTE — PROGRESS NOTE ADULT - SUBJECTIVE AND OBJECTIVE BOX
Pt was seen and examined at the bedside.  reports having diarrhea.  otherwise resting comfortably in bed.     ROS: neg except as noted above    PAST MEDICAL & SURGICAL HISTORY:  SOB (shortness of breath)    Pain  knee    Varicose veins of both lower extremities, unspecified whether complicated    Atrial fibrillation, unspecified type  2019 on Eliquis    Jaundice  March 5, 2021    Malignant neoplasm of pancreas, unspecified location of malignancy  Pancreatic Cancer    Hypertension, unspecified type  2019    Pancreatic cancer    Kidney stones    History of surgery  Whipple procedure 8/2/2021 with Dr. Nino at [facility]    Status post phlebectomy  10/2018    History of ovarian cystectomy  left    History of tonsillectomy  1959    Kidney stone  2017    Allergies    Augmentin (Rash)  chocolate (Headache)  digoxin (Hives)  Metoprolol Succinate ER (Hives)  Novocain (Angioedema)  Steroids: Excessive swelling (Flushing; Other (Mild to Mod))  sulfa drugs (Fever)  Xarelto (Hives)    Intolerances    MEDICATIONS  (STANDING):  apixaban 5 milliGRAM(s) Oral two times a day  chlorhexidine 4% Liquid 1 Application(s) Topical <User Schedule>  diltiazem    milliGRAM(s) Oral daily  lactated ringers. 500 milliLiter(s) (100 mL/Hr) IV Continuous <Continuous>  lactated ringers. 1000 milliLiter(s) (60 mL/Hr) IV Continuous <Continuous>  lactobacillus acidophilus 1 Tablet(s) Oral daily  magnesium sulfate  IVPB 2 Gram(s) IV Intermittent once  pantoprazole    Tablet 40 milliGRAM(s) Oral before breakfast  vancomycin    Solution 125 milliGRAM(s) Oral every 6 hours    MEDICATIONS  (PRN):    Vital Signs Last 24 Hrs  T(C): 37 (02 Oct 2021 05:59), Max: 37 (02 Oct 2021 05:59)  T(F): 98.6 (02 Oct 2021 05:59), Max: 98.6 (02 Oct 2021 05:59)  HR: 127 (02 Oct 2021 10:04) (101 - 127)  BP: 105/67 (02 Oct 2021 10:04) (96/60 - 105/67)  BP(mean): --  RR: 17 (02 Oct 2021 10:04) (17 - 18)  SpO2: 99% (02 Oct 2021 10:04) (97% - 99%)    Physical Exam:  General: cachectic, No acute distress; Pallor (-), Icterus (-), Cyanosis (-), Clubbing (-)  HEENT: Normocephalic, atraumatic, PERRLA, EOMI  PULM: Bilaterally equal and clear breath sounds, wheeze (-), rubs (-), crackles (-)  CVS: Normal S1 and S2, murmurs (-), rubs (-), gallops (-)   GI: Soft, distended, nontender, BS +  MSK: Edema (-), no muscle, bone or joint tenderness noted  SKIN: Warm and well perfused, dry skin  NEURO:  Alert and Oriented x 3; No gross focal neurological deficit noted    Labs:  CBC                        13.5   6.55  )-----------( 229      ( 02 Oct 2021 05:54 )             41.1   CMP  10-02    132<L>  |  97<L>  |  15  ----------------------------<  120<H>  4.2   |  22  |  0.6<L>    Ca    7.8<L>      02 Oct 2021 05:54  Mg     1.7     10-02    TPro  4.1<L>  /  Alb  2.2<L>  /  TBili  0.5  /  DBili  x   /  AST  18  /  ALT  14  /  AlkPhos  360<H>  10-02    Radiology:  < from: Xray Chest 1 View- PORTABLE-Urgent (Xray Chest 1 View- PORTABLE-Urgent .) (09.30.21 @ 13:51) >  Impression:    Increased right pleural effusion/basilar opacity.EXAM:  XR CHEST PORTABLE URGENT 1V            PROCEDURE DATE:  09/30/2021      < end of copied text >

## 2021-10-03 NOTE — PROGRESS NOTE ADULT - ATTENDING COMMENTS
cont  C Diff treatment, no surgical intervention
76 yr old female with hx of Chronic AFib on Eliquis, Pancreatic Ca s/p whipple 8/1/21 (in remission) admitted for diarrhoea.     # Diarrhea  # Lactic Acidosis - likely secondary to dehydration   - hemodynamically stable   -lactate 2.7 with improvement to 1.6  - pt endorses having 8 - 10 BM per day for past 4 days  - hold abx for now   - start NS@65  - check GI pcr     # Malnutrition  - dietitian consult    # Asymptomatic Bacteruria   - monitor for now     # Chronic AFib on Eliquis  - rate controlled   - rate controlled on Cardizem  - c/w Eliquis    # Pancreatic Ca s/p Whipple in August  - s/p chemotherapy tx, 6 cycles completed in July  - In remission  - outpt oncology f/u    # DVT ppx  - c/w Eliquis    # Diet: Soft    # Dispo  - anticipated for discharge to home with home care services     # Full code

## 2021-10-03 NOTE — PROGRESS NOTE ADULT - SUBJECTIVE AND OBJECTIVE BOX
Patient is a 76y old  Female who presents with a chief complaint of dehydration (03 Oct 2021 13:04)    Patient was seen and examined.  C/o weakness  C/o diarrhea    PAST MEDICAL & SURGICAL HISTORY:  SOB (shortness of breath)  Varicose veins of both lower extremities, unspecified whether complicated  Atrial fibrillation, unspecified type, 2019 on Eliquis  Jaundice, March 5, 2021  Malignant neoplasm of pancreas, unspecified location of malignancy, Pancreatic Cancer  Hypertension, unspecified type, 2019  Kidney stones  History of surgery- Whipple procedure 8/2/2021 with Dr. Nino at [facility]  Status post phlebectomy- 10/2018  History of ovarian cystectomy, left  History of tonsillectomy, 1959    Allergies  Augmentin (Rash)  chocolate (Headache)  digoxin (Hives)  Metoprolol Succinate ER (Hives)  Novocain (Angioedema)  Steroids: Excessive swelling (Flushing; Other (Mild to Mod))  sulfa drugs (Fever)  Xarelto (Hives)    MEDICATIONS  (STANDING):  apixaban 5 milliGRAM(s) Oral two times a day  chlorhexidine 4% Liquid 1 Application(s) Topical <User Schedule>  diltiazem    milliGRAM(s) Oral daily  lactated ringers. 500 milliLiter(s) (100 mL/Hr) IV Continuous <Continuous>  lactated ringers. 1000 milliLiter(s) (60 mL/Hr) IV Continuous <Continuous>  lactobacillus acidophilus 1 Tablet(s) Oral daily  pantoprazole    Tablet 40 milliGRAM(s) Oral before breakfast  vancomycin    Solution 125 milliGRAM(s) Oral every 6 hours    MEDICATIONS  (PRN):    Vital Signs Last 24 Hrs  T(C): 35.6  T(F): 96.1  HR: 98  BP: 110/57  BP(mean): --  RR: 18  SpO2: 97%    O/E:  Awake, alert, not in distress.  HEENT: atraumatic, EOMI.  Chest: decreased breath sounds at base  CVS: SIS2 +, irregular no murmur.  P/A: Soft, BS+  CNS: awake, alert  Ext: no edema feet.  Skin: no rash, no ulcers.  All systems reviewed positive findings as above.                        13.0   5.11  )-----------( 219      ( 03 Oct 2021 05:31 )             40.7                         13.5   6.55  )-----------( 229      ( 02 Oct 2021 05:54 )             41.1     10-03    133<L>  |  99  |  15  ----------------------------<  111<H>  3.8   |  22  |  0.6<L>  10-02    132<L>  |  97<L>  |  15  ----------------------------<  120<H>  4.2   |  22  |  0.6<L>    Ca    7.6<L>      03 Oct 2021 05:31  Ca    7.8<L>      02 Oct 2021 05:54  Mg     1.6     10-03    TPro  3.9<L>  /  Alb  2.1<L>  /  TBili  0.5  /  DBili  x   /  AST  20  /  ALT  16  /  AlkPhos  378<H>  10-03  TPro  4.1<L>  /  Alb  2.2<L>  /  TBili  0.5  /  DBili  x   /  AST  18  /  ALT  14  /  AlkPhos  360<H>  10-02                GI PCR Panel, Stool (collected 02 Oct 2021 09:20)  Source: .Stool Feces  Final Report (02 Oct 2021 22:58):    GI PCR Results: NOT detected    *******Please Note:*******    GI panel PCR evaluates for:    Campylobacter, Plesiomonas shigelloides, Salmonella,    Vibrio, Yersinia enterocolitica, Enteroaggregative    Escherichia coli (EAEC), Enteropathogenic E.coli (EPEC),    Enterotoxigenic E. coli (ETEC) lt/st, Shiga-like    toxin-producing E. coli (STEC) stx1/stx2,    Shigella/ Enteroinvasive E. coli (EIEC), Cryptosporidium,    Cyclospora cayetanensis, Entamoeba histolytica,    Giardia lamblia, Adenovirus F 40/41, Astrovirus,    Norovirus GI/GII, Rotavirus A, Sapovirus    Culture - Urine (collected 30 Sep 2021 18:40)  Source: Clean Catch Clean Catch (Midstream)  Final Report (03 Oct 2021 14:50):    >100,000 CFU/ml Klebsiella pneumoniae    >100,000 CFU/ml Citrobacter freundii  Organism: Klebsiella pneumoniae  Citrobacter freundii (03 Oct 2021 14:50)  Organism: Citrobacter freundii (03 Oct 2021 14:50)      -  Amikacin: S <=16      -  Amoxicillin/Clavulanic Acid: R >16/8      -  Ampicillin: R >16 These ampicillin results predict results for amoxicillin      -  Ampicillin/Sulbactam: R >16/8 Enterobacter, Citrobacter, and Serratia may develop resistance during prolonged therapy (3-4 days)      -  Aztreonam: I 16      -  Cefazolin: R >16      -  Cefepime: S <=2      -  Cefotaxime: I 16      -  Cefoxitin: R >16      -  Ceftazidime: R >16      -  Ceftriaxone: R 32 Enterobacter, Citrobacter, and Serratia may develop resistance during prolonged therapy      -  Cefuroxime: R >16      -  Ciprofloxacin: S <=0.25      -  Ertapenem: S <=0.5      -  Gentamicin: S <=2      -  Imipenem: S <=1      -  Levofloxacin: S <=0.5      -  Meropenem: S <=1      -  Minocycline: S <=4      -  Nitrofurantoin: S <=32 Should not be used to treat pyelonephritis      -  Piperacillin/Tazobactam: S <=8      -  Tigecycline: S <=2      -  Tobramycin: S <=2      -  Trimethoprim/Sulfamethoxazole: S <=0.5/9.5      Method Type: MAXINE  Organism: Klebsiella pneumoniae (03 Oct 2021 14:50)      -  Amikacin: S <=16      -  Amoxicillin/Clavulanic Acid: S <=8/4      -  Ampicillin: R >16 These ampicillin results predict results for amoxicillin      -  Ampicillin/Sulbactam: S 8/4 Enterobacter, Citrobacter, and Serratia may develop resistance during prolonged therapy (3-4 days)      -  Aztreonam: S <=4      -  Cefazolin: S <=2 ([id number]) For uncomplicated UTI with K. pneumoniae, E. coli, or P. mirablis: MAXINE <=16 is sensitive and MAXINE >=32 is resistant. This also predicts results for oral agents cefaclor, cefdinir, cefpodoxime, cefprozil, cefuroxime axetil, cephalexin and locarbef for uncomplicated UTI. Note that some isolates may be susceptible to these agents while testing resistant to cefazolin.      -  Cefepime: S <=2      -  Cefoxitin: S <=8      -  Ceftriaxone: S <=1 Enterobacter, Citrobacter, and Serratia may develop resistance during prolonged therapy      -  Ciprofloxacin: S <=0.25      -  Ertapenem: S <=0.5      -  Gentamicin: S <=2      -  Imipenem: S <=1      -  Levofloxacin: S <=0.5      -  Meropenem: S <=1      -  Nitrofurantoin: S <=32 Should not be used to treat pyelonephritis      -  Piperacillin/Tazobactam: S <=8      -  Tigecycline: S <=2      -  Tobramycin: S <=2      -  Trimethoprim/Sulfamethoxazole: S <=0.5/9.5      Method Type: MAXINE

## 2021-10-03 NOTE — PROGRESS NOTE ADULT - ASSESSMENT
ASSESSMENT:  76y F w/ PMHx of HTN, Afib on eliquis, pancreatic cancer s/p whipple on 8/2/21, kidney stones, ovarian cystectomy, tonsillectomy, phlebectomy who is admitted for a C. diff infection.    PLAN:  - Continue PO Vancomycin  - Monitor bowel function  - Encourage OOBTC  - Continue AC  - GI PPX  - IVF  - Tolerating soft diet  - Continue probiotic  - Monitor for fevers, tachycardia    Spectra: [id number]

## 2021-10-03 NOTE — PROGRESS NOTE ADULT - ASSESSMENT
75 yo F with PMHx of Chronic AFib on Eliquis, Pancreatic Ca s/p whipple 8/1/21 (in remission) presents with worsening fatigue and diarrhea.    # Lactic acidosis sec to dehydration -resolved  # Clostridium colitis  - C Diff by PCR Result: Positive (10.02.21 @ 02:47)  - Lactate, Blood: 1.6 mmol/L (09.30.21 @ 15:20)  - Contact Isolation  - c/w vancomycin    # Hypomagnesium  - replete Mg    # Hyponatremia  - monitor Na    # Chronic afib- rate  controled  - c/w cardizem, eliquis    # H/o Pancreatic Ca s/p Whipple   - s/p chemotherapy tx, 6 cycles completed in July  - outpt F/u with oncology    # Full code    # Pending: clinical improvement  # Discussed plan of care with patient  # Disposition: Home when stable

## 2021-10-03 NOTE — PROGRESS NOTE ADULT - SUBJECTIVE AND OBJECTIVE BOX
GENERAL SURGERY PROGRESS NOTE    Patient: VIKASH LI , 76y ([birth year])Female   MRN: [id number]  Location: [address]  Visit: 09-30-21 Inpatient  Date: 10-03-21 @ 13:05    Hospital Day #: 4  Post-Op Day #:    Procedure/Dx/Injuries: C diff    Events of past 24 hours: Pt doing well with no fevers, having some diarrhea, no abdominal pain.    PAST MEDICAL & SURGICAL HISTORY:  SOB (shortness of breath)    Pain  knee    Varicose veins of both lower extremities, unspecified whether complicated    Atrial fibrillation, unspecified type  2019 on Eliquis    Jaundice  March 5, 2021    Malignant neoplasm of pancreas, unspecified location of malignancy  Pancreatic Cancer    Hypertension, unspecified type  2019    Pancreatic cancer    Kidney stones    History of surgery  Whipple procedure 8/2/2021 with Dr. Nino at [facility]    Status post phlebectomy  10/2018    History of ovarian cystectomy  left    History of tonsillectomy  1959    Kidney stone  2017        Vitals:   T(F): 96.1 (10-03-21 @ 08:30), Max: 96.9 (10-02-21 @ 16:54)  HR: 98 (10-03-21 @ 08:30)  BP: 110/57 (10-03-21 @ 08:30)  RR: 18 (10-03-21 @ 08:30)  SpO2: 97% (10-02-21 @ 15:59)      Diet, Soft      Fluids:     I & O's:    10-02-21 @ 07:01  -  10-03-21 @ 07:00  --------------------------------------------------------  IN:    Oral Fluid: 222 mL  Total IN: 222 mL    OUT:  Total OUT: 0 mL    Total NET: 222 mL        Bowel Movement: : [X] YES [] NO  Flatus: : [X] YES [] NO    PHYSICAL EXAM:  General: NAD, AAOx3, calm and cooperative  HEENT: NCAT, JEFFRY, EOMI, Trachea ML, Neck supple  Cardiac: RRR S1, S2, no Murmurs, rubs or gallops  Respiratory: CTAB, normal respiratory effort, breath sounds equal BL, no wheeze, rhonchi or crackles  Abdomen: Soft, non-distended, non-tender    MEDICATIONS  (STANDING):  apixaban 5 milliGRAM(s) Oral two times a day  chlorhexidine 4% Liquid 1 Application(s) Topical <User Schedule>  diltiazem    milliGRAM(s) Oral daily  lactated ringers. 500 milliLiter(s) (100 mL/Hr) IV Continuous <Continuous>  lactated ringers. 1000 milliLiter(s) (60 mL/Hr) IV Continuous <Continuous>  lactobacillus acidophilus 1 Tablet(s) Oral daily  magnesium oxide 400 milliGRAM(s) Oral every 4 hours  pantoprazole    Tablet 40 milliGRAM(s) Oral before breakfast  vancomycin    Solution 125 milliGRAM(s) Oral every 6 hours    MEDICATIONS  (PRN):      DVT PROPHYLAXIS:   GI PROPHYLAXIS: pantoprazole    Tablet 40 milliGRAM(s) Oral before breakfast    ANTICOAGULATION:   ANTIBIOTICS:  vancomycin    Solution 125 milliGRAM(s)        Isolation Precautions:     Isolation Type: CONTACT;  Indication for Isolation: Clostridium difficile (10-02-21 @ 13:19)      LAB/STUDIES:  Labs:  CAPILLARY BLOOD GLUCOSE                              13.0   5.11  )-----------( 219      ( 03 Oct 2021 05:31 )             40.7       Auto Neutrophil %: 57.1 % (10-03-21 @ 05:31)  Auto Immature Granulocyte %: 0.4 % (10-03-21 @ 05:31)    10-03    133<L>  |  99  |  15  ----------------------------<  111<H>  3.8   |  22  |  0.6<L>      Calcium, Total Serum: 7.6 mg/dL (10-03-21 @ 05:31)      LFTs:             3.9  | 0.5  | 20       ------------------[378     ( 03 Oct 2021 05:31 )  2.1  | x    | 16          Lipase:x      Amylase:x         Lactate, Blood: 1.6 mmol/L (09-30-21 @ 15:20)      Coags:                GI PCR Panel, Stool (collected 02 Oct 2021 09:20)  Source: .Stool Feces  Final Report (02 Oct 2021 22:58):    GI PCR Results: NOT detected    *******Please Note:*******    GI panel PCR evaluates for:    Campylobacter, Plesiomonas shigelloides, Salmonella,    Vibrio, Yersinia enterocolitica, Enteroaggregative    Escherichia coli (EAEC), Enteropathogenic E.coli (EPEC),    Enterotoxigenic E. coli (ETEC) lt/st, Shiga-like    toxin-producing E. coli (STEC) stx1/stx2,    Shigella/ Enteroinvasive E. coli (EIEC), Cryptosporidium,    Cyclospora cayetanensis, Entamoeba histolytica,    Giardia lamblia, Adenovirus F 40/41, Astrovirus,    Norovirus GI/GII, Rotavirus A, Sapovirus    Culture - Urine (collected 30 Sep 2021 18:40)  Source: Clean Catch Clean Catch (Midstream)  Preliminary Report (02 Oct 2021 14:57):    >100,000 CFU/ml Klebsiella pneumoniae              IMAGING:      ACCESS/ DEVICES:  [X] Peripheral IV

## 2021-10-04 NOTE — DIETITIAN INITIAL EVALUATION ADULT. - OTHER CALCULATIONS
Weight used: 52.2 kg -- dosing weight Energy needs: 6812-4305 kcal/day (MSJ 1.2-1.3 AF) Protein needs: 53-64 g/day ( 1.0-1.2 g/kg of dosing weight) Fluid needs: per LIP

## 2021-10-04 NOTE — DIETITIAN INITIAL EVALUATION ADULT. - OTHER INFO
Pertinent medical information: 75 yo F with PMHx of Chronic AFib on Eliquis, Pancreatic Ca s/p whipple 8/1/21 (in remission) presents with worsening fatigue and diarrhea. Per attending note, Lactic acidosis sec to dehydration -resolved, Clostridium colitis, Hypomagnesemia, repleted, Hyponatremia - noted.    Patients PO intake ranges from % of meals, reports shes been trying to do her best to eat.     Reports her UBW ~ february was ~125, current dosing weight is 115 lbs.

## 2021-10-04 NOTE — CONSULT NOTE ADULT - CONSULT REASON
15.9   12.3  )-----------( 221      ( 19 Oct 2018 21:53 )             43.1       LIVER FUNCTIONS - ( 19 Oct 2018 21:53 )  Alb: 3.7 g/dL / Pro: 7.2 g/dL / ALK PHOS: 77 U/L / ALT: 15 U/L / AST: 26 U/L / GGT: x             Urinalysis Basic - ( 19 Oct 2018 18:21 )    Color: Yellow / Appearance: Clear / S.025 / pH: x  Gluc: x / Ketone: 40 mg/dL  / Bili: Large / Urobili: 1.0 E.U./dL   Blood: x / Protein: 30 mg/dL / Nitrite: NEGATIVE   Leuk Esterase: NEGATIVE / RBC: x / WBC 5-10 /HPF   Sq Epi: x / Non Sq Epi: 0-5 /HPF / Bacteria: x      All imaging reviewed
CD colitis

## 2021-10-04 NOTE — DIETITIAN INITIAL EVALUATION ADULT. - ORAL INTAKE PTA/DIET HISTORY
Patient reports since she contracted covid-19 last february her appetite has never been back to baseline. Reports she still cant taste and smell which makes her lose her appetite. Does not follow any specific diet, tries to eat 3 meals per day but consumes very small portions. Drinks ensure 3xday. Takes Multivitamin. NKFA.

## 2021-10-04 NOTE — CONSULT NOTE ADULT - CONSULT REQUESTED DATE/TIME
OFFICE VISIT    HISTORY    Allison Fregoso is a 27 year old female who presents for:    # Sinusitis - 100% better  DURATION: 09/21/2019  HISTORY: tender and swollen lymph nodes  Was seen in the walk-in : 09/23/2019  PAIN: 0 / 10   MEDS tried for this issue: cefdinir     Physical exam    Diet: Vegetables, fruits, meats  Exercise: none    ADDITIONAL REVIEW OF SYSTEMS  Denies: Fever, chest pain, shortness of breath, fatigue, visual change, eye pain, nasal congestion, hearing change, ear pain, sore throat, cough, wheezing, palpitations, abdominal pain, nausea, vomiting, bloody stools, diarrhea, dysuria, hematuria, joint pain, rash, headache, facial droop, dizziness, numbness/tingling, syncope, temperature intolerance, polyuria, depression, anxiety, suicidal ideation.    Current Outpatient Medications   Medication Sig Dispense Refill   • cefdinir (OMNICEF) 300 MG capsule Take 1 capsule by mouth 2 times daily for 10 days. 20 capsule 0   • Fe Fum-Vit C-Vit B12--60-0.01-1 MG Cap Take one daily 30 capsule 0   • vitamin B-12 (CYANOCOBALAMIN) 1000 MCG tablet Take 1,000 mcg by mouth daily.       No current facility-administered medications for this visit.      ALLERGIES:  No Known Allergies  History reviewed. No pertinent past medical history.  History reviewed. No pertinent surgical history.  Social History     Socioeconomic History   • Marital status: /Civil Union     Spouse name: Not on file   • Number of children: Not on file   • Years of education: Not on file   • Highest education level: Not on file   Occupational History   • Not on file   Social Needs   • Financial resource strain: Not on file   • Food insecurity:     Worry: Not on file     Inability: Not on file   • Transportation needs:     Medical: Not on file     Non-medical: Not on file   Tobacco Use   • Smoking status: Never Smoker   • Smokeless tobacco: Never Used   Substance and Sexual Activity   • Alcohol use: Not Currently     Frequency: Never      Drinks per session: 1 or 2     Binge frequency: Never   • Drug use: Never   • Sexual activity: Not on file   Lifestyle   • Physical activity:     Days per week: Not on file     Minutes per session: Not on file   • Stress: Not on file   Relationships   • Social connections:     Talks on phone: Not on file     Gets together: Not on file     Attends Christian service: Not on file     Active member of club or organization: Not on file     Attends meetings of clubs or organizations: Not on file     Relationship status: Not on file   • Intimate partner violence:     Fear of current or ex partner: Not on file     Emotionally abused: Not on file     Physically abused: Not on file     Forced sexual activity: Not on file   Other Topics Concern   • Not on file   Social History Narrative   • Not on file     Family History   Problem Relation Age of Onset   • Systemic Lupus Erythematosus Mother    • Diabetes Maternal Aunt      Family Status   Relation Name Status   • Mo  Alive   • MAunt  (Not Specified)   • Fa  Alive       PHYSICAL EXAM    Vital Signs:    Visit Vitals  /68   Pulse 72   Temp 97.2 °F (36.2 °C) (Temporal)   Ht 5' 4\" (1.626 m)   Wt 77.2 kg   LMP 09/02/2019 (Approximate)   SpO2 99%   Breastfeeding? Yes   BMI 29.21 kg/m²     Constitutional:  No acute distress. Well-developed.  Skin:  Warm. Dry.  Eyes:  Normal conjunctivae.   Ears, nose, mouth and throat:  Oral mucosa moist. Normal posterior oropharynx. No tonsillar exudate. Normal external ears and auditory canals. Tympanic membranes gray, translucent bilaterally. Normal external nose. Inflamed nasal turbinates. Frontal and maxillary sinuses nontender to palpation.  Neck:  Supple. Nontender to palpation.  Lymphatic:  No cervical or submandibular lymphadenopathy.  Respiratory:  Lungs are clear to auscultation bilaterally. Respirations are nonlabored. Breath sounds are equal. No wheezing, rales, or rhonchi.   Cardiovascular:  Regular rate and rhythm. No murmurs.    Gastrointestinal:  Soft. Nontender. Nondistended. Normal bowel sounds.  Musculoskeletal:  Normal gait. Moves all 4 extremities spontaneously.  Neurologic:  No focal neurological deficits observed. Normal speech observed.  Psychiatric:  Alert and awake. Normal mood and affect. Responds appropriately to questions and commands.    ASSESSMENT & PLAN    Allison Fregoso is a 27 year old female who presents for:    Health Maintenance Summary     DTaP/Tdap/Td Vaccine (4 - Tdap)  Overdue since 12/7/2008    Influenza Vaccine (1)  Overdue since 9/1/2019    Cervical Cancer Screening (Every 3 Years)  Next due on 8/11/2020    Depression Screening (Yearly)  Next due on 10/1/2020    Meningococcal Vaccine   Aged Out    Pneumococcal Vaccine 0-64   Aged Out          Follow-up: No follow-ups on file. Sooner if worsening or not improving. Red flags discussed.    Instructed patient on correct medication dosing, usage and adverse effects (if applicable).  All questions and concerns discussed. Patient / representative agreeable to plan.    Sandro Rivera MD, MA  Family Medicine with Obstetrics  [id number] [place] [id number]  Telephone: [phone]           Fax: [phone]   04-Oct-2021 16:20

## 2021-10-04 NOTE — PROGRESS NOTE ADULT - SUBJECTIVE AND OBJECTIVE BOX
VIKASH LI 76y Female  MRN#: [id number]   CODE STATUS:________      SUBJECTIVE  Patient is a 76y old Female who presents with a chief complaint of dehydration (04 Oct 2021 11:38)  Currently admitted to medicine with the primary diagnosis of PNA (pneumonia)      Today is hospital day 4d, and this morning she is           OBJECTIVE  PAST MEDICAL & SURGICAL HISTORY  SOB (shortness of breath)    Pain  knee    Varicose veins of both lower extremities, unspecified whether complicated    Atrial fibrillation, unspecified type  2019 on Eliquis    Jaundice  March 5, 2021    Malignant neoplasm of pancreas, unspecified location of malignancy  Pancreatic Cancer    Hypertension, unspecified type  2019    Pancreatic cancer    Kidney stones    History of surgery  Whipple procedure 8/2/2021 with Dr. Nino at [facility]    Status post phlebectomy  10/2018    History of ovarian cystectomy  left    History of tonsillectomy  1959    Kidney stone  2017      ALLERGIES:  Augmentin (Rash)  chocolate (Headache)  digoxin (Hives)  Metoprolol Succinate ER (Hives)  Novocain (Angioedema)  Steroids: Excessive swelling (Flushing; Other (Mild to Mod))  sulfa drugs (Fever)  Xarelto (Hives)    MEDICATIONS:  STANDING MEDICATIONS  apixaban 5 milliGRAM(s) Oral two times a day  chlorhexidine 4% Liquid 1 Application(s) Topical <User Schedule>  ciprofloxacin     Tablet 500 milliGRAM(s) Oral every 12 hours  diltiazem    milliGRAM(s) Oral daily  lactated ringers. 500 milliLiter(s) IV Continuous <Continuous>  lactated ringers. 1000 milliLiter(s) IV Continuous <Continuous>  lactobacillus acidophilus 1 Tablet(s) Oral daily  pantoprazole    Tablet 40 milliGRAM(s) Oral before breakfast  vancomycin    Solution 125 milliGRAM(s) Oral every 6 hours    PRN MEDICATIONS      VITAL SIGNS: Last 24 Hours  T(C): 35.4 (04 Oct 2021 07:39), Max: 36.5 (03 Oct 2021 16:00)  T(F): 95.7 (04 Oct 2021 07:39), Max: 97.7 (03 Oct 2021 16:00)  HR: 111 (04 Oct 2021 07:39) (101 - 111)  BP: 138/61 (04 Oct 2021 07:39) (98/61 - 138/61)  BP(mean): --  RR: 18 (04 Oct 2021 07:39) (18 - 18)  SpO2: --    LABS:                        13.6   4.31  )-----------( 235      ( 04 Oct 2021 07:36 )             42.5     10-04    135  |  101  |  14  ----------------------------<  131<H>  4.2   |  22  |  0.5<L>    Ca    7.7<L>      04 Oct 2021 07:36  Mg     1.6     10-04    TPro  4.1<L>  /  Alb  2.2<L>  /  TBili  0.6  /  DBili  x   /  AST  18  /  ALT  18  /  AlkPhos  368<H>  10-04              Culture - Stool (collected 02 Oct 2021 09:20)  Source: .Stool Feces  Preliminary Report (03 Oct 2021 17:06):    No enteric pathogens to date: Final culture pending    GI PCR Panel, Stool (collected 02 Oct 2021 09:20)  Source: .Stool Feces  Final Report (02 Oct 2021 22:58):    GI PCR Results: NOT detected    *******Please Note:*******    GI panel PCR evaluates for:    Campylobacter, Plesiomonas shigelloides, Salmonella,    Vibrio, Yersinia enterocolitica, Enteroaggregative    Escherichia coli (EAEC), Enteropathogenic E.coli (EPEC),    Enterotoxigenic E. coli (ETEC) lt/st, Shiga-like    toxin-producing E. coli (STEC) stx1/stx2,    Shigella/ Enteroinvasive E. coli (EIEC), Cryptosporidium,    Cyclospora cayetanensis, Entamoeba histolytica,    Giardia lamblia, Adenovirus F 40/41, Astrovirus,    Norovirus GI/GII, Rotavirus A, Sapovirus      RADIOLOGY:      PHYSICAL EXAM:    GENERAL: NAD, well-developed, AAOx3  HEENT:  Atraumatic, Normocephalic. EOMI, PERRLA, conjunctiva and sclera clear, No JVD  PULMONARY: Clear to auscultation bilaterally; No wheeze  CARDIOVASCULAR: Regular rate and rhythm; No murmurs, rubs, or gallops  GASTROINTESTINAL: Soft, Nontender, Nondistended; Bowel sounds present  MUSCULOSKELETAL:  2+ Peripheral Pulses, No clubbing, cyanosis, or edema  NEUROLOGY: non-focal  SKIN: No rashes or lesions       VIKASH LI 76y Female  MRN#: [id number]   CODE STATUS:________      SUBJECTIVE  Patient is a 76y old Female who presents with a chief complaint of dehydration (04 Oct 2021 11:38)  Currently admitted to medicine with the primary diagnosis of PNA (pneumonia)      Today is hospital day 4d, and this morning she is complaining of persistent diarrhea. Notes that she had about 4 episodes of watery, loose diarrhea that has worsened since the onset of her diarrhea symptoms. Notes that after completing her course of antifungals for fungemia, she started to experience 4 episodes of diarrhea a day. Denies any abdominal pain, nausea, vomiting, fever, chills,       OBJECTIVE  PAST MEDICAL & SURGICAL HISTORY  SOB (shortness of breath)    Pain  knee    Varicose veins of both lower extremities, unspecified whether complicated    Atrial fibrillation, unspecified type  2019 on Eliquis    Jaundice  March 5, 2021    Malignant neoplasm of pancreas, unspecified location of malignancy  Pancreatic Cancer    Hypertension, unspecified type  2019    Pancreatic cancer    Kidney stones    History of surgery  Whipple procedure 8/2/2021 with Dr. Nino at [facility]    Status post phlebectomy  10/2018    History of ovarian cystectomy  left    History of tonsillectomy  1959    Kidney stone  2017      ALLERGIES:  Augmentin (Rash)  chocolate (Headache)  digoxin (Hives)  Metoprolol Succinate ER (Hives)  Novocain (Angioedema)  Steroids: Excessive swelling (Flushing; Other (Mild to Mod))  sulfa drugs (Fever)  Xarelto (Hives)    MEDICATIONS:  STANDING MEDICATIONS  apixaban 5 milliGRAM(s) Oral two times a day  chlorhexidine 4% Liquid 1 Application(s) Topical <User Schedule>  ciprofloxacin     Tablet 500 milliGRAM(s) Oral every 12 hours  diltiazem    milliGRAM(s) Oral daily  lactated ringers. 500 milliLiter(s) IV Continuous <Continuous>  lactated ringers. 1000 milliLiter(s) IV Continuous <Continuous>  lactobacillus acidophilus 1 Tablet(s) Oral daily  pantoprazole    Tablet 40 milliGRAM(s) Oral before breakfast  vancomycin    Solution 125 milliGRAM(s) Oral every 6 hours    PRN MEDICATIONS      VITAL SIGNS: Last 24 Hours  T(C): 35.4 (04 Oct 2021 07:39), Max: 36.5 (03 Oct 2021 16:00)  T(F): 95.7 (04 Oct 2021 07:39), Max: 97.7 (03 Oct 2021 16:00)  HR: 111 (04 Oct 2021 07:39) (101 - 111)  BP: 138/61 (04 Oct 2021 07:39) (98/61 - 138/61)  BP(mean): --  RR: 18 (04 Oct 2021 07:39) (18 - 18)  SpO2: --    LABS:                        13.6   4.31  )-----------( 235      ( 04 Oct 2021 07:36 )             42.5     10-04    135  |  101  |  14  ----------------------------<  131<H>  4.2   |  22  |  0.5<L>    Ca    7.7<L>      04 Oct 2021 07:36  Mg     1.6     10-04    TPro  4.1<L>  /  Alb  2.2<L>  /  TBili  0.6  /  DBili  x   /  AST  18  /  ALT  18  /  AlkPhos  368<H>  10-04        Culture - Stool (collected 02 Oct 2021 09:20)  Source: .Stool Feces  Preliminary Report (03 Oct 2021 17:06):    No enteric pathogens to date: Final culture pending    GI PCR Panel, Stool (collected 02 Oct 2021 09:20)  Source: .Stool Feces  Final Report (02 Oct 2021 22:58):    GI PCR Results: NOT detected    *******Please Note:*******    GI panel PCR evaluates for:    Campylobacter, Plesiomonas shigelloides, Salmonella,    Vibrio, Yersinia enterocolitica, Enteroaggregative    Escherichia coli (EAEC), Enteropathogenic E.coli (EPEC),    Enterotoxigenic E. coli (ETEC) lt/st, Shiga-like    toxin-producing E. coli (STEC) stx1/stx2,    Shigella/ Enteroinvasive E. coli (EIEC), Cryptosporidium,    Cyclospora cayetanensis, Entamoeba histolytica,    Giardia lamblia, Adenovirus F 40/41, Astrovirus,    Norovirus GI/GII, Rotavirus A, Sapovirus      RADIOLOGY:        PHYSICAL EXAM:    GENERAL: NAD, well-developed, AAOx3  HEENT:  Atraumatic, Normocephalic. EOMI, PERRLA, conjunctiva and sclera clear, No JVD  PULMONARY: Clear to auscultation bilaterally; No wheeze  CARDIOVASCULAR: Regular rate and rhythm; No murmurs, rubs, or gallops  GASTROINTESTINAL: Soft, Nontender, Nondistended; Bowel sounds present  MUSCULOSKELETAL:  2+ Peripheral Pulses, No clubbing, cyanosis, or edema  NEUROLOGY: non-focal  SKIN: No rashes or lesions

## 2021-10-04 NOTE — DIETITIAN INITIAL EVALUATION ADULT. - REASON
slight muscle wasting however consistent with advanced age and patient reports being always on the thinner side

## 2021-10-04 NOTE — CONSULT NOTE ADULT - SUBJECTIVE AND OBJECTIVE BOX
LI, VIKASH  76y, Female  Allergy: Augmentin (Rash)  chocolate (Headache)  digoxin (Hives)  Metoprolol Succinate ER (Hives)  Novocain (Angioedema)  Steroids: Excessive swelling (Flushing; Other (Mild to Mod))  sulfa drugs (Fever)  Xarelto (Hives)      All historical available data reviewed.    HPI:  75 yo F with PMHx of Chronic AFib on Eliquis, Pancreatic Ca s/p whipple 8/1/21 (in remission) presents with worsening fatigue and diarrhea.  Pt recently admitted for fungemia, treated with IV ABx via PICC line, completed 9/21. She states that she has been having watery stools ever since her Whipple procedure, however, since the completion of her antifungal tx, the diarrhea has become more frequent and more severe. Reports watery stools immediately after any PO intake. Primarily eats a limited amount of bland foods due to poor appetite and minimal tolerance. Denies any blood or mucus in stools. Denies having abdominal pain but did feel slight discomfort on presentation to ED. Also reports nausea for past 1 day and vomited day prior after eating. Was advised by Dr. Nino to take Imodium, which she reports slightly reduced diarrhea.  In the ED, T 96.7, HR 72, /72, RR 20, SpO2 99% on RA. Lab work revealed Lactate 2.7, positive UA. CT Abd revealed generalized anasarca with presence of ascites within abdomen; moderate R sided and trace L sided pleural effusion. Given Vancomycin, Aztreonam and 1.5 LR fluid bolus in ED.  (30 Sep 2021 20:25)    FAMILY HISTORY:  CAD (coronary artery disease) (Sibling)  3  siblings ( 2 living and 1 passed away)      PAST MEDICAL & SURGICAL HISTORY:  SOB (shortness of breath)    Pain  knee    Varicose veins of both lower extremities, unspecified whether complicated    Atrial fibrillation, unspecified type  2019 on Eliquis    Jaundice  March 5, 2021    Malignant neoplasm of pancreas, unspecified location of malignancy  Pancreatic Cancer    Hypertension, unspecified type  2019    Pancreatic cancer    Kidney stones    History of surgery  Whipple procedure 8/2/2021 with Dr. Nino at [facility]    Status post phlebectomy  10/2018    History of ovarian cystectomy  left    History of tonsillectomy  1959    Kidney stone  2017          VITALS:  T(F): 97, Max: 97 (10-04-21 @ 16:00)  HR: 108  BP: 116/59  RR: 17Vital Signs Last 24 Hrs  T(C): 36.1 (04 Oct 2021 16:00), Max: 36.1 (04 Oct 2021 16:00)  T(F): 97 (04 Oct 2021 16:00), Max: 97 (04 Oct 2021 16:00)  HR: 108 (04 Oct 2021 16:00) (101 - 111)  BP: 116/59 (04 Oct 2021 16:00) (98/61 - 138/61)  BP(mean): --  RR: 17 (04 Oct 2021 16:00) (17 - 18)  SpO2: --    TESTS & MEASUREMENTS:                        13.6   4.31  )-----------( 235      ( 04 Oct 2021 07:36 )             42.5     10-04    135  |  101  |  14  ----------------------------<  131<H>  4.2   |  22  |  0.5<L>    Ca    7.7<L>      04 Oct 2021 07:36  Mg     1.6     10-04    TPro  4.1<L>  /  Alb  2.2<L>  /  TBili  0.6  /  DBili  x   /  AST  18  /  ALT  18  /  AlkPhos  368<H>  10-04    LIVER FUNCTIONS - ( 04 Oct 2021 07:36 )  Alb: 2.2 g/dL / Pro: 4.1 g/dL / ALK PHOS: 368 U/L / ALT: 18 U/L / AST: 18 U/L / GGT: x             Culture - Stool (collected 10-02-21 @ 09:20)  Source: .Stool Feces  Preliminary Report (10-03-21 @ 17:06):    No enteric pathogens to date: Final culture pending    GI PCR Panel, Stool (collected 10-02-21 @ 09:20)  Source: .Stool Feces  Final Report (10-02-21 @ 22:58):    GI PCR Results: NOT detected    *******Please Note:*******    GI panel PCR evaluates for:    Campylobacter, Plesiomonas shigelloides, Salmonella,    Vibrio, Yersinia enterocolitica, Enteroaggregative    Escherichia coli (EAEC), Enteropathogenic E.coli (EPEC),    Enterotoxigenic E. coli (ETEC) lt/st, Shiga-like    toxin-producing E. coli (STEC) stx1/stx2,    Shigella/ Enteroinvasive E. coli (EIEC), Cryptosporidium,    Cyclospora cayetanensis, Entamoeba histolytica,    Giardia lamblia, Adenovirus F 40/41, Astrovirus,    Norovirus GI/GII, Rotavirus A, Sapovirus    Culture - Urine (collected 09-30-21 @ 18:40)  Source: Clean Catch Clean Catch (Midstream)  Final Report (10-03-21 @ 14:50):    >100,000 CFU/ml Klebsiella pneumoniae    >100,000 CFU/ml Citrobacter freundii  Organism: Klebsiella pneumoniae  Citrobacter freundii (10-03-21 @ 14:50)  Organism: Citrobacter freundii (10-03-21 @ 14:50)      -  Amikacin: S <=16      -  Amoxicillin/Clavulanic Acid: R >16/8      -  Ampicillin: R >16 These ampicillin results predict results for amoxicillin      -  Ampicillin/Sulbactam: R >16/8 Enterobacter, Citrobacter, and Serratia may develop resistance during prolonged therapy (3-4 days)      -  Aztreonam: I 16      -  Cefazolin: R >16      -  Cefepime: S <=2      -  Cefotaxime: I 16      -  Cefoxitin: R >16      -  Ceftazidime: R >16      -  Ceftriaxone: R 32 Enterobacter, Citrobacter, and Serratia may develop resistance during prolonged therapy      -  Cefuroxime: R >16      -  Ciprofloxacin: S <=0.25      -  Ertapenem: S <=0.5      -  Gentamicin: S <=2      -  Imipenem: S <=1      -  Levofloxacin: S <=0.5      -  Meropenem: S <=1      -  Minocycline: S <=4      -  Nitrofurantoin: S <=32 Should not be used to treat pyelonephritis      -  Piperacillin/Tazobactam: S <=8      -  Tigecycline: S <=2      -  Tobramycin: S <=2      -  Trimethoprim/Sulfamethoxazole: S <=0.5/9.5      Method Type: MAXINE  Organism: Klebsiella pneumoniae (10-03-21 @ 14:50)      -  Amikacin: S <=16      -  Amoxicillin/Clavulanic Acid: S <=8/4      -  Ampicillin: R >16 These ampicillin results predict results for amoxicillin      -  Ampicillin/Sulbactam: S 8/4 Enterobacter, Citrobacter, and Serratia may develop resistance during prolonged therapy (3-4 days)      -  Aztreonam: S <=4      -  Cefazolin: S <=2 ([id number]) For uncomplicated UTI with K. pneumoniae, E. coli, or P. mirablis: MAXINE <=16 is sensitive and MAXINE >=32 is resistant. This also predicts results for oral agents cefaclor, cefdinir, cefpodoxime, cefprozil, cefuroxime axetil, cephalexin and locarbef for uncomplicated UTI. Note that some isolates may be susceptible to these agents while testing resistant to cefazolin.      -  Cefepime: S <=2      -  Cefoxitin: S <=8      -  Ceftriaxone: S <=1 Enterobacter, Citrobacter, and Serratia may develop resistance during prolonged therapy      -  Ciprofloxacin: S <=0.25      -  Ertapenem: S <=0.5      -  Gentamicin: S <=2      -  Imipenem: S <=1      -  Levofloxacin: S <=0.5      -  Meropenem: S <=1      -  Nitrofurantoin: S <=32 Should not be used to treat pyelonephritis      -  Piperacillin/Tazobactam: S <=8      -  Tigecycline: S <=2      -  Tobramycin: S <=2      -  Trimethoprim/Sulfamethoxazole: S <=0.5/9.5      Method Type: MAXINE    Culture - Blood (collected 09-30-21 @ 11:54)  Source: .Blood Blood  Preliminary Report (10-01-21 @ 23:02):    No growth to date.    Culture - Blood (collected 09-30-21 @ 11:54)  Source: .Blood Blood  Preliminary Report (10-01-21 @ 23:02):    No growth to date.            RADIOLOGY & ADDITIONAL TESTS:  Personal review of radiological diagnostics performed  Echo and EKG results noted when applicable.     MEDICATIONS:  apixaban 5 milliGRAM(s) Oral two times a day  chlorhexidine 4% Liquid 1 Application(s) Topical <User Schedule>  ciprofloxacin     Tablet 500 milliGRAM(s) Oral every 12 hours  diltiazem    milliGRAM(s) Oral daily  lactated ringers. 500 milliLiter(s) IV Continuous <Continuous>  lactated ringers. 1000 milliLiter(s) IV Continuous <Continuous>  lactobacillus acidophilus 1 Tablet(s) Oral daily  pantoprazole    Tablet 40 milliGRAM(s) Oral before breakfast  vancomycin    Solution 125 milliGRAM(s) Oral every 6 hours      ANTIBIOTICS:  ciprofloxacin     Tablet 500 milliGRAM(s) Oral every 12 hours  vancomycin    Solution 125 milliGRAM(s) Oral every 6 hours

## 2021-10-04 NOTE — PROGRESS NOTE ADULT - SUBJECTIVE AND OBJECTIVE BOX
Patient is a 76y old  Female who presents with a chief complaint of dehydration (03 Oct 2021 13:04)    Patient was seen and examined.  C/o weakness  Had 5 episodes of watery diarrhea    PAST MEDICAL & SURGICAL HISTORY:  SOB (shortness of breath)  Varicose veins of both lower extremities, unspecified whether complicated  Atrial fibrillation, unspecified type, 2019 on Eliquis  Jaundice, March 5, 2021  Malignant neoplasm of pancreas, unspecified location of malignancy, Pancreatic Cancer  Hypertension, unspecified type, 2019  Kidney stones  History of surgery- Whipple procedure 8/2/2021 with Dr. Nino at [facility]  Status post phlebectomy- 10/2018  History of ovarian cystectomy, left  History of tonsillectomy, 1959    Allergies  Augmentin (Rash)  chocolate (Headache)  digoxin (Hives)  Metoprolol Succinate ER (Hives)  Novocain (Angioedema)  Steroids: Excessive swelling (Flushing; Other (Mild to Mod))  sulfa drugs (Fever)  Xarelto (Hives)    MEDICATIONS  (STANDING):  apixaban 5 milliGRAM(s) Oral two times a day  chlorhexidine 4% Liquid 1 Application(s) Topical <User Schedule>  ciprofloxacin     Tablet 500 milliGRAM(s) Oral every 12 hours  diltiazem    milliGRAM(s) Oral daily  lactated ringers. 500 milliLiter(s) (100 mL/Hr) IV Continuous <Continuous>  lactated ringers. 1000 milliLiter(s) (60 mL/Hr) IV Continuous <Continuous>  lactobacillus acidophilus 1 Tablet(s) Oral daily  pantoprazole    Tablet 40 milliGRAM(s) Oral before breakfast  vancomycin    Solution 125 milliGRAM(s) Oral every 6 hours    MEDICATIONS  (PRN):    T(C): 35.4 (10-04-21 @ 07:39), Max: 36.5 (10-03-21 @ 16:00)  HR: 111 (10-04-21 @ 07:39) (101 - 111)  BP: 138/61 (10-04-21 @ 07:39) (98/61 - 138/61)  RR: 18 (10-04-21 @ 07:39) (18 - 18)  SpO2: --    O/E:  Awake, alert, not in distress.  HEENT: atraumatic, EOMI.  Chest: decreased breath sounds at base  CVS: SIS2 +, irregular no murmur.  P/A: Soft, BS+  CNS: awake, alert  Ext: no edema feet.  Skin: no rash, no ulcers.  All systems reviewed positive findings as above.                                    13.6   4.31<L> )-----------( 235      ( 04 Oct 2021 07:36 )             42.5                         13.0   5.11  )-----------( 219      ( 03 Oct 2021 05:31 )             40.7   10-04    135  |  101  |  14  ----------------------------<  131<H>  4.2   |  22  |  0.5<L>  10-03    133<L>  |  99  |  15  ----------------------------<  111<H>  3.8   |  22  |  0.6<L>    Ca    7.7<L>      04 Oct 2021 07:36  Ca    7.6<L>      03 Oct 2021 05:31  Mg     1.6     10-04    TPro  4.1<L>  /  Alb  2.2<L>  /  TBili  0.6  /  DBili  x   /  AST  18  /  ALT  18  /  AlkPhos  368<H>  10-04  TPro  3.9<L>  /  Alb  2.1<L>  /  TBili  0.5  /  DBili  x   /  AST  20  /  ALT  16  /  AlkPhos  378<H>  10-03

## 2021-10-04 NOTE — DIETITIAN INITIAL EVALUATION ADULT. - PHYSCIAL ASSESSMENT
skin intact/ no edema noted  No GI s/s, however noted with multiple loose bowel movement  No chewing/swallowing difficulties per pt alert and oriented/underweight

## 2021-10-04 NOTE — PROGRESS NOTE ADULT - ASSESSMENT
75 yo F with PMHx of Chronic AFib on Eliquis, Pancreatic Ca s/p whipple 8/1/21 (in remission) presents with worsening fatigue and diarrhea.    # Lactic acidosis sec to dehydration -resolved  # Clostridium colitis  - C Diff by PCR Result: Positive (10.02.21 @ 02:47)  - Lactate, Blood: 1.6 mmol/L (09.30.21 @ 15:20)  - Contact Isolation  - c/w vancomycin    # UTI  - urine culture : Culture Results: >100,000 CFU/ml Klebsiella pneumoniae>100,000 CFU/ml Citrobacter freundii (09.30.21 @ 18:40)  - Start ciprofloxacin    # Hypomagnesium  - replete Mg    # Hyponatremia-resolved    # Chronic afib- rate  controlled  - c/w cardizem, eliquis    # H/o Pancreatic Ca s/p Whipple   - s/p chemotherapy tx, 6 cycles completed in July  - outpt F/u with oncology    # Full code    # Pending: clinical improvement  # Discussed plan of care with patient  # Disposition: Home when stable

## 2021-10-04 NOTE — DIETITIAN INITIAL EVALUATION ADULT. - PERSON TAUGHT/METHOD
Discussed with pt the importance of consuming adequate calories and protein and discussed diffferent nutrition oral supplements/verbal instruction/patient instructed

## 2021-10-04 NOTE — DIETITIAN INITIAL EVALUATION ADULT. - NAME AND PHONE
RD to monitor: diet order, body composition, energy intake, nutrition focused physical finding, electrolyte profile. at risk will f/u in 4-6 days

## 2021-10-04 NOTE — DIETITIAN INITIAL EVALUATION ADULT. - PERTINENT MEDS FT
MEDICATIONS  (STANDING):  lactated ringers. 500 milliLiter(s) (100 mL/Hr) IV Continuous <Continuous>  lactated ringers. 1000 milliLiter(s) (60 mL/Hr) IV Continuous <Continuous>  lactobacillus acidophilus 1 Tablet(s) Oral daily  pantoprazole    Tablet 40 milliGRAM(s) Oral before breakfast

## 2021-10-04 NOTE — PROGRESS NOTE ADULT - ASSESSMENT
77 yo F with PMHx of Chronic AFib on Eliquis, Pancreatic Ca s/p whipple 8/1/21 (in remission) presents with worsening fatigue and diarrhea. Pt was found to be positive for C. diff. During her previous admission, she was found to have fungemia and was treated with caspofungin with a PICC line completed on 9/21/21 and since then, she reports worsening diarrhea.     #C. Diff colitis:  - continue with vancomycin  - tolerating diet well   - currently not experiencing abdominal pain 77 yo F with PMHx of Chronic AFib on Eliquis, Pancreatic Ca s/p whipple 21 (in remission) presents with worsening fatigue and diarrhea. Pt was found to be positive for C. diff. During her previous admission, she was found to have fungemia and was treated with caspofungin with a PICC line completed on 21 and since then, she reports worsening diarrhea.     #C. Diff colitis:  - pt is having about 4-5 watery diarrhea episodes per day  - continue with vancomycin  - tolerating diet well   - currently not experiencing abdominal pain  - pt is currently on contact precautions     #Hypomagnesmia   - Ma.6   - magnesium repletion   - repeat magnesium levels in the AM tomorrow     #Chronic afib  - pt is on cardizem and eliquis - continue with this management     #Pancreatic Cancer s/p Whipple procedure - in remission   - follow up outpatient     #Lactic acidosis - resolved    - secondary to dehydration   - positive for c diff

## 2021-10-04 NOTE — CONSULT NOTE ADULT - ASSESSMENT
75 yo F with PMHx of Chronic AFib on Eliquis, Pancreatic Ca s/p whipple 8/1/21 (in remission) presents with worsening fatigue and diarrhea. Pt was found to be positive for C. diff. During her previous admission, she was found to have fungemia and was treated with caspofungin with a PICC line completed on 9/21/21 and since then, she reports worsening diarrhea.     IMPRESSION;   CD colitis  No pyelonephritis  No PNA  no intraabdominal abscess    RECOMMENDATIONS;  d/c cipro  po vancomycin 125 mg q6h for 2 weeks and then q12h for 2 more weeks  recall prn please

## 2021-10-04 NOTE — DIETITIAN INITIAL EVALUATION ADULT. - ADD RECOMMEND
1) Continue current diet order 2) Add ensure enlive TID to diet order 3) Will provide banatrol plus TID ; a nutrition supplement for loose stools and  diarrhea

## 2021-10-05 NOTE — PROGRESS NOTE ADULT - ASSESSMENT
77 yo F with PMHx of Chronic AFib on Eliquis, Pancreatic Ca s/p whipple 8/1/21 (in remission) presents with worsening fatigue and diarrhea.    #Decrease PO intake  - nutrition eval  - calorie count    # Lactic acidosis sec to dehydration -resolved  # Clostridium colitis  - C Diff by PCR Result: Positive (10.02.21 @ 02:47)  - Lactate, Blood: 1.6 mmol/L (09.30.21 @ 15:20)  - Contact Isolation  - c/w vancomycin 125 mg q6h for 2 weeks and then q12h for 2 more weeks    # NO UTI/ pyelonephritis  - urine culture : Culture Results: >100,000 CFU/ml Klebsiella pneumoniae>100,000 CFU/ml Citrobacter freundii (09.30.21 @ 18:40)  - d/c cipro as per  ID     # Hypomagnesemia  - replete Mg    # Hyponatremia-resolved    # Chronic afib- rate  controlled  - c/w cardizem, eliquis    # H/o Pancreatic Ca s/p Whipple   - s/p chemotherapy tx, 6 cycles completed in July  - outpt F/u with oncology    # Full code    # Pending: clinical improvement, calorie count  # Discussed plan of care with patient  # Disposition: Home when stable

## 2021-10-05 NOTE — PROGRESS NOTE ADULT - ASSESSMENT
77 yo F with PMHx of Chronic AFib on Eliquis, Pancreatic Ca s/p whipple 21 (in remission) presents with worsening fatigue and diarrhea. Pt was found to be positive for C. diff. During her previous admission, she was found to have fungemia and was treated with caspofungin with a PICC line completed on 21 and since then, she reports worsening diarrhea.     #C. Diff colitis:  - pt is having about 4-5 watery diarrhea episodes per day  - continue with vancomycin  - tolerating diet well - monitor for increase PO intake   - currently not experiencing abdominal pain  - pt is currently on contact precautions     #Positive UA:  - positive UA   - Urine culture shows multiple resistance   - pt started on cipro on 10/4  - ID consult: recommended d/c cipro  - pt is asymptomatic     #Hypomagnesmia   - Ma.6   - magnesium repletion   - repeat magnesium levels in the AM tomorrow     #Chronic afib  - pt is on cardizem and eliquis - continue with this management     #Pancreatic Cancer s/p Whipple procedure - in remission   - follow up outpatient     #Lactic acidosis - resolved    - secondary to dehydration   - positive for c diff     Dispo: possible discharge tomorrow depending on clinical status

## 2021-10-05 NOTE — PROGRESS NOTE ADULT - SUBJECTIVE AND OBJECTIVE BOX
Patient is a 76y old  Female who presents with a chief complaint of dehydration (03 Oct 2021 13:04)    Patient was seen and examined.  C/o weakness  C/o poor appetite  Has had 5 episodes watery diarrhea today    PAST MEDICAL & SURGICAL HISTORY:  SOB (shortness of breath)  Varicose veins of both lower extremities, unspecified whether complicated  Atrial fibrillation, unspecified type, 2019 on Eliquis  Jaundice, March 5, 2021  Malignant neoplasm of pancreas, unspecified location of malignancy, Pancreatic Cancer  Hypertension, unspecified type, 2019  Kidney stones  History of surgery- Whipple procedure 8/2/2021 with Dr. Nino at [facility]  Status post phlebectomy- 10/2018  History of ovarian cystectomy, left  History of tonsillectomy, 1959    Allergies  Augmentin (Rash)  chocolate (Headache)  digoxin (Hives)  Metoprolol Succinate ER (Hives)  Novocain (Angioedema)  Steroids: Excessive swelling (Flushing; Other (Mild to Mod))  sulfa drugs (Fever)  Xarelto (Hives)    MEDICATIONS  (STANDING):  apixaban 5 milliGRAM(s) Oral two times a day  chlorhexidine 4% Liquid 1 Application(s) Topical <User Schedule>  diltiazem    milliGRAM(s) Oral daily  lactated ringers. 500 milliLiter(s) (100 mL/Hr) IV Continuous <Continuous>  lactated ringers. 1000 milliLiter(s) (60 mL/Hr) IV Continuous <Continuous>  lactobacillus acidophilus 1 Tablet(s) Oral daily  pantoprazole    Tablet 40 milliGRAM(s) Oral before breakfast  vancomycin    Solution 125 milliGRAM(s) Oral every 6 hours    MEDICATIONS  (PRN):    T(C): 35.9 (10-05-21 @ 08:02), Max: 36.4 (10-04-21 @ 21:49)  HR: 94 (10-05-21 @ 08:02) (85 - 108)  BP: 114/69 (10-05-21 @ 08:02) (104/62 - 116/59)  RR: 18 (10-05-21 @ 08:02) (17 - 18)  SpO2: --    O/E:  Awake, alert, not in distress.  HEENT: atraumatic, EOMI.  Chest: decreased breath sounds at base  CVS: SIS2 +, irregular no murmur.  P/A: Soft, BS+  CNS: awake, alert  Ext: no edema feet.  Skin: no rash, no ulcers.  All systems reviewed positive findings as above.                                        13.2   3.79<L> )-----------( 215      ( 05 Oct 2021 06:59 )             40.5                         13.2   4.60<L> )-----------( 238      ( 04 Oct 2021 18:21 )             41.4   10-05    136  |  101  |  14  ----------------------------<  118<H>  4.6   |  22  |  0.6<L>  10-04    135  |  101  |  14  ----------------------------<  131<H>  4.2   |  22  |  0.5<L>    Ca    7.8<L>      05 Oct 2021 06:59  Ca    7.7<L>      04 Oct 2021 07:36  Mg     1.5     10-05    TPro  4.1<L>  /  Alb  2.2<L>  /  TBili  0.7  /  DBili  x   /  AST  22  /  ALT  19  /  AlkPhos  375<H>  10-05  TPro  4.1<L>  /  Alb  2.2<L>  /  TBili  0.6  /  DBili  x   /  AST  18  /  ALT  18  /  AlkPhos  368<H>  10-04

## 2021-10-05 NOTE — PROGRESS NOTE ADULT - SUBJECTIVE AND OBJECTIVE BOX
VIKASH LI 76y Female  MRN#: [id number]   CODE STATUS:____Full code____      SUBJECTIVE  Patient is a 76y old Female who presents with a chief complaint of dehydration (04 Oct 2021 16:19)  Currently admitted to medicine with the primary diagnosis of PNA (pneumonia)      Today is hospital day 5d, and this morning she is experiencing the same amount of diarrhea. States that she is having about 4-5 episodes of diarrhea per day and notes that in addition, she also experienced some dry heaving last night. Denies any vomiting. Notes that she has not been eating much. She states that she only ate yogurt, grilled cheese and soups yesterday.     Denies any urinary symptoms currently.     OBJECTIVE  PAST MEDICAL & SURGICAL HISTORY  SOB (shortness of breath)    Pain  knee    Varicose veins of both lower extremities, unspecified whether complicated    Atrial fibrillation, unspecified type  2019 on Eliquis    Jaundice  March 5, 2021    Malignant neoplasm of pancreas, unspecified location of malignancy  Pancreatic Cancer    Hypertension, unspecified type  2019    Pancreatic cancer    Kidney stones    History of surgery  Whipple procedure 8/2/2021 with Dr. Nino at [facility]    Status post phlebectomy  10/2018    History of ovarian cystectomy  left    History of tonsillectomy  1959    Kidney stone  2017      ALLERGIES:  Augmentin (Rash)  chocolate (Headache)  digoxin (Hives)  Metoprolol Succinate ER (Hives)  Novocain (Angioedema)  Steroids: Excessive swelling (Flushing; Other (Mild to Mod))  sulfa drugs (Fever)  Xarelto (Hives)    MEDICATIONS:  STANDING MEDICATIONS  apixaban 5 milliGRAM(s) Oral two times a day  chlorhexidine 4% Liquid 1 Application(s) Topical <User Schedule>  diltiazem    milliGRAM(s) Oral daily  lactated ringers. 500 milliLiter(s) IV Continuous <Continuous>  lactated ringers. 1000 milliLiter(s) IV Continuous <Continuous>  lactobacillus acidophilus 1 Tablet(s) Oral daily  magnesium oxide 400 milliGRAM(s) Oral three times a day with meals  pantoprazole    Tablet 40 milliGRAM(s) Oral before breakfast  vancomycin    Solution 125 milliGRAM(s) Oral every 6 hours    PRN MEDICATIONS      VITAL SIGNS: Last 24 Hours  T(C): 35.9 (05 Oct 2021 08:02), Max: 36.4 (04 Oct 2021 21:49)  T(F): 96.6 (05 Oct 2021 08:02), Max: 97.6 (04 Oct 2021 21:49)  HR: 94 (05 Oct 2021 08:02) (85 - 108)  BP: 114/69 (05 Oct 2021 08:02) (104/62 - 116/59)  BP(mean): --  RR: 18 (05 Oct 2021 08:02) (17 - 18)  SpO2: --    LABS:                        13.2   3.79  )-----------( 215      ( 05 Oct 2021 06:59 )             40.5     10-05    136  |  101  |  14  ----------------------------<  118<H>  4.6   |  22  |  0.6<L>    Ca    7.8<L>      05 Oct 2021 06:59  Mg     1.5     10-05    TPro  4.1<L>  /  Alb  2.2<L>  /  TBili  0.7  /  DBili  x   /  AST  22  /  ALT  19  /  AlkPhos  375<H>  10-05    RADIOLOGY:      PHYSICAL EXAM:    GENERAL: NAD, well-developed, AAOx3  HEENT:  Atraumatic, Normocephalic. EOMI, PERRLA, conjunctiva and sclera clear, No JVD  PULMONARY: Clear to auscultation bilaterally; No wheeze  CARDIOVASCULAR: Regular rate and rhythm; No murmurs, rubs, or gallops  GASTROINTESTINAL: Soft, Nontender, Nondistended; Bowel sounds present  MUSCULOSKELETAL:  2+ Peripheral Pulses, No clubbing, cyanosis, or edema  NEUROLOGY: non-focal  SKIN: No rashes or lesions

## 2021-10-06 NOTE — PROGRESS NOTE ADULT - SUBJECTIVE AND OBJECTIVE BOX
LI, VIKASH  76y  [facility] [id number] A      Patient is a 76y old  Female who presents with a chief complaint of dehydration (06 Oct 2021 10:54)      INTERVAL HPI/OVERNIGHT EVENTS:      still complaining of loose frequent bowel movements   REVIEW OF SYSTEMS:  CONSTITUTIONAL: +Fatigue   EYES: No eye pain, visual disturbances, or discharge  ENMT:  No difficulty hearing, tinnitus, vertigo; No sinus or throat pain  NECK: No pain or stiffness  BREASTS: No pain, masses, or nipple discharge  RESPIRATORY: No cough, wheezing, chills or hemoptysis; No shortness of breath  CARDIOVASCULAR: No chest pain, palpitations, dizziness, or leg swelling  GASTROINTESTINAL:+ diarrhea   GENITOURINARY: No dysuria, frequency, hematuria, or incontinence  NEUROLOGICAL: No headaches, memory loss, loss of strength, numbness, or tremors  SKIN: No itching, burning, rashes, or lesions   LYMPH NODES: No enlarged glands  ENDOCRINE: No heat or cold intolerance; No hair loss  MUSCULOSKELETAL: No joint pain or swelling; No muscle, back, or extremity pain  PSYCHIATRIC: No depression, anxiety, mood swings, or difficulty sleeping  HEME/LYMPH: No easy bruising, or bleeding gums  ALLERY AND IMMUNOLOGIC: No hives or eczema  FAMILY HISTORY:  CAD (coronary artery disease) (Sibling)  3  siblings ( 2 living and 1 passed away)      T(C): 36.5 (10-06-21 @ 08:03), Max: 36.5 (10-06-21 @ 08:03)  HR: 50 (10-06-21 @ 08:03) (50 - 102)  BP: 102/66 (10-06-21 @ 08:03) (101/68 - 119/63)  RR: 18 (10-06-21 @ 08:03) (17 - 18)  SpO2: --  Wt(kg): --Vital Signs Last 24 Hrs  T(C): 36.5 (06 Oct 2021 08:03), Max: 36.5 (06 Oct 2021 08:03)  T(F): 97.7 (06 Oct 2021 08:03), Max: 97.7 (06 Oct 2021 08:03)  HR: 50 (06 Oct 2021 08:03) (50 - 102)  BP: 102/66 (06 Oct 2021 08:03) (101/68 - 119/63)  BP(mean): --  RR: 18 (06 Oct 2021 08:03) (17 - 18)  SpO2: --    PHYSICAL EXAM:  GENERAL: NAD, thin  HEAD:  Atraumatic, Normocephalic  EYES: EOMI, PERRLA, conjunctiva and sclera clear  ENMT: No tonsillar erythema, exudates, or enlargement; Moist mucous membranes, Good dentition, No lesions  NECK: Supple, No JVD, Normal thyroid  NERVOUS SYSTEM:  Alert & Oriented X3, Good concentration; Motor Strength 5/5 B/L upper and lower extremities; DTRs 2+ intact and symmetric  PULM: Clear to auscultation bilaterally  CARDIAC: Regular rate and rhythm; No murmurs, rubs, or gallops  GI: Soft, Nontender, Nondistended; Bowel sounds present  EXTREMITIES:  2+ Peripheral Pulses, mild edema on bl antecubital fossa   LYMPH: No lymphadenopathy noted  SKIN: No rashes or lesions    Consultant(s) Notes Reviewed:  [x ] YES  [ ] NO  Care Discussed with Consultants/Other Providers [ x] YES  [ ] NO    LABS:                            13.1   4.43  )-----------( 217      ( 06 Oct 2021 05:25 )             41.1   10-06    132<L>  |  99  |  13  ----------------------------<  114<H>  4.6   |  24  |  0.5<L>    Ca    7.7<L>      06 Oct 2021 05:25  Mg     1.6     10-06    TPro  4.1<L>  /  Alb  2.2<L>  /  TBili  0.7  /  DBili  x   /  AST  25  /  ALT  21  /  AlkPhos  413<H>  10-06            apixaban 5 milliGRAM(s) Oral two times a day  chlorhexidine 4% Liquid 1 Application(s) Topical <User Schedule>  diltiazem    milliGRAM(s) Oral daily  lactated ringers. 500 milliLiter(s) IV Continuous <Continuous>  lactated ringers. 1000 milliLiter(s) IV Continuous <Continuous>  lactobacillus acidophilus 1 Tablet(s) Oral daily  pantoprazole    Tablet 40 milliGRAM(s) Oral before breakfast  vancomycin    Solution 125 milliGRAM(s) Oral every 6 hours      HEALTH ISSUES - PROBLEM Dx:          Case Discussed with House Staff   45 minutes spent on total encounter; more than 50% of the visit was spent counseling and/or coordinating care by the attending physician.   Spectra [phone]

## 2021-10-06 NOTE — DISCHARGE NOTE NURSING/CASE MANAGEMENT/SOCIAL WORK - PATIENT PORTAL LINK FT
You can access the FollowMyHealth Patient Portal offered by [facility] by registering at the following website: [email or web address]. By joining [facility]’s FollowMyHealth portal, you will also be able to view your health information using other applications (apps) compatible with our system.

## 2021-10-06 NOTE — PROGRESS NOTE ADULT - SUBJECTIVE AND OBJECTIVE BOX
VIKASH LI 76y Female  MRN#: [id number]   CODE STATUS:________      SUBJECTIVE  Patient is a 76y old Female who presents with a chief complaint of dehydration (05 Oct 2021 14:19)  Currently admitted to medicine with the primary diagnosis of PNA (pneumonia)      Today is hospital day 6d, and this morning she is           OBJECTIVE  PAST MEDICAL & SURGICAL HISTORY  SOB (shortness of breath)    Pain  knee    Varicose veins of both lower extremities, unspecified whether complicated    Atrial fibrillation, unspecified type  2019 on Eliquis    Jaundice  March 5, 2021    Malignant neoplasm of pancreas, unspecified location of malignancy  Pancreatic Cancer    Hypertension, unspecified type  2019    Pancreatic cancer    Kidney stones    History of surgery  Whipple procedure 8/2/2021 with Dr. Nino at [facility]    Status post phlebectomy  10/2018    History of ovarian cystectomy  left    History of tonsillectomy  1959    Kidney stone  2017      ALLERGIES:  Augmentin (Rash)  chocolate (Headache)  digoxin (Hives)  Metoprolol Succinate ER (Hives)  Novocain (Angioedema)  Steroids: Excessive swelling (Flushing; Other (Mild to Mod))  sulfa drugs (Fever)  Xarelto (Hives)    MEDICATIONS:  STANDING MEDICATIONS  apixaban 5 milliGRAM(s) Oral two times a day  chlorhexidine 4% Liquid 1 Application(s) Topical <User Schedule>  diltiazem    milliGRAM(s) Oral daily  lactated ringers. 500 milliLiter(s) IV Continuous <Continuous>  lactated ringers. 1000 milliLiter(s) IV Continuous <Continuous>  lactobacillus acidophilus 1 Tablet(s) Oral daily  pantoprazole    Tablet 40 milliGRAM(s) Oral before breakfast  vancomycin    Solution 125 milliGRAM(s) Oral every 6 hours    PRN MEDICATIONS      VITAL SIGNS: Last 24 Hours  T(C): 36.5 (06 Oct 2021 08:03), Max: 36.5 (06 Oct 2021 08:03)  T(F): 97.7 (06 Oct 2021 08:03), Max: 97.7 (06 Oct 2021 08:03)  HR: 50 (06 Oct 2021 08:03) (50 - 102)  BP: 102/66 (06 Oct 2021 08:03) (101/68 - 119/63)  BP(mean): --  RR: 18 (06 Oct 2021 08:03) (17 - 18)  SpO2: --    LABS:                        13.1   4.43  )-----------( 217      ( 06 Oct 2021 05:25 )             41.1     10-06    132<L>  |  99  |  13  ----------------------------<  114<H>  4.6   |  24  |  0.5<L>    Ca    7.7<L>      06 Oct 2021 05:25  Mg     1.6     10-06    TPro  4.1<L>  /  Alb  2.2<L>  /  TBili  0.7  /  DBili  x   /  AST  25  /  ALT  21  /  AlkPhos  413<H>  10-06                  RADIOLOGY:      PHYSICAL EXAM:    GENERAL: NAD, well-developed, AAOx3  HEENT:  Atraumatic, Normocephalic. EOMI, PERRLA, conjunctiva and sclera clear, No JVD  PULMONARY: Clear to auscultation bilaterally; No wheeze  CARDIOVASCULAR: Regular rate and rhythm; No murmurs, rubs, or gallops  GASTROINTESTINAL: Soft, Nontender, Nondistended; Bowel sounds present  MUSCULOSKELETAL:  2+ Peripheral Pulses, No clubbing, cyanosis, or edema  NEUROLOGY: non-focal  SKIN: No rashes or lesions       VIKASH LI 76y Female  MRN#: [id number]   CODE STATUS:________      SUBJECTIVE  Patient is a 76y old Female who presents with a chief complaint of dehydration (05 Oct 2021 14:19)  Currently admitted to medicine with the primary diagnosis of PNA (pneumonia)      Today is hospital day 6d, and this morning she is           OBJECTIVE  PAST MEDICAL & SURGICAL HISTORY  SOB (shortness of breath)    Pain  knee    Varicose veins of both lower extremities, unspecified whether complicated    Atrial fibrillation, unspecified type  2019 on Eliquis    Jaundice  March 5, 2021    Malignant neoplasm of pancreas, unspecified location of malignancy  Pancreatic Cancer    Hypertension, unspecified type  2019    Pancreatic cancer    Kidney stones    History of surgery  Whipple procedure 8/2/2021 with Dr. Nino at [facility]    Status post phlebectomy  10/2018    History of ovarian cystectomy  left    History of tonsillectomy  1959    Kidney stone  2017      ALLERGIES:  Augmentin (Rash)  chocolate (Headache)  digoxin (Hives)  Metoprolol Succinate ER (Hives)  Novocain (Angioedema)  Steroids: Excessive swelling (Flushing; Other (Mild to Mod))  sulfa drugs (Fever)  Xarelto (Hives)    MEDICATIONS:  STANDING MEDICATIONS  apixaban 5 milliGRAM(s) Oral two times a day  chlorhexidine 4% Liquid 1 Application(s) Topical <User Schedule>  diltiazem    milliGRAM(s) Oral daily  lactated ringers. 500 milliLiter(s) IV Continuous <Continuous>  lactated ringers. 1000 milliLiter(s) IV Continuous <Continuous>  lactobacillus acidophilus 1 Tablet(s) Oral daily  pantoprazole    Tablet 40 milliGRAM(s) Oral before breakfast  vancomycin    Solution 125 milliGRAM(s) Oral every 6 hours    PRN MEDICATIONS      VITAL SIGNS: Last 24 Hours  T(C): 36.5 (06 Oct 2021 08:03), Max: 36.5 (06 Oct 2021 08:03)  T(F): 97.7 (06 Oct 2021 08:03), Max: 97.7 (06 Oct 2021 08:03)  HR: 50 (06 Oct 2021 08:03) (50 - 102)  BP: 102/66 (06 Oct 2021 08:03) (101/68 - 119/63)  BP(mean): --  RR: 18 (06 Oct 2021 08:03) (17 - 18)  SpO2: --    LABS:                        13.1   4.43  )-----------( 217      ( 06 Oct 2021 05:25 )             41.1     10-06    132<L>  |  99  |  13  ----------------------------<  114<H>  4.6   |  24  |  0.5<L>    Ca    7.7<L>      06 Oct 2021 05:25  Mg     1.6     10-06    TPro  4.1<L>  /  Alb  2.2<L>  /  TBili  0.7  /  DBili  x   /  AST  25  /  ALT  21  /  AlkPhos  413<H>  10-06      RADIOLOGY:      PHYSICAL EXAM:    GENERAL: NAD, well-developed, AAOx3  HEENT:  Atraumatic, Normocephalic. EOMI, PERRLA, conjunctiva and sclera clear, No JVD  PULMONARY: Clear to auscultation bilaterally; No wheeze  CARDIOVASCULAR: Regular rate and rhythm; No murmurs, rubs, or gallops  GASTROINTESTINAL: Soft, Nontender, Nondistended; Bowel sounds present  MUSCULOSKELETAL:  2+ Peripheral Pulses, No clubbing, cyanosis, or edema  NEUROLOGY: non-focal  SKIN: No rashes or lesions       VIKASH LI 76y Female  MRN#: [id number]   CODE STATUS:________      SUBJECTIVE  Patient is a 76y old Female who presents with a chief complaint of dehydration (05 Oct 2021 14:19)  Currently admitted to medicine with the primary diagnosis of PNA (pneumonia)      Today is hospital day 6d, and this morning she is stating that her diarrhea episodes are unchanged. NOtes that she is still having about 4-5 episodes of watery, loose diarrhea. Denies any abdominal pain, nausea, vomiting. Notes that she is also having decreased PO intake. States that she is only eating about 25% of her meals due to her not being able to taste her food after having COVID.    Pt also notes that she has been experiencing bilateral arm swelling. States that it started one day ago, denies any trauma to her arms, denies having a recent Iv in place for the past 2 days. States that the swelling is only present from mid forearm to mid upper arm. She reported attempting to keep arms elevated but denies any improvement.      OBJECTIVE  PAST MEDICAL & SURGICAL HISTORY  SOB (shortness of breath)    Pain  knee    Varicose veins of both lower extremities, unspecified whether complicated    Atrial fibrillation, unspecified type  2019 on Eliquis    Jaundice  March 5, 2021    Malignant neoplasm of pancreas, unspecified location of malignancy  Pancreatic Cancer    Hypertension, unspecified type  2019    Pancreatic cancer    Kidney stones    History of surgery  Whipple procedure 8/2/2021 with Dr. Nino at [facility]    Status post phlebectomy  10/2018    History of ovarian cystectomy  left    History of tonsillectomy  1959    Kidney stone  2017      ALLERGIES:  Augmentin (Rash)  chocolate (Headache)  digoxin (Hives)  Metoprolol Succinate ER (Hives)  Novocain (Angioedema)  Steroids: Excessive swelling (Flushing; Other (Mild to Mod))  sulfa drugs (Fever)  Xarelto (Hives)    MEDICATIONS:  STANDING MEDICATIONS  apixaban 5 milliGRAM(s) Oral two times a day  chlorhexidine 4% Liquid 1 Application(s) Topical <User Schedule>  diltiazem    milliGRAM(s) Oral daily  lactated ringers. 500 milliLiter(s) IV Continuous <Continuous>  lactated ringers. 1000 milliLiter(s) IV Continuous <Continuous>  lactobacillus acidophilus 1 Tablet(s) Oral daily  pantoprazole    Tablet 40 milliGRAM(s) Oral before breakfast  vancomycin    Solution 125 milliGRAM(s) Oral every 6 hours    PRN MEDICATIONS      VITAL SIGNS: Last 24 Hours  T(C): 36.5 (06 Oct 2021 08:03), Max: 36.5 (06 Oct 2021 08:03)  T(F): 97.7 (06 Oct 2021 08:03), Max: 97.7 (06 Oct 2021 08:03)  HR: 50 (06 Oct 2021 08:03) (50 - 102)  BP: 102/66 (06 Oct 2021 08:03) (101/68 - 119/63)  BP(mean): --  RR: 18 (06 Oct 2021 08:03) (17 - 18)  SpO2: --    LABS:                        13.1   4.43  )-----------( 217      ( 06 Oct 2021 05:25 )             41.1     10-06    132<L>  |  99  |  13  ----------------------------<  114<H>  4.6   |  24  |  0.5<L>    Ca    7.7<L>      06 Oct 2021 05:25  Mg     1.6     10-06    TPro  4.1<L>  /  Alb  2.2<L>  /  TBili  0.7  /  DBili  x   /  AST  25  /  ALT  21  /  AlkPhos  413<H>  10-06      RADIOLOGY:      PHYSICAL EXAM:    GENERAL: NAD, well-developed, AAOx3  HEENT:  Atraumatic, Normocephalic. EOMI, PERRLA, conjunctiva and sclera clear, No JVD  PULMONARY: Clear to auscultation bilaterally; No wheeze  CARDIOVASCULAR: Regular rate and rhythm; No murmurs, rubs, or gallops  GASTROINTESTINAL: Soft, Nontender, Nondistended; Bowel sounds present  MUSCULOSKELETAL:  2+ Peripheral Pulses, No clubbing, cyanosis, or edema  NEUROLOGY: non-focal  SKIN: No rashes or lesions  EXTREMITY: +bilateral swelling from mid forearm to mid arm, +pitting to left arm, pulses present

## 2021-10-06 NOTE — PROGRESS NOTE ADULT - ASSESSMENT
HPI:  75 yo F with PMHx of Chronic AFib on Eliquis, Pancreatic Ca s/p whipple 8/1/21 (in remission) presents with worsening fatigue and diarrhea.  Pt recently admitted for fungemia, treated with IV ABx via PICC line, completed 9/21. She states that she has been having watery stools ever since her Whipple procedure, however, since the completion of her antifungal tx, the diarrhea has become more frequent and more severe. Reports watery stools immediately after any PO intake. Primarily eats a limited amount of bland foods due to poor appetite and minimal tolerance. Denies any blood or mucus in stools. Denies having abdominal pain but did feel slight discomfort on presentation to ED. Also reports nausea for past 1 day and vomited day prior after eating. Was advised by Dr. Stone to take Imodium, which she reports slightly reduced diarrhea.  In the ED, T 96.7, HR 72, /72, RR 20, SpO2 99% on RA. Lab work revealed Lactate 2.7, positive UA. CT Abd revealed generalized anasarca with presence of ascites within abdomen; moderate R sided and trace L sided pleural effusion. Given Vancomycin, Aztreonam and 1.5 LR fluid bolus in ED.  (30 Sep 2021 20:25)    #Acute diarrhea secondary to C dificile   on vancomycin   appreciate id consult     #Poor po intake   nutrition consult appreciated     #Pancreatic cancer sp rafal   dw dr stone will start creon     #Chronic atrial fibrillation rate controlled on eliquis     #Bilateral antecubital edema - duplex     #Hypomagnesemia - replete and check in am     # Hyponatremia - no intervention for now     #Prediabetes monitor finger sticks     PROGRESS NOTE HANDOFF    Pending:  monitor bowel movements , continue with vancomycin , monitor electrolytes     Family discussion: patient verbalized understanding and agreeable to plan of care     Disposition: Home

## 2021-10-07 NOTE — PROGRESS NOTE ADULT - SUBJECTIVE AND OBJECTIVE BOX
VIKASH LI 76y Female  MRN#: [id number]   CODE STATUS:__Full code______      SUBJECTIVE  Patient is a 76y old Female who presents with a chief complaint of dehydration (06 Oct 2021 14:58)  Currently admitted to medicine with the primary diagnosis of PNA (pneumonia)      Today is hospital day 7d, and this morning she is stating that she feels better. Notes that she is still having the same amount of diarrhea episodes (4 episodes per day), but notes improvement in urgency to pass bowels.     Notes that her arms are still swollen, but denies any pain in the area. States that she has full range of motion. She reports that it has not worsened or improved, but stayed the same for the past 2 days. She attempted to keep her arms elevated, but notes that the room was too cold to leave her arms outside of her blanket. Denies any recent trauma to arms      OBJECTIVE  PAST MEDICAL & SURGICAL HISTORY  SOB (shortness of breath)    Pain  knee    Varicose veins of both lower extremities, unspecified whether complicated    Atrial fibrillation, unspecified type  2019 on Eliquis    Jaundice  March 5, 2021    Malignant neoplasm of pancreas, unspecified location of malignancy  Pancreatic Cancer    Hypertension, unspecified type  2019    Pancreatic cancer    Kidney stones    History of surgery  Whipple procedure 8/2/2021 with Dr. Nino at [facility]    Status post phlebectomy  10/2018    History of ovarian cystectomy  left    History of tonsillectomy  1959    Kidney stone  2017      ALLERGIES:  Augmentin (Rash)  chocolate (Headache)  digoxin (Hives)  Metoprolol Succinate ER (Hives)  Novocain (Angioedema)  Steroids: Excessive swelling (Flushing; Other (Mild to Mod))  sulfa drugs (Fever)  Xarelto (Hives)    MEDICATIONS:  STANDING MEDICATIONS  apixaban 5 milliGRAM(s) Oral two times a day  chlorhexidine 4% Liquid 1 Application(s) Topical <User Schedule>  diltiazem    milliGRAM(s) Oral daily  lactated ringers. 1000 milliLiter(s) IV Continuous <Continuous>  lactated ringers. 500 milliLiter(s) IV Continuous <Continuous>  lactobacillus acidophilus 1 Tablet(s) Oral daily  pancrelipase  (CREON 12,000 Lipase Units) 1 Capsule(s) Oral three times a day with meals  pantoprazole    Tablet 40 milliGRAM(s) Oral before breakfast  vancomycin    Solution 125 milliGRAM(s) Oral every 6 hours    PRN MEDICATIONS      VITAL SIGNS: Last 24 Hours  T(C): 35.9 (06 Oct 2021 23:13), Max: 36.7 (06 Oct 2021 15:31)  T(F): 96.6 (06 Oct 2021 23:13), Max: 98.1 (06 Oct 2021 15:31)  HR: 97 (06 Oct 2021 23:13) (50 - 97)  BP: 103/63 (06 Oct 2021 23:13) (102/64 - 103/63)  BP(mean): --  RR: 17 (06 Oct 2021 23:13) (17 - 18)  SpO2: --    LABS:                        13.1   4.43  )-----------( 217      ( 06 Oct 2021 05:25 )             41.1     10-06    132<L>  |  99  |  13  ----------------------------<  114<H>  4.6   |  24  |  0.5<L>    Ca    7.7<L>      06 Oct 2021 05:25  Mg     1.6     10-06    TPro  4.1<L>  /  Alb  2.2<L>  /  TBili  0.7  /  DBili  x   /  AST  25  /  ALT  21  /  AlkPhos  413<H>  10-06      RADIOLOGY:    Duplex, Bilateral Upper Arms:   Impression:  No evidence of deep or superficial thrombosis in the bilateral upper extremities.    PHYSICAL EXAM:    GENERAL: NAD, well-developed, AAOx3  HEENT:  Atraumatic, Normocephalic. EOMI, PERRLA, conjunctiva and sclera clear, No JVD  PULMONARY: Clear to auscultation bilaterally; No wheeze  CARDIOVASCULAR: Regular rate and rhythm; No murmurs, rubs, or gallops  GASTROINTESTINAL: Soft, Nontender, Nondistended; Bowel sounds present, +healing scar from previous abdominal surgery  MUSCULOSKELETAL:  2+ Peripheral Pulses, No clubbing, cyanosis, or edema  NEUROLOGY: non-focal  SKIN: +ecchymosis seen on upper extremities   EXTREMITY: +Bilateral antecubital region edema with mild pitting of upper extremities, full range of motion to bilateral arms        VIKASH LI 76y Female  MRN#: [id number]   CODE STATUS:__Full code______      SUBJECTIVE  Patient is a 76y old Female who presents with a chief complaint of dehydration (06 Oct 2021 14:58)  Currently admitted to medicine with the primary diagnosis of PNA (pneumonia)      Today is hospital day 7d, and this morning she is stating that she feels better. Notes that she is still having the same amount of diarrhea episodes (4 episodes per day), but notes improvement in urgency to pass bowels. Notes that her bowel movements have also been more formed since introducing Banatrol to meals.     Notes that her arms are still swollen, but denies any pain in the area. States that she has full range of motion. She reports that it has not worsened or improved, but stayed the same for the past 2 days. She attempted to keep her arms elevated, but notes that the room was too cold to leave her arms outside of her blanket. Denies any recent trauma to arms      OBJECTIVE  PAST MEDICAL & SURGICAL HISTORY  SOB (shortness of breath)    Pain  knee    Varicose veins of both lower extremities, unspecified whether complicated    Atrial fibrillation, unspecified type  2019 on Eliquis    Jaundice  March 5, 2021    Malignant neoplasm of pancreas, unspecified location of malignancy  Pancreatic Cancer    Hypertension, unspecified type  2019    Pancreatic cancer    Kidney stones    History of surgery  Whipple procedure 8/2/2021 with Dr. Nino at [facility]    Status post phlebectomy  10/2018    History of ovarian cystectomy  left    History of tonsillectomy  1959    Kidney stone  2017      ALLERGIES:  Augmentin (Rash)  chocolate (Headache)  digoxin (Hives)  Metoprolol Succinate ER (Hives)  Novocain (Angioedema)  Steroids: Excessive swelling (Flushing; Other (Mild to Mod))  sulfa drugs (Fever)  Xarelto (Hives)    MEDICATIONS:  STANDING MEDICATIONS  apixaban 5 milliGRAM(s) Oral two times a day  chlorhexidine 4% Liquid 1 Application(s) Topical <User Schedule>  diltiazem    milliGRAM(s) Oral daily  lactated ringers. 1000 milliLiter(s) IV Continuous <Continuous>  lactated ringers. 500 milliLiter(s) IV Continuous <Continuous>  lactobacillus acidophilus 1 Tablet(s) Oral daily  pancrelipase  (CREON 12,000 Lipase Units) 1 Capsule(s) Oral three times a day with meals  pantoprazole    Tablet 40 milliGRAM(s) Oral before breakfast  vancomycin    Solution 125 milliGRAM(s) Oral every 6 hours    PRN MEDICATIONS      VITAL SIGNS: Last 24 Hours  T(C): 35.9 (06 Oct 2021 23:13), Max: 36.7 (06 Oct 2021 15:31)  T(F): 96.6 (06 Oct 2021 23:13), Max: 98.1 (06 Oct 2021 15:31)  HR: 97 (06 Oct 2021 23:13) (50 - 97)  BP: 103/63 (06 Oct 2021 23:13) (102/64 - 103/63)  BP(mean): --  RR: 17 (06 Oct 2021 23:13) (17 - 18)  SpO2: --    LABS:                        13.1   4.43  )-----------( 217      ( 06 Oct 2021 05:25 )             41.1     10-06    132<L>  |  99  |  13  ----------------------------<  114<H>  4.6   |  24  |  0.5<L>    Ca    7.7<L>      06 Oct 2021 05:25  Mg     1.6     10-06    TPro  4.1<L>  /  Alb  2.2<L>  /  TBili  0.7  /  DBili  x   /  AST  25  /  ALT  21  /  AlkPhos  413<H>  10-06      RADIOLOGY:    Duplex, Bilateral Upper Arms:   Impression:  No evidence of deep or superficial thrombosis in the bilateral upper extremities.    PHYSICAL EXAM:    GENERAL: NAD, well-developed, AAOx3  HEENT:  Atraumatic, Normocephalic. EOMI, PERRLA, conjunctiva and sclera clear, No JVD  PULMONARY: Clear to auscultation bilaterally; No wheeze  CARDIOVASCULAR: Regular rate and rhythm; No murmurs, rubs, or gallops  GASTROINTESTINAL: Soft, Nontender, Nondistended; Bowel sounds present, +healing scar from previous abdominal surgery  MUSCULOSKELETAL:  2+ Peripheral Pulses, No clubbing, cyanosis, or edema  NEUROLOGY: non-focal  SKIN: +ecchymosis seen on upper extremities   EXTREMITY: +Bilateral antecubital region edema with mild pitting of upper extremities, full range of motion to bilateral arms

## 2021-10-07 NOTE — PROGRESS NOTE ADULT - ASSESSMENT
75 yo F with PMHx of Chronic AFib on Eliquis, Pancreatic Ca s/p whipple 21 (in remission) presents with worsening fatigue and diarrhea. Pt was found to be positive for C. diff. During her previous admission, she was found to have fungemia and was treated with caspofungin with a PICC line completed on 21 and since then, she reports worsening diarrhea.     #Bilateral arm swelling:   - arm swelling present from mid arm to mid forearm  - Bilateral arm Duplex results; Impression: No evidence of deep or superficial thrombosis in the bilateral upper extremities.  - applied warm compresses to arms  - encouraged constant elevation of arms  - will monitor      #C. Diff colitis:  - pt is having about 4-5 watery diarrhea episodes per day  - continue with vancomycin 125mg q6h as per ID recommendations   - currently not experiencing abdominal pain  - pt is currently on contact precautions     #Decreased PO Intake:  #Malnutrition:  - as per dietician: 1) Continue current diet order 2) Add ensure enlive TID to diet order 3) Will provide banatrol plus TID ; a nutrition supplement for loose stools and diarrhea  - calorie count   - monitor PO intake for increase to about 75%  - pt is drinking well   - pt may be malabsorbing food given hx of Whipple procedure - discussed with Dr. Nino who recommends adding Creon [id number] TID with meals     #Positive UA:  - positive UA   - Urine culture shows multiple resistance   - pt started on cipro on 10/4  - ID consult: recommended d/c cipro  - pt is asymptomatic     #Hypomagnesmia   - Ma.6   - magnesium repletion needed but due to bilateral arm swelling, pt is refusing IV mag repletion  - oral mag oxalate not given due to possibility of increased diarrhea episodes   - repeat magnesium levels in the AM tomorrow   - pt is refusing IV access given arm swelling, will continue to monitor     #Chronic afib  - pt is on cardizem and eliquis - continue with this management     #Pancreatic Cancer s/p Whipple procedure - in remission   - follow up outpatient   - added Creon to medication regimen     #Lactic acidosis - resolved    - secondary to dehydration   - positive for c diff    75 yo F with PMHx of Chronic AFib on Eliquis, Pancreatic Ca s/p whipple 21 (in remission) presents with worsening fatigue and diarrhea. Pt was found to be positive for C. diff. During her previous admission, she was found to have fungemia and was treated with caspofungin with a PICC line completed on 21 and since then, she reports worsening diarrhea.     #Bilateral arm swelling:   - arm swelling present from mid arm to mid forearm  - Bilateral arm Duplex results; Impression: No evidence of deep or superficial thrombosis in the bilateral upper extremities.  - applied warm compresses to arms  - encouraged constant elevation of arms  - will monitor      #C. Diff colitis:  - pt is having about 4-5 watery diarrhea episodes per day  - continue with vancomycin 125mg q6h as per ID recommendations   - currently not experiencing abdominal pain  - pt is currently on contact precautions   - pt reports improvement to symptoms, stools are more formed and has less urgency     #Decreased PO Intake:  #Malnutrition:  - as per dietician: 1) Continue current diet order 2) Add ensure enlive TID to diet order 3) Will provide banatrol plus TID ; a nutrition supplement for loose stools and diarrhea  - calorie count   - monitor PO intake for increase to about 75%  - pt is drinking well   - pt may be malabsorbing food given hx of Whipple procedure - discussed with Dr. Nino who recommends adding Creon [id number] TID with meals     #Positive UA:  - positive UA   - Urine culture shows multiple resistance   - pt started on cipro on 10/4  - ID consult: recommended d/c cipro  - pt is asymptomatic     #Hypomagnesmia   - Ma.6   - magnesium repletion needed but due to bilateral arm swelling, pt is refusing IV mag repletion  - oral mag oxalate not given due to possibility of increased diarrhea episodes   - repeat magnesium levels in the AM tomorrow   - pt is refusing IV access given arm swelling, will continue to monitor     #Chronic afib  - pt is on cardizem and eliquis - continue with this management     #Pancreatic Cancer s/p Whipple procedure - in remission   - follow up outpatient   - added Creon to medication regimen     #Lactic acidosis - resolved    - secondary to dehydration   - positive for c diff

## 2021-10-07 NOTE — PROGRESS NOTE ADULT - SUBJECTIVE AND OBJECTIVE BOX
LI, VIKASH  76y  [id number]      Patient is a 76y old  Female who presents with a chief complaint of dehydration (07 Oct 2021 07:52)      INTERVAL HPI/OVERNIGHT EVENTS:    ate ensure yesterday , and has more formed stool    REVIEW OF SYSTEMS:  CONSTITUTIONAL: No fever, weight loss, or fatigue  EYES: No eye pain, visual disturbances, or discharge  ENMT:  No difficulty hearing, tinnitus, vertigo; No sinus or throat pain  NECK: No pain or stiffness  BREASTS: No pain, masses, or nipple discharge  RESPIRATORY: No cough, wheezing, chills or hemoptysis; No shortness of breath  CARDIOVASCULAR: No chest pain, palpitations, dizziness, or leg swelling  GASTROINTESTINAL: 4-5 bowel movements per day , and more formed   GENITOURINARY: No dysuria, frequency, hematuria, or incontinence  NEUROLOGICAL: No headaches, memory loss, loss of strength, numbness, or tremors  SKIN: No itching, burning, rashes, or lesions   LYMPH NODES: No enlarged glands  ENDOCRINE: No heat or cold intolerance; No hair loss  MUSCULOSKELETAL: No joint pain or swelling; No muscle, back, or extremity pain  PSYCHIATRIC: No depression, anxiety, mood swings, or difficulty sleeping  HEME/LYMPH: No easy bruising, or bleeding gums  ALLERY AND IMMUNOLOGIC: No hives or eczema  FAMILY HISTORY:  CAD (coronary artery disease) (Sibling)  3  siblings ( 2 living and 1 passed away)      T(C): 35.6 (10-07-21 @ 07:58), Max: 36.7 (10-06-21 @ 15:31)  HR: 97 (10-07-21 @ 07:58) (79 - 97)  BP: 111/79 (10-07-21 @ 07:58) (102/64 - 111/79)  RR: 18 (10-07-21 @ 07:58) (17 - 18)  SpO2: --  Wt(kg): --Vital Signs Last 24 Hrs  T(C): 35.6 (07 Oct 2021 07:58), Max: 36.7 (06 Oct 2021 15:31)  T(F): 96 (07 Oct 2021 07:58), Max: 98.1 (06 Oct 2021 15:31)  HR: 97 (07 Oct 2021 07:58) (79 - 97)  BP: 111/79 (07 Oct 2021 07:58) (102/64 - 111/79)  BP(mean): --  RR: 18 (07 Oct 2021 07:58) (17 - 18)  SpO2: --    PHYSICAL EXAM:  GEN Lying in no acute distress  HEENT Pupils equal and reactive to light and accommodationSupple Neck  PULM Clear to auscultation bilaterally  CV s1s2 regular rate and rhythm  GI + bowel sounds nontnender  EXT no cyanosis or edema  PSYCH awake alert and oriented x 3  INTEG No Lesions  NEURO Moves all extremities    Consultant(s) Notes Reviewed:  [x ] YES  [ ] NO  Care Discussed with Consultants/Other Providers [ x] YES  [ ] NO    LABS:                            13.6   4.91  )-----------( 216      ( 07 Oct 2021 09:23 )             42.6   10-07    134<L>  |  102  |  12  ----------------------------<  138<H>  4.9   |  20  |  0.5<L>    Ca    7.6<L>      07 Oct 2021 09:23  Phos  2.7     10-07  Mg     1.6     10-07    TPro  4.3<L>  /  Alb  2.2<L>  /  TBili  0.7  /  DBili  x   /  AST  48<H>  /  ALT  27  /  AlkPhos  541<H>  10-07            apixaban 5 milliGRAM(s) Oral two times a day  chlorhexidine 4% Liquid 1 Application(s) Topical <User Schedule>  diltiazem    milliGRAM(s) Oral daily  lactated ringers. 500 milliLiter(s) IV Continuous <Continuous>  lactated ringers. 1000 milliLiter(s) IV Continuous <Continuous>  lactobacillus acidophilus 1 Tablet(s) Oral daily  pancrelipase  (CREON 12,000 Lipase Units) 1 Capsule(s) Oral three times a day with meals  pantoprazole    Tablet 40 milliGRAM(s) Oral before breakfast  vancomycin    Solution 125 milliGRAM(s) Oral every 6 hours      HEALTH ISSUES - PROBLEM Dx:          Case Discussed with House Staff   45 minutes spent on total encounter; more than 50% of the visit was spent counseling and/or coordinating care by the attending physician including reviewing labs, and discussing with housestaff plan   Spectra [phone]

## 2021-10-07 NOTE — PROGRESS NOTE ADULT - ASSESSMENT
HPI:  77 yo F with PMHx of Chronic AFib on Eliquis, Pancreatic Ca s/p whipple 8/1/21 (in remission) presents with worsening fatigue and diarrhea.  Pt recently admitted for fungemia, treated with IV ABx via PICC line, completed 9/21. She states that she has been having watery stools ever since her Whipple procedure, however, since the completion of her antifungal tx, the diarrhea has become more frequent and more severe. Reports watery stools immediately after any PO intake. Primarily eats a limited amount of bland foods due to poor appetite and minimal tolerance. Denies any blood or mucus in stools. Denies having abdominal pain but did feel slight discomfort on presentation to ED. Also reports nausea for past 1 day and vomited day prior after eating. Was advised by Dr. Nino to take Imodium, which she reports slightly reduced diarrhea.  In the ED, T 96.7, HR 72, /72, RR 20, SpO2 99% on RA. Lab work revealed Lactate 2.7, positive UA. CT Abd revealed generalized anasarca with presence of ascites within abdomen; moderate R sided and trace L sided pleural effusion. Given Vancomycin, Aztreonam and 1.5 LR fluid bolus in ED.  (30 Sep 2021 20:25)    #Acute diarrhea secondary to C dificile   on vancomycin   improving     #Poor po intake   improving , continue with ensure supplementation     #Pancreatic cancer sp whipple   creon added    #Chronic atrial fibrillation rate controlled on eliquis     #Bilateral antecubital edema - no dvt on duplex, local compress    #Hypomagnesemia - persistent  , unable to plan in extremities iv , will place ej and replete , patient was refusing prior     # Hyponatremia - improved     #Prediabetes monitor finger sticks   Glucose, Serum: 138 mg/dL (10.07.21 @ 09:23)  controlled     PROGRESS NOTE HANDOFF    Pending:  monitor bowel movements  anticipate dc in 24 hours     Family discussion: patient verbalized understanding and agreeable to plan of care     Disposition: Home

## 2021-10-08 NOTE — CHART NOTE - NSCHARTNOTEFT_GEN_A_CORE
Pt discussed during rounds.     -- Followed up on Calorie count hanging in pt's room -- eating ~50% and also drinking ensure shakes. Pt's baseline PO at home however, is also around 50-75%. It has been like this since Feb 2021 after she had covid and lost sense of smell/taste, which hasn't completely returned to normal until now. She drinks 3 Ensure nutrition supplements daily along with small meals at home. Benatrol Plus has helped w/ forming of the stool, not completely watery anymore. Pt complains of persistent diarrhea at home s/p pancreatic ca + sx in past.     -- Discussed continuation of nutrition supplements at admit and once she returns home to maintain adequate nutrition.   -- Will provide pt w/ benatrol plus samples to take home and discussed how to order from home.   Overall, pt's PO is back to baseline and eating fair.     RD remains available: Magaly Elliott, RDN [phone]

## 2021-10-08 NOTE — PROGRESS NOTE ADULT - ASSESSMENT
75 yo F with PMHx of Chronic AFib on Eliquis, Pancreatic Ca s/p whipple 21 (in remission) presents with worsening fatigue and diarrhea. Pt was found to be positive for C. diff. During her previous admission, she was found to have fungemia and was treated with caspofungin with a PICC line completed on 21 and since then, she reports worsening diarrhea.     #Bilateral arm swelling:   - arm swelling present from mid arm to mid forearm  - Bilateral arm Duplex results; Impression: No evidence of deep or superficial thrombosis in the bilateral upper extremities.  - applied warm compresses to arms  - encouraged constant elevation of arms  - will monitor      #C. Diff colitis:  - pt is having about 4-5 watery diarrhea episodes per day  - continue with vancomycin 125mg q6h as per ID recommendations   - currently not experiencing abdominal pain  - pt is currently on contact precautions   - pt reports improvement to symptoms, stools are more formed and has less urgency     #Decreased PO Intake:  #Malnutrition:  - as per dietician: 1) Continue current diet order 2) Add ensure enlive TID to diet order 3) Will provide banatrol plus TID ; a nutrition supplement for loose stools and diarrhea  - calorie count   - monitor PO intake for increase to about 75%  - pt is drinking well   - pt may be malabsorbing food given hx of Whipple procedure - discussed with Dr. Nino who recommends adding Creon [id number] TID with meals     #Positive UA:  - positive UA   - Urine culture shows multiple resistance   - pt started on cipro on 10/4  - ID consult: recommended d/c cipro  - pt is asymptomatic     #Hypomagnesmia   - Ma.6   - magnesium repletion needed but due to bilateral arm swelling, pt is refusing IV mag repletion  - oral mag oxalate not given due to possibility of increased diarrhea episodes   - repeat magnesium levels in the AM tomorrow   - pt is refusing IV access given arm swelling, will continue to monitor     #Chronic afib  - pt is on cardizem and eliquis - continue with this management     #Pancreatic Cancer s/p Whipple procedure - in remission   - follow up outpatient   - added Creon to medication regimen     #Lactic acidosis - resolved    - secondary to dehydration   - positive for c diff

## 2021-10-08 NOTE — DISCHARGE NOTE PROVIDER - NSDCCPCAREPLAN_GEN_ALL_CORE_FT
PRINCIPAL DISCHARGE DIAGNOSIS  Diagnosis: C. difficile colitis  Assessment and Plan of Treatment: Follow these instructions at home:  Medicines   •Take over-the-counter and prescription medicines only as told by your health care provider  •Take antibiotic medicine as told by your health care provider. Do not stop taking the antibiotic even if you start to feel better.  • Do not treat diarrhea with medicines unless your health care provider tells you to.  General instructions   •Wash your hands often with soap and water for at least 20 seconds. Bathe or shower using soap and water daily.  •Return to your normal activities as told by your health care provider. Ask your health care provider what activities are safe for you.  •Be sure your home is clean before you leave the hospital or clinic to go home. Then continue daily cleaning for at least a week.  •Keep all follow-up visits. This is important.  How is this prevented?  Hand hygiene   •Wash your hands with soap and water for at least 20 seconds before preparing food and after using the bathroom. Make sure the people you live with also wash their hands often with soap and water for at least 20 seconds.  •If you are being treated at a hospital or clinic, make sure that all health care providers and visitors wash their hands with soap and water before touching you.  Contact precautions   •Tell your health care team right away if you develop diarrhea while in a hospital or long-term care facility.  •When visiting someone in a hospital or a long-term care facility, follow guidelines for wearing a gown, gloves, or other protective equipment.  •If possible, avoid contact with people who have diarrhea.  •Use a separate bathroom if you are sick and live with other people, if possible.  Clean environment   •Clean surfaces that are touched often every day. C. diff bacteria are killed only by cleaning products that contain 10% chlorine bleach solution.  •Clean frequently touched surfaces, such as toilet seats and flush handles, bathtubs, sinks, doorknobs, and work surfaces.  •If you are in the hospital, make sure that staff members clean the      SECONDARY DISCHARGE DIAGNOSES  Diagnosis: Sepsis  Assessment and Plan of Treatment:

## 2021-10-08 NOTE — PROGRESS NOTE ADULT - ASSESSMENT
HPI:  77 yo F with PMHx of Chronic AFib on Eliquis, Pancreatic Ca s/p whipple 8/1/21 (in remission) presents with worsening fatigue and diarrhea.  Pt recently admitted for fungemia, treated with IV ABx via PICC line, completed 9/21. She states that she has been having watery stools ever since her Whipple procedure, however, since the completion of her antifungal tx, the diarrhea has become more frequent and more severe. Reports watery stools immediately after any PO intake. Primarily eats a limited amount of bland foods due to poor appetite and minimal tolerance. Denies any blood or mucus in stools. Denies having abdominal pain but did feel slight discomfort on presentation to ED. Also reports nausea for past 1 day and vomited day prior after eating. Was advised by Dr. Nino to take Imodium, which she reports slightly reduced diarrhea.  In the ED, T 96.7, HR 72, /72, RR 20, SpO2 99% on RA. Lab work revealed Lactate 2.7, positive UA. CT Abd revealed generalized anasarca with presence of ascites within abdomen; moderate R sided and trace L sided pleural effusion. Given Vancomycin, Aztreonam and 1.5 LR fluid bolus in ED.  (30 Sep 2021 20:25)    #Acute diarrhea secondary to C dificile   on vancomycin   patient is having formed bowel movements    #Poor po intake   improving , continue with ensure supplementation , appreciate dietitian evaluation , patient eating adequately     #Pancreatic cancer sp whipple   creon added    #Chronic atrial fibrillation rate controlled on eliquis     #Bilateral antecubital edema - no dvt on duplex, local compress    #Hypomagnesemia - replete with one po mag ox as refusing iv     # Hyponatremia - improved     #Prediabetes monitor finger sticks   Glucose, Serum: 101 mg/dL (10.08.21 @ 04:30)  controlled     PROGRESS NOTE HANDOFF    Pending:  medically stable for dc     Family discussion: patient verbalized understanding and agreeable to plan of care     Disposition: Home

## 2021-10-08 NOTE — DISCHARGE NOTE PROVIDER - NSDCMRMEDTOKEN_GEN_ALL_CORE_FT
calcium,magnesium and Zinc: 1 tab(s) orally once a day  Cardizem  mg/24 hours oral capsule, extended release: 1 cap(s) orally once a day   Eliquis 5 mg oral tablet: 1 tab(s) orally 2 times a day  lactobacillus acidophilus oral capsule: 1 cap(s) orally 2 times a day with meals  metoclopramide 5 mg oral tablet: 1 tab(s) orally 3 times a day (before meals), As Needed  for nausea and vomitting  pantoprazole 40 mg oral delayed release tablet: 1 tab(s) orally once a day (before a meal)  saccharomyces boulardii lyo 250 mg oral capsule: 1 cap(s) orally 2 times a day with meals  Women&#x27;s daily vitamins: 1 tab(s) orally once a day   calcium,magnesium and Zinc: 1 tab(s) orally once a day  Cardizem  mg/24 hours oral capsule, extended release: 1 cap(s) orally once a day   Eliquis 5 mg oral tablet: 1 tab(s) orally 2 times a day  lactobacillus acidophilus oral capsule: 1 cap(s) orally 2 times a day with meals  metoclopramide 5 mg oral tablet: 1 tab(s) orally 3 times a day (before meals), As Needed  for nausea and vomitting  pancrelipase 12,000 units-38,000 units-60,000 units oral delayed release capsule: 1 cap(s) orally 3 times a day (with meals)  pantoprazole 40 mg oral delayed release tablet: 1 tab(s) orally once a day (before a meal)  saccharomyces boulardii lyo 250 mg oral capsule: 1 cap(s) orally 2 times a day with meals  vancomycin 125 mg oral capsule: 1 cap(s) orally every 6 hours  vancomycin 125 mg oral capsule: 1 cap(s) orally 2 times a day to be taken after vancomycin 4 times a day   Women&#x27;s daily vitamins: 1 tab(s) orally once a day

## 2021-10-08 NOTE — DISCHARGE NOTE PROVIDER - CARE PROVIDER_API CALL
Jeromy Nino (MD)  Complex General Surgical Oncology; Surgery  [address] [facility], [address]rd [place] [address]  Phone: [phone]  Fax: [phone]  Established Patient  Follow Up Time:    Jeromy Nino)  Complex General Surgical Oncology; Surgery  [address] [facility], [address]rd [place] [address]  Phone: [phone]  Fax: [phone]  Established Patient  Follow Up Time:     Danie Chavarria)  [address]  [address] [place] [address]  Phone: [phone]  Fax: [phone]  Follow Up Time:

## 2021-10-08 NOTE — PROGRESS NOTE ADULT - SUBJECTIVE AND OBJECTIVE BOX
LI, VIKASH  76y  [facility] F4-4B 017 A      Patient is a 76y old  Female who presents with a chief complaint of dehydration (08 Oct 2021 12:46)      INTERVAL HPI/OVERNIGHT EVENTS:    no acute events overnight     REVIEW OF SYSTEMS:    ROS neg    T(C): 36.2 (10-08-21 @ 08:00), Max: 36.2 (10-08-21 @ 08:00)  HR: 107 (10-08-21 @ 08:00) (85 - 107)  BP: 110/67 (10-08-21 @ 08:00) (100/70 - 126/72)  RR: 18 (10-08-21 @ 08:00) (18 - 18)  SpO2: 90% (10-08-21 @ 08:00) (90% - 90%)  Wt(kg): --Vital Signs Last 24 Hrs  T(C): 36.2 (08 Oct 2021 08:00), Max: 36.2 (08 Oct 2021 08:00)  T(F): 97.1 (08 Oct 2021 08:00), Max: 97.1 (08 Oct 2021 08:00)  HR: 107 (08 Oct 2021 08:00) (85 - 107)  BP: 110/67 (08 Oct 2021 08:00) (100/70 - 126/72)  BP(mean): 85 (08 Oct 2021 08:00) (85 - 85)  RR: 18 (08 Oct 2021 08:00) (18 - 18)  SpO2: 90% (08 Oct 2021 08:00) (90% - 90%)    PHYSICAL EXAM:  GENERAL: NAD, well-groomed, well-developed  HEAD:  Atraumatic, Normocephalic  EYES: EOMI, PERRLA, conjunctiva and sclera clear  ENMT: No tonsillar erythema, exudates, or enlargement; Moist mucous membranes, Good dentition, No lesions  NECK: Supple, No JVD, Normal thyroid  NERVOUS SYSTEM:  Alert & Oriented X3, Good concentration; Motor Strength 5/5 B/L upper and lower extremities; DTRs 2+ intact and symmetric  PULM: Clear to auscultation bilaterally  CARDIAC: Regular rate and rhythm; No murmurs, rubs, or gallops  GI: Soft, Nontender, Nondistended; Bowel sounds present  EXTREMITIES:  2+ Peripheral Pulses, No clubbing, cyanosis, or edema  LYMPH: No lymphadenopathy noted  SKIN: No rashes or lesions    Consultant(s) Notes Reviewed:  [x ] YES  [ ] NO  Care Discussed with Consultants/Other Providers [ x] YES  [ ] NO    LABS:                            12.9   7.40  )-----------( 211      ( 08 Oct 2021 04:30 )             40.6   10-08    137  |  104  |  10  ----------------------------<  101<H>  5.3<H>   |  21  |  0.5<L>    Ca    7.8<L>      08 Oct 2021 04:30  Phos  2.7     10-07  Mg     1.5     10-08    TPro  4.3<L>  /  Alb  2.2<L>  /  TBili  0.7  /  DBili  x   /  AST  48<H>  /  ALT  27  /  AlkPhos  541<H>  10-07            apixaban 5 milliGRAM(s) Oral two times a day  chlorhexidine 4% Liquid 1 Application(s) Topical <User Schedule>  diltiazem    milliGRAM(s) Oral daily  lactated ringers. 1000 milliLiter(s) IV Continuous <Continuous>  lactated ringers. 500 milliLiter(s) IV Continuous <Continuous>  lactobacillus acidophilus 1 Tablet(s) Oral daily  pancrelipase  (CREON 12,000 Lipase Units) 1 Capsule(s) Oral three times a day with meals  pantoprazole    Tablet 40 milliGRAM(s) Oral before breakfast  vancomycin    Solution 125 milliGRAM(s) Oral every 6 hours      HEALTH ISSUES - PROBLEM Dx:          Case Discussed with House Staff   Spectra [phone]

## 2021-10-08 NOTE — DISCHARGE NOTE PROVIDER - PROVIDER TOKENS
PROVIDER:[TOKEN:[[id number]],ESTABLISHEDPATIENT:[T]] PROVIDER:[TOKEN:[[id number]],ESTABLISHEDPATIENT:[T]],PROVIDER:[TOKEN:[[id number]]]

## 2021-10-08 NOTE — PROGRESS NOTE ADULT - SUBJECTIVE AND OBJECTIVE BOX
VIKASH LI 76y Female  MRN#: [id number]   CODE STATUS:________      SUBJECTIVE  Patient is a 76y old Female who presents with a chief complaint of dehydration (07 Oct 2021 12:42)  Currently admitted to medicine with the primary diagnosis of PNA (pneumonia)      Today is hospital day 8d, and this morning she is       OBJECTIVE  PAST MEDICAL & SURGICAL HISTORY  SOB (shortness of breath)    Pain  knee    Varicose veins of both lower extremities, unspecified whether complicated    Atrial fibrillation, unspecified type  2019 on Eliquis    Jaundice  March 5, 2021    Malignant neoplasm of pancreas, unspecified location of malignancy  Pancreatic Cancer    Hypertension, unspecified type  2019    Pancreatic cancer    Kidney stones    History of surgery  Whipple procedure 8/2/2021 with Dr. Nino at [facility]    Status post phlebectomy  10/2018    History of ovarian cystectomy  left    History of tonsillectomy  1959    Kidney stone  2017      ALLERGIES:  Augmentin (Rash)  chocolate (Headache)  digoxin (Hives)  Metoprolol Succinate ER (Hives)  Novocain (Angioedema)  Steroids: Excessive swelling (Flushing; Other (Mild to Mod))  sulfa drugs (Fever)  Xarelto (Hives)    MEDICATIONS:  STANDING MEDICATIONS  apixaban 5 milliGRAM(s) Oral two times a day  chlorhexidine 4% Liquid 1 Application(s) Topical <User Schedule>  diltiazem    milliGRAM(s) Oral daily  lactated ringers. 1000 milliLiter(s) IV Continuous <Continuous>  lactated ringers. 500 milliLiter(s) IV Continuous <Continuous>  lactobacillus acidophilus 1 Tablet(s) Oral daily  pancrelipase  (CREON 12,000 Lipase Units) 1 Capsule(s) Oral three times a day with meals  pantoprazole    Tablet 40 milliGRAM(s) Oral before breakfast  vancomycin    Solution 125 milliGRAM(s) Oral every 6 hours    PRN MEDICATIONS      VITAL SIGNS: Last 24 Hours  T(C): 36.2 (08 Oct 2021 08:00), Max: 36.2 (08 Oct 2021 08:00)  T(F): 97.1 (08 Oct 2021 08:00), Max: 97.1 (08 Oct 2021 08:00)  HR: 107 (08 Oct 2021 08:00) (85 - 107)  BP: 110/67 (08 Oct 2021 08:00) (100/70 - 126/72)  BP(mean): 85 (08 Oct 2021 08:00) (85 - 85)  RR: 18 (08 Oct 2021 08:00) (18 - 18)  SpO2: 90% (08 Oct 2021 08:00) (90% - 90%)    LABS:                        12.9   7.40  )-----------( 211      ( 08 Oct 2021 04:30 )             40.6     10-08    137  |  104  |  10  ----------------------------<  101<H>  5.3<H>   |  21  |  0.5<L>    Ca    7.8<L>      08 Oct 2021 04:30  Phos  2.7     10-07  Mg     1.5     10-08    TPro  4.3<L>  /  Alb  2.2<L>  /  TBili  0.7  /  DBili  x   /  AST  48<H>  /  ALT  27  /  AlkPhos  541<H>  10-07    RADIOLOGY:      PHYSICAL EXAM:    GENERAL: NAD, well-developed, AAOx3  HEENT:  Atraumatic, Normocephalic. EOMI, PERRLA, conjunctiva and sclera clear, No JVD  PULMONARY: Clear to auscultation bilaterally; No wheeze  CARDIOVASCULAR: Regular rate and rhythm; No murmurs, rubs, or gallops  GASTROINTESTINAL: Soft, Nontender, Nondistended; Bowel sounds present  MUSCULOSKELETAL:  2+ Peripheral Pulses, No clubbing, cyanosis, or edema  NEUROLOGY: non-focal  SKIN: No rashes or lesions       VIKASH LI 76y Female  MRN#: [id number]   CODE STATUS:________      SUBJECTIVE  Patient is a 76y old Female who presents with a chief complaint of dehydration (07 Oct 2021 12:42)  Currently admitted to medicine with the primary diagnosis of PNA (pneumonia)      Today is hospital day 8d, and this morning she is doing better overall. She states that her stools are more formed now with increased PO diet. Denies any abdominal pain    Swelling to bilateral arms, some improvement overnight. Continuing to elevate arms       OBJECTIVE  PAST MEDICAL & SURGICAL HISTORY  SOB (shortness of breath)    Pain  knee    Varicose veins of both lower extremities, unspecified whether complicated    Atrial fibrillation, unspecified type  2019 on Eliquis    Jaundice  March 5, 2021    Malignant neoplasm of pancreas, unspecified location of malignancy  Pancreatic Cancer    Hypertension, unspecified type  2019    Pancreatic cancer    Kidney stones    History of surgery  Whipple procedure 8/2/2021 with Dr. Nino at [facility]    Status post phlebectomy  10/2018    History of ovarian cystectomy  left    History of tonsillectomy  1959    Kidney stone  2017      ALLERGIES:  Augmentin (Rash)  chocolate (Headache)  digoxin (Hives)  Metoprolol Succinate ER (Hives)  Novocain (Angioedema)  Steroids: Excessive swelling (Flushing; Other (Mild to Mod))  sulfa drugs (Fever)  Xarelto (Hives)    MEDICATIONS:  STANDING MEDICATIONS  apixaban 5 milliGRAM(s) Oral two times a day  chlorhexidine 4% Liquid 1 Application(s) Topical <User Schedule>  diltiazem    milliGRAM(s) Oral daily  lactated ringers. 1000 milliLiter(s) IV Continuous <Continuous>  lactated ringers. 500 milliLiter(s) IV Continuous <Continuous>  lactobacillus acidophilus 1 Tablet(s) Oral daily  pancrelipase  (CREON 12,000 Lipase Units) 1 Capsule(s) Oral three times a day with meals  pantoprazole    Tablet 40 milliGRAM(s) Oral before breakfast  vancomycin    Solution 125 milliGRAM(s) Oral every 6 hours    PRN MEDICATIONS      VITAL SIGNS: Last 24 Hours  T(C): 36.2 (08 Oct 2021 08:00), Max: 36.2 (08 Oct 2021 08:00)  T(F): 97.1 (08 Oct 2021 08:00), Max: 97.1 (08 Oct 2021 08:00)  HR: 107 (08 Oct 2021 08:00) (85 - 107)  BP: 110/67 (08 Oct 2021 08:00) (100/70 - 126/72)  BP(mean): 85 (08 Oct 2021 08:00) (85 - 85)  RR: 18 (08 Oct 2021 08:00) (18 - 18)  SpO2: 90% (08 Oct 2021 08:00) (90% - 90%)    LABS:                        12.9   7.40  )-----------( 211      ( 08 Oct 2021 04:30 )             40.6     10-08    137  |  104  |  10  ----------------------------<  101<H>  5.3<H>   |  21  |  0.5<L>    Ca    7.8<L>      08 Oct 2021 04:30  Phos  2.7     10-07  Mg     1.5     10-08    TPro  4.3<L>  /  Alb  2.2<L>  /  TBili  0.7  /  DBili  x   /  AST  48<H>  /  ALT  27  /  AlkPhos  541<H>  10-07    RADIOLOGY:      PHYSICAL EXAM:    GENERAL: NAD, well-developed, AAOx3  HEENT:  Atraumatic, Normocephalic. EOMI, PERRLA, conjunctiva and sclera clear, No JVD  PULMONARY: Clear to auscultation bilaterally; No wheeze  CARDIOVASCULAR: Regular rate and rhythm; No murmurs, rubs, or gallops  GASTROINTESTINAL: Soft, Nontender, Nondistended; Bowel sounds present  MUSCULOSKELETAL:  2+ Peripheral Pulses, No clubbing, cyanosis, or edema  NEUROLOGY: non-focal  SKIN: No rashes or lesions

## 2021-10-08 NOTE — DISCHARGE NOTE PROVIDER - HOSPITAL COURSE
75 yo F with PMHx of Chronic AFib on Eliquis, Pancreatic Ca s/p whipple 8/1/21 (in remission) presents with worsening fatigue and diarrhea.  Pt recently admitted for fungemia, treated with IV ABx via PICC line, completed 9/21. She states that she has been having watery stools ever since her Whipple procedure, however, since the completion of her antifungal tx, the diarrhea has become more frequent and more severe. Reports watery stools immediately after any PO intake. Primarily eats a limited amount of bland foods due to poor appetite and minimal tolerance. Denies any blood or mucus in stools. Denies having abdominal pain but did feel slight discomfort on presentation to ED. Also reports nausea for past 1 day and vomited day prior after eating. Was advised by Dr. Nino to take Imodium, which she reports slightly reduced diarrhea.  In the ED, T 96.7, HR 72, /72, RR 20, SpO2 99% on RA. Lab work revealed Lactate 2.7, positive UA. CT Abd revealed generalized anasarca with presence of ascites within abdomen; moderate R sided and trace L sided pleural effusion. Given Vancomycin, Aztreonam and 1.5 LR fluid bolus in ED. She was found to be positive for C. Diff on stool test.     Pt was admitted for the medicine floor for further management of C. Diff colitis and rehydration. She was followed by Infectious disease who started her on treatment of Vancomycin 125mg q6 hours, which was continued throughout her admission. During treatment, she noticed improvement   77 yo F with PMHx of Chronic AFib on Eliquis, Pancreatic Ca s/p whipple 8/1/21 (in remission) presents with worsening fatigue and diarrhea.  Pt recently admitted for fungemia, treated with IV ABx via PICC line, completed 9/21. She states that she has been having watery stools ever since her Whipple procedure, however, since the completion of her antifungal tx, the diarrhea has become more frequent and more severe. Reports watery stools immediately after any PO intake. Primarily eats a limited amount of bland foods due to poor appetite and minimal tolerance. Denies any blood or mucus in stools. Denies having abdominal pain but did feel slight discomfort on presentation to ED. Also reports nausea for past 1 day and vomited day prior after eating. Was advised by Dr. Nino to take Imodium, which she reports slightly reduced diarrhea.  In the ED, T 96.7, HR 72, /72, RR 20, SpO2 99% on RA. Lab work revealed Lactate 2.7, positive UA. CT Abd revealed generalized anasarca with presence of ascites within abdomen; moderate R sided and trace L sided pleural effusion. Given Vancomycin, Aztreonam and 1.5 LR fluid bolus in ED. She was found to be positive for C. Diff on stool test.     Pt was admitted for the medicine floor for further management of C. Diff colitis and rehydration. She was followed by Infectious disease who started her on treatment of Vancomycin 125mg q6 hours, which was continued throughout her admission. During treatment, she noticed improvement to diarrhea urgency and consistency. PO intake was encouraged and she was followed by a dietician who started on a calorie count. With the calorie count and added Banatrol to her diet, pt was able to eat about 50% of her food which is her baseline. She also began to incorporate Ensure enlive to her diet.    During her stay, she started to experience bilateral arm swelling to her antecubital region. She noted that the swelling was unprovoked, as she did not have an IV present. She was encouraged to elevate her arms as well as apply warm compresses to them, which resulted in mild improvement to the swelling. Pt also had a duplex performed that resulted in no deep vein thrombosis.     Pt to be discharged home today, with continued treatment of vancomycin 125mg q6h.

## 2021-10-09 NOTE — PROGRESS NOTE ADULT - REASON FOR ADMISSION
dehydration

## 2021-10-09 NOTE — PROGRESS NOTE ADULT - PROVIDER SPECIALTY LIST ADULT
Internal Medicine
Hospitalist
Hospitalist
Internal Medicine
Surgery
Hospitalist
Internal Medicine
Hospitalist
Internal Medicine
Internal Medicine

## 2021-10-09 NOTE — PROGRESS NOTE ADULT - SUBJECTIVE AND OBJECTIVE BOX
LI[facility] VIKASH  76[facility]      Patient is a 76y old  Female who presents with a chief complaint of dehydration (08 Oct 2021 14:01)      INTERVAL HPI/OVERNIGHT EVENTS:    no acute overnight events    REVIEW OF SYSTEMS:  CONSTITUTIONAL: No fever, weight loss, or fatigue  EYES: No eye pain, visual disturbances, or discharge  ENMT:  No difficulty hearing, tinnitus, vertigo; No sinus or throat pain  NECK: No pain or stiffness  BREASTS: No pain, masses, or nipple discharge  RESPIRATORY: No cough, wheezing, chills or hemoptysis; No shortness of breath  CARDIOVASCULAR: No chest pain, palpitations, dizziness, or leg swelling  GASTROINTESTINAL: signficant decrease in bowel movement frequency per patient   GENITOURINARY: No dysuria, frequency, hematuria, or incontinence  NEUROLOGICAL: No headaches, memory loss, loss of strength, numbness, or tremors  SKIN: No itching, burning, rashes, or lesions   LYMPH NODES: No enlarged glands  ENDOCRINE: No heat or cold intolerance; No hair loss  MUSCULOSKELETAL: No joint pain or swelling; No muscle, back, or extremity pain  PSYCHIATRIC: No depression, anxiety, mood swings, or difficulty sleeping  HEME/LYMPH: No easy bruising, or bleeding gums  ALLERY AND IMMUNOLOGIC: No hives or eczema  FAMILY HISTORY:  CAD (coronary artery disease) (Sibling)  3  siblings ( 2 living and 1 passed away)      T(C): 36.1 (10-09-21 @ 00:00), Max: 36.2 (10-08-21 @ 08:00)  HR: 99 (10-09-21 @ 00:00) (99 - 107)  BP: 106/61 (10-09-21 @ 00:00) (101/57 - 110/67)  RR: 18 (10-09-21 @ 00:00) (18 - 18)  SpO2: 90% (10-08-21 @ 08:00) (90% - 90%)  Wt(kg): --Vital Signs Last 24 Hrs  T(C): 36.1 (09 Oct 2021 00:00), Max: 36.2 (08 Oct 2021 08:00)  T(F): 97 (09 Oct 2021 00:00), Max: 97.1 (08 Oct 2021 08:00)  HR: 99 (09 Oct 2021 00:00) (99 - 107)  BP: 106/61 (09 Oct 2021 00:00) (101/57 - 110/67)  BP(mean): 85 (08 Oct 2021 08:00) (85 - 85)  RR: 18 (09 Oct 2021 00:00) (18 - 18)  SpO2: 90% (08 Oct 2021 08:00) (90% - 90%)    PHYSICAL EXAM:  GENERAL: NAD, thin  HEAD:  Atraumatic, Normocephalic  EYES: EOMI, PERRLA, conjunctiva and sclera clear  ENMT: No tonsillar erythema, exudates, or enlargement; Moist mucous membranes, Good dentition, No lesions  NECK: Supple, No JVD, Normal thyroid  NERVOUS SYSTEM:  Alert & Oriented X3, Good concentration; Motor Strength 5/5 B/L upper and lower extremities; DTRs 2+ intact and symmetric  PULM: Clear to auscultation bilaterally  CARDIAC: Regular rate and rhythm; No murmurs, rubs, or gallops  GI: Soft, Nontender, Nondistended; Bowel sounds present  EXTREMITIES:  2+ Peripheral Pulses, No clubbing, cyanosis, or edema  LYMPH: No lymphadenopathy noted  SKIN: No rashes or lesions    Consultant(s) Notes Reviewed:  [x ] YES  [ ] NO  Care Discussed with Consultants/Other Providers [ x] YES  [ ] NO    LABS:                            12.9   7.40  )-----------( 211      ( 08 Oct 2021 04:30 )             40.6   10-08    137  |  104  |  10  ----------------------------<  101<H>  5.3<H>   |  21  |  0.5<L>    Ca    7.8<L>      08 Oct 2021 04:30  Phos  2.7     10-07  Mg     1.5     10-08    TPro  4.3<L>  /  Alb  2.2<L>  /  TBili  0.7  /  DBili  x   /  AST  48<H>  /  ALT  27  /  AlkPhos  541<H>  10-07            apixaban 5 milliGRAM(s) Oral two times a day  chlorhexidine 4% Liquid 1 Application(s) Topical <User Schedule>  diltiazem    milliGRAM(s) Oral daily  lactated ringers. 500 milliLiter(s) IV Continuous <Continuous>  lactated ringers. 1000 milliLiter(s) IV Continuous <Continuous>  lactobacillus acidophilus 1 Tablet(s) Oral daily  pancrelipase  (CREON 12,000 Lipase Units) 1 Capsule(s) Oral three times a day with meals  pantoprazole    Tablet 40 milliGRAM(s) Oral before breakfast  vancomycin    Solution 125 milliGRAM(s) Oral every 6 hours      HEALTH ISSUES - PROBLEM Dx:          Case Discussed with House Staff      Spectra [phone]

## 2021-10-09 NOTE — PROGRESS NOTE ADULT - ASSESSMENT
HPI:  75 yo F with PMHx of Chronic AFib on Eliquis, Pancreatic Ca s/p whipple 8/1/21 (in remission) presents with worsening fatigue and diarrhea.  Pt recently admitted for fungemia, treated with IV ABx via PICC line, completed 9/21. She states that she has been having watery stools ever since her Whipple procedure, however, since the completion of her antifungal tx, the diarrhea has become more frequent and more severe. Reports watery stools immediately after any PO intake. Primarily eats a limited amount of bland foods due to poor appetite and minimal tolerance. Denies any blood or mucus in stools. Denies having abdominal pain but did feel slight discomfort on presentation to ED. Also reports nausea for past 1 day and vomited day prior after eating. Was advised by Dr. Nino to take Imodium, which she reports slightly reduced diarrhea.  In the ED, T 96.7, HR 72, /72, RR 20, SpO2 99% on RA. Lab work revealed Lactate 2.7, positive UA. CT Abd revealed generalized anasarca with presence of ascites within abdomen; moderate R sided and trace L sided pleural effusion. Given Vancomycin, Aztreonam and 1.5 LR fluid bolus in ED.  (30 Sep 2021 20:25)    #Acute diarrhea secondary to C dificile   on vancomycin   bowel movements now formed     #Poor po intake   improved , cw supplementation     #Pancreatic cancer sp whipple   cw creon    #Chronic atrial fibrillation rate controlled on eliquis     #Bilateral antecubital edema - no dvt on duplex, local compress    #Hypomagnesemia - replete with one po mag ox as refusing iv     # Hyponatremia - improved     #Prediabetes outpatient follow up   controlled  during hospitalization    PROGRESS NOTE HANDOFF    Pending: dc     Family discussion: patient verbalized understanding and agreeable to plan of care including dc     Disposition: Home

## 2021-10-15 NOTE — CDI QUERY NOTE - NSCDIOTHERTXTBX_GEN_ALL_CORE_HH
CLINICAL INDICATORS    9/30 ED Provider Note: SEPSIS – was this patient treated for sepsis? Yes … pt with diarrhea and abd pain, low grade fever ,r/o sepsis, labs, ivf, abx and ct a/p.    9/30 ED ADULT Triage Note: , Temp 96.7    9/30 H&P Adult: Reason for Admission: dehydration … Lab work revealed Lactate 2.7 … Given Vancomycin, Aztreonam and 1.5 LR fluid bolus in ED. Attending supervision statement: # Diarrhea# Lactic Acidosis - likely secondary to dehydration -hemodynamically stable -lactate 2.7 with improvement to 1.6    10/4 Consult Note Adult-Infectious Disease Attending: Pt was found to be positive for C. diff … po vancomycin 125 mg q6h for 2 weeks and then q12h for 2 more weeks    10/1 Progress Note Adult-GO-Internal Medicin Resident/Attending: Diarrhea# Recently hospitalized for Candida fungemia, s/p Caspofungin tx completed 9/21- r/o infectious etiology - GI PCR/C. diff- If cause non infectious, may be related to Whipple procedure- If infection ruled out - start immodium PRN for diarrhea- s/p 1.5 LR fluid bolus, will c/w gentle hydration for now    10/2 Progress Note Adult-Internal Medicine Attending: #Diarrhea 2/2 Clostridium Difficile   #Lactic Acidosis - likely secondary to dehydration, C. Diff PCR positive … start Po Vanc 125 q6     10/9 Progress Note Adult-Hospitalist Attending: Acute diarrhea secondary to C dificile, on vancomycin     10/8 Discharge Note Provider: Pt was admitted for the medicine floor for further management of C. Diff colitis and rehydration. She was followed by Infectious disease who started her on treatment of Vancomycin 125mg q6 hours, which was continued throughout her admission … PRINCIPAL DISCHARGE DIAGNOSIS: C. difficile colitis … SECONDARY DISCHARGE DIAGNOSES: Sepsis    Per MAR: Vancomycin IVPB 1000mg IV x1 on 9/30, Indication: sepsis; Azactam 2000mg IVPB x1 on 9/30, Indication: sepsis; Vancomycin 125mg, oral, every 6 hours- from 10/2-10/9    Based on your professional judgment and the clinical indicators, please clarify if the documentation of sepsis by ED and by DC provider can be further specified as:  • Sepsis was evaluated and confirmed to be present on admission  • Sepsis was evaluated and ruled out   • Other (please specify):   • Clinically unable to determine     Thank you,  Loretta Huitron RN [facility] CCDS  [phone]

## 2021-10-19 PROBLEM — E53.8 B12 DEFICIENCY: Status: ACTIVE | Noted: 2021-01-01

## 2021-10-26 NOTE — HISTORY OF PRESENT ILLNESS
[de-identified] : VIKASH LI  is a pleasant 75 year year old woman  who came in 03/18/2021 for newly diagnosed pancreas cancer 3/2021. She has cardiologist Dr Garrett Garvey for a fib. Dr Carson vascular surgeon for her varicose veins.\par \par she noticed jaundice in december 2020. treated for UTI. her cardiologist noticed jaundice during her stress test and send her to Dr Grant who scope her 3/3/2021 with negative results and ordered CT which was not done as outpatient. she came to [facility] ED with worsening jaundice and she had CT and EUS/ERCP and plastic stent by Dr Gavin with bx showed adenocarcinoma at head of pancreas. 3/9/2021 EUS showed resectable lesion and possible reactive LN. t mable 16 and went down after stent to 8\par \par she had three cycles of Wolfe/abraxane\par \par 6/24/2021 she reported lower ext ulcers and picture of dermatitis\par 7/13/2021 she came to f/u \par 7/27/2021 she came to discuss whipple procedure\par 8/2/2021 she had Whipple with long course of rehab\par \par 10/19/2021 recently was discharged from hospital after being treated for C diff colitis

## 2021-10-26 NOTE — ASSESSMENT
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 presented with obstructive jaundice\par s/p ERCP/plastic stent/EUS 3/9/2021\par \par received neoadjuvant chemo with poor tolerance started 5/2021\par \par 8/2/2021 she had Whipple with negative margins but 5/26 LN involvement\par \par prolonged postop rehab course\par \par 10/19/2021 first postop visit at office with  recent discharge from hospital for  C diff colitis, she is eating and having bowel movement with slight abdominal discomfort , will cont vancomycin and PT, will see her in couple weeks

## 2021-11-20 PROBLEM — A04.72 C. DIFFICILE COLITIS: Status: ACTIVE | Noted: 2021-01-01

## 2021-11-20 PROBLEM — I48.91 ATRIAL FIBRILLATION, UNSPECIFIED TYPE: Status: ACTIVE | Noted: 2021-03-18

## 2021-11-20 NOTE — ASSESSMENT
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 presented with obstructive jaundice\par s/p ERCP/plastic stent/EUS 3/9/2021\par \par received neoadjuvant chemo with poor tolerance started 5/2021\par \par 8/2/2021 she had Whipple with negative margins but 5/26 LN involvement\par \par prolonged postop rehab course\par \par 10/19/2021 first postop visit at office with  recent discharge from hospital for  C diff colitis, she is eating and having bowel movement with slight abdominal discomfort , will cont vancomycin and PT, will see her in couple weeks\par \par 11/18/2021 diarrhea and hx of recent c diff, will cont vanco PO, add creon, need more protein drink to improve her edema, cont eliquis, dizzy-> need cardio eval  to adjust her a fib meds , need PPI, will see her in two weeks

## 2021-11-20 NOTE — REVIEW OF SYSTEMS
[FreeTextEntry8] : some abdominal discomfort, diarrhea [FreeTextEntry2] : loss of weight and fatigue [de-identified] : skin breaks in her LE, edema UE and LE

## 2021-11-20 NOTE — HISTORY OF PRESENT ILLNESS
[de-identified] : VIKASH LI  is a pleasant 75 year year old woman  who came in 03/18/2021 for newly diagnosed pancreas cancer 3/2021. She has cardiologist Dr Garrett Garvey for a fib. Dr Carson vascular surgeon for her varicose veins.\par \par she noticed jaundice in december 2020. treated for UTI. her cardiologist noticed jaundice during her stress test and send her to Dr Grant who scope her 3/3/2021 with negative results and ordered CT which was not done as outpatient. she came to [facility] ED with worsening jaundice and she had CT and EUS/ERCP and plastic stent by Dr Gavin with bx showed adenocarcinoma at head of pancreas. 3/9/2021 EUS showed resectable lesion and possible reactive LN. t mable 16 and went down after stent to 8\par \par she had three cycles of [place]/abraxane\par \par 6/24/2021 she reported lower ext ulcers and picture of dermatitis\par 7/13/2021 she came to f/u \par 7/27/2021 she came to discuss whipple procedure\par 8/2/2021 she had Whipple with long course of rehab\par \par 10/19/2021 recently was discharged from hospital after being treated for C diff colitis\par \par 11/18/2021 edema. LE wounds , edema of UE more on right side, she feels dizzy and she has frequent BM

## 2021-11-22 NOTE — ED PROVIDER NOTE - ATTENDING CONTRIBUTION TO CARE
77 y/o female h/o HTN, afib on Eliquis, pancreatic Ca s/p whipple 8/1/21 (in remission), lives at home with son, walks with walker at baseline, recently admitted now p/w SOB x 1 week, progressively worse, worse with exertion, + generalized weakness 9Has been unable to get out of bed), + palpitations / fast heartbeat, extrem swelling (since hospitalization, normal duplex as inpt), denies fever, hemoptysis, orthopnea, PND, chest pain, LE pain, recent immobilization or travel or other associated complaints at present.    Old chart reviewed.  I have reviewed and agree with the initial nursing note, except as documented in my note.    VSS, awake, alert, non-toxic appearing, oropharynx clear, no skin rash or lesions, no tracheal deviation, non-labored breathing without accessory muscle use apparent or pursed lip breathing, no retractions, speaks full sentences, chest CTAB, no w/r/r, +S1/S2, no m/r/g, abdomen soft, NT, ND, +BS, AO x 3, clear speech and steady gait.

## 2021-11-22 NOTE — ED PROVIDER NOTE - OBJECTIVE STATEMENT
77 yo female with a pmh of HTN, afib on Eliquis, pancreatic Ca s/p whipple 8/1/21 (in remission) presents for SOB. pt states to note worsening SOB with exertion and weakness over the past week. pt denies any other symptoms including fevers, chill, headache, recent illness/travel, cough, abdominal pain, chest pain.

## 2021-11-22 NOTE — ED ADULT TRIAGE NOTE - CHIEF COMPLAINT QUOTE
pt was admitted x 1 month ago for urosepsis, reports swelling to bilat upper and lower extremities while in the hospital but having worsening symptoms as well as exertional sob. completed chemo for pancreatic CA

## 2021-11-22 NOTE — ED ADULT NURSE NOTE - NSIMPLEMENTINTERV_GEN_ALL_ED
Implemented All Fall with Harm Risk Interventions:  [place] to call system. Call bell, personal items and telephone within reach. Instruct patient to call for assistance. Room bathroom lighting operational. Non-slip footwear when patient is off stretcher. Physically safe environment: no spills, clutter or unnecessary equipment. Stretcher in lowest position, wheels locked, appropriate side rails in place. Provide visual cue, wrist band, yellow gown, etc. Monitor gait and stability. Monitor for mental status changes and reorient to person, place, and time. Review medications for side effects contributing to fall risk. Reinforce activity limits and safety measures with patient and family. Provide visual clues: red socks.

## 2021-11-22 NOTE — ED PROVIDER NOTE - NSICDXPASTSURGICALHX_GEN_ALL_CORE_FT
PAST SURGICAL HISTORY:  History of ovarian cystectomy left    History of surgery Whipple procedure 8/2/2021 with Dr. Nino at [facility]    History of tonsillectomy 1959    Kidney stone 2017    Status post phlebectomy 10/2018

## 2021-11-22 NOTE — H&P ADULT - ASSESSMENT
#Pleural effusions and #LE edema 2+ secondary to suspected cardiac etiology, undiagnosed  - PMHx Afib--> possibly mediated by decreased cardiac output and subsequent venous congestion  - BNP >3.6K on admision, none to comparet to  - Sees Dr Liang outpatient, next appointment scheduled for Dec 1st  - Will consult Garth inpatient given new findings  - Will give Lasix 40mg IV x1 now, f/u am CXR  - ECHO ordered, f/u  - strict ins and outs  - f/u BMP in am    #Recurrent UTI  - s/p UTI inpatient 2 months ago- denies dysuria  now, denies urinary frequency  - Given Ceftriaxone in Ed, will continue empirically for now  - Urine cultures sent--> f/u results  - currently afebrile--> Tylenol PRN for fever if develops    #Chronic AFib on Eliquis  - EKG on admission shows pt in Afib now, no RVR  - c/w Eliquis for now  - note pt says she has tried many other AC meds, allergies manifest  - discuss with Dr Liang prior to initiating alternative AC    #Pancreatic Ca   - s/p whipple 8/1/21 (in remission)   - c/w Creon 12K TID before meals  - o/p management per heme/onc    #HCM  - not covid vaccinated, last tested positive April 2021  - says her PCP told her she does not have to get vaccine??  - Counseled on admission, open to discussing vaccination inpatient  - Primary team please discuss w patient over admission    #Misc  - DVT Prophylaxis: Eliquis  - Diet: Dash/TLC, fluid restriction 1500mL  - Activity: Ambulate as tolerated  - IV Fluids: none  - Code Status: full code   #Pleural effusions and #LE edema 2+ secondary to suspected cardiac etiology, undiagnosed  - PMHx Afib--> possibly mediated by decreased cardiac output and subsequent venous congestion  - BNP >3.6K on admision, none to comparet to  - Sees Dr Liang outpatient, next appointment scheduled for Dec 1st  - Will consult [facility] inpatient given new findings  - Will give Lasix 40mg IV x1 now, f/u am CXR  - ECHO ordered, f/u  - strict ins and outs  - f/u BMP in am    #Recurrent UTI  - s/p UTI inpatient 2 months ago (Klebsiella and Citrobacter)- denies dysuria  now, denies urinary frequency  - Given Ceftriaxone in Ed, will continue empirically for now  - Urine cultures sent--> f/u results  - currently afebrile--> Tylenol PRN for fever if develops    #Chronic AFib on Eliquis  - EKG on admission shows pt in Afib now, no RVR  - c/w Eliquis for now  - note pt says she has tried many other AC meds, allergies manifest  - discuss with Dr Liang prior to initiating alternative AC    #Pancreatic Ca   - s/p whipple 8/1/21 (in remission)   - c/w Creon 12K TID before meals  - o/p management per heme/onc    #HCM  - not covid vaccinated, last tested positive April 2021  - says her PCP told her she does not have to get vaccine??  - Counseled on admission, open to discussing vaccination inpatient  - Primary team please discuss w patient over admission    #Misc  - DVT Prophylaxis: Eliquis  - Diet: Dash/TLC, fluid restriction 1500mL  - Activity: Ambulate as tolerated  - IV Fluids: none  - Code Status: full code   #Pleural effusions and #LE edema 2+ secondary to suspected cardiac etiology, undiagnosed  - PMHx Afib--> possibly mediated by decreased cardiac output and subsequent venous congestion  - BNP >3.6K on admision, none to comparet to  - Sees Dr Liang outpatient, next appointment scheduled for Dec 1st  - Will consult Garth inpatient given new findings  - Will give Lasix 40mg IV x1 now, f/u am CXR  - ECHO ordered, f/u  - strict ins and outs  - f/u BMP in am    #Recurrent UTI  - s/p UTI inpatient 2 months ago (Klebsiella and Citrobacter)- denies dysuria  now, denies urinary frequency  - Given Ceftriaxone in Ed, will continue empirically for now  - Urine cultures sent--> f/u results  - currently afebrile--> Tylenol PRN for fever if develops    #Chronic AFib on Eliquis  - EKG on admission shows pt in Afib now, no RVR  - c/w Eliquis for now  - note pt says she has tried many other AC meds, allergies manifest  - discuss with Dr Liang prior to initiating alternative AC    #Pancreatic Ca   - s/p whipple 8/1/21 (in remission)   - c/w Creon 12K TID before meals  - o/p management per heme/onc    #L-Upper thigh rash  - non-pruritic macules, localized to area  - possibly drug allergy mediated  - no new prescriptions/ OTC meds  - developed after resuming Vancomycin PO  - hold Vanc PO  - consider adding vanc to list of allergies    #Hypomagnesemia  - on admission  - s/p 2mg IV Mg in Ed  - monitor Mg overnight  - appears to be chronic per lab records--> consider resuming Mg PO supplement on discharge    #HCM  - not covid vaccinated, last tested positive April 2021  - says her PCP told her she does not have to get vaccine??  - Counseled on admission, open to discussing vaccination inpatient  - Primary team please discuss w patient over admission    #Misc  - DVT Prophylaxis: Eliquis  - Diet: Dash/TLC, fluid restriction 1500mL  - Activity: Ambulate as tolerated  - IV Fluids: none  - Code Status: full code   #Pleural effusions and #LE edema 2+ secondary to suspected cardiac etiology, undiagnosed  - PMHx Afib--> possibly mediated by decreased cardiac output and subsequent venous congestion  - BNP >3.6K on admision, none to comparet to  - Sees Dr Liang outpatient, next appointment scheduled for Dec 1st  - Will consult [facility] inpatient given new findings  - Proven sulfa allergy, currently has rash from likely other drug rxn--> will hold off on Lasix for now  - Will give Ethacrynic acid PO for diursesis--> f/u urine output overnight (nurse at bedside informed to record)  - ECHO ordered, f/u  - strict ins and outs  - f/u BMP in am    #Recurrent UTI  - s/p UTI inpatient 2 months ago (Klebsiella and Citrobacter)- denies dysuria  now, denies urinary frequency  - Given Ceftriaxone in Ed, will continue empirically for now  - Urine cultures sent--> f/u results  - currently afebrile--> Tylenol PRN for fever if develops    #Chronic AFib on Eliquis  - EKG on admission shows pt in Afib now, no RVR  - c/w Eliquis for now  - note pt says she has tried many other AC meds, allergies manifest  - discuss with Dr Liang prior to initiating alternative AC    #Pancreatic Ca   - s/p whipple 8/1/21 (in remission)   - c/w Creon 12K TID before meals  - o/p management per heme/onc    #L-Upper thigh rash  - non-pruritic macules, localized to area  - possibly drug allergy mediated  - no new prescriptions/ OTC meds  - developed after resuming Vancomycin PO  - hold Vanc PO  - consider adding vanc to list of allergies    #Hypomagnesemia  - on admission  - s/p 2mg IV Mg in Ed  - monitor Mg overnight  - appears to be chronic per lab records--> consider resuming Mg PO supplement on discharge    #HCM  - not covid vaccinated, last tested positive April 2021  - says her PCP told her she does not have to get vaccine??  - Counseled on admission, open to discussing vaccination inpatient  - Primary team please discuss w patient over admission    #Misc  - DVT Prophylaxis: Eliquis  - Diet: Dash/TLC, fluid restriction 1500mL  - Activity: Ambulate as tolerated  - IV Fluids: none  - Code Status: full code   #Pleural effusions and #LE edema 2+ secondary to suspected cardiac etiology, possibly new HF, undiagnosed  - PMHx Afib--> possibly mediated by decreased cardiac output (tachycardia-related adverse hemodynamics?) and subsequent venous congestion  - BNP >3.6K on admision, none to compare it to  - Sees Dr Liang outpatient, next appointment scheduled for Dec 1st  - Will consult [facility] inpatient given new findings--> why is pt not on beta blocker?  - Proven sulfa allergy, currently has rash from likely other drug rxn--> will hold off on Lasix for now  - Will give Ethacrynic acid PO for diuresis f/u urine output overnight (nurse at bedside informed to record output)  - ECHO ordered, f/u  - strict ins and outs  - f/u BMP in am    #Recurrent UTI  - s/p UTI inpatient 2 months ago (Klebsiella and Citrobacter)- denies dysuria  now, denies urinary frequency  - Given Ceftriaxone in Ed, will continue empirically for now  - Urine cultures sent--> f/u results  - currently afebrile--> Tylenol PRN for fever if develops    #Chronic AFib on Eliquis  - EKG on admission shows pt in Afib now, no RVR  - c/w Eliquis for now  - note pt says she has tried many other AC meds, allergies manifest  - discuss with Dr Liang prior to initiating alternative AC    #Pancreatic Ca   - s/p whipple 8/1/21 (in remission)   - c/w Creon 12K TID before meals  - o/p management per heme/onc    #L-Upper thigh rash  - non-pruritic macules, localized to area  - possibly drug allergy mediated  - no new prescriptions/ OTC meds  - developed after resuming Vancomycin PO  - hold Vanc PO  - consider adding vanc to list of allergies    #Hypomagnesemia  - on admission  - s/p 2mg IV Mg in Ed  - monitor Mg overnight  - appears to be chronic per lab records--> consider resuming Mg PO supplement on discharge    #HCM  - not covid vaccinated, last tested positive April 2021  - says her PCP told her she does not have to get vaccine??  - Counseled on admission, open to discussing vaccination inpatient  - Primary team please discuss w patient over admission    #Misc  - DVT Prophylaxis: Eliquis  - Diet: Dash/TLC, fluid restriction 1500mL  - Activity: Ambulate as tolerated  - IV Fluids: none  - Code Status: full code

## 2021-11-22 NOTE — H&P ADULT - NSHPLABSRESULTS_GEN_ALL_CORE
LABS:                        12.4   7.19  )-----------( 275      ( 2021 15:27 )             39.0         140  |  104  |  9<L>  ----------------------------<  94  3.8   |  23  |  <0.5<L>    Ca    7.9<L>      2021 15:27  Mg     1.7         TPro  5.0<L>  /  Alb  2.2<L>  /  TBili  0.4  /  DBili  x   /  AST  25  /  ALT  16  /  AlkPhos  594<H>        Urinalysis Basic - ( 2021 17:37 )    Color: Yellow / Appearance: Slightly Turbid / S.020 / pH: x  Gluc: x / Ketone: Negative  / Bili: Negative / Urobili: <2 mg/dL   Blood: x / Protein: 30 mg/dL / Nitrite: Positive   Leuk Esterase: Large / RBC: 10 /HPF /  /HPF   Sq Epi: x / Non Sq Epi: 2 /HPF / Bacteria: Many    Troponin T, Serum: <0.01 ng/mL (21 @ 15:27)      CARDIAC MARKERS ( 2021 15:27 )  x     / <0.01 ng/mL / x     / x     / x          RADIOLOGY:    < from: Xray Chest 1 View- PORTABLE-Urgent (21 @ 14:49) >  Impression:    Pulmonary vascular congestion/interstitial edema with bilateral pleural effusions and opacities.    --- End of Report ---

## 2021-11-22 NOTE — H&P ADULT - HISTORY OF PRESENT ILLNESS
75 yo F with PMHx of Chronic AFib on Eliquis, Pancreatic Ca s/p whipple 8/1/21 (in remission) recent admission for fungemia (treated with IV ABx via PICC line, completed 9/21), recent admission for C diff colitis (treated with Vancomycin course completed Oct 17th) presenting with SOB. Pt states to note worsening SOB with exertion and weakness over the past week. SOB has progressively worsened, is aggravated by exertion, and associated with  generalized weakness to the point that she has been unable to get out of bed. ROS + for palpitations / fast heartbeat, extremity swelling (since hospitalization, normal duplex as inpt), denies fever, Says she has been compliant with her home Eliquis. Pt denies any other symptoms including orthopnea, hemoptysis, recent illness/travel, fevers, chill, headache, cough, abdominal pain, chest pain. Denies any ongoing diarrhea since completion of Vanc course.     In the ED VS were CXR showed pleural effusion, BNP was found to be 3.7K, UA (+) for UTI, cultures pending. Pt was started on Ceftriaxone in ED. Will admit to medicine for UTI in setting of recent hospitalization also with pleural effusions secondary to suspected volume overload requiring further workup and possible diuresis inpatient.   75 yo F with PMHx of Chronic AFib on Eliquis, Pancreatic Ca s/p whipple 8/1/21 (in remission) recent admission for fungemia (treated with IV ABx via PICC line, completed 9/21), recent admission for C diff colitis, presenting with SOB. Pt states to note upper and lower extremity swelling developing over her last admission (note LE duplex negative on discharge), not improving after discharge, acutely worsening over the last week accompanied by acutely worsening SOB and weakness. SOB  is aggravated by exertion to the point that she is now unable to get out of bed without help of her son. She sleeps at home with a rented hospital bed on the first floor of her house, head of bed propped up at 30 degree angle and additionally with two pillows. ROS + for palpitations / fast heartbeat, denies fever, Says she has been compliant with her home Eliquis. Pt denies any other symptoms including hemoptysis, travel, fevers, chill, headache, cough, abdominal pain, chest pain. Denies any ongoing diarrhea since completion of Vanc course. Per discharge documents she was meant to complete her 4x daily Vanc course on Oct 17th, however her o/p doctor, Dr Nino, renewed prescription with decreased BID frequency. She stopped taking  the Vanc for the last 10 days on her own, but due to current symptoms, Dr Nino told her to resume antibiotics so she did so the night before presentation and woke up with a pinpoint rash loaclized to anterior surface of her left lower thigh, which she describes as the usual appearance and location of her past drug allergy reactions.    In the ED VS were CXR showed pleural effusion, BNP was found to be 3.7K, UA (+) for UTI, cultures pending. Pt was started on Ceftriaxone in ED. Will admit to medicine for UTI in setting of recent hospitalization also with pleural effusions secondary to suspected volume overload requiring further workup and possible diuresis inpatient.

## 2021-11-22 NOTE — ED PROCEDURE NOTE - ATTENDING CONTRIBUTION TO CARE
[Chaperone Present] : A chaperone was present in the examining room during all aspects of the physical examination [Normal Appearance] : general appearance was normal [Atrophy] : atrophy [1] : 1 [Aa ____] : Aa [unfilled] [Ba ____] : Ba [unfilled] [C ____] : C [unfilled] [GH ____] : GH [unfilled] [PB ____] : PB [unfilled] [TVL ____] : TVL  [unfilled] [Ap ____] : Ap [unfilled] [Bp ____] : Bp [unfilled] [D ____] : D [unfilled] I personally supervised the study.  I reviewed the images and interpretation by the resident/ACP and have edited where appropriate. [Absent] : absent [Normal] : no abnormalities

## 2021-11-22 NOTE — ED PROVIDER NOTE - CARE PLAN
Principal Discharge DX:	Pleural effusion  Secondary Diagnosis:	Acute UTI   1 Principal Discharge DX:	Pleural effusion  Secondary Diagnosis:	Acute UTI  Secondary Diagnosis:	SOB (shortness of breath)

## 2021-11-22 NOTE — ED PROVIDER NOTE - PHYSICAL EXAMINATION
Gen: NAD, AOx3  Head: NCAT  HEENT: PERRL, oral mucosa moist, normal conjunctiva, oropharynx clear without exudate or erythema  Lung: CTAB, no respiratory distress, no wheezing, rales, rhonchi  CV: normal s1/s2, rrr, Normal perfusion, pulses 2+ throughout  Abd: soft, NTND, no CVA tenderness  Genitourinary: no pelvic tenderness  MSK: BLE/BUE edema, no visible deformities, full range of motion in all 4 extremities  Neuro: CN II-XII grossly intact, No focal neurologic deficits  Skin: No rash   Psych: normal affect

## 2021-11-22 NOTE — H&P ADULT - ATTENDING COMMENTS
75 yo F with PMHx of Chronic AFib on Eliquis, Pancreatic Ca s/p whipple 8/1/21 (in remission) recent admission for fungemia, recent admission for C diff colitis, presenting with SOB. Found to be hypervolemic.   Suspected acute on chronic diastolic CHF exacerbation and anasarca due to hypoalbuminemia.  Start diuresis, fluid restriction, repeat TTE.   Asymptomatic pyuria - no symptoms, no abx   Chronically elevated ALP, check ggt (s/p cholecystectomy) and if normal, check iPTH and TSH.   Rest of plan as above.

## 2021-11-23 NOTE — CONSULT NOTE ADULT - SUBJECTIVE AND OBJECTIVE BOX
Patient is a 76y old  Female who presents with a chief complaint of SOB & Bilateral UE and LE swelling (2021 09:41)      HPI:  77 yo F with PMHx of Chronic AFib on Eliquis, Pancreatic Ca s/p whipple 21 (in remission) recent admission for fungemia (treated with IV ABx via PICC line, completed ), recent admission for C diff colitis, presenting with SOB. Pt states to note upper and lower extremity swelling developing over her last admission (note LE duplex negative on discharge), not improving after discharge, acutely worsening over the last week accompanied by acutely worsening SOB and weakness. SOB  is aggravated by exertion to the point that she is now unable to get out of bed without help of her son. She sleeps at home with a rented hospital bed on the first floor of her house, head of bed propped up at 30 degree angle and additionally with two pillows. ROS + for palpitations / fast heartbeat, denies fever, Says she has been compliant with her home Eliquis. Pt denies any other symptoms including hemoptysis, travel, fevers, chill, headache, cough, abdominal pain, chest pain. Denies any ongoing diarrhea since completion of Vanc course. Per discharge documents she was meant to complete her 4x daily Vanc course on Oct 17th, however her o/p doctor, Dr Nino, renewed prescription with decreased BID frequency. She stopped taking  the Vanc for the last 10 days on her own, but due to current symptoms, Dr Nino told her to resume antibiotics so she did so the night before presentation and woke up with a pinpoint rash loaclized to anterior surface of her left lower thigh, which she describes as the usual appearance and location of her past drug allergy reactions.    In the ED VS were CXR showed pleural effusion, BNP was found to be 3.7K, UA (+) for UTI, cultures pending. Pt was started on Ceftriaxone in ED. Will admit to medicine for UTI in setting of recent hospitalization also with pleural effusions secondary to suspected volume overload requiring further workup and possible diuresis inpatient.   (2021 22:27)      PAST MEDICAL & SURGICAL HISTORY:  SOB (shortness of breath)    Pain  knee    Varicose veins of both lower extremities, unspecified whether complicated    Atrial fibrillation, unspecified type  2019 on Eliquis    Jaundice  2021    Malignant neoplasm of pancreas, unspecified location of malignancy  Pancreatic Cancer    Hypertension, unspecified type  2019    Pancreatic cancer    Kidney stones    History of surgery  Whipple procedure 2021 with Dr. Nino at [facility]    Status post phlebectomy  10/2018    History of ovarian cystectomy  left    History of tonsillectomy  89    Kidney stone          PREVIOUS DIAGNOSTIC TESTING:      ECHO  FINDINGS:    STRESS  FINDINGS:    CATHETERIZATION  FINDINGS:    MEDICATIONS  (STANDING):  apixaban 5 milliGRAM(s) Oral every 12 hours  collagenase Ointment 1 Application(s) Topical daily  diltiazem    milliGRAM(s) Oral daily  furosemide   Injectable 40 milliGRAM(s) IV Push two times a day  lactobacillus acidophilus 1 Tablet(s) Oral daily  multivitamin 1 Tablet(s) Oral daily  pancrelipase  (CREON 12,000 Lipase Units) 1 Capsule(s) Oral three times a day with meals  pantoprazole    Tablet 40 milliGRAM(s) Oral before breakfast  saccharomyces boulardii 250 milliGRAM(s) Oral two times a day    MEDICATIONS  (PRN):      FAMILY HISTORY:  CAD (coronary artery disease) (Sibling)  3  siblings ( 2 living and 1 passed away)        SOCIAL HISTORY:  CIGARETTES:    ALCOHOL:    REVIEW OF SYSTEMS:  CONSTITUTIONAL: No fever, weight loss, or fatigue  NECK: No pain or stiffness  RESPIRATORY: No cough, wheezing, chills or hemoptysis; No shortness of breath  CARDIOVASCULAR: No chest pain, palpitations, dizziness, or leg swelling  GASTROINTESTINAL: No abdominal or epigastric pain. No nausea, vomiting, or hematemesis; No diarrhea or constipation. No melena or hematochezia.  GENITOURINARY: No dysuria, frequency, hematuria, or incontinence  NEUROLOGICAL: No headaches, memory loss, loss of strength, numbness, or tremors  SKIN: No itching, burning, rashes, or lesions   ENDOCRINE: No heat or cold intolerance; No hair loss  MUSCULOSKELETAL: No joint pain or swelling; No muscle, back, or extremity pain  HEME/LYMPH: No easy bruising, or bleeding gums          Vital Signs Last 24 Hrs  T(C): 35.8 (2021 14:08), Max: 36.5 (2021 22:46)  T(F): 96.5 (2021 14:08), Max: 97.7 (2021 22:46)  HR: 98 (2021 16:26) (72 - 113)  BP: 101/67 (2021 14:08) (101/67 - 133/92)  BP(mean): --  RR: 18 (2021 14:08) (18 - 18)  SpO2: 99% (2021 16:26) (96% - 99%)        PHYSICAL EXAM:  GENERAL: NAD, well-groomed, well-developed  HEAD:  Atraumatic, Normocephalic  NECK: Supple, No JVD, Normal thyroid  NERVOUS SYSTEM:  Alert & Oriented X3, Good concentration  CHEST/LUNG: Clear to percussion bilaterally; No rales, rhonchi, wheezing, or rubs  HEART: Regular rate and rhythm; No murmurs, rubs, or gallops  ABDOMEN: Soft, Nontender, Nondistended; Bowel sounds present  EXTREMITIES:  2+ Peripheral Pulses, No clubbing, cyanosis, or edema  SKIN: No rashes or lesions    INTERPRETATION OF TELEMETRY:    ECG:    I&O's Detail    2021 07:01  -  2021 19:56  --------------------------------------------------------  IN:    Oral Fluid: 540 mL  Total IN: 540 mL    OUT:    Voided (mL): 500 mL  Total OUT: 500 mL    Total NET: 40 mL          LABS:                        12.0   9.12  )-----------( 304      ( 2021 11:58 )             38.1         140  |  105  |  10  ----------------------------<  131<H>  4.2   |  21  |  0.5<L>    Ca    7.9<L>      2021 11:58  Mg     1.8         TPro  4.5<L>  /  Alb  1.9<L>  /  TBili  0.4  /  DBili  x   /  AST  27  /  ALT  16  /  AlkPhos  550<H>      CARDIAC MARKERS ( 2021 15:27 )  x     / <0.01 ng/mL / x     / x     / x            Urinalysis Basic - ( 2021 17:37 )    Color: Yellow / Appearance: Slightly Turbid / S.020 / pH: x  Gluc: x / Ketone: Negative  / Bili: Negative / Urobili: <2 mg/dL   Blood: x / Protein: 30 mg/dL / Nitrite: Positive   Leuk Esterase: Large / RBC: 10 /HPF /  /HPF   Sq Epi: x / Non Sq Epi: 2 /HPF / Bacteria: Many      I&O's Summary    2021 07:01  -  2021 19:56  --------------------------------------------------------  IN: 540 mL / OUT: 500 mL / NET: 40 mL        RADIOLOGY & ADDITIONAL STUDIES:

## 2021-11-23 NOTE — PROGRESS NOTE ADULT - NUTRITIONAL ASSESSMENT
#Pleural effusions, LE edema 2+, SOB secondary to suspected cardiac etiology, possibly new CHF, undiagnosed  - Hx Afib currently on Eliquis  - BNP >3.6K on admission, none to compare it to  - Sees Dr Liang (cardiology) outpatient, next appointment scheduled for Dec 1st  - Per Ed, "Proven sulfa allergy, currently has rash from likely other drug rxn--> will hold off on Lasix for now and Ethacrynic acid PO for diuresis  - ECHO (9/21) shows EF 50%, enlarged LA & RA, RV systolic dysfunction, but normal LV thickness and size   - Prior admission shows use of lassix w/o complications. Will start on Lassix.  - Will consult cardiology   - strict ins and outs  - f/u BMP    #Asymptomatic pyuria and Hx Recurrent UTI  - s/p UTI inpatient 2 months ago (Klebsiella and Citrobacter)- denies dysuria now, denies urinary frequency  - Given Ceftriaxone in Ed  - Urine cultures show (+) leukocyte esterase, nitrites, WBCs  - Pt is asymptomatic, afebrile, and prior admission of C. difficile  - d/c Ceftriaxone and will give Tylenol PRN for fever if develops    #Ankle ulcer   - Well defined open ulcer above the posterior R ankle  - Will consult wound care and podiatry and f/u    #Chronic AFib on Eliquis  - EKG on admission shows pt in Afib now, no RVR  - c/w Eliquis for now  - note pt says she has tried many other AC meds, allergies manifest  - discuss with Dr Liang prior to initiating alternative AC    #Pancreatic Ca   - s/p whipple 8/1/21 (in remission)   - c/w Creon 12K TID before meals  - o/p management per heme/onc    #L-Upper thigh rash  - non-pruritic macules, localized to area  - possibly drug allergy mediated  - no new prescriptions/ OTC meds  - developed after resuming Vancomycin PO  - hold Vanc PO  - consider adding vanc to list of allergies    #Hypomagnesemia  - on admission  - s/p 2mg IV Mg in Ed  - monitor Mg overnight  - appears to be chronic per lab records--> consider resuming Mg PO supplement on discharge  - Mg2+ 1.7 (11/22)    #Health Care Maintenance  - not covid vaccinated, last tested positive April 2021  - says her PCP told her she does not have to get vaccine??  - Counseled on admission, open to discussing vaccination inpatient  - Primary team please discuss w patient over admission    #Misc  - DVT Prophylaxis: Eliquis  - Diet: Dash/TLC, fluid restriction 1500mL  - Dispo: Will start on lassix and consult cardio, stop Abx given asymptomatic and prior C.diff, and consult podiatry for ulcer and reassess #Pleural effusions, LE edema 2+, SOB secondary to suspected cardiac etiology, possibly new CHF, undiagnosed  - Hx Afib currently on Eliquis  - BNP >3.6K on admission, none to compare it to  - Sees Dr Liang (cardiology) outpatient, next appointment scheduled for Dec 1st  - Per Ed, "Proven sulfa allergy, currently has rash from likely other drug rxn--> will hold off on Lasix for now and Ethacrynic acid PO for diuresis  - ECHO (9/21) shows EF 50%, enlarged LA & RA, RV systolic dysfunction, but normal LV thickness and size   - Prior admission shows use of lassix w/o complications. Will start on Lassix.  - Will consult cardiology   - strict ins and outs  - f/u BMP    #Asymptomatic pyuria and Hx Recurrent UTI  - s/p UTI inpatient 2 months ago (Klebsiella and Citrobacter)- denies dysuria now, denies urinary frequency  - Given Ceftriaxone in Ed  - Urine cultures show (+) leukocyte esterase, nitrites, WBCs  - Pt is asymptomatic, afebrile, and prior admission of C. difficile  - d/c Ceftriaxone and will give Tylenol PRN for fever if develops    #Ankle ulcer   - Well defined open ulcer above the posterior R ankle  - Will consult wound care and podiatry and f/u  - Per podiatry pt refusing Sx intervention, will continue Santyl wound care, possible debridment on 11/29 if pt still in-house.  - Pt will f/u as outpatient w/ Dr. Tovar s/p 1week post d/c    #Chronic AFib on Eliquis  - EKG on admission shows pt in Afib now, no RVR  - c/w Eliquis for now  - note pt says she has tried many other AC meds, allergies manifest  - discuss with Dr Liang prior to initiating alternative AC    #Pancreatic Ca   - s/p whipple 8/1/21 (in remission)   - c/w Creon 12K TID before meals  - o/p management per heme/onc    #L-Upper thigh rash  - non-pruritic macules, localized to area  - possibly drug allergy mediated  - no new prescriptions/ OTC meds  - developed after resuming Vancomycin PO  - hold Vanc PO  - consider adding vanc to list of allergies    #Hypomagnesemia  - on admission  - s/p 2mg IV Mg in Ed  - monitor Mg overnight  - appears to be chronic per lab records--> consider resuming Mg PO supplement on discharge  - Mg2+ 1.7 (11/22)    #Health Care Maintenance  - not covid vaccinated, last tested positive April 2021  - says her PCP told her she does not have to get vaccine??  - Counseled on admission, open to discussing vaccination inpatient  - Primary team please discuss w patient over admission    #Misc  - DVT Prophylaxis: Eliquis  - Diet: Dash/TLC, fluid restriction 1500mL  - Dispo: Will start on lassix and consult cardio, stop Abx given asymptomatic and prior C.diff, and consult podiatry for ulcer and reassess #Pleural effusions, LE edema 2+, SOB secondary to suspected cardiac etiology, possibly new CHF, undiagnosed  - Hx Afib currently on Eliquis  - BNP >3.6K on admission, none to compare it to  - Sees Dr Liang (cardiology) outpatient, next appointment scheduled for Dec 1st  - Per Ed, "Proven sulfa allergy, currently has rash from likely other drug rxn--> will hold off on Lasix for now and Ethacrynic acid PO for diuresis  - ECHO (9/21) shows EF 50%, enlarged LA & RA, RV systolic dysfunction, but normal LV thickness and size   - Pending Echo (ordered 11/22)  - Prior admission shows use of lassix w/o complications. Will start on Lassix.  - strict ins and outs  - f/u BMP    #Asymptomatic pyuria and Hx Recurrent UTI  - s/p UTI inpatient 2 months ago (Klebsiella and Citrobacter)- denies dysuria now, denies urinary frequency  - Given Ceftriaxone in Ed  - Urine cultures show (+) leukocyte esterase, nitrites, WBCs  - Pt is asymptomatic, afebrile, and prior admission of C. difficile  - d/c Ceftriaxone and will give Tylenol PRN for fever if develops    #Ankle ulcer   - Well defined open ulcer above the posterior R ankle  - Will consult wound care and podiatry and f/u  - Per podiatry pt refusing Sx intervention, will continue Santyl wound care, possible debridment on 11/29 if pt still in-house.  - Pt will f/u as outpatient w/ Dr. Tovar s/p 1week post d/c    #Chronic AFib on Eliquis  - EKG on admission shows pt in Afib now, no RVR  - c/w Eliquis for now  - note pt says she has tried many other AC meds, allergies manifest  - discuss with Dr Liang prior to initiating alternative AC    #Pancreatic Ca   - s/p whipple 8/1/21 (in remission)   - c/w Creon 12K TID before meals  - o/p management per heme/onc    #L-Upper thigh rash  - non-pruritic macules, localized to area  - possibly drug allergy mediated  - no new prescriptions/ OTC meds  - developed after resuming Vancomycin PO  - hold Vanc PO  - consider adding vanc to list of allergies    #Hypomagnesemia  - on admission  - s/p 2mg IV Mg in Ed  - monitor Mg overnight  - appears to be chronic per lab records--> consider resuming Mg PO supplement on discharge  - Mg2+ 1.7 (11/22)    #Health Care Maintenance  - not covid vaccinated, last tested positive April 2021  - says her PCP told her she does not have to get vaccine??  - Counseled on admission, open to discussing vaccination inpatient  - Primary team please discuss w patient over admission    #Misc  - DVT Prophylaxis: Eliquis  - Diet: Dash/TLC, fluid restriction 1500mL  - Dispo: Will start on lassix, stop Abx given asymptomatic and prior C.diff, and consult podiatry for ulcer and reassess

## 2021-11-23 NOTE — CONSULT NOTE ADULT - SUBJECTIVE AND OBJECTIVE BOX
Podiatry Consult Note    Subjective:  VIKASH LI is a 76y old  Female who presents with a chief complaint of SOB & Bilateral UE and LE swelling (23 Nov 2021 09:41)    HPI:  77 yo F with PMHx of Chronic AFib on Eliquis, Pancreatic Ca s/p whipple 8/1/21 (in remission) recent admission for fungemia (treated with IV ABx via PICC line, completed 9/21), recent admission for C diff colitis, presenting with SOB. Pt states to note upper and lower extremity swelling developing over her last admission (note LE duplex negative on discharge), not improving after discharge, acutely worsening over the last week accompanied by acutely worsening SOB and weakness. SOB  is aggravated by exertion to the point that she is now unable to get out of bed without help of her son. She sleeps at home with a rented hospital bed on the first floor of her house, head of bed propped up at 30 degree angle and additionally with two pillows. ROS + for palpitations / fast heartbeat, denies fever, Says she has been compliant with her home Eliquis. Pt denies any other symptoms including hemoptysis, travel, fevers, chill, headache, cough, abdominal pain, chest pain. Denies any ongoing diarrhea since completion of Vanc course. Per discharge documents she was meant to complete her 4x daily Vanc course on Oct 17th, however her o/p doctor, Dr Nino, renewed prescription with decreased BID frequency. She stopped taking  the Vanc for the last 10 days on her own, but due to current symptoms, Dr Nino told her to resume antibiotics so she did so the night before presentation and woke up with a pinpoint rash loaclized to anterior surface of her left lower thigh, which she describes as the usual appearance and location of her past drug allergy reactions.    In the ED VS were CXR showed pleural effusion, BNP was found to be 3.7K, UA (+) for UTI, cultures pending. Pt was started on Ceftriaxone in ED. Will admit to medicine for UTI in setting of recent hospitalization also with pleural effusions secondary to suspected volume overload requiring further workup and possible diuresis inpatient.   (22 Nov 2021 22:27)    Seen by bedside w/ attending; evaluated BL feet;     PAST MEDICAL & SURGICAL HISTORY:  SOB (shortness of breath)  Pain  knee  Varicose veins of both lower extremities, unspecified whether complicated  Atrial fibrillation, unspecified type  2019 on Eliquis  Jaundice  March 5, 2021  Malignant neoplasm of pancreas, unspecified location of malignancy  Pancreatic Cancer  Hypertension, unspecified type  2019  Pancreatic cancer  Kidney stones  History of surgery  Whipple procedure 8/2/2021 with Dr. Nino at [facility]  Status post phlebectomy  10/2018  History of ovarian cystectomy  left  History of tonsillectomy  1959  Kidney stone  2017     Objective:  Vital Signs Last 24 Hrs  T(C): 36.1 (23 Nov 2021 05:48), Max: 36.5 (22 Nov 2021 22:46)  T(F): 97 (23 Nov 2021 05:48), Max: 97.7 (22 Nov 2021 22:46)  HR: 106 (23 Nov 2021 05:48) (72 - 106)  BP: 121/76 (23 Nov 2021 05:48) (102/75 - 133/92)  BP(mean): --  RR: 18 (23 Nov 2021 00:44) (18 - 20)  SpO2: 99% (23 Nov 2021 08:46) (96% - 99%)                        12.4   7.19  )-----------( 275      ( 22 Nov 2021 15:27 )             39.0                 11-22    140  |  104  |  9<L>  ----------------------------<  94  3.8   |  23  |  <0.5<L>    Ca    7.9<L>      22 Nov 2021 15:27  Mg     1.7     11-22    TPro  5.0<L>  /  Alb  2.2<L>  /  TBili  0.4  /  DBili  x   /  AST  25  /  ALT  16  /  AlkPhos  594<H>  11-22    Physical Exam - Lower Extremity Focused:   Derm:   Right achilles tendon exposed; fibrotic-necrotic;   Left foot multiple scab wound; stable;   Vascular: DP and PT Pulses Diminished; cold feet noted;   Neuro: Protective Sensation Diminished;    Assessment:  Right achilles tendon exposed;   Multiple stable scab wound on left foot;     Plan:  Chart reviewed and Patient evaluated. All Questions and Concerns Addressed and Answered  Discussed diagnosis and treatment with patient  Wound Flushed w/ normal saline; Santyl on right achilles tendon / WTD / Kerlix; q24  Local Wound Care; As Stated Above;  Pt refuses sx intervention from podiatry; will continue w/ Santyl local wound care;  Possible bedside achilles debridement on Mon 11/29 if pt still in-house;  Follow up as an outpt to Dr. Tovar at [address] [place] [address] [facility] a week post discharge;  Weight bearing status; WBAT BL feet;   Evaluated and Discussed Plan w/ Dr. Tovar;     Podiatry

## 2021-11-23 NOTE — PATIENT PROFILE ADULT - NSPROPTRIGHTCAREGIVER_GEN_A_NUR
PROCEDURE NOTE: Eylea 2mg OD. Diagnosis: Neovascular AMD with Active CNV. Prior to injection, risks/benefits/alternatives discussed including corneal abrasion, infection, loss of vision, hemorrhage, cataract, glaucoma, retinal tears or detachment. A written consent is on file, and the need for today’s injection was discussed and the patient is understanding and wishes to proceed. The entire vial of Eylea was drawn up into a syringe. The opened vial, remaining drug, and filtered needle were disposed of in a certified biohazard container. Betadine prep was performed. Topical anesthesia was induced with Alcaine. 4% lidocaine pledge. A lid speculum was used. A short 30g needle on a 1cc syringe was used with product that that had previously been prepared under sterile conditions. Injection site: 3-4 mm from the limbus. The used syringe/needle was transferred to a biohazard container. Lid speculum removed. Mask worn during procedure. Patient tolerated procedure well. Count fingers vision was verified. There were no complications. Patient was given the standard instruction sheet. Patient given office phone number/answering service number and advised to call immediately should there be loss of vision or pain, or should they have any other questions or concerns. Wendy Vallecillo PROCEDURE NOTE: Epilation Right Lower Lid. Aberrant lashes removed from 1 lid(s) using microforcep. Patient tolerated procedure well. There were no complications. Post-op instructions given. Wendy Vallecillo
PROCEDURE NOTE: Eylea 2mg OD. Diagnosis: Neovascular AMD with Active CNV. Prior to injection, risks/benefits/alternatives discussed including corneal abrasion, infection, loss of vision, hemorrhage, cataract, glaucoma, retinal tears or detachment. A written consent is on file, and the need for today’s injection was discussed and the patient is understanding and wishes to proceed. The entire vial of Eylea was drawn up into a syringe. The opened vial, remaining drug, and filtered needle were disposed of in a certified biohazard container. Betadine prep was performed. Topical anesthesia was induced with Alcaine. 4% lidocaine pledge. A lid speculum was used. A short 30g needle on a 1cc syringe was used with product that that had previously been prepared under sterile conditions. Injection site: 3-4 mm from the limbus. The used syringe/needle was transferred to a biohazard container. Lid speculum removed. Mask worn during procedure. Patient tolerated procedure well. Count fingers vision was verified. There were no complications. Patient was given the standard instruction sheet. Patient given office phone number/answering service number and advised to call immediately should there be loss of vision or pain, or should they have any other questions or concerns. Wendy Vallecillo PROCEDURE NOTE: Epilation Right Lower Lid. Aberrant lashes removed from 1 lid(s) using microforcep. Patient tolerated procedure well. There were no complications. Post-op instructions given. Wendy Vallecillo
declines

## 2021-11-23 NOTE — CONSULT NOTE ADULT - ASSESSMENT
ASSESSMENT:  76yF w/ PMHx of A-fib on eliquis and cardizam, pancreatic adenocarcinoma, h/o C-diff, h/o fungemia presents w/ SOB and weakness. Found to have UTI    PLAN:  - urine cultures  - ID for antibiotics  - Duplex U/S for Upper extremity w/ evaluation of internal jugular and subclavian vein  - Cards consult  - [facility] (Dr. Weldon) ->for eval for post-op Chemo    Above plan discussed with Attending Surgeon Dr. Nino  11-23-21 @ 18:53     ASSESSMENT:  76yF w/ PMHx of A-fib on eliquis and cardizam, pancreatic adenocarcinoma, h/o C-diff, h/o fungemia presents w/ SOB and weakness. Found to have UTI    PLAN:  - urine cultures  - ID for antibiotics  - Duplex U/S for Upper extremity w/ evaluation of internal jugular and subclavian vein  - Cards consult  - Podiatry for debridement of right heel wound  - [facility] (Dr. Weldon) ->for eval for post-op Chemo    Above plan discussed with Attending Surgeon Dr. Nino  11-23-21 @ 18:53

## 2021-11-23 NOTE — PROGRESS NOTE ADULT - ASSESSMENT
77 yo F with Hx of Chronic AFib on Eliquis, Pancreatic Ca s/p whipple 8/1/21 (in remission) recent admission for fungemia (treated with IV ABx via PICC line, completed 9/21), recent admission for C diff colitis (10/9) presenting with SOB and b/l extremity swelling. Pt history and findings (PE diminished breath sounds and perfuse 2+ pitting edema, CXR bilateral opacities) consistent with fluid overload and will work-up. Pt has no prior Hx of CHF. Pt shows asymptomatic pyuria and given prior C.diff admission, will d/c Abx. Pt also c/o ulcer on RLE, will f/u with podiatry.

## 2021-11-23 NOTE — PROGRESS NOTE ADULT - SUBJECTIVE AND OBJECTIVE BOX
77 yo F with Hx of Chronic AFib on Eliquis, Pancreatic Ca s/p whipple 8/1/21 (in remission) recent admission for fungemia (treated with IV ABx via PICC line, completed 9/21), recent admission for C diff colitis (10/9) presenting with SOB and b/l extremity swelling. Pt states upper and lower extremity swelling developed over her last admission (note LE duplex negative on discharge) and has been worsening over the last week accompanied by acutely worsening SOB and weakness. Pt notes SOB  is aggravated by exertion to the point that she is now unable to get out of bed without help of her son. She sleeps at home with a rented hospital bed on the first floor of her house, head of bed propped up at 30 degree angle and additionally with two pillows. Pt denies any other symptoms including fever, chills, chest pain, abdominal pain, N/V/D, constipation, burning urination. Denies any ongoing diarrhea since completion of Vanc course.     Per discharge documents she was meant to complete her 4x daily Vanc course on Oct 17th, however her o/p doctor, Dr Nino, renewed prescription with decreased BID frequency. She stopped taking  the Vanc for the last 10 days on her own, but due to current symptoms, Dr Nino told her to resume antibiotics so she did so the night before presentation and woke up with a pinpoint rash loaclized to anterior surface of her left lower thigh, which she describes as the usual appearance and location of her past drug allergy reactions.    In the ED VS were CXR showed pleural effusion, BNP was found to be 3.7K, UA (+) for UTI, cultures pending. Pt was started on Ceftriaxone in ED. Will admit to medicine for UTI in setting of recent hospitalization also with pleural effusions secondary to suspected volume overload requiring further workup and possible diuresis inpatient.   75 yo F with Hx of Chronic AFib on Eliquis, Pancreatic Ca s/p whipple 21 (in remission) recent admission for fungemia (treated with IV ABx via PICC line, completed ), recent admission for C diff colitis (10/9) presenting with SOB and b/l extremity swelling. Pt states upper and lower extremity swelling developed over her last admission (note LE duplex negative on discharge) and has been worsening over the last week accompanied by acutely worsening SOB and weakness. Pt notes SOB  is aggravated by exertion to the point that she is now unable to get out of bed without help of her son. She sleeps at home with a rented hospital bed on the first floor of her house, head of bed propped up at 30 degree angle and additionally with two pillows. Pt denies any other symptoms including fever, chills, chest pain, abdominal pain, N/V/D, constipation, burning urination. Denies any ongoing diarrhea since completion of Vanc course.     Per discharge documents she was meant to complete her 4x daily Vanc course on Oct 17th, however her o/p doctor, Dr Nino, renewed prescription with decreased BID frequency. She stopped taking  the Vanc for the last 10 days on her own, but due to current symptoms, Dr Nino told her to resume antibiotics so she did so the night before presentation and woke up with a pinpoint rash loaclized to anterior surface of her left lower thigh, which she describes as the usual appearance and location of her past drug allergy reactions.    In the ED: CXR showed pleural effusion, BNP was found to be 3.7K, UA (+) for UTI, cultures pending. Pt was started on Ceftriaxone in ED. Will admit to medicine for UTI in setting of recent hospitalization also with pleural effusions secondary to suspected volume overload requiring further workup and possible diuresis inpatient.    Subjective:  Overnight events: none  Complaints: SOB and extremity swelling    Objective:    Vital Signs Last 24 Hrs  T(C): 36.1 (2021 05:48), Max: 36.5 (2021 22:46)  T(F): 97 (2021 05:48), Max: 97.7 (2021 22:46)  HR: 106 (2021 05:48) (72 - 106)  BP: 121/76 (2021 05:48) (102/75 - 133/92)  BP(mean): --  RR: 18 (2021 00:44) (18 - 20)  SpO2: 99% (2021 08:46) (96% - 99%)    PHYSICAL EXAM:  VITAL SIGNS: I have reviewed nursing notes and confirm.  CONSTITUTIONAL: Well-developed; well-nourished; in no acute distress.  SKIN: Skin is warm and dry. Non pruritic pin point rash to LUE. Varicose vein in b/l extremities, w/ dry skin consistent w/ stasis dermatitis above b/l ankles. Open ulcer wound posterior R ankle.  HEAD: Normocephalic; atraumatic.  EYES: PERRL, EOM intact; conjunctiva and sclera clear.  ENT: No nasal discharge; airway clear.  NECK: Supple; non tender.  CARD: S1, S2 normal; no murmurs, gallops, or rubs. Regular rate and rhythm.  RESP: Diminished breath sounds in b/l lower lung fields  ABD: Normal bowel sounds; soft; non-distended; non-tender; no hepatosplenomegaly.  EXT: Perfuse swelling and 2+ pitting edema in b/l LE and UE. Ulcer noted to posterior R ankle.  NEURO: Alert, oriented. Grossly unremarkable.  PSYCH: Cooperative, appropriate.     MEDICATIONS  (STANDING):  apixaban 5 milliGRAM(s) Oral every 12 hours  diltiazem    milliGRAM(s) Oral daily  furosemide   Injectable 40 milliGRAM(s) IV Push two times a day  lactobacillus acidophilus 1 Tablet(s) Oral daily  multivitamin 1 Tablet(s) Oral daily  pancrelipase  (CREON 12,000 Lipase Units) 1 Capsule(s) Oral three times a day with meals  pantoprazole    Tablet 40 milliGRAM(s) Oral before breakfast  saccharomyces boulardii 250 milliGRAM(s) Oral two times a day    MEDICATIONS  (PRN):    Home Medications:  calcium,magnesium and Zinc: 1 tab(s) orally once a day (03 Sep 2021 23:41)  Cardizem  mg/24 hours oral capsule, extended release: 1 cap(s) orally once a day (2021 23:59)  Eliquis 5 mg oral tablet: 1 tab(s) orally 2 times a day (03 Sep 2021 23:41)  Women&#x27;s daily vitamins: 1 tab(s) orally once a day (03 Sep 2021 23:41)    Social History:  Denies tobacco, Etoh or illicit drug use  Living: Lives with son    Labs:                                  12.4   7.19  )-----------( 275      ( 2021 15:27 )             39.0     CBC Full  -  ( 2021 15:27 )  WBC Count : 7.19 K/uL  RBC Count : 4.34 M/uL  Hemoglobin : 12.4 g/dL  Hematocrit : 39.0 %  Platelet Count - Automated : 275 K/uL  Mean Cell Volume : 89.9 fL  Mean Cell Hemoglobin : 28.6 pg  Mean Cell Hemoglobin Concentration : 31.8 g/dL  Auto Neutrophil # : 5.36 K/uL  Auto Lymphocyte # : 1.29 K/uL  Auto Monocyte # : 0.47 K/uL  Auto Eosinophil # : 0.05 K/uL  Auto Basophil # : 0.01 K/uL  Auto Neutrophil % : 74.7 %  Auto Lymphocyte % : 17.9 %  Auto Monocyte % : 6.5 %  Auto Eosinophil % : 0.7 %  Auto Basophil % : 0.1 %        140  |  104  |  9<L>  ----------------------------<  94  3.8   |  23  |  <0.5<L>    Ca    7.9<L>      2021 15:27  Mg     1.7         TPro  5.0<L>  /  Alb  2.2<L>  /  TBili  0.4  /  DBili  x   /  AST  25  /  ALT  16  /  AlkPhos  594<H>      Urinalysis Basic - ( 2021 17:37 )  Color: Yellow / Appearance: Slightly Turbid / S.020 / pH: x  Gluc: x / Ketone: Negative  / Bili: Negative / Urobili: <2 mg/dL   Blood: x / Protein: 30 mg/dL / Nitrite: Positive   Leuk Esterase: Large / RBC: 10 /HPF /  /HPF   Sq Epi: x / Non Sq Epi: 2 /HPF / Bacteria: Many    Ventricular Rate 90 BPM    Atrial Rate 87 BPM    QRS Duration 74 ms    Q-T Interval 340 ms    QTC Calculation(Bazett) 415 ms    R Axis 97 degrees    T Axis 6 degrees    Diagnosis Line Atrial fibrillation  Rightward axis  Cannot rule out Anterior infarct , age undetermined  Abnormal ECG    Confirmed by DEENA GOMEZ MD ([id number]) on 2021 2:58:44 PM    Imaging:    EXAM:  XR FOOT COMP MIN 3 VIEWS BI        PROCEDURE DATE:  2021    IMPRESSION:  No definite radiographic evidence for osteomyelitis ineither foot. However, if there is clinical suspicion for osteomyelitis further evaluation can be obtained with a MRI.    EXAM:  XR CHEST PORTABLE URGENT 1V        PROCEDURE DATE:  2021    Impression:  Pulmonary vascular congestion/interstitial edema with bilateral pleural effusions and opacities.     75 yo F with Hx of Chronic AFib on Eliquis, Pancreatic Ca s/p whipple 21 (in remission) recent admission for fungemia (treated with IV ABx via PICC line, completed ), recent admission for C diff colitis (10/9) presenting with SOB and b/l extremity swelling. Pt states upper and lower extremity swelling developed over her last admission (note LE duplex negative on discharge) and has been worsening over the last week accompanied by acutely worsening SOB and weakness. Pt notes SOB  is aggravated by exertion to the point that she is now unable to get out of bed without help of her son. She sleeps at home with a rented hospital bed on the first floor of her house, head of bed propped up at 30 degree angle and additionally with two pillows. Pt denies any other symptoms including fever, chills, chest pain, abdominal pain, N/V/D, constipation, burning urination. Denies any ongoing diarrhea since completion of Vanc course.     Per discharge documents she was meant to complete her 4x daily Vanc course on Oct 17th, however her o/p doctor, Dr Nino, renewed prescription with decreased BID frequency. She stopped taking  the Vanc for the last 10 days on her own, but due to current symptoms, Dr Nino told her to resume antibiotics so she did so the night before presentation and woke up with a pinpoint rash loaclized to anterior surface of her left lower thigh, which she describes as the usual appearance and location of her past drug allergy reactions.    In the ED: CXR showed pleural effusion, BNP was found to be 3.7K, UA (+) for UTI, cultures pending. Pt was started on Ceftriaxone in ED. Will admit to medicine for UTI in setting of recent hospitalization also with pleural effusions secondary to suspected volume overload requiring further workup and possible diuresis inpatient.    Subjective:  Overnight events: none  Complaints: SOB and extremity swelling    Objective:    Vital Signs Last 24 Hrs  T(C): 36.1 (2021 05:48), Max: 36.5 (2021 22:46)  T(F): 97 (2021 05:48), Max: 97.7 (2021 22:46)  HR: 106 (2021 05:48) (72 - 106)  BP: 121/76 (2021 05:48) (102/75 - 133/92)  BP(mean): --  RR: 18 (2021 00:44) (18 - 20)  SpO2: 99% (2021 08:46) (96% - 99%)    PHYSICAL EXAM:  VITAL SIGNS: I have reviewed nursing notes and confirm.  CONSTITUTIONAL: Well-developed; well-nourished; in no acute distress.  SKIN: Skin is warm and dry. Non pruritic pin point rash to LUE. Varicose vein in b/l extremities, w/ dry skin consistent w/ stasis dermatitis above b/l ankles. Open ulcer wound posterior R ankle.  HEAD: Normocephalic; atraumatic.  EYES: PERRL, EOM intact; conjunctiva and sclera clear.  ENT: No nasal discharge; airway clear.  NECK: Supple; non tender.  CARD: S1, S2 normal; no murmurs, gallops, or rubs. Regular rate and rhythm.  RESP: Diminished breath sounds in b/l lower lung fields  ABD: Normal bowel sounds; soft; non-distended; non-tender; no hepatosplenomegaly.  EXT: Perfuse swelling and 2+ pitting edema in b/l LE and UE. Ulcer noted to posterior R ankle.  NEURO: Alert, oriented. Grossly unremarkable.  PSYCH: Cooperative, appropriate.     MEDICATIONS  (STANDING):  apixaban 5 milliGRAM(s) Oral every 12 hours  diltiazem    milliGRAM(s) Oral daily  furosemide   Injectable 40 milliGRAM(s) IV Push two times a day  lactobacillus acidophilus 1 Tablet(s) Oral daily  multivitamin 1 Tablet(s) Oral daily  pancrelipase  (CREON 12,000 Lipase Units) 1 Capsule(s) Oral three times a day with meals  pantoprazole    Tablet 40 milliGRAM(s) Oral before breakfast  saccharomyces boulardii 250 milliGRAM(s) Oral two times a day    MEDICATIONS  (PRN):    Home Medications:  calcium,magnesium and Zinc: 1 tab(s) orally once a day (03 Sep 2021 23:41)  Cardizem  mg/24 hours oral capsule, extended release: 1 cap(s) orally once a day (2021 23:59)  Eliquis 5 mg oral tablet: 1 tab(s) orally 2 times a day (03 Sep 2021 23:41)  Women&#x27;s daily vitamins: 1 tab(s) orally once a day (03 Sep 2021 23:41)    Social History:  Denies tobacco, Etoh or illicit drug use  Living: Lives with son    Labs:                                  12.4   7.19  )-----------( 275      ( 2021 15:27 )             39.0     CBC Full  -  ( 2021 15:27 )  WBC Count : 7.19 K/uL  RBC Count : 4.34 M/uL  Hemoglobin : 12.4 g/dL  Hematocrit : 39.0 %  Platelet Count - Automated : 275 K/uL  Mean Cell Volume : 89.9 fL  Mean Cell Hemoglobin : 28.6 pg  Mean Cell Hemoglobin Concentration : 31.8 g/dL  Auto Neutrophil # : 5.36 K/uL  Auto Lymphocyte # : 1.29 K/uL  Auto Monocyte # : 0.47 K/uL  Auto Eosinophil # : 0.05 K/uL  Auto Basophil # : 0.01 K/uL  Auto Neutrophil % : 74.7 %  Auto Lymphocyte % : 17.9 %  Auto Monocyte % : 6.5 %  Auto Eosinophil % : 0.7 %  Auto Basophil % : 0.1 %        140  |  104  |  9<L>  ----------------------------<  94  3.8   |  23  |  <0.5<L>    Ca    7.9<L>      2021 15:27  Mg     1.7         TPro  5.0<L>  /  Alb  2.2<L>  /  TBili  0.4  /  DBili  x   /  AST  25  /  ALT  16  /  AlkPhos  594<H>      Urinalysis Basic - ( 2021 17:37 )  Color: Yellow / Appearance: Slightly Turbid / S.020 / pH: x  Gluc: x / Ketone: Negative  / Bili: Negative / Urobili: <2 mg/dL   Blood: x / Protein: 30 mg/dL / Nitrite: Positive   Leuk Esterase: Large / RBC: 10 /HPF /  /HPF   Sq Epi: x / Non Sq Epi: 2 /HPF / Bacteria: Many    Ventricular Rate 90 BPM    Atrial Rate 87 BPM    QRS Duration 74 ms    Q-T Interval 340 ms    QTC Calculation(Bazett) 415 ms    R Axis 97 degrees    T Axis 6 degrees    Diagnosis Line Atrial fibrillation  Rightward axis  Cannot rule out Anterior infarct , age undetermined  Abnormal ECG    Confirmed by DEENA GOMEZ MD ([id number]) on 2021 2:58:44 PM    Imaging:    EXAM:  XR FOOT COMP MIN 3 VIEWS BI        PROCEDURE DATE:  2021    IMPRESSION:  No definite radiographic evidence for osteomyelitis ineither foot. However, if there is clinical suspicion for osteomyelitis further evaluation can be obtained with a MRI.    EXAM:  XR CHEST PORTABLE URGENT 1V        PROCEDURE DATE:  2021    Impression:  Pulmonary vascular congestion/interstitial edema with bilateral pleural effusions and opacities.    Consult:    Per Podiatry   Pt refuses sx intervention from podiatry; will continue w/ Santyl local wound care;  Possible bedside achilles debridement on  if pt still in-house;  Follow up as an outpt to Dr. Tovar at [address] [place] [address] [facility] a week post discharge;  Weight bearing status; WBAT BL feet;   Evaluated and Discussed Plan w/ Dr. Tovar;

## 2021-11-23 NOTE — CONSULT NOTE ADULT - SUBJECTIVE AND OBJECTIVE BOX
GENERAL SURGERY CONSULT NOTE    Patient: VIKASH LI , 76y (41)Female   MRN: [id number]  Location: [facility] T4-3B 021 B  Visit: 21 Inpatient  Date: 21 @ 18:53    HPI:  77 yo F with PMHx of Chronic AFib on Eliquis, Pancreatic Ca s/p whipple 21 (in remission) recent admission for fungemia (treated with IV ABx via PICC line, completed ), recent admission for C diff colitis, presenting with SOB. Pt states to note upper and lower extremity swelling developing over her last admission (note LE duplex negative on discharge), not improving after discharge, acutely worsening over the last week accompanied by acutely worsening SOB and weakness. SOB  is aggravated by exertion to the point that she is now unable to get out of bed without help of her son. She sleeps at home with a rented hospital bed on the first floor of her house, head of bed propped up at 30 degree angle and additionally with two pillows. ROS + for palpitations / fast heartbeat, denies fever, Says she has been compliant with her home Eliquis. Pt denies any other symptoms including hemoptysis, travel, fevers, chill, headache, cough, abdominal pain, chest pain. Denies any ongoing diarrhea since completion of Vanc course. Per discharge documents she was meant to complete her 4x daily Vanc course on Oct 17th, however her o/p doctor, Dr Nino, renewed prescription with decreased BID frequency. She stopped taking  the Vanc for the last 10 days on her own, but due to current symptoms, Dr Nino told her to resume antibiotics so she did so the night before presentation and woke up with a pinpoint rash loaclized to anterior surface of her left lower thigh, which she describes as the usual appearance and location of her past drug allergy reactions.    In the ED VS were CXR showed pleural effusion, BNP was found to be 3.7K, UA (+) for UTI, cultures pending. Pt was started on Ceftriaxone in ED. Will admit to medicine for UTI in setting of recent hospitalization also with pleural effusions secondary to suspected volume overload requiring further workup and possible diuresis inpatient.   (2021 22:27)    75 y/o F w/ PMH of Afib on eliquis, Pancreatic adenocarcinoma 21 w/ path report of neg bile duct margin, - liver cyst, - portal caval tissue, - hepatic LN 0/1, pancreatic adenocarcinoma w/o evidence of invasive Ca. patient recieved neoadjuvant chemo therapy (Dr. Weldon). Patient came in for SOB and upper and lower extremity swelling, also found to have Rt heel wound. Of note patient had prior admission for fungemia and C-diff treated w/ PO VANC. Now has a UA + for LE, nitrates and bacteria. Patient reports normal bowel function, tolerating diet, however porr appetite. No nausea, vomiting, diarrhea.     PAST MEDICAL & SURGICAL HISTORY:  SOB (shortness of breath)    Pain  knee    Varicose veins of both lower extremities, unspecified whether complicated    Atrial fibrillation, unspecified type  2019 on Eliquis    Jaundice  2021    Malignant neoplasm of pancreas, unspecified location of malignancy  Pancreatic Cancer    Hypertension, unspecified type  2019    Pancreatic cancer    Kidney stones    History of surgery  Whipple procedure 2021 with Dr. Nino at [facility]    Status post phlebectomy  10/2018    History of ovarian cystectomy  left    History of tonsillectomy  195    Kidney stone  2017        Home Medications:  calcium,magnesium and Zinc: 1 tab(s) orally once a day (03 Sep 2021 23:41)  Cardizem  mg/24 hours oral capsule, extended release: 1 cap(s) orally once a day (2021 23:59)  Eliquis 5 mg oral tablet: 1 tab(s) orally 2 times a day (03 Sep 2021 23:41)  Women&#x27;s daily vitamins: 1 tab(s) orally once a day (03 Sep 2021 23:41)        VITALS:  T(F): 96.5 (21 @ 14:08), Max: 97.7 (21 @ 22:46)  HR: 98 (21 @ 16:26) (72 - 113)  BP: 101/67 (21 @ 14:08) (101/67 - 133/92)  RR: 18 (21 @ 14:08) (18 - 18)  SpO2: 99% (21 @ 16:26) (96% - 99%)    PHYSICAL EXAM:  General: NAD, AAOx3, calm and cooperative  HEENT: NCAT, JEFFRY, EOMI, Trachea ML, Neck supple  Cardiac: RRR S1, S2, no Murmurs, rubs or gallops  Respiratory: CTAB, normal respiratory effort, breath sounds equal BL  Abdomen: Soft, non-distended, non-tender, no rebound, no guarding. +BS.  Musculoskeletal: b/l Upper and lower extremity swelling noted  Skin: Warm/dry, normal color, no jaundice  Incision/wound: healing well, dressings in place, clean, dry and intact      MEDICATIONS  (STANDING):  apixaban 5 milliGRAM(s) Oral every 12 hours  collagenase Ointment 1 Application(s) Topical daily  diltiazem    milliGRAM(s) Oral daily  furosemide   Injectable 40 milliGRAM(s) IV Push two times a day  lactobacillus acidophilus 1 Tablet(s) Oral daily  multivitamin 1 Tablet(s) Oral daily  pancrelipase  (CREON 12,000 Lipase Units) 1 Capsule(s) Oral three times a day with meals  pantoprazole    Tablet 40 milliGRAM(s) Oral before breakfast  saccharomyces boulardii 250 milliGRAM(s) Oral two times a day    MEDICATIONS  (PRN):      LAB/STUDIES:                        12.0   9.12  )-----------( 304      ( 2021 11:58 )             38.1         140  |  105  |  10  ----------------------------<  131<H>  4.2   |  21  |  0.5<L>    Ca    7.9<L>      2021 11:58  Mg     1.8         TPro  4.5<L>  /  Alb  1.9<L>  /  TBili  0.4  /  DBili  x   /  AST  27  /  ALT  16  /  AlkPhos  550<H>        LIVER FUNCTIONS - ( 2021 11:58 )  Alb: 1.9 g/dL / Pro: 4.5 g/dL / ALK PHOS: 550 U/L / ALT: 16 U/L / AST: 27 U/L / GGT: x           Urinalysis Basic - ( 2021 17:37 )    Color: Yellow / Appearance: Slightly Turbid / S.020 / pH: x  Gluc: x / Ketone: Negative  / Bili: Negative / Urobili: <2 mg/dL   Blood: x / Protein: 30 mg/dL / Nitrite: Positive   Leuk Esterase: Large / RBC: 10 /HPF /  /HPF   Sq Epi: x / Non Sq Epi: 2 /HPF / Bacteria: Many      CARDIAC MARKERS ( 2021 15:27 )  x     / <0.01 ng/mL / x     / x     / x                  IMAGING:  < from: Xray Chest 1 View- PORTABLE-Urgent (21 @ 14:49) >  Impression:    Pulmonary vascular congestion/interstitial edema with bilateral pleural effusions and opacities.    < end of copied text >  < from: Xray Chest 1 View- PORTABLE-Routine (Xray Chest 1 View- PORTABLE-Routine in AM.) (21 @ 05:44) >  Impression:    Bilateral opacifications and effusions without difference.    < end of copied text >

## 2021-11-24 NOTE — CONSULT NOTE ADULT - ATTENDING COMMENTS
right posterior heel ulcer w/ exposed achilles tendon. fibrotic wound base. no acute signs of infection  Pt would benefit from debridement and wound vac   Pt hesitant to go the OR for debridement.  Recommend santyl application daily. If patient is still in-house will perform bedside debridement on Monday. If patient is d/c then follow up as outpatient for debridement   hold off on wound vac until fibrotic tissue is removed    Thank you for allowing me to participate in your patient care.     My notes supersedes the above resident documentation in case of discrepancy. My corrections for their notes are in my note    Hao Tovar DPM
UTI, acute on chronic decompensated diastolic CHF  need podiatry eval for her LE wounds, need ID for her UTI/C diff  abdomin is soft, NT/ND  no need for surgical intervention from surg onc standpoint
Patient also seen and examined by myself. I agree with the medical student's note above. Situation discussed with her and the patient. Not much to add to the above assessment and plan.

## 2021-11-24 NOTE — CONSULT NOTE ADULT - SUBJECTIVE AND OBJECTIVE BOX
Patient is a 76y old  Female who presents with a chief complaint of SOB & Bilateral UE and LE swelling (23 Nov 2021 09:41)      HPI:  75 yo F with PMHx of Chronic AFib on Eliquis, Pancreatic Ca s/p whipple 8/1/21 (in remission) recent admission for fungemia (treated with IV ABx via PICC line, completed 9/21), recent admission for C diff colitis, presenting with SOB. Pt states to note upper and lower extremity swelling developing over her last admission (note LE duplex negative on discharge), not improving after discharge, acutely worsening over the last week accompanied by acutely worsening SOB and weakness. SOB  is aggravated by exertion to the point that she is now unable to get out of bed without help of her son. She sleeps at home with a rented hospital bed on the first floor of her house, head of bed propped up at 30 degree angle and additionally with two pillows. ROS + for palpitations / fast heartbeat, denies fever, Says she has been compliant with her home Eliquis. Pt denies any other symptoms including hemoptysis, travel, fevers, chill, headache, cough, abdominal pain, chest pain. Denies any ongoing diarrhea since completion of Vanc course. Per discharge documents she was meant to complete her 4x daily Vanc course on Oct 17th, however her o/p doctor, Dr Nino, renewed prescription with decreased BID frequency. She stopped taking  the Vanc for the last 10 days on her own, but due to current symptoms, Dr Nino told her to resume antibiotics so she did so the night before presentation and woke up with a pinpoint rash loaclized to anterior surface of her left lower thigh, which she describes as the usual appearance and location of her past drug allergy reactions.    In the ED VS were CXR showed pleural effusion, BNP was found to be 3.7K, UA (+) for UTI, cultures pending. Pt was started on Ceftriaxone in ED. Will admit to medicine for UTI in setting of recent hospitalization also with pleural effusions secondary to suspected volume overload requiring further workup and possible diuresis inpatient.   (22 Nov 2021 22:27)      ROS:  Negative except for:    PAST MEDICAL & SURGICAL HISTORY:  SOB (shortness of breath)    Pain  knee    Varicose veins of both lower extremities, unspecified whether complicated    Atrial fibrillation, unspecified type  2019 on Eliquis    Jaundice  March 5, 2021    Malignant neoplasm of pancreas, unspecified location of malignancy  Pancreatic Cancer    Hypertension, unspecified type  2019    Pancreatic cancer    Kidney stones    History of surgery  Whipple procedure 8/2/2021 with Dr. Nino at [facility]    Status post phlebectomy  10/2018    History of ovarian cystectomy  left    History of tonsillectomy  1959    Kidney stone  2017        SOCIAL HISTORY:    FAMILY HISTORY:  CAD (coronary artery disease) (Sibling)  3  siblings ( 2 living and 1 passed away)        MEDICATIONS  (STANDING):  apixaban 5 milliGRAM(s) Oral every 12 hours  collagenase Ointment 1 Application(s) Topical daily  diltiazem    milliGRAM(s) Oral daily  furosemide   Injectable 40 milliGRAM(s) IV Push two times a day  lactobacillus acidophilus 1 Tablet(s) Oral daily  multivitamin 1 Tablet(s) Oral daily  pancrelipase  (CREON 12,000 Lipase Units) 1 Capsule(s) Oral three times a day with meals  pantoprazole    Tablet 40 milliGRAM(s) Oral before breakfast  saccharomyces boulardii 250 milliGRAM(s) Oral two times a day    MEDICATIONS  (PRN):    Height (cm): 172.7 (11-23-21 @ 19:55)  Weight (kg): 56.8 (11-23-21 @ 19:55)  BMI (kg/m2): 19 (11-23-21 @ 19:55)  BSA (m2): 1.67 (11-23-21 @ 19:55)  Allergies    Augmentin (Rash)  chocolate (Headache)  digoxin (Hives)  Metoprolol Succinate ER (Hives)  Novocain (Angioedema)  Steroids: Excessive swelling (Flushing; Other (Mild to Mod))  sulfa drugs (Fever)  Xarelto (Hives)    Intolerances        Vital Signs Last 24 Hrs  T(C): 36.1 (24 Nov 2021 05:31), Max: 36.1 (24 Nov 2021 05:31)  T(F): 96.9 (24 Nov 2021 05:31), Max: 96.9 (24 Nov 2021 05:31)  HR: 95 (24 Nov 2021 05:31) (95 - 130)  BP: 103/69 (24 Nov 2021 05:31) (101/67 - 109/76)  BP(mean): 88 (23 Nov 2021 21:39) (88 - 88)  RR: 18 (24 Nov 2021 05:31) (18 - 18)  SpO2: 99% (23 Nov 2021 16:26) (99% - 99%)    PHYSICAL EXAM  General: adult in NAD  HEENT: clear oropharynx, anicteric sclera, pink conjunctiva  Neck: supple  CV: normal S1/S2 with no murmur rubs or gallops  Lungs: positive air movement b/l ant lungs,clear to auscultation, no wheezes, no rales  Abdomen: soft non-tender non-distended, no hepatosplenomegaly  Ext: no clubbing cyanosis or edema  Skin: no rashes and no petechiae  Neuro: alert and oriented X 4, no focal deficits      LABS:                          12.0   9.12  )-----------( 304      ( 23 Nov 2021 11:58 )             38.1         Mean Cell Volume : 91.4 fL  Mean Cell Hemoglobin : 28.8 pg  Mean Cell Hemoglobin Concentration : 31.5 g/dL  Auto Neutrophil # : 6.99 K/uL  Auto Lymphocyte # : 1.46 K/uL  Auto Monocyte # : 0.57 K/uL  Auto Eosinophil # : 0.03 K/uL  Auto Basophil # : 0.03 K/uL  Auto Neutrophil % : 76.7 %  Auto Lymphocyte % : 16.0 %  Auto Monocyte % : 6.3 %  Auto Eosinophil % : 0.3 %  Auto Basophil % : 0.3 %      Serial CBC's  11-23 @ 11:58  Hct-38.1 / Hgb-12.0 / Plat-304 / RBC-4.17 / WBC-9.12  Serial CBC's  11-22 @ 15:27  Hct-39.0 / Hgb-12.4 / Plat-275 / RBC-4.34 / WBC-7.19      11-23    140  |  105  |  10  ----------------------------<  131<H>  4.2   |  21  |  0.5<L>    Ca    7.9<L>      23 Nov 2021 11:58  Mg     1.8     11-23    TPro  4.5<L>  /  Alb  1.9<L>  /  TBili  0.4  /  DBili  x   /  AST  27  /  ALT  16  /  AlkPhos  550<H>  11-23                      BLOOD SMEAR INTERPRETATION:       RADIOLOGY & ADDITIONAL STUDIES:    < from: CT Abdomen and Pelvis w/ Oral Cont and w/wo IV Cont (08.12.21 @ 13:01) >  IMPRESSION:  Since July 10, 2021, interval Whipple procedure with new pneumoperitoneum and additional postsurgical changes as above; no definite evidence of contrast extravasation.    Please separately dictated report of concurrently performed CT chest for intrathoracic findings.    ---End of Report --    < end of copied text >   Patient is a 76y old  Female who presents with a chief complaint of SOB & Bilateral UE and LE swelling (23 Nov 2021 09:41)      HPI:  75 yo F with PMHx of Chronic AFib on Eliquis, Pancreatic Ca s/p whipple 8/1/21 (in remission) recent admission for fungemia (treated with IV ABx via PICC line, completed 9/21), recent admission for C diff colitis, presenting with SOB. Pt states to note upper and lower extremity swelling developing over her last admission (note LE duplex negative on discharge), not improving after discharge, acutely worsening over the last week accompanied by acutely worsening SOB and weakness. SOB  is aggravated by exertion to the point that she is now unable to get out of bed without help of her son. She sleeps at home with a rented hospital bed on the first floor of her house, head of bed propped up at 30 degree angle and additionally with two pillows. ROS + for palpitations / fast heartbeat, denies fever, Says she has been compliant with her home Eliquis. Pt denies any other symptoms including hemoptysis, travel, fevers, chill, diarrhea, headache, cough, abdominal pain, chest pain. Denies any ongoing diarrhea since completion of Vanc course. Per discharge documents she was meant to complete her 4x daily Vanc course on Oct 17th, however her o/p doctor, Dr Nino, renewed prescription with decreased BID frequency. She stopped taking  the Vanc for the last 10 days on her own, but due to current symptoms, Dr Nino told her to resume antibiotics so she did so the night before presentation and woke up with a pinpoint rash loaclized to anterior surface of her left lower thigh, which she describes as the usual appearance and location of her past drug allergy reactions.    In the ED VS were CXR showed pleural effusion, BNP was found to be 3.7K, UA (+) for UTI, cultures pending. Pt was started on Ceftriaxone in ED. Will admit to medicine for UTI in setting of recent hospitalization also with pleural effusions secondary to suspected volume overload requiring further workup and possible diuresis inpatient.   (22 Nov 2021 22:27)    Heme/onc is been consulted for post-op chemotherapy      ROS:  See HPI    PAST MEDICAL & SURGICAL HISTORY:  SOB (shortness of breath)    Pain  knee    Varicose veins of both lower extremities, unspecified whether complicated    Atrial fibrillation, unspecified type  2019 on Eliquis    Jaundice  March 5, 2021    Malignant neoplasm of pancreas, unspecified location of malignancy  Pancreatic Cancer    Hypertension, unspecified type  2019    Pancreatic cancer    Kidney stones    History of surgery  Whipple procedure 8/2/2021 with Dr. Nino at [facility]    Status post phlebectomy  10/2018    History of ovarian cystectomy  left    History of tonsillectomy  1959    Kidney stone  2017        SOCIAL HISTORY:  Lives at home with her son. Walks with a walker  Denies tobacco use    FAMILY HISTORY:  CAD (coronary artery disease) (Sibling)  3  siblings ( 2 living and 1 passed away)        MEDICATIONS  (STANDING):  apixaban 5 milliGRAM(s) Oral every 12 hours  collagenase Ointment 1 Application(s) Topical daily  diltiazem    milliGRAM(s) Oral daily  furosemide   Injectable 40 milliGRAM(s) IV Push two times a day  lactobacillus acidophilus 1 Tablet(s) Oral daily  multivitamin 1 Tablet(s) Oral daily  pancrelipase  (CREON 12,000 Lipase Units) 1 Capsule(s) Oral three times a day with meals  pantoprazole    Tablet 40 milliGRAM(s) Oral before breakfast  saccharomyces boulardii 250 milliGRAM(s) Oral two times a day    MEDICATIONS  (PRN):    Height (cm): 172.7 (11-23-21 @ 19:55)  Weight (kg): 56.8 (11-23-21 @ 19:55)  BMI (kg/m2): 19 (11-23-21 @ 19:55)  BSA (m2): 1.67 (11-23-21 @ 19:55)  Allergies    Augmentin (Rash)  chocolate (Headache)  digoxin (Hives)  Metoprolol Succinate ER (Hives)  Novocain (Angioedema)  Steroids: Excessive swelling (Flushing; Other (Mild to Mod))  sulfa drugs (Fever)  Xarelto (Hives)    Intolerances        Vital Signs Last 24 Hrs  T(C): 36.1 (24 Nov 2021 05:31), Max: 36.1 (24 Nov 2021 05:31)  T(F): 96.9 (24 Nov 2021 05:31), Max: 96.9 (24 Nov 2021 05:31)  HR: 95 (24 Nov 2021 05:31) (95 - 130)  BP: 103/69 (24 Nov 2021 05:31) (101/67 - 109/76)  BP(mean): 88 (23 Nov 2021 21:39) (88 - 88)  RR: 18 (24 Nov 2021 05:31) (18 - 18)  SpO2: 99% (23 Nov 2021 16:26) (99% - 99%)    PHYSICAL EXAM  General: adult in NAD  HEENT: clear oropharynx, anicteric sclera, pink conjunctiva  Neck: supple  CV: normal S1/S2 with no murmur rubs or gallops  Lungs: positive air movement b/l ant lungs,clear to auscultation, no wheezes, no rales  Abdomen: soft non-tender non-distended, no hepatosplenomegaly  Ext: dry ulcer behind the right ankle. B/L status status dermatitis  Skin: no rashes and no petechiae  Neuro: alert and oriented X 4, no focal deficits      LABS:                          12.0   9.12  )-----------( 304      ( 23 Nov 2021 11:58 )             38.1         Mean Cell Volume : 91.4 fL  Mean Cell Hemoglobin : 28.8 pg  Mean Cell Hemoglobin Concentration : 31.5 g/dL  Auto Neutrophil # : 6.99 K/uL  Auto Lymphocyte # : 1.46 K/uL  Auto Monocyte # : 0.57 K/uL  Auto Eosinophil # : 0.03 K/uL  Auto Basophil # : 0.03 K/uL  Auto Neutrophil % : 76.7 %  Auto Lymphocyte % : 16.0 %  Auto Monocyte % : 6.3 %  Auto Eosinophil % : 0.3 %  Auto Basophil % : 0.3 %      Serial CBC's  11-23 @ 11:58  Hct-38.1 / Hgb-12.0 / Plat-304 / RBC-4.17 / WBC-9.12  Serial CBC's  11-22 @ 15:27  < from: Xray Chest 1 View- PORTABLE-Routine (Xray Chest 1 View- PORTABLE-Routine in AM.) (11.23.21 @ 05:44) >  Impression:    Bilateral opacifications and effusions without difference.    < end of copied text >  Hct-39.0 / Hgb-12.4 / Plat-275 / RBC-4.34 / WBC-7.19      11-23    140  |  105  |  10  ----------------------------<  131<H>  4.2   |  21  |  0.5<L>    Ca    7.9<L>      23 Nov 2021 11:58  Mg     1.8     11-23    TPro  4.5<L>  /  Alb  1.9<L>  /  TBili  0.4  /  DBili  x   /  AST  27  /  ALT  16  /  AlkPhos  550<H>  11-23    RADIOLOGY & ADDITIONAL STUDIES:    -CT Abdomen and Pelvis w/ Oral Cont and w/wo IV Cont (08.12.21 @ 13:01) >  IMPRESSION:  Since July 10, 2021, interval Whipple procedure with new pneumoperitoneum and additional postsurgical changes as above; no definite evidence of contrast extravasation.    Please separately dictated report of concurrently performed CT chest for intrathoracic findings.    ---End of Report --    -CT Abdomen and Pelvis w/ IV Cont (09.30.21 @ 15:08) >  IMPRESSION:    1. Status post Whipple procedure. No evidence of focal upper abdominal fluid collections oranastomotic strictures. No evidence of tumor recurrence (specifically in the region of the portal-superior mesenteric artery confluence).    2. Generalized anasarca is noted with presence of ascites seen within the abdomen or pelvis.    --- End of Report ---    -Xray Chest 1 View- PORTABLE-Routine (Xray Chest 1 View- PORTABLE-Routine in AM.) (11.23.21 @ 05:44) >  Impression:    Bilateral opacifications and effusions without difference.    -VA Duplex Upper Ext Vein Scan, Bilat (11.23.21 @ 20:51) >  Impression:    No evidence of deep or superficial thrombosis in the visualized bilateral upper extremities.       Patient is a 76y old  Female who presents with a chief complaint of SOB & Bilateral UE and LE swelling (23 Nov 2021 09:41)      HPI:  77 yo F with PMHx of Chronic AFib on Eliquis, Pancreatic Ca s/p whipple 8/1/21 (in remission) recent admission for fungemia (treated with IV ABx via PICC line, completed 9/21), recent admission for C diff colitis, presenting with SOB. Pt states to note upper and lower extremity swelling developing over her last admission (note LE duplex negative on discharge), not improving after discharge, acutely worsening over the last week accompanied by acutely worsening SOB and weakness. SOB  is aggravated by exertion to the point that she is now unable to get out of bed without help of her son. She sleeps at home with a rented hospital bed on the first floor of her house, head of bed propped up at 30 degree angle and additionally with two pillows. ROS + for palpitations / fast heartbeat, denies fever, Says she has been compliant with her home Eliquis. Pt denies any other symptoms including hemoptysis, travel, fevers, chill, diarrhea, headache, cough, abdominal pain, chest pain. Denies any ongoing diarrhea since completion of Vanc course. Per discharge documents she was meant to complete her 4x daily Vanc course on Oct 17th, however her o/p doctor, Dr Nino, renewed prescription with decreased BID frequency. She stopped taking  the Vanc for the last 10 days on her own, but due to current symptoms, Dr Nino told her to resume antibiotics so she did so the night before presentation and woke up with a pinpoint rash loaclized to anterior surface of her left lower thigh, which she describes as the usual appearance and location of her past drug allergy reactions.    In the ED VS were CXR showed pleural effusion, BNP was found to be 3.7K, UA (+) for UTI, cultures pending. Pt was started on Ceftriaxone in ED. Will admit to medicine for UTI in setting of recent hospitalization also with pleural effusions secondary to suspected volume overload requiring further workup and possible diuresis inpatient.   (22 Nov 2021 22:27)    Heme/onc is been consulted for post-op chemotherapy. Pt said she last saw her oncologist Dr. Meyer 2 months ago and they never discussed post-op chemotherapy.      ROS:  See HPI    PAST MEDICAL & SURGICAL HISTORY:  SOB (shortness of breath)    Pain  knee    Varicose veins of both lower extremities, unspecified whether complicated    Atrial fibrillation, unspecified type  2019 on Eliquis    Jaundice  March 5, 2021    Malignant neoplasm of pancreas, unspecified location of malignancy  Pancreatic Cancer    Hypertension, unspecified type  2019    Pancreatic cancer    Kidney stones    History of surgery  Whipple procedure 8/2/2021 with Dr. Nino at [facility]    Status post phlebectomy  10/2018    History of ovarian cystectomy  left    History of tonsillectomy  1959    Kidney stone  2017        SOCIAL HISTORY:  Lives at home with her son. Walks with a walker  Denies tobacco use    FAMILY HISTORY:  CAD (coronary artery disease) (Sibling)  3  siblings ( 2 living and 1 passed away)        MEDICATIONS  (STANDING):  apixaban 5 milliGRAM(s) Oral every 12 hours  collagenase Ointment 1 Application(s) Topical daily  diltiazem    milliGRAM(s) Oral daily  furosemide   Injectable 40 milliGRAM(s) IV Push two times a day  lactobacillus acidophilus 1 Tablet(s) Oral daily  multivitamin 1 Tablet(s) Oral daily  pancrelipase  (CREON 12,000 Lipase Units) 1 Capsule(s) Oral three times a day with meals  pantoprazole    Tablet 40 milliGRAM(s) Oral before breakfast  saccharomyces boulardii 250 milliGRAM(s) Oral two times a day    MEDICATIONS  (PRN):    Height (cm): 172.7 (11-23-21 @ 19:55)  Weight (kg): 56.8 (11-23-21 @ 19:55)  BMI (kg/m2): 19 (11-23-21 @ 19:55)  BSA (m2): 1.67 (11-23-21 @ 19:55)  Allergies    Augmentin (Rash)  chocolate (Headache)  digoxin (Hives)  Metoprolol Succinate ER (Hives)  Novocain (Angioedema)  Steroids: Excessive swelling (Flushing; Other (Mild to Mod))  sulfa drugs (Fever)  Xarelto (Hives)    Intolerances        Vital Signs Last 24 Hrs  T(C): 36.1 (24 Nov 2021 05:31), Max: 36.1 (24 Nov 2021 05:31)  T(F): 96.9 (24 Nov 2021 05:31), Max: 96.9 (24 Nov 2021 05:31)  HR: 95 (24 Nov 2021 05:31) (95 - 130)  BP: 103/69 (24 Nov 2021 05:31) (101/67 - 109/76)  BP(mean): 88 (23 Nov 2021 21:39) (88 - 88)  RR: 18 (24 Nov 2021 05:31) (18 - 18)  SpO2: 99% (23 Nov 2021 16:26) (99% - 99%)    PHYSICAL EXAM  General: adult in NAD  HEENT: clear oropharynx, anicteric sclera, pink conjunctiva  Neck: supple  CV: normal S1/S2 with no murmur rubs or gallops  Lungs: positive air movement b/l ant lungs,clear to auscultation, no wheezes, no rales  Abdomen: soft non-tender non-distended, no hepatosplenomegaly  Ext: dry ulcer behind the right ankle. B/L status status dermatitis  Skin: no rashes and no petechiae  Neuro: alert and oriented X 4, no focal deficits      LABS:                          12.0   9.12  )-----------( 304      ( 23 Nov 2021 11:58 )             38.1         Mean Cell Volume : 91.4 fL  Mean Cell Hemoglobin : 28.8 pg  Mean Cell Hemoglobin Concentration : 31.5 g/dL  Auto Neutrophil # : 6.99 K/uL  Auto Lymphocyte # : 1.46 K/uL  Auto Monocyte # : 0.57 K/uL  Auto Eosinophil # : 0.03 K/uL  Auto Basophil # : 0.03 K/uL  Auto Neutrophil % : 76.7 %  Auto Lymphocyte % : 16.0 %  Auto Monocyte % : 6.3 %  Auto Eosinophil % : 0.3 %  Auto Basophil % : 0.3 %      Serial CBC's  11-23 @ 11:58  Hct-38.1 / Hgb-12.0 / Plat-304 / RBC-4.17 / WBC-9.12  Serial CBC's  11-22 @ 15:27  < from: Xray Chest 1 View- PORTABLE-Routine (Xray Chest 1 View- PORTABLE-Routine in AM.) (11.23.21 @ 05:44) >  Impression:    Bilateral opacifications and effusions without difference.    < end of copied text >  Hct-39.0 / Hgb-12.4 / Plat-275 / RBC-4.34 / WBC-7.19      11-23    140  |  105  |  10  ----------------------------<  131<H>  4.2   |  21  |  0.5<L>    Ca    7.9<L>      23 Nov 2021 11:58  Mg     1.8     11-23    TPro  4.5<L>  /  Alb  1.9<L>  /  TBili  0.4  /  DBili  x   /  AST  27  /  ALT  16  /  AlkPhos  550<H>  11-23    RADIOLOGY & ADDITIONAL STUDIES:    -CT Abdomen and Pelvis w/ Oral Cont and w/wo IV Cont (08.12.21 @ 13:01) >  IMPRESSION:  Since July 10, 2021, interval Whipple procedure with new pneumoperitoneum and additional postsurgical changes as above; no definite evidence of contrast extravasation.    Please separately dictated report of concurrently performed CT chest for intrathoracic findings.    ---End of Report --    -CT Abdomen and Pelvis w/ IV Cont (09.30.21 @ 15:08) >  IMPRESSION:    1. Status post Whipple procedure. No evidence of focal upper abdominal fluid collections oranastomotic strictures. No evidence of tumor recurrence (specifically in the region of the portal-superior mesenteric artery confluence).    2. Generalized anasarca is noted with presence of ascites seen within the abdomen or pelvis.    --- End of Report ---    -Xray Chest 1 View- PORTABLE-Routine (Xray Chest 1 View- PORTABLE-Routine in AM.) (11.23.21 @ 05:44) >  Impression:    Bilateral opacifications and effusions without difference.    -VA Duplex Upper Ext Vein Scan, Bilat (11.23.21 @ 20:51) >  Impression:    No evidence of deep or superficial thrombosis in the visualized bilateral upper extremities.       Patient is a 76y old  Female who presents with a chief complaint of SOB & Bilateral UE and LE swelling (23 Nov 2021 09:41)      HPI:  75 yo F with PMHx of Chronic AFib on Eliquis, Pancreatic Ca s/p whipple 8/1/21 (in remission) recent admission for fungemia (treated with IV ABx via PICC line, completed 9/21), recent admission for C diff colitis, presenting with SOB. Pt states to note upper and lower extremity swelling developing over her last admission (note LE duplex negative on discharge), not improving after discharge, acutely worsening over the last week accompanied by acutely worsening SOB and weakness. SOB  is aggravated by exertion to the point that she is now unable to get out of bed without help of her son. She sleeps at home with a rented hospital bed on the first floor of her house, head of bed propped up at 30 degree angle and additionally with two pillows. ROS + for palpitations / fast heartbeat, denies fever, Says she has been compliant with her home Eliquis. Pt denies any other symptoms including hemoptysis, travel, fevers, chill, diarrhea, headache, cough, abdominal pain, chest pain. Denies any ongoing diarrhea since completion of Vanc course. Per discharge documents she was meant to complete her 4x daily Vanc course on Oct 17th, however her o/p doctor, Dr Nino, renewed prescription with decreased BID frequency. She stopped taking  the Vancomycin for the last 10 days on her own, but due to current symptoms, Dr Nino told her to resume antibiotics so she did so the night before presentation and woke up with a pinpoint rash loaclized to anterior surface of her left lower thigh, which she describes as the usual appearance and location of her past drug allergy reactions.    In the ED VS were CXR showed pleural effusion, BNP was found to be 3.7K, UA (+) for UTI, cultures pending. Pt was started on Ceftriaxone in ED. Will admit to medicine for UTI in setting of recent hospitalization also with pleural effusions secondary to suspected volume overload requiring further workup and possible diuresis inpatient.   (22 Nov 2021 22:27)    Heme-Onc is been consulted for post-op chemotherapy. Pt said she last saw her oncologist Dr. Johnson 2 months ago and they never discussed post-op chemotherapy.      ROS:  See HPI    PAST MEDICAL & SURGICAL HISTORY:  SOB (shortness of breath)    Pain  knee    Varicose veins of both lower extremities, unspecified whether complicated    Atrial fibrillation, unspecified type  2019 on Eliquis    Jaundice  March 5, 2021    Malignant neoplasm of pancreas, unspecified location of malignancy  Pancreatic Cancer    Hypertension, unspecified type  2019    Pancreatic cancer    Kidney stones    History of surgery  Whipple procedure 8/2/2021 with Dr. Nino at [facility]    Status post phlebectomy  10/2018    History of ovarian cystectomy  left    History of tonsillectomy  1959    Kidney stone  2017        SOCIAL HISTORY:  Lives at home with her son. Walks with a walker  Denies tobacco use    FAMILY HISTORY:  CAD (coronary artery disease) (Sibling)  3  siblings ( 2 living and 1 passed away)        MEDICATIONS  (STANDING):  apixaban 5 milliGRAM(s) Oral every 12 hours  collagenase Ointment 1 Application(s) Topical daily  diltiazem    milliGRAM(s) Oral daily  furosemide   Injectable 40 milliGRAM(s) IV Push two times a day  lactobacillus acidophilus 1 Tablet(s) Oral daily  multivitamin 1 Tablet(s) Oral daily  pancrelipase  (CREON 12,000 Lipase Units) 1 Capsule(s) Oral three times a day with meals  pantoprazole    Tablet 40 milliGRAM(s) Oral before breakfast  saccharomyces boulardii 250 milliGRAM(s) Oral two times a day    MEDICATIONS  (PRN):    Height (cm): 172.7 (11-23-21 @ 19:55)  Weight (kg): 56.8 (11-23-21 @ 19:55)  BMI (kg/m2): 19 (11-23-21 @ 19:55)  BSA (m2): 1.67 (11-23-21 @ 19:55)  Allergies    Augmentin (Rash)  chocolate (Headache)  digoxin (Hives)  Metoprolol Succinate ER (Hives)  Novocain (Angioedema)  Steroids: Excessive swelling (Flushing; Other (Mild to Mod))  sulfa drugs (Fever)  Xarelto (Hives)    Intolerances        Vital Signs Last 24 Hrs  T(C): 36.1 (24 Nov 2021 05:31), Max: 36.1 (24 Nov 2021 05:31)  T(F): 96.9 (24 Nov 2021 05:31), Max: 96.9 (24 Nov 2021 05:31)  HR: 95 (24 Nov 2021 05:31) (95 - 130)  BP: 103/69 (24 Nov 2021 05:31) (101/67 - 109/76)  BP(mean): 88 (23 Nov 2021 21:39) (88 - 88)  RR: 18 (24 Nov 2021 05:31) (18 - 18)  SpO2: 99% (23 Nov 2021 16:26) (99% - 99%)    PHYSICAL EXAM  General: adult in NAD  HEENT: clear oropharynx, anicteric sclera, pink conjunctiva  Neck: supple  CV: normal S1/S2 with no murmur rubs or gallops  Lungs: positive air movement b/l ant lungs,clear to auscultation, no wheezes, no rales  Abdomen: soft non-tender non-distended, no hepatosplenomegaly  Ext: dry ulcer behind the right ankle. B/L status status dermatitis  Skin: no rashes and no petechiae  Neuro: alert and oriented X 4, no focal deficits      LABS:                          12.0   9.12  )-----------( 304      ( 23 Nov 2021 11:58 )             38.1         Mean Cell Volume : 91.4 fL  Mean Cell Hemoglobin : 28.8 pg  Mean Cell Hemoglobin Concentration : 31.5 g/dL  Auto Neutrophil # : 6.99 K/uL  Auto Lymphocyte # : 1.46 K/uL  Auto Monocyte # : 0.57 K/uL  Auto Eosinophil # : 0.03 K/uL  Auto Basophil # : 0.03 K/uL  Auto Neutrophil % : 76.7 %  Auto Lymphocyte % : 16.0 %  Auto Monocyte % : 6.3 %  Auto Eosinophil % : 0.3 %  Auto Basophil % : 0.3 %      Serial CBC's  11-23 @ 11:58  Hct-38.1 / Hgb-12.0 / Plat-304 / RBC-4.17 / WBC-9.12  Serial CBC's  11-22 @ 15:27  < from: Xray Chest 1 View- PORTABLE-Routine (Xray Chest 1 View- PORTABLE-Routine in AM.) (11.23.21 @ 05:44) >  Impression:    Bilateral opacifications and effusions without difference.    < end of copied text >  Hct-39.0 / Hgb-12.4 / Plat-275 / RBC-4.34 / WBC-7.19      11-23    140  |  105  |  10  ----------------------------<  131<H>  4.2   |  21  |  0.5<L>    Ca    7.9<L>      23 Nov 2021 11:58  Mg     1.8     11-23    TPro  4.5<L>  /  Alb  1.9<L>  /  TBili  0.4  /  DBili  x   /  AST  27  /  ALT  16  /  AlkPhos  550<H>  11-23    RADIOLOGY & ADDITIONAL STUDIES:    -CT Abdomen and Pelvis w/ Oral Cont and w/wo IV Cont (08.12.21 @ 13:01) >  IMPRESSION:  Since July 10, 2021, interval Whipple procedure with new pneumoperitoneum and additional postsurgical changes as above; no definite evidence of contrast extravasation.    Please separately dictated report of concurrently performed CT chest for intrathoracic findings.    ---End of Report --    -CT Abdomen and Pelvis w/ IV Cont (09.30.21 @ 15:08) >  IMPRESSION:    1. Status post Whipple procedure. No evidence of focal upper abdominal fluid collections oranastomotic strictures. No evidence of tumor recurrence (specifically in the region of the portal-superior mesenteric artery confluence).    2. Generalized anasarca is noted with presence of ascites seen within the abdomen or pelvis.    --- End of Report ---    -Xray Chest 1 View- PORTABLE-Routine (Xray Chest 1 View- PORTABLE-Routine in AM.) (11.23.21 @ 05:44) >  Impression:    Bilateral opacifications and effusions without difference.    -VA Duplex Upper Ext Vein Scan, Bilat (11.23.21 @ 20:51) >  Impression:    No evidence of deep or superficial thrombosis in the visualized bilateral upper extremities.

## 2021-11-24 NOTE — CONSULT NOTE ADULT - ASSESSMENT
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 De albin albumin synthesis will be suppressed by infection, inflammatory illness, or malignancy (all of which she's had in the past 4 months), regardless of protein intake.  - check zinc level; once sent, start 220 mg po zinc SO4 once daily. Revise dose and length of treatment course once level resulted  - send 25oh vitamin D level  - f/u phos level  - d/c Ensure Clear and add Ensure Enlive once a day. Add Prosource Gelatein once a day; add yogurt once a day.  - cont FloraStor until pt is off antibiotics  - d/c acidophilus  - change multivit to MV+ minerals (eg One a Day womens' equivalent, on discharge)  - calorie counts to assess adequacy of intake  - discussed with pt various foods and flavorings, r/t dysgeusia

## 2021-11-24 NOTE — PROGRESS NOTE ADULT - SUBJECTIVE AND OBJECTIVE BOX
LI, VIKASH  76y  Female      Patient is a 76y old  Female who presents with a chief complaint of SOB and b/l extremity swelling (2021 09:48)      INTERVAL HPI/OVERNIGHT EVENTS:    pt seen and examined at bedside this morning   -on IV diuresis; switch to oral Lasix in 24 hrs; ambulate patient with Physical therapy   -urine culture with GNR; follow official cultures   -no DVT Upper extremities   -daily weights and I/Os    REVIEW OF SYSTEMS:  generalized weakness     Vital Signs Last 24 Hrs  T(C): 36.1 (2021 05:31), Max: 36.1 (2021 05:31)  T(F): 96.9 (2021 05:31), Max: 96.9 (2021 05:31)  HR: 95 (2021 05:31) (95 - 130)  BP: 103/69 (2021 05:31) (101/67 - 109/76)  BP(mean): 88 (2021 21:39) (88 - 88)  RR: 18 (2021 05:31) (18 - 18)  SpO2: 99% (2021 16:26) (99% - 99%)    PHYSICAL EXAM:  GENERAL: NAD, speaking in full sentences   HEAD:  Atraumatic, Normocephalic  EYES: EOMI, PERRLA, conjunctiva and sclera clear  NERVOUS SYSTEM:  Alert & Oriented X 3  CHEST/LUNG: Bibasilar crackles   CV/HEART: Regular rate and rhythm; No murmurs, rubs, or gallops  GI/ABDOMEN: Soft, Nontender, Nondistended; Bowel sounds present  EXTREMITIES: no ankle edema   SKIN: No rashes or lesions    LAB:                        12.0   9.12  )-----------( 304      ( 2021 11:58 )             38.1     11-    140  |  105  |  10  ----------------------------<  131<H>  4.2   |  21  |  0.5<L>    Ca    7.9<L>      2021 11:58  Mg     1.8         TPro  4.5<L>  /  Alb  1.9<L>  /  TBili  0.4  /  DBili  x   /  AST  27  /  ALT  16  /  AlkPhos  550<H>  11-23    CARDIAC MARKERS ( 2021 15:27 )  x     / <0.01 ng/mL / x     / x     / x        Daily Height in cm: 172.72 (2021 19:55)    Daily   CAPILLARY BLOOD GLUCOSE    Urinalysis Basic - ( 2021 17:37 )    Color: Yellow / Appearance: Slightly Turbid / S.020 / pH: x  Gluc: x / Ketone: Negative  / Bili: Negative / Urobili: <2 mg/dL   Blood: x / Protein: 30 mg/dL / Nitrite: Positive   Leuk Esterase: Large / RBC: 10 /HPF /  /HPF   Sq Epi: x / Non Sq Epi: 2 /HPF / Bacteria: Many    LIVER FUNCTIONS - ( 2021 11:58 )  Alb: 1.9 g/dL / Pro: 4.5 g/dL / ALK PHOS: 550 U/L / ALT: 16 U/L / AST: 27 U/L / GGT: x           RADIOLOGY:    Imaging Personally visualized and Reviewed:  [ y ] YES  [ ] NO    HEALTH ISSUES - PROBLEM Dx:  Suspected deep vein thrombosis (DVT)    Suspected pulmonary embolism    MEDS:  apixaban 5 milliGRAM(s) Oral every 12 hours  collagenase Ointment 1 Application(s) Topical daily  diltiazem    milliGRAM(s) Oral daily  furosemide   Injectable 40 milliGRAM(s) IV Push two times a day  lactobacillus acidophilus 1 Tablet(s) Oral daily  multivitamin 1 Tablet(s) Oral daily  pancrelipase  (CREON 12,000 Lipase Units) 1 Capsule(s) Oral three times a day with meals  pantoprazole    Tablet 40 milliGRAM(s) Oral before breakfast  saccharomyces boulardii 250 milliGRAM(s) Oral two times a day

## 2021-11-24 NOTE — PROGRESS NOTE ADULT - SUBJECTIVE AND OBJECTIVE BOX
75 yo F with Hx of Chronic AFib on Eliquis, Pancreatic Ca s/p whipple 21 (in remission) recent admission for fungemia (treated with IV ABx via PICC line, completed ), recent admission for C diff colitis (10/9) presenting with SOB and b/l extremity swelling. Pt states upper and lower extremity swelling developed over her last admission (note LE duplex negative on discharge) and has been worsening over the last week accompanied by acutely worsening SOB and weakness. Pt notes SOB  is aggravated by exertion to the point that she is now unable to get out of bed without help of her son. She notes she sleeps with bed propped up at 30 degree angle and additionally with two pillows. Pt denies any other symptoms including fever, chills, chest pain, abdominal pain, N/V/D, constipation, burning urination. Denies any ongoing diarrhea since completion of Vanc course.     Per discharge documents she was meant to complete her 4x daily Vanc course on Oct 17th, however her o/p doctor, Dr Nino, renewed prescription with decreased BID frequency. She stopped taking  the Vanc for the last 10 days on her own, but due to current symptoms, Dr Nino told her to resume antibiotics so she did so the night before presentation and woke up with a pinpoint rash loaclized to anterior surface of her left lower thigh, which she describes as the usual appearance and location of her past drug allergy reactions.    In the ED: CXR showed pleural effusion, BNP was found to be 3.7K, UA (+) for UTI, cultures pending. Pt was started on Ceftriaxone in ED. Will admit to medicine for UTI in setting of recent hospitalization also with pleural effusions secondary to suspected volume overload requiring further workup and possible diuresis inpatient.    Subjective:  Overnight events: none  Complaints: SOB and extremity swelling    Objective:    Vital Signs Last 24 Hrs  T(C): 36.1 (2021 05:31), Max: 36.1 (2021 05:31)  T(F): 96.9 (2021 05:31), Max: 96.9 (2021 05:31)  HR: 95 (2021 05:31) (95 - 130)  BP: 103/69 (2021 05:31) (101/67 - 109/76)  BP(mean): 88 (2021 21:39) (88 - 88)  RR: 18 (2021 05:31) (18 - 18)  SpO2: 99% (2021 16:26) (99% - 99%)    PHYSICAL EXAM:  VITAL SIGNS: I have reviewed nursing notes and confirm.  CONSTITUTIONAL: Well-developed; well-nourished; in no acute distress.  SKIN: Skin is warm and dry. Non pruritic pin point rash to LUE. Varicose vein in b/l extremities, w/ dry skin consistent w/ stasis dermatitis above b/l ankles. Open ulcer wound posterior R ankle.  HEAD: Normocephalic; atraumatic.  EYES: PERRL, EOM intact; conjunctiva and sclera clear.  ENT: No nasal discharge; airway clear.  NECK: Supple; non tender.  CARD: S1, S2 normal; no murmurs, gallops, or rubs. Regular rate and rhythm.  RESP: Diminished breath sounds in b/l lower lung fields  ABD: Normal bowel sounds; soft; non-distended; non-tender; no hepatosplenomegaly.  EXT: Perfuse swelling and 2+ pitting edema in b/l LE and UE. Ulcer noted to posterior R ankle.  NEURO: Alert, oriented. Grossly unremarkable.  PSYCH: Cooperative, appropriate.     MEDICATIONS  (STANDING):  apixaban 5 milliGRAM(s) Oral every 12 hours  collagenase Ointment 1 Application(s) Topical daily  diltiazem    milliGRAM(s) Oral daily  furosemide   Injectable 40 milliGRAM(s) IV Push two times a day  lactobacillus acidophilus 1 Tablet(s) Oral daily  multivitamin 1 Tablet(s) Oral daily  pancrelipase  (CREON 12,000 Lipase Units) 1 Capsule(s) Oral three times a day with meals  pantoprazole    Tablet 40 milliGRAM(s) Oral before breakfast  saccharomyces boulardii 250 milliGRAM(s) Oral two times a day    MEDICATIONS  (PRN):    Home Medications:  calcium,magnesium and Zinc: 1 tab(s) orally once a day (03 Sep 2021 23:41)  Cardizem  mg/24 hours oral capsule, extended release: 1 cap(s) orally once a day (2021 23:59)  Eliquis 5 mg oral tablet: 1 tab(s) orally 2 times a day (03 Sep 2021 23:41)  Women&#x27;s daily vitamins: 1 tab(s) orally once a day (03 Sep 2021 23:41)    Social History:  Denies tobacco, Etoh or illicit drug use  Living: Lives with son    Labs:                                  12.0   9.12  )-----------( 304      ( 2021 11:58 )             38.1     CBC Full  -  ( 2021 11:58 )  WBC Count : 9.12 K/uL  RBC Count : 4.17 M/uL  Hemoglobin : 12.0 g/dL  Hematocrit : 38.1 %  Platelet Count - Automated : 304 K/uL  Mean Cell Volume : 91.4 fL  Mean Cell Hemoglobin : 28.8 pg  Mean Cell Hemoglobin Concentration : 31.5 g/dL  Auto Neutrophil # : 6.99 K/uL  Auto Lymphocyte # : 1.46 K/uL  Auto Monocyte # : 0.57 K/uL  Auto Eosinophil # : 0.03 K/uL  Auto Basophil # : 0.03 K/uL  Auto Neutrophil % : 76.7 %  Auto Lymphocyte % : 16.0 %  Auto Monocyte % : 6.3 %  Auto Eosinophil % : 0.3 %  Auto Basophil % : 0.3 %        140  |  105  |  10  ----------------------------<  131<H>  4.2   |  21  |  0.5<L>    Ca    7.9<L>      2021 11:58  Mg     1.8         TPro  4.5<L>  /  Alb  1.9<L>  /  TBili  0.4  /  DBili  x   /  AST  27  /  ALT  16  /  AlkPhos  550<H>      Urinalysis Basic - ( 2021 17:37 )  Color: Yellow / Appearance: Slightly Turbid / S.020 / pH: x  Gluc: x / Ketone: Negative  / Bili: Negative / Urobili: <2 mg/dL   Blood: x / Protein: 30 mg/dL / Nitrite: Positive   Leuk Esterase: Large / RBC: 10 /HPF /  /HPF   Sq Epi: x / Non Sq Epi: 2 /HPF / Bacteria: Many    EKG  2:03 pm  Ventricular Rate 125 BPM    Atrial Rate 133 BPM    QRS Duration 154 ms    Q-T Interval 318 ms    QTC Calculation(Bazett) 458 ms    R Axis 98 degrees    T Axis -60 degrees    Diagnosis Line Atrial fibrillation with rapid ventricular response with premature ventricular  or aberrantly conducted complexes  Rightward axis  Non-specific intra-ventricular conduction block  Abnormal ECG    Imaging:    EXAM:  XR FOOT COMP MIN 3 VIEWS BI        PROCEDURE DATE:  2021    IMPRESSION:  No definite radiographic evidence for osteomyelitis ineither foot. However, if there is clinical suspicion for osteomyelitis further evaluation can be obtained with a MRI.    EXAM:  XR CHEST PORTABLE ROUTINE 1V        PROCEDURE DATE:  2021    Impression:  Bilateral opacifications and effusions without difference.    EXAM:  DUPLEX SCAN EXT VEINS UPPER BI        PROCEDURE DATE:  2021    Impression:  No evidence of deep or superficial thrombosis in the visualized bilateral upper extremities.    Consult:    Per Heme/Onc consult :  IMPRESSION:  Since July 10, 2021, interval Whipple procedure with new pneumoperitoneum and additional postsurgical changes as above; no definite evidence of contrast extravasation.  Please separately dictated report of concurrently performed CT chest for intrathoracic findings.    Per ID consult :  No ABx for now  Off loading to prevent pressure sores and preventive measures to avoid aspiration   recall prn please       Per cardio consult   CHF   cont lasix        afib RVR on eliquis and cardizem  add  metoprolol   25 Q12    Per surgery consult   PLAN:  - urine cultures  - ID for antibiotics  - Duplex U/S for Upper extremity w/ evaluation of internal jugular and subclavian vein  - Cards consult  - Podiatry for debridement of right heel wound  - MED-ONC (Dr. Weldon) ->for eval for post-op Chemo    Per Podiatry   Pt refuses sx intervention from podiatry; will continue w/ Santyl local wound care;  Possible bedside achilles debridement on  if pt still in-house;  Follow up as an outpt to Dr. Tovar at [address] [place] [address] [facility] a week post discharge;  Weight bearing status; WBAT BL feet;   Evaluated and Discussed Plan w/ Dr. Tovar;      77 yo F with Hx of Chronic AFib on Eliquis, Pancreatic Ca s/p whipple 21 (in remission) recent admission for fungemia (treated with IV ABx via PICC line, completed ), recent admission for C diff colitis (10/9) presenting with SOB and b/l extremity swelling. Pt states upper and lower extremity swelling developed over her last admission (note LE duplex negative on discharge) and has been worsening over the last week accompanied by acutely worsening SOB and weakness. Pt notes SOB  is aggravated by exertion to the point that she is now unable to get out of bed without help of her son. She notes she sleeps with bed propped up at 30 degree angle and additionally with two pillows. Pt denies any other symptoms including fever, chills, chest pain, abdominal pain, N/V/D, constipation, burning urination. Denies any ongoing diarrhea since completion of Vanc course.     Per discharge documents she was meant to complete her 4x daily Vanc course on Oct 17th, however her o/p doctor, Dr Nino, renewed prescription with decreased BID frequency. She stopped taking  the Vanc for the last 10 days on her own, but due to current symptoms, Dr Nino told her to resume antibiotics so she did so the night before presentation and woke up with a pinpoint rash loaclized to anterior surface of her left lower thigh, which she describes as the usual appearance and location of her past drug allergy reactions.    In the ED: CXR showed pleural effusion, BNP was found to be 3.7K, UA (+) for UTI, cultures pending. Pt was started on Ceftriaxone in ED. Will admit to medicine for UTI in setting of recent hospitalization also with pleural effusions secondary to suspected volume overload requiring further workup and possible diuresis inpatient.    Subjective:  Overnight events: none  Complaints: SOB and extremity swelling    Objective:    Vital Signs Last 24 Hrs  T(C): 36.1 (2021 05:31), Max: 36.1 (2021 05:31)  T(F): 96.9 (2021 05:31), Max: 96.9 (2021 05:31)  HR: 95 (2021 05:31) (95 - 130)  BP: 103/69 (2021 05:31) (101/67 - 109/76)  BP(mean): 88 (2021 21:39) (88 - 88)  RR: 18 (2021 05:31) (18 - 18)  SpO2: 99% (2021 16:26) (99% - 99%)    PHYSICAL EXAM:  VITAL SIGNS: I have reviewed nursing notes and confirm.  CONSTITUTIONAL: Well-developed; well-nourished; in no acute distress.  SKIN: Skin is warm and dry. Non pruritic pin point rash to LUE. Varicose vein in b/l extremities, w/ dry skin consistent w/ stasis dermatitis above b/l ankles. Open ulcer wound posterior R ankle.  HEAD: Normocephalic; atraumatic.  EYES: PERRL, EOM intact; conjunctiva and sclera clear.  ENT: No nasal discharge; airway clear.  NECK: Supple; non tender.  CARD: S1, S2 normal; no murmurs, gallops, or rubs. Regular rate and rhythm.  RESP: Diminished breath sounds in b/l lower lung fields  ABD: Normal bowel sounds; soft; non-distended; non-tender; no hepatosplenomegaly.  EXT: Perfuse swelling and 2+ pitting edema in b/l LE. Ulcer noted to posterior R ankle.  NEURO: Alert, oriented. Grossly unremarkable.  PSYCH: Cooperative, appropriate.     MEDICATIONS  (STANDING):  apixaban 5 milliGRAM(s) Oral every 12 hours  collagenase Ointment 1 Application(s) Topical daily  diltiazem    milliGRAM(s) Oral daily  furosemide   Injectable 40 milliGRAM(s) IV Push two times a day  lactobacillus acidophilus 1 Tablet(s) Oral daily  multivitamin 1 Tablet(s) Oral daily  pancrelipase  (CREON 12,000 Lipase Units) 1 Capsule(s) Oral three times a day with meals  pantoprazole    Tablet 40 milliGRAM(s) Oral before breakfast  saccharomyces boulardii 250 milliGRAM(s) Oral two times a day    MEDICATIONS  (PRN):    Home Medications:  calcium,magnesium and Zinc: 1 tab(s) orally once a day (03 Sep 2021 23:41)  Cardizem  mg/24 hours oral capsule, extended release: 1 cap(s) orally once a day (2021 23:59)  Eliquis 5 mg oral tablet: 1 tab(s) orally 2 times a day (03 Sep 2021 23:41)  Women&#x27;s daily vitamins: 1 tab(s) orally once a day (03 Sep 2021 23:41)    Social History:  Denies tobacco, Etoh or illicit drug use  Living: Lives with son    Labs:                                  12.0   9.12  )-----------( 304      ( 2021 11:58 )             38.1     CBC Full  -  ( 2021 11:58 )  WBC Count : 9.12 K/uL  RBC Count : 4.17 M/uL  Hemoglobin : 12.0 g/dL  Hematocrit : 38.1 %  Platelet Count - Automated : 304 K/uL  Mean Cell Volume : 91.4 fL  Mean Cell Hemoglobin : 28.8 pg  Mean Cell Hemoglobin Concentration : 31.5 g/dL  Auto Neutrophil # : 6.99 K/uL  Auto Lymphocyte # : 1.46 K/uL  Auto Monocyte # : 0.57 K/uL  Auto Eosinophil # : 0.03 K/uL  Auto Basophil # : 0.03 K/uL  Auto Neutrophil % : 76.7 %  Auto Lymphocyte % : 16.0 %  Auto Monocyte % : 6.3 %  Auto Eosinophil % : 0.3 %  Auto Basophil % : 0.3 %        140  |  105  |  10  ----------------------------<  131<H>  4.2   |  21  |  0.5<L>    Ca    7.9<L>      2021 11:58  Mg     1.8         TPro  4.5<L>  /  Alb  1.9<L>  /  TBili  0.4  /  DBili  x   /  AST  27  /  ALT  16  /  AlkPhos  550<H>      Urinalysis Basic - ( 2021 17:37 )  Color: Yellow / Appearance: Slightly Turbid / S.020 / pH: x  Gluc: x / Ketone: Negative  / Bili: Negative / Urobili: <2 mg/dL   Blood: x / Protein: 30 mg/dL / Nitrite: Positive   Leuk Esterase: Large / RBC: 10 /HPF /  /HPF   Sq Epi: x / Non Sq Epi: 2 /HPF / Bacteria: Many    EKG  2:03 pm  Ventricular Rate 125 BPM    Atrial Rate 133 BPM    QRS Duration 154 ms    Q-T Interval 318 ms    QTC Calculation(Bazett) 458 ms    R Axis 98 degrees    T Axis -60 degrees    Diagnosis Line Atrial fibrillation with rapid ventricular response with premature ventricular  or aberrantly conducted complexes  Rightward axis  Non-specific intra-ventricular conduction block  Abnormal ECG    Imaging:    EXAM:  XR FOOT COMP MIN 3 VIEWS BI        PROCEDURE DATE:  2021    IMPRESSION:  No definite radiographic evidence for osteomyelitis ineither foot. However, if there is clinical suspicion for osteomyelitis further evaluation can be obtained with a MRI.    EXAM:  XR CHEST PORTABLE ROUTINE 1V        PROCEDURE DATE:  2021    Impression:  Bilateral opacifications and effusions without difference.    EXAM:  DUPLEX SCAN EXT VEINS UPPER BI        PROCEDURE DATE:  2021    Impression:  No evidence of deep or superficial thrombosis in the visualized bilateral upper extremities.    Consult:    Per Heme/Onc consult :  IMPRESSION:  Since July 10, 2021, interval Whipple procedure with new pneumoperitoneum and additional postsurgical changes as above; no definite evidence of contrast extravasation.  Please separately dictated report of concurrently performed CT chest for intrathoracic findings.    Per ID consult :  No ABx for now  Off loading to prevent pressure sores and preventive measures to avoid aspiration   recall prn please       Per cardio consult   CHF   cont lasix        afib RVR on eliquis and cardizem  add  metoprolol   25 Q12    Per surgery consult   PLAN:  - urine cultures  - ID for antibiotics  - Duplex U/S for Upper extremity w/ evaluation of internal jugular and subclavian vein  - Cards consult  - Podiatry for debridement of right heel wound  - MED-ONC (Dr. Weldon) ->for eval for post-op Chemo    Per Podiatry   Pt refuses sx intervention from podiatry; will continue w/ Santyl local wound care;  Possible bedside achilles debridement on  if pt still in-house;  Follow up as an outpt to Dr. Tovar at [address] [place] [address] [facility] a week post discharge;  Weight bearing status; WBAT BL feet;   Evaluated and Discussed Plan w/ Dr. Tovar;

## 2021-11-24 NOTE — CONSULT NOTE ADULT - EXTREMITIES COMMENTS
Left thigh laterally with non blanching erythematous/petechial lesions, no pain/induration  Right foot medially with small superficial non infected ischemic ulcer  No pedal pulses

## 2021-11-24 NOTE — PHYSICAL THERAPY INITIAL EVALUATION ADULT - SPECIFY REASON(S)
Chart reviewed. Pt. currently on bedrest. PT evaluation on hold pending activity orders as appropriate. Will follow.

## 2021-11-24 NOTE — PROGRESS NOTE ADULT - ASSESSMENT
patient with dyspnea     ·	Acute CHF exacerbation (mild) systolic dysfunction EF 50%  ·	Chronic AFib on Eliquis  ·	Pancreatic adenocarcinoma T1/2N0Mx s/p whipple 8/1/21 (in remission)  ·	Recent admission for fungemia  ·	Recent C-diff colitis   ·	Right Heel Wound    -BNP > 3600; CXR with effusions; extremities edema and dyspnea --- continue with IV diuresis 24hrs and switch to oral Lasix as clinically allowed  -continue with home meds  -follow up official urine cultures prelim with GNR  -no need for post-op chemo as per heme/onc consult; outpatient follow up in the community   -skin care/wound care as per nursing; Podiatry follow up for reccs   -oob to chair as tolerated       # Progress Note Handoff  PENDING as follows  consults: Physical therapy   Test: ECHO  Family discussion: discussed with patient who comprehends her medical care and agreeable for current plan of care   Disposition: ACUTE     Attending Physician Dr. Caitlin Edward # [id number]

## 2021-11-24 NOTE — PROGRESS NOTE ADULT - ASSESSMENT
77 yo F with Hx of Chronic AFib on Eliquis, Pancreatic Ca s/p whipple 8/1/21 (in remission) recent admission for fungemia (treated with IV ABx via PICC line, completed 9/21), recent admission for C diff colitis (10/9) presenting with SOB and b/l extremity swelling. Pt history and findings (PE diminished breath sounds and perfuse 2+ pitting edema, CXR bilateral opacities) consistent with fluid overload and will give lassix per cardio and echo will be performed today. Pt has no prior Hx of CHF.    #Pleural effusions, LE edema 2+, SOB secondary to suspected cardiac etiology, possibly new CHF, undiagnosed  - Hx Afib currently on Eliquis  - BNP >3.6K on admission, none to compare it to  - Sees Dr Liang (cardiology) outpatient, next appointment scheduled for Dec 1st  - Per Ed, "Proven sulfa allergy, currently has rash from likely other drug rxn--> will hold off on Lasix for now and Ethacrynic acid PO for diuresis  - ECHO (9/21) shows EF 50%, enlarged LA & RA, RV systolic dysfunction, but normal LV thickness and size   - Pending Echo results (performed 11/24)  - Prior admission shows use of lassix w/o complications. Will start on Lassix.  - strict ins and outs (last 24hrs -160mL)  - f/u BMP    #Asymptomatic pyuria and Hx Recurrent UTI  - s/p UTI inpatient 2 months ago (Klebsiella and Citrobacter)- denies dysuria now, denies urinary frequency  - Given Ceftriaxone in Ed  - Urine cultures show (+) leukocyte esterase, nitrites, WBCs  - Pt is asymptomatic, afebrile, and prior admission of C. difficile  - d/c Ceftriaxone and will give Tylenol PRN for fever if develops  - ID consulted and agrees to d/c Abx    #Ankle ulcer   - Well defined open ulcer above the posterior R ankle  - Will consult wound care and podiatry and f/u  - Per podiatry pt refusing Sx intervention, will continue Santyl wound care, possible debridment on 11/29 if pt still in-house.  - Pt will f/u as outpatient w/ Dr. Tovar s/p 1week post d/c    #Chronic AFib on Eliquis  - EKG on admission shows pt in Afib now, no RVR  - c/w Eliquis for now  - note pt says she has tried many other AC meds, allergies manifest  - discuss with Dr Liang prior to initiating alternative AC    #Pancreatic Ca   - s/p whipple 8/1/21 (in remission)   - c/w Creon 12K TID before meals  - o/p management per heme/onc    #L-Upper thigh rash  - non-pruritic macules, localized to area  - possibly drug allergy mediated  - no new prescriptions/ OTC meds  - developed after resuming Vancomycin PO  - hold Vanc PO  - consider adding vanc to list of allergies    #Hypomagnesemia  - on admission  - s/p 2mg IV Mg in Ed  - monitor Mg overnight  - appears to be chronic per lab records--> consider resuming Mg PO supplement on discharge  - Mg2+ 1.7 (11/22)    #Health Care Maintenance  - not covid vaccinated, last tested positive April 2021  - says her PCP told her she does not have to get vaccine??  - Counseled on admission, open to discussing vaccination inpatient  - Primary team please discuss w patient over admission    #Misc  - DVT Prophylaxis: Eliquis  - Diet: Dash/TLC, fluid restriction 1500mL  - Dispo: Will c/o lassix per cardio and pt to get echo today

## 2021-11-24 NOTE — CONSULT NOTE ADULT - SUBJECTIVE AND OBJECTIVE BOX
75 yo F with PMHx of Chronic AFib on Eliquis, Pancreatic Ca s/p whipple 8/1/21 (in remission) recent admission for fungemia (treated with IV ABx via PICC line, completed 9/21), recent admission for C diff colitis, presenting with SOB. Pt states to note upper and lower extremity swelling developing over her last admission (note LE duplex negative on discharge), not improving after discharge, acutely worsening over the last week accompanied by acutely worsening SOB and weakness. SOB  is aggravated by exertion to the point that she is now unable to get out of bed without help of her son. She sleeps at home with a rented hospital bed on the first floor of her house, head of bed propped up at 30 degree angle and additionally with two pillows. ROS + for palpitations / fast heartbeat, denies fever, Says she has been compliant with her home Eliquis. Pt denies any other symptoms including hemoptysis, travel, fevers, chill, diarrhea, headache, cough, abdominal pain, chest pain. Denies any ongoing diarrhea since completion of Vanc course. Per discharge documents she was meant to complete her 4x daily Vanc course on Oct 17th, however her o/p doctor, Dr Nino, renewed prescription with decreased BID frequency. She stopped taking  the Vanc for the last 10 days on her own, but due to current symptoms, Dr Nino told her to resume antibiotics so she did so the night before presentation and woke up with a pinpoint rash localized to anterior surface of her left lower thigh, which she describes as the usual appearance and location of her past drug allergy reactions.  pt c/o loss of taste acuity and sense of smell r/t covid several months ago.  pt admitted for UTI.    Vital Signs Last 24 Hrs  T(C): 35.6 (24 Nov 2021 14:17), Max: 36.1 (24 Nov 2021 05:31)  T(F): 96.1 (24 Nov 2021 14:17), Max: 96.9 (24 Nov 2021 05:31)  HR: 97 (24 Nov 2021 14:43) (95 - 130)  BP: 92/61 (24 Nov 2021 14:17) (92/61 - 109/76)  BP(mean): 88 (23 Nov 2021 21:39) (88 - 88)  RR: 18 (24 Nov 2021 14:17) (18 - 18)  SpO2: 95% (24 Nov 2021 14:51) (95% - 99%)  Drug Dosing Weight  Height (cm): 172.7 (23 Nov 2021 19:55)  Weight (kg): 56.8 (23 Nov 2021 19:55)  BMI (kg/m2): 19 (23 Nov 2021 19:55)  BSA (m2): 1.67 (23 Nov 2021 19:55)  A&O, skin turgor good, pale  tongue darker red, flattened markings, no mucositis or zion-oral lesions  abd soft, ND, NT, healed wound  +R chest port, not accessed  ++ forearm edema R>L, +R arm small ecchymosis from IV access  2+ bilat LE edema    MEDICATIONS  (STANDING):  apixaban 5 milliGRAM(s) Oral every 12 hours  collagenase Ointment 1 Application(s) Topical daily  diltiazem    milliGRAM(s) Oral daily  furosemide   Injectable 40 milliGRAM(s) IV Push two times a day  lactobacillus acidophilus 1 Tablet(s) Oral daily  multivitamin 1 Tablet(s) Oral daily  pancrelipase  (CREON 12,000 Lipase Units) 1 Capsule(s) Oral three times a day with meals  pantoprazole    Tablet 40 milliGRAM(s) Oral before breakfast  saccharomyces boulardii 250 milliGRAM(s) Oral two times a day                        12.0   9.12  )-----------( 304      ( 23 Nov 2021 11:58 )             38.1   MCV 91.4, RDW 21.9  11-23    140  |  105  |  10  ----------------------------<  131<H>  4.2   |  21  |  0.5<L>    Ca    7.9<L>      23 Nov 2021 11:58  Mg     1.8     11-23    TPro  4.5<L>  /  Alb  1.9<L>  /  TBili  0.4  /  DBili  x   /  AST  27  /  ALT  16  /  AlkPhos  550<H>  11-23  Phosphorus Level, Serum: 2.7 mg/dL (10.07.21 @ 09:23)   < from: VA Duplex Upper Ext Vein Scan, Bilat (11.23.21 @ 20:51) >  The bilateral internal jugular, left subclavian, axillary, brachial, radial and ulnar veins were visualized and were free of thrombus.  The veins were compressible with presence of spontaneous flow, augmentation with distal compression and phasicity. The right subclavian vein was not visualized due to indwelling MediPort.    The cephalic vein is not visualized bilaterally.  The basilic vein is patent.    Impression:    No evidence of deep or superficial thrombosis in the visualized bilateral upper extremities.    < end of copied text >    < from: Xray Chest 1 View- PORTABLE-Routine (Xray Chest 1 View- PORTABLE-Routine in AM.) (11.23.21 @ 05:44) >  Bilateral opacifications and effusions without difference.    < end of copied text >    < from: Xray Foot AP + Lateral + Oblique, Bilat (11.22.21 @ 14:50) >  No definite radiographic evidence for osteomyelitis ineither foot. However, if there is clinical suspicion for osteomyelitis further evaluation can be obtained with a MRI.    < end of copied text >

## 2021-11-24 NOTE — CONSULT NOTE ADULT - ASSESSMENT
67 yojustus whitman     # pancreatic adenocarcinoma 77 yo F with PMHx of Chronic AFib on Eliquis, Pancreatic Ca s/p whipple 8/1/21 (in remission) recent admission for fungemia (treated with IV ABx via PICC line, completed 9/21), recent admission for C diff colitis, presenting with SOB. Heme/Onc was consulted for possible post-op chemotherapy.    #pancreatic adenocarcinoma T1/2N0Mx  -Pt was diagnosed in Mar 2021 after a CT/EUS/ERCP w/ bx and stent placement  by Dr. Gavin showed pancreatic head adenocarcinoma. At the time pt was seen at [facility] for worsening jaundice  -Pt underwent 3 cycles of neoadjuvant therapy with Gemzar and Abraxane  -Whipple's procedure was performed on 8/1/2021 and pt is in remission  Plan:  -No indication for post-op chemotherapy at this time as pt is out of range for post-operative chemotherapy of 40-60 days.  -CT scan of chest to r/o PE as pt is high risk due to recent surgery, immobilization and hx of cancer  -Treat underlying CHF  -F/U out-patient with oncologist Dr. Meyer 77 yo F with PMHx of Chronic AFib on Eliquis, Pancreatic Ca s/p whipple 8/1/21 (in remission) recent admission for fungemia (treated with IV ABx via PICC line, completed 9/21), recent admission for C diff colitis, presenting with SOB. Heme/Onc was consulted for possible post-op chemotherapy.    #pancreatic adenocarcinoma T1/2N0Mx  -Pt was diagnosed in Mar 2021 after a CT/EUS/ERCP w/ bx and stent placement by Dr. Gavin showed pancreatic head adenocarcinoma. At that time pt was seen at [facility] for worsening jaundice.  -Pt underwent 3 cycles of neoadjuvant therapy with Gemzar and Abraxane  -Whipple's procedure was performed on 8/1/2021 and pt is in remission  Plan:  -No indication for post-op chemotherapy at this time as pt is out of range for post-operative chemotherapy of 40-60 days.  -CT scan of chest to r/o PE as pt is high risk due to recent surgery, immobilization and hx of cancer  -Treat underlying CHF  -F/U out-patient with oncologist Dr. Meyer 77 yo F with PMHx of Chronic AFib on Eliquis, Pancreatic Ca s/p whipple 8/1/21 (in remission) recent admission for fungemia (treated with IV ABx via PICC line, completed 9/21), recent admission for C diff colitis, presenting with SOB. Heme/Onc was consulted for possible post-op chemotherapy.    # Pancreatic adenocarcinoma T1/2N0Mx s/p neoadjuvant chemo with gemzar and abraxane and whipple procedure on 8/2021  - Diagnosed in Mar 2021 after a CT/EUS/ERCP w/ bx and stent placement by Dr. Gavin showed pancreatic head adenocarcinoma. At that time pt was seen at [facility] for worsening jaundice.  - s/p 3 cycles of neoadjuvant therapy with Gemzar and Abraxane  - Whipple's procedure was performed on 8/1/2021 and repeated CT AP shows no evidence of tumor recurrence     Plan:  - given pt almost 4 months post-op, no role for adjuvant chemotherapy at this time   - Treat underlying CHF, consider CT chest angio to r/o PE given SOB in a patient with hx of cancer   - F/U outpatient with oncologist Dr. Meyer 75 yo F with PMHx of Chronic AFib on Eliquis, Pancreatic Ca s/p Whipple 8/1/21 and no new lesions since then, recent admission for fungemia (treated with IV ABx via PICC line, completed 9/21), recent admission for C diff colitis, presenting with SOB. Heme/Onc was consulted for possible post-op chemotherapy.    # Pancreatic adenocarcinoma T1/2N0Mx s/p neoadjuvant chemo with gemzar and abraxane already followed by Whipple's procedure on 8/2021  - Diagnosed in Mar 2021 after a CT/EUS/ERCP w/ bx and stent placement by Dr. Gavin: pancreatic head adenocarcinoma. At that time pt was seen at [facility] for worsening jaundice.  - s/p 3 cycles of neoadjuvant therapy with Gemzar and Abraxane  - Whipple's procedure was performed on 8/1/2021 and repeated CT AP shows no evidence of tumor recurrence     Plan:  - given pt almost 4 months post-op, no role for adjuvant chemotherapy at this time especially that she had received already neoadjuvant chemotherapy.  - Treat underlying CHF, consider CT chest angio to r/o PE given SOB in a patient with hx of cancer.   - F/U outpatient with oncologist Dr. Johnson.

## 2021-11-24 NOTE — CONSULT NOTE ADULT - SUBJECTIVE AND OBJECTIVE BOX
LI, VIKASH  76y, Female  Allergy: Augmentin (Rash)  chocolate (Headache)  digoxin (Hives)  Metoprolol Succinate ER (Hives)  Novocain (Angioedema)  Steroids: Excessive swelling (Flushing; Other (Mild to Mod))  sulfa drugs (Fever)  Xarelto (Hives)      All historical available data reviewed.    HPI:  75 yo F with PMHx of Chronic AFib on Eliquis, Pancreatic Ca s/p whipple 21 (in remission) recent admission for fungemia (treated with IV ABx via PICC line, completed ), recent admission for C diff colitis, presenting with SOB. Pt states to note upper and lower extremity swelling developing over her last admission (note LE duplex negative on discharge), not improving after discharge, acutely worsening over the last week accompanied by acutely worsening SOB and weakness. SOB  is aggravated by exertion to the point that she is now unable to get out of bed without help of her son. She sleeps at home with a rented hospital bed on the first floor of her house, head of bed propped up at 30 degree angle and additionally with two pillows. ROS + for palpitations / fast heartbeat, denies fever, Says she has been compliant with her home Eliquis. Pt denies any other symptoms including hemoptysis, travel, fevers, chill, headache, cough, abdominal pain, chest pain. Denies any ongoing diarrhea since completion of Vanc course. Per discharge documents she was meant to complete her 4x daily Vanc course on Oct 17th, however her o/p doctor, Dr Nino, renewed prescription with decreased BID frequency. She stopped taking  the Vanc for the last 10 days on her own, but due to current symptoms, Dr Nino told her to resume antibiotics so she did so the night before presentation and woke up with a pinpoint rash loaclized to anterior surface of her left lower thigh, which she describes as the usual appearance and location of her past drug allergy reactions.    In the ED VS were CXR showed pleural effusion, BNP was found to be 3.7K, UA (+) for UTI, cultures pending. Pt was started on Ceftriaxone in ED. Will admit to medicine for UTI in setting of recent hospitalization also with pleural effusions secondary to suspected volume overload requiring further workup and possible diuresis inpatient.   (2021 22:27)  ID called for UTI    FAMILY HISTORY:  CAD (coronary artery disease) (Sibling)  3  siblings ( 2 living and 1 passed away)      PAST MEDICAL & SURGICAL HISTORY:  SOB (shortness of breath)    Pain  knee    Varicose veins of both lower extremities, unspecified whether complicated    Atrial fibrillation, unspecified type  2019 on Eliquis    Jaundice  2021    Malignant neoplasm of pancreas, unspecified location of malignancy  Pancreatic Cancer    Hypertension, unspecified type  2019    Pancreatic cancer    Kidney stones    History of surgery  Whipple procedure 2021 with Dr. Nino at [facility]    Status post phlebectomy  10/2018    History of ovarian cystectomy  left    History of tonsillectomy      Kidney stone            VITALS:  T(F): 96.9, Max: 96.9 (21 @ 05:31)  HR: 95  BP: 103/69  RR: 18Vital Signs Last 24 Hrs  T(C): 36.1 (2021 05:31), Max: 36.1 (2021 05:31)  T(F): 96.9 (2021 05:31), Max: 96.9 (2021 05:31)  HR: 95 (2021 05:31) (95 - 130)  BP: 103/69 (2021 05:31) (101/67 - 109/76)  BP(mean): 88 (2021 21:39) (88 - 88)  RR: 18 (2021 05:31) (18 - 18)  SpO2: 99% (2021 16:26) (99% - 99%)    TESTS & MEASUREMENTS:                        12.0   9.12  )-----------( 304      ( 2021 11:58 )             38.1         140  |  105  |  10  ----------------------------<  131<H>  4.2   |  21  |  0.5<L>    Ca    7.9<L>      2021 11:58  Mg     1.8         TPro  4.5<L>  /  Alb  1.9<L>  /  TBili  0.4  /  DBili  x   /  AST  27  /  ALT  16  /  AlkPhos  550<H>      LIVER FUNCTIONS - ( 2021 11:58 )  Alb: 1.9 g/dL / Pro: 4.5 g/dL / ALK PHOS: 550 U/L / ALT: 16 U/L / AST: 27 U/L / GGT: x             Culture - Urine (collected 21 @ 17:37)  Source: Clean Catch Clean Catch (Midstream)  Preliminary Report (21 @ 04:36):    >100,000 CFU/ml Gram Negative Rods      Urinalysis Basic - ( 2021 17:37 )    Color: Yellow / Appearance: Slightly Turbid / S.020 / pH: x  Gluc: x / Ketone: Negative  / Bili: Negative / Urobili: <2 mg/dL   Blood: x / Protein: 30 mg/dL / Nitrite: Positive   Leuk Esterase: Large / RBC: 10 /HPF /  /HPF   Sq Epi: x / Non Sq Epi: 2 /HPF / Bacteria: Many          RADIOLOGY & ADDITIONAL TESTS:  Personal review of radiological diagnostics performed  Echo and EKG results noted when applicable.     MEDICATIONS:  apixaban 5 milliGRAM(s) Oral every 12 hours  collagenase Ointment 1 Application(s) Topical daily  diltiazem    milliGRAM(s) Oral daily  furosemide   Injectable 40 milliGRAM(s) IV Push two times a day  lactobacillus acidophilus 1 Tablet(s) Oral daily  multivitamin 1 Tablet(s) Oral daily  pancrelipase  (CREON 12,000 Lipase Units) 1 Capsule(s) Oral three times a day with meals  pantoprazole    Tablet 40 milliGRAM(s) Oral before breakfast  saccharomyces boulardii 250 milliGRAM(s) Oral two times a day      ANTIBIOTICS:

## 2021-11-24 NOTE — CONSULT NOTE ADULT - ASSESSMENT
77 yo F with PMHx of Chronic AFib on Eliquis, Pancreatic Ca s/p whipple 8/1/21 (in remission) recent admission for fungemia (treated with IV ABx via PICC line, completed 9/21), recent admission for C diff colitis, presenting with SOB. Pt denies any other symptoms including hemoptysis, travel, fevers, chill, headache, cough, abdominal pain, chest pain. Denies any ongoing diarrhea since completion of Vanc course. Per discharge documents she was meant to complete her 4x daily Vanc course on Oct 17th, however her o/p doctor, Dr Nino, renewed prescription with decreased BID frequency. She stopped taking  the Vanc for the last 10 days on her own, but due to current symptoms, Dr Nino told her to resume antibiotics so she did so the night before presentation and woke up with a pinpoint rash loaclized to anterior surface of her left lower thigh, which she describes as the usual appearance and location of her past drug allergy reactions.    IMPRESSION;  Asymptomatic bacteria with GNR  No pyelonephritis  11/22 GNR  11/22 BCx NGTD  CXR compatible with CHF  Clinically no bacterial PNA  No ongoing CD colitis  Right foot with ischemic ulcer along medial aspect inferior to the medial malleolus  Left thigh laterally with rash > not infected    RECOMMENDATIONS;  No ABx for now  Off loading to prevent pressure sores and preventive measures to avoid aspiration   recall prn please

## 2021-11-25 NOTE — PROGRESS NOTE ADULT - ASSESSMENT
patient with dyspnea     ·	Acute CHF exacerbation (mild) systolic dysfunction EF 50%  ·	Chronic AFib on Eliquis  ·	Pancreatic adenocarcinoma T1/2N0Mx s/p whipple 8/1/21 (in remission)  ·	Recent admission for fungemia  ·	Recent C-diff colitis   ·	Right Heel Wound  ·	malnutrition/Underweight BMI 19  ·	acute hypokalemia   ·	suspected magnesium def     -switch to oral lasix; monitor BMP  -BNP > 3600; CXR with effusions; extremities edema and dyspnea  -continue with home meds  -follow up official urine cultures prelim with GNR  -no need for post-op chemo as per heme/onc consult; outpatient follow up in the community   -Tachycardia noted; hold second dose of lasix tonight; cardizem prn added (with parameters)  -skin care/wound care as per nursing; Podiatry follow up for reccs   -oob to chair as tolerated   -replete electrolytes; daily labs       # Progress Note Handoff  PENDING as follows  consults: Physical therapy   Test: K+ and Mg2+  Family discussion: discussed with patient who comprehends her medical care and agreeable for current plan of care   Disposition: ACUTE; not ready yet    Attending Physician Dr. Caitlin Edward # [id number]    patient with dyspnea     ·	Acute CHF exacerbation (mild) systolic dysfunction EF 50%  ·	Chronic AFib on Eliquis  ·	Pancreatic adenocarcinoma T1/2N0Mx s/p whipple 8/1/21 (in remission)  ·	Recent admission for fungemia  ·	Recent C-diff colitis   ·	Right Heel Wound  ·	malnutrition/Underweight BMI 19  ·	acute hypokalemia   ·	suspected magnesium def     -switch to oral lasix; monitor BMP  -BNP > 3600; CXR with effusions; extremities edema and dyspnea  -continue with home meds  -follow up official urine cultures prelim with GNR (asymptomatic; if leukocytosis and fevers, will add abx as per sensitvities)  -no need for post-op chemo as per heme/onc consult; outpatient follow up in the community   -Tachycardia noted; hold second dose of Lasix tonight; Cardizem prn added (with parameters)  -skin care/wound care as per nursing; Podiatry follow up for reccs   -oob to chair as tolerated   -replete electrolytes; daily labs       # Progress Note Handoff  PENDING as follows  consults: Physical therapy   Test: K+ and Mg2+  Family discussion: discussed with patient who comprehends her medical care and agreeable for current plan of care   Disposition: ACUTE; not ready yet    Attending Physician Dr. Caitlin Edward # [id number]    patient with dyspnea     ·	Acute CHF exacerbation (mild) systolic dysfunction EF 50%  ·	Chronic AFib on Eliquis  ·	Pancreatic adenocarcinoma T1/2N0Mx s/p whipple 8/1/21 (in remission)  ·	Recent admission for fungemia  ·	Recent C-diff colitis   ·	Right Heel Wound  ·	malnutrition/Underweight BMI 19  ·	acute hypokalemia   ·	suspected magnesium def     -switch to oral lasix; monitor BMP  -BNP > 3600; CXR with effusions; extremities edema and dyspnea  -continue with home meds  -follow up official urine cultures prelim with GNR (asymptomatic; if leukocytosis and fevers, will add abx as per sensitvities)  -no need for post-op chemo as per heme/onc consult; outpatient follow up in the community   -Tachycardia noted; hold second dose of Lasix tonight; Cardizem prn added (with parameters); ALREADY ON ANTICOAGULATION AND ON ROOM AIR WITH NO DYSPNEA AND OR HYPOXIA   -skin care/wound care as per nursing; Podiatry follow up for reccs   -oob to chair as tolerated   -replete electrolytes; daily labs       # Progress Note Handoff  PENDING as follows  consults: Physical therapy   Test: K+ and Mg2+  Family discussion: discussed with patient who comprehends her medical care and agreeable for current plan of care   Disposition: ACUTE; not ready yet    Attending Physician Dr. Caitlin Edward # [id number]

## 2021-11-25 NOTE — PROGRESS NOTE ADULT - SUBJECTIVE AND OBJECTIVE BOX
LI, VIKASH  76y  Female      Patient is a 76y old  Female who presents with a chief complaint of SOB and b/l extremity swelling (2021 09:48)      INTERVAL HPI/OVERNIGHT EVENTS:    pt seen and examined at bedside this morning   -switch to oral lasix once daily (start dose tomorrow)  -tachycardia noted; added prn cardizem with parameters  -replete electrolytes   -calorie count to be charted   -oob to chair to be encouraged; Physical therapy as tolerated     REVIEW OF SYSTEMS:  generalized weakness     Vital Signs Last 24 Hrs  T(C): 36 (2021 05:30), Max: 36.2 (2021 20:43)  T(F): 96.8 (2021 05:30), Max: 97.1 (2021 20:43)  HR: 115 (2021 05:30) (97 - 117)  BP: 150/78 (2021 05:30) (87/66 - 150/78)  RR: 19 (2021 07:57) (18 - 19)  SpO2: 96% (2021 07:57) (95% - 96%)    PHYSICAL EXAM:  GENERAL: NAD, speaking in full sentences   HEAD:  Atraumatic, Normocephalic  EYES: EOMI, PERRLA, conjunctiva and sclera clear  NERVOUS SYSTEM:  Alert & Oriented X 3  CHEST/LUNG: Bibasilar crackles   CV/HEART: Regular rate and rhythm; No murmurs, rubs, or gallops  GI/ABDOMEN: Soft, Nontender, Nondistended; Bowel sounds present  EXTREMITIES: no ankle edema; Upper thigh and arms swelling   SKIN: No rashes or lesions    LAB:                                 11.1   7.23  )-----------( 286      ( 2021 04:30 )             34.4   11-25    145  |  106  |  10  ----------------------------<  95  3.2<L>   |  28  |  <0.5<L>    Ca    7.6<L>      2021 04:30  Phos  3.7     11-24  Mg     1.5         TPro  4.4<L>  /  Alb  2.1<L>  /  TBili  0.3  /  DBili  x   /  AST  15  /  ALT  13  /  AlkPhos  478<H>  11-25    CARDIAC MARKERS ( 2021 15:27 )  x     / <0.01 ng/mL / x     / x     / x        Daily Height in cm: 172.72 (2021 19:55)    Daily   CAPILLARY BLOOD GLUCOSE    Urinalysis Basic - ( 2021 17:37 )    Color: Yellow / Appearance: Slightly Turbid / S.020 / pH: x  Gluc: x / Ketone: Negative  / Bili: Negative / Urobili: <2 mg/dL   Blood: x / Protein: 30 mg/dL / Nitrite: Positive   Leuk Esterase: Large / RBC: 10 /HPF /  /HPF   Sq Epi: x / Non Sq Epi: 2 /HPF / Bacteria: Many    LIVER FUNCTIONS - ( 2021 11:58 )  Alb: 1.9 g/dL / Pro: 4.5 g/dL / ALK PHOS: 550 U/L / ALT: 16 U/L / AST: 27 U/L / GGT: x           RADIOLOGY:    Imaging Personally visualized and Reviewed:  [ y ] YES  [ ] NO    HEALTH ISSUES - PROBLEM Dx:  Suspected deep vein thrombosis (DVT)    Suspected pulmonary embolism    MEDICATIONS  (STANDING):  apixaban 5 milliGRAM(s) Oral every 12 hours  collagenase Ointment 1 Application(s) Topical daily  diltiazem    milliGRAM(s) Oral daily  magnesium sulfate  IVPB 2 Gram(s) IV Intermittent once  multivitamin/minerals 1 Tablet(s) Oral daily  pancrelipase  (CREON 12,000 Lipase Units) 1 Capsule(s) Oral three times a day with meals  pantoprazole    Tablet 40 milliGRAM(s) Oral before breakfast  potassium chloride   Powder 40 milliEquivalent(s) Oral once  saccharomyces boulardii 250 milliGRAM(s) Oral two times a day  zinc sulfate 220 milliGRAM(s) Oral daily    MEDICATIONS  (PRN):  diltiazem    Tablet 30 milliGRAM(s) Oral every 8 hours PRN for HR > 110 and hold for SBP < 110

## 2021-11-26 NOTE — PROGRESS NOTE ADULT - SUBJECTIVE AND OBJECTIVE BOX
Patient is a 76y old  Female who presents with a chief complaint of SOB and b/l extremity swelling (26 Nov 2021 09:26)  pt seen and evaluated   alert, awake , talkative  on po diet, tolerate dinner last night and breakfast this morning   ICU Vital Signs Last 24 Hrs  T(C): 36.3 (26 Nov 2021 13:36), Max: 36.3 (26 Nov 2021 13:36)  T(F): 97.4 (26 Nov 2021 13:36), Max: 97.4 (26 Nov 2021 13:36)  HR: 107 (26 Nov 2021 13:36) (107 - 113)  BP: 124/77 (26 Nov 2021 13:36) (103/76 - 124/77)  RR: 18 (26 Nov 2021 13:36) (18 - 19)  Drug Dosing Weight  Height (cm): 172.7 (23 Nov 2021 19:55)  Weight (kg): 56.8 (23 Nov 2021 19:55)  BMI (kg/m2): 19 (23 Nov 2021 19:55)  BSA (m2): 1.67 (23 Nov 2021 19:55)  25 Nov 2021 07:01  -  26 Nov 2021 07:00  --------------------------------------------------------  IN:    IV PiggyBack: 50 mL    Oral Fluid: 360 mL  Total IN: 410 mL  OUT:    Voided (mL): 1000 mL  Total OUT: 1000 mL  Total NET: -590 mL    PHYSICAL EXAM:  Constitutional:  alert, awake, talkative  Gastrointestinal:  soft, n/t  MEDICATIONS  (STANDING):  apixaban 5 milliGRAM(s) Oral every 12 hours  collagenase Ointment 1 Application(s) Topical daily  diltiazem    milliGRAM(s) Oral daily  ergocalciferol 67932 Unit(s) Oral <User Schedule>  furosemide    Tablet 40 milliGRAM(s) Oral every 24 hours  multivitamin/minerals 1 Tablet(s) Oral daily  pancrelipase  (CREON 12,000 Lipase Units) 1 Capsule(s) Oral three times a day with meals  pantoprazole    Tablet 40 milliGRAM(s) Oral before breakfast  saccharomyces boulardii 250 milliGRAM(s) Oral two times a day  zinc sulfate 220 milliGRAM(s) Oral daily  Diet, DASH/TLC:   Sodium & Cholesterol Restricted  1500mL Fluid Restriction ([id number])  Prosource Gelatein Plus     Qty per Day:  1  Supplement Feeding Modality:  Oral  Ensure Enlive Cans or Servings Per Day:  3       Frequency:  Three Times a day  Probiotic Yogurt/Smoothie Cans or Servings Per Day:  1       Frequency:  Daily (11-24-21 @ 16:21)      LABS  11-25    145  |  106  |  10  ----------------------------<  95  3.2<L>   |  28  |  <0.5<L>    Ca    7.6<L>      25 Nov 2021 04:30  Phos  3.7     11-24  Mg     1.5     11-25    TPro  4.4<L>  /  Alb  2.1<L>  /  TBili  0.3  /  DBili  x   /  AST  15  /  ALT  13  /  AlkPhos  478<H>  11-25                        11.1   7.23  )-----------( 286      ( 25 Nov 2021 04:30 )             34.4   Vitamin D, 25-Hydroxy (11.24.21 @ 16:21)   Vitamin D, 25-Hydroxy: 16: 30 - 80 ng/mL Optimum Levels      Patient is a 76y old  Female who presents with a chief complaint of SOB and b/l extremity swelling   pt seen and evaluated   alert, awake , verbal  on po diet, tolerate dinner last night and breakfast this morning   ICU Vital Signs Last 24 Hrs  T(C): 36.3 (26 Nov 2021 13:36), Max: 36.3 (26 Nov 2021 13:36)  T(F): 97.4 (26 Nov 2021 13:36), Max: 97.4 (26 Nov 2021 13:36)  HR: 107 (26 Nov 2021 13:36) (107 - 113)  BP: 124/77 (26 Nov 2021 13:36) (103/76 - 124/77)  RR: 18 (26 Nov 2021 13:36) (18 - 19)  Drug Dosing Weight  Height (cm): 172.7 (23 Nov 2021 19:55)  Weight (kg): 56.8 (23 Nov 2021 19:55)  BMI (kg/m2): 19 (23 Nov 2021 19:55)  BSA (m2): 1.67 (23 Nov 2021 19:55)  25 Nov 2021 07:01  -  26 Nov 2021 07:00  --------------------------------------------------------  IN:    IV PiggyBack: 50 mL    Oral Fluid: 360 mL  Total IN: 410 mL  OUT:    Voided (mL): 1000 mL  Total OUT: 1000 mL  Total NET: -590 mL    PHYSICAL EXAM:  Constitutional: alert, awake, verbal  Gastrointestinal:  soft, n/t  MEDICATIONS  (STANDING):  apixaban 5 milliGRAM(s) Oral every 12 hours  collagenase Ointment 1 Application(s) Topical daily  diltiazem    milliGRAM(s) Oral daily  ergocalciferol 44040 Unit(s) Oral <User Schedule>  furosemide    Tablet 40 milliGRAM(s) Oral every 24 hours  multivitamin/minerals 1 Tablet(s) Oral daily  pancrelipase  (CREON 12,000 Lipase Units) 1 Capsule(s) Oral three times a day with meals  pantoprazole    Tablet 40 milliGRAM(s) Oral before breakfast  saccharomyces boulardii 250 milliGRAM(s) Oral two times a day  zinc sulfate 220 milliGRAM(s) Oral daily    Diet, DASH/TLC:   Sodium & Cholesterol Restricted  1500mL Fluid Restriction ([id number])  Prosource Gelatein Plus     Qty per Day:  1  Supplement Feeding Modality:  Oral  Ensure Enlive Cans or Servings Per Day:  3       Frequency:  Three Times a day  Probiotic Yogurt/Smoothie Cans or Servings Per Day:  1       Frequency:  Daily (11-24-21 @ 16:21)      LABS  11-25    145  |  106  |  10  ----------------------------<  95  3.2<L>   |  28  |  <0.5<L>    Ca    7.6<L>      25 Nov 2021 04:30  Phos  3.7     11-24  Mg     1.5     11-25    TPro  4.4<L>  /  Alb  2.1<L>  /  TBili  0.3  /  DBili  x   /  AST  15  /  ALT  13  /  AlkPhos  478<H>  11-25                        11.1   7.23  )-----------( 286      ( 25 Nov 2021 04:30 )             34.4   Vitamin D, 25-Hydroxy (11.24.21 @ 16:21)   Vitamin D, 25-Hydroxy: 16: 30 - 80 ng/mL Optimum Levels

## 2021-11-26 NOTE — PROGRESS NOTE ADULT - SUBJECTIVE AND OBJECTIVE BOX
LI, VIKASH  76y  Female      Patient is a 76y old  Female who presents with a chief complaint of SOB and b/l extremity swelling (2021 09:48)      INTERVAL HPI/OVERNIGHT EVENTS:    pt seen and examined at bedside this morning   -switched to oral Lasix  -tachycardia noted; prn Cardizem with parameters (will increase current long acting dose of cardizem)  -replete electrolytes as needed   -calorie count to be charted   -oob to chair to be encouraged; Physical therapy as tolerated   *** clarified with heme/onc for the need of CT chest (not necessary and patient also refused the test; already on anticoagulation)    REVIEW OF SYSTEMS:  generalized weakness     Vital Signs Last 24 Hrs  T(C): 36.3 (2021 13:36), Max: 36.3 (2021 13:36)  T(F): 97.4 (2021 13:36), Max: 97.4 (2021 13:36)  HR: 107 (2021 13:36) (107 - 113)  BP: 124/77 (2021 13:36) (103/76 - 124/77)  RR: 18 (2021 13:36) (18 - 19)    PHYSICAL EXAM:  GENERAL: NAD, speaking in full sentences   HEAD:  Atraumatic, Normocephalic  EYES: EOMI, PERRLA, conjunctiva and sclera clear  NERVOUS SYSTEM:  Alert & Oriented X 3  CHEST/LUNG: Bibasilar crackles   CV/HEART: Regular rate and rhythm; No murmurs, rubs, or gallops  GI/ABDOMEN: Soft, Nontender, Nondistended; Bowel sounds present  EXTREMITIES: no ankle edema; Upper thigh and arms swelling   SKIN: No rashes or lesions    LAB:                       11.1   7.23  )-----------( 286      ( 2021 04:30 )             34.4   11-25    145  |  106  |  10  ----------------------------<  95  3.2<L>   |  28  |  <0.5<L>    Ca    7.6<L>      2021 04:30  Phos  3.7     11-24  Mg     1.5     -    TPro  4.4<L>  /  Alb  2.1<L>  /  TBili  0.3  /  DBili  x   /  AST  15  /  ALT  13  /  AlkPhos  478<H>  11-25    CARDIAC MARKERS ( 2021 15:27 )  x     / <0.01 ng/mL / x     / x     / x        Daily Height in cm: 172.72 (2021 19:55)    Daily   CAPILLARY BLOOD GLUCOSE    Urinalysis Basic - ( 2021 17:37 )    Color: Yellow / Appearance: Slightly Turbid / S.020 / pH: x  Gluc: x / Ketone: Negative  / Bili: Negative / Urobili: <2 mg/dL   Blood: x / Protein: 30 mg/dL / Nitrite: Positive   Leuk Esterase: Large / RBC: 10 /HPF /  /HPF   Sq Epi: x / Non Sq Epi: 2 /HPF / Bacteria: Many    LIVER FUNCTIONS - ( 2021 11:58 )  Alb: 1.9 g/dL / Pro: 4.5 g/dL / ALK PHOS: 550 U/L / ALT: 16 U/L / AST: 27 U/L / GGT: x           RADIOLOGY:    Imaging Personally visualized and Reviewed:  [ y ] YES  [ ] NO    HEALTH ISSUES - PROBLEM Dx:  Suspected deep vein thrombosis (DVT)    Suspected pulmonary embolism    MEDICATIONS  (STANDING):  apixaban 5 milliGRAM(s) Oral every 12 hours  collagenase Ointment 1 Application(s) Topical daily  diltiazem    milliGRAM(s) Oral daily  ergocalciferol 53929 Unit(s) Oral <User Schedule>  furosemide    Tablet 40 milliGRAM(s) Oral every 24 hours  multivitamin/minerals 1 Tablet(s) Oral daily  pancrelipase  (CREON 12,000 Lipase Units) 1 Capsule(s) Oral three times a day with meals  pantoprazole    Tablet 40 milliGRAM(s) Oral before breakfast  saccharomyces boulardii 250 milliGRAM(s) Oral two times a day  zinc sulfate 220 milliGRAM(s) Oral daily    MEDICATIONS  (PRN):  diltiazem    Tablet 30 milliGRAM(s) Oral every 8 hours PRN for HR > 110 and hold for SBP < 110

## 2021-11-26 NOTE — PROGRESS NOTE ADULT - SUBJECTIVE AND OBJECTIVE BOX
GENERAL SURGERY PROGRESS NOTE    Patient: VIKASH LI , 76y ([birth year])Female   MRN: [id number]  Location: [facility] [id number] B  Visit: 11-22-21 Inpatient  Date: 11-26-21 @ 06:52    Events of past 24 hours: no acute events over night, +bm, +gas, +voiding    PAST MEDICAL & SURGICAL HISTORY:  SOB (shortness of breath)    Pain  knee    Varicose veins of both lower extremities, unspecified whether complicated    Atrial fibrillation, unspecified type  2019 on Eliquis    Jaundice  March 5, 2021    Malignant neoplasm of pancreas, unspecified location of malignancy  Pancreatic Cancer    Hypertension, unspecified type  2019    Pancreatic cancer    Kidney stones    History of surgery  Whipple procedure 8/2/2021 with Dr. Nino at [facility]    Status post phlebectomy  10/2018    History of ovarian cystectomy  left    History of tonsillectomy  1959    Kidney stone  2017        Vitals:   T(F): 96.8 (11-26-21 @ 05:26), Max: 96.8 (11-25-21 @ 20:50)  HR: 113 (11-26-21 @ 05:26)  BP: 118/80 (11-26-21 @ 05:26)  RR: 18 (11-26-21 @ 05:26)  SpO2: 96% (11-25-21 @ 07:57)      Diet, DASH/TLC:   Sodium & Cholesterol Restricted  1500mL Fluid Restriction ([id number])  Prosource Gelatein Plus     Qty per Day:  1  Supplement Feeding Modality:  Oral  Ensure Enlive Cans or Servings Per Day:  3       Frequency:  Three Times a day  Probiotic Yogurt/Smoothie Cans or Servings Per Day:  1       Frequency:  Daily      Fluids:     I & O's:    11-24-21 @ 07:01  -  11-25-21 @ 07:00  --------------------------------------------------------  IN:    IV PiggyBack: 540 mL  Total IN: 540 mL    OUT:    Voided (mL): 800 mL  Total OUT: 800 mL    Total NET: -260 mL          PHYSICAL EXAM:  General Appearance: AAOX3, NAD  Heart: RRR  Lungs: CTAx2  Abdomen:  non tender, soft, non distended  MSK/Extremities:  mobile, no edema    MEDICATIONS  (STANDING):  apixaban 5 milliGRAM(s) Oral every 12 hours  collagenase Ointment 1 Application(s) Topical daily  diltiazem    milliGRAM(s) Oral daily  furosemide    Tablet 40 milliGRAM(s) Oral every 24 hours  multivitamin/minerals 1 Tablet(s) Oral daily  pancrelipase  (CREON 12,000 Lipase Units) 1 Capsule(s) Oral three times a day with meals  pantoprazole    Tablet 40 milliGRAM(s) Oral before breakfast  saccharomyces boulardii 250 milliGRAM(s) Oral two times a day  zinc sulfate 220 milliGRAM(s) Oral daily    MEDICATIONS  (PRN):  diltiazem    Tablet 30 milliGRAM(s) Oral every 8 hours PRN for HR > 110 and hold for SBP < 110      DVT PROPHYLAXIS:   GI PROPHYLAXIS: pantoprazole    Tablet 40 milliGRAM(s) Oral before breakfast    ANTICOAGULATION:   ANTIBIOTICS:            LAB/STUDIES:  Labs:  CAPILLARY BLOOD GLUCOSE                              11.1   7.23  )-----------( 286      ( 25 Nov 2021 04:30 )             34.4         11-25    145  |  106  |  10  ----------------------------<  95  3.2<L>   |  28  |  <0.5<L>          LFTs:             4.4  | 0.3  | 15       ------------------[478     ( 25 Nov 2021 04:30 )  2.1  | x    | 13          Lipase:x      Amylase:x             Coags:        Serum Pro-Brain Natriuretic Peptide: 3664 pg/mL (11-22-21 @ 15:27)                  IMAGING:      ACCESS/ DEVICES:  [x ] Peripheral IV  [ ] Central Venous Line	[ ] R	[ ] L	[ ] IJ	[ ] Fem	[ ] SC	Placed:   [ ] Arterial Line		[ ] R	[ ] L	[ ] Fem	[ ] Rad	[ ] Ax	Placed:   [ ] PICC:					[ ] Mediport  [ ] Urinary Catheter,  Date Placed:   [ ] Chest tube: [ ] Right, [ ] Left  [ ] ISABELL/Byron Drains

## 2021-11-26 NOTE — PROGRESS NOTE ADULT - ASSESSMENT
patient with dyspnea     ·	Acute CHF exacerbation (mild) systolic dysfunction EF 50%  ·	Chronic AFib on Eliquis  ·	Pancreatic adenocarcinoma T1/2N0Mx s/p whipple 8/1/21 (in remission)  ·	Recent admission for fungemia  ·	Recent C-diff colitis   ·	Right Heel Wound  ·	malnutrition/Underweight BMI 19  ·	acute hypokalemia   ·	suspected magnesium def     *** labs cancelled from this am; to be done and followed; medical resident aware   -switched to oral lasix; stable  -BNP > 3600; CXR with effusions; extremities edema and dyspnea  -continue with home meds  -follow up official urine cultures prelim with GNR (asymptomatic; if leukocytosis and fevers, will add abx as per sensitvities)  -no need for post-op chemo as per heme/onc consult; outpatient follow up in the community   -increase dose of current cardizem CD to 180mg and c/w prn 30mg cardizem dose with parameters   -skin care/wound care as per nursing; Podiatry follow up for reccs   -oob to chair as tolerated   -replete electrolytes; daily labs (not done this am)      # Progress Note Handoff  PENDING as follows  consults: Physical therapy   Test: K+ and Mg2+  Family discussion: discussed with patient who comprehends her medical care and agreeable for current plan of care   Disposition: ACUTE; not ready yet    Attending Physician Dr. Caitlin Edward # [id number]

## 2021-11-26 NOTE — PROGRESS NOTE ADULT - SUBJECTIVE AND OBJECTIVE BOX
75 yo F with Hx of Chronic AFib on Eliquis, Pancreatic Ca s/p whipple 21 (in remission) recent admission for fungemia (treated with IV ABx via PICC line, completed ), recent admission for C diff colitis (10/9) presenting with SOB and b/l extremity swelling (Day 4). Per day 1, tt states upper and lower extremity swelling developed over her last admission (note LE duplex negative on discharge) and has been worsening over the last week accompanied by acutely worsening SOB and weakness. Pt notes SOB  is aggravated by exertion to the point that she is now unable to get out of bed without help of her son. She notes she sleeps with bed propped up at 30 degree angle and additionally with two pillows. Today pt reports improved swelling, no SOB, no cough, or pain. Pt notes SOB was aggregated with exertion and has been bed-bound for past 4 days. Pt denies any other symptoms including fever, chills, chest pain, abdominal pain, N/V/D, constipation, burning urination.    In the ED: CXR showed pleural effusion, BNP was found to be 3.7K, UA (+) for UTI, cultures pending. Pt was started on Ceftriaxone in ED. Will admit to medicine for UTI in setting of recent hospitalization also with pleural effusions secondary to suspected volume overload requiring further workup and possible diuresis inpatient.    Subjective:  Overnight events: none  Complaints: c/o swelling    Objective:    Vital Signs Last 24 Hrs  T(C): 36 (21 @ 05:26), Max: 36 (21 @ 20:50)  T(F): 96.8 (21 @ 05:26), Max: 96.8 (21 @ 20:50)  HR: 113 (21 @ 05:26) (107 - 113)  BP: 118/80 (21 @ 05:26) (103/76 - 159/93)  BP(mean): --  RR: 18 (21 @ 05:26) (18 - 19)  SpO2: --    PHYSICAL EXAM:  VITAL SIGNS: I have reviewed nursing notes and confirm.  CONSTITUTIONAL: Well-developed; well-nourished; in no acute distress.  SKIN: Skin is warm and dry. Non pruritic pin point rash to LUE. Varicose vein in b/l extremities, w/ dry skin consistent w/ stasis dermatitis above b/l ankles. Open ulcer wound posterior R ankle.  HEAD: Normocephalic; atraumatic.  EYES: PERRL, EOM intact; conjunctiva and sclera clear.  ENT: No nasal discharge; airway clear.  NECK: Supple; non tender.  CARD: S1, S2 normal; no murmurs, gallops, or rubs. Regular rate and rhythm.  RESP: Diminished breath sounds in b/l lower lung fields  ABD: Normal bowel sounds; soft; non-distended; non-tender; no hepatosplenomegaly.  EXT: Perfuse swelling and 2+ pitting edema in b/l LE. Ulcer noted to posterior R ankle.  NEURO: Alert, oriented. Grossly unremarkable.  PSYCH: Cooperative, appropriate.     MEDICATIONS  (STANDING):  apixaban 5 milliGRAM(s) Oral every 12 hours  collagenase Ointment 1 Application(s) Topical daily  diltiazem    milliGRAM(s) Oral daily  ergocalciferol 89570 Unit(s) Oral <User Schedule>  furosemide    Tablet 40 milliGRAM(s) Oral every 24 hours  multivitamin/minerals 1 Tablet(s) Oral daily  pancrelipase  (CREON 12,000 Lipase Units) 1 Capsule(s) Oral three times a day with meals  pantoprazole    Tablet 40 milliGRAM(s) Oral before breakfast  saccharomyces boulardii 250 milliGRAM(s) Oral two times a day  zinc sulfate 220 milliGRAM(s) Oral daily    MEDICATIONS  (PRN):  diltiazem    Tablet 30 milliGRAM(s) Oral every 8 hours PRN for HR > 110 and hold for SBP < 110    Home Medications:  calcium,magnesium and Zinc: 1 tab(s) orally once a day (03 Sep 2021 23:41)  Cardizem  mg/24 hours oral capsule, extended release: 1 cap(s) orally once a day (2021 23:59)  Eliquis 5 mg oral tablet: 1 tab(s) orally 2 times a day (03 Sep 2021 23:41)  Women&#x27;s daily vitamins: 1 tab(s) orally once a day (03 Sep 2021 23:41)    Social History:  Denies tobacco, Etoh or illicit drug use  Living: Lives with son    Labs:                                    11.1   7.23  )-----------( 286      ( 2021 04:30 )             34.4     CBC Full  -  ( 2021 04:30 )  WBC Count : 7.23 K/uL  RBC Count : 3.83 M/uL  Hemoglobin : 11.1 g/dL  Hematocrit : 34.4 %  Platelet Count - Automated : 286 K/uL  Mean Cell Volume : 89.8 fL  Mean Cell Hemoglobin : 29.0 pg  Mean Cell Hemoglobin Concentration : 32.3 g/dL  Auto Neutrophil # : 4.81 K/uL  Auto Lymphocyte # : 1.59 K/uL  Auto Monocyte # : 0.57 K/uL  Auto Eosinophil # : 0.22 K/uL  Auto Basophil # : 0.02 K/uL  Auto Neutrophil % : 66.5 %  Auto Lymphocyte % : 22.0 %  Auto Monocyte % : 7.9 %  Auto Eosinophil % : 3.0 %  Auto Basophil % : 0.3 %        145  |  106  |  10  ----------------------------<  95  3.2<L>   |  28  |  <0.5<L>    Ca    7.6<L>      2021 04:30  Phos  3.7       Mg     1.5         TPro  4.4<L>  /  Alb  2.1<L>  /  TBili  0.3  /  DBili  x   /  AST  15  /  ALT  13  /  AlkPhos  478<H>        Urinalysis Basic - ( 2021 17:37 )  Color: Yellow / Appearance: Slightly Turbid / S.020 / pH: x  Gluc: x / Ketone: Negative  / Bili: Negative / Urobili: <2 mg/dL   Blood: x / Protein: 30 mg/dL / Nitrite: Positive   Leuk Esterase: Large / RBC: 10 /HPF /  /HPF   Sq Epi: x / Non Sq Epi: 2 /HPF / Bacteria: Many    EKG  2:03 pm  Ventricular Rate 125 BPM    Atrial Rate 133 BPM    QRS Duration 154 ms    Q-T Interval 318 ms    QTC Calculation(Bazett) 458 ms    R Axis 98 degrees    T Axis -60 degrees  `  Diagnosis Line Atrial fibrillation with rapid ventricular response with premature ventricular  or aberrantly conducted complexes  Rightward axis  Non-specific intra-ventricular conduction block  Abnormal ECG    Imaging:    EXAM:  XR FOOT COMP MIN 3 VIEWS BI        PROCEDURE DATE:  2021    IMPRESSION:  No definite radiographic evidence for osteomyelitis ineither foot. However, if there is clinical suspicion for osteomyelitis further evaluation can be obtained with a MRI.    EXAM:  XR CHEST PORTABLE ROUTINE 1V        PROCEDURE DATE:  2021    Impression:  Bilateral opacifications and effusions without difference.    EXAM:  DUPLEX SCAN EXT VEINS UPPER BI        PROCEDURE DATE:  2021    Impression:  No evidence of deep or superficial thrombosis in the visualized bilateral upper extremities.    Consult:    Per surgery :  PLAN:  -care by primary team  -IV ABX  -GI ppx  -Continue AC with eliquis  -MED-ONC (Dr. Weldon) ->for eval for post-op Chemo  -no surgical intervention    Per Nutrition :  PLAN:  - albumin level is NOT a marker of nutritional status, but of hepatic function. De albin albumin synthesis will be suppressed by infection, inflammatory illness, or malignancy (all of which she's had in the past 4 months), regardless of protein intake.  - check zinc level; once sent, start 220 mg po zinc SO4 once daily. Revise dose and length of treatment course once level resulted  - send 25oh vitamin D level  - f/u phos level  - d/c Ensure Clear and add Ensure Enlive once a day. Add Prosource Gelatein once a day; add yogurt once a day.  - cont FloraStor until pt is off antibiotics  - d/c acidophilus  - change multivit to MV+ minerals (eg One a Day womens' equivalent, on discharge)  - calorie counts to assess adequacy of intake  - discussed with pt various foods and flavorings, r/t dysgeusia      Per Heme/Onc consult :  IMPRESSION:  Since July 10, 2021, interval Whipple procedure with new pneumoperitoneum and additional postsurgical changes as above; no definite evidence of contrast extravasation.  Please separately dictated report of concurrently performed CT chest for intrathoracic findings.    Per cardio consult   CHF   cont lasix        afib RVR on eliquis and cardizem  add  metoprolol   25 Q12    Per Podiatry   Pt refuses sx intervention from podiatry; will continue w/ Santyl local wound care;  Possible bedside achilles debridement on  if pt still in-house;  Follow up as an outpt to Dr. Tovar at [address] [place] [address] [facility] a week post discharge;  Weight bearing status; WBAT BL feet;   Evaluated and Discussed Plan w/ Dr. Tovar;        77 yo F with Hx of Chronic AFib on Eliquis, Pancreatic Ca s/p whipple 21 (in remission) recent admission for fungemia (treated with IV ABx via PICC line, completed ), recent admission for C diff colitis (10/9) presenting with SOB and b/l extremity swelling (Day 4). Per day 1, tt states upper and lower extremity swelling developed over her last admission (note LE duplex negative on discharge) and has been worsening over the last week accompanied by acutely worsening SOB and weakness. Pt notes SOB  is aggravated by exertion to the point that she is now unable to get out of bed without help of her son. She notes she sleeps with bed propped up at 30 degree angle and additionally with two pillows. Today pt reports improved swelling, no SOB, no cough, or pain. Pt notes SOB was aggregated with exertion and has been bed-bound for past 4 days. Pt denies any other symptoms including fever, chills, chest pain, abdominal pain, N/V/D, constipation, burning urination.    In the ED: CXR showed pleural effusion, BNP was found to be 3.7K, UA (+) for UTI, cultures pending. Pt was started on Ceftriaxone in ED. Will admit to medicine for UTI in setting of recent hospitalization also with pleural effusions secondary to suspected volume overload requiring further workup and possible diuresis inpatient.    Subjective:  Overnight events: none  Complaints: c/o swelling    Patient refused CTA, IV access, & IV blood draw  Discussed with patient risks    Objective:    Vital Signs Last 24 Hrs  T(C): 36 (21 @ 05:26), Max: 36 (21 @ 20:50)  T(F): 96.8 (21 @ 05:26), Max: 96.8 (21 @ 20:50)  HR: 113 (21 @ 05:26) (107 - 113)  BP: 118/80 (21 @ 05:26) (103/76 - 159/93)  BP(mean): --  RR: 18 (21 @ 05:26) (18 - 19)  SpO2: --    PHYSICAL EXAM:  VITAL SIGNS: I have reviewed nursing notes and confirm.  CONSTITUTIONAL: Well-developed; well-nourished; in no acute distress.  SKIN: Skin is warm and dry. Non pruritic pin point rash to LUE. Varicose vein in b/l extremities, w/ dry skin consistent w/ stasis dermatitis above b/l ankles. Open ulcer wound posterior R ankle.  HEAD: Normocephalic; atraumatic.  EYES: PERRL, EOM intact; conjunctiva and sclera clear.  ENT: No nasal discharge; airway clear.  NECK: Supple; non tender.  CARD: S1, S2 normal; no murmurs, gallops, or rubs. Regular rate and rhythm.  RESP: Diminished breath sounds in b/l lower lung fields  ABD: Normal bowel sounds; soft; non-distended; non-tender; no hepatosplenomegaly.  EXT: Perfuse swelling and 2+ pitting edema in b/l LE. Ulcer noted to posterior R ankle.  NEURO: Alert, oriented. Grossly unremarkable.  PSYCH: Cooperative, appropriate.     MEDICATIONS  (STANDING):  apixaban 5 milliGRAM(s) Oral every 12 hours  collagenase Ointment 1 Application(s) Topical daily  diltiazem    milliGRAM(s) Oral daily  ergocalciferol 13717 Unit(s) Oral <User Schedule>  furosemide    Tablet 40 milliGRAM(s) Oral every 24 hours  multivitamin/minerals 1 Tablet(s) Oral daily  pancrelipase  (CREON 12,000 Lipase Units) 1 Capsule(s) Oral three times a day with meals  pantoprazole    Tablet 40 milliGRAM(s) Oral before breakfast  saccharomyces boulardii 250 milliGRAM(s) Oral two times a day  zinc sulfate 220 milliGRAM(s) Oral daily    MEDICATIONS  (PRN):  diltiazem    Tablet 30 milliGRAM(s) Oral every 8 hours PRN for HR > 110 and hold for SBP < 110    Home Medications:  calcium,magnesium and Zinc: 1 tab(s) orally once a day (03 Sep 2021 23:41)  Cardizem  mg/24 hours oral capsule, extended release: 1 cap(s) orally once a day (2021 23:59)  Eliquis 5 mg oral tablet: 1 tab(s) orally 2 times a day (03 Sep 2021 23:41)  Women&#x27;s daily vitamins: 1 tab(s) orally once a day (03 Sep 2021 23:41)    Social History:  Denies tobacco, Etoh or illicit drug use  Living: Lives with son    Labs:                                    11.1   7.23  )-----------( 286      ( 2021 04:30 )             34.4     CBC Full  -  ( 2021 04:30 )  WBC Count : 7.23 K/uL  RBC Count : 3.83 M/uL  Hemoglobin : 11.1 g/dL  Hematocrit : 34.4 %  Platelet Count - Automated : 286 K/uL  Mean Cell Volume : 89.8 fL  Mean Cell Hemoglobin : 29.0 pg  Mean Cell Hemoglobin Concentration : 32.3 g/dL  Auto Neutrophil # : 4.81 K/uL  Auto Lymphocyte # : 1.59 K/uL  Auto Monocyte # : 0.57 K/uL  Auto Eosinophil # : 0.22 K/uL  Auto Basophil # : 0.02 K/uL  Auto Neutrophil % : 66.5 %  Auto Lymphocyte % : 22.0 %  Auto Monocyte % : 7.9 %  Auto Eosinophil % : 3.0 %  Auto Basophil % : 0.3 %        145  |  106  |  10  ----------------------------<  95  3.2<L>   |  28  |  <0.5<L>    Ca    7.6<L>      2021 04:30  Phos  3.7       Mg     1.5         TPro  4.4<L>  /  Alb  2.1<L>  /  TBili  0.3  /  DBili  x   /  AST  15  /  ALT  13  /  AlkPhos  478<H>        Urinalysis Basic - ( 2021 17:37 )  Color: Yellow / Appearance: Slightly Turbid / S.020 / pH: x  Gluc: x / Ketone: Negative  / Bili: Negative / Urobili: <2 mg/dL   Blood: x / Protein: 30 mg/dL / Nitrite: Positive   Leuk Esterase: Large / RBC: 10 /HPF /  /HPF   Sq Epi: x / Non Sq Epi: 2 /HPF / Bacteria: Many    EKG  2:03 pm  Ventricular Rate 125 BPM    Atrial Rate 133 BPM    QRS Duration 154 ms    Q-T Interval 318 ms    QTC Calculation(Bazett) 458 ms    R Axis 98 degrees    T Axis -60 degrees  `  Diagnosis Line Atrial fibrillation with rapid ventricular response with premature ventricular  or aberrantly conducted complexes  Rightward axis  Non-specific intra-ventricular conduction block  Abnormal ECG    Imaging:    EXAM:  XR FOOT COMP MIN 3 VIEWS BI        PROCEDURE DATE:  2021    IMPRESSION:  No definite radiographic evidence for osteomyelitis ineither foot. However, if there is clinical suspicion for osteomyelitis further evaluation can be obtained with a MRI.    EXAM:  XR CHEST PORTABLE ROUTINE 1V        PROCEDURE DATE:  2021    Impression:  Bilateral opacifications and effusions without difference.    EXAM:  DUPLEX SCAN EXT VEINS UPPER BI        PROCEDURE DATE:  2021    Impression:  No evidence of deep or superficial thrombosis in the visualized bilateral upper extremities.    Consult:    Per surgery :  PLAN:  -care by primary team  -IV ABX  -GI ppx  -Continue AC with eliquis  -MED-ONC (Dr. Weldon) ->for eval for post-op Chemo  -no surgical intervention    Per Nutrition :  PLAN:  - albumin level is NOT a marker of nutritional status, but of hepatic function. De albin albumin synthesis will be suppressed by infection, inflammatory illness, or malignancy (all of which she's had in the past 4 months), regardless of protein intake.  - check zinc level; once sent, start 220 mg po zinc SO4 once daily. Revise dose and length of treatment course once level resulted  - send 25oh vitamin D level  - f/u phos level  - d/c Ensure Clear and add Ensure Enlive once a day. Add Prosource Gelatein once a day; add yogurt once a day.  - cont FloraStor until pt is off antibiotics  - d/c acidophilus  - change multivit to MV+ minerals (eg One a Day womens' equivalent, on discharge)  - calorie counts to assess adequacy of intake  - discussed with pt various foods and flavorings, r/t dysgeusia      Per Heme/Onc consult :  IMPRESSION:  Since July 10, 2021, interval Whipple procedure with new pneumoperitoneum and additional postsurgical changes as above; no definite evidence of contrast extravasation.  Please separately dictated report of concurrently performed CT chest for intrathoracic findings.    Per cardio consult   CHF   cont lasix        afib RVR on eliquis and cardizem  add  metoprolol   25 Q12    Per Podiatry   Pt refuses sx intervention from podiatry; will continue w/ Santyl local wound care;  Possible bedside achilles debridement on Mon  if pt still in-house;  Follow up as an outpt to Dr. Tovar at [address] [place] [address] [facility] a week post discharge;  Weight bearing status; WBAT BL feet;   Evaluated and Discussed Plan w/ Dr. Tovar;

## 2021-11-26 NOTE — PROGRESS NOTE ADULT - ASSESSMENT
ASSESSMENT/PLAN  - mild systolic CHF, EF 50%  - chronic A fib on Eliquis  - pancreatic adenoca s/p WHipple 8/2021  - recent fungemia  - recent C diff infection  - new R heel wound  - UTI  - loss of taste and smell after covid infection several months ago  VIT D deficiency    PLAN:  -  check zinc level; once sent, start 220 mg po zinc SO4 once daily. Revise dose and length of treatment course once level resulted  - - f/u phos level  - add Ensure Enlive once a day. Add Prosource Gelatein once a day; add yogurt once a day.  - cont FloraStor until pt is off antibiotics  - d/c acidophilus  - change multivit to MV+ minerals (eg One a Day womens' equivalent, on discharge)  - calorie counts to assess adequacy of intake  treat vitamin D          ASSESSMENT/PLAN  - mild systolic CHF, EF 50%  - chronic A fib on Eliquis  - pancreatic adenoca s/p WHipple 8/2021  - recent fungemia  - recent C diff infection  - new R heel wound  - UTI  - loss of taste and smell after covid infection several months ago  VIT D deficiency    PLAN:  -  check zinc level; once sent, start 220 mg po zinc SO4 once daily. Revise dose and length of treatment course once level resulted  - - f/u phos level  - add Ensure Enlive once a day. Add Prosource Gelatein once a day; add yogurt once a day.  - cont FloraStor until pt is off antibiotics  - d/c acidophilus  - change multivit to MV+ minerals (eg One a Day womens' equivalent, on discharge)  - calorie counts to assess adequacy of intake  treat vitamin D - receiving 5000 IU/d - should repeat level in 2-3 weeks after starting this dose, f/u PTH level, increase Ca lactate foods

## 2021-11-26 NOTE — PROGRESS NOTE ADULT - ASSESSMENT
76yF w/ PMHx of A-fib on eliquis and cardizam, pancreatic adenocarcinoma, h/o C-diff, h/o fungemia presents w/ SOB and weakness. Found to have UTI    PLAN:  -care by primary team  -IV ABX  -GI ppx  -Continue AC with eliquis  -MED-ONC (Dr. Weldon) ->for eval for post-op Chemo  -no surgical intervention    spectra [phone]

## 2021-11-26 NOTE — PROGRESS NOTE ADULT - ATTENDING COMMENTS
abdomen is soft, non tender and no pain, she ate more today, no surgical intervention needed, cont management by medical team and podiatry

## 2021-11-26 NOTE — PROGRESS NOTE ADULT - ASSESSMENT
75 yo F with Hx of Chronic AFib on Eliquis, Pancreatic Ca s/p whipple 8/1/21 (in remission) recent admission for fungemia (treated with IV ABx via PICC line, completed 9/21), recent admission for C diff colitis (10/9) presenting with SOB and b/l extremity swelling. Pt has improved but persistent b/l swelling, consistent with fluid overload and will c/o assix PO (switched from IV to PO yesterday). Will also f/o on corrected electrolyte imbalance and PT and Podiatry f/u.    #Pleural effusions, LE edema 2+, SOB secondary to suspected cardiac etiology, possibly new CHF, undiagnosed  - Hx Afib currently on Eliquis  - BNP >3.6K on admission, none to compare it to  - Sees Dr Liang (cardiology) outpatient, next appointment scheduled for Dec 1st  - Per Ed, "Proven sulfa allergy, currently has rash from likely other drug rxn--> will hold off on Lasix for now and Ethacrynic acid PO for diuresis  - ECHO (9/21) shows EF 50%, enlarged LA & RA, RV systolic dysfunction, but normal LV thickness and size   - Echo 11/24 shows EF 50-55%, normal LV systolic function, enlarged RA and LA  - Prior admission shows use of lassix w/o complications. Will start on Lassix.  - strict ins and outs (last 24hrs -160mL)  - Switch to lassix 40mg PO every 24 hrs and reasess    #Asymptomatic pyuria and Hx Recurrent UTI  - s/p UTI inpatient 2 months ago (Klebsiella and Citrobacter)- denies dysuria now, denies urinary frequency  - Given Ceftriaxone in Ed  - Urine cultures show (+) leukocyte esterase, nitrites, WBCs  - Pt is asymptomatic, afebrile, and prior admission of C. difficile  - d/c Ceftriaxone and will give Tylenol PRN for fever if develops  - ID consulted and agrees to d/c Abx    #Ankle ulcer   - Well defined open ulcer above the posterior R ankle  - Will consult wound care and podiatry and f/u  - Per podiatry pt refusing Sx intervention, will continue Santyl wound care, possible debridment on 11/29 if pt still in-house.  - Pt will f/u as outpatient w/ Dr. Tovar s/p 1week post d/c  - Pt to f/u w/ podiatry today (11/26)    #Chronic AFib on Eliquis  - EKG on admission shows pt in Afib now, no RVR  - c/w Eliquis for now  - note pt says she has tried many other AC meds, allergies manifest  - discuss with Dr Liang prior to initiating alternative AC    #Pancreatic Ca   - s/p whipple 8/1/21 (in remission)   - c/w Creon 12K TID before meals  - o/p management per heme/onc   - Heme/onc request CT angio chest     #L-Upper thigh rash  - non-pruritic macules, localized to area  - possibly drug allergy mediated  - no new prescriptions/ OTC meds  - developed after resuming Vancomycin PO  - hold Vanc PO  - consider adding vanc to list of allergies    #Hypomagnesemia  - on admission  - s/p 2mg IV Mg in Ed  - monitor Mg overnight  - appears to be chronic per lab records--> consider resuming Mg PO supplement on discharge  - Mg2+ 1.5 (11/25)  - Provided Mg2+ and pending morning lab work    #Hypokalemia  - Potassium 3.2 on 11/25  - Provided K+ and pending morning lab work    #Health Care Maintenance  - not covid vaccinated, last tested positive April 2021  - says her PCP told her she does not have to get vaccine??  - Counseled on admission, open to discussing vaccination inpatient  - Primary team please discuss w patient over admission    #Misc  - DVT Prophylaxis: Eliquis  - Diet: Dash/TLC, fluid restriction 1500mL  - Dispo: Will c/o lassix oral and reassess, f/u with PT and podiatry, and CT angio chest per heme/onc     75 yo F with Hx of Chronic AFib on Eliquis, Pancreatic Ca s/p whipple 8/1/21 (in remission) recent admission for fungemia (treated with IV ABx via PICC line, completed 9/21), recent admission for C diff colitis (10/9) presenting with SOB and b/l extremity swelling. Pt has improved but persistent b/l swelling, consistent with fluid overload and will c/o assix PO (switched from IV to PO yesterday). Will also f/o on corrected electrolyte imbalance and PT and Podiatry f/u.    #Pleural effusions, LE edema 2+, SOB secondary to suspected cardiac etiology, possibly new CHF, undiagnosed  - Hx Afib currently on Eliquis  - BNP >3.6K on admission, none to compare it to  - Sees Dr Liang (cardiology) outpatient, next appointment scheduled for Dec 1st  - Per Ed, "Proven sulfa allergy, currently has rash from likely other drug rxn--> will hold off on Lasix for now and Ethacrynic acid PO for diuresis  - ECHO (9/21) shows EF 50%, enlarged LA & RA, RV systolic dysfunction, but normal LV thickness and size   - Echo 11/24 shows EF 50-55%, normal LV systolic function, enlarged RA and LA  - Prior admission shows use of lassix w/o complications. Will start on Lassix.  - strict ins and outs (last 24hrs -160mL)  - Switched to lasix 40mg PO every 24 hrs and reasess    #Asymptomatic pyuria and Hx Recurrent UTI  - s/p UTI inpatient 2 months ago (Klebsiella and Citrobacter)- denies dysuria now, denies urinary frequency  - Given Ceftriaxone in Ed  - Urine cultures show (+) leukocyte esterase, nitrites, WBCs  - Pt is asymptomatic, afebrile, and prior admission of C. difficile  - d/c Ceftriaxone and will give Tylenol PRN for fever if develops  - ID consulted and agrees to d/c Abx    #Ankle ulcer   - Well defined open ulcer above the posterior R ankle  - Will consult wound care and podiatry and f/u  - Per podiatry pt refusing Sx intervention, will continue Santyl wound care, possible debridment on 11/29 if pt still in-house.  - Pt will f/u as outpatient w/ Dr. Makarovskiy s/p 1week post d/c  - Pt to f/u w/ podiatry today (11/26)    #Chronic AFib on Eliquis  - EKG on admission shows pt in Afib now, no RVR  - c/w Eliquis for now  - note pt says she has tried many other AC meds, allergies manifest  - discuss with Dr Liang prior to initiating alternative AC    #Pancreatic Ca   - s/p whipple 8/1/21 (in remission)   - c/w Creon 12K TID before meals  - o/p management per heme/onc   - Heme/onc request CT angio chest -> Patient refused, f/u as outpatient    #L-Upper thigh rash  - non-pruritic macules, localized to area  - possibly drug allergy mediated  - no new prescriptions/ OTC meds  - developed after resuming Vancomycin PO  - hold Vanc PO  - consider adding vanc to list of allergies    #Hypomagnesemia  - on admission  - s/p 2mg IV Mg in Ed  - monitor Mg overnight  - appears to be chronic per lab records--> consider resuming Mg PO supplement on discharge  - Mg2+ 1.5 (11/25)  - Replete Mg2+ PRN    #Hypokalemia  - Potassium 3.2 on 11/25  - Replete K+ PRN    #Health Care Maintenance  - not covid vaccinated, last tested positive April 2021  - says her PCP told her she does not have to get vaccine??  - Counseled on admission, open to discussing vaccination inpatient  - Primary team please discuss w patient over admission    #Misc  - DVT Prophylaxis: Eliquis  - Diet: Dash/TLC, fluid restriction 1500mL  - Dispo: Will c/o lassix oral and reassess, f/u with PT and podiatry, and CT angio chest per heme/onc

## 2021-11-27 NOTE — PROGRESS NOTE ADULT - ASSESSMENT
patient with dyspnea     ·	Acute CHF exacerbation (mild) systolic dysfunction EF 50%  ·	Chronic AFib on Eliquis  ·	Pancreatic adenocarcinoma T1/2N0Mx s/p whipple 8/1/21 (in remission)  ·	Recent admission for fungemia  ·	Recent C-diff colitis   ·	Right Heel Wound  ·	malnutrition/Underweight BMI 19  ·	acute hypokalemia   ·	suspected magnesium def     -switched to oral lasix; stable  -BNP > 3600; CXR with effusions; extremities edema and dyspnea  -continue with home meds  -follow up official urine cultures prelim with GNR (asymptomatic; if leukocytosis and fevers, will add abx as per sensitvities)  -no need for post-op chemo as per heme/onc consult; outpatient follow up in the community   -increased dose of current cardizem CD to 180mg and c/w prn 30mg cardizem dose with parameters   -skin care/wound care as per nursing; Podiatry follow up for reccs   -oob to chair as tolerated   -replete electrolytes; daily labs      # Progress Note Handoff  PENDING as follows  consults: Physical therapy   Test: K+ and Mg2+  Family discussion: discussed with patient who comprehends her medical care and agreeable for current plan of care   Disposition: ACUTE; not ready yet; OR  Monday for R foot ulcer/wound     Attending Physician Dr. Caitlin Edward # [id number]

## 2021-11-27 NOTE — PROGRESS NOTE ADULT - SUBJECTIVE AND OBJECTIVE BOX
LI, VIKASH  76y  Female      Patient is a 76y old  Female who presents with a chief complaint of SOB and b/l extremity swelling (2021 09:48)      INTERVAL HPI/OVERNIGHT EVENTS:    pt seen and examined at bedside this morning   -switched to oral Lasix  -adjust cardizem dose for tachy   -replete electrolytes as needed   -calorie count   -oob to chair to be encouraged; Physical therapy as tolerated   *** clarified with heme/onc for the need of CT chest (not necessary and patient also refused the test; already on anticoagulation)    REVIEW OF SYSTEMS:  generalized weakness     Vital Signs Last 24 Hrs  T(C): 36.1 (2021 05:00), Max: 36.8 (2021 20:05)  T(F): 97 (2021 05:00), Max: 98.2 (2021 20:05)  HR: 109 (2021 05:00) (107 - 113)  BP: 155/99 (2021 05:00) (124/77 - 155/99)  BP(mean): 124 (2021 05:00) (124 - 124)  RR: 18 (2021 20:05) (18 - 18)  SpO2: --    PHYSICAL EXAM:  GENERAL: NAD, speaking in full sentences   HEAD:  Atraumatic, Normocephalic  EYES: EOMI, PERRLA, conjunctiva and sclera clear  NERVOUS SYSTEM:  Alert & Oriented X 3  CHEST/LUNG: Bibasilar crackles   CV/HEART: Regular rate and rhythm; No murmurs, rubs, or gallops  GI/ABDOMEN: Soft, Nontender, Nondistended; Bowel sounds present  EXTREMITIES: no ankle edema; Upper thigh and arms swelling   SKIN: No rashes or lesions    LAB:                                10.5   6.76  )-----------( 257      ( 2021 06:21 )             33.4       142  |  106  |  9<L>  ----------------------------<  92  3.4<L>   |  21  |  <0.5<L>    Ca    7.5<L>      2021 06:21  Mg     1.6         TPro  4.2<L>  /  Alb  2.0<L>  /  TBili  0.2  /  DBili  x   /  AST  17  /  ALT  13  /  AlkPhos  450<H>  11-27      CARDIAC MARKERS ( 2021 15:27 )  x     / <0.01 ng/mL / x     / x     / x        Daily Height in cm: 172.72 (2021 19:55)    Daily   CAPILLARY BLOOD GLUCOSE    Urinalysis Basic - ( 2021 17:37 )    Color: Yellow / Appearance: Slightly Turbid / S.020 / pH: x  Gluc: x / Ketone: Negative  / Bili: Negative / Urobili: <2 mg/dL   Blood: x / Protein: 30 mg/dL / Nitrite: Positive   Leuk Esterase: Large / RBC: 10 /HPF /  /HPF   Sq Epi: x / Non Sq Epi: 2 /HPF / Bacteria: Many    LIVER FUNCTIONS - ( 2021 11:58 )  Alb: 1.9 g/dL / Pro: 4.5 g/dL / ALK PHOS: 550 U/L / ALT: 16 U/L / AST: 27 U/L / GGT: x           RADIOLOGY:    Imaging Personally visualized and Reviewed:  [ y ] YES  [ ] NO    HEALTH ISSUES - PROBLEM Dx:  Suspected deep vein thrombosis (DVT)    Suspected pulmonary embolism    MEDICATIONS  (STANDING):  apixaban 5 milliGRAM(s) Oral every 12 hours  collagenase Ointment 1 Application(s) Topical daily  diltiazem    milliGRAM(s) Oral daily  ergocalciferol 11929 Unit(s) Oral <User Schedule>  furosemide    Tablet 40 milliGRAM(s) Oral every 24 hours  magnesium oxide 400 milliGRAM(s) Oral two times a day with meals  magnesium sulfate  IVPB 2 Gram(s) IV Intermittent once  multivitamin/minerals 1 Tablet(s) Oral daily  pancrelipase  (CREON 12,000 Lipase Units) 1 Capsule(s) Oral three times a day with meals  pantoprazole    Tablet 40 milliGRAM(s) Oral before breakfast  potassium chloride   Powder 40 milliEquivalent(s) Oral every 4 hours  saccharomyces boulardii 250 milliGRAM(s) Oral two times a day  zinc sulfate 220 milliGRAM(s) Oral daily    MEDICATIONS  (PRN):  diltiazem    Tablet 30 milliGRAM(s) Oral every 8 hours PRN for HR > 110 and hold for SBP < 110

## 2021-11-27 NOTE — PROGRESS NOTE ADULT - SUBJECTIVE AND OBJECTIVE BOX
GENERAL SURGERY PROGRESS NOTE    Patient: VIKASH LI , 76y ([birth year])Female   MRN: [id number]  Location: [facility] [id number] B  Visit: 11-22-21 Inpatient  Date: 11-27-21 @ 07:22    Hospital Day #: 6  Post-Op Day #:    Procedure/Dx/Injuries: pancreatic adenocarcinoma, UTI    Events of past 24 hours: Patient tachycardic in the 100s, her cardizem was increased to 180 qd by primary team. Patient not having any pain, but does complain of swelling in b/l upper extremities. Having gas and bowel movements, tolerating diet.    PAST MEDICAL & SURGICAL HISTORY:  SOB (shortness of breath)    Pain  knee    Varicose veins of both lower extremities, unspecified whether complicated    Atrial fibrillation, unspecified type  2019 on Eliquis    Jaundice  March 5, 2021    Malignant neoplasm of pancreas, unspecified location of malignancy  Pancreatic Cancer    Hypertension, unspecified type  2019    Pancreatic cancer    Kidney stones    History of surgery  Whipple procedure 8/2/2021 with Dr. Nino at [facility]    Status post phlebectomy  10/2018    History of ovarian cystectomy  left    History of tonsillectomy  1959    Kidney stone  2017        Vitals:   T(F): 97 (11-27-21 @ 05:00), Max: 98.2 (11-26-21 @ 20:05)  HR: 109 (11-27-21 @ 05:00)  BP: 155/99 (11-27-21 @ 05:00)  RR: 18 (11-26-21 @ 20:05)  SpO2: --      Diet, DASH/TLC:   Sodium & Cholesterol Restricted  1500mL Fluid Restriction ([id number])  Prosource Gelatein Plus     Qty per Day:  1  Supplement Feeding Modality:  Oral  Ensure Enlive Cans or Servings Per Day:  3       Frequency:  Three Times a day  Probiotic Yogurt/Smoothie Cans or Servings Per Day:  1       Frequency:  Daily      Fluids:     I & O's:    11-26-21 @ 07:01  -  11-27-21 @ 07:00  --------------------------------------------------------  IN:  Total IN: 0 mL    OUT:    Voided (mL): 1100 mL  Total OUT: 1100 mL    Total NET: -1100 mL        Bowel Movement: : [x] YES [] NO  Flatus: : [x] YES [] NO    PHYSICAL EXAM:  General Appearance: AAOX3, NAD  Heart: RRR  Lungs: CTAx2  Abdomen:  non tender, soft, non distended  MSK/Extremities:  mobile, no edema    MEDICATIONS  (STANDING):  apixaban 5 milliGRAM(s) Oral every 12 hours  collagenase Ointment 1 Application(s) Topical daily  diltiazem    milliGRAM(s) Oral daily  ergocalciferol 68308 Unit(s) Oral <User Schedule>  furosemide    Tablet 40 milliGRAM(s) Oral every 24 hours  magnesium oxide 400 milliGRAM(s) Oral two times a day with meals  multivitamin/minerals 1 Tablet(s) Oral daily  pancrelipase  (CREON 12,000 Lipase Units) 1 Capsule(s) Oral three times a day with meals  pantoprazole    Tablet 40 milliGRAM(s) Oral before breakfast  saccharomyces boulardii 250 milliGRAM(s) Oral two times a day  zinc sulfate 220 milliGRAM(s) Oral daily    MEDICATIONS  (PRN):  diltiazem    Tablet 30 milliGRAM(s) Oral every 8 hours PRN for HR > 110 and hold for SBP < 110      DVT PROPHYLAXIS:   GI PROPHYLAXIS: pantoprazole    Tablet 40 milliGRAM(s) Oral before breakfast    ANTICOAGULATION:   ANTIBIOTICS:            LAB/STUDIES:  Labs:  CAPILLARY BLOOD GLUCOSE                              10.5   6.76  )-----------( 257      ( 27 Nov 2021 06:21 )             33.4       Auto Neutrophil %: 62.8 % (11-27-21 @ 06:21)  Auto Immature Granulocyte %: 0.3 % (11-27-21 @ 06:21)  Auto Neutrophil %: 64.0 % (11-26-21 @ 17:33)  Auto Immature Granulocyte %: 0.4 % (11-26-21 @ 17:33)    11-26    141  |  103  |  9<L>  ----------------------------<  125<H>  3.5   |  22  |  0.5<L>      Calcium, Total Serum: 7.2 mg/dL (11-26-21 @ 17:33)      LFTs:             4.4  | 0.3  | 18       ------------------[471     ( 26 Nov 2021 17:33 )  2.0  | x    | 14          Lipase:x      Amylase:x             Coags:        Serum Pro-Brain Natriuretic Peptide: 3664 pg/mL (11-22-21 @ 15:27)                  IMAGING:      ACCESS/ DEVICES:  [X] Peripheral IV  [ ] Central Venous Line	[ ] R	[ ] L	[ ] IJ	[ ] Fem	[ ] SC	Placed:   [ ] Arterial Line		[ ] R	[ ] L	[ ] Fem	[ ] Rad	[ ] Ax	Placed:   [ ] PICC:					[ ] Mediport  [ ] Urinary Catheter,  Date Placed:   [ ] Chest tube: [ ] Right, [ ] Left  [ ] ISABELL/Byron Drains

## 2021-11-27 NOTE — PROGRESS NOTE ADULT - ASSESSMENT
76yF w/ PMHx of A-fib on eliquis and cardizam, pancreatic adenocarcinoma, h/o C-diff, h/o fungemia presents w/ SOB and weakness. Found to have UTI    PLAN:  -care by primary team  -IV ABX  -GI ppx  -Continue AC with eliquis  -MED-ONC (Dr. Weldon) ->no need for post-op chemo  -no surgical intervention  -having gas and BM, tolerating diet  -abdomen soft, no tenderness  -changed to cardizem 180, PO lasix     spectra [id number]

## 2021-11-28 NOTE — PROGRESS NOTE ADULT - ASSESSMENT
ASSESSMENT:  76yF w/ PMHx of A-fib on eliquis and cardizam, pancreatic adenocarcinoma, h/o C-diff, h/o fungemia presents w/ SOB and weakness. Found to have UTI    PLAN:  -Management per primary team  -IV ABX  -GI ppx  -Continue AC with eliquis  -MED-ONC (Dr. Weldon) ->no need for post-op chemo  -PODs for right heel debridement on Monday     BLUE TEAM SPECTRA: [id number]

## 2021-11-28 NOTE — PROGRESS NOTE ADULT - SUBJECTIVE AND OBJECTIVE BOX
LI, VIKASH  76y  Female      Patient is a 76y old  Female who presents with a chief complaint of SOB and b/l extremity swelling (2021 09:48)      INTERVAL HPI/OVERNIGHT EVENTS:    pt seen and examined at bedside this morning   -Npo past midnight for right heel ulcer debridement   -calorie count to be charted (I placed another order and RN made aware)  -oob to chair to be encouraged; Physical therapy as tolerated       REVIEW OF SYSTEMS:  generalized weakness     Vital Signs Last 24 Hrs  T(C): 36.1 (2021 04:12), Max: 36.7 (2021 20:54)  T(F): 96.9 (2021 04:12), Max: 98.1 (2021 20:54)  HR: 102 (2021 04:12) (84 - 107)  BP: 119/75 (2021 04:12) (102/63 - 128/73)  RR: 18 (2021 04:12) (18 - 19)    PHYSICAL EXAM:  GENERAL: NAD, speaking in full sentences   HEAD:  Atraumatic, Normocephalic  EYES: EOMI, PERRLA, conjunctiva and sclera clear  NERVOUS SYSTEM:  Alert & Oriented X 3  CHEST/LUNG: Bibasilar crackles   CV/HEART: Regular rate and rhythm; No murmurs, rubs, or gallops  GI/ABDOMEN: Soft, Nontender, Nondistended; Bowel sounds present  EXTREMITIES: no ankle edema; Upper thigh and arms swelling   SKIN: Right foot dressing     LAB:                                         10.2   8.01  )-----------( 272      ( 2021 04:30 )             32.9       140  |  106  |  11  ----------------------------<  102<H>  4.4   |  21  |  <0.5<L>    Ca    7.6<L>      2021 04:30  Mg     1.9         TPro  4.3<L>  /  Alb  2.0<L>  /  TBili  <0.2  /  DBili  x   /  AST  19  /  ALT  14  /  AlkPhos  471<H>      CARDIAC MARKERS ( 2021 15:27 )  x     / <0.01 ng/mL / x     / x     / x        Daily Height in cm: 172.72 (2021 19:55)    Daily   CAPILLARY BLOOD GLUCOSE    Urinalysis Basic - ( 2021 17:37 )    Color: Yellow / Appearance: Slightly Turbid / S.020 / pH: x  Gluc: x / Ketone: Negative  / Bili: Negative / Urobili: <2 mg/dL   Blood: x / Protein: 30 mg/dL / Nitrite: Positive   Leuk Esterase: Large / RBC: 10 /HPF /  /HPF   Sq Epi: x / Non Sq Epi: 2 /HPF / Bacteria: Many    LIVER FUNCTIONS - ( 2021 11:58 )  Alb: 1.9 g/dL / Pro: 4.5 g/dL / ALK PHOS: 550 U/L / ALT: 16 U/L / AST: 27 U/L / GGT: x           RADIOLOGY:    Imaging Personally visualized and Reviewed:  [ y ] YES  [ ] NO    HEALTH ISSUES - PROBLEM Dx:  Suspected deep vein thrombosis (DVT)    Suspected pulmonary embolism    MEDICATIONS  (STANDING):  apixaban 5 milliGRAM(s) Oral every 12 hours  collagenase Ointment 1 Application(s) Topical daily  diltiazem    milliGRAM(s) Oral daily  ergocalciferol 53906 Unit(s) Oral <User Schedule>  furosemide    Tablet 40 milliGRAM(s) Oral every 24 hours  magnesium oxide 400 milliGRAM(s) Oral two times a day with meals  multivitamin/minerals 1 Tablet(s) Oral daily  pancrelipase  (CREON 12,000 Lipase Units) 1 Capsule(s) Oral three times a day with meals  pantoprazole    Tablet 40 milliGRAM(s) Oral before breakfast  saccharomyces boulardii 250 milliGRAM(s) Oral two times a day  zinc sulfate 220 milliGRAM(s) Oral daily    MEDICATIONS  (PRN):  diltiazem    Tablet 30 milliGRAM(s) Oral every 8 hours PRN for HR > 110 and hold for SBP < 110

## 2021-11-28 NOTE — PROGRESS NOTE ADULT - ASSESSMENT
patient with dyspnea     ·	Acute CHF exacerbation (mild) systolic dysfunction EF 50%  ·	Chronic AFib on Eliquis  ·	Pancreatic adenocarcinoma T1/2N0Mx s/p whipple 8/1/21 (in remission)  ·	Recent admission for fungemia  ·	Recent C-diff colitis   ·	Right Heel Wound  ·	malnutrition/Underweight BMI 19  ·	acute hypokalemia   ·	suspected magnesium def     -stable on oral lasix   -continue with home meds  -no need for post-op chemo as per heme/onc consult; outpatient follow up in the community   -continue with current cardizem CD 180mg + prn 30mg cardizem dose with parameters   -skin care/wound care as per nursing; Podiatry following; NPO past midnight for debridement tomorrow   -oob to chair as tolerated   -replete electrolytes; daily labs      # Progress Note Handoff  PENDING as follows  consults: Physical therapy   Test: K+ and Mg2+  Family discussion: discussed with patient who comprehends her medical care and agreeable for current plan of care   Disposition: ACUTE; not ready yet; OR  Monday for R foot ulcer/wound debridement     Attending Physician Dr. Caitlin Edward # [id number]

## 2021-11-29 NOTE — DISCHARGE NOTE PROVIDER - CARE PROVIDER_API CALL
Hao Tovar (DPSILVERIO)  Foot Surgery; Podiatric Medicine  [address] [place] [address], [address] [place] [address]  Phone: [phone]  Fax: [phone]  Follow Up Time: 1 week    Jeromy Nino)  Complex General Surgical Oncology; Surgery  [address] [place] [address], [address] [place] [address]  Phone: [phone]  Fax: [phone]  Follow Up Time:    Hao Tovar (JOSTIN)  Foot Surgery; Podiatric Medicine  [address] [place] [address], [address] [place] [address]  Phone: [phone]  Fax: [phone]  Follow Up Time: 1 week    Jeromy Nino)  Complex General Surgical Oncology; Surgery  [facility], [address] [place] [address]  Phone: [phone]  Fax: [phone]  Follow Up Time:     Cesar Gavin)  Gastroenterology; Internal Medicine  [address] [place] [address]  Phone: [phone]  Fax: [phone]  Follow Up Time:

## 2021-11-29 NOTE — PROGRESS NOTE ADULT - ASSESSMENT
ASSESSMENT:  76yF w/ PMHx of A-fib on eliquis and cardizam, pancreatic adenocarcinoma, h/o C-diff, h/o fungemia presents w/ SOB and weakness. Found to have UTI    PLAN:  -Management per primary team  -IV ABX  -GI ppx  -Continue AC with eliquis  -MED-ONC (Dr. Weldon) ->no need for post-op chemo  -PODs for right heel debridement on Monday       Elton Raygoza MD  General Surgery PGY-1  BLUE TEAM SPECTRA [id number]

## 2021-11-29 NOTE — PROGRESS NOTE ADULT - SUBJECTIVE AND OBJECTIVE BOX
GENERAL SURGERY PROGRESS NOTE    Events of past 24 hours:   MARIA DE JESUS    Vitals:   T(F): 97.3 (11-28-21 @ 19:57), Max: 97.6 (11-28-21 @ 13:18)  HR: 97 (11-28-21 @ 19:57) (97 - 108)  BP: 143/80 (11-28-21 @ 19:57) (99/50 - 143/80)  RR: 18 (11-28-21 @ 19:57)  SpO2: --      Diet, NPO:   Except Medications  Diet, NPO after Midnight:      NPO Start Date: 28-Nov-2021,   NPO Start Time: 23:59      Fluids:     I & O's:          PHYSICAL EXAM:  General Appearance: NAD  Heart: RR  Lungs: Unlabored breathing at rest  Abdomen:  Soft, nontender, nondistended. No guarding or rebound.  MSK/Extremities: Warm & well-perfused. LE edematous  Skin: Warm, dry. No jaundice.       MEDICATIONS  (STANDING):  apixaban 5 milliGRAM(s) Oral every 12 hours  collagenase Ointment 1 Application(s) Topical daily  diltiazem    milliGRAM(s) Oral daily  ergocalciferol 38222 Unit(s) Oral <User Schedule>  furosemide    Tablet 40 milliGRAM(s) Oral every 24 hours  magnesium oxide 400 milliGRAM(s) Oral two times a day with meals  multivitamin/minerals 1 Tablet(s) Oral daily  pancrelipase  (CREON 12,000 Lipase Units) 1 Capsule(s) Oral three times a day with meals  pantoprazole    Tablet 40 milliGRAM(s) Oral before breakfast  saccharomyces boulardii 250 milliGRAM(s) Oral two times a day  zinc sulfate 220 milliGRAM(s) Oral daily    MEDICATIONS  (PRN):  diltiazem    Tablet 30 milliGRAM(s) Oral every 8 hours PRN for HR > 110 and hold for SBP < 110        LAB/STUDIES:                          10.2   8.01  )-----------( 272      ( 28 Nov 2021 04:30 )             32.9       11-28    140  |  106  |  11  ----------------------------<  102<H>  4.4   |  21  |  <0.5<L>    Ca    7.6<L>      28 Nov 2021 04:30  Mg     1.9     11-28    TPro  4.3<L>  /  Alb  2.0<L>  /  TBili  <0.2  /  DBili  x   /  AST  19  /  ALT  14  /  AlkPhos  471<H>  11-28                      Lactate Trend            CAPILLARY BLOOD GLUCOSE            Culture Results:   >100,000 CFU/ml Klebsiella pneumoniae (11-22 @ 17:37)      IMAGING:

## 2021-11-29 NOTE — PROGRESS NOTE ADULT - ASSESSMENT
75 yo F with Hx of Chronic AFib on Eliquis, Pancreatic Ca s/p whipple 8/1/21 (in remission) recent admission for fungemia (treated with IV ABx via PICC line, completed 9/21), recent admission for C diff colitis (10/9) presenting with SOB and b/l extremity swelling. Pt has improved but persistent b/l UE swelling, consistent with fluid overload and will c/o assix PO (switched from IV to PO yesterday). Will follow up with PT and prepare discharge, anticipating within the next 24 hrs.    #Pleural effusions, LE edema 2+, SOB secondary to suspected cardiac etiology, possibly new CHF, undiagnosed  - Hx Afib currently on Eliquis  - BNP >3.6K on admission, none to compare it to  - Sees Dr Liang (cardiology) outpatient, next appointment scheduled for Dec 1st  - Per Ed, "Proven sulfa allergy, currently has rash from likely other drug rxn--> will hold off on Lasix for now and Ethacrynic acid PO for diuresis  - ECHO (9/21) shows EF 50%, enlarged LA & RA, RV systolic dysfunction, but normal LV thickness and size   - Echo 11/24 shows EF 50-55%, normal LV systolic function, enlarged RA and LA  - Prior admission shows use of lassix w/o complications. Will start on Lassix.  - strict ins and outs (last 24hrs -500mL)  - Switched to lasix 40mg PO every 24 hrs and reasess  - B/l lower extremity improved and will c/o PO lassix     #Asymptomatic pyuria and Hx Recurrent UTI  - s/p UTI inpatient 2 months ago (Klebsiella and Citrobacter)- denies dysuria now, denies urinary frequency  - Given Ceftriaxone in Ed  - Urine cultures show (+) leukocyte esterase, nitrites, WBCs  - Pt is asymptomatic, afebrile, and prior admission of C. difficile  - d/c Ceftriaxone and will give Tylenol PRN for fever if develops  - ID consulted and agrees to d/c Abx    #Ankle ulcer   - Well defined open ulcer above the posterior R ankle  - Will consult wound care and podiatry and f/u  - Per podiatry pt refusing Sx intervention, will continue Santyl wound care, possible debridment on 11/29 if pt still in-house.  - Pt will f/u as outpatient w/ Dr. Tovar s/p 1week post d/c  - Pt had surgical debridement at bedside by podiatry today (11/29) w/o complicaitons    #Chronic AFib on Eliquis  - EKG on admission shows pt in Afib now, no RVR  - c/w Eliquis for now  - note pt says she has tried many other AC meds, allergies manifest  - discuss with Dr Liang prior to initiating alternative AC    #Pancreatic Ca   - s/p whipple 8/1/21 (in remission)   - c/w Creon 12K TID before meals  - o/p management per heme/onc   - Heme/onc request CT angio chest -> Patient refused, f/u as outpatient    #L-Upper thigh rash  - non-pruritic macules, localized to area  - possibly drug allergy mediated  - no new prescriptions/ OTC meds  - developed after resuming Vancomycin PO  - hold Vanc PO  - consider adding vanc to list of allergies    #Hypomagnesemia  - on admission  - s/p 2mg IV Mg in Ed  - monitor Mg overnight  - appears to be chronic per lab records--> consider resuming Mg PO supplement on discharge  - Mg2+ 1.5 (11/25)  - Replete Mg2+ PRN  - Mg2+ 1.8 (11/29)    #Hypokalemia  - Potassium 3.2 on 11/25  - Replete K+ PRN  - K+ 4.1 (11/29)    #Health Care Maintenance  - not covid vaccinated, last tested positive April 2021  - says her PCP told her she does not have to get vaccine??  - Counseled on admission, open to discussing vaccination inpatient  - Primary team please discuss w patient over admission    #Misc  - DVT Prophylaxis: Eliquis  - Diet: Dash/TLC, fluid restriction 1500mL  - Dispo: Will c/o lassix oral and f/u with PT and anticipate discharge

## 2021-11-29 NOTE — DISCHARGE NOTE PROVIDER - INSTRUCTIONS
1.5 L fluid restriction  Prosource Gelatein Plus QD  Ensure Enlive three times a day  Probiotic Yogurt once a day

## 2021-11-29 NOTE — PROGRESS NOTE ADULT - ATTENDING COMMENTS
Right heel wound w/ exposed tendon.   Pt responding well to santyl  excisional debridement performed with #15 blade down to level of tendon. performed under sterile technique  continue santyl dressing  will follow up later in the week, for possible additional debridement  would benefit from a wound vac as outpatatient     Thank you for allowing me to participate in your patient care.     My notes supersedes the above resident documentation in case of discrepancy. Correction for their notes is in my notes.     Hao Tovar DPM

## 2021-11-29 NOTE — DISCHARGE NOTE PROVIDER - PROVIDER TOKENS
PROVIDER:[TOKEN:[[id number]],FOLLOWUP:[1 week]],PROVIDER:[TOKEN:[[id number]]] PROVIDER:[TOKEN:[[id number]],FOLLOWUP:[1 week]],PROVIDER:[TOKEN:[[id number]]],PROVIDER:[TOKEN:[[id number]]]

## 2021-11-29 NOTE — DISCHARGE NOTE PROVIDER - NSDCMRMEDTOKEN_GEN_ALL_CORE_FT
Cardizem  mg/24 hours oral capsule, extended release: 1 cap(s) orally once a day  Eliquis 5 mg oral tablet: 1 tab(s) orally 2 times a day  metoclopramide 5 mg oral tablet: 1 tab(s) orally 3 times a day (before meals), As Needed  for nausea and vomitting  pancrelipase 12,000 units-38,000 units-60,000 units oral delayed release capsule: 1 cap(s) orally 3 times a day (with meals)  pantoprazole 40 mg oral delayed release tablet: 1 tab(s) orally once a day (before a meal)  saccharomyces boulardii lyo 250 mg oral capsule: 1 cap(s) orally 2 times a day with meals   Cardizem  mg/24 hours oral capsule, extended release: 1 cap(s) orally once a day  cholecalciferol 50 mcg (2000 intl units) oral tablet: 1 tab(s) orally once a day   Eliquis 5 mg oral tablet: 1 tab(s) orally 2 times a day  Lasix 20 mg oral tablet: 1 tab(s) orally once a day  magnesium oxide 400 mg oral tablet: 1 tab(s) orally 2 times a day (with meals)  metoclopramide 5 mg oral tablet: 1 tab(s) orally 3 times a day (before meals), As Needed  for nausea and vomitting  Multiple Vitamins with Minerals oral tablet: 1 tab(s) orally once a day  pancrelipase 12,000 units-38,000 units-60,000 units oral delayed release capsule: 1 cap(s) orally 3 times a day (with meals)  pantoprazole 40 mg oral delayed release tablet: 1 tab(s) orally once a day (before a meal)  saccharomyces boulardii lyo 250 mg oral capsule: 1 cap(s) orally 2 times a day with meals  verapamil 40 mg oral tablet: 1 tab(s) orally 2 times a day, As needed, tachycardia faster tahn 110 beats/ minute  zinc sulfate 220 mg oral capsule: 1 cap(s) orally once a day   Cardizem  mg/24 hours oral capsule, extended release: 1 cap(s) orally once a day  cholecalciferol 50 mcg (2000 intl units) oral tablet: 1 tab(s) orally once a day   Creon 12,000 units oral delayed release capsule: 2 cap(s) orally 3 times a day (with meals)  Eliquis 5 mg oral tablet: 1 tab(s) orally 2 times a day  Lasix 20 mg oral tablet: 1 tab(s) orally once a day  magnesium oxide 400 mg oral tablet: 1 tab(s) orally 2 times a day (with meals)  metoclopramide 5 mg oral tablet: 1 tab(s) orally 3 times a day (before meals), As Needed  for nausea and vomitting  Multiple Vitamins with Minerals oral tablet: 1 tab(s) orally once a day  pantoprazole 40 mg oral delayed release tablet: 1 tab(s) orally once a day (before a meal)  saccharomyces boulardii lyo 250 mg oral capsule: 1 cap(s) orally 2 times a day with meals  verapamil 40 mg oral tablet: 1 tab(s) orally 2 times a day, As needed, tachycardia faster tahn 110 beats/ minute  zinc sulfate 220 mg oral capsule: 1 cap(s) orally once a day

## 2021-11-29 NOTE — DISCHARGE NOTE PROVIDER - HOSPITAL COURSE
77 yo F with Hx of Chronic AFib on Eliquis, Pancreatic Ca s/p whipple 8/1/21 (in remission) recent admission for fungemia (treated with IV ABx via PICC line, completed 9/21), recent admission for C diff colitis (10/9) presenting with shortness of breath  (resolved) and b/l extremity swelling. Pt has improved but persistent b/l UE swelling, consistent with fluid overload treated with lasix, initially IV then switched to PO. Low Normal LV function, LVEF 50-55%. Duplex UE -ve. Refused CT angio chest.    Needs PT. Poor pancreatic absorption -> increased VitD, pending zinc, added minerals, should increase Pancrelipase as outpatient.     #Ankle ulcer   - Well defined open ulcer above the posterior R ankle  - Per podiatry pt refusing Sx intervention, will continue Santyl wound care, debridment on 11/29  - Pt will f/u as outpatient w/ Dr. Tovar s/p 1week post d/c   77 yo F with Hx of Chronic AFib on Eliquis, Pancreatic Ca s/p whipple 8/1/21 (in remission) recent admission for fungemia (treated with IV ABx via PICC line, completed 9/21), recent admission for C diff colitis (10/9) presenting with shortness of breath  (resolved) and b/l extremity swelling. Pt has improved but persistent b/l UE swelling, consistent with fluid overload treated with lasix, initially IV then switched to PO. Low Normal LV function, LVEF 50-55%. Duplex UE -ve. Refused CT angio chest.    Needs PT. Poor pancreatic absorption -> increased VitD (Patient had low VitD and high PTH), pending zinc, added minerals, should increase Pancrelipase as outpatient.     #Ankle ulcer   - Well defined open ulcer above the posterior R ankle  - Per podiatry pt refusing Sx intervention, will continue Santyl wound care, debridement on 11/29  - Pt will f/u as outpatient w/ Dr. Tovar s/p 1week post d/c   75 yo F with Hx of Chronic AFib on Eliquis, Pancreatic Ca s/p whipple 8/1/21 (in remission) recent admission for fungemia (treated with IV ABx via PICC line, completed 9/21), recent admission for C diff colitis (10/9) presenting with shortness of breath  (resolved) and b/l extremity swelling. Pt has improved but persistent b/l UE swelling, consistent with fluid overload treated with lasix, initially IV then switched to PO. Low Normal LV function, LVEF 50-55%. Duplex UE -ve. Refused CT angio chest.    Needs PT    Vitamin D deficiency  Poor pancreatic absorption -> increased VitD (Patient had low VitD and high PTH),     zinc deficiency  started supplements    Pancreatic malabsorption  , added minerals, should increase Pancrelipase as outpatient.     #Ankle ulcer   - Well defined open ulcer above the posterior R ankle  - Per podiatry pt refusing Sx intervention, will continue Santyl wound care, debridement on 11/29  - Pt will f/u as outpatient w/ Dr. Tovar s/p 1week post d/c    #Elevated ALP, GGT  - nl Bili  - MRCP r/o biliary stenosis -> Suboptimal study, Repeat as outpatient  - US RUQ -> 1.  Hepatic steatosis, pneumobilia, liver cysts.  2.  Status post cholecystectomy, unremarkable.  - F/U w/ GI as outpatient` 75 yo F with Hx of Chronic AFib on Eliquis, Pancreatic Ca s/p whipple 8/1/21 (in remission) recent admission for fungemia (treated with IV ABx via PICC line, completed 9/21), recent admission for C diff colitis (10/9) presenting with shortness of breath  (resolved) and b/l extremity swelling. Pt has improved but persistent b/l UE swelling, consistent with fluid overload treated with lasix, initially IV then switched to PO. Low Normal LV function, LVEF 50-55%. Duplex UE -ve. Refused CT angio chest.  Pt seen by podiatry for ankle ulcer s/p debridement and will need to follow up with podiatry outpatient.  Elevated ALP/GGT noted however normal bili, pt is status post cholecystectomy, inpatient MRCP inconclusive - will need GI follow up outpatient with repeat MRCP. Pt currently medically stable and ready for discharge.     75 yo F with Hx of Chronic AFib on Eliquis, Pancreatic Ca s/p whipple 8/1/21 (in remission) recent admission for fungemia (treated with IV ABx via PICC line, completed 9/21), recent admission for C diff colitis (10/9) presenting with shortness of breath  (resolved) and b/l extremity swelling. Pt has improved but persistent b/l UE swelling, consistent with fluid overload treated with lasix, initially IV then switched to PO. Low Normal LV function, LVEF 50-55%. Duplex UE -ve. Refused CT angio chest.  Pt seen by podiatry for ankle ulcer s/p debridement and will need to follow up with podiatry outpatient.  Elevated ALP/GGT noted however normal bili, pt is status post cholecystectomy, inpatient MRCP inconclusive - will need GI follow up outpatient with repeat MRCP. Pt currently medically stable and ready for discharge.      Attending addendum: Patient seen and examined. Patient is stable for discharge. Med rec reviewed.

## 2021-11-29 NOTE — DISCHARGE NOTE PROVIDER - NSDCCPCAREPLAN_GEN_ALL_CORE_FT
PRINCIPAL DISCHARGE DIAGNOSIS  Diagnosis: Volume overload  Assessment and Plan of Treatment: You had increased fluids in your body causing shortness of breath and edema. You were treated with lasix a diuretic that drains the excess fluid through urine.      SECONDARY DISCHARGE DIAGNOSES  Diagnosis: Deep tissue injury  Assessment and Plan of Treatment: You have an injury of your right ankle exposing your achellis tendon. Please follow up with your podiatrist for further debriedement.    Diagnosis: Pancreatic malabsorption  Assessment and Plan of Treatment: You have a history of whipple surgery that can be associated with malabsorbtion of certain vitamins and minerals. We have started you on replacement and maintanance here. Please follow up with your surgeon or primary care provider for further adjustments     PRINCIPAL DISCHARGE DIAGNOSIS  Diagnosis: Volume overload  Assessment and Plan of Treatment: You had increased fluids in your body causing shortness of breath and edema. You were treated with lasix a diuretic that drains the excess fluid through urine.      SECONDARY DISCHARGE DIAGNOSES  Diagnosis: Deep tissue injury  Assessment and Plan of Treatment: You have an injury of your right ankle exposing your achellis tendon. Please follow up with your podiatrist for further debriedement.    Diagnosis: Pancreatic malabsorption  Assessment and Plan of Treatment: You have a history of whipple surgery that can be associated with malabsorbtion of certain vitamins and minerals. We have started you on replacement and maintanance here. Please follow up with your surgeon or primary care provider for further adjustments    Diagnosis: Elevated alkaline phosphatase level  Assessment and Plan of Treatment: You have elevated biliary tract marker levels (gamma GT and alkaline phosphatase). The MRCP You did as inpatient was not optimal. You will need to follow up with a gastroenterologist as outpatient.    Diagnosis: Vitamin D deficiency  Assessment and Plan of Treatment: You have low vitamin D with likely compensatory elevated parathyroid hormone. You need to take vitamin D and followup Vit D level in 6 weeks    Diagnosis: Zinc deficiency  Assessment and Plan of Treatment: You have low zinc levels, kindly take the zinc supplements we prescribed and follow up with your primary care provider

## 2021-11-29 NOTE — PROGRESS NOTE ADULT - SUBJECTIVE AND OBJECTIVE BOX
77 yo F with Hx of Chronic AFib on Eliquis, Pancreatic Ca s/p whipple 21 (in remission) recent admission for fungemia (treated with IV ABx via PICC line, completed ), recent admission for C diff colitis (10/9) presenting with SOB and b/l extremity swelling (Day 7). Per day 1, tt states upper and lower extremity swelling developed over her last admission (note LE duplex negative on discharge) and has been worsening over the last week accompanied by acutely worsening SOB and weakness. Pt notes SOB  is aggravated by exertion to the point that she is now unable to get out of bed without help of her son. She notes she sleeps with bed propped up at 30 degree angle and additionally with two pillows. Today pt reports swelling in upper extremities. Pt notes improved swelling in LE, no SOB, no cough, or pain. Pt notes SOB was aggregated with exertion and has been bed-bound for past week. Pt denies any other symptoms including fever, chills, chest pain, abdominal pain, N/V/D, constipation, burning urination.    In the ED: CXR showed pleural effusion, BNP was found to be 3.7K, UA (+) for UTI, cultures pending. Pt was started on Ceftriaxone in ED. Will admit to medicine for UTI in setting of recent hospitalization also with pleural effusions secondary to suspected volume overload requiring further workup and possible diuresis inpatient.    Subjective:  Overnight events: none  Complaints: c/o swelling arms    Objective:    Vital Signs Last 24 Hrs  T(C): 36.2 (2021 06:45), Max: 36.4 (2021 13:18)  T(F): 97.1 (2021 06:45), Max: 97.6 (2021 13:18)  HR: 100 (2021 06:45) (97 - 108)  BP: 125/65 (2021 05:42) (99/50 - 143/80)  BP(mean): --  RR: 18 (2021 06:45) (18 - 18)  SpO2: 97% (2021 06:45) (97% - 97%)    PHYSICAL EXAM:  VITAL SIGNS: I have reviewed nursing notes and confirm.  CONSTITUTIONAL: Well-developed; well-nourished; in no acute distress.  SKIN: Skin is warm and dry. Non pruritic pin point rash to LUE. Varicose vein in b/l extremities, w/ dry skin consistent w/ stasis dermatitis above b/l ankles. Open ulcer wound posterior R ankle.  HEAD: Normocephalic; atraumatic.  EYES: PERRL, EOM intact; conjunctiva and sclera clear.  ENT: No nasal discharge; airway clear.  NECK: Supple; non tender.  CARD: S1, S2 normal; no murmurs, gallops, or rubs. Regular rate and rhythm.  RESP: Diminished breath sounds in b/l lower lung fields  ABD: Normal bowel sounds; soft; non-distended; non-tender; no hepatosplenomegaly.  EXT: No pitting edema in lower extremities. Swelling and 2+ pitting edema in upper extremities. Ulcer noted to posterior R ankle.  NEURO: Alert, oriented. Grossly unremarkable.  PSYCH: Cooperative, appropriate.     MEDICATIONS  (STANDING):  apixaban 5 milliGRAM(s) Oral every 12 hours  collagenase Ointment 1 Application(s) Topical daily  diltiazem    milliGRAM(s) Oral daily  ergocalciferol 35031 Unit(s) Oral <User Schedule>  furosemide    Tablet 40 milliGRAM(s) Oral every 24 hours  magnesium oxide 400 milliGRAM(s) Oral two times a day with meals  multivitamin/minerals 1 Tablet(s) Oral daily  pancrelipase  (CREON 12,000 Lipase Units) 1 Capsule(s) Oral three times a day with meals  pantoprazole    Tablet 40 milliGRAM(s) Oral before breakfast  saccharomyces boulardii 250 milliGRAM(s) Oral two times a day  zinc sulfate 220 milliGRAM(s) Oral daily    MEDICATIONS  (PRN):  diltiazem    Tablet 30 milliGRAM(s) Oral every 8 hours PRN for HR > 110 and hold for SBP < 110    Home Medications:  calcium,magnesium and Zinc: 1 tab(s) orally once a day (03 Sep 2021 23:41)  Cardizem  mg/24 hours oral capsule, extended release: 1 cap(s) orally once a day (2021 23:59)  Eliquis 5 mg oral tablet: 1 tab(s) orally 2 times a day (03 Sep 2021 23:41)  Women&#x27;s daily vitamins: 1 tab(s) orally once a day (03 Sep 2021 23:41)    Social History:  Denies tobacco, Etoh or illicit drug use  Living: Lives with son    Labs:                                    9.8    7.63  )-----------( 264      ( 2021 05:39 )             31.7     CBC Full  -  ( 2021 05:39 )  WBC Count : 7.63 K/uL  RBC Count : 3.44 M/uL  Hemoglobin : 9.8 g/dL  Hematocrit : 31.7 %  Platelet Count - Automated : 264 K/uL  Mean Cell Volume : 92.2 fL  Mean Cell Hemoglobin : 28.5 pg  Mean Cell Hemoglobin Concentration : 30.9 g/dL  Auto Neutrophil # : 4.96 K/uL  Auto Lymphocyte # : 1.66 K/uL  Auto Monocyte # : 0.66 K/uL  Auto Eosinophil # : 0.29 K/uL  Auto Basophil # : 0.03 K/uL  Auto Neutrophil % : 64.9 %  Auto Lymphocyte % : 21.8 %  Auto Monocyte % : 8.7 %  Auto Eosinophil % : 3.8 %  Auto Basophil % : 0.4 %        141  |  106  |  12  ----------------------------<  98  4.1   |  26  |  <0.5<L>    Ca    7.8<L>      2021 05:39  Mg     1.8         TPro  4.3<L>  /  Alb  1.8<L>  /  TBili  0.2  /  DBili  x   /  AST  19  /  ALT  14  /  AlkPhos  466<H>        Urinalysis Basic - ( 2021 17:37 )  Color: Yellow / Appearance: Slightly Turbid / S.020 / pH: x  Gluc: x / Ketone: Negative  / Bili: Negative / Urobili: <2 mg/dL   Blood: x / Protein: 30 mg/dL / Nitrite: Positive   Leuk Esterase: Large / RBC: 10 /HPF /  /HPF   Sq Epi: x / Non Sq Epi: 2 /HPF / Bacteria: Many    EKG  2:03 pm  Ventricular Rate 125 BPM    Atrial Rate 133 BPM    QRS Duration 154 ms    Q-T Interval 318 ms    QTC Calculation(Bazett) 458 ms    R Axis 98 degrees    T Axis -60 degrees  `  Diagnosis Line Atrial fibrillation with rapid ventricular response with premature ventricular  or aberrantly conducted complexes  Rightward axis  Non-specific intra-ventricular conduction block  Abnormal ECG    Imaging:    US: Abd RUQ  Procedure date: 2021  1.  Hepatic steatosis, pneumobilia, liver cysts.  2.  Status post cholecystectomy, unremarkable..  3.  Right renal 0.5 cm cyst  4.  Right upper quadrant mild ascites. Right pleural effusion.    EXAM:  XR FOOT COMP MIN 3 VIEWS BI        PROCEDURE DATE:  2021    IMPRESSION:  No definite radiographic evidence for osteomyelitis ineither foot. However, if there is clinical suspicion for osteomyelitis further evaluation can be obtained with a MRI.    EXAM:  XR CHEST PORTABLE ROUTINE 1V        PROCEDURE DATE:  2021    Impression:  Bilateral opacifications and effusions without difference.    EXAM:  DUPLEX SCAN EXT VEINS UPPER BI        PROCEDURE DATE:  2021    Impression:  No evidence of deep or superficial thrombosis in the visualized bilateral upper extremities.    Consult:    Per Podiatry :  Chart reviewed and Patient evaluated. All Questions and Concerns Addressed and Answered  Discussed diagnosis and treatment with patient  Bedside R achilles tendon debridement performed under aseptic technique; betadine prepped;  Pt tolerated procedure without local well;   Wound Flushed w/ normal saline; Santyl / WTD / Kerlix; q24  Local Wound Care; As Stated Above   If pt will be in-house on next week, podiatry will plan for possible debridement;   Follow up as an outpt to Dr. Tovar at [address] [place] [address] [facility] a week post discharge;   Weight bearing status; WBAT BL feet;   Evaluated and Discussed Plan w/ Dr. Tovar;     Per surgery :  PLAN:  -care by primary team  -IV ABX  -GI ppx  -Continue AC with eliquis  -MED-ONC (Dr. Weldon) ->for eval for post-op Chemo  -surgical debriment at bedside to be done Monday () as pt refuses OR Tx    Per Heme/Onc consult :  IMPRESSION:  Since July 10, 2021, interval Whipple procedure with new pneumoperitoneum and additional postsurgical changes as above; no definite evidence of contrast extravasation.  Please separately dictated report of concurrently performed CT chest for intrathoracic findings.

## 2021-11-29 NOTE — DISCHARGE NOTE PROVIDER - NSDCFUADDINST_GEN_ALL_CORE_FT
kindly follow up your vitamin d  in 6 weeks  Would recommend increasing pancrelipase, however that needs close monitoring. Kindly follow up with your primary care physician or your surgeon for dose adjustment  Kindly follow up with your primary care physician on your zinc level (currently pending) kindly follow up your vitamin d  in 6 weeks  Would recommend increasing pancrelipase, however that needs close monitoring. Kindly follow up with your primary care physician or your surgeon for dose adjustment  Kindly follow up with your primary care physician on your zinc level (currently pending)  Follow up with your oncologist for CT angio kindly follow up your vitamin d in 6 weeks  Would recommend increasing pancrelipase, however that needs close monitoring. Kindly follow up with your primary care physician or your surgeon for dose adjustment  Kindly follow up with your primary care physician on your zinc level  Follow up with your oncologist for CT angio  Follow up with your gastroenterologist for the elevated biliary tract markers (alkaline phosphatase and gamma GT) and for the hepatic steatosis (fatty liver)

## 2021-11-29 NOTE — DISCHARGE NOTE NURSING/CASE MANAGEMENT/SOCIAL WORK - PATIENT PORTAL LINK FT
You can access the FollowMyHealth Patient Portal offered by [facility] by registering at the following website: [email or web address]. By joining [facility]’s FollowMyHealth portal, you will also be able to view your health information using other applications (apps) compatible with our system.

## 2021-11-29 NOTE — PROGRESS NOTE ADULT - SUBJECTIVE AND OBJECTIVE BOX
Podiatry Progress Note    Subjective:   VIKASH LI is a 76y old  Female who presents with a chief complaint of UTI (27 Nov 2021 07:22)  Seen by bedside w/ attending; bedside debridement of right achilles performed;     PAST MEDICAL & SURGICAL HISTORY:  SOB (shortness of breath)  Pain  knee  Varicose veins of both lower extremities, unspecified whether complicated  Atrial fibrillation, unspecified type  2019 on Eliquis  Jaundice  March 5, 2021  Malignant neoplasm of pancreas, unspecified location of malignancy  Pancreatic Cancer  Hypertension, unspecified type  2019  Pancreatic cancer  Kidney stones  History of surgery  Whipple procedure 8/2/2021 with Dr. Nino at [facility]  Status post phlebectomy  10/2018  History of ovarian cystectomy  left  History of tonsillectomy  1959  Kidney stone  2017     Objective:  Vital Signs Last 24 Hrs  T(C): 36.2 (29 Nov 2021 06:45), Max: 36.4 (28 Nov 2021 13:18)  T(F): 97.1 (29 Nov 2021 06:45), Max: 97.6 (28 Nov 2021 13:18)  HR: 100 (29 Nov 2021 06:45) (97 - 108)  BP: 125/65 (29 Nov 2021 05:42) (99/50 - 143/80)  BP(mean): --  RR: 18 (29 Nov 2021 06:45) (18 - 18)  SpO2: 97% (29 Nov 2021 06:45) (97% - 97%)                        9.8    7.63  )-----------( 264      ( 29 Nov 2021 05:39 )             31.7                 11-29    141  |  106  |  12  ----------------------------<  98  4.1   |  26  |  <0.5<L>    Ca    7.8<L>      29 Nov 2021 05:39  Mg     1.8     11-29    TPro  4.3<L>  /  Alb  1.8<L>  /  TBili  0.2  /  DBili  x   /  AST  19  /  ALT  14  /  AlkPhos  466<H>  11-29    Physical Exam - Lower Extremity Focused:   Derm:   Right achilles tendon exposed; improving w/ santyl dressing, less fibrotic wound;   Vascular: DP and PT Pulses Diminished;   Neuro: Protective Sensation Diminished;    Assessment:   Right achilles tendon exposed wound;     Plan:  Chart reviewed and Patient evaluated. All Questions and Concerns Addressed and Answered  Discussed diagnosis and treatment with patient  Bedside R achilles tendon debridement performed under aseptic technique; betadine prepped;  Pt tolerated procedure without local well;   Wound Flushed w/ normal saline; Santyl / WTD / Kerlix; q24  Local Wound Care; As Stated Above   If pt will be in-house on next week, podiatry will plan for possible debridement;   Follow up as an outpt to Dr. Tovar at [address] [place] [address] Pod[facility]ry Clinic a week post discharge;   Weight bearing status; WBAT BL feet;   Evaluated and Discussed Plan w/ Dr. Tovar;     Podiatry

## 2021-11-29 NOTE — DISCHARGE NOTE PROVIDER - NSFOLLOWUPCLINICS_GEN_ALL_ED_FT
[facility]  Podiatry  .  [place]   Phone: [phone]  Fax:   Follow Up Time: 1 week

## 2021-11-29 NOTE — DISCHARGE NOTE PROVIDER - CARE PROVIDERS DIRECT ADDRESSES
[email or web address]

## 2021-11-29 NOTE — PROGRESS NOTE ADULT - SUBJECTIVE AND OBJECTIVE BOX
LI, VIKASH  76y  Female      Patient is a 76y old  Female who presents with a chief complaint of SOB and b/l extremity swelling (2021 09:48)      INTERVAL HPI/OVERNIGHT EVENTS:    pt seen and examined at bedside this morning   -Npo past midnight for right heel ulcer debridement today  -oob to chair to be encouraged; Physical therapy as tolerated for d/c planning; will anticipate for tomorrow   -no active medical concerns; not in resp distress   -skin care as per nursing       REVIEW OF SYSTEMS:  generalized weakness     Vital Signs Last 24 Hrs  T(C): 36.2 (2021 06:45), Max: 36.3 (2021 19:57)  T(F): 97.1 (2021 06:45), Max: 97.3 (2021 19:57)  HR: 98 (2021 12:09) (97 - 103)  BP: 125/65 (2021 05:42) (125/65 - 143/80)  RR: 19 (2021 12:09) (18 - 19)  SpO2: 97% (2021 12:09) (97% - 97%)    PHYSICAL EXAM:  GENERAL: NAD, speaking in full sentences   HEAD:  Atraumatic, Normocephalic  EYES: EOMI, PERRLA, conjunctiva and sclera clear  NERVOUS SYSTEM:  Alert & Oriented X 3  CHEST/LUNG: Bibasilar crackles   CV/HEART: Regular rate and rhythm; No murmurs, rubs, or gallops  GI/ABDOMEN: Soft, Nontender, Nondistended; Bowel sounds present  EXTREMITIES: no ankle edema; Upper thigh and arms swelling   SKIN: Right foot dressing     LAB:                                               9.8    7.63  )-----------( 264      ( 2021 05:39 )             31.7       141  |  106  |  12  ----------------------------<  98  4.1   |  26  |  <0.5<L>    Ca    7.8<L>      2021 05:39  Mg     1.8         TPro  4.3<L>  /  Alb  1.8<L>  /  TBili  0.2  /  DBili  x   /  AST  19  /  ALT  14  /  AlkPhos  466<H>  11-29              CARDIAC MARKERS ( 2021 15:27 )  x     / <0.01 ng/mL / x     / x     / x        Daily Height in cm: 172.72 (2021 19:55)    Daily   CAPILLARY BLOOD GLUCOSE    Urinalysis Basic - ( 2021 17:37 )    Color: Yellow / Appearance: Slightly Turbid / S.020 / pH: x  Gluc: x / Ketone: Negative  / Bili: Negative / Urobili: <2 mg/dL   Blood: x / Protein: 30 mg/dL / Nitrite: Positive   Leuk Esterase: Large / RBC: 10 /HPF /  /HPF   Sq Epi: x / Non Sq Epi: 2 /HPF / Bacteria: Many    LIVER FUNCTIONS - ( 2021 11:58 )  Alb: 1.9 g/dL / Pro: 4.5 g/dL / ALK PHOS: 550 U/L / ALT: 16 U/L / AST: 27 U/L / GGT: x           RADIOLOGY:    Imaging Personally visualized and Reviewed:  [ y ] YES  [ ] NO    HEALTH ISSUES - PROBLEM Dx:  Suspected deep vein thrombosis (DVT)    Suspected pulmonary embolism    MEDICATIONS  (STANDING):  apixaban 5 milliGRAM(s) Oral every 12 hours  collagenase Ointment 1 Application(s) Topical daily  diltiazem    milliGRAM(s) Oral daily  ergocalciferol 54383 Unit(s) Oral <User Schedule>  furosemide    Tablet 40 milliGRAM(s) Oral every 24 hours  magnesium oxide 400 milliGRAM(s) Oral two times a day with meals  multivitamin/minerals 1 Tablet(s) Oral daily  pancrelipase  (CREON 12,000 Lipase Units) 1 Capsule(s) Oral three times a day with meals  pantoprazole    Tablet 40 milliGRAM(s) Oral before breakfast  saccharomyces boulardii 250 milliGRAM(s) Oral two times a day  zinc sulfate 220 milliGRAM(s) Oral daily    MEDICATIONS  (PRN):  diltiazem    Tablet 30 milliGRAM(s) Oral every 8 hours PRN for HR > 110 and hold for SBP < 110

## 2021-11-29 NOTE — PROGRESS NOTE ADULT - ASSESSMENT
patient with dyspnea     ·	Acute CHF exacerbation (mild) systolic dysfunction EF 50%  ·	Chronic AFib on Eliquis  ·	Pancreatic adenocarcinoma T1/2N0Mx s/p whipple 8/1/21 (in remission)  ·	Recent admission for fungemia  ·	Recent C-diff colitis   ·	Right Heel Wound  ·	malnutrition/Underweight BMI 19  ·	acute hypokalemia   ·	suspected magnesium def     -stable on oral lasix   -continue with home meds  -no need for post-op chemo as per heme/onc consult; outpatient follow up in the community   -continue with current cardizem CD 180mg + prn 30mg cardizem dose with parameters   -skin care/wound care as per nursing; Podiatry following; NPO past midnight for debridement today (bedside as patient refused OR procedure)  -oob to chair as tolerated   -replete electrolytes; daily labs      # Progress Note Handoff  PENDING as follows  consults: Physical therapy for d/c planning   Family discussion: discussed with patient who comprehends her medical care and agreeable for current plan of care   Disposition: Anticipate d/c in 24 hrs    Attending Physician Dr. Caitlin Edward # [id number]

## 2021-11-30 NOTE — PROGRESS NOTE ADULT - SUBJECTIVE AND OBJECTIVE BOX
Subjective:  patient post pancreatectomy, recovering developed Afib with tachycardia her Cardizem dose was increased from 120 to 240, since has been lethargic, she was given direction to get out of bed and start ambulation, but b/o feeling dizzy light headed and sense of fainting avoided rehab.  Dr Nino asked me to see her.    MEDICATIONS  (STANDING):  apixaban 5 milliGRAM(s) Oral every 12 hours  collagenase Ointment 1 Application(s) Topical daily  diltiazem    milliGRAM(s) Oral daily  ergocalciferol 74192 Unit(s) Oral <User Schedule>  furosemide    Tablet 40 milliGRAM(s) Oral every 24 hours  magnesium oxide 400 milliGRAM(s) Oral two times a day with meals  multivitamin/minerals 1 Tablet(s) Oral daily  pancrelipase  (CREON 12,000 Lipase Units) 1 Capsule(s) Oral three times a day with meals  pantoprazole    Tablet 40 milliGRAM(s) Oral before breakfast  saccharomyces boulardii 250 milliGRAM(s) Oral two times a day  zinc sulfate 220 milliGRAM(s) Oral daily    MEDICATIONS  (PRN):  diltiazem    Tablet 30 milliGRAM(s) Oral every 8 hours PRN for HR > 110 and hold for SBP < 110            Vital Signs Last 24 Hrs  T(C): 35.9 (30 Nov 2021 14:04), Max: 36 (30 Nov 2021 04:50)  T(F): 96.6 (30 Nov 2021 14:04), Max: 96.8 (30 Nov 2021 04:50)  HR: 108 (30 Nov 2021 14:04) (103 - 115)  BP: 135/91 (30 Nov 2021 14:04) (122/73 - 135/91)  BP(mean): --  RR: 18 (30 Nov 2021 14:04) (18 - 18)  SpO2: 95% (30 Nov 2021 08:00) (95% - 95%)             REVIEW OF SYSTEMS:  CONSTITUTIONAL: no fever, no chills, no diaphoresis  CARDIOLOGY: no chest pain, no SOB, no palpitation,  RESPIRATORY: no dyspnea, no wheeze, no orthopnea  NEUROLOGICAL: dizziness, no headache  GI: no abdominal pain, no dyspepsia, no nausea, no vomiting, no diarrhea.    SKIN: no ecchymosis, no petechia             PHYSICAL EXAM:  · CONSTITUTIONAL: slender female in no respiratory distress,   . NECK: Supple, no JVD   · RESPIRATORY: Decrease air entry to lung base, no wheeze, no crackle  · CARDIOVASCULAR: S1, A2, P2, no murmur, irregular rate  · EXTREMITIES: No cyanosis, no clubbing, improved edema  · VASCULAR: Pulses are irregular,     ECG: < from: 12 Lead ECG (11.23.21 @ 07:50) >   Atrial fibrillation with rapid ventricular response with premature ventricular  or aberrantly conducted complexes  Rightward axis  Non-specific intra-ventricular conduction block    < end of copied text >      TTE: < from: TTE Echo Complete w/o Contrast w/ Doppler (11.24.21 @ 07:50) >   1. Left ventricular ejection fraction, by visual estimation, is 50 to 55%.   2. Moderately enlarged right ventricle.   3. Moderate thickening of the anterior and posterior mitral valve leaflets.   4. Mild to moderate mitral valve regurgitation.   5. Mild aortic regurgitation.   6. Moderate-severe tricuspid regurgitation.   7. Mild pulmonic valve regurgitation.   8. Moderate to severe left atrial enlargement.   9. Severely enlarged right atrium.  10. Large pleural effusion in the left lateral region.  11. Moderate ascites.    < end of copied text >      LABS:                        10.6   8.71  )-----------( 320      ( 30 Nov 2021 13:23 )             34.4     11-30    142  |  105  |  11  ----------------------------<  105<H>  4.0   |  25  |  <0.5<L>    Ca    7.7<L>      30 Nov 2021 13:23  Mg     1.8     11-30    TPro  4.6<L>  /  Alb  2.1<L>  /  TBili  0.3  /  DBili  x   /  AST  22  /  ALT  19  /  AlkPhos  513<H>  11-30            I&O's Summary    29 Nov 2021 07:01  -  30 Nov 2021 07:00  --------------------------------------------------------  IN: 0 mL / OUT: 500 mL / NET: -500 mL      BNP  RADIOLOGY & ADDITIONAL STUDIES:    IMPRESSION AND PLAN:               Subjective:  patient post pancreatectomy, recovering developed Afib with tachycardia her Cardizem dose was increased from 120 to 240, since has been lethargic, she was given direction to get out of bed and start ambulation, but b/o feeling dizzy light headed and sense of fainting avoided rehab.  Patient insists that Cardizem 240 caused her symptom, asked to decrease the dose, meanwhile states is allergic to Digoxin and Metoprolol   Dr Nino asked me to see her.    MEDICATIONS  (STANDING):  apixaban 5 milliGRAM(s) Oral every 12 hours  collagenase Ointment 1 Application(s) Topical daily  diltiazem    milliGRAM(s) Oral daily  ergocalciferol 20739 Unit(s) Oral <User Schedule>  furosemide    Tablet 40 milliGRAM(s) Oral every 24 hours  magnesium oxide 400 milliGRAM(s) Oral two times a day with meals  multivitamin/minerals 1 Tablet(s) Oral daily  pancrelipase  (CREON 12,000 Lipase Units) 1 Capsule(s) Oral three times a day with meals  pantoprazole    Tablet 40 milliGRAM(s) Oral before breakfast  saccharomyces boulardii 250 milliGRAM(s) Oral two times a day  zinc sulfate 220 milliGRAM(s) Oral daily    MEDICATIONS  (PRN):  diltiazem    Tablet 30 milliGRAM(s) Oral every 8 hours PRN for HR > 110 and hold for SBP < 110            Vital Signs Last 24 Hrs  T(C): 35.9 (30 Nov 2021 14:04), Max: 36 (30 Nov 2021 04:50)  T(F): 96.6 (30 Nov 2021 14:04), Max: 96.8 (30 Nov 2021 04:50)  HR: 108 (30 Nov 2021 14:04) (103 - 115)  BP: 135/91 (30 Nov 2021 14:04) (122/73 - 135/91)  BP(mean): --  RR: 18 (30 Nov 2021 14:04) (18 - 18)  SpO2: 95% (30 Nov 2021 08:00) (95% - 95%)             REVIEW OF SYSTEMS:  CONSTITUTIONAL: no fever, no chills, no diaphoresis  CARDIOLOGY: no chest pain, no SOB, no palpitation,  RESPIRATORY: no dyspnea, no wheeze, no orthopnea  NEUROLOGICAL: dizziness, no headache  GI: no abdominal pain, no dyspepsia, no nausea, no vomiting, no diarrhea.    SKIN: no ecchymosis, no petechia             PHYSICAL EXAM:  · CONSTITUTIONAL: slender female in no respiratory distress,   . NECK: Supple, no JVD   · RESPIRATORY: Decrease air entry to lung base, no wheeze, no crackle  · CARDIOVASCULAR: S1, A2, P2, no murmur, irregular rate  · EXTREMITIES: No cyanosis, no clubbing, improved edema  · VASCULAR: Pulses are irregular,     ECG: < from: 12 Lead ECG (11.23.21 @ 07:50) >   Atrial fibrillation with rapid ventricular response with premature ventricular  or aberrantly conducted complexes  Rightward axis  Non-specific intra-ventricular conduction block    < end of copied text >      TTE: < from: TTE Echo Complete w/o Contrast w/ Doppler (11.24.21 @ 07:50) >   1. Left ventricular ejection fraction, by visual estimation, is 50 to 55%.   2. Moderately enlarged right ventricle.   3. Moderate thickening of the anterior and posterior mitral valve leaflets.   4. Mild to moderate mitral valve regurgitation.   5. Mild aortic regurgitation.   6. Moderate-severe tricuspid regurgitation.   7. Mild pulmonic valve regurgitation.   8. Moderate to severe left atrial enlargement.   9. Severely enlarged right atrium.  10. Large pleural effusion in the left lateral region.  11. Moderate ascites.    < end of copied text >      LABS:                        10.6   8.71  )-----------( 320      ( 30 Nov 2021 13:23 )             34.4     11-30    142  |  105  |  11  ----------------------------<  105<H>  4.0   |  25  |  <0.5<L>    Ca    7.7<L>      30 Nov 2021 13:23  Mg     1.8     11-30    TPro  4.6<L>  /  Alb  2.1<L>  /  TBili  0.3  /  DBili  x   /  AST  22  /  ALT  19  /  AlkPhos  513<H>  11-30            I&O's Summary    29 Nov 2021 07:01  -  30 Nov 2021 07:00  --------------------------------------------------------  IN: 0 mL / OUT: 500 mL / NET: -500 mL      BNP  RADIOLOGY & ADDITIONAL STUDIES:    IMPRESSION AND PLAN:

## 2021-11-30 NOTE — PROGRESS NOTE ADULT - SUBJECTIVE AND OBJECTIVE BOX
GENERAL SURGERY PROGRESS NOTE    Patient: GUILLERMO, VIKASH , 76y ([birth year])Female   MRN: [id number]  Location: [facility] [id number] B  Visit: 11-22-21 Inpatient  Date: 11-30-21 @ 12:06    Events of past 24 hours: Patient s/p bedside debridement of LE ulcer 11/29, OOBTC however refused ambulating. otherwise no acute overnight events, tolerating diet, urinating, no N/V.    PAST MEDICAL & SURGICAL HISTORY:  SOB (shortness of breath)  Pain  knee  Varicose veins of both lower extremities, unspecified whether complicated  Atrial fibrillation, unspecified type  2019 on Eliquis  Jaundice  March 5, 2021  Malignant neoplasm of pancreas, unspecified location of malignancy  Pancreatic Cancer  Hypertension, unspecified type  2019  Pancreatic cancer  Kidney stones    History of surgery  Whipple procedure 8/2/2021 with Dr. Nino at [facility]  Status post phlebectomy  10/2018  History of ovarian cystectomy  left  History of tonsillectomy  1959  Kidney stone  2017    Vitals:   T(F): 96.8 (11-30-21 @ 04:50), Max: 96.8 (11-30-21 @ 04:50)  HR: 103 (11-30-21 @ 06:00)  BP: 122/82 (11-30-21 @ 04:50)  RR: 18 (11-30-21 @ 04:50)  SpO2: 95% (11-30-21 @ 08:00)    Diet, DASH/TLC:   Sodium & Cholesterol Restricted  1500mL Fluid Restriction ([id number])  Prosource Gelatein Plus     Qty per Day:  1  Supplement Feeding Modality:  Oral  Ensure Enlive Cans or Servings Per Day:  3       Frequency:  Three Times a day  Probiotic Yogurt/Smoothie Cans or Servings Per Day:  1       Frequency:  Daily    11-29-21 @ 07:01  -  11-30-21 @ 07:00  --------------------------------------------------------  IN:  Total IN: 0 mL    OUT:    Voided (mL): 500 mL  Total OUT: 500 mL    Total NET: -500 mL    Bowel Movement: : [x] YES [] NO  Flatus: : [x] YES [] NO    PHYSICAL EXAM:  General: NAD, AAOx3, calm and cooperative  Cardiac: RRR S1, S2, no Murmurs, rubs or gallops  Respiratory: CTAB, normal respiratory effort, breath sounds equal BL, no wheeze, rhonchi or crackles  Abdomen: Soft, non-distended, non-tender, no rebound, no guarding. +BS.    MEDICATIONS  (STANDING):  apixaban 5 milliGRAM(s) Oral every 12 hours  collagenase Ointment 1 Application(s) Topical daily  diltiazem    milliGRAM(s) Oral daily  ergocalciferol 56914 Unit(s) Oral <User Schedule>  furosemide    Tablet 40 milliGRAM(s) Oral every 24 hours  magnesium oxide 400 milliGRAM(s) Oral two times a day with meals  multivitamin/minerals 1 Tablet(s) Oral daily  pancrelipase  (CREON 12,000 Lipase Units) 1 Capsule(s) Oral three times a day with meals  pantoprazole    Tablet 40 milliGRAM(s) Oral before breakfast  saccharomyces boulardii 250 milliGRAM(s) Oral two times a day  zinc sulfate 220 milliGRAM(s) Oral daily    MEDICATIONS  (PRN):  diltiazem    Tablet 30 milliGRAM(s) Oral every 8 hours PRN for HR > 110 and hold for SBP < 110    DVT PROPHYLAXIS:   GI PROPHYLAXIS: pantoprazole    Tablet 40 milliGRAM(s) Oral before breakfast    LAB/STUDIES:             9.8    7.63  )-----------( 264      ( 29 Nov 2021 05:39 )             31.7      11-29    141  |  106  |  12  ----------------------------<  98  4.1   |  26  |  <0.5<L>    LFTs:             4.3  | 0.2  | 19       ------------------[466     ( 29 Nov 2021 05:39 )  1.8  | x    | 14          Lipase:x      Amylase:x      Coags:    Serum Pro-Brain Natriuretic Peptide: 3664 pg/mL (11-22-21 @ 15:27    ACCESS/ DEVICES:  [x ] Peripheral IV

## 2021-11-30 NOTE — PROGRESS NOTE ADULT - ASSESSMENT
75 yo F with Hx of Chronic AFib on Eliquis, Pancreatic Ca s/p whipple 8/1/21 (in remission) recent admission for fungemia (treated with IV ABx via PICC line, completed 9/21), recent admission for C diff colitis (10/9) presenting with SOB and b/l extremity swelling. Pt has improved swelling, with persistent UE swelling and c/o PO lassix. Pt needs to be consulted w/ PT before d/c. Will follow up with PT.    #Pleural effusions, LE edema 2+, SOB secondary to suspected cardiac etiology, possibly new CHF, undiagnosed  - Hx Afib currently on Eliquis  - BNP >3.6K on admission, none to compare it to  - Sees Dr Liang (cardiology) outpatient, next appointment scheduled for Dec 1st  - Per Ed, "Proven sulfa allergy, currently has rash from likely other drug rxn--> will hold off on Lasix for now and Ethacrynic acid PO for diuresis  - ECHO (9/21) shows EF 50%, enlarged LA & RA, RV systolic dysfunction, but normal LV thickness and size   - Echo 11/24 shows EF 50-55%, normal LV systolic function, enlarged RA and LA  - Prior admission shows use of lassix w/o complications. Will start on Lassix.  - strict ins and outs (last 24hrs -500mL)  - Switched to lasix 40mg PO every 24 hrs and reasess  - B/l lower extremity improved and will c/o PO lassix     #Asymptomatic pyuria and Hx Recurrent UTI  - s/p UTI inpatient 2 months ago (Klebsiella and Citrobacter)- denies dysuria now, denies urinary frequency  - Given Ceftriaxone in Ed  - Urine cultures show (+) leukocyte esterase, nitrites, WBCs  - Pt is asymptomatic, afebrile, and prior admission of C. difficile  - d/c Ceftriaxone and will give Tylenol PRN for fever if develops  - ID consulted and agrees to d/c Abx    #Ankle ulcer   - Well defined open ulcer above the posterior R ankle  - Will consult wound care and podiatry and f/u  - Per podiatry pt refusing Sx intervention, will continue Santyl wound care, possible debridment on 11/29 if pt still in-house.  - Pt will f/u as outpatient w/ Dr. Tovar s/p 1week post d/c  - Pt had surgical debridement at bedside by podiatry yesterday (11/29) w/o complicaitons    #Chronic AFib on Eliquis  - EKG on admission shows pt in Afib now, no RVR  - c/w Eliquis for now  - note pt says she has tried many other AC meds, allergies manifest  - discuss with Dr Liang prior to initiating alternative AC    #Pancreatic Ca   - s/p whipple 8/1/21 (in remission)   - c/w Creon 12K TID before meals  - o/p management per heme/onc   - Heme/onc request CT angio chest -> Patient refused, f/u as outpatient    #L-Upper thigh rash  - non-pruritic macules, localized to area  - possibly drug allergy mediated  - no new prescriptions/ OTC meds  - developed after resuming Vancomycin PO  - hold Vanc PO  - consider adding vanc to list of allergies    #Hypomagnesemia  - on admission  - s/p 2mg IV Mg in Ed  - monitor Mg overnight  - appears to be chronic per lab records--> consider resuming Mg PO supplement on discharge  - Mg2+ 1.5 (11/25)  - Replete Mg2+ PRN  - Mg2+ 1.8 (11/29)    #Hypokalemia  - Potassium 3.2 on 11/25  - Replete K+ PRN  - K+ 4.1 (11/29)    #Health Care Maintenance  - not covid vaccinated, last tested positive April 2021  - says her PCP told her she does not have to get vaccine??  - Counseled on admission, open to discussing vaccination inpatient  - Primary team please discuss w patient over admission    #Misc  - DVT Prophylaxis: Eliquis  - Diet: Dash/TLC, fluid restriction 1500mL  - Dispo: Will c/o lassix oral and f/u with PT 75 yo F with Hx of Chronic AFib on Eliquis, Pancreatic Ca s/p whipple 8/1/21 (in remission) recent admission for fungemia (treated with IV ABx via PICC line, completed 9/21), recent admission for C diff colitis (10/9) presenting with SOB and b/l extremity swelling. Pt has improved swelling, with persistent UE swelling and c/o PO lassix. Pt needs to be consulted w/ PT before d/c. Will follow up with PT.    #Pleural effusions, LE edema 2+, SOB secondary to suspected cardiac etiology, possibly new CHF, undiagnosed  - Hx Afib currently on Eliquis  - BNP >3.6K on admission, none to compare it to  - Sees Dr Liang (cardiology) outpatient, next appointment scheduled for Dec 1st  - Per Ed, "Proven sulfa allergy, currently has rash from likely other drug rxn--> will hold off on Lasix for now and Ethacrynic acid PO for diuresis  - ECHO (9/21) shows EF 50%, enlarged LA & RA, RV systolic dysfunction, but normal LV thickness and size   - Echo 11/24 shows EF 50-55%, normal LV systolic function, enlarged RA and LA  - Prior admission shows use of lassix w/o complications. Will start on Lassix.  - strict ins and outs (last 24hrs -500mL)  - Switched to lasix 40mg PO every 24 hrs and reasess  - B/l lower extremity improved and will c/o PO lassix     #Asymptomatic pyuria and Hx Recurrent UTI  - s/p UTI inpatient 2 months ago (Klebsiella and Citrobacter)- denies dysuria now, denies urinary frequency  - Given Ceftriaxone in Ed  - Urine cultures show (+) leukocyte esterase, nitrites, WBCs  - Pt is asymptomatic, afebrile, and prior admission of C. difficile  - d/c Ceftriaxone and will give Tylenol PRN for fever if develops  - ID consulted and agrees to d/c Abx    #Ankle ulcer   - Well defined open ulcer above the posterior R ankle  - Will consult wound care and podiatry and f/u  - Per podiatry pt refusing Sx intervention, will continue Santyl wound care, possible debridment on 11/29 if pt still in-house.  - Pt will f/u as outpatient w/ Dr. Tovar s/p 1week post d/c  - Pt had surgical debridement at bedside by podiatry yesterday (11/29) w/o complicaitons    #Chronic AFib on Eliquis  - EKG on admission shows pt in Afib now, no RVR  - Increase Cardizem Cd 240 QD  - c/w Eliquis for now  - note pt says she has tried many other AC meds, allergies manifest  - discuss with Dr Liang prior to initiating alternative AC    #Pancreatic Ca   - s/p whipple 8/1/21 (in remission)   - c/w Creon 12K TID before meals  - o/p management per heme/onc   - Heme/onc request CT angio chest -> Patient refused, f/u as outpatient    #L-Upper thigh rash  - non-pruritic macules, localized to area  - possibly drug allergy mediated  - no new prescriptions/ OTC meds  - developed after resuming Vancomycin PO  - hold Vanc PO  - consider adding vanc to list of allergies    #Hypomagnesemia  - on admission  - s/p 2mg IV Mg in Ed  - monitor Mg overnight  - appears to be chronic per lab records--> consider resuming Mg PO supplement on discharge  - Mg2+ 1.5 (11/25)  - Replete Mg2+ PRN  - Mg2+ 1.8 (11/29)    #Hypokalemia  - Potassium 3.2 on 11/25  - Replete K+ PRN  - K+ 4.1 (11/29)    #Health Care Maintenance  - not covid vaccinated, last tested positive April 2021  - says her PCP told her she does not have to get vaccine??  - Counseled on admission, open to discussing vaccination inpatient  - Primary team please discuss w patient over admission    #Misc  - DVT Prophylaxis: Eliquis  - Diet: Dash/TLC, fluid restriction 1500mL  - Dispo: Will c/o lassix oral and f/u with PT

## 2021-11-30 NOTE — PROGRESS NOTE ADULT - ASSESSMENT
Afib, rate controlled  Dizziness, light headed, fatigue, lethargy  Post Op, Pancreatic Ca, deconditioning     Advise to decrease Lasix to 20 daily  Decrease Cardizem to 120 daily   Start Metoprolol 25 bid ( to mix beta blocker with Ca blocker to keep the heart rate controlled  then out of bed top chair with assistance to see the effect  I had a long talk with the patient    Afib, still tachycardia,   Dizziness, light headed, fatigue, lethargy specially by standing or getting out of bed  Post Op, Pancreatic Ca, deconditioning     Advise to decrease Lasix to 20 daily  Decrease Cardizem to 120 daily, patient can not tolerate higher dose at thispoint  Can not Start Metoprolol due to allergy  Start Digoxin, due to low Albumin level will keep on the lower dose ( Digoxin is protein bound)  Digoxin 0.25 mg every 6 hours for 3 doses then 0.125 mg daily  then out of bed to chair with assistance to see the effect and to start ambulation  I had a long talk with the patient    Afib, still tachycardia,   Dizziness, light headed, fatigue, lethargy specially by standing or getting out of bed  Post Op, Pancreatic Ca, deconditioning   AS per patient ALLERGY to Digoxin and Metoprolol    Advise to decrease Lasix to 20 daily  Decrease Cardizem to 120 daily, patient can not tolerate higher dose at this point  D/C Cardizem 30 mg PRN and try Verapamil 40 bid as needed   Can not Start Metoprolol or Digoxin due to allergy, the only options are Cardizem- Verapamil and Amiodarone)  I do not think Amiodarone be a proper option considering the Pancreatic cancer and multiple S/Es of the Amio  then out of bed to chair with assistance to see the effect and to start ambulation  I had a long talk with the patient

## 2021-11-30 NOTE — PROGRESS NOTE ADULT - TIME BILLING
direct patient care  -coordinated current plan of care with Medical staff on MDR

## 2021-11-30 NOTE — PROGRESS NOTE ADULT - SUBJECTIVE AND OBJECTIVE BOX
LI, VIKASH  76y  Female      Patient is a 76y old  Female who presents with a chief complaint of SOB and b/l extremity swelling (2021 09:48)      INTERVAL HPI/OVERNIGHT EVENTS:    pt seen and examined at bedside this morning   -s/p right heel ulcer debridement yesterday  -oob to chair to be encouraged; Physical therapy as tolerated for d/c planning; spoke to PT in rounds (will notify me if patient refuses physical therapy)  -no active medical concerns; not in resp distress   -skin care as per nursing       REVIEW OF SYSTEMS:  generalized weakness     Vital Signs Last 24 Hrs  T(C): 36 (2021 04:50), Max: 36 (2021 04:50)  T(F): 96.8 (2021 04:50), Max: 96.8 (2021 04:50)  HR: 103 (2021 06:00) (103 - 115)  BP: 122/82 (2021 04:50) (122/73 - 128/65)  RR: 18 (2021 04:50) (18 - 18)  SpO2: 95% (2021 08:00) (95% - 95%)    PHYSICAL EXAM:  GENERAL: NAD, speaking in full sentences   HEAD:  Atraumatic, Normocephalic  EYES: EOMI, PERRLA, conjunctiva and sclera clear  NERVOUS SYSTEM:  Alert & Oriented X 3  CHEST/LUNG: Bibasilar crackles   CV/HEART: Regular rate and rhythm; No murmurs, rubs, or gallops  GI/ABDOMEN: Soft, Nontender, Nondistended; Bowel sounds present  EXTREMITIES: no ankle edema; Upper thigh and arms swelling   SKIN: Right foot dressing     LAB:                                                9.8    7.63  )-----------( 264      ( 2021 05:39 )             31.7   11-    141  |  106  |  12  ----------------------------<  98  4.1   |  26  |  <0.5<L>    Ca    7.8<L>      2021 05:39  Mg     1.8         TPro  4.3<L>  /  Alb  1.8<L>  /  TBili  0.2  /  DBili  x   /  AST  19  /  ALT  14  /  AlkPhos  466<H>  11-29              CARDIAC MARKERS ( 2021 15:27 )  x     / <0.01 ng/mL / x     / x     / x        Daily Height in cm: 172.72 (2021 19:55)    Daily   CAPILLARY BLOOD GLUCOSE    Urinalysis Basic - ( 2021 17:37 )    Color: Yellow / Appearance: Slightly Turbid / S.020 / pH: x  Gluc: x / Ketone: Negative  / Bili: Negative / Urobili: <2 mg/dL   Blood: x / Protein: 30 mg/dL / Nitrite: Positive   Leuk Esterase: Large / RBC: 10 /HPF /  /HPF   Sq Epi: x / Non Sq Epi: 2 /HPF / Bacteria: Many    LIVER FUNCTIONS - ( 2021 11:58 )  Alb: 1.9 g/dL / Pro: 4.5 g/dL / ALK PHOS: 550 U/L / ALT: 16 U/L / AST: 27 U/L / GGT: x           RADIOLOGY:    Imaging Personally visualized and Reviewed:  [ y ] YES  [ ] NO    HEALTH ISSUES - PROBLEM Dx:  Suspected deep vein thrombosis (DVT)    Suspected pulmonary embolism    MEDICATIONS  (STANDING):  apixaban 5 milliGRAM(s) Oral every 12 hours  collagenase Ointment 1 Application(s) Topical daily  diltiazem    milliGRAM(s) Oral daily  ergocalciferol 81201 Unit(s) Oral <User Schedule>  furosemide    Tablet 40 milliGRAM(s) Oral every 24 hours  magnesium oxide 400 milliGRAM(s) Oral two times a day with meals  multivitamin/minerals 1 Tablet(s) Oral daily  pancrelipase  (CREON 12,000 Lipase Units) 1 Capsule(s) Oral three times a day with meals  pantoprazole    Tablet 40 milliGRAM(s) Oral before breakfast  saccharomyces boulardii 250 milliGRAM(s) Oral two times a day  zinc sulfate 220 milliGRAM(s) Oral daily    MEDICATIONS  (PRN):  diltiazem    Tablet 30 milliGRAM(s) Oral every 8 hours PRN for HR > 110 and hold for SBP < 110

## 2021-11-30 NOTE — PROGRESS NOTE ADULT - ASSESSMENT
ASSESSMENT:  76yF w/ PMHx of A-fib on eliquis and cardizam, pancreatic adenocarcinoma, h/o C-diff, h/o fungemia presents w/ SOB and weakness. Found to have UTI    PLAN:  - Ambulate with PT  - no acute surgical intervention at this time  - routine OP f/u with Dr. Nino  - c/w management per primary team    BLUE TEAM SPECTRA [id number]  Lines/Tubes: PIV

## 2021-11-30 NOTE — PHYSICAL THERAPY INITIAL EVALUATION ADULT - GENERAL OBSERVATIONS, REHAB EVAL
patient refusing PT evaluation today, stating that today is not a good day just recently had a R ankle debridement. SHARMILA Carter (covering nurse) was notified.
Pt chart reviewed. Pt found semi-reclined in bed. +Ace wrap on right foot, +BUE swelling, +IV lock. No apparent distress. A&Ox3, Pt presenting anxious towards treatment.

## 2021-11-30 NOTE — CONSULT NOTE ADULT - CONSULT REASON
uti
low albumin, poor po intake
Post-op chemo
Right heel exposed achilles tendon;
gait dysfunction
s/p rafal 8/2/21
sob pleural effusion  afib

## 2021-11-30 NOTE — CONSULT NOTE ADULT - CONSULT REQUESTED DATE/TIME
24-Nov-2021 06:23
24-Nov-2021 14:52
30-Nov-2021 16:23
23-Nov-2021 11:45
23-Nov-2021 18:52
24-Nov-2021 09:04
23-Nov-2021

## 2021-11-30 NOTE — PHYSICAL THERAPY INITIAL EVALUATION ADULT - LEVEL OF INDEPENDENCE: SIT/STAND, REHAB EVAL
Attempted to stand x2 with 2 person assistance and rolling walker, Pt unable to perform due to "lack of feeling" on bottom of feet and pain./unable to perform

## 2021-11-30 NOTE — PROGRESS NOTE ADULT - SUBJECTIVE AND OBJECTIVE BOX
77 yo F with Hx of Chronic AFib on Eliquis, Pancreatic Ca s/p whipple 21 (in remission) recent admission for fungemia (treated with IV ABx via PICC line, completed ), recent admission for C diff colitis (10/9) presenting with SOB and b/l extremity swelling (Day 7). Per day 1, tt states upper and lower extremity swelling developed over her last admission (note LE duplex negative on discharge) and has been worsening over the last week accompanied by acutely worsening SOB and weakness. Pt notes SOB  is aggravated by exertion to the point that she is now unable to get out of bed without help of her son. She notes she sleeps with bed propped up at 30 degree angle and additionally with two pillows. Today pt reports swelling in upper extremities. Pt notes improved swelling in LE, no SOB, no cough, or pain. Pt notes SOB was aggregated with exertion and has been bed-bound for past week. Pt denies any other symptoms including fever, chills, chest pain, abdominal pain, N/V/D, constipation, burning urination.    In the ED: CXR showed pleural effusion, BNP was found to be 3.7K, UA (+) for UTI, cultures pending. Pt was started on Ceftriaxone in ED. Will admit to medicine for UTI in setting of recent hospitalization also with pleural effusions secondary to suspected volume overload requiring further workup and possible diuresis inpatient.    Subjective:  Overnight events: none  Complaints: c/o swelling arms    Objective:    Vital Signs Last 24 Hrs  T(C): 36 (2021 04:50), Max: 36 (2021 04:50)  T(F): 96.8 (2021 04:50), Max: 96.8 (2021 04:50)  HR: 103 (2021 06:00) (98 - 115)  BP: 122/82 (2021 04:50) (122/73 - 128/65)  BP(mean): --  RR: 18 (2021 04:50) (18 - 19)  SpO2: 97% (2021 12:09) (97% - 97%)    PHYSICAL EXAM:  VITAL SIGNS: I have reviewed nursing notes and confirm.  CONSTITUTIONAL: Well-developed; well-nourished; in no acute distress.  SKIN: Skin is warm and dry. Non pruritic pin point rash to LUE. Varicose vein in b/l extremities, w/ dry skin consistent w/ stasis dermatitis above b/l ankles. Open ulcer wound posterior R ankle.  HEAD: Normocephalic; atraumatic.  EYES: PERRL, EOM intact; conjunctiva and sclera clear.  ENT: No nasal discharge; airway clear.  NECK: Supple; non tender.  CARD: S1, S2 normal; no murmurs, gallops, or rubs. Regular rate and rhythm.  RESP: Diminished breath sounds in b/l lower lung fields  ABD: Normal bowel sounds; soft; non-distended; non-tender; no hepatosplenomegaly.  EXT: No pitting edema in lower extremities. Swelling and 2+ pitting edema in upper extremities. Ulcer noted to posterior R ankle.  NEURO: Alert, oriented. Grossly unremarkable.  PSYCH: Cooperative, appropriate.     MEDICATIONS  (STANDING):  apixaban 5 milliGRAM(s) Oral every 12 hours  collagenase Ointment 1 Application(s) Topical daily  diltiazem    milliGRAM(s) Oral daily  ergocalciferol 67437 Unit(s) Oral <User Schedule>  furosemide    Tablet 40 milliGRAM(s) Oral every 24 hours  magnesium oxide 400 milliGRAM(s) Oral two times a day with meals  multivitamin/minerals 1 Tablet(s) Oral daily  pancrelipase  (CREON 12,000 Lipase Units) 1 Capsule(s) Oral three times a day with meals  pantoprazole    Tablet 40 milliGRAM(s) Oral before breakfast  saccharomyces boulardii 250 milliGRAM(s) Oral two times a day  zinc sulfate 220 milliGRAM(s) Oral daily    MEDICATIONS  (PRN):  diltiazem    Tablet 30 milliGRAM(s) Oral every 8 hours PRN for HR > 110 and hold for SBP < 110    Home Medications:  Cardizem  mg/24 hours oral capsule, extended release: 1 cap(s) orally once a day (2021 23:59)  Eliquis 5 mg oral tablet: 1 tab(s) orally 2 times a day (03 Sep 2021 23:41)    Social History:  Denies tobacco, Etoh or illicit drug use  Living: Lives with son    Labs:                                             9.8    7.63  )-----------( 264      ( 2021 05:39 )             31.7     CBC Full  -  ( 2021 05:39 )  WBC Count : 7.63 K/uL  RBC Count : 3.44 M/uL  Hemoglobin : 9.8 g/dL  Hematocrit : 31.7 %  Platelet Count - Automated : 264 K/uL  Mean Cell Volume : 92.2 fL  Mean Cell Hemoglobin : 28.5 pg  Mean Cell Hemoglobin Concentration : 30.9 g/dL  Auto Neutrophil # : 4.96 K/uL  Auto Lymphocyte # : 1.66 K/uL  Auto Monocyte # : 0.66 K/uL  Auto Eosinophil # : 0.29 K/uL  Auto Basophil # : 0.03 K/uL  Auto Neutrophil % : 64.9 %  Auto Lymphocyte % : 21.8 %  Auto Monocyte % : 8.7 %  Auto Eosinophil % : 3.8 %  Auto Basophil % : 0.4 %        141  |  106  |  12  ----------------------------<  98  4.1   |  26  |  <0.5<L>    Ca    7.8<L>      2021 05:39  Mg     1.8         TPro  4.3<L>  /  Alb  1.8<L>  /  TBili  0.2  /  DBili  x   /  AST  19  /  ALT  14  /  AlkPhos  466<H>        Urinalysis Basic - ( 2021 17:37 )  Color: Yellow / Appearance: Slightly Turbid / S.020 / pH: x  Gluc: x / Ketone: Negative  / Bili: Negative / Urobili: <2 mg/dL   Blood: x / Protein: 30 mg/dL / Nitrite: Positive   Leuk Esterase: Large / RBC: 10 /HPF /  /HPF   Sq Epi: x / Non Sq Epi: 2 /HPF / Bacteria: Many    EKG  2:03 pm  Ventricular Rate 125 BPM    Atrial Rate 133 BPM    QRS Duration 154 ms    Q-T Interval 318 ms    QTC Calculation(Bazett) 458 ms    R Axis 98 degrees    T Axis -60 degrees  `  Diagnosis Line Atrial fibrillation with rapid ventricular response with premature ventricular  or aberrantly conducted complexes  Rightward axis  Non-specific intra-ventricular conduction block  Abnormal ECG    Imaging:    US: Abd RUQ  Procedure date: 2021  1.  Hepatic steatosis, pneumobilia, liver cysts.  2.  Status post cholecystectomy, unremarkable..  3.  Right renal 0.5 cm cyst  4.  Right upper quadrant mild ascites. Right pleural effusion.    EXAM:  XR FOOT COMP MIN 3 VIEWS BI        PROCEDURE DATE:  2021    IMPRESSION:  No definite radiographic evidence for osteomyelitis ineither foot. However, if there is clinical suspicion for osteomyelitis further evaluation can be obtained with a MRI.    EXAM:  XR CHEST PORTABLE ROUTINE 1V        PROCEDURE DATE:  2021    Impression:  Bilateral opacifications and effusions without difference.    EXAM:  DUPLEX SCAN EXT VEINS UPPER BI        PROCEDURE DATE:  2021    Impression:  No evidence of deep or superficial thrombosis in the visualized bilateral upper extremities.    Consult:    Per Podiatry :  Chart reviewed and Patient evaluated. All Questions and Concerns Addressed and Answered  Discussed diagnosis and treatment with patient  Bedside R achilles tendon debridement performed under aseptic technique; betadine prepped;  Pt tolerated procedure without local well;   Wound Flushed w/ normal saline; Santyl / WTD / Kerlix; q24  Local Wound Care; As Stated Above   If pt will be in-house on next week, podiatry will plan for possible debridement;   Follow up as an outpt to Dr. Tovar at [address] [place] [address] [facility] a week post discharge;   Weight bearing status; WBAT BL feet;   Evaluated and Discussed Plan w/ Dr. Tovar;     Per surgery :  PLAN:  -care by primary team  -IV ABX  -GI ppx  -Continue AC with eliquis  -MED-ONC (Dr. Weldon) ->for eval for post-op Chemo  -surgical debriment at bedside to be done Monday () as pt refuses OR Tx    Per Heme/Onc consult :  IMPRESSION:  Since July 10, 2021, interval Whipple procedure with new pneumoperitoneum and additional postsurgical changes as above; no definite evidence of contrast extravasation.  Please separately dictated report of concurrently performed CT chest for intrathoracic findings.

## 2021-11-30 NOTE — CONSULT NOTE ADULT - ASSESSMENT
IMPRESSION: Rehab of gait dysfunction / CHF    PRECAUTIONS: [   ] Cardiac  [   ] Respiratory  [   ] Seizures [   ] Contact Isolation  [   ] Droplet Isolation  [   ] Other    Weight Bearing Status:     RECOMMENDATION:    Out of Bed to Chair     DVT/Decubiti Prophylaxis    REHAB PLAN:     [ x   ] Bedside P/T 3-5 times a week   [    ]   Bedside O/T  2-3 times a week             [    ] Speech Therapy               [    ]  No Rehab Therapy Indicated   Conditioning/ROM                                    ADL  Bed Mobility                                               Conditioning/ROM  Transfers                                                     Bed Mobility  Sitting /Standing Balance                         Transfers                                        Gait Training                                               Sitting/Standing Balance  Stair Training [   ]Applicable                    Home equipment Eval                                                                        Splinting  [   ] Only      GOALS:   ADL   [    ]   Independent                    Transfers  [   x ] Independent                          Ambulation  [x    ] Independent     [   x  ] With device                            [    ]  CG                                                         [    ]  CG                                                                  [    ] CG                            [    ] Min A                                                   [    ] Min A                                                              [    ] Min  A          DISCHARGE PLAN:   [    ]  Good candidate for Intensive Rehabilitation/Hospital based                                             Will tolerate 3hrs Intensive Rehab Daily                                       [   x  ]  Short Term Rehab in Skilled Nursing Facility                          vs             [   x  ]  Home with Outpatient or VN services                                         [     ]  Possible Candidate for Intensive Hospital based Rehab

## 2021-11-30 NOTE — PHYSICAL THERAPY INITIAL EVALUATION ADULT - PERTINENT HX OF CURRENT PROBLEM, REHAB EVAL
77 yo F with Hx of Chronic AFib on Eliquis, Pancreatic Ca s/p whipple 8/1/21 (in remission) recent admission for fungemia (treated with IV ABx via PICC line, completed 9/21), recent admission for C diff colitis (10/9) presenting with SOB and b/l extremity swelling. Pt has improved swelling, with persistent UE swelling and c/o PO lassix. Referred to PT for evaluation and treatment.

## 2021-11-30 NOTE — PROGRESS NOTE ADULT - ASSESSMENT
patient with dyspnea     ·	Acute CHF exacerbation (mild) systolic dysfunction EF 50%  ·	Chronic AFib on Eliquis  ·	Pancreatic adenocarcinoma T1/2N0Mx s/p whipple 8/1/21 (in remission)  ·	Recent admission for fungemia  ·	Recent C-diff colitis   ·	Right Heel Wound  ·	malnutrition/Underweight BMI 19  ·	acute hypokalemia   ·	suspected magnesium def     -stable on oral lasix   -continue with home meds  -no need for post-op chemo as per heme/onc consult; outpatient follow up in the community   -increase Cardizem CD 240mg + prn 30mg Cardizem dose with parameters (outpatient follow up with Dr. Palmer)  -skin care/wound care as per nursing; Podiatry follow  -oob to chair as tolerated   -replete electrolytes as needed       # Progress Note Handoff  PENDING as follows  consults: Physical therapy for d/c planning   Family discussion: discussed with patient who comprehends her medical care and agreeable for current plan of care   Disposition: Anticipate d/c in 24 hrs (STR vs. home care d/c dependent on how patient does during physical therapy)    Attending Physician Dr. Caitlin Edward # [id number]

## 2021-11-30 NOTE — CONSULT NOTE ADULT - SUBJECTIVE AND OBJECTIVE BOX
HPI:  77 yo F with PMHx of Chronic AFib on Eliquis, Pancreatic Ca s/p whipple 8/1/21 (in remission) recent admission for fungemia (treated with IV ABx via PICC line, completed 9/21), recent admission for C diff colitis, presenting with SOB. Pt states to note upper and lower extremity swelling developing over her last admission (note LE duplex negative on discharge), not improving after discharge, acutely worsening over the last week accompanied by acutely worsening SOB and weakness. SOB  is aggravated by exertion to the point that she is now unable to get out of bed without help of her son. She sleeps at home with a rented hospital bed on the first floor of her house, head of bed propped up at 30 degree angle and additionally with two pillows. ROS + for palpitations / fast heartbeat, denies fever, Says she has been compliant with her home Eliquis. Pt denies any other symptoms including hemoptysis, travel, fevers, chill, headache, cough, abdominal pain, chest pain. Denies any ongoing diarrhea since completion of Vanc course. Per discharge documents she was meant to complete her 4x daily Vanc course on Oct 17th, however her o/p doctor, Dr Nino, renewed prescription with decreased BID frequency. She stopped taking  the Vanc for the last 10 days on her own, but due to current symptoms, Dr Nino told her to resume antibiotics so she did so the night before presentation and woke up with a pinpoint rash loaclized to anterior surface of her left lower thigh, which she describes as the usual appearance and location of her past drug allergy reactions.    In the ED VS were CXR showed pleural effusion, BNP was found to be 3.7K, UA (+) for UTI, cultures pending. Pt was started on Ceftriaxone in ED. Will admit to medicine for UTI in setting of recent hospitalization also with pleural effusions secondary to suspected volume overload requiring further workup and possible diuresis inpatient.   (22 Nov 2021 22:27)      PAST MEDICAL & SURGICAL HISTORY:  SOB (shortness of breath)    Pain  knee    Varicose veins of both lower extremities, unspecified whether complicated    Atrial fibrillation, unspecified type  2019 on Eliquis    Jaundice  March 5, 2021    Malignant neoplasm of pancreas, unspecified location of malignancy  Pancreatic Cancer    Hypertension, unspecified type  2019    Pancreatic cancer    Kidney stones    History of surgery  Whipple procedure 8/2/2021 with Dr. Nino at [facility]    Status post phlebectomy  10/2018    History of ovarian cystectomy  left    History of tonsillectomy  1959    Kidney stone  2017        Hospital Course:    TODAY'S SUBJECTIVE & REVIEW OF SYMPTOMS:     Constitutional WNL   Cardio WNL   Resp WNL   GI WNL  Heme WNL  Endo WNL  Skin WNL  MSK Weakness  Neuro WNL  Cognitive WNL  Psych WNL      MEDICATIONS  (STANDING):  apixaban 5 milliGRAM(s) Oral every 12 hours  collagenase Ointment 1 Application(s) Topical daily  diltiazem    milliGRAM(s) Oral daily  ergocalciferol 15419 Unit(s) Oral <User Schedule>  furosemide    Tablet 40 milliGRAM(s) Oral every 24 hours  magnesium oxide 400 milliGRAM(s) Oral two times a day with meals  multivitamin/minerals 1 Tablet(s) Oral daily  pancrelipase  (CREON 12,000 Lipase Units) 1 Capsule(s) Oral three times a day with meals  pantoprazole    Tablet 40 milliGRAM(s) Oral before breakfast  saccharomyces boulardii 250 milliGRAM(s) Oral two times a day  zinc sulfate 220 milliGRAM(s) Oral daily    MEDICATIONS  (PRN):  diltiazem    Tablet 30 milliGRAM(s) Oral every 8 hours PRN for HR > 110 and hold for SBP < 110      FAMILY HISTORY:  CAD (coronary artery disease) (Sibling)  3  siblings ( 2 living and 1 passed away)        Allergies    Augmentin (Rash)  chocolate (Headache)  digoxin (Hives)  Metoprolol Succinate ER (Hives)  Novocain (Angioedema)  Steroids: Excessive swelling (Flushing; Other (Mild to Mod))  sulfa drugs (Fever)  Xarelto (Hives)    Intolerances        SOCIAL HISTORY:    [  ] Etoh  [  ] Smoking  [  ] Substance abuse     Home Environment:  [   ] Home Alone  [ x  ] Lives with Family  [   ] Home Health Aid    Dwelling:  [   ] Apartment  [ x  ] Private House  [   ] Adult Home  [   ] Skilled Nursing Facility      [   ] Short Term  [   ] Long Term  [ x  ] Stairs       Elevator [   ]    FUNCTIONAL STATUS PTA: (Check all that apply)  Ambulation: [  x  ]Independent    [   ] Dependent     [   ] Non-Ambulatory  Assistive Device: [   ] SA Cane  [   ]  Q Cane  [ x  ] Walker  [   ]  Wheelchair  ADL : [ x  ] Independent  [    ]  Dependent       Vital Signs Last 24 Hrs  T(C): 35.9 (30 Nov 2021 14:04), Max: 36 (30 Nov 2021 04:50)  T(F): 96.6 (30 Nov 2021 14:04), Max: 96.8 (30 Nov 2021 04:50)  HR: 108 (30 Nov 2021 14:04) (103 - 115)  BP: 135/91 (30 Nov 2021 14:04) (122/73 - 135/91)  BP(mean): --  RR: 18 (30 Nov 2021 14:04) (18 - 18)  SpO2: 95% (30 Nov 2021 08:00) (95% - 95%)      PHYSICAL EXAM: Awake & Alert  GENERAL: NAD  HEAD:  Normocephalic  CHEST/LUNG: Clear   HEART: S1S2+  ABDOMEN: Soft, Nontender  EXTREMITIES:  no calf tenderness    NERVOUS SYSTEM:  Cranial Nerves 2-12 intact [   ] Abnormal  [   ]  ROM: WFL all extremities [  x ]  Abnormal [   ]  Motor Strength: WFL all extremities  [   ]  Abnormal [ x  ]3-4/5 all ext  Sensation: intact to light touch [  x ] Abnormal [   ]    FUNCTIONAL STATUS:  Bed Mobility: Independent [   ]  Supervision [ x  ]  Needs Assistance [   ]  N/A [   ]  Transfers: Independent [   ]  Supervision [   ]  Needs Assistance [   ]  N/A [   ]   Ambulation: Independent [   ]  Supervision [   ]  Needs Assistance [   ]  N/A [   ]  ADL: Independent [   ] Requires Assistance [   ] N/A [   ]      LABS:                        10.6   8.71  )-----------( 320      ( 30 Nov 2021 13:23 )             34.4     11-29    141  |  106  |  12  ----------------------------<  98  4.1   |  26  |  <0.5<L>    Ca    7.8<L>      29 Nov 2021 05:39  Mg     1.8     11-29    TPro  4.3<L>  /  Alb  1.8<L>  /  TBili  0.2  /  DBili  x   /  AST  19  /  ALT  14  /  AlkPhos  466<H>  11-29          RADIOLOGY & ADDITIONAL STUDIES:

## 2021-12-01 NOTE — PROGRESS NOTE ADULT - ASSESSMENT
75 yo F with Hx of Chronic AFib on Eliquis, Pancreatic Ca s/p whipple 8/1/21 (in remission) recent admission for fungemia (treated with IV ABx via PICC line, completed 9/21), recent admission for C diff colitis (10/9) presenting with SOB and b/l extremity swelling. Pt has improved swelling, with persistent UE swelling and c/o PO lassix. Per Physiatry and PT pt needs to be sent home with PT therapy 3-5/week. Pt refusing being sent ot rehab facility and requests at home PT services. Will plan services and plan d/c.    #Pleural effusions, LE edema 2+, SOB secondary to suspected cardiac etiology, possibly new CHF, undiagnosed  - Hx Afib currently on Eliquis  - BNP >3.6K on admission, none to compare it to  - Sees Dr Liang (cardiology) outpatient, next appointment scheduled for Dec 1st  - Per Ed, "Proven sulfa allergy, currently has rash from likely other drug rxn--> will hold off on Lasix for now and Ethacrynic acid PO for diuresis  - ECHO (9/21) shows EF 50%, enlarged LA & RA, RV systolic dysfunction, but normal LV thickness and size   - Echo 11/24 shows EF 50-55%, normal LV systolic function, enlarged RA and LA  - Prior admission shows use of lassix w/o complications. Will start on Lassix.  - strict ins and outs (last 24hrs -500mL)  - Switched to lasix 40mg PO every 24 hrs and reasess  - B/l lower extremity improved and will c/o PO lassix   - Per cardio will switch Lassix to 20mg PO     #Asymptomatic pyuria and Hx Recurrent UTI  - s/p UTI inpatient 2 months ago (Klebsiella and Citrobacter)- denies dysuria now, denies urinary frequency  - Given Ceftriaxone in Ed  - Urine cultures show (+) leukocyte esterase, nitrites, WBCs  - Pt is asymptomatic, afebrile, and prior admission of C. difficile  - d/c Ceftriaxone and will give Tylenol PRN for fever if develops  - ID consulted and agrees to d/c Abx    #Ankle ulcer   - Well defined open ulcer above the posterior R ankle  - Will consult wound care and podiatry and f/u  - Per podiatry pt refusing Sx intervention, will continue Santyl wound care, possible debridment on 11/29 if pt still in-house.  - Pt will f/u as outpatient w/ Dr. Tovar s/p 1week post d/c  - Pt had surgical debridement at bedside by podiatry yesterday (11/29) w/o complicaitons    #Chronic AFib on Eliquis  - EKG on admission shows pt in Afib now, no RVR  - Increase Cardizem Cd 240 QD  - c/w Eliquis for now  - note pt says she has tried many other AC meds, allergies manifest  - discuss with Dr Liang prior to initiating alternative AC  - Per cardio will switch Cardizem to 120 QD and d/c cardizam 30 PRN, switch to Verapamil 40 PRN    #Pancreatic Ca   - s/p whipple 8/1/21 (in remission)   - c/w Creon 12K TID before meals  - o/p management per heme/onc   - Heme/onc request CT angio chest -> Patient refused, f/u as outpatient    #L-Upper thigh rash  - non-pruritic macules, localized to area  - possibly drug allergy mediated  - no new prescriptions/ OTC meds  - developed after resuming Vancomycin PO  - hold Vanc PO  - consider adding vanc to list of allergies    #Hypomagnesemia  - on admission  - s/p 2mg IV Mg in Ed  - monitor Mg overnight  - appears to be chronic per lab records--> consider resuming Mg PO supplement on discharge  - Mg2+ 1.5 (11/25)  - Replete Mg2+ PRN  - Mg2+ 1.8 (11/29), 1.7 (12/1)    #Hypokalemia  - Potassium 3.2 on 11/25  - Replete K+ PRN  - K+ 4.1 (11/29), 3.9 (12/1)    #Health Care Maintenance  - not covid vaccinated, last tested positive April 2021  - says her PCP told her she does not have to get vaccine??  - Counseled on admission, open to discussing vaccination inpatient  - Primary team please discuss w patient over admission    #Misc  - DVT Prophylaxis: Eliquis  - Diet: Dash/TLC, fluid restriction 1500mL  - Dispo: Will adjust medications per cardio recommendation and plan d/c with at home therapy per pt request   75 yo F with Hx of Chronic AFib on Eliquis, Pancreatic Ca s/p whipple 8/1/21 (in remission) recent admission for fungemia (treated with IV ABx via PICC line, completed 9/21), recent admission for C diff colitis (10/9) presenting with SOB and b/l extremity swelling. Pt has improved swelling, with persistent UE swelling and c/o PO lassix. Per Physiatry and PT pt needs to be sent home with PT therapy 3-5/week. Pt refusing being sent ot rehab facility and requests at home PT services. Will plan services and plan d/c.    #Pleural effusions, LE edema 2+, SOB secondary to suspected cardiac etiology, possibly new CHF, undiagnosed  - Hx Afib currently on Eliquis  - BNP >3.6K on admission, none to compare it to  - Sees Dr Liang (cardiology) outpatient, next appointment scheduled for Dec 1st  - Per Ed, "Proven sulfa allergy, currently has rash from likely other drug rxn--> will hold off on Lasix for now and Ethacrynic acid PO for diuresis  - Echo 11/24 shows EF 50-55%, normal LV systolic function, enlarged RA and LA  - Prior admission shows use of lassix w/o complications. Will start on Lassix.  - strict ins and outs (last 24hrs -500mL)  - Switched to lasix 40mg PO every 24 hrs and reasess  - B/l lower extremity improved and will c/o PO lassix   - Per cardio will switch Lassix to 20mg PO     #Asymptomatic pyuria and Hx Recurrent UTI  - s/p UTI inpatient 2 months ago (Klebsiella and Citrobacter)- denies dysuria now, denies urinary frequency  - Given Ceftriaxone in Ed  - Urine cultures show (+) leukocyte esterase, nitrites, WBCs  - Pt was asymptomatic and afebrile  - d/c Ceftriaxone  - ID consulted and agrees to d/c Abx    #Ankle ulcer   - Well defined open ulcer above the posterior R ankle  - Per podiatry pt refusing Sx intervention, will continue Santyl wound care  - Pt will f/u as outpatient w/ Dr. Tovar s/p 1week post d/c  - Pt had surgical debridement at bedside by podiatry (11/29) w/o complications    #Chronic AFib on Eliquis  - EKG on admission shows pt in Afib now, no RVR  - Increase Cardizem Cd 240 QD  - c/w Eliquis for now  - note pt says she has tried many other AC meds, allergies manifest  - discuss with Dr Liang prior to initiating alternative AC  - Per cardio will switch Cardizem to 120 QD and d/c cardizam 30 PRN, switch to Verapamil 40 PRN  - d/c amiodarone (pancreatic cancer + side effects)    #Pancreatic Ca   - s/p whipple 8/1/21 (in remission)   - c/w Creon 12K TID before meals  - o/p management per heme/onc   - Heme/onc request CT angio chest -> Patient refused, f/u as outpatient    #L-Upper thigh rash  - non-pruritic macules, localized to area  - possibly drug allergy mediated  - no new prescriptions/ OTC meds  - developed after resuming Vancomycin PO  - hold Vanc PO  - consider adding vanc to list of allergies    #Hypomagnesemia  - Replete Mg2+ PRN    #Hypokalemia  - Replete K+ PRN    #Health Care Maintenance  - not covid vaccinated, last tested positive April 2021  - says her PCP told her she does not have to get vaccine??  - Counseled on admission, open to discussing vaccination inpatient  - Primary team please discuss w patient over admission    #Misc  - DVT Prophylaxis: Eliquis  - Diet: Dash/TLC, fluid restriction 1500mL  - Dispo: Possible SNF, patient needs 2 people to assist in walking

## 2021-12-01 NOTE — PROGRESS NOTE ADULT - ATTENDING COMMENTS
75 yo F with Hx of Chronic AFib on Eliquis, Pancreatic Ca s/p whipple 8/1/21 (in remission) recent admission for fungemia (treated with IV ABx via PICC line, completed 9/21), recent admission for C diff colitis (10/9) presenting with SOB and b/l extremity swelling.      #Pleural effusions, Anasarca   #Hypoalbuminemia   - c/w PO Lasix     #Debility - PT/Rehab     #Asymptomatic pyuria and Hx of Recurrent UTI  - monitor off abx     #Posterior right ankle ulcer   - s/p surgical debridement at bedside by podiatry      #Chronic AFib on Eliquis  - c/w Cardizem, Verapamil PRN   - c/w Eliquis      #Pancreatic Ca   - s/p whipple 8/1/21 (in remission)   - c/w Creon 12K TID before meals    #Hypomagnesemia  - replete     - DVT Prophylaxis: Eliquis     Pending: placement vs STR 77 yo F with Hx of Chronic AFib on Eliquis, Pancreatic Ca s/p whipple 8/1/21 (in remission) recent admission for fungemia (treated with IV ABx via PICC line, completed 9/21), recent admission for C diff colitis (10/9) presenting with SOB and b/l extremity swelling.      #Pleural effusions, Anasarca   #Hypoalbuminemia   - c/w PO Lasix     #Debility - PT/Rehab     #Asymptomatic pyuria and Hx of Recurrent UTI  - monitor off abx     #Chronic elevated ALP   - GGT elevated. S/p Whipple.   - RUQ sonogram showed hepatic steatosis, pneumobilia, liver cysts     #Posterior right ankle ulcer   - s/p surgical debridement at bedside by podiatry      #Chronic AFib on Eliquis  - c/w Cardizem, Verapamil PRN   - c/w Eliquis      #Pancreatic Ca   - s/p whipple 8/1/21 (in remission)   - c/w Creon 12K TID before meals     #Hypomagnesemia  - replete     #Vitamin d deficiency   #Secondary hyperparathyroidism   - c/w supplementation     - DVT Prophylaxis: Eliquis     Pending: Placement vs STR

## 2021-12-01 NOTE — PROGRESS NOTE ADULT - SUBJECTIVE AND OBJECTIVE BOX
Patient is a 76y old  Female who presents with a chief complaint of SOB and Extremity swelling (01 Dec 2021 09:24)  pt seen and evaluated   family at bedside  on po diet  ICU Vital Signs Last 24 Hrs  T(C): 35.7 (01 Dec 2021 13:45), Max: 36 (01 Dec 2021 05:34)  T(F): 96.2 (01 Dec 2021 13:45), Max: 96.8 (01 Dec 2021 05:34)  HR: 110 (01 Dec 2021 13:45) (101 - 125)  BP: 114/70 (01 Dec 2021 13:45) (114/70 - 125/83)  RR: 18 (01 Dec 2021 13:45) (18 - 18)  SpO2: 97% (01 Dec 2021 05:34) (97% - 97%)  Drug Dosing Weight  Height (cm): 172.7 (23 Nov 2021 19:55)  Weight (kg): 56.8 (23 Nov 2021 19:55)  BMI (kg/m2): 19 (23 Nov 2021 19:55)  BSA (m2): 1.67 (23 Nov 2021 19:55)  30 Nov 2021 07:01  -  01 Dec 2021 07:00  --------------------------------------------------------  IN:  Total IN: 0 mL  OUT:    Voided (mL): 250 mL  Total OUT: 250 mL  Total NET: -250 mL    PHYSICAL EXAM:  Constitutional:  alert, awake, talkative  Gastrointestinal:  soft, n/d  MEDICATIONS  (STANDING):  apixaban 5 milliGRAM(s) Oral every 12 hours  collagenase Ointment 1 Application(s) Topical daily  diltiazem    milliGRAM(s) Oral daily  ergocalciferol 62464 Unit(s) Oral <User Schedule>  furosemide    Tablet 20 milliGRAM(s) Oral daily  magnesium oxide 400 milliGRAM(s) Oral two times a day with meals  multivitamin/minerals 1 Tablet(s) Oral daily  pancrelipase  (CREON 12,000 Lipase Units) 1 Capsule(s) Oral three times a day with meals  pantoprazole    Tablet 40 milliGRAM(s) Oral before breakfast  saccharomyces boulardii 250 milliGRAM(s) Oral two times a day  zinc sulfate 220 milliGRAM(s) Oral daily  Diet, DASH/TLC:   Sodium & Cholesterol Restricted  1500mL Fluid Restriction ([id number])  Prosource Gelatein Plus     Qty per Day:  1  Supplement Feeding Modality:  Oral  Ensure Enlive Cans or Servings Per Day:  3       Frequency:  Three Times a day  Probiotic Yogurt/Smoothie Cans or Servings Per Day:  1       Frequency:  Daily (11-29-21 @ 08:51)      LABS  12-01    138  |  104  |  10  ----------------------------<  84  3.9   |  22  |  <0.5<L>    Ca    7.7<L>      01 Dec 2021 06:51  Mg     1.7     12-01    TPro  4.5<L>  /  Alb  2.1<L>  /  TBili  0.4  /  DBili  x   /  AST  25  /  ALT  20  /  AlkPhos  510<H>  12-01                          10.7   7.93  )-----------( 278      ( 01 Dec 2021 06:51 )             34.1      RADIOLOGY STUDIES  < from: US Abdomen Upper Quadrant Right (11.27.21 @ 10:37) >  IMPRESSION:  1.  Hepatic steatosis, pneumobilia, liver cysts.  2.  Status post cholecystectomy, unremarkable..  3.  Right renal 0.5 cm cyst  4.  Right upper quadrant mild ascites. Right pleural effusion.     Patient is a 76y old  Female who presents with a chief complaint of SOB and Extremity swelling   pt seen and evaluated - alert, verbal  family at bedside  pt receiving po diet, abd soft ND  ICU Vital Signs Last 24 Hrs  T(C): 35.7 (01 Dec 2021 13:45), Max: 36 (01 Dec 2021 05:34)  T(F): 96.2 (01 Dec 2021 13:45), Max: 96.8 (01 Dec 2021 05:34)  HR: 110 (01 Dec 2021 13:45) (101 - 125)  BP: 114/70 (01 Dec 2021 13:45) (114/70 - 125/83)  RR: 18 (01 Dec 2021 13:45) (18 - 18)  SpO2: 97% (01 Dec 2021 05:34) (97% - 97%)  Drug Dosing Weight  Height (cm): 172.7 (23 Nov 2021 19:55)  Weight (kg): 56.8 (23 Nov 2021 19:55)  BMI (kg/m2): 19 (23 Nov 2021 19:55)  BSA (m2): 1.67 (23 Nov 2021 19:55)  30 Nov 2021 07:01  -  01 Dec 2021 07:00  --------------------------------------------------------  IN:  Total IN: 0 mL - - ....+ intake, nothing documented  OUT:    Voided (mL): 250 mL  Total OUT: 250 mL  Total NET: -250 mL    MEDICATIONS  (STANDING):  apixaban 5 milliGRAM(s) Oral every 12 hours  collagenase Ointment 1 Application(s) Topical daily  diltiazem    milliGRAM(s) Oral daily  ergocalciferol 06618 Unit(s) Oral <User Schedule>  furosemide    Tablet 20 milliGRAM(s) Oral daily  magnesium oxide 400 milliGRAM(s) Oral two times a day with meals  multivitamin/minerals 1 Tablet(s) Oral daily  pancrelipase  (CREON 12,000 Lipase Units) 1 Capsule(s) Oral three times a day with meals  pantoprazole    Tablet 40 milliGRAM(s) Oral before breakfast  saccharomyces boulardii 250 milliGRAM(s) Oral two times a day  zinc sulfate 220 milliGRAM(s) Oral daily    Diet, DASH/TLC:   Sodium & Cholesterol Restricted  1500mL Fluid Restriction ([id number])  Prosource Gelatein Plus     Qty per Day:  1  Supplement Feeding Modality:  Oral  Ensure Enlive Cans or Servings Per Day:  3       Frequency:  Three Times a day  Probiotic Yogurt/Smoothie Cans or Servings Per Day:  1       Frequency:  Daily (11-29-21 @ 08:51)      LABS  12-01    138  |  104  |  10  ----------------------------<  84  3.9   |  22  |  <0.5<L>    Ca    7.7<L>      01 Dec 2021 06:51  Mg     1.7     12-01    TPro  4.5<L>  /  Alb  2.1<L>  /  TBili  0.4  /  DBili  x   /  AST  25  /  ALT  20  /  AlkPhos  510<H>  12-01                          10.7   7.93  )-----------( 278      ( 01 Dec 2021 06:51 )             34.1      RADIOLOGY STUDIES  < from: US Abdomen Upper Quadrant Right (11.27.21 @ 10:37) >  IMPRESSION:  1.  Hepatic steatosis, pneumobilia, liver cysts.  2.  Status post cholecystectomy, unremarkable..  3.  Right renal 0.5 cm cyst  4.  Right upper quadrant mild ascites. Right pleural effusion.  Vitamin D, 25-Hydroxy (11.24.21 @ 16:21)   Vitamin D, 25-Hydroxy: 16: 30 - 80 ng/mL Optimum Levels   Parathyroid Hormone Intact, Serum (11.24.21 @ 16:21)   Intact PTH: 96:

## 2021-12-01 NOTE — PROGRESS NOTE ADULT - SUBJECTIVE AND OBJECTIVE BOX
75 yo F with Hx of Chronic AFib on Eliquis, Pancreatic Ca s/p whipple 21 (in remission) recent admission for fungemia (treated with IV ABx via PICC line, completed ), recent admission for C diff colitis (10/9) presenting with SOB and b/l extremity swelling (Day 7). Per day 1, Pt states upper and lower extremity swelling developed over her last admission (note LE duplex negative on discharge) and has been worsening over the last week accompanied by acutely worsening SOB and weakness. Pt notes SOB  is aggravated by exertion to the point that she is now unable to get out of bed without help of her son. She notes she sleeps with bed propped up at 30 degree angle and additionally with two pillows. Today pt reports swelling in upper extremities. Pt notes improved swelling in LE, no SOB, no cough, or pain. Pt notes SOB was aggregated with exertion and has been bed-bound for past week and struggled to walk during PT evaluation yesterday. Pt denies any other symptoms including fever, chills, chest pain, abdominal pain, N/V/D, constipation, burning urination. Pt refusing at this time to go to a rehab facility, and would rather receive home PT.    In the ED: CXR showed pleural effusion, BNP was found to be 3.7K, UA (+) for UTI, cultures pending. Pt was started on Ceftriaxone in ED. Will admit to medicine for UTI in setting of recent hospitalization also with pleural effusions secondary to suspected volume overload requiring further workup and possible diuresis inpatient.    Subjective:  Overnight events: none  Complaints: c/o swelling arms    Objective:    Vital Signs Last 24 Hrs  T(C): 36 (01 Dec 2021 05:34), Max: 36 (01 Dec 2021 05:34)  T(F): 96.8 (01 Dec 2021 05:34), Max: 96.8 (01 Dec 2021 05:34)  HR: 113 (01 Dec 2021 06:22) (101 - 125)  BP: 125/83 (01 Dec 2021 05:34) (122/75 - 135/91)  BP(mean): --  RR: 18 (01 Dec 2021 05:34) (18 - 18)  SpO2: 97% (01 Dec 2021 05:34) (97% - 97%)    PHYSICAL EXAM:  VITAL SIGNS: I have reviewed nursing notes and confirm.  CONSTITUTIONAL: Well-developed; well-nourished; in no acute distress.  SKIN: Skin is warm and dry. Non pruritic pin point rash to LUE. Varicose vein in b/l extremities, w/ dry skin consistent w/ stasis dermatitis above b/l ankles. Open ulcer wound posterior R ankle.  HEAD: Normocephalic; atraumatic.  EYES: PERRL, EOM intact; conjunctiva and sclera clear.  ENT: No nasal discharge; airway clear.  NECK: Supple; non tender.  CARD: S1, S2 normal; no murmurs, gallops, or rubs. Regular rate and rhythm.  RESP: Diminished breath sounds in b/l lower lung fields  ABD: Normal bowel sounds; soft; non-distended; non-tender; no hepatosplenomegaly.  EXT: No pitting edema in lower extremities. Swelling and 2+ pitting edema in upper extremities. Ulcer noted to posterior R ankle.  NEURO: Alert, oriented. Grossly unremarkable.  PSYCH: Cooperative, appropriate.     MEDICATIONS  (STANDING):  apixaban 5 milliGRAM(s) Oral every 12 hours  collagenase Ointment 1 Application(s) Topical daily  diltiazem    milliGRAM(s) Oral daily  ergocalciferol 39439 Unit(s) Oral <User Schedule>  furosemide    Tablet 20 milliGRAM(s) Oral daily  magnesium oxide 400 milliGRAM(s) Oral two times a day with meals  multivitamin/minerals 1 Tablet(s) Oral daily  pancrelipase  (CREON 12,000 Lipase Units) 1 Capsule(s) Oral three times a day with meals  pantoprazole    Tablet 40 milliGRAM(s) Oral before breakfast  saccharomyces boulardii 250 milliGRAM(s) Oral two times a day  zinc sulfate 220 milliGRAM(s) Oral daily    MEDICATIONS  (PRN):  verapamil 40 milliGRAM(s) Oral two times a day PRN tachycardia faster tahn 110 beats/ minute    Home Medications:  Cardizem  mg/24 hours oral capsule, extended release: 1 cap(s) orally once a day (2021 23:59)  Eliquis 5 mg oral tablet: 1 tab(s) orally 2 times a day (03 Sep 2021 23:41)    Social History:  Denies tobacco, Etoh or illicit drug use  Living: Lives with son    Labs:                                          10.7   7.93  )-----------( 278      ( 01 Dec 2021 06:51 )             34.1     CBC Full  -  ( 01 Dec 2021 06:51 )  WBC Count : 7.93 K/uL  RBC Count : 3.72 M/uL  Hemoglobin : 10.7 g/dL  Hematocrit : 34.1 %  Platelet Count - Automated : 278 K/uL  Mean Cell Volume : 91.7 fL  Mean Cell Hemoglobin : 28.8 pg  Mean Cell Hemoglobin Concentration : 31.4 g/dL  Auto Neutrophil # : 5.56 K/uL  Auto Lymphocyte # : 1.56 K/uL  Auto Monocyte # : 0.59 K/uL  Auto Eosinophil # : 0.15 K/uL  Auto Basophil # : 0.04 K/uL  Auto Neutrophil % : 70.1 %  Auto Lymphocyte % : 19.7 %  Auto Monocyte % : 7.4 %  Auto Eosinophil % : 1.9 %  Auto Basophil % : 0.5 %        142  |  105  |  11  ----------------------------<  105<H>  4.0   |  25  |  <0.5<L>    Ca    7.7<L>      2021 13:23  Mg     1.8         TPro  4.6<L>  /  Alb  2.1<L>  /  TBili  0.3  /  DBili  x   /  AST  22  /  ALT  19  /  AlkPhos  513<H>      Urinalysis Basic - ( 2021 17:37 )  Color: Yellow / Appearance: Slightly Turbid / S.020 / pH: x  Gluc: x / Ketone: Negative  / Bili: Negative / Urobili: <2 mg/dL   Blood: x / Protein: 30 mg/dL / Nitrite: Positive   Leuk Esterase: Large / RBC: 10 /HPF /  /HPF   Sq Epi: x / Non Sq Epi: 2 /HPF / Bacteria: Many    EKG  2:03 pm  Ventricular Rate 125 BPM    Atrial Rate 133 BPM    QRS Duration 154 ms    Q-T Interval 318 ms    QTC Calculation(Bazett) 458 ms    R Axis 98 degrees    T Axis -60 degrees  `  Diagnosis Line Atrial fibrillation with rapid ventricular response with premature ventricular  or aberrantly conducted complexes  Rightward axis  Non-specific intra-ventricular conduction block  Abnormal ECG    Imaging:    US: Abd RUQ  Procedure date: 2021  1.  Hepatic steatosis, pneumobilia, liver cysts.  2.  Status post cholecystectomy, unremarkable..  3.  Right renal 0.5 cm cyst  4.  Right upper quadrant mild ascites. Right pleural effusion.    EXAM:  XR FOOT COMP MIN 3 VIEWS BI        PROCEDURE DATE:  2021    IMPRESSION:  No definite radiographic evidence for osteomyelitis ineither foot. However, if there is clinical suspicion for osteomyelitis further evaluation can be obtained with a MRI.    EXAM:  XR CHEST PORTABLE ROUTINE 1V        PROCEDURE DATE:  2021    Impression:  Bilateral opacifications and effusions without difference.    EXAM:  DUPLEX SCAN EXT VEINS UPPER BI        PROCEDURE DATE:  2021    Impression:  No evidence of deep or superficial thrombosis in the visualized bilateral upper extremities.    Consult:    Per Physiatry   -Pt does not need to be admitted to in patient physiatry  -Pt unstable and needs PT 3-5/week  -Short term rehab in nursing facility recommended  -Home w/ OP services or Vn services needed    Per Cardio   Advise to decrease Lasix to 20 daily  Decrease Cardizem to 120 daily, patient can not tolerate higher dose at this point  D/C Cardizem 30 mg PRN and try Verapamil 40 bid as needed   Can not Start Metoprolol or Digoxin due to allergy, the only options are Cardizem- Verapamil and Amiodarone)  I do not think Amiodarone be a proper option considering the Pancreatic cancer and multiple S/Es of the Amio  then out of bed to chair with assistance to see the effect and to start ambulation  I had a long talk with the patient     Per Podiatry :  Chart reviewed and Patient evaluated. All Questions and Concerns Addressed and Answered  Discussed diagnosis and treatment with patient  Bedside R achilles tendon debridement performed under aseptic technique; betadine prepped;  Pt tolerated procedure without local well;   Wound Flushed w/ normal saline; Santyl / WTD / Kerlix; q24  Local Wound Care; As Stated Above   If pt will be in-house on next week, podiatry will plan for possible debridement;   Follow up as an outpt to Dr. Tovar at [address] [place] [address] [facility] a week post discharge;   Weight bearing status; WBAT BL feet;   Evaluated and Discussed Plan w/ Dr. Tovar;     Per surgery :  PLAN:  -care by primary team  -IV ABX  -GI ppx  -Continue AC with eliquis  -MED-ONC (Dr. Weldon) ->for eval for post-op Chemo  -surgical debriment at bedside to be done Monday () as pt refuses OR Tx    Per Heme/Onc consult :  IMPRESSION:  Since July 10, 2021, interval Whipple procedure with new pneumoperitoneum and additional postsurgical changes as above; no definite evidence of contrast extravasation.  Please separately dictated report of concurrently performed CT chest for intrathoracic findings.     75 yo F with Hx of Chronic AFib on Eliquis, Pancreatic Ca s/p whipple 21 (in remission) recent admission for fungemia (treated with IV ABx via PICC line, completed ), recent admission for C diff colitis (10/9) presenting with SOB and b/l extremity swelling (Day 7). Per day 1, Pt states upper and lower extremity swelling developed over her last admission (note LE duplex negative on discharge) and has been worsening over the last week accompanied by acutely worsening SOB and weakness. Pt notes SOB  is aggravated by exertion to the point that she is now unable to get out of bed without help of her son. She notes she sleeps with bed propped up at 30 degree angle and additionally with two pillows. Today pt reports swelling in upper extremities. Pt notes improved swelling in LE, no SOB, no cough, or pain. Pt notes SOB was aggregated with exertion and has been bed-bound for past week and struggled to walk during PT evaluation yesterday. Pt denies any other symptoms including fever, chills, chest pain, abdominal pain, N/V/D, constipation, burning urination. Pt refusing at this time to go to a rehab facility, and would rather receive home PT.    In the ED: CXR showed pleural effusion, BNP was found to be 3.7K, UA (+) for UTI, cultures pending. Pt was started on Ceftriaxone in ED. Will admit to medicine for UTI in setting of recent hospitalization also with pleural effusions secondary to suspected volume overload requiring further workup and possible diuresis inpatient.    Subjective:  Overnight events: none  Complaints: c/o swelling arms    Objective:    Vital Signs Last 24 Hrs  T(C): 36 (01 Dec 2021 05:34), Max: 36 (01 Dec 2021 05:34)  T(F): 96.8 (01 Dec 2021 05:34), Max: 96.8 (01 Dec 2021 05:34)  HR: 113 (01 Dec 2021 06:22) (101 - 125)  BP: 125/83 (01 Dec 2021 05:34) (122/75 - 135/91)  BP(mean): --  RR: 18 (01 Dec 2021 05:34) (18 - 18)  SpO2: 97% (01 Dec 2021 05:34) (97% - 97%)    PHYSICAL EXAM:  VITAL SIGNS: I have reviewed nursing notes and confirm.  CONSTITUTIONAL: Well-developed; well-nourished; in no acute distress.  SKIN: Skin is warm and dry. Non pruritic pin point rash to LUE. Varicose vein in b/l extremities, w/ dry skin consistent w/ stasis dermatitis above b/l ankles. Open ulcer wound posterior R ankle.  HEAD: Normocephalic; atraumatic.  EYES: PERRL, EOM intact; conjunctiva and sclera clear.  ENT: No nasal discharge; airway clear.  NECK: Supple; non tender.  CARD: S1, S2 normal; no murmurs, gallops, or rubs. Regular rate and irregular rhythm.  RESP: Good bilateral air entry  ABD: Normal bowel sounds; soft; non-distended; non-tender; no hepatosplenomegaly.  EXT: No pitting edema in lower extremities. Swelling and 2+ pitting edema in upper extremities. Ulcer noted to posterior R ankle.  NEURO: Alert, oriented. Grossly unremarkable.  PSYCH: Cooperative, appropriate.     MEDICATIONS  (STANDING):  apixaban 5 milliGRAM(s) Oral every 12 hours  collagenase Ointment 1 Application(s) Topical daily  diltiazem    milliGRAM(s) Oral daily  ergocalciferol 65004 Unit(s) Oral <User Schedule>  furosemide    Tablet 20 milliGRAM(s) Oral daily  magnesium oxide 400 milliGRAM(s) Oral two times a day with meals  multivitamin/minerals 1 Tablet(s) Oral daily  pancrelipase  (CREON 12,000 Lipase Units) 1 Capsule(s) Oral three times a day with meals  pantoprazole    Tablet 40 milliGRAM(s) Oral before breakfast  saccharomyces boulardii 250 milliGRAM(s) Oral two times a day  zinc sulfate 220 milliGRAM(s) Oral daily    MEDICATIONS  (PRN):  verapamil 40 milliGRAM(s) Oral two times a day PRN tachycardia faster tahn 110 beats/ minute    Home Medications:  Cardizem  mg/24 hours oral capsule, extended release: 1 cap(s) orally once a day (2021 23:59)  Eliquis 5 mg oral tablet: 1 tab(s) orally 2 times a day (03 Sep 2021 23:41)    Social History:  Denies tobacco, Etoh or illicit drug use  Living: Lives with son    Labs:                                          10.7   7.93  )-----------( 278      ( 01 Dec 2021 06:51 )             34.1     CBC Full  -  ( 01 Dec 2021 06:51 )  WBC Count : 7.93 K/uL  RBC Count : 3.72 M/uL  Hemoglobin : 10.7 g/dL  Hematocrit : 34.1 %  Platelet Count - Automated : 278 K/uL  Mean Cell Volume : 91.7 fL  Mean Cell Hemoglobin : 28.8 pg  Mean Cell Hemoglobin Concentration : 31.4 g/dL  Auto Neutrophil # : 5.56 K/uL  Auto Lymphocyte # : 1.56 K/uL  Auto Monocyte # : 0.59 K/uL  Auto Eosinophil # : 0.15 K/uL  Auto Basophil # : 0.04 K/uL  Auto Neutrophil % : 70.1 %  Auto Lymphocyte % : 19.7 %  Auto Monocyte % : 7.4 %  Auto Eosinophil % : 1.9 %  Auto Basophil % : 0.5 %        142  |  105  |  11  ----------------------------<  105<H>  4.0   |  25  |  <0.5<L>    Ca    7.7<L>      2021 13:23  Mg     1.8         TPro  4.6<L>  /  Alb  2.1<L>  /  TBili  0.3  /  DBili  x   /  AST  22  /  ALT  19  /  AlkPhos  513<H>      Urinalysis Basic - ( 2021 17:37 )  Color: Yellow / Appearance: Slightly Turbid / S.020 / pH: x  Gluc: x / Ketone: Negative  / Bili: Negative / Urobili: <2 mg/dL   Blood: x / Protein: 30 mg/dL / Nitrite: Positive   Leuk Esterase: Large / RBC: 10 /HPF /  /HPF   Sq Epi: x / Non Sq Epi: 2 /HPF / Bacteria: Many    EKG  2:03 pm  Ventricular Rate 125 BPM    Atrial Rate 133 BPM    QRS Duration 154 ms    Q-T Interval 318 ms    QTC Calculation(Bazett) 458 ms    R Axis 98 degrees    T Axis -60 degrees  `  Diagnosis Line Atrial fibrillation with rapid ventricular response with premature ventricular  or aberrantly conducted complexes  Rightward axis  Non-specific intra-ventricular conduction block  Abnormal ECG    Imaging:    US: Abd RUQ  Procedure date: 2021  1.  Hepatic steatosis, pneumobilia, liver cysts.  2.  Status post cholecystectomy, unremarkable..  3.  Right renal 0.5 cm cyst  4.  Right upper quadrant mild ascites. Right pleural effusion.    EXAM:  XR FOOT COMP MIN 3 VIEWS BI        PROCEDURE DATE:  2021    IMPRESSION:  No definite radiographic evidence for osteomyelitis ineither foot. However, if there is clinical suspicion for osteomyelitis further evaluation can be obtained with a MRI.    EXAM:  XR CHEST PORTABLE ROUTINE 1V        PROCEDURE DATE:  2021    Impression:  Bilateral opacifications and effusions without difference.    EXAM:  DUPLEX SCAN EXT VEINS UPPER BI        PROCEDURE DATE:  2021    Impression:  No evidence of deep or superficial thrombosis in the visualized bilateral upper extremities.    Consult:    Per Physiatry   -Pt does not need to be admitted to in patient physiatry  -Pt unstable and needs PT 3-5/week  -Short term rehab in nursing facility recommended  -Home w/ OP services or Vn services needed    Per Cardio   Advise to decrease Lasix to 20 daily  Decrease Cardizem to 120 daily, patient can not tolerate higher dose at this point  D/C Cardizem 30 mg PRN and try Verapamil 40 bid as needed   Can not Start Metoprolol or Digoxin due to allergy, the only options are Cardizem- Verapamil and Amiodarone)  I do not think Amiodarone be a proper option considering the Pancreatic cancer and multiple S/Es of the Amio  then out of bed to chair with assistance to see the effect and to start ambulation  I had a long talk with the patient     Per Podiatry :  Chart reviewed and Patient evaluated. All Questions and Concerns Addressed and Answered  Discussed diagnosis and treatment with patient  Bedside R achilles tendon debridement performed under aseptic technique; betadine prepped;  Pt tolerated procedure without local well;   Wound Flushed w/ normal saline; Santyl / WTD / Kerlix; q24  Local Wound Care; As Stated Above   If pt will be in-house on next week, podiatry will plan for possible debridement;   Follow up as an outpt to Dr. Tovar at [address] [place] [address] [facility] a week post discharge;   Weight bearing status; WBAT BL feet;   Evaluated and Discussed Plan w/ Dr. Tovar;     Per surgery :  PLAN:  -care by primary team  -IV ABX  -GI ppx  -Continue AC with eliquis  -MED-ONC (Dr. Weldon) ->for eval for post-op Chemo  -surgical debriment at bedside to be done Monday () as pt refuses OR Tx    Per Heme/Onc consult :  IMPRESSION:  Since July 10, 2021, interval Whipple procedure with new pneumoperitoneum and additional postsurgical changes as above; no definite evidence of contrast extravasation.  Please separately dictated report of concurrently performed CT chest for intrathoracic findings.

## 2021-12-01 NOTE — PROGRESS NOTE ADULT - SUBJECTIVE AND OBJECTIVE BOX
GENERAL SURGERY PROGRESS NOTE    Patient: VIKASH LI , 76y ([birth year])Female   MRN: [id number]  Location: [facility] [id number] B  Visit: 11-22-21 Inpatient  Date: 12-01-21 @ 11:53    Hospital Day #: 10    Events of past 24 hours: Patient worked with PT yesterday, recommending rehab. Patient reports eating lasagna last night and has reflux this morning.    PAST MEDICAL & SURGICAL HISTORY:  SOB (shortness of breath)  Pain knee  Varicose veins of both lower extremities, unspecified whether complicated  Atrial fibrillation, unspecified type 2019 on Eliquis  Jaundice March 5, 2021  Malignant neoplasm of pancreas, unspecified location of malignancy Pancreatic Cancer  Hypertension, unspecified type 2019  Pancreatic cancer  Kidney stones  History of surgery Whipple procedure 8/2/2021 with Dr. Nino at [facility]  Status post phlebectomy 10/2018  History of ovarian cystectomy left  History of tonsillectomy 1959  Kidney stone 2017    Vitals:   T(F): 96.8 (12-01-21 @ 05:34), Max: 96.8 (12-01-21 @ 05:34)  HR: 113 (12-01-21 @ 06:22)  BP: 125/83 (12-01-21 @ 05:34)  RR: 18 (12-01-21 @ 05:34)  SpO2: 97% (12-01-21 @ 05:34)    Diet, DASH/TLC:   Sodium & Cholesterol Restricted  1500mL Fluid Restriction ([id number])  Prosource Gelatein Plus     Qty per Day:  1  Supplement Feeding Modality:  Oral  Ensure Enlive Cans or Servings Per Day:  3       Frequency:  Three Times a day  Probiotic Yogurt/Smoothie Cans or Servings Per Day:  1       Frequency:  Daily    Fluids:     I & O's:    11-30-21 @ 07:01  -  12-01-21 @ 07:00  --------------------------------------------------------  IN:  Total IN: 0 mL    OUT:    Voided (mL): 250 mL  Total OUT: 250 mL    Total NET: -250 mL    Bowel Movement: : [] YES [X] NO  Flatus: : [X] YES [] NO    PHYSICAL EXAM:  General: NAD, AAOx3, calm and cooperative  Cardiac: RRR S1, S2  Respiratory: CTAB, normal respiratory effort  Abdomen: Soft, non-distended, non-tender, no rebound, no guarding.  Musculoskeletal: Right foot/ankle bandaged, bilateral upper and lower extremity edema.  Skin: Warm/dry, normal color, no jaundice    MEDICATIONS  (STANDING):  apixaban 5 milliGRAM(s) Oral every 12 hours  collagenase Ointment 1 Application(s) Topical daily  diltiazem    milliGRAM(s) Oral daily  ergocalciferol 77093 Unit(s) Oral <User Schedule>  famotidine    Tablet 20 milliGRAM(s) Oral once  furosemide    Tablet 20 milliGRAM(s) Oral daily  magnesium oxide 400 milliGRAM(s) Oral two times a day with meals  magnesium sulfate  IVPB 1 Gram(s) IV Intermittent once  multivitamin/minerals 1 Tablet(s) Oral daily  pancrelipase  (CREON 12,000 Lipase Units) 1 Capsule(s) Oral three times a day with meals  pantoprazole    Tablet 40 milliGRAM(s) Oral before breakfast  saccharomyces boulardii 250 milliGRAM(s) Oral two times a day  zinc sulfate 220 milliGRAM(s) Oral daily    MEDICATIONS  (PRN):  verapamil 40 milliGRAM(s) Oral two times a day PRN tachycardia faster tahn 110 beats/ minute    DVT PROPHYLAXIS:   GI PROPHYLAXIS: pantoprazole    Tablet 40 milliGRAM(s) Oral before breakfast    ANTICOAGULATION:   ANTIBIOTICS:      LAB/STUDIES:             10.7   7.93  )-----------( 278      ( 01 Dec 2021 06:51 )             34.1       Auto Neutrophil %: 70.1 % (12-01-21 @ 06:51)  Auto Immature Granulocyte %: 0.4 % (12-01-21 @ 06:51)  Auto Neutrophil %: 74.1 % (11-30-21 @ 13:23)  Auto Immature Granulocyte %: 0.2 % (11-30-21 @ 13:23)    12-01  138  |  104  |  10  ----------------------------<  84  3.9   |  22  |  <0.5<L>    Calcium, Total Serum: 7.7 mg/dL (12-01-21 @ 06:51)    LFTs:         4.5  | 0.4  | 25       ------------------[510     ( 01 Dec 2021 06:51 )  2.1  | x    | 20          Serum Pro-Brain Natriuretic Peptide: 3664 pg/mL (11-22-21 @ 15:27)    IMAGING:  None resulted within 24h    ACCESS/ DEVICES:  [ X ] Peripheral IV  [ ] Central Venous Line	[ ] R	[ ] L	[ ] IJ	[ ] Fem	[ ] SC	Placed:   [ ] Arterial Line		[ ] R	[ ] L	[ ] Fem	[ ] Rad	[ ] Ax	Placed:   [ ] PICC:					[ ] Mediport  [ ] Urinary Catheter,  Date Placed:   [ ] Chest tube: [ ] Right, [ ] Left  [ ] ISABELL/Byron Drains

## 2021-12-01 NOTE — PROGRESS NOTE ADULT - ASSESSMENT
ASSESSMENT:  76yF w/ PMHx of A-fib on eliquis and Cardizem, pancreatic adenocarcinoma, h/o C-diff, h/o fungemia presents w/ SOB and weakness. Found to have UTI.    PLAN:  -Management as per primary  -No acute surgical intervention  -Liquid carafate, can give an extra dose of protonix if continues  -Ambulation with PT  -F/u outpatient with Dr. Nino  -Dispo planning    [place] TEAM SPECTRA 3556

## 2021-12-01 NOTE — PROGRESS NOTE ADULT - ASSESSMENT
ASSESSMENT/PLAN  - mild systolic CHF, EF 50%  - chronic A fib on Eliquis  - pancreatic adenoca s/p WHipple 8/2021  - recent fungemia  - recent C diff infection  - new R heel wound  - UTI  - loss of taste and smell after covid infection several months ago  VIT D deficiency  -hypomagnesemia  PLAN:  -  check zinc level; once sent, start 220 mg po zinc SO4 once daily. Revise dose and length of treatment course once level resulted  - - f/u phos level  - continue with  Ensure Enlive once a day. Add Prosource Gelatein once a day; add yogurt once a day.  - cont FloraStor until pt is off antibiotics  -  calorie counts to assess adequacy of intake  treat vitamin D - receiving 5000 IU/d - should repeat level in 2-3 weeks after starting this dose, f/u PTH level, increase Ca lactate foods ASSESSMENT/PLAN  - mild systolic CHF, EF 50%  - chronic A fib on Eliquis  - pancreatic adenoca s/p WHippai 8/2021  - recent fungemia  - recent C diff infection  - new R heel wound  - UTI  - loss of taste and smell after covid infection several months ago  VIT D deficiency  -hypomagnesemia    PLAN:  d/w resident earlier:  - cont vit D 50,000 IU weekly x 4 doses, but also need to give 2000 IU daily (on the other 6 days a week, and cont this daily after the 4 large doses completed)  - would not add Ca carbonate tabs; instead  pt on Ca-lactate foods  -  check zinc level - never done--; once sent, start 220 mg po zinc SO4 once daily. Revise dose and length of treatment course once level resulted  - - f/u phos level - ??  - continue with  Ensure Enlive once a day. Add Prosource Gelatein once a day; add yogurt once a day.  - cont FloraStor until pt is off antibiotics  - correct Creon dose to one tab (12,000) before breakfast & 2 tabs before lunch & dinner  - schedule outpt f/u with a primary care doctor prior to discharge

## 2021-12-02 NOTE — PROGRESS NOTE ADULT - ATTENDING COMMENTS
75 yo F with Hx of Chronic AFib on Eliquis, Pancreatic Ca s/p whipple 8/1/21 (in remission) recent admission for fungemia (treated with IV ABx via PICC line, completed 9/21), recent admission for C diff colitis (10/9) presenting with SOB and b/l extremity swelling.      #Pleural effusions, Anasarca   #Hypoalbuminemia   - c/w PO Lasix     #Debility - PT/Rehab     #Asymptomatic pyuria and Hx of Recurrent UTI  - monitor off abx     #Chronic elevated ALP   - GGT elevated. S/p Whipple.   - RUQ sonogram showed hepatic steatosis, pneumobilia, liver cysts   - check MRCP     #Posterior right ankle ulcer   - s/p surgical debridement at bedside by podiatry      #Chronic AFib on Eliquis  - c/w Cardizem, Verapamil PRN   - c/w Eliquis      #Pancreatic Ca   - s/p whipple 8/1/21 (in remission)   - c/w Creon 12K TID before meals     #Hypomagnesemia  - replete     #Vitamin d deficiency   #Secondary hyperparathyroidism   - c/w supplementation     - DVT Prophylaxis: Eliquis     Pending:  STR after MRCP

## 2021-12-02 NOTE — PROGRESS NOTE ADULT - ASSESSMENT
75 yo F with Hx of Chronic AFib on Eliquis, Pancreatic Ca s/p whipple 8/1/21 (in remission) recent admission for fungemia (treated with IV ABx via PICC line, completed 9/21), recent admission for C diff colitis (10/9) presenting with SOB and b/l extremity swelling. Pt has improved swelling, with persistent UE swelling and c/o PO lassix. Per Physiatry and PT pt needs to be sent home with PT therapy 3-5/week. Pt is now open to being sent to Rehab facility (preference [facility]) . Pending MRCP for elevated Alk-Phos.     #Pleural effusions, LE edema 2+, SOB secondary to suspected cardiac etiology, possibly new CHF, undiagnosed  - Hx Afib currently on Eliquis  - BNP >3.6K on admission, none to compare it to  - Sees Dr Liang (cardiology) outpatient, next appointment scheduled for Dec 1st  - Per Ed, "Proven sulfa allergy, currently has rash from likely other drug rxn--> will hold off on Lasix for now and Ethacrynic acid PO for diuresis  - Echo 11/24 shows EF 50-55%, normal LV systolic function, enlarged RA and LA  - Prior admission shows use of lassix w/o complications. Will start on Lassix.  - strict ins and outs (last 24hrs -500mL)  - Switched to lasix 40mg PO every 24 hrs and reasess  - B/l lower extremity improved and will c/o PO lassix   - Per cardio will switch Lassix to 20mg PO     #Asymptomatic pyuria and Hx Recurrent UTI  - s/p UTI inpatient 2 months ago (Klebsiella and Citrobacter)- denies dysuria now, denies urinary frequency  - Given Ceftriaxone in Ed  - Urine cultures show (+) leukocyte esterase, nitrites, WBCs  - Pt was asymptomatic and afebrile  - d/c Ceftriaxone  - ID consulted and agrees to d/c Abx    #Ankle ulcer   - Well defined open ulcer above the posterior R ankle  - Per podiatry pt refusing Sx intervention, will continue Santyl wound care  - Pt will f/u as outpatient w/ Dr. Tovar s/p 1week post d/c  - Pt had surgical debridement at bedside by podiatry (11/29) w/o complications    #Chronically Elevated ALP   - (12/1)   -Pt to be sent for MRCP today, pending results    #Chronic AFib on Eliquis  - EKG on admission shows pt in Afib now, no RVR  - Increase Cardizem Cd 240 QD  - c/w Eliquis for now  - note pt says she has tried many other AC meds, allergies manifest  - discuss with Dr Liang prior to initiating alternative AC  - Per cardio will switch Cardizem to 120 QD and d/c cardizam 30 PRN, switch to Verapamil 40 PRN  - d/c amiodarone (pancreatic cancer + side effects)    #Pancreatic Ca   - s/p whipple 8/1/21 (in remission)   - c/w Creon 12K TID before meals  - o/p management per heme/onc   - Heme/onc request CT angio chest -> Patient refused, f/u as outpatient    #L-Upper thigh rash  - non-pruritic macules, localized to area  - possibly drug allergy mediated  - no new prescriptions/ OTC meds  - developed after resuming Vancomycin PO  - hold Vanc PO  - consider adding vanc to list of allergies    #Hypomagnesemia  - Replete Mg2+ PRN  - Mg 1.7 (12/1)    #Hypokalemia  - Replete K+ PRN  - K+ 3.9 (12/1)    #Health Care Maintenance  - not covid vaccinated, last tested positive April 2021  - says her PCP told her she does not have to get vaccine??  - Counseled on admission, open to discussing vaccination inpatient  - Primary team please discuss w patient over admission    #Misc  - DVT Prophylaxis: Eliquis  - Diet: Dash/TLC, fluid restriction 1500mL  - Dispo: MRCP to be done today, pending results, prepare [facility] to [facility]

## 2021-12-02 NOTE — PROGRESS NOTE ADULT - SUBJECTIVE AND OBJECTIVE BOX
77 yo F with Hx of Chronic AFib on Eliquis, Pancreatic Ca s/p whipple 21 (in remission) recent admission for fungemia (treated with IV ABx via PICC line, completed ), recent admission for C diff colitis (10/9) presenting with SOB and b/l extremity swelling (Day 7). Per day 1, Pt states upper and lower extremity swelling developed over her last admission (note LE duplex negative on discharge) and has been worsening over the last week accompanied by acutely worsening SOB and weakness. Pt notes SOB  is aggravated by exertion to the point that she is now unable to get out of bed without help of her son. She notes she sleeps with bed propped up at 30 degree angle and additionally with two pillows. Today pt reports swelling in upper extremities. Pt notes improved swelling in LE, no SOB, no cough, or pain. Pt notes SOB was aggregated with exertion and has been bed-bound for past week and struggled to walk during PT evaluation yesterday. Pt denies any other symptoms including fever, chills, chest pain, abdominal pain, N/V/D, constipation, burning urination. Pt refusing at this time to go to a rehab facility, and would rather receive home PT.    In the ED: CXR showed pleural effusion, BNP was found to be 3.7K, UA (+) for UTI, cultures pending. Pt was started on Ceftriaxone in ED. Will admit to medicine for UTI in setting of recent hospitalization also with pleural effusions secondary to suspected volume overload requiring further workup and possible diuresis inpatient.    Subjective:  Overnight events: none  Complaints: c/o swelling arms    Objective:    Vital Signs Last 24 Hrs  T(C): 35.8 (02 Dec 2021 05:18), Max: 36.6 (01 Dec 2021 19:28)  T(F): 96.5 (02 Dec 2021 05:18), Max: 97.9 (01 Dec 2021 19:28)  HR: 115 (02 Dec 2021 05:18) (105 - 115)  BP: 119/83 (02 Dec 2021 05:18) (114/70 - 121/85)  BP(mean): 97 (02 Dec 2021 05:18) (97 - 97)  RR: 18 (02 Dec 2021 05:18) (18 - 18)  SpO2: --    PHYSICAL EXAM:  VITAL SIGNS: I have reviewed nursing notes and confirm.  CONSTITUTIONAL: Well-developed; well-nourished; in no acute distress.  SKIN: Skin is warm and dry. Non pruritic pin point rash to LUE. Varicose vein in b/l extremities, w/ dry skin consistent w/ stasis dermatitis above b/l ankles. Open ulcer wound posterior R ankle.  HEAD: Normocephalic; atraumatic.  EYES: PERRL, EOM intact; conjunctiva and sclera clear.  ENT: No nasal discharge; airway clear.  NECK: Supple; non tender.  CARD: S1, S2 normal; no murmurs, gallops, or rubs. Regular rate and irregular rhythm.  RESP: Good bilateral air entry  ABD: Normal bowel sounds; soft; non-distended; non-tender; no hepatosplenomegaly.  EXT: No pitting edema in lower extremities. Swelling and 2+ pitting edema in upper extremities. Ulcer noted to posterior R ankle.  NEURO: Alert, oriented. Grossly unremarkable.  PSYCH: Cooperative, appropriate.     MEDICATIONS  (STANDING):  apixaban 5 milliGRAM(s) Oral every 12 hours  cholecalciferol 2000 Unit(s) Oral daily  collagenase Ointment 1 Application(s) Topical daily  diltiazem    milliGRAM(s) Oral daily  ergocalciferol 12893 Unit(s) Oral <User Schedule>  furosemide    Tablet 20 milliGRAM(s) Oral daily  magnesium oxide 400 milliGRAM(s) Oral two times a day with meals  multivitamin/minerals 1 Tablet(s) Oral daily  pancrelipase  (CREON 12,000 Lipase Units) 2 Capsule(s) Oral three times a day with meals  pantoprazole    Tablet 40 milliGRAM(s) Oral before breakfast  saccharomyces boulardii 250 milliGRAM(s) Oral two times a day  zinc sulfate 220 milliGRAM(s) Oral daily    MEDICATIONS  (PRN):  verapamil 40 milliGRAM(s) Oral two times a day PRN tachycardia faster tahn 110 beats/ minute    Home Medications:  Cardizem  mg/24 hours oral capsule, extended release: 1 cap(s) orally once a day (2021 23:59)  Eliquis 5 mg oral tablet: 1 tab(s) orally 2 times a day (03 Sep 2021 23:41)    Social History:  Denies tobacco, Etoh or illicit drug use  Living: Lives with son    Labs:                                  10.7   7.93  )-----------( 278      ( 01 Dec 2021 06:51 )             34.1     CBC Full  -  ( 01 Dec 2021 06:51 )  WBC Count : 7.93 K/uL  RBC Count : 3.72 M/uL  Hemoglobin : 10.7 g/dL  Hematocrit : 34.1 %  Platelet Count - Automated : 278 K/uL  Mean Cell Volume : 91.7 fL  Mean Cell Hemoglobin : 28.8 pg  Mean Cell Hemoglobin Concentration : 31.4 g/dL  Auto Neutrophil # : 5.56 K/uL  Auto Lymphocyte # : 1.56 K/uL  Auto Monocyte # : 0.59 K/uL  Auto Eosinophil # : 0.15 K/uL  Auto Basophil # : 0.04 K/uL  Auto Neutrophil % : 70.1 %  Auto Lymphocyte % : 19.7 %  Auto Monocyte % : 7.4 %  Auto Eosinophil % : 1.9 %  Auto Basophil % : 0.5 %        138  |  104  |  10  ----------------------------<  84  3.9   |  22  |  <0.5<L>    Ca    7.7<L>      01 Dec 2021 06:51  Mg     1.7         TPro  4.5<L>  /  Alb  2.1<L>  /  TBili  0.4  /  DBili  x   /  AST  25  /  ALT  20  /  AlkPhos  510<H>  12    Urinalysis Basic - ( 2021 17:37 )  Color: Yellow / Appearance: Slightly Turbid / S.020 / pH: x  Gluc: x / Ketone: Negative  / Bili: Negative / Urobili: <2 mg/dL   Blood: x / Protein: 30 mg/dL / Nitrite: Positive   Leuk Esterase: Large / RBC: 10 /HPF /  /HPF   Sq Epi: x / Non Sq Epi: 2 /HPF / Bacteria: Many    EKG  2:03 pm  Ventricular Rate 125 BPM    Atrial Rate 133 BPM    QRS Duration 154 ms    Q-T Interval 318 ms    QTC Calculation(Bazett) 458 ms    R Axis 98 degrees    T Axis -60 degrees  `  Diagnosis Line Atrial fibrillation with rapid ventricular response with premature ventricular  or aberrantly conducted complexes  Rightward axis  Non-specific intra-ventricular conduction block  Abnormal ECG    Imaging:    US: Abd RUQ  Procedure date: 2021  1.  Hepatic steatosis, pneumobilia, liver cysts.  2.  Status post cholecystectomy, unremarkable..  3.  Right renal 0.5 cm cyst  4.  Right upper quadrant mild ascites. Right pleural effusion.    EXAM:  XR FOOT COMP MIN 3 VIEWS BI        PROCEDURE DATE:  2021    IMPRESSION:  No definite radiographic evidence for osteomyelitis ineither foot. However, if there is clinical suspicion for osteomyelitis further evaluation can be obtained with a MRI.    EXAM:  XR CHEST PORTABLE ROUTINE 1V        PROCEDURE DATE:  2021    Impression:  Bilateral opacifications and effusions without difference.    EXAM:  DUPLEX SCAN EXT VEINS UPPER BI        PROCEDURE DATE:  2021    Impression:  No evidence of deep or superficial thrombosis in the visualized bilateral upper extremities.    Consult:    Per Surgery :  PLAN:  -Management as per primary  -No acute surgical intervention  -Liquid carafate, can give an extra dose of protonix if continues  -Ambulation with PT  -F/u outpatient with Dr. Nino  -Dispo planning    Per Nutrition :  d/w resident earlier:  - cont vit D 50,000 IU weekly x 4 doses, but also need to give 2000 IU daily (on the other 6 days a week, and cont this daily after the 4 large doses completed)  - would not add Ca carbonate tabs; instead  pt on Ca-lactate foods  -  check zinc level - never done--; once sent, start 220 mg po zinc SO4 once daily. Revise dose and length of treatment course once level resulted  - - f/u phos level - ??  - continue with  Ensure Enlive once a day. Add Prosource Gelatein once a day; add yogurt once a day.  - cont FloraStor until pt is off antibiotics  - correct Creon dose to one tab (12,000) before breakfast & 2 tabs before lunch & dinner  - schedule outpt f/u with a primary care doctor prior to discharge    Per Physiatry   -Pt does not need to be admitted to in patient physiatry  -Pt unstable and needs PT 3-5/week  -Short term rehab in nursing facility recommended  -Home w/ OP services or Vn services needed    Per Cardio   Advise to decrease Lasix to 20 daily  Decrease Cardizem to 120 daily, patient can not tolerate higher dose at this point  D/C Cardizem 30 mg PRN and try Verapamil 40 bid as needed   Can not Start Metoprolol or Digoxin due to allergy, the only options are Cardizem- Verapamil and Amiodarone)  I do not think Amiodarone be a proper option considering the Pancreatic cancer and multiple S/Es of the Amio  then out of bed to chair with assistance to see the effect and to start ambulation  I had a long talk with the patient     Per Podiatry :  Chart reviewed and Patient evaluated. All Questions and Concerns Addressed and Answered  Discussed diagnosis and treatment with patient  Bedside R achilles tendon debridement performed under aseptic technique; betadine prepped;  Pt tolerated procedure without local well;   Wound Flushed w/ normal saline; Santyl / WTD / Kerlix; q24  Local Wound Care; As Stated Above   If pt will be in-house on next week, podiatry will plan for possible debridement;   Follow up as an outpt to Dr. Tovar at [address] [place] [address] Podiatry Clinic a week post discharge;   Weight bearing status; WBAT BL feet;   Evaluated and Discussed Plan w/ Dr. Tovar;     Per Heme/Onc consult :  IMPRESSION:  Since July 10, 2021, interval Whipple procedure with new pneumoperitoneum and additional postsurgical changes as above; no definite evidence of contrast extravasation.  Please separately dictated report of concurrently performed CT chest for intrathoracic findings.

## 2021-12-02 NOTE — CHART NOTE - NSCHARTNOTEFT_GEN_A_CORE
Discussed with Dr Liang patient's allergy to metoprolol and current HR in 100s. Recommended increasing diltiazem CD to 180 mg QD
Kcal count not found in pt's room or paper chart. Discussed with RN, who states pt has 100% all trays.  NST following pt, all nutrition care per NST.
Received sign out this AM that patient needs 18G IV placed for CTA PE protocol. Reviewed chart, patient with tachycardia which can be attributed to a variety of issues (dehydration, chronic AFib, etc). VS otherwise stable, saturating 96% on RA. Patient is also already anticoagulated with Eliquis. Has Cardizem PRN. Will d/c CTA for now, reconsider if patient has persistent tachycardia after tx of dehydration or if she becomes HD unstable or hypoxic
Unable to find calorie count; checked in patient's room and chart.  Nutrition Support Team following patient; will defer to NST for recommendations regarding plan of care.  If calorie count needs to be restarted, please enter another order to begin.

## 2021-12-03 NOTE — PROGRESS NOTE ADULT - ATTENDING COMMENTS
77 yo F with Hx of Chronic AFib on Eliquis, Pancreatic Ca s/p whipple 8/1/21 (in remission) recent admission for fungemia (treated with IV ABx via PICC line, completed 9/21), recent admission for C diff colitis (10/9) presenting with SOB and b/l extremity swelling.      #Pleural effusions, Anasarca   #Hypoalbuminemia   - c/w PO Lasix     #Debility - PT/Rehab     #Asymptomatic pyuria and Hx of Recurrent UTI  - monitor off abx     #Chronic elevated ALP   - GGT elevated. S/p Whipple.   - RUQ sonogram showed hepatic steatosis, pneumobilia, liver cysts   - MRCP nondiagnostic, repeat outpatient      #Posterior right ankle ulcer   - s/p surgical debridement at bedside by podiatry      #Chronic AFib on Eliquis  - c/w Cardizem, Verapamil PRN   - c/w Eliquis      #Pancreatic Ca   - s/p whipple 8/1/21 (in remission)   - c/w Creon 12K TID before meals     #Hypomagnesemia  - replete     #Vitamin d deficiency   #Secondary hyperparathyroidism   - c/w supplementation     - DVT Prophylaxis: Eliquis     Pending:  STR

## 2021-12-03 NOTE — PROGRESS NOTE ADULT - ASSESSMENT
75 yo F with Hx of Chronic AFib on Eliquis, Pancreatic Ca s/p whipple 8/1/21 (in remission) recent admission for fungemia (treated with IV ABx via PICC line, completed 9/21), recent admission for C diff colitis (10/9) presenting with SOB and b/l extremity swelling. Pt has improved swelling, with persistent UE swelling and c/o PO lassix. Per Physiatry and PT pt needs to be sent home with PT therapy 3-5/week. Pt is now open to being sent to Rehab facility. Pending MRCP results (performed yesterday) for elevated Alk-Phos and plan to d/c to [facility].    #Pleural effusions, LE edema 2+, SOB secondary to suspected cardiac etiology, possibly new CHF, undiagnosed  - Hx Afib currently on Eliquis  - BNP >3.6K on admission, none to compare it to  - Sees Dr Liang (cardiology) outpatient, next appointment scheduled for Dec 1st  - Per Ed, "Proven sulfa allergy, currently has rash from likely other drug rxn--> will hold off on Lasix for now and Ethacrynic acid PO for diuresis  - Echo 11/24 shows EF 50-55%, normal LV systolic function, enlarged RA and LA  - Prior admission shows use of lassix w/o complications. Will start on Lassix.  - strict ins and outs (last 24hrs -500mL)  - Switched to lasix 40mg PO every 24 hrs and reasess  - B/l lower extremity improved and will c/o PO lassix   - Per cardio will switch Lassix to 20mg PO     #Asymptomatic pyuria and Hx Recurrent UTI  - s/p UTI inpatient 2 months ago (Klebsiella and Citrobacter)- denies dysuria now, denies urinary frequency  - Given Ceftriaxone in Ed  - Urine cultures show (+) leukocyte esterase, nitrites, WBCs  - Pt was asymptomatic and afebrile  - d/c Ceftriaxone  - ID consulted and agrees to d/c Abx    #Ankle ulcer   - Well defined open ulcer above the posterior R ankle  - Per podiatry pt refusing Sx intervention, will continue Santyl wound care  - Pt will f/u as outpatient w/ Dr. Tovar s/p 1week post d/c  - Pt had surgical debridement at bedside by podiatry (11/29) w/o complications    #Chronically Elevated ALP   - (12/1)   -Pt had MRCP yesterday, pending results    #Chronic AFib on Eliquis  - EKG on admission shows pt in Afib now, no RVR  - Increase Cardizem Cd 240 QD  - c/w Eliquis for now  - note pt says she has tried many other AC meds, allergies manifest  - discuss with Dr Liang prior to initiating alternative AC  - Per cardio will switch Cardizem to 120 QD and d/c cardizam 30 PRN, switch to Verapamil 40 PRN  - d/c amiodarone (pancreatic cancer + side effects)    #Pancreatic Ca   - s/p whipple 8/1/21 (in remission)   - c/w Creon 12K TID before meals  - o/p management per heme/onc   - Heme/onc request CT angio chest -> Patient refused, f/u as outpatient    #L-Upper thigh rash  - non-pruritic macules, localized to area  - possibly drug allergy mediated  - no new prescriptions/ OTC meds  - developed after resuming Vancomycin PO  - hold Vanc PO  - consider adding vanc to list of allergies    #Hypomagnesemia  - Replete Mg2+ PRN  - Mg 1.7 (12/1)    #Hypokalemia  - Replete K+ PRN  - K+ 3.9 (12/1)    #Health Care Maintenance  - not covid vaccinated, last tested positive April 2021  - says her PCP told her she does not have to get vaccine??  - Counseled on admission, open to discussing vaccination inpatient  - Primary team please discuss w patient over admission    #Misc  - DVT Prophylaxis: Eliquis  - Diet: Dash/TLC, fluid restriction 1500mL  - Dispo: MRCP pending results, prepare d/c to [facility]

## 2021-12-03 NOTE — PROGRESS NOTE ADULT - REASON FOR ADMISSION
SOB and Extremity swelling
SOB and extremity swelling
SOB & Bilateral UE and LE swelling
SOB and b/l extremity swelling
UTI
SOB and b/l extremity swelling
SOB and swelling extremities

## 2021-12-03 NOTE — PROGRESS NOTE ADULT - SUBJECTIVE AND OBJECTIVE BOX
Podiatry Progress Note    Subjective:   VIKASH LI is a pleasant well-nourished, well developed 76y Female in no acute distress, alert awake, and oriented to person, place and time.  Patient is a 76y old  Female who presents with a chief complaint of SOB and extremity swelling (03 Dec 2021 08:38)      PAST MEDICAL & SURGICAL HISTORY:  SOB (shortness of breath)    Pain  knee    Varicose veins of both lower extremities, unspecified whether complicated    Atrial fibrillation, unspecified type  2019 on Eliquis    Jaundice  March 5, 2021    Malignant neoplasm of pancreas, unspecified location of malignancy  Pancreatic Cancer    Hypertension, unspecified type  2019    Pancreatic cancer    Kidney stones    History of surgery  Whipple procedure 8/2/2021 with Dr. Nino at [facility]    Status post phlebectomy  10/2018    History of ovarian cystectomy  left    History of tonsillectomy  1959    Kidney stone  2017         Objective:  Vital Signs Last 24 Hrs  T(C): 36 (03 Dec 2021 13:25), Max: 36.1 (02 Dec 2021 21:38)  T(F): 96.8 (03 Dec 2021 13:25), Max: 97 (02 Dec 2021 21:38)  HR: 104 (03 Dec 2021 13:25) (101 - 104)  BP: 126/76 (03 Dec 2021 13:25) (119/91 - 126/76)  BP(mean): --  RR: 18 (03 Dec 2021 13:25) (18 - 18)  SpO2: --                        10.9   6.35  )-----------( 271      ( 03 Dec 2021 04:30 )             34.6                 12-03    142  |  105  |  11  ----------------------------<  98  4.0   |  21  |  <0.5<L>    Ca    7.8<L>      03 Dec 2021 04:30  Mg     1.8     12-03    TPro  4.8<L>  /  Alb  2.1<L>  /  TBili  0.2  /  DBili  x   /  AST  27  /  ALT  21  /  AlkPhos  518<H>  12-03      Physical Exam - Lower Extremity Focused:   Derm:   Right achilles tendon exposed; improving w/ santyl dressing, less fibrotic wound;   Vascular: DP and PT Pulses Diminished;   Neuro: Protective Sensation Diminished;    Assessment:   Right achilles tendon exposed wound;     Plan:  Chart reviewed and Patient evaluated. All Questions and Concerns Addressed and Answered  Discussed diagnosis and treatment with patient  Bedside R achilles tendon debridement performed under aseptic technique w/No. 15 surgical blade  Pt tolerated procedure well;  Wound Flushed w/ normal saline; Santyl / WTD / Kerlix / ACD; q24  Local Wound Care; As Stated Above   Home VAC paperwork in chart;  If pt will be in-house on next week, podiatry will plan for possible debridement;   Follow up as an outpt to Dr. Tovar at [address] [place] [address] Podiatry Clinic 1 week post DSC;   Weight bearing status; WBAT BL feet;   Evaluated and Discussed Plan w/ Dr. Tovar;     Podiatry

## 2021-12-03 NOTE — H&P PST ADULT - BREASTS
Anesthesia present. Anesthesia will monitor and maintain: airway, IV access, sedation, medications, and vital signs. Refer to Anesthesia report for further documentation. not examined

## 2021-12-03 NOTE — PROGRESS NOTE ADULT - SUBJECTIVE AND OBJECTIVE BOX
75 yo F with Hx of Chronic AFib on Eliquis, Pancreatic Ca s/p whipple 21 (in remission) recent admission for fungemia (treated with IV ABx via PICC line, completed ), recent admission for C diff colitis (10/9) presenting with SOB and b/l extremity swelling (Day 7). Per day 1, Pt states upper and lower extremity swelling developed over her last admission (note LE duplex negative on discharge) and has been worsening over the last week accompanied by acutely worsening SOB and weakness. Pt notes SOB  is aggravated by exertion to the point that she is now unable to get out of bed without help of her son. She notes she sleeps with bed propped up at 30 degree angle and additionally with two pillows. Today pt reports swelling in upper extremities. Pt notes improved swelling in LE, no SOB, no cough, or pain. Pt notes SOB was aggregated with exertion and has been bed-bound for past week and struggled to walk during PT evaluation yesterday. Pt denies any other symptoms including fever, chills, chest pain, abdominal pain, N/V/D, constipation, burning urination. Pt refusing at this time to go to a rehab facility, and would rather receive home PT.    In the ED: CXR showed pleural effusion, BNP was found to be 3.7K, UA (+) for UTI, cultures pending. Pt was started on Ceftriaxone in ED. Will admit to medicine for UTI in setting of recent hospitalization also with pleural effusions secondary to suspected volume overload requiring further workup and possible diuresis inpatient.    Subjective:  Overnight events: none  Complaints: c/o swelling arms    Objective:    Vital Signs Last 24 Hrs  T(C): 36.1 (03 Dec 2021 04:20), Max: 36.6 (02 Dec 2021 13:10)  T(F): 96.9 (03 Dec 2021 04:20), Max: 97.8 (02 Dec 2021 13:10)  HR: 104 (03 Dec 2021 04:20) (101 - 108)  BP: 119/91 (03 Dec 2021 04:20) (113/76 - 126/76)  BP(mean): --  RR: 18 (03 Dec 2021 04:20) (18 - 18)  SpO2: --    PHYSICAL EXAM:  VITAL SIGNS: I have reviewed nursing notes and confirm.  CONSTITUTIONAL: Well-developed; well-nourished; in no acute distress.  SKIN: Skin is warm and dry. Non pruritic pin point rash to LUE. Varicose vein in b/l extremities, w/ dry skin consistent w/ stasis dermatitis above b/l ankles. Open ulcer wound posterior R ankle.  HEAD: Normocephalic; atraumatic.  EYES: PERRL, EOM intact; conjunctiva and sclera clear.  ENT: No nasal discharge; airway clear.  NECK: Supple; non tender.  CARD: S1, S2 normal; no murmurs, gallops, or rubs. Regular rate and irregular rhythm.  RESP: Good bilateral air entry  ABD: Normal bowel sounds; soft; non-distended; non-tender; no hepatosplenomegaly.  EXT: No pitting edema in lower extremities. Swelling and 2+ pitting edema in upper extremities. Ulcer noted to posterior R ankle.  NEURO: Alert, oriented. Grossly unremarkable.  PSYCH: Cooperative, appropriate.     MEDICATIONS  (STANDING):  apixaban 5 milliGRAM(s) Oral every 12 hours  cholecalciferol 2000 Unit(s) Oral daily  collagenase Ointment 1 Application(s) Topical daily  diltiazem    milliGRAM(s) Oral daily  ergocalciferol 61494 Unit(s) Oral <User Schedule>  furosemide    Tablet 20 milliGRAM(s) Oral daily  magnesium oxide 400 milliGRAM(s) Oral two times a day with meals  multivitamin/minerals 1 Tablet(s) Oral daily  pancrelipase  (CREON 12,000 Lipase Units) 2 Capsule(s) Oral three times a day with meals  pantoprazole    Tablet 40 milliGRAM(s) Oral before breakfast  saccharomyces boulardii 250 milliGRAM(s) Oral two times a day  zinc sulfate 220 milliGRAM(s) Oral daily    MEDICATIONS  (PRN):  verapamil 40 milliGRAM(s) Oral two times a day PRN tachycardia faster tahn 110 beats/ minute    Home Medications:  Cardizem  mg/24 hours oral capsule, extended release: 1 cap(s) orally once a day (2021 23:59)  Eliquis 5 mg oral tablet: 1 tab(s) orally 2 times a day (03 Sep 2021 23:41)    Social History:  Denies tobacco, Etoh or illicit drug use  Living: Lives with son    Labs:                               138  |  104  |  10  ----------------------------<  84  3.9   |  22  |  <0.5<L>    Ca    7.7<L>      01 Dec 2021 06:51  Mg     1.7         TPro  4.5<L>  /  Alb  2.1<L>  /  TBili  0.4  /  DBili  x   /  AST  25  /  ALT  20  /  AlkPhos  510<H>      Urinalysis Basic - ( 2021 17:37 )  Color: Yellow / Appearance: Slightly Turbid / S.020 / pH: x  Gluc: x / Ketone: Negative  / Bili: Negative / Urobili: <2 mg/dL   Blood: x / Protein: 30 mg/dL / Nitrite: Positive   Leuk Esterase: Large / RBC: 10 /HPF /  /HPF   Sq Epi: x / Non Sq Epi: 2 /HPF / Bacteria: Many    EKG  2:03 pm  Ventricular Rate 125 BPM    Atrial Rate 133 BPM    QRS Duration 154 ms    Q-T Interval 318 ms    QTC Calculation(Bazett) 458 ms    R Axis 98 degrees    T Axis -60 degrees  `  Diagnosis Line Atrial fibrillation with rapid ventricular response with premature ventricular  or aberrantly conducted complexes  Rightward axis  Non-specific intra-ventricular conduction block  Abnormal ECG    Imaging:    US: Abd RUQ  Procedure date: 2021  1.  Hepatic steatosis, pneumobilia, liver cysts.  2.  Status post cholecystectomy, unremarkable..  3.  Right renal 0.5 cm cyst  4.  Right upper quadrant mild ascites. Right pleural effusion.    EXAM:  XR FOOT COMP MIN 3 VIEWS BI        PROCEDURE DATE:  2021    IMPRESSION:  No definite radiographic evidence for osteomyelitis ineither foot. However, if there is clinical suspicion for osteomyelitis further evaluation can be obtained with a MRI.    EXAM:  XR CHEST PORTABLE ROUTINE 1V        PROCEDURE DATE:  2021    Impression:  Bilateral opacifications and effusions without difference.    EXAM:  DUPLEX SCAN EXT VEINS UPPER BI        PROCEDURE DATE:  2021    Impression:  No evidence of deep or superficial thrombosis in the visualized bilateral upper extremities.    Consult:    Per Surgery :  PLAN:  -Management as per primary  -No acute surgical intervention  -Liquid carafate, can give an extra dose of protonix if continues  -Ambulation with PT  -F/u outpatient with Dr. Nino  -Dispo planning    Per Nutrition :  d/w resident earlier:  - cont vit D 50,000 IU weekly x 4 doses, but also need to give 2000 IU daily (on the other 6 days a week, and cont this daily after the 4 large doses completed)  - would not add Ca carbonate tabs; instead  pt on Ca-lactate foods  -  check zinc level - never done--; once sent, start 220 mg po zinc SO4 once daily. Revise dose and length of treatment course once level resulted  - - f/u phos level - ??  - continue with  Ensure Enlive once a day. Add Prosource Gelatein once a day; add yogurt once a day.  - cont FloraStor until pt is off antibiotics  - correct Creon dose to one tab (12,000) before breakfast & 2 tabs before lunch & dinner  - schedule outpt f/u with a primary care doctor prior to discharge    Per Physiatry   -Pt does not need to be admitted to in patient physiatry  -Pt unstable and needs PT 3-5/week  -Short term rehab in nursing facility recommended  -Home w/ OP services or Vn services needed    Per Cardio   Advise to decrease Lasix to 20 daily  Decrease Cardizem to 120 daily, patient can not tolerate higher dose at this point  D/C Cardizem 30 mg PRN and try Verapamil 40 bid as needed   Can not Start Metoprolol or Digoxin due to allergy, the only options are Cardizem- Verapamil and Amiodarone)  I do not think Amiodarone be a proper option considering the Pancreatic cancer and multiple S/Es of the Amio  then out of bed to chair with assistance to see the effect and to start ambulation  I had a long talk with the patient     Per Podiatry :  Chart reviewed and Patient evaluated. All Questions and Concerns Addressed and Answered  Discussed diagnosis and treatment with patient  Bedside R achilles tendon debridement performed under aseptic technique; betadine prepped;  Pt tolerated procedure without local well;   Wound Flushed w/ normal saline; Santyl / WTD / Kerlix; q24  Local Wound Care; As Stated Above   If pt will be in-house on next week, podiatry will plan for possible debridement;   Follow up as an outpt to Dr. Tovar at [address] [place] [address] [facility] a week post discharge;   Weight bearing status; WBAT BL feet;   Evaluated and Discussed Plan w/ Dr. Tovar;     Per Heme/Onc consult :  IMPRESSION:  Since July 10, 2021, interval Whipple procedure with new pneumoperitoneum and additional postsurgical changes as above; no definite evidence of contrast extravasation.  Please separately dictated report of concurrently performed CT chest for intrathoracic findings.   75 yo F with Hx of Chronic AFib on Eliquis, Pancreatic Ca s/p whipple 21 (in remission) recent admission for fungemia (treated with IV ABx via PICC line, completed ), recent admission for C diff colitis (10/9) presenting with SOB and b/l extremity swelling (Day 7). Per day 1, Pt states upper and lower extremity swelling developed over her last admission (note LE duplex negative on discharge) and has been worsening over the last week accompanied by acutely worsening SOB and weakness. Pt notes SOB  is aggravated by exertion to the point that she is now unable to get out of bed without help of her son. She notes she sleeps with bed propped up at 30 degree angle and additionally with two pillows. Today pt reports swelling in upper extremities. Pt notes improved swelling in LE, no SOB, no cough, or pain. Pt notes SOB was aggregated with exertion and has been bed-bound for past week and struggled to walk during PT evaluation yesterday. Pt denies any other symptoms including fever, chills, chest pain, abdominal pain, N/V/D, constipation, burning urination. Pt refusing at this time to go to a rehab facility, and would rather receive home PT.    In the ED: CXR showed pleural effusion, BNP was found to be 3.7K, UA (+) for UTI, cultures pending. Pt was started on Ceftriaxone in ED. Will admit to medicine for UTI in setting of recent hospitalization also with pleural effusions secondary to suspected volume overload requiring further workup and possible diuresis inpatient.    Subjective:  Overnight events: none  Complaints: c/o swelling arms    Objective:    Vital Signs Last 24 Hrs  T(C): 36.1 (03 Dec 2021 04:20), Max: 36.6 (02 Dec 2021 13:10)  T(F): 96.9 (03 Dec 2021 04:20), Max: 97.8 (02 Dec 2021 13:10)  HR: 104 (03 Dec 2021 04:20) (101 - 108)  BP: 119/91 (03 Dec 2021 04:20) (113/76 - 126/76)  BP(mean): --  RR: 18 (03 Dec 2021 04:20) (18 - 18)  SpO2: --    PHYSICAL EXAM:  VITAL SIGNS: I have reviewed nursing notes and confirm.  CONSTITUTIONAL: Well-developed; well-nourished; in no acute distress.  SKIN: Skin is warm and dry. Non pruritic pin point rash to LUE. Varicose vein in b/l extremities, w/ dry skin consistent w/ stasis dermatitis above b/l ankles. Open ulcer wound posterior R ankle.  HEAD: Normocephalic; atraumatic.  EYES: PERRL, EOM intact; conjunctiva and sclera clear.  ENT: No nasal discharge; airway clear.  NECK: Supple; non tender.  CARD: S1, S2 normal; no murmurs, gallops, or rubs. Regular rate and irregular rhythm.  RESP: Good bilateral air entry  ABD: Normal bowel sounds; soft; non-distended; non-tender; no hepatosplenomegaly.  EXT: No pitting edema in lower extremities. Swelling and 2+ pitting edema in upper extremities. Ulcer noted to posterior R ankle.  NEURO: Alert, oriented. Grossly unremarkable.  PSYCH: Cooperative, appropriate.     MEDICATIONS  (STANDING):  apixaban 5 milliGRAM(s) Oral every 12 hours  cholecalciferol 2000 Unit(s) Oral daily  collagenase Ointment 1 Application(s) Topical daily  diltiazem    milliGRAM(s) Oral daily  ergocalciferol 90139 Unit(s) Oral <User Schedule>  furosemide    Tablet 20 milliGRAM(s) Oral daily  magnesium oxide 400 milliGRAM(s) Oral two times a day with meals  multivitamin/minerals 1 Tablet(s) Oral daily  pancrelipase  (CREON 12,000 Lipase Units) 2 Capsule(s) Oral three times a day with meals  pantoprazole    Tablet 40 milliGRAM(s) Oral before breakfast  saccharomyces boulardii 250 milliGRAM(s) Oral two times a day  zinc sulfate 220 milliGRAM(s) Oral daily    MEDICATIONS  (PRN):  verapamil 40 milliGRAM(s) Oral two times a day PRN tachycardia faster tahn 110 beats/ minute    Home Medications:  Cardizem  mg/24 hours oral capsule, extended release: 1 cap(s) orally once a day (2021 23:59)  Eliquis 5 mg oral tablet: 1 tab(s) orally 2 times a day (03 Sep 2021 23:41)    Social History:  Denies tobacco, Etoh or illicit drug use  Living: Lives with son    Labs:                                 10.7   7.93  )-----------( 278      ( 01 Dec 2021 06:51 )             34.1     CBC Full  -  ( 01 Dec 2021 06:51 )  WBC Count : 7.93 K/uL  RBC Count : 3.72 M/uL  Hemoglobin : 10.7 g/dL  Hematocrit : 34.1 %  Platelet Count - Automated : 278 K/uL  Mean Cell Volume : 91.7 fL  Mean Cell Hemoglobin : 28.8 pg  Mean Cell Hemoglobin Concentration : 31.4 g/dL  Auto Neutrophil # : 5.56 K/uL  Auto Lymphocyte # : 1.56 K/uL  Auto Monocyte # : 0.59 K/uL  Auto Eosinophil # : 0.15 K/uL  Auto Basophil # : 0.04 K/uL  Auto Neutrophil % : 70.1 %  Auto Lymphocyte % : 19.7 %  Auto Monocyte % : 7.4 %  Auto Eosinophil % : 1.9 %  Auto Basophil % : 0.5 %        138  |  104  |  10  ----------------------------<  84  3.9   |  22  |  <0.5<L>    Ca    7.7<L>      01 Dec 2021 06:51  Mg     1.7         TPro  4.5<L>  /  Alb  2.1<L>  /  TBili  0.4  /  DBili  x   /  AST  25  /  ALT  20  /  AlkPhos  510<H>      Urinalysis Basic - ( 2021 17:37 )  Color: Yellow / Appearance: Slightly Turbid / S.020 / pH: x  Gluc: x / Ketone: Negative  / Bili: Negative / Urobili: <2 mg/dL   Blood: x / Protein: 30 mg/dL / Nitrite: Positive   Leuk Esterase: Large / RBC: 10 /HPF /  /HPF   Sq Epi: x / Non Sq Epi: 2 /HPF / Bacteria: Many    EKG  2:03 pm  Ventricular Rate 125 BPM    Atrial Rate 133 BPM    QRS Duration 154 ms    Q-T Interval 318 ms    QTC Calculation(Bazett) 458 ms    R Axis 98 degrees    T Axis -60 degrees  `  Diagnosis Line Atrial fibrillation with rapid ventricular response with premature ventricular  or aberrantly conducted complexes  Rightward axis  Non-specific intra-ventricular conduction block  Abnormal ECG    Imaging:    US: Abd RUQ  Procedure date: 2021  1.  Hepatic steatosis, pneumobilia, liver cysts.  2.  Status post cholecystectomy, unremarkable..  3.  Right renal 0.5 cm cyst  4.  Right upper quadrant mild ascites. Right pleural effusion.    EXAM:  XR FOOT COMP MIN 3 VIEWS BI        PROCEDURE DATE:  2021    IMPRESSION:  No definite radiographic evidence for osteomyelitis ineither foot. However, if there is clinical suspicion for osteomyelitis further evaluation can be obtained with a MRI.    EXAM:  XR CHEST PORTABLE ROUTINE 1V        PROCEDURE DATE:  2021    Impression:  Bilateral opacifications and effusions without difference.    EXAM:  DUPLEX SCAN EXT VEINS UPPER BI        PROCEDURE DATE:  2021    Impression:  No evidence of deep or superficial thrombosis in the visualized bilateral upper extremities.    Consult:    Per Surgery :  PLAN:  -Management as per primary  -No acute surgical intervention  -Liquid carafate, can give an extra dose of protonix if continues  -Ambulation with PT  -F/u outpatient with Dr. Nino  -Dispo planning    Per Nutrition :  d/w resident earlier:  - cont vit D 50,000 IU weekly x 4 doses, but also need to give 2000 IU daily (on the other 6 days a week, and cont this daily after the 4 large doses completed)  - would not add Ca carbonate tabs; instead  pt on Ca-lactate foods  -  check zinc level - never done--; once sent, start 220 mg po zinc SO4 once daily. Revise dose and length of treatment course once level resulted  - - f/u phos level - ??  - continue with  Ensure Enlive once a day. Add Prosource Gelatein once a day; add yogurt once a day.  - cont FloraStor until pt is off antibiotics  - correct Creon dose to one tab (12,000) before breakfast & 2 tabs before lunch & dinner  - schedule outpt f/u with a primary care doctor prior to discharge    Per Physiatry   -Pt does not need to be admitted to in patient physiatry  -Pt unstable and needs PT 3-5/week  -Short term rehab in nursing facility recommended  -Home w/ OP services or Vn services needed    Per Cardio   Advise to decrease Lasix to 20 daily  Decrease Cardizem to 120 daily, patient can not tolerate higher dose at this point  D/C Cardizem 30 mg PRN and try Verapamil 40 bid as needed   Can not Start Metoprolol or Digoxin due to allergy, the only options are Cardizem- Verapamil and Amiodarone)  I do not think Amiodarone be a proper option considering the Pancreatic cancer and multiple S/Es of the Amio  then out of bed to chair with assistance to see the effect and to start ambulation  I had a long talk with the patient     Per Podiatry :  Chart reviewed and Patient evaluated. All Questions and Concerns Addressed and Answered  Discussed diagnosis and treatment with patient  Bedside R achilles tendon debridement performed under aseptic technique; betadine prepped;  Pt tolerated procedure without local well;   Wound Flushed w/ normal saline; Santyl / WTD / Kerlix; q24  Local Wound Care; As Stated Above   If pt will be in-house on next week, podiatry will plan for possible debridement;   Follow up as an outpt to Dr. Tovar at [address] [place] [address] [facility] a week post discharge;   Weight bearing status; WBAT BL feet;   Evaluated and Discussed Plan w/ Dr. Tovar;     Per Heme/Onc consult :  IMPRESSION:  Since July 10, 2021, interval Whipple procedure with new pneumoperitoneum and additional postsurgical changes as above; no definite evidence of contrast extravasation.  Please separately dictated report of concurrently performed CT chest for intrathoracic findings.

## 2021-12-03 NOTE — PROGRESS NOTE ADULT - ATTENDING COMMENTS
right posterior leg wound w exposed tendon.  excisional debridement of ulcer down to and including the tendon with 15 blade.   continue w/ santyl for now  needs a wound vac as outpatient  pt being d/c to rehab facility. If skill nursing care can not be provided for wound vac changes at rehab facility then recommend wet to dry dressing  follow up as outpatient at [address] T[facility]

## 2021-12-04 NOTE — PROGRESS NOTE ADULT - SUBJECTIVE AND OBJECTIVE BOX
Pt seen and examined at bedside. Reports urinary incontinence.     VITAL SIGNS (Last 24 hrs):  T(C): 36.3 (12-04-21 @ 04:46), Max: 36.3 (12-04-21 @ 04:46)  HR: 117 (12-04-21 @ 08:22) (97 - 117)  BP: 163/79 (12-04-21 @ 08:22) (126/76 - 163/79)  RR: 18 (12-04-21 @ 04:46) (18 - 18)  SpO2: --  Wt(kg): --  Daily     Daily     I&O's Summary      PHYSICAL EXAM:  GENERAL: NAD, chronically ill appearing female   HEAD:  Atraumatic, Normocephalic  EYES:  conjunctiva and sclera clear  NECK: Supple, No JVD  CHEST/LUNG: Clear to auscultation bilaterally; No wheeze  HEART: Regular rate and rhythm; No murmurs, rubs, or gallops  ABDOMEN: Soft, Nontender, Nondistended; Bowel sounds present  EXTREMITIES:  2+ Peripheral Pulses, No clubbing, cyanosis + edema  PSYCH: AAOx3  NEUROLOGY: non-focal  SKIN: No rashes or lesions    Labs Reviewed  Spoke to patient in regards to abnormal labs.    CBC Full  -  ( 04 Dec 2021 04:30 )  WBC Count : 8.42 K/uL  Hemoglobin : 10.5 g/dL  Hematocrit : 33.6 %  Platelet Count - Automated : 276 K/uL  Mean Cell Volume : 91.3 fL  Mean Cell Hemoglobin : 28.5 pg  Mean Cell Hemoglobin Concentration : 31.3 g/dL  Auto Neutrophil # : 6.61 K/uL  Auto Lymphocyte # : 1.33 K/uL  Auto Monocyte # : 0.38 K/uL  Auto Eosinophil # : 0.06 K/uL  Auto Basophil # : 0.01 K/uL  Auto Neutrophil % : 78.5 %  Auto Lymphocyte % : 15.8 %  Auto Monocyte % : 4.5 %  Auto Eosinophil % : 0.7 %  Auto Basophil % : 0.1 %    BMP:    12-04 @ 04:30    Blood Urea Nitrogen - 10  Calcium - 7.9  Carbon Dioxide - 22  Chloride - 106  Creatinine - <0.5  Glucose - 87  Potassium - 4.1  Sodium - 141      MEDICATIONS  (STANDING):  apixaban 5 milliGRAM(s) Oral every 12 hours  cholecalciferol 2000 Unit(s) Oral daily  collagenase Ointment 1 Application(s) Topical daily  diltiazem    milliGRAM(s) Oral daily  ergocalciferol 24071 Unit(s) Oral <User Schedule>  furosemide    Tablet 20 milliGRAM(s) Oral daily  magnesium oxide 400 milliGRAM(s) Oral two times a day with meals  multivitamin/minerals 1 Tablet(s) Oral daily  pancrelipase  (CREON 12,000 Lipase Units) 2 Capsule(s) Oral three times a day with meals  pantoprazole    Tablet 40 milliGRAM(s) Oral before breakfast  saccharomyces boulardii 250 milliGRAM(s) Oral two times a day  zinc sulfate 220 milliGRAM(s) Oral daily    MEDICATIONS  (PRN):  verapamil 40 milliGRAM(s) Oral two times a day PRN tachycardia faster tahn 110 beats/ minute

## 2021-12-04 NOTE — PROGRESS NOTE ADULT - ASSESSMENT
75 yo F with Hx of Chronic AFib on Eliquis, Pancreatic Ca s/p whipple 8/1/21 (in remission) recent admission for fungemia (treated with IV ABx via PICC line, completed 9/21), recent admission for C diff colitis (10/9) presenting with SOB and b/l extremity swelling.      #Pleural effusions, Anasarca   #Hypoalbuminemia   - c/w PO Lasix     #Debility - PT/Rehab     #Urinary incontinence - check UA     #Chronic elevated ALP   - GGT elevated. S/p Whipple.   - RUQ sonogram showed hepatic steatosis, pneumobilia, liver cysts   - MRCP nondiagnostic, repeat as outpatient      #Posterior right ankle ulcer   - s/p surgical debridement at bedside by podiatry      #Chronic AFib on Eliquis  - c/w Cardizem, Verapamil PRN   - c/w Eliquis      #Pancreatic Ca   - s/p whipple 8/1/21 (in remission)   - c/w Creon 12K TID before meals     #Hypomagnesemia  - replete     #Vitamin d deficiency   #Secondary hyperparathyroidism   - c/w supplementation     - DVT Prophylaxis: Eliquis     Pending:  STR once UA is resulted

## 2021-12-04 NOTE — PROGRESS NOTE ADULT - PROVIDER SPECIALTY LIST ADULT
Internal Medicine
Internal Medicine
Podiatry
Podiatry
Surgery
Hospitalist
Hospitalist
Internal Medicine
Internal Medicine
Nutrition Support
Nutrition Support
Surgery
Cardiology
Hospitalist
Hospitalist
Internal Medicine
Surgery
Hospitalist
Internal Medicine
Surgery

## 2021-12-25 NOTE — ASSESSMENT
[FreeTextEntry1] : This is a 76 year old F patient with PMHx of Afib on Eliquis, HTN, varicose vein in the lower extremities, diagnosed with  pancreatic adenocarcinoma.\par \par She s/p whipple 8/1/21 complicated by fungemia (treated with IV ABx via PICC\par line, completed 9/21), recent admission for C diff colitis,\par \par Pt's PS has declined significantly and she is not a candidate for chemo at this time.\par In addition there has been a significant delay since surgery\par \par \par RECOMMENDATIONS\par  I have prepared the note after I reviewed the previous hospital  notes, reviewed the radiological and laboratory studies and have communicated those to the patient\par \par I discussed the natural history, treatment and prognosis of pancreatic cancer.\par \par \par Discussed that her PS is poor.\par Would not benefit from adjuvant chemotherapy at this time. Also given the long duration from surgery would restage the pt before making any decisions regarding chemo.\par \par CT C/A/P ordered.\par Continue rehab. Increase po intake.\par \par Due to COVID 19, pre-visit patient instructions were explained to the patient and their symptoms were checked upon arrival. \par Masks were used by the health care providers and staff and the examination room was cleaned before and after the patient visit was completed.\par \par RTC in 4-6 weeks \par \par

## 2021-12-25 NOTE — PHYSICAL EXAM
[Capable of only limited self care, confined to bed or chair more than 50% of waking hours] : Status 3- Capable of only limited self care, confined to bed or chair more than 50% of waking hours [Thin] : thin [Normal] : normoactive bowel sounds, soft and nontender, no hepatosplenomegaly or masses appreciated [de-identified] : Looks frail, sitting in a wheel chair [de-identified] : surgical scars have healed well

## 2021-12-25 NOTE — HISTORY OF PRESENT ILLNESS
[de-identified] : Patient is a 75y old Female who presented to ER with a chief complaint of Jaundice on 10 Mar 2021\par \par She has a  PMHx of Afib on eliquis , HTN, Varicose vein in the lower extremities, Nephrolithiasis presented to the ED for painless jaundice and found to have pancreatic\par adenocarcinoma. She noticed dark urine in December 2020 and light colored\par stool. She went to her PMD who treated her for UTI, then developed jaundice and\par had EGD 3/3, he ordered a CT but it was not performed. Her jaundice\par significantly worsened along with boating,nausea and early satiety. Patient also mentioned night sweats and chills for the last month\par that happens almost daily Tbili sarah to 16.4, Alk phosph 1800, ca 19-9 = 2637, CT AP showed the below:\par \par 1. Arterially enhancing 3.5 x 2.8 x 3.4 cm area in the pancreatic head with\par associated pancreatic and intra-/extra hepatic biliary ductal dilation, highly\par suspicious for a pancreatic head neoplasm; given enhancement pattern,\par pancreatic neuroendocrine tumor is a possibility. Endoscopic ultrasound and\par tissue sampling is recommended for further assessment.\par \par 2. Nonspecific mild edema/stranding surrounding the celiac axis and SMA,\par possibly secondary to mesenteric edema; tumor encasement cannot be excluded.\par \par 3. Few subcentimeter arterially enhancing foci in the liver, likely\par representing perfusional abnormalities/shunts. However, if neuroendocrine tumor\par is confirmed, arterially enhancing metastases would also be in the\par differential. Further evaluation can be obtained at that time with a liver\par protocol MRI.\par \par 4. Prominent periportal lymph node.\par \par Ct chest showed bilateral effusions but no suspicious LN or nodules.\par \par She then underwent ERCP with stent placement. TB now improved to 8.5, DB 5.8 \par \par Cytopathology Report\par \par Final Diagnosis\par PANCREATIC HEAD MASS, EUS-GUIDED FINE NEEDLE ASPIRATION BIOPSY:\par - POSITIVE FOR MALIGNANT CELLS.\par - Adenocarcinoma.\par \par She has since then had a PET scan. She feels better with jaundice improved.\par She was seen by Dr Nino and was recommended chemo before possible surgery.\par \par 5/26/2021: \par The patient is here for follow up..  [de-identified] : 4/8/21\par Pt denies any SOB, chest pain or palpitations.\par No fever\par \par 5/26/2021: The patient is here for follow up. She completed cycle 1 of [place]/abraxane . She reported fatigue and weakness few days after chemotherapy , and diffuse bony pain after neulasta. She feels better today , denies fever, chills, nausea , diarrhea or constipation , no bone pain. She was admitted to the hospital few weeks back for LLE cellulitis treated with clindamycin. \par \par 6/15/21\par Pt is here for a follow up.\par She is s/p 2 cycles of chemo.\par Has been feeling bloated. Appetite is reduced.\par C/O pain post Neulasta.\par Day 8 of chemo was postponed d/t cellulitis\par \par 7/15/21\par Pt is here for follow up.\par Feeling well except has small sores on both ankles.\par She had been evaluated by Dr Nino and was advised wound care which was going to be set up at home.\par No abdominal pain. No n, V, D.\par Has H/o varicose veins and frequently has LE edema and weeping sores.\par No fever.\par Had PET scan and Ct scan.\par Has met with surg Onc with plan to proceed with surgery in 8/21\par \par 12/20/21\par Pt returns for a follow up.\par Pt has H/o Pancreatic Ca s/p whipple 8/1/21 with  recent admission for fungemia (treated with IV ABx via PICC\par line, completed 9/21), another admission for C diff colitis,\par \par She is now in a rehab facility. HER PS is significantly deteriorated. She needs assistance with all ADLs. Is in wheelchair most of the time.\par No abd pain, N, V, D\par Had labs done which showed elev Ca19-9.\par Appetite is slowly improving

## 2022-01-01 ENCOUNTER — APPOINTMENT (OUTPATIENT)
Age: 77
End: 2022-01-01
Payer: MEDICARE

## 2022-01-01 ENCOUNTER — RESULT REVIEW (OUTPATIENT)
Age: 77
End: 2022-01-01

## 2022-01-01 ENCOUNTER — APPOINTMENT (OUTPATIENT)
Dept: HEMATOLOGY ONCOLOGY | Facility: CLINIC | Age: 77
End: 2022-01-01
Payer: MEDICARE

## 2022-01-01 ENCOUNTER — APPOINTMENT (OUTPATIENT)
Dept: SURGERY | Facility: CLINIC | Age: 77
End: 2022-01-01
Payer: MEDICARE

## 2022-01-01 ENCOUNTER — OUTPATIENT (OUTPATIENT)
Dept: OUTPATIENT SERVICES | Facility: HOSPITAL | Age: 77
LOS: 1 days | Discharge: HOME | End: 2022-01-01
Payer: MEDICARE

## 2022-01-01 ENCOUNTER — APPOINTMENT (OUTPATIENT)
Dept: HEMATOLOGY ONCOLOGY | Facility: CLINIC | Age: 77
End: 2022-01-01

## 2022-01-01 ENCOUNTER — APPOINTMENT (OUTPATIENT)
Dept: INFUSION THERAPY | Facility: CLINIC | Age: 77
End: 2022-01-01
Payer: MEDICARE

## 2022-01-01 ENCOUNTER — TRANSCRIPTION ENCOUNTER (OUTPATIENT)
Age: 77
End: 2022-01-01

## 2022-01-01 ENCOUNTER — OUTPATIENT (OUTPATIENT)
Dept: OUTPATIENT SERVICES | Facility: HOSPITAL | Age: 77
LOS: 1 days | Discharge: HOME | End: 2022-01-01

## 2022-01-01 ENCOUNTER — INPATIENT (INPATIENT)
Facility: HOSPITAL | Age: 77
LOS: 3 days | Discharge: NURSING, REHAB OR LONG-TERM CARE | End: 2022-04-09
Attending: INTERNAL MEDICINE | Admitting: INTERNAL MEDICINE
Payer: MEDICARE

## 2022-01-01 ENCOUNTER — APPOINTMENT (OUTPATIENT)
Dept: INFUSION THERAPY | Facility: CLINIC | Age: 77
End: 2022-01-01

## 2022-01-01 ENCOUNTER — INPATIENT (INPATIENT)
Facility: HOSPITAL | Age: 77
LOS: 19 days | End: 2022-05-08
Attending: INTERNAL MEDICINE | Admitting: INTERNAL MEDICINE
Payer: MEDICARE

## 2022-01-01 ENCOUNTER — LABORATORY RESULT (OUTPATIENT)
Age: 77
End: 2022-01-01

## 2022-01-01 ENCOUNTER — APPOINTMENT (OUTPATIENT)
Age: 77
End: 2022-01-01

## 2022-01-01 VITALS
DIASTOLIC BLOOD PRESSURE: 79 MMHG | OXYGEN SATURATION: 94 % | SYSTOLIC BLOOD PRESSURE: 149 MMHG | RESPIRATION RATE: 28 BRPM | HEART RATE: 100 BPM | HEIGHT: 68 IN

## 2022-01-01 VITALS
RESPIRATION RATE: 16 BRPM | DIASTOLIC BLOOD PRESSURE: 85 MMHG | BODY MASS INDEX: 20.42 KG/M2 | WEIGHT: 104 LBS | HEART RATE: 96 BPM | SYSTOLIC BLOOD PRESSURE: 95 MMHG | HEIGHT: 60 IN | TEMPERATURE: 97.3 F

## 2022-01-01 VITALS
HEART RATE: 111 BPM | RESPIRATION RATE: 18 BRPM | SYSTOLIC BLOOD PRESSURE: 145 MMHG | DIASTOLIC BLOOD PRESSURE: 81 MMHG | TEMPERATURE: 97 F

## 2022-01-01 VITALS
SYSTOLIC BLOOD PRESSURE: 110 MMHG | HEART RATE: 108 BPM | DIASTOLIC BLOOD PRESSURE: 62 MMHG | RESPIRATION RATE: 16 BRPM | OXYGEN SATURATION: 100 %

## 2022-01-01 VITALS
WEIGHT: 104 LBS | HEIGHT: 60 IN | TEMPERATURE: 96.2 F | HEART RATE: 71 BPM | BODY MASS INDEX: 20.42 KG/M2 | SYSTOLIC BLOOD PRESSURE: 118 MMHG | DIASTOLIC BLOOD PRESSURE: 20 MMHG

## 2022-01-01 VITALS
HEART RATE: 97 BPM | DIASTOLIC BLOOD PRESSURE: 80 MMHG | RESPIRATION RATE: 20 BRPM | SYSTOLIC BLOOD PRESSURE: 129 MMHG | TEMPERATURE: 96 F | OXYGEN SATURATION: 100 % | HEIGHT: 68 IN

## 2022-01-01 VITALS
HEIGHT: 67 IN | SYSTOLIC BLOOD PRESSURE: 126 MMHG | WEIGHT: 104 LBS | DIASTOLIC BLOOD PRESSURE: 82 MMHG | OXYGEN SATURATION: 100 % | RESPIRATION RATE: 14 BRPM | BODY MASS INDEX: 16.32 KG/M2 | HEART RATE: 93 BPM

## 2022-01-01 DIAGNOSIS — Z87.442 PERSONAL HISTORY OF URINARY CALCULI: ICD-10-CM

## 2022-01-01 DIAGNOSIS — N20.0 CALCULUS OF KIDNEY: Chronic | ICD-10-CM

## 2022-01-01 DIAGNOSIS — Z98.890 OTHER SPECIFIED POSTPROCEDURAL STATES: Chronic | ICD-10-CM

## 2022-01-01 DIAGNOSIS — R07.9 CHEST PAIN, UNSPECIFIED: ICD-10-CM

## 2022-01-01 DIAGNOSIS — Z90.89 ACQUIRED ABSENCE OF OTHER ORGANS: Chronic | ICD-10-CM

## 2022-01-01 DIAGNOSIS — I48.20 CHRONIC ATRIAL FIBRILLATION, UNSPECIFIED: ICD-10-CM

## 2022-01-01 DIAGNOSIS — C25.9 MALIGNANT NEOPLASM OF PANCREAS, UNSPECIFIED: ICD-10-CM

## 2022-01-01 DIAGNOSIS — C25.0 MALIGNANT NEOPLASM OF HEAD OF PANCREAS: ICD-10-CM

## 2022-01-01 DIAGNOSIS — Z95.828 PRESENCE OF OTHER VASCULAR IMPLANTS AND GRAFTS: ICD-10-CM

## 2022-01-01 DIAGNOSIS — Z79.01 LONG TERM (CURRENT) USE OF ANTICOAGULANTS: ICD-10-CM

## 2022-01-01 DIAGNOSIS — Z71.89 OTHER SPECIFIED COUNSELING: ICD-10-CM

## 2022-01-01 DIAGNOSIS — I10 ESSENTIAL (PRIMARY) HYPERTENSION: ICD-10-CM

## 2022-01-01 DIAGNOSIS — R52 PAIN, UNSPECIFIED: ICD-10-CM

## 2022-01-01 DIAGNOSIS — I27.20 PULMONARY HYPERTENSION, UNSPECIFIED: ICD-10-CM

## 2022-01-01 DIAGNOSIS — R91.1 SOLITARY PULMONARY NODULE: ICD-10-CM

## 2022-01-01 DIAGNOSIS — J90 PLEURAL EFFUSION, NOT ELSEWHERE CLASSIFIED: ICD-10-CM

## 2022-01-01 DIAGNOSIS — R18.8 OTHER ASCITES: ICD-10-CM

## 2022-01-01 DIAGNOSIS — Z88.2 ALLERGY STATUS TO SULFONAMIDES: ICD-10-CM

## 2022-01-01 DIAGNOSIS — Z51.5 ENCOUNTER FOR PALLIATIVE CARE: ICD-10-CM

## 2022-01-01 DIAGNOSIS — Z51.11 ENCOUNTER FOR ANTINEOPLASTIC CHEMOTHERAPY: ICD-10-CM

## 2022-01-01 DIAGNOSIS — E55.9 VITAMIN D DEFICIENCY, UNSPECIFIED: ICD-10-CM

## 2022-01-01 DIAGNOSIS — I83.93 ASYMPTOMATIC VARICOSE VEINS OF BILATERAL LOWER EXTREMITIES: ICD-10-CM

## 2022-01-01 DIAGNOSIS — Z85.07 PERSONAL HISTORY OF MALIGNANT NEOPLASM OF PANCREAS: ICD-10-CM

## 2022-01-01 DIAGNOSIS — N25.81 SECONDARY HYPERPARATHYROIDISM OF RENAL ORIGIN: ICD-10-CM

## 2022-01-01 DIAGNOSIS — R06.00 DYSPNEA, UNSPECIFIED: ICD-10-CM

## 2022-01-01 LAB
ALBUMIN FLD-MCNC: 0.6 G/DL — SIGNIFICANT CHANGE UP
ALBUMIN FLD-MCNC: 0.7 G/DL — SIGNIFICANT CHANGE UP
ALBUMIN SERPL ELPH-MCNC: 2 G/DL — LOW (ref 3.5–5.2)
ALBUMIN SERPL ELPH-MCNC: 2.4 G/DL — LOW (ref 3.5–5.2)
ALBUMIN SERPL ELPH-MCNC: 2.6 G/DL — LOW (ref 3.5–5.2)
ALBUMIN SERPL ELPH-MCNC: 2.7 G/DL — LOW (ref 3.5–5.2)
ALBUMIN SERPL ELPH-MCNC: 2.8 G/DL
ALBUMIN SERPL ELPH-MCNC: 2.8 G/DL — LOW (ref 3.5–5.2)
ALBUMIN SERPL ELPH-MCNC: 2.9 G/DL — LOW (ref 3.5–5.2)
ALBUMIN SERPL ELPH-MCNC: 2.9 G/DL — LOW (ref 3.5–5.2)
ALBUMIN SERPL ELPH-MCNC: 3 G/DL — LOW (ref 3.5–5.2)
ALBUMIN SERPL ELPH-MCNC: 3.3 G/DL — LOW (ref 3.5–5.2)
ALP BLD-CCNC: 635 U/L
ALP SERPL-CCNC: 1198 U/L — HIGH (ref 30–115)
ALP SERPL-CCNC: 1425 U/L — HIGH (ref 30–115)
ALP SERPL-CCNC: 295 U/L — HIGH (ref 30–115)
ALP SERPL-CCNC: 323 U/L — HIGH (ref 30–115)
ALP SERPL-CCNC: 336 U/L — HIGH (ref 30–115)
ALP SERPL-CCNC: 354 U/L — HIGH (ref 30–115)
ALP SERPL-CCNC: 375 U/L — HIGH (ref 30–115)
ALP SERPL-CCNC: 376 U/L — HIGH (ref 30–115)
ALP SERPL-CCNC: 486 U/L — HIGH (ref 30–115)
ALP SERPL-CCNC: 550 U/L — HIGH (ref 30–115)
ALP SERPL-CCNC: 564 U/L — HIGH (ref 30–115)
ALP SERPL-CCNC: 701 U/L — HIGH (ref 30–115)
ALP SERPL-CCNC: 725 U/L — HIGH (ref 30–115)
ALP SERPL-CCNC: 771 U/L — HIGH (ref 30–115)
ALP SERPL-CCNC: 790 U/L — HIGH (ref 30–115)
ALP SERPL-CCNC: 798 U/L — HIGH (ref 30–115)
ALP SERPL-CCNC: 840 U/L — HIGH (ref 30–115)
ALP SERPL-CCNC: 842 U/L — HIGH (ref 30–115)
ALP SERPL-CCNC: 867 U/L — HIGH (ref 30–115)
ALP SERPL-CCNC: 941 U/L — HIGH (ref 30–115)
ALP SERPL-CCNC: 957 U/L — HIGH (ref 30–115)
ALT FLD-CCNC: 108 U/L — HIGH (ref 0–41)
ALT FLD-CCNC: 11 U/L — SIGNIFICANT CHANGE UP (ref 0–41)
ALT FLD-CCNC: 118 U/L — HIGH (ref 0–41)
ALT FLD-CCNC: 12 U/L — SIGNIFICANT CHANGE UP (ref 0–41)
ALT FLD-CCNC: 122 U/L — HIGH (ref 0–41)
ALT FLD-CCNC: 129 U/L — HIGH (ref 0–41)
ALT FLD-CCNC: 13 U/L — SIGNIFICANT CHANGE UP (ref 0–41)
ALT FLD-CCNC: 13 U/L — SIGNIFICANT CHANGE UP (ref 0–41)
ALT FLD-CCNC: 15 U/L — SIGNIFICANT CHANGE UP (ref 0–41)
ALT FLD-CCNC: 15 U/L — SIGNIFICANT CHANGE UP (ref 0–41)
ALT FLD-CCNC: 227 U/L — HIGH (ref 0–41)
ALT FLD-CCNC: 234 U/L — HIGH (ref 0–41)
ALT FLD-CCNC: 40 U/L — SIGNIFICANT CHANGE UP (ref 0–41)
ALT FLD-CCNC: 43 U/L — HIGH (ref 0–41)
ALT FLD-CCNC: 47 U/L — HIGH (ref 0–41)
ALT FLD-CCNC: 79 U/L — HIGH (ref 0–41)
ALT FLD-CCNC: 81 U/L — HIGH (ref 0–41)
ALT FLD-CCNC: 82 U/L — HIGH (ref 0–41)
ALT FLD-CCNC: 86 U/L — HIGH (ref 0–41)
ALT FLD-CCNC: 87 U/L — HIGH (ref 0–41)
ALT FLD-CCNC: 93 U/L — HIGH (ref 0–41)
ALT SERPL-CCNC: 33 U/L
AMMONIA BLD-MCNC: 33 UMOL/L — SIGNIFICANT CHANGE UP (ref 11–55)
AMMONIA BLD-MCNC: 38 UMOL/L — SIGNIFICANT CHANGE UP (ref 11–55)
AMMONIA BLD-MCNC: 63 UMOL/L — HIGH (ref 11–55)
AMMONIA BLD-MCNC: 88 UMOL/L — HIGH (ref 11–55)
ANION GAP SERPL CALC-SCNC: -6 MMOL/L — LOW (ref 7–14)
ANION GAP SERPL CALC-SCNC: 10 MMOL/L — SIGNIFICANT CHANGE UP (ref 7–14)
ANION GAP SERPL CALC-SCNC: 10 MMOL/L — SIGNIFICANT CHANGE UP (ref 7–14)
ANION GAP SERPL CALC-SCNC: 11 MMOL/L — SIGNIFICANT CHANGE UP (ref 7–14)
ANION GAP SERPL CALC-SCNC: 12 MMOL/L — SIGNIFICANT CHANGE UP (ref 7–14)
ANION GAP SERPL CALC-SCNC: 13 MMOL/L — SIGNIFICANT CHANGE UP (ref 7–14)
ANION GAP SERPL CALC-SCNC: 14 MMOL/L — SIGNIFICANT CHANGE UP (ref 7–14)
ANION GAP SERPL CALC-SCNC: 15 MMOL/L — HIGH (ref 7–14)
ANION GAP SERPL CALC-SCNC: 15 MMOL/L — HIGH (ref 7–14)
ANION GAP SERPL CALC-SCNC: 16 MMOL/L — HIGH (ref 7–14)
ANION GAP SERPL CALC-SCNC: 17 MMOL/L — HIGH (ref 7–14)
ANION GAP SERPL CALC-SCNC: 9 MMOL/L
APPEARANCE UR: ABNORMAL
APTT BLD: 28.6 SEC — SIGNIFICANT CHANGE UP (ref 27–39.2)
APTT BLD: 38.1 SEC — SIGNIFICANT CHANGE UP (ref 27–39.2)
APTT BLD: 38.2 SEC — SIGNIFICANT CHANGE UP (ref 27–39.2)
APTT BLD: 38.3 SEC — SIGNIFICANT CHANGE UP (ref 27–39.2)
APTT BLD: 39.4 SEC — HIGH (ref 27–39.2)
APTT BLD: 39.5 SEC — HIGH (ref 27–39.2)
AST SERPL-CCNC: 106 U/L — HIGH (ref 0–41)
AST SERPL-CCNC: 12 U/L — SIGNIFICANT CHANGE UP (ref 0–41)
AST SERPL-CCNC: 129 U/L — HIGH (ref 0–41)
AST SERPL-CCNC: 13 U/L — SIGNIFICANT CHANGE UP (ref 0–41)
AST SERPL-CCNC: 141 U/L — HIGH (ref 0–41)
AST SERPL-CCNC: 156 U/L — HIGH (ref 0–41)
AST SERPL-CCNC: 16 U/L — SIGNIFICANT CHANGE UP (ref 0–41)
AST SERPL-CCNC: 16 U/L — SIGNIFICANT CHANGE UP (ref 0–41)
AST SERPL-CCNC: 20 U/L — SIGNIFICANT CHANGE UP (ref 0–41)
AST SERPL-CCNC: 226 U/L — HIGH (ref 0–41)
AST SERPL-CCNC: 25 U/L — SIGNIFICANT CHANGE UP (ref 0–41)
AST SERPL-CCNC: 256 U/L — HIGH (ref 0–41)
AST SERPL-CCNC: 31 U/L
AST SERPL-CCNC: 368 U/L — HIGH (ref 0–41)
AST SERPL-CCNC: 37 U/L — SIGNIFICANT CHANGE UP (ref 0–41)
AST SERPL-CCNC: 42 U/L — HIGH (ref 0–41)
AST SERPL-CCNC: 55 U/L — HIGH (ref 0–41)
AST SERPL-CCNC: 61 U/L — HIGH (ref 0–41)
AST SERPL-CCNC: 64 U/L — HIGH (ref 0–41)
AST SERPL-CCNC: 72 U/L — HIGH (ref 0–41)
AST SERPL-CCNC: 76 U/L — HIGH (ref 0–41)
AST SERPL-CCNC: 77 U/L — HIGH (ref 0–41)
B PERT IGG+IGM PNL SER: ABNORMAL
B PERT IGG+IGM PNL SER: CLEAR — SIGNIFICANT CHANGE UP
BACTERIA # UR AUTO: NEGATIVE — SIGNIFICANT CHANGE UP
BASE EXCESS BLDV CALC-SCNC: 8.9 MMOL/L — HIGH (ref -2–3)
BASOPHILS # BLD AUTO: 0 K/UL — SIGNIFICANT CHANGE UP (ref 0–0.2)
BASOPHILS # BLD AUTO: 0 K/UL — SIGNIFICANT CHANGE UP (ref 0–0.2)
BASOPHILS # BLD AUTO: 0.01 K/UL — SIGNIFICANT CHANGE UP (ref 0–0.2)
BASOPHILS # BLD AUTO: 0.02 K/UL — SIGNIFICANT CHANGE UP (ref 0–0.2)
BASOPHILS # BLD AUTO: 0.02 K/UL — SIGNIFICANT CHANGE UP (ref 0–0.2)
BASOPHILS # BLD AUTO: 0.04 K/UL — SIGNIFICANT CHANGE UP (ref 0–0.2)
BASOPHILS # BLD AUTO: 0.05 K/UL — SIGNIFICANT CHANGE UP (ref 0–0.2)
BASOPHILS NFR BLD AUTO: 0 % — SIGNIFICANT CHANGE UP (ref 0–1)
BASOPHILS NFR BLD AUTO: 0 % — SIGNIFICANT CHANGE UP (ref 0–1)
BASOPHILS NFR BLD AUTO: 0.1 % — SIGNIFICANT CHANGE UP (ref 0–1)
BASOPHILS NFR BLD AUTO: 0.2 % — SIGNIFICANT CHANGE UP (ref 0–1)
BASOPHILS NFR BLD AUTO: 0.4 % — SIGNIFICANT CHANGE UP (ref 0–1)
BASOPHILS NFR BLD AUTO: 0.4 % — SIGNIFICANT CHANGE UP (ref 0–1)
BILIRUB SERPL-MCNC: 0.3 MG/DL
BILIRUB SERPL-MCNC: 0.4 MG/DL — SIGNIFICANT CHANGE UP (ref 0.2–1.2)
BILIRUB SERPL-MCNC: 0.4 MG/DL — SIGNIFICANT CHANGE UP (ref 0.2–1.2)
BILIRUB SERPL-MCNC: 0.5 MG/DL — SIGNIFICANT CHANGE UP (ref 0.2–1.2)
BILIRUB SERPL-MCNC: 0.6 MG/DL — SIGNIFICANT CHANGE UP (ref 0.2–1.2)
BILIRUB SERPL-MCNC: 0.7 MG/DL — SIGNIFICANT CHANGE UP (ref 0.2–1.2)
BILIRUB SERPL-MCNC: 0.8 MG/DL — SIGNIFICANT CHANGE UP (ref 0.2–1.2)
BILIRUB UR-MCNC: NEGATIVE — SIGNIFICANT CHANGE UP
BLD GP AB SCN SERPL QL: SIGNIFICANT CHANGE UP
BUN SERPL-MCNC: 12 MG/DL
BUN SERPL-MCNC: 15 MG/DL — SIGNIFICANT CHANGE UP (ref 10–20)
BUN SERPL-MCNC: 16 MG/DL — SIGNIFICANT CHANGE UP (ref 10–20)
BUN SERPL-MCNC: 17 MG/DL — SIGNIFICANT CHANGE UP (ref 10–20)
BUN SERPL-MCNC: 18 MG/DL — SIGNIFICANT CHANGE UP (ref 10–20)
BUN SERPL-MCNC: 18 MG/DL — SIGNIFICANT CHANGE UP (ref 10–20)
BUN SERPL-MCNC: 20 MG/DL — SIGNIFICANT CHANGE UP (ref 10–20)
BUN SERPL-MCNC: 26 MG/DL — HIGH (ref 10–20)
BUN SERPL-MCNC: 32 MG/DL — HIGH (ref 10–20)
BUN SERPL-MCNC: 33 MG/DL — HIGH (ref 10–20)
BUN SERPL-MCNC: 34 MG/DL — HIGH (ref 10–20)
BUN SERPL-MCNC: 39 MG/DL — HIGH (ref 10–20)
BUN SERPL-MCNC: 40 MG/DL — HIGH (ref 10–20)
BUN SERPL-MCNC: 41 MG/DL — HIGH (ref 10–20)
BUN SERPL-MCNC: 42 MG/DL — HIGH (ref 10–20)
BUN SERPL-MCNC: 43 MG/DL — HIGH (ref 10–20)
BUN SERPL-MCNC: 43 MG/DL — HIGH (ref 10–20)
BUN SERPL-MCNC: 45 MG/DL — HIGH (ref 10–20)
BUN SERPL-MCNC: 45 MG/DL — HIGH (ref 10–20)
BUN SERPL-MCNC: 46 MG/DL — HIGH (ref 10–20)
BUN SERPL-MCNC: 48 MG/DL — HIGH (ref 10–20)
BUN SERPL-MCNC: 52 MG/DL — HIGH (ref 10–20)
BUN SERPL-MCNC: 56 MG/DL — HIGH (ref 10–20)
BUN SERPL-MCNC: 58 MG/DL — HIGH (ref 10–20)
BUN SERPL-MCNC: 67 MG/DL — CRITICAL HIGH (ref 10–20)
CA-I SERPL-SCNC: 1.21 MMOL/L — SIGNIFICANT CHANGE UP (ref 1.15–1.33)
CALCIUM SERPL-MCNC: 7.9 MG/DL — LOW (ref 8.5–10.1)
CALCIUM SERPL-MCNC: 7.9 MG/DL — LOW (ref 8.5–10.1)
CALCIUM SERPL-MCNC: 8 MG/DL — LOW (ref 8.5–10.1)
CALCIUM SERPL-MCNC: 8.1 MG/DL — LOW (ref 8.5–10.1)
CALCIUM SERPL-MCNC: 8.2 MG/DL
CALCIUM SERPL-MCNC: 8.2 MG/DL — LOW (ref 8.5–10.1)
CALCIUM SERPL-MCNC: 8.3 MG/DL — LOW (ref 8.5–10.1)
CALCIUM SERPL-MCNC: 8.4 MG/DL — LOW (ref 8.5–10.1)
CALCIUM SERPL-MCNC: 8.5 MG/DL — SIGNIFICANT CHANGE UP (ref 8.5–10.1)
CALCIUM SERPL-MCNC: 8.6 MG/DL — SIGNIFICANT CHANGE UP (ref 8.5–10.1)
CALCIUM SERPL-MCNC: 8.7 MG/DL — SIGNIFICANT CHANGE UP (ref 8.5–10.1)
CALCIUM SERPL-MCNC: 8.7 MG/DL — SIGNIFICANT CHANGE UP (ref 8.5–10.1)
CALCIUM SERPL-MCNC: 8.8 MG/DL — SIGNIFICANT CHANGE UP (ref 8.5–10.1)
CALCIUM SERPL-MCNC: 8.9 MG/DL — SIGNIFICANT CHANGE UP (ref 8.5–10.1)
CALCIUM SERPL-MCNC: 9 MG/DL — SIGNIFICANT CHANGE UP (ref 8.5–10.1)
CANCER AG19-9 SERPL-ACNC: 38 U/ML — HIGH
CANCER AG19-9 SERPL-ACNC: 42 U/ML — HIGH
CANCER AG19-9 SERPL-ACNC: 57 U/ML
CHLORIDE SERPL-SCNC: 101 MMOL/L — SIGNIFICANT CHANGE UP (ref 98–110)
CHLORIDE SERPL-SCNC: 103 MMOL/L — SIGNIFICANT CHANGE UP (ref 98–110)
CHLORIDE SERPL-SCNC: 103 MMOL/L — SIGNIFICANT CHANGE UP (ref 98–110)
CHLORIDE SERPL-SCNC: 104 MMOL/L — SIGNIFICANT CHANGE UP (ref 98–110)
CHLORIDE SERPL-SCNC: 105 MMOL/L
CHLORIDE SERPL-SCNC: 105 MMOL/L — SIGNIFICANT CHANGE UP (ref 98–110)
CHLORIDE SERPL-SCNC: 107 MMOL/L — SIGNIFICANT CHANGE UP (ref 98–110)
CHLORIDE SERPL-SCNC: 107 MMOL/L — SIGNIFICANT CHANGE UP (ref 98–110)
CHLORIDE SERPL-SCNC: 108 MMOL/L — SIGNIFICANT CHANGE UP (ref 98–110)
CHLORIDE SERPL-SCNC: 108 MMOL/L — SIGNIFICANT CHANGE UP (ref 98–110)
CHLORIDE SERPL-SCNC: 109 MMOL/L — SIGNIFICANT CHANGE UP (ref 98–110)
CHLORIDE SERPL-SCNC: 110 MMOL/L — SIGNIFICANT CHANGE UP (ref 98–110)
CHLORIDE SERPL-SCNC: 110 MMOL/L — SIGNIFICANT CHANGE UP (ref 98–110)
CHLORIDE SERPL-SCNC: 112 MMOL/L — HIGH (ref 98–110)
CO2 SERPL-SCNC: 21 MMOL/L — SIGNIFICANT CHANGE UP (ref 17–32)
CO2 SERPL-SCNC: 22 MMOL/L — SIGNIFICANT CHANGE UP (ref 17–32)
CO2 SERPL-SCNC: 22 MMOL/L — SIGNIFICANT CHANGE UP (ref 17–32)
CO2 SERPL-SCNC: 23 MMOL/L — SIGNIFICANT CHANGE UP (ref 17–32)
CO2 SERPL-SCNC: 23 MMOL/L — SIGNIFICANT CHANGE UP (ref 17–32)
CO2 SERPL-SCNC: 24 MMOL/L — SIGNIFICANT CHANGE UP (ref 17–32)
CO2 SERPL-SCNC: 25 MMOL/L — SIGNIFICANT CHANGE UP (ref 17–32)
CO2 SERPL-SCNC: 26 MMOL/L — SIGNIFICANT CHANGE UP (ref 17–32)
CO2 SERPL-SCNC: 27 MMOL/L
CO2 SERPL-SCNC: 27 MMOL/L — SIGNIFICANT CHANGE UP (ref 17–32)
CO2 SERPL-SCNC: 28 MMOL/L — SIGNIFICANT CHANGE UP (ref 17–32)
COD CRY URNS QL: ABNORMAL
COLOR FLD: YELLOW — SIGNIFICANT CHANGE UP
COLOR FLD: YELLOW — SIGNIFICANT CHANGE UP
COLOR SPEC: YELLOW — SIGNIFICANT CHANGE UP
CREAT SERPL-MCNC: 0.5 MG/DL — LOW (ref 0.7–1.5)
CREAT SERPL-MCNC: 0.5 MG/DL — LOW (ref 0.7–1.5)
CREAT SERPL-MCNC: 0.6 MG/DL — LOW (ref 0.7–1.5)
CREAT SERPL-MCNC: 0.7 MG/DL — SIGNIFICANT CHANGE UP (ref 0.7–1.5)
CREAT SERPL-MCNC: 0.8 MG/DL — SIGNIFICANT CHANGE UP (ref 0.7–1.5)
CREAT SERPL-MCNC: <0.5 MG/DL
CULTURE RESULTS: SIGNIFICANT CHANGE UP
D DIMER BLD IA.RAPID-MCNC: 1104 NG/ML DDU — HIGH (ref 0–230)
DIFF PNL FLD: ABNORMAL
EGFR: 75 ML/MIN/1.73M2 — SIGNIFICANT CHANGE UP
EGFR: 88 ML/MIN/1.73M2 — SIGNIFICANT CHANGE UP
EGFR: 92 ML/MIN/1.73M2 — SIGNIFICANT CHANGE UP
EGFR: 96 ML/MIN/1.73M2 — SIGNIFICANT CHANGE UP
EGFR: 96 ML/MIN/1.73M2 — SIGNIFICANT CHANGE UP
EOSINOPHIL # BLD AUTO: 0.01 K/UL — SIGNIFICANT CHANGE UP (ref 0–0.7)
EOSINOPHIL # BLD AUTO: 0.01 K/UL — SIGNIFICANT CHANGE UP (ref 0–0.7)
EOSINOPHIL # BLD AUTO: 0.02 K/UL — SIGNIFICANT CHANGE UP (ref 0–0.7)
EOSINOPHIL # BLD AUTO: 0.03 K/UL — SIGNIFICANT CHANGE UP (ref 0–0.7)
EOSINOPHIL # BLD AUTO: 0.04 K/UL — SIGNIFICANT CHANGE UP (ref 0–0.7)
EOSINOPHIL # BLD AUTO: 0.04 K/UL — SIGNIFICANT CHANGE UP (ref 0–0.7)
EOSINOPHIL # BLD AUTO: 0.05 K/UL — SIGNIFICANT CHANGE UP (ref 0–0.7)
EOSINOPHIL # BLD AUTO: 0.07 K/UL — SIGNIFICANT CHANGE UP (ref 0–0.7)
EOSINOPHIL # BLD AUTO: 0.08 K/UL — SIGNIFICANT CHANGE UP (ref 0–0.7)
EOSINOPHIL # BLD AUTO: 0.08 K/UL — SIGNIFICANT CHANGE UP (ref 0–0.7)
EOSINOPHIL # BLD AUTO: 0.12 K/UL — SIGNIFICANT CHANGE UP (ref 0–0.7)
EOSINOPHIL NFR BLD AUTO: 0.1 % — SIGNIFICANT CHANGE UP (ref 0–8)
EOSINOPHIL NFR BLD AUTO: 0.1 % — SIGNIFICANT CHANGE UP (ref 0–8)
EOSINOPHIL NFR BLD AUTO: 0.2 % — SIGNIFICANT CHANGE UP (ref 0–8)
EOSINOPHIL NFR BLD AUTO: 0.3 % — SIGNIFICANT CHANGE UP (ref 0–8)
EOSINOPHIL NFR BLD AUTO: 0.3 % — SIGNIFICANT CHANGE UP (ref 0–8)
EOSINOPHIL NFR BLD AUTO: 0.4 % — SIGNIFICANT CHANGE UP (ref 0–8)
EOSINOPHIL NFR BLD AUTO: 0.5 % — SIGNIFICANT CHANGE UP (ref 0–8)
EOSINOPHIL NFR BLD AUTO: 0.8 % — SIGNIFICANT CHANGE UP (ref 0–8)
EOSINOPHIL NFR BLD AUTO: 0.9 % — SIGNIFICANT CHANGE UP (ref 0–8)
EOSINOPHIL NFR BLD AUTO: 1 % — SIGNIFICANT CHANGE UP (ref 0–8)
EOSINOPHIL NFR BLD AUTO: 1.1 % — SIGNIFICANT CHANGE UP (ref 0–8)
EOSINOPHIL NFR BLD AUTO: 1.3 % — SIGNIFICANT CHANGE UP (ref 0–8)
EPI CELLS # UR: 3 /HPF — SIGNIFICANT CHANGE UP (ref 0–5)
GAS PNL BLDA: SIGNIFICANT CHANGE UP
GAS PNL BLDV: 134 MMOL/L — LOW (ref 136–145)
GAS PNL BLDV: SIGNIFICANT CHANGE UP
GLUCOSE BLDC GLUCOMTR-MCNC: 109 MG/DL — HIGH (ref 70–99)
GLUCOSE BLDC GLUCOMTR-MCNC: 112 MG/DL — HIGH (ref 70–99)
GLUCOSE BLDC GLUCOMTR-MCNC: 113 MG/DL — HIGH (ref 70–99)
GLUCOSE BLDC GLUCOMTR-MCNC: 121 MG/DL — HIGH (ref 70–99)
GLUCOSE BLDC GLUCOMTR-MCNC: 122 MG/DL — HIGH (ref 70–99)
GLUCOSE BLDC GLUCOMTR-MCNC: 122 MG/DL — HIGH (ref 70–99)
GLUCOSE BLDC GLUCOMTR-MCNC: 124 MG/DL — HIGH (ref 70–99)
GLUCOSE BLDC GLUCOMTR-MCNC: 127 MG/DL — HIGH (ref 70–99)
GLUCOSE BLDC GLUCOMTR-MCNC: 139 MG/DL — HIGH (ref 70–99)
GLUCOSE BLDC GLUCOMTR-MCNC: 140 MG/DL — HIGH (ref 70–99)
GLUCOSE BLDC GLUCOMTR-MCNC: 160 MG/DL — HIGH (ref 70–99)
GLUCOSE BLDC GLUCOMTR-MCNC: 181 MG/DL — HIGH (ref 70–99)
GLUCOSE BLDC GLUCOMTR-MCNC: 182 MG/DL — HIGH (ref 70–99)
GLUCOSE BLDC GLUCOMTR-MCNC: 183 MG/DL — HIGH (ref 70–99)
GLUCOSE BLDC GLUCOMTR-MCNC: 188 MG/DL — HIGH (ref 70–99)
GLUCOSE BLDC GLUCOMTR-MCNC: 218 MG/DL — HIGH (ref 70–99)
GLUCOSE BLDC GLUCOMTR-MCNC: 228 MG/DL — HIGH (ref 70–99)
GLUCOSE BLDC GLUCOMTR-MCNC: 33 MG/DL — CRITICAL LOW (ref 70–99)
GLUCOSE BLDC GLUCOMTR-MCNC: 49 MG/DL — CRITICAL LOW (ref 70–99)
GLUCOSE BLDC GLUCOMTR-MCNC: 87 MG/DL — SIGNIFICANT CHANGE UP (ref 70–99)
GLUCOSE BLDC GLUCOMTR-MCNC: 88 MG/DL — SIGNIFICANT CHANGE UP (ref 70–99)
GLUCOSE BLDC GLUCOMTR-MCNC: 95 MG/DL — SIGNIFICANT CHANGE UP (ref 70–99)
GLUCOSE BLDC GLUCOMTR-MCNC: 96 MG/DL — SIGNIFICANT CHANGE UP (ref 70–99)
GLUCOSE FLD-MCNC: 118 MG/DL — SIGNIFICANT CHANGE UP
GLUCOSE FLD-MCNC: 135 MG/DL — SIGNIFICANT CHANGE UP
GLUCOSE SERPL-MCNC: 104 MG/DL — HIGH (ref 70–99)
GLUCOSE SERPL-MCNC: 106 MG/DL — HIGH (ref 70–99)
GLUCOSE SERPL-MCNC: 114 MG/DL — HIGH (ref 70–99)
GLUCOSE SERPL-MCNC: 115 MG/DL — HIGH (ref 70–99)
GLUCOSE SERPL-MCNC: 115 MG/DL — HIGH (ref 70–99)
GLUCOSE SERPL-MCNC: 119 MG/DL — HIGH (ref 70–99)
GLUCOSE SERPL-MCNC: 120 MG/DL — HIGH (ref 70–99)
GLUCOSE SERPL-MCNC: 124 MG/DL — HIGH (ref 70–99)
GLUCOSE SERPL-MCNC: 130 MG/DL — HIGH (ref 70–99)
GLUCOSE SERPL-MCNC: 150 MG/DL — HIGH (ref 70–99)
GLUCOSE SERPL-MCNC: 168 MG/DL — HIGH (ref 70–99)
GLUCOSE SERPL-MCNC: 71 MG/DL — SIGNIFICANT CHANGE UP (ref 70–99)
GLUCOSE SERPL-MCNC: 71 MG/DL — SIGNIFICANT CHANGE UP (ref 70–99)
GLUCOSE SERPL-MCNC: 73 MG/DL — SIGNIFICANT CHANGE UP (ref 70–99)
GLUCOSE SERPL-MCNC: 79 MG/DL — SIGNIFICANT CHANGE UP (ref 70–99)
GLUCOSE SERPL-MCNC: 84 MG/DL — SIGNIFICANT CHANGE UP (ref 70–99)
GLUCOSE SERPL-MCNC: 86 MG/DL — SIGNIFICANT CHANGE UP (ref 70–99)
GLUCOSE SERPL-MCNC: 87 MG/DL — SIGNIFICANT CHANGE UP (ref 70–99)
GLUCOSE SERPL-MCNC: 87 MG/DL — SIGNIFICANT CHANGE UP (ref 70–99)
GLUCOSE SERPL-MCNC: 88 MG/DL — SIGNIFICANT CHANGE UP (ref 70–99)
GLUCOSE SERPL-MCNC: 92 MG/DL — SIGNIFICANT CHANGE UP (ref 70–99)
GLUCOSE SERPL-MCNC: 92 MG/DL — SIGNIFICANT CHANGE UP (ref 70–99)
GLUCOSE SERPL-MCNC: 93 MG/DL — SIGNIFICANT CHANGE UP (ref 70–99)
GLUCOSE SERPL-MCNC: 94 MG/DL
GLUCOSE SERPL-MCNC: 95 MG/DL — SIGNIFICANT CHANGE UP (ref 70–99)
GLUCOSE UR QL: NEGATIVE — SIGNIFICANT CHANGE UP
GRAM STN FLD: SIGNIFICANT CHANGE UP
GRAM STN FLD: SIGNIFICANT CHANGE UP
HCO3 BLDV-SCNC: 37 MMOL/L — HIGH (ref 22–29)
HCT VFR BLD CALC: 30 % — LOW (ref 37–47)
HCT VFR BLD CALC: 31 % — LOW (ref 37–47)
HCT VFR BLD CALC: 32.5 % — LOW (ref 37–47)
HCT VFR BLD CALC: 32.6 % — LOW (ref 37–47)
HCT VFR BLD CALC: 32.6 % — LOW (ref 37–47)
HCT VFR BLD CALC: 32.9 % — LOW (ref 37–47)
HCT VFR BLD CALC: 33.5 % — LOW (ref 37–47)
HCT VFR BLD CALC: 33.8 %
HCT VFR BLD CALC: 34.1 % — LOW (ref 37–47)
HCT VFR BLD CALC: 34.4 % — LOW (ref 37–47)
HCT VFR BLD CALC: 34.4 % — LOW (ref 37–47)
HCT VFR BLD CALC: 34.8 % — LOW (ref 37–47)
HCT VFR BLD CALC: 35.2 % — LOW (ref 37–47)
HCT VFR BLD CALC: 35.4 % — LOW (ref 37–47)
HCT VFR BLD CALC: 35.4 % — LOW (ref 37–47)
HCT VFR BLD CALC: 36.4 % — LOW (ref 37–47)
HCT VFR BLD CALC: 36.5 % — LOW (ref 37–47)
HCT VFR BLD CALC: 38.4 % — SIGNIFICANT CHANGE UP (ref 37–47)
HCT VFR BLD CALC: 39.7 % — SIGNIFICANT CHANGE UP (ref 37–47)
HCT VFR BLD CALC: 39.8 % — SIGNIFICANT CHANGE UP (ref 37–47)
HCT VFR BLD CALC: 41.4 % — SIGNIFICANT CHANGE UP (ref 37–47)
HCT VFR BLD CALC: 45.3 % — SIGNIFICANT CHANGE UP (ref 37–47)
HCT VFR BLDA CALC: 38 % — LOW (ref 39–51)
HGB BLD CALC-MCNC: 12.8 G/DL — SIGNIFICANT CHANGE UP (ref 12.6–17.4)
HGB BLD-MCNC: 10 G/DL — LOW (ref 12–16)
HGB BLD-MCNC: 10.2 G/DL — LOW (ref 12–16)
HGB BLD-MCNC: 10.3 G/DL — LOW (ref 12–16)
HGB BLD-MCNC: 10.4 G/DL — LOW (ref 12–16)
HGB BLD-MCNC: 10.4 G/DL — LOW (ref 12–16)
HGB BLD-MCNC: 10.7 G/DL
HGB BLD-MCNC: 10.8 G/DL — LOW (ref 12–16)
HGB BLD-MCNC: 10.9 G/DL — LOW (ref 12–16)
HGB BLD-MCNC: 10.9 G/DL — LOW (ref 12–16)
HGB BLD-MCNC: 11 G/DL — LOW (ref 12–16)
HGB BLD-MCNC: 11.2 G/DL — LOW (ref 12–16)
HGB BLD-MCNC: 11.3 G/DL — LOW (ref 12–16)
HGB BLD-MCNC: 11.8 G/DL — LOW (ref 12–16)
HGB BLD-MCNC: 12.4 G/DL — SIGNIFICANT CHANGE UP (ref 12–16)
HGB BLD-MCNC: 12.5 G/DL — SIGNIFICANT CHANGE UP (ref 12–16)
HGB BLD-MCNC: 12.5 G/DL — SIGNIFICANT CHANGE UP (ref 12–16)
HGB BLD-MCNC: 14.1 G/DL — SIGNIFICANT CHANGE UP (ref 12–16)
HGB BLD-MCNC: 9.2 G/DL — LOW (ref 12–16)
HGB BLD-MCNC: 9.6 G/DL — LOW (ref 12–16)
HGB BLD-MCNC: 9.9 G/DL — LOW (ref 12–16)
HYALINE CASTS # UR AUTO: 24 /LPF — HIGH (ref 0–7)
IMM GRANULOCYTES NFR BLD AUTO: 0.3 % — SIGNIFICANT CHANGE UP (ref 0.1–0.3)
IMM GRANULOCYTES NFR BLD AUTO: 0.4 % — HIGH (ref 0.1–0.3)
IMM GRANULOCYTES NFR BLD AUTO: 0.5 % — HIGH (ref 0.1–0.3)
IMM GRANULOCYTES NFR BLD AUTO: 0.6 % — HIGH (ref 0.1–0.3)
IMM GRANULOCYTES NFR BLD AUTO: 0.7 % — HIGH (ref 0.1–0.3)
INR BLD: 1.56 RATIO — HIGH (ref 0.65–1.3)
INR BLD: 1.66 RATIO — HIGH (ref 0.65–1.3)
INR BLD: 1.7 RATIO — HIGH (ref 0.65–1.3)
INR BLD: 2.31 RATIO — HIGH (ref 0.65–1.3)
INR BLD: 2.36 RATIO — HIGH (ref 0.65–1.3)
INR BLD: 2.48 RATIO — HIGH (ref 0.65–1.3)
INR BLD: 2.93 RATIO — HIGH (ref 0.65–1.3)
KETONES UR-MCNC: NEGATIVE — SIGNIFICANT CHANGE UP
LACTATE BLDV-MCNC: 2.8 MMOL/L — HIGH (ref 0.5–2)
LDH SERPL L TO P-CCNC: 209 — SIGNIFICANT CHANGE UP (ref 50–242)
LDH SERPL L TO P-CCNC: 84 U/L — SIGNIFICANT CHANGE UP
LDH SERPL L TO P-CCNC: 85 U/L — SIGNIFICANT CHANGE UP
LEUKOCYTE ESTERASE UR-ACNC: ABNORMAL
LYMPHOCYTES # BLD AUTO: 1.04 K/UL — LOW (ref 1.2–3.4)
LYMPHOCYTES # BLD AUTO: 1.05 K/UL — LOW (ref 1.2–3.4)
LYMPHOCYTES # BLD AUTO: 1.1 K/UL — LOW (ref 1.2–3.4)
LYMPHOCYTES # BLD AUTO: 1.22 K/UL — SIGNIFICANT CHANGE UP (ref 1.2–3.4)
LYMPHOCYTES # BLD AUTO: 1.35 K/UL — SIGNIFICANT CHANGE UP (ref 1.2–3.4)
LYMPHOCYTES # BLD AUTO: 1.43 K/UL — SIGNIFICANT CHANGE UP (ref 1.2–3.4)
LYMPHOCYTES # BLD AUTO: 1.52 K/UL — SIGNIFICANT CHANGE UP (ref 1.2–3.4)
LYMPHOCYTES # BLD AUTO: 1.59 K/UL — SIGNIFICANT CHANGE UP (ref 1.2–3.4)
LYMPHOCYTES # BLD AUTO: 1.6 K/UL — SIGNIFICANT CHANGE UP (ref 1.2–3.4)
LYMPHOCYTES # BLD AUTO: 1.62 K/UL — SIGNIFICANT CHANGE UP (ref 1.2–3.4)
LYMPHOCYTES # BLD AUTO: 1.67 K/UL — SIGNIFICANT CHANGE UP (ref 1.2–3.4)
LYMPHOCYTES # BLD AUTO: 1.7 K/UL — SIGNIFICANT CHANGE UP (ref 1.2–3.4)
LYMPHOCYTES # BLD AUTO: 1.77 K/UL — SIGNIFICANT CHANGE UP (ref 1.2–3.4)
LYMPHOCYTES # BLD AUTO: 1.81 K/UL — SIGNIFICANT CHANGE UP (ref 1.2–3.4)
LYMPHOCYTES # BLD AUTO: 1.89 K/UL — SIGNIFICANT CHANGE UP (ref 1.2–3.4)
LYMPHOCYTES # BLD AUTO: 1.93 K/UL — SIGNIFICANT CHANGE UP (ref 1.2–3.4)
LYMPHOCYTES # BLD AUTO: 11.5 % — LOW (ref 20.5–51.1)
LYMPHOCYTES # BLD AUTO: 14.1 % — LOW (ref 20.5–51.1)
LYMPHOCYTES # BLD AUTO: 14.9 % — LOW (ref 20.5–51.1)
LYMPHOCYTES # BLD AUTO: 15 % — LOW (ref 20.5–51.1)
LYMPHOCYTES # BLD AUTO: 15.6 % — LOW (ref 20.5–51.1)
LYMPHOCYTES # BLD AUTO: 16.6 % — LOW (ref 20.5–51.1)
LYMPHOCYTES # BLD AUTO: 18.9 % — LOW (ref 20.5–51.1)
LYMPHOCYTES # BLD AUTO: 19.6 % — LOW (ref 20.5–51.1)
LYMPHOCYTES # BLD AUTO: 20.6 % — SIGNIFICANT CHANGE UP (ref 20.5–51.1)
LYMPHOCYTES # BLD AUTO: 21 % — SIGNIFICANT CHANGE UP (ref 20.5–51.1)
LYMPHOCYTES # BLD AUTO: 21.1 % — SIGNIFICANT CHANGE UP (ref 20.5–51.1)
LYMPHOCYTES # BLD AUTO: 21.2 % — SIGNIFICANT CHANGE UP (ref 20.5–51.1)
LYMPHOCYTES # BLD AUTO: 21.2 % — SIGNIFICANT CHANGE UP (ref 20.5–51.1)
LYMPHOCYTES # BLD AUTO: 23 % — SIGNIFICANT CHANGE UP (ref 20.5–51.1)
LYMPHOCYTES # BLD AUTO: 23.1 % — SIGNIFICANT CHANGE UP (ref 20.5–51.1)
LYMPHOCYTES # BLD AUTO: 8.4 % — LOW (ref 20.5–51.1)
LYMPHOCYTES # FLD: 24 — SIGNIFICANT CHANGE UP
LYMPHOCYTES # FLD: SIGNIFICANT CHANGE UP
MAGNESIUM SERPL-MCNC: 1.6 MG/DL — LOW (ref 1.8–2.4)
MAGNESIUM SERPL-MCNC: 2 MG/DL — SIGNIFICANT CHANGE UP (ref 1.8–2.4)
MAGNESIUM SERPL-MCNC: 2 MG/DL — SIGNIFICANT CHANGE UP (ref 1.8–2.4)
MAGNESIUM SERPL-MCNC: 2.1 MG/DL — SIGNIFICANT CHANGE UP (ref 1.8–2.4)
MAGNESIUM SERPL-MCNC: 2.2 MG/DL — SIGNIFICANT CHANGE UP (ref 1.8–2.4)
MAGNESIUM SERPL-MCNC: 2.3 MG/DL — SIGNIFICANT CHANGE UP (ref 1.8–2.4)
MAGNESIUM SERPL-MCNC: 2.4 MG/DL — SIGNIFICANT CHANGE UP (ref 1.8–2.4)
MAGNESIUM SERPL-MCNC: 2.5 MG/DL — HIGH (ref 1.8–2.4)
MCHC RBC-ENTMCNC: 24.6 PG — LOW (ref 27–31)
MCHC RBC-ENTMCNC: 24.8 PG — LOW (ref 27–31)
MCHC RBC-ENTMCNC: 24.8 PG — LOW (ref 27–31)
MCHC RBC-ENTMCNC: 24.9 PG — LOW (ref 27–31)
MCHC RBC-ENTMCNC: 25 PG — LOW (ref 27–31)
MCHC RBC-ENTMCNC: 25 PG — LOW (ref 27–31)
MCHC RBC-ENTMCNC: 25.1 PG — LOW (ref 27–31)
MCHC RBC-ENTMCNC: 25.3 PG — LOW (ref 27–31)
MCHC RBC-ENTMCNC: 25.5 PG — LOW (ref 27–31)
MCHC RBC-ENTMCNC: 25.6 PG — LOW (ref 27–31)
MCHC RBC-ENTMCNC: 25.7 PG — LOW (ref 27–31)
MCHC RBC-ENTMCNC: 25.8 PG — LOW (ref 27–31)
MCHC RBC-ENTMCNC: 25.9 PG — LOW (ref 27–31)
MCHC RBC-ENTMCNC: 27.4 PG
MCHC RBC-ENTMCNC: 29.9 G/DL — LOW (ref 32–37)
MCHC RBC-ENTMCNC: 30.2 G/DL — LOW (ref 32–37)
MCHC RBC-ENTMCNC: 30.2 G/DL — LOW (ref 32–37)
MCHC RBC-ENTMCNC: 30.4 G/DL — LOW (ref 32–37)
MCHC RBC-ENTMCNC: 30.7 G/DL — LOW (ref 32–37)
MCHC RBC-ENTMCNC: 31 G/DL — LOW (ref 32–37)
MCHC RBC-ENTMCNC: 31.1 G/DL — LOW (ref 32–37)
MCHC RBC-ENTMCNC: 31.1 G/DL — LOW (ref 32–37)
MCHC RBC-ENTMCNC: 31.2 G/DL — LOW (ref 32–37)
MCHC RBC-ENTMCNC: 31.3 G/DL — LOW (ref 32–37)
MCHC RBC-ENTMCNC: 31.3 G/DL — LOW (ref 32–37)
MCHC RBC-ENTMCNC: 31.4 G/DL — LOW (ref 32–37)
MCHC RBC-ENTMCNC: 31.6 G/DL — LOW (ref 32–37)
MCHC RBC-ENTMCNC: 31.7 G/DL
MCHC RBC-ENTMCNC: 31.7 G/DL — LOW (ref 32–37)
MCV RBC AUTO: 79.9 FL — LOW (ref 81–99)
MCV RBC AUTO: 80.2 FL — LOW (ref 81–99)
MCV RBC AUTO: 80.8 FL — LOW (ref 81–99)
MCV RBC AUTO: 80.9 FL — LOW (ref 81–99)
MCV RBC AUTO: 80.9 FL — LOW (ref 81–99)
MCV RBC AUTO: 81.2 FL — SIGNIFICANT CHANGE UP (ref 81–99)
MCV RBC AUTO: 81.3 FL — SIGNIFICANT CHANGE UP (ref 81–99)
MCV RBC AUTO: 81.3 FL — SIGNIFICANT CHANGE UP (ref 81–99)
MCV RBC AUTO: 81.4 FL — SIGNIFICANT CHANGE UP (ref 81–99)
MCV RBC AUTO: 81.5 FL — SIGNIFICANT CHANGE UP (ref 81–99)
MCV RBC AUTO: 81.6 FL — SIGNIFICANT CHANGE UP (ref 81–99)
MCV RBC AUTO: 81.6 FL — SIGNIFICANT CHANGE UP (ref 81–99)
MCV RBC AUTO: 81.7 FL — SIGNIFICANT CHANGE UP (ref 81–99)
MCV RBC AUTO: 81.8 FL — SIGNIFICANT CHANGE UP (ref 81–99)
MCV RBC AUTO: 81.9 FL — SIGNIFICANT CHANGE UP (ref 81–99)
MCV RBC AUTO: 81.9 FL — SIGNIFICANT CHANGE UP (ref 81–99)
MCV RBC AUTO: 82.3 FL — SIGNIFICANT CHANGE UP (ref 81–99)
MCV RBC AUTO: 82.5 FL — SIGNIFICANT CHANGE UP (ref 81–99)
MCV RBC AUTO: 82.6 FL — SIGNIFICANT CHANGE UP (ref 81–99)
MCV RBC AUTO: 83 FL — SIGNIFICANT CHANGE UP (ref 81–99)
MCV RBC AUTO: 83.1 FL — SIGNIFICANT CHANGE UP (ref 81–99)
MCV RBC AUTO: 86.4 FL
MESOTHL CELL # FLD: 2 % — SIGNIFICANT CHANGE UP
MESOTHL CELL # FLD: SIGNIFICANT CHANGE UP %
MONOCYTES # BLD AUTO: 0.41 K/UL — SIGNIFICANT CHANGE UP (ref 0.1–0.6)
MONOCYTES # BLD AUTO: 0.43 K/UL — SIGNIFICANT CHANGE UP (ref 0.1–0.6)
MONOCYTES # BLD AUTO: 0.56 K/UL — SIGNIFICANT CHANGE UP (ref 0.1–0.6)
MONOCYTES # BLD AUTO: 0.58 K/UL — SIGNIFICANT CHANGE UP (ref 0.1–0.6)
MONOCYTES # BLD AUTO: 0.58 K/UL — SIGNIFICANT CHANGE UP (ref 0.1–0.6)
MONOCYTES # BLD AUTO: 0.6 K/UL — SIGNIFICANT CHANGE UP (ref 0.1–0.6)
MONOCYTES # BLD AUTO: 0.66 K/UL — HIGH (ref 0.1–0.6)
MONOCYTES # BLD AUTO: 0.67 K/UL — HIGH (ref 0.1–0.6)
MONOCYTES # BLD AUTO: 0.67 K/UL — HIGH (ref 0.1–0.6)
MONOCYTES # BLD AUTO: 0.68 K/UL — HIGH (ref 0.1–0.6)
MONOCYTES # BLD AUTO: 0.69 K/UL — HIGH (ref 0.1–0.6)
MONOCYTES # BLD AUTO: 0.71 K/UL — HIGH (ref 0.1–0.6)
MONOCYTES # BLD AUTO: 0.75 K/UL — HIGH (ref 0.1–0.6)
MONOCYTES # BLD AUTO: 0.81 K/UL — HIGH (ref 0.1–0.6)
MONOCYTES # BLD AUTO: 0.89 K/UL — HIGH (ref 0.1–0.6)
MONOCYTES # BLD AUTO: 1.11 K/UL — HIGH (ref 0.1–0.6)
MONOCYTES NFR BLD AUTO: 4.9 % — SIGNIFICANT CHANGE UP (ref 1.7–9.3)
MONOCYTES NFR BLD AUTO: 5 % — SIGNIFICANT CHANGE UP (ref 1.7–9.3)
MONOCYTES NFR BLD AUTO: 6.5 % — SIGNIFICANT CHANGE UP (ref 1.7–9.3)
MONOCYTES NFR BLD AUTO: 6.9 % — SIGNIFICANT CHANGE UP (ref 1.7–9.3)
MONOCYTES NFR BLD AUTO: 7.4 % — SIGNIFICANT CHANGE UP (ref 1.7–9.3)
MONOCYTES NFR BLD AUTO: 7.6 % — SIGNIFICANT CHANGE UP (ref 1.7–9.3)
MONOCYTES NFR BLD AUTO: 7.6 % — SIGNIFICANT CHANGE UP (ref 1.7–9.3)
MONOCYTES NFR BLD AUTO: 7.8 % — SIGNIFICANT CHANGE UP (ref 1.7–9.3)
MONOCYTES NFR BLD AUTO: 7.9 % — SIGNIFICANT CHANGE UP (ref 1.7–9.3)
MONOCYTES NFR BLD AUTO: 8.2 % — SIGNIFICANT CHANGE UP (ref 1.7–9.3)
MONOCYTES NFR BLD AUTO: 8.6 % — SIGNIFICANT CHANGE UP (ref 1.7–9.3)
MONOCYTES NFR BLD AUTO: 8.8 % — SIGNIFICANT CHANGE UP (ref 1.7–9.3)
MONOCYTES NFR BLD AUTO: 9 % — SIGNIFICANT CHANGE UP (ref 1.7–9.3)
MONOCYTES NFR BLD AUTO: 9.2 % — SIGNIFICANT CHANGE UP (ref 1.7–9.3)
MONOCYTES NFR BLD AUTO: 9.3 % — SIGNIFICANT CHANGE UP (ref 1.7–9.3)
MONOCYTES NFR BLD AUTO: 9.5 % — HIGH (ref 1.7–9.3)
MONOS+MACROS # FLD: 59 % — SIGNIFICANT CHANGE UP
MONOS+MACROS # FLD: SIGNIFICANT CHANGE UP %
NEUTROPHILS # BLD AUTO: 12.1 K/UL — HIGH (ref 1.4–6.5)
NEUTROPHILS # BLD AUTO: 4.43 K/UL — SIGNIFICANT CHANGE UP (ref 1.4–6.5)
NEUTROPHILS # BLD AUTO: 4.69 K/UL — SIGNIFICANT CHANGE UP (ref 1.4–6.5)
NEUTROPHILS # BLD AUTO: 4.79 K/UL — SIGNIFICANT CHANGE UP (ref 1.4–6.5)
NEUTROPHILS # BLD AUTO: 5.2 K/UL — SIGNIFICANT CHANGE UP (ref 1.4–6.5)
NEUTROPHILS # BLD AUTO: 5.44 K/UL — SIGNIFICANT CHANGE UP (ref 1.4–6.5)
NEUTROPHILS # BLD AUTO: 5.7 K/UL — SIGNIFICANT CHANGE UP (ref 1.4–6.5)
NEUTROPHILS # BLD AUTO: 5.74 K/UL — SIGNIFICANT CHANGE UP (ref 1.4–6.5)
NEUTROPHILS # BLD AUTO: 6.03 K/UL — SIGNIFICANT CHANGE UP (ref 1.4–6.5)
NEUTROPHILS # BLD AUTO: 6.04 K/UL — SIGNIFICANT CHANGE UP (ref 1.4–6.5)
NEUTROPHILS # BLD AUTO: 6.12 K/UL — SIGNIFICANT CHANGE UP (ref 1.4–6.5)
NEUTROPHILS # BLD AUTO: 6.25 K/UL — SIGNIFICANT CHANGE UP (ref 1.4–6.5)
NEUTROPHILS # BLD AUTO: 6.53 K/UL — HIGH (ref 1.4–6.5)
NEUTROPHILS # BLD AUTO: 7.24 K/UL — HIGH (ref 1.4–6.5)
NEUTROPHILS # BLD AUTO: 8.86 K/UL — HIGH (ref 1.4–6.5)
NEUTROPHILS # BLD AUTO: 9.33 K/UL — HIGH (ref 1.4–6.5)
NEUTROPHILS NFR BLD AUTO: 67.3 % — SIGNIFICANT CHANGE UP (ref 42.2–75.2)
NEUTROPHILS NFR BLD AUTO: 68.4 % — SIGNIFICANT CHANGE UP (ref 42.2–75.2)
NEUTROPHILS NFR BLD AUTO: 68.8 % — SIGNIFICANT CHANGE UP (ref 42.2–75.2)
NEUTROPHILS NFR BLD AUTO: 69 % — SIGNIFICANT CHANGE UP (ref 42.2–75.2)
NEUTROPHILS NFR BLD AUTO: 69.9 % — SIGNIFICANT CHANGE UP (ref 42.2–75.2)
NEUTROPHILS NFR BLD AUTO: 70.2 % — SIGNIFICANT CHANGE UP (ref 42.2–75.2)
NEUTROPHILS NFR BLD AUTO: 70.4 % — SIGNIFICANT CHANGE UP (ref 42.2–75.2)
NEUTROPHILS NFR BLD AUTO: 70.7 % — SIGNIFICANT CHANGE UP (ref 42.2–75.2)
NEUTROPHILS NFR BLD AUTO: 73.2 % — SIGNIFICANT CHANGE UP (ref 42.2–75.2)
NEUTROPHILS NFR BLD AUTO: 74.3 % — SIGNIFICANT CHANGE UP (ref 42.2–75.2)
NEUTROPHILS NFR BLD AUTO: 75 % — SIGNIFICANT CHANGE UP (ref 42.2–75.2)
NEUTROPHILS NFR BLD AUTO: 75.8 % — HIGH (ref 42.2–75.2)
NEUTROPHILS NFR BLD AUTO: 77.7 % — HIGH (ref 42.2–75.2)
NEUTROPHILS NFR BLD AUTO: 78.8 % — HIGH (ref 42.2–75.2)
NEUTROPHILS NFR BLD AUTO: 79.6 % — HIGH (ref 42.2–75.2)
NEUTROPHILS NFR BLD AUTO: 83.1 % — HIGH (ref 42.2–75.2)
NEUTROPHILS-BODY FLUID: 15 % — SIGNIFICANT CHANGE UP
NEUTROPHILS-BODY FLUID: SIGNIFICANT CHANGE UP %
NITRITE UR-MCNC: NEGATIVE — SIGNIFICANT CHANGE UP
NON-GYNECOLOGICAL CYTOLOGY STUDY: SIGNIFICANT CHANGE UP
NRBC # BLD: 0 /100 WBCS — SIGNIFICANT CHANGE UP (ref 0–0)
NT-PROBNP SERPL-SCNC: 2151 PG/ML — HIGH (ref 0–300)
NT-PROBNP SERPL-SCNC: 5190 PG/ML — HIGH (ref 0–300)
PCO2 BLDV: 67 MMHG — HIGH (ref 39–42)
PH BLDV: 7.35 — SIGNIFICANT CHANGE UP (ref 7.32–7.43)
PH FLD: 7.8 — SIGNIFICANT CHANGE UP
PH FLD: 7.9 — SIGNIFICANT CHANGE UP
PH UR: 6 — SIGNIFICANT CHANGE UP (ref 5–8)
PHOSPHATE SERPL-MCNC: 2 MG/DL — LOW (ref 2.1–4.9)
PHOSPHATE SERPL-MCNC: 3 MG/DL — SIGNIFICANT CHANGE UP (ref 2.1–4.9)
PHOSPHATE SERPL-MCNC: 3.3 MG/DL — SIGNIFICANT CHANGE UP (ref 2.1–4.9)
PHOSPHATE SERPL-MCNC: 3.7 MG/DL — SIGNIFICANT CHANGE UP (ref 2.1–4.9)
PHOSPHATE SERPL-MCNC: 3.8 MG/DL — SIGNIFICANT CHANGE UP (ref 2.1–4.9)
PHOSPHATE SERPL-MCNC: 4 MG/DL — SIGNIFICANT CHANGE UP (ref 2.1–4.9)
PLATELET # BLD AUTO: 100 K/UL — LOW (ref 130–400)
PLATELET # BLD AUTO: 114 K/UL — LOW (ref 130–400)
PLATELET # BLD AUTO: 117 K/UL — LOW (ref 130–400)
PLATELET # BLD AUTO: 121 K/UL — LOW (ref 130–400)
PLATELET # BLD AUTO: 129 K/UL — LOW (ref 130–400)
PLATELET # BLD AUTO: 132 K/UL — SIGNIFICANT CHANGE UP (ref 130–400)
PLATELET # BLD AUTO: 140 K/UL — SIGNIFICANT CHANGE UP (ref 130–400)
PLATELET # BLD AUTO: 153 K/UL — SIGNIFICANT CHANGE UP (ref 130–400)
PLATELET # BLD AUTO: 158 K/UL — SIGNIFICANT CHANGE UP (ref 130–400)
PLATELET # BLD AUTO: 174 K/UL — SIGNIFICANT CHANGE UP (ref 130–400)
PLATELET # BLD AUTO: 181 K/UL — SIGNIFICANT CHANGE UP (ref 130–400)
PLATELET # BLD AUTO: 182 K/UL — SIGNIFICANT CHANGE UP (ref 130–400)
PLATELET # BLD AUTO: 184 K/UL — SIGNIFICANT CHANGE UP (ref 130–400)
PLATELET # BLD AUTO: 195 K/UL — SIGNIFICANT CHANGE UP (ref 130–400)
PLATELET # BLD AUTO: 198 K/UL — SIGNIFICANT CHANGE UP (ref 130–400)
PLATELET # BLD AUTO: 207 K/UL — SIGNIFICANT CHANGE UP (ref 130–400)
PLATELET # BLD AUTO: 221 K/UL — SIGNIFICANT CHANGE UP (ref 130–400)
PLATELET # BLD AUTO: 229 K/UL — SIGNIFICANT CHANGE UP (ref 130–400)
PLATELET # BLD AUTO: 240 K/UL — SIGNIFICANT CHANGE UP (ref 130–400)
PLATELET # BLD AUTO: 277 K/UL
PMV BLD: 9.8 FL
PO2 BLDV: 26 MMHG — SIGNIFICANT CHANGE UP
POTASSIUM BLDV-SCNC: 5.7 MMOL/L — HIGH (ref 3.5–5.1)
POTASSIUM SERPL-MCNC: 3.8 MMOL/L — SIGNIFICANT CHANGE UP (ref 3.5–5)
POTASSIUM SERPL-MCNC: 3.9 MMOL/L — SIGNIFICANT CHANGE UP (ref 3.5–5)
POTASSIUM SERPL-MCNC: 4.3 MMOL/L — SIGNIFICANT CHANGE UP (ref 3.5–5)
POTASSIUM SERPL-MCNC: 4.4 MMOL/L — SIGNIFICANT CHANGE UP (ref 3.5–5)
POTASSIUM SERPL-MCNC: 4.5 MMOL/L — SIGNIFICANT CHANGE UP (ref 3.5–5)
POTASSIUM SERPL-MCNC: 4.6 MMOL/L — SIGNIFICANT CHANGE UP (ref 3.5–5)
POTASSIUM SERPL-MCNC: 4.6 MMOL/L — SIGNIFICANT CHANGE UP (ref 3.5–5)
POTASSIUM SERPL-MCNC: 4.7 MMOL/L — SIGNIFICANT CHANGE UP (ref 3.5–5)
POTASSIUM SERPL-MCNC: 4.8 MMOL/L — SIGNIFICANT CHANGE UP (ref 3.5–5)
POTASSIUM SERPL-MCNC: 4.8 MMOL/L — SIGNIFICANT CHANGE UP (ref 3.5–5)
POTASSIUM SERPL-MCNC: 4.9 MMOL/L — SIGNIFICANT CHANGE UP (ref 3.5–5)
POTASSIUM SERPL-MCNC: 5 MMOL/L — SIGNIFICANT CHANGE UP (ref 3.5–5)
POTASSIUM SERPL-MCNC: 5.2 MMOL/L — HIGH (ref 3.5–5)
POTASSIUM SERPL-MCNC: 5.2 MMOL/L — HIGH (ref 3.5–5)
POTASSIUM SERPL-MCNC: 5.3 MMOL/L — HIGH (ref 3.5–5)
POTASSIUM SERPL-MCNC: 5.3 MMOL/L — HIGH (ref 3.5–5)
POTASSIUM SERPL-MCNC: 5.8 MMOL/L — HIGH (ref 3.5–5)
POTASSIUM SERPL-SCNC: 3.2 MMOL/L
POTASSIUM SERPL-SCNC: 3.8 MMOL/L — SIGNIFICANT CHANGE UP (ref 3.5–5)
POTASSIUM SERPL-SCNC: 3.9 MMOL/L — SIGNIFICANT CHANGE UP (ref 3.5–5)
POTASSIUM SERPL-SCNC: 4.3 MMOL/L — SIGNIFICANT CHANGE UP (ref 3.5–5)
POTASSIUM SERPL-SCNC: 4.4 MMOL/L — SIGNIFICANT CHANGE UP (ref 3.5–5)
POTASSIUM SERPL-SCNC: 4.5 MMOL/L — SIGNIFICANT CHANGE UP (ref 3.5–5)
POTASSIUM SERPL-SCNC: 4.6 MMOL/L — SIGNIFICANT CHANGE UP (ref 3.5–5)
POTASSIUM SERPL-SCNC: 4.6 MMOL/L — SIGNIFICANT CHANGE UP (ref 3.5–5)
POTASSIUM SERPL-SCNC: 4.7 MMOL/L — SIGNIFICANT CHANGE UP (ref 3.5–5)
POTASSIUM SERPL-SCNC: 4.8 MMOL/L — SIGNIFICANT CHANGE UP (ref 3.5–5)
POTASSIUM SERPL-SCNC: 4.8 MMOL/L — SIGNIFICANT CHANGE UP (ref 3.5–5)
POTASSIUM SERPL-SCNC: 4.9 MMOL/L — SIGNIFICANT CHANGE UP (ref 3.5–5)
POTASSIUM SERPL-SCNC: 5 MMOL/L — SIGNIFICANT CHANGE UP (ref 3.5–5)
POTASSIUM SERPL-SCNC: 5.2 MMOL/L — HIGH (ref 3.5–5)
POTASSIUM SERPL-SCNC: 5.2 MMOL/L — HIGH (ref 3.5–5)
POTASSIUM SERPL-SCNC: 5.3 MMOL/L — HIGH (ref 3.5–5)
POTASSIUM SERPL-SCNC: 5.3 MMOL/L — HIGH (ref 3.5–5)
POTASSIUM SERPL-SCNC: 5.8 MMOL/L — HIGH (ref 3.5–5)
PROCALCITONIN SERPL-MCNC: 0.08 NG/ML — SIGNIFICANT CHANGE UP (ref 0.02–0.1)
PROCALCITONIN SERPL-MCNC: 0.19 NG/ML — HIGH (ref 0.02–0.1)
PROT FLD-MCNC: 1.3 G/DL — SIGNIFICANT CHANGE UP
PROT FLD-MCNC: 1.5 G/DL — SIGNIFICANT CHANGE UP
PROT SERPL-MCNC: 4.4 G/DL — LOW (ref 6–8)
PROT SERPL-MCNC: 4.9 G/DL — LOW (ref 6–8)
PROT SERPL-MCNC: 5.2 G/DL — LOW (ref 6–8)
PROT SERPL-MCNC: 5.2 G/DL — LOW (ref 6–8)
PROT SERPL-MCNC: 5.3 G/DL
PROT SERPL-MCNC: 5.3 G/DL — LOW (ref 6–8)
PROT SERPL-MCNC: 5.3 G/DL — LOW (ref 6–8)
PROT SERPL-MCNC: 5.4 G/DL — LOW (ref 6–8)
PROT SERPL-MCNC: 5.4 G/DL — LOW (ref 6–8)
PROT SERPL-MCNC: 5.5 G/DL — LOW (ref 6–8)
PROT SERPL-MCNC: 5.5 G/DL — LOW (ref 6–8)
PROT SERPL-MCNC: 5.6 G/DL — LOW (ref 6–8)
PROT SERPL-MCNC: 5.7 G/DL — LOW (ref 6–8)
PROT SERPL-MCNC: 5.8 G/DL — LOW (ref 6–8)
PROT SERPL-MCNC: 6 G/DL — SIGNIFICANT CHANGE UP (ref 6–8)
PROT SERPL-MCNC: 6.5 G/DL — SIGNIFICANT CHANGE UP (ref 6–8)
PROT UR-MCNC: SIGNIFICANT CHANGE UP
PROTHROM AB SERPL-ACNC: 17.9 SEC — HIGH (ref 9.95–12.87)
PROTHROM AB SERPL-ACNC: 19 SEC — HIGH (ref 9.95–12.87)
PROTHROM AB SERPL-ACNC: 19.5 SEC — HIGH (ref 9.95–12.87)
PROTHROM AB SERPL-ACNC: 26.4 SEC — HIGH (ref 9.95–12.87)
PROTHROM AB SERPL-ACNC: 26.9 SEC — HIGH (ref 9.95–12.87)
PROTHROM AB SERPL-ACNC: 28.3 SEC — HIGH (ref 9.95–12.87)
PROTHROM AB SERPL-ACNC: 33.3 SEC — HIGH (ref 9.95–12.87)
RAPID RVP RESULT: SIGNIFICANT CHANGE UP
RBC # BLD: 3.71 M/UL — LOW (ref 4.2–5.4)
RBC # BLD: 3.8 M/UL — LOW (ref 4.2–5.4)
RBC # BLD: 3.91 M/UL
RBC # BLD: 3.95 M/UL — LOW (ref 4.2–5.4)
RBC # BLD: 3.97 M/UL — LOW (ref 4.2–5.4)
RBC # BLD: 4.01 M/UL — LOW (ref 4.2–5.4)
RBC # BLD: 4.1 M/UL — LOW (ref 4.2–5.4)
RBC # BLD: 4.14 M/UL — LOW (ref 4.2–5.4)
RBC # BLD: 4.17 M/UL — LOW (ref 4.2–5.4)
RBC # BLD: 4.22 M/UL — SIGNIFICANT CHANGE UP (ref 4.2–5.4)
RBC # BLD: 4.23 M/UL — SIGNIFICANT CHANGE UP (ref 4.2–5.4)
RBC # BLD: 4.23 M/UL — SIGNIFICANT CHANGE UP (ref 4.2–5.4)
RBC # BLD: 4.24 M/UL — SIGNIFICANT CHANGE UP (ref 4.2–5.4)
RBC # BLD: 4.32 M/UL — SIGNIFICANT CHANGE UP (ref 4.2–5.4)
RBC # BLD: 4.38 M/UL — SIGNIFICANT CHANGE UP (ref 4.2–5.4)
RBC # BLD: 4.39 M/UL — SIGNIFICANT CHANGE UP (ref 4.2–5.4)
RBC # BLD: 4.46 M/UL — SIGNIFICANT CHANGE UP (ref 4.2–5.4)
RBC # BLD: 4.72 M/UL — SIGNIFICANT CHANGE UP (ref 4.2–5.4)
RBC # BLD: 4.86 M/UL — SIGNIFICANT CHANGE UP (ref 4.2–5.4)
RBC # BLD: 4.98 M/UL — SIGNIFICANT CHANGE UP (ref 4.2–5.4)
RBC # BLD: 5.01 M/UL — SIGNIFICANT CHANGE UP (ref 4.2–5.4)
RBC # BLD: 5.58 M/UL — HIGH (ref 4.2–5.4)
RBC # FLD: 17.4 %
RBC # FLD: 18.2 % — HIGH (ref 11.5–14.5)
RBC # FLD: 18.4 % — HIGH (ref 11.5–14.5)
RBC # FLD: 18.6 % — HIGH (ref 11.5–14.5)
RBC # FLD: 18.9 % — HIGH (ref 11.5–14.5)
RBC # FLD: 19.3 % — HIGH (ref 11.5–14.5)
RBC # FLD: 20.5 % — HIGH (ref 11.5–14.5)
RBC # FLD: 20.6 % — HIGH (ref 11.5–14.5)
RBC # FLD: 20.8 % — HIGH (ref 11.5–14.5)
RBC # FLD: 21.1 % — HIGH (ref 11.5–14.5)
RBC # FLD: 21.1 % — HIGH (ref 11.5–14.5)
RBC # FLD: 21.2 % — HIGH (ref 11.5–14.5)
RBC # FLD: 21.3 % — HIGH (ref 11.5–14.5)
RBC # FLD: 21.4 % — HIGH (ref 11.5–14.5)
RBC # FLD: 21.5 % — HIGH (ref 11.5–14.5)
RBC # FLD: 21.8 % — HIGH (ref 11.5–14.5)
RBC # FLD: 22.4 % — HIGH (ref 11.5–14.5)
RBC # FLD: 22.5 % — HIGH (ref 11.5–14.5)
RBC # FLD: 22.5 % — HIGH (ref 11.5–14.5)
RBC # FLD: 22.7 % — HIGH (ref 11.5–14.5)
RBC # FLD: 22.9 % — HIGH (ref 11.5–14.5)
RBC # FLD: 23.2 % — HIGH (ref 11.5–14.5)
RBC CASTS # UR COMP ASSIST: 16 /HPF — HIGH (ref 0–4)
RCV VOL RI: 0 /UL — SIGNIFICANT CHANGE UP (ref 0–0)
RCV VOL RI: 1000 /UL — HIGH (ref 0–0)
SAO2 % BLDV: 34.2 % — SIGNIFICANT CHANGE UP
SARS-COV-2 RNA SPEC QL NAA+PROBE: SIGNIFICANT CHANGE UP
SODIUM SERPL-SCNC: 129 MMOL/L — LOW (ref 135–146)
SODIUM SERPL-SCNC: 138 MMOL/L — SIGNIFICANT CHANGE UP (ref 135–146)
SODIUM SERPL-SCNC: 140 MMOL/L — SIGNIFICANT CHANGE UP (ref 135–146)
SODIUM SERPL-SCNC: 140 MMOL/L — SIGNIFICANT CHANGE UP (ref 135–146)
SODIUM SERPL-SCNC: 141 MMOL/L
SODIUM SERPL-SCNC: 141 MMOL/L — SIGNIFICANT CHANGE UP (ref 135–146)
SODIUM SERPL-SCNC: 142 MMOL/L — SIGNIFICANT CHANGE UP (ref 135–146)
SODIUM SERPL-SCNC: 143 MMOL/L — SIGNIFICANT CHANGE UP (ref 135–146)
SODIUM SERPL-SCNC: 144 MMOL/L — SIGNIFICANT CHANGE UP (ref 135–146)
SODIUM SERPL-SCNC: 145 MMOL/L — SIGNIFICANT CHANGE UP (ref 135–146)
SODIUM SERPL-SCNC: 146 MMOL/L — SIGNIFICANT CHANGE UP (ref 135–146)
SODIUM SERPL-SCNC: 146 MMOL/L — SIGNIFICANT CHANGE UP (ref 135–146)
SODIUM SERPL-SCNC: 150 MMOL/L — HIGH (ref 135–146)
SODIUM SERPL-SCNC: 150 MMOL/L — HIGH (ref 135–146)
SP GR SPEC: 1.02 — SIGNIFICANT CHANGE UP (ref 1.01–1.03)
SPECIMEN SOURCE: SIGNIFICANT CHANGE UP
TOTAL NUCLEATED CELL COUNT, BODY FLUID: 129 /UL — SIGNIFICANT CHANGE UP
TOTAL NUCLEATED CELL COUNT, BODY FLUID: 235 /UL — SIGNIFICANT CHANGE UP
TROPONIN T SERPL-MCNC: <0.01 NG/ML — SIGNIFICANT CHANGE UP
UROBILINOGEN FLD QL: SIGNIFICANT CHANGE UP
WBC # BLD: 10.17 K/UL — SIGNIFICANT CHANGE UP (ref 4.8–10.8)
WBC # BLD: 10.26 K/UL — SIGNIFICANT CHANGE UP (ref 4.8–10.8)
WBC # BLD: 11.24 K/UL — HIGH (ref 4.8–10.8)
WBC # BLD: 11.39 K/UL — HIGH (ref 4.8–10.8)
WBC # BLD: 12.02 K/UL — HIGH (ref 4.8–10.8)
WBC # BLD: 14.56 K/UL — HIGH (ref 4.8–10.8)
WBC # BLD: 6.25 K/UL — SIGNIFICANT CHANGE UP (ref 4.8–10.8)
WBC # BLD: 6.58 K/UL — SIGNIFICANT CHANGE UP (ref 4.8–10.8)
WBC # BLD: 6.95 K/UL — SIGNIFICANT CHANGE UP (ref 4.8–10.8)
WBC # BLD: 7.32 K/UL — SIGNIFICANT CHANGE UP (ref 4.8–10.8)
WBC # BLD: 7.56 K/UL — SIGNIFICANT CHANGE UP (ref 4.8–10.8)
WBC # BLD: 8.06 K/UL — SIGNIFICANT CHANGE UP (ref 4.8–10.8)
WBC # BLD: 8.1 K/UL — SIGNIFICANT CHANGE UP (ref 4.8–10.8)
WBC # BLD: 8.37 K/UL — SIGNIFICANT CHANGE UP (ref 4.8–10.8)
WBC # BLD: 8.39 K/UL — SIGNIFICANT CHANGE UP (ref 4.8–10.8)
WBC # BLD: 8.55 K/UL — SIGNIFICANT CHANGE UP (ref 4.8–10.8)
WBC # BLD: 8.63 K/UL — SIGNIFICANT CHANGE UP (ref 4.8–10.8)
WBC # BLD: 8.63 K/UL — SIGNIFICANT CHANGE UP (ref 4.8–10.8)
WBC # BLD: 8.92 K/UL — SIGNIFICANT CHANGE UP (ref 4.8–10.8)
WBC # BLD: 9.1 K/UL — SIGNIFICANT CHANGE UP (ref 4.8–10.8)
WBC # BLD: 9.11 K/UL — SIGNIFICANT CHANGE UP (ref 4.8–10.8)
WBC # FLD AUTO: 10.17 K/UL — SIGNIFICANT CHANGE UP (ref 4.8–10.8)
WBC # FLD AUTO: 10.26 K/UL — SIGNIFICANT CHANGE UP (ref 4.8–10.8)
WBC # FLD AUTO: 11.24 K/UL — HIGH (ref 4.8–10.8)
WBC # FLD AUTO: 11.39 K/UL — HIGH (ref 4.8–10.8)
WBC # FLD AUTO: 12.02 K/UL — HIGH (ref 4.8–10.8)
WBC # FLD AUTO: 14.56 K/UL — HIGH (ref 4.8–10.8)
WBC # FLD AUTO: 5.51 K/UL
WBC # FLD AUTO: 6.25 K/UL — SIGNIFICANT CHANGE UP (ref 4.8–10.8)
WBC # FLD AUTO: 6.58 K/UL — SIGNIFICANT CHANGE UP (ref 4.8–10.8)
WBC # FLD AUTO: 6.95 K/UL — SIGNIFICANT CHANGE UP (ref 4.8–10.8)
WBC # FLD AUTO: 7.32 K/UL — SIGNIFICANT CHANGE UP (ref 4.8–10.8)
WBC # FLD AUTO: 7.56 K/UL — SIGNIFICANT CHANGE UP (ref 4.8–10.8)
WBC # FLD AUTO: 8.06 K/UL — SIGNIFICANT CHANGE UP (ref 4.8–10.8)
WBC # FLD AUTO: 8.1 K/UL — SIGNIFICANT CHANGE UP (ref 4.8–10.8)
WBC # FLD AUTO: 8.37 K/UL — SIGNIFICANT CHANGE UP (ref 4.8–10.8)
WBC # FLD AUTO: 8.39 K/UL — SIGNIFICANT CHANGE UP (ref 4.8–10.8)
WBC # FLD AUTO: 8.55 K/UL — SIGNIFICANT CHANGE UP (ref 4.8–10.8)
WBC # FLD AUTO: 8.63 K/UL — SIGNIFICANT CHANGE UP (ref 4.8–10.8)
WBC # FLD AUTO: 8.63 K/UL — SIGNIFICANT CHANGE UP (ref 4.8–10.8)
WBC # FLD AUTO: 8.92 K/UL — SIGNIFICANT CHANGE UP (ref 4.8–10.8)
WBC # FLD AUTO: 9.1 K/UL — SIGNIFICANT CHANGE UP (ref 4.8–10.8)
WBC # FLD AUTO: 9.11 K/UL — SIGNIFICANT CHANGE UP (ref 4.8–10.8)
WBC UR QL: 12 /HPF — HIGH (ref 0–5)

## 2022-01-01 PROCEDURE — 74177 CT ABD & PELVIS W/CONTRAST: CPT | Mod: 26

## 2022-01-01 PROCEDURE — 71045 X-RAY EXAM CHEST 1 VIEW: CPT | Mod: 26

## 2022-01-01 PROCEDURE — 99231 SBSQ HOSP IP/OBS SF/LOW 25: CPT

## 2022-01-01 PROCEDURE — 99232 SBSQ HOSP IP/OBS MODERATE 35: CPT

## 2022-01-01 PROCEDURE — 99223 1ST HOSP IP/OBS HIGH 75: CPT

## 2022-01-01 PROCEDURE — 99233 SBSQ HOSP IP/OBS HIGH 50: CPT

## 2022-01-01 PROCEDURE — 51702 INSERT TEMP BLADDER CATH: CPT

## 2022-01-01 PROCEDURE — 99214 OFFICE O/P EST MOD 30 MIN: CPT

## 2022-01-01 PROCEDURE — 76604 US EXAM CHEST: CPT | Mod: 26,GC

## 2022-01-01 PROCEDURE — 99232 SBSQ HOSP IP/OBS MODERATE 35: CPT | Mod: GC

## 2022-01-01 PROCEDURE — 99497 ADVNCD CARE PLAN 30 MIN: CPT

## 2022-01-01 PROCEDURE — 99498 ADVNCD CARE PLAN ADDL 30 MIN: CPT

## 2022-01-01 PROCEDURE — 74177 CT ABD & PELVIS W/CONTRAST: CPT | Mod: 26,MH

## 2022-01-01 PROCEDURE — 93010 ELECTROCARDIOGRAM REPORT: CPT

## 2022-01-01 PROCEDURE — 88305 TISSUE EXAM BY PATHOLOGIST: CPT | Mod: 26

## 2022-01-01 PROCEDURE — 71260 CT THORAX DX C+: CPT | Mod: 26,MH

## 2022-01-01 PROCEDURE — 71045 X-RAY EXAM CHEST 1 VIEW: CPT | Mod: 26,77

## 2022-01-01 PROCEDURE — 93971 EXTREMITY STUDY: CPT | Mod: 26,LT

## 2022-01-01 PROCEDURE — 99285 EMERGENCY DEPT VISIT HI MDM: CPT | Mod: FS

## 2022-01-01 PROCEDURE — 88112 CYTOPATH CELL ENHANCE TECH: CPT | Mod: 26

## 2022-01-01 PROCEDURE — 70450 CT HEAD/BRAIN W/O DYE: CPT | Mod: 26

## 2022-01-01 PROCEDURE — 93971 EXTREMITY STUDY: CPT | Mod: 26,RT

## 2022-01-01 PROCEDURE — 71275 CT ANGIOGRAPHY CHEST: CPT | Mod: 26,MA

## 2022-01-01 PROCEDURE — 78815 PET IMAGE W/CT SKULL-THIGH: CPT | Mod: 26,PS,MH

## 2022-01-01 PROCEDURE — 74018 RADEX ABDOMEN 1 VIEW: CPT | Mod: 26

## 2022-01-01 PROCEDURE — 93306 TTE W/DOPPLER COMPLETE: CPT | Mod: 26

## 2022-01-01 PROCEDURE — 99222 1ST HOSP IP/OBS MODERATE 55: CPT

## 2022-01-01 PROCEDURE — 49083 ABD PARACENTESIS W/IMAGING: CPT

## 2022-01-01 PROCEDURE — 99221 1ST HOSP IP/OBS SF/LOW 40: CPT

## 2022-01-01 PROCEDURE — 76705 ECHO EXAM OF ABDOMEN: CPT | Mod: 26

## 2022-01-01 PROCEDURE — 99291 CRITICAL CARE FIRST HOUR: CPT | Mod: FT,25,GC

## 2022-01-01 PROCEDURE — 99238 HOSP IP/OBS DSCHRG MGMT 30/<: CPT

## 2022-01-01 PROCEDURE — 99497 ADVNCD CARE PLAN 30 MIN: CPT | Mod: 25

## 2022-01-01 RX ORDER — VERAPAMIL HCL 240 MG
40 CAPSULE, EXTENDED RELEASE PELLETS 24 HR ORAL
Refills: 0 | Status: DISCONTINUED | OUTPATIENT
Start: 2022-01-01 | End: 2022-01-01

## 2022-01-01 RX ORDER — CEFTRIAXONE 500 MG/1
INJECTION, POWDER, FOR SOLUTION INTRAMUSCULAR; INTRAVENOUS
Refills: 0 | Status: DISCONTINUED | OUTPATIENT
Start: 2022-01-01 | End: 2022-01-01

## 2022-01-01 RX ORDER — FUROSEMIDE 40 MG
40 TABLET ORAL DAILY
Refills: 0 | Status: DISCONTINUED | OUTPATIENT
Start: 2022-01-01 | End: 2022-01-01

## 2022-01-01 RX ORDER — ACETAMINOPHEN 500 MG
650 TABLET ORAL EVERY 6 HOURS
Refills: 0 | Status: DISCONTINUED | OUTPATIENT
Start: 2022-01-01 | End: 2022-01-01

## 2022-01-01 RX ORDER — ENOXAPARIN SODIUM 100 MG/ML
40 INJECTION SUBCUTANEOUS EVERY 12 HOURS
Refills: 0 | Status: DISCONTINUED | OUTPATIENT
Start: 2022-01-01 | End: 2022-01-01

## 2022-01-01 RX ORDER — LACTULOSE 10 G/15ML
20 SOLUTION ORAL EVERY 12 HOURS
Refills: 0 | Status: DISCONTINUED | OUTPATIENT
Start: 2022-01-01 | End: 2022-01-01

## 2022-01-01 RX ORDER — MORPHINE SULFATE 50 MG/1
5 CAPSULE, EXTENDED RELEASE ORAL EVERY 6 HOURS
Refills: 0 | Status: DISCONTINUED | OUTPATIENT
Start: 2022-01-01 | End: 2022-01-01

## 2022-01-01 RX ORDER — MULTIVIT-MIN/FERROUS FUMARATE 15 MG
TABLET ORAL
Qty: 30 | Refills: 0 | Status: ACTIVE | COMMUNITY
Start: 2021-01-01

## 2022-01-01 RX ORDER — DRONABINOL 2.5 MG
5 CAPSULE ORAL
Refills: 0 | Status: DISCONTINUED | OUTPATIENT
Start: 2022-01-01 | End: 2022-01-01

## 2022-01-01 RX ORDER — LACTULOSE 10 G/15ML
20 SOLUTION ORAL EVERY 6 HOURS
Refills: 0 | Status: DISCONTINUED | OUTPATIENT
Start: 2022-01-01 | End: 2022-01-01

## 2022-01-01 RX ORDER — MIRTAZAPINE 45 MG/1
15 TABLET, ORALLY DISINTEGRATING ORAL AT BEDTIME
Refills: 0 | Status: DISCONTINUED | OUTPATIENT
Start: 2022-01-01 | End: 2022-01-01

## 2022-01-01 RX ORDER — PANTOPRAZOLE SODIUM 20 MG/1
40 TABLET, DELAYED RELEASE ORAL
Refills: 0 | Status: DISCONTINUED | OUTPATIENT
Start: 2022-01-01 | End: 2022-01-01

## 2022-01-01 RX ORDER — DILTIAZEM HCL 120 MG
10 CAPSULE, EXT RELEASE 24 HR ORAL ONCE
Refills: 0 | Status: COMPLETED | OUTPATIENT
Start: 2022-01-01 | End: 2022-01-01

## 2022-01-01 RX ORDER — APIXABAN 2.5 MG/1
5 TABLET, FILM COATED ORAL
Refills: 0 | Status: DISCONTINUED | OUTPATIENT
Start: 2022-01-01 | End: 2022-01-01

## 2022-01-01 RX ORDER — DM/PE/ACETAMINOPHEN/CHLORPHENR 10-5-325-2
50 MCG TABLET, SEQUENTIAL ORAL
Qty: 30 | Refills: 0 | Status: ACTIVE | COMMUNITY
Start: 2021-01-01

## 2022-01-01 RX ORDER — ACETAMINOPHEN 500 MG
975 TABLET ORAL ONCE
Refills: 0 | Status: COMPLETED | OUTPATIENT
Start: 2022-01-01 | End: 2022-01-01

## 2022-01-01 RX ORDER — FUROSEMIDE 40 MG
40 TABLET ORAL ONCE
Refills: 0 | Status: COMPLETED | OUTPATIENT
Start: 2022-01-01 | End: 2022-01-01

## 2022-01-01 RX ORDER — APIXABAN 2.5 MG/1
5 TABLET, FILM COATED ORAL EVERY 12 HOURS
Refills: 0 | Status: DISCONTINUED | OUTPATIENT
Start: 2022-01-01 | End: 2022-01-01

## 2022-01-01 RX ORDER — MORPHINE SULFATE 50 MG/1
1 CAPSULE, EXTENDED RELEASE ORAL ONCE
Refills: 0 | Status: DISCONTINUED | OUTPATIENT
Start: 2022-01-01 | End: 2022-01-01

## 2022-01-01 RX ORDER — ONDANSETRON 8 MG/1
4 TABLET, FILM COATED ORAL EVERY 8 HOURS
Refills: 0 | Status: DISCONTINUED | OUTPATIENT
Start: 2022-01-01 | End: 2022-01-01

## 2022-01-01 RX ORDER — MORPHINE SULFATE 50 MG/1
0.5 CAPSULE, EXTENDED RELEASE ORAL EVERY 4 HOURS
Refills: 0 | Status: DISCONTINUED | OUTPATIENT
Start: 2022-01-01 | End: 2022-01-01

## 2022-01-01 RX ORDER — PHYTONADIONE (VIT K1) 5 MG
2.5 TABLET ORAL ONCE
Refills: 0 | Status: COMPLETED | OUTPATIENT
Start: 2022-01-01 | End: 2022-01-01

## 2022-01-01 RX ORDER — MORPHINE SULFATE 50 MG/1
1 CAPSULE, EXTENDED RELEASE ORAL EVERY 6 HOURS
Refills: 0 | Status: DISCONTINUED | OUTPATIENT
Start: 2022-01-01 | End: 2022-01-01

## 2022-01-01 RX ORDER — LIPASE/PROTEASE/AMYLASE 16-48-48K
2 CAPSULE,DELAYED RELEASE (ENTERIC COATED) ORAL
Refills: 0 | Status: DISCONTINUED | OUTPATIENT
Start: 2022-01-01 | End: 2022-01-01

## 2022-01-01 RX ORDER — CEFTRIAXONE 500 MG/1
1000 INJECTION, POWDER, FOR SOLUTION INTRAMUSCULAR; INTRAVENOUS EVERY 24 HOURS
Refills: 0 | Status: DISCONTINUED | OUTPATIENT
Start: 2022-01-01 | End: 2022-01-01

## 2022-01-01 RX ORDER — MORPHINE SULFATE 50 MG/1
5 CAPSULE, EXTENDED RELEASE ORAL EVERY 4 HOURS
Refills: 0 | Status: DISCONTINUED | OUTPATIENT
Start: 2022-01-01 | End: 2022-01-01

## 2022-01-01 RX ORDER — CHLORHEXIDINE GLUCONATE 213 G/1000ML
1 SOLUTION TOPICAL
Refills: 0 | Status: DISCONTINUED | OUTPATIENT
Start: 2022-01-01 | End: 2022-01-01

## 2022-01-01 RX ORDER — DEXTROSE 50 % IN WATER 50 %
25 SYRINGE (ML) INTRAVENOUS ONCE
Refills: 0 | Status: COMPLETED | OUTPATIENT
Start: 2022-01-01 | End: 2022-01-01

## 2022-01-01 RX ORDER — MAGNESIUM OXIDE 400 MG ORAL TABLET 241.3 MG
400 TABLET ORAL
Refills: 0 | Status: DISCONTINUED | OUTPATIENT
Start: 2022-01-01 | End: 2022-01-01

## 2022-01-01 RX ORDER — OLANZAPINE 15 MG/1
2.5 TABLET, FILM COATED ORAL AT BEDTIME
Refills: 0 | Status: DISCONTINUED | OUTPATIENT
Start: 2022-01-01 | End: 2022-01-01

## 2022-01-01 RX ORDER — SODIUM CHLORIDE 9 MG/ML
1000 INJECTION, SOLUTION INTRAVENOUS
Refills: 0 | Status: DISCONTINUED | OUTPATIENT
Start: 2022-01-01 | End: 2022-01-01

## 2022-01-01 RX ORDER — DILTIAZEM HCL 120 MG
120 CAPSULE, EXT RELEASE 24 HR ORAL DAILY
Refills: 0 | Status: DISCONTINUED | OUTPATIENT
Start: 2022-01-01 | End: 2022-01-01

## 2022-01-01 RX ORDER — LANOLIN ALCOHOL/MO/W.PET/CERES
3 CREAM (GRAM) TOPICAL AT BEDTIME
Refills: 0 | Status: DISCONTINUED | OUTPATIENT
Start: 2022-01-01 | End: 2022-01-01

## 2022-01-01 RX ORDER — MAGNESIUM OXIDE 400 MG ORAL TABLET 241.3 MG
400 TABLET ORAL
Refills: 0 | Status: ACTIVE | OUTPATIENT
Start: 2022-01-01 | End: 2023-03-17

## 2022-01-01 RX ORDER — LANOLIN ALCOHOL/MO/W.PET/CERES
5 CREAM (GRAM) TOPICAL ONCE
Refills: 0 | Status: COMPLETED | OUTPATIENT
Start: 2022-01-01 | End: 2022-01-01

## 2022-01-01 RX ORDER — CHOLECALCIFEROL (VITAMIN D3) 125 MCG
2000 CAPSULE ORAL DAILY
Refills: 0 | Status: DISCONTINUED | OUTPATIENT
Start: 2022-01-01 | End: 2022-01-01

## 2022-01-01 RX ORDER — LIDOCAINE 4 G/100G
1 CREAM TOPICAL ONCE
Refills: 0 | Status: COMPLETED | OUTPATIENT
Start: 2022-01-01 | End: 2022-01-01

## 2022-01-01 RX ORDER — SODIUM CHLORIDE 9 MG/ML
500 INJECTION, SOLUTION INTRAVENOUS ONCE
Refills: 0 | Status: COMPLETED | OUTPATIENT
Start: 2022-01-01 | End: 2022-01-01

## 2022-01-01 RX ORDER — POLYETHYLENE GLYCOL 3350 17 G/17G
17 POWDER, FOR SOLUTION ORAL DAILY
Refills: 0 | Status: DISCONTINUED | OUTPATIENT
Start: 2022-01-01 | End: 2022-01-01

## 2022-01-01 RX ORDER — PHYTONADIONE (VIT K1) 5 MG
5 TABLET ORAL ONCE
Refills: 0 | Status: COMPLETED | OUTPATIENT
Start: 2022-01-01 | End: 2022-01-01

## 2022-01-01 RX ORDER — FUROSEMIDE 40 MG
20 TABLET ORAL DAILY
Refills: 0 | Status: DISCONTINUED | OUTPATIENT
Start: 2022-01-01 | End: 2022-01-01

## 2022-01-01 RX ORDER — PANTOPRAZOLE SODIUM 20 MG/1
40 TABLET, DELAYED RELEASE ORAL DAILY
Refills: 0 | Status: DISCONTINUED | OUTPATIENT
Start: 2022-01-01 | End: 2022-01-01

## 2022-01-01 RX ORDER — DRONABINOL 2.5 MG
2.5 CAPSULE ORAL
Refills: 0 | Status: DISCONTINUED | OUTPATIENT
Start: 2022-01-01 | End: 2022-01-01

## 2022-01-01 RX ORDER — SENNA PLUS 8.6 MG/1
2 TABLET ORAL AT BEDTIME
Refills: 0 | Status: DISCONTINUED | OUTPATIENT
Start: 2022-01-01 | End: 2022-01-01

## 2022-01-01 RX ORDER — ONDANSETRON 8 MG/1
4 TABLET, FILM COATED ORAL ONCE
Refills: 0 | Status: COMPLETED | OUTPATIENT
Start: 2022-01-01 | End: 2022-01-01

## 2022-01-01 RX ORDER — OXYCODONE HYDROCHLORIDE 5 MG/1
5 TABLET ORAL ONCE
Refills: 0 | Status: DISCONTINUED | OUTPATIENT
Start: 2022-01-01 | End: 2022-01-01

## 2022-01-01 RX ORDER — CEFTRIAXONE 500 MG/1
1000 INJECTION, POWDER, FOR SOLUTION INTRAMUSCULAR; INTRAVENOUS ONCE
Refills: 0 | Status: COMPLETED | OUTPATIENT
Start: 2022-01-01 | End: 2022-01-01

## 2022-01-01 RX ORDER — MAGNESIUM SULFATE 500 MG/ML
2 VIAL (ML) INJECTION ONCE
Refills: 0 | Status: COMPLETED | OUTPATIENT
Start: 2022-01-01 | End: 2022-01-01

## 2022-01-01 RX ORDER — KETOROLAC TROMETHAMINE 30 MG/ML
15 SYRINGE (ML) INJECTION ONCE
Refills: 0 | Status: DISCONTINUED | OUTPATIENT
Start: 2022-01-01 | End: 2022-01-01

## 2022-01-01 RX ORDER — SODIUM ZIRCONIUM CYCLOSILICATE 10 G/10G
5 POWDER, FOR SUSPENSION ORAL ONCE
Refills: 0 | Status: COMPLETED | OUTPATIENT
Start: 2022-01-01 | End: 2022-01-01

## 2022-01-01 RX ORDER — ENOXAPARIN SODIUM 100 MG/ML
36 INJECTION SUBCUTANEOUS EVERY 12 HOURS
Refills: 0 | Status: DISCONTINUED | OUTPATIENT
Start: 2022-01-01 | End: 2022-01-01

## 2022-01-01 RX ORDER — SIMETHICONE 80 MG/1
80 TABLET, CHEWABLE ORAL THREE TIMES A DAY
Refills: 0 | Status: DISCONTINUED | OUTPATIENT
Start: 2022-01-01 | End: 2022-01-01

## 2022-01-01 RX ORDER — SODIUM CHLORIDE 9 MG/ML
1000 INJECTION INTRAMUSCULAR; INTRAVENOUS; SUBCUTANEOUS ONCE
Refills: 0 | Status: COMPLETED | OUTPATIENT
Start: 2022-01-01 | End: 2022-01-01

## 2022-01-01 RX ADMIN — Medication 975 MILLIGRAM(S): at 01:14

## 2022-01-01 RX ADMIN — LACTULOSE 20 GRAM(S): 10 SOLUTION ORAL at 00:22

## 2022-01-01 RX ADMIN — PANTOPRAZOLE SODIUM 40 MILLIGRAM(S): 20 TABLET, DELAYED RELEASE ORAL at 08:28

## 2022-01-01 RX ADMIN — MORPHINE SULFATE 0.5 MILLIGRAM(S): 50 CAPSULE, EXTENDED RELEASE ORAL at 10:08

## 2022-01-01 RX ADMIN — Medication 25 GRAM(S): at 13:06

## 2022-01-01 RX ADMIN — ENOXAPARIN SODIUM 40 MILLIGRAM(S): 100 INJECTION SUBCUTANEOUS at 17:19

## 2022-01-01 RX ADMIN — Medication 2 CAPSULE(S): at 11:11

## 2022-01-01 RX ADMIN — MIRTAZAPINE 15 MILLIGRAM(S): 45 TABLET, ORALLY DISINTEGRATING ORAL at 21:24

## 2022-01-01 RX ADMIN — Medication 2 CAPSULE(S): at 08:28

## 2022-01-01 RX ADMIN — ENOXAPARIN SODIUM 40 MILLIGRAM(S): 100 INJECTION SUBCUTANEOUS at 06:13

## 2022-01-01 RX ADMIN — MIRTAZAPINE 15 MILLIGRAM(S): 45 TABLET, ORALLY DISINTEGRATING ORAL at 21:01

## 2022-01-01 RX ADMIN — MAGNESIUM OXIDE 400 MG ORAL TABLET 400 MILLIGRAM(S): 241.3 TABLET ORAL at 17:39

## 2022-01-01 RX ADMIN — MIRTAZAPINE 15 MILLIGRAM(S): 45 TABLET, ORALLY DISINTEGRATING ORAL at 21:31

## 2022-01-01 RX ADMIN — CHLORHEXIDINE GLUCONATE 1 APPLICATION(S): 213 SOLUTION TOPICAL at 05:26

## 2022-01-01 RX ADMIN — ENOXAPARIN SODIUM 40 MILLIGRAM(S): 100 INJECTION SUBCUTANEOUS at 05:00

## 2022-01-01 RX ADMIN — Medication 1 DROP(S): at 06:15

## 2022-01-01 RX ADMIN — Medication 120 MILLIGRAM(S): at 05:52

## 2022-01-01 RX ADMIN — Medication 120 MILLIGRAM(S): at 06:18

## 2022-01-01 RX ADMIN — MORPHINE SULFATE 5 MILLIGRAM(S): 50 CAPSULE, EXTENDED RELEASE ORAL at 14:10

## 2022-01-01 RX ADMIN — MORPHINE SULFATE 1 MILLIGRAM(S): 50 CAPSULE, EXTENDED RELEASE ORAL at 22:50

## 2022-01-01 RX ADMIN — PANTOPRAZOLE SODIUM 40 MILLIGRAM(S): 20 TABLET, DELAYED RELEASE ORAL at 11:02

## 2022-01-01 RX ADMIN — SENNA PLUS 2 TABLET(S): 8.6 TABLET ORAL at 21:24

## 2022-01-01 RX ADMIN — APIXABAN 5 MILLIGRAM(S): 2.5 TABLET, FILM COATED ORAL at 05:52

## 2022-01-01 RX ADMIN — MIRTAZAPINE 15 MILLIGRAM(S): 45 TABLET, ORALLY DISINTEGRATING ORAL at 22:38

## 2022-01-01 RX ADMIN — SENNA PLUS 2 TABLET(S): 8.6 TABLET ORAL at 23:18

## 2022-01-01 RX ADMIN — LACTULOSE 20 GRAM(S): 10 SOLUTION ORAL at 12:21

## 2022-01-01 RX ADMIN — CEFTRIAXONE 100 MILLIGRAM(S): 500 INJECTION, POWDER, FOR SOLUTION INTRAMUSCULAR; INTRAVENOUS at 12:25

## 2022-01-01 RX ADMIN — Medication 650 MILLIGRAM(S): at 03:51

## 2022-01-01 RX ADMIN — LACTULOSE 20 GRAM(S): 10 SOLUTION ORAL at 06:24

## 2022-01-01 RX ADMIN — Medication 25 GRAM(S): at 14:19

## 2022-01-01 RX ADMIN — Medication 1 DROP(S): at 13:05

## 2022-01-01 RX ADMIN — Medication 1 DROP(S): at 22:53

## 2022-01-01 RX ADMIN — LACTULOSE 20 GRAM(S): 10 SOLUTION ORAL at 05:20

## 2022-01-01 RX ADMIN — Medication 1 DROP(S): at 05:18

## 2022-01-01 RX ADMIN — LACTULOSE 20 GRAM(S): 10 SOLUTION ORAL at 18:41

## 2022-01-01 RX ADMIN — LACTULOSE 20 GRAM(S): 10 SOLUTION ORAL at 05:03

## 2022-01-01 RX ADMIN — MORPHINE SULFATE 5 MILLIGRAM(S): 50 CAPSULE, EXTENDED RELEASE ORAL at 13:41

## 2022-01-01 RX ADMIN — Medication 120 MILLIGRAM(S): at 05:19

## 2022-01-01 RX ADMIN — Medication 2.5 MILLIGRAM(S): at 23:28

## 2022-01-01 RX ADMIN — Medication 40 MILLIGRAM(S): at 03:35

## 2022-01-01 RX ADMIN — Medication 650 MILLIGRAM(S): at 05:15

## 2022-01-01 RX ADMIN — Medication 25 GRAM(S): at 11:35

## 2022-01-01 RX ADMIN — Medication 40 MILLIGRAM(S): at 05:52

## 2022-01-01 RX ADMIN — Medication 1 DROP(S): at 15:53

## 2022-01-01 RX ADMIN — LACTULOSE 20 GRAM(S): 10 SOLUTION ORAL at 17:46

## 2022-01-01 RX ADMIN — CHLORHEXIDINE GLUCONATE 1 APPLICATION(S): 213 SOLUTION TOPICAL at 07:05

## 2022-01-01 RX ADMIN — MORPHINE SULFATE 5 MILLIGRAM(S): 50 CAPSULE, EXTENDED RELEASE ORAL at 22:56

## 2022-01-01 RX ADMIN — Medication 2 CAPSULE(S): at 11:36

## 2022-01-01 RX ADMIN — LACTULOSE 20 GRAM(S): 10 SOLUTION ORAL at 18:53

## 2022-01-01 RX ADMIN — Medication 2 CAPSULE(S): at 12:23

## 2022-01-01 RX ADMIN — MIRTAZAPINE 15 MILLIGRAM(S): 45 TABLET, ORALLY DISINTEGRATING ORAL at 21:26

## 2022-01-01 RX ADMIN — Medication 650 MILLIGRAM(S): at 11:11

## 2022-01-01 RX ADMIN — ENOXAPARIN SODIUM 40 MILLIGRAM(S): 100 INJECTION SUBCUTANEOUS at 05:47

## 2022-01-01 RX ADMIN — Medication 1 DROP(S): at 05:05

## 2022-01-01 RX ADMIN — ENOXAPARIN SODIUM 40 MILLIGRAM(S): 100 INJECTION SUBCUTANEOUS at 17:18

## 2022-01-01 RX ADMIN — Medication 1 DROP(S): at 21:23

## 2022-01-01 RX ADMIN — CHLORHEXIDINE GLUCONATE 1 APPLICATION(S): 213 SOLUTION TOPICAL at 07:28

## 2022-01-01 RX ADMIN — Medication 2 CAPSULE(S): at 12:42

## 2022-01-01 RX ADMIN — PANTOPRAZOLE SODIUM 40 MILLIGRAM(S): 20 TABLET, DELAYED RELEASE ORAL at 11:26

## 2022-01-01 RX ADMIN — LACTULOSE 20 GRAM(S): 10 SOLUTION ORAL at 17:36

## 2022-01-01 RX ADMIN — MORPHINE SULFATE 1 MILLIGRAM(S): 50 CAPSULE, EXTENDED RELEASE ORAL at 10:59

## 2022-01-01 RX ADMIN — Medication 20 MILLIGRAM(S): at 05:44

## 2022-01-01 RX ADMIN — Medication 62.5 MILLIMOLE(S): at 13:53

## 2022-01-01 RX ADMIN — MAGNESIUM OXIDE 400 MG ORAL TABLET 400 MILLIGRAM(S): 241.3 TABLET ORAL at 17:53

## 2022-01-01 RX ADMIN — Medication 15 MILLIGRAM(S): at 01:21

## 2022-01-01 RX ADMIN — PANTOPRAZOLE SODIUM 40 MILLIGRAM(S): 20 TABLET, DELAYED RELEASE ORAL at 12:05

## 2022-01-01 RX ADMIN — ENOXAPARIN SODIUM 40 MILLIGRAM(S): 100 INJECTION SUBCUTANEOUS at 05:26

## 2022-01-01 RX ADMIN — Medication 40 MILLIGRAM(S): at 05:47

## 2022-01-01 RX ADMIN — POLYETHYLENE GLYCOL 3350 17 GRAM(S): 17 POWDER, FOR SOLUTION ORAL at 12:40

## 2022-01-01 RX ADMIN — LACTULOSE 20 GRAM(S): 10 SOLUTION ORAL at 06:14

## 2022-01-01 RX ADMIN — LACTULOSE 20 GRAM(S): 10 SOLUTION ORAL at 11:23

## 2022-01-01 RX ADMIN — LACTULOSE 20 GRAM(S): 10 SOLUTION ORAL at 00:41

## 2022-01-01 RX ADMIN — LACTULOSE 20 GRAM(S): 10 SOLUTION ORAL at 17:58

## 2022-01-01 RX ADMIN — POLYETHYLENE GLYCOL 3350 17 GRAM(S): 17 POWDER, FOR SOLUTION ORAL at 12:30

## 2022-01-01 RX ADMIN — Medication 1 DROP(S): at 12:29

## 2022-01-01 RX ADMIN — SENNA PLUS 2 TABLET(S): 8.6 TABLET ORAL at 21:31

## 2022-01-01 RX ADMIN — Medication 2 CAPSULE(S): at 17:27

## 2022-01-01 RX ADMIN — MAGNESIUM OXIDE 400 MG ORAL TABLET 400 MILLIGRAM(S): 241.3 TABLET ORAL at 17:27

## 2022-01-01 RX ADMIN — MIRTAZAPINE 15 MILLIGRAM(S): 45 TABLET, ORALLY DISINTEGRATING ORAL at 21:57

## 2022-01-01 RX ADMIN — MORPHINE SULFATE 0.5 MILLIGRAM(S): 50 CAPSULE, EXTENDED RELEASE ORAL at 09:29

## 2022-01-01 RX ADMIN — Medication 650 MILLIGRAM(S): at 05:55

## 2022-01-01 RX ADMIN — Medication 120 MILLIGRAM(S): at 06:14

## 2022-01-01 RX ADMIN — Medication 2 CAPSULE(S): at 17:03

## 2022-01-01 RX ADMIN — MIRTAZAPINE 15 MILLIGRAM(S): 45 TABLET, ORALLY DISINTEGRATING ORAL at 23:19

## 2022-01-01 RX ADMIN — MORPHINE SULFATE 1 MILLIGRAM(S): 50 CAPSULE, EXTENDED RELEASE ORAL at 05:05

## 2022-01-01 RX ADMIN — SENNA PLUS 2 TABLET(S): 8.6 TABLET ORAL at 21:54

## 2022-01-01 RX ADMIN — MAGNESIUM OXIDE 400 MG ORAL TABLET 400 MILLIGRAM(S): 241.3 TABLET ORAL at 08:28

## 2022-01-01 RX ADMIN — MORPHINE SULFATE 5 MILLIGRAM(S): 50 CAPSULE, EXTENDED RELEASE ORAL at 14:01

## 2022-01-01 RX ADMIN — SENNA PLUS 2 TABLET(S): 8.6 TABLET ORAL at 21:03

## 2022-01-01 RX ADMIN — Medication 650 MILLIGRAM(S): at 14:24

## 2022-01-01 RX ADMIN — LACTULOSE 20 GRAM(S): 10 SOLUTION ORAL at 12:13

## 2022-01-01 RX ADMIN — Medication 2 CAPSULE(S): at 16:57

## 2022-01-01 RX ADMIN — SENNA PLUS 2 TABLET(S): 8.6 TABLET ORAL at 21:27

## 2022-01-01 RX ADMIN — Medication 120 MILLIGRAM(S): at 05:25

## 2022-01-01 RX ADMIN — Medication 1 DROP(S): at 06:34

## 2022-01-01 RX ADMIN — MORPHINE SULFATE 5 MILLIGRAM(S): 50 CAPSULE, EXTENDED RELEASE ORAL at 23:16

## 2022-01-01 RX ADMIN — MORPHINE SULFATE 5 MILLIGRAM(S): 50 CAPSULE, EXTENDED RELEASE ORAL at 23:46

## 2022-01-01 RX ADMIN — Medication 2 CAPSULE(S): at 18:33

## 2022-01-01 RX ADMIN — POLYETHYLENE GLYCOL 3350 17 GRAM(S): 17 POWDER, FOR SOLUTION ORAL at 12:28

## 2022-01-01 RX ADMIN — PANTOPRAZOLE SODIUM 40 MILLIGRAM(S): 20 TABLET, DELAYED RELEASE ORAL at 12:30

## 2022-01-01 RX ADMIN — POLYETHYLENE GLYCOL 3350 17 GRAM(S): 17 POWDER, FOR SOLUTION ORAL at 11:11

## 2022-01-01 RX ADMIN — PANTOPRAZOLE SODIUM 40 MILLIGRAM(S): 20 TABLET, DELAYED RELEASE ORAL at 12:28

## 2022-01-01 RX ADMIN — Medication 650 MILLIGRAM(S): at 12:41

## 2022-01-01 RX ADMIN — MIRTAZAPINE 15 MILLIGRAM(S): 45 TABLET, ORALLY DISINTEGRATING ORAL at 21:23

## 2022-01-01 RX ADMIN — POLYETHYLENE GLYCOL 3350 17 GRAM(S): 17 POWDER, FOR SOLUTION ORAL at 11:14

## 2022-01-01 RX ADMIN — MAGNESIUM OXIDE 400 MG ORAL TABLET 400 MILLIGRAM(S): 241.3 TABLET ORAL at 09:06

## 2022-01-01 RX ADMIN — ONDANSETRON 4 MILLIGRAM(S): 8 TABLET, FILM COATED ORAL at 10:53

## 2022-01-01 RX ADMIN — MAGNESIUM OXIDE 400 MG ORAL TABLET 400 MILLIGRAM(S): 241.3 TABLET ORAL at 17:12

## 2022-01-01 RX ADMIN — MORPHINE SULFATE 1 MILLIGRAM(S): 50 CAPSULE, EXTENDED RELEASE ORAL at 14:36

## 2022-01-01 RX ADMIN — MIRTAZAPINE 15 MILLIGRAM(S): 45 TABLET, ORALLY DISINTEGRATING ORAL at 21:05

## 2022-01-01 RX ADMIN — Medication 5 MILLIGRAM(S): at 21:31

## 2022-01-01 RX ADMIN — Medication 10 MILLIGRAM(S): at 00:50

## 2022-01-01 RX ADMIN — MIRTAZAPINE 15 MILLIGRAM(S): 45 TABLET, ORALLY DISINTEGRATING ORAL at 21:15

## 2022-01-01 RX ADMIN — Medication 2000 UNIT(S): at 11:36

## 2022-01-01 RX ADMIN — MAGNESIUM OXIDE 400 MG ORAL TABLET 400 MILLIGRAM(S): 241.3 TABLET ORAL at 10:01

## 2022-01-01 RX ADMIN — LACTULOSE 20 GRAM(S): 10 SOLUTION ORAL at 17:19

## 2022-01-01 RX ADMIN — SODIUM ZIRCONIUM CYCLOSILICATE 5 GRAM(S): 10 POWDER, FOR SUSPENSION ORAL at 11:35

## 2022-01-01 RX ADMIN — LACTULOSE 20 GRAM(S): 10 SOLUTION ORAL at 06:22

## 2022-01-01 RX ADMIN — Medication 40 MILLIGRAM(S): at 08:28

## 2022-01-01 RX ADMIN — Medication 120 MILLIGRAM(S): at 06:24

## 2022-01-01 RX ADMIN — MIRTAZAPINE 15 MILLIGRAM(S): 45 TABLET, ORALLY DISINTEGRATING ORAL at 23:26

## 2022-01-01 RX ADMIN — OLANZAPINE 2.5 MILLIGRAM(S): 15 TABLET, FILM COATED ORAL at 21:27

## 2022-01-01 RX ADMIN — Medication 650 MILLIGRAM(S): at 03:34

## 2022-01-01 RX ADMIN — Medication 120 MILLIGRAM(S): at 05:05

## 2022-01-01 RX ADMIN — Medication 120 MILLIGRAM(S): at 05:27

## 2022-01-01 RX ADMIN — Medication 40 MILLIGRAM(S): at 22:38

## 2022-01-01 RX ADMIN — Medication 2.5 MILLIGRAM(S): at 17:19

## 2022-01-01 RX ADMIN — OXYCODONE HYDROCHLORIDE 5 MILLIGRAM(S): 5 TABLET ORAL at 12:42

## 2022-01-01 RX ADMIN — PANTOPRAZOLE SODIUM 40 MILLIGRAM(S): 20 TABLET, DELAYED RELEASE ORAL at 11:57

## 2022-01-01 RX ADMIN — Medication 120 MILLIGRAM(S): at 05:47

## 2022-01-01 RX ADMIN — ENOXAPARIN SODIUM 40 MILLIGRAM(S): 100 INJECTION SUBCUTANEOUS at 05:04

## 2022-01-01 RX ADMIN — SENNA PLUS 2 TABLET(S): 8.6 TABLET ORAL at 23:27

## 2022-01-01 RX ADMIN — ENOXAPARIN SODIUM 40 MILLIGRAM(S): 100 INJECTION SUBCUTANEOUS at 17:07

## 2022-01-01 RX ADMIN — LACTULOSE 20 GRAM(S): 10 SOLUTION ORAL at 06:37

## 2022-01-01 RX ADMIN — MAGNESIUM OXIDE 400 MG ORAL TABLET 400 MILLIGRAM(S): 241.3 TABLET ORAL at 18:41

## 2022-01-01 RX ADMIN — PANTOPRAZOLE SODIUM 40 MILLIGRAM(S): 20 TABLET, DELAYED RELEASE ORAL at 05:25

## 2022-01-01 RX ADMIN — Medication 40 MILLIGRAM(S): at 06:54

## 2022-01-01 RX ADMIN — MAGNESIUM OXIDE 400 MG ORAL TABLET 400 MILLIGRAM(S): 241.3 TABLET ORAL at 07:59

## 2022-01-01 RX ADMIN — SENNA PLUS 2 TABLET(S): 8.6 TABLET ORAL at 23:25

## 2022-01-01 RX ADMIN — Medication 2.5 MILLIGRAM(S): at 08:01

## 2022-01-01 RX ADMIN — PANTOPRAZOLE SODIUM 40 MILLIGRAM(S): 20 TABLET, DELAYED RELEASE ORAL at 05:27

## 2022-01-01 RX ADMIN — OLANZAPINE 2.5 MILLIGRAM(S): 15 TABLET, FILM COATED ORAL at 21:02

## 2022-01-01 RX ADMIN — Medication 5 MILLIGRAM(S): at 05:20

## 2022-01-01 RX ADMIN — OLANZAPINE 2.5 MILLIGRAM(S): 15 TABLET, FILM COATED ORAL at 23:29

## 2022-01-01 RX ADMIN — POLYETHYLENE GLYCOL 3350 17 GRAM(S): 17 POWDER, FOR SOLUTION ORAL at 11:42

## 2022-01-01 RX ADMIN — Medication 650 MILLIGRAM(S): at 12:23

## 2022-01-01 RX ADMIN — OLANZAPINE 2.5 MILLIGRAM(S): 15 TABLET, FILM COATED ORAL at 21:54

## 2022-01-01 RX ADMIN — Medication 2.5 MILLIGRAM(S): at 19:05

## 2022-01-01 RX ADMIN — MIRTAZAPINE 15 MILLIGRAM(S): 45 TABLET, ORALLY DISINTEGRATING ORAL at 21:28

## 2022-01-01 RX ADMIN — MAGNESIUM OXIDE 400 MG ORAL TABLET 400 MILLIGRAM(S): 241.3 TABLET ORAL at 08:40

## 2022-01-01 RX ADMIN — Medication 40 MILLIGRAM(S): at 23:10

## 2022-01-01 RX ADMIN — MORPHINE SULFATE 5 MILLIGRAM(S): 50 CAPSULE, EXTENDED RELEASE ORAL at 17:50

## 2022-01-01 RX ADMIN — MORPHINE SULFATE 5 MILLIGRAM(S): 50 CAPSULE, EXTENDED RELEASE ORAL at 22:26

## 2022-01-01 RX ADMIN — Medication 5 MILLIGRAM(S): at 19:09

## 2022-01-01 RX ADMIN — MORPHINE SULFATE 1 MILLIGRAM(S): 50 CAPSULE, EXTENDED RELEASE ORAL at 23:29

## 2022-01-01 RX ADMIN — Medication 650 MILLIGRAM(S): at 04:30

## 2022-01-01 RX ADMIN — Medication 120 MILLIGRAM(S): at 06:29

## 2022-01-01 RX ADMIN — MAGNESIUM OXIDE 400 MG ORAL TABLET 400 MILLIGRAM(S): 241.3 TABLET ORAL at 18:53

## 2022-01-01 RX ADMIN — Medication 650 MILLIGRAM(S): at 19:47

## 2022-01-01 RX ADMIN — MIRTAZAPINE 15 MILLIGRAM(S): 45 TABLET, ORALLY DISINTEGRATING ORAL at 21:53

## 2022-01-01 RX ADMIN — PANTOPRAZOLE SODIUM 40 MILLIGRAM(S): 20 TABLET, DELAYED RELEASE ORAL at 05:52

## 2022-01-01 RX ADMIN — MORPHINE SULFATE 1 MILLIGRAM(S): 50 CAPSULE, EXTENDED RELEASE ORAL at 18:28

## 2022-01-01 RX ADMIN — PANTOPRAZOLE SODIUM 40 MILLIGRAM(S): 20 TABLET, DELAYED RELEASE ORAL at 09:05

## 2022-01-01 RX ADMIN — Medication 650 MILLIGRAM(S): at 05:25

## 2022-01-01 RX ADMIN — SENNA PLUS 2 TABLET(S): 8.6 TABLET ORAL at 21:57

## 2022-01-01 RX ADMIN — Medication 650 MILLIGRAM(S): at 14:27

## 2022-01-01 RX ADMIN — MORPHINE SULFATE 5 MILLIGRAM(S): 50 CAPSULE, EXTENDED RELEASE ORAL at 10:00

## 2022-01-01 RX ADMIN — OLANZAPINE 2.5 MILLIGRAM(S): 15 TABLET, FILM COATED ORAL at 21:24

## 2022-01-01 RX ADMIN — CHLORHEXIDINE GLUCONATE 1 APPLICATION(S): 213 SOLUTION TOPICAL at 05:18

## 2022-01-01 RX ADMIN — LACTULOSE 20 GRAM(S): 10 SOLUTION ORAL at 17:51

## 2022-01-01 RX ADMIN — MORPHINE SULFATE 5 MILLIGRAM(S): 50 CAPSULE, EXTENDED RELEASE ORAL at 19:02

## 2022-01-01 RX ADMIN — POLYETHYLENE GLYCOL 3350 17 GRAM(S): 17 POWDER, FOR SOLUTION ORAL at 13:53

## 2022-01-01 RX ADMIN — LACTULOSE 20 GRAM(S): 10 SOLUTION ORAL at 05:04

## 2022-01-01 RX ADMIN — PANTOPRAZOLE SODIUM 40 MILLIGRAM(S): 20 TABLET, DELAYED RELEASE ORAL at 06:29

## 2022-01-01 RX ADMIN — Medication 650 MILLIGRAM(S): at 11:40

## 2022-01-01 RX ADMIN — PANTOPRAZOLE SODIUM 40 MILLIGRAM(S): 20 TABLET, DELAYED RELEASE ORAL at 11:53

## 2022-01-01 RX ADMIN — MORPHINE SULFATE 1 MILLIGRAM(S): 50 CAPSULE, EXTENDED RELEASE ORAL at 02:28

## 2022-01-01 RX ADMIN — Medication 120 MILLIGRAM(S): at 06:35

## 2022-01-01 RX ADMIN — PANTOPRAZOLE SODIUM 40 MILLIGRAM(S): 20 TABLET, DELAYED RELEASE ORAL at 11:14

## 2022-01-01 RX ADMIN — CHLORHEXIDINE GLUCONATE 1 APPLICATION(S): 213 SOLUTION TOPICAL at 06:34

## 2022-01-01 RX ADMIN — MORPHINE SULFATE 1 MILLIGRAM(S): 50 CAPSULE, EXTENDED RELEASE ORAL at 11:40

## 2022-01-01 RX ADMIN — Medication 1 DROP(S): at 22:05

## 2022-01-01 RX ADMIN — LACTULOSE 20 GRAM(S): 10 SOLUTION ORAL at 23:27

## 2022-01-01 RX ADMIN — MAGNESIUM OXIDE 400 MG ORAL TABLET 400 MILLIGRAM(S): 241.3 TABLET ORAL at 17:19

## 2022-01-01 RX ADMIN — Medication 120 MILLIGRAM(S): at 05:04

## 2022-01-01 RX ADMIN — MIRTAZAPINE 15 MILLIGRAM(S): 45 TABLET, ORALLY DISINTEGRATING ORAL at 21:38

## 2022-01-01 RX ADMIN — ENOXAPARIN SODIUM 40 MILLIGRAM(S): 100 INJECTION SUBCUTANEOUS at 05:19

## 2022-01-01 RX ADMIN — Medication 2 CAPSULE(S): at 17:12

## 2022-01-01 RX ADMIN — MAGNESIUM OXIDE 400 MG ORAL TABLET 400 MILLIGRAM(S): 241.3 TABLET ORAL at 09:28

## 2022-01-01 RX ADMIN — POLYETHYLENE GLYCOL 3350 17 GRAM(S): 17 POWDER, FOR SOLUTION ORAL at 11:25

## 2022-01-01 RX ADMIN — Medication 120 MILLIGRAM(S): at 06:23

## 2022-01-01 RX ADMIN — MAGNESIUM OXIDE 400 MG ORAL TABLET 400 MILLIGRAM(S): 241.3 TABLET ORAL at 08:03

## 2022-01-01 RX ADMIN — Medication 5 MILLIGRAM(S): at 06:18

## 2022-01-01 RX ADMIN — MAGNESIUM OXIDE 400 MG ORAL TABLET 400 MILLIGRAM(S): 241.3 TABLET ORAL at 08:06

## 2022-01-01 RX ADMIN — Medication 2 CAPSULE(S): at 07:59

## 2022-01-01 RX ADMIN — Medication 1 DROP(S): at 00:17

## 2022-01-01 RX ADMIN — POLYETHYLENE GLYCOL 3350 17 GRAM(S): 17 POWDER, FOR SOLUTION ORAL at 11:40

## 2022-01-01 RX ADMIN — LACTULOSE 20 GRAM(S): 10 SOLUTION ORAL at 18:42

## 2022-01-01 RX ADMIN — MAGNESIUM OXIDE 400 MG ORAL TABLET 400 MILLIGRAM(S): 241.3 TABLET ORAL at 08:48

## 2022-01-01 RX ADMIN — ENOXAPARIN SODIUM 40 MILLIGRAM(S): 100 INJECTION SUBCUTANEOUS at 17:45

## 2022-01-01 RX ADMIN — MAGNESIUM OXIDE 400 MG ORAL TABLET 400 MILLIGRAM(S): 241.3 TABLET ORAL at 09:17

## 2022-01-01 RX ADMIN — PANTOPRAZOLE SODIUM 40 MILLIGRAM(S): 20 TABLET, DELAYED RELEASE ORAL at 06:11

## 2022-01-01 RX ADMIN — SENNA PLUS 2 TABLET(S): 8.6 TABLET ORAL at 21:23

## 2022-01-01 RX ADMIN — LACTULOSE 20 GRAM(S): 10 SOLUTION ORAL at 11:41

## 2022-01-01 RX ADMIN — SENNA PLUS 2 TABLET(S): 8.6 TABLET ORAL at 21:25

## 2022-01-01 RX ADMIN — Medication 650 MILLIGRAM(S): at 22:05

## 2022-01-01 RX ADMIN — ENOXAPARIN SODIUM 40 MILLIGRAM(S): 100 INJECTION SUBCUTANEOUS at 06:35

## 2022-01-01 RX ADMIN — Medication 120 MILLIGRAM(S): at 06:11

## 2022-01-01 RX ADMIN — Medication 2 CAPSULE(S): at 12:48

## 2022-01-01 RX ADMIN — Medication 2 CAPSULE(S): at 07:54

## 2022-01-01 RX ADMIN — MIRTAZAPINE 15 MILLIGRAM(S): 45 TABLET, ORALLY DISINTEGRATING ORAL at 21:03

## 2022-01-01 RX ADMIN — LACTULOSE 20 GRAM(S): 10 SOLUTION ORAL at 17:37

## 2022-01-01 RX ADMIN — MAGNESIUM OXIDE 400 MG ORAL TABLET 400 MILLIGRAM(S): 241.3 TABLET ORAL at 17:38

## 2022-01-01 RX ADMIN — MAGNESIUM OXIDE 400 MG ORAL TABLET 400 MILLIGRAM(S): 241.3 TABLET ORAL at 18:54

## 2022-01-01 RX ADMIN — Medication 25 GRAM(S): at 07:58

## 2022-01-01 RX ADMIN — Medication 1 DROP(S): at 13:41

## 2022-01-01 RX ADMIN — Medication 650 MILLIGRAM(S): at 20:11

## 2022-01-01 RX ADMIN — Medication 20 MILLIGRAM(S): at 05:36

## 2022-01-01 RX ADMIN — MORPHINE SULFATE 1 MILLIGRAM(S): 50 CAPSULE, EXTENDED RELEASE ORAL at 12:24

## 2022-01-01 RX ADMIN — POLYETHYLENE GLYCOL 3350 17 GRAM(S): 17 POWDER, FOR SOLUTION ORAL at 12:21

## 2022-01-01 RX ADMIN — SODIUM CHLORIDE 500 MILLILITER(S): 9 INJECTION, SOLUTION INTRAVENOUS at 09:00

## 2022-01-01 RX ADMIN — ENOXAPARIN SODIUM 40 MILLIGRAM(S): 100 INJECTION SUBCUTANEOUS at 17:37

## 2022-01-01 RX ADMIN — Medication 120 MILLIGRAM(S): at 05:00

## 2022-01-01 RX ADMIN — MORPHINE SULFATE 1 MILLIGRAM(S): 50 CAPSULE, EXTENDED RELEASE ORAL at 18:42

## 2022-01-01 RX ADMIN — PANTOPRAZOLE SODIUM 40 MILLIGRAM(S): 20 TABLET, DELAYED RELEASE ORAL at 13:54

## 2022-01-01 RX ADMIN — CHLORHEXIDINE GLUCONATE 1 APPLICATION(S): 213 SOLUTION TOPICAL at 06:41

## 2022-01-01 RX ADMIN — Medication 650 MILLIGRAM(S): at 20:43

## 2022-01-01 RX ADMIN — APIXABAN 5 MILLIGRAM(S): 2.5 TABLET, FILM COATED ORAL at 17:37

## 2022-01-01 RX ADMIN — PANTOPRAZOLE SODIUM 40 MILLIGRAM(S): 20 TABLET, DELAYED RELEASE ORAL at 05:48

## 2022-01-01 RX ADMIN — Medication 1 DROP(S): at 21:04

## 2022-01-01 RX ADMIN — SENNA PLUS 2 TABLET(S): 8.6 TABLET ORAL at 21:02

## 2022-01-01 RX ADMIN — MIRTAZAPINE 15 MILLIGRAM(S): 45 TABLET, ORALLY DISINTEGRATING ORAL at 22:55

## 2022-01-01 RX ADMIN — Medication 2000 UNIT(S): at 11:50

## 2022-01-01 RX ADMIN — OXYCODONE HYDROCHLORIDE 5 MILLIGRAM(S): 5 TABLET ORAL at 12:13

## 2022-01-01 RX ADMIN — LACTULOSE 20 GRAM(S): 10 SOLUTION ORAL at 23:31

## 2022-01-01 RX ADMIN — ENOXAPARIN SODIUM 40 MILLIGRAM(S): 100 INJECTION SUBCUTANEOUS at 06:25

## 2022-01-01 RX ADMIN — CHLORHEXIDINE GLUCONATE 1 APPLICATION(S): 213 SOLUTION TOPICAL at 05:00

## 2022-01-01 RX ADMIN — ENOXAPARIN SODIUM 40 MILLIGRAM(S): 100 INJECTION SUBCUTANEOUS at 19:17

## 2022-01-01 RX ADMIN — PANTOPRAZOLE SODIUM 40 MILLIGRAM(S): 20 TABLET, DELAYED RELEASE ORAL at 05:38

## 2022-01-01 RX ADMIN — ENOXAPARIN SODIUM 40 MILLIGRAM(S): 100 INJECTION SUBCUTANEOUS at 18:41

## 2022-01-01 RX ADMIN — Medication 5 MILLIGRAM(S): at 06:15

## 2022-01-01 RX ADMIN — Medication 40 MILLIGRAM(S): at 11:36

## 2022-01-01 RX ADMIN — Medication 20 MILLIGRAM(S): at 06:29

## 2022-01-01 RX ADMIN — CHLORHEXIDINE GLUCONATE 1 APPLICATION(S): 213 SOLUTION TOPICAL at 05:03

## 2022-01-01 RX ADMIN — PANTOPRAZOLE SODIUM 40 MILLIGRAM(S): 20 TABLET, DELAYED RELEASE ORAL at 05:44

## 2022-01-01 RX ADMIN — Medication 40 MILLIGRAM(S): at 11:33

## 2022-01-01 RX ADMIN — MORPHINE SULFATE 1 MILLIGRAM(S): 50 CAPSULE, EXTENDED RELEASE ORAL at 03:30

## 2022-01-01 RX ADMIN — CHLORHEXIDINE GLUCONATE 1 APPLICATION(S): 213 SOLUTION TOPICAL at 06:24

## 2022-01-01 RX ADMIN — SENNA PLUS 2 TABLET(S): 8.6 TABLET ORAL at 21:15

## 2022-01-01 RX ADMIN — MORPHINE SULFATE 5 MILLIGRAM(S): 50 CAPSULE, EXTENDED RELEASE ORAL at 13:46

## 2022-01-01 RX ADMIN — MAGNESIUM OXIDE 400 MG ORAL TABLET 400 MILLIGRAM(S): 241.3 TABLET ORAL at 16:58

## 2022-01-01 RX ADMIN — SODIUM CHLORIDE 75 MILLILITER(S): 9 INJECTION, SOLUTION INTRAVENOUS at 13:14

## 2022-01-01 RX ADMIN — OLANZAPINE 2.5 MILLIGRAM(S): 15 TABLET, FILM COATED ORAL at 21:28

## 2022-01-01 RX ADMIN — Medication 2 CAPSULE(S): at 09:17

## 2022-01-01 RX ADMIN — Medication 120 MILLIGRAM(S): at 05:36

## 2022-01-01 RX ADMIN — ENOXAPARIN SODIUM 40 MILLIGRAM(S): 100 INJECTION SUBCUTANEOUS at 17:51

## 2022-01-01 RX ADMIN — Medication 1 DROP(S): at 06:54

## 2022-01-01 RX ADMIN — MAGNESIUM OXIDE 400 MG ORAL TABLET 400 MILLIGRAM(S): 241.3 TABLET ORAL at 07:48

## 2022-01-01 RX ADMIN — ENOXAPARIN SODIUM 40 MILLIGRAM(S): 100 INJECTION SUBCUTANEOUS at 17:53

## 2022-01-01 RX ADMIN — Medication 1 DROP(S): at 13:26

## 2022-01-01 RX ADMIN — CHLORHEXIDINE GLUCONATE 1 APPLICATION(S): 213 SOLUTION TOPICAL at 12:04

## 2022-01-01 RX ADMIN — Medication 650 MILLIGRAM(S): at 04:10

## 2022-01-01 RX ADMIN — ENOXAPARIN SODIUM 40 MILLIGRAM(S): 100 INJECTION SUBCUTANEOUS at 06:14

## 2022-01-01 RX ADMIN — POLYETHYLENE GLYCOL 3350 17 GRAM(S): 17 POWDER, FOR SOLUTION ORAL at 12:07

## 2022-01-01 RX ADMIN — ENOXAPARIN SODIUM 40 MILLIGRAM(S): 100 INJECTION SUBCUTANEOUS at 06:19

## 2022-01-01 RX ADMIN — ENOXAPARIN SODIUM 40 MILLIGRAM(S): 100 INJECTION SUBCUTANEOUS at 17:57

## 2022-01-01 RX ADMIN — Medication 1 DROP(S): at 05:01

## 2022-01-01 RX ADMIN — SODIUM CHLORIDE 1000 MILLILITER(S): 9 INJECTION INTRAMUSCULAR; INTRAVENOUS; SUBCUTANEOUS at 11:03

## 2022-01-01 RX ADMIN — APIXABAN 5 MILLIGRAM(S): 2.5 TABLET, FILM COATED ORAL at 17:05

## 2022-01-01 RX ADMIN — Medication 120 MILLIGRAM(S): at 05:44

## 2022-01-01 RX ADMIN — Medication 2000 UNIT(S): at 11:31

## 2022-01-01 RX ADMIN — Medication 5 MILLIGRAM(S): at 17:53

## 2022-01-01 RX ADMIN — CHLORHEXIDINE GLUCONATE 1 APPLICATION(S): 213 SOLUTION TOPICAL at 05:04

## 2022-01-01 RX ADMIN — LACTULOSE 20 GRAM(S): 10 SOLUTION ORAL at 17:39

## 2022-01-01 RX ADMIN — Medication 2 CAPSULE(S): at 08:03

## 2022-01-01 RX ADMIN — Medication 2 CAPSULE(S): at 11:31

## 2022-01-01 RX ADMIN — Medication 1 DROP(S): at 13:54

## 2022-01-01 RX ADMIN — Medication 650 MILLIGRAM(S): at 21:26

## 2022-01-01 RX ADMIN — Medication 2 CAPSULE(S): at 08:11

## 2022-01-01 RX ADMIN — ENOXAPARIN SODIUM 40 MILLIGRAM(S): 100 INJECTION SUBCUTANEOUS at 17:50

## 2022-01-01 RX ADMIN — ENOXAPARIN SODIUM 40 MILLIGRAM(S): 100 INJECTION SUBCUTANEOUS at 17:39

## 2022-01-01 RX ADMIN — LACTULOSE 20 GRAM(S): 10 SOLUTION ORAL at 17:53

## 2022-01-01 RX ADMIN — PANTOPRAZOLE SODIUM 40 MILLIGRAM(S): 20 TABLET, DELAYED RELEASE ORAL at 11:44

## 2022-01-01 RX ADMIN — Medication 120 MILLIGRAM(S): at 05:29

## 2022-01-01 RX ADMIN — Medication 2 CAPSULE(S): at 17:19

## 2022-01-01 RX ADMIN — PANTOPRAZOLE SODIUM 40 MILLIGRAM(S): 20 TABLET, DELAYED RELEASE ORAL at 12:22

## 2022-01-01 RX ADMIN — LACTULOSE 20 GRAM(S): 10 SOLUTION ORAL at 05:24

## 2022-01-01 RX ADMIN — Medication 975 MILLIGRAM(S): at 02:44

## 2022-01-01 RX ADMIN — MIRTAZAPINE 15 MILLIGRAM(S): 45 TABLET, ORALLY DISINTEGRATING ORAL at 23:27

## 2022-01-01 RX ADMIN — Medication 20 MILLIGRAM(S): at 06:11

## 2022-01-01 RX ADMIN — Medication 2000 UNIT(S): at 11:10

## 2022-01-01 RX ADMIN — MAGNESIUM OXIDE 400 MG ORAL TABLET 400 MILLIGRAM(S): 241.3 TABLET ORAL at 17:05

## 2022-01-01 RX ADMIN — Medication 40 MILLIGRAM(S): at 02:28

## 2022-01-01 RX ADMIN — MORPHINE SULFATE 1 MILLIGRAM(S): 50 CAPSULE, EXTENDED RELEASE ORAL at 05:30

## 2022-01-01 RX ADMIN — ENOXAPARIN SODIUM 40 MILLIGRAM(S): 100 INJECTION SUBCUTANEOUS at 18:54

## 2022-01-01 RX ADMIN — ENOXAPARIN SODIUM 40 MILLIGRAM(S): 100 INJECTION SUBCUTANEOUS at 05:03

## 2022-01-01 RX ADMIN — POLYETHYLENE GLYCOL 3350 17 GRAM(S): 17 POWDER, FOR SOLUTION ORAL at 11:02

## 2022-01-01 RX ADMIN — MORPHINE SULFATE 5 MILLIGRAM(S): 50 CAPSULE, EXTENDED RELEASE ORAL at 09:30
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
## 2022-01-18 NOTE — ASU PREOP CHECKLIST - BP NONINVASIVE SYSTOLIC (MM HG)
Product 50 Units: 0 Product 73 Price (In Dollars - Numeric Only, No Special Characters Or $): 0.00 Name Of Product 6: ELTA MD UV 40 tinted Name Of Product 1: Elta MD SPF 40 tinted Product 6 Price (In Dollars - Numeric Only, No Special Characters Or $): 33.00 Render Product Pricing In Note: Yes Name Of Product 2: Elta MD SPF 46 clear Name Of Product 11: Skinceuticals triple lipid Product 2 Price (In Dollars - Numeric Only, No Special Characters Or $): 34.00 Product 11 Price (In Dollars - Numeric Only, No Special Characters Or $): 130.00 Name Of Product 7: Isdin tinted Product 11 Units: 1 Product 7 Price (In Dollars - Numeric Only, No Special Characters Or $): 60.00 Name Of Product 3: Elta MD body SPF 30 Risk Of Complication Category: No MDM Assigning Risk Information: Per AMA, level of risk is based upon consequences of the problem(s) addressed at the encounter when appropriately treated. Risk also includes medical decision making related to the need to initiate or forego further testing, treatment and/or hospitalization. Over the counter medication are assigned a risk level of low. Prescription medication management is assigned a risk level of moderate. Name Of Product 4: Revision intellishade Product 4 Price (In Dollars - Numeric Only, No Special Characters Or $): 75.00 Detail Level: Zone Name Of Product 5: Nelson Product 5 Price (In Dollars - Numeric Only, No Special Characters Or $): 50.00 140

## 2022-01-25 NOTE — REASON FOR VISIT
[Follow-Up] : a follow-up visit [TextBox_44] : The patient was seen several years ago.  Since then she has been diagnosed with pancreatic cancer.  She was seen in chemotherapy.  She has been admitted to the hospital multiple times for various incidences of infection.\par \par She recently had a PET scan which showed very mild uptake in the surgical bed of the pancreas.  However she was also seen to have a right-sided pleural effusion which was deemed new.\par \par Upon reviewing old films she had bilateral pleural effusions off and on for some time.  The right-sided effusion became dramatic somewhere the end of August into early November.  She has had several films since then which document the effusion.  It appears unchanged.\par \par Today in the office she states that she feels fine though she is tremendously weak.  She just got out of a nursing facility for rehab.  She has no shortness of breath.  She wants to know how can she have water in her lung but feel absolutely nothing.  We had a long discussion regarding the effusion.

## 2022-01-25 NOTE — HISTORY OF PRESENT ILLNESS
[TextBox_4] : I reviewed my original evaluation  and last notes.\par \par \par I reviewed all the previous sleep test that are available on file.\par I reviewed my previous office notes.\par

## 2022-02-03 PROBLEM — R91.1 LUNG NODULE: Status: ACTIVE | Noted: 2021-03-18

## 2022-02-03 PROBLEM — J90 PLEURAL EFFUSION: Status: ACTIVE | Noted: 2022-01-01

## 2022-02-03 PROBLEM — C25.9 PANCREATIC CANCER: Status: ACTIVE | Noted: 2021-03-09

## 2022-02-03 PROBLEM — Z51.11 ENCOUNTER FOR CHEMOTHERAPY MANAGEMENT: Status: ACTIVE | Noted: 2021-04-09

## 2022-02-03 PROBLEM — Z95.828 PORT-A-CATH IN PLACE: Status: ACTIVE | Noted: 2021-05-04

## 2022-02-08 NOTE — RESULTS/DATA
[FreeTextEntry1] : \par  PET/CT FDG Skull to Thigh ONC Subsequent Treatment Strategy             Final\par \par No Documents Attached\par \par \par \par \par   ACC: [id number]     EXAM:  PETCT SKCANDACE-PHILOMENA ONC FDG SUBS\par \par PROCEDURE DATE:  01/20/2022\par \par \par \par INTERPRETATION:  CLINICAL STATEMENT: Pancreatic cancer.\par \par FDG PET CT STUDY, SUBSEQUENT TREATMENT STRATEGY\par REASON: TUMOR IMAGING - PET with concurrently acquired CT for attenuation correction and anatomic localization; skull base to mid - thigh / [id number]\par \par HISTORY: 76-year-old female with history of pancreatic cancer, status post Whipple procedure. Abdominopelvic CT dated January 5, 2020 2220 demonstrated an indeterminate density in the region of the pancreatic head between the SMV and SMA. PET/CT dated July 6, 2021 demonstrated no new site of pathologic FDG uptake and interval resolution of previously described abnormal pancreatic FDG uptake. Patient presents for follow-up PET/CT evaluation.\par \par Blood glucose pre injection 88 mg/dL.\par \par TECHNIQUE: Approximately 45 minutes after the intravenous administration of 11.6 mCi 18-Fluorine FDG, whole body PET images were acquired from base of skull to mid - thigh. In addition, non-contrast, low dose, non - diagnostic CT was acquired for attenuation correction and anatomic correlation purposes only.\par \par COMPARISON: PET/CT dated July 6, 2021. Correlation is made with abdominopelvic CT dated January 5, 2022\par \par FINDINGS:\par \par HEAD/FACE:  Physiologic FDG uptake is seen in the visualized regions of the brain, large salivary glands and oropharynx.\par \par NECK: Physiologic FDG uptake is seen in neck muscles.\par \par CHEST: Physiologic FDG avidity is seen in mediastinal blood pool, myocardium.\par \par LUNGS: No abnormal uptake.\par \par PLEURA/PERICARDIUM: No abnormal uptake.\par \par THORACIC NODES: No abnormal uptake.\par \par HEPATOBILIARY: No abnormal uptake.\par \par SPLEEN: No abnormal uptake.\par \par PANCREAS: Status post Whipple procedure. Increased mild focal FDG uptake in the region of the surgical bed, just anterior to the aorta and IVC (max SUV 3.4.)\par \par ADRENAL GLANDS: No abnormal uptake.\par \par KIDNEYS/URETERS/BLADDER: Excreted activity is seen.\par \par ABDOMINOPELVIC NODES: No abnormal uptake.\par \par BOWEL/PERITONEUM/MESENTERY: No abnormal uptake.\par \par PELVIC ORGANS: No abnormal uptake.\par \par BONES/SOFT TISSUES: No abnormal uptake.\par \par OTHER FINDINGS: New moderate to large size non-FDG avid right pleural effusion. Right chest wall port catheter in place. Status post Whipple procedure, with stable postoperative changes and bilobar pneumobilia. Left hepatic cyst. Left renal upper pole cyst. Unchanged right-sided extrarenal pelvis.Atherosclerotic vascular calcification.\par \par \par IMPRESSION:\par \par 1. Since July 6, 2021, no new site of pathologic FDG uptake.\par \par 2. Increased focal FDG uptake in the region of the pancreatectomy bed, just anterior to the aorta and IVC, with max SUV 3.4 (previously 2.6, which reflects a 31% increase), possibly reflecting biologic tumor activity.\par \par 3. New moderate to large size non-FDG avid right pleural effusion.\par \par --- End of Report ---\par \par \par \par \par \par CHRISTIANE ARGUELLO MD; Attending Radiologist\par This document has been electronically signed. Jan 20 2022  2:03PM\par \par  \par \par  Ordered by: BLANKA YEH       Collected/Examined: 20Jan2022 12:01PM       \par Verified by: BLANKA YEH 20Jan2022 03:38PM       \par  Result Communication: No patient communication needed at this time;\par Stage: Final       \par  Performed at: [facility]       Resulted: 20Jan2022 12:18PM       Last Updated: 20Jan2022 03:38PM       Accession: [id number]       \par \par \par CT AP 1/5/22:\par IMPRESSION:\par Indeterminate density in the region of the pancreatic head between the SMV and SMA measuring 2 cm; not definitively seen on prior examination\par An area of tumor recurrence is not excluded..\par Large right extrarenal pelvis versus hydronephrosis. This can be further evaluated with ultrasound if indicated.\par CT Chest\par INTERPRETATION:  Reason for study: Pancreatic cancer. Post Whipple procedure. Follow-up. Elevated CA-19-9\par \par Technique: Postcontrast CT scan of the thorax was performed from lung apices to adrenal glands.\par Sagittal and coronal reformatted images were generated.\par \par CT abdomen and pelvis  performed on the same day, see separate report.\par Comparison: CT chest-3/6/2021\par \par \par Findings:\par \par Tubes/Lines: Right Port-A-Cath satisfactory position\par Mediastinum/hilum: No adenopathy or mass.\par .\par \par Heart/Great Vessels:No pericardial effusions. Great vessels are normal in caliber. No pulmonary emboli. Cardiomegaly with biatrial enlargement\par \par \par \par Bones and soft tissues: Degenerative changes thoracic spine. Stable scoliosis\par \par Lungs, Pleura, and Airways:\par Patent central tracheobronchial tree. Large right, small left pleural effusions increased in volume from previous CT scan. Compressive atelectasis both lower lobes. Interval development of right middle lobe atelectasis\par Pulmonary nodules:\par No suspicious pulmonary nodules.\par \par IMPRESSION:\par \par Since  3/6/2021\par \par \par Large right, small left pleural effusions increased in volume from previous CT scan.\par \par Compressive atelectasis both lower lobes.\par \par Interval development of right middle lobe atelectasis (with patent proximal bronchus)\par No suspicious pulmonary nodules.\par \par \par CT abdomen and pelvis  performed on the same day, see separate report.\par \par \par \par  PET/CT FDG Skull to Thigh ONC Subsequent Treatment Strategy             Final\par \par No Documents Attached\par \par \par \par \par   ACC: [id number]     EXAM:  PETCT SKUL-THI ONC FDG SUBS\par \par PROCEDURE DATE:  01/20/2022\par \par \par \par INTERPRETATION:  CLINICAL STATEMENT: Pancreatic cancer.\par \par FDG PET CT STUDY, SUBSEQUENT TREATMENT STRATEGY\par REASON: TUMOR IMAGING - PET with concurrently acquired CT for attenuation correction and anatomic localization; skull base to mid - thigh / [id number]\par \par HISTORY: 76-year-old female with history of pancreatic cancer, status post Whipple procedure. Abdominopelvic CT dated January 5, 2020 2220 demonstrated an indeterminate density in the region of the pancreatic head between the SMV and SMA. PET/CT dated July 6, 2021 demonstrated no new site of pathologic FDG uptake and interval resolution of previously described abnormal pancreatic FDG uptake. Patient presents for follow-up PET/CT evaluation.\par \par Blood glucose pre injection 88 mg/dL.\par \par TECHNIQUE: Approximately 45 minutes after the intravenous administration of 11.6 mCi 18-Fluorine FDG, whole body PET images were acquired from base of skull to mid - thigh. In addition, non-contrast, low dose, non - diagnostic CT was acquired for attenuation correction and anatomic correlation purposes only.\par \par COMPARISON: PET/CT dated July 6, 2021. Correlation is made with abdominopelvic CT dated January 5, 2022\par \par FINDINGS:\par \par HEAD/FACE:  Physiologic FDG uptake is seen in the visualized regions of the brain, large salivary glands and oropharynx.\par \par NECK: Physiologic FDG uptake is seen in neck muscles.\par \par CHEST: Physiologic FDG avidity is seen in mediastinal blood pool, myocardium.\par \par LUNGS: No abnormal uptake.\par \par PLEURA/PERICARDIUM: No abnormal uptake.\par \par THORACIC NODES: No abnormal uptake.\par \par HEPATOBILIARY: No abnormal uptake.\par \par SPLEEN: No abnormal uptake.\par \par PANCREAS: Status post Whipple procedure. Increased mild focal FDG uptake in the region of the surgical bed, just anterior to the aorta and IVC (max SUV 3.4.)\par \par ADRENAL GLANDS: No abnormal uptake.\par \par KIDNEYS/URETERS/BLADDER: Excreted activity is seen.\par \par ABDOMINOPELVIC NODES: No abnormal uptake.\par \par BOWEL/PERITONEUM/MESENTERY: No abnormal uptake.\par \par PELVIC ORGANS: No abnormal uptake.\par \par BONES/SOFT TISSUES: No abnormal uptake.\par \par OTHER FINDINGS: New moderate to large size non-FDG avid right pleural effusion. Right chest wall port catheter in place. Status post Whipple procedure, with stable postoperative changes and bilobar pneumobilia. Left hepatic cyst. Left renal upper pole cyst. Unchanged right-sided extrarenal pelvis.Atherosclerotic vascular calcification.\par \par \par IMPRESSION:\par \par 1. Since July 6, 2021, no new site of pathologic FDG uptake.\par \par 2. Increased focal FDG uptake in the region of the pancreatectomy bed, just anterior to the aorta and IVC, with max SUV 3.4 (previously 2.6, which reflects a 31% increase), possibly reflecting biologic tumor activity.\par \par 3. New moderate to large size non-FDG avid right pleural effusion.\par \par --- End of Report ---\par CHRISTIANE ARGUELLO MD; Attending Radiologist\par This document has been electronically signed. Jan 20 2022  2:03PM\par \par  \par

## 2022-02-08 NOTE — REVIEW OF SYSTEMS
[Abdominal Pain] : abdominal pain [Negative] : Musculoskeletal [FreeTextEntry2] : weakness [FreeTextEntry7] : post prandial abdominal pain

## 2022-02-08 NOTE — HISTORY OF PRESENT ILLNESS
[de-identified] : Patient is a 75y old Female who presented to ER with a chief complaint of Jaundice on 10 Mar 2021\par \par She has a  PMHx of Afib on eliquis , HTN, Varicose vein in the lower extremities, Nephrolithiasis presented to the ED for painless jaundice and found to have pancreatic\par adenocarcinoma. She noticed dark urine in December 2020 and light colored\par stool. She went to her PMD who treated her for UTI, then developed jaundice and\par had EGD 3/3, he ordered a CT but it was not performed. Her jaundice\par significantly worsened along with boating,nausea and early satiety. Patient also mentioned night sweats and chills for the last month\par that happens almost daily Tbili sarah to 16.4, Alk phosph 1800, ca 19-9 = 2637, CT AP showed the below:\par \par 1. Arterially enhancing 3.5 x 2.8 x 3.4 cm area in the pancreatic head with\par associated pancreatic and intra-/extra hepatic biliary ductal dilation, highly\par suspicious for a pancreatic head neoplasm; given enhancement pattern,\par pancreatic neuroendocrine tumor is a possibility. Endoscopic ultrasound and\par tissue sampling is recommended for further assessment.\par \par 2. Nonspecific mild edema/stranding surrounding the celiac axis and SMA,\par possibly secondary to mesenteric edema; tumor encasement cannot be excluded.\par \par 3. Few subcentimeter arterially enhancing foci in the liver, likely\par representing perfusional abnormalities/shunts. However, if neuroendocrine tumor\par is confirmed, arterially enhancing metastases would also be in the\par differential. Further evaluation can be obtained at that time with a liver\par protocol MRI.\par \par 4. Prominent periportal lymph node.\par \par Ct chest showed bilateral effusions but no suspicious LN or nodules.\par \par She then underwent ERCP with stent placement. TB now improved to 8.5, DB 5.8 \par \par Cytopathology Report\par \par Final Diagnosis\par PANCREATIC HEAD MASS, EUS-GUIDED FINE NEEDLE ASPIRATION BIOPSY:\par - POSITIVE FOR MALIGNANT CELLS.\par - Adenocarcinoma.\par \par She has since then had a PET scan. She feels better with jaundice improved.\par She was seen by Dr Nino and was recommended chemo before possible surgery.\par \par 5/26/2021: \par The patient is here for follow up..  [de-identified] : 4/8/21\par Pt denies any SOB, chest pain or palpitations.\par No fever\par \par 5/26/2021: The patient is here for follow up. She completed cycle 1 of [place]/abraxane . She reported fatigue and weakness few days after chemotherapy , and diffuse bony pain after neulasta. She feels better today , denies fever, chills, nausea , diarrhea or constipation , no bone pain. She was admitted to the hospital few weeks back for LLE cellulitis treated with clindamycin. \par \par 6/15/21\par Pt is here for a follow up.\par She is s/p 2 cycles of chemo.\par Has been feeling bloated. Appetite is reduced.\par C/O pain post Neulasta.\par Day 8 of chemo was postponed d/t cellulitis\par \par 7/15/21\par Pt is here for follow up.\par Feeling well except has small sores on both ankles.\par She had been evaluated by Dr Nino and was advised wound care which was going to be set up at home.\par No abdominal pain. No n, V, D.\par Has H/o varicose veins and frequently has LE edema and weeping sores.\par No fever.\par Had PET scan and Ct scan.\par Has met with surg Onc with plan to proceed with surgery in 8/21\par \par 12/20/21\par Pt returns for a follow up.\par Pt has H/o Pancreatic Ca s/p whipple 8/1/21 with  recent admission for fungemia (treated with IV ABx via PICC\par line, completed 9/21), another admission for C diff colitis,\par \par She is now in a rehab facility. HER PS is significantly deteriorated. She needs assistance with all ADLs. Is in wheelchair most of the time.\par No abd pain, N, V, D\par Had labs done which showed elev Ca19-9.\par Appetite is slowly improving\par \par 1/31/22\par Patient returns for followup.\par She is having a lot of abdominal pain after eating \par She is home from rehab. She is walking with assistance but had a fall recently when trying to get to the sink, possibly lost consciousness but never went to the ED nor called 911.\par She is taking immodium daily\par Ca19.9 remains elevated but not rising\par She went for CT CAP which sowed a large right pleural effusion and small left effusion. \par CT chest 1/5/22\par IMPRESSION:\par Since  3/6/2021\par Large right, small left pleural effusions increased in volume from previous CT scan.\par Compressive atelectasis both lower lobes.\par Interval development of right middle lobe atelectasis (with patent proximal bronchus)\par No suspicious pulmonary nodules.\par CT AP 1/5/22:\par IMPRESSION:\par Indeterminate density in the region of the pancreatic head between the SMV and SMA measuring 2 cm; not definitively seen on prior examination\par An area of tumor recurrence is not excluded..\par Large right extrarenal pelvis versus hydronephrosis. This can be further evaluated with ultrasound if indicated.\par \par Additionally, she went for a PET scan on 1/20/22 which showed no new areas of pathologic uptake, however mild uptake at the pancreatectomy bed. \par IMPRESSION:\par 1. Since July 6, 2021, no new site of pathologic FDG uptake.\par 2. Increased focal FDG uptake in the region of the pancreatectomy bed, just anterior to the aorta and IVC, with max SUV 3.4 (previously 2.6, which reflects a 31% increase), possibly reflecting biologic tumor activity.\par 3. New moderate to large size non-FDG avid right pleural effusion.\par \par She then was seen by pulmonary, who offered a thoracentesis however patient declined it. \par \par

## 2022-02-08 NOTE — ASSESSMENT
[FreeTextEntry1] : This is a 76 year old F patient with PMHx of Afib on Eliquis, HTN, varicose vein in the lower extremities, diagnosed with  pancreatic adenocarcinoma.\par \par She had 3 cycles of neoadjuvant gem/abraxane\par She s/p whipple 8/1/21 complicated by fungemia (treated with IV ABx via PICC\par line, completed 9/21), recent admission for C diff colitis,\par \par Pt's PS has declined significantly and she is not a candidate for chemo at this time.\par In addition there has been a significant delay since surgery\par \par She went for CT CAP which showed a large right pleural effusion and small left effusion and atelectasis. Additionally, an indeterminate density in the region of the pancreatic head between the SMV and SMA measuring 2 cm; not definitively seen on prior examination\par An area of tumor recurrence is not excluded..\par Large right extrarenal pelvis versus hydronephrosis. This can be further evaluated with ultrasound if indicated.\par \par PET scan 1/20/22:\par IMPRESSION:\par 1. Since July 6, 2021, no new site of pathologic FDG uptake.\par 2. Increased focal FDG uptake in the region of the pancreatectomy bed, just anterior to the aorta and IVC, with max SUV 3.4 (previously 2.6, which reflects a 31% increase), possibly reflecting biologic tumor activity.\par 3. New moderate to large size non-FDG avid right pleural effusion.\par \par \par RECOMMENDATIONS\par  I have prepared the note after I reviewed the previous hospital  notes, reviewed the radiological and laboratory studies and have communicated those to the patient\par \par I discussed the natural history, treatment and prognosis of pancreatic cancer.\par \par Discussed that her PS is poor.\par \par Recommend thoracentesis for staging as it would change mangement. If negative, would recommend radiation oncology evaluation for consideration of treatment to the pancreatic bed that is FDG avid. If it is positive, she would be metastatic, in which case chemotherapy or comfort measures would likely be offered \par \par Recommend GI followup for her abdominal pain\par Start PPI\par Port flush\par Order CBC, CMP, Ca19.9\par RTC after thoracentesis, then will decide on chemotherapy vs radiation therapy. \par \par Due to COVID 19, pre-visit patient instructions were explained to the patient and their symptoms were checked upon arrival. \par Masks were used by the health care providers and staff and the examination room was cleaned before and after the patient visit was completed.\par \par RTC in 5 weeks \par \par

## 2022-02-08 NOTE — PHYSICAL EXAM
[Capable of only limited self care, confined to bed or chair more than 50% of waking hours] : Status 3- Capable of only limited self care, confined to bed or chair more than 50% of waking hours [Thin] : thin [Normal] : affect appropriate [de-identified] : Looks frail, sitting in a wheel chair [de-identified] : Air movement grater on L than right [de-identified] : surgical scars have healed well

## 2022-02-12 PROBLEM — C25.0 MALIGNANT NEOPLASM OF HEAD OF PANCREAS: Status: ACTIVE | Noted: 2021-03-09

## 2022-02-12 NOTE — ASSESSMENT
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 presented with obstructive jaundice\par s/p ERCP/plastic stent/EUS 3/9/2021\par \par received neoadjuvant chemo with poor tolerance started 5/2021\par \par 8/2/2021 she had Whipple with negative margins but 5/26 LN involvement\par \par prolonged postop rehab course\par \par 10/19/2021 first postop visit at office with  recent discharge from hospital for  C diff colitis, she is eating and having bowel movement with slight abdominal discomfort , will cont vancomycin and PT, will see her in couple weeks\par \par 11/18/2021 diarrhea and hx of recent c diff, will cont vanco PO, add creon, need more protein drink to improve her edema, cont eliquis, dizzy-> need cardio eval  to adjust her a fib meds , need PPI, will see her in two weeks\par \par 2/8/2022 reported less GI symptoms and gaining weight, reviewed CT and PET Jan 2022 suggestive of local recurrence, dr Johsnon is planning for thoracentesis to rule out distant disease , possible chemo vs radio per dr Johnson note\par discussed the need for ppi and creon\par CA 19-9 Jan 2022 = 50s\par the above plan of care with discussed in details to the patient and all questions were answered to patient satisfaction\par

## 2022-02-12 NOTE — REVIEW OF SYSTEMS
[Negative] : Heme/Lymph [FreeTextEntry2] :  fatigue [FreeTextEntry8] : some abdominal discomfort, diarrhea [de-identified] : skin breaks in her LE, edema UE and LE

## 2022-02-12 NOTE — PHYSICAL EXAM
[Normal] : supple, no neck mass and thyroid not enlarged [Normal Neck Lymph Nodes] : normal neck lymph nodes  [Normal Supraclavicular Lymph Nodes] : normal supraclavicular lymph nodes [Normal Groin Lymph Nodes] : normal groin lymph nodes [Normal Axillary Lymph Nodes] : normal axillary lymph nodes [Normal] : oriented to person, place and time, with appropriate affect [de-identified] : a fib [de-identified] : skin ulcer LE, edema

## 2022-02-12 NOTE — HISTORY OF PRESENT ILLNESS
[de-identified] : VIKASH LI  is a pleasant 75 year year old woman  who came in 03/18/2021 for newly diagnosed pancreas cancer 3/2021. She has cardiologist Dr Garrett Garvey for a fib. Dr Carson vascular surgeon for her varicose veins.\par \par she noticed jaundice in december 2020. treated for UTI. her cardiologist noticed jaundice during her stress test and send her to Dr Grant who scope her 3/3/2021 with negative results and ordered CT which was not done as outpatient. she came to [facility] ED with worsening jaundice and she had CT and EUS/ERCP and plastic stent by Dr Gavin with bx showed adenocarcinoma at head of pancreas. 3/9/2021 EUS showed resectable lesion and possible reactive LN. t mable 16 and went down after stent to 8\par \par she had three cycles of [place]/abraxane\par \par 6/24/2021 she reported lower ext ulcers and picture of dermatitis\par 7/13/2021 she came to f/u \par 7/27/2021 she came to discuss whipple procedure\par 8/2/2021 she had Whipple with long course of rehab\par \par 10/19/2021 recently was discharged from hospital after being treated for C diff colitis\par \par 11/18/2021 edema. LE wounds , edema of UE more on right side, she feels dizzy and she has frequent BM\par \par 2/8/2022:she still reporting frequent loose BM, gaining weight,

## 2022-04-05 NOTE — ED ADULT NURSE NOTE - NSICDXPASTSURGICALHX_GEN_ALL_CORE_FT
PAST SURGICAL HISTORY:  History of ovarian cystectomy left    History of surgery Whipple procedure 8/2/2021 with Dr. Nino at [facility]    History of tonsillectomy 1959    Kidney stone 2017    Status post phlebectomy 10/2018

## 2022-04-05 NOTE — ED ADULT NURSE REASSESSMENT NOTE - NS ED NURSE REASSESS COMMENT FT1
pt received from previous nurse. pt stable. no s.s of acute distress. pt on @2L of O2. pt a&ox3. will cont to monitor.

## 2022-04-05 NOTE — H&P ADULT - NSHPLABSRESULTS_GEN_ALL_CORE
10.4   14.56 )-----------( 182      ( 05 Apr 2022 17:31 )             33.5       04-05    140  |  108  |  20  ----------------------------<  114<H>  4.9   |  21  |  0.6<L>    Ca    8.5      05 Apr 2022 17:31    TPro  5.6<L>  /  Alb  2.8<L>  /  TBili  0.7  /  DBili  x   /  AST  25  /  ALT  15  /  AlkPhos  375<H>  04-05                      Lactate Trend      CARDIAC MARKERS ( 05 Apr 2022 17:31 )  x     / <0.01 ng/mL / x     / x     / x            CAPILLARY BLOOD GLUCOSE

## 2022-04-05 NOTE — ED PROVIDER NOTE - CARE PLAN
1 Principal Discharge DX:	Bilateral pleural effusion  Secondary Diagnosis:	Leukocytosis  Secondary Diagnosis:	Chest pain

## 2022-04-05 NOTE — ED PROVIDER NOTE - OBJECTIVE STATEMENT
76 year old female with pmhx of afib on eliquis, pancreatic cancer, s/p whipple , pleural effusions presents with sob and weakness x 2 days. 76 year old female with pmhx of afib on eliquis, pancreatic cancer, s/p whipple , pleural effusions presents with sob and weakness x 2 days. mild chest discomfort. pt is scheduled for a thoracentesis in 2 weeks with dr gallagher. No fever, cough, calf pain, swelling, abd pain or nausea, vomiting, diarrhea.

## 2022-04-05 NOTE — H&P ADULT - HISTORY OF PRESENT ILLNESS
76-year-old female with history of pancreatic CA, s/p Whipple 8/2021, A. fib on Eliquis, pleural effusion in ER with c/o SOB x2 days, generalized weakness.  Patient states she has been diagnosed previously with pleural effusion, is scheduled for thoracentesis later this month, has been having intermittent shortness of breath for months, but has felt worse for the past 2-3 days.  Denies any CP.  No cough.  No F/C.  No abdominal pain.  No LE pain/swelling.  No HA/syncope/near syncope. 76-year-old female with history of pancreatic CA, s/p Whipple 8/2021, A. fib on Eliquis, pleural effusion in ER with c/o SOB x2 days and generalized weakness.  Patient states she has been diagnosed previously with pleural effusion and was scheduled for thoracentesis this weekend but rescheduled as she had a family occasion. States she has been having intermittent shortness of breath for months, but has felt worse for the past 2-3 days.  Feels better when laying flat.  Denies CP, cough, fever, chills, abdominal pain, LE pain/swelling.    In the ED, pt hemodynamically stable saturating at 97% on 2L. Labs with WBC 14, Hb 10.4 at baseline

## 2022-04-05 NOTE — ED PROVIDER NOTE - PROGRESS NOTE DETAILS
Labs reviewed, wbc 14K, hgb 10, trop neg, bnp 5,190. CTA chest: no PE, + b/l pleural effusions, can not r/o superimposed infection.  pt given lasix, Iv abx, no resp distress in ER, + normal WOB. Pt admitted for pulm eval, will need eliquis held prior to thoracentesis.

## 2022-04-05 NOTE — ED ADULT TRIAGE NOTE - CHIEF COMPLAINT QUOTE
c/o SOB x 2 days, +generalized body weakness and back pain from sitting on the chair all day, Hx fluid in lungs, patient scheduled for a thoracentesis on 4/21

## 2022-04-05 NOTE — H&P ADULT - NSHPPHYSICALEXAM_GEN_ALL_CORE
GENERAL: NAD, speaks in full sentences, no signs of respiratory distress  HEAD:  Atraumatic, Normocephalic  EYES: EOMI, PERRLA, conjunctiva and sclera clear  NECK: Supple, No JVD  CHEST/LUNG: Clear to auscultation bilaterally; No wheeze; No crackles; No accessory muscles used  HEART: Regular rate and rhythm; No murmurs;   ABDOMEN: Soft, Nontender, Nondistended; Bowel sounds present; No guarding  EXTREMITIES:  2+ Peripheral Pulses, No cyanosis or edema  PSYCH: AAOx3  NEUROLOGY: non-focal GENERAL: NAD, speaks in full sentences, no signs of respiratory distress  HEAD:  Atraumatic, Normocephalic  EYES: EOMI, PERRLA, conjunctiva and sclera clear  NECK: Supple, No JVD  CHEST/LUNG: crackles b/l; No wheeze; No crackles; No accessory muscles used  HEART: Regular rate and rhythm; No murmurs;   ABDOMEN: Soft, Nontender, Nondistended; Bowel sounds present; No guarding  EXTREMITIES:  2+ Peripheral Pulses, No cyanosis or edema  PSYCH: AAOx3

## 2022-04-05 NOTE — H&P ADULT - NSICDXPASTSURGICALHX_GEN_ALL_CORE_FT
PAST SURGICAL HISTORY:  History of ovarian cystectomy left    History of surgery Whipple procedure 8/2/2021 with Dr. Nino at [facility]    History of tonsillectomy 1959    Kidney stone 2017    Status post phlebectomy 10/2018

## 2022-04-05 NOTE — ED PROVIDER NOTE - NSICDXPASTSURGICALHX_GEN_ALL_CORE_FT
PAST SURGICAL HISTORY:  History of ovarian cystectomy left    History of surgery Whipple procedure 8/2/2021 with Dr. Nino at [facility]    History of tonsillectomy 1959    Kidney stone 2017    Status post phlebectomy 10/2018

## 2022-04-05 NOTE — H&P ADULT - ASSESSMENT
#SOB likely secondary to b/l Pleural effusions  #PNA   -No sepsis POA   -s/p levaquin in the ED, cont   -BNP 5190  -Trop <0.01  - Echo Nov 2021. EF 50 to 55%. Severely enlarged right atrium.  - c/w PO Lasix   -CT chest : No pulmonary embolus. Increased moderate left pleural effusion with adjacent atelectasis and markedly decreased aeration of the left lower lobe. Scattered groundglass opacities within the left upper lobe may reflect atelectatic changes,   however superimposed infection or underlying neoplasm cannot be ruled out on imaging. Consider short-term 3 month follow-up examination to evaluate for resolution.  Stable large right pleural effusion with adjacent atelectasis that is mildly decreased within the right middle lobe.  -sat 97% on 2L, wean off O2, supplemental O2 PRN   -f/u pulm consult for thoracentesis   -obtain PT/INR   -Hold Eliquis, use lovenox for now     #Chronic AFib on Eliquis-Rate controlled  - c/w Cardizem, Verapamil PRN       #Pancreatic Ca   - s/p whipple 8/1/21 (in remission)   - c/w Creon 12K TID before meals       #Vitamin D deficiency   #Secondary hyperparathyroidism   - c/w supplementation     #DVT PPX : Hold Eliquis for now, switch to Lovenox   #Diet: DASH/TLC  #GI PPX: Protonix  #Activity:IAT   CODE  76-year-old female with history of pancreatic CA, s/p Whipple 8/2021, A. fib on Eliquis, pleural effusion in ER with c/o SOB x2 days and generalized weakness.  Patient states she has been diagnosed previously with pleural effusion and was scheduled for thoracentesis this weekend but rescheduled as she had a family occasion. States she has been having intermittent shortness of breath for months, but has felt worse for the past 2-3 days.  Feels better when laying flat.  Denies CP, cough, fever, chills, abdominal pain, LE pain/swelling.    In the ED, pt hemodynamically stable saturating at 97% on 2L. Labs with WBC 14, Hb 10.4 at baseline     #SOB likely secondary to b/l Pleural effusions  #PNA   -No sepsis POA, asymptomatic  -s/p levaquin in the ED, cont   -BNP 5190  -Trop <0.01  - Echo Nov 2021. EF 50 to 55%. Severely enlarged right atrium.  - c/w PO Lasix   -CT chest : No pulmonary embolus. Increased moderate left pleural effusion with adjacent atelectasis and markedly decreased aeration of the left lower lobe. Scattered groundglass opacities within the left upper lobe may reflect atelectatic changes, however superimposed infection or underlying neoplasm cannot be ruled out on imaging. Consider short-term 3 month follow-up examination to evaluate for resolution.  Stable large right pleural effusion with adjacent atelectasis that is mildly decreased within the right middle lobe.  -sat 97% on 2L, wean off O2, supplemental O2 PRN   -f/u pulm consult for thoracentesis   -obtain PT/INR   -Hold Eliquis, use lovenox for now     #Chronic AFib on Eliquis-Rate controlled  - c/w Cardizem, Verapamil PRN       #Pancreatic Ca   - s/p whipple 8/1/21 (in remission)   - c/w Creon 12K TID before meals       #Vitamin D deficiency   #Secondary hyperparathyroidism   - c/w supplementation     #DVT PPX : Hold Eliquis for now, switch to Lovenox   #Diet: DASH/TLC  #GI PPX: Protonix  #Activity:IAT   FULL CODE  76-year-old female with history of pancreatic CA, s/p Whipple 8/2021, A. fib on Eliquis, pleural effusion in ER with c/o SOB x2 days and generalized weakness.  Patient states she has been diagnosed previously with pleural effusion and was scheduled for thoracentesis this weekend but rescheduled as she had a family occasion. States she has been having intermittent shortness of breath for months, but has felt worse for the past 2-3 days.  Feels better when laying flat.  Denies CP, cough, fever, chills, abdominal pain, LE pain/swelling.    In the ED, pt hemodynamically stable saturating at 97% on 2L. Labs with WBC 14, Hb 10.4 at baseline     #SOB likely secondary to b/l Pleural effusions  #PNA   -No sepsis POA, asymptomatic  -s/p levaquin in the ED, cont   -BNP 5190  -Trop <0.01  - Echo Nov 2021. EF 50 to 55%. Severely enlarged right atrium.  - c/w PO Lasix. s/p IV lasix in the ED   -CT chest : No pulmonary embolus. Increased moderate left pleural effusion with adjacent atelectasis and markedly decreased aeration of the left lower lobe. Scattered groundglass opacities within the left upper lobe may reflect atelectatic changes, however superimposed infection or underlying neoplasm cannot be ruled out on imaging. Consider short-term 3 month follow-up examination to evaluate for resolution.  Stable large right pleural effusion with adjacent atelectasis that is mildly decreased within the right middle lobe.  -sat 97% on 2L, wean off O2, supplemental O2 PRN   -f/u pulm consult for thoracentesis   -obtain PT/INR   -Hold Eliquis, use lovenox for now, last Eliquis dose 4/5 AM. Hold AM lovenox if thoracentesis in the AM- covering team aware    #Chronic AFib on Eliquis-Rate controlled  - c/w Cardizem, Verapamil PRN       #Pancreatic Ca   - s/p whipple 8/1/21 (in remission)   - c/w Creon 12K TID before meals       #Vitamin D deficiency   #Secondary hyperparathyroidism   - c/w supplementation     #DVT PPX : Hold Eliquis for now, switch to Lovenox   #Diet: DASH/TLC  #GI PPX: Protonix  #Activity:IAT   FULL CODE

## 2022-04-05 NOTE — ED ADULT NURSE NOTE - NSIMPLEMENTINTERV_GEN_ALL_ED
Implemented All Fall with Harm Risk Interventions:  Sabin to call system. Call bell, personal items and telephone within reach. Instruct patient to call for assistance. Room bathroom lighting operational. Non-slip footwear when patient is off stretcher. Physically safe environment: no spills, clutter or unnecessary equipment. Stretcher in lowest position, wheels locked, appropriate side rails in place. Provide visual cue, wrist band, yellow gown, etc. Monitor gait and stability. Monitor for mental status changes and reorient to person, place, and time. Review medications for side effects contributing to fall risk. Reinforce activity limits and safety measures with patient and family. Provide visual clues: red socks.

## 2022-04-05 NOTE — ED PROVIDER NOTE - ATTENDING CONTRIBUTION TO CARE
76-year-old female with history of pancreatic CA, s/p Whipple 8/2021, A. fib on Eliquis, pleural effusion, in ER with c/o SOB x2 days, generalized weakness.  Patient states she has been diagnosed previously with pleural effusion, is scheduled for thoracentesis later this month, has been having intermittent shortness of breath for months, but has felt worse for the past 2-3 days.  Denies any CP.  No cough.  No F/C.  No abdominal pain.  No LE pain/swelling.  No HA/syncope/near syncope.  PE - nad, nc/at, eomi, perrl, op - clear, mmm, neck supple, no resp distress, normal WOB, b/l rhonchi, rrr, abd- soft, nt/nd, nabs, from x 4, no LE swelling/tenderness, A&O x 3, cn 2-12 intact, no focal motor/sensory deficits.   -check labs, cxr, cta chest

## 2022-04-06 NOTE — PROGRESS NOTE ADULT - ASSESSMENT
76-year-old female with history of pancreatic CA, s/p Whipple 8/2021, A. fib on Eliquis, pleural effusion in ER with c/o SOB x2 days and generalized weakness.  Patient states she has been diagnosed previously with pleural effusion and was scheduled for thoracentesis this weekend but rescheduled as she had a family occasion.  76-year-old female with history of pancreatic CA, s/p Whipple 8/2021, A. fib on Eliquis, pleural effusion in ER with c/o SOB x2 days and generalized weakness.  Patient states she has been diagnosed previously with pleural effusion and was scheduled for thoracentesis this weekend but rescheduled as she had a family occasion.       B/L pleural effusions likely due to metastatic pancreatic ca  H/O Pancreatic Ca S/P Whipple on 8/2021  Chronic A-Fib on Eliquis               PLAN:    ·	Cont to hold Eliquis. Will restart after the procedure.   ·	Care d/w the pulmonary. Will do thoracentesis today  ·	CTA chest reviewed. No PE. Scattered ground glass opacities within the RUL. B/L pleural effusion  ·	Doubt PNA. Can d/c Abx.   ·	Cont her other home meds.  ·	Full code.     Progress Note Handoff    Pending (specify):  Consults_________, Tests________, Test Results_______, Other_Thoracentesis today. Possible d/c after the procedure. ________  Family discussion:  Disposition: Home___/SNF___/Other________/Unknown at this time________    Sergo Young MD  Spectra: [id number]

## 2022-04-06 NOTE — CONSULT NOTE ADULT - ATTENDING COMMENTS
doubt paracentesis would help her, her thoracentesis resolves her SOB, if diarrhea we need to send stool C DIFF

## 2022-04-06 NOTE — PROGRESS NOTE ADULT - ASSESSMENT
Patient is a 76y old Female with history of pancreatic CA, s/p Whipple 8/2021, A. fib on Eliquis, pleural effusion in ER with c/o SOB x2 days and generalized weakness.    who presents with a chief complaint of Currently admitted to medicine with the primary diagnosis of Bilateral pleural effusion    #SOB likely secondary to b/l Pleural effusions  #PNA   -No sepsis POA, asymptomatic  -BNP 5190  -Trop <0.01  - Echo Nov 2021. EF 50 to 55%. Severely enlarged right atrium.  -CT chest : No pulmonary embolus. Increased moderate left pleural effusion with adjacent atelectasis and markedly decreased aeration of the left lower lobe. Scattered groundglass opacities within the left upper lobe may reflect atelectatic changes, however superimposed infection or underlying neoplasm cannot be ruled out on imaging. Consider short-term 3 month follow-up examination to evaluate for resolution.  Stable large right pleural effusion with adjacent atelectasis that is mildly decreased within the right middle lobe.  - sat 97% on 2L, wean off O2, supplemental O2 PRN   - Pulm consulted: s/p thoracenthesis (4/6)1.1L removed  - f/u pleural fluid analysis  - c/w lasix 40mg po  - c/w levoquin 750mg IV q24  - cont to hold anticoagulation for now    #Chronic AFib on Eliquis-Rate controlled  - c/w Verapamil PRN   - c/w diltiazem CD 120mg daily      #Hx Pancreatic Ca   - s/p whipple 8/1/21 (in remission)   - c/w Creon 12K TID before meals       #Vitamin D deficiency   #Secondary hyperparathyroidism   - c/w supplementation     #DVT PPX : Hold Eliquis for now, switch to Lovenox   #Diet: DASH/TLC  #GI PPX: Protonix  #Activity:IAT   FULL CODE    Dispo: prepare for dc, monitor overnight

## 2022-04-06 NOTE — PROGRESS NOTE ADULT - SUBJECTIVE AND OBJECTIVE BOX
LI, VIKASH  76y Female    CHIEF COMPLAINT:    Patient is a 76y old  Female who presents with a chief complaint of     INTERVAL HPI/OVERNIGHT EVENTS:    Patient seen and examined.    ROS: All other systems are negative.    Vital Signs:    T(F): 97.7 (22 @ 04:53), Max: 97.7 (22 @ 04:53)  HR: 99 (22 @ 04:53) (95 - 100)  BP: 115/78 (22 @ 04:53) (115/78 - 129/80)  RR: 18 (22 @ 04:53) (18 - 20)  SpO2: 94% (22 @ 02:03) (94% - 100%)  I&O's Summary    2022 07:01  -  2022 07:00  --------------------------------------------------------  IN: 60 mL / OUT: 1900 mL / NET: -1840 mL      Daily Height in cm: 172.72 (2022 16:55)    Daily Weight in k.8 (2022 04:53)  CAPILLARY BLOOD GLUCOSE          PHYSICAL EXAM:    GENERAL:  NAD  SKIN: No rashes or lesions  HENT: Atraumatic. Normocephalic. PERRL. Moist membranes.  NECK: Supple, No JVD. No lymphadenopathy.  PULMONARY: CTA B/L. No wheezing. No rales  CVS: Normal S1, S2. Rate and Rhythm are regular. No murmurs.  ABDOMEN/GI: Soft, Nontender, Nondistended; BS present  EXTREMITIES: Peripheral pulses intact. No edema B/L LE.  NEUROLOGIC:  No motor or sensory deficit.  PSYCH: Alert & oriented x 3    Consultant(s) Notes Reviewed:  [x ] YES  [ ] NO  Care Discussed with Consultants/Other Providers [ x] YES  [ ] NO    EKG reviewed  Telemetry reviewed    LABS:                        10.4   14.56 )-----------( 182      ( 2022 17:31 )             33.5     04-    140  |  108  |  20  ----------------------------<  114<H>  4.9   |  21  |  0.6<L>    Ca    8.5      2022 17:31    TPro  5.6<L>  /  Alb  2.8<L>  /  TBili  0.7  /  DBili  x   /  AST  25  /  ALT  15  /  AlkPhos  375<H>        Serum Pro-Brain Natriuretic Peptide: 5190 pg/mL (22 @ 17:31)    Trop <0.01, CKMB --, CK --, 22 @ 17:31        RADIOLOGY & ADDITIONAL TESTS:      Imaging or report Personally Reviewed:  [ ] YES  [ ] NO    Medications:  Standing  cholecalciferol Oral Tab/Cap - Peds 2000 Unit(s) Oral daily  diltiazem    milliGRAM(s) Oral daily  furosemide    Tablet 20 milliGRAM(s) Oral daily  levoFLOXacin IVPB 750 milliGRAM(s) IV Intermittent every 24 hours  pancrelipase  (CREON 12,000 Lipase Units) 2 Capsule(s) Oral three times a day with meals  pantoprazole    Tablet 40 milliGRAM(s) Oral before breakfast    PRN Meds  acetaminophen     Tablet .. 650 milliGRAM(s) Oral every 6 hours PRN  aluminum hydroxide/magnesium hydroxide/simethicone Suspension 30 milliLiter(s) Oral every 4 hours PRN  melatonin 3 milliGRAM(s) Oral at bedtime PRN  ondansetron Injectable 4 milliGRAM(s) IV Push every 8 hours PRN  verapamil 40 milliGRAM(s) Oral two times a day PRN      Case discussed with resident    Care discussed with pt/family           LI, VIKASH  76y Female    CHIEF COMPLAINT:    Patient is a 76y old  Female who presents with a chief complaint of     INTERVAL HPI/OVERNIGHT EVENTS:    Patient seen and examined. C/O sob. No cough or fever. No cp. Pt sates that her breathing has gotten worse over the last 3 days.     ROS: All other systems are negative.    Vital Signs:    T(F): 97.7 (22 @ 04:53), Max: 97.7 (22 @ 04:53)  HR: 99 (22 @ 04:53) (95 - 100)  BP: 115/78 (22 @ 04:53) (115/78 - 129/80)  RR: 18 (22 @ 04:53) (18 - 20)  SpO2: 94% (22 @ 02:03) (94% - 100%)  I&O's Summary    2022 07:01  -  2022 07:00  --------------------------------------------------------  IN: 60 mL / OUT: 1900 mL / NET: -1840 mL      Daily Height in cm: 172.72 (2022 16:55)    Daily Weight in k.8 (2022 04:53)  CAPILLARY BLOOD GLUCOSE          PHYSICAL EXAM:    GENERAL:  NAD  SKIN: No rashes or lesions  HENT: Atraumatic. Normocephalic. PERRL. Moist membranes.  NECK: Supple, No JVD. No lymphadenopathy.  PULMONARY: Decreased BS in the lower chest post B/L. No wheezing. No rales  CVS: Normal S1, S2. Rate and Rhythm are regular. No murmurs.  ABDOMEN/GI: Soft, Nontender, distended; BS present  EXTREMITIES: Peripheral pulses intact. No edema B/L LE.  NEUROLOGIC:  No motor or sensory deficit.  PSYCH: Alert & oriented x 3    Consultant(s) Notes Reviewed:  [x ] YES  [ ] NO  Care Discussed with Consultants/Other Providers [ x] YES  [ ] NO    EKG reviewed  Telemetry reviewed    LABS:                        10.4   14.56 )-----------( 182      ( 2022 17:31 )             33.5         140  |  108  |  20  ----------------------------<  114<H>  4.9   |  21  |  0.6<L>    Ca    8.5      2022 17:31    TPro  5.6<L>  /  Alb  2.8<L>  /  TBili  0.7  /  DBili  x   /  AST  25  /  ALT  15  /  AlkPhos  375<H>  -      Serum Pro-Brain Natriuretic Peptide: 5190 pg/mL (22 @ 17:31)    Trop <0.01, CKMB --, CK --, 22 @ 17:31        RADIOLOGY & ADDITIONAL TESTS:    < from: CT Angio Chest PE Protocol w/ IV Cont (22 @ 00:00) >    IMPRESSION:      No pulmonary embolus.    Increased moderate left pleural effusion with adjacent atelectasis and   markedly decreased aeration of the left lower lobe. Scattered groundglass   opacities within the left upper lobe may reflect atelectatic changes,   however superimposed infection or underlying neoplasm cannot be ruled out   on imaging. Consider short-term 3 month follow-up examination to evaluate   for resolution.    Enlarging large right pleural effusion with adjacent atelectasis that is   mildly decreasedwithin the right middle lobe.    Evaluation of the upper abdomen is severely limited due to poor tissue   contrast, however there is additional increased ascites as compared with   prior examination.    Redemonstration of hepatic cysts and hypodensities too small to further   characterize and postsurgical changes related to Whipple procedure which   are better demonstrated on prior examinations.    < end of copied text >    Imaging or report Personally Reviewed:  [x ] YES  [ ] NO    Medications:  Standing  cholecalciferol Oral Tab/Cap - Peds 2000 Unit(s) Oral daily  diltiazem    milliGRAM(s) Oral daily  furosemide    Tablet 20 milliGRAM(s) Oral daily  levoFLOXacin IVPB 750 milliGRAM(s) IV Intermittent every 24 hours  pancrelipase  (CREON 12,000 Lipase Units) 2 Capsule(s) Oral three times a day with meals  pantoprazole    Tablet 40 milliGRAM(s) Oral before breakfast    PRN Meds  acetaminophen     Tablet .. 650 milliGRAM(s) Oral every 6 hours PRN  aluminum hydroxide/magnesium hydroxide/simethicone Suspension 30 milliLiter(s) Oral every 4 hours PRN  melatonin 3 milliGRAM(s) Oral at bedtime PRN  ondansetron Injectable 4 milliGRAM(s) IV Push every 8 hours PRN  verapamil 40 milliGRAM(s) Oral two times a day PRN      Case discussed with resident    Care discussed with pt/family

## 2022-04-06 NOTE — PATIENT PROFILE ADULT - FALL HARM RISK - RISK INTERVENTIONS

## 2022-04-06 NOTE — PROGRESS NOTE ADULT - SUBJECTIVE AND OBJECTIVE BOX
VIKASH LI 76y Female  MRN#: [id number]   Hospital Day: 1d    SUBJECTIVE  Patient is a 76y old Female with history of pancreatic CA, s/p Whipple 8/2021, A. fib on Eliquis, pleural effusion in ER with c/o SOB x2 days and generalized weakness.    who presents with a chief complaint of Currently admitted to medicine with the primary diagnosis of Bilateral pleural effusion      INTERVAL HPI AND OVERNIGHT EVENTS:  Patient was examined and seen at bedside. This morning she is resting comfortably in bed and reports no issues or overnight events.    REVIEW OF SYMPTOMS:  Denies any HA, CP, palpitations. No SOB, abdominal pain. No nausea, fever or sweats    OBJECTIVE  PAST MEDICAL & SURGICAL HISTORY  SOB (shortness of breath)    Pain  knee    Varicose veins of both lower extremities, unspecified whether complicated    Atrial fibrillation, unspecified type  2019 on Eliquis    Jaundice  March 5, 2021    Malignant neoplasm of pancreas, unspecified location of malignancy  Pancreatic Cancer    Hypertension, unspecified type  2019    Pancreatic cancer    Kidney stones    History of surgery  Whipple procedure 8/2/2021 with Dr. Nino at [facility]    Status post phlebectomy  10/2018    History of ovarian cystectomy  left    History of tonsillectomy  1959    Kidney stone  2017      ALLERGIES:  Augmentin (Rash)  chocolate (Headache)  digoxin (Hives)  Metoprolol Succinate ER (Hives)  Novocain (Angioedema)  Steroids: Excessive swelling (Flushing; Other (Mild to Mod))  sulfa drugs (Fever)  Xarelto (Hives)    MEDICATIONS:  STANDING MEDICATIONS  cholecalciferol Oral Tab/Cap - Peds 2000 Unit(s) Oral daily  diltiazem    milliGRAM(s) Oral daily  furosemide    Tablet 20 milliGRAM(s) Oral daily  levoFLOXacin IVPB 750 milliGRAM(s) IV Intermittent every 24 hours  pancrelipase  (CREON 12,000 Lipase Units) 2 Capsule(s) Oral three times a day with meals  pantoprazole    Tablet 40 milliGRAM(s) Oral before breakfast    PRN MEDICATIONS  acetaminophen     Tablet .. 650 milliGRAM(s) Oral every 6 hours PRN  aluminum hydroxide/magnesium hydroxide/simethicone Suspension 30 milliLiter(s) Oral every 4 hours PRN  melatonin 3 milliGRAM(s) Oral at bedtime PRN  ondansetron Injectable 4 milliGRAM(s) IV Push every 8 hours PRN  verapamil 40 milliGRAM(s) Oral two times a day PRN      VITAL SIGNS: Last 24 Hours  T(C): 36.5 (06 Apr 2022 04:53), Max: 36.5 (06 Apr 2022 04:53)  T(F): 97.7 (06 Apr 2022 04:53), Max: 97.7 (06 Apr 2022 04:53)  HR: 102 (06 Apr 2022 12:43) (95 - 102)  BP: 126/76 (06 Apr 2022 12:43) (115/78 - 129/80)  BP(mean): --  RR: 18 (06 Apr 2022 04:53) (18 - 20)  SpO2: 100% (06 Apr 2022 12:43) (94% - 100%)    LABS:                        9.6    11.39 )-----------( 184      ( 06 Apr 2022 06:14 )             31.0     04-06    141  |  105  |  18  ----------------------------<  130<H>  3.9   |  24  |  0.7    Ca    8.4<L>      06 Apr 2022 06:14    TPro  5.3<L>  /  Alb  2.7<L>  /  TBili  0.8  /  DBili  x   /  AST  16  /  ALT  13  /  AlkPhos  354<H>  04-06    PT/INR - ( 06 Apr 2022 06:14 )   PT: 33.30 sec;   INR: 2.93 ratio         PTT - ( 06 Apr 2022 06:14 )  PTT:38.2 sec      Troponin T, Serum: <0.01 ng/mL (04-05-22 @ 17:31)      CARDIAC MARKERS ( 05 Apr 2022 17:31 )  x     / <0.01 ng/mL / x     / x     / x          RADIOLOGY:      PHYSICAL EXAM:  CONSTITUTIONAL: No acute distress,  AAOx3  HEAD: Atraumatic, normocephalic  EYES: conjunctiva and sclera clear  ENT: no JVD  PULMONARY: decreased BS BL  CARDIOVASCULAR: Regular rate and rhythm; no murmurs  GASTROINTESTINAL: Soft, non-tender, non-distended; bowel sounds present  MUSCULOSKELETAL:  no edema  NEUROLOGY: non-focal  SKIN:warm and dry

## 2022-04-07 NOTE — PROGRESS NOTE ADULT - ASSESSMENT
76yF w/ PMHx of afib on Eliquis pleural effusion, pancreatic cancer sp whipple for pancreatic adenocarcinoma, 8/2021 who presented with worsening shortness of breath. Physical exam findings, imaging, and labs as documented above.     -patient is now s/p thoracentesis 4/6  -there is no indication for surgical intervention at this time   -surgery team will sign off, please recall if needed

## 2022-04-07 NOTE — CONSULT NOTE ADULT - SUBJECTIVE AND OBJECTIVE BOX
GENERAL SURGERY CONSULT NOTE    Patient: VIKASH LI , 76y ([birth year])Female   MRN: [id number]  Location: [facility] ER Hold 013 A  Visit: 04-05-22 Inpatient  Date: 04-06-22 @ 00:54    HPI:  76-year-old female with history of pancreatic CA, s/p Whipple 8/2021, A. fib on Eliquis, pleural effusion in ER with c/o SOB x2 days and generalized weakness.  Patient states she has been diagnosed previously with pleural effusion and was scheduled for thoracentesis this weekend but rescheduled as she had a family occasion. States she has been having intermittent shortness of breath for months, but has felt worse for the past 2-3 days.  Feels better when laying flat.  Denies CP, cough, fever, chills, abdominal pain, LE pain/swelling.    In the ED, pt hemodynamically stable saturating at 97% on 2L. Labs with WBC 14, Hb 10.4 at baseline       PAST MEDICAL & SURGICAL HISTORY:  SOB (shortness of breath)  Pain  knee  Varicose veins of both lower extremities, unspecified whether complicated  Atrial fibrillation, unspecified type  2019 on Eliquis  Jaundice  March 5, 2021  Malignant neoplasm of pancreas, unspecified location of malignancy  Pancreatic Cancer    Hypertension, unspecified type  2019    Pancreatic cancer    Kidney stones    History of surgery  Whipple procedure 8/2/2021 with Dr. Nino at [facility]    Status post phlebectomy  10/2018    History of ovarian cystectomy  left    History of tonsillectomy  1959    Kidney stone  2017        Home Medications:  Cardizem  mg/24 hours oral capsule, extended release: 1 cap(s) orally once a day (22 Nov 2021 23:59)  Creon 12,000 units oral delayed release capsule: 2 cap(s) orally 3 times a day (with meals) (03 Dec 2021 10:54)  Eliquis 5 mg oral tablet: 1 tab(s) orally 2 times a day (03 Sep 2021 23:41)        VITALS:  T(F): 97.2 (04-05-22 @ 23:50), Max: 97.2 (04-05-22 @ 23:50)  HR: 95 (04-05-22 @ 23:50) (95 - 97)  BP: 119/72 (04-05-22 @ 23:50) (119/72 - 129/80)  RR: 18 (04-05-22 @ 23:50) (18 - 20)  SpO2: 98% (04-05-22 @ 23:50) (97% - 100%)    PHYSICAL EXAM:  General: NAD, AAOx3, calm and cooperative  HEENT: NCAT, JEFFRY, EOMI, Trachea ML, Neck supple  Cardiac: RRR S1, S2, no Murmurs, rubs or gallops  Respiratory: CTAB, normal respiratory effort  Abdomen: Soft, non-distended, non-tender, no rebound, no guarding. +BS.  Musculoskeletal: Strength 5/5 BL UE/LE, ROM intact, compartments soft  Vascular: Pulses 2+ throughout, extremities well perfused  Skin: Warm/dry, normal color, no jaundice    MEDICATIONS  (STANDING):  cholecalciferol Oral Tab/Cap - Peds 2000 Unit(s) Oral daily  diltiazem    milliGRAM(s) Oral daily  furosemide    Tablet 20 milliGRAM(s) Oral daily  pancrelipase  (CREON 12,000 Lipase Units) 2 Capsule(s) Oral three times a day with meals  pantoprazole    Tablet 40 milliGRAM(s) Oral before breakfast    MEDICATIONS  (PRN):  verapamil 40 milliGRAM(s) Oral two times a day PRN tachycardia faster tahn 110 beats/ minute      LAB/STUDIES:                        10.4   14.56 )-----------( 182      ( 05 Apr 2022 17:31 )             33.5     04-05    140  |  108  |  20  ----------------------------<  114<H>  4.9   |  21  |  0.6<L>    Ca    8.5      05 Apr 2022 17:31    TPro  5.6<L>  /  Alb  2.8<L>  /  TBili  0.7  /  DBili  x   /  AST  25  /  ALT  15  /  AlkPhos  375<H>  04-05      LIVER FUNCTIONS - ( 05 Apr 2022 17:31 )  Alb: 2.8 g/dL / Pro: 5.6 g/dL / ALK PHOS: 375 U/L / ALT: 15 U/L / AST: 25 U/L / GGT: x             CARDIAC MARKERS ( 05 Apr 2022 17:31 )  x     / <0.01 ng/mL / x     / x     / x          MAGING:  < from: CT Angio Chest PE Protocol w/ IV Cont (04.05.22 @ 00:00) >    IMPRESSION:      No pulmonary embolus.    Increased moderate left pleural effusion with adjacent atelectasis and   markedly decreased aeration of the left lower lobe. Scattered groundglass   opacities within the left upper lobe may reflect atelectatic changes,   however superimposed infection or underlying neoplasm cannot be ruled out   on imaging. Consider short-term 3 month follow-up examination to evaluate   for resolution.    Enlarging large right pleural effusion with adjacent atelectasis that is   mildly decreasedwithin the right middle lobe.    Evaluation of the upper abdomen is severely limited due to poor tissue   contrast, however there is additional increased ascites as compared with   prior examination.    Redemonstration of hepatic cysts and hypodensities too small to further   characterize and postsurgical changes related to Whipple procedure which   are better demonstrated on prior examinations.    < end of copied text >      ACCESS DEVICES:  [x] Peripheral IV  [ ] Central Venous Line	[ ] R	[ ] L	[ ] IJ	[ ] Fem	[ ] SC	Placed:   [ ] Arterial Line		[ ] R	[ ] L	[ ] Fem	[ ] Rad	[ ] Ax	Placed:   [ ] PICC:					[ ] Mediport  [ ] Urinary Catheter, Date Placed:   
HPI:  76-year-old female with history of pancreatic CA, s/p Whipple 8/2021, A. fib on Eliquis, pleural effusion in ER with c/o SOB x2 days and generalized weakness.  Patient states she has been diagnosed previously with pleural effusion and was scheduled for thoracentesis this weekend but rescheduled as she had a family occasion. States she has been having intermittent shortness of breath for months, but has felt worse for the past 2-3 days.  Feels better when laying flat.  Denies CP, cough, fever, chills, abdominal pain, LE pain/swelling.    In the ED, pt hemodynamically stable saturating at 97% on 2L. Labs with WBC 14, Hb 10.4 at baseline   SOB likely secondary to b/l Pleural effusions  #PNA   -No sepsis POA, asymptomatic  -BNP 5190  -Trop <0.01  - Echo Nov 2021. EF 50 to 55%. Severely enlarged right atrium.  -CT chest : No pulmonary embolus. Increased moderate left pleural effusion with adjacent atelectasis and markedly decreased aeration of the left lower lobe. Scattered groundglass opacities within the left upper lobe may reflect atelectatic changes, however superimposed infection or underlying neoplasm cannot be ruled out on imaging. Consider short-term 3 month follow-up examination to evaluate for resolution.  Stable large right pleural effusion with adjacent atelectasis that is mildly decreased within the right middle lobe.  - sat 97% on 2L, wean off O2, supplemental O2 PRN   - Pulm consulted: s/p thoracenthesis (4/6)1.1L removed  - f/u pleural fluid analysis  - c/w lasix 40mg po  - c/w levoquin 750mg IV q24  - cont to hold anticoagulation for now         PAST MEDICAL & SURGICAL HISTORY:  SOB (shortness of breath)    Pain  knee    Varicose veins of both lower extremities, unspecified whether complicated    Atrial fibrillation, unspecified type  2019 on Eliquis    Jaundice  March 5, 2021    Malignant neoplasm of pancreas, unspecified location of malignancy  Pancreatic Cancer    Hypertension, unspecified type  2019    Pancreatic cancer    Kidney stones    History of surgery  Whipple procedure 8/2/2021 with Dr. Nino at [facility]    Status post phlebectomy  10/2018    History of ovarian cystectomy  left    History of tonsillectomy  1959    Kidney stone  2017        Hospital Course:    TODAY'S SUBJECTIVE & REVIEW OF SYMPTOMS:     Constitutional WNL   Cardio WNL   Resp sob   GI WNL  Heme WNL  Endo WNL  Skin WNL  MSK WNL  Neuro WNL  Cognitive WNL  Psych WNL      MEDICATIONS  (STANDING):  cholecalciferol Oral Tab/Cap - Peds 2000 Unit(s) Oral daily  diltiazem    milliGRAM(s) Oral daily  furosemide    Tablet 20 milliGRAM(s) Oral daily  levoFLOXacin IVPB 750 milliGRAM(s) IV Intermittent every 24 hours  pancrelipase  (CREON 12,000 Lipase Units) 2 Capsule(s) Oral three times a day with meals  pantoprazole    Tablet 40 milliGRAM(s) Oral before breakfast    MEDICATIONS  (PRN):  acetaminophen     Tablet .. 650 milliGRAM(s) Oral every 6 hours PRN Temp greater or equal to 38C (100.4F), Mild Pain (1 - 3)  aluminum hydroxide/magnesium hydroxide/simethicone Suspension 30 milliLiter(s) Oral every 4 hours PRN Dyspepsia  melatonin 3 milliGRAM(s) Oral at bedtime PRN Insomnia  ondansetron Injectable 4 milliGRAM(s) IV Push every 8 hours PRN Nausea and/or Vomiting  verapamil 40 milliGRAM(s) Oral two times a day PRN tachycardia faster tahn 110 beats/ minute      FAMILY HISTORY:  CAD (coronary artery disease) (Sibling)  3  siblings ( 2 living and 1 passed away)        Allergies    Augmentin (Rash)  chocolate (Headache)  digoxin (Hives)  Metoprolol Succinate ER (Hives)  Novocain (Angioedema)  Steroids: Excessive swelling (Flushing; Other (Mild to Mod))  sulfa drugs (Fever)  Xarelto (Hives)    Intolerances        SOCIAL HISTORY:    [  ] Etoh  [  ] Smoking  [  ] Substance abuse     Home Environment:  [   ] Home Alone  [ x  ] Lives with Family  [   ] Home Health Aid    Dwelling:  [   ] Apartment  [ x  ] Private House  [   ] Adult Home  [   ] Skilled Nursing Facility      [   ] Short Term  [   ] Long Term  [ x  ] Stairs       Elevator [   ]    FUNCTIONAL STATUS PTA: (Check all that apply)  Ambulation: [ x   ]Independent    [   ] Dependent     [   ] Non-Ambulatory  Assistive Device: [   ] SA Cane  [   ]  Q Cane  [ x  ] Walker  [   ]  Wheelchair  ADL : [  x ] Independent  [    ]  Dependent       Vital Signs Last 24 Hrs  T(C): 35.9 (07 Apr 2022 12:31), Max: 36.6 (06 Apr 2022 21:09)  T(F): 96.6 (07 Apr 2022 12:31), Max: 97.9 (06 Apr 2022 21:09)  HR: 113 (07 Apr 2022 12:31) (94 - 113)  BP: 104/75 (07 Apr 2022 12:31) (104/75 - 126/78)  BP(mean): --  RR: 18 (07 Apr 2022 12:31) (18 - 18)  SpO2: 97% (07 Apr 2022 11:30) (84% - 99%)      PHYSICAL EXAM: Awake & Alert  GENERAL: NAD  HEAD:  Normocephalic  CHEST/LUNG: decreased BS lung bases  HEART: S1S2+  ABDOMEN: Soft, Nontender  EXTREMITIES:  no calf tenderness    NERVOUS SYSTEM:  Cranial Nerves 2-12 intact [   ] Abnormal  [   ]  ROM: WFL all extremities [ x  ]  Abnormal [   ]  Motor Strength: WFL all extremities  [   ]  Abnormal [ x  ]4/5 all ext  Sensation: intact to light touch [  x ] Abnormal [   ]    FUNCTIONAL STATUS:  Bed Mobility: Independent [   ]  Supervision [   ]  Needs Assistance [ x  ]  N/A [   ]  Transfers: Independent [   ]  Supervision [   ]  Needs Assistance [ x  ]  N/A [   ]   Ambulation: Independent [   ]  Supervision [   ]  Needs Assistance [   ]  N/A [   ]  ADL: Independent [   ] Requires Assistance [   ] N/A [   ]      LABS:                        9.2    9.10  )-----------( 182      ( 07 Apr 2022 05:30 )             30.0     04-07    140  |  104  |  16  ----------------------------<  95  3.8   |  26  |  0.6<L>    Ca    8.3<L>      07 Apr 2022 05:30  Mg     1.6     04-07    TPro  4.9<L>  /  Alb  2.6<L>  /  TBili  0.6  /  DBili  x   /  AST  12  /  ALT  11  /  AlkPhos  295<H>  04-07    PT/INR - ( 06 Apr 2022 06:14 )   PT: 33.30 sec;   INR: 2.93 ratio         PTT - ( 06 Apr 2022 06:14 )  PTT:38.2 sec      RADIOLOGY & ADDITIONAL STUDIES:  
Patient is a 76y old  Female who presents with a chief complaint of     HPI:  76-year-old female with history of pancreatic CA, s/p Whipple 8/2021, A. fib on Eliquis, pleural effusion in ER with c/o SOB x2 days and generalized weakness.  Patient states she has been diagnosed previously with pleural effusion and was scheduled for thoracentesis this weekend but rescheduled as she had a family occasion. States she has been having intermittent shortness of breath for months, but has felt worse for the past 2-3 days.  Feels better when laying flat.  Denies CP, cough, fever, chills, abdominal pain, LE pain/swelling.    In the ED, pt hemodynamically stable saturating at 97% on 2L. Labs with WBC 14, Hb 10.4 at baseline      (05 Apr 2022 22:55)      PAST MEDICAL & SURGICAL HISTORY:  SOB (shortness of breath)    Pain  knee    Varicose veins of both lower extremities, unspecified whether complicated    Atrial fibrillation, unspecified type  2019 on Eliquis    Jaundice  March 5, 2021    Malignant neoplasm of pancreas, unspecified location of malignancy  Pancreatic Cancer    Hypertension, unspecified type  2019    Pancreatic cancer    Kidney stones    History of surgery  Whipple procedure 8/2/2021 with Dr. Nino at [facility]    Status post phlebectomy  10/2018    History of ovarian cystectomy  left    History of tonsillectomy  1959    Kidney stone  2017        SOCIAL HX:   Smoking         No              ETOH                            Other    FAMILY HISTORY:  CAD (coronary artery disease) (Sibling)  3  siblings ( 2 living and 1 passed away)    :  No known cardiovacular family hisotry     Review Of Systems:     All ROS are negative except per HPI       Allergies    Augmentin (Rash)  chocolate (Headache)  digoxin (Hives)  Metoprolol Succinate ER (Hives)  Novocain (Angioedema)  Steroids: Excessive swelling (Flushing; Other (Mild to Mod))  sulfa drugs (Fever)  Xarelto (Hives)    Intolerances          PHYSICAL EXAM    ICU Vital Signs Last 24 Hrs  T(C): 36.5 (06 Apr 2022 04:53), Max: 36.5 (06 Apr 2022 04:53)  T(F): 97.7 (06 Apr 2022 04:53), Max: 97.7 (06 Apr 2022 04:53)  HR: 99 (06 Apr 2022 04:53) (95 - 100)  BP: 115/78 (06 Apr 2022 04:53) (115/78 - 129/80)  BP(mean): --  ABP: --  ABP(mean): --  RR: 18 (06 Apr 2022 04:53) (18 - 20)  SpO2: 94% (06 Apr 2022 02:03) (94% - 100%)      CONSTITUTIONAL:  Ill appearing in NAD    ENT:   Airway patent,   Mouth with normal mucosa.   No thrush      CARDIAC:   Normal rate,   Regular rhythm.    No edema      Vascular:   normal systolic impulse  no bruits    RESPIRATORY:   No wheezing  Dec BS both bases    Not tachypneic,  No use of accessory muscles    GASTROINTESTINAL:  Abdomen soft,   Non-tender,   No guarding,   + BS      NEUROLOGICAL:   Alert and oriented   No motor deficits.    SKIN:   Skin normal color for race,   No evidence of rash.      HEME LYMPH: .  No cervical  lymphadenopathy.  No inguinal lymphadenopathy            04-05-22 @ 07:01  -  04-06-22 @ 06:52  --------------------------------------------------------  IN:    Oral Fluid: 60 mL  Total IN: 60 mL    OUT:    Voided (mL): 1700 mL  Total OUT: 1700 mL    Total NET: -1640 mL          LABS:                          10.4   14.56 )-----------( 182      ( 05 Apr 2022 17:31 )             33.5                                               04-05    140  |  108  |  20  ----------------------------<  114<H>  4.9   |  21  |  0.6<L>    Ca    8.5      05 Apr 2022 17:31    TPro  5.6<L>  /  Alb  2.8<L>  /  TBili  0.7  /  DBili  x   /  AST  25  /  ALT  15  /  AlkPhos  375<H>  04-05                                                 CARDIAC MARKERS ( 05 Apr 2022 17:31 )  x     / <0.01 ng/mL / x     / x     / x                                                LIVER FUNCTIONS - ( 05 Apr 2022 17:31 )  Alb: 2.8 g/dL / Pro: 5.6 g/dL / ALK PHOS: 375 U/L / ALT: 15 U/L / AST: 25 U/L / GGT: x                                                                                                                                       X-Rays reviewed                                                                                     ECHO    CXR interpreted by me     MEDICATIONS  (STANDING):  cholecalciferol Oral Tab/Cap - Peds 2000 Unit(s) Oral daily  diltiazem    milliGRAM(s) Oral daily  furosemide    Tablet 20 milliGRAM(s) Oral daily  levoFLOXacin IVPB 750 milliGRAM(s) IV Intermittent every 24 hours  pancrelipase  (CREON 12,000 Lipase Units) 2 Capsule(s) Oral three times a day with meals  pantoprazole    Tablet 40 milliGRAM(s) Oral before breakfast    MEDICATIONS  (PRN):  acetaminophen     Tablet .. 650 milliGRAM(s) Oral every 6 hours PRN Temp greater or equal to 38C (100.4F), Mild Pain (1 - 3)  aluminum hydroxide/magnesium hydroxide/simethicone Suspension 30 milliLiter(s) Oral every 4 hours PRN Dyspepsia  melatonin 3 milliGRAM(s) Oral at bedtime PRN Insomnia  ondansetron Injectable 4 milliGRAM(s) IV Push every 8 hours PRN Nausea and/or Vomiting  verapamil 40 milliGRAM(s) Oral two times a day PRN tachycardia faster tahn 110 beats/ minute

## 2022-04-07 NOTE — PROGRESS NOTE ADULT - ATTENDING COMMENTS
Impression  Bilateral pleural effusions R>L sp right thoracentesis 4/6  HO pancreatic Cancer SP whipple   PHTN    Plan as outlined above

## 2022-04-07 NOTE — CONSULT NOTE ADULT - ASSESSMENT
Impression    Bilateral pleural effusions R>L  HO pancreatic Cancer SP whipple   PHTN    Recommendations    Volume contraction as tolerated  Right thoracentesis today   NO need for ABX from pulmonary stand point  Dimer  DVT prophylaxis   Can DC home from pulmonary stand point after thoracentesis   Needs OP pulmonary follow up
ASSESSMENT:  76yF w/ PMHx of afib on Eliquis pleural effusion, pancreatic cancer sp whipple for pancreatic adenocarcinoma, 8/2021 who presented with worsening shortness of breath. Physical exam findings, imaging, and labs as documented above.     PLAN:  - No surg intervention  - Pulm consult for south  - CT chest showing no PE    Above plan discussed with Attending Surgeon Dr. Nion  , patient, patient family, and Primary team  04-06-22 @ 00:54  
IMPRESSION: Rehab of gait dysfunction / pleural effusion    PRECAUTIONS: [   ] Cardiac  [   ] Respiratory  [   ] Seizures [   ] Contact Isolation  [   ] Droplet Isolation  [   ] Other    Weight Bearing Status:     RECOMMENDATION:    Out of Bed to Chair     DVT/Decubiti Prophylaxis    REHAB PLAN:     [   x ] Bedside P/T 3-5 times a week   [    ]   Bedside O/T  2-3 times a week             [    ] Speech Therapy               [    ]  No Rehab Therapy Indicated   Conditioning/ROM                                    ADL  Bed Mobility                                               Conditioning/ROM  Transfers                                                     Bed Mobility  Sitting /Standing Balance                         Transfers                                        Gait Training                                               Sitting/Standing Balance  Stair Training [   ]Applicable                    Home equipment Eval                                                                        Splinting  [   ] Only      GOALS:   ADL   [    ]   Independent                    Transfers  [  x  ] Independent                          Ambulation  [   x ] Independent     [ x    ] With device                            [    ]  CG                                                         [    ]  CG                                                                  [    ] CG                            [    ] Min A                                                   [    ] Min A                                                              [    ] Min  A          DISCHARGE PLAN:   [    ]  Good candidate for Intensive Rehabilitation/Hospital based                                             Will tolerate 3hrs Intensive Rehab Daily                                       [   x  ]  Short Term Rehab in Skilled Nursing Facility                            vs           [  x   ]  Home with Outpatient or VN services                                         [     ]  Possible Candidate for Intensive Hospital based Rehab

## 2022-04-07 NOTE — PROGRESS NOTE ADULT - SUBJECTIVE AND OBJECTIVE BOX
Patient is a 76y old  Female who presents with a chief complaint of Pleural Effusions (06 Apr 2022 14:45)        SUBJECTIVE:      REVIEW OF SYSTEMS:    All Pertinent ROS are negative except per HPI       PHYSICAL EXAM  Vital Signs Last 24 Hrs  T(C): 35.9 (07 Apr 2022 12:31), Max: 36.6 (06 Apr 2022 21:09)  T(F): 96.6 (07 Apr 2022 12:31), Max: 97.9 (06 Apr 2022 21:09)  HR: 113 (07 Apr 2022 12:31) (94 - 113)  BP: 104/75 (07 Apr 2022 12:31) (104/75 - 126/78)  BP(mean): --  RR: 18 (07 Apr 2022 12:31) (18 - 18)  SpO2: 97% (07 Apr 2022 11:30) (84% - 99%)    CONSTITUTIONAL:  cachectic, frail, chronically ill appearing  NAD    ENT:   Airway patent,   No thrush  on NC    CARDIAC:   Normal rate,   regular rhythm.    +1 edema      RESPIRATORY:   NO Wheezing  Normal chest expansion  Not tachypneic,  No use of accessory muscles  Decreased BS LLL    GASTROINTESTINAL:  Abdomen soft,   non-tender,   no guarding,     MUSCULOSKELETAL:   range of motion is not limited,  no clubbing, cyanosis    NEUROLOGICAL:   Alert and oriented   no motor or deficits.    SKIN:   Skin normal color for race,   warm, dry   No evidence of rash.      04-06-22 @ 07:01  -  04-07-22 @ 07:00  --------------------------------------------------------  IN:    IV PiggyBack: 150 mL    Oral Fluid: 1370 mL  Total IN: 1520 mL    OUT:    Voided (mL): 1150 mL  Total OUT: 1150 mL    Total NET: 370 mL      04-07-22 @ 07:01  -  04-07-22 @ 14:28  --------------------------------------------------------  IN:    Oral Fluid: 640 mL  Total IN: 640 mL    OUT:    Voided (mL): 350 mL  Total OUT: 350 mL    Total NET: 290 mL          LABS:                          9.2    9.10  )-----------( 182      ( 07 Apr 2022 05:30 )             30.0                                               04-07    140  |  104  |  16  ----------------------------<  95  3.8   |  26  |  0.6<L>    Ca    8.3<L>      07 Apr 2022 05:30  Mg     1.6     04-07    TPro  4.9<L>  /  Alb  2.6<L>  /  TBili  0.6  /  DBili  x   /  AST  12  /  ALT  11  /  AlkPhos  295<H>  04-07      PT/INR - ( 06 Apr 2022 06:14 )   PT: 33.30 sec;   INR: 2.93 ratio         PTT - ( 06 Apr 2022 06:14 )  PTT:38.2 sec                                           CARDIAC MARKERS ( 05 Apr 2022 17:31 )  x     / <0.01 ng/mL / x     / x     / x                                                LIVER FUNCTIONS - ( 07 Apr 2022 05:30 )  Alb: 2.6 g/dL / Pro: 4.9 g/dL / ALK PHOS: 295 U/L / ALT: 11 U/L / AST: 12 U/L / GGT: x                                                  Culture - Fungal, Body Fluid (collected 06 Apr 2022 12:50)  Source: .Body Fluid Pleural Fluid  Preliminary Report (07 Apr 2022 10:11):    Testing in progress    Culture - Body Fluid with Gram Stain (collected 06 Apr 2022 12:50)  Source: .Body Fluid Pleural Fluid  Gram Stain (06 Apr 2022 23:57):    polymorphonuclear leukocytes seen    No organisms seen    by cytocentrifuge                                                    MEDICATIONS  (STANDING):  cholecalciferol Oral Tab/Cap - Peds 2000 Unit(s) Oral daily  diltiazem    milliGRAM(s) Oral daily  furosemide    Tablet 20 milliGRAM(s) Oral daily  levoFLOXacin IVPB 750 milliGRAM(s) IV Intermittent every 24 hours  pancrelipase  (CREON 12,000 Lipase Units) 2 Capsule(s) Oral three times a day with meals  pantoprazole    Tablet 40 milliGRAM(s) Oral before breakfast    MEDICATIONS  (PRN):  acetaminophen     Tablet .. 650 milliGRAM(s) Oral every 6 hours PRN Temp greater or equal to 38C (100.4F), Mild Pain (1 - 3)  aluminum hydroxide/magnesium hydroxide/simethicone Suspension 30 milliLiter(s) Oral every 4 hours PRN Dyspepsia  melatonin 3 milliGRAM(s) Oral at bedtime PRN Insomnia  ondansetron Injectable 4 milliGRAM(s) IV Push every 8 hours PRN Nausea and/or Vomiting  verapamil 40 milliGRAM(s) Oral two times a day PRN tachycardia faster tahn 110 beats/ minute      X-Rays reviewed    CXR interpreted by me: Patient is a 76y old  Female who presents with a chief complaint of Pleural Effusions (06 Apr 2022 14:45)        SUBJECTIVE:  SO thoracentesis       REVIEW OF SYSTEMS:    All Pertinent ROS are negative except per HPI       PHYSICAL EXAM  Vital Signs Last 24 Hrs  T(C): 35.9 (07 Apr 2022 12:31), Max: 36.6 (06 Apr 2022 21:09)  T(F): 96.6 (07 Apr 2022 12:31), Max: 97.9 (06 Apr 2022 21:09)  HR: 113 (07 Apr 2022 12:31) (94 - 113)  BP: 104/75 (07 Apr 2022 12:31) (104/75 - 126/78)  BP(mean): --  RR: 18 (07 Apr 2022 12:31) (18 - 18)  SpO2: 97% (07 Apr 2022 11:30) (84% - 99%)    CONSTITUTIONAL:  cachectic, frail, chronically ill appearing  NAD    ENT:   Airway patent,   No thrush  on NC    CARDIAC:   Normal rate,   regular rhythm.    +1 edema      RESPIRATORY:   NO Wheezing  Normal chest expansion  Not tachypneic,  No use of accessory muscles  Decreased BS LLL    GASTROINTESTINAL:  Abdomen soft,   non-tender,   no guarding,     MUSCULOSKELETAL:   range of motion is not limited,  no clubbing, cyanosis    NEUROLOGICAL:   Alert and oriented   no motor or deficits.    SKIN:   Skin normal color for race,   warm, dry   No evidence of rash.      04-06-22 @ 07:01  -  04-07-22 @ 07:00  --------------------------------------------------------  IN:    IV PiggyBack: 150 mL    Oral Fluid: 1370 mL  Total IN: 1520 mL    OUT:    Voided (mL): 1150 mL  Total OUT: 1150 mL    Total NET: 370 mL      04-07-22 @ 07:01  -  04-07-22 @ 14:28  --------------------------------------------------------  IN:    Oral Fluid: 640 mL  Total IN: 640 mL    OUT:    Voided (mL): 350 mL  Total OUT: 350 mL    Total NET: 290 mL          LABS:                          9.2    9.10  )-----------( 182      ( 07 Apr 2022 05:30 )             30.0                                               04-07    140  |  104  |  16  ----------------------------<  95  3.8   |  26  |  0.6<L>    Ca    8.3<L>      07 Apr 2022 05:30  Mg     1.6     04-07    TPro  4.9<L>  /  Alb  2.6<L>  /  TBili  0.6  /  DBili  x   /  AST  12  /  ALT  11  /  AlkPhos  295<H>  04-07      PT/INR - ( 06 Apr 2022 06:14 )   PT: 33.30 sec;   INR: 2.93 ratio         PTT - ( 06 Apr 2022 06:14 )  PTT:38.2 sec                                           CARDIAC MARKERS ( 05 Apr 2022 17:31 )  x     / <0.01 ng/mL / x     / x     / x                                                LIVER FUNCTIONS - ( 07 Apr 2022 05:30 )  Alb: 2.6 g/dL / Pro: 4.9 g/dL / ALK PHOS: 295 U/L / ALT: 11 U/L / AST: 12 U/L / GGT: x                                                  Culture - Fungal, Body Fluid (collected 06 Apr 2022 12:50)  Source: .Body Fluid Pleural Fluid  Preliminary Report (07 Apr 2022 10:11):    Testing in progress    Culture - Body Fluid with Gram Stain (collected 06 Apr 2022 12:50)  Source: .Body Fluid Pleural Fluid  Gram Stain (06 Apr 2022 23:57):    polymorphonuclear leukocytes seen    No organisms seen    by cytocentrifuge                                                    MEDICATIONS  (STANDING):  cholecalciferol Oral Tab/Cap - Peds 2000 Unit(s) Oral daily  diltiazem    milliGRAM(s) Oral daily  furosemide    Tablet 20 milliGRAM(s) Oral daily  levoFLOXacin IVPB 750 milliGRAM(s) IV Intermittent every 24 hours  pancrelipase  (CREON 12,000 Lipase Units) 2 Capsule(s) Oral three times a day with meals  pantoprazole    Tablet 40 milliGRAM(s) Oral before breakfast    MEDICATIONS  (PRN):  acetaminophen     Tablet .. 650 milliGRAM(s) Oral every 6 hours PRN Temp greater or equal to 38C (100.4F), Mild Pain (1 - 3)  aluminum hydroxide/magnesium hydroxide/simethicone Suspension 30 milliLiter(s) Oral every 4 hours PRN Dyspepsia  melatonin 3 milliGRAM(s) Oral at bedtime PRN Insomnia  ondansetron Injectable 4 milliGRAM(s) IV Push every 8 hours PRN Nausea and/or Vomiting  verapamil 40 milliGRAM(s) Oral two times a day PRN tachycardia faster tahn 110 beats/ minute      X-Rays reviewed    CXR interpreted by me:

## 2022-04-07 NOTE — PROGRESS NOTE ADULT - SUBJECTIVE AND OBJECTIVE BOX
LI, VIKASH  76y Female    CHIEF COMPLAINT:    Patient is a 76y old  Female who presents with a chief complaint of Pleural Effusions (2022 14:45)      INTERVAL HPI/OVERNIGHT EVENTS:    Patient seen and examined. S/P R sided thoracentesis. Breathing has improved. No other complaint.     ROS: All other systems are negative.    Vital Signs:    T(F): 97.5 (22 @ 05:15), Max: 97.9 (22 @ 21:09)  HR: 106 (22 @ 05:15) (94 - 106)  BP: 126/78 (22 @ 05:15) (116/71 - 126/78)  RR: 18 (22 @ 21:09) (18 - 18)  SpO2: 96% (22 @ 19:59) (96% - 100%)  I&O's Summary    2022 07:01  -  2022 07:00  --------------------------------------------------------  IN: 1520 mL / OUT: 1150 mL / NET: 370 mL      Daily     Daily Weight in k.2 (2022 05:15)  CAPILLARY BLOOD GLUCOSE          PHYSICAL EXAM:    GENERAL:  NAD  SKIN: No rashes or lesions  HENT: Atraumatic. Normocephalic. PERRL. Moist membranes.  NECK: Supple, No JVD. No lymphadenopathy.  PULMONARY: Decreased BS in the bases B/L. No wheezing. No rales  CVS: Normal S1, S2. Rate and Rhythm are regular. No murmurs.  ABDOMEN/GI: Soft, Nontender, distended; BS present  EXTREMITIES: Peripheral pulses intact. No edema B/L LE.  NEUROLOGIC:  No motor or sensory deficit.  PSYCH: Alert & oriented x 3    Consultant(s) Notes Reviewed:  [x ] YES  [ ] NO  Care Discussed with Consultants/Other Providers [ x] YES  [ ] NO    EKG reviewed  Telemetry reviewed    LABS:                        9.2    9.10  )-----------( 182      ( 2022 05:30 )             30.0     04-    140  |  104  |  16  ----------------------------<  95  3.8   |  26  |  0.6<L>    Ca    8.3<L>      2022 05:30  Mg     1.6         TPro  4.9<L>  /  Alb  2.6<L>  /  TBili  0.6  /  DBili  x   /  AST  12  /  ALT  11  /  AlkPhos  295<H>      PT/INR - ( 2022 06:14 )   PT: 33.30 sec;   INR: 2.93 ratio         PTT - ( 2022 06:14 )  PTT:38.2 sec  Serum Pro-Brain Natriuretic Peptide: 5190 pg/mL (22 @ 17:31)    Trop <0.01, CKMB --, CK --, 22 @ 17:31      Culture - Body Fluid with Gram Stain (collected 2022 12:50)  Source: .Body Fluid Pleural Fluid  Gram Stain (2022 23:57):    polymorphonuclear leukocytes seen    No organisms seen    by cytocentrifuge        RADIOLOGY & ADDITIONAL TESTS:      Imaging or report Personally Reviewed:  [ ] YES  [ ] NO    Medications:  Standing  cholecalciferol Oral Tab/Cap - Peds 2000 Unit(s) Oral daily  diltiazem    milliGRAM(s) Oral daily  furosemide    Tablet 20 milliGRAM(s) Oral daily  levoFLOXacin IVPB 750 milliGRAM(s) IV Intermittent every 24 hours  pancrelipase  (CREON 12,000 Lipase Units) 2 Capsule(s) Oral three times a day with meals  pantoprazole    Tablet 40 milliGRAM(s) Oral before breakfast    PRN Meds  acetaminophen     Tablet .. 650 milliGRAM(s) Oral every 6 hours PRN  aluminum hydroxide/magnesium hydroxide/simethicone Suspension 30 milliLiter(s) Oral every 4 hours PRN  melatonin 3 milliGRAM(s) Oral at bedtime PRN  ondansetron Injectable 4 milliGRAM(s) IV Push every 8 hours PRN  verapamil 40 milliGRAM(s) Oral two times a day PRN      Case discussed with resident    Care discussed with pt/family

## 2022-04-07 NOTE — PROGRESS NOTE ADULT - ASSESSMENT
Impression  Bilateral pleural effusions R>L sp right thoracentesis 4/6  HO pancreatic Cancer SP whipple   PHTN    Recommendations  Diuresis as tolerated   FU pleural fluid studies   check D-Dimer  DVT prophylaxis   OP pulmonary follow up Impression  Bilateral pleural effusions R>L sp right thoracentesis 4/6  HO pancreatic Cancer SP whipple   PHTN    Recommendations    Continue Diuresis as tolerated   FU pleural fluid studies   check D-Dimer  DVT prophylaxis   OP pulmonary follow up

## 2022-04-07 NOTE — PROGRESS NOTE ADULT - SUBJECTIVE AND OBJECTIVE BOX
VIKASH LI 76y Female  MRN#: [id number]   Hospital Day: 2d    SUBJECTIVE  Patient is a 76y old Female with history of pancreatic CA, s/p Whipple 8/2021, A. fib on Eliquis, pleural effusion in ER with c/o SOB x2 days and generalized weakness.    who presents with a chief complaint of Currently admitted to medicine with the primary diagnosis of Bilateral pleural effusion      INTERVAL HPI AND OVERNIGHT EVENTS:  Patient was examined and seen at bedside. This morning she is resting comfortably in bed and reports no issues or overnight events.    REVIEW OF SYMPTOMS:  Denies any SOB. ROS otherwise neg    OBJECTIVE  PAST MEDICAL & SURGICAL HISTORY  SOB (shortness of breath)    Pain  knee    Varicose veins of both lower extremities, unspecified whether complicated    Atrial fibrillation, unspecified type  2019 on Eliquis    Jaundice  March 5, 2021    Malignant neoplasm of pancreas, unspecified location of malignancy  Pancreatic Cancer    Hypertension, unspecified type  2019    Pancreatic cancer    Kidney stones    History of surgery  Whipple procedure 8/2/2021 with Dr. Nino at [facility]    Status post phlebectomy  10/2018    History of ovarian cystectomy  left    History of tonsillectomy  1959    Kidney stone  2017      ALLERGIES:  Augmentin (Rash)  chocolate (Headache)  digoxin (Hives)  Metoprolol Succinate ER (Hives)  Novocain (Angioedema)  Steroids: Excessive swelling (Flushing; Other (Mild to Mod))  sulfa drugs (Fever)  Xarelto (Hives)    MEDICATIONS:  STANDING MEDICATIONS  cholecalciferol Oral Tab/Cap - Peds 2000 Unit(s) Oral daily  diltiazem    milliGRAM(s) Oral daily  furosemide    Tablet 20 milliGRAM(s) Oral daily  levoFLOXacin IVPB 750 milliGRAM(s) IV Intermittent every 24 hours  pancrelipase  (CREON 12,000 Lipase Units) 2 Capsule(s) Oral three times a day with meals  pantoprazole    Tablet 40 milliGRAM(s) Oral before breakfast    PRN MEDICATIONS  acetaminophen     Tablet .. 650 milliGRAM(s) Oral every 6 hours PRN  aluminum hydroxide/magnesium hydroxide/simethicone Suspension 30 milliLiter(s) Oral every 4 hours PRN  melatonin 3 milliGRAM(s) Oral at bedtime PRN  ondansetron Injectable 4 milliGRAM(s) IV Push every 8 hours PRN  verapamil 40 milliGRAM(s) Oral two times a day PRN      VITAL SIGNS: Last 24 Hours  T(C): 35.9 (07 Apr 2022 12:31), Max: 36.6 (06 Apr 2022 21:09)  T(F): 96.6 (07 Apr 2022 12:31), Max: 97.9 (06 Apr 2022 21:09)  HR: 113 (07 Apr 2022 12:31) (94 - 113)  BP: 104/75 (07 Apr 2022 12:31) (104/75 - 126/78)  BP(mean): --  RR: 18 (07 Apr 2022 12:31) (18 - 18)  SpO2: 97% (07 Apr 2022 11:30) (84% - 99%)    LABS:                        9.2    9.10  )-----------( 182      ( 07 Apr 2022 05:30 )             30.0     04-07    140  |  104  |  16  ----------------------------<  95  3.8   |  26  |  0.6<L>    Ca    8.3<L>      07 Apr 2022 05:30  Mg     1.6     04-07    TPro  4.9<L>  /  Alb  2.6<L>  /  TBili  0.6  /  DBili  x   /  AST  12  /  ALT  11  /  AlkPhos  295<H>  04-07    PT/INR - ( 06 Apr 2022 06:14 )   PT: 33.30 sec;   INR: 2.93 ratio         PTT - ( 06 Apr 2022 06:14 )  PTT:38.2 sec          Culture - Fungal, Body Fluid (collected 06 Apr 2022 12:50)  Source: .Body Fluid Pleural Fluid  Preliminary Report (07 Apr 2022 10:11):    Testing in progress    Culture - Body Fluid with Gram Stain (collected 06 Apr 2022 12:50)  Source: .Body Fluid Pleural Fluid  Gram Stain (06 Apr 2022 23:57):    polymorphonuclear leukocytes seen    No organisms seen    by cytocentrifuge      CARDIAC MARKERS ( 05 Apr 2022 17:31 )  x     / <0.01 ng/mL / x     / x     / x          RADIOLOGY:  < from: Xray Chest 1 View- PORTABLE-Urgent (Xray Chest 1 View- PORTABLE-Urgent .) (04.06.22 @ 14:55) >    Decreased right effusion postthoracentesis. No evidence for pneumothorax.    Stable left basilar opacity/effusion.    < end of copied text >    PHYSICAL EXAM:  CONSTITUTIONAL: No acute distress,  AAOx3  HEAD: Atraumatic, normocephalic  EYES: conjunctiva and sclera clear  ENT: no JVD  PULMONARY: decreased BS BL  CARDIOVASCULAR: Regular rate and rhythm; no murmurs  GASTROINTESTINAL: Soft, non-tender, non-distended; bowel sounds present  MUSCULOSKELETAL:  no edema, no erythema at site of drainage  NEUROLOGY: non-focal  SKIN:warm and dry

## 2022-04-07 NOTE — PROGRESS NOTE ADULT - ASSESSMENT
76-year-old female with history of pancreatic CA, s/p Whipple 8/2021, A. fib on Eliquis, pleural effusion in ER with c/o SOB x2 days and generalized weakness.  Patient states she has been diagnosed previously with pleural effusion and was scheduled for thoracentesis this weekend but rescheduled as she had a family occasion.       B/L pleural effusions likely due to metastatic pancreatic ca  S/P R sided thoracentesis  H/O Pancreatic Ca S/P Whipple on 8/2021  Chronic A-Fib on Eliquis  Ascites                PLAN:    ·	S/P R sided thoracentesis and removal of 1100 cc fluid. Analysis still pending  ·	CTA chest reviewed. No PE. Scattered ground glass opacities within the RUL. B/L pleural effusion  ·	Doubt PNA. Can d/c Abx.   ·	Cont her other home meds.  ·	Replete Mg.   ·	Full code.   ·	D/C home today    * Med rec reviewed. Plan of care d/w the pt. Time spent 35 minutes.     Sergo Young MD  Spectra: [phone]

## 2022-04-07 NOTE — PROGRESS NOTE ADULT - SUBJECTIVE AND OBJECTIVE BOX
SURGERY PROGRESS NOTE      Events of past 24 hours:    s/p right thoracentesis  symptoms improved after procedure      ROS otherwise negative except per subjective and HPI      Vital Signs Last 24 Hrs  T(C): 35.9 (07 Apr 2022 12:31), Max: 36.6 (06 Apr 2022 21:09)  T(F): 96.6 (07 Apr 2022 12:31), Max: 97.9 (06 Apr 2022 21:09)  HR: 113 (07 Apr 2022 12:31) (94 - 113)  BP: 104/75 (07 Apr 2022 12:31) (104/75 - 126/78)  BP(mean): --  RR: 18 (07 Apr 2022 12:31) (18 - 18)  SpO2: 96% (06 Apr 2022 19:59) (96% - 96%)    Diet, Regular (04-06-22 @ 01:38) [Active]          I&O's Detail    06 Apr 2022 07:01  -  07 Apr 2022 07:00  --------------------------------------------------------  IN:    IV PiggyBack: 150 mL    Oral Fluid: 1370 mL  Total IN: 1520 mL    OUT:    Voided (mL): 1150 mL  Total OUT: 1150 mL    Total NET: 370 mL      07 Apr 2022 07:01  -  07 Apr 2022 13:08  --------------------------------------------------------  IN:    Oral Fluid: 340 mL  Total IN: 340 mL    OUT:  Total OUT: 0 mL    Total NET: 340 mL          General Appearance: NAD  Heart: RRR  Lungs: Unlabored breathing at rest  Abdomen:  S/ND/NT. No guarding or rebound.          MEDICATIONS:   MEDICATIONS  (STANDING):  cholecalciferol Oral Tab/Cap - Peds 2000 Unit(s) Oral daily  diltiazem    milliGRAM(s) Oral daily  furosemide    Tablet 20 milliGRAM(s) Oral daily  levoFLOXacin IVPB 750 milliGRAM(s) IV Intermittent every 24 hours  pancrelipase  (CREON 12,000 Lipase Units) 2 Capsule(s) Oral three times a day with meals  pantoprazole    Tablet 40 milliGRAM(s) Oral before breakfast    MEDICATIONS  (PRN):  acetaminophen     Tablet .. 650 milliGRAM(s) Oral every 6 hours PRN Temp greater or equal to 38C (100.4F), Mild Pain (1 - 3)  aluminum hydroxide/magnesium hydroxide/simethicone Suspension 30 milliLiter(s) Oral every 4 hours PRN Dyspepsia  melatonin 3 milliGRAM(s) Oral at bedtime PRN Insomnia  ondansetron Injectable 4 milliGRAM(s) IV Push every 8 hours PRN Nausea and/or Vomiting  verapamil 40 milliGRAM(s) Oral two times a day PRN tachycardia faster tahn 110 beats/ minute        LAB/STUDIES:                        9.2    9.10  )-----------( 182      ( 07 Apr 2022 05:30 )             30.0     04-07    140  |  104  |  16  ----------------------------<  95  3.8   |  26  |  0.6<L>    Ca    8.3<L>      07 Apr 2022 05:30  Mg     1.6     04-07    TPro  4.9<L>  /  Alb  2.6<L>  /  TBili  0.6  /  DBili  x   /  AST  12  /  ALT  11  /  AlkPhos  295<H>  04-07    PT/INR - ( 06 Apr 2022 06:14 )   PT: 33.30 sec;   INR: 2.93 ratio         PTT - ( 06 Apr 2022 06:14 )  PTT:38.2 sec  LIVER FUNCTIONS - ( 07 Apr 2022 05:30 )  Alb: 2.6 g/dL / Pro: 4.9 g/dL / ALK PHOS: 295 U/L / ALT: 11 U/L / AST: 12 U/L / GGT: x             CARDIAC MARKERS ( 05 Apr 2022 17:31 )  x     / <0.01 ng/mL / x     / x     / x              Culture - Fungal, Body Fluid (collected 06 Apr 2022 12:50)  Source: .Body Fluid Pleural Fluid  Preliminary Report (07 Apr 2022 10:11):    Testing in progress    Culture - Body Fluid with Gram Stain (collected 06 Apr 2022 12:50)  Source: .Body Fluid Pleural Fluid  Gram Stain (06 Apr 2022 23:57):    polymorphonuclear leukocytes seen    No organisms seen    by cytocentrifuge      IMAGING:

## 2022-04-07 NOTE — PROGRESS NOTE ADULT - ASSESSMENT
Patient is a 76y old Female with history of pancreatic CA, s/p Whipple 8/2021, A. fib on Eliquis, pleural effusion in ER with c/o SOB x2 days and generalized weakness.    who presents with a chief complaint of Currently admitted to medicine with the primary diagnosis of Bilateral pleural effusion    #SOB likely secondary to b/l Pleural effusions  #PNA   -No sepsis POA, asymptomatic  -BNP 5190  -Trop <0.01  - Echo Nov 2021. EF 50 to 55%. Severely enlarged right atrium.  -CT chest : No pulmonary embolus. Increased moderate left pleural effusion with adjacent atelectasis and markedly decreased aeration of the left lower lobe. Scattered groundglass opacities within the left upper lobe may reflect atelectatic changes, however superimposed infection or underlying neoplasm cannot be ruled out on imaging. Consider short-term 3 month follow-up examination to evaluate for resolution.  Stable large right pleural effusion with adjacent atelectasis that is mildly decreased within the right middle lobe.  - sat 97% on 2L, wean off O2, supplemental O2 PRN   - Pulm consulted: s/p thoracenthesis (4/6)1.1L removed  - f/u pleural fluid analysis  - c/w lasix 40mg po  - c/w levoquin 750mg IV q24  - cont to hold anticoagulation for now    #Chronic AFib on Eliquis-Rate controlled  - c/w Verapamil PRN   - c/w diltiazem CD 120mg daily      #Hx Pancreatic Ca   - s/p whipple 8/1/21 (in remission)   - c/w Creon 12K TID before meals       #Vitamin D deficiency   #Secondary hyperparathyroidism   - c/w supplementation     #DVT PPX : Hold Eliquis for now, switch to Lovenox   #Diet: DASH/TLC  #GI PPX: Protonix  #Activity:IAT   FULL CODE    Dispo: prepare for dc, monitor overnight   Patient is a 76y old Female with history of pancreatic CA, s/p Whipple 8/2021, A. fib on Eliquis, pleural effusion in ER with c/o SOB x2 days and generalized weakness.    who presents with a chief complaint of Currently admitted to medicine with the primary diagnosis of Bilateral pleural effusion    #SOB likely secondary to b/l Pleural effusions  #PNA   -No sepsis POA, asymptomatic  -BNP 5190  -Trop <0.01  - Echo Nov 2021. EF 50 to 55%. Severely enlarged right atrium.  -CT chest : No pulmonary embolus. Increased moderate left pleural effusion with adjacent atelectasis and markedly decreased aeration of the left lower lobe. Scattered groundglass opacities within the left upper lobe may reflect atelectatic changes, however superimposed infection or underlying neoplasm cannot be ruled out on imaging. Consider short-term 3 month follow-up examination to evaluate for resolution.  Stable large right pleural effusion with adjacent atelectasis that is mildly decreased within the right middle lobe.  - sat 97% on 2L, wean off O2, supplemental O2 PRN   - Pulm consulted: s/p thoracenthesis (4/6)1.1L removed  - f/u pleural fluid analysis  - c/w lasix 40mg po  - c/w levoquin 750mg IV q24  - cont to hold anticoagulation for now  - will get d-dimer    #Chronic AFib on Eliquis-Rate controlled  - c/w Verapamil PRN   - c/w diltiazem CD 120mg daily    #Hx Pancreatic Ca   - s/p whipple 8/1/21 (in remission)   - c/w Creon 12K TID before meals       #Vitamin D deficiency   #Secondary hyperparathyroidism   - c/w supplementation     #DVT PPX : Hold Eliquis for now, switch to Lovenox   #Diet: DASH/TLC  #GI PPX: Protonix  #Activity:IAT   FULL CODE    Dispo: pending placement to [facility] as per family wished, PT, physiatry consult

## 2022-04-08 NOTE — DISCHARGE NOTE PROVIDER - HOSPITAL COURSE
76-year-old female with history of pancreatic CA, s/p Whipple 8/2021, A. fib on Eliquis, pleural effusion in ER with c/o SOB x2 days and generalized weakness.  Patient states she has been diagnosed previously with pleural effusion and was scheduled for thoracentesis this weekend but rescheduled as she had a family occasion. States she has been having intermittent shortness of breath for months, but has felt worse for the past 2-3 days.  Feels better when laying flat.  Denies CP, cough, fever, chills, abdominal pain, LE pain/swelling.    In the ED, pt hemodynamically stable saturating at 97% on 2L. Labs with WBC 14, Hb 10.4 at baseline     Patient was admitted for pleural effusion. While in the hospital, patient was seen by pulmonologist. Patient is s/p thoracenthesis, 1.1L removed. Transudative process ongoing based on fluid analysis.     Patient is stable and ready for DC, will follow up with pulmonology as outpatient.

## 2022-04-08 NOTE — DIETITIAN INITIAL EVALUATION ADULT - NSICDXPASTSURGICALHX_GEN_ALL_CORE_FT
PAST SURGICAL HISTORY:  History of ovarian cystectomy left    History of surgery Whipple procedure 8/2/2021 with Dr. Nino at [facility]    History of tonsillectomy 1959    Kidney stone 2017    Status post phlebectomy 10/2018

## 2022-04-08 NOTE — DISCHARGE NOTE PROVIDER - NSDCMRMEDTOKEN_GEN_ALL_CORE_FT
Cardizem  mg/24 hours oral capsule, extended release: 1 cap(s) orally once a day  cholecalciferol 50 mcg (2000 intl units) oral tablet: 1 tab(s) orally once a day   Creon 12,000 units oral delayed release capsule: 2 cap(s) orally 3 times a day (with meals)  Eliquis 5 mg oral tablet: 1 tab(s) orally 2 times a day  Lasix 20 mg oral tablet: 1 tab(s) orally once a day  magnesium oxide 400 mg oral tablet: 1 tab(s) orally 2 times a day (with meals)  Multiple Vitamins with Minerals oral tablet: 1 tab(s) orally once a day  pantoprazole 40 mg oral delayed release tablet: 1 tab(s) orally once a day (before a meal)  saccharomyces boulardii lyo 250 mg oral capsule: 1 cap(s) orally 2 times a day with meals  verapamil 40 mg oral tablet: 1 tab(s) orally 2 times a day, As needed, tachycardia greater than 110 beats/ minute  zinc sulfate 220 mg oral capsule: 1 cap(s) orally once a day

## 2022-04-08 NOTE — PROGRESS NOTE ADULT - SUBJECTIVE AND OBJECTIVE BOX
VIKASH LI 76y Female  MRN#: [id number]   Hospital Day: 3d    SUBJECTIVE  Patient is a 76y old Female with history of pancreatic CA, s/p Whipple 8/2021, A. fib on Eliquis, pleural effusion in ER with c/o SOB x2 days and generalized weakness.    who presents with a chief complaint of Currently admitted to medicine with the primary diagnosis of Bilateral pleural effusion      INTERVAL HPI AND OVERNIGHT EVENTS:  Patient was examined and seen at bedside. This morning she is resting comfortably in bed and reports no issues or overnight events.    REVIEW OF SYMPTOMS:  Denies any CP, SOB, nausea, sweats or chills. Admits to some abd pain with eating    OBJECTIVE  PAST MEDICAL & SURGICAL HISTORY  SOB (shortness of breath)    Pain  knee    Varicose veins of both lower extremities, unspecified whether complicated    Atrial fibrillation, unspecified type  2019 on Eliquis    Jaundice  March 5, 2021    Malignant neoplasm of pancreas, unspecified location of malignancy  Pancreatic Cancer    Hypertension, unspecified type  2019    Pancreatic cancer    Kidney stones    History of surgery  Whipple procedure 8/2/2021 with Dr. Nino at [facility]    Status post phlebectomy  10/2018    History of ovarian cystectomy  left    History of tonsillectomy  1959    Kidney stone  2017      ALLERGIES:  Augmentin (Rash)  chocolate (Headache)  digoxin (Hives)  Metoprolol Succinate ER (Hives)  Novocain (Angioedema)  Steroids: Excessive swelling (Flushing; Other (Mild to Mod))  sulfa drugs (Fever)  Xarelto (Hives)    MEDICATIONS:  STANDING MEDICATIONS  apixaban 5 milliGRAM(s) Oral two times a day  cholecalciferol Oral Tab/Cap - Peds 2000 Unit(s) Oral daily  diltiazem    milliGRAM(s) Oral daily  furosemide    Tablet 40 milliGRAM(s) Oral daily  magnesium oxide 400 milliGRAM(s) Oral two times a day with meals  pancrelipase  (CREON 12,000 Lipase Units) 2 Capsule(s) Oral three times a day with meals  pantoprazole    Tablet 40 milliGRAM(s) Oral before breakfast    PRN MEDICATIONS  acetaminophen     Tablet .. 650 milliGRAM(s) Oral every 6 hours PRN  aluminum hydroxide/magnesium hydroxide/simethicone Suspension 30 milliLiter(s) Oral every 4 hours PRN  melatonin 3 milliGRAM(s) Oral at bedtime PRN  ondansetron Injectable 4 milliGRAM(s) IV Push every 8 hours PRN  verapamil 40 milliGRAM(s) Oral two times a day PRN      VITAL SIGNS: Last 24 Hours  T(C): 35.6 (08 Apr 2022 04:48), Max: 36 (07 Apr 2022 20:10)  T(F): 96 (08 Apr 2022 04:48), Max: 96.8 (07 Apr 2022 20:10)  HR: 113 (08 Apr 2022 04:48) (113 - 116)  BP: 120/81 (08 Apr 2022 04:48) (113/81 - 120/81)  BP(mean): --  RR: 18 (08 Apr 2022 04:48) (18 - 18)  SpO2: 98% (08 Apr 2022 09:49) (96% - 98%)    LABS:                        10.3   7.32  )-----------( 198      ( 08 Apr 2022 05:38 )             32.9     04-08    145  |  108  |  15  ----------------------------<  92  3.9   |  26  |  0.5<L>    Ca    8.2<L>      08 Apr 2022 05:38  Mg     2.0     04-08    TPro  5.2<L>  /  Alb  2.7<L>  /  TBili  0.5  /  DBili  x   /  AST  16  /  ALT  13  /  AlkPhos  323<H>  04-08    Culture - Fungal, Body Fluid (collected 06 Apr 2022 12:50)  Source: .Body Fluid Pleural Fluid  Preliminary Report (07 Apr 2022 10:11):    Testing in progress    Culture - Body Fluid with Gram Stain (collected 06 Apr 2022 12:50)  Source: .Body Fluid Pleural Fluid  Gram Stain (06 Apr 2022 23:57):    polymorphonuclear leukocytes seen    No organisms seen    by cytocentrifuge  Preliminary Report (07 Apr 2022 16:35):    No growth to date.    RADIOLOGY:      PHYSICAL EXAM:  CONSTITUTIONAL: No acute distress,  AAOx3  HEAD: Atraumatic, normocephalic  EYES: conjunctiva and sclera clear  ENT: no JVD  PULMONARY: improved BS BL  CARDIOVASCULAR: Regular rate and rhythm; no murmurs  GASTROINTESTINAL: Soft, non-tender, non-distended; bowel sounds present  MUSCULOSKELETAL:  no edema  NEUROLOGY: non-focal  SKIN: warm and dry

## 2022-04-08 NOTE — PHYSICAL THERAPY INITIAL EVALUATION ADULT - SPECIFY REASON(S)
Pt not available for PT evaluation. Per RN, pt went to ECHO. PT to f/u when pt available and appropriate.

## 2022-04-08 NOTE — DISCHARGE NOTE PROVIDER - NSDCCPCAREPLAN_GEN_ALL_CORE_FT
PRINCIPAL DISCHARGE DIAGNOSIS  Diagnosis: Bilateral pleural effusion  Assessment and Plan of Treatment: You presented to the hospital for shortness of breath associated with some chest discomfort. This was due to fluid around your lungs. While in the hospital, you were seen by the pulmonologist who eventually removed 1.1L of fluid during a bedside procedure. Repeat imaging showed incresed fluid build up. You will need to follow up with your primary care provider and pulmonologist as outpatient. If your shortness of breath returns, come back to the hospital.

## 2022-04-08 NOTE — PHYSICAL THERAPY INITIAL EVALUATION ADULT - GENERAL OBSERVATIONS, REHAB EVAL
Pt encountered supine in bed in NAD, +tele, no c/o pain, and agreeable with PT. Pt performed bed mobility with Min A and transfer mobility Mod A. Pt performed standing balance using RW with Min A to maintain static position, however, pt unable to initiate ambulation secondary weakness, poor balance and poor standing tolerance. Pt left in bed as found per pt request secondary to fatigue.

## 2022-04-08 NOTE — DISCHARGE NOTE PROVIDER - CARE PROVIDER_API CALL
Barron Aragon)  Critical Care Medicine; Pulmonary Disease; Sleep Medicine  [address] [place] [address]  Phone: [phone]  Fax: [phone]  Established Patient  Follow Up Time: 1 week   Barron Aragon)  Critical Care Medicine; Pulmonary Disease; Sleep Medicine  [address] [place] [address]  Phone: [phone]  Fax: [phone]  Established Patient  Follow Up Time: 1 week    Kassidy Hawley (DO)  Geriatric Medicine; Internal Medicine  [address] [place] [address]  Phone: [phone]  Fax: [phone]  Follow Up Time: 2 weeks

## 2022-04-08 NOTE — PHYSICAL THERAPY INITIAL EVALUATION ADULT - PERTINENT HX OF CURRENT PROBLEM, REHAB EVAL
76-year-old female, h/o pancreatic CA, s/p Whipple 8/2021, A. fib on Eliquis, pleural effusion in ER with c/o SOB x2 days and generalized weakness.  Patient states she has been diagnosed previously with pleural effusion and was scheduled for thoracentesis this weekend but rescheduled as she had a family occasion.

## 2022-04-08 NOTE — DIETITIAN INITIAL EVALUATION ADULT - PERTINENT MEDS FT
MEDICATIONS  (STANDING):  apixaban 5 milliGRAM(s) Oral two times a day  cholecalciferol Oral Tab/Cap - Peds 2000 Unit(s) Oral daily  diltiazem    milliGRAM(s) Oral daily  furosemide    Tablet 40 milliGRAM(s) Oral daily  magnesium oxide 400 milliGRAM(s) Oral two times a day with meals  pancrelipase  (CREON 12,000 Lipase Units) 2 Capsule(s) Oral three times a day with meals  pantoprazole    Tablet 40 milliGRAM(s) Oral before breakfast    MEDICATIONS  (PRN):  acetaminophen     Tablet .. 650 milliGRAM(s) Oral every 6 hours PRN Temp greater or equal to 38C (100.4F), Mild Pain (1 - 3)  aluminum hydroxide/magnesium hydroxide/simethicone Suspension 30 milliLiter(s) Oral every 4 hours PRN Dyspepsia  melatonin 3 milliGRAM(s) Oral at bedtime PRN Insomnia  ondansetron Injectable 4 milliGRAM(s) IV Push every 8 hours PRN Nausea and/or Vomiting  verapamil 40 milliGRAM(s) Oral two times a day PRN tachycardia faster tahn 110 beats/ minute

## 2022-04-08 NOTE — DISCHARGE NOTE PROVIDER - PROVIDER TOKENS
PROVIDER:[TOKEN:[[id number]],FOLLOWUP:[1 week],ESTABLISHEDPATIENT:[T]] PROVIDER:[TOKEN:[[id number]],FOLLOWUP:[1 week],ESTABLISHEDPATIENT:[T]],PROVIDER:[TOKEN:[[id number]],FOLLOWUP:[2 weeks]]

## 2022-04-08 NOTE — PROGRESS NOTE ADULT - ASSESSMENT
76-year-old female with history of pancreatic CA, s/p Whipple 8/2021, A. fib on Eliquis, pleural effusion in ER with c/o SOB x2 days and generalized weakness.  Patient states she has been diagnosed previously with pleural effusion and was scheduled for thoracentesis this weekend but rescheduled as she had a family occasion.       B/L pleural effusions   S/P R sided thoracentesis  H/O Pancreatic Ca S/P Whipple on 8/2021  Chronic A-Fib on Eliquis  Ascites                PLAN:    ·	S/P R sided thoracentesis and removal of 1100 cc fluid.   ·	Fluid is transudate.   ·	Can restart her Eliquis 5 mg po q 12h  ·	ECHO  ·	D/C Lasix. Start her on Torsemide 10 mg po daily  ·	CTA chest reviewed. No PE. Scattered ground glass opacities within the RUL. B/L pleural effusion  ·	Doubt PNA. Can d/c Abx.   ·	Cont her other home meds.  ·	Full code.   ·	PT eval  ·	D/C planning. Pt refused to go to SNF.       * Med rec reviewed. Plan of care d/w the pt. Time spent 35 minutes.     Sergo Young MD  Spectra: [phone]

## 2022-04-08 NOTE — PROGRESS NOTE ADULT - SUBJECTIVE AND OBJECTIVE BOX
LI, VIKASH  76y Female    CHIEF COMPLAINT:    Patient is a 76y old  Female who presents with a chief complaint of Pleural effusions (2022 10:22)      INTERVAL HPI/OVERNIGHT EVENTS:    Patient seen and examined. Denies sob. No abdominal pain. No cough.     ROS: All other systems are negative.    Vital Signs:    T(F): 96 (22 @ 04:48), Max: 96.8 (22 @ 20:10)  HR: 113 (22 @ 04:48) (113 - 116)  BP: 120/81 (22 @ 04:48) (104/75 - 120/81)  RR: 18 (22 @ 04:48) (18 - 18)  SpO2: 98% (22 @ 09:49) (96% - 98%)  I&O's Summary    2022 07:01  -  2022 07:00  --------------------------------------------------------  IN: 1198 mL / OUT: 950 mL / NET: 248 mL    2022 07:01  -  2022 11:19  --------------------------------------------------------  IN: 450 mL / OUT: 0 mL / NET: 450 mL      Daily     Daily Weight in k (2022 04:48)  CAPILLARY BLOOD GLUCOSE          PHYSICAL EXAM:    GENERAL:  NAD  SKIN: No rashes or lesions  HENT: Atraumatic. Normocephalic. PERRL. Moist membranes.  NECK: Supple, No JVD. No lymphadenopathy.  PULMONARY: Decrease BS in the lower chest B/L. No wheezing. No rales  CVS: Normal S1, S2. Rate and Rhythm are regular. No murmurs.  ABDOMEN/GI: Soft, Nontender, distended; BS present  EXTREMITIES: Peripheral pulses intact. No edema B/L LE.  NEUROLOGIC:  No motor or sensory deficit.  PSYCH: Alert & oriented x 3    Consultant(s) Notes Reviewed:  [x ] YES  [ ] NO  Care Discussed with Consultants/Other Providers [ x] YES  [ ] NO    EKG reviewed  Telemetry reviewed    LABS:                        10.3   7.32  )-----------( 198      ( 2022 05:38 )             32.9     0408    145  |  108  |  15  ----------------------------<  92  3.9   |  26  |  0.5<L>    Ca    8.2<L>      2022 05:38  Mg     2.0     04    TPro  5.2<L>  /  Alb  2.7<L>  /  TBili  0.5  /  DBili  x   /  AST  16  /  ALT  13  /  AlkPhos  323<H>        Serum Pro-Brain Natriuretic Peptide: 5190 pg/mL (22 @ 17:31)    Trop <0.01, CKMB --, CK --, 22 @ 17:31      Culture - Fungal, Body Fluid (collected 2022 12:50)  Source: .Body Fluid Pleural Fluid  Preliminary Report (2022 10:11):    Testing in progress    Culture - Body Fluid with Gram Stain (collected 2022 12:50)  Source: .Body Fluid Pleural Fluid  Gram Stain (2022 23:57):    polymorphonuclear leukocytes seen    No organisms seen    by cytocentrifuge  Preliminary Report (2022 16:35):    No growth to date.        RADIOLOGY & ADDITIONAL TESTS:      Imaging or report Personally Reviewed:  [ ] YES  [ ] NO    Medications:  Standing  cholecalciferol Oral Tab/Cap - Peds 2000 Unit(s) Oral daily  diltiazem    milliGRAM(s) Oral daily  furosemide    Tablet 20 milliGRAM(s) Oral daily  levoFLOXacin IVPB 750 milliGRAM(s) IV Intermittent every 24 hours  pancrelipase  (CREON 12,000 Lipase Units) 2 Capsule(s) Oral three times a day with meals  pantoprazole    Tablet 40 milliGRAM(s) Oral before breakfast    PRN Meds  acetaminophen     Tablet .. 650 milliGRAM(s) Oral every 6 hours PRN  aluminum hydroxide/magnesium hydroxide/simethicone Suspension 30 milliLiter(s) Oral every 4 hours PRN  melatonin 3 milliGRAM(s) Oral at bedtime PRN  ondansetron Injectable 4 milliGRAM(s) IV Push every 8 hours PRN  verapamil 40 milliGRAM(s) Oral two times a day PRN      Case discussed with resident    Care discussed with pt/family

## 2022-04-08 NOTE — PROGRESS NOTE ADULT - ASSESSMENT
Patient is a 76y old Female with history of pancreatic CA, s/p Whipple 8/2021, A. fib on Eliquis, pleural effusion in ER with c/o SOB x2 days and generalized weakness.    who presents with a chief complaint of Currently admitted to medicine with the primary diagnosis of Bilateral pleural effusion    #SOB likely secondary to b/l Pleural effusions  #PNA   -No sepsis POA, asymptomatic  -BNP 5190  -Trop <0.01  - Echo Nov 2021. EF 50 to 55%. Severely enlarged right atrium.  -CT chest : No pulmonary embolus. Increased moderate left pleural effusion with adjacent atelectasis and markedly decreased aeration of the left lower lobe. Scattered groundglass opacities within the left upper lobe may reflect atelectatic changes, however superimposed infection or underlying neoplasm cannot be ruled out on imaging. Consider short-term 3 month follow-up examination to evaluate for resolution.  Stable large right pleural effusion with adjacent atelectasis that is mildly decreased within the right middle lobe.  - sat 97% on 2L, wean off O2, supplemental O2 PRN   - Pulm consulted: s/p thoracenthesis (4/6)1.1L removed  - transudative pleural fluid analysis  - echo: EF 54%, severely enlarged LA, RA. Mod-severe TR  - c/w lasix 40mg po  - d'c'ed levoquin 750mg IV q24  - resume eliquis 5mg BID  - d-dimer 1104    #Chronic AFib on Eliquis-Rate controlled  - c/w Verapamil PRN   - c/w diltiazem CD 120mg daily    #Hx Pancreatic Ca   - s/p whipple 8/1/21 (in remission)   - c/w Creon 12K TID before meals       #Vitamin D deficiency   #Secondary hyperparathyroidism   - c/w supplementation     #DVT PPX : Eliquis  #Diet: DASH/TLC  #GI PPX: Protonix  #Activity:IAT   FULL CODE    Dispo: pending placement

## 2022-04-08 NOTE — DIETITIAN INITIAL EVALUATION ADULT - PERTINENT LABORATORY DATA
04-08    145  |  108  |  15  ----------------------------<  92  3.9   |  26  |  0.5<L>    Ca    8.2<L>      08 Apr 2022 05:38  Mg     2.0     04-08    TPro  5.2<L>  /  Alb  2.7<L>  /  TBili  0.5  /  DBili  x   /  AST  16  /  ALT  13  /  AlkPhos  323<H>  04-08  POCT Blood Glucose.: 122 mg/dL (04-08-22 @ 16:39)  A1C with Estimated Average Glucose Result: 6.3 % (10-06-21 @ 05:25)

## 2022-04-08 NOTE — DISCHARGE NOTE PROVIDER - CARE PROVIDERS DIRECT ADDRESSES
,DirectAddress_Unknown ,DirectAddress_Unknown,[email or web address]

## 2022-04-09 NOTE — DISCHARGE NOTE NURSING/CASE MANAGEMENT/SOCIAL WORK - NSDCPEFALRISK_GEN_ALL_CORE
For information on Fall & Injury Prevention, visit: [email or web address] OR  [email or web address] OR  https://www.cdc.gov/steadi/patient.html

## 2022-04-09 NOTE — PROGRESS NOTE ADULT - PROVIDER SPECIALTY LIST ADULT
Hospitalist
Internal Medicine
Hospitalist
Hospitalist
Internal Medicine
Pulmonology
Surgery
Hospitalist
Internal Medicine

## 2022-04-09 NOTE — DISCHARGE NOTE NURSING/CASE MANAGEMENT/SOCIAL WORK - PATIENT PORTAL LINK FT
You can access the FollowMyHealth Patient Portal offered by [facility] by registering at the following website: [email or web address]. By joining [employer]’s FollowMyHealth portal, you will also be able to view your health information using other applications (apps) compatible with our system.

## 2022-04-09 NOTE — PROGRESS NOTE ADULT - ASSESSMENT
VIKASH LI 76y Female  MRN#: [id number]   CODE STATUS:full code       SUBJECTIVE  Patient is a 76y old Female who presents with a chief complaint of BILATERAL PLEURAL EFFUSION; LEUKOCYTOSIS; CHEST PAIN Currently admitted to medicine with the primary diagnosis of Bilateral pleural effusion  Today is hospital day 4d, and this morning she is resting in bed and reports no overnight events.       OBJECTIVE  PAST MEDICAL & SURGICAL HISTORY  SOB (shortness of breath)    Pain  knee    Varicose veins of both lower extremities, unspecified whether complicated    Atrial fibrillation, unspecified type  2019 on Eliquis    Jaundice  March 5, 2021    Malignant neoplasm of pancreas, unspecified location of malignancy  Pancreatic Cancer    Hypertension, unspecified type  2019    Pancreatic cancer    Kidney stones    History of surgery  Whipple procedure 8/2/2021 with Dr. Nino at [facility]    Status post phlebectomy  10/2018    History of ovarian cystectomy  left    History of tonsillectomy  1959    Kidney stone  2017      ALLERGIES:  Augmentin (Rash)  chocolate (Headache)  digoxin (Hives)  Metoprolol Succinate ER (Hives)  Novocain (Angioedema)  Steroids: Excessive swelling (Flushing; Other (Mild to Mod))  sulfa drugs (Fever)  Xarelto (Hives)    MEDICATIONS:  STANDING MEDICATIONS  apixaban 5 milliGRAM(s) Oral two times a day  cholecalciferol Oral Tab/Cap - Peds 2000 Unit(s) Oral daily  diltiazem    milliGRAM(s) Oral daily  furosemide    Tablet 40 milliGRAM(s) Oral daily  magnesium oxide 400 milliGRAM(s) Oral two times a day with meals  pancrelipase  (CREON 12,000 Lipase Units) 2 Capsule(s) Oral three times a day with meals  pantoprazole    Tablet 40 milliGRAM(s) Oral before breakfast    PRN MEDICATIONS  acetaminophen     Tablet .. 650 milliGRAM(s) Oral every 6 hours PRN  aluminum hydroxide/magnesium hydroxide/simethicone Suspension 30 milliLiter(s) Oral every 4 hours PRN  melatonin 3 milliGRAM(s) Oral at bedtime PRN  ondansetron Injectable 4 milliGRAM(s) IV Push every 8 hours PRN  verapamil 40 milliGRAM(s) Oral two times a day PRN      VITAL SIGNS: Last 24 Hours  T(C): 36.2 (09 Apr 2022 12:56), Max: 36.4 (08 Apr 2022 15:24)  T(F): 97.1 (09 Apr 2022 12:56), Max: 97.6 (08 Apr 2022 15:24)  HR: 111 (09 Apr 2022 12:56) (74 - 113)  BP: 145/81 (09 Apr 2022 12:56) (128/60 - 145/81)  BP(mean): --  RR: 18 (09 Apr 2022 12:56) (18 - 18)  SpO2: --    LABS:                        12.5   8.63  )-----------( 207      ( 09 Apr 2022 04:30 )             39.8     04-09    144  |  105  |  17  ----------------------------<  104<H>  3.9   |  26  |  0.6<L>    Ca    9.0      09 Apr 2022 04:30  Mg     2.0     04-09    TPro  5.6<L>  /  Alb  2.9<L>  /  TBili  0.6  /  DBili  x   /  AST  20  /  ALT  15  /  AlkPhos  376<H>  04-09                  RADIOLOGY:      PHYSICAL EXAM:    GENERAL:  NAD  SKIN: No rashes or lesions  HENT: Atraumatic. Normocephalic.  NECK: Supple, No JVD. No lymphadenopathy.  PULMONARY: Decrease BS in the lower chest B/L. No wheezing. No rales  CVS: Normal S1, S2. Rate and Rhythm are regular.   ABDOMEN/GI: Soft, Nontender, distended;   EXTREMITIES: Peripheral pulses intact. No edema B/L LE.  NEUROLOGIC:  No motor or sensory deficit.  PSYCH: Alert & oriented x 3    ASSESSMENT & PLAN  76-year-old female with history of pancreatic CA, s/p Whipple 8/2021, A. fib on Eliquis, pleural effusion in ER with c/o SOB x2 days and generalized weakness.  Patient states she has been diagnosed previously with pleural effusion and was scheduled for thoracentesis this weekend but rescheduled as she had a family occasion.       ·	B/L pleural effusions   ·	S/P R sided thoracentesis  ·	H/O Pancreatic Ca S/P Whipple on 8/2021  ·	Chronic A-Fib on Eliquis  ·	Ascites                PLAN:    S/P R sided thoracentesis and removal of 1100 cc fluid.   Fluid is transudate.   Can restart her Eliquis 5 mg po q 12h  ECHO  D/C Lasix. Start her on Torsemide 10 mg po daily  CTA chest reviewed. No PE. Scattered ground glass opacities within the RUL. B/L pleural effusion  Doubt PNA. Can d/c Abx.   Cont her other home meds.  Full code.   PT eval    discharge today to SNF

## 2022-04-16 NOTE — ED ADULT NURSE NOTE - NSFALLRSKINDICTYPE_ED_ALL_ED
Dc Huerta)  Critical Care Medicine; Internal Medicine; Pulmonary Disease  [address] [place] [address]  Phone: [phone]  Fax: [phone]  Follow Up Time:     Sammy Man  INTERNAL MEDICINE  [address] [place] [address], [place] [address]  Phone: [phone]  Fax: [phone]  Follow Up Time:   
Need for Mobility Assisted Device

## 2022-04-18 NOTE — CONSULT NOTE ADULT - PROBLEM SELECTOR RECOMMENDATION 3
care per primary team care per primary team  possible paracentesis    See above for details.  In sum, patient without capacity during encounter; dgt open to palliative following - she is awaiting update from primary team; willing for family meeting  family meeting 4/20 at 11am

## 2022-04-18 NOTE — ED PROVIDER NOTE - PHYSICAL EXAMINATION
Vital Signs: Reviewed  GEN: awake, responds to voice, cachectic  HEAD:  normocephalic, atraumatic  EYES:  PERRLA; conjunctivae without injection, drainage or discharge  ENMT:  nasal mucosa moist; mouth moist without ulcerations or lesions; throat moist without erythema, exudate, ulcerations or lesions  NECK:  supple  CARDIAC:  tachycardic, irregular  RESP:  tachypneic, decreased breath sounds b/l  ABDOMEN:  nontender, distended and firm  MUSCULOSKELETAL/NEURO:  normal movement, normal tone  SKIN:  normal skin color for age and race, well-perfused; warm and dry

## 2022-04-18 NOTE — H&P ADULT - NSHPLABSRESULTS_GEN_ALL_CORE
VITAL SIGNS: Last 24 Hours  T(C): 35.7 (18 Apr 2022 09:01), Max: 35.7 (18 Apr 2022 09:01)  T(F): 96.3 (18 Apr 2022 09:01), Max: 96.3 (18 Apr 2022 09:01)  HR: 108 (18 Apr 2022 10:42) (100 - 108)  BP: 136/93 (18 Apr 2022 10:42) (136/93 - 149/79)  BP(mean): 104 (18 Apr 2022 10:42) (104 - 104)  RR: 18 (18 Apr 2022 10:42) (18 - 28)  SpO2: 100% (18 Apr 2022 09:01) (94% - 100%)    LABS:                        14.1   8.92  )-----------( 240      ( 18 Apr 2022 08:10 )             45.3     04-18    142  |  103  |  26<H>  ----------------------------<  106<H>  4.8   |  25  |  0.7    Ca    8.9      18 Apr 2022 08:10    TPro  6.5  /  Alb  3.3<L>  /  TBili  0.6  /  DBili  x   /  AST  55<H>  /  ALT  43<H>  /  AlkPhos  564<H>  04-18        RADIOLOGY:  < from: Xray Chest 1 View- PORTABLE-Urgent (04.18.22 @ 09:23) >    Impression:    Interval increase in bilateral effusions and opacities.    < end of copied text >

## 2022-04-18 NOTE — CONSULT NOTE ADULT - ASSESSMENT
Assessment  76F PMH HTN, pancreatic Ca s/p whipple 8/2021, Afib on eliquis, pleural effusion presenting secondary to hypoxia due to recurrent pleural effusion and abdominal ascites    Plan  - thoracentesis and paracentesis  - request pigtail catheters to be placed in bilateral thoracic cavity and abdominal cavity  - BNP >2000, likely secondary to fluid overload (previous thoracentesis with no malignant cells therefore possibility of malignant effusions likely low)  - please send fluid for cytology   - will need CTAP after thoracentesis and paracentesis  - d/w Dr. Nino

## 2022-04-18 NOTE — CONSULT NOTE ADULT - SUBJECTIVE AND OBJECTIVE BOX
LI, VIKASH [id number]  76y Female    HPI:  75 YO F with PMHx of HTN, pancreatic CA s/p Whipple 8/2021, A.Fib on Eliquis, pleural effusion was BIBEMS due to worsening SOB that started earlier on the morning of presentation  As per EMS, patient was saturating in the 70s on room air and was placed on NRB w/ sats improving to high 80s. Patient reports that she lives with her son and started noticing shortness of breath.   Patient had recent admission from [facility] and had thoracentesis done  Patient denies fevers, chills, headache, chest pain, palpitations, constipation, melena, hematochezia, dysuria, urinary frequency or urgency, numbness, tingling, recent travel or any known sick contact.   In the ED patient was placed on non rebreather with SpO2 of 94%. .  X-ray chest showed Interval increase in bilateral effusions and opacities. (18 Apr 2022 11:47)    Surgical Consult: 76F PMH HTN, pancreatic Ca s/p whipple 8/2021, Afib on eliquis, pleural effusion BIBEMS due to worsening SOB. Started earlier on the morning of presentation. As per EMS, patient was saturating in the 70s on room air and was placed on NRB w/sats improving to high 80s. Of note patient recently discharged 4/8 secondary to recurrent pleural effusions. Patient coming from nursing home, with decreased PO tolerance. No F/chills. No N/V, having BMs.    PAST MEDICAL & SURGICAL HISTORY:  SOB (shortness of breath)  Pain  knee  Varicose veins of both lower extremities, unspecified whether complicated  Atrial fibrillation, unspecified type  2019 on Eliquis  Jaundice  March 5, 2021  Malignant neoplasm of pancreas, unspecified location of malignancy  Pancreatic Cancer  Hypertension, unspecified type  2019  Pancreatic cancer  Kidney stones  History of surgery  Whipple procedure 8/2/2021 with Dr. Nino at [facility]  Status post phlebectomy  10/2018  History of ovarian cystectomy  left  History of tonsillectomy  1959  Kidney stone  2017    MEDICATIONS  (STANDING):  diltiazem    milliGRAM(s) Oral daily  furosemide    Tablet 20 milliGRAM(s) Oral daily  magnesium oxide 400 milliGRAM(s) Oral two times a day with meals  pancrelipase  (CREON 12,000 Lipase Units) 2 Capsule(s) Oral three times a day with meals  pantoprazole    Tablet 40 milliGRAM(s) Oral before breakfast    MEDICATIONS  (PRN):  verapamil 40 milliGRAM(s) Oral two times a day PRN tachycardia faster tahn 110 beats/ minute    Allergies  Augmentin (Rash)  chocolate (Headache)  digoxin (Hives)  Metoprolol Succinate ER (Hives)  Novocain (Angioedema)  Steroids: Excessive swelling (Flushing; Other (Mild to Mod))  sulfa drugs (Fever)  Xarelto (Hives)    Intolerances    REVIEW OF SYSTEMS  [x ] A ten-point review of systems was otherwise negative except as noted.  [ ] Due to altered mental status/intubation, subjective information were not able to be obtained from the patient. History was obtained, to the extent possible, from review of the chart and collateral sources of information.    Vital Signs Last 24 Hrs  T(C): 35.7 (18 Apr 2022 09:01), Max: 35.7 (18 Apr 2022 09:01)  T(F): 96.3 (18 Apr 2022 09:01), Max: 96.3 (18 Apr 2022 09:01)  HR: 114 (18 Apr 2022 16:04) (100 - 114)  BP: 123/78 (18 Apr 2022 16:04) (123/78 - 149/79)  BP(mean): 104 (18 Apr 2022 10:42) (104 - 104)  RR: 18 (18 Apr 2022 16:04) (18 - 28)  SpO2: 98% (18 Apr 2022 16:04) (70% - 100%)    PHYSICAL EXAM:  GENERAL: ill appearing, cachectic appearance   CHEST/LUNG: Clear to auscultation bilaterally  HEART: Regular rate and rhythm  ABDOMEN: Soft, Nontender, distended; (+) fluid wave   EXTREMITIES:  No clubbing, cyanosis, or edema    Labs:  CAPILLARY BLOOD GLUCOSE    POCT Blood Glucose.: 76 mg/dL (18 Apr 2022 08:33)                   14.1   8.92  )-----------( 240      ( 18 Apr 2022 08:10 )             45.3       Auto Neutrophil %: 70.2 % (04-18-22 @ 08:10)  Auto Immature Granulocyte %: 0.4 % (04-18-22 @ 08:10)    04-18  142  |  103  |  26<H>  ----------------------------<  106<H>  4.8   |  25  |  0.7    Calcium, Total Serum: 8.9 mg/dL (04-18-22 @ 08:10)    LFTs:           6.5  | 0.6  | 55       ------------------[564     ( 18 Apr 2022 08:10 )  3.3  | x    | 43          Lipase:x      Amylase:x         Blood Gas Venous - Lactate: 2.80 mmol/L (04-18-22 @ 10:42)  Coags:    Serum Pro-Brain Natriuretic Peptide: 2151 pg/mL (04-18-22 @ 08:10    RADIOLOGY & ADDITIONAL STUDIES:  < from: Xray Chest 1 View- PORTABLE-Urgent (04.18.22 @ 09:23) >  Impression:    Interval increase in bilateral effusions and opacities.    < end of copied text >

## 2022-04-18 NOTE — CONSULT NOTE ADULT - ASSESSMENT
75 YO F with PMHx of HTN, pancreatic CA s/p Whipple 8/2021, A.Fib on Eliquis, pleural effusion was BIBEMS from [facility] rehab due to worsening SOB; Patient had recent admission from [facility] and had thoracentesis done admitted and being treated with HFNC alternating non-rebreather      being evaluated for support with GOC       MEDD (morphine equivalent daily dose): na      See Recs below. d/w Primary and Palliative teams       Please call [phone] with questions or concerns 24/7.   We will continue to follow.

## 2022-04-18 NOTE — CONSULT NOTE ADULT - ASSESSMENT
IMPRESSION:  Acute Hypoxemic Respiratory Failure on NC  B/l effusion R>L  Acities   Pancreatic Cancer advanced   Afib on AC     PLAN:    CNS: no excessive sedation     HEENT: Oral care    PULMONARY:  HOB @ 45 degrees.  Aspiration precautions . Thoracentesis after paracentesis     CARDIOVASCULAR: Keep negative balance. Rate control . Hold AC     GI: GI prophylaxis.  Feeding.  Bowel regimen . Paracentesis     RENAL:  Follow up lytes.  Correct as needed    INFECTIOUS DISEASE: Follow up cultures. Procal.     HEMATOLOGICAL:  DVT prophylaxis.    ENDOCRINE:  Follow up FS.  Insulin protocol if needed.    MUSCULOSKELETAL: bed rest    SDU  Poor prognosis  Palliative eval         IMPRESSION:  Acute Hypoxemic Respiratory Failure on NC  B/l effusion R>L  Acities   Pancreatic Cancer advanced   Afib on AC     PLAN:    CNS: no excessive sedation     HEENT: Oral care    PULMONARY:  HOB @ 45 degrees.  Aspiration precautions . Thoracentesis after paracentesis   possible pleurex cath    CARDIOVASCULAR: Keep negative balance. Rate control . Hold AC     GI: GI prophylaxis.  Feeding.  Bowel regimen . Paracentesis     RENAL:  Follow up lytes.  Correct as needed    INFECTIOUS DISEASE: Follow up cultures. Procal.     HEMATOLOGICAL:  DVT prophylaxis.    ENDOCRINE:  Follow up FS.  Insulin protocol if needed.    MUSCULOSKELETAL: bed rest    SDU  Poor prognosis  Palliative eval

## 2022-04-18 NOTE — H&P ADULT - ATTENDING COMMENTS
75 YO F with a PMH of HTN, pancreatic CA s/p Whipple, chronic AFib (Apixaban), s/p pleural effusions who was BIBEMS for eval of SOB that started this AM. Denies any CP, palpitations, ABD pain, or N/V/D. ROS is negative except as above.     In the ED, pt seen by surgery and the plan is to place pigtail catheters in thoracic/ABD cavities. Pt was hypoxic to 70's on RA, started on NRB and deescalated to NC in the ED.     FMHx: Reviewed, not relevant    Physical exam shows pt in NAD. VSS, afebrile, not hypoxic on 6L NC. A&Ox3. Neuro exam without deficits, motor/sensory intact, no dysarthria, no facial asymmetry. Muscle strength/sensation intact. Decreased breath sounds noted over the B/L lower lung fields. RRR, no M/G/R. ABD is soft and non-tender, normoactive BSs. LEs without swelling. No rashes. Labs and radiology as above.     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 Supplemental O2 PRN. Pulm/IR consulted, plan on placing pigtail catheters. Coag panel. Hold Apixaban. IV Lasix and transition to PO.     Abdominal ascites, suspect malignant. Management as above.     Transaminitis, likely from hepatic congestion. Serial LFTs.     Hypoalbuminemia, from poor oral intake. Nutrition eval.     Hx of HTN, pancreatic CA s/p Whipple, and chronic AFib (Apixaban). Restart home meds, except as stated above. DVT PPX. Inform PCP of pt's admission to hospital. My note supersedes the residents note.     Date seen by Attendin22

## 2022-04-18 NOTE — CONSULT NOTE ADULT - PROBLEM SELECTOR RECOMMENDATION 4
See above for details.  In sum, patient without capacity during encounter; dgt open to palliative following - she is awaiting update from primary team; willing for family meeting  family meeting 4/20 at 11am

## 2022-04-18 NOTE — ED PROVIDER NOTE - PROGRESS NOTE DETAILS
AG: pt w/ SOB consents to bedside US--b/l pleural effusions, ascites. Pulm consulted for eval. AG: pt w/ SOB consents to bedside US--b/l pleural effusions, ascites. Pulm consulted for eval. Saturating 98% on 6L NC. AG: per pulm abdomen must be tapped before pleural space as fluid will reaccumulate if abd is not drained. AG: palliative consulted

## 2022-04-18 NOTE — ED PROVIDER NOTE - ATTENDING CONTRIBUTION TO CARE
I personally evaluated the patient. I reviewed the Resident’s or Physician Assistant’s note (as assigned above), and agree with the findings and plan except as documented in my note.   76-year-old female past medical history significant for hypertension, A. fib on Eliquis, pancreatic cancer status post Whipple 8/2021, pleural effusions status post multiple prior thoracenteses, complaining of shortness of breath. I personally evaluated the patient. I reviewed the Resident’s or Physician Assistant’s note (as assigned above), and agree with the findings and plan except as documented in my note.   76-year-old female past medical history significant for hypertension, A. fib on Eliquis, pancreatic cancer status post Whipple 8/2021, pleural effusions status post multiple prior thoracenteses, complaining of shortness of breath since this am. Patient noted to be hypoxic by EMS.  No chest pain, call, fever, chills.  Last admission for pleural effusion and thoracentesis.  Vitals noted.  CONSTITUTIONAL: Well-appearing; thin, pale.  HEAD: Normocephalic; atraumatic.   EYES: PERRL; EOM intact. Conjunctiva normal B/L.   ENT: Normal pharynx with no tonsillar hypertrophy. MMM. + JVD.   NECK: Supple; non-tender; no cervical lymphadenopathy.   CHEST: Normal chest excursion with respiration. + Port.   CARDIOVASCULAR: Tachycardia, S1, S2; no murmurs, rubs, or gallops.   RESPIRATORY: + rhonchi B/L and decreased BS R base.   GI/: Normal bowel sounds; non-distended; non-tender.  BACK: No evidence of trauma or deformity. Non-tender to palpation. No CVA tenderness.   EXT: Normal ROM in all four extremities; non-tender to palpation; distal pulses are normal. No leg edema B/L.   SKIN: Normal for age and race; warm; dry; good turgor.  NEURO: A & O x 4; CN 2-12 intact. Grossly unremarkable.

## 2022-04-18 NOTE — ED ADULT NURSE NOTE - NSICDXPASTSURGICALHX_GEN_ALL_CORE_FT
PAST SURGICAL HISTORY:  History of ovarian cystectomy left    History of surgery Whipple procedure 8/2/2021 with Dr. Nino at [facility]    History of tonsillectomy 1959    Kidney stone 2017    Status post phlebectomy 10/2018

## 2022-04-18 NOTE — CONSULT NOTE ADULT - PROBLEM SELECTOR RECOMMENDATION 9
[facility]  care per primary team s/p whipple  frail  care per primary team  not currently on treatment  had chemo prior to whipple  monitor symptoms

## 2022-04-18 NOTE — H&P ADULT - NSHPPHYSICALEXAM_GEN_ALL_CORE
CONSTITUTIONAL: No acute distress, well-developed, well-groomed, AAOx3  HEAD: Atraumatic, normocephalic  EYES: EOM intact, PERRLA, conjunctiva and sclera clear  ENT: Supple, no masses, no thyromegaly, no bruits, no JVD; moist mucous membranes  PULMONARY: Clear to auscultation bilaterally; no wheezes, rales, or rhonchi  CARDIOVASCULAR: Regular rate and rhythm; no murmurs, rubs, or gallops  GASTROINTESTINAL: Soft, non-tender, non-distended; bowel sounds present  MUSCULOSKELETAL: 2+ peripheral pulses; no clubbing, no cyanosis, no edema  NEUROLOGY: non-focal  SKIN: No rashes or lesions; warm and dry CONSTITUTIONAL: AAOx3  ENT: moist mucous membranes  PULMONARY: b/l basal crackles   CARDIOVASCULAR: Irregular   GASTROINTESTINAL: Distended   NEUROLOGY: Moves all extremities

## 2022-04-18 NOTE — ED PROVIDER NOTE - NSICDXPASTSURGICALHX_GEN_ALL_CORE_FT
PAST SURGICAL HISTORY:  History of ovarian cystectomy left    History of surgery Whipple procedure 8/2/2021 with Dr. Nino at [facility]    History of tonsillectomy 1959    Kidney stone 2017    Status post phlebectomy 10/2018

## 2022-04-18 NOTE — ED PROVIDER NOTE - CLINICAL SUMMARY MEDICAL DECISION MAKING FREE TEXT BOX
76-year-old female past medical history as documented, pancreatic cancer status post Whipple, history of pleural effusion status post thoracentesis complaining of shortness of breath.  Patient with bilateral pleural effusions and ascites.  All diagnostic testing reviewed.  Pulmonary and palliative consulted.  Patient admitted to the stepdown unit.

## 2022-04-18 NOTE — CONSULT NOTE ADULT - CONVERSATION DETAILS
[facility] complicated by infections/CDiff  only has been experiencing difficulty with breathing over the last few weeks

## 2022-04-18 NOTE — ED PROVIDER NOTE - CARE PLAN
Principal Discharge DX:	Pleural effusion  Secondary Diagnosis:	Shortness of breath  Secondary Diagnosis:	Ascites   1

## 2022-04-18 NOTE — H&P ADULT - ASSESSMENT
#Acute Hypoxemic Respiratory Failure; Currently on 6L NC  #B/l pleural effusion; R>L  #Acities   #Pancreatic Ca  - Xray Chest: Interval increase in bilateral effusions and opacities  - BNP 2151  - Echo: EF 54%, severely enlarged LA, RA. Mod-severe TR  - As per pulm, plan for thoracentesis after paracentesis   - ED spoke with IR for paracentesis. Plan for paracentesis tomorrow      #Chronic A.Fib on Eliquis  - c/w Verapamil PRN and diltiazem CD 120mg daily  - Hold AC for now    #Hx Pancreatic Ca   - s/p whipple 8/1/21 (in remission)   - c/w Creon 12K TID before meals     #Misc  - DVT Prophylaxis:  - Diet:  - GI Prophylaxis:  - Activity:  - IV Fluids:  - Code Status:    Dispo: 77 YO F with PMHx of HTN, pancreatic CA s/p Whipple 8/2021, A.Fib on Eliquis, pleural effusion was BIBEMS due to worsening SOB that started earlier this morning    #Acute Hypoxemic Respiratory Failure; Currently on 6L NC  #B/l pleural effusion; R>L  #Ascites   #Pancreatic Ca  - Xray Chest: Interval increase in bilateral effusions and opacities  - BNP 2151  - Echo: EF 54%, severely enlarged LA, RA. Mod-severe TR  - As per pulm, plan for thoracentesis after paracentesis   - Pending IR for paracentesis. Plan for paracentesis tomorrow. ED to send coagulation labs  - Follow procalcitonin and blood cultures    #Chronic A.Fib on Eliquis  - c/w Verapamil PRN and diltiazem CD 120mg daily  - Hold AC for now    #Hx Pancreatic Ca   - s/p whipple 8/1/21 (in remission)   - c/w Creon 12K TID before meals     #Misc  - DVT Prophylaxis: Hold for now  - Diet: NPO after midnight  - GI Prophylaxis: Pantoprazole  - Activity: AAT  - Code Status: Full Code (See GOC from April 6)    Dispo: Pending paracentesis by IR and thoracentesis by pulm 75 YO F with PMHx of HTN, pancreatic CA s/p Whipple 8/2021, A.Fib on Eliquis, pleural effusion was BIBEMS due to worsening SOB that started earlier this morning    #Acute Hypoxemic Respiratory Failure; Currently on 6L NC  #B/l pleural effusion; R>L  #Ascites   #Pancreatic Ca  - Xray Chest: Interval increase in bilateral effusions and opacities  - BNP 2151  - Echo: EF 54%, severely enlarged LA, RA. Mod-severe TR  - As per pulm, plan for thoracentesis after paracentesis   - Pending IR for paracentesis. Plan for paracentesis tomorrow. ED to send coagulation labs  - Follow procalcitonin and blood cultures. No need for antibiotics for now    #Chronic A.Fib on Eliquis  - Continue Verapamil PRN and diltiazem CD 120mg daily  - Hold Eliquis for now    #Hx Pancreatic Ca   - s/p whipple 8/1/21 (in remission)   - c/w Creon 12K TID before meals   - Evaluated by surgery team on April 7. No further intervention     #Misc  - DVT Prophylaxis: Hold for now  - Diet: NPO after midnight  - GI Prophylaxis: Pantoprazole  - Activity: AAT  - Code Status: Full Code (See GOC from April 6)    Dispo: Pending paracentesis by IR and thoracentesis by pulm

## 2022-04-18 NOTE — CONSULT NOTE ADULT - NS ATTEND AMEND GEN_ALL_CORE FT
Agree with above, patient here with dyspnea, with increased effusions likely requiring thoracentesis. Patient without full capacity to make decisions during our evaluation, d/w daughter who is open to thoracentesis if needed, and will have family meeting Wednesday at 11am to discuss further GOC    ______________  Bipin Madrigal MD  Palliative Medicine  [facility]   of Geriatric and Palliative Medicine  [phone]

## 2022-04-18 NOTE — H&P ADULT - NSICDXPASTSURGICALHX_GEN_ALL_CORE_FT
PAST SURGICAL HISTORY:  History of ovarian cystectomy left    History of surgery Whipple procedure 8/2/2021 with Dr. Nino at [facility]    History of tonsillectomy 1959    Kidney stone 2017    Status post phlebectomy 10/2018

## 2022-04-18 NOTE — ED PROVIDER NOTE - OBJECTIVE STATEMENT
76yF pmhx HTN, pancreatic CA, s/p Whipple 8/2021, A. fib on Eliquis, pleural effusion BIBEMS due to SOB starting earlier this morning; constant, stable; per EMS, pt saturating in the 70s on RA so placed on NRB w/ sats improving to high 80s; pt w/ recent admission and d/c from [facility] after thoracentesis.

## 2022-04-18 NOTE — ED ADULT NURSE NOTE - NSIMPLEMENTINTERV_GEN_ALL_ED
Implemented All Fall with Harm Risk Interventions:  Balsam to call system. Call bell, personal items and telephone within reach. Instruct patient to call for assistance. Room bathroom lighting operational. Non-slip footwear when patient is off stretcher. Physically safe environment: no spills, clutter or unnecessary equipment. Stretcher in lowest position, wheels locked, appropriate side rails in place. Provide visual cue, wrist band, yellow gown, etc. Monitor gait and stability. Monitor for mental status changes and reorient to person, place, and time. Review medications for side effects contributing to fall risk. Reinforce activity limits and safety measures with patient and family. Provide visual clues: red socks.

## 2022-04-18 NOTE — CONSULT NOTE ADULT - WHAT MATTERS MOST
comfort  if paracentesis helps with breathing - move forward to  perform comfort  if thoracentesis/paracentesis helps with breathing - move forward to  perform

## 2022-04-18 NOTE — CONSULT NOTE ADULT - PROBLEM SELECTOR RECOMMENDATION 2
dgt willing for throacenteiss to improve QOL; stated in past - negative for cancer dgt willing for throacenteiss to improve QOL; stated in past - negative for cancer  monitor O2 sat

## 2022-04-18 NOTE — H&P ADULT - HISTORY OF PRESENT ILLNESS
77 YO F with PMHx of HTN, pancreatic CA s/p Whipple 8/2021, A.Fib on Eliquis, pleural effusion was BIBEMS due to worsening SOB that started earlier this morning. As per EMS, patient was saturating in the 70s on room air and was placed on NRB w/ sats improving to high 80s  Patient had recent admission from [facility] and had thoracentesis done 77 YO F with PMHx of HTN, pancreatic CA s/p Whipple 8/2021, A.Fib on Eliquis, pleural effusion was BIBEMS due to worsening SOB that started earlier this morning. As per EMS, patient was saturating in the 70s on room air and was placed on NRB w/ sats improving to high 80s  Patient had recent admission from [facility] and had thoracentesis done  In the ED patient was placed on non rebreather with SpO2 of 94%. .  X-ray chest showed Interval increase in bilateral effusions and opacities. 77 YO F with PMHx of HTN, pancreatic CA s/p Whipple 8/2021, A.Fib on Eliquis, pleural effusion was BIBEMS due to worsening SOB that started earlier on the morning of presentation  As per EMS, patient was saturating in the 70s on room air and was placed on NRB w/ sats improving to high 80s. Patient reports that she lives with her son and started noticing shortness of breath.   Patient had recent admission from [facility] and had thoracentesis done  Patient denies fevers, chills, headache, chest pain, palpitations, constipation, melena, hematochezia, dysuria, urinary frequency or urgency, numbness, tingling, recent travel or any known sick contact.   In the ED patient was placed on non rebreather with SpO2 of 94%. .  X-ray chest showed Interval increase in bilateral effusions and opacities.

## 2022-04-18 NOTE — CONSULT NOTE ADULT - SUBJECTIVE AND OBJECTIVE BOX
Patient is a 76y old  Female who presents with a chief complaint of     HPI: 76yF pmhx HTN, pancreatic CA, s/p Whipple 8/2021, A. fib on Eliquis, pleural effusion BIBEMS due to SOB starting earlier this morning; constant, stable; per EMS, pt saturating in the 70s on RA so placed on NRB w/ sats improving to high 80s; pt w/ recent admission and d/c from [facility] after thoracentesis.       PAST MEDICAL & SURGICAL HISTORY:  SOB (shortness of breath)    Pain  knee    Varicose veins of both lower extremities, unspecified whether complicated    Atrial fibrillation, unspecified type  2019 on Eliquis    Jaundice  March 5, 2021    Malignant neoplasm of pancreas, unspecified location of malignancy  Pancreatic Cancer    Hypertension, unspecified type  2019    Pancreatic cancer    Kidney stones    History of surgery  Whipple procedure 8/2/2021 with Dr. Nino at [facility]    Status post phlebectomy  10/2018    History of ovarian cystectomy  left    History of tonsillectomy  1959    Kidney stone  2017        SOCIAL HX:   Smoking EX                       FAMILY HISTORY:  CAD (coronary artery disease) (Sibling)  3  siblings ( 2 living and 1 passed away)    :  No known cardiovacular family hisotry     Review Of Systems:     All ROS are negative except per HPI       Allergies    Augmentin (Rash)  chocolate (Headache)  digoxin (Hives)  Metoprolol Succinate ER (Hives)  Novocain (Angioedema)  Steroids: Excessive swelling (Flushing; Other (Mild to Mod))  sulfa drugs (Fever)  Xarelto (Hives)    Intolerances          PHYSICAL EXAM    ICU Vital Signs Last 24 Hrs  T(C): 35.7 (18 Apr 2022 09:01), Max: 35.7 (18 Apr 2022 09:01)  T(F): 96.3 (18 Apr 2022 09:01), Max: 96.3 (18 Apr 2022 09:01)  HR: 108 (18 Apr 2022 09:01) (100 - 108)  BP: 149/79 (18 Apr 2022 08:28) (149/79 - 149/79)  RR: 24 (18 Apr 2022 09:01) (24 - 28)  SpO2: 100% (18 Apr 2022 09:01) (94% - 100%)      CONSTITUTIONAL:  ill appearing    ENT:   Airway patent,   Mouth with normal mucosa.   No thrush  on NC    CARDIAC:   Normal rate,   Regular rhythm.    LE edema         RESPIRATORY:   decreased air entry    GASTROINTESTINAL:  Abdomen soft,   Non-tender,   No guarding,   + BS                 LABS:                          14.1   8.92  )-----------( 240      ( 18 Apr 2022 08:10 )             45.3                                                                                                                                                                                                                                                                          X-Rays reviewed        B/L Effusion R>L                                                                                    MEDICATIONS  (STANDING):    MEDICATIONS  (PRN):

## 2022-04-18 NOTE — CONSULT NOTE ADULT - SUBJECTIVE AND OBJECTIVE BOX
VIKASH LI          MRN-[id number]              HPI:  75 YO F with PMHx of HTN, pancreatic CA s/p Whipple 8/2021, A.Afshan on Eliquis, pleural effusion was BIBEMS due to worsening SOB that started earlier on the morning of presentation  As per EMS, patient was saturating in the 70s on room air and was placed on NRB w/ sats improving to high 80s. Patient reports that she lives with her son and started noticing shortness of breath.   Patient had recent admission from [facility] and had thoracentesis done  Patient denies fevers, chills, headache, chest pain, palpitations, constipation, melena, hematochezia, dysuria, urinary frequency or urgency, numbness, tingling, recent travel or any known sick contact.   In the ED patient was placed on non rebreather with SpO2 of 94%. .  X-ray chest showed Interval increase in bilateral effusions and opacities. (18 Apr 2022 11:47)      PAST MEDICAL & SURGICAL HISTORY:  SOB (shortness of breath)    Pain  knee    Varicose veins of both lower extremities, unspecified whether complicated    Atrial fibrillation, unspecified type  2019 on Eliquis    Jaundice  March 5, 2021    Malignant neoplasm of pancreas, unspecified location of malignancy  Pancreatic Cancer    Hypertension, unspecified type  2019    Pancreatic cancer    Kidney stones    History of surgery  Whipple procedure 8/2/2021 with Dr. Nino at [facility]    Status post phlebectomy  10/2018    History of ovarian cystectomy  left    History of tonsillectomy  1959    Kidney stone  2017        FAMILY HISTORY:  CAD (coronary artery disease) (Sibling)  3  siblings ( 2 living and 1 passed away)     Reviewed and found non contributory in mother or father    SOCIAL HISTORY:   Tobacco/etoh/illicit drug use use reported. Yes [ ]  _________  No [ ?]  Pt resides at: home [ ]  facility [x ]  other [ ] _______        ROS:	    Unable to attain due to:  patient A&Ox1 - thought dgt in room;                     Dyspnea (Salima 0-10): 0                       N/V (Y/N): No                             Secretions (Y/N) : No                                          Agitation(Y/N): No                              Pain (Y/N): No                                 -Provocation/Palliation: N/A  -Quality/Quantity: N/A  -Radiating: N/A  -Severity: No pain  -Timing/Frequency: N/A  -Impact on ADLs: N/A    General:   no nonverbal indications  HEENT:   no nonverbal indications    Neck:   no nonverbal indications  CVS:   no nonverbal indications  Resp:   no nonverbal indications  GI:   no nonverbal indications  :   no nonverbal indications  Musc:   no nonverbal indications  Neuro:   no nonverbal indications  Psych:  no nonverbal indications  Skin:   no nonverbal indications  Lymph:   no nonverbal indications    Last BM:?      Allergies    Augmentin (Rash)  chocolate (Headache)  digoxin (Hives)  Metoprolol Succinate ER (Hives)  Novocain (Angioedema)  Steroids: Excessive swelling (Flushing; Other (Mild to Mod))  sulfa drugs (Fever)  Xarelto (Hives)    Intolerances      Opiate Naive (Y/N): Y  -iStop reviewed (Y/N): Y   Ref#:     This report was requested by: Yuki Nolan | Reference #: [id number]             Labs:	    CBC:                        14.1   8.92  )-----------( 240      ( 18 Apr 2022 08:10 )             45.3     CMP:    04-18    142  |  103  |  26<H>  ----------------------------<  106<H>  4.8   |  25  |  0.7    Ca    8.9      18 Apr 2022 08:10    TPro  6.5  /  Alb  3.3<L>  /  TBili  0.6  /  DBili  x   /  AST  55<H>  /  ALT  43<H>  /  AlkPhos  564<H>  04-18                  Radiology:	     < from: Xray Chest 1 View- PORTABLE-Urgent (04.18.22 @ 09:23) >    ACC: [id number] EXAM:  XR CHEST PORTABLE URGENT 1V                          PROCEDURE DATE:  04/18/2022          INTERPRETATION:  Clinical History / Reason for exam: Shortness of breath    Comparison : Chest radiograph April 6, 2022.    Technique/Positioning: Single AP view of the chest.    Findings:    Support devices: Right chest wall Mediport, unchanged.    Cardiac/mediastinum/hilum: Cardiomegaly, unchanged.    Lung parenchyma/Pleura: Interval increase in bilateral effusions and   opacities. No pneumothorax.    Skeleton/soft tissues: Unchanged.    Impression:    Interval increase in bilateral effusions and opacities.        --- End of Report ---            ELLIOT LANDAU MD; Attending Radiologist  This document has been electronically signed. Apr 18 2022 10:26AM    < end of copied text >        EKG:	  < from: 12 Lead ECG (04.05.22 @ 17:56) >    Ventricular Rate 91 BPM    Atrial Rate 37 BPM    QRS Duration 82 ms    Q-T Interval 360 ms    QTC Calculation(Bazett) 442 ms    R Axis 117 degrees    T Axis -62 degrees    Diagnosis Line Atrial fibrillation  Left posterior fascicular block  T wave abnormality, consider lateral ischemia  Abnormal ECG    Confirmed by AMADEO CUNHA, [facility] ([id number]) on 4/6/2022 10:08:26 AM    < end of copied text >      Imaging Personally Reviewed:  [ ] xYES  [ ] NO    Consultant(s) Notes Reviewed:  [x ] YES  [ ] NO  Care Discussed with Consultants/Other Providers [x ] YES  [ ] NO    PEx:	  T(C): 35.7 (04-18-22 @ 09:01), Max: 35.7 (04-18-22 @ 09:01)  HR: 100 (04-18-22 @ 14:40) (100 - 112)  BP: 137/95 (04-18-22 @ 12:09) (136/93 - 149/79)  RR: 18 (04-18-22 @ 14:40) (18 - 28)  SpO2: 96% (04-18-22 @ 14:40) (70% - 100%)  Wt(kg): --  Daily Height in cm: 172.72 (18 Apr 2022 08:28)    Daily     General:  found in bed had taken off HFNC - NAD; overall frail  Eyes:  PER Non icteric   ENMT: no external oral ulcers,   CVS: AF  Resp: Unlabored Non tachypneic No increased WOB O2 sat - poor wave form (rev'd with RN)  GI:  Soft NT ND   Musc: No C/C/E    Neuro: Follows commands inconsistency No focal deficits  Psych: Calm Pleasant, AAOxself  Skin: Non jaundiced ,     Preadmit Karnofsky:  %           Current Karnofsky:  30   %  http://www.npcrc.org/files/news/karnofsky_performance_scale.pdf   http://www.npcrc.org/files/news/palliative_performance_scale_PPSv2.pdf  Cachexia (Y/N): Y  BMI: BMI (kg/m2): 12.2 (04-18-22 @ 08:41)      Medications:	      MEDICATIONS  (STANDING):  diltiazem    milliGRAM(s) Oral daily  furosemide    Tablet 20 milliGRAM(s) Oral daily  magnesium oxide 400 milliGRAM(s) Oral two times a day with meals  pancrelipase  (CREON 12,000 Lipase Units) 2 Capsule(s) Oral three times a day with meals  pantoprazole    Tablet 40 milliGRAM(s) Oral before breakfast    MEDICATIONS  (PRN):  verapamil 40 milliGRAM(s) Oral two times a day PRN tachycardia faster tahn 110 beats/ minute    last 24H had just started Remeron (under psych care at sub-acute rehab)        Advanced Directives:	     Full Code      Decision maker: The patient is unable to participate in complex medical decision making conversations.   Legal surrogate:    GOALS OF CARE DISCUSSION	       Palliative care info/counseling provided	              Documentation of GOC/Advanced Care Planning:      PSYCHOSOCIAL-SPIRITUAL ASSESSMENT:            See Palliative Care SW/ documentation        	    REFERRALS       Palliative Med        Unit SW/Case Mgmt            VIKASH LI          MRN-[id number]              HPI:  77 YO F with PMHx of HTN, pancreatic CA s/p Whipple 8/2021, A.Afshan on Eliquis, pleural effusion was BIBEMS due to worsening SOB that started earlier on the morning of presentation  As per EMS, patient was saturating in the 70s on room air and was placed on NRB w/ sats improving to high 80s. Patient reports that she lives with her son and started noticing shortness of breath.   Patient had recent admission from [facility] and had thoracentesis done  Patient denies fevers, chills, headache, chest pain, palpitations, constipation, melena, hematochezia, dysuria, urinary frequency or urgency, numbness, tingling, recent travel or any known sick contact.   In the ED patient was placed on non rebreather with SpO2 of 94%. .  X-ray chest showed Interval increase in bilateral effusions and opacities. (18 Apr 2022 11:47)      PAST MEDICAL & SURGICAL HISTORY:  SOB (shortness of breath)    Pain  knee    Varicose veins of both lower extremities, unspecified whether complicated    Atrial fibrillation, unspecified type  2019 on Eliquis    Jaundice  March 5, 2021    Malignant neoplasm of pancreas, unspecified location of malignancy  Pancreatic Cancer    Hypertension, unspecified type  2019    Pancreatic cancer    Kidney stones    History of surgery  Whipple procedure 8/2/2021 with Dr. Nino at [facility]    Status post phlebectomy  10/2018    History of ovarian cystectomy  left    History of tonsillectomy  1959    Kidney stone  2017        FAMILY HISTORY:  CAD (coronary artery disease) (Sibling)  3  siblings ( 2 living and 1 passed away)     Reviewed and found non contributory in mother or father    SOCIAL HISTORY:   Tobacco/etoh/illicit drug use use reported. Yes [ ]  _________  No [ ?]  Pt resides at: home [ ]  facility [x ]  other [ ] _______        ROS:	    Unable to attain due to:  patient A&Ox1 - thought dgt in room;                     Dyspnea (Salima 0-10): 0                       N/V (Y/N): No                             Secretions (Y/N) : No                                          Agitation(Y/N): No                              Pain (Y/N): No                                 -Provocation/Palliation: N/A  -Quality/Quantity: N/A  -Radiating: N/A  -Severity: No pain  -Timing/Frequency: N/A  -Impact on ADLs: N/A    General:   no nonverbal indications  HEENT:   no nonverbal indications    Neck:   no nonverbal indications  CVS:   no nonverbal indications  Resp:   no nonverbal indications  GI:   no nonverbal indications  :   no nonverbal indications  Musc:   no nonverbal indications  Neuro:   no nonverbal indications  Psych:  no nonverbal indications  Skin:   no nonverbal indications  Lymph:   no nonverbal indications    Last BM:?      Allergies    Augmentin (Rash)  chocolate (Headache)  digoxin (Hives)  Metoprolol Succinate ER (Hives)  Novocain (Angioedema)  Steroids: Excessive swelling (Flushing; Other (Mild to Mod))  sulfa drugs (Fever)  Xarelto (Hives)    Intolerances      Opiate Naive (Y/N): Y  -iStop reviewed (Y/N): Y   Ref#:     This report was requested by: Yuki Nolan | Reference #: [id number]             Labs:	  reviewed  CBC:                        14.1   8.92  )-----------( 240      ( 18 Apr 2022 08:10 )             45.3     CMP:    04-18    142  |  103  |  26<H>  ----------------------------<  106<H>  4.8   |  25  |  0.7    Ca    8.9      18 Apr 2022 08:10    TPro  6.5  /  Alb  3.3<L>  /  TBili  0.6  /  DBili  x   /  AST  55<H>  /  ALT  43<H>  /  AlkPhos  564<H>  04-18                  Radiology:	     < from: Xray Chest 1 View- PORTABLE-Urgent (04.18.22 @ 09:23) >    ACC: [id number] EXAM:  XR CHEST PORTABLE URGENT 1V                          PROCEDURE DATE:  04/18/2022          INTERPRETATION:  Clinical History / Reason for exam: Shortness of breath    Comparison : Chest radiograph April 6, 2022.    Technique/Positioning: Single AP view of the chest.    Findings:    Support devices: Right chest wall Mediport, unchanged.    Cardiac/mediastinum/hilum: Cardiomegaly, unchanged.    Lung parenchyma/Pleura: Interval increase in bilateral effusions and   opacities. No pneumothorax.    Skeleton/soft tissues: Unchanged.    Impression:    Interval increase in bilateral effusions and opacities.        --- End of Report ---            ELLIOT LANDAU MD; Attending Radiologist  This document has been electronically signed. Apr 18 2022 10:26AM    < end of copied text >        EKG:	  < from: 12 Lead ECG (04.05.22 @ 17:56) >    Ventricular Rate 91 BPM    Atrial Rate 37 BPM    QRS Duration 82 ms    Q-T Interval 360 ms    QTC Calculation(Bazett) 442 ms    R Axis 117 degrees    T Axis -62 degrees    Diagnosis Line Atrial fibrillation  Left posterior fascicular block  T wave abnormality, consider lateral ischemia  Abnormal ECG    Confirmed by AMADEO CUNHA, [facility] ([id number]) on 4/6/2022 10:08:26 AM    < end of copied text >      Imaging Personally Reviewed:  [ ] xYES  [ ] NO    Consultant(s) Notes Reviewed:  [x ] YES  [ ] NO  Care Discussed with Consultants/Other Providers [x ] YES  [ ] NO    PEx:	  T(C): 35.7 (04-18-22 @ 09:01), Max: 35.7 (04-18-22 @ 09:01)  HR: 100 (04-18-22 @ 14:40) (100 - 112)  BP: 137/95 (04-18-22 @ 12:09) (136/93 - 149/79)  RR: 18 (04-18-22 @ 14:40) (18 - 28)  SpO2: 96% (04-18-22 @ 14:40) (70% - 100%)  Wt(kg): --  Daily Height in cm: 172.72 (18 Apr 2022 08:28)    Daily     General:  found in bed had taken off HFNC - NAD; overall frail  Eyes:  PER Non icteric   ENMT: no external oral ulcers,   CVS: AF  Resp: Unlabored Non tachypneic No increased WOB O2 sat - poor wave form (rev'd with RN)  GI:  S nondistended  Musc: No C/C/E    Neuro: Follows commands inconsistency No focal deficits  Psych: Calm Pleasant, AAOxself  Skin: Non jaundiced ,     Preadmit Karnofsky:  %           Current Karnofsky:  30   %  http://www.npcrc.org/files/news/karnofsky_performance_scale.pdf   http://www.npcrc.org/files/news/palliative_performance_scale_PPSv2.pdf  Cachexia (Y/N): Y  BMI: BMI (kg/m2): 12.2 (04-18-22 @ 08:41)      Medications:	      MEDICATIONS  (STANDING):  diltiazem    milliGRAM(s) Oral daily  furosemide    Tablet 20 milliGRAM(s) Oral daily  magnesium oxide 400 milliGRAM(s) Oral two times a day with meals  pancrelipase  (CREON 12,000 Lipase Units) 2 Capsule(s) Oral three times a day with meals  pantoprazole    Tablet 40 milliGRAM(s) Oral before breakfast    MEDICATIONS  (PRN):  verapamil 40 milliGRAM(s) Oral two times a day PRN tachycardia faster tahn 110 beats/ minute    last 24H had just started Remeron (under psych care at sub-acute rehab)        Advanced Directives:	     Full Code      Decision maker: The patient is unable to fully participate in complex medical decision making conversations.   Legal surrogate:    GOALS OF CARE DISCUSSION	       Palliative care info/counseling provided	              Documentation of GOC/Advanced Care Planning:      PSYCHOSOCIAL-SPIRITUAL ASSESSMENT:            See Palliative Care SW/ documentation        	    REFERRALS       Palliative Med        Unit SW/Case Mgmt

## 2022-04-19 NOTE — PROGRESS NOTE ADULT - ASSESSMENT
75 YO F with PMHx of HTN, pancreatic CA s/p Whipple 8/2021, A.Fib on Eliquis, pleural effusion was BIBEMS due to worsening SOB that started earlier this morning    IMPRESSION:  Acute Hypoxemic Respiratory Failure on NC  B/l effusion R>L  Ascites   Pancreatic Cancer advanced   Afib on AC     #Acute Hypoxemic Respiratory Failure; Currently on 6L NC  #B/l pleural effusion; R>L  #Ascites   #Pancreatic Ca  - Xray Chest: Interval increase in bilateral effusions and opacities  - BNP 2151  - Echo: EF 54%, severely enlarged LA, RA. Mod-severe TR  - Currently on HFNC 40/80  - As per pulm, plan for thoracentesis after paracentesis   - Pending IR for paracentesis.  - Surgery recs appreciated   - Surgery is requesting pigtail catheters to be placed in bilateral thoracic cavity and abdominal cavity  - CTAP after thoracentesis and paracentesis  - F/u procalcitonin  - F/u blood cultures   - Palliative rec appreciated: family meeting 4/20 at 11am. Remains fulls code for now     #Chronic A.Fib on Eliquis  - Continue Verapamil PRN and diltiazem CD 120mg daily  - Hold Eliquis for now    #Hx Pancreatic Ca   - s/p whipple 8/1/21 (in remission)   - c/w Creon 12K TID before meals   - Evaluated by surgery team on April 7. No further intervention     #Misc  - DVT Prophylaxis: Hold for now  - Diet: NPO after midnight  - GI Prophylaxis: Pantoprazole  - Activity: AAT  - Code Status: Full Code  - Disposition: acute   Pending: Paracentesis then thoracentesis    77 YO F with PMHx of HTN, pancreatic CA s/p Whipple 8/2021, A.Fib on Eliquis, pleural effusion was BIBEMS due to worsening SOB that started earlier this morning    IMPRESSION:  Acute Hypoxemic Respiratory Failure on NC  B/l effusion R>L  Ascites   Pancreatic Cancer advanced   Afib on AC     #Acute Hypoxemic Respiratory Failure; Currently on 6L NC  #B/l pleural effusion; R>L  #Ascites   #Pancreatic Ca  - Xray Chest: Interval increase in bilateral effusions and opacities  - BNP 2151  - Echo: EF 54%, severely enlarged LA, RA. Mod-severe TR  - Currently on HFNC 40/80  - As per pulm, plan for thoracentesis after paracentesis   - Pending IR for paracentesis.  - Surgery recs appreciated   - Surgery is requesting pigtail catheters to be placed in bilateral thoracic cavity and abdominal cavity  - CTAP after thoracentesis and paracentesis  - C/w Lasix 20mg PO QD   - F/u procalcitonin  - F/u blood cultures   - Palliative rec appreciated: family meeting 4/20 at 11am. Remains fulls code for now     #Transaminitis, likely from hepatic congestion  - TPro  5.6<L>  /  Alb  2.9<L>  /  TBili  0.7  /  DBili  x   /  AST  64<H>  /  ALT  47<H>  /  AlkPhos  550<H>  04-19  - Monitor LFTs     #Chronic A.Fib on Eliquis  - Continue Verapamil PRN and diltiazem CD 120mg daily  - Hold Eliquis for now    #Hx Pancreatic Ca   - s/p whipple 8/1/21 (in remission)   - c/w Creon 12K TID before meals   - Evaluated by surgery team on April 7. No further intervention     #Misc  - DVT Prophylaxis: Hold for now  - Diet: NPO after midnight  - GI Prophylaxis: Pantoprazole  - Activity: AAT  - Code Status: Full Code  - Disposition: acute   Pending: Paracentesis then thoracentesis    75 YO F with PMHx of HTN, pancreatic CA s/p Whipple 8/2021, A.Fib on Eliquis, pleural effusion was BIBEMS due to worsening SOB that started earlier this morning    IMPRESSION:  Acute Hypoxemic Respiratory Failure on NC  B/l effusion R>L  Ascites   Pancreatic Cancer advanced   Afib on AC     #Acute Hypoxemic Respiratory Failure; Currently on 6L NC  #B/l pleural effusion; R>L  #Ascites   #Pancreatic Ca  - Xray Chest: Interval increase in bilateral effusions and opacities  - BNP 2151  - Echo: EF 54%, severely enlarged LA, RA. Mod-severe TR  - Currently on HFNC 40/80  - As per pulm, plan for thoracentesis after paracentesis   - Surgery recs appreciated   - Surgery is requesting pigtail catheters to be placed in bilateral thoracic cavity and abdominal cavity  - CTAP after thoracentesis and paracentesis  - C/w Lasix 20mg PO QD   - F/u procalcitonin  - F/u blood cultures   - Palliative rec appreciated: family meeting 4/20 at 11am. Remains fulls code for now   - s/p Thoracentesis 4/19 drained 750ml   - f/u cytology and pleural fluid analysis   - Was unable to perform Paracentesis     #Transaminitis, likely from hepatic congestion  - TPro  5.6<L>  /  Alb  2.9<L>  /  TBili  0.7  /  DBili  x   /  AST  64<H>  /  ALT  47<H>  /  AlkPhos  550<H>  04-19  - Monitor LFTs     #Chronic A.Fib on Eliquis  - Continue Verapamil PRN and diltiazem CD 120mg daily  - Hold Eliquis for now    #Hx Pancreatic Ca   - s/p whipple 8/1/21 (in remission)   - c/w Creon 12K TID before meals   - Evaluated by surgery team on April 7. No further intervention     #Misc  - DVT Prophylaxis: Hold for now  - Diet: NPO after midnight  - GI Prophylaxis: Pantoprazole  - Activity: AAT  - Code Status: Full Code  - Disposition: acute   Pending: Paracentesis    75 YO F with PMHx of HTN, pancreatic CA s/p Whipple 8/2021, A.Fib on Eliquis, pleural effusion was BIBEMS due to worsening SOB that started earlier this morning    IMPRESSION:  Acute Hypoxemic Respiratory Failure on NC  B/l effusion R>L  Ascites   Pancreatic Cancer advanced   Afib on AC     #Acute Hypoxemic Respiratory Failure; Currently on 6L NC  #B/l pleural effusion; R>L  #Ascites   #Pancreatic Ca  - Xray Chest: Interval increase in bilateral effusions and opacities  - BNP 2151  - Echo: EF 54%, severely enlarged LA, RA. Mod-severe TR  - Currently on HFNC 40/80  - Surgery recs appreciated   - Surgery is requesting pigtail catheters to be placed in bilateral thoracic cavity and abdominal cavity  - CTAP after thoracentesis and paracentesis  - C/w Lasix 40mg IV QD   - F/u procalcitonin  - F/u blood cultures   - F/u ammonia level  - Palliative rec appreciated: family meeting 4/20 at 11am. Remains fulls code for now   - s/p Thoracentesis 4/19 drained 750ml   - f/u cytology and pleural fluid analysis   - Was unable to perform Paracentesis  - C/w ceftriaxone      #Transaminitis, likely from hepatic congestion  - TPro  5.6<L>  /  Alb  2.9<L>  /  TBili  0.7  /  DBili  x   /  AST  64<H>  /  ALT  47<H>  /  AlkPhos  550<H>  04-19  - Monitor LFTs     #Chronic A.Fib on Eliquis  - Continue Verapamil PRN and diltiazem CD 120mg daily  - Hold Eliquis for now    #Hx Pancreatic Ca   - s/p whipple 8/1/21 (in remission)   - c/w Creon 12K TID before meals   - Evaluated by surgery team on April 7. No further intervention     #Misc  - DVT Prophylaxis: Hold for now  - Diet: NPO after midnight  - GI Prophylaxis: Pantoprazole  - Activity: AAT  - Code Status: Full Code  - Disposition: acute   Pending: Paracentesis    75 YO F with PMHx of HTN, pancreatic CA s/p Whipple 8/2021, A.Fib on Eliquis, pleural effusion was BIBEMS due to worsening SOB that started earlier this morning    IMPRESSION:  Acute Hypoxemic Respiratory Failure on HFNC  Acute hypercapnic respiratory failure  AMS likely toxic metabolic improved   B/l effusion R>L,  SP right thoracentesis transudative effusion   Ascites   Pancreatic Cancer advanced  s/p whipple 8/21  Afib on Eliquis last dose Sunday     #Acute Hypoxemic Respiratory Failure  #B/l pleural effusion; R>L  #Ascites   #Pancreatic Ca  - Xray Chest: Interval increase in bilateral effusions and opacities  - BNP 2151  - Echo: EF 54%, severely enlarged LA, RA. Mod-severe TR  - Currently on HFNC 40/80  - Surgery recs appreciated   - Surgery is requesting pigtail catheters to be placed in bilateral thoracic cavity and abdominal cavity  - CTAP after thoracentesis and paracentesis  - C/w Lasix 40mg IV QD   - F/u procalcitonin  - F/u blood cultures   - F/u ammonia level  - Palliative rec appreciated: family meeting 4/20 at 11am. Remains fulls code for now   - s/p Thoracentesis 4/19 drained 750ml   - f/u cytology and pleural fluid analysis   - Was unable to perform Paracentesis  - C/w ceftriaxone      #Transaminitis, likely from hepatic congestion  - TPro  5.6<L>  /  Alb  2.9<L>  /  TBili  0.7  /  DBili  x   /  AST  64<H>  /  ALT  47<H>  /  AlkPhos  550<H>  04-19  - Monitor LFTs     #Chronic A.Fib on Eliquis  - Continue Verapamil PRN and diltiazem CD 120mg daily  - Hold Eliquis for now    #Hx Pancreatic Ca   - s/p whipple 8/1/21 (in remission)   - c/w Creon 12K TID before meals   - Evaluated by surgery team on April 7. No further intervention     #Misc  - DVT Prophylaxis: Hold for now  - Diet: NPO after midnight  - GI Prophylaxis: Pantoprazole  - Activity: AAT  - Code Status: Full Code  - Disposition: acute   Pending: Paracentesis

## 2022-04-19 NOTE — PROGRESS NOTE ADULT - SUBJECTIVE AND OBJECTIVE BOX
LI, VIKASH  76y  Female      Patient is a 76y old  Female who presents with a chief complaint of Shortness of breath (19 Apr 2022 09:37)      INTERVAL HPI/OVERNIGHT EVENTS:  Patient seen and examined earlier this morning  lying comfortably in bed on highflow       REVIEW OF SYSTEMS:  unable to assess due to mental status    T(C): 35.4 (04-19-22 @ 12:00), Max: 35.4 (04-19-22 @ 12:00)  HR: 129 (04-19-22 @ 12:00) (95 - 137)  BP: 158/83 (04-19-22 @ 12:00) (102/68 - 158/83)  RR: 18 (04-19-22 @ 14:03) (16 - 20)  SpO2: 98% (04-19-22 @ 14:03) (97% - 100%)    PHYSICAL EXAM:  GENERAL: NAD, elderly, cachectic female  HEAD:  Atraumatic, Normocephalic  EYES: EOMI, PERRLA, conjunctiva and sclera clear  NECK: Supple, No JVD, Normal thyroid  NERVOUS SYSTEM:  wakes to tactile stimuli,   CHEST/LUNG: mild ronchi   HEART: Regular rate and rhythm; No murmurs, rubs, or gallops  ABDOMEN: Soft, Nontender, Nondistended; Bowel sounds present  EXTREMITIES:  2+ Peripheral Pulses, No clubbing, cyanosis, or edema  LYMPH: No lymphadenopathy noted  SKIN: pale    Consultant(s) Notes Reviewed:  [x ] YES  [ ] NO  Care Discussed with Consultants/Other Providers [ x] YES  [ ] NO    LAB:                        11.8   11.24 )-----------( 229      ( 19 Apr 2022 05:43 )             38.4     04-19    145  |  107  |  32<H>  ----------------------------<  71  5.8<H>   |  25  |  0.7    Ca    8.8      19 Apr 2022 05:43  Phos  3.7     04-19  Mg     2.1     04-19    TPro  5.6<L>  /  Alb  2.9<L>  /  TBili  0.7  /  DBili  x   /  AST  64<H>  /  ALT  47<H>  /  AlkPhos  550<H>  04-19    LIVER FUNCTIONS - ( 19 Apr 2022 05:43 )  Alb: 2.9 g/dL / Pro: 5.6 g/dL / ALK PHOS: 550 U/L / ALT: 47 U/L / AST: 64 U/L / GGT: x               Drug Dosing Weight  Height (cm): 172.7 (18 Apr 2022 08:28)  Weight (kg): 36.3 (18 Apr 2022 08:41)  BMI (kg/m2): 12.2 (18 Apr 2022 08:41)  BSA (m2): 1.38 (18 Apr 2022 08:41)      CAPILLARY BLOOD GLUCOSE  POCT Blood Glucose.: 113 mg/dL (19 Apr 2022 08:22)  POCT Blood Glucose.: 49 mg/dL (19 Apr 2022 07:48)      RADIOLOGY & ADDITIONAL TESTS:  Imaging Personally Reviewed:  [x] YES  [ ] NO    HEALTH ISSUES - PROBLEM Dx:  Pancreatic cancer    Pleural effusion    Ascites    Counseling regarding goals of care    Palliative care by specialist    Advanced care planning/counseling discussion        MEDS:  cefTRIAXone   IVPB      diltiazem    milliGRAM(s) Oral daily  furosemide   Injectable 40 milliGRAM(s) IV Push daily  magnesium oxide 400 milliGRAM(s) Oral two times a day with meals  mirtazapine 15 milliGRAM(s) Oral at bedtime  pancrelipase  (CREON 12,000 Lipase Units) 2 Capsule(s) Oral three times a day with meals  pantoprazole    Tablet 40 milliGRAM(s) Oral before breakfast  verapamil 40 milliGRAM(s) Oral two times a day PRN

## 2022-04-19 NOTE — PROGRESS NOTE ADULT - SUBJECTIVE AND OBJECTIVE BOX
----------Daily Progress Note----------    HISTORY OF PRESENT ILLNESS:  Patient is a 76y old Female who presents with a chief complaint of Shortness of breath (18 Apr 2022 18:19)    Currently admitted to medicine with the primary diagnosis of Pleural effusion    75 YO F with PMHx of HTN, pancreatic CA s/p Whipple 8/2021, A.Fib on Eliquis, pleural effusion was BIBEMS due to worsening SOB that started earlier on the morning of presentation  As per EMS, patient was saturating in the 70s on room air and was placed on NRB w/ sats improving to high 80s. Patient reports that she lives with her son and started noticing shortness of breath.   Patient had recent admission from [facility] and had thoracentesis done  Patient denies fevers, chills, headache, chest pain, palpitations, constipation, melena, hematochezia, dysuria, urinary frequency or urgency, numbness, tingling, recent travel or any known sick contact.   In the ED patient was placed on non rebreather with SpO2 of 94%. .  X-ray chest showed Interval increase in bilateral effusions and opacities.     Today is hospital day 1d.     INTERVAL HOSPITAL COURSE / OVERNIGHT EVENTS:    Patient was examined and seen at bedside. This morning she is resting comfortably in bed and reports no new issues or overnight events.     Review of Systems: Patient is very lethargic and minimal ROS obtained after multiple attempts. Patient is feeling weak    <<<<<PAST MEDICAL & SURGICAL HISTORY>>>>>  SOB (shortness of breath)    Pain  knee    Varicose veins of both lower extremities, unspecified whether complicated    Atrial fibrillation, unspecified type  2019 on Eliquis    Jaundice  March 5, 2021    Malignant neoplasm of pancreas, unspecified location of malignancy  Pancreatic Cancer    Hypertension, unspecified type  2019    Pancreatic cancer    Kidney stones    History of surgery  Whipple procedure 8/2/2021 with Dr. Nino at [facility]    Status post phlebectomy  10/2018    History of ovarian cystectomy  left    History of tonsillectomy  1959    Kidney stone  2017      ALLERGIES  Augmentin (Rash)  chocolate (Headache)  digoxin (Hives)  Metoprolol Succinate ER (Hives)  Novocain (Angioedema)  Steroids: Excessive swelling (Flushing; Other (Mild to Mod))  sulfa drugs (Fever)  Xarelto (Hives)      Home Medications:  Cardizem  mg/24 hours oral capsule, extended release: 1 cap(s) orally once a day (22 Nov 2021 23:59)  Creon 12,000 units oral delayed release capsule: 2 cap(s) orally 3 times a day (with meals) (03 Dec 2021 10:54)  Eliquis 5 mg oral tablet: 1 tab(s) orally 2 times a day (03 Sep 2021 23:41)        MEDICATIONS  STANDING MEDICATIONS  dextrose 50% Injectable 25 Gram(s) IV Push once  diltiazem    milliGRAM(s) Oral daily  furosemide    Tablet 20 milliGRAM(s) Oral daily  magnesium oxide 400 milliGRAM(s) Oral two times a day with meals  mirtazapine 15 milliGRAM(s) Oral at bedtime  pancrelipase  (CREON 12,000 Lipase Units) 2 Capsule(s) Oral three times a day with meals  pantoprazole    Tablet 40 milliGRAM(s) Oral before breakfast    PRN MEDICATIONS  verapamil 40 milliGRAM(s) Oral two times a day PRN    VITALS:  T(F): 96.3  HR: 111  BP: 102/68  RR: 17  SpO2: 100%    <<<<<LABS>>>>>                        11.8   11.24 )-----------( 229      ( 19 Apr 2022 05:43 )             38.4     04-19    145  |  107  |  32<H>  ----------------------------<  71  5.8<H>   |  25  |  0.7    Ca    8.8      19 Apr 2022 05:43  Phos  3.7     04-19  Mg     2.1     04-19    TPro  5.6<L>  /  Alb  2.9<L>  /  TBili  0.7  /  DBili  x   /  AST  64<H>  /  ALT  47<H>  /  AlkPhos  550<H>  04-19    PT/INR - ( 18 Apr 2022 20:00 )   PT: 28.30 sec;   INR: 2.48 ratio         PTT - ( 18 Apr 2022 20:00 )  PTT:39.5 sec        [id number]        <<<<<RADIOLOGY>>>>>  < from: Xray Chest 1 View- PORTABLE-Urgent (04.18.22 @ 09:23) >  Impression:    Interval increase in bilateral effusions and opacities.        --- End of Report ---    < end of copied text >        <<<<<PHYSICAL EXAM>>>>>  GENERAL: Thin, elderly women,  in no acute distress. Resting comfortably in bed.  PULMONARY: B/L crackles   CARDIOVASCULAR: Regular rate and rhythm, S1-S2, no murmurs  GASTROINTESTINAL: Soft, +tender to palpation in the LLQ, non-distended, no guarding.  SKIN/EXTREMITIES: B/l LE cellulitis   NEUROLOGIC/MUSCULOSKELETAL: weak and lethargic       -----------------------------------------------------------------------------------------------------------------------------------------------------------------------------------------------

## 2022-04-19 NOTE — PATIENT PROFILE ADULT - FALL HARM RISK - RISK INTERVENTIONS

## 2022-04-19 NOTE — PROGRESS NOTE ADULT - PROBLEM SELECTOR PLAN 1
- not on recent DDT  - will discuss at [facility] meeting  - ongoing supportive care

## 2022-04-19 NOTE — PROGRESS NOTE ADULT - SUBJECTIVE AND OBJECTIVE BOX
VIKASH LI          [id number]              HPI:  77 YO F with PMHx of HTN, pancreatic CA s/p Whipple 8/2021, A.Fib on Eliquis, pleural effusion was BIBEMS due to worsening SOB that started earlier on the morning of presentation  As per EMS, patient was saturating in the 70s on room air and was placed on NRB w/ sats improving to high 80s. Patient reports that she lives with her son and started noticing shortness of breath.   Patient had recent admission from [facility] and had thoracentesis done  Patient denies fevers, chills, headache, chest pain, palpitations, constipation, melena, hematochezia, dysuria, urinary frequency or urgency, numbness, tingling, recent travel or any known sick contact.   In the ED patient was placed on non rebreather with SpO2 of 94%. .  X-ray chest showed Interval increase in bilateral effusions and opacities. (18 Apr 2022 11:47)    ROS:	    Unable to attain due to:                      Dyspnea (Salima 0-10): 0                       N/V (Y/N): No                             Secretions (Y/N) : No                                          Agitation(Y/N): No                              Pain (Y/N): No                                 -Provocation/Palliation: N/A  -Quality/Quantity: N/A  -Radiating: N/A  -Severity: No pain  -Timing/Frequency: N/A  -Impact on ADLs: N/A    General:  none   HEENT:  none  Neck:   none  CVS:   none  Resp:   none  GI: none     :   none  Musc:   none  Neuro:   none  Psych:  none  Skin:   none  Lymph:   none    Last BM:?      Allergies    Augmentin (Rash)  chocolate (Headache)  digoxin (Hives)  Metoprolol Succinate ER (Hives)  Novocain (Angioedema)  Steroids: Excessive swelling (Flushing; Other (Mild to Mod))  sulfa drugs (Fever)  Xarelto (Hives)    Intolerances      Opiate Naive (Y/N): Y  -iStop reviewed (Y/N): Y   Ref#:     This report was requested by: Yuki Nolan | Reference #: [id number]             Labs:	  reviewed                        11.8   11.24 )-----------( 229      ( 19 Apr 2022 05:43 )             38.4     04-19    145  |  107  |  32<H>  ----------------------------<  71  5.8<H>   |  25  |  0.7    Ca    8.8      19 Apr 2022 05:43  Phos  3.7     04-19  Mg     2.1     04-19    Radiology:	     < from: Xray Chest 1 View- PORTABLE-Urgent (04.18.22 @ 09:23) >    ACC: [id number] EXAM:  XR CHEST PORTABLE URGENT 1V                          PROCEDURE DATE:  04/18/2022          INTERPRETATION:  Clinical History / Reason for exam: Shortness of breath    Comparison : Chest radiograph April 6, 2022.    Technique/Positioning: Single AP view of the chest.    Findings:    Support devices: Right chest wall Mediport, unchanged.    Cardiac/mediastinum/hilum: Cardiomegaly, unchanged.    Lung parenchyma/Pleura: Interval increase in bilateral effusions and   opacities. No pneumothorax.    Skeleton/soft tissues: Unchanged.    Impression:    Interval increase in bilateral effusions and opacities.        --- End of Report ---            ELLIOT LANDAU MD; Attending Radiologist  This document has been electronically signed. Apr 18 2022 10:26AM    < end of copied text >        EKG:	  < from: 12 Lead ECG (04.05.22 @ 17:56) >    Ventricular Rate 91 BPM    Atrial Rate 37 BPM    QRS Duration 82 ms    Q-T Interval 360 ms    QTC Calculation(Bazett) 442 ms    R Axis 117 degrees    T Axis -62 degrees    Diagnosis Line Atrial fibrillation  Left posterior fascicular block  T wave abnormality, consider lateral ischemia  Abnormal ECG    Confirmed by AMADEO CUNHA, [facility] ([id number]) on 4/6/2022 10:08:26 AM    < end of copied text >      Imaging Personally Reviewed:  [ ] xYES  [ ] NO    Consultant(s) Notes Reviewed:  [x ] YES  [ ] NO  Care Discussed with Consultants/Other Providers [x ] YES  [ ] NO    PEx:	  Vital Signs Last 24 Hrs  T(C): 35.4 (19 Apr 2022 12:00), Max: 35.4 (19 Apr 2022 12:00)  T(F): 95.7 (19 Apr 2022 12:00), Max: 95.7 (19 Apr 2022 12:00)  HR: 129 (19 Apr 2022 12:00) (95 - 137)  BP: 158/83 (19 Apr 2022 12:00) (102/68 - 158/83)  BP(mean): 109 (19 Apr 2022 12:00) (109 - 110)  RR: 18 (19 Apr 2022 14:03) (16 - 20)  SpO2: 98% (19 Apr 2022 14:03) (97% - 100%)    General:  NAD  Eyes:  clear conjunctiva  ENMT: grossly normal   Resp: Unlabored Non tachypneic + BiPAP  GI:  nondistended  Neuro: grossly WNL  Psych: Calm   Skin: nonjaundiced    Preadmit Karnofsky:  %           Current Karnofsky:  30   %  http://www.npcrc.org/files/news/karnofsky_performance_scale.pdf   http://[email or web address].org/files/news/palliative_performance_scale_PPSv2.pdf  Cachexia (Y/N): Y  BMI: BMI (kg/m2): 12.2 (04-18-22 @ 08:41)      Medications:	      MEDICATIONS  (STANDING):  cefTRIAXone   IVPB      diltiazem    milliGRAM(s) Oral daily  furosemide   Injectable 40 milliGRAM(s) IV Push daily  magnesium oxide 400 milliGRAM(s) Oral two times a day with meals  mirtazapine 15 milliGRAM(s) Oral at bedtime  pancrelipase  (CREON 12,000 Lipase Units) 2 Capsule(s) Oral three times a day with meals  pantoprazole    Tablet 40 milliGRAM(s) Oral before breakfast    MEDICATIONS  (PRN):  verapamil 40 milliGRAM(s) Oral two times a day PRN tachycardia faster tahn 110 beats/ minute    Advanced Directives:	     Full Code      Decision maker: The patient is unable to fully participate in complex medical decision making conversations.   Legal surrogate:    GOALS OF CARE DISCUSSION	       Palliative care info/counseling provided	              Documentation of GOC/Advanced Care Planning:      PSYCHOSOCIAL-SPIRITUAL ASSESSMENT:            See Palliative Care SW/ documentation        	    REFERRALS       Palliative Med        Unit SW/Case Mgmt

## 2022-04-19 NOTE — CHART NOTE - NSCHARTNOTEFT_GEN_A_CORE
PALLIATIVE MEDICINE INTERDISCIPLINARY TEAM NOTE    Provider:  [  x ]Social Work   [   ]          [ x  ] Initial visit [   ] Follow up    Family or contact name / phone #   Met with: [ x  ] Patient:  patient was observed to be resting comfortably  [ x  ] Family  [   ] Other:    Primary Language: [x   ] English [   ] Other*:                      *Interpretation provided by:    SUPPORT DIAGNOSES            (Check all that apply)  [   ] Psychosocial spiritual assessment (PSSA)  [   ] EOL issues  [   ] Cultural / spiritual concerns  [ x  ] Pain / suffering  [   ] Dementia / AMS  [   ] Other:  [  x ] AD issues  [ x  ] Grief / loss / sadness  [   ] Discharge issues  [x   ] Distress / coping    PSYCHOSOCIAL ASSESSMENT OF PATIENT         (Check all that apply)  [  x ] Initial Assessment            [   ] Reassessment          [   ] Not Applicable this visit    Pain/suffering acuity:  patient appeared to be resting comfortably  [   ] None to mild (0-3)           [   ] Moderate (4-6)        [   ] High (7-10)    Mental Status:  unable to assess  [   ] Alert/oriented (x3)          [   ] Confused/Altered(x2/x1)         [   ] Non-resp    Functional status:  [   ] Independent w ADLs      [   ] Needs Assistance             [ x  ] Bedbound/Full Care    Coping:  unable to assess  [   ] Coping well                     [   ] Coping w/difficulty            [   ] Poor coping    Support system:  [ x  ] Strong                              [   ] Adequate                        [   ] Inadequate      Past history and medications for:   none, as per daughter    [ ] Anxiety       [ ] Depression    [ ] Sleep disorders     SPIRITUAL ASSESSMENT  Rastafarian/Spiritual practice: ___________________________    Role of organized Mosque:  [   ] Important                     [   ] Some (fam tradition, cultural)               [   ] None    Effects on medical care:  [   ] Yes, _____________________________________                         [   ] None    Cultural/Zoroastrian need:  [   ] Yes, _____________________________________                         [   ] None    Refer to Pastoral Care:  [   ] Yes           [   ] No, not at this time    SERVICE PROVIDED  [   ]PSSA                                                                             [   ]Discharge support / facilitation  [  x ]AD / goals of care counseling                                  [   ]EOL / death / bereavement counseling  [  x ]Counseling / support                                                [   ] Family meeting  [   ]Prayer / sacrament / ritual                                      [   ] Referral   [   ]Other                                                                       NOTE and Plan of Care (PoC):    Patient is a 76 year old female.  Patient was admitted on 4/18/22, dx:  Pleural Effusion, SOB, Ascites. Patient has PMH of Pancreatic Cancer s/p Whipple in 8-2021, A-Rib on Eliquis.    Patient was observed to be resting comfortably.  T/C to daughter, Yuli Shoemakerdarshana:  provided information regarding the role of Palliative and AD.  Daughter expressed concerns regarding patient's prognosis.  Support rendered.

## 2022-04-19 NOTE — PROGRESS NOTE ADULT - ASSESSMENT
IMPRESSION:    Acute Hypoxemic Respiratory Failure on HFNC  Acute hypercapnic respiratory failure  AMS likely toxic metabolic  B/l effusion R>L  Acites   Pancreatic Cancer advanced  s/p whipple 8/21  Afib on Eliquis    PLAN:    CNS: no excessive sedation. Check ammonia level.     HEENT: Oral care    PULMONARY:  HOB @ 45 degrees.  Aspiration precautions . Thoracentesis after paracentesis . Possible pleurex cath. Alternate HFNC and NIV.    CARDIOVASCULAR: Keep negative balance. Rate control . Lasix 40 x 1. Hold AC (last dose of Eliquis Sunday evening)    GI: GI prophylaxis.  Feeding.  Bowel regimen . Paracentesis.      RENAL:  Follow up lytes.  Correct as needed    INFECTIOUS DISEASE: Follow up cultures. Procal. Start Rocephin 1 g q24 hrs.    HEMATOLOGICAL:  DVT prophylaxis. Check coags.    ENDOCRINE:  Follow up FS.  Insulin protocol if needed.    MUSCULOSKELETAL: bed rest    SDU  Poor prognosis  Palliative eval appreciated         IMPRESSION:    Acute Hypoxemic Respiratory Failure on HFNC  Acute hypercapnic respiratory failure  AMS likely toxic metabolic improved   B/l effusion R>L,  SP right thoracentesis transudative effusion   Ascites   Pancreatic Cancer advanced  s/p whipple 8/21  Afib on Eliquis last dose Sunday     PLAN:    CNS: no excessive sedation. Check ammonia level.     HEENT: Oral care    PULMONARY:  HOB @ 45 degrees.  Aspiration precautions.  Right Thoracentesis.  Paracentesis .     CARDIOVASCULAR: Keep negative balance. Rate control.      GI: GI prophylaxis.  Feeding.  Bowel regimen . Paracentesis.      RENAL:  Follow up lytes.  Correct as needed    INFECTIOUS DISEASE: Follow up cultures. Procal.    HEMATOLOGICAL:  DVT prophylaxis. Check coags.    ENDOCRINE:  Follow up FS.  Insulin protocol if needed.    MUSCULOSKELETAL: bed rest    SDU  Poor prognosis  Palliative eval appreciated

## 2022-04-19 NOTE — PROGRESS NOTE ADULT - SUBJECTIVE AND OBJECTIVE BOX
Patient is a 76y old  Female who presents with a chief complaint of Shortness of breath (19 Apr 2022 07:56)        Over Night Events:    Placed on HFNC  40 L 80% FIO2. overnight due to hypoxia and cyanosis.    ROS:  See HPI    PHYSICAL EXAM    ICU Vital Signs Last 24 Hrs  T(C): 35.3 (19 Apr 2022 08:27), Max: 35.3 (19 Apr 2022 08:27)  T(F): 95.5 (19 Apr 2022 08:27), Max: 95.5 (19 Apr 2022 08:27)  HR: 113 (19 Apr 2022 08:27) (95 - 137)  BP: 134/100 (19 Apr 2022 08:27) (102/68 - 140/87)  BP(mean): 110 (19 Apr 2022 08:27) (104 - 110)  ABP: --  ABP(mean): --  RR: 18 (19 Apr 2022 08:27) (17 - 24)  SpO2: 100% (19 Apr 2022 04:43) (70% - 100%)          CONSTITUTIONAL:  Ill appearing.    ENT:   Airway patent,   No thrush    EYES:   Clear bilaterally,   pupils equal,   round and reactive to light.    CARDIAC:   Normal rate,   regular rhythm.    no edema      CAROTID:   normal systolic impulse  no bruits    RESPIRATORY:  Decreased bilateral breath sounds   No wheezing  Normal chest expansion  Not tachypneic,  No use of accessory muscles    GASTROINTESTINAL:  Distended with fluid wave.  Abdomen soft,   non-tender,   no guarding,   + BS    MUSCULOSKELETAL:   range of motion is not limited,  no clubbing, cyanosis    NEUROLOGICAL:   Alert and oriented   no motor deficits.      LABS:                            11.8   11.24 )-----------( 229      ( 19 Apr 2022 05:43 )             38.4                                               04-19    145  |  107  |  32<H>  ----------------------------<  71  5.8<H>   |  25  |  0.7    Ca    8.8      19 Apr 2022 05:43  Phos  3.7     04-19  Mg     2.1     04-19    TPro  5.6<L>  /  Alb  2.9<L>  /  TBili  0.7  /  DBili  x   /  AST  64<H>  /  ALT  47<H>  /  AlkPhos  550<H>  04-19      PT/INR - ( 18 Apr 2022 20:00 )   PT: 28.30 sec;   INR: 2.48 ratio         PTT - ( 18 Apr 2022 20:00 )  PTT:39.5 sec                                                                                     LIVER FUNCTIONS - ( 19 Apr 2022 05:43 )  Alb: 2.9 g/dL / Pro: 5.6 g/dL / ALK PHOS: 550 U/L / ALT: 47 U/L / AST: 64 U/L / GGT: x                    D-Dimer Assay, Quantitative: 1104 ng/mL DDU (04-08-22 @ 05:38)                    POCT Blood Glucose.: 113 mg/dL (04-19-22 @ 08:22)  POCT Blood Glucose.: 49 mg/dL (04-19-22 @ 07:48)                                                                                                           MEDICATIONS  (STANDING):  diltiazem    milliGRAM(s) Oral daily  furosemide    Tablet 20 milliGRAM(s) Oral daily  magnesium oxide 400 milliGRAM(s) Oral two times a day with meals  mirtazapine 15 milliGRAM(s) Oral at bedtime  pancrelipase  (CREON 12,000 Lipase Units) 2 Capsule(s) Oral three times a day with meals  pantoprazole    Tablet 40 milliGRAM(s) Oral before breakfast  sodium zirconium cyclosilicate 5 Gram(s) Oral once    MEDICATIONS  (PRN):  verapamil 40 milliGRAM(s) Oral two times a day PRN tachycardia faster tahn 110 beats/ minute      Xrays:                                                                                     ECHO     Patient is a 76y old  Female who presents with a chief complaint of Shortness of breath (19 Apr 2022 07:56)        Over Night Events:    Placed on HFNC  40 L 80% FIO2. overnight due to hypoxia and cyanosis.    ROS:  See HPI    PHYSICAL EXAM    ICU Vital Signs Last 24 Hrs  T(C): 35.3 (19 Apr 2022 08:27), Max: 35.3 (19 Apr 2022 08:27)  T(F): 95.5 (19 Apr 2022 08:27), Max: 95.5 (19 Apr 2022 08:27)  HR: 113 (19 Apr 2022 08:27) (95 - 137)  BP: 134/100 (19 Apr 2022 08:27) (102/68 - 140/87)  BP(mean): 110 (19 Apr 2022 08:27) (104 - 110)  ABP: --  ABP(mean): --  RR: 18 (19 Apr 2022 08:27) (17 - 24)  SpO2: 100% (19 Apr 2022 04:43) (70% - 100%)          CONSTITUTIONAL:  Ill appearing.    ENT:   Airway patent,   No thrush    EYES:   Clear bilaterally,   pupils equal,   round and reactive to light.    CARDIAC:   tachycardic  irregular rhythm.    no edema      CAROTID:   normal systolic impulse  no bruits    RESPIRATORY:  Decreased bilateral breath sounds   No wheezing  Normal chest expansion  Not tachypneic,  No use of accessory muscles    GASTROINTESTINAL:  Distended with fluid wave.  Abdomen soft,   non-tender,   no guarding,   + BS    MUSCULOSKELETAL:   range of motion is not limited,  no clubbing, cyanosis    NEUROLOGICAL:   Alert and oriented   no motor deficits.      LABS:                            11.8   11.24 )-----------( 229      ( 19 Apr 2022 05:43 )             38.4                                               04-19    145  |  107  |  32<H>  ----------------------------<  71  5.8<H>   |  25  |  0.7    Ca    8.8      19 Apr 2022 05:43  Phos  3.7     04-19  Mg     2.1     04-19    TPro  5.6<L>  /  Alb  2.9<L>  /  TBili  0.7  /  DBili  x   /  AST  64<H>  /  ALT  47<H>  /  AlkPhos  550<H>  04-19      PT/INR - ( 18 Apr 2022 20:00 )   PT: 28.30 sec;   INR: 2.48 ratio         PTT - ( 18 Apr 2022 20:00 )  PTT:39.5 sec                                                                                     LIVER FUNCTIONS - ( 19 Apr 2022 05:43 )  Alb: 2.9 g/dL / Pro: 5.6 g/dL / ALK PHOS: 550 U/L / ALT: 47 U/L / AST: 64 U/L / GGT: x                    D-Dimer Assay, Quantitative: 1104 ng/mL DDU (04-08-22 @ 05:38)                    POCT Blood Glucose.: 113 mg/dL (04-19-22 @ 08:22)  POCT Blood Glucose.: 49 mg/dL (04-19-22 @ 07:48)                                                                                                           MEDICATIONS  (STANDING):  diltiazem    milliGRAM(s) Oral daily  furosemide    Tablet 20 milliGRAM(s) Oral daily  magnesium oxide 400 milliGRAM(s) Oral two times a day with meals  mirtazapine 15 milliGRAM(s) Oral at bedtime  pancrelipase  (CREON 12,000 Lipase Units) 2 Capsule(s) Oral three times a day with meals  pantoprazole    Tablet 40 milliGRAM(s) Oral before breakfast  sodium zirconium cyclosilicate 5 Gram(s) Oral once    MEDICATIONS  (PRN):  verapamil 40 milliGRAM(s) Oral two times a day PRN tachycardia faster tahn 110 beats/ minute      Xrays:                                                                                     ECHO     Patient is a 76y old  Female who presents with a chief complaint of Shortness of breath (19 Apr 2022 07:56)        Over Night Events:    Placed on HFNC  40 L 80% FIO2. overnight due to hypoxia and cyanosis.    ROS:  See HPI    PHYSICAL EXAM    ICU Vital Signs Last 24 Hrs  T(C): 35.3 (19 Apr 2022 08:27), Max: 35.3 (19 Apr 2022 08:27)  T(F): 95.5 (19 Apr 2022 08:27), Max: 95.5 (19 Apr 2022 08:27)  HR: 113 (19 Apr 2022 08:27) (95 - 137)  BP: 134/100 (19 Apr 2022 08:27) (102/68 - 140/87)  BP(mean): 110 (19 Apr 2022 08:27) (104 - 110)  ABP: --  ABP(mean): --  RR: 18 (19 Apr 2022 08:27) (17 - 24)  SpO2: 100% (19 Apr 2022 04:43) (70% - 100%)          CONSTITUTIONAL:  Ill appearing.    ENT:   Airway patent,   No thrush    EYES:   Clear bilaterally,   pupils equal,   round and reactive to light.    CARDIAC:   tachycardic  irregular rhythm.    no edema      CAROTID:   normal systolic impulse  no bruits    RESPIRATORY:  Decreased bilateral breath sounds   No wheezing  Normal chest expansion  tachypneic,  use of accessory muscles    GASTROINTESTINAL:  Distended with fluid wave.  Abdomen soft,   non-tender,   no guarding,   + BS    MUSCULOSKELETAL:   range of motion is not limited,  no clubbing, cyanosis    NEUROLOGICAL:   Alert and oriented   no motor deficits.      LABS:                            11.8   11.24 )-----------( 229      ( 19 Apr 2022 05:43 )             38.4                                               04-19    145  |  107  |  32<H>  ----------------------------<  71  5.8<H>   |  25  |  0.7    Ca    8.8      19 Apr 2022 05:43  Phos  3.7     04-19  Mg     2.1     04-19    TPro  5.6<L>  /  Alb  2.9<L>  /  TBili  0.7  /  DBili  x   /  AST  64<H>  /  ALT  47<H>  /  AlkPhos  550<H>  04-19      PT/INR - ( 18 Apr 2022 20:00 )   PT: 28.30 sec;   INR: 2.48 ratio         PTT - ( 18 Apr 2022 20:00 )  PTT:39.5 sec                                                                                     LIVER FUNCTIONS - ( 19 Apr 2022 05:43 )  Alb: 2.9 g/dL / Pro: 5.6 g/dL / ALK PHOS: 550 U/L / ALT: 47 U/L / AST: 64 U/L / GGT: x                    D-Dimer Assay, Quantitative: 1104 ng/mL DDU (04-08-22 @ 05:38)                    POCT Blood Glucose.: 113 mg/dL (04-19-22 @ 08:22)  POCT Blood Glucose.: 49 mg/dL (04-19-22 @ 07:48)                                                                                                           MEDICATIONS  (STANDING):  diltiazem    milliGRAM(s) Oral daily  furosemide    Tablet 20 milliGRAM(s) Oral daily  magnesium oxide 400 milliGRAM(s) Oral two times a day with meals  mirtazapine 15 milliGRAM(s) Oral at bedtime  pancrelipase  (CREON 12,000 Lipase Units) 2 Capsule(s) Oral three times a day with meals  pantoprazole    Tablet 40 milliGRAM(s) Oral before breakfast  sodium zirconium cyclosilicate 5 Gram(s) Oral once    MEDICATIONS  (PRN):  verapamil 40 milliGRAM(s) Oral two times a day PRN tachycardia faster tahn 110 beats/ minute      Xrays:                                                                                     ECHO

## 2022-04-19 NOTE — PROGRESS NOTE ADULT - ASSESSMENT
77 YO F with PMHx of HTN, pancreatic CA s/p Whipple 8/2021, A.Fib on Eliquis, pleural effusion was BIBEMS from [facility] due to worsening SOB; Patient had recent admission from [facility] and had thoracentesis done admitted and being treated with HFNC alternating non-rebreather      being evaluated for support with GOC       MEDD (morphine equivalent daily dose): na      See Recs below. d/w Primary and Palliative teams       Please call [phone] with questions or concerns 24/7.   We will continue to follow.

## 2022-04-19 NOTE — PROGRESS NOTE ADULT - ASSESSMENT
75 YO F with PMHx of HTN, pancreatic CA s/p Whipple 8/2021, A.Fib on Eliquis, pleural effusion was BIBEMS due to worsening SOB that started earlier this morning    #Acute Hypoxemic Respiratory Failure  #B/l pleural effusions s/p thoracentesis today   #Ascites   #Pancreatic Ca s/p whipple  #Transaminitis  - continue highflow alternating with bipap  - follow up cxr post thora - drained 750ml   - no pockets of fluid for paracentesis  - continue lasix 40mg IV q12  - pulm following  - Palliative planned a family meeting for 4/20 at 11am  - c/w Creon 12K q8 with meals   - surgery following  - monitor LFT's    #Chronic A.Fib on Eliquis  - rate control with diltiazem   - Hold Eliquis for now; start lovenox and hold for planned procedures     GOC - full code  palliative planning on meeting on 4/20  overall poor prognosis    Progress Note Handoff  Pending Consults: pulm follow up  Pending Tests: labs  Pending Results: labs  Family Discussion: discussed medication, labs, cxr and overall plan of care with medical staff.  Patient's family will be updated by house staff.    Disposition: Home_____/SNF______/Other_____/Unknown at this time_x____  Spent over 35 min reviewing chart and on coordinating patient care during interdisciplinary rounds     Please call me with any questions at extension [phone]

## 2022-04-19 NOTE — PROGRESS NOTE ADULT - PROBLEM SELECTOR PLAN 5
- will follow    ______________  Bipin Madrigal MD  Palliative Medicine  [facility]   [facility] Ger[facility] Pall[facility]  [phone]

## 2022-04-19 NOTE — PROGRESS NOTE ADULT - ATTENDING COMMENTS
Attending Statement: I have personally performed a face to face diagnostic evaluation on this patient. The patient is suffering from:  Acute Hypoxemic Respiratory Failure on NC  B/l effusion R>L  Acities   Pancreatic Cancer advanced   I have reviewed the above note and agree with the history, exam and suggestions for care, except as I have noted in the text. I have amended the treatment plans as necessary. IMPRESSION:    Acute Hypoxemic Respiratory Failure on HFNC  Acute hypercapnic respiratory failure  AMS likely toxic metabolic improved   B/l effusion R>L,  SP right thoracentesis transudative effusion   Ascites   Pancreatic Cancer advanced  s/p whipple 8/21  Afib on Eliquis last dose Sunday     Plan as outlined above

## 2022-04-19 NOTE — PATIENT PROFILE ADULT - FUNCTIONAL ASSESSMENT - BASIC MOBILITY ASSESSMENT TYPE
1. Continue Lexapro 20mg daily.  2. Continue propranolol 20mg three times daily as needed.  3. Begin Abilify 2mg daily in the morning.  4. Consider therapy.  5. Follow up in 4 weeks.  6. To ER or 911 if unsafe or suicidal.     
Admission

## 2022-04-20 NOTE — PROGRESS NOTE ADULT - ASSESSMENT
IMPRESSION:    Acute Hypoxemic Respiratory Failure on HFNC  Acute hypercapnic respiratory failure  AMS likely toxic metabolic improved   B/l effusion R>L,  SP right thoracentesis transudative effusion   Ascites   Pancreatic Cancer advanced  s/p whipple 8/21  Afib on Eliquis last dose Sunday   UTI    PLAN:    CNS: no excessive sedation. ammonia level noted.     HEENT: Oral care    PULMONARY:  HOB @ 45 degrees.  Aspiration precautions.  S/p Right Thoracentesis. f/u fluid analysis    CARDIOVASCULAR: Keep negative balance. Rate control.      GI: GI prophylaxis.  Feeding.  Bowel regimen .    RENAL:  Follow up lytes.  Correct as needed    INFECTIOUS DISEASE: Follow up cultures. Rocephin    HEMATOLOGICAL:  DVT prophylaxis. Check coags.    ENDOCRINE:  Follow up FS.  Insulin protocol if needed.    MUSCULOSKELETAL: bed rest    SDU  Poor prognosis  Palliative eval appreciated         IMPRESSION:    Acute Hypoxemic Respiratory Failure on HFNC  Acute hypercapnic respiratory failure  AMS likely toxic metabolic improved   B/l effusion R>L,  SP right thoracentesis transudative effusion   Ascites   Pancreatic Cancer advanced  s/p whipple 8/21  Afib on Eliquis last dose Sunday   Possible UTI    PLAN:    CNS: no excessive sedation. ammonia level noted.     HEENT: Oral care    PULMONARY:  HOB @ 45 degrees.  Aspiration precautions.  Wean O2,  Repeat ABG     CARDIOVASCULAR: Keep negative balance. Rate control.      GI: GI prophylaxis.  Feeding.  Bowel regimen .    RENAL:  Follow up lytes.  Correct as needed    INFECTIOUS DISEASE: Follow up cultures. Rocephin,  UA     HEMATOLOGICAL:  DVT prophylaxis. Check coags.    ENDOCRINE:  Follow up FS.  Insulin protocol if needed.    MUSCULOSKELETAL: bed rest    SDU  Poor prognosis  Palliative eval appreciated

## 2022-04-20 NOTE — PROGRESS NOTE ADULT - PROBLEM SELECTOR PLAN 4
See above for details.  In sum, introduced; family does not feel at hospice at this point and wanting to have a medical update; offered family meeting. Will need to clarify if HCP if other dgt - Yuli - is HCP See above for details.  In sum, introduced; family does not feel at hospice at this point and wanting to have a medical update; offered family meeting. Will need to clarify if HCP if other dgt - Yuli - is HCP    > 16 mins spent

## 2022-04-20 NOTE — PROGRESS NOTE ADULT - CONVERSATION DETAILS
Patient too lethargic to participate  Dgt able to teach back series of events; expressing questions about medical plan and how to optimize nutrition    Introduced Palliative care and three levels: full med management; limited medical management; CMO based on GOC  Amddie mentioned that patient had put her sister down as a contact - unclear if HCP.  We suggested a family meeting Patient too lethargic to participate  Dgt able to teach back series of events; expressing questions about medical plan and how to optimize nutrition    Introduced Palliative care and three levels: full med management; limited medical management; CMO based on GOC  Maddie mentioned that patient had put her sister down as a contact - unclear if HCP.  Mentioned ACP, including DNR, daughter aware, and open to our team talking to patient about this when she is more alert  We suggested a family meeting

## 2022-04-20 NOTE — PROGRESS NOTE ADULT - SUBJECTIVE AND OBJECTIVE BOX
----------Daily Progress Note----------    HISTORY OF PRESENT ILLNESS:  Patient is a 76y old Female who presents with a chief complaint of Shortness of breath (19 Apr 2022 17:19)    Currently admitted to medicine with the primary diagnosis of Pleural effusion    75 YO F with PMHx of HTN, pancreatic CA s/p Whipple 8/2021, A.Fib on Eliquis, pleural effusion was BIBEMS due to worsening SOB that started earlier on the morning of presentation  As per EMS, patient was saturating in the 70s on room air and was placed on NRB w/ sats improving to high 80s. Patient reports that she lives with her son and started noticing shortness of breath.   Patient had recent admission from [facility] and had thoracentesis done  Patient denies fevers, chills, headache, chest pain, palpitations, constipation, melena, hematochezia, dysuria, urinary frequency or urgency, numbness, tingling, recent travel or any known sick contact.   In the ED patient was placed on non rebreather with SpO2 of 94%. .  X-ray chest showed Interval increase in bilateral effusions and opacities.     Today is hospital day 2d.     INTERVAL HOSPITAL COURSE / OVERNIGHT EVENTS:    Patient was examined and seen at bedside. This morning she is resting comfortably in bed and reports no new issues or overnight events.     Review of Systems: Patient denies headache, dizziness. Patient denies chest pain, palpitation, SOB. Patient denies abdominal pain, n/v/d/c      <<<<<PAST MEDICAL & SURGICAL HISTORY>>>>>  SOB (shortness of breath)    Pain  knee    Varicose veins of both lower extremities, unspecified whether complicated    Atrial fibrillation, unspecified type  2019 on Eliquis    Jaundice  March 5, 2021    Malignant neoplasm of pancreas, unspecified location of malignancy  Pancreatic Cancer    Hypertension, unspecified type  2019    Pancreatic cancer    Kidney stones    History of surgery  Whipple procedure 8/2/2021 with Dr. Nino at [facility]    Status post phlebectomy  10/2018    History of ovarian cystectomy  left    History of tonsillectomy  1959    Kidney stone  2017      ALLERGIES  Augmentin (Rash)  chocolate (Headache)  digoxin (Hives)  Metoprolol Succinate ER (Hives)  Novocain (Angioedema)  Steroids: Excessive swelling (Flushing; Other (Mild to Mod))  sulfa drugs (Fever)  Xarelto (Hives)      Home Medications:  Cardizem  mg/24 hours oral capsule, extended release: 1 cap(s) orally once a day (22 Nov 2021 23:59)  Creon 12,000 units oral delayed release capsule: 2 cap(s) orally 3 times a day (with meals) (03 Dec 2021 10:54)  Eliquis 5 mg oral tablet: 1 tab(s) orally 2 times a day (03 Sep 2021 23:41)        MEDICATIONS  STANDING MEDICATIONS  cefTRIAXone   IVPB 1000 milliGRAM(s) IV Intermittent every 24 hours  cefTRIAXone   IVPB      diltiazem    milliGRAM(s) Oral daily  furosemide   Injectable 40 milliGRAM(s) IV Push daily  magnesium oxide 400 milliGRAM(s) Oral two times a day with meals  mirtazapine 15 milliGRAM(s) Oral at bedtime  pancrelipase  (CREON 12,000 Lipase Units) 2 Capsule(s) Oral three times a day with meals  pantoprazole    Tablet 40 milliGRAM(s) Oral before breakfast    PRN MEDICATIONS  verapamil 40 milliGRAM(s) Oral two times a day PRN    VITALS:  T(F): 97.4  HR: 100  BP: 131/73  RR: 18  SpO2: 98%    <<<<<LABS>>>>>                        12.5   8.06  )-----------( 174      ( 20 Apr 2022 01:20 )             41.4     04-20    146  |  109  |  33<H>  ----------------------------<  92  5.2<H>   |  22  |  0.8    Ca    8.5      20 Apr 2022 01:20  Phos  3.7     04-19  Mg     2.3     04-20    TPro  4.4<L>  /  Alb  2.0<L>  /  TBili  0.4  /  DBili  x   /  AST  72<H>  /  ALT  40  /  AlkPhos  486<H>  04-20    PT/INR - ( 19 Apr 2022 10:54 )   PT: 26.40 sec;   INR: 2.31 ratio         PTT - ( 19 Apr 2022 10:54 )  PTT:39.4 sec          Culture - Fungal, Body Fluid (collected 19 Apr 2022 14:19)  Source: .Body Fluid Pleural Fluid  Preliminary Report (20 Apr 2022 08:39):    Testing in progress    Culture - Body Fluid with Gram Stain (collected 19 Apr 2022 14:19)  Source: .Body Fluid Pleural Fluid  Gram Stain (20 Apr 2022 03:41):    polymorphonuclear leukocytes seen    No organisms seen    by cytocentrifuge    [id number]        <<<<<RADIOLOGY>>>>>    < from: Xray Chest 1 View- PORTABLE-Urgent (04.18.22 @ 09:23) >  Impression:    Interval increase in bilateral effusions and opacities.        --- End of Report ---    < end of copied text >    < from: Xray Chest 1 View- PORTABLE-Urgent (Xray Chest 1 View- PORTABLE-Urgent .) (04.19.22 @ 15:55) >  Impression:    Cardiomegaly, unchanged.    Improved right lung opacity and stable left lung opacity.    Right-sided Port-A-Cath.    --- End of Report ---    < end of copied text >        <<<<<PHYSICAL EXAM>>>>>  GENERAL: Thin, elderly women,  in no acute distress. Resting comfortably in bed.  PULMONARY: B/L crackles   CARDIOVASCULAR: Regular rate and rhythm, S1-S2, no murmurs  GASTROINTESTINAL: Soft, +tender to palpation in the LLQ, non-distended, no guarding.  SKIN/EXTREMITIES: B/l LE chronic skin changes   NEUROLOGIC/MUSCULOSKELETAL: weak but able to follow command     -----------------------------------------------------------------------------------------------------------------------------------------------------------------------------------------------

## 2022-04-20 NOTE — PROGRESS NOTE ADULT - PROBLEM SELECTOR PLAN 1
- not on recent DDT  - discussed at GOC meeting  - family needs medical update - input about prognostication from oncology/surgery needed   - ongoing supportive care - not on recent DDT  - discussed at GOC meeting, they are open to further DDT if offered  - family needs medical update - input about prognostication from oncology/surgery needed   - ongoing supportive care

## 2022-04-20 NOTE — PROGRESS NOTE ADULT - ASSESSMENT
76F PMH HTN, pancreatic Ca s/p whipple 8/2021, Afib on eliquis, pleural effusion presenting secondary to hypoxia due to recurrent pleural effusion and abdominal ascites    Plan  - s/p R thoracentesis  - Will need paracentesis  - request pigtail catheters to be placed in bilateral thoracic cavity and abdominal cavity  - BNP >2000, likely secondary to fluid overload (previous thoracentesis with no malignant cells therefore possibility of malignant effusions likely low)  - please send fluid for cytology   - will need CTAP after thoracentesis and paracentesis  - d/w Dr. Nino    Spectra: [phone]

## 2022-04-20 NOTE — PROGRESS NOTE ADULT - PROBLEM SELECTOR PLAN 5
- will follow  - full code    ______________  Bipin Madrigal MD  Palliative Medicine  [facility]   of Geriatric and Palliative Medicine  [phone] - will follow  - full code

## 2022-04-20 NOTE — PROGRESS NOTE ADULT - SUBJECTIVE AND OBJECTIVE BOX
VIKASH LI          MRN-[id number]              HPI:  77 YO F with PMHx of HTN, pancreatic CA s/p Whipple 8/2021, A.Fib on Eliquis, pleural effusion was BIBEMS due to worsening SOB that started earlier on the morning of presentation  As per EMS, patient was saturating in the 70s on room air and was placed on NRB w/ sats improving to high 80s. Patient reports that she lives with her son and started noticing shortness of breath.   Patient had recent admission from [facility] and had thoracentesis done  Patient denies fevers, chills, headache, chest pain, palpitations, constipation, melena, hematochezia, dysuria, urinary frequency or urgency, numbness, tingling, recent travel or any known sick contact.   In the ED patient was placed on non rebreather with SpO2 of 94%. .  X-ray chest showed Interval increase in bilateral effusions and opacities. (18 Apr 2022 11:47)    ROS:	    Unable to full attain due to:  patient is lethargic and not able to sustain interaction. Maddie Silva at bedside                    Dyspnea (Salima 0-10): 0                       N/V (Y/N): No                             Secretions (Y/N) : No                                          Agitation(Y/N): No                              Pain (Y/N): No                                 -Provocation/Palliation: N/A  -Quality/Quantity: N/A  -Radiating: N/A  -Severity: No pain  -Timing/Frequency: N/A  -Impact on ADLs: N/A    General:   no nonverbal indications  HEENT:   no nonverbal indications    Neck:   no nonverbal indications  CVS:   no nonverbal indications  Resp:   no nonverbal indications  GI:   no nonverbal indications  :   no nonverbal indications  Musc:   no nonverbal indications  Neuro:   no nonverbal indications  Psych:  no nonverbal indications  Skin:   no nonverbal indications  Lymph:   no nonverbal indications    Last BM: none documented      Allergies    Augmentin (Rash)  chocolate (Headache)  digoxin (Hives)  Metoprolol Succinate ER (Hives)  Novocain (Angioedema)  Steroids: Excessive swelling (Flushing; Other (Mild to Mod))  sulfa drugs (Fever)  Xarelto (Hives)    Intolerances      Opiate Naive (Y/N): Y  -iStop reviewed (Y/N): Y   Ref#:     This report was requested by: Yuki Nolan | Reference #: [id number]             Labs:	  reviewed                    LABS:                          12.5   8.06  )-----------( 174      ( 20 Apr 2022 01:20 )             41.4     04-20    146  |  109  |  33<H>  ----------------------------<  92  5.2<H>   |  22  |  0.8    Ca    8.5      20 Apr 2022 01:20  Phos  3.7     04-19  Mg     2.3     04-20    TPro  4.4<L>  /  Alb  2.0<L>  /  TBili  0.4  /  DBili  x   /  AST  72<H>  /  ALT  40  /  AlkPhos  486<H>  04-20    LIVER FUNCTIONS - ( 20 Apr 2022 01:20 )  Alb: 2.0 g/dL / Pro: 4.4 g/dL / ALK PHOS: 486 U/L / ALT: 40 U/L / AST: 72 U/L / GGT: x           PT/INR - ( 19 Apr 2022 10:54 )   PT: 26.40 sec;   INR: 2.31 ratio         PTT - ( 19 Apr 2022 10:54 )  PTT:39.4 sec        Radiology:	     < from: Xray Chest 1 View- PORTABLE-Urgent (Xray Chest 1 View- PORTABLE-Urgent .) (04.19.22 @ 15:55) >  ACC: [id number] EXAM:  XR CHEST PORTABLE URGENT 1V                          PROCEDURE DATE:  04/19/2022          INTERPRETATION:  Clinical History / Reason for exam: Follow-up.    Comparison : Chest radiograph April 18, 2022.    Technique/Positioning: Adequate.    Findings:    Support devices: Right-sided Port-A-Cath with its tip overlying the SVC.    Cardiac/mediastinum/hilum: Cardiomegaly, unchanged.    Lung parenchyma/Pleura: Improved right lung opacity and stable left lung   opacity. No pneumothorax is seen.    Skeleton/soft tissues: Stable.    Impression:    Cardiomegaly, unchanged.    Improved right lung opacity and stable left lung opacity.    Right-sided Port-A-Cath.    --- End of Report ---            GRUPO MALDONADO MD; Attending Radiologist  This document has been electronically signed. Apr 19 2022  5:04PM    < end of copied text >          EKG:	  < from: 12 Lead ECG (04.05.22 @ 17:56) >    Ventricular Rate 91 BPM    Atrial Rate 37 BPM    QRS Duration 82 ms    Q-T Interval 360 ms    QTC Calculation(Bazett) 442 ms    R Axis 117 degrees    T Axis -62 degrees    Diagnosis Line Atrial fibrillation  Left posterior fascicular block  T wave abnormality, consider lateral ischemia  Abnormal ECG    Confirmed by JOSEFA CHILDS MD ([id number]) on 4/6/2022 10:08:26 AM    < end of copied text >    < from: 12 Lead ECG (04.18.22 @ 10:08) >    Ventricular Rate 117 BPM    Atrial Rate 115 BPM    QRS Duration 82 ms    Q-T Interval 356 ms    QTC Calculation(Bazett) 496 ms    R Axis 79 degrees    T Axis 219 degrees    Diagnosis Line Atrial fibrillation with rapid ventricular response  Low voltage QRS  ST & T wave abnormality, consider lateral ischemia  Abnormal ECG    Confirmed by Torri Collins MD ([id number]) on 4/18/2022 4:40:35 PM    < end of copied text >      Imaging Personally Reviewed:  [ ] xYES  [ ] NO    Consultant(s) Notes Reviewed:  [x ] YES  [ ] NO  Care Discussed with Consultants/Other Providers [x ] YES  [ ] NO    PEx:	  T(C): 36.1 (04-20-22 @ 11:55), Max: 37.2 (04-20-22 @ 00:20)  T(F): 97 (04-20-22 @ 11:55), Max: 98.9 (04-20-22 @ 00:20)  HR: 103 (04-20-22 @ 11:55) (93 - 104)  BP: 151/98 (04-20-22 @ 11:55) (117/78 - 151/98)  RR: 18 (04-20-22 @ 11:55) (18 - 20)  SpO2: 98% (04-20-22 @ 08:05) (98% - 100%)  Wt(kg): --    General:  NAD  Eyes:  clear conjunctiva, PER  ENMT: no ulcers externally  CV: ; nail beds cyanotic   Resp: Unlabored Non tachypneic + HFNC  GI:  nondistended  Neuro: lethargic  Psych: Calm   Skin: nonjaundiced;     Preadmit Karnofsky:  %           Current Karnofsky:  30   %  http://www.npcrc.org/files/news/karnofsky_performance_scale.pdf   http://www.npcrc.org/files/news/palliative_performance_scale_PPSv2.pdf  Cachexia (Y/N): Y  BMI: BMI (kg/m2): 12.2 (04-18-22 @ 08:41)      Medications:	      MEDICATIONS  (STANDING):  cefTRIAXone   IVPB 1000 milliGRAM(s) IV Intermittent every 24 hours  cefTRIAXone   IVPB      diltiazem    milliGRAM(s) Oral daily  dronabinol 2.5 milliGRAM(s) Oral two times a day  enoxaparin Injectable 40 milliGRAM(s) SubCutaneous every 12 hours  furosemide   Injectable 40 milliGRAM(s) IV Push daily  magnesium oxide 400 milliGRAM(s) Oral two times a day with meals  mirtazapine 15 milliGRAM(s) Oral at bedtime  pancrelipase  (CREON 12,000 Lipase Units) 2 Capsule(s) Oral three times a day with meals  pantoprazole    Tablet 40 milliGRAM(s) Oral before breakfast    MEDICATIONS  (PRN):  verapamil 40 milliGRAM(s) Oral two times a day PRN tachycardia faster tahn 110 beats/ minute      Advanced Directives:	     Full Code      Decision maker: The patient is unable to fully participate in complex medical decision making conversations at this time due to lethargy - will continue to reassess  Legal surrogate:    GOALS OF CARE DISCUSSION	       Palliative care info/counseling provided	              Documentation of GOC/Advanced Care Planning:      PSYCHOSOCIAL-SPIRITUAL ASSESSMENT:            See Palliative Care SW/ documentation        	    REFERRALS       Palliative Med        Unit SW/Case Mgmt            VIKASH LI          MRN-[id number]              HPI:  75 YO F with PMHx of HTN, pancreatic CA s/p Whipple 8/2021, A.Fib on Eliquis, pleural effusion was BIBEMS due to worsening SOB that started earlier on the morning of presentation  As per EMS, patient was saturating in the 70s on room air and was placed on NRB w/ sats improving to high 80s. Patient reports that she lives with her son and started noticing shortness of breath.   Patient had recent admission from [facility] and had thoracentesis done  Patient denies fevers, chills, headache, chest pain, palpitations, constipation, melena, hematochezia, dysuria, urinary frequency or urgency, numbness, tingling, recent travel or any known sick contact.   In the ED patient was placed on non rebreather with SpO2 of 94%. .  X-ray chest showed Interval increase in bilateral effusions and opacities. (18 Apr 2022 11:47)    ROS:	    Unable to full attain due to:  patient is lethargic and not able to sustain interaction. Maddie Silva at bedside                    Dyspnea (Salima 0-10): 0                       N/V (Y/N): No                             Secretions (Y/N) : No                                          Agitation(Y/N): No                              Pain (Y/N): No                                 -Provocation/Palliation: N/A  -Quality/Quantity: N/A  -Radiating: N/A  -Severity: No pain  -Timing/Frequency: N/A  -Impact on ADLs: N/A    General:   no nonverbal indications  HEENT:   no nonverbal indications    Neck:   no nonverbal indications  CVS:   no nonverbal indications  Resp:   no nonverbal indications  GI:   no nonverbal indications  :   no nonverbal indications  Musc:   no nonverbal indications  Neuro:   no nonverbal indications  Psych:  no nonverbal indications  Skin:   no nonverbal indications  Lymph:   no nonverbal indications    Last BM: none documented      Allergies    Augmentin (Rash)  chocolate (Headache)  digoxin (Hives)  Metoprolol Succinate ER (Hives)  Novocain (Angioedema)  Steroids: Excessive swelling (Flushing; Other (Mild to Mod))  sulfa drugs (Fever)  Xarelto (Hives)    Intolerances      Opiate Naive (Y/N): Y  -iStop reviewed (Y/N): Y   Ref#:     This report was requested by: Yuki Nolan | Reference #: [id number]             Labs:	  reviewed                    LABS:                          12.5   8.06  )-----------( 174      ( 20 Apr 2022 01:20 )             41.4     04-20    146  |  109  |  33<H>  ----------------------------<  92  5.2<H>   |  22  |  0.8    Ca    8.5      20 Apr 2022 01:20  Phos  3.7     04-19  Mg     2.3     04-20    TPro  4.4<L>  /  Alb  2.0<L>  /  TBili  0.4  /  DBili  x   /  AST  72<H>  /  ALT  40  /  AlkPhos  486<H>  04-20    LIVER FUNCTIONS - ( 20 Apr 2022 01:20 )  Alb: 2.0 g/dL / Pro: 4.4 g/dL / ALK PHOS: 486 U/L / ALT: 40 U/L / AST: 72 U/L / GGT: x           PT/INR - ( 19 Apr 2022 10:54 )   PT: 26.40 sec;   INR: 2.31 ratio         PTT - ( 19 Apr 2022 10:54 )  PTT:39.4 sec        Radiology:	     < from: Xray Chest 1 View- PORTABLE-Urgent (Xray Chest 1 View- PORTABLE-Urgent .) (04.19.22 @ 15:55) >  ACC: [id number] EXAM:  XR CHEST PORTABLE URGENT 1V                          PROCEDURE DATE:  04/19/2022          INTERPRETATION:  Clinical History / Reason for exam: Follow-up.    Comparison : Chest radiograph April 18, 2022.    Technique/Positioning: Adequate.    Findings:    Support devices: Right-sided Port-A-Cath with its tip overlying the SVC.    Cardiac/mediastinum/hilum: Cardiomegaly, unchanged.    Lung parenchyma/Pleura: Improved right lung opacity and stable left lung   opacity. No pneumothorax is seen.    Skeleton/soft tissues: Stable.    Impression:    Cardiomegaly, unchanged.    Improved right lung opacity and stable left lung opacity.    Right-sided Port-A-Cath.    --- End of Report ---            GRUPO MALDONADO MD; Attending Radiologist  This document has been electronically signed. Apr 19 2022  5:04PM    < end of copied text >          EKG:	  < from: 12 Lead ECG (04.05.22 @ 17:56) >    Ventricular Rate 91 BPM    Atrial Rate 37 BPM    QRS Duration 82 ms    Q-T Interval 360 ms    QTC Calculation(Bazett) 442 ms    R Axis 117 degrees    T Axis -62 degrees    Diagnosis Line Atrial fibrillation  Left posterior fascicular block  T wave abnormality, consider lateral ischemia  Abnormal ECG    Confirmed by JOSEFA CHILDS MD ([id number]) on 4/6/2022 10:08:26 AM    < end of copied text >    < from: 12 Lead ECG (04.18.22 @ 10:08) >    Ventricular Rate 117 BPM    Atrial Rate 115 BPM    QRS Duration 82 ms    Q-T Interval 356 ms    QTC Calculation(Bazett) 496 ms    R Axis 79 degrees    T Axis 219 degrees    Diagnosis Line Atrial fibrillation with rapid ventricular response  Low voltage QRS  ST & T wave abnormality, consider lateral ischemia  Abnormal ECG    Confirmed by Torri Collins MD ([id number]) on 4/18/2022 4:40:35 PM    < end of copied text >      Imaging Personally Reviewed:  [ ] xYES  [ ] NO    Consultant(s) Notes Reviewed:  [x ] YES  [ ] NO  Care Discussed with Consultants/Other Providers [x ] YES  [ ] NO    PEx:	  T(C): 36.1 (04-20-22 @ 11:55), Max: 37.2 (04-20-22 @ 00:20)  T(F): 97 (04-20-22 @ 11:55), Max: 98.9 (04-20-22 @ 00:20)  HR: 103 (04-20-22 @ 11:55) (93 - 104)  BP: 151/98 (04-20-22 @ 11:55) (117/78 - 151/98)  RR: 18 (04-20-22 @ 11:55) (18 - 20)  SpO2: 98% (04-20-22 @ 08:05) (98% - 100%)  Wt(kg): --    General:  NAD  Eyes:  clear conjunctiva, PER  ENMT: no ulcers externally  CV: ; nail beds cyanotic   Resp: Unlabored Non tachypneic + HFNC  GI:  nondistended  Neuro: lethargic  Psych: Calm   Skin: nonjaundiced;     Preadmit Karnofsky:  %           Current Karnofsky:  30   %  http://www.npcrc.org/files/news/karnofsky_performance_scale.pdf   http://www.npcrc.org/files/news/palliative_performance_scale_PPSv2.pdf  Cachexia (Y/N): Y  BMI: BMI (kg/m2): 12.2 (04-18-22 @ 08:41)      Medications:	      MEDICATIONS  (STANDING):  cefTRIAXone   IVPB 1000 milliGRAM(s) IV Intermittent every 24 hours  cefTRIAXone   IVPB      diltiazem    milliGRAM(s) Oral daily  dronabinol 2.5 milliGRAM(s) Oral two times a day  enoxaparin Injectable 40 milliGRAM(s) SubCutaneous every 12 hours  furosemide   Injectable 40 milliGRAM(s) IV Push daily  magnesium oxide 400 milliGRAM(s) Oral two times a day with meals  mirtazapine 15 milliGRAM(s) Oral at bedtime  pancrelipase  (CREON 12,000 Lipase Units) 2 Capsule(s) Oral three times a day with meals  pantoprazole    Tablet 40 milliGRAM(s) Oral before breakfast    MEDICATIONS  (PRN):  verapamil 40 milliGRAM(s) Oral two times a day PRN tachycardia faster tahn 110 beats/ minute      Advanced Directives:	     Full Code      Decision maker: The patient is unable to fully participate in complex medical decision making conversations at this time due to lethargy - will continue to reassess  Legal surrogate: daughter    GOALS OF CARE DISCUSSION	       Palliative care info/counseling provided	              Documentation of GOC/Advanced Care Planning:      PSYCHOSOCIAL-SPIRITUAL ASSESSMENT:            See Palliative Care SW/ documentation        	    REFERRALS       Palliative Med        Unit SW/Case Mgmt

## 2022-04-20 NOTE — PROGRESS NOTE ADULT - ASSESSMENT
75 YO F with PMHx of HTN, pancreatic CA s/p Whipple 8/2021, A.Fib on Eliquis, pleural effusion was BIBEMS due to worsening SOB    Acute Hypoxemic Respiratory Failure likely due to volume overload  b/l Pleural effusions with ascites  Pancreatic Ca s/p Whipple 8/21  - remains on HFNC 40/50 alternating with BIPAP  - s/p THora 4/19 with 750 cc fluid removed, transudative  - f/u cx  - paracentesis could not be performed  - continue lasix 40mg IV q12, monitor electrolytes  - Palliative planned a family meeting for 4/20 at 11am  - c/w Creon 12K q8 with meals   - surgery following  - monitor LFT's    Chronic A.Fib on Eliquis  - rate control with diltiazem   - Hold Eliquis for now; lovenox and hold for planned procedures     Poor PO intake  ensure vanilla, started marinol today  discussed the possibility of NGT with family  Dietary eval requested    GOC - full code  overall poor prognosis    #Progress Note Handoff  Pending (specify):   clinical impprovement, tapering down supplemental 02, monitor PO intake, dietary eval   Family discussion: Plan of care discussed with patient, daughter and son in law at bedside, all questions answered, aware and agreeable   Disposition:  from [facility]     Amanda Garcia MD  s. [phone]

## 2022-04-20 NOTE — PROGRESS NOTE ADULT - ATTENDING COMMENTS
IMPRESSION:    Acute Hypoxemic Respiratory Failure on HFNC  Acute hypercapnic respiratory failure  AMS likely toxic metabolic improved   B/l effusion R>L,  SP right thoracentesis transudative effusion   Ascites   Pancreatic Cancer advanced  s/p whipple 8/21  Afib on Eliquis last dose Sunday   Possible UTI    Plan as outlined above

## 2022-04-20 NOTE — PROGRESS NOTE ADULT - ASSESSMENT
75 YO F with PMHx of HTN, pancreatic CA s/p Whipple 8/2021, A.Fib on Eliquis, pleural effusion was BIBEMS from [facility] due to worsening SOB; Patient had recent admission from [facility] and had thoracentesis done admitted and being treated with HFNC alternating non-rebreather      being followed for support with GOC       MEDD (morphine equivalent daily dose): na      See Recs below. d/w Primary and Palliative teams       Please call [phone] with questions or concerns 24/7.   We will continue to follow.

## 2022-04-20 NOTE — PROGRESS NOTE ADULT - ASSESSMENT
75 YO F with PMHx of HTN, pancreatic CA s/p Whipple 8/2021, A.Fib on Eliquis, pleural effusion was BIBEMS due to worsening SOB that started earlier this morning    IMPRESSION:  Acute Hypoxemic Respiratory Failure on HFNC  Acute hypercapnic respiratory failure  AMS likely toxic metabolic improved   B/l effusion R>L,  SP right thoracentesis transudative effusion   Ascites   Pancreatic Cancer advanced  s/p whipple 8/21  Afib on Eliquis last dose Sunday     #Acute Hypoxemic Respiratory Failure  #B/l pleural effusion; R>L  #Ascites   #Pancreatic Ca  - Xray Chest: Interval increase in bilateral effusions and opacities  - BNP 2151  - Echo: EF 54%, severely enlarged LA, RA. Mod-severe TR  - Currently on HFNC 40/80  - Surgery recs appreciated   - Surgery is requesting pigtail catheters to be placed in bilateral thoracic cavity and abdominal cavity  - CTAP after thoracentesis and paracentesis  - C/w Lasix 40mg IV QD   - Procalcitonin .08  - Blood cultures NGTD  - Ammonia level 33  - Palliative rec appreciated: family meeting 4/20 at 11am. Remains fulls code for now   - s/p Thoracentesis 4/19 drained 750ml   - f/u cytology and pleural fluid analysis   - Was unable to perform Paracentesis  - C/w ceftriaxone      #Transaminitis, likely from hepatic congestion  - TPro  5.6<L>  /  Alb  2.9<L>  /  TBili  0.7  /  DBili  x   /  AST  64<H>  /  ALT  47<H>  /  AlkPhos  550<H>  04-19  - Monitor LFTs     #Chronic A.Fib on Eliquis  - Continue Verapamil PRN and diltiazem CD 120mg daily  - Hold Eliquis for now    #Hx Pancreatic Ca   - s/p whipple 8/1/21 (in remission)   - c/w Creon 12K TID before meals   - Evaluated by surgery team on April 7. No further intervention     #Misc  - DVT Prophylaxis: Eliquis   - Diet: Feeds when off BiPAP  - GI Prophylaxis: Pantoprazole  - Activity: AAT  - Code Status: Full Code  - Disposition: acute   Pending: GOC, Para, fluid analysis    77 YO F with PMHx of HTN, pancreatic CA s/p Whipple 8/2021, A.Fib on Eliquis, pleural effusion was BIBEMS due to worsening SOB that started earlier this morning    IMPRESSION:  Acute Hypoxemic Respiratory Failure on HFNC  Acute hypercapnic respiratory failure  AMS likely toxic metabolic improved   B/l effusion R>L,  SP right thoracentesis transudative effusion   Ascites   Pancreatic Cancer advanced  s/p whipple 8/21  Afib on Eliquis last dose Sunday     #Acute Hypoxemic Respiratory Failure  #B/l pleural effusion; R>L  #Ascites   #Pancreatic Ca  - Xray Chest: Interval increase in bilateral effusions and opacities  - BNP 2151  - Echo: EF 54%, severely enlarged LA, RA. Mod-severe TR  - Currently on HFNC 40/80  - Surgery recs appreciated   - Surgery is requesting pigtail catheters to be placed in bilateral thoracic cavity and abdominal cavity  - CTAP after thoracentesis and paracentesis  - C/w Lasix 40mg IV QD   - Procalcitonin .08  - Blood cultures NGTD  - Ammonia level 33  - Palliative rec appreciated: family meeting 4/20 at 11am. Remains fulls code for now   - s/p Thoracentesis 4/19 drained 750ml   - Pleural fluid Cytology neg  - F/u pleural fluid analysis   - Was unable to perform Paracentesis  - C/w ceftriaxone    - F/u UA and Ucx     #Transaminitis, likely from hepatic congestion  - TPro  5.6<L>  /  Alb  2.9<L>  /  TBili  0.7  /  DBili  x   /  AST  64<H>  /  ALT  47<H>  /  AlkPhos  550<H>  04-19  - Monitor LFTs     #Chronic A.Fib on Eliquis  - Continue Verapamil PRN and diltiazem CD 120mg daily  - Hold Eliquis for now    #Hx Pancreatic Ca   - s/p whipple 8/1/21 (in remission)   - c/w Creon 12K TID before meals   - Evaluated by surgery team on April 7. No further intervention     #Misc  - DVT Prophylaxis: Lovenox started today, Eliquis is still on hold in anticipation for further procedures   - Diet: Feeds when off BiPAP  - GI Prophylaxis: Pantoprazole  - Activity: AAT  - Code Status: Full Code  - Disposition: acute   Pending: GOC, Para, fluid analysis, UA

## 2022-04-20 NOTE — PROCEDURE NOTE - NSPROCDETAILS_GEN_ALL_CORE
sterile technique, indwelling urinary device inserted
location identified, draped/prepped, sterile technique used, needle inserted/introduced/catheter inserted over needle/connection to evacuated glass bottle/ultrasound assessment of effusion (localization)

## 2022-04-20 NOTE — PROGRESS NOTE ADULT - SUBJECTIVE AND OBJECTIVE BOX
LI, VIKASH  76y Female    CHIEF COMPLAINT:    Patient is a 76y old  Female who presents with a chief complaint of Shortness of breath (20 Apr 2022 12:00)    INTERVAL HPI/OVERNIGHT EVENTS:    Patient seen and examined. No acute events overnight. Remains on HFNC     ROS: All other systems are negative.    Vital Signs:    T(F): 96.6 (04-20-22 @ 16:00), Max: 98.9 (04-20-22 @ 00:20)  HR: 107 (04-20-22 @ 16:00) (93 - 107)  BP: 108/70 (04-20-22 @ 16:00) (108/70 - 151/98)  RR: 18 (04-20-22 @ 16:00) (18 - 20)  SpO2: 98% (04-20-22 @ 16:00) (98% - 100%)    PHYSICAL EXAM:    GENERAL:  NAD, pale, chronically ill appearing   SKIN: No rashes or lesions  HEENT: Atraumatic. Normocephalic   NECK: Supple, No JVD.   PULMONARY: decreased breath sounds b/L. No wheezing.   CVS: Normal S1, S2. Rate and Rhythm are regular   ABDOMEN/GI: Soft, Nontender, Nondistended   MSK:  No clubbing or cyanosis   NEUROLOGIC: moves all extremities   PSYCH: Awake and alert     Consultant(s) Notes Reviewed:  [x ] YES  [ ] NO  Care Discussed with Consultants/Other Providers [ x] YES  [ ] NO    LABS:                        12.5   8.06  )-----------( 174      ( 20 Apr 2022 01:20 )             41.4     146  |  109  |  33<H>  ----------------------------<  92  5.2<H>   |  22  |  0.8    Ca    8.5      20 Apr 2022 01:20  Phos  3.7     04-19  Mg     2.3     04-20    TPro  4.4<L>  /  Alb  2.0<L>  /  TBili  0.4  /  DBili  x   /  AST  72<H>  /  ALT  40  /  AlkPhos  486<H>  04-20    PT/INR - ( 19 Apr 2022 10:54 )   PT: 26.40 sec;   INR: 2.31 ratio       PTT - ( 19 Apr 2022 10:54 )  PTT:39.4 sec  Serum Pro-Brain Natriuretic Peptide: 2151 pg/mL (04-18-22 @ 08:10)    Culture - Fungal, Body Fluid (collected 19 Apr 2022 14:19)  Source: .Body Fluid Pleural Fluid  Preliminary Report (20 Apr 2022 08:39):    Testing in progress    Culture - Body Fluid with Gram Stain (collected 19 Apr 2022 14:19)  Source: .Body Fluid Pleural Fluid  Gram Stain (20 Apr 2022 03:41):    polymorphonuclear leukocytes seen    No organisms seen    by cytocentrifuge    Culture - Blood (collected 18 Apr 2022 20:00)  Source: .Blood Blood-Peripheral  Preliminary Report (20 Apr 2022 09:01):    No growth to date.    RADIOLOGY & ADDITIONAL TESTS:  Imaging or report Personally Reviewed:  [x] YES  [ ] NO  EKG reviewed: [x] YES  [ ] NO    Medications:  Standing  cefTRIAXone   IVPB 1000 milliGRAM(s) IV Intermittent every 24 hours  cefTRIAXone   IVPB      diltiazem    milliGRAM(s) Oral daily  dronabinol 2.5 milliGRAM(s) Oral two times a day  enoxaparin Injectable 40 milliGRAM(s) SubCutaneous every 12 hours  furosemide   Injectable 40 milliGRAM(s) IV Push daily  magnesium oxide 400 milliGRAM(s) Oral two times a day with meals  mirtazapine 15 milliGRAM(s) Oral at bedtime  pancrelipase  (CREON 12,000 Lipase Units) 2 Capsule(s) Oral three times a day with meals  pantoprazole    Tablet 40 milliGRAM(s) Oral before breakfast    PRN Meds  verapamil 40 milliGRAM(s) Oral two times a day PRN

## 2022-04-20 NOTE — PROGRESS NOTE ADULT - SUBJECTIVE AND OBJECTIVE BOX
GENERAL SURGERY PROGRESS NOTE    Patient: LI, VIKASH , 76y ([birth year])Female   MRN: [id number]  Location: [facility] A5- A  Visit: 04-18-22 Inpatient  Date: 04-20-22 @ 09:55    Hospital Day #: 3  Post-Op Day #:    Procedure/Dx/Injuries: pleural effusions, ascites    Events of past 24 hours: Patient is s/p R thoracentesis with pulm output of 750 serous fluid. Has had episodes of A fib with RVR being managed with verapamil and diltiazem, HR much better controlled in past 24 hrs. [facility] meeting to be had this AM at 11:00. Patient not complaining of any SOB, chest pain, constipation, abdominal pain this AM.    PAST MEDICAL & SURGICAL HISTORY:  SOB (shortness of breath)    Pain  knee    Varicose veins of both lower extremities, unspecified whether complicated    Atrial fibrillation, unspecified type  2019 on Eliquis    Jaundice  March 5, 2021    Malignant neoplasm of pancreas, unspecified location of malignancy  Pancreatic Cancer    Hypertension, unspecified type  2019    Pancreatic cancer    Kidney stones    History of surgery  Whipple procedure 8/2/2021 with Dr. Nino at [facility]    Status post phlebectomy  10/2018    History of ovarian cystectomy  left    History of tonsillectomy  1959    Kidney stone  2017        Vitals:   T(F): 97.4 (04-20-22 @ 08:35), Max: 98.9 (04-20-22 @ 00:20)  HR: 100 (04-20-22 @ 08:35)  BP: 131/73 (04-20-22 @ 08:35)  RR: 18 (04-20-22 @ 08:35)  SpO2: 98% (04-20-22 @ 08:05)      Diet, DASH/TLC:   Sodium & Cholesterol Restricted  800mL Fluid Restriction ([id number])      Fluids:     I & O's:      General: NAD  HEENT: trachea midline, sclera nonicteric  Respiratory: CTAB  Cardiac: RRR  Abdominal: soft, non tender, non distended    MEDICATIONS  (STANDING):  cefTRIAXone   IVPB 1000 milliGRAM(s) IV Intermittent every 24 hours  cefTRIAXone   IVPB      diltiazem    milliGRAM(s) Oral daily  dronabinol 2.5 milliGRAM(s) Oral two times a day  enoxaparin Injectable 40 milliGRAM(s) SubCutaneous every 12 hours  furosemide   Injectable 40 milliGRAM(s) IV Push daily  magnesium oxide 400 milliGRAM(s) Oral two times a day with meals  mirtazapine 15 milliGRAM(s) Oral at bedtime  pancrelipase  (CREON 12,000 Lipase Units) 2 Capsule(s) Oral three times a day with meals  pantoprazole    Tablet 40 milliGRAM(s) Oral before breakfast    MEDICATIONS  (PRN):  verapamil 40 milliGRAM(s) Oral two times a day PRN tachycardia faster tahn 110 beats/ minute      DVT PROPHYLAXIS: enoxaparin Injectable 40 milliGRAM(s) SubCutaneous every 12 hours    GI PROPHYLAXIS: pantoprazole    Tablet 40 milliGRAM(s) Oral before breakfast    ANTICOAGULATION:   ANTIBIOTICS:  cefTRIAXone   IVPB 1000 milliGRAM(s)  cefTRIAXone   IVPB              LAB/STUDIES:  Labs:  CAPILLARY BLOOD GLUCOSE                              12.5   8.06  )-----------( 174      ( 20 Apr 2022 01:20 )             41.4         04-20    146  |  109  |  33<H>  ----------------------------<  92  5.2<H>   |  22  |  0.8      Calcium, Total Serum: 8.5 mg/dL (04-20-22 @ 01:20)      LFTs:             4.4  | 0.4  | 72       ------------------[486     ( 20 Apr 2022 01:20 )  2.0  | x    | 40          Lipase:x      Amylase:x         Blood Gas Venous - Lactate: 2.80 mmol/L (04-18-22 @ 10:42)      Coags:     26.40  ----< 2.31    ( 19 Apr 2022 10:54 )     39.4            Serum Pro-Brain Natriuretic Peptide: 2151 pg/mL (04-18-22 @ 08:10)          Culture - Fungal, Body Fluid (collected 19 Apr 2022 14:19)  Source: .Body Fluid Pleural Fluid  Preliminary Report (20 Apr 2022 08:39):    Testing in progress    Culture - Body Fluid with Gram Stain (collected 19 Apr 2022 14:19)  Source: .Body Fluid Pleural Fluid  Gram Stain (20 Apr 2022 03:41):    polymorphonuclear leukocytes seen    No organisms seen    by cytocentrifuge    Culture - Blood (collected 18 Apr 2022 20:00)  Source: .Blood Blood-Peripheral  Preliminary Report (20 Apr 2022 09:01):    No growth to date.             recent vulvar biopsy 8/24/18 on Keflex for prophylaxis recent vulvar biopsy 8/24/18 on Keflex for prophylaxis at Dr. Bolanos's office.

## 2022-04-20 NOTE — PROGRESS NOTE ADULT - SUBJECTIVE AND OBJECTIVE BOX
Patient is a 76y old  Female who presents with a chief complaint of Shortness of breath (20 Apr 2022 09:00)        Over Night Events:    Improved mental status and breathing. On 40% and 70 L    ROS:  See HPI    PHYSICAL EXAM    ICU Vital Signs Last 24 Hrs  T(C): 36.3 (20 Apr 2022 08:35), Max: 37.2 (20 Apr 2022 00:20)  T(F): 97.4 (20 Apr 2022 08:35), Max: 98.9 (20 Apr 2022 00:20)  HR: 100 (20 Apr 2022 08:35) (93 - 129)  BP: 131/73 (20 Apr 2022 08:35) (117/78 - 158/83)  BP(mean): 94 (20 Apr 2022 08:35) (94 - 109)  ABP: --  ABP(mean): --  RR: 18 (20 Apr 2022 08:35) (16 - 20)  SpO2: 98% (20 Apr 2022 08:05) (97% - 100%)          CONSTITUTIONAL:  Ill appearing. cachectic.  In NAD    ENT:   Airway patent,   No thrush    EYES:   Clear bilaterally,   pupils equal,   round and reactive to light.    CARDIAC:   Normal rate,   regular rhythm.    no edema      CAROTID:   normal systolic impulse  no bruits    RESPIRATORY:   No wheezing  Normal chest expansion  Not tachypneic,  No use of accessory muscles    GASTROINTESTINAL:  Abdomen soft,   non-tender,   no guarding,   + BS    MUSCULOSKELETAL:   range of motion is not limited,  no clubbing, cyanosis    NEUROLOGICAL:   Alert and oriented   no motor deficits.    SKIN:   Skin normal color for race,   No evidence of rash.    PSYCHIATRIC:   normal mood and affect.   no apparent risk to self or others.        LABS:                            12.5   8.06  )-----------( 174      ( 20 Apr 2022 01:20 )             41.4                                               04-20    146  |  109  |  33<H>  ----------------------------<  92  5.2<H>   |  22  |  0.8    Ca    8.5      20 Apr 2022 01:20  Phos  3.7     04-19  Mg     2.3     04-20    TPro  4.4<L>  /  Alb  2.0<L>  /  TBili  0.4  /  DBili  x   /  AST  72<H>  /  ALT  40  /  AlkPhos  486<H>  04-20      PT/INR - ( 19 Apr 2022 10:54 )   PT: 26.40 sec;   INR: 2.31 ratio         PTT - ( 19 Apr 2022 10:54 )  PTT:39.4 sec                                                                                     LIVER FUNCTIONS - ( 20 Apr 2022 01:20 )  Alb: 2.0 g/dL / Pro: 4.4 g/dL / ALK PHOS: 486 U/L / ALT: 40 U/L / AST: 72 U/L / GGT: x                    Procalcitonin, Serum: 0.08 ng/mL (04-18-22 @ 20:00)  D-Dimer Assay, Quantitative: 1104 ng/mL DDU (04-08-22 @ 05:38)                                        Culture - Fungal, Body Fluid (collected 19 Apr 2022 14:19)  Source: .Body Fluid Pleural Fluid  Preliminary Report (20 Apr 2022 08:39):    Testing in progress    Culture - Body Fluid with Gram Stain (collected 19 Apr 2022 14:19)  Source: .Body Fluid Pleural Fluid  Gram Stain (20 Apr 2022 03:41):    polymorphonuclear leukocytes seen    No organisms seen    by cytocentrifuge    Culture - Blood (collected 18 Apr 2022 20:00)  Source: .Blood Blood-Peripheral  Preliminary Report (20 Apr 2022 09:01):    No growth to date.                                                                                           MEDICATIONS  (STANDING):  cefTRIAXone   IVPB 1000 milliGRAM(s) IV Intermittent every 24 hours  cefTRIAXone   IVPB      diltiazem    milliGRAM(s) Oral daily  furosemide   Injectable 40 milliGRAM(s) IV Push daily  magnesium oxide 400 milliGRAM(s) Oral two times a day with meals  mirtazapine 15 milliGRAM(s) Oral at bedtime  pancrelipase  (CREON 12,000 Lipase Units) 2 Capsule(s) Oral three times a day with meals  pantoprazole    Tablet 40 milliGRAM(s) Oral before breakfast    MEDICATIONS  (PRN):  verapamil 40 milliGRAM(s) Oral two times a day PRN tachycardia faster tahn 110 beats/ minute      Xrays:                                                                                     ECHO     Patient is a 76y old  Female who presents with a chief complaint of Shortness of breath (20 Apr 2022 09:00)        Over Night Events:    Improved mental status and breathing. On 40% and 70 L    ROS:  See HPI    PHYSICAL EXAM    ICU Vital Signs Last 24 Hrs  T(C): 36.3 (20 Apr 2022 08:35), Max: 37.2 (20 Apr 2022 00:20)  T(F): 97.4 (20 Apr 2022 08:35), Max: 98.9 (20 Apr 2022 00:20)  HR: 100 (20 Apr 2022 08:35) (93 - 129)  BP: 131/73 (20 Apr 2022 08:35) (117/78 - 158/83)  BP(mean): 94 (20 Apr 2022 08:35) (94 - 109)  ABP: --  ABP(mean): --  RR: 18 (20 Apr 2022 08:35) (16 - 20)  SpO2: 98% (20 Apr 2022 08:05) (97% - 100%)          CONSTITUTIONAL:  Ill appearing. cachectic.  In NAD    ENT:   Airway patent,   No thrush    EYES:   Clear bilaterally,   pupils equal,   round and reactive to light.    CARDIAC:   Normal rate,   regular rhythm.    no edema      CAROTID:   normal systolic impulse  no bruits    RESPIRATORY:   No wheezing  Normal chest expansion  Not tachypneic,  No use of accessory muscles    GASTROINTESTINAL:  Abdomen soft,   non-tender,   no guarding,   + BS    MUSCULOSKELETAL:   range of motion is not limited,  no clubbing, cyanosis    NEUROLOGICAL:   Alert and oriented   no motor deficits.    SKIN:   Skin normal color for race,   No evidence of rash.    PSYCHIATRIC:   normal mood and affect.   no apparent risk to self or others.        LABS:                            12.5   8.06  )-----------( 174      ( 20 Apr 2022 01:20 )             41.4                                               04-20    146  |  109  |  33<H>  ----------------------------<  92  5.2<H>   |  22  |  0.8    Ca    8.5      20 Apr 2022 01:20  Phos  3.7     04-19  Mg     2.3     04-20    TPro  4.4<L>  /  Alb  2.0<L>  /  TBili  0.4  /  DBili  x   /  AST  72<H>  /  ALT  40  /  AlkPhos  486<H>  04-20      PT/INR - ( 19 Apr 2022 10:54 )   PT: 26.40 sec;   INR: 2.31 ratio         PTT - ( 19 Apr 2022 10:54 )  PTT:39.4 sec                                                                                     LIVER FUNCTIONS - ( 20 Apr 2022 01:20 )  Alb: 2.0 g/dL / Pro: 4.4 g/dL / ALK PHOS: 486 U/L / ALT: 40 U/L / AST: 72 U/L / GGT: x                    Procalcitonin, Serum: 0.08 ng/mL (04-18-22 @ 20:00)  D-Dimer Assay, Quantitative: 1104 ng/mL DDU (04-08-22 @ 05:38)                                        Culture - Fungal, Body Fluid (collected 19 Apr 2022 14:19)  Source: .Body Fluid Pleural Fluid  Preliminary Report (20 Apr 2022 08:39):    Testing in progress    Culture - Body Fluid with Gram Stain (collected 19 Apr 2022 14:19)  Source: .Body Fluid Pleural Fluid  Gram Stain (20 Apr 2022 03:41):    polymorphonuclear leukocytes seen    No organisms seen    by cytocentrifuge    Culture - Blood (collected 18 Apr 2022 20:00)  Source: .Blood Blood-Peripheral  Preliminary Report (20 Apr 2022 09:01):    No growth to date.                                                                                           MEDICATIONS  (STANDING):  cefTRIAXone   IVPB 1000 milliGRAM(s) IV Intermittent every 24 hours  cefTRIAXone   IVPB      diltiazem    milliGRAM(s) Oral daily  furosemide   Injectable 40 milliGRAM(s) IV Push daily  magnesium oxide 400 milliGRAM(s) Oral two times a day with meals  mirtazapine 15 milliGRAM(s) Oral at bedtime  pancrelipase  (CREON 12,000 Lipase Units) 2 Capsule(s) Oral three times a day with meals  pantoprazole    Tablet 40 milliGRAM(s) Oral before breakfast    MEDICATIONS  (PRN):  verapamil 40 milliGRAM(s) Oral two times a day PRN tachycardia faster tahn 110 beats/ minute      Xrays:               improved right effusion                                                                       ECHO

## 2022-04-21 NOTE — PROGRESS NOTE ADULT - ASSESSMENT
75 YO F with PMHx of HTN, pancreatic CA s/p Whipple 8/2021, A.Fib on Eliquis, pleural effusion was BIBEMS from [facility] rehab due to worsening SOB; Patient had recent admission from [facility] and had thoracentesis done admitted and being treated with HFNC alternating non-rebreather      being followed for support with GOC       MEDD (morphine equivalent daily dose): na      See Recs below. d/w Primary and Palliative and oncology teams       Please call [phone] with questions or concerns 24/7.   We will continue to follow.

## 2022-04-21 NOTE — CONSULT NOTE ADULT - SUBJECTIVE AND OBJECTIVE BOX
75 YO F with PMHx of HTN, pancreatic CA s/p Whipple 2021, A.Fib on Eliquis, pleural effusion was BIBEMS due to worsening SOB that started earlier on the morning of presentation  As per EMS, patient was saturating in the 70s on room air and was placed on NRB w/ sats improving to high 80s. Patient reports that she lives with her son and started noticing shortness of breath.   Patient had recent admission from [facility] and had thoracentesis done  Patient denies fevers, chills, headache, chest pain, palpitations, constipation, melena, hematochezia, dysuria, urinary frequency or urgency, numbness, tingling, recent travel or any known sick contact.   In the ED patient was placed on non rebreather with SpO2 of 94%. .  X-ray chest showed Interval increase in bilateral effusions and opacities. (2022 11:47)      PAST MEDICAL & SURGICAL HISTORY:  SOB (shortness of breath)  Varicose veins of both lower extremities, unspecified whether complicated  Atrial fibrillation, unspecified type 2019 on Eliquis  Jaundice  Pancreatic Cancer  Hypertension,  Kidney stonfes  Whipple procedure 2021 with Dr. Nino at [facility]  Status post phlebectomy 10/2018  History of ovarian cystectomy left  History of tonsillectomy      MEDICATIONS  (STANDING):  apixaban 5 milliGRAM(s) Oral every 12 hours  cefTRIAXone   IVPB 1000 milliGRAM(s) IV Intermittent every 24 hours    dextrose 5%. 1000 milliLiter(s) (30 mL/Hr) IV Continuous <Continuous>  diltiazem    milliGRAM(s) Oral daily  dronabinol 2.5 milliGRAM(s) Oral two times a day  magnesium oxide 400 milliGRAM(s) Oral two times a day with meals  mirtazapine 15 milliGRAM(s) Oral at bedtime  pancrelipase  (CREON 12,000 Lipase Units) 2 Capsule(s) Oral three times a day with meals  pantoprazole    Tablet 40 milliGRAM(s) Oral before breakfast    MEDICATIONS  (PRN):  acetaminophen     Tablet .. 650 milliGRAM(s) Oral every 6 hours PRN Temp greater or equal to 38C (100.4F), Mild Pain (1 - 3)  simethicone 80 milliGRAM(s) Chew three times a day PRN Gas  verapamil 40 milliGRAM(s) Oral two times a day PRN tachycardia faster tahn 110 beats/ minute      Allergies  Augmentin (Rash)  chocolate (Headache)  digoxin (Hives)  Metoprolol Succinate ER (Hives)  Novocain (Angioedema)  Steroids: Excessive swelling (Flushing; Other (Mild to Mod))  sulfa drugs (Fever)  Xarelto (Hives)    Vital Signs Last 24 Hrs  T(C): 35.6 (2022 14:13), Max: 36.2 (2022 20:00)  T(F): 96.1 (2022 14:13), Max: 97.1 (2022 20:00)  HR: 109 (2022 14:13) (105 - 124)  BP: 116/86 (2022 14:13) (110/74 - 116/86)  BP(mean): 98 (2022 14:13) (98 - 98)  RR: 18 (2022 14:13) (18 - 18)  SpO2: 99% (2022 14:13) (97% - 100%)  Drug Dosing Weight  Height (cm): 172.7 (2022 08:28)  Weight (kg): 36.3 (2022 08:41)  BMI (kg/m2): 12.2 (2022 08:41)  BSA (m2): 1.38 (2022 08:41)  cachectic, ill appearing, ++ facial wasting, weak but responsive  CHEST/LUNG: Clear to auscultation bilaterally  HEART: Regular rate and rhythm  ABDOMEN: Soft, Nontender, Nondistended s/p paracentesis, LLQ discomfort   EXTREMITIES:  No clubbing, cyanosis, or edema               12.4   12.02 )-----------( 221      ( 2022 04:30 )             39.7       Auto Neutrophil %: 77.7 % (22 @ 04:30)  Auto Immature Granulocyte %: 0.4 % (22 @ 04:30)        143  |  105  |  40<H>  ----------------------------<  86  5.0   |  22  |  0.7      Calcium, Total Serum: 8.7 mg/dL (22 @ 04:30)      LFTs:             5.7  | 0.5  | 141      ------------------[790     ( 2022 04:30 )  2.7  | x    | 86          Lipase:x      Amylase:x         Blood Gas Arterial, Lactate: 1.30 mmol/L (22 @ 13:05)    ABG - ( 2022 13:05 )  pH: 7.51  /  pCO2: 40    /  pO2: 132   / HCO3: 32    / Base Excess: 8.2   /  SaO2: 96.1      Serum Pro-Brain Natriuretic Peptide: 2151 pg/mL (22 @ 08:10)      Urinalysis Basic - ( 2022 00:30 )  Color: Yellow / Appearance: Slightly Turbid / S.016 / pH: x  Gluc: x / Ketone: Negative  / Bili: Negative / Urobili: <2 mg/dL   Blood: x / Protein: Trace / Nitrite: Negative   Leuk Esterase: Small / RBC: 16 /HPF / WBC 12 /HPF   Sq Epi: x / Non Sq Epi: 3 /HPF / Bacteria: Negative    Culture - Fungal, Body Fluid (collected 2022 14:19)  Source: .Body Fluid Pleural Fluid  Preliminary Report (2022 08:39):    Testing in progress    Culture - Body Fluid with Gram Stain (collected 2022 14:19)  Source: .Body Fluid Pleural Fluid  Gram Stain (2022 03:41):    polymorphonuclear leukocytes seen    No organisms seen    by cytocentrifuge  Preliminary Report (2022 20:29):    No growth    Culture - Blood (collected 2022 20:00)  Source: .Blood Blood-Peripheral  Preliminary Report (2022 09:01):    No growth to date.

## 2022-04-21 NOTE — PROGRESS NOTE ADULT - ATTENDING COMMENTS
IMPRESSION:    Acute Hypoxemic Respiratory Failure on HFNC  Acute hypercapnic respiratory failure  AMS likely toxic metabolic improved   B/l effusion R>L,  SP right thoracentesis transudative effusion   Ascites   Pancreatic Cancer advanced  s/p whipple 8/21  Afib on Eliquis last dose Sunday   Possible UTI    Plan as outlined above IMPRESSION:    Acute Hypoxemic Respiratory Failure improving   Acute hypercapnic respiratory failure  AMS likely toxic metabolic improved   B/l effusion R>L,  SP right thoracentesis transudative effusion   Ascites   Pancreatic Cancer advanced  s/p whipple 8/21  Afib on Eliquis  UTI    Plan as outlined above

## 2022-04-21 NOTE — PROGRESS NOTE ADULT - ASSESSMENT
ASSESSMENT:  76F PMH HTN, pancreatic Ca s/p whipple 8/2021, Afib on eliquis, pleural effusion presenting secondary to hypoxia due to recurrent pleural effusion and abdominal ascites.    PLAN:  - s/p R thoracentesis  - Will need paracentesis  - F/U palliative talk   - request pigtail catheters to be placed in bilateral thoracic cavity and abdominal cavity  - BNP >2000, likely secondary to fluid overload (previous thoracentesis with no malignant cells therefore possibility of malignant effusions likely low)  - please send fluid for cytology   - will need CTAP after thoracentesis and paracentesis  - d/w Dr. Trung SAWYER TEAM SPECTRA: [phone]

## 2022-04-21 NOTE — PROGRESS NOTE ADULT - PROBLEM SELECTOR PLAN 4
- will follow  - full cod  -see prior GOC; reinforced to family option to have family meeting to learn who will be surrogate decision maker - will follow  - full code  -see prior GOC; reinforced to family option to have family meeting to learn who will be surrogate decision maker  - d/w team

## 2022-04-21 NOTE — PROGRESS NOTE ADULT - ASSESSMENT
75 YO F with PMHx of HTN, pancreatic CA s/p Whipple 8/2021, A.Fib on Eliquis, pleural effusion was BIBEMS due to worsening SOB that started earlier this morning    IMPRESSION:  Acute Hypoxemic Respiratory Failure improved  Acute hypercapnic respiratory failure  AMS likely toxic metabolic improved   B/l effusion R>L,  SP right thoracentesis transudative effusion   Ascites   Pancreatic Cancer advanced  s/p whipple 8/21  Afib on Eliquis - restarted today 4/21  UTI      #Acute Hypoxemic Respiratory Failure  #B/l pleural effusion; R>L  #Ascites   #Pancreatic Ca  - Xray Chest: Interval increase in bilateral effusions and opacities  - BNP 2151  - Echo: EF 54%, severely enlarged LA, RA. Mod-severe TR  - Currently on HFNC 40/80  - Surgery recs appreciated   - Surgery is requesting pigtail catheters to be placed in bilateral thoracic cavity and abdominal cavity  - CTAP after thoracentesis and paracentesis  - Procalcitonin .08  - Blood cultures NGTD  - Ammonia level 33  - Palliative rec appreciated: family meeting 4/20 at 11am. Remains fulls code for now   - s/p Thoracentesis 4/19 drained 750ml   - Pleural fluid Cytology neg  - Pleural fluid analysis shows transudative fluid   - Was unable to perform Paracentesis  - C/w ceftriaxone    - Change Lasix 40mg IV from QD to every other day   - UA + for LE  - F/u Ucx   - D5 30cc/hr    #Transaminitis, likely from hepatic congestion  - TPro  5.6<L>  /  Alb  2.9<L>  /  TBili  0.7  /  DBili  x   /  AST  64<H>  /  ALT  47<H>  /  AlkPhos  550<H>  04-19  - TPro  5.7<L>  /  Alb  2.7<L>  /  TBili  0.5  /  DBili  x   /  AST  141<H>  /  ALT  86<H>  /  AlkPhos  790<H>  04-21  - Monitor LFTs     #Chronic A.Fib on Eliquis  - Continue Verapamil PRN and diltiazem CD 120mg daily  - 4/21: restarted Eliquis     #Hx Pancreatic Ca   - s/p whipple 8/1/21 (in remission)   - c/w Creon 12K TID before meals   - Evaluated by surgery team on April 7. No further intervention     #Misc  - DVT Prophylaxis: Eliquis   - Diet: DASH   - GI Prophylaxis: Pantoprazole  - Activity: AAT  - Code Status: Full Code  - Disposition: acute   Pending: Ucx      75 YO F with PMHx of HTN, pancreatic CA s/p Whipple 8/2021, A.Fib on Eliquis, pleural effusion was BIBEMS due to worsening SOB that started earlier this morning    IMPRESSION:  Acute Hypoxemic Respiratory Failure improved  Acute hypercapnic respiratory failure  AMS likely toxic metabolic improved   B/l effusion R>L,  SP right thoracentesis transudative effusion   Ascites   Pancreatic Cancer advanced  s/p whipple 8/21  Afib on Eliquis - restarted today 4/21  UTI      #Acute Hypoxemic Respiratory Failure  #B/l pleural effusion; R>L  #Ascites   #Pancreatic Ca  - Xray Chest: Interval increase in bilateral effusions and opacities  - BNP 2151  - Echo: EF 54%, severely enlarged LA, RA. Mod-severe TR  - Currently on HFNC 40/80  - Surgery recs appreciated   - Surgery is requesting pigtail catheters to be placed in bilateral thoracic cavity and abdominal cavity  - CTAP after thoracentesis and paracentesis  - Procalcitonin .08  - Blood cultures NGTD  - Ammonia level 33  - Palliative rec appreciated: family meeting 4/20 at 11am. Remains fulls code for now   - s/p Thoracentesis 4/19 drained 750ml   - Pleural fluid Cytology neg  - Pleural fluid analysis shows transudative fluid   - Was unable to perform Paracentesis  - C/w ceftriaxone    - Change Lasix 40mg IV from QD to every other day   - UA + for LE  - F/u Ucx   - D5 30cc/hr    #Transaminitis, likely from hepatic congestion  - TPro  5.6<L>  /  Alb  2.9<L>  /  TBili  0.7  /  DBili  x   /  AST  64<H>  /  ALT  47<H>  /  AlkPhos  550<H>  04-19  - TPro  5.7<L>  /  Alb  2.7<L>  /  TBili  0.5  /  DBili  x   /  AST  141<H>  /  ALT  86<H>  /  AlkPhos  790<H>  04-21  - Monitor LFTs   - F/u RUQ US     #Chronic A.Fib on Eliquis  - Continue Verapamil PRN and diltiazem CD 120mg daily  - 4/21: restarted Eliquis     #Hx Pancreatic Ca   - s/p whipple 8/1/21 (in remission)   - c/w Creon 12K TID before meals   - Evaluated by surgery team on April 7. No further intervention   - F/u Heme/onc     #Misc  - DVT Prophylaxis: Eliquis   - Diet: DASH   - GI Prophylaxis: Pantoprazole  - Activity: AAT  - Code Status: Full Code  - Disposition: acute   Pending: Ucx, Heme/onc, Nutrition, RUQ US

## 2022-04-21 NOTE — PROGRESS NOTE ADULT - SUBJECTIVE AND OBJECTIVE BOX
----------Daily Progress Note----------    HISTORY OF PRESENT ILLNESS:  Patient is a 76y old Female who presents with a chief complaint of Shortness of breath (2022 08:44)    Currently admitted to medicine with the primary diagnosis of Pleural effusion    77 YO F with PMHx of HTN, pancreatic CA s/p Whipple 2021, A.Fib on Eliquis, pleural effusion was BIBEMS due to worsening SOB that started earlier on the morning of presentation  As per EMS, patient was saturating in the 70s on room air and was placed on NRB w/ sats improving to high 80s. Patient reports that she lives with her son and started noticing shortness of breath.   Patient had recent admission from [facility] and had thoracentesis done  Patient denies fevers, chills, headache, chest pain, palpitations, constipation, melena, hematochezia, dysuria, urinary frequency or urgency, numbness, tingling, recent travel or any known sick contact.   In the ED patient was placed on non rebreather with SpO2 of 94%. .  X-ray chest showed Interval increase in bilateral effusions and opacities.       Today is hospital day 3d.     INTERVAL HOSPITAL COURSE / OVERNIGHT EVENTS:    Patient was examined and seen at bedside. This morning she is resting comfortably in bed and reports no new issues or overnight events.     Review of Systems: Patient denies headache, dizziness. Patient denies chest pain, palpitation, SOB. Patient denies abdominal pain, n/v/d/c      <<<<<PAST MEDICAL & SURGICAL HISTORY>>>>>  SOB (shortness of breath)    Pain  knee    Varicose veins of both lower extremities, unspecified whether complicated    Atrial fibrillation, unspecified type  2019 on Eliquis    Jaundice  2021    Malignant neoplasm of pancreas, unspecified location of malignancy  Pancreatic Cancer    Hypertension, unspecified type  2019    Pancreatic cancer    Kidney stones    History of surgery  Whipple procedure 2021 with Dr. Nino at [facility]    Status post phlebectomy  10/2018    History of ovarian cystectomy  left    History of tonsillectomy  1959    Kidney stone        ALLERGIES  Augmentin (Rash)  chocolate (Headache)  digoxin (Hives)  Metoprolol Succinate ER (Hives)  Novocain (Angioedema)  Steroids: Excessive swelling (Flushing; Other (Mild to Mod))  sulfa drugs (Fever)  Xarelto (Hives)      Home Medications:  Cardizem  mg/24 hours oral capsule, extended release: 1 cap(s) orally once a day (2021 23:59)  Creon 12,000 units oral delayed release capsule: 2 cap(s) orally 3 times a day (with meals) (03 Dec 2021 10:54)  Eliquis 5 mg oral tablet: 1 tab(s) orally 2 times a day (03 Sep 2021 23:41)        MEDICATIONS  STANDING MEDICATIONS  cefTRIAXone   IVPB 1000 milliGRAM(s) IV Intermittent every 24 hours  cefTRIAXone   IVPB      dextrose 5%. 1000 milliLiter(s) IV Continuous <Continuous>  diltiazem    milliGRAM(s) Oral daily  dronabinol 2.5 milliGRAM(s) Oral two times a day  enoxaparin Injectable 40 milliGRAM(s) SubCutaneous every 12 hours  magnesium oxide 400 milliGRAM(s) Oral two times a day with meals  mirtazapine 15 milliGRAM(s) Oral at bedtime  pancrelipase  (CREON 12,000 Lipase Units) 2 Capsule(s) Oral three times a day with meals  pantoprazole    Tablet 40 milliGRAM(s) Oral before breakfast    PRN MEDICATIONS  acetaminophen     Tablet .. 650 milliGRAM(s) Oral every 6 hours PRN  simethicone 80 milliGRAM(s) Chew three times a day PRN  verapamil 40 milliGRAM(s) Oral two times a day PRN    VITALS:  T(F): 96.7  HR: 111  BP: 111/78  RR: 18  SpO2: 97%    <<<<<LABS>>>>>                        12.4   12.02 )-----------( 221      ( 2022 04:30 )             39.7     04-    143  |  105  |  40<H>  ----------------------------<  86  5.0   |  22  |  0.7    Ca    8.7      2022 04:30  Mg     2.2     -    TPro  5.7<L>  /  Alb  2.7<L>  /  TBili  0.5  /  DBili  x   /  AST  141<H>  /  ALT  86<H>  /  AlkPhos  790<H>        Urinalysis Basic - ( 2022 00:30 )    Color: Yellow / Appearance: Slightly Turbid / S.016 / pH: x  Gluc: x / Ketone: Negative  / Bili: Negative / Urobili: <2 mg/dL   Blood: x / Protein: Trace / Nitrite: Negative   Leuk Esterase: Small / RBC: 16 /HPF / WBC 12 /HPF   Sq Epi: x / Non Sq Epi: 3 /HPF / Bacteria: Negative      ABG - ( 2022 13:05 )  pH, Arterial: 7.51  pH, Blood: x     /  pCO2: 40    /  pO2: 132   / HCO3: 32    / Base Excess: 8.2   /  SaO2: 96.1                  Culture - Fungal, Body Fluid (collected 2022 14:19)  Source: .Body Fluid Pleural Fluid  Preliminary Report (2022 08:39):    Testing in progress    Culture - Body Fluid with Gram Stain (collected 2022 14:19)  Source: .Body Fluid Pleural Fluid  Gram Stain (2022 03:41):    polymorphonuclear leukocytes seen    No organisms seen    by cytocentrifuge  Preliminary Report (2022 20:29):    No growth    Culture - Blood (collected 2022 20:00)  Source: .Blood Blood-Peripheral  Preliminary Report (2022 09:01):    No growth to date.    [id number]        <<<<<RADIOLOGY>>>>>    < from: Xray Chest 1 View- PORTABLE-Urgent (22 @ 09:23) >  Impression:    Interval increase in bilateral effusions and opacities.        --- End of Report ---    < end of copied text >    < from: Xray Chest 1 View- PORTABLE-Urgent (Xray Chest 1 View- PORTABLE-Urgent .) (22 @ 15:55) >  Impression:    Cardiomegaly, unchanged.    Improved right lung opacity and stable left lung opacity.    Right-sided Port-A-Cath.    --- End of Report ---    < end of copied text >        <<<<<PHYSICAL EXAM>>>>>  GENERAL: Thin, elderly women,  in no acute distress. Resting comfortably in bed.  PULMONARY: B/L crackles   CARDIOVASCULAR: Regular rate and rhythm, S1-S2, no murmurs  GASTROINTESTINAL: Soft, +tender to palpation in the LLQ, non-distended, no guarding.  SKIN/EXTREMITIES: B/l LE chronic skin changes   NEUROLOGIC/MUSCULOSKELETAL: weak but able to follow command       -----------------------------------------------------------------------------------------------------------------------------------------------------------------------------------------------

## 2022-04-21 NOTE — CHART NOTE - NSCHARTNOTEFT_GEN_A_CORE
Patient's daughter Maddie was update over the phone ([phone]). Family wants NGT. Discussed potential need for PEG in the future. Family is agreeable to NGT for now. Patient's daughter Maddie was update over the phone ([phone]). Family wants NGT. Discussed potential need for PEG in the future. Family is agreeable to NGT for now. GOC was reattempted and family wants patient to remain Full code.

## 2022-04-21 NOTE — PROGRESS NOTE ADULT - ASSESSMENT
75 YO F with PMHx of HTN, pancreatic CA s/p Whipple 8/2021, A.Fib on Eliquis, pleural effusion was BIBEMS due to worsening SOB    Acute Hypoxemic Respiratory Failure likely due to volume overload  b/l Pleural effusions with ascites  Pancreatic Ca s/p Whipple 8/21  Transaminitis  - remains on HFNC 40/40 alternating with BIPAP, trial of NC   - s/p THora 4/19 with 750 cc fluid removed, transudative  - f/u cx/cytology   - paracentesis could not be performed  - s/p lasix  - Palliative team following, patient is full code at this time   - c/w Creon 12K q8 with meals   - surgery following  - monitor LFT's and check RUQ US given uptrend    Chronic A.Fib on Eliquis  - rate control with diltiazem   - on Eliquis    Poor PO intake  ensure vanilla, started marinol 4/20  Family amenable to NGT placement, to be placed today once family at bedside  Dietary eval requested    GOC - full code  overall very poor prognosis    #Progress Note Handoff  Pending (specify):   clinical improvement tapering down supplemental 02, monitor PO intake, dietary eval, monitor LFTs    Family discussion: Plan of care discussed with patient, daughter and son in law at bedside, all questions answered, aware and agreeable   Disposition:  from [facility]     Amanda Garcia MD  s. [phone]

## 2022-04-21 NOTE — PROGRESS NOTE ADULT - SUBJECTIVE AND OBJECTIVE BOX
VIKASH LI          MRN-[id number]              HPI:  77 YO F with PMHx of HTN, pancreatic CA s/p Whipple 2021, A.Fib on Eliquis, pleural effusion was BIBEMS due to worsening SOB that started earlier on the morning of presentation  As per EMS, patient was saturating in the 70s on room air and was placed on NRB w/ sats improving to high 80s. Patient reports that she lives with her son and started noticing shortness of breath.   Patient had recent admission from [facility] and had thoracentesis done  Patient denies fevers, chills, headache, chest pain, palpitations, constipation, melena, hematochezia, dysuria, urinary frequency or urgency, numbness, tingling, recent travel or any known sick contact.   In the ED patient was placed on non rebreather with SpO2 of 94%. .  X-ray chest showed Interval increase in bilateral effusions and opacities. (2022 11:47)    ROS:	    Unable to full attain due to:  patient is lethargic and not able to sustain interaction.   after encounter Dgt, Maddie at bedside with her  - reinforced offer to have family meeting                    Dyspnea (Salima 0-10): 0                       N/V (Y/N): No                             Secretions (Y/N) : No                                          Agitation(Y/N): No                              Pain (Y/N): No                                 -Provocation/Palliation: N/A  -Quality/Quantity: N/A  -Radiating: N/A  -Severity: No pain  -Timing/Frequency: N/A  -Impact on ADLs: N/A    General:   no nonverbal indications  HEENT:   no nonverbal indications    Neck:   no nonverbal indications  CVS:   no nonverbal indications  Resp:   no nonverbal indications  GI:   no nonverbal indications  :   no nonverbal indications  Musc:   no nonverbal indications  Neuro:   no nonverbal indications  Psych:  no nonverbal indications  Skin:   no nonverbal indications  Lymph:   no nonverbal indications    Last BM: none documented      Allergies    Augmentin (Rash)  chocolate (Headache)  digoxin (Hives)  Metoprolol Succinate ER (Hives)  Novocain (Angioedema)  Steroids: Excessive swelling (Flushing; Other (Mild to Mod))  sulfa drugs (Fever)  Xarelto (Hives)    Intolerances      Opiate Naive (Y/N): Y  -iStop reviewed (Y/N): Y   Ref#:     This report was requested by: Yuki Nolan | Reference #: [id number]             Labs:	  reviewed                      LABS:                          12.4    )-----------( 221      ( 2022 04:30 )             39.7         143  |  105  |  40<H>  ----------------------------<  86  5.0   |  22  |  0.7    Ca    8.7      2022 04:30  Mg     2.2         TPro  5.7<L>  /  Alb  2.7<L>  /  TBili  0.5  /  DBili  x   /  AST  141<H>  /  ALT  86<H>  /  AlkPhos  790<H>      LIVER FUNCTIONS - ( 2022 04:30 )  Alb: 2.7 g/dL / Pro: 5.7 g/dL / ALK PHOS: 790 U/L / ALT: 86 U/L / AST: 141 U/L / GGT: x             Urinalysis Basic - ( 2022 00:30 )    Color: Yellow / Appearance: Slightly Turbid / S.016 / pH: x  Gluc: x / Ketone: Negative  / Bili: Negative / Urobili: <2 mg/dL   Blood: x / Protein: Trace / Nitrite: Negative   Leuk Esterase: Small / RBC: 16 /HPF / WBC 12 /HPF   Sq Epi: x / Non Sq Epi: 3 /HPF / Bacteria: Negative            Radiology:	     < from: Xray Chest 1 View- PORTABLE-Urgent (Xray Chest 1 View- PORTABLE-Urgent .) (22 @ 15:55) >  ACC: [id number] EXAM:  XR CHEST PORTABLE URGENT 1V                          PROCEDURE DATE:  2022          INTERPRETATION:  Clinical History / Reason for exam: Follow-up.    Comparison : Chest radiograph 2022.    Technique/Positioning: Adequate.    Findings:    Support devices: Right-sided Port-A-Cath with its tip overlying the SVC.    Cardiac/mediastinum/hilum: Cardiomegaly, unchanged.    Lung parenchyma/Pleura: Improved right lung opacity and stable left lung   opacity. No pneumothorax is seen.    Skeleton/soft tissues: Stable.    Impression:    Cardiomegaly, unchanged.    Improved right lung opacity and stable left lung opacity.    Right-sided Port-A-Cath.    --- End of Report ---            GRUPO MALDONADO MD; Attending Radiologist  This document has been electronically signed. 2022  5:04PM    < end of copied text >          EKG:	  < from: 12 Lead ECG (22 @ 17:56) >    Ventricular Rate 91 BPM    Atrial Rate 37 BPM    QRS Duration 82 ms    Q-T Interval 360 ms    QTC Calculation(Bazett) 442 ms    R Axis 117 degrees    T Axis -62 degrees    Diagnosis Line Atrial fibrillation  Left posterior fascicular block  T wave abnormality, consider lateral ischemia  Abnormal ECG    Confirmed by AMADEO CUNHA [facility] ([id number]) on 2022 10:08:26 AM    < end of copied text >    < from: 12 Lead ECG (22 @ 10:08) >    Ventricular Rate 117 BPM    Atrial Rate 115 BPM    QRS Duration 82 ms    Q-T Interval 356 ms    QTC Calculation(Bazett) 496 ms    R Axis 79 degrees    T Axis 219 degrees    Diagnosis Line Atrial fibrillation with rapid ventricular response  Low voltage QRS  ST & T wave abnormality, consider lateral ischemia  Abnormal ECG    Confirmed by Torri Collins MD ([id number]) on 2022 4:40:35 PM    < end of copied text >      Imaging Personally Reviewed:  [ ] xYES  [ ] NO    Consultant(s) Notes Reviewed:  [x ] YES  [ ] NO  Care Discussed with Consultants/Other Providers [x ] YES  [ ] NO    PEx:	  T(C): 36.1 (22 @ 11:55), Max: 37.2 (22 @ 00:20)  T(F): 97 (22 @ 11:55), Max: 98.9 (22 @ 00:20)  HR: 103 (22 @ 11:55) (93 - 104)  BP: 151/98 (22 @ 11:55) (117/78 - 151/98)  RR: 18 (22 @ 11:55) (18 - 20)  SpO2: 98% (22 @ 08:05) (98% - 100%)  Wt(kg): --    General:  NAD  Eyes: did not sustain open  ENMT: no ulcers externally  CV: ; nail beds cyanotic   Resp: Unlabored Non tachypneic + HFNC  GI:  nondistended  Neuro: lethargic  Psych: Calm   Skin: nonjaundiced;     Preadmit Karnofsky:  %           Current Karnofsky:  20   %  [email or web address]   [email or web address]/[email or web address].pdf  Cachexia (Y/N): Y  BMI: BMI (kg/m2): 12.2 (22 @ 08:41)      Medications:	      MEDICATIONS  (STANDING):  apixaban 5 milliGRAM(s) Oral every 12 hours  cefTRIAXone   IVPB 1000 milliGRAM(s) IV Intermittent every 24 hours  cefTRIAXone   IVPB      dextrose 5%. 1000 milliLiter(s) (30 mL/Hr) IV Continuous <Continuous>  diltiazem    milliGRAM(s) Oral daily  dronabinol 2.5 milliGRAM(s) Oral two times a day  magnesium oxide 400 milliGRAM(s) Oral two times a day with meals  mirtazapine 15 milliGRAM(s) Oral at bedtime  pancrelipase  (CREON 12,000 Lipase Units) 2 Capsule(s) Oral three times a day with meals  pantoprazole    Tablet 40 milliGRAM(s) Oral before breakfast    MEDICATIONS  (PRN):  acetaminophen     Tablet .. 650 milliGRAM(s) Oral every 6 hours PRN Temp greater or equal to 38C (100.4F), Mild Pain (1 - 3)  simethicone 80 milliGRAM(s) Chew three times a day PRN Gas  verapamil 40 milliGRAM(s) Oral two times a day PRN tachycardia faster tahn 110 beats/ minute        Advanced Directives:	     Full Code      Decision maker: The patient is unable to fully participate in complex medical decision making conversations at this time due to lethargy - will continue to reassess  Legal surrogate: daughter    GOALS OF CARE DISCUSSION	       Palliative care info/counseling provided	              Documentation of GOC/Advanced Care Planning see prior       PSYCHOSOCIAL-SPIRITUAL ASSESSMENT:            See Palliative Care SW/ documentation        	    REFERRALS       Palliative Med        Unit SW/Case Mgmt            VIKASH LI          MRN-[id number]              HPI:  77 YO F with PMHx of HTN, pancreatic CA s/p Whipple 2021, A.Fib on Eliquis, pleural effusion was BIBEMS due to worsening SOB that started earlier on the morning of presentation  As per EMS, patient was saturating in the 70s on room air and was placed on NRB w/ sats improving to high 80s. Patient reports that she lives with her son and started noticing shortness of breath.   Patient had recent admission from [facility] and had thoracentesis done.  Patient denies fevers, chills, headache, chest pain, palpitations, constipation, melena, hematochezia, dysuria, urinary frequency or urgency, numbness, tingling, recent travel or any known sick contact.   In the ED patient was placed on non rebreather with SpO2 of 94%. .  X-ray chest showed Interval increase in bilateral effusions and opacities. (2022 11:47)    ROS:	    Unable to full attain due to:  patient is lethargic and not able to sustain interaction.   after encounter DgtMaddie at bedside with her  - reinforced offer to have family meeting                    Dyspnea (Salima 0-10): 0                       N/V (Y/N): No                             Secretions (Y/N) : No                                          Agitation(Y/N): No                              Pain (Y/N): No                                 -Provocation/Palliation: N/A  -Quality/Quantity: N/A  -Radiating: N/A  -Severity: No pain  -Timing/Frequency: N/A  -Impact on ADLs: N/A    General:   no nonverbal indications  HEENT:   no nonverbal indications    Neck:   no nonverbal indications  CVS:   no nonverbal indications  Resp:   no nonverbal indications  GI:   no nonverbal indications  :   no nonverbal indications  Musc:   no nonverbal indications  Neuro:   no nonverbal indications  Psych:  no nonverbal indications  Skin:   no nonverbal indications  Lymph:   no nonverbal indications    Last BM: none documented      Allergies    Augmentin (Rash)  chocolate (Headache)  digoxin (Hives)  Metoprolol Succinate ER (Hives)  Novocain (Angioedema)  Steroids: Excessive swelling (Flushing; Other (Mild to Mod))  sulfa drugs (Fever)  Xarelto (Hives)    Intolerances      Opiate Naive (Y/N): Y  -iStop reviewed (Y/N): Y   Ref#:     This report was requested by: Yuki Nolan | Reference #: [id number]             Labs:	  reviewed                      LABS:                          12.4    )-----------( 221      ( 2022 04:30 )             39.7         143  |  105  |  40<H>  ----------------------------<  86  5.0   |  22  |  0.7    Ca    8.7      2022 04:30  Mg     2.2         TPro  5.7<L>  /  Alb  2.7<L>  /  TBili  0.5  /  DBili  x   /  AST  141<H>  /  ALT  86<H>  /  AlkPhos  790<H>      LIVER FUNCTIONS - ( 2022 04:30 )  Alb: 2.7 g/dL / Pro: 5.7 g/dL / ALK PHOS: 790 U/L / ALT: 86 U/L / AST: 141 U/L / GGT: x             Urinalysis Basic - ( 2022 00:30 )    Color: Yellow / Appearance: Slightly Turbid / S.016 / pH: x  Gluc: x / Ketone: Negative  / Bili: Negative / Urobili: <2 mg/dL   Blood: x / Protein: Trace / Nitrite: Negative   Leuk Esterase: Small / RBC: 16 /HPF / WBC 12 /HPF   Sq Epi: x / Non Sq Epi: 3 /HPF / Bacteria: Negative            Radiology:	     < from: Xray Chest 1 View- PORTABLE-Urgent (Xray Chest 1 View- PORTABLE-Urgent .) (22 @ 15:55) >  ACC: [id number] EXAM:  XR CHEST PORTABLE URGENT 1V                          PROCEDURE DATE:  2022          INTERPRETATION:  Clinical History / Reason for exam: Follow-up.    Comparison : Chest radiograph 2022.    Technique/Positioning: Adequate.    Findings:    Support devices: Right-sided Port-A-Cath with its tip overlying the SVC.    Cardiac/mediastinum/hilum: Cardiomegaly, unchanged.    Lung parenchyma/Pleura: Improved right lung opacity and stable left lung   opacity. No pneumothorax is seen.    Skeleton/soft tissues: Stable.    Impression:    Cardiomegaly, unchanged.    Improved right lung opacity and stable left lung opacity.    Right-sided Port-A-Cath.    --- End of Report ---            GRUPO MALDONADO MD; Attending Radiologist  This document has been electronically signed. 2022  5:04PM    < end of copied text >          EKG:	  < from: 12 Lead ECG (22 @ 17:56) >    Ventricular Rate 91 BPM    Atrial Rate 37 BPM    QRS Duration 82 ms    Q-T Interval 360 ms    QTC Calculation(Bazett) 442 ms    R Axis 117 degrees    T Axis -62 degrees    Diagnosis Line Atrial fibrillation  Left posterior fascicular block  T wave abnormality, consider lateral ischemia  Abnormal ECG    Confirmed by AMADEO CUNHA [facility] ([id number]) on 2022 10:08:26 AM    < end of copied text >    < from: 12 Lead ECG (22 @ 10:08) >    Ventricular Rate 117 BPM    Atrial Rate 115 BPM    QRS Duration 82 ms    Q-T Interval 356 ms    QTC Calculation(Bazett) 496 ms    R Axis 79 degrees    T Axis 219 degrees    Diagnosis Line Atrial fibrillation with rapid ventricular response  Low voltage QRS  ST & T wave abnormality, consider lateral ischemia  Abnormal ECG    Confirmed by Torri Collins MD ([id number]) on 2022 4:40:35 PM    < end of copied text >      Imaging Personally Reviewed:  [ ] xYES  [ ] NO    Consultant(s) Notes Reviewed:  [x ] YES  [ ] NO  Care Discussed with Consultants/Other Providers [x ] YES  [ ] NO    PEx:	  T(C): 36.1 (22 @ 11:55), Max: 37.2 (22 @ 00:20)  T(F): 97 (22 @ 11:55), Max: 98.9 (22 @ 00:20)  HR: 103 (22 @ 11:55) (93 - 104)  BP: 151/98 (22 @ 11:55) (117/78 - 151/98)  RR: 18 (22 @ 11:55) (18 - 20)  SpO2: 98% (22 @ 08:05) (98% - 100%)  Wt(kg): --    General:  NAD  Eyes: did not sustain open  ENMT: no ulcers externally  CV: ; nail beds cyanotic   Resp: Unlabored Non tachypneic + HFNC  GI:  nondistended  Neuro: lethargic  Psych: Calm   Skin: nonjaundiced;     Preadmit Karnofsky:  %           Current Karnofsky:  20   %  [email or web address]_[email or web address]   [email or web address]/files/[email or web address]_performance_[email or web address]_[email or web address].pdf  Cachexia (Y/N): Y  BMI: BMI (kg/m2): 12.2 (22 @ 08:41)      Medications:	      MEDICATIONS  (STANDING):  apixaban 5 milliGRAM(s) Oral every 12 hours  cefTRIAXone   IVPB 1000 milliGRAM(s) IV Intermittent every 24 hours  cefTRIAXone   IVPB      dextrose 5%. 1000 milliLiter(s) (30 mL/Hr) IV Continuous <Continuous>  diltiazem    milliGRAM(s) Oral daily  dronabinol 2.5 milliGRAM(s) Oral two times a day  magnesium oxide 400 milliGRAM(s) Oral two times a day with meals  mirtazapine 15 milliGRAM(s) Oral at bedtime  pancrelipase  (CREON 12,000 Lipase Units) 2 Capsule(s) Oral three times a day with meals  pantoprazole    Tablet 40 milliGRAM(s) Oral before breakfast    MEDICATIONS  (PRN):  acetaminophen     Tablet .. 650 milliGRAM(s) Oral every 6 hours PRN Temp greater or equal to 38C (100.4F), Mild Pain (1 - 3)  simethicone 80 milliGRAM(s) Chew three times a day PRN Gas  verapamil 40 milliGRAM(s) Oral two times a day PRN tachycardia faster tahn 110 beats/ minute        Advanced Directives:	     Full Code      Decision maker: The patient is unable to fully participate in complex medical decision making conversations at this time due to lethargy - will continue to reassess  Legal surrogate: daughter    GOALS OF CARE DISCUSSION	       Palliative care info/counseling provided	              Documentation of GOC/Advanced Care Planning see prior       PSYCHOSOCIAL-SPIRITUAL ASSESSMENT:            See Palliative Care SW/ documentation        	    REFERRALS       Palliative Med        Unit SW/Case Mgmt

## 2022-04-21 NOTE — PROGRESS NOTE ADULT - SUBJECTIVE AND OBJECTIVE BOX
GENERAL SURGERY PROGRESS NOTE    Patient: VIKASH LI , 76y ([birth year])Female   MRN: [id number]  Location: [facility] A5-U 012 A  Visit: 22 Inpatient  Date: 22 @ 16:31    Hospital Day #: 4    Procedure/Dx/Injuries: pleural effusions, ascites    Events of past 24 hours: Patient had ariza placed , currently full code. Patient is to talk to palliative and family to talk about surrogate decision maker. Patient has been tachy overnight.     PAST MEDICAL & SURGICAL HISTORY:  SOB (shortness of breath)    Pain  knee    Varicose veins of both lower extremities, unspecified whether complicated    Atrial fibrillation, unspecified type  2019 on Eliquis    Jaundice  2021    Malignant neoplasm of pancreas, unspecified location of malignancy  Pancreatic Cancer    Hypertension, unspecified type  2019    Pancreatic cancer    Kidney stones    History of surgery  Whipple procedure 2021 with Dr. Nino at [facility]    Status post phlebectomy  10/2018    History of ovarian cystectomy  left    History of tonsillectomy      Kidney stone          Vitals:   T(F): 96.1 (22 @ 14:13), Max: 97.1 (22 @ 20:00)  HR: 109 (22 @ 14:13)  BP: 116/86 (22 @ 14:13)  RR: 18 (22 @ 14:13)  SpO2: 99% (22 @ 14:13)          Fluids:     I & O's:    22 @ 07:01  -  22 @ 07:00  --------------------------------------------------------  IN:  Total IN: 0 mL    OUT:    Indwelling Catheter - Urethral (mL): 350 mL  Total OUT: 350 mL    Total NET: -350 mL      PHYSICAL EXAM:  HEENT: trachea midline, sclera nonicteric  Respiratory: CTAB  Cardiac: RRR  Abdominal: soft, non tender, non distended    MEDICATIONS  (STANDING):  apixaban 5 milliGRAM(s) Oral every 12 hours  cefTRIAXone   IVPB 1000 milliGRAM(s) IV Intermittent every 24 hours  cefTRIAXone   IVPB      dextrose 5%. 1000 milliLiter(s) (30 mL/Hr) IV Continuous <Continuous>  diltiazem    milliGRAM(s) Oral daily  dronabinol 2.5 milliGRAM(s) Oral two times a day  magnesium oxide 400 milliGRAM(s) Oral two times a day with meals  mirtazapine 15 milliGRAM(s) Oral at bedtime  pancrelipase  (CREON 12,000 Lipase Units) 2 Capsule(s) Oral three times a day with meals  pantoprazole    Tablet 40 milliGRAM(s) Oral before breakfast    MEDICATIONS  (PRN):  acetaminophen     Tablet .. 650 milliGRAM(s) Oral every 6 hours PRN Temp greater or equal to 38C (100.4F), Mild Pain (1 - 3)  simethicone 80 milliGRAM(s) Chew three times a day PRN Gas  verapamil 40 milliGRAM(s) Oral two times a day PRN tachycardia faster tahn 110 beats/ minute      DVT PROPHYLAXIS:   GI PROPHYLAXIS: pantoprazole    Tablet 40 milliGRAM(s) Oral before breakfast    ANTICOAGULATION:   ANTIBIOTICS:  cefTRIAXone   IVPB 1000 milliGRAM(s)  cefTRIAXone   IVPB              LAB/STUDIES:  Labs:  CAPILLARY BLOOD GLUCOSE                              12.4   12.02 )-----------( 221      ( 2022 04:30 )             39.7       Auto Neutrophil %: 77.7 % (22 @ 04:30)  Auto Immature Granulocyte %: 0.4 % (22 @ 04:30)        143  |  105  |  40<H>  ----------------------------<  86  5.0   |  22  |  0.7      Calcium, Total Serum: 8.7 mg/dL (22 @ 04:30)      LFTs:             5.7  | 0.5  | 141      ------------------[790     ( 2022 04:30 )  2.7  | x    | 86          Lipase:x      Amylase:x         Blood Gas Arterial, Lactate: 1.30 mmol/L (22 @ 13:05)    ABG - ( 2022 13:05 )  pH: 7.51  /  pCO2: 40    /  pO2: 132   / HCO3: 32    / Base Excess: 8.2   /  SaO2: 96.1              Coags:        Serum Pro-Brain Natriuretic Peptide: 2151 pg/mL (22 @ 08:10)      Urinalysis Basic - ( 2022 00:30 )    Color: Yellow / Appearance: Slightly Turbid / S.016 / pH: x  Gluc: x / Ketone: Negative  / Bili: Negative / Urobili: <2 mg/dL   Blood: x / Protein: Trace / Nitrite: Negative   Leuk Esterase: Small / RBC: 16 /HPF / WBC 12 /HPF   Sq Epi: x / Non Sq Epi: 3 /HPF / Bacteria: Negative        Culture - Fungal, Body Fluid (collected 2022 14:19)  Source: .Body Fluid Pleural Fluid  Preliminary Report (2022 08:39):    Testing in progress    Culture - Body Fluid with Gram Stain (collected 2022 14:19)  Source: .Body Fluid Pleural Fluid  Gram Stain (2022 03:41):    polymorphonuclear leukocytes seen    No organisms seen    by cytocentrifuge  Preliminary Report (2022 20:29):    No growth    Culture - Blood (collected 2022 20:00)  Source: .Blood Blood-Peripheral  Preliminary Report (2022 09:01):    No growth to date.    IMAGING:  < from: Xray Chest 1 View- PORTABLE-Routine (Xray Chest 1 View- PORTABLE-Routine in AM.) (22 @ 06:25) >  Impression:    Stable bilateral opacities.    < end of copied text >

## 2022-04-21 NOTE — CONSULT NOTE ADULT - SUBJECTIVE AND OBJECTIVE BOX
Patient is a 76y old  Female who presents with a chief complaint of Shortness of breath (2022 10:54)      HPI:  75 YO F with PMHx of HTN, pancreatic CA s/p Whipple 2021, A.Fib on Eliquis, pleural effusion was BIBEMS due to worsening SOB that started earlier on the morning of presentation  As per EMS, patient was saturating in the 70s on room air and was placed on NRB w/ sats improving to high 80s. Patient reports that she lives with her son and started noticing shortness of breath.   Patient had recent admission from [facility] and had thoracentesis done  Patient denies fevers, chills, headache, chest pain, palpitations, constipation, melena, hematochezia, dysuria, urinary frequency or urgency, numbness, tingling, recent travel or any known sick contact.   In the ED patient was placed on non rebreather with SpO2 of 94%. .  X-ray chest showed Interval increase in bilateral effusions and opacities. (2022 11:47)         PAST MEDICAL & SURGICAL HISTORY:  SOB (shortness of breath)    Pain  knee    Varicose veins of both lower extremities, unspecified whether complicated    Atrial fibrillation, unspecified type  2019 on Eliquis    Jaundice  2021    Malignant neoplasm of pancreas, unspecified location of malignancy  Pancreatic Cancer    Hypertension, unspecified type  2019    Pancreatic cancer    Kidney stones    History of surgery  Whipple procedure 2021 with Dr. Nino at [facility]    Status post phlebectomy  10/2018    History of ovarian cystectomy  left    History of tonsillectomy  89    Kidney stone          SOCIAL HISTORY:    FAMILY HISTORY:  CAD (coronary artery disease) (Sibling)  3  siblings ( 2 living and 1 passed away)      Allergies    Augmentin (Rash)  chocolate (Headache)  digoxin (Hives)  Metoprolol Succinate ER (Hives)  Novocain (Angioedema)  Steroids: Excessive swelling (Flushing; Other (Mild to Mod))  sulfa drugs (Fever)  Xarelto (Hives)    Intolerances      ROS:  Negative except for:              HOME MEDICATIONS:  Cardizem  mg/24 hours oral capsule, extended release: 1 cap(s) orally once a day (2021 23:59)  Creon 12,000 units oral delayed release capsule: 2 cap(s) orally 3 times a day (with meals) (03 Dec 2021 10:54)  Eliquis 5 mg oral tablet: 1 tab(s) orally 2 times a day (03 Sep 2021 23:41)      Vital Signs Last 24 Hrs  T(C): 35.9 (2022 05:30), Max: 36.2 (2022 20:00)  T(F): 96.7 (2022 05:30), Max: 97.1 (2022 20:00)  HR: 111 (2022 05:30) (103 - 124)  BP: 111/78 (2022 05:30) (108/70 - 151/98)  BP(mean): 115 (2022 11:55) (115 - 115)  RR: 18 (2022 05:30) (18 - 18)  SpO2: 97% (2022 23:50) (97% - 100%)    PHYSICAL EXAM  General: adult in NAD  HEENT: clear oropharynx, anicteric sclera, pink conjunctiva  Neck: supple  CV: normal S1/S2 with no murmur rubs or gallops  Lungs: positive air movement b/l ant lungs,clear to auscultation, no wheezes, no rales  Abdomen: soft non-tender non-distended, no hepatosplenomegaly  Ext: no clubbing cyanosis or edema  Skin: no rashes and no petechiae  Neuro: alert and oriented X 4, no focal deficits    MEDICATIONS  (STANDING):  apixaban 5 milliGRAM(s) Oral every 12 hours  cefTRIAXone   IVPB 1000 milliGRAM(s) IV Intermittent every 24 hours  cefTRIAXone   IVPB      dextrose 5%. 1000 milliLiter(s) (30 mL/Hr) IV Continuous <Continuous>  diltiazem    milliGRAM(s) Oral daily  dronabinol 2.5 milliGRAM(s) Oral two times a day  magnesium oxide 400 milliGRAM(s) Oral two times a day with meals  mirtazapine 15 milliGRAM(s) Oral at bedtime  pancrelipase  (CREON 12,000 Lipase Units) 2 Capsule(s) Oral three times a day with meals  pantoprazole    Tablet 40 milliGRAM(s) Oral before breakfast    MEDICATIONS  (PRN):  acetaminophen     Tablet .. 650 milliGRAM(s) Oral every 6 hours PRN Temp greater or equal to 38C (100.4F), Mild Pain (1 - 3)  simethicone 80 milliGRAM(s) Chew three times a day PRN Gas  verapamil 40 milliGRAM(s) Oral two times a day PRN tachycardia faster tahn 110 beats/ minute      LABS:                          12.4   12.02 )-----------( 221      ( 2022 04:30 )             39.7         Mean Cell Volume : 81.7 fL  Mean Cell Hemoglobin : 25.5 pg  Mean Cell Hemoglobin Concentration : 31.2 g/dL  Auto Neutrophil # : 9.33 K/uL  Auto Lymphocyte # : 1.70 K/uL  Auto Monocyte # : 0.89 K/uL  Auto Eosinophil # : 0.03 K/uL  Auto Basophil # : 0.02 K/uL  Auto Neutrophil % : 77.7 %  Auto Lymphocyte % : 14.1 %  Auto Monocyte % : 7.4 %  Auto Eosinophil % : 0.2 %  Auto Basophil % : 0.2 %      Serial CBC's   @ 04:30  Hct-39.7 / Hgb-12.4 / Plat-221 / RBC-4.86 / WBC-12.02  Serial CBC's   @ 01:20  Hct-41.4 / Hgb-12.5 / Plat-174 / RBC-5.01 / WBC-8.06  Serial CBC's   @ 05:43  Hct-38.4 / Hgb-11.8 / Plat-229 / RBC-4.72 / WBC-11.24  Serial CBC's   @ 08:10  Hct-45.3 / Hgb-14.1 / Plat-240 / RBC-5.58 / WBC-8.92          143  |  105  |  40<H>  ----------------------------<  86  5.0   |  22  |  0.7    Ca    8.7      2022 04:30  Mg     2.2         TPro  5.7<L>  /  Alb  2.7<L>  /  TBili  0.5  /  DBili  x   /  AST  141<H>  /  ALT  86<H>  /  AlkPhos  790<H>                        Urinalysis Basic - ( 2022 00:30 )    Color: Yellow / Appearance: Slightly Turbid / S.016 / pH: x  Gluc: x / Ketone: Negative  / Bili: Negative / Urobili: <2 mg/dL   Blood: x / Protein: Trace / Nitrite: Negative   Leuk Esterase: Small / RBC: 16 /HPF / WBC 12 /HPF   Sq Epi: x / Non Sq Epi: 3 /HPF / Bacteria: Negative          Culture - Fungal, Body Fluid (collected 2022 14:19)  Source: .Body Fluid Pleural Fluid  Preliminary Report (2022 08:39):    Testing in progress    Culture - Body Fluid with Gram Stain (collected 2022 14:19)  Source: .Body Fluid Pleural Fluid  Gram Stain (2022 03:41):    polymorphonuclear leukocytes seen    No organisms seen    by cytocentrifuge  Preliminary Report (2022 20:29):    No growth    Culture - Blood (collected 2022 20:00)  Source: .Blood Blood-Peripheral  Preliminary Report (2022 09:01):    No growth to date.      RADIOLOGY & ADDITIONAL STUDIES:    < from: CT Angio Chest PE Protocol w/ IV Cont (22 @ 00:00) >  IMPRESSION:      No pulmonary embolus.    Increased moderate left pleural effusion with adjacent atelectasis and   markedly decreased aeration of the left lower lobe. Scattered groundglass   opacities within the left upper lobe may reflect atelectatic changes,   however superimposed infection or underlying neoplasm cannot be ruled out   on imaging. Consider short-term 3 month follow-up examination to evaluate   for resolution.    Enlarging large right pleural effusion with adjacent atelectasis that is   mildly decreasedwithin the right middle lobe.    Evaluation of the upper abdomen is severely limited due to poor tissue   contrast, however there is additional increased ascites as compared with   prior examination.    Redemonstration of hepatic cysts and hypodensities too small to further   characterize and postsurgical changes related to Whipple procedure which   are better demonstrated on prior examinations.    Cytopathology - Non Gyn Report:   ACCESSION No:  [id number]  Patient:   VIKASH LI      Accession:                             [id number]    Collected Date/Time:                   2022 18:46 EDT  Received Date/Time:                    2022 18:46 EDT    Non-Gynecologic Addendum Report - Auth (Verified)    Addendum  The cell block contains few histiocytes and mesothelial cells. No changes  are made for the original diagnosis.  ___________    Non-Gynecologic Report - Auth (Verified)  Specimen(s) Submitted    Pleural Fluid  Final Diagnosis  PLEURAL FLUID, THORACENTESIS:  - NO MALIGNANT CELL IDENTIFIED.  - Mesothelial cells, histiocytes and lymphocytes.  - Cell block pending.     (22 @ 18:46)    Surgical Pathology Report:   ACCESSION No:  [id number]    6.  Pancreatic neck margin:  -  Focal high grade dysplasia/adenocarcinoma in-situ (see  comment).  -  No evidence of invasive carcinoma seen.  - The tissue surrounding the duct is diffusely fibrotic, with  residual islets and occasional entrapped non-neoplastic duct,  consistent with therapy related changes.    Comment: The focus of high grade dysplasia/carcinoma in-situ is  well seen in original frozen sections which received  intradepartmental review, both at the time of  the frozen section  consultation and at the time of the final sign-out.  This focus  is no longer present in the deeper sections obtained for frozen  section control.    7.  Camron caval lymph node:  -  One lymph node negative for tumor (0/1).    8.  Additional pancreatic margin:    -  Portion of pancreatic parenchyma, no evidence of invasive  carcinoma of carcinoma in-situ seen in this entirely submitted  specimen.  -  Atrophy of pancreatic parenchyma with extensive fibrosis and  with areas of replacement by sheaths of foamy macrophages, and  residual pancreatic islet aggregates, consistent with therapy  related changes.  -  Focal mild cytologic atypia of small residual ducts (including  frozen section) and focally in residual epithelium of the main  pancreatic duct (not at the margin), see comment.    Comment:  Mild atypia seen in these ducts is nonspecific.    9.  New pancreatic margin:  -  Portion of pancreatic parenchyma, no evidence of invasive  carcinoma or carcinoma in-situ in this entirely submitted  specimen.  -  Focal mild cytologic atypia of residual small ducts is noted,  not at the surgical margin (see comment).  -  Pancreatic parenchyma shows atrophy with replacement by  diffuse fibrosis, and with aggregation of residual pancreatic  islets, consistent with therapy related changes.  -  One lymph node negative for tumor (0/1).    10.  Gallbladder, cholecystectomy:  -  Severe chronic follicular cholecystitis with superimposed  acute cholecystitis and with foci of reactive atypia of surface  epithelium.    11.  Pancreaticoduodenectomy (Whipple procedure):  -  Infiltrating ductal adenocarcinoma, ranging from well to  poorly differentiated, 4 cm in maximum dimension.  -  Foci of high grade dysplasia/ carcinoma in situ involving  large residual ducts.  -  Few small cysts at the periphery of invasive tumor, mostly  lined by benign non-neoplastic epithelium, with rare foci of mild  atypia.  -  Rare foci of perineural invasion seen.  -  Tumor infiltrates duodenal wall.  -  Uncinate process margin negative for tumor.  -   Duodenal wall at the margins negative for tumor.  -   See specimen 5 for bile duct margin and specimen 9 for final  parenchymal margin (both  negative for invasive carcinoma and  carcinoma in situ).  -  Metastatic adenocarcinoma in five of twenty six regional lymph  nodes (5/26).  -  Tumor staging: pyT2, pN2.    1. Negative for malignancy    5. Bile duct margin: Negative for malignancy    6. Pancreatic neck margin: High grade dysplasia /  carcinoma in-situ, negative for   invasive carcinoma    8. Additional pancreatic margin: Negative for invasive  carcinoma. Few residual small ducts with cytologic atypia. Main pancreatic duct denuded of surfaceepithelium      9. New pancreatic margin: Negative for malignancy    Synoptic Summary  SURGICAL PATHOLOGY CANCER CASE SUMMARY    PROCEDURE:  Pancreaticoduodenectomy  TUMOR SITE:   Pancreatihead and uncinate process  HISTOLOGIC TYPE:  Ductal carcinoma (NOS)  HISTOLOGIC GRADE (DUCTAL CARCINOMA ONLY):  G3, poorly  differentiated  TUMOR SIZE:  4 cm  SITE(s) INVOLVED BY DIRECT TUMOR EXTENSION:  Duodenal wall  TREATMENT EFFECT:  Absent, with extensive residual cancer and no  evident tumor regression (poor or no response, score 3)  LYMPHOVASCULAR INVASION:  Not identified  PERINEURAL INVASION:  Present  MARGINS:  All margins negative for invasive carcinoma  MARGIN STATUS FOR DYSPLASIA AND INTRAEPITHELIAL NEOPLASIA:  All  margins negative for dysplasia and intraepithelial neoplasia  REGIONAL LYMPH NODES  REGIONAL LYMPH NODES STATUS:  Tumor present in regional lymph  node(s)  NUMBER OF LYMPH NODES WITH TUMOR:  5  NUMBER OF LYMPH NODES  EXAMINED: 29  DISTANT METASTASIS:  Not applicable  PATHOLOGIC STAGE CLASSIFICATION  TNM DESCRIPTORS: y (post-treatment)  pT CATEGORY:  pT2:  Tumor greater than 2 cm and less than or  equal to 4 cm in greatest dimension  pN CATEGORY:  pN2:  Metastasis in four or more regional lymph  nodes  pM CATEGORY:  Not applicable  ADDITIONAL FINDINGS:  Pancreatic intraepithelial neoplasia  (PanIN);  (specify highest grade):  PaIN 3    < from: NM PET/CT Onc FDG Skull to Thigh, Subsq (22 @ 12:01) >    IMPRESSION:    1. Since 2021, no new site of pathologic FDG uptake.    2. Increased focal FDG uptake in the region of the pancreatectomy bed,   just anterior to the aorta and IVC, with max SUV 3.4 (previously 2.6,   which reflects a 31% increase), possibly reflecting biologic tumor   activity.    3. New moderate to large size non-FDG avid right pleural effusion.

## 2022-04-21 NOTE — CONSULT NOTE ADULT - ASSESSMENT
IMP:  - pancreatic cancer  - severe cancer cachexia  - malabsorption on Creon  - acute hypoxic resp failure r/t fluid overload      s/p thoracentesis of pleural effusion, and paracentesis of ascites    d/w resident  - need to ascertain Onc plans (reportedly last chemo was in Nov?)  - whether or not further or continued SNS or other aggressive interventions are continued depend entirely on treatment options for the primary cancer  - NG placed later today  - suggest Peptamen 1.5 start 125 ml over 30 min q6h x 2 feeds  - f/u BMP, phos, and Mg and correct t wnl  - then increase to 250 ml over 30-40 min x 3 feeds/d (q8h or on meal pattern)  - do NOT try to put Creon down the NG tube

## 2022-04-21 NOTE — PROGRESS NOTE ADULT - ATTENDING COMMENTS
would cont tube feeding and consider PEG tube, she needs PT and psychiatry eval,     no evidence of cancer recurrence, however she needs repeat imaging in future,

## 2022-04-21 NOTE — CONSULT NOTE ADULT - ASSESSMENT
75 yo F with PMHx of HTN, Afib on Eliquis, pancreatic adenocarcinoma presented for worsening shortness of breath. CT chest showed increased moderate left pleural effusion, enlarged right pleural effusion and scattered ground glass opacities.   Oncology consulted for pancreatic adenocarcinoma.     IMPRESSION    1. Worsening bilateral pleural effusions     -s/p thoracentesis on 4/6: Negative cytology; Transudative (prev admission). S/p repeat thoracentesis on 4/19 with cytology pending.    2. Pancreatic adenocarcinoma    -s/p 3 cycles of neoadjuvant [place]/Abraxane, followed by Whipple procedure in 8/2021.     -Last PET scan 1/2022: No new site of pathologic FDG uptake. Increased focal FDG uptake in the region of pancreatectomy bed, possibly reflecting biologic tumor activity. New moderate to large size non FDG avid right pleural effusion.  75 yo F with PMHx of HTN, Afib on Eliquis, pancreatic adenocarcinoma presented for worsening shortness of breath. CT chest showed increased moderate left pleural effusion, enlarged right pleural effusion and scattered ground glass opacities.   Oncology consulted for pancreatic adenocarcinoma.     IMPRESSION    1. Worsening bilateral pleural effusions     -s/p thoracentesis on 4/6: Negative cytology; Transudative (prev admission). S/p repeat thoracentesis on 4/19 with cytology pending.    - from: TTE Echo Complete w/o Contrast w/ Doppler (04.08.22): Normal global left ventricular systolic function. Severely enlarged left atrium. Severely enlarged right atrium. LV Ejection Fraction by Nick's Method with a biplane EF of 54 %.    -Differential includes, cardiac etiology with hypoalbuminemia vs malignant, although cytology x1 has been negative for malignant cells    2. Pancreatic adenocarcinoma    -s/p 3 cycles of neoadjuvant [place]/Abraxane, followed by Whipple procedure in 8/2021.     -Last PET scan 1/2022: No new site of pathologic FDG uptake. Increased focal FDG uptake in the region of pancreatectomy bed, possibly reflecting biologic tumor activity. New moderate to large size non FDG avid right pleural effusion.     3. Failure to thrive    RECOMMENDATIONS:    -Recommend pulmonary follow up for possible Pleurx catheter for symptoms of dyspnea and palliation.   -Consider cardiology evaluation for optimizing medications given the 2D echo findings.   -Can get CT A/P to evaluate for pancreatic cancer. Can check Ca-19-9 although it can be falsely elevated given effusions and ascites.   -Pt is very deconditioned and cachetic. We discussed extensively about patient's prognosis with the daughter and son in law. We explained that patient is very weak, with advanced disease and co-morbidities, and will not be a good candidate for cancer-directed therapy. We would recommend symptom based care with focus on keeping the patient comfortable with no aggressive measures.   -Palliative care evaluation. 75 yo F with PMHx of HTN, Afib on Eliquis, pancreatic adenocarcinoma presented for worsening shortness of breath. CT chest showed increased moderate left pleural effusion, enlarged right pleural effusion and scattered ground glass opacities.   Oncology consulted for pancreatic adenocarcinoma.     IMPRESSION    1. Worsening bilateral pleural effusions     -s/p thoracentesis on 4/6: Negative cytology; Transudative (prev admission). S/p repeat thoracentesis on 4/19 with cytology pending.    - from: TTE Echo Complete w/o Contrast w/ Doppler (04.08.22): Normal global left ventricular systolic function. Severely enlarged left atrium. Severely enlarged right atrium. LV Ejection Fraction by Nick's Method with a biplane EF of 54 %.    -Differential includes, cardiac etiology with hypoalbuminemia vs malignant, although cytology x1 has been negative for malignant cells    2. Pancreatic adenocarcinoma    -s/p 3 cycles of neoadjuvant [place]/Abraxane, followed by Whipple procedure in 8/2021.     -Last PET scan 1/2022: No new site of pathologic FDG uptake. Increased focal FDG uptake in the region of pancreatectomy bed, possibly reflecting biologic tumor activity. New moderate to large size non FDG avid right pleural effusion.     3. Failure to thrive    RECOMMENDATIONS:    -Recommend pulmonary follow up for possible Pleurx catheter for symptoms of dyspnea and palliation.   -Consider cardiology evaluation for optimizing medications given the 2D echo findings.   -Can get CT A/P to evaluate for pancreatic cancer. Can check Ca-19-9   -Pt is very deconditioned and cachetic. We discussed extensively about patient's prognosis with the daughter and son in law. We explained that patient is very weak, with advanced disease and co-morbidities, and will not be a good candidate for cancer-directed therapy. We would recommend symptom based care with focus on keeping the patient comfortable with no aggressive measures.   -Palliative care evaluation.

## 2022-04-21 NOTE — PROGRESS NOTE ADULT - SUBJECTIVE AND OBJECTIVE BOX
Patient is a 76y old  Female who presents with a chief complaint of Shortness of breath (2022 16:18)        Over Night Events:    On HFNC 40% 40 L. Decreased oral intake.     ROS:  See HPI    PHYSICAL EXAM    ICU Vital Signs Last 24 Hrs  T(C): 35.9 (2022 05:30), Max: 36.2 (2022 20:00)  T(F): 96.7 (2022 05:30), Max: 97.1 (2022 20:00)  HR: 111 (2022 05:30) (103 - 124)  BP: 111/78 (2022 05:30) (108/70 - 151/98)  BP(mean): 115 (2022 11:55) (115 - 115)  ABP: --  ABP(mean): --  RR: 18 (2022 05:30) (18 - 18)  SpO2: 97% (2022 23:50) (97% - 100%)      22 @ 07:01  -  - @ 07:00  --------------------------------------------------------  IN:  Total IN: 0 mL    OUT:    Indwelling Catheter - Urethral (mL): 350 mL  Total OUT: 350 mL    Total NET: -350 mL            CONSTITUTIONAL:  Ill appearing. Cachectic. In  NAD    ENT:   Airway patent,   No thrush    EYES:   Clear bilaterally,   pupils equal,   round and reactive to light.    CARDIAC:   tachycardic,   irregular rhythm.    no edema      CAROTID:   normal systolic impulse  no bruits    RESPIRATORY:   No wheezing  Normal chest expansion  Not tachypneic,  No use of accessory muscles    GASTROINTESTINAL:  Abdomen soft,   non-tender,   no guarding,   + BS    MUSCULOSKELETAL:   range of motion is not limited,  no clubbing, cyanosis    NEUROLOGICAL:   Alert   no motor deficits.    SKIN:   Skin normal color for race,   No evidence of rash.    PSYCHIATRIC:   normal mood and affect.   no apparent risk to self or others.        LABS:                            12.5   8.06  )-----------( 174      ( 2022 01:20 )             41.4                                                   146  |  109  |  33<H>  ----------------------------<  92  5.2<H>   |  22  |  0.8    Ca    8.5      2022 01:20  Mg     2.3         TPro  4.4<L>  /  Alb  2.0<L>  /  TBili  0.4  /  DBili  x   /  AST  72<H>  /  ALT  40  /  AlkPhos  486<H>        PT/INR - ( 2022 10:54 )   PT: 26.40 sec;   INR: 2.31 ratio         PTT - ( 2022 10:54 )  PTT:39.4 sec                                       Urinalysis Basic - ( 2022 00:30 )    Color: Yellow / Appearance: Slightly Turbid / S.016 / pH: x  Gluc: x / Ketone: Negative  / Bili: Negative / Urobili: <2 mg/dL   Blood: x / Protein: Trace / Nitrite: Negative   Leuk Esterase: Small / RBC: 16 /HPF / WBC 12 /HPF   Sq Epi: x / Non Sq Epi: 3 /HPF / Bacteria: Negative                                                  LIVER FUNCTIONS - ( 2022 01:20 )  Alb: 2.0 g/dL / Pro: 4.4 g/dL / ALK PHOS: 486 U/L / ALT: 40 U/L / AST: 72 U/L / GGT: x                    Procalcitonin, Serum: 0.08 ng/mL (22 @ 20:00)  D-Dimer Assay, Quantitative: 1104 ng/mL DDU (22 @ 05:38)                                        Culture - Fungal, Body Fluid (collected 2022 14:19)  Source: .Body Fluid Pleural Fluid  Preliminary Report (2022 08:39):    Testing in progress    Culture - Body Fluid with Gram Stain (collected 2022 14:19)  Source: .Body Fluid Pleural Fluid  Gram Stain (2022 03:41):    polymorphonuclear leukocytes seen    No organisms seen    by cytocentrifuge  Preliminary Report (2022 20:29):    No growth    Culture - Blood (collected 2022 20:00)  Source: .Blood Blood-Peripheral  Preliminary Report (2022 09:01):    No growth to date.                                                                                       ABG - ( 2022 13:05 )  pH, Arterial: 7.51  pH, Blood: x     /  pCO2: 40    /  pO2: 132   / HCO3: 32    / Base Excess: 8.2   /  SaO2: 96.1                MEDICATIONS  (STANDING):  cefTRIAXone   IVPB 1000 milliGRAM(s) IV Intermittent every 24 hours  cefTRIAXone   IVPB      diltiazem    milliGRAM(s) Oral daily  dronabinol 2.5 milliGRAM(s) Oral two times a day  enoxaparin Injectable 40 milliGRAM(s) SubCutaneous every 12 hours  furosemide   Injectable 40 milliGRAM(s) IV Push daily  magnesium oxide 400 milliGRAM(s) Oral two times a day with meals  mirtazapine 15 milliGRAM(s) Oral at bedtime  pancrelipase  (CREON 12,000 Lipase Units) 2 Capsule(s) Oral three times a day with meals  pantoprazole    Tablet 40 milliGRAM(s) Oral before breakfast    MEDICATIONS  (PRN):  acetaminophen     Tablet .. 650 milliGRAM(s) Oral every 6 hours PRN Temp greater or equal to 38C (100.4F), Mild Pain (1 - 3)  simethicone 80 milliGRAM(s) Chew three times a day PRN Gas  verapamil 40 milliGRAM(s) Oral two times a day PRN tachycardia faster tahn 110 beats/ minute      Xrays:                                                                                     ECHO

## 2022-04-21 NOTE — PHYSICAL THERAPY INITIAL EVALUATION ADULT - SPECIFY REASON(S)
300 pm Attempted to see pt for PT initial evaluation however, pt is somnolent, unable to participate at this time . Spoke to pt's family at bedside,  plan of care discussed. Will continue to f/u.

## 2022-04-21 NOTE — PROGRESS NOTE ADULT - SUBJECTIVE AND OBJECTIVE BOX
LI, VIKASH  76y Female    CHIEF COMPLAINT:    Patient is a 76y old  Female who presents with a chief complaint of Shortness of breath (21 Apr 2022 11:26)    INTERVAL HPI/OVERNIGHT EVENTS:    Patient seen and examined. No acute events overnight. on HFNC     ROS: All other systems are negative.    Vital Signs:    T(F): 96.1 (04-21-22 @ 14:13), Max: 97.1 (04-20-22 @ 20:00)  HR: 109 (04-21-22 @ 14:13) (105 - 124)  BP: 116/86 (04-21-22 @ 14:13) (110/74 - 116/86)  RR: 18 (04-21-22 @ 14:13) (18 - 18)  SpO2: 99% (04-21-22 @ 14:13) (97% - 100%)    20 Apr 2022 07:01  -  21 Apr 2022 07:00  --------------------------------------------------------  IN: 0 mL / OUT: 350 mL / NET: -350 mL    PHYSICAL EXAM:    GENERAL:  NAD, cachectic, chronically ill appearing, pale   SKIN: No rashes or lesions  HEENT: Atraumatic. Normocephalic   NECK: Supple, No JVD.    PULMONARY: Coarse breath sounds  B/L. No wheezing.    CVS: Normal S1, S2. Rate and Rhythm are regular.   ABDOMEN/GI: Soft, Nontender, Nondistended   MSK:  No clubbing or cyanosis   NEUROLOGIC: moves all extremities   PSYCH: Awake and alert, answers questions appropriately     Consultant(s) Notes Reviewed:  [x ] YES  [ ] NO  Care Discussed with Consultants/Other Providers [ x] YES  [ ] NO    LABS:                        12.4   12.02 )-----------( 221      ( 21 Apr 2022 04:30 )             39.7     143  |  105  |  40<H>  ----------------------------<  86  5.0   |  22  |  0.7    Ca    8.7      21 Apr 2022 04:30  Mg     2.2     04-21    TPro  5.7<L>  /  Alb  2.7<L>  /  TBili  0.5  /  DBili  x   /  AST  141<H>  /  ALT  86<H>  /  AlkPhos  790<H>  04-21    Serum Pro-Brain Natriuretic Peptide: 2151 pg/mL (04-18-22 @ 08:10)    Culture - Fungal, Body Fluid (collected 19 Apr 2022 14:19)  Source: .Body Fluid Pleural Fluid  Preliminary Report (20 Apr 2022 08:39):    Testing in progress    Culture - Body Fluid with Gram Stain (collected 19 Apr 2022 14:19)  Source: .Body Fluid Pleural Fluid  Gram Stain (20 Apr 2022 03:41):    polymorphonuclear leukocytes seen    No organisms seen    by cytocentrifuge  Preliminary Report (20 Apr 2022 20:29):    No growth    Culture - Blood (collected 18 Apr 2022 20:00)  Source: .Blood Blood-Peripheral  Preliminary Report (20 Apr 2022 09:01):    No growth to date.    RADIOLOGY & ADDITIONAL TESTS:  Imagin or report Personally Reviewed:  [x] YES  [ ] NO  EKG reviewed: [x] YES  [ ] NO    Medications:  Standing  apixaban 5 milliGRAM(s) Oral every 12 hours  cefTRIAXone   IVPB 1000 milliGRAM(s) IV Intermittent every 24 hours  cefTRIAXone   IVPB      dextrose 5%. 1000 milliLiter(s) IV Continuous <Continuous>  diltiazem    milliGRAM(s) Oral daily  dronabinol 2.5 milliGRAM(s) Oral two times a day  magnesium oxide 400 milliGRAM(s) Oral two times a day with meals  mirtazapine 15 milliGRAM(s) Oral at bedtime  pancrelipase  (CREON 12,000 Lipase Units) 2 Capsule(s) Oral three times a day with meals  pantoprazole    Tablet 40 milliGRAM(s) Oral before breakfast    PRN Meds  acetaminophen     Tablet .. 650 milliGRAM(s) Oral every 6 hours PRN  simethicone 80 milliGRAM(s) Chew three times a day PRN  verapamil 40 milliGRAM(s) Oral two times a day PRN

## 2022-04-21 NOTE — CONSULT NOTE ADULT - ATTENDING COMMENTS
Attending Statement: I have personally performed a face to face diagnostic evaluation on this patient. The patient is suffering from:  Acute Hypoxemic Respiratory Failure on NC  B/l effusion R>L  Acities   Pancreatic Cancer advanced   I have reviewed the above note and agree with the history, exam and suggestions for care, except as I have noted in the text. I have amended the treatment plans as necessary.
need thoracentesis, paracentesis, nutrition consult, rehab consult, need CT after thoracentesis and paracentesis, need cardio consult, need BNP and echo
Agree with above A/P  Had a long discussion with pt's family.  They want a feeding tube inserted to maximize po intake.  Pt does not have any difficulty swallowing and can try po feeding with encouragement. NGT or PEG will not help improve pt's situation. She should be offered supportive measures and symptom control.  Family id not agreeable. I discussed case with Hospitalist and medical team also.

## 2022-04-21 NOTE — PROGRESS NOTE ADULT - PROBLEM SELECTOR PLAN 1
- not on recent DDT  - discussed at GOC meeting, they are open to further DDT if offered  - family needs medical update - input about prognostication from oncology/surgery needed   - ongoing supportive care

## 2022-04-21 NOTE — PROGRESS NOTE ADULT - ASSESSMENT
IMPRESSION:    Acute Hypoxemic Respiratory Failure improved  Acute hypercapnic respiratory failure  AMS likely toxic metabolic improved   B/l effusion R>L,  SP right thoracentesis transudative effusion   Ascites   Pancreatic Cancer advanced  s/p whipple 8/21  Afib on Eliquis  UTI    PLAN:    CNS: no excessive sedation.     HEENT: Oral care    PULMONARY:  HOB @ 45 degrees.  Aspiration precautions.  Wean O2    CARDIOVASCULAR: Keep negative balance. Rate control.      GI: GI prophylaxis.  Feeding per NGT. Place NGT.  Bowel regimen .    RENAL:  Follow up lytes.  Correct as needed    INFECTIOUS DISEASE: Follow up cultures. Rocephin x 5 days.    HEMATOLOGICAL:  Resume Eliquis    ENDOCRINE:  Follow up FS.  Insulin protocol if needed.    MUSCULOSKELETAL: OOBTC    SDU  Poor prognosis  Palliative eval appreciated         IMPRESSION:    Acute Hypoxemic Respiratory Failure improving  Acute hypercapnic respiratory failure  AMS likely toxic metabolic improved   B/l effusion R>L,  SP right thoracentesis transudative effusion   Ascites   Pancreatic Cancer advanced  s/p whipple 8/21  Afib on Eliquis  UTI    PLAN:    CNS: no excessive sedation.     HEENT: Oral care    PULMONARY:  HOB @ 45 degrees.  Aspiration precautions.  Wean O2    CARDIOVASCULAR: Keep negative balance. Rate control.      GI: GI prophylaxis.  Feeding per NGT. Place NGT.  Bowel regimen .    RENAL:  Follow up lytes.  Correct as needed    INFECTIOUS DISEASE: Follow up cultures. Rocephin x 5 days.    HEMATOLOGICAL:  Continue LMWH for now.      ENDOCRINE:  Follow up FS.  Insulin protocol if needed.    MUSCULOSKELETAL: OOBTC    Very poor prognosis    Downgrade to med surg     Palliative eval appreciated

## 2022-04-22 NOTE — PROGRESS NOTE ADULT - SUBJECTIVE AND OBJECTIVE BOX
LI, VIKASH  76y Female    CHIEF COMPLAINT:    Patient is a 76y old  Female who presents with a chief complaint of Shortness of breath (2022 10:39)    INTERVAL HPI/OVERNIGHT EVENTS:    Patient seen and examined. s/p ariza placement and disimpaction overnight, worsening nausea today     ROS: All other systems are negative.    Vital Signs:    T(F): 96.9 (22 @ 11:59), Max: 98.9 (22 @ 00:21)  HR: 95 (22 @ 12:42) (94 - 109)  BP: 105/73 (22 @ 11:59) (105/73 - 116/93)  RR: 20 (22 @ 11:59) (18 - 20)  SpO2: 99% (22 @ 12:42) (98% - 100%)    2022 07:  -  2022 07:00  --------------------------------------------------------  IN: 0 mL / OUT: 650 mL / NET: -650 mL    2022 07:01  -  2022 15:51  --------------------------------------------------------  IN: 0 mL / OUT: 250 mL / NET: -250 mL    Daily Weight in k.4 (2022 09:09)    POCT Blood Glucose.: 87 mg/dL (2022 14:50)  POCT Blood Glucose.: 33 mg/dL (2022 14:49)    PHYSICAL EXAM:    GENERAL:  NAD, pale, cachectic   SKIN: No rashes or lesions  HEENT: Atraumatic. Normocephalic  NECK: Supple, No JVD. No lymphadenopathy.  PULMONARY: decreased breath sounds b/lB/L. No wheezing. No rales  CVS: Normal S1, S2. Rate and Rhythm are regular.    ABDOMEN/GI: Soft, Nontender, mildly distended; BS present  MSK:  No edema B/L LE. LUE edema, no clubbing   NEUROLOGIC:  follows simple commands   PSYCH: lethargic, arousable     Consultant(s) Notes Reviewed:  [x ] YES  [ ] NO  Care Discussed with Consultants/Other Providers [ x] YES  [ ] NO    LABS:                        11.0   10.26 )-----------( 181      ( 2022 02:40 )             35.4     143  |  103  |  42<H>  ----------------------------<  93  4.6   |  23  |  0.7    Ca    8.5      2022 02:40  Mg     2.2         TPro  5.5<L>  /  Alb  2.6<L>  /  TBili  0.4  /  DBili  x   /  AST  106<H>  /  ALT  79<H>  /  AlkPhos  701<H>      Serum Pro-Brain Natriuretic Peptide: 2151 pg/mL (22 @ 08:10)    Culture - Urine (collected 2022 00:30)  Source: Catheterized Catheterized  Final Report (2022 05:39):    <10,000 CFU/mL Normal Urogenital Callie    RADIOLOGY & ADDITIONAL TESTS:  Imaging or report Personally Reviewed:  [x] YES  [ ] NO  EKG reviewed: [x] YES  [ ] NO    Medications:  Standing  dextrose 5%. 1000 milliLiter(s) IV Continuous <Continuous>  dextrose 50% Injectable 25 Gram(s) IV Push once  diltiazem    milliGRAM(s) Oral daily  dronabinol 2.5 milliGRAM(s) Oral two times a day  enoxaparin Injectable 40 milliGRAM(s) SubCutaneous every 12 hours  magnesium oxide 400 milliGRAM(s) Oral two times a day with meals  mirtazapine 15 milliGRAM(s) Oral at bedtime  pantoprazole    Tablet 40 milliGRAM(s) Oral before breakfast  polyethylene glycol 3350 17 Gram(s) Oral daily  senna 2 Tablet(s) Oral at bedtime    PRN Meds  acetaminophen     Tablet .. 650 milliGRAM(s) Oral every 6 hours PRN  simethicone 80 milliGRAM(s) Chew three times a day PRN  verapamil 40 milliGRAM(s) Oral two times a day PRN

## 2022-04-22 NOTE — PROGRESS NOTE ADULT - ASSESSMENT
75 YO F with PMHx of HTN, pancreatic CA s/p Whipple 8/2021, A.Fib on Eliquis, pleural effusion was BIBEMS due to worsening SOB    Acute Hypoxemic Respiratory Failure likely due to volume overload  b/l Pleural effusions with ascites  Pancreatic Ca s/p Whipple 8/21  Transaminitis  - remains on HFNC 40/50 alternating with BIPAP, more lethargic today, complains of worsening nausea   - s/p THora 4/19 with 750 cc fluid removed, transudative  - f/u cx/cytology   - paracentesis could not be performed  - s/p lasix  - cardiology eval with Dr West for med optimization   - Palliative team following, patient is full code at this time   - c/w Creon 12K q8 with meals   - surgery following  - monitor LFT's  - pending CT A/P     Chronic A.Fib on Eliquis  - rate control with diltiazem   - on lovenox     Poor PO intake  Severe malnutrition  History of eating disorder  ensure vanilla, started marinol 4/20  NGT placed, feeds to start today after CT A/P  Dr De Luna following  family requesting psychiatry eval given history of eating disorder     GOC - full code  overall very poor prognosis    #Progress Note Handoff  Pending (specify):   clinical improvement tapering down supplemental 02, monitor PO intake, cardiology eval, monitor LFTs    Family discussion: Plan of care discussed with patient, daughter and son in law at bedside extensively, informed of overall poor prognosis, all questions answered, aware and agreeable   Disposition:  from [facility]     Amanda Garcia MD  s. [phone]

## 2022-04-22 NOTE — PROGRESS NOTE ADULT - ATTENDING COMMENTS
IMPRESSION:    Acute Hypoxemic Respiratory Failure improving  Acute hypercapnic respiratory failure  AMS likely toxic metabolic improved   B/l effusion R>L,  SP right thoracentesis x2 transudative effusion   Ascites   Pancreatic Cancer advanced  s/p whipple 8/21  Afib on Eliquis  UTI    Plan as outlined above

## 2022-04-22 NOTE — CONSULT NOTE ADULT - SUBJECTIVE AND OBJECTIVE BOX
Patient is a 76y old  Female who presents with a chief complaint of Shortness of breath (2022 15:50)      HPI:  77 YO F with PMHx of HTN, pancreatic CA s/p Whipple 2021, KSENIA.Afshan on Eliquis, pleural effusion was BIBEMS due to worsening SOB that started earlier on the morning of presentation  As per EMS, patient was saturating in the 70s on room air and was placed on NRB w/ sats improving to high 80s. Patient reports that she lives with her son and started noticing shortness of breath.   Patient had recent admission from [facility] and had thoracentesis done  Patient denies fevers, chills, headache, chest pain, palpitations, constipation, melena, hematochezia, dysuria, urinary frequency or urgency, numbness, tingling, recent travel or any known sick contact.   In the ED patient was placed on non rebreather with SpO2 of 94%. .  X-ray chest showed Interval increase in bilateral effusions and opacities. (2022 11:47)      PAST MEDICAL & SURGICAL HISTORY:  SOB (shortness of breath)    Pain  knee    Varicose veins of both lower extremities, unspecified whether complicated    Atrial fibrillation, unspecified type  2019 on Eliquis    Jaundice  2021    Malignant neoplasm of pancreas, unspecified location of malignancy  Pancreatic Cancer    Hypertension, unspecified type  2019    Pancreatic cancer    Kidney stones    History of surgery  Whipple procedure 2021 with Dr. Nino at [facility]    Status post phlebectomy  10/2018    History of ovarian cystectomy  left    History of tonsillectomy  89    Kidney stone          PREVIOUS DIAGNOSTIC TESTING:      ECHO  FINDINGS:    STRESS  FINDINGS:    CATHETERIZATION  FINDINGS:    MEDICATIONS  (STANDING):  diltiazem    milliGRAM(s) Oral daily  dronabinol 2.5 milliGRAM(s) Oral two times a day  enoxaparin Injectable 40 milliGRAM(s) SubCutaneous every 12 hours  magnesium oxide 400 milliGRAM(s) Oral two times a day with meals  mirtazapine 15 milliGRAM(s) Oral at bedtime  pantoprazole    Tablet 40 milliGRAM(s) Oral before breakfast  polyethylene glycol 3350 17 Gram(s) Oral daily  senna 2 Tablet(s) Oral at bedtime    MEDICATIONS  (PRN):  acetaminophen     Tablet .. 650 milliGRAM(s) Oral every 6 hours PRN Temp greater or equal to 38C (100.4F), Mild Pain (1 - 3)  simethicone 80 milliGRAM(s) Chew three times a day PRN Gas  verapamil 40 milliGRAM(s) Oral two times a day PRN tachycardia faster tahn 110 beats/ minute      FAMILY HISTORY:  CAD (coronary artery disease) (Sibling)  3  siblings ( 2 living and 1 passed away)        SOCIAL HISTORY:  CIGARETTES:    ALCOHOL:    REVIEW OF SYSTEMS:  CONSTITUTIONAL: No fever, weight loss, or fatigue  NECK: No pain or stiffness  RESPIRATORY: No cough, wheezing, chills or hemoptysis; No shortness of breath  CARDIOVASCULAR: No chest pain, palpitations, dizziness, or leg swelling  GASTROINTESTINAL: No abdominal or epigastric pain. No nausea, vomiting, or hematemesis; No diarrhea or constipation. No melena or hematochezia.  GENITOURINARY: No dysuria, frequency, hematuria, or incontinence  NEUROLOGICAL: No headaches, memory loss, loss of strength, numbness, or tremors  SKIN: No itching, burning, rashes, or lesions   ENDOCRINE: No heat or cold intolerance; No hair loss  MUSCULOSKELETAL: No joint pain or swelling; No muscle, back, or extremity pain  HEME/LYMPH: No easy bruising, or bleeding gums          Vital Signs Last 24 Hrs  T(C): 36.5 (2022 16:01), Max: 37.2 (2022 00:21)  T(F): 97.7 (2022 16:01), Max: 98.9 (2022 00:21)  HR: 90 (2022 16:36) (90 - 108)  BP: 123/80 (2022 16:01) (105/73 - 123/80)  BP(mean): 84 (2022 11:59) (84 - 101)  RR: 20 (2022 16:01) (18 - 20)  SpO2: 99% (2022 16:36) (88% - 100%)        PHYSICAL EXAM:  GENERAL: NAD, well-groomed, well-developed  HEAD:  Atraumatic, Normocephalic  NECK: Supple, No JVD, Normal thyroid  NERVOUS SYSTEM:  Alert & Oriented X3, Good concentration  CHEST/LUNG: Clear to percussion bilaterally; No rales, rhonchi, wheezing, or rubs  HEART: Regular rate and rhythm; No murmurs, rubs, or gallops  ABDOMEN: Soft, Nontender, Nondistended; Bowel sounds present  EXTREMITIES:  2+ Peripheral Pulses, No clubbing, cyanosis, or edema  SKIN: No rashes or lesions    INTERPRETATION OF TELEMETRY:    ECG:    I&O's Detail    2022 07:01  -  2022 07:00  --------------------------------------------------------  IN:  Total IN: 0 mL    OUT:    Indwelling Catheter - Urethral (mL): 650 mL  Total OUT: 650 mL    Total NET: -650 mL      2022 07:01  -  2022 19:44  --------------------------------------------------------  IN:  Total IN: 0 mL    OUT:    Indwelling Catheter - Urethral (mL): 250 mL  Total OUT: 250 mL    Total NET: -250 mL          LABS:                        11.0   10.26 )-----------( 181      ( 2022 02:40 )             35.4         143  |  103  |  42<H>  ----------------------------<  93  4.6   |  23  |  0.7    Ca    8.5      2022 02:40  Mg     2.2         TPro  5.5<L>  /  Alb  2.6<L>  /  TBili  0.4  /  DBili  x   /  AST  106<H>  /  ALT  79<H>  /  AlkPhos  701<H>            Urinalysis Basic - ( 2022 00:30 )    Color: Yellow / Appearance: Slightly Turbid / S.016 / pH: x  Gluc: x / Ketone: Negative  / Bili: Negative / Urobili: <2 mg/dL   Blood: x / Protein: Trace / Nitrite: Negative   Leuk Esterase: Small / RBC: 16 /HPF / WBC 12 /HPF   Sq Epi: x / Non Sq Epi: 3 /HPF / Bacteria: Negative      I&O's Summary    2022 07:01  -  2022 07:00  --------------------------------------------------------  IN: 0 mL / OUT: 650 mL / NET: -650 mL    2022 07:01  -  2022 19:44  --------------------------------------------------------  IN: 0 mL / OUT: 250 mL / NET: -250 mL        RADIOLOGY & ADDITIONAL STUDIES:

## 2022-04-22 NOTE — PROGRESS NOTE ADULT - ASSESSMENT
75 YO F with PMHx of HTN, pancreatic CA s/p Whipple 8/2021, A.Fib on Eliquis, pleural effusion was BIBEMS due to worsening SOB that started earlier this morning    IMPRESSION:  Acute Hypoxemic Respiratory Failure improved  Acute hypercapnic respiratory failure  AMS likely toxic metabolic improved   B/l effusion R>L,  SP right thoracentesis transudative effusion   Ascites   Pancreatic Cancer advanced  s/p whipple 8/21  Afib on Eliquis - restarted today 4/21  UTI      #Acute Hypoxemic Respiratory Failure  #B/l pleural effusion; R>L  #Ascites   #Pancreatic Ca  - Xray Chest: Interval increase in bilateral effusions and opacities  - BNP 2151  - Echo: EF 54%, severely enlarged LA, RA. Mod-severe TR  - Currently on HFNC 40/80  - Surgery recs appreciated   - Surgery is requesting pigtail catheters to be placed in bilateral thoracic cavity and abdominal cavity  - CTAP after thoracentesis and paracentesis  - Procalcitonin .08  - Blood cultures NGTD  - Ammonia level 33  - Palliative rec appreciated: family meeting 4/20 at 11am. Remains fulls code for now   - s/p Thoracentesis 4/19 drained 750ml   - Pleural fluid Cytology neg  - Pleural fluid analysis shows transudative fluid   - Was unable to perform Paracentesis  - C/w ceftriaxone    - Change Lasix 40mg IV from QD to every other day   - UA + for LE  - Ucx NGTD   - D5 30cc/hr    #Transaminitis, likely from hepatic congestion  - TPro  5.6<L>  /  Alb  2.9<L>  /  TBili  0.7  /  DBili  x   /  AST  64<H>  /  ALT  47<H>  /  AlkPhos  550<H>  04-19  - TPro  5.7<L>  /  Alb  2.7<L>  /  TBili  0.5  /  DBili  x   /  AST  141<H>  /  ALT  86<H>  /  AlkPhos  790<H>  04-21  - Monitor LFTs   - RUQ US Pneumobilia with perihepatic ascites.    #Decrease PO intake   - NGT placed on 4/21, after discussing with Fam   - Peptamen 1.5 start 125 ml over 30 min q6h x 2 feeds, then increase to 250 ml over 30-40 min x 3 feeds/d (q8h or on meal pattern)  - DO NOT try to put Creon down the NG tube    #Chronic A.Fib on Eliquis  - Continue Verapamil PRN and diltiazem CD 120mg daily  - 4/21: restarted Eliquis     #Hx Pancreatic Ca   - s/p whipple 8/1/21 (in remission)   - c/w Creon 12K TID before meals   - Evaluated by surgery team on April 7. No further intervention   - Heme/onc recs appreciated   - F/u CT A/P to evaluate for pancreatic cancer.   - F/u Ca-19-9     #Misc  - DVT Prophylaxis: Eliquis   - Diet: DASH   - GI Prophylaxis: Pantoprazole  - Activity: AAT  - Code Status: Full Code  - Disposition: acute   Pending: CT AB/P   75 YO F with PMHx of HTN, pancreatic CA s/p Whipple 8/2021, A.Fib on Eliquis, pleural effusion was BIBEMS due to worsening SOB that started earlier this morning    IMPRESSION:  Acute Hypoxemic Respiratory Failure improved  Acute hypercapnic respiratory failure  AMS likely toxic metabolic improved   B/l effusion R>L,  SP right thoracentesis transudative effusion   Ascites   Pancreatic Cancer advanced  s/p whipple 8/21  Afib on Eliquis - restarted today 4/21  UTI      #Acute Hypoxemic Respiratory Failure  #B/l pleural effusion; R>L  #Ascites   #Pancreatic Ca  - Xray Chest: Interval increase in bilateral effusions and opacities  - BNP 2151  - Echo: EF 54%, severely enlarged LA, RA. Mod-severe TR  - Currently on HFNC 40/80  - Surgery recs appreciated   - Surgery is requesting pigtail catheters to be placed in bilateral thoracic cavity and abdominal cavity  - CTAP after thoracentesis and paracentesis  - Procalcitonin .08  - Blood cultures NGTD  - Ammonia level 33  - Palliative rec appreciated: family meeting 4/20 at 11am. Remains fulls code for now   - s/p Thoracentesis 4/19 drained 750ml   - Pleural fluid Cytology neg  - Pleural fluid analysis shows transudative fluid   - Was unable to perform Paracentesis  - C/w ceftriaxone    - Change Lasix 40mg IV from QD to every other day   - UA + for LE  - Ucx NGTD   - D5 30cc/hr    #Transaminitis, likely from hepatic congestion  - TPro  5.6<L>  /  Alb  2.9<L>  /  TBili  0.7  /  DBili  x   /  AST  64<H>  /  ALT  47<H>  /  AlkPhos  550<H>  04-19  - TPro  5.7<L>  /  Alb  2.7<L>  /  TBili  0.5  /  DBili  x   /  AST  141<H>  /  ALT  86<H>  /  AlkPhos  790<H>  04-21  - Monitor LFTs   - RUQ US Pneumobilia with perihepatic ascites.    #Decrease PO intake   - NGT placed on 4/21, after discussing with Fam   - Peptamen 1.5 start 125 ml over 30 min q6h x 2 feeds, then increase to 250 ml over 30-40 min x 3 feeds/d (q8h or on meal pattern)  - DO NOT try to put Creon down the NG tube    #Chronic A.Fib on Eliquis  - Continue Verapamil PRN and diltiazem CD 120mg daily  - 4/21: restarted Eliquis   - F/u Cradio recs for medication optimization     #Hx Pancreatic Ca   - s/p whipple 8/1/21 (in remission)   - c/w Creon 12K TID before meals   - Evaluated by surgery team on April 7. No further intervention   - Heme/onc recs appreciated   - F/u CT A/P to evaluate for pancreatic cancer.   - F/u Ca-19-9     #Misc  - DVT Prophylaxis: Eliquis   - Diet: DASH   - GI Prophylaxis: Pantoprazole  - Activity: AAT  - Code Status: Full Code  - Disposition: acute   Pending: CT AB/P, Cardio recsJUSTINO Duplex    75 YO F with PMHx of HTN, pancreatic CA s/p Whipple 8/2021, A.Fib on Eliquis, pleural effusion was BIBEMS due to worsening SOB that started earlier this morning    IMPRESSION:  Acute Hypoxemic Respiratory Failure improved  Acute hypercapnic respiratory failure  AMS likely toxic metabolic improved   B/l effusion R>L,  SP right thoracentesis transudative effusion   Ascites   Pancreatic Cancer advanced  s/p whipple 8/21  Afib on Eliquis - restarted today 4/21  UTI      #Acute Hypoxemic Respiratory Failure  #B/l pleural effusion; R>L  #Ascites   #Pancreatic Ca  - Xray Chest: Interval increase in bilateral effusions and opacities  - BNP 2151  - Echo: EF 54%, severely enlarged LA, RA. Mod-severe TR  - Currently on HFNC 40/80  - Surgery recs appreciated   - Surgery is requesting pigtail catheters to be placed in bilateral thoracic cavity and abdominal cavity  - CTAP after thoracentesis and paracentesis  - Procalcitonin .08  - Blood cultures NGTD  - Ammonia level 33  - Palliative rec appreciated: family meeting 4/20 at 11am. Remains fulls code for now   - s/p Thoracentesis 4/19 drained 750ml   - Pleural fluid Cytology neg  - Pleural fluid analysis shows transudative fluid   - Was unable to perform Paracentesis  - 4/22 D/c ceftriaxone    - Change Lasix 40mg IV from QD to every other day   - UA + for LE  - Ucx NGTD   - D5 30cc/hr    #Transaminitis, likely from hepatic congestion  - TPro  5.6<L>  /  Alb  2.9<L>  /  TBili  0.7  /  DBili  x   /  AST  64<H>  /  ALT  47<H>  /  AlkPhos  550<H>  04-19  - TPro  5.7<L>  /  Alb  2.7<L>  /  TBili  0.5  /  DBili  x   /  AST  141<H>  /  ALT  86<H>  /  AlkPhos  790<H>  04-21  - Monitor LFTs   - RUQ US Pneumobilia with perihepatic ascites.    #Decrease PO intake   - NGT placed on 4/21, after discussing with Fam   - Peptamen 1.5 start 125 ml over 30 min q6h x 2 feeds, then increase to 250 ml over 30-40 min x 3 feeds/d (q8h or on meal pattern)  - DO NOT try to put Creon down the NG tube    #Chronic A.Fib on Eliquis  - Continue Verapamil PRN and diltiazem CD 120mg daily  - 4/21: restarted Eliquis   - F/u Cradio recs for medication optimization     #Hx Pancreatic Ca   - s/p whipple 8/1/21 (in remission)   - c/w Creon 12K TID before meals   - Evaluated by surgery team on April 7. No further intervention   - Heme/onc recs appreciated   - F/u CT A/P to evaluate for pancreatic cancer.   - F/u Ca-19-9     #Misc  - DVT Prophylaxis: Eliquis   - Diet: DASH   - GI Prophylaxis: Pantoprazole  - Activity: AAT  - Code Status: Full Code  - Disposition: acute   Pending: CT AB/P, Cardio recsJUSTINO Duplex    77 YO F with PMHx of HTN, pancreatic CA s/p Whipple 8/2021, A.Fib on Eliquis, pleural effusion was BIBEMS due to worsening SOB that started earlier this morning    IMPRESSION:  Acute Hypoxemic Respiratory Failure improved  Acute hypercapnic respiratory failure  AMS likely toxic metabolic improved   B/l effusion R>L,  SP right thoracentesis transudative effusion   Ascites   Pancreatic Cancer advanced  s/p whipple 8/21  Afib on Eliquis - stopped and switched to Lovenox 4/22  UTI      #Acute Hypoxemic Respiratory Failure  #B/l pleural effusion; R>L  #Ascites   #Pancreatic Ca  - Xray Chest: Interval increase in bilateral effusions and opacities  - BNP 2151  - Echo: EF 54%, severely enlarged LA, RA. Mod-severe TR  - Currently on HFNC 40/80  - Surgery recs appreciated   - Surgery is requesting pigtail catheters to be placed in bilateral thoracic cavity and abdominal cavity  - CTAP after thoracentesis and paracentesis  - Procalcitonin .08  - Blood cultures NGTD  - Ammonia level 33  - Palliative rec appreciated: family meeting 4/20 at 11am. Remains fulls code for now   - s/p Thoracentesis 4/19 drained 750ml   - Pleural fluid Cytology neg  - Pleural fluid analysis shows transudative fluid   - Was unable to perform Paracentesis  - 4/22 D/c ceftriaxone    - Change Lasix 40mg IV from QD to every other day   - UA + for LE  - Ucx NGTD   - D5 30cc/hr    #Transaminitis, likely from hepatic congestion  - TPro  5.6<L>  /  Alb  2.9<L>  /  TBili  0.7  /  DBili  x   /  AST  64<H>  /  ALT  47<H>  /  AlkPhos  550<H>  04-19  - TPro  5.7<L>  /  Alb  2.7<L>  /  TBili  0.5  /  DBili  x   /  AST  141<H>  /  ALT  86<H>  /  AlkPhos  790<H>  04-21  - Monitor LFTs   - RUQ US Pneumobilia with perihepatic ascites.    #Decrease PO intake   - NGT placed on 4/21, after discussing with Fam   - Peptamen 1.5 start 125 ml over 30 min q6h x 2 feeds, then increase to 250 ml over 30-40 min x 3 feeds/d (q8h or on meal pattern)  - DO NOT try to put Creon down the NG tube    #Chronic A.Fib on Eliquis  - Continue Verapamil PRN and diltiazem CD 120mg daily  - 4/22 hold Eliquis, started on Lovenox   - F/u Cradio recs for medication optimization     #Hx Pancreatic Ca   - s/p whipple 8/1/21 (in remission)   - c/w Creon 12K TID before meals   - Evaluated by surgery team on April 7. No further intervention   - Heme/onc recs appreciated   - F/u CT A/P to evaluate for pancreatic cancer.   - F/u Ca-19-9     #Misc  - DVT Prophylaxis: Lovenox   - Diet: DASH   - GI Prophylaxis: Pantoprazole  - Activity: AAT  - Code Status: Full Code  - Disposition: acute   Pending: CT AB/P, Cardio recs, JUSTINO Duplex

## 2022-04-22 NOTE — PROGRESS NOTE ADULT - SUBJECTIVE AND OBJECTIVE BOX
----------Daily Progress Note----------    HISTORY OF PRESENT ILLNESS:  Patient is a 76y old Female who presents with a chief complaint of Shortness of breath (2022 16:31)    Currently admitted to medicine with the primary diagnosis of Pleural effusion    7 YO F with PMHx of HTN, pancreatic CA s/p Whipple 2021, A.Fib on Eliquis, pleural effusion was BIBEMS due to worsening SOB that started earlier on the morning of presentation  As per EMS, patient was saturating in the 70s on room air and was placed on NRB w/ sats improving to high 80s. Patient reports that she lives with her son and started noticing shortness of breath.   Patient had recent admission from [facility] and had thoracentesis done  Patient denies fevers, chills, headache, chest pain, palpitations, constipation, melena, hematochezia, dysuria, urinary frequency or urgency, numbness, tingling, recent travel or any known sick contact.   In the ED patient was placed on non rebreather with SpO2 of 94%. .  X-ray chest showed Interval increase in bilateral effusions and opacities.       Today is hospital day 4d.     INTERVAL HOSPITAL COURSE / OVERNIGHT EVENTS:    Patient was examined and seen at bedside. This morning she is resting comfortably in bed and reports no new issues or overnight events.     Review of Systems: Patient denies headache, dizziness. Patient denies chest pain, palpitation, SOB. Patient denies abdominal pain, n/v/d/c      <<<<<PAST MEDICAL & SURGICAL HISTORY>>>>>  SOB (shortness of breath)    Pain  knee    Varicose veins of both lower extremities, unspecified whether complicated    Atrial fibrillation, unspecified type  2019 on Eliquis    Jaundice  2021    Malignant neoplasm of pancreas, unspecified location of malignancy  Pancreatic Cancer    Hypertension, unspecified type  2019    Pancreatic cancer    Kidney stones    History of surgery  Whipple procedure 2021 with Dr. Nino at [facility]    Status post phlebectomy  10/2018    History of ovarian cystectomy  left    History of tonsillectomy  1959    Kidney stone        ALLERGIES  Augmentin (Rash)  chocolate (Headache)  digoxin (Hives)  Metoprolol Succinate ER (Hives)  Novocain (Angioedema)  Steroids: Excessive swelling (Flushing; Other (Mild to Mod))  sulfa drugs (Fever)  Xarelto (Hives)      Home Medications:  Cardizem  mg/24 hours oral capsule, extended release: 1 cap(s) orally once a day (2021 23:59)  Creon 12,000 units oral delayed release capsule: 2 cap(s) orally 3 times a day (with meals) (03 Dec 2021 10:54)  Eliquis 5 mg oral tablet: 1 tab(s) orally 2 times a day (03 Sep 2021 23:41)        MEDICATIONS  STANDING MEDICATIONS  apixaban 5 milliGRAM(s) Oral every 12 hours  cefTRIAXone   IVPB 1000 milliGRAM(s) IV Intermittent every 24 hours  cefTRIAXone   IVPB      dextrose 5%. 1000 milliLiter(s) IV Continuous <Continuous>  diltiazem    milliGRAM(s) Oral daily  dronabinol 2.5 milliGRAM(s) Oral two times a day  magnesium oxide 400 milliGRAM(s) Oral two times a day with meals  mirtazapine 15 milliGRAM(s) Oral at bedtime  pancrelipase  (CREON 12,000 Lipase Units) 2 Capsule(s) Oral three times a day with meals  pantoprazole    Tablet 40 milliGRAM(s) Oral before breakfast    PRN MEDICATIONS  acetaminophen     Tablet .. 650 milliGRAM(s) Oral every 6 hours PRN  simethicone 80 milliGRAM(s) Chew three times a day PRN  verapamil 40 milliGRAM(s) Oral two times a day PRN    VITALS:  T(F): 98.3  HR: 98  BP: 114/77  RR: 20  SpO2: 100%    <<<<<LABS>>>>>                        11.0   10.26 )-----------( 181      ( 2022 02:40 )             35.4     -    143  |  103  |  42<H>  ----------------------------<  93  4.6   |  23  |  0.7    Ca    8.5      2022 02:40  Mg     2.2         TPro  5.5<L>  /  Alb  2.6<L>  /  TBili  0.4  /  DBili  x   /  AST  106<H>  /  ALT  79<H>  /  AlkPhos  701<H>        Urinalysis Basic - ( 2022 00:30 )    Color: Yellow / Appearance: Slightly Turbid / S.016 / pH: x  Gluc: x / Ketone: Negative  / Bili: Negative / Urobili: <2 mg/dL   Blood: x / Protein: Trace / Nitrite: Negative   Leuk Esterase: Small / RBC: 16 /HPF / WBC 12 /HPF   Sq Epi: x / Non Sq Epi: 3 /HPF / Bacteria: Negative      ABG - ( 2022 13:05 )  pH, Arterial: 7.51  pH, Blood: x     /  pCO2: 40    /  pO2: 132   / HCO3: 32    / Base Excess: 8.2   /  SaO2: 96.1                  Culture - Urine (collected 2022 00:30)  Source: Catheterized Catheterized  Final Report (2022 05:39):    <10,000 CFU/mL Normal Urogenital Callie    Culture - Fungal, Body Fluid (collected 2022 14:19)  Source: .Body Fluid Pleural Fluid  Preliminary Report (2022 08:39):    Testing in progress    Culture - Body Fluid with Gram Stain (collected 2022 14:19)  Source: .Body Fluid Pleural Fluid  Gram Stain (2022 03:41):    polymorphonuclear leukocytes seen    No organisms seen    by cytocentrifuge  Preliminary Report (2022 20:29):    No growth    [id number]        <<<<<RADIOLOGY>>>>>      < from: Xray Chest 1 View- PORTABLE-Urgent (22 @ 09:23) >  Impression:    Interval increase in bilateral effusions and opacities.        --- End of Report ---    < end of copied text >    < from: Xray Chest 1 View- PORTABLE-Urgent (Xray Chest 1 View- PORTABLE-Urgent .) (22 @ 15:55) >  Impression:    Cardiomegaly, unchanged.    Improved right lung opacity and stable left lung opacity.    Right-sided Port-A-Cath.    --- End of Report ---    < end of copied text >        <<<<<PHYSICAL EXAM>>>>>  GENERAL: Thin, elderly women,  in no acute distress. Resting comfortably in bed.  PULMONARY: B/L crackles   CARDIOVASCULAR: Regular rate and rhythm, S1-S2, no murmurs  GASTROINTESTINAL: Soft, +tender to palpation in the LLQ, non-distended, no guarding.  SKIN/EXTREMITIES: B/l LE chronic skin changes   NEUROLOGIC/MUSCULOSKELETAL: weak but able to follow command       -----------------------------------------------------------------------------------------------------------------------------------------------------------------------------------------------

## 2022-04-22 NOTE — PROGRESS NOTE ADULT - SUBJECTIVE AND OBJECTIVE BOX
Patient is a 76y old  Female who presents with a chief complaint of Shortness of breath (2022 07:16)        Over Night Events:    Left upper extremity swelling.     ROS:  See HPI    PHYSICAL EXAM    ICU Vital Signs Last 24 Hrs  T(C): 36.3 (2022 09:09), Max: 37.2 (2022 00:21)  T(F): 97.3 (2022 09:09), Max: 98.9 (2022 00:21)  HR: 108 (2022 09:09) (95 - 109)  BP: 116/93 (2022 09:09) (110/72 - 116/93)  BP(mean): 101 (2022 09:09) (98 - 101)  ABP: --  ABP(mean): --  RR: 20 (2022 09:09) (18 - 20)  SpO2: 98% (2022 09:09) (98% - 100%)      22 @ 07:01  -  22 @ 07:00  --------------------------------------------------------  IN:  Total IN: 0 mL    OUT:    Indwelling Catheter - Urethral (mL): 650 mL  Total OUT: 650 mL    Total NET: -650 mL            CONSTITUTIONAL:  Ill appearing cachectic. In   NAD    ENT:   Airway patent,   No thrush    EYES:   Clear bilaterally,   pupils equal,   round and reactive to light.    CARDIAC:   Normal rate,   regular rhythm.    no edema      CAROTID:   normal systolic impulse  no bruits    RESPIRATORY:   No wheezing  Normal chest expansion  Not tachypneic,  No use of accessory muscles    GASTROINTESTINAL:  Abdomen soft,   non-tender,   no guarding,   + BS    MUSCULOSKELETAL:   LUE swelling.  range of motion is not limited,  no clubbing, cyanosis    NEUROLOGICAL:   Lethargic  no motor deficits.      LABS:                            11.0   10.26 )-----------( 181      ( 2022 02:40 )             35.4                                                   143  |  103  |  42<H>  ----------------------------<  93  4.6   |  23  |  0.7    Ca    8.5      2022 02:40  Mg     2.2         TPro  5.5<L>  /  Alb  2.6<L>  /  TBili  0.4  /  DBili  x   /  AST  106<H>  /  ALT  79<H>  /  AlkPhos  701<H>                                               Urinalysis Basic - ( 2022 00:30 )    Color: Yellow / Appearance: Slightly Turbid / S.016 / pH: x  Gluc: x / Ketone: Negative  / Bili: Negative / Urobili: <2 mg/dL   Blood: x / Protein: Trace / Nitrite: Negative   Leuk Esterase: Small / RBC: 16 /HPF / WBC 12 /HPF   Sq Epi: x / Non Sq Epi: 3 /HPF / Bacteria: Negative                                                  LIVER FUNCTIONS - ( 2022 02:40 )  Alb: 2.6 g/dL / Pro: 5.5 g/dL / ALK PHOS: 701 U/L / ALT: 79 U/L / AST: 106 U/L / GGT: x                    Procalcitonin, Serum: 0.08 ng/mL (22 @ 20:00)  D-Dimer Assay, Quantitative: 1104 ng/mL DDU (22 @ 05:38)                                        Culture - Urine (collected 2022 00:30)  Source: Catheterized Catheterized  Final Report (2022 05:39):    <10,000 CFU/mL Normal Urogenital Callie    Culture - Fungal, Body Fluid (collected 2022 14:19)  Source: .Body Fluid Pleural Fluid  Preliminary Report (2022 08:39):    Testing in progress    Culture - Body Fluid with Gram Stain (collected 2022 14:19)  Source: .Body Fluid Pleural Fluid  Gram Stain (2022 03:41):    polymorphonuclear leukocytes seen    No organisms seen    by cytocentrifuge  Preliminary Report (2022 20:29):    No growth                                                                                       ABG - ( 2022 13:05 )  pH, Arterial: 7.51  pH, Blood: x     /  pCO2: 40    /  pO2: 132   / HCO3: 32    / Base Excess: 8.2   /  SaO2: 96.1                MEDICATIONS  (STANDING):  apixaban 5 milliGRAM(s) Oral every 12 hours  cefTRIAXone   IVPB 1000 milliGRAM(s) IV Intermittent every 24 hours  cefTRIAXone   IVPB      dextrose 5%. 1000 milliLiter(s) (30 mL/Hr) IV Continuous <Continuous>  diltiazem    milliGRAM(s) Oral daily  dronabinol 2.5 milliGRAM(s) Oral two times a day  magnesium oxide 400 milliGRAM(s) Oral two times a day with meals  mirtazapine 15 milliGRAM(s) Oral at bedtime  pantoprazole    Tablet 40 milliGRAM(s) Oral before breakfast    MEDICATIONS  (PRN):  acetaminophen     Tablet .. 650 milliGRAM(s) Oral every 6 hours PRN Temp greater or equal to 38C (100.4F), Mild Pain (1 - 3)  simethicone 80 milliGRAM(s) Chew three times a day PRN Gas  verapamil 40 milliGRAM(s) Oral two times a day PRN tachycardia faster tahn 110 beats/ minute      Xrays:                                                                                     ECHO

## 2022-04-22 NOTE — PROGRESS NOTE ADULT - ASSESSMENT
ASSESSMENT:  76F PMH HTN, pancreatic Ca s/p whipple 8/2021, Afib on eliquis, pleural effusion presenting secondary to hypoxia due to recurrent pleural effusion and abdominal ascites.    PLAN:  - f/u CT A/P  - Care as per primary team    [phone]

## 2022-04-22 NOTE — PROGRESS NOTE ADULT - SUBJECTIVE AND OBJECTIVE BOX
GENERAL SURGERY PROGRESS NOTE    Patient: VIKASH LI , 76y (41)Female   MRN: [id number]  Location: [address] 012 [address]  Visit: 22 Inpatient  Date: 22 @ 10:40    Hospital Day #: 5    Procedure/Dx/Injuries: pleural effusions, ascites    Events of past 24 hours: NGT placed    PAST MEDICAL & SURGICAL HISTORY:  SOB (shortness of breath)    Pain  knee    Varicose veins of both lower extremities, unspecified whether complicated    Atrial fibrillation, unspecified type  2019 on Eliquis    Jaundice  2021    Malignant neoplasm of pancreas, unspecified location of malignancy  Pancreatic Cancer    Hypertension, unspecified type  2019    Pancreatic cancer    Kidney stones    History of surgery  Whipple procedure 2021 with Dr. Nino at [facility]    Status post phlebectomy  10/2018    History of ovarian cystectomy  left    History of tonsillectomy      Kidney stone          Vitals:   T(F): 97.3 (22 @ 09:09), Max: 98.9 (22 @ 00:21)  HR: 108 (22 @ 09:09)  BP: 116/93 (22 @ 09:09)  RR: 20 (22 @ 09:09)  SpO2: 98% (22 @ 09:09)      Diet, NPO with Tube Feed:   Tube Feeding Modality: Nasogastric  Peptamen A.F. Formula  Total Volume for 24 Hours (mL): 1200  Bolus  Total Volume of Bolus (mL):  300  Tube Feed Frequency: Every 6 hours   Tube Feed Start Time: 08:15  Bolus Feed Rate (mL per Hour): 125   Bolus Feed Duration (in Hours): 0.5      Fluids:     I & O's:    22 @ 07:01  -  22 @ 07:00  --------------------------------------------------------  IN:  Total IN: 0 mL    OUT:    Indwelling Catheter - Urethral (mL): 650 mL  Total OUT: 650 mL    Total NET: -650 mL    PHYSICAL EXAM:  General: NAD  HEENT: Trachea ML, Neck supple, NGT in place  Cardiac: RRR S1, S2  Respiratory: CTAB, normal respiratory effort, breath sounds equal BL  Abdomen: Soft, non-distended, non-tender, no rebound, no guarding. +BS.  Skin: Warm/dry, normal color, no jaundice    MEDICATIONS  (STANDING):  diltiazem    milliGRAM(s) Oral daily  dronabinol 2.5 milliGRAM(s) Oral two times a day  magnesium oxide 400 milliGRAM(s) Oral two times a day with meals  mirtazapine 15 milliGRAM(s) Oral at bedtime  ondansetron Injectable 4 milliGRAM(s) IV Push once  pantoprazole    Tablet 40 milliGRAM(s) Oral before breakfast    MEDICATIONS  (PRN):  acetaminophen     Tablet .. 650 milliGRAM(s) Oral every 6 hours PRN Temp greater or equal to 38C (100.4F), Mild Pain (1 - 3)  simethicone 80 milliGRAM(s) Chew three times a day PRN Gas  verapamil 40 milliGRAM(s) Oral two times a day PRN tachycardia faster tahn 110 beats/ minute      DVT PROPHYLAXIS:   GI PROPHYLAXIS: pantoprazole    Tablet 40 milliGRAM(s) Oral before breakfast    ANTICOAGULATION:   ANTIBIOTICS:            LAB/STUDIES:  Labs:  CAPILLARY BLOOD GLUCOSE                              11.0   10.26 )-----------( 181      ( 2022 02:40 )             35.4             143  |  103  |  42<H>  ----------------------------<  93  4.6   |  23  |  0.7      Calcium, Total Serum: 8.5 mg/dL (22 @ 02:40)      LFTs:             5.5  | 0.4  | 106      ------------------[701     ( 2022 02:40 )  2.6  | x    | 79          Lipase:x      Amylase:x         Blood Gas Arterial, Lactate: 1.30 mmol/L (22 @ 13:05)    ABG - ( 2022 13:05 )  pH: 7.51  /  pCO2: 40    /  pO2: 132   / HCO3: 32    / Base Excess: 8.2   /  SaO2: 96.1        Serum Pro-Brain Natriuretic Peptide: 2151 pg/mL (22 @ 08:10)    Urinalysis Basic - ( 2022 00:30 )    Color: Yellow / Appearance: Slightly Turbid / S.016 / pH: x  Gluc: x / Ketone: Negative  / Bili: Negative / Urobili: <2 mg/dL   Blood: x / Protein: Trace / Nitrite: Negative   Leuk Esterase: Small / RBC: 16 /HPF / WBC 12 /HPF   Sq Epi: x / Non Sq Epi: 3 /HPF / Bacteria: Negative      Culture - Urine (collected 2022 00:30)  Source: Catheterized Catheterized  Final Report (2022 05:39):    <10,000 CFU/mL Normal Urogenital Callie    Culture - Fungal, Body Fluid (collected 2022 14:19)  Source: .Body Fluid Pleural Fluid  Preliminary Report (2022 08:39):    Testing in progress    Culture - Body Fluid with Gram Stain (collected 2022 14:19)  Source: .Body Fluid Pleural Fluid  Gram Stain (2022 03:41):    polymorphonuclear leukocytes seen    No organisms seen    by cytocentrifuge  Preliminary Report (2022 20:29):    No growth        IMAGING:    < from: US Abdomen Upper Quadrant Right (22 @ 21:06) >  Pneumobilia with perihepatic ascites.    < end of copied text >  < from: Xray Chest 1 View- PORTABLE-Urgent (Xray Chest 1 View- PORTABLE-Urgent .) (22 @ 17:42) >  Low lung volume.    Enlargement of the cardiac silhouette, unchanged.    Bilateral opacities, unchanged.    NGT.    Right-sided Port-A-Cath.    < end of copied text >

## 2022-04-22 NOTE — PROGRESS NOTE ADULT - ASSESSMENT
HPI:  77 YO F with PMHx of HTN, pancreatic CA s/p Whipple 8/2021, A.Fib on Eliquis, pleural effusion was BIBEMS due to worsening SOB that started earlier on the morning of presentation  As per EMS, patient was saturating in the 70s on room air and was placed on NRB w/ sats improving to high 80s. Patient reports that she lives with her son and started noticing shortness of breath.   Patient had recent admission from [facility] and had thoracentesis done  Patient denies fevers, chills, headache, chest pain, palpitations, constipation, melena, hematochezia, dysuria, urinary frequency or urgency, numbness, tingling, recent travel or any known sick contact.   In the ED patient was placed on non rebreather with SpO2 of 94%. .  X-ray chest showed Interval increase in bilateral effusions and opacities. (18 Apr 2022 11:47)    NGT was placed and started Peptamen A. F. pt is tolerating Feed. no BM yet      Allergies    Augmentin (Rash)  chocolate (Headache)  digoxin (Hives)  Metoprolol Succinate ER (Hives)  Novocain (Angioedema)  Steroids: Excessive swelling (Flushing; Other (Mild to Mod))  sulfa drugs (Fever)  Xarelto (Hives)    Intolerances        MEDICATIONS  (STANDING):  apixaban 5 milliGRAM(s) Oral every 12 hours  diltiazem    milliGRAM(s) Oral daily  dronabinol 2.5 milliGRAM(s) Oral two times a day  magnesium oxide 400 milliGRAM(s) Oral two times a day with meals  mirtazapine 15 milliGRAM(s) Oral at bedtime  ondansetron Injectable 4 milliGRAM(s) IV Push once  pantoprazole    Tablet 40 milliGRAM(s) Oral before breakfast    MEDICATIONS  (PRN):  acetaminophen     Tablet .. 650 milliGRAM(s) Oral every 6 hours PRN Temp greater or equal to 38C (100.4F), Mild Pain (1 - 3)  simethicone 80 milliGRAM(s) Chew three times a day PRN Gas  verapamil 40 milliGRAM(s) Oral two times a day PRN tachycardia faster tahn 110 beats/ minute      PAST MEDICAL & SURGICAL HISTORY:  SOB (shortness of breath)    Pain  knee    Varicose veins of both lower extremities, unspecified whether complicated    Atrial fibrillation, unspecified type  2019 on Eliquis    Jaundice  March 5, 2021    Malignant neoplasm of pancreas, unspecified location of malignancy  Pancreatic Cancer    Hypertension, unspecified type  2019    Pancreatic cancer    Kidney stones    History of surgery  Whipple procedure 8/2/2021 with Dr. Nino at [facility]    Status post phlebectomy  10/2018    History of ovarian cystectomy  left    History of tonsillectomy  1959    Kidney stone  2017        FAMILY HISTORY:  CAD (coronary artery disease) (Sibling)  3  siblings ( 2 living and 1 passed away)        SOCIAL HISTORY:    ADVANCE DIRECTIVES:    REVIEW OF SYSTEMS      General:	    Skin/Breast:  	  Ophthalmologic:  	  ENMT:	    Respiratory and Thorax:  	  Cardiovascular:	    Gastrointestinal:	    Genitourinary:	    Musculoskeletal:	    Neurological:	    Psychiatric:	    Hematology/Lymphatics:	    Endocrine:	    Allergic/Immunologic:	    ICU Vital Signs Last 24 Hrs  T(C): 36.3 (22 Apr 2022 09:09), Max: 37.2 (22 Apr 2022 00:21)  T(F): 97.3 (22 Apr 2022 09:09), Max: 98.9 (22 Apr 2022 00:21)  HR: 108 (22 Apr 2022 09:09) (95 - 109)  BP: 116/93 (22 Apr 2022 09:09) (110/72 - 116/93)  BP(mean): 101 (22 Apr 2022 09:09) (98 - 101)  ABP: --  ABP(mean): --  RR: 20 (22 Apr 2022 09:09) (18 - 20)  SpO2: 98% (22 Apr 2022 09:09) (98% - 100%)    I&O's Detail    21 Apr 2022 07:01  -  22 Apr 2022 07:00  --------------------------------------------------------  IN:  Total IN: 0 mL    OUT:    Indwelling Catheter - Urethral (mL): 650 mL  Total OUT: 650 mL    Total NET: -650 mL            PHYSICAL EXAM:      Constitutional:    Eyes:    ENMT:    Neck:    Breasts:    Back:    Respiratory:    Cardiovascular:    Gastrointestinal:    Genitourinary:    Rectal:    Extremities:    Vascular:    Neurological:    Skin:    Lymph Nodes:    Musculoskeletal:    Psychiatric:        LABS:  CBC Full  -  ( 22 Apr 2022 02:40 )  WBC Count : 10.26 K/uL  RBC Count : 4.32 M/uL  Hemoglobin : 11.0 g/dL  Hematocrit : 35.4 %  Platelet Count - Automated : 181 K/uL  Mean Cell Volume : 81.9 fL  Mean Cell Hemoglobin : 25.5 pg  Mean Cell Hemoglobin Concentration : 31.1 g/dL  Auto Neutrophil # : x  Auto Lymphocyte # : x  Auto Monocyte # : x  Auto Eosinophil # : x  Auto Basophil # : x  Auto Neutrophil % : x  Auto Lymphocyte % : x  Auto Monocyte % : x  Auto Eosinophil % : x  Auto Basophil % : x    04-22    143  |  103  |  42<H>  ----------------------------<  93  4.6   |  23  |  0.7    Ca    8.5      22 Apr 2022 02:40  Mg     2.2     04-22    TPro  5.5<L>  /  Alb  2.6<L>  /  TBili  0.4  /  DBili  x   /  AST  106<H>  /  ALT  79<H>  /  AlkPhos  701<H>  04-22    CAPILLARY BLOOD GLUCOSE  ABG - ( 20 Apr 2022 13:05 )  pH, Arterial: 7.51  pH, Blood: x     /  pCO2: 40    /  pO2: 132   / HCO3: 32    / Base Excess: 8.2   /  SaO2: 96.1        IMP:  ·	- pancreatic cancer  ·	- severe cancer cachexia  ·	- malabsorption on Creon  ·	- acute hypoxic resp failure r/t fluid overload  ·	    s/p thoracentesis of pleural effusion, and paracentesis of ascites    - need to ascertain Onc plans (reportedly last chemo was in Nov?)  - whether or not further or continued SNS or other aggressive interventions are continued depend entirely on treatment options for the primary cancer  - NG placed yesterday.   - suggest Peptamen 1.5 start 125 ml over 30 min q6h x 2 feeds.  then increase to 250 ml over 30-40 min x 3 feeds/d (q8h or on meal pattern)  - c/w GOC discussion, possible PEG tube?  - f/u BMP, phos, and Mg and correct t wnl  - do NOT try to put Creon down the NG tube  - d/w resident   HPI:  77 YO F with PMHx of HTN, pancreatic CA s/p Whipple 8/2021, A.Fib on Eliquis, pleural effusion was BIBEMS due to worsening SOB that started earlier on the morning of presentation  As per EMS, patient was saturating in the 70s on room air and was placed on NRB w/ sats improving to high 80s. Patient reports that she lives with her son and started noticing shortness of breath.   Patient had recent admission from [facility] and had thoracentesis done  Patient denies fevers, chills, headache, chest pain, palpitations, constipation, melena, hematochezia, dysuria, urinary frequency or urgency, numbness, tingling, recent travel or any known sick contact.   In the ED patient was placed on non rebreather with SpO2 of 94%. .  X-ray chest showed Interval increase in bilateral effusions and opacities. (18 Apr 2022 11:47)    NGT was placed and started Peptamen A. F. pt is tolerating Feed. no BM yet      Allergies    Augmentin (Rash)  chocolate (Headache)  digoxin (Hives)  Metoprolol Succinate ER (Hives)  Novocain (Angioedema)  Steroids: Excessive swelling (Flushing; Other (Mild to Mod))  sulfa drugs (Fever)  Xarelto (Hives)    Intolerances        MEDICATIONS  (STANDING):  apixaban 5 milliGRAM(s) Oral every 12 hours  diltiazem    milliGRAM(s) Oral daily  dronabinol 2.5 milliGRAM(s) Oral two times a day  magnesium oxide 400 milliGRAM(s) Oral two times a day with meals  mirtazapine 15 milliGRAM(s) Oral at bedtime  ondansetron Injectable 4 milliGRAM(s) IV Push once  pantoprazole    Tablet 40 milliGRAM(s) Oral before breakfast    MEDICATIONS  (PRN):  acetaminophen     Tablet .. 650 milliGRAM(s) Oral every 6 hours PRN Temp greater or equal to 38C (100.4F), Mild Pain (1 - 3)  simethicone 80 milliGRAM(s) Chew three times a day PRN Gas  verapamil 40 milliGRAM(s) Oral two times a day PRN tachycardia faster tahn 110 beats/ minute      PAST MEDICAL & SURGICAL HISTORY:  SOB (shortness of breath)    Pain  knee    Varicose veins of both lower extremities, unspecified whether complicated    Atrial fibrillation, unspecified type  2019 on Eliquis    Jaundice  March 5, 2021    Malignant neoplasm of pancreas, unspecified location of malignancy  Pancreatic Cancer    Hypertension, unspecified type  2019    Pancreatic cancer    Kidney stones    History of surgery  Whipple procedure 8/2/2021 with Dr. Nino at [facility]    Status post phlebectomy  10/2018    History of ovarian cystectomy  left    History of tonsillectomy  1959    Kidney stone  2017        FAMILY HISTORY:  CAD (coronary artery disease) (Sibling)  3  siblings ( 2 living and 1 passed away)        SOCIAL HISTORY:    ADVANCE DIRECTIVES:    REVIEW OF SYSTEMS      General:	    Skin/Breast:  	  Ophthalmologic:  	  ENMT:	    Respiratory and Thorax:  	  Cardiovascular:	    Gastrointestinal:	    Genitourinary:	    Musculoskeletal:	    Neurological:	    Psychiatric:	    Hematology/Lymphatics:	    Endocrine:	    Allergic/Immunologic:	    ICU Vital Signs Last 24 Hrs  T(C): 36.3 (22 Apr 2022 09:09), Max: 37.2 (22 Apr 2022 00:21)  T(F): 97.3 (22 Apr 2022 09:09), Max: 98.9 (22 Apr 2022 00:21)  HR: 108 (22 Apr 2022 09:09) (95 - 109)  BP: 116/93 (22 Apr 2022 09:09) (110/72 - 116/93)  BP(mean): 101 (22 Apr 2022 09:09) (98 - 101)  ABP: --  ABP(mean): --  RR: 20 (22 Apr 2022 09:09) (18 - 20)  SpO2: 98% (22 Apr 2022 09:09) (98% - 100%)    I&O's Detail    21 Apr 2022 07:01  -  22 Apr 2022 07:00  --------------------------------------------------------  IN:  Total IN: 0 mL    OUT:    Indwelling Catheter - Urethral (mL): 650 mL  Total OUT: 650 mL    Total NET: -650 mL            PHYSICAL EXAM:      Constitutional:    Eyes:    ENMT:    Neck:    Breasts:    Back:    Respiratory:    Cardiovascular:    Gastrointestinal:    Genitourinary:    Rectal:    Extremities:    Vascular:    Neurological:    Skin:    Lymph Nodes:    Musculoskeletal:    Psychiatric:        LABS:  CBC Full  -  ( 22 Apr 2022 02:40 )  WBC Count : 10.26 K/uL  RBC Count : 4.32 M/uL  Hemoglobin : 11.0 g/dL  Hematocrit : 35.4 %  Platelet Count - Automated : 181 K/uL  Mean Cell Volume : 81.9 fL  Mean Cell Hemoglobin : 25.5 pg  Mean Cell Hemoglobin Concentration : 31.1 g/dL  Auto Neutrophil # : x  Auto Lymphocyte # : x  Auto Monocyte # : x  Auto Eosinophil # : x  Auto Basophil # : x  Auto Neutrophil % : x  Auto Lymphocyte % : x  Auto Monocyte % : x  Auto Eosinophil % : x  Auto Basophil % : x    04-22    143  |  103  |  42<H>  ----------------------------<  93  4.6   |  23  |  0.7    Ca    8.5      22 Apr 2022 02:40  Mg     2.2     04-22    TPro  5.5<L>  /  Alb  2.6<L>  /  TBili  0.4  /  DBili  x   /  AST  106<H>  /  ALT  79<H>  /  AlkPhos  701<H>  04-22    CAPILLARY BLOOD GLUCOSE  ABG - ( 20 Apr 2022 13:05 )  pH, Arterial: 7.51  pH, Blood: x     /  pCO2: 40    /  pO2: 132   / HCO3: 32    / Base Excess: 8.2   /  SaO2: 96.1        IMP:  ·	- pancreatic cancer  ·	- severe cancer cachexia  ·	- malabsorption on Creon  ·	- acute hypoxic resp failure r/t fluid overload  ·	    s/p thoracentesis of pleural effusion, and paracentesis of ascites    - need to ascertain Onc plans (reportedly last chemo was in Nov?)  - whether or not further or continued SNS or other aggressive interventions are continued depend entirely on treatment options for the primary cancer  - NG placed yesterday.   - suggest Peptamen 1.5, 375 cc q 8 hours over 60 min x 3 feeds/d (q8h or on meal pattern)  - c/w GOC discussion, possible PEG tube?  - f/u BMP, phos, and Mg and correct t wnl  - do NOT try to put Creon down the NG tube  - d/w resident   HPI:  77 YO F with PMHx of HTN, pancreatic CA s/p Whipple 8/2021, A.Fib on Eliquis, pleural effusion was BIBEMS due to worsening SOB that started earlier on the morning of presentation  As per EMS, patient was saturating in the 70s on room air and was placed on NRB w/ sats improving to high 80s. Patient reports that she lives with her son and started noticing shortness of breath.   Patient had recent admission from [facility] and had thoracentesis done  Patient denies fevers, chills, headache, chest pain, palpitations, constipation, melena, hematochezia, dysuria, urinary frequency or urgency, numbness, tingling, recent travel or any known sick contact.   In the ED patient was placed on non rebreather with SpO2 of 94%. .  X-ray chest showed Interval increase in bilateral effusions and opacities. (18 Apr 2022 11:47)    NGT was placed and started Peptamen A. F. pt is tolerating Feed. no BM yet. abd soft, ND.     Allergies:  Augmentin (Rash)  chocolate (Headache)  digoxin (Hives)  Metoprolol Succinate ER (Hives)  Novocain (Angioedema)  Steroids: Excessive swelling (Flushing; Other (Mild to Mod))  sulfa drugs (Fever)  Xarelto (Hives)    MEDICATIONS  (STANDING):  apixaban 5 milliGRAM(s) Oral every 12 hours  diltiazem    milliGRAM(s) Oral daily  dronabinol 2.5 milliGRAM(s) Oral two times a day  magnesium oxide 400 milliGRAM(s) Oral two times a day with meals  mirtazapine 15 milliGRAM(s) Oral at bedtime  ondansetron Injectable 4 milliGRAM(s) IV Push once  pantoprazole    Tablet 40 milliGRAM(s) Oral before breakfast    MEDICATIONS  (PRN):  acetaminophen     Tablet .. 650 milliGRAM(s) Oral every 6 hours PRN Temp greater or equal to 38C (100.4F), Mild Pain (1 - 3)  simethicone 80 milliGRAM(s) Chew three times a day PRN Gas  verapamil 40 milliGRAM(s) Oral two times a day PRN tachycardia faster tahn 110 beats/ minute      PAST MEDICAL & SURGICAL HISTORY:  SOB (shortness of breath)    Pain  knee    Varicose veins of both lower extremities, unspecified whether complicated    Atrial fibrillation, unspecified type  2019 on Eliquis    Jaundice  March 5, 2021    Malignant neoplasm of pancreas, unspecified location of malignancy  Pancreatic Cancer    Hypertension, unspecified type  2019    Pancreatic cancer    Kidney stones    History of surgery  Whipple procedure 8/2/2021 with Dr. Nino at [facility]    Status post phlebectomy  10/2018    History of ovarian cystectomy  left    History of tonsillectomy  1959    Kidney stone  2017        FAMILY HISTORY:  CAD (coronary artery disease) (Sibling)  3  siblings ( 2 living and 1 passed away)        SOCIAL HISTORY:    ADVANCE DIRECTIVES:    REVIEW OF SYSTEMS    ICU Vital Signs Last 24 Hrs  T(C): 36.3 (22 Apr 2022 09:09), Max: 37.2 (22 Apr 2022 00:21)  T(F): 97.3 (22 Apr 2022 09:09), Max: 98.9 (22 Apr 2022 00:21)  HR: 108 (22 Apr 2022 09:09) (95 - 109)  BP: 116/93 (22 Apr 2022 09:09) (110/72 - 116/93)  BP(mean): 101 (22 Apr 2022 09:09) (98 - 101)  ABP: --  ABP(mean): --  RR: 20 (22 Apr 2022 09:09) (18 - 20)  SpO2: 98% (22 Apr 2022 09:09) (98% - 100%)    I&O's Detail    21 Apr 2022 07:01  -  22 Apr 2022 07:00  --------------------------------------------------------  IN:  Total IN: 0 mL    OUT:    Indwelling Catheter - Urethral (mL): 650 mL  Total OUT: 650 mL    Total NET: -650 mL            LABS:  CBC Full  -  ( 22 Apr 2022 02:40 )  WBC Count : 10.26 K/uL  RBC Count : 4.32 M/uL  Hemoglobin : 11.0 g/dL  Hematocrit : 35.4 %  Platelet Count - Automated : 181 K/uL  Mean Cell Volume : 81.9 fL  Mean Cell Hemoglobin : 25.5 pg  Mean Cell Hemoglobin Concentration : 31.1 g/dL  Auto Neutrophil # : x  Auto Lymphocyte # : x  Auto Monocyte # : x  Auto Eosinophil # : x  Auto Basophil # : x  Auto Neutrophil % : x  Auto Lymphocyte % : x  Auto Monocyte % : x  Auto Eosinophil % : x  Auto Basophil % : x    04-22    143  |  103  |  42<H>  ----------------------------<  93  4.6   |  23  |  0.7    Ca    8.5      22 Apr 2022 02:40  Mg     2.2     04-22    TPro  5.5<L>  /  Alb  2.6<L>  /  TBili  0.4  /  DBili  x   /  AST  106<H>  /  ALT  79<H>  /  AlkPhos  701<H>  04-22    CAPILLARY BLOOD GLUCOSE  ABG - ( 20 Apr 2022 13:05 )  pH, Arterial: 7.51  pH, Blood: x     /  pCO2: 40    /  pO2: 132   / HCO3: 32    / Base Excess: 8.2   /  SaO2: 96.1        IMP:  ·	- pancreatic cancer  ·	- severe cancer cachexia  ·	- malabsorption on Creon  ·	- acute hypoxic resp failure r/t fluid overload  ·	    s/p thoracentesis of pleural effusion, and paracentesis of ascites    - need to ascertain Onc plans (reportedly last chemo was in Nov?)  - whether or not further or continued SNS or other aggressive interventions are continued depend entirely on treatment options for the primary cancer  - NG placed yesterday.   - cont Peptamen 1.5, 375 cc q 8 hours over 60 min x 3 feeds/d (q8h or on meal pattern)  - c/w GOC discussion, possible PEG tube?  - f/u BMP, phos, and Mg and correct to wnl  - do NOT try to put Creon down the NG tube  - d/w resident

## 2022-04-22 NOTE — CONSULT NOTE ADULT - ASSESSMENT
History  Pancreatic  CA  AFIB on eliquis      with  SOB    pleural effusion       ECHO  Ef 54           cont diuresis

## 2022-04-22 NOTE — PROGRESS NOTE ADULT - ASSESSMENT
IMPRESSION:    Acute Hypoxemic Respiratory Failure improving  Acute hypercapnic respiratory failure  AMS likely toxic metabolic improved   B/l effusion R>L,  SP right thoracentesis x2 transudative effusion   Ascites   Pancreatic Cancer advanced  s/p whipple 8/21  Afib on Eliquis  UTI    PLAN:    CNS: no excessive sedation.     HEENT: Oral care    PULMONARY:  HOB @ 45 degrees.  Aspiration precautions.  Wean O2    CARDIOVASCULAR: Keep negative balance. Rate control.  Cardio follow up    GI: GI prophylaxis.  Feeding per NGT. Nutrition eval appreciated.  Bowel regimen .    RENAL:  Follow up lytes.  Correct as needed    INFECTIOUS DISEASE: Follow up cultures. D/c rocephin    HEMATOLOGICAL:  Continue LMWH for now.      ENDOCRINE:  Follow up FS.  Insulin protocol if needed.    MUSCULOSKELETAL: OOBTC    Very poor prognosis    Palliative eval appreciated         IMPRESSION:    Acute Hypoxemic Respiratory Failure improving  Acute hypercapnic respiratory failure  AMS likely toxic metabolic improved   B/l effusion R>L,  SP right thoracentesis x2 transudative effusion   Ascites   Pancreatic Cancer advanced  s/p whipple 8/21  Afib on Eliquis  UTI    PLAN:    CNS: no excessive sedation.     HEENT: Oral care    PULMONARY:  HOB @ 45 degrees.  Aspiration precautions.  Wean O2  CHest US     CARDIOVASCULAR: Keep negative balance. Rate control.  Cardio follow up    GI: GI prophylaxis.  Feeding per NGT. Nutrition eval appreciated.  Bowel regimen .    RENAL:  Follow up lytes.  Correct as needed    INFECTIOUS DISEASE: Follow up cultures. D/c rocephin    HEMATOLOGICAL:  Continue LMWH for now.      ENDOCRINE:  Follow up FS.  Insulin protocol if needed.    MUSCULOSKELETAL: OOBTC    Very poor prognosis    Palliative eval appreciated

## 2022-04-23 NOTE — PROGRESS NOTE ADULT - ASSESSMENT
IMPRESSION:  Acute Hypoxemic Respiratory Failure, improving  Acute hypercapnic respiratory failure  AMS, likely toxic metabolic improved   B/l effusion R>L,  SP right thoracentesis x2 transudative effusion   Large Volume Ascites   HO eating disorder  Pancreatic Cancer advanced s/p whipple 8/21  Afib on Eliquis    PLAN:    CNS: Avoid CNS depressants.     HEENT: Oral care    PULMONARY:  HOB @ 45 degrees.  Aspiration precautions. Wean HHFNC. Target SPO2 92-96%. Titrate oxygen as tolerated.     CARDIOVASCULAR: Keep negative balance. Rate control. Cardio follow up    GI: GI prophylaxis. Feeding per NGT. Nutrition eval appreciated. Increase bowel regimen. IR eval for paracentesis.    RENAL: Follow up lytes. Correct as needed    INFECTIOUS DISEASE: Follow up cultures. Procal negative.     HEMATOLOGICAL:  Continue therapeutic LMWH for now.     ENDOCRINE:  Follow up FS.      MUSCULOSKELETAL: OOBTC. Wound care nurse eval.     Very poor prognosis    Palliative eval appreciated

## 2022-04-23 NOTE — BH CONSULTATION LIAISON ASSESSMENT NOTE - NSBHCHARTREVIEWVS_PSY_A_CORE FT
Vital Signs Last 24 Hrs  T(C): 36.2 (23 Apr 2022 08:01), Max: 36.5 (22 Apr 2022 16:01)  T(F): 97.1 (23 Apr 2022 08:01), Max: 97.7 (22 Apr 2022 16:01)  HR: 93 (23 Apr 2022 08:01) (87 - 108)  BP: 113/77 (23 Apr 2022 08:01) (92/68 - 123/80)  BP(mean): 89 (23 Apr 2022 08:01) (84 - 101)  RR: 20 (23 Apr 2022 08:01) (20 - 20)  SpO2: 98% (23 Apr 2022 04:44) (88% - 99%)

## 2022-04-23 NOTE — BH CONSULTATION LIAISON ASSESSMENT NOTE - CURRENT MEDICATION
MEDICATIONS  (STANDING):  diltiazem    milliGRAM(s) Oral daily  dronabinol 2.5 milliGRAM(s) Oral two times a day  enoxaparin Injectable 40 milliGRAM(s) SubCutaneous every 12 hours  magnesium oxide 400 milliGRAM(s) Oral two times a day with meals  mirtazapine 15 milliGRAM(s) Oral at bedtime  pantoprazole    Tablet 40 milliGRAM(s) Oral before breakfast  polyethylene glycol 3350 17 Gram(s) Oral daily  senna 2 Tablet(s) Oral at bedtime    MEDICATIONS  (PRN):  acetaminophen     Tablet .. 650 milliGRAM(s) Oral every 6 hours PRN Temp greater or equal to 38C (100.4F), Mild Pain (1 - 3)  simethicone 80 milliGRAM(s) Chew three times a day PRN Gas  verapamil 40 milliGRAM(s) Oral two times a day PRN tachycardia faster tahn 110 beats/ minute

## 2022-04-23 NOTE — PROGRESS NOTE ADULT - SUBJECTIVE AND OBJECTIVE BOX
LI, VIKASH  76y Female    CHIEF COMPLAINT:    Patient is a 76y old  Female who presents with a chief complaint of Shortness of breath (23 Apr 2022 09:16)      INTERVAL HPI/OVERNIGHT EVENTS:    Patient seen and examined. No acute events overnight. Appears uncomfortable, denies any pain     ROS: All other systems are negative.    Vital Signs:    T(F): 97.1 (04-23-22 @ 11:19), Max: 97.7 (04-22-22 @ 16:01)  HR: 100 (04-23-22 @ 11:19) (87 - 100)  BP: 95/60 (04-23-22 @ 11:19) (92/68 - 123/80)  RR: 20 (04-23-22 @ 11:19) (20 - 20)  SpO2: 97% (04-23-22 @ 11:08) (88% - 99%)    22 Apr 2022 07:01  -  23 Apr 2022 07:00  --------------------------------------------------------  IN: 0 mL / OUT: 350 mL / NET: -350 mL    POCT Blood Glucose.: 87 mg/dL (22 Apr 2022 14:50)  POCT Blood Glucose.: 33 mg/dL (22 Apr 2022 14:49)    PHYSICAL EXAM:    GENERAL:  NAD cachectic  SKIN: No rashes or lesions  HEENT: Atraumatic. Normocephalic.   NECK: Supple, No JVD.    PULMONARY: coarse breath sounds  B/L. No wheezing.    CVS: Normal S1, S2. Rate and Rhythm are regular.   ABDOMEN/GI: Soft, Nontender, distended; BS present  MSK:  No clubbing or cyanosis.  NEUROLOGIC: grossly intact .  PSYCH: lethargic arousable     Consultant(s) Notes Reviewed:  [x ] YES  [ ] NO  Care Discussed with Consultants/Other Providers [ x] YES  [ ] NO    LABS:                        10.0   9.11  )-----------( 182      ( 22 Apr 2022 23:30 )             32.6     145  |  105  |  39<H>  ----------------------------<  88  5.0   |  27  |  0.7    Ca    8.4<L>      22 Apr 2022 23:30  Mg     2.1     04-22    TPro  5.3<L>  /  Alb  2.6<L>  /  TBili  0.5  /  DBili  x   /  AST  226<H>  /  ALT  118<H>  /  AlkPhos  840<H>  04-22    Serum Pro-Brain Natriuretic Peptide: 2151 pg/mL (04-18-22 @ 08:10)    Culture - Urine (collected 21 Apr 2022 00:30)  Source: Catheterized Catheterized  Final Report (22 Apr 2022 05:39):    <10,000 CFU/mL Normal Urogenital Callie    RADIOLOGY & ADDITIONAL TESTS:  Imaging or report Personally Reviewed:  [x] YES  [ ] NO  EKG reviewed: [x] YES  [ ] NO    Medications:  Standing  diltiazem    milliGRAM(s) Oral daily  dronabinol 5 milliGRAM(s) Oral two times a day  enoxaparin Injectable 40 milliGRAM(s) SubCutaneous every 12 hours  lactulose Syrup 20 Gram(s) Oral every 6 hours  magnesium oxide 400 milliGRAM(s) Oral two times a day with meals  mirtazapine 15 milliGRAM(s) Oral at bedtime  pantoprazole    Tablet 40 milliGRAM(s) Oral before breakfast  polyethylene glycol 3350 17 Gram(s) Oral daily  senna 2 Tablet(s) Oral at bedtime    PRN Meds  acetaminophen     Tablet .. 650 milliGRAM(s) Oral every 6 hours PRN  simethicone 80 milliGRAM(s) Chew three times a day PRN  verapamil 40 milliGRAM(s) Oral two times a day PRN

## 2022-04-23 NOTE — BH CONSULTATION LIAISON ASSESSMENT NOTE - RISK ASSESSMENT
Risk factors - medical problems    Protective factors - supportive family who are aware of patient's medical conditions

## 2022-04-23 NOTE — BH CONSULTATION LIAISON ASSESSMENT NOTE - SUMMARY
Patient is a 75 y/o female, per chart review PMHx of HTN, pancreatic CA s/p Whipple 8/2021, A.Fib on Eliquis, pleural effusion, no known past psych hx although per family suspicion of an eating disorder, admitted to medicine for worsening shortness of breath. Psychiatry consult placed due to suspicion for eating disorder.    On evaluation, patient appeared extremely uncomfortable. She appeared awake and somewhat alert (she did respond to verbal stimuli), but her level of orientation could not be properly assessed. She appears cachexic with a high likelihood that this is due to her history of pancreatic cancer. The ability to assess for an eating of feeding disorder at this time is difficulty given patient's level of alertness and orientation, as well as the fact that a potential eating disturbance could be due to pancreatic cancer. The psychiatry team's recommendation is to start Zyprexa Zydis 2.5 mg qbedtime. Patient's daughter is aware of the risks and benefits and agreeable to starting Zyprexa Zydis. We also recommend not increasing the dose of Remeron given patient's elevated LFTs.    Recommendations:  - Recommend to add Zyprexa Zydis 2.5 mg qbedtime.  - Psychiatry is signing off. Reconsult as necessary. Patient is a 77 y/o female, per chart review PMHx of HTN, pancreatic CA s/p Whipple 8/2021, A.Fib on Eliquis, pleural effusion, no known past psych hx although per family suspicion of an eating disorder, admitted to medicine for worsening shortness of breath. Psychiatry consult placed due to suspicion for eating disorder.    On evaluation, patient appeared extremely uncomfortable. She appeared awake and somewhat alert (she did respond to verbal stimuli), but her level of orientation could not be properly assessed. She appears cachexic with a high likelihood that this is due to her history of pancreatic cancer. The ability to assess for an eating of feeding disorder at this time is difficulty given patient's level of alertness and orientation, as well as the fact that a potential eating disturbance could be due to pancreatic cancer. The psychiatry team's recommendation is to start Zyprexa Zydis 2.5 mg qbedtime. Patient's daughter is aware of the risks and benefits and agreeable to starting Zyprexa Zydis. We also recommend not increasing the dose of Remeron given patient's elevated LFTs.    Recommendations:  - Recommend to add Zyprexa Zydis 2.5 mg qbedtime.  Repeat EKG to monitor QTC.  - Psychiatry is signing off. Reconsult as necessary.

## 2022-04-23 NOTE — BH CONSULTATION LIAISON ASSESSMENT NOTE - NSBHLOCFT_PSY_A_CORE
Appeared in extreme discomfort, initially had eyes closed but slowly opened them when her name was called before closing them again, able to nod or head and answer "yes" and "no" when asked questions. Patient was awake. Unable to properly assess alertness and orientation.

## 2022-04-23 NOTE — PROGRESS NOTE ADULT - ATTENDING COMMENTS
IMPRESSION:  Acute Hypoxemic Respiratory Failure, improving  Acute hypercapnic respiratory failure  AMS, likely toxic metabolic improved   B/l effusion R>L,  SP right thoracentesis x2 transudative effusion   Large Volume Ascites   HO eating disorder  Pancreatic Cancer advanced s/p whipple 8/21  Afib on Eliquis    Plan as outlined above

## 2022-04-23 NOTE — BH CONSULTATION LIAISON ASSESSMENT NOTE - PATIENT'S CHIEF COMPLAINT
On approach, patient was lying in bed and appeared in discomfort. When calling her name, she slowly opened her eyes and was able to answer with 1-word answers. When asked how she was doing, she nodded her head.

## 2022-04-23 NOTE — BH CONSULTATION LIAISON ASSESSMENT NOTE - OTHER
Unable to assess Appeared cachexic Unable to assess at time of interview. Uncomfortable Not formally assessed

## 2022-04-23 NOTE — PROGRESS NOTE ADULT - SUBJECTIVE AND OBJECTIVE BOX
Patient is a 76y old  Female who presents with a chief complaint of Shortness of breath (22 Apr 2022 19:43)        Over Night Events:  S/p fecal disimpaction yesterday.       ROS:     All pertinent ROS are negative except HPI         PHYSICAL EXAM    ICU Vital Signs Last 24 Hrs  T(C): 36.2 (23 Apr 2022 08:01), Max: 36.5 (22 Apr 2022 16:01)  T(F): 97.1 (23 Apr 2022 08:01), Max: 97.7 (22 Apr 2022 16:01)  HR: 93 (23 Apr 2022 08:01) (87 - 95)  BP: 113/77 (23 Apr 2022 08:01) (92/68 - 123/80)  BP(mean): 89 (23 Apr 2022 08:01) (84 - 89)  RR: 20 (23 Apr 2022 08:01) (20 - 20)  SpO2: 98% (23 Apr 2022 04:44) (88% - 99%)      CONSTITUTIONAL:  Elderly, frail female. Ill appearing. NAD    ENT:   Airway patent,   Mouth with normal mucosa.   No thrush    EYES:   Pupils equal,   Round and reactive to light.    CARDIAC:   Normal rate,   Regular rhythm.      RESPIRATORY:   HHFNC 40LPM-60%  No wheezing  Bilateral BS  Normal chest expansion  Not tachypneic,  No use of accessory muscles    GASTROINTESTINAL:  Distended  Abdomen soft,   Non-tender,   No guarding,   + BS    MUSCULOSKELETAL:   Range of motion is not limited,  No clubbing, cyanosis    NEUROLOGICAL:   AOx4  Follows commands  Moves all extremities     SKIN:   Sacrum stage 2  Skin normal color for race,   Warm and dry  No evidence of rash.    PSYCHIATRIC:   No apparent risk to self or others.      04-22-22 @ 07:01  -  04-23-22 @ 07:00  --------------------------------------------------------  IN:  Total IN: 0 mL    OUT:    Indwelling Catheter - Urethral (mL): 350 mL  Total OUT: 350 mL    Total NET: -350 mL      LABS:                            10.0   9.11  )-----------( 182      ( 22 Apr 2022 23:30 )             32.6                                               04-22    145  |  105  |  39<H>  ----------------------------<  88  5.0   |  27  |  0.7    Ca    8.4<L>      22 Apr 2022 23:30  Mg     2.1     04-22    TPro  5.3<L>  /  Alb  2.6<L>  /  TBili  0.5  /  DBili  x   /  AST  226<H>  /  ALT  118<H>  /  AlkPhos  840<H>  04-22                                                                                         LIVER FUNCTIONS - ( 22 Apr 2022 23:30 )  Alb: 2.6 g/dL / Pro: 5.3 g/dL / ALK PHOS: 840 U/L / ALT: 118 U/L / AST: 226 U/L / GGT: x                                                  Culture - Urine (collected 21 Apr 2022 00:30)  Source: Catheterized Catheterized  Final Report (22 Apr 2022 05:39):    <10,000 CFU/mL Normal Urogenital Callie                  MEDICATIONS  (STANDING):  diltiazem    milliGRAM(s) Oral daily  dronabinol 2.5 milliGRAM(s) Oral two times a day  enoxaparin Injectable 40 milliGRAM(s) SubCutaneous every 12 hours  magnesium oxide 400 milliGRAM(s) Oral two times a day with meals  mirtazapine 15 milliGRAM(s) Oral at bedtime  pantoprazole    Tablet 40 milliGRAM(s) Oral before breakfast  polyethylene glycol 3350 17 Gram(s) Oral daily  senna 2 Tablet(s) Oral at bedtime    MEDICATIONS  (PRN):  acetaminophen     Tablet .. 650 milliGRAM(s) Oral every 6 hours PRN Temp greater or equal to 38C (100.4F), Mild Pain (1 - 3)  simethicone 80 milliGRAM(s) Chew three times a day PRN Gas  verapamil 40 milliGRAM(s) Oral two times a day PRN tachycardia faster tahn 110 beats/ minute      New X-rays reviewed:                                                                                  ECHO    CXR interpreted by me:  stable cardiomegaly     Patient is a 76y old  Female who presents with a chief complaint of Shortness of breath (22 Apr 2022 19:43)        Over Night Events:  S/p fecal disimpaction yesterday. Remains on HF 40/40      ROS:     All pertinent ROS are negative except HPI         PHYSICAL EXAM    ICU Vital Signs Last 24 Hrs  T(C): 36.2 (23 Apr 2022 08:01), Max: 36.5 (22 Apr 2022 16:01)  T(F): 97.1 (23 Apr 2022 08:01), Max: 97.7 (22 Apr 2022 16:01)  HR: 93 (23 Apr 2022 08:01) (87 - 95)  BP: 113/77 (23 Apr 2022 08:01) (92/68 - 123/80)  BP(mean): 89 (23 Apr 2022 08:01) (84 - 89)  RR: 20 (23 Apr 2022 08:01) (20 - 20)  SpO2: 98% (23 Apr 2022 04:44) (88% - 99%)      CONSTITUTIONAL:  Elderly, frail female. Ill appearing. NAD    ENT:   Airway patent,   Mouth with normal mucosa.   No thrush    EYES:   Pupils equal,   Round and reactive to light.    CARDIAC:   Normal rate,   Regular rhythm.      RESPIRATORY:   HHFNC 40LPM-60%  No wheezing  Bilateral BS  Normal chest expansion  Not tachypneic,  No use of accessory muscles    GASTROINTESTINAL:  Distended  Abdomen soft,   Non-tender,   No guarding,   + BS    MUSCULOSKELETAL:   Range of motion is not limited,  No clubbing, cyanosis    NEUROLOGICAL:   AOx4  Follows commands  Moves all extremities     SKIN:   Sacrum stage 2  Skin normal color for race,   Warm and dry  No evidence of rash.    PSYCHIATRIC:   No apparent risk to self or others.      04-22-22 @ 07:01  -  04-23-22 @ 07:00  --------------------------------------------------------  IN:  Total IN: 0 mL    OUT:    Indwelling Catheter - Urethral (mL): 350 mL  Total OUT: 350 mL    Total NET: -350 mL      LABS:                            10.0   9.11  )-----------( 182      ( 22 Apr 2022 23:30 )             32.6                                               04-22    145  |  105  |  39<H>  ----------------------------<  88  5.0   |  27  |  0.7    Ca    8.4<L>      22 Apr 2022 23:30  Mg     2.1     04-22    TPro  5.3<L>  /  Alb  2.6<L>  /  TBili  0.5  /  DBili  x   /  AST  226<H>  /  ALT  118<H>  /  AlkPhos  840<H>  04-22                                                                                         LIVER FUNCTIONS - ( 22 Apr 2022 23:30 )  Alb: 2.6 g/dL / Pro: 5.3 g/dL / ALK PHOS: 840 U/L / ALT: 118 U/L / AST: 226 U/L / GGT: x                                                  Culture - Urine (collected 21 Apr 2022 00:30)  Source: Catheterized Catheterized  Final Report (22 Apr 2022 05:39):    <10,000 CFU/mL Normal Urogenital Callie                  MEDICATIONS  (STANDING):  diltiazem    milliGRAM(s) Oral daily  dronabinol 2.5 milliGRAM(s) Oral two times a day  enoxaparin Injectable 40 milliGRAM(s) SubCutaneous every 12 hours  magnesium oxide 400 milliGRAM(s) Oral two times a day with meals  mirtazapine 15 milliGRAM(s) Oral at bedtime  pantoprazole    Tablet 40 milliGRAM(s) Oral before breakfast  polyethylene glycol 3350 17 Gram(s) Oral daily  senna 2 Tablet(s) Oral at bedtime    MEDICATIONS  (PRN):  acetaminophen     Tablet .. 650 milliGRAM(s) Oral every 6 hours PRN Temp greater or equal to 38C (100.4F), Mild Pain (1 - 3)  simethicone 80 milliGRAM(s) Chew three times a day PRN Gas  verapamil 40 milliGRAM(s) Oral two times a day PRN tachycardia faster tahn 110 beats/ minute      New X-rays reviewed:                                                                                  ECHO    CXR interpreted by me:  stable cardiomegaly

## 2022-04-23 NOTE — BH CONSULTATION LIAISON ASSESSMENT NOTE - HPI (INCLUDE ILLNESS QUALITY, SEVERITY, DURATION, TIMING, CONTEXT, MODIFYING FACTORS, ASSOCIATED SIGNS AND SYMPTOMS)
Patient is a 75 y/o female, per chart review PMHx of HTN, pancreatic CA s/p Whipple 8/2021, A.Fib on Eliquis, pleural effusion, no known past psych hx although per family suspicion of an eating disorder, admitted to medicine for worsening shortness of breath. Psychiatry consult placed due to suspicion for eating disorder.    Upon approach, pt was lying in bed and having dressings on her arm changed by the nurse. She had a nasogastric tube in place and appeared cachetic. Notably, per chart review, she has a BMI of 12.2, is s/p Whipple on 8/2021, and currently getting tube feeds. She appeared very uncomfortable and was initially closing her eyes. When entering the room, I called her name and she slowly opened her eyes very briefly before closing them again. When I introduced myself and asked how she was doing, she nodded her head. When asked if she was in pain or discomfort, she stated, "No." I attempted to further ask questions and speak with the patient, but given her level of discomfort and minimal responses, we told her we would discuss with family about starting a medication to help with her appetite. Patient was agreeable to this.     Spoke with patient's daughter Maddie Nunez ([phone]): Maddie stated she last saw the patient yesterday and was given updates from the medical team regarding her level of care. I explained we could recommend to the medical team to start the patient on Zyprexa (Olanzapine) 2.5 mg at bedtime, how this could help the patient with sleep, appetite, weight gain, and mental status. I further explained about the black box warning of Zyprexa. I Zyprexa explained this would be in adjunct to Remeron. Daughter was agreeable to starting Zyprexa. Maddie was agreeable to starting Zyprexa.

## 2022-04-23 NOTE — BH CONSULTATION LIAISON ASSESSMENT NOTE - NSBHCONSULTFOLLOWAFTERCARE_PSY_A_CORE FT
On discharge and if patient and family are agreeable, can refer patient to [facility], located at [address] [place] [address], phone number - [phone] (patient can be given [phone] to make appointment themselves).

## 2022-04-23 NOTE — PROGRESS NOTE ADULT - ASSESSMENT
75 YO F with PMHx of HTN, pancreatic CA s/p Whipple 8/2021, A.Fib on Eliquis, pleural effusion was BIBEMS due to worsening SOB    Acute Hypoxemic Respiratory Failure likely due to volume overload  b/l Pleural effusions with ascites  Pancreatic Ca s/p Whipple 8/21  Transaminitis  - currently on HFNC 40/60 alternating with BIPAP as needed and at bedtime  - s/p THora 4/19 with 750 cc fluid removed, transudative  - paracentesis could not be performed at bedside previously  - repeat CT noted, massive ascites--> IR eval for paracentesis   - s/p lasix  - cardiology eval appreciated  - Palliative team following, patient is full code at this time  - GOC discussed daily with daughter and son in law, patient remains full code    - c/w Creon 12K q8 with meals   - surgery following  - monitor LFT's    Chronic A.Fib on Eliquis  - rate control with diltiazem   - on lovenox     Poor PO intake  Severe malnutrition  History of eating disorder  started marinol 4/20  NGT placed, tolerating feeds, encourage PO intake as well   Dr De Luna following  psychiatry following, added Zyprexa to current regimen    GOC - full code  overall graveprognosis    #Progress Note Handoff  Pending (specify):   clinical improvement tapering down supplemental 02, monitor PO intake, IR eval for paracentesis , monitor LFTs    Family discussion: Plan of care discussed with patient, daughter and son in law at bedside extensively, informed of overall poor prognosis, all questions answered, aware and agreeable   Disposition:  from [facility]     Amanda Garcia MD  s. [phone]

## 2022-04-24 NOTE — PHYSICAL THERAPY INITIAL EVALUATION ADULT - GENERAL OBSERVATIONS, REHAB EVAL
pt seen in the CEU. [id number] pm. 75 y/o F received/left in bed, nad, + 40/50 HFNC, + bed alarm. pt presents with cachexia, severe weight and muscle loss, limited tolerance for activity. pt is very sensitive to even light touch and slight mvt, moans in response. pt noted to have marked purple discoloration and hypothermia of bilat fingers and RT toes/heel, LT post heel decubitus with 2-3 mm open epicenter, bilat forearms pitting edema/hypothermia >> resident [phone] made aware. provided pt with bilat CAIR AFO boots for skin protection. vitals: 105/64 103 100% on 40/50 NCHF.

## 2022-04-24 NOTE — CONSULT NOTE ADULT - SUBJECTIVE AND OBJECTIVE BOX
asked to evaluate right arm r/o compartment syndrome  PE: pt with altered mental stasis     PE: bilateral arms--> cool with cyanosis    right arm: soft, not tender, edema upper forearm and distal upper arm, iv upper medial arm,   palpable radial pulse  doppler signal right radial, ulnar, brachial, axillary arteries

## 2022-04-24 NOTE — PROGRESS NOTE ADULT - ASSESSMENT
75 YO F with PMHx of HTN, pancreatic CA s/p Whipple 8/2021, A.Fib on Eliquis, pleural effusion was BIBEMS due to worsening SOB that started earlier this morning    IMPRESSION:  Acute Hypoxemic Respiratory Failure improved  Acute hypercapnic respiratory failure  AMS likely toxic metabolic improved   B/l effusion R>L,  SP right thoracentesis transudative effusion   Ascites   Pancreatic Cancer advanced  s/p whipple 8/21  Afib on Eliquis - stopped and switched to Lovenox 4/22  UTI      #Acute Hypoxemic Respiratory Failure  #B/l pleural effusion; R>L  #Ascites   #Pancreatic Ca  - Xray Chest: Interval increase in bilateral effusions and opacities  - BNP 2151  - Echo: EF 54%, severely enlarged LA, RA. Mod-severe TR  - Currently on HFNC 40/80  - Surgery recs appreciated   - Surgery is requesting pigtail catheters to be placed in bilateral thoracic cavity and abdominal cavity  - CTAP after thoracentesis and paracentesis  - Procalcitonin .08  - Blood cultures NGTD  - Ammonia level 33  - Palliative rec appreciated: family meeting 4/20 at 11am. Remains fulls code for now   - s/p Thoracentesis 4/19 drained 750ml   - Pleural fluid Cytology neg  - Pleural fluid analysis shows transudative fluid   - Was unable to perform Paracentesis - F/u IR for para   - 4/22 D/c ceftriaxone    - Change Lasix 40mg IV from QD to every other day   - UA + for LE  - Ucx NGTD     #Decrease PO intake   - started marinol 4/20  - NGT placed on 4/21, after discussing with Fam   - Peptamen 1.5 start 125 ml over 30 min, increase to 250 ml  after paracentesis   - DO NOT try to put Creon down the NG tube  - Dr De Luna following  - psychiatry following, added Zyprexa to current regimen    #Transaminitis, likely from hepatic congestion  - TPro  5.6<L>  /  Alb  2.9<L>  /  TBili  0.7  /  DBili  x   /  AST  64<H>  /  ALT  47<H>  /  AlkPhos  550<H>  04-19  - TPro  5.7<L>  /  Alb  2.7<L>  /  TBili  0.5  /  DBili  x   /  AST  141<H>  /  ALT  86<H>  /  AlkPhos  790<H>  04-21  - Monitor LFTs   - RUQ US Pneumobilia with perihepatic ascites.    #Chronic A.Fib on Eliquis  - Continue Verapamil PRN and diltiazem CD 120mg daily  - 4/22 hold Eliquis, started on Lovenox   - Cradio recs recs for medication optimization     #Hx Pancreatic Ca   - s/p whipple 8/1/21 (in remission)   - c/w Creon 12K TID before meals   - Evaluated by surgery team on April 7. No further intervention   - Heme/onc recs appreciated   - CT A/P noted above was done to evaluate for pancreatic cancer, contrast was extravasated in the right upper extremity, thus study was noncon. Frequent RUE checks for compartment syndrome  - Ca-19-9 : 42    #Misc  - DVT Prophylaxis: Lovenox, ON HOLD FOR possible paracentesis tomorrow 4/25. Restart after Para.   - Diet: Feeds through NGT, but encourage PO intake   - GI Prophylaxis: Pantoprazole  - Activity: AAT  - Code Status: Full Code  - Disposition: acute   Pending: Heme-onc, Para by IR

## 2022-04-24 NOTE — PROGRESS NOTE ADULT - SUBJECTIVE AND OBJECTIVE BOX
----------Daily Progress Note----------    HISTORY OF PRESENT ILLNESS:  Patient is a 76y old Female who presents with a chief complaint of Shortness of breath (24 Apr 2022 08:26)    Currently admitted to medicine with the primary diagnosis of Pleural effusion       75 YO F with PMHx of HTN, pancreatic CA s/p Whipple 8/2021, A.Fib on Eliquis, pleural effusion was BIBEMS due to worsening SOB that started earlier on the morning of presentation  As per EMS, patient was saturating in the 70s on room air and was placed on NRB w/ sats improving to high 80s. Patient reports that she lives with her son and started noticing shortness of breath.   Patient had recent admission from [facility] and had thoracentesis done  Patient denies fevers, chills, headache, chest pain, palpitations, constipation, melena, hematochezia, dysuria, urinary frequency or urgency, numbness, tingling, recent travel or any known sick contact.   In the ED patient was placed on non rebreather with SpO2 of 94%. .  X-ray chest showed Interval increase in bilateral effusions and opacities.    Today is hospital day 6d.     INTERVAL HOSPITAL COURSE / OVERNIGHT EVENTS:    Patient was examined and seen at bedside. This morning she is resting comfortably in bed and reports no new issues or overnight events.     Review of Systems: Patient denies headache, dizziness. Patient denies chest pain, palpitation, SOB. Patient denies abdominal pain, n/v/d/c      <<<<<PAST MEDICAL & SURGICAL HISTORY>>>>>  SOB (shortness of breath)    Pain  knee    Varicose veins of both lower extremities, unspecified whether complicated    Atrial fibrillation, unspecified type  2019 on Eliquis    Jaundice  March 5, 2021    Malignant neoplasm of pancreas, unspecified location of malignancy  Pancreatic Cancer    Hypertension, unspecified type  2019    Pancreatic cancer    Kidney stones    History of surgery  Whipple procedure 8/2/2021 with Dr. Nino at [facility]    Status post phlebectomy  10/2018    History of ovarian cystectomy  left    History of tonsillectomy  1959    Kidney stone  2017      ALLERGIES  Augmentin (Rash)  chocolate (Headache)  digoxin (Hives)  Metoprolol Succinate ER (Hives)  Novocain (Angioedema)  Steroids: Excessive swelling (Flushing; Other (Mild to Mod))  sulfa drugs (Fever)  Xarelto (Hives)      Home Medications:  Cardizem  mg/24 hours oral capsule, extended release: 1 cap(s) orally once a day (22 Nov 2021 23:59)  Creon 12,000 units oral delayed release capsule: 2 cap(s) orally 3 times a day (with meals) (03 Dec 2021 10:54)  Eliquis 5 mg oral tablet: 1 tab(s) orally 2 times a day (03 Sep 2021 23:41)        MEDICATIONS  STANDING MEDICATIONS  diltiazem    milliGRAM(s) Oral daily  dronabinol 5 milliGRAM(s) Oral two times a day  enoxaparin Injectable 40 milliGRAM(s) SubCutaneous every 12 hours  lactulose Syrup 20 Gram(s) Oral every 6 hours  magnesium oxide 400 milliGRAM(s) Oral two times a day with meals  mirtazapine 15 milliGRAM(s) Oral at bedtime  OLANZapine Disintegrating Tablet 2.5 milliGRAM(s) Oral at bedtime  pantoprazole    Tablet 40 milliGRAM(s) Oral before breakfast  polyethylene glycol 3350 17 Gram(s) Oral daily  senna 2 Tablet(s) Oral at bedtime    PRN MEDICATIONS  acetaminophen     Tablet .. 650 milliGRAM(s) Oral every 6 hours PRN  morphine  - Injectable 1 milliGRAM(s) IV Push every 6 hours PRN  simethicone 80 milliGRAM(s) Chew three times a day PRN  verapamil 40 milliGRAM(s) Oral two times a day PRN    VITALS:  T(F): 97.1  HR: 120  BP: 106/72  RR: 20  SpO2: 99%    <<<<<LABS>>>>>                        10.0   9.11  )-----------( 182      ( 22 Apr 2022 23:30 )             32.6     04-22    145  |  105  |  39<H>  ----------------------------<  88  5.0   |  27  |  0.7    Ca    8.4<L>      22 Apr 2022 23:30  Mg     2.1     04-22    TPro  5.3<L>  /  Alb  2.6<L>  /  TBili  0.5  /  DBili  x   /  AST  226<H>  /  ALT  118<H>  /  AlkPhos  840<H>  04-22            [id number]        <<<<<RADIOLOGY>>>>>    < from: Xray Chest 1 View- PORTABLE-Urgent (04.18.22 @ 09:23) >  Impression:    Interval increase in bilateral effusions and opacities.        --- End of Report ---    < end of copied text >    < from: Xray Chest 1 View- PORTABLE-Urgent (Xray Chest 1 View- PORTABLE-Urgent .) (04.19.22 @ 15:55) >  Impression:    Cardiomegaly, unchanged.    Improved right lung opacity and stable left lung opacity.    Right-sided Port-A-Cath.    --- End of Report ---  < from: CT Abdomen and Pelvis w/ IV Cont (04.22.22 @ 15:36) >    IMPRESSION:  1.  Although contrast was administered, it appears that most if not all   of the contrast is extravasated in the right upper extremity and   therefore the study is essentially noncontrast. That along with the large   volume of ascites limits evaluation of most of this exam. Consider   neurovascular evaluation of the right upper extremity given the large   bolus of contrast extravasation.  2.  Liver has a nodular contour possibly representing cirrhosis or other   underlying hepatic pathology. Pneumobilia with CBD stent.  3.  Pancreas, adrenal glands and kidneys are inadequately assessed.  4.  Pocket of air anterior to the urinary bladder possibly in the small   bowel loop doubtful to represent pneumoperitoneum however this cannot be   excluded.  5.  If clinically warranted, recommend repeat imaging after paracentesis   for better evaluation.    --- End of Report ---    < end of copied text >    < end of copied text >        <<<<<PHYSICAL EXAM>>>>>  GENERAL: Thin, elderly women,  in no acute distress. Resting comfortably in bed.  PULMONARY: B/L crackles   CARDIOVASCULAR: Regular rate and rhythm, S1-S2, no murmurs  GASTROINTESTINAL: Soft, +tender to palpation in the LLQ, non-distended, no guarding.  SKIN/EXTREMITIES: B/l LE chronic skin changes   NEUROLOGIC/MUSCULOSKELETAL: weak but able to follow command           -----------------------------------------------------------------------------------------------------------------------------------------------------------------------------------------------

## 2022-04-24 NOTE — PROGRESS NOTE ADULT - SUBJECTIVE AND OBJECTIVE BOX
Patient is a 76y old  Female who presents with a chief complaint of Shortness of breath (23 Apr 2022 12:26)      Over Night Events: no acute events overnight      ICU Vital Signs Last 24 Hrs  T(C): 36.2 (24 Apr 2022 04:00), Max: 36.4 (23 Apr 2022 20:00)  T(F): 97.1 (24 Apr 2022 04:00), Max: 97.5 (23 Apr 2022 20:00)  HR: 120 (24 Apr 2022 06:30) (90 - 120)  BP: 106/72 (24 Apr 2022 04:00) (94/56 - 111/75)  BP(mean): 77 (23 Apr 2022 20:00) (68 - 77)  RR: 20 (24 Apr 2022 04:00) (19 - 20)  SpO2: 99% (24 Apr 2022 07:45) (97% - 100%)      CONSTITUTIONAL:   Ill appearing.  NAD    ENT:   Airway patent,   Mouth with normal mucosa.   No thrush    EYES:   Pupils equal,   Round and reactive to light.    CARDIAC:   Normal rate,   Regular rhythm.    No edema    RESPIRATORY:   No wheezing  Bilateral BS  Normal chest expansion  Not tachypneic,  No use of accessory muscles    GASTROINTESTINAL:  Abdomen soft,   Non-tender,   No guarding,   + BS    MUSCULOSKELETAL:   Range of motion is not limited,  No clubbing, cyanosis    NEUROLOGICAL:   Alert and oriented   No motor  deficits.  pertinent DTRs normal    SKIN:   Skin normal color for race,   Warm and dry  No evidence of rash.    PSYCHIATRIC:   Normal mood and affect.     04-23-22 @ 07:01  -  04-24-22 @ 07:00  --------------------------------------------------------  IN:    Peptamen A.F.: 850 mL  Total IN: 850 mL    OUT:    Indwelling Catheter - Urethral (mL): 1650 mL  Total OUT: 1650 mL    Total NET: -800 mL          LABS:                            10.0   9.11  )-----------( 182      ( 22 Apr 2022 23:30 )             32.6                                               04-22    145  |  105  |  39<H>  ----------------------------<  88  5.0   |  27  |  0.7    Ca    8.4<L>      22 Apr 2022 23:30  Mg     2.1     04-22    TPro  5.3<L>  /  Alb  2.6<L>  /  TBili  0.5  /  DBili  x   /  AST  226<H>  /  ALT  118<H>  /  AlkPhos  840<H>  04-22                                                                                           LIVER FUNCTIONS - ( 22 Apr 2022 23:30 )  Alb: 2.6 g/dL / Pro: 5.3 g/dL / ALK PHOS: 840 U/L / ALT: 118 U/L / AST: 226 U/L / GGT: x                                                                                                                                       MEDICATIONS  (STANDING):  diltiazem    milliGRAM(s) Oral daily  dronabinol 5 milliGRAM(s) Oral two times a day  enoxaparin Injectable 40 milliGRAM(s) SubCutaneous every 12 hours  lactulose Syrup 20 Gram(s) Oral every 6 hours  magnesium oxide 400 milliGRAM(s) Oral two times a day with meals  mirtazapine 15 milliGRAM(s) Oral at bedtime  OLANZapine Disintegrating Tablet 2.5 milliGRAM(s) Oral at bedtime  pantoprazole    Tablet 40 milliGRAM(s) Oral before breakfast  polyethylene glycol 3350 17 Gram(s) Oral daily  senna 2 Tablet(s) Oral at bedtime    MEDICATIONS  (PRN):  acetaminophen     Tablet .. 650 milliGRAM(s) Oral every 6 hours PRN Temp greater or equal to 38C (100.4F), Mild Pain (1 - 3)  simethicone 80 milliGRAM(s) Chew three times a day PRN Gas  verapamil 40 milliGRAM(s) Oral two times a day PRN tachycardia faster tahn 110 beats/ minute        CXR interpreted by me:  unchanged faint bibasilar opacities      Patient is a 76y old  Female who presents with a chief complaint of Shortness of breath (23 Apr 2022 12:26)      Over Night Events: patient did not tolerate NIV overnight, no acute events       ICU Vital Signs Last 24 Hrs  T(C): 36.2 (24 Apr 2022 04:00), Max: 36.4 (23 Apr 2022 20:00)  T(F): 97.1 (24 Apr 2022 04:00), Max: 97.5 (23 Apr 2022 20:00)  HR: 120 (24 Apr 2022 06:30) (90 - 120)  BP: 106/72 (24 Apr 2022 04:00) (94/56 - 111/75)  BP(mean): 77 (23 Apr 2022 20:00) (68 - 77)  RR: 20 (24 Apr 2022 04:00) (19 - 20)  SpO2: 99% (24 Apr 2022 07:45) (97% - 100%)      CONSTITUTIONAL:   Ill appearing.  NAD    ENT:   NGT in place  Airway patent,   Mouth with normal mucosa.   No thrush    EYES:   Pupils equal,   Round and reactive to light.    CARDIAC:   Normal rate,   Regular rhythm.    No edema    RESPIRATORY:  Decreased bilateral air entry  No wheezing  Bilateral BS  Normal chest expansion  Not tachypneic,  No use of accessory muscles    GASTROINTESTINAL:  Abdomen soft,   Non-tender,   No guarding,   + BS    MUSCULOSKELETAL:   Range of motion is not limited,  No clubbing, cyanosis    NEUROLOGICAL:   Smnolent    SKIN:   Skin normal color for race,   Warm and dry  No evidence of rash.        04-23-22 @ 07:01  -  04-24-22 @ 07:00  --------------------------------------------------------  IN:    Peptamen A.F.: 850 mL  Total IN: 850 mL    OUT:    Indwelling Catheter - Urethral (mL): 1650 mL  Total OUT: 1650 mL    Total NET: -800 mL          LABS:                            10.0   9.11  )-----------( 182      ( 22 Apr 2022 23:30 )             32.6                                               04-22    145  |  105  |  39<H>  ----------------------------<  88  5.0   |  27  |  0.7    Ca    8.4<L>      22 Apr 2022 23:30  Mg     2.1     04-22    TPro  5.3<L>  /  Alb  2.6<L>  /  TBili  0.5  /  DBili  x   /  AST  226<H>  /  ALT  118<H>  /  AlkPhos  840<H>  04-22                                                                                           LIVER FUNCTIONS - ( 22 Apr 2022 23:30 )  Alb: 2.6 g/dL / Pro: 5.3 g/dL / ALK PHOS: 840 U/L / ALT: 118 U/L / AST: 226 U/L / GGT: x                                                                                                                                       MEDICATIONS  (STANDING):  diltiazem    milliGRAM(s) Oral daily  dronabinol 5 milliGRAM(s) Oral two times a day  enoxaparin Injectable 40 milliGRAM(s) SubCutaneous every 12 hours  lactulose Syrup 20 Gram(s) Oral every 6 hours  magnesium oxide 400 milliGRAM(s) Oral two times a day with meals  mirtazapine 15 milliGRAM(s) Oral at bedtime  OLANZapine Disintegrating Tablet 2.5 milliGRAM(s) Oral at bedtime  pantoprazole    Tablet 40 milliGRAM(s) Oral before breakfast  polyethylene glycol 3350 17 Gram(s) Oral daily  senna 2 Tablet(s) Oral at bedtime    MEDICATIONS  (PRN):  acetaminophen     Tablet .. 650 milliGRAM(s) Oral every 6 hours PRN Temp greater or equal to 38C (100.4F), Mild Pain (1 - 3)  simethicone 80 milliGRAM(s) Chew three times a day PRN Gas  verapamil 40 milliGRAM(s) Oral two times a day PRN tachycardia faster tahn 110 beats/ minute        CXR interpreted by me:  unchanged faint bibasilar opacities

## 2022-04-24 NOTE — CONSULT NOTE ADULT - ASSESSMENT
edematous right arm--> vasculature intact    no evidence of compartment syndrome    REC: right arm elevation, warm compress tid for 2o min  consider d/c iv right upper arm      no surgery needed at this time

## 2022-04-24 NOTE — CONSULT NOTE ADULT - SUBJECTIVE AND OBJECTIVE BOX
VASCULAR SURGERY CONSULT NOTE      HPI:  75 YO F with PMHx of HTN, pancreatic CA s/p Whipple 8/2021, A.Afshan on Eliquis, pleural effusion was BIBEMS due to worsening SOB that started earlier on the morning of presentation  As per EMS, patient was saturating in the 70s on room air and was placed on NRB w/ sats improving to high 80s. Patient reports that she lives with her son and started noticing shortness of breath.   Patient had recent admission from [facility] and had thoracentesis done  Patient denies fevers, chills, headache, chest pain, palpitations, constipation, melena, hematochezia, dysuria, urinary frequency or urgency, numbness, tingling, recent travel or any known sick contact.   In the ED patient was placed on non rebreather with SpO2 of 94%. .  X-ray chest showed Interval increase in bilateral effusions and opacities. (18 Apr 2022 11:47)    Vascular surgery consulted for RUE compartment syndrome. Patient has altered mental status and is unable to verbalize any pain.     PAST MEDICAL & SURGICAL HISTORY:  SOB (shortness of breath)    Pain  knee    Varicose veins of both lower extremities, unspecified whether complicated    Atrial fibrillation, unspecified type  2019 on Eliquis    Jaundice  March 5, 2021    Malignant neoplasm of pancreas, unspecified location of malignancy  Pancreatic Cancer    Hypertension, unspecified type  2019    Pancreatic cancer    Kidney stones    History of surgery  Whipple procedure 8/2/2021 with Dr. Nino at [facility]    Status post phlebectomy  10/2018    History of ovarian cystectomy  left    History of tonsillectomy  1959    Kidney stone  2017      Augmentin (Rash)  chocolate (Headache)  digoxin (Hives)  Metoprolol Succinate ER (Hives)  Novocain (Angioedema)  Steroids: Excessive swelling (Flushing; Other (Mild to Mod))  sulfa drugs (Fever)  Xarelto (Hives)    Home Medications:  Cardizem  mg/24 hours oral capsule, extended release: 1 cap(s) orally once a day (22 Nov 2021 23:59)  Creon 12,000 units oral delayed release capsule: 2 cap(s) orally 3 times a day (with meals) (03 Dec 2021 10:54)  Eliquis 5 mg oral tablet: 1 tab(s) orally 2 times a day (03 Sep 2021 23:41)    No permtinent family history of PVD    REVIEW OF SYSTEMS:  GENERAL:                                         negative  SKIN:                                                 negative  OPTHALMOLOGIC:                          negative  ENMT:                                               negative  RESPIRATORY AND THORAX:        negative  CARDIOVASCULAR:                         negative  GASTROINTESTINAL:                       negative  NEPHROLOGY:                                  negative  MUSCULOSKELETAL:                       negative  NEUROLOGIC:                                   negative  PSYCHIATRIC:                                    negative  HEMATOLOGY/LYMPHATICS:         negative  ENDOCRINE:                                     negative  ALLERGIC/IMMUNOLOGIC:            negative    12 point ROS otherwise normal except as stated in HPI    PHYSICAL EXAM  Vital Signs Last 24 Hrs  T(C): 36.2 (24 Apr 2022 04:00), Max: 36.3 (24 Apr 2022 00:00)  T(F): 97.1 (24 Apr 2022 04:00), Max: 97.4 (24 Apr 2022 00:00)  HR: 103 (24 Apr 2022 17:05) (99 - 120)  BP: 101/55 (24 Apr 2022 17:05) (101/55 - 124/69)  BP(mean): 89 (24 Apr 2022 11:30) (89 - 89)  RR: 20 (24 Apr 2022 17:05) (20 - 20)  SpO2: 100% (24 Apr 2022 20:19) (97% - 100%)    GENERAL: cachectic, chronically ill appearing   SKIN: Ecchymosis to right hand and fingers.   HEENT: Atraumatic. Normocephalic.    NECK: Supple, No JVD. .  PULMONARY: decreased breath sounds B/L. No wheezing. on HFNC  CVS: Normal S1, S2. Rate and Rhythm are regular   ABDOMEN/GI: Soft, Nontender, Nondistended   MSK:  No edema B/L LE. Improving LUE edema. Edematous RUE with oozing. Palpable RUE radial pulse.   NEUROLOGIC: follows simple commands   PSYCH: Lethargic      MEDICATIONS:   MEDICATIONS  (STANDING):  diltiazem    milliGRAM(s) Oral daily  dronabinol 5 milliGRAM(s) Oral two times a day  lactulose Syrup 20 Gram(s) Oral every 6 hours  magnesium oxide 400 milliGRAM(s) Oral two times a day with meals  mirtazapine 15 milliGRAM(s) Oral at bedtime  OLANZapine Disintegrating Tablet 2.5 milliGRAM(s) Oral at bedtime  pantoprazole    Tablet 40 milliGRAM(s) Oral before breakfast  polyethylene glycol 3350 17 Gram(s) Oral daily  senna 2 Tablet(s) Oral at bedtime    MEDICATIONS  (PRN):  acetaminophen     Tablet .. 650 milliGRAM(s) Oral every 6 hours PRN Temp greater or equal to 38C (100.4F), Mild Pain (1 - 3)  morphine  - Injectable 1 milliGRAM(s) IV Push every 6 hours PRN Moderate Pain (4 - 6)  simethicone 80 milliGRAM(s) Chew three times a day PRN Gas  verapamil 40 milliGRAM(s) Oral two times a day PRN tachycardia faster tahn 110 beats/ minute      LAB/STUDIES:                        10.0   9.11  )-----------( 182      ( 22 Apr 2022 23:30 )             32.6     04-24    129<L>  |  112<H>  |  43<H>  ----------------------------<  124<H>  5.2<H>   |  23  |  0.8    Ca    8.2<L>      24 Apr 2022 09:39  Mg     2.1     04-24    TPro  5.5<L>  /  Alb  2.8<L>  /  TBili  0.5  /  DBili  x   /  AST  156<H>  /  ALT  122<H>  /  AlkPhos  842<H>  04-24      LIVER FUNCTIONS - ( 24 Apr 2022 09:39 )  Alb: 2.8 g/dL / Pro: 5.5 g/dL / ALK PHOS: 842 U/L / ALT: 122 U/L / AST: 156 U/L / GGT: x           IMAGING:  < from: [facility] Duplex Upper Ext Vein Scan, Left (04.22.22 @ 20:06) >  Impression:    No evidence of deep or superficial thrombosis in the visualized left   upper extremity.    < end of copied text >

## 2022-04-24 NOTE — PROGRESS NOTE ADULT - ASSESSMENT
IMPRESSION:    Acute Hypoxemic Respiratory Failure, improving  Acute hypercapnic respiratory failure  AMS, likely toxic metabolic improved   B/l effusion R>L,  SP right thoracentesis x2 transudative effusion   Large Volume Ascites   HO eating disorder  Pancreatic Cancer advanced s/p whipple 8/21  Afib on Eliquis    PLAN:    CNS: Avoid CNS depressants.     HEENT: Oral care    PULMONARY:  HOB @ 45 degrees.  Aspiration precautions. Wean HHFNC. Target SPO2 92-96%. Titrate oxygen as tolerated.     CARDIOVASCULAR: Keep negative balance. Rate control. Cardio appreciated     GI: GI prophylaxis. Feeding per NGT. Nutrition eval appreciated. Increase bowel regimen. IR eval for paracentesis.    RENAL: Follow up lytes. Correct as needed    INFECTIOUS DISEASE: Follow up cultures. Procal negative.     HEMATOLOGICAL:  Continue therapeutic LMWH for now.     ENDOCRINE:  Follow up FS.      MUSCULOSKELETAL: OOBTC. Wound care nurse eval.     Very poor prognosis    Palliative eval appreciated    SDU monitoring  IMPRESSION:    Acute Hypoxemic Respiratory Failure, improving  Acute hypercapnic respiratory failure  AMS, likely toxic metabolic improved   B/l effusion R>L,  SP right thoracentesis x2 transudative effusion   Large Volume Ascites   HO eating disorder  Pancreatic Cancer advanced s/p whipple 8/21  Afib on Eliquis    PLAN:    CNS: Avoid CNS depressants.     HEENT: Oral care    PULMONARY:  HOB @ 45 degrees.  Aspiration precautions. Wean HHFNC. Target SPO2 88-94%. Titrate oxygen as tolerated.     CARDIOVASCULAR: Keep negative balance. Rate control. Cardio appreciated     GI: GI prophylaxis. Feeding per NGT. Nutrition eval appreciated. Increase bowel regimen.  IR eval for paracentesis.    RENAL: Follow up lytes. Correct as needed    INFECTIOUS DISEASE: Follow up cultures. Procal negative.     HEMATOLOGICAL:  Continue therapeutic LMWH for now.     ENDOCRINE:  Follow up FS.      MUSCULOSKELETAL: OOBTC. Wound care nurse eval.     Very poor prognosis    Palliative eval appreciated    SDU monitoring

## 2022-04-24 NOTE — PROGRESS NOTE ADULT - SUBJECTIVE AND OBJECTIVE BOX
LI, VIKASH  76y Female    CHIEF COMPLAINT:    Patient is a 76y old  Female who presents with a chief complaint of Shortness of breath (24 Apr 2022 09:11)    INTERVAL HPI/OVERNIGHT EVENTS:    Patient seen and examined. No acute events overnight. Overall unchanged, remains lethargic on HFNC     ROS: All other systems are negative.    Vital Signs:    T(F): 97.1 (04-24-22 @ 04:00), Max: 97.5 (04-23-22 @ 20:00)  HR: 120 (04-24-22 @ 06:30) (90 - 120)  BP: 106/72 (04-24-22 @ 04:00) (94/56 - 111/75)  RR: 20 (04-24-22 @ 04:00) (19 - 20)  SpO2: 99% (04-24-22 @ 07:45) (97% - 100%)    23 Apr 2022 07:01  -  24 Apr 2022 07:00  --------------------------------------------------------  IN: 850 mL / OUT: 1650 mL / NET: -800 mL    PHYSICAL EXAM:    GENERAL: cachectic, chronically ill appearing   SKIN: No rashes or lesions  HEENT: Atraumatic. Normocephalic.    NECK: Supple, No JVD. .  PULMONARY: decreased breath sounds B/L. No wheezing.   CVS: Normal S1, S2. Rate and Rhythm are regular   ABDOMEN/GI: Soft, Nontender, Nondistended   MSK:  No edema B/L LE. Improving LUE edema  NEUROLOGIC: follows simple commands   PSYCH: Lethargic    Consultant(s) Notes Reviewed:  [x ] YES  [ ] NO  Care Discussed with Consultants/Other Providers [ x] YES  [ ] NO    LABS:                        10.0   9.11  )-----------( 182      ( 22 Apr 2022 23:30 )             32.6     145  |  105  |  39<H>  ----------------------------<  88  5.0   |  27  |  0.7    Ca    8.4<L>      22 Apr 2022 23:30  Mg     2.1     04-22    TPro  5.3<L>  /  Alb  2.6<L>  /  TBili  0.5  /  DBili  x   /  AST  226<H>  /  ALT  118<H>  /  AlkPhos  840<H>  04-22    Serum Pro-Brain Natriuretic Peptide: 2151 pg/mL (04-18-22 @ 08:10)    RADIOLOGY & ADDITIONAL TESTS:  Imaging or report Personally Reviewed:  [x] YES  [ ] NO  EKG reviewed: [x] YES  [ ] NO    Medications:  Standing  diltiazem    milliGRAM(s) Oral daily  dronabinol 5 milliGRAM(s) Oral two times a day  lactulose Syrup 20 Gram(s) Oral every 6 hours  magnesium oxide 400 milliGRAM(s) Oral two times a day with meals  mirtazapine 15 milliGRAM(s) Oral at bedtime  OLANZapine Disintegrating Tablet 2.5 milliGRAM(s) Oral at bedtime  pantoprazole    Tablet 40 milliGRAM(s) Oral before breakfast  polyethylene glycol 3350 17 Gram(s) Oral daily  senna 2 Tablet(s) Oral at bedtime    PRN Meds  acetaminophen     Tablet .. 650 milliGRAM(s) Oral every 6 hours PRN  morphine  - Injectable 1 milliGRAM(s) IV Push every 6 hours PRN  simethicone 80 milliGRAM(s) Chew three times a day PRN  verapamil 40 milliGRAM(s) Oral two times a day PRN

## 2022-04-24 NOTE — CHART NOTE - NSCHARTNOTEFT_GEN_A_CORE
Patient's right upper extremity swollen and found to be cold at 5PM today. Patient is on HFNC and moaning in pain (this is her baseline). Pulses palpable at the time    Concern   Burn and vascular surgery consulted -> no intervention at this time Patient's right upper extremity swollen and found to be cold to touch at 5PM today. Patient is on HFNC and moaning in pain (this is her baseline). Pulses palpable at the time and /55 with     Concern for compartment syndrome versus necrotizing fasciitis    RUE duplex - no DVT identified  Burn and vascular surgery consulted -> no intervention at this time    Please monitor patient closely and consult vascular surgery if vitals change. Consider CT right upper extremity w/IV contrast to r/o necrotizing fasciitis

## 2022-04-24 NOTE — CONSULT NOTE ADULT - TIME BILLING
r/o compartment syndrome
discussed her care with surgical and medical oncology and ED teams and with the patient and her daughter

## 2022-04-24 NOTE — GOALS OF CARE CONVERSATION - ADVANCED CARE PLANNING - CONVERSATION DETAILS
I had a discussion with the patient's Daughter Yuli. Who had expressed that her mother wanted to be DNR/DNI based on their previous conversation. Yuli also is thinking about making her mother more comfortable and will discuss with her sister Maddie and will update the team.   Patient is a Full Code for now. Continue Goals of care wit the family

## 2022-04-24 NOTE — PROGRESS NOTE ADULT - ASSESSMENT
75 YO F with PMHx of HTN, pancreatic CA s/p Whipple 8/2021, A.Fib on Eliquis, pleural effusion was BIBEMS due to worsening SOB    Acute Hypoxemic Respiratory Failure likely due to volume overload  b/l Pleural effusions with ascites  Pancreatic Ca s/p Whipple 8/21  Transaminitis  - remains on HFNC 40/50, did not tolerate bipap overnight   - s/p THora 4/19 with 750 cc fluid removed, transudative  - paracentesis could not be performed at bedside previously  - repeat CT noted, massive ascites--> IR eval for paracentesis   - s/p lasix  - cardiology eval appreciated  - Palliative team following, patient is full code at this time  - GOC discussed daily with daughter and son in law, patient remains full code    - c/w Creon 12K q8 with meals   - surgery following  - monitor LFT's    Chronic A.Fib on Eliquis  - rate control with diltiazem   - hold lovenox for possible paracentesis     Poor PO intake  Severe malnutrition  History of eating disorder  started marinol 4/20  NGT placed, tolerating feeds, encourage PO intake as well   Dr De Luna following  psychiatry following, added Zyprexa to current regimen    GOC - full code  overall grave prognosis    #Progress Note Handoff  Pending (specify):   clinical improvement tapering down supplemental 02, monitor PO intake, IR eval for paracentesis , monitor LFTs    Family discussion: Plan of care discussed with patient, daughter and son in law at bedside extensively, informed of overall poor prognosis, all questions answered, aware and agreeable   Disposition:  from [facility]     Amanda Garcia MD  s. [phone]

## 2022-04-24 NOTE — CONSULT NOTE ADULT - ASSESSMENT
ASSESSMENT:     75 YO F with PMHx of HTN, pancreatic CA s/p Whipple 8/2021, A.Fib on Eliquis, pleural effusion was BIBEMS due to worsening SOB that started earlier on the morning of presentation  As per EMS, patient was saturating in the 70s on room air and was placed on NRB w/ sats improving to high 80s. Patient reports that she lives with her son and started noticing shortness of breath.   Patient had recent admission from [facility] and had thoracentesis done  Patient denies fevers, chills, headache, chest pain, palpitations, constipation, melena, hematochezia, dysuria, urinary frequency or urgency, numbness, tingling, recent travel or any known sick contact.   In the ED patient was placed on non rebreather with SpO2 of 94%. .  X-ray chest showed Interval increase in bilateral effusions and opacities.     Vascular surgery consulted for RUE compartment syndrome. Patient has RUE edema with weeping, with ecchymosis to right hand, and is cool to touch and delayed capillary refill.     PLAN:   -  No vascular surgical intervention currently as patient has no vascular compromise.   - Edgar hugger to RUE as needed   - RUE arm elevation   - Plan Discussed with Fellow, Dr. Fleming   - Plan to be discussed with Attending, Dr. Carson      VASCULAR TEAM SPECTRA: [id number]

## 2022-04-24 NOTE — PHYSICAL THERAPY INITIAL EVALUATION ADULT - LEVEL OF INDEPENDENCE: SUPINE/SIT, REHAB EVAL
only tolerated ~ 1 min sitting at EOB with max A, limited by fatigue and overall weakness/dependent (less than 25% patients effort)

## 2022-04-25 NOTE — PROGRESS NOTE ADULT - ASSESSMENT
ASSESSMENT:     77 YO F with PMHx of HTN, pancreatic CA s/p Whipple 8/2021, A.Fib on Eliquis, pleural effusion was BIBEMS due to worsening SOB that started earlier on the morning of presentation  As per EMS, patient was saturating in the 70s on room air and was placed on NRB w/ sats improving to high 80s. Patient reports that she lives with her son and started noticing shortness of breath.   Patient had recent admission from [facility] and had thoracentesis done  Patient denies fevers, chills, headache, chest pain, palpitations, constipation, melena, hematochezia, dysuria, urinary frequency or urgency, numbness, tingling, recent travel or any known sick contact.   In the ED patient was placed on non rebreather with SpO2 of 94%. .  X-ray chest showed Interval increase in bilateral effusions and opacities.     Vascular surgery consulted for RUE compartment syndrome. Patient has RUE edema with weeping, with ecchymosis to right hand, and is cool to touch and delayed capillary refill.     PLAN:   -  No vascular surgical intervention at this time, as a patient has no vascular compromise.   - Edgar hugger to RUE as needed   - RUE arm elevation       Recall VASCULAR TEAM SPECTRA, as needed: [id number]

## 2022-04-25 NOTE — PROGRESS NOTE ADULT - SUBJECTIVE AND OBJECTIVE BOX
VIKASH LI 76y Female  MRN#: [id number]   CODE STATUS:________    Hospital Day: 7d    Pt is currently admitted with the primary diagnosis of     SUBJECTIVE  Overnight events   -No major overnight events  Subjective complaints                                             ----------------------------------------------------------  OBJECTIVE  PAST MEDICAL & SURGICAL HISTORY  SOB (shortness of breath)    Pain  knee    Varicose veins of both lower extremities, unspecified whether complicated    Atrial fibrillation, unspecified type  2019 on Eliquis    Jaundice  March 5, 2021    Malignant neoplasm of pancreas, unspecified location of malignancy  Pancreatic Cancer    Hypertension, unspecified type  2019    Pancreatic cancer    Kidney stones    History of surgery  Whipple procedure 8/2/2021 with Dr. Nino at [facility]    Status post phlebectomy  10/2018    History of ovarian cystectomy  left    History of tonsillectomy  1959    Kidney stone  2017                                              -----------------------------------------------------------  ALLERGIES:  Augmentin (Rash)  chocolate (Headache)  digoxin (Hives)  Metoprolol Succinate ER (Hives)  Novocain (Angioedema)  Steroids: Excessive swelling (Flushing; Other (Mild to Mod))  sulfa drugs (Fever)  Xarelto (Hives)                                            ------------------------------------------------------------    HOME MEDICATIONS  Home Medications:  Cardizem  mg/24 hours oral capsule, extended release: 1 cap(s) orally once a day (22 Nov 2021 23:59)  Creon 12,000 units oral delayed release capsule: 2 cap(s) orally 3 times a day (with meals) (03 Dec 2021 10:54)  Eliquis 5 mg oral tablet: 1 tab(s) orally 2 times a day (03 Sep 2021 23:41)                           MEDICATIONS:  STANDING MEDICATIONS  diltiazem    milliGRAM(s) Oral daily  dronabinol 5 milliGRAM(s) Oral two times a day  lactulose Syrup 20 Gram(s) Oral every 6 hours  magnesium oxide 400 milliGRAM(s) Oral two times a day with meals  mirtazapine 15 milliGRAM(s) Oral at bedtime  OLANZapine Disintegrating Tablet 2.5 milliGRAM(s) Oral at bedtime  pantoprazole    Tablet 40 milliGRAM(s) Oral before breakfast  polyethylene glycol 3350 17 Gram(s) Oral daily  senna 2 Tablet(s) Oral at bedtime    PRN MEDICATIONS  acetaminophen     Tablet .. 650 milliGRAM(s) Oral every 6 hours PRN  morphine  - Injectable 1 milliGRAM(s) IV Push every 6 hours PRN  simethicone 80 milliGRAM(s) Chew three times a day PRN  verapamil 40 milliGRAM(s) Oral two times a day PRN                                            ------------------------------------------------------------  VITAL SIGNS: Last 24 Hours  T(C): 36.4 (25 Apr 2022 04:00), Max: 36.4 (25 Apr 2022 04:00)  T(F): 97.6 (25 Apr 2022 04:00), Max: 97.6 (25 Apr 2022 04:00)  HR: 120 (25 Apr 2022 04:00) (99 - 120)  BP: 129/87 (25 Apr 2022 04:00) (101/55 - 129/87)  BP(mean): 89 (24 Apr 2022 11:30) (89 - 89)  RR: 20 (25 Apr 2022 04:00) (20 - 20)  SpO2: 100% (25 Apr 2022 04:00) (99% - 100%)      04-24-22 @ 07:01  -  04-25-22 @ 07:00  --------------------------------------------------------  IN: 0 mL / OUT: 1150 mL / NET: -1150 mL                                             --------------------------------------------------------------  LABS:    04-24    144  |  104  |  46<H>  ----------------------------<  87  4.7   |  25  |  0.7    Ca    8.4<L>      24 Apr 2022 22:26  Mg     2.1     04-24    TPro  5.5<L>  /  Alb  2.8<L>  /  TBili  0.5  /  DBili  x   /  AST  156<H>  /  ALT  122<H>  /  AlkPhos  842<H>  04-24                                                                --------------------------------------------------------------    PHYSICAL EXAM:  GENERAL: AAOx3. Well-nourished, laying in bed appearing in no acute distress  HEENT: No FNDs, atraumatic, normocephalic  LUNGS: Clear to auscultation bilaterally  HEART: S1/S2. No heaves or thrills  ABD: Soft, non-tender, non-distended.  EXT/NEURO: 5/5 strength in all extremity joints. Sensation and ROM grossly intact.  SKIN: No breaks, erythema, edema                                           --------------------------------------------------------------  77 YO F with PMHx of HTN, pancreatic CA s/p Whipple 8/2021, AHaylie on Eliquis, pleural effusion was BIBEMS due to worsening SOB that started earlier this morning    IMPRESSION:  Acute Hypoxemic Respiratory Failure improved  Acute hypercapnic respiratory failure  AMS likely toxic metabolic improved   B/l effusion R>L,  SP right thoracentesis transudative effusion   Ascites   Pancreatic Cancer advanced  s/p whipple 8/21  Afib on Eliquis - stopped and switched to Lovenox 4/22  UTI      #Acute Hypoxemic Respiratory Failure  #B/l pleural effusion; R>L  #Ascites   #Pancreatic Ca  - Xray Chest: Interval increase in bilateral effusions and opacities  - BNP 2151  - Echo: EF 54%, severely enlarged LA, RA. Mod-severe TR  - Currently on HFNC 40/80  - Surgery recs appreciated   - Surgery is requesting pigtail catheters to be placed in bilateral thoracic cavity and abdominal cavity  - CTAP after thoracentesis and paracentesis  - Procalcitonin .08  - Blood cultures NGTD  - Ammonia level 33  - Palliative rec appreciated: family meeting 4/20 at 11am. Remains fulls code for now   - s/p Thoracentesis 4/19 drained 750ml   - Pleural fluid Cytology neg  - Pleural fluid analysis shows transudative fluid   - Was unable to perform Paracentesis - F/u IR for para   - 4/22 D/c ceftriaxone    - Change Lasix 40mg IV from QD to every other day   - UA + for LE  - Ucx NGTD     #Decrease PO intake   - started marinol 4/20  - NGT placed on 4/21, after discussing with Fam   - Peptamen 1.5 start 125 ml over 30 min, increase to 250 ml  after paracentesis   - DO NOT try to put Creon down the NG tube  - Dr De Luna following  - psychiatry following, added Zyprexa to current regimen    #Transaminitis, likely from hepatic congestion  - TPro  5.6<L>  /  Alb  2.9<L>  /  TBili  0.7  /  DBili  x   /  AST  64<H>  /  ALT  47<H>  /  AlkPhos  550<H>  04-19  - TPro  5.7<L>  /  Alb  2.7<L>  /  TBili  0.5  /  DBili  x   /  AST  141<H>  /  ALT  86<H>  /  AlkPhos  790<H>  04-21  - Monitor LFTs   - RUQ US Pneumobilia with perihepatic ascites.    #Chronic A.Fib on Eliquis  - Continue Verapamil PRN and diltiazem CD 120mg daily  - 4/22 hold Eliquis, started on Lovenox   - Cradio recs recs for medication optimization     #Hx Pancreatic Ca   - s/p whipple 8/1/21 (in remission)   - c/w Creon 12K TID before meals   - Evaluated by surgery team on April 7. No further intervention   - Heme/onc recs appreciated   - CT A/P noted above was done to evaluate for pancreatic cancer, contrast was extravasated in the right upper extremity, thus study was noncon. Frequent RUE checks for compartment syndrome  - Ca-19-9 : 42    #Misc  - DVT Prophylaxis: Lovenox, ON HOLD FOR possible paracentesis tomorrow 4/25. Restart after Para.   - Diet: Feeds through NGT, but encourage PO intake   - GI Prophylaxis: Pantoprazole  - Activity: AAT  - Code Status: Full Code  - Disposition: acute   Pending: Heme-onc, Para by IR    VIKASH LI 76y Female  MRN#: [id number]   CODE STATUS:________    Hospital Day: 7d    Pt is currently admitted with the primary diagnosis of AHRF    SUBJECTIVE  Overnight events   -No major overnight events  Subjective complaints   -non-verbal, did not appear in acute distress                                          ----------------------------------------------------------  OBJECTIVE  PAST MEDICAL & SURGICAL HISTORY  SOB (shortness of breath)    Pain  knee    Varicose veins of both lower extremities, unspecified whether complicated    Atrial fibrillation, unspecified type  2019 on Eliquis    Jaundice  March 5, 2021    Malignant neoplasm of pancreas, unspecified location of malignancy  Pancreatic Cancer    Hypertension, unspecified type  2019    Pancreatic cancer    Kidney stones    History of surgery  Whipple procedure 8/2/2021 with Dr. Nino at [facility]    Status post phlebectomy  10/2018    History of ovarian cystectomy  left    History of tonsillectomy  1959    Kidney stone  2017                                              -----------------------------------------------------------  ALLERGIES:  Augmentin (Rash)  chocolate (Headache)  digoxin (Hives)  Metoprolol Succinate ER (Hives)  Novocain (Angioedema)  Steroids: Excessive swelling (Flushing; Other (Mild to Mod))  sulfa drugs (Fever)  Xarelto (Hives)                                            ------------------------------------------------------------    HOME MEDICATIONS  Home Medications:  Cardizem  mg/24 hours oral capsule, extended release: 1 cap(s) orally once a day (22 Nov 2021 23:59)  Creon 12,000 units oral delayed release capsule: 2 cap(s) orally 3 times a day (with meals) (03 Dec 2021 10:54)  Eliquis 5 mg oral tablet: 1 tab(s) orally 2 times a day (03 Sep 2021 23:41)                           MEDICATIONS:  STANDING MEDICATIONS  diltiazem    milliGRAM(s) Oral daily  dronabinol 5 milliGRAM(s) Oral two times a day  lactulose Syrup 20 Gram(s) Oral every 6 hours  magnesium oxide 400 milliGRAM(s) Oral two times a day with meals  mirtazapine 15 milliGRAM(s) Oral at bedtime  OLANZapine Disintegrating Tablet 2.5 milliGRAM(s) Oral at bedtime  pantoprazole    Tablet 40 milliGRAM(s) Oral before breakfast  polyethylene glycol 3350 17 Gram(s) Oral daily  senna 2 Tablet(s) Oral at bedtime    PRN MEDICATIONS  acetaminophen     Tablet .. 650 milliGRAM(s) Oral every 6 hours PRN  morphine  - Injectable 1 milliGRAM(s) IV Push every 6 hours PRN  simethicone 80 milliGRAM(s) Chew three times a day PRN  verapamil 40 milliGRAM(s) Oral two times a day PRN                                            ------------------------------------------------------------  VITAL SIGNS: Last 24 Hours  T(C): 36.4 (25 Apr 2022 04:00), Max: 36.4 (25 Apr 2022 04:00)  T(F): 97.6 (25 Apr 2022 04:00), Max: 97.6 (25 Apr 2022 04:00)  HR: 120 (25 Apr 2022 04:00) (99 - 120)  BP: 129/87 (25 Apr 2022 04:00) (101/55 - 129/87)  BP(mean): 89 (24 Apr 2022 11:30) (89 - 89)  RR: 20 (25 Apr 2022 04:00) (20 - 20)  SpO2: 100% (25 Apr 2022 04:00) (99% - 100%)      04-24-22 @ 07:01  -  04-25-22 @ 07:00  --------------------------------------------------------  IN: 0 mL / OUT: 1150 mL / NET: -1150 mL                                             --------------------------------------------------------------  LABS:    04-24    144  |  104  |  46<H>  ----------------------------<  87  4.7   |  25  |  0.7    Ca    8.4<L>      24 Apr 2022 22:26  Mg     2.1     04-24    TPro  5.5<L>  /  Alb  2.8<L>  /  TBili  0.5  /  DBili  x   /  AST  156<H>  /  ALT  122<H>  /  AlkPhos  842<H>  04-24                                                                --------------------------------------------------------------    PHYSICAL EXAM:  GENERAL: AAOx0. Cachectic, laying in bed appearing in no acute distress  HEENT: atraumatic, normocephalic  LUNGS: Clear to auscultation bilaterally  HEART: S1/S2. No heaves or thrills  ABD: Soft, non-tender, non-distended.  EXT/NEURO: ROm grossly intact  SKIN: RUE swelling + bruising/echymosis, improving                                           --------------------------------------------------------------

## 2022-04-25 NOTE — CONSULT NOTE ADULT - CONSULT REASON
Pancreatic ca
RUE compartment syndrome
SOB
r/o compartment symdrone
x
SOB
pleural effusion/ascites
poor intake
advanced illness - pancreatic cancer with recurrent plural effusion

## 2022-04-25 NOTE — CONSULT NOTE ADULT - SUBJECTIVE AND OBJECTIVE BOX
INTERVENTIONAL RADIOLOGY CONSULT:     Procedure Requested: image guided paracentesis     HPI:  75 YO F with PMHx of HTN, pancreatic CA s/p Whipple 8/2021, AHaylie on Eliquis, pleural effusion was BIBEMS due to worsening SOB that started earlier on the morning of presentation  As per EMS, patient was saturating in the 70s on room air and was placed on NRB w/ sats improving to high 80s. Patient reports that she lives with her son and started noticing shortness of breath.   Patient had recent admission from [facility] and had thoracentesis done  Patient denies fevers, chills, headache, chest pain, palpitations, constipation, melena, hematochezia, dysuria, urinary frequency or urgency, numbness, tingling, recent travel or any known sick contact.   In the ED patient was placed on non rebreather with SpO2 of 94%. .  X-ray chest showed Interval increase in bilateral effusions and opacities. (18 Apr 2022 11:47)      PAST MEDICAL & SURGICAL HISTORY:  SOB (shortness of breath)    Pain  knee    Varicose veins of both lower extremities, unspecified whether complicated    Atrial fibrillation, unspecified type  2019 on Eliquis    Jaundice  March 5, 2021    Malignant neoplasm of pancreas, unspecified location of malignancy  Pancreatic Cancer    Hypertension, unspecified type  2019    Pancreatic cancer    Kidney stones    History of surgery  Whipple procedure 8/2/2021 with Dr. Nino at [facility]    Status post phlebectomy  10/2018    History of ovarian cystectomy  left    History of tonsillectomy  1959    Kidney stone  2017        MEDICATIONS  (STANDING):  diltiazem    milliGRAM(s) Oral daily  dronabinol 5 milliGRAM(s) Oral two times a day  lactulose Syrup 20 Gram(s) Oral every 6 hours  magnesium oxide 400 milliGRAM(s) Oral two times a day with meals  mirtazapine 15 milliGRAM(s) Oral at bedtime  OLANZapine Disintegrating Tablet 2.5 milliGRAM(s) Oral at bedtime  pantoprazole    Tablet 40 milliGRAM(s) Oral before breakfast  polyethylene glycol 3350 17 Gram(s) Oral daily  senna 2 Tablet(s) Oral at bedtime    MEDICATIONS  (PRN):  acetaminophen     Tablet .. 650 milliGRAM(s) Oral every 6 hours PRN Temp greater or equal to 38C (100.4F), Mild Pain (1 - 3)  morphine  - Injectable 1 milliGRAM(s) IV Push every 6 hours PRN Moderate Pain (4 - 6)  simethicone 80 milliGRAM(s) Chew three times a day PRN Gas  verapamil 40 milliGRAM(s) Oral two times a day PRN tachycardia faster tahn 110 beats/ minute      Allergies    Augmentin (Rash)  chocolate (Headache)  digoxin (Hives)  Metoprolol Succinate ER (Hives)  Novocain (Angioedema)  Steroids: Excessive swelling (Flushing; Other (Mild to Mod))  sulfa drugs (Fever)  Xarelto (Hives)    Intolerances        Social History:   Smoking: Yes [ ]  No [ ]   ______pk yrs  ETOH  Yes [ ]  No [ ]  Social [ ]  DRUGS:  Yes [ ]  No [ ]  if so what______________    FAMILY HISTORY:  CAD (coronary artery disease) (Sibling)  3  siblings ( 2 living and 1 passed away)        Physical Exam:   Vital Signs Last 24 Hrs  T(C): 36.2 (25 Apr 2022 11:28), Max: 36.4 (25 Apr 2022 04:00)  T(F): 97.1 (25 Apr 2022 11:28), Max: 97.6 (25 Apr 2022 04:00)  HR: 78 (25 Apr 2022 11:28) (78 - 120)  BP: 85/51 (25 Apr 2022 11:28) (85/51 - 129/87)  BP(mean): 63 (25 Apr 2022 11:28) (63 - 67)  RR: 20 (25 Apr 2022 11:28) (20 - 20)  SpO2: 97% (25 Apr 2022 13:24) (97% - 100%)      Labs:     04-24    144  |  104  |  46<H>  ----------------------------<  87  4.7   |  25  |  0.7    Ca    8.4<L>      24 Apr 2022 22:26  Mg     2.1     04-24    TPro  5.5<L>  /  Alb  2.8<L>  /  TBili  0.5  /  DBili  x   /  AST  156<H>  /  ALT  122<H>  /  AlkPhos  842<H>  04-24        Pertinent labs:    04-24    144  |  104  |  46<H>  ----------------------------<  87  4.7   |  25  |  0.7    Ca    8.4<L>      24 Apr 2022 22:26  Mg     2.1     04-24    TPro  5.5<L>  /  Alb  2.8<L>  /  TBili  0.5  /  DBili  x   /  AST  156<H>  /  ALT  122<H>  /  AlkPhos  842<H>  04-24      Radiology & Additional Studies:     IMPRESSION:  1.  Although contrast was administered, it appears that most if not all   of the contrast is extravasated in the right upper extremity and   therefore the study is essentially noncontrast. That along with the large   volume of ascites limits evaluation of most of this exam. Consider   neurovascular evaluation of the right upper extremity given the large   bolus of contrast extravasation.  2.  Liver has a nodular contour possibly representing cirrhosis or other   underlying hepatic pathology. Pneumobilia with CBD stent.  3.  Pancreas, adrenal glands and kidneys are inadequately assessed.  4.  Pocket of air anterior to the urinary bladder possibly in the small   bowel loop doubtful to represent pneumoperitoneum however this cannot be   excluded.  5.  If clinically warranted, recommend repeat imaging after paracentesis   for better evaluation.      Radiology imaging reviewed.       ASSESSMENT/ PLAN:   75 YO F with PMHx of HTN, pancreatic CA s/p Whipple 8/2021, A.Fib on Eliquis, pleural effusion was BIBEMS due to worsening SOB that started earlier on the morning of presentation. As per EMS, patient was saturating in the 70s on room air and was placed on NRB w/ sats improving to high 80s. Patient reports that she lives with her son and started noticing shortness of breath. Patient had recent admission from [facility] and had thoracentesis done  - consulted for image guided paracentesis d/t accumulation of ascites  - CT abdomen/pelvis confirms large volume ascites   - patient current INR: 2.31 - INR must be btw 1.5-1.7 for procedure to be performed due to high risk of bleeding  - patient required repeat COVID prior to procedure  - discussed case with resident, they will attempt bedside first, if unsuccessful will correct INR and reconsult us   - will continue to follow     Thank you for the courtesy of this consult, please call [phone] with any further questions.

## 2022-04-25 NOTE — PROGRESS NOTE ADULT - SUBJECTIVE AND OBJECTIVE BOX
VASCULAR SURGERY PROGRESS NOTE    Hospital Day # 8    Events of past 24 hours:  No acute events    PAST MEDICAL & SURGICAL HISTORY:  SOB (shortness of breath)    Pain  knee    Varicose veins of both lower extremities, unspecified whether complicated    Atrial fibrillation, unspecified type  2019 on Eliquis    Jaundice  March 5, 2021    Malignant neoplasm of pancreas, unspecified location of malignancy  Pancreatic Cancer    Hypertension, unspecified type  2019    Pancreatic cancer    Kidney stones    History of surgery  Whipple procedure 8/2/2021 with Dr. Nino at [facility]    Status post phlebectomy  10/2018    History of ovarian cystectomy  left    History of tonsillectomy  1959    Kidney stone  2017        Vital Signs Last 24 Hrs  T(C): 36.2 (25 Apr 2022 07:36), Max: 36.4 (25 Apr 2022 04:00)  T(F): 97.1 (25 Apr 2022 07:36), Max: 97.6 (25 Apr 2022 04:00)  HR: 96 (25 Apr 2022 07:36) (96 - 120)  BP: 91/55 (25 Apr 2022 07:36) (91/55 - 129/87)  BP(mean): 67 (25 Apr 2022 07:36) (67 - 89)  RR: 20 (25 Apr 2022 07:36) (20 - 20)  SpO2: 100% (25 Apr 2022 07:52) (100% - 100%)    Pain (0-10):            Pain Control Adequate: [] YES [] N    Diet:    I&O's Detail    24 Apr 2022 07:01  -  25 Apr 2022 07:00  --------------------------------------------------------  IN:  Total IN: 0 mL    OUT:    Indwelling Catheter - Urethral (mL): 1150 mL  Total OUT: 1150 mL    Total NET: -1150 mL    GENERAL: cachectic, chronically ill appearing   SKIN: Ecchymosis to right hand and fingers.   HEENT: Atraumatic. Normocephalic.    NECK: Supple, No JVD. .  PULMONARY: decreased breath sounds B/L. No wheezing. on HFNC  CVS: Normal S1, S2. Rate and Rhythm are regular   ABDOMEN/GI: Soft, Nontender, Nondistended   MSK:  No edema B/L LE. Improving LUE edema. Edematous RUE with oozing. Palpable RUE radial pulse.   NEUROLOGIC: follows simple commands   PSYCH: Lethargic    MEDICATIONS:   MEDICATIONS  (STANDING):  diltiazem    milliGRAM(s) Oral daily  dronabinol 5 milliGRAM(s) Oral two times a day  lactulose Syrup 20 Gram(s) Oral every 6 hours  magnesium oxide 400 milliGRAM(s) Oral two times a day with meals  mirtazapine 15 milliGRAM(s) Oral at bedtime  OLANZapine Disintegrating Tablet 2.5 milliGRAM(s) Oral at bedtime  pantoprazole    Tablet 40 milliGRAM(s) Oral before breakfast  polyethylene glycol 3350 17 Gram(s) Oral daily  senna 2 Tablet(s) Oral at bedtime    MEDICATIONS  (PRN):  acetaminophen     Tablet .. 650 milliGRAM(s) Oral every 6 hours PRN Temp greater or equal to 38C (100.4F), Mild Pain (1 - 3)  morphine  - Injectable 1 milliGRAM(s) IV Push every 6 hours PRN Moderate Pain (4 - 6)  simethicone 80 milliGRAM(s) Chew three times a day PRN Gas  verapamil 40 milliGRAM(s) Oral two times a day PRN tachycardia faster tahn 110 beats/ minute    LAB/STUDIES:    04-24    144  |  104  |  46<H>  ----------------------------<  87  4.7   |  25  |  0.7    Ca    8.4<L>      24 Apr 2022 22:26  Mg     2.1     04-24    TPro  5.5<L>  /  Alb  2.8<L>  /  TBili  0.5  /  DBili  x   /  AST  156<H>  /  ALT  122<H>  /  AlkPhos  842<H>  04-24      LIVER FUNCTIONS - ( 24 Apr 2022 09:39 )  Alb: 2.8 g/dL / Pro: 5.5 g/dL / ALK PHOS: 842 U/L / ALT: 122 U/L / AST: 156 U/L / GGT: x

## 2022-04-25 NOTE — PROGRESS NOTE ADULT - SUBJECTIVE AND OBJECTIVE BOX
Over Night Events: events noted, on [facility] [address] [facility] 40/50, Afebrile, vascular, burn reviewed, RUE doppler done    PHYSICAL EXAM    ICU Vital Signs Last 24 Hrs  T(C): 36.4 (25 Apr 2022 04:00), Max: 36.4 (25 Apr 2022 04:00)  T(F): 97.6 (25 Apr 2022 04:00), Max: 97.6 (25 Apr 2022 04:00)  HR: 120 (25 Apr 2022 04:00) (99 - 120)  BP: 129/87 (25 Apr 2022 04:00) (101/55 - 129/87)  BP(mean): 89 (24 Apr 2022 11:30) (89 - 89)  RR: 20 (25 Apr 2022 04:00) (20 - 20)  SpO2: 100% (25 Apr 2022 04:00) (99% - 100%)      General: ill looking, cachectic  HEENT: JEFFRY             Lymph Nodes: No cervical LN   Lungs: dec bs both bases  Cardiovascular: COLLIN 2/6  Abdomen: Soft, Positive BS, distended  Neurological: Not following commands  RUE swelling      04-24-22 @ 07:01  -  04-25-22 @ 07:00  --------------------------------------------------------  IN:  Total IN: 0 mL    OUT:    Indwelling Catheter - Urethral (mL): 1150 mL  Total OUT: 1150 mL    Total NET: -1150 mL          LABS:                                                04-24    144  |  104  |  46<H>  ----------------------------<  87  4.7   |  25  |  0.7    Ca    8.4<L>      24 Apr 2022 22:26  Mg     2.1     04-24    TPro  5.5<L>  /  Alb  2.8<L>  /  TBili  0.5  /  DBili  x   /  AST  156<H>  /  ALT  122<H>  /  AlkPhos  842<H>  04-24                                                                                           LIVER FUNCTIONS - ( 24 Apr 2022 09:39 )  Alb: 2.8 g/dL / Pro: 5.5 g/dL / ALK PHOS: 842 U/L / ALT: 122 U/L / AST: 156 U/L / GGT: x                                                                                                                                       MEDICATIONS  (STANDING):  diltiazem    milliGRAM(s) Oral daily  dronabinol 5 milliGRAM(s) Oral two times a day  lactulose Syrup 20 Gram(s) Oral every 6 hours  magnesium oxide 400 milliGRAM(s) Oral two times a day with meals  mirtazapine 15 milliGRAM(s) Oral at bedtime  OLANZapine Disintegrating Tablet 2.5 milliGRAM(s) Oral at bedtime  pantoprazole    Tablet 40 milliGRAM(s) Oral before breakfast  polyethylene glycol 3350 17 Gram(s) Oral daily  senna 2 Tablet(s) Oral at bedtime    MEDICATIONS  (PRN):  acetaminophen     Tablet .. 650 milliGRAM(s) Oral every 6 hours PRN Temp greater or equal to 38C (100.4F), Mild Pain (1 - 3)  morphine  - Injectable 1 milliGRAM(s) IV Push every 6 hours PRN Moderate Pain (4 - 6)  simethicone 80 milliGRAM(s) Chew three times a day PRN Gas  verapamil 40 milliGRAM(s) Oral two times a day PRN tachycardia faster tahn 110 beats/ minute    CXR reviewed

## 2022-04-25 NOTE — CHART NOTE - NSCHARTNOTEFT_GEN_A_CORE
Lethargic, on HFNC O2  Afebrile  NGT in place and has been tolerating TF's with elemental formula  +very small BM X 1, on lactulose, miralax and senna  Oncology noted, no further plans for tx    T(F): 97.1 (04-25-22 @ 11:28), Max: 97.6 (04-25-22 @ 04:00)  HR: 78 (04-25-22 @ 11:28) (78 - 120)  BP: 85/51 (04-25-22 @ 11:28) (85/51 - 129/87)  RR: 20 (04-25-22 @ 11:28) (20 - 20)  SpO2: 97% (04-25-22 @ 13:24) (97% - 100%)    I&O's Detail    24 Apr 2022 07:01  -  25 Apr 2022 07:00  --------------------------------------------------------  IN:  Total IN: 0 mL    OUT:    Indwelling Catheter - Urethral (mL): 1150 mL  Total OUT: 1150 mL    Total NET: -1150 mL    MEDICATIONS  (STANDING):  diltiazem    milliGRAM(s) Oral daily  dronabinol 5 milliGRAM(s) Oral two times a day  lactulose Syrup 20 Gram(s) Oral every 6 hours  magnesium oxide 400 milliGRAM(s) Oral two times a day with meals  mirtazapine 15 milliGRAM(s) Oral at bedtime  OLANZapine Disintegrating Tablet 2.5 milliGRAM(s) Oral at bedtime  pantoprazole    Tablet 40 milliGRAM(s) Oral before breakfast  polyethylene glycol 3350 17 Gram(s) Oral daily  senna 2 Tablet(s) Oral at bedtime    MEDICATIONS  (PRN):  acetaminophen     Tablet .. 650 milliGRAM(s) Oral every 6 hours PRN Temp greater or equal to 38C (100.4F), Mild Pain (1 - 3)  morphine  - Injectable 1 milliGRAM(s) IV Push every 6 hours PRN Moderate Pain (4 - 6)  simethicone 80 milliGRAM(s) Chew three times a day PRN Gas  verapamil 40 milliGRAM(s) Oral two times a day PRN tachycardia faster tahn 110 beats/ minute    04-24    144  |  104  |  46<H>  ----------------------------<  87  4.7   |  25  |  0.7    Ca    8.4<L>      24 Apr 2022 22:26  Mg     2.1     04-24  Phosphorus Level, Serum: 3.7 mg/dL (04.19.22 @ 05:43)    TPro  5.5<L>  /  Alb  2.8<L>  /  TBili  0.5  /  DBili  x   /  AST  156<H>  /  ALT  122<H>  /  AlkPhos  842<H>  04-24    IMP:  - pancreatic cancer  - severe cancer cachexia  - malabsorption on Creon  - acute hypoxic resp failure r/t fluid overload  - s/p thoracentesis of pleural effusion, and paracentesis of ascites  - constipation    PLAN:  - d/c marinol  - protonix DR can not be given via tube, change to solutab but must be given in apple juice  - change feeds to Jevity 1.2 360ml X 3 feeds/day given at meal times    - need to ascertain Onc plans (reportedly last chemo was in Nov?), c/w GOC discussion, possible PEG tube? Whether or not further or continued SNS or other aggressive interventions are continued depend entirely on treatment options for the primary cancer  - monitor BMP, phos, and Mg    - d/c Creon for now, can not be put down the NG tube  - d/c marinol while NPO  - monitor BM's  - re-eval bowel regimen (lactulose, miralax, senna) daily Lethargic, on HFNC O2  Afebrile  NGT in place and has been tolerating TF's with elemental formula  +very small BM X 1, on lactulose, miralax and senna  Oncology noted, no further plans for tx    T(F): 97.1 (04-25-22 @ 11:28), Max: 97.6 (04-25-22 @ 04:00)  HR: 78 (04-25-22 @ 11:28) (78 - 120)  BP: 85/51 (04-25-22 @ 11:28) (85/51 - 129/87)  RR: 20 (04-25-22 @ 11:28) (20 - 20)  SpO2: 97% (04-25-22 @ 13:24) (97% - 100%)    I&O's Detail    24 Apr 2022 07:01  -  25 Apr 2022 07:00  --------------------------------------------------------  IN:  Total IN: 0 mL---------------------------  but pt has been receiving feeds  OUT:    Indwelling Catheter - Urethral (mL): 1150 mL  Total OUT: 1150 mL    Total NET: -1150 mL    MEDICATIONS  (STANDING):  diltiazem    milliGRAM(s) Oral daily  dronabinol 5 milliGRAM(s) Oral two times a day  lactulose Syrup 20 Gram(s) Oral every 6 hours  magnesium oxide 400 milliGRAM(s) Oral two times a day with meals  mirtazapine 15 milliGRAM(s) Oral at bedtime  OLANZapine Disintegrating Tablet 2.5 milliGRAM(s) Oral at bedtime  pantoprazole    Tablet 40 milliGRAM(s) Oral before breakfast  polyethylene glycol 3350 17 Gram(s) Oral daily  senna 2 Tablet(s) Oral at bedtime    MEDICATIONS  (PRN):  acetaminophen     Tablet .. 650 milliGRAM(s) Oral every 6 hours PRN Temp greater or equal to 38C (100.4F), Mild Pain (1 - 3)  morphine  - Injectable 1 milliGRAM(s) IV Push every 6 hours PRN Moderate Pain (4 - 6)  simethicone 80 milliGRAM(s) Chew three times a day PRN Gas  verapamil 40 milliGRAM(s) Oral two times a day PRN tachycardia faster tahn 110 beats/ minute    04-24    144  |  104  |  46<H>  ----------------------------<  87  4.7   |  25  |  0.7    Ca    8.4<L>      24 Apr 2022 22:26  Mg     2.1     04-24  Phosphorus Level, Serum: 3.7 mg/dL (04.19.22 @ 05:43)    TPro  5.5<L>  /  Alb  2.8<L>  /  TBili  0.5  /  DBili  x   /  AST  156<H>  /  ALT  122<H>  /  AlkPhos  842<H>  04-24    IMP:  - pancreatic cancer  - severe cancer cachexia  - malabsorption on Creon  - acute hypoxic resp failure r/t fluid overload  - s/p thoracentesis of pleural effusion, and paracentesis of ascites  - constipation    PLAN:  - d/c marinol as pt is not alert  - protonix DR can not be given via tube, change to solutab but must be given in apple juice  - unclear why recommended feeds not given  - change feeds to Jevity 1.2 360ml X 3 feeds/day given at meal times  and monitor/ document BMs (& BM appearance)  - need to ascertain Onc plans (reportedly last chemo was in Nov?), c/w GOC discussion, possible PEG tube? Whether or not further or continued SNS or other aggressive interventions are continued depend entirely on treatment options for the primary cancer  - monitor BMP, phos, and Mg    - d/c Creon for now, can not be put down the NG tube  - monitor BM's  - re-eval bowel regimen (lactulose, miralax, senna) daily  - d/w hospitalist

## 2022-04-25 NOTE — PROGRESS NOTE ADULT - SUBJECTIVE AND OBJECTIVE BOX
LI, VIKASH  76y Female    CHIEF COMPLAINT:    Patient is a 76y old  Female who presents with a chief complaint of Shortness of breath (25 Apr 2022 08:46)    INTERVAL HPI/OVERNIGHT EVENTS:    Patient seen and examined. No acute events overnight. Remains on HFNC, worsening lethargy     ROS: All other systems are negative.    Vital Signs:    T(F): 97.1 (04-25-22 @ 11:28), Max: 97.6 (04-25-22 @ 04:00)  HR: 78 (04-25-22 @ 11:28) (78 - 120)  BP: 85/51 (04-25-22 @ 11:28) (85/51 - 129/87)  RR: 20 (04-25-22 @ 11:28) (20 - 20)  SpO2: 100% (04-25-22 @ 07:52) (100% - 100%)    24 Apr 2022 07:01  -  25 Apr 2022 07:00  --------------------------------------------------------  IN: 0 mL / OUT: 1150 mL / NET: -1150 mL    PHYSICAL EXAM:    GENERAL:  NAD cachectic chronically ill appearing    SKIN: No rashes or lesions  HEENT: Atraumatic. Normocephalic.  NECK: Supple, No JVD.   PULMONARY: Poor inspiratory effort. No wheezing.   CVS: Normal S1, S2. Rate and Rhythm are regular.   ABDOMEN/GI: Soft, Nontender,  distended   MSK:  No clubbing, b/l finger cyanosis , b/l UE swelling improving  NEUROLOGIC:  opens eyes to verbal stimuli   PSYCH: lethargic, arousable     Consultant(s) Notes Reviewed:  [x ] YES  [ ] NO  Care Discussed with Consultants/Other Providers [ x] YES  [ ] NO    LABS    144  |  104  |  46<H>  ----------------------------<  87  4.7   |  25  |  0.7    Ca    8.4<L>      24 Apr 2022 22:26  Mg     2.1     04-24    TPro  5.5<L>  /  Alb  2.8<L>  /  TBili  0.5  /  DBili  x   /  AST  156<H>  /  ALT  122<H>  /  AlkPhos  842<H>  04-24    RADIOLOGY & ADDITIONAL TESTS:  Imaging or report Personally Reviewed:  [x] YES  [ ] NO  EKG reviewed: [x] YES  [ ] NO    Medications:  Standing  diltiazem    milliGRAM(s) Oral daily  dronabinol 5 milliGRAM(s) Oral two times a day  lactulose Syrup 20 Gram(s) Oral every 6 hours  magnesium oxide 400 milliGRAM(s) Oral two times a day with meals  mirtazapine 15 milliGRAM(s) Oral at bedtime  OLANZapine Disintegrating Tablet 2.5 milliGRAM(s) Oral at bedtime  pantoprazole    Tablet 40 milliGRAM(s) Oral before breakfast  polyethylene glycol 3350 17 Gram(s) Oral daily  senna 2 Tablet(s) Oral at bedtime    PRN Meds  acetaminophen     Tablet .. 650 milliGRAM(s) Oral every 6 hours PRN  morphine  - Injectable 1 milliGRAM(s) IV Push every 6 hours PRN  simethicone 80 milliGRAM(s) Chew three times a day PRN  verapamil 40 milliGRAM(s) Oral two times a day PRN

## 2022-04-25 NOTE — CONSULT NOTE ADULT - CONSULT REQUESTED DATE/TIME
18-Apr-2022 09:55
21-Apr-2022 11:27
22-Apr-2022
24-Apr-2022
24-Apr-2022 18:01
18-Apr-2022 18:19
21-Apr-2022 16:28
25-Apr-2022 14:12
18-Apr-2022 15:47

## 2022-04-25 NOTE — PROGRESS NOTE ADULT - SUBJECTIVE AND OBJECTIVE BOX
VIKASH LI          MRN-[id number]              HPI:  75 YO F with PMHx of HTN, pancreatic CA s/p Whipple 2021, A.Fib on Eliquis, pleural effusion was BIBEMS due to worsening SOB that started earlier on the morning of presentation  As per EMS, patient was saturating in the 70s on room air and was placed on NRB w/ sats improving to high 80s. Patient reports that she lives with her son and started noticing shortness of breath.   Patient had recent admission from [facility] and had thoracentesis done.  Patient denies fevers, chills, headache, chest pain, palpitations, constipation, melena, hematochezia, dysuria, urinary frequency or urgency, numbness, tingling, recent travel or any known sick contact.   In the ED patient was placed on non rebreather with SpO2 of 94%. .  X-ray chest showed Interval increase in bilateral effusions and opacities. (2022 11:47)    ROS:	    : Unable to full attain due to:  patient is lethargic and not able to sustain interaction; after encounter JesústMaddie at bedside with her  - reinforced offer to have family meeting  : interim: at [facility] noted with subsequent vascular consult for R arm and disimpaction; no family at bedside; patient non-responsive; hypotensive; now has NG tube                    Dyspnea (Salima 0-10): 0                       N/V (Y/N): No                             Secretions (Y/N) : No                                          Agitation(Y/N): No                              Pain (Y/N): No                                 -Provocation/Palliation: N/A  -Quality/Quantity: N/A  -Radiating: N/A  -Severity: No pain  -Timing/Frequency: N/A  -Impact on ADLs: N/A    General:   no nonverbal indications  HEENT:   no nonverbal indications    Neck:   no nonverbal indications  CVS:   no nonverbal indications  Resp:   no nonverbal indications  GI:   no nonverbal indications  :   no nonverbal indications  Musc:   no nonverbal indications  Neuro:   no nonverbal indications  Psych:  no nonverbal indications  Skin:   no nonverbal indications  Lymph:   no nonverbal indications    Last BM:       Allergies    Augmentin (Rash)  chocolate (Headache)  digoxin (Hives)  Metoprolol Succinate ER (Hives)  Novocain (Angioedema)  Steroids: Excessive swelling (Flushing; Other (Mild to Mod))  sulfa drugs (Fever)  Xarelto (Hives)    Intolerances      Opiate Naive (Y/N): Y  -iStop reviewed (Y/N): Y   Ref#:     This report was requested by: Yuki Nolan | Reference #: [id number]             Labs:	  reviewed                        LABS:          144  |  104  |  46<H>  ----------------------------<  87  4.7   |  25  |  0.7    Ca    8.4<L>      2022 22:26  Mg     2.1         TPro  5.5<L>  /  Alb  2.8<L>  /  TBili  0.5  /  DBili  x   /  AST  156<H>  /  ALT  122<H>  /  AlkPhos  842<H>      LIVER FUNCTIONS - ( 2022 09:39 )  Alb: 2.8 g/dL / Pro: 5.5 g/dL / ALK PHOS: 842 U/L / ALT: 122 U/L / AST: 156 U/L / GGT: x                   Urinalysis Basic - ( 2022 00:30 )    Color: Yellow / Appearance: Slightly Turbid / S.016 / pH: x  Gluc: x / Ketone: Negative  / Bili: Negative / Urobili: <2 mg/dL   Blood: x / Protein: Trace / Nitrite: Negative   Leuk Esterase: Small / RBC: 16 /HPF / WBC 12 /HPF   Sq Epi: x / Non Sq Epi: 3 /HPF / Bacteria: Negative            Radiology:	         < from: Xray Chest 1 View- PORTABLE-Urgent (Xray Chest 1 View- PORTABLE-Urgent .) (22 @ 15:55) >  ACC: [id number] EXAM:  XR CHEST PORTABLE URGENT 1V                          PROCEDURE DATE:  2022          INTERPRETATION:  Clinical History / Reason for exam: Follow-up.    Comparison : Chest radiograph 2022.    Technique/Positioning: Adequate.    Findings:    Support devices: Right-sided Port-A-Cath with its tip overlying the SVC.    Cardiac/mediastinum/hilum: Cardiomegaly, unchanged.    Lung parenchyma/Pleura: Improved right lung opacity and stable left lung   opacity. No pneumothorax is seen.    Skeleton/soft tissues: Stable.    Impression:    Cardiomegaly, unchanged.    Improved right lung opacity and stable left lung opacity.    Right-sided Port-A-Cath.    --- End of Report ---            GRUPO MALDONADO MD; Attending Radiologist  This document has been electronically signed. 2022  5:04PM    < end of copied text >          EKG:	  < from: 12 Lead ECG (22 @ 17:56) >    Ventricular Rate 91 BPM    Atrial Rate 37 BPM    QRS Duration 82 ms    Q-T Interval 360 ms    QTC Calculation(Bazett) 442 ms    R Axis 117 degrees    T Axis -62 degrees    Diagnosis Line Atrial fibrillation  Left posterior fascicular block  T wave abnormality, consider lateral ischemia  Abnormal ECG    Confirmed by JOSEFA CHILDS MD ([id number]) on 2022 10:08:26 AM    < end of copied text >    < from: 12 Lead ECG (22 @ 10:08) >    Ventricular Rate 117 BPM    Atrial Rate 115 BPM    QRS Duration 82 ms    Q-T Interval 356 ms    QTC Calculation(Bazett) 496 ms    R Axis 79 degrees    T Axis 219 degrees    Diagnosis Line Atrial fibrillation with rapid ventricular response  Low voltage QRS  ST & T wave abnormality, consider lateral ischemia  Abnormal ECG    Confirmed by Torri Collins MD ([id number]) on 2022 4:40:35 PM    < end of copied text >      Imaging Personally Reviewed:  [ ] xYES  [ ] NO    Consultant(s) Notes Reviewed:  [x ] YES  [ ] NO  Care Discussed with Consultants/Other Providers [x ] YES  [ ] NO    PEx:	  Vital Signs Last 24 Hrs  T(C): 36.5 (2022 15:36), Max: 36.5 (2022 15:36)  T(F): 97.7 (2022 15:36), Max: 97.7 (2022 15:36)  HR: 85 (2022 15:36) (78 - 120)  BP: 98/62 (2022 15:36) (85/51 - 129/87)  BP(mean): 63 (2022 11:28) (63 - 67)  RR: 20 (2022 15:36) (20 - 20)  SpO2: 100% (2022 15:36) (97% - 100%)    General:  NAD; frail  Eyes: did not open  ENMT: no ulcers externally   CV: ; nail beds and chest cyanotic   Resp: Unlabored Non tachypneic + HFNC  Neuro: slight moan when stimulated  Psych: Calm   Skin: nonjaundiced; pale/mottled    Preadmit Karnofsky:  %           Current Karnofsky:  20   %  http://www.[facility].org/files/news/karnofsky_performance_scale.pdf   http://www.[facility].org/files/news/palliative_performance_scale_PPSv2.pdf  Cachexia (Y/N): Y  BMI: BMI (kg/m2): 12.2 (22 @ 08:41)      Medications:	      MEDICATIONS  (STANDING):  chlorhexidine 2% Cloths 1 Application(s) Topical <User Schedule>  diltiazem    milliGRAM(s) Oral daily  dronabinol 5 milliGRAM(s) Oral two times a day  lactulose Syrup 20 Gram(s) Oral every 6 hours  magnesium oxide 400 milliGRAM(s) Oral two times a day with meals  mirtazapine 15 milliGRAM(s) Oral at bedtime  morphine  - Injectable 1 milliGRAM(s) IV Push once  OLANZapine Disintegrating Tablet 2.5 milliGRAM(s) Oral at bedtime  pantoprazole    Tablet 40 milliGRAM(s) Oral before breakfast  polyethylene glycol 3350 17 Gram(s) Oral daily  senna 2 Tablet(s) Oral at bedtime  MEDICATIONS  (PRN):  acetaminophen     Tablet .. 650 milliGRAM(s) Oral every 6 hours PRN Temp greater or equal to 38C (100.4F), Mild Pain (1 - 3)  morphine  - Injectable 1 milliGRAM(s) IV Push every 6 hours PRN Moderate Pain (4 - 6)  simethicone 80 milliGRAM(s) Chew three times a day PRN Gas  verapamil 40 milliGRAM(s) Oral two times a day PRN tachycardia faster tahn 110 beats/ minute          Advanced Directives:	     Full Code      Decision maker: The patient is unable to fully participate in complex medical decision making conversations at this time due to lethargy - will continue to reassess  Legal surrogate: daughter    GOALS OF CARE DISCUSSION	       Palliative care info/counseling provided	              Documentation of GOC/Advanced Care Planning see prior       PSYCHOSOCIAL-SPIRITUAL ASSESSMENT:            See Palliative Care SW/ documentation        	    REFERRALS       Palliative Med        Unit SW/Case Mgmt            VIKASH LI          MRN-[id number]              HPI:  75 YO F with PMHx of HTN, pancreatic CA s/p Whipple 2021, A.Fib on Eliquis, pleural effusion was BIBEMS due to worsening SOB that started earlier on the morning of presentation  As per EMS, patient was saturating in the 70s on room air and was placed on NRB w/ sats improving to high 80s. Patient reports that she lives with her son and started noticing shortness of breath.   Patient had recent admission from [facility] and had thoracentesis done.  Patient denies fevers, chills, headache, chest pain, palpitations, constipation, melena, hematochezia, dysuria, urinary frequency or urgency, numbness, tingling, recent travel or any known sick contact.   In the ED patient was placed on non rebreather with SpO2 of 94%. .  X-ray chest showed Interval increase in bilateral effusions and opacities. (2022 11:47)    ROS:	    : Unable to full attain due to:  patient is lethargic and not able to sustain interaction; after encounter JesústMaddie at bedside with her  - reinforced offer to have family meeting  : interim: at [facility] noted with subsequent vascular consult for R arm and disimpaction; no family at bedside; patient non-responsive; hypotensive; now has NG tube                    Dyspnea (Salima 0-10): 0                       N/V (Y/N): No                             Secretions (Y/N) : No                                          Agitation(Y/N): No                              Pain (Y/N): No                                 -Provocation/Palliation: N/A  -Quality/Quantity: N/A  -Radiating: N/A  -Severity: No pain  -Timing/Frequency: N/A  -Impact on ADLs: N/A    General:   no nonverbal indications  HEENT:   no nonverbal indications    Neck:   no nonverbal indications  CVS:   no nonverbal indications  Resp:   no nonverbal indications  GI:   no nonverbal indications  :   no nonverbal indications  Musc:   no nonverbal indications  Neuro:   no nonverbal indications  Psych:  no nonverbal indications  Skin:   no nonverbal indications  Lymph:   no nonverbal indications    Last BM:       Allergies    Augmentin (Rash)  chocolate (Headache)  digoxin (Hives)  Metoprolol Succinate ER (Hives)  Novocain (Angioedema)  Steroids: Excessive swelling (Flushing; Other (Mild to Mod))  sulfa drugs (Fever)  Xarelto (Hives)    Intolerances      Opiate Naive (Y/N): Y  -iStop reviewed (Y/N): Y   Ref#:     This report was requested by: Yuki Nolan | Reference #: [id number]             Labs:	  reviewed                        LABS: reviewed          144  |  104  |  46<H>  ----------------------------<  87  4.7   |  25  |  0.7    Ca    8.4<L>      2022 22:26  Mg     2.1         TPro  5.5<L>  /  Alb  2.8<L>  /  TBili  0.5  /  DBili  x   /  AST  156<H>  /  ALT  122<H>  /  AlkPhos  842<H>      LIVER FUNCTIONS - ( 2022 09:39 )  Alb: 2.8 g/dL / Pro: 5.5 g/dL / ALK PHOS: 842 U/L / ALT: 122 U/L / AST: 156 U/L / GGT: x                   Urinalysis Basic - ( 2022 00:30 )    Color: Yellow / Appearance: Slightly Turbid / S.016 / pH: x  Gluc: x / Ketone: Negative  / Bili: Negative / Urobili: <2 mg/dL   Blood: x / Protein: Trace / Nitrite: Negative   Leuk Esterase: Small / RBC: 16 /HPF / WBC 12 /HPF   Sq Epi: x / Non Sq Epi: 3 /HPF / Bacteria: Negative            Radiology:	         < from: Xray Chest 1 View- PORTABLE-Urgent (Xray Chest 1 View- PORTABLE-Urgent .) (22 @ 15:55) >  ACC: [id number] EXAM:  XR CHEST PORTABLE URGENT 1V                          PROCEDURE DATE:  2022          INTERPRETATION:  Clinical History / Reason for exam: Follow-up.    Comparison : Chest radiograph 2022.    Technique/Positioning: Adequate.    Findings:    Support devices: Right-sided Port-A-Cath with its tip overlying the SVC.    Cardiac/mediastinum/hilum: Cardiomegaly, unchanged.    Lung parenchyma/Pleura: Improved right lung opacity and stable left lung   opacity. No pneumothorax is seen.    Skeleton/soft tissues: Stable.    Impression:    Cardiomegaly, unchanged.    Improved right lung opacity and stable left lung opacity.    Right-sided Port-A-Cath.    --- End of Report ---            GRUPO MALDONADO MD; Attending Radiologist  This document has been electronically signed. 2022  5:04PM    < end of copied text >          EKG:	  < from: 12 Lead ECG (22 @ 17:56) >    Ventricular Rate 91 BPM    Atrial Rate 37 BPM    QRS Duration 82 ms    Q-T Interval 360 ms    QTC Calculation(Bazett) 442 ms    R Axis 117 degrees    T Axis -62 degrees    Diagnosis Line Atrial fibrillation  Left posterior fascicular block  T wave abnormality, consider lateral ischemia  Abnormal ECG    Confirmed by JOSEFA CHILDS MD ([id number]) on 2022 10:08:26 AM    < end of copied text >    < from: 12 Lead ECG (22 @ 10:08) >    Ventricular Rate 117 BPM    Atrial Rate 115 BPM    QRS Duration 82 ms    Q-T Interval 356 ms    QTC Calculation(Bazett) 496 ms    R Axis 79 degrees    T Axis 219 degrees    Diagnosis Line Atrial fibrillation with rapid ventricular response  Low voltage QRS  ST & T wave abnormality, consider lateral ischemia  Abnormal ECG    Confirmed by Torri Collins MD ([id number]) on 2022 4:40:35 PM    < end of copied text >      Imaging Personally Reviewed:  [ ] xYES  [ ] NO    Consultant(s) Notes Reviewed:  [x ] YES  [ ] NO  Care Discussed with Consultants/Other Providers [x ] YES  [ ] NO    PEx:	  Vital Signs Last 24 Hrs  T(C): 36.5 (2022 15:36), Max: 36.5 (2022 15:36)  T(F): 97.7 (2022 15:36), Max: 97.7 (2022 15:36)  HR: 85 (2022 15:36) (78 - 120)  BP: 98/62 (2022 15:36) (85/51 - 129/87)  BP(mean): 63 (2022 11:28) (63 - 67)  RR: 20 (2022 15:36) (20 - 20)  SpO2: 100% (2022 15:36) (97% - 100%)    General:  NAD; frail  Eyes: did not open  ENMT: no ulcers externally   CV: ; nail beds and chest cyanotic   Resp: Unlabored Non tachypneic + HFNC  Neuro: slight moan when stimulated  Psych: Calm   Skin: nonjaundiced; pale/mottled    Preadmit Karnofsky:  %           Current Karnofsky:  20   %  http://[email or web address]/files/news/karnofsky_performance_scale.pdf   http://[email or web address]/files/news/palliative_performance_scale_[email or web address].pdf  Cachexia (Y/N): Y  BMI: BMI (kg/m2): 12.2 (22 @ 08:41)      Medications:	      MEDICATIONS  (STANDING):  chlorhexidine 2% Cloths 1 Application(s) Topical <User Schedule>  diltiazem    milliGRAM(s) Oral daily  dronabinol 5 milliGRAM(s) Oral two times a day  lactulose Syrup 20 Gram(s) Oral every 6 hours  magnesium oxide 400 milliGRAM(s) Oral two times a day with meals  mirtazapine 15 milliGRAM(s) Oral at bedtime  morphine  - Injectable 1 milliGRAM(s) IV Push once  OLANZapine Disintegrating Tablet 2.5 milliGRAM(s) Oral at bedtime  pantoprazole    Tablet 40 milliGRAM(s) Oral before breakfast  polyethylene glycol 3350 17 Gram(s) Oral daily  senna 2 Tablet(s) Oral at bedtime  MEDICATIONS  (PRN):  acetaminophen     Tablet .. 650 milliGRAM(s) Oral every 6 hours PRN Temp greater or equal to 38C (100.4F), Mild Pain (1 - 3)  morphine  - Injectable 1 milliGRAM(s) IV Push every 6 hours PRN Moderate Pain (4 - 6)  simethicone 80 milliGRAM(s) Chew three times a day PRN Gas  verapamil 40 milliGRAM(s) Oral two times a day PRN tachycardia faster tahn 110 beats/ minute          Advanced Directives:	     Full Code      Decision maker: The patient is unable to fully participate in complex medical decision making conversations at this time due to lethargy - will continue to reassess  Legal surrogate: daughter    GOALS OF CARE DISCUSSION	       Palliative care info/counseling provided	              Documentation of GOC/Advanced Care Planning see prior       PSYCHOSOCIAL-SPIRITUAL ASSESSMENT:            See Palliative Care SW/ documentation        	    REFERRALS       Palliative Med        Unit SW/Case Mgmt

## 2022-04-25 NOTE — PROGRESS NOTE ADULT - PROBLEM SELECTOR PLAN 4
- will follow  - full code  -see prior GOC; reinforced to family option to have family meeting to learn who will be surrogate decision maker  - d/w team futility of current plan; primary team to update family re: clinical decline; our team will remain avialable PRN - will follow  - full code  -see prior GOC; reinforced to family option to have family meeting to learn who will be surrogate decision maker  - d/w team futility of current plan; primary team to update family re: clinical decline; our team will remain available PRN

## 2022-04-25 NOTE — PROGRESS NOTE ADULT - ASSESSMENT
77 YO F with PMHx of HTN, pancreatic CA s/p Whipple 8/2021, A.Fib on Eliquis, pleural effusion was BIBEMS due to worsening SOB    Acute Hypoxemic Respiratory Failure likely due to volume overload  b/l Pleural effusions with ascites  Pancreatic Ca s/p Whipple 8/21  Transaminitis  - remains on HFNC 40/50  - s/p THora 4/19 with 750 cc fluid removed, transudative  - paracentesis could not be performed at bedside previously  - repeat CT noted, massive ascites--> IR eval for paracentesis   - s/p lasix  - cardiology eval appreciated, diuresis as tolerated  - family now considering CMO  - will need paracentesis if family agreeable, lovenox currently on hold   - c/w Creon 12K q8 with meals   - surgery following  - monitor LFT's    Chronic A.Fib on Eliquis  - rate control with diltiazem   - hold lovenox for possible paracentesis     Poor PO intake  Severe malnutrition  History of eating disorder  started marinol 4/20  NGT placed, tolerating feeds, encourage PO intake as well   Dr De Luna following  psychiatry following, added Zyprexa to current regimen    GOC - full code  overall grave prognosis    #Progress Note Handoff  Pending (specify):   clinical improvement tapering down supplemental 02, monitor PO intake, IR eval for paracentesis , monitor LFTs    Family discussion: Plan of care discussed with patient, daughter and son in law at bedside extensively, informed of overall poor prognosis, all questions answered, aware and agreeable   [facility]position:  [facility]     Amanda Garcia MD  s. [phone]   75 YO F with PMHx of HTN, pancreatic CA s/p Whipple 8/2021, A.Fib on Eliquis, pleural effusion was BIBEMS due to worsening SOB    Acute Hypoxemic Respiratory Failure likely due to volume overload  b/l Pleural effusions with ascites  Pancreatic Ca s/p Whipple 8/21  Transaminitis  - remains on HFNC 40/50  - s/p THora 4/19 with 750 cc fluid removed, transudative  - paracentesis could not be performed at bedside previously  - repeat CT noted, massive ascites--> IR eval for paracentesis   - s/p lasix  - cardiology eval appreciated, diuresis as tolerated  - family now considering CMO  - will need paracentesis if family agreeable, lovenox currently on hold   - c/w Creon 12K q8 with meals   - surgery following  - monitor LFT's    BIlateral UE edema, from infitrated IVs and contrast extravasation  improving, c/w monitoring    Chronic A.Fib on Eliquis  - rate control with diltiazem   - hold lovenox for possible paracentesis     Poor PO intake  Severe malnutrition  History of eating disorder  started marinol 4/20  NGT placed, tolerating feeds, encourage PO intake as well   Dr De Luna following  psychiatry following, added Zyprexa to current regimen    GOC - full code  overall grave prognosis    #Progress Note Handoff  Pending (specify):   clinical improvement tapering down supplemental 02, monitor PO intake, IR eval for paracentesis , monitor LFTs    Family discussion: Plan of care discussed with patient, daughter and son in law at bedside extensively, informed of overall poor prognosis, all questions answered, aware and agreeable   Disposition:  from [facility]     Amanda Garcia MD  s. [phone]

## 2022-04-25 NOTE — CONSULT NOTE ADULT - PROVIDER SPECIALTY LIST ADULT
Cardiology
Intervent Radiology
Critical Care
Nutrition Support
Surgery
Vascular Surgery
Heme/Onc
Palliative Care
Burn

## 2022-04-25 NOTE — PROGRESS NOTE ADULT - PROBLEM SELECTOR PLAN 1
- not on recent DDT  - discussed at prior GOC meeting, they are open to further DDT if offered - oncology consult not offering and rec focus on symptom/comfort   - ongoing supportive care

## 2022-04-25 NOTE — PROGRESS NOTE ADULT - ASSESSMENT
75 YO F with PMHx of HTN, pancreatic CA s/p Whipple 8/2021, A.Fib on Eliquis, pleural effusion was BIBEMS due to worsening SOB here for AHRF    #Acute Hypoxemic/hypercapnic Respiratory Failure  #B/l pleural effusion; R>L  #AMS likely toxic metabolic improved   #Ascites   #Pancreatic Ca  - Xray Chest: Interval increase in bilateral effusions and opacities  - BNP 2151, Procalcitonin .08, Ammonia level 33  - Echo (4/8/22): EF 54%, severely enlarged LA, RA. Mod-severe TR  - CTAP after thoracentesis and paracentesis  -BCx-NGF, UCx-NGF  - Palliative rec appreciated: family meeting 4/20 at 11am. Remains fulls code for now   - s/p Thoracentesis 4/19 drained 750ml   - Pleural fluid Cytology neg  - Pleural fluid analysis shows transudative fluid   - 4/22 D/c ceftriaxone    -holding diuresis  - Was unable to perform Paracentesis - F/u family + IR for para     #Decrease PO intake   - started marinol 4/20  - NGT placed on 4/21, after discussing with Fam   - Peptamen 1.5 start 125 ml over 30 min, increase to 250 ml  after paracentesis   - DO NOT try to put Creon down the NG tube  - Dr De Luna following  - psychiatry following, added Zyprexa to current regimen    #Transaminitis, likely from hepatic congestion  - TPro  5.6<L>  /  Alb  2.9<L>  /  TBili  0.7  /  DBili  x   /  AST  64<H>  /  ALT  47<H>  /  AlkPhos  550<H>  04-19  - TPro  5.7<L>  /  Alb  2.7<L>  /  TBili  0.5  /  DBili  x   /  AST  141<H>  /  ALT  86<H>  /  AlkPhos  790<H>  04-21  - Monitor LFTs   - RUQ US Pneumobilia with perihepatic ascites.  -f/u possible para    #Chronic A.Fib on Eliquis  - Continue Verapamil PRN and diltiazem CD 120mg daily  - 4/22 hold Eliquis, started on Lovenox   - Cradio recs recs for medication optimization     #Hx Pancreatic Ca   - s/p whipple 8/1/21 (in remission)   - c/w Creon 12K TID before meals   - Evaluated by surgery team on April 7. No further intervention   - Heme/onc recs appreciated   - CT A/P noted above was done to evaluate for pancreatic cancer, contrast was extravasated in the right upper extremity, thus study was noncon. Frequent RUE checks for compartment syndrome  - Ca-19-9 : 42    #Misc  - DVT Prophylaxis: Lovenox, ON HOLD FOR possible paracentesis tomorrow 4/25. Restart after Para.   - Diet: Feeds through NGT, but encourage PO intake   - GI Prophylaxis: Pantoprazole  - Activity: AAT  - Code Status: Full Code  - Disposition: acute   Pending:  Para + further GOC

## 2022-04-25 NOTE — PROGRESS NOTE ADULT - ASSESSMENT
IMPRESSION:    Acute Hypoxemic Respiratory Failure on HHFNC 50%  Acute hypercapnic respiratory failure  AMS, likely toxic metabolic  B/l effusion R>L,  SP right thoracentesis x2 transudative effusion   Large Volume Ascites   HO eating disorder  Pancreatic Cancer advanced s/p whipple 8/21  Afib on Eliquis    PLAN:    CNS: Avoid CNS depressants.     HEENT: Oral care    PULMONARY:  HOB @ 45 degrees.  Aspiration precautions. Wean HHFNC. Target SPO2 88-94%. Titrate oxygen as tolerated.     CARDIOVASCULAR: Keep negative balance. Rate control.     GI: GI prophylaxis. Feeding per NGT.     RENAL: Follow up lytes. Correct as needed    INFECTIOUS DISEASE: Follow up cultures. Procal negative.     HEMATOLOGICAL:  Continue therapeutic LMWH for now. LE doppler    ENDOCRINE:  Follow up FS.      MUSCULOSKELETAL: OOBTC. Wound care nurse eval.     Very poor prognosis    Palliative eval appreciated    [facility]

## 2022-04-25 NOTE — PROGRESS NOTE ADULT - ASSESSMENT
75 YO F with PMHx of HTN, pancreatic CA s/p Whipple 8/2021, A.Fib on Eliquis, pleural effusion was BIBEMS from [facility] due to worsening SOB; Patient had recent admission from [facility] and had thoracentesis done admitted has been clinically declining    being followed for support with [facility]       MEDD (morphine equivalent daily dose): na      See Recs below. d/w Primary and Palliative and oncology teams       Please call [phone] with questions or concerns 24/7.   We will continue to follow.

## 2022-04-26 NOTE — PROGRESS NOTE ADULT - ASSESSMENT
IMPRESSION:    Acute Hypoxemic Respiratory Failure on HHFNC 50%  Acute hypercapnic respiratory failure  AMS, likely toxic metabolic  B/l effusion R>L,  SP right thoracentesis x2 transudative effusion   Large Volume Ascites   HO eating disorder  Pancreatic Cancer advanced s/p whipple 8/21  Afib on Eliquis    PLAN:    CNS: Avoid CNS depressants.     HEENT: Oral care    PULMONARY:  HOB @ 45 degrees.  Aspiration precautions. Wean HHFNC. Target SPO2 88-94%. Titrate oxygen as tolerated.     CARDIOVASCULAR: Keep negative balance. Rate control.     GI: GI prophylaxis.     RENAL: Follow up lytes. Correct as needed    INFECTIOUS DISEASE: Follow up cultures. Procal negative.     HEMATOLOGICAL:  ac    ENDOCRINE:  Follow up FS.      MUSCULOSKELETAL: bedrest    Very poor prognosis    Palliative eval appreciated    GOC

## 2022-04-26 NOTE — PROGRESS NOTE ADULT - SUBJECTIVE AND OBJECTIVE BOX
VIKASH LI 76y Female  MRN#: [id number]   CODE STATUS:________    Hospital Day: 8d    Pt is currently admitted with the primary diagnosis of AHRF    SUBJECTIVE  Overnight events   -No major overnight events  Subjective complaints   -non-verbal, did not appear in acute distress                                            ----------------------------------------------------------  OBJECTIVE  PAST MEDICAL & SURGICAL HISTORY  SOB (shortness of breath)    Pain  knee    Varicose veins of both lower extremities, unspecified whether complicated    Atrial fibrillation, unspecified type  2019 on Eliquis    Jaundice  March 5, 2021    Malignant neoplasm of pancreas, unspecified location of malignancy  Pancreatic Cancer    Hypertension, unspecified type  2019    Pancreatic cancer    Kidney stones    History of surgery  Whipple procedure 8/2/2021 with Dr. Nino at [facility]    Status post phlebectomy  10/2018    History of ovarian cystectomy  left    History of tonsillectomy  1959    Kidney stone  2017                                              -----------------------------------------------------------  ALLERGIES:  Augmentin (Rash)  chocolate (Headache)  digoxin (Hives)  Metoprolol Succinate ER (Hives)  Novocain (Angioedema)  Steroids: Excessive swelling (Flushing; Other (Mild to Mod))  sulfa drugs (Fever)  Xarelto (Hives)                                            ------------------------------------------------------------    HOME MEDICATIONS  Home Medications:  Cardizem  mg/24 hours oral capsule, extended release: 1 cap(s) orally once a day (22 Nov 2021 23:59)  Creon 12,000 units oral delayed release capsule: 2 cap(s) orally 3 times a day (with meals) (03 Dec 2021 10:54)  Eliquis 5 mg oral tablet: 1 tab(s) orally 2 times a day (03 Sep 2021 23:41)                           MEDICATIONS:  STANDING MEDICATIONS  chlorhexidine 2% Cloths 1 Application(s) Topical <User Schedule>  diltiazem    milliGRAM(s) Oral daily  lactulose Syrup 20 Gram(s) Oral every 6 hours  magnesium oxide 400 milliGRAM(s) Oral two times a day with meals  mirtazapine 15 milliGRAM(s) Oral at bedtime  OLANZapine Disintegrating Tablet 2.5 milliGRAM(s) Oral at bedtime  pantoprazole  Injectable 40 milliGRAM(s) IV Push daily  polyethylene glycol 3350 17 Gram(s) Oral daily  senna 2 Tablet(s) Oral at bedtime    PRN MEDICATIONS  acetaminophen     Tablet .. 650 milliGRAM(s) Oral every 6 hours PRN  morphine  - Injectable 1 milliGRAM(s) IV Push every 6 hours PRN  simethicone 80 milliGRAM(s) Chew three times a day PRN  verapamil 40 milliGRAM(s) Oral two times a day PRN                                            ------------------------------------------------------------  VITAL SIGNS: Last 24 Hours  T(C): 35.6 (26 Apr 2022 05:21), Max: 36.5 (25 Apr 2022 15:36)  T(F): 96 (26 Apr 2022 05:21), Max: 97.7 (25 Apr 2022 15:36)  HR: 90 (26 Apr 2022 05:21) (78 - 99)  BP: 120/60 (26 Apr 2022 05:21) (85/51 - 120/60)  BP(mean): 63 (25 Apr 2022 11:28) (63 - 63)  RR: 20 (26 Apr 2022 05:21) (19 - 20)  SpO2: 100% (25 Apr 2022 21:01) (97% - 100%)      04-25-22 @ 07:01  -  04-26-22 @ 07:00  --------------------------------------------------------  IN: 900 mL / OUT: 600 mL / NET: 300 mL                                             --------------------------------------------------------------  LABS:                        9.9    6.25  )-----------( 153      ( 26 Apr 2022 02:00 )             32.6     04-26    144  |  104  |  43<H>  ----------------------------<  71  4.3   |  27  |  0.7    Ca    8.4<L>      26 Apr 2022 02:00  Mg     2.2     04-26    TPro  5.6<L>  /  Alb  2.7<L>  /  TBili  0.5  /  DBili  x   /  AST  77<H>  /  ALT  87<H>  /  AlkPhos  725<H>  04-26    PT/INR - ( 26 Apr 2022 02:27 )   PT: 19.50 sec;   INR: 1.70 ratio         PTT - ( 26 Apr 2022 02:00 )  PTT:38.3 sec                                                            --------------------------------------------------------------    PHYSICAL EXAM:  GENERAL: AAOx0. Cachectic, laying in bed appearing in no acute distress  HEENT: atraumatic, normocephalic  LUNGS: Clear to auscultation bilaterally  HEART: S1/S2. No heaves or thrills  ABD: Soft, non-tender, non-distended.  EXT/NEURO: ROm grossly intact  SKIN: RUE swelling + bruising/echymosis, improving                                           --------------------------------------------------------------  77 YO F with PMHx of HTN, pancreatic CA s/p Whipple 8/2021, A.Fib on Eliquis, pleural effusion was BIBEMS due to worsening SOB here for AHRF    #Acute Hypoxemic/hypercapnic Respiratory Failure  #B/l pleural effusion; R>L  #AMS likely toxic metabolic improved   #Ascites   #Pancreatic Ca  - Xray Chest: Interval increase in bilateral effusions and opacities  - BNP 2151, Procalcitonin .08, Ammonia level 33  - Echo (4/8/22): EF 54%, severely enlarged LA, RA. Mod-severe TR  - CTAP after thoracentesis and paracentesis  -BCx-NGF, UCx-NGF  - Palliative rec appreciated: family meeting 4/20 at 11am. Remains fulls code for now   - s/p Thoracentesis 4/19 drained 750ml   - Pleural fluid Cytology: reactive mesothelial, histiocytes, acute/chronic infl cells  - Pleural fluid analysis shows transudative fluid   - 4/22 D/c ceftriaxone    -holding diuresis  - Was unable to perform Paracentesis - will re-attempt 4/26 given INR improvement    #Decrease PO intake   - started marinol 4/20  - NGT placed on 4/21, after discussing with Fam   - Peptamen 1.5 start 125 ml over 30 min, increase to 250 ml  after paracentesis   - DO NOT try to put Creon down the NG tube  - Dr De Luna following  - psychiatry following, added Zyprexa to current regimen    #Transaminitis, likely from hepatic congestion  - TPro  5.6<L>  /  Alb  2.9<L>  /  TBili  0.7  /  DBili  x   /  AST  64<H>  /  ALT  47<H>  /  AlkPhos  550<H>  04-19  - TPro  5.7<L>  /  Alb  2.7<L>  /  TBili  0.5  /  DBili  x   /  AST  141<H>  /  ALT  86<H>  /  AlkPhos  790<H>  04-21  - Monitor LFTs   - RUQ US Pneumobilia with perihepatic ascites.  -f/u possible para    #Chronic A.Fib on Eliquis  - Continue Verapamil PRN and diltiazem CD 120mg daily  - 4/22 hold Eliquis, started on Lovenox   - Cradio recs recs for medication optimization     #Hx Pancreatic Ca   - s/p whipple 8/1/21 (in remission)   - c/w Creon 12K TID before meals   - Evaluated by surgery team on April 7. No further intervention   - Heme/onc recs appreciated   - CT A/P noted above was done to evaluate for pancreatic cancer, contrast was extravasated in the right upper extremity, thus study was noncon. Frequent RUE checks for compartment syndrome  - Ca-19-9 : 42    #Misc  - DVT Prophylaxis: Lovenox, ON HOLD FOR possible paracentesis tomorrow 4/25. Restart after Para.   - Diet: Feeds through NGT, but encourage PO intake   - GI Prophylaxis: Pantoprazole  - Activity: AAT  - Code Status: Full Code  - Disposition: acute   Pending:  Para + further GOC   VIKASH LI 76y Female  MRN#: [id number]   CODE STATUS:________    Hospital Day: 8d    Pt is currently admitted with the primary diagnosis of AHRF    SUBJECTIVE  Overnight events   -No major overnight events  Subjective complaints   -non-verbal, did not appear in acute distress                                            ----------------------------------------------------------  OBJECTIVE  PAST MEDICAL & SURGICAL HISTORY  SOB (shortness of breath)    Pain  knee    Varicose veins of both lower extremities, unspecified whether complicated    Atrial fibrillation, unspecified type  2019 on Eliquis    Jaundice  March 5, 2021    Malignant neoplasm of pancreas, unspecified location of malignancy  Pancreatic Cancer    Hypertension, unspecified type  2019    Pancreatic cancer    Kidney stones    History of surgery  Whipple procedure 8/2/2021 with Dr. Nino at [facility]    Status post phlebectomy  10/2018    History of ovarian cystectomy  left    History of tonsillectomy  1959    Kidney stone  2017                                              -----------------------------------------------------------  ALLERGIES:  Augmentin (Rash)  chocolate (Headache)  digoxin (Hives)  Metoprolol Succinate ER (Hives)  Novocain (Angioedema)  Steroids: Excessive swelling (Flushing; Other (Mild to Mod))  sulfa drugs (Fever)  Xarelto (Hives)                                            ------------------------------------------------------------    HOME MEDICATIONS  Home Medications:  Cardizem  mg/24 hours oral capsule, extended release: 1 cap(s) orally once a day (22 Nov 2021 23:59)  Creon 12,000 units oral delayed release capsule: 2 cap(s) orally 3 times a day (with meals) (03 Dec 2021 10:54)  Eliquis 5 mg oral tablet: 1 tab(s) orally 2 times a day (03 Sep 2021 23:41)                           MEDICATIONS:  STANDING MEDICATIONS  chlorhexidine 2% Cloths 1 Application(s) Topical <User Schedule>  diltiazem    milliGRAM(s) Oral daily  lactulose Syrup 20 Gram(s) Oral every 6 hours  magnesium oxide 400 milliGRAM(s) Oral two times a day with meals  mirtazapine 15 milliGRAM(s) Oral at bedtime  OLANZapine Disintegrating Tablet 2.5 milliGRAM(s) Oral at bedtime  pantoprazole  Injectable 40 milliGRAM(s) IV Push daily  polyethylene glycol 3350 17 Gram(s) Oral daily  senna 2 Tablet(s) Oral at bedtime    PRN MEDICATIONS  acetaminophen     Tablet .. 650 milliGRAM(s) Oral every 6 hours PRN  morphine  - Injectable 1 milliGRAM(s) IV Push every 6 hours PRN  simethicone 80 milliGRAM(s) Chew three times a day PRN  verapamil 40 milliGRAM(s) Oral two times a day PRN                                            ------------------------------------------------------------  VITAL SIGNS: Last 24 Hours  T(C): 35.6 (26 Apr 2022 05:21), Max: 36.5 (25 Apr 2022 15:36)  T(F): 96 (26 Apr 2022 05:21), Max: 97.7 (25 Apr 2022 15:36)  HR: 90 (26 Apr 2022 05:21) (78 - 99)  BP: 120/60 (26 Apr 2022 05:21) (85/51 - 120/60)  BP(mean): 63 (25 Apr 2022 11:28) (63 - 63)  RR: 20 (26 Apr 2022 05:21) (19 - 20)  SpO2: 100% (25 Apr 2022 21:01) (97% - 100%)      04-25-22 @ 07:01  -  04-26-22 @ 07:00  --------------------------------------------------------  IN: 900 mL / OUT: 600 mL / NET: 300 mL                                             --------------------------------------------------------------  LABS:                        9.9    6.25  )-----------( 153      ( 26 Apr 2022 02:00 )             32.6     04-26    144  |  104  |  43<H>  ----------------------------<  71  4.3   |  27  |  0.7    Ca    8.4<L>      26 Apr 2022 02:00  Mg     2.2     04-26    TPro  5.6<L>  /  Alb  2.7<L>  /  TBili  0.5  /  DBili  x   /  AST  77<H>  /  ALT  87<H>  /  AlkPhos  725<H>  04-26    PT/INR - ( 26 Apr 2022 02:27 )   PT: 19.50 sec;   INR: 1.70 ratio         PTT - ( 26 Apr 2022 02:00 )  PTT:38.3 sec                                                            --------------------------------------------------------------    PHYSICAL EXAM:  GENERAL: AAOx0. Cachectic, laying in bed appearing in no acute distress  HEENT: atraumatic, normocephalic  LUNGS: Clear to auscultation bilaterally  HEART: S1/S2. No heaves or thrills  ABD: Soft, non-tender, non-distended.  EXT/NEURO: ROm grossly intact  SKIN: RUE swelling + bruising/echymosis, improving                                           --------------------------------------------------------------

## 2022-04-26 NOTE — PROGRESS NOTE ADULT - SUBJECTIVE AND OBJECTIVE BOX
Over Night Events: events noted, on BIPAP overnight, afebrile, vascular, IR reviewed    PHYSICAL EXAM    ICU Vital Signs Last 24 Hrs  T(C): 35.6 (26 Apr 2022 05:21), Max: 36.5 (25 Apr 2022 15:36)  T(F): 96 (26 Apr 2022 05:21), Max: 97.7 (25 Apr 2022 15:36)  HR: 90 (26 Apr 2022 05:21) (78 - 99)  BP: 120/60 (26 Apr 2022 05:21) (85/51 - 120/60)  BP(mean): 63 (25 Apr 2022 11:28) (63 - 67)  RR: 20 (26 Apr 2022 05:21) (19 - 20)  SpO2: 100% (25 Apr 2022 21:01) (97% - 100%)      General: ill looking, cachectic  HEENT: JEFFRY             Lymph Nodes: No cervical LN   Lungs: dec bs both bases  Cardiovascular: COLLIN 2/6  Abdomen: Soft, Positive BS, distended  Neurological: Non focal   RUE swelling      04-24-22 @ 07:01  -  04-25-22 @ 07:00  --------------------------------------------------------  IN:  Total IN: 0 mL    OUT:    Indwelling Catheter - Urethral (mL): 1150 mL  Total OUT: 1150 mL    Total NET: -1150 mL      04-25-22 @ 07:01  -  04-26-22 @ 06:47  --------------------------------------------------------  IN:    Peptamen A.F.: 300 mL  Total IN: 300 mL    OUT:    Indwelling Catheter - Urethral (mL): 200 mL    Voided (mL): 400 mL  Total OUT: 600 mL    Total NET: -300 mL          LABS:                          9.9    6.25  )-----------( 153      ( 26 Apr 2022 02:00 )             32.6                                               04-26    144  |  104  |  43<H>  ----------------------------<  71  4.3   |  27  |  0.7    Ca    8.4<L>      26 Apr 2022 02:00  Mg     2.2     04-26    TPro  5.6<L>  /  Alb  2.7<L>  /  TBili  0.5  /  DBili  x   /  AST  77<H>  /  ALT  87<H>  /  AlkPhos  725<H>  04-26      PT/INR - ( 26 Apr 2022 02:27 )   PT: 19.50 sec;   INR: 1.70 ratio         PTT - ( 26 Apr 2022 02:00 )  PTT:38.3 sec                                                                                     LIVER FUNCTIONS - ( 26 Apr 2022 02:00 )  Alb: 2.7 g/dL / Pro: 5.6 g/dL / ALK PHOS: 725 U/L / ALT: 87 U/L / AST: 77 U/L / GGT: x                                                                                                                                       MEDICATIONS  (STANDING):  chlorhexidine 2% Cloths 1 Application(s) Topical <User Schedule>  diltiazem    milliGRAM(s) Oral daily  lactulose Syrup 20 Gram(s) Oral every 6 hours  magnesium oxide 400 milliGRAM(s) Oral two times a day with meals  mirtazapine 15 milliGRAM(s) Oral at bedtime  OLANZapine Disintegrating Tablet 2.5 milliGRAM(s) Oral at bedtime  pantoprazole  Injectable 40 milliGRAM(s) IV Push daily  polyethylene glycol 3350 17 Gram(s) Oral daily  senna 2 Tablet(s) Oral at bedtime    MEDICATIONS  (PRN):  acetaminophen     Tablet .. 650 milliGRAM(s) Oral every 6 hours PRN Temp greater or equal to 38C (100.4F), Mild Pain (1 - 3)  morphine  - Injectable 1 milliGRAM(s) IV Push every 6 hours PRN Moderate Pain (4 - 6)  simethicone 80 milliGRAM(s) Chew three times a day PRN Gas  verapamil 40 milliGRAM(s) Oral two times a day PRN tachycardia faster tahn 110 beats/ minute

## 2022-04-26 NOTE — ADVANCED PRACTICE NURSE CONSULT - RECOMMEDATIONS
1. Right heel/achilles area ulceration- Cleanse wound bed w/ normal saline, gently pat dry. Apply Cavilon no-sting barrier film to wound edges & periwound to prevent maceration. Apply silver hydrofiber/Aquacel Ag  to absorb wound exudate, provide antimicrobial properties to wound bed & assist w/ autolytic debridement of devitalized tissue (upon removal of old dressing, it will be in gel form since it gels when in contact w/ wound exudate). Cover w/ dry gauze & foam Allevyn dressing. Change dressing q2d and prn for strike-through drainage or soiling.  2. Left heel & left medial plantar ulcerations- Swab w/ Betadine 10% to maintain clean, dry wound bed and allow devitalized tissue to uproof naturally. Leave open to air.  -Consider vascular consult for further evaluation & treatment of ? underlying vascular insufficiency.  -Assess skin/wound qshift, report changes to primary provider.     Additional recs:  -Continue turning/positioning patient from side-to-side q2h while in bed, q1h when/if OOB chair, or in accordance w/ pt's plan of care. Continue utilizing pillows to assist w/ turning/positioning. When/if OOB chair, utilize pillows or chair cushion to offload pressure.   -Continue to offload heels from bed surface with offloading boots to BLEs.   -May continue prophylactically applying silicone foam Allevyn dressing to sacrococcygeal (and other bony prominences/high pressure areas) to cushion area, decrease friction and shear, and prevent further skin breakdown. Change dressing q3d and prn for soiling.  -Continue applying Coloplast Jaci Protect moisture barrier cream to buttock and perineal area daily and prn after each incontinent episode.    -Continue utilizing one underpad underneath patient to contain incontinence episodes; change pad when saturated/soiled.   -When/if ariza d/c'ed & patient w/ consistent urinary incontinence, consider utilizing female incontinence device/PrimaFit/PureWick (if patient candidate) to contain urinary incontinence.  -Continue nutrition consult for optimal wound healing & nutritional status.     Plan of Care: Primary SHARMILA Garduno made aware of above recs. Spoke w/  covering/primary MD Valeschi-Diaz  (at [phone]) in regards to above. Signing off on patient, no further needs/recs from WOCN service at this time. Staff RN to perform routine skin/wound assessment and manage wound care. Questions or concerns or if wound worsens and reconsult needed, please contact [facility], [phone].

## 2022-04-26 NOTE — PROGRESS NOTE ADULT - SUBJECTIVE AND OBJECTIVE BOX
Interventional Radiology Brief- Operative Note: Paracentesis    Procedure: right sided image guided paracentesis     Pre-Op Diagnosis: pancreatic ca with new onset ascites    Post-Op Diagnosis: same    Provider: Funmilayo Cardoza PA-C    Anesthesia (type):  [ ] General Anesthesia  [ ] Sedation  [ ] Spinal Anesthesia  [ x ] Local/Regional    Contrast: none    Estimated Blood Loss: <5mL    Condition:   [ ] Critical  [ ] Serious  [ ] Fair   [ x ] Good    Findings/Follow up Plan of Care: 4.7L of serous fluid removed     Specimens Removed: none requested     Implants: none     Complications: none     Condition/Disposition: d/c to floor     Discharge instructions: resume diet and activity as tolerated, resume medications as needed, keep dressing clean and dry - remove after 2 days, follow up paracentesis as needed    Please call Interventional Radiology [phone] with any questions, concerns, or issues.

## 2022-04-26 NOTE — ADVANCED PRACTICE NURSE CONSULT - ASSESSMENT
77 YO F with PMHx of HTN, pancreatic CA s/p Whipple 8/2021, A.Fib on Eliquis, pleural effusion was BIBEMS (4/18)  due to worsening SOB that started earlier on the morning of presentation  As per EMS, patient was saturating in the 70s on room air and was placed on NRB w/ sats improving to high 80s. Patient reports that she lives with her son and started noticing shortness of breath.   Patient had recent admission from [facility] and had thoracentesis done. Patient denies fevers, chills, headache, chest pain, palpitations, constipation, melena, hematochezia, dysuria, urinary frequency or urgency, numbness, tingling, recent travel or any known sick contact.   In the ED patient was placed on non rebreather with SpO2 of 94%. . X-ray chest showed Interval increase in bilateral effusions and opacities.  Currently admitted with the primary diagnosis of AHRF; today is hospital day-8d; in CEU, being managed for Acute Hypoxemic/hypercapnic Respiratory Failure; B/l pleural effusion; R>L; AMS likely toxic metabolic improved; Ascites; Pancreatic Ca; Decrease PO intake; Transaminitis, likely from hepatic congestion; Chronic A.Fib on Eliquis; Hx Pancreatic Ca.   Patient seen by WOCN during previous 3/2022 admission.     Received patient on CEU, laying supine in bed (turned to right side, pillow under left side, offloading boots to BLEs in place), patient awake, mostly non-verbal during WOCN visit, occasional moaning,  covering RN Bihn made aware of purpose of WOCN visit, agreeable to consult.   Foam Allevyn dressing to right achilles in place, dressing removed.     Type of wound: Full thickness wound- ? etiology- ? pressure vs. vascular component   Location: right heel-achilles area   Wound measurements: 5cm x 2cm x 0.2cm  Tunneling/Undermining: none  Wound bed: 95% dark pink tissue, 5% yellow devitalized tissue at 12 & 6 o'clock      Wound edges: attached, flush    Periwound: macerated, healed light pink tissue circumferentially     Wound exudate: removed dressing saturated w/ seropurulent drainage  Wound odor: malodorous upon dressing removed but dissipated w/ wound cleansing   Induration, erythema, warmth: none  Wound pain: no s/s pain present on assessment     Type of wound: Full thickness wound-presenting as vascular ulceration  Location: left heel   Wound measurements: 1cm x 1cm x 0cm, true depth indeterminable at this time   Tunneling/Undermining: none  Wound bed: dry, intact purple-brown necrotic tissue   Wound edges: attached, flush, irregular    Periwound: intact   Wound exudate: none  Wound odor: none  Induration, erythema, warmth, crepitus: none  Wound pain: no s/s pain present on assessment     Type of wound: Full thickness wound- presenting as vascular ulceration  Location: left medial plantar area   Wound measurements: 2.5cm x 2.5cm x 0cm, true depth indeterminable at this time   Tunneling/Undermining: none  Wound bed: dry, intact purple-brown necrotic tissue   Wound edges: attached, flush, irregular    Periwound: intact   Wound exudate: none  Wound odor: none  Induration, erythema, warmth, crepitus: none  Wound pain: no s/s pain present on assessment     Left foot w/ small scattered scabbed over ulcerations throughout presenting as ? vascular ulcerations.     With assistance from PCA, turned patient to left side for sacral skin assessment.     Patient cachetic, bones visibly protruding underneath skin. Prophylactic foam Allevyn dressing to sacrococcygeal area. Sacral, coccyx & buttock skin intact, no pressure injuries.     Patient bedbound, very limited mobility in bed, + ariza, incontinent of stool. Receiving TF via NGT,

## 2022-04-26 NOTE — PROGRESS NOTE ADULT - ASSESSMENT
77 YO F with PMHx of HTN, pancreatic CA s/p Whipple 8/2021, A.Fib on Eliquis, pleural effusion was BIBEMS from [facility]hab due to worsening SOB; Patient had recent admission from [facility] and had thoracentesis done admitted has been clinically declining    being followed for support with [facility]       MEDD (morphine equivalent daily dose): na      See Recs below. d/w Primary and Palliative and oncology teams       Please call [phone] with questions or concerns 24/7.   We will continue to follow.

## 2022-04-26 NOTE — PROGRESS NOTE ADULT - ASSESSMENT
75 YO F with PMHx of HTN, pancreatic CA s/p Whipple 8/2021, A.Fib on Eliquis, pleural effusion was BIBEMS due to worsening SOB    Acute Hypoxemic Respiratory Failure likely due to volume overload  b/l Pleural effusions with ascites  Pancreatic Ca s/p Whipple 8/21  Transaminitis  - remains on HFNC 40/45  - s/p THora 4/19 with 750 cc fluid removed, transudative  - repeat CT noted, massive ascites--> IR for therapeutic paracentesis, family in agreement  - s/p lasix  - cardiology eval appreciated, diuresis as tolerated  - discussed with Dr De Luna, feeds adjusted, Creon currently on hold as cannot be given through NGT  - resume once patient more awake/able to tolerate PO  - Ammonia noted to be elevated, on lactulose, titrate to 2-3 bm daily, CTH negative   - surgery following. Extensive conversation had again with patient's daughter Maddie and her , they want ongoing medical management   - monitor LFT's    BIlateral UE edema, from infitrated IVs and contrast extravasation  improving, c/w monitoring    Chronic A.Fib on Eliquis  - rate control with diltiazem   - hold lovenox for possible paracentesis     Poor PO intake  Severe malnutrition  History of eating disorder  started marinol 4/20, now off  NGT placed, tolerating feeds, encourage PO intake as well   Dr De Luna following  psychiatry following, added Zyprexa to current regimen    GOC - full code  overall grave prognosis    #Progress Note Handoff  Pending (specify):   clinical improvement tapering down supplemental 02, monitor PO intake, IR  for paracentesis , monitor LFTs    Family discussion: Plan of care discussed with patient, daughter and son in law at bedside extensively, informed of overall poor prognosis, all questions answered, aware and agreeable   Disposition:  from [facility]     Amanda Garcia MD  s. [phone]

## 2022-04-26 NOTE — PROGRESS NOTE ADULT - SUBJECTIVE AND OBJECTIVE BOX
VIKASH LI          MRN-[id number]              HPI:  75 YO F with PMHx of HTN, pancreatic CA s/p Whipple 8/2021, A.Fib on Eliquis, pleural effusion was BIBEMS due to worsening SOB that started earlier on the morning of presentation  As per EMS, patient was saturating in the 70s on room air and was placed on NRB w/ sats improving to high 80s. Patient reports that she lives with her son and started noticing shortness of breath.   Patient had recent admission from [facility] and had thoracentesis done.  Patient denies fevers, chills, headache, chest pain, palpitations, constipation, melena, hematochezia, dysuria, urinary frequency or urgency, numbness, tingling, recent travel or any known sick contact.   In the ED patient was placed on non rebreather with SpO2 of 94%. .  X-ray chest showed Interval increase in bilateral effusions and opacities. (18 Apr 2022 11:47)    ROS:	    4/21: Unable to full attain due to:  patient is lethargic and not able to sustain interaction; after encounter JesústMaddie at bedside with her  - reinforced offer to have family meeting  4/25: interim: at [facility] noted with subsequent vascular consult for R arm and disimpaction; no family at bedside; patient non-responsive; hypotensive; now has NG tube  4/26: patient declining, did not appear in pain during my evaluation                    Dyspnea (Salima 0-10): 0                       N/V (Y/N): No                             Secretions (Y/N) : No                                          Agitation(Y/N): No                              Pain (Y/N): No (PainAD 1)                                -Provocation/Palliation: N/A  -Quality/Quantity: N/A  -Radiating: N/A  -Severity: No pain  -Timing/Frequency: N/A  -Impact on ADLs: N/A    General:   no nonverbal indications  HEENT:   no nonverbal indications    Neck:   no nonverbal indications  CVS:   no nonverbal indications  Resp:   no nonverbal indications  GI:   no nonverbal indications  :   no nonverbal indications  Musc:   no nonverbal indications  Neuro:   no nonverbal indications  Psych:  no nonverbal indications  Skin:   no nonverbal indications  Lymph:   no nonverbal indications    Last BM: 4/22      Allergies    Augmentin (Rash)  chocolate (Headache)  digoxin (Hives)  Metoprolol Succinate ER (Hives)  Novocain (Angioedema)  Steroids: Excessive swelling (Flushing; Other (Mild to Mod))  sulfa drugs (Fever)  Xarelto (Hives)    Intolerances      Opiate Naive (Y/N): Y  -iStop reviewed (Y/N): Y   Ref#:     This report was requested by: Yuki Nolan | Reference #: [id number]             Labs:	  reviewed                        LABS: no current labs for review          Radiology:	         < from: Xray Chest 1 View- PORTABLE-Urgent (Xray Chest 1 View- PORTABLE-Urgent .) (04.19.22 @ 15:55) >  ACC: [id number] EXAM:  XR CHEST PORTABLE URGENT 1V                          PROCEDURE DATE:  04/19/2022          INTERPRETATION:  Clinical History / Reason for exam: Follow-up.    Comparison : Chest radiograph April 18, 2022.    Technique/Positioning: Adequate.    Findings:    Support devices: Right-sided Port-A-Cath with its tip overlying the SVC.    Cardiac/mediastinum/hilum: Cardiomegaly, unchanged.    Lung parenchyma/Pleura: Improved right lung opacity and stable left lung   opacity. No pneumothorax is seen.    Skeleton/soft tissues: Stable.                          9.9    6.25  )-----------( 153      ( 26 Apr 2022 02:00 )             32.6   04-26    144  |  104  |  43<H>  ----------------------------<  71  4.3   |  27  |  0.7    Ca    8.4<L>      26 Apr 2022 02:00  Mg     2.2     04-26    Impression:    Cardiomegaly, unchanged.    Improved right lung opacity and stable left lung opacity.    Right-sided Port-A-Cath.    --- End of Report ---            GRUPO MALDONADO MD; Attending Radiologist  This document has been electronically signed. Apr 19 2022  5:04PM    < end of copied text >          EKG:	  < from: 12 Lead ECG (04.05.22 @ 17:56) >    Ventricular Rate 91 BPM    Atrial Rate 37 BPM    QRS Duration 82 ms    Q-T Interval 360 ms    QTC Calculation(Bazett) 442 ms    R Axis 117 degrees    T Axis -62 degrees    Diagnosis Line Atrial fibrillation  Left posterior fascicular block  T wave abnormality, consider lateral ischemia  Abnormal ECG    Confirmed by JOSEFA CHILDS MD ([id number]) on 4/6/2022 10:08:26 AM    < end of copied text >    < from: 12 Lead ECG (04.18.22 @ 10:08) >    Ventricular Rate 117 BPM    Atrial Rate 115 BPM    QRS Duration 82 ms    Q-T Interval 356 ms    QTC Calculation(Bazett) 496 ms    R Axis 79 degrees    T Axis 219 degrees    Diagnosis Line Atrial fibrillation with rapid ventricular response  Low voltage QRS  ST & T wave abnormality, consider lateral ischemia  Abnormal ECG    Confirmed by Torri Collins MD ([id number]) on 4/18/2022 4:40:35 PM    < end of copied text >      Imaging Personally Reviewed:  [ ] xYES  [ ] NO    Consultant(s) Notes Reviewed:  [x ] YES  [ ] NO  Care Discussed with Consultants/Other Providers [x ] YES  [ ] NO    PEx:	  Vital Signs Last 24 Hrs  T(C): 36.7 (26 Apr 2022 11:20), Max: 36.7 (26 Apr 2022 11:20)  T(F): 98 (26 Apr 2022 11:20), Max: 98 (26 Apr 2022 11:20)  HR: 91 (26 Apr 2022 11:20) (90 - 99)  BP: 120/73 (26 Apr 2022 11:20) (95/71 - 120/73)  BP(mean): 90 (26 Apr 2022 11:20) (83 - 90)  RR: 20 (26 Apr 2022 11:20) (19 - 20)  SpO2: 100% (26 Apr 2022 15:06) (100% - 100%)    General:  NAD; frail  Eyes: clear conjunctiva  ENMT: no ulcers externally   Resp: Unlabored Non tachypneic + HFNC  Neuro: slight moan when stimulated  Psych: Calm   Skin: nonjaundiced; pale/mottled    Preadmit Karnofsky:  %           Current Karnofsky:  20   %  http://www.npcrc.org/files/news/karnofsky_performance_scale.pdf   http://www.npcrc.org/files/news/palliative_performance_scale_PPSv2.pdf  Cachexia (Y/N): Y  BMI: BMI (kg/m2): 12.2 (04-18-22 @ 08:41)      Medications:	      MEDICATIONS  (STANDING):  chlorhexidine 2% Cloths 1 Application(s) Topical <User Schedule>  diltiazem    milliGRAM(s) Oral daily  lactulose Syrup 20 Gram(s) Oral every 6 hours  magnesium oxide 400 milliGRAM(s) Oral two times a day with meals  mirtazapine 15 milliGRAM(s) Oral at bedtime  OLANZapine Disintegrating Tablet 2.5 milliGRAM(s) Oral at bedtime  pantoprazole  Injectable 40 milliGRAM(s) IV Push daily  polyethylene glycol 3350 17 Gram(s) Oral daily  senna 2 Tablet(s) Oral at bedtime    MEDICATIONS  (PRN):  acetaminophen     Tablet .. 650 milliGRAM(s) Oral every 6 hours PRN Temp greater or equal to 38C (100.4F), Mild Pain (1 - 3)  morphine  - Injectable 1 milliGRAM(s) IV Push every 6 hours PRN Moderate Pain (4 - 6)  simethicone 80 milliGRAM(s) Chew three times a day PRN Gas  verapamil 40 milliGRAM(s) Oral two times a day PRN tachycardia faster tahn 110 beats/ minute    Advanced Directives:	     Full Code      Decision maker: The patient is unable to participate in complex medical decision making conversations    Legal surrogate: daughter    GOALS OF CARE DISCUSSION	       Palliative care info/counseling provided	              Documentation of GOC/Advanced Care Planning see prior       PSYCHOSOCIAL-SPIRITUAL ASSESSMENT:            See Palliative Care SW/ documentation        	    REFERRALS       Palliative Med        Unit SW/Case Mgmt

## 2022-04-26 NOTE — PROGRESS NOTE ADULT - ASSESSMENT
75 YO F with PMHx of HTN, pancreatic CA s/p Whipple 8/2021, A.Fib on Eliquis, pleural effusion was BIBEMS due to worsening SOB here for AHRF    #Acute Hypoxemic/hypercapnic Respiratory Failure  #B/l pleural effusion; R>L  #AMS likely toxic metabolic improved   #Ascites   #Pancreatic Ca  - Xray Chest: Interval increase in bilateral effusions and opacities  - BNP 2151, Procalcitonin .08, Ammonia level 33  - Echo (4/8/22): EF 54%, severely enlarged LA, RA. Mod-severe TR  - CTAP after thoracentesis and paracentesis  -BCx-NGF, UCx-NGF  - Palliative rec appreciated: family meeting 4/20 at 11am. Remains fulls code for now   - s/p Thoracentesis 4/19 drained 750ml   - Pleural fluid Cytology: reactive mesothelial, histiocytes, acute/chronic infl cells  - Pleural fluid analysis shows transudative fluid  -holding diuresis  -f/u therapeutic para following INR improvement  -f/u repeat ammonia + INR    #Decrease PO intake   - started marinol 4/20  - NGT placed on 4/21, after discussing with Fam   - Peptamen 1.5 start 125 ml over 30 min, increase to 250 ml  after paracentesis   - DO NOT try to put Creon down the NG tube  - Dr De Luna following  - psychiatry following, added Zyprexa to current regimen    #Transaminitis, likely from hepatic congestion  - TPro  5.6<L>  /  Alb  2.9<L>  /  TBili  0.7  /  DBili  x   /  AST  64<H>  /  ALT  47<H>  /  AlkPhos  550<H>  04-19  - TPro  5.7<L>  /  Alb  2.7<L>  /  TBili  0.5  /  DBili  x   /  AST  141<H>  /  ALT  86<H>  /  AlkPhos  790<H>  04-21  - Monitor LFTs   - RUQ US Pneumobilia with perihepatic ascites.  -INR trending down with feedings, likely due to vit K deficiency from chronic malnutrition    #Chronic A.Fib on Eliquis  - Continue Verapamil PRN and diltiazem CD 120mg daily  - 4/22 hold Eliquis, started on Lovenox   - Cardio recs recs for medication optimization     #Hx Pancreatic Ca   - s/p whipple 8/1/21 (in remission)   - c/w Creon 12K TID before meals   - Evaluated by surgery team on April 7. No further intervention   - Heme/onc recs appreciated   - CT A/P noted above was done to evaluate for pancreatic cancer, contrast was extravasated in the right upper extremity, thus study was noncon. Frequent RUE checks for compartment syndrome  - Ca-19-9 : 42    #Misc  - DVT Prophylaxis: Lovenox, ON HOLD FOR possible paracentesis tomorrow 4/25. Restart after Para.   - Diet: Feeds through NGT, but encourage PO intake   - GI Prophylaxis: Pantoprazole  - Activity: AAT  - Code Status: Full Code  - Disposition: acute   Pending:  Para + further GOC

## 2022-04-26 NOTE — PROGRESS NOTE ADULT - PROBLEM SELECTOR PLAN 4
- will follow  - full code  -family meeting at 12pm tomorrow    ______________  Bipin Madrigal MD  Palliative Medicine  [facility]   [facility] Pall[facility]  [phone]

## 2022-04-26 NOTE — CHART NOTE - NSCHARTNOTEFT_GEN_A_CORE
Patient was observed to be in pain.  Writer informed RN and medication was given.  Writer has observed patient to be steadily declining.  T/C to daughters Yuli Roger and Maddie Nunez:  family meeting scheduled to discuss GOC on 4/27/22 at 12:00 pm.

## 2022-04-26 NOTE — PROGRESS NOTE ADULT - SUBJECTIVE AND OBJECTIVE BOX
LI, VIKASH  76y Female    CHIEF COMPLAINT:    Patient is a 76y old  Female who presents with a chief complaint of Shortness of breath (2022 07:36)    INTERVAL HPI/OVERNIGHT EVENTS:    Patient seen and examined. No acute events overnight. Overall unchanged, still lehtargic with episodes of agitation     ROS: All other systems are negative.    Vital Signs:    T(F): 98 (22 @ 11:20), Max: 98 (22 @ 11:20)  HR: 91 (22 @ 11:20) (85 - 99)  BP: 120/73 (22 @ 11:20) (95/71 - 120/73)  RR: 20 (22 @ 11:20) (19 - 20)  SpO2: 100% (22 @ 13:02) (100% - 100%)    2022 07:01  -  2022 07:00  --------------------------------------------------------  IN: 900 mL / OUT: 600 mL / NET: 300 mL    Daily Weight in k.4 (2022 05:21)    PHYSICAL EXAM:    GENERAL:  NAD cachectic, chronically ill appearing   SKIN: No rashes or lesions  HEENT: Atraumatic. Normocephalic.   NECK: Supple, No JVD.   PULMONARY: coarse breath sounds  B/L. No wheezing. No rales  CVS: Normal S1, S2. Rate and Rhythm are regular.   ABDOMEN/GI: Soft, Nontender, distended   MSK:  No edema B/L LE. b/l finger cyanosis, no clubbing   NEUROLOGIC: does not follow commands   PSYCH: lethargic, arousable     Consultant(s) Notes Reviewed:  [x ] YES  [ ] NO  Care Discussed with Consultants/Other Providers [ x] YES  [ ] NO    LABS:                        9.9    6.25  )-----------( 153      ( 2022 02:00 )             32.6     144  |  104  |  43<H>  ----------------------------<  71  4.3   |  27  |  0.7    Ca    8.4<L>      2022 02:00  Mg     2.2         TPro  5.6<L>  /  Alb  2.7<L>  /  TBili  0.5  /  DBili  x   /  AST  77<H>  /  ALT  87<H>  /  AlkPhos  725<H>      PT/INR - ( 2022 11:00 )   PT: 17.90 sec;   INR: 1.56 ratio      PTT - ( 2022 02:00 )  PTT:38.3 sec    RADIOLOGY & ADDITIONAL TESTS:  Imaging or report Personally Reviewed:  [x] YES  [ ] NO  EKG reviewed: [x] YES  [ ] NO    Medications:  Standing  chlorhexidine 2% Cloths 1 Application(s) Topical <User Schedule>  diltiazem    milliGRAM(s) Oral daily  lactulose Syrup 20 Gram(s) Oral every 6 hours  magnesium oxide 400 milliGRAM(s) Oral two times a day with meals  mirtazapine 15 milliGRAM(s) Oral at bedtime  morphine  - Injectable 1 milliGRAM(s) IV Push once  OLANZapine Disintegrating Tablet 2.5 milliGRAM(s) Oral at bedtime  pantoprazole  Injectable 40 milliGRAM(s) IV Push daily  polyethylene glycol 3350 17 Gram(s) Oral daily  senna 2 Tablet(s) Oral at bedtime    PRN Meds  acetaminophen     Tablet .. 650 milliGRAM(s) Oral every 6 hours PRN  morphine  - Injectable 1 milliGRAM(s) IV Push every 6 hours PRN  simethicone 80 milliGRAM(s) Chew three times a day PRN  verapamil 40 milliGRAM(s) Oral two times a day PRN

## 2022-04-27 NOTE — PROGRESS NOTE ADULT - PROBLEM SELECTOR PLAN 7
See above for details.  In sum, now DNR with trial of enteral feeds until Saturday; limit amount of labs/xray; have S& S evaluate; get information about hospice  family need time to continue private discussions  revisit prn - will follow  - now DNR only

## 2022-04-27 NOTE — HOSPICE CARE NOTE - CONVESATION DETAILS
Hospice referral completed.  Phone call to patient's daughter Yuli, hospice services and philosophy reviewed.  Family seeking information only at this time.  Hospice contact information provided.  Hospice to remain available.

## 2022-04-27 NOTE — PROGRESS NOTE ADULT - ASSESSMENT
77 YO F with PMHx of HTN, pancreatic CA s/p Whipple 8/2021, A.Fib on Eliquis, pleural effusion was BIBEMS due to worsening SOB    Acute Hypoxemic Respiratory Failure likely due to volume overload  b/l Pleural effusions with ascites  Pancreatic Ca s/p Whipple 8/21  Transaminitis  - now on nasal cannula   - s/p Thoracentesis 4/19 with 750 cc fluid removed, transudative  - repeat CT noted, massive ascites--> IR for therapeutic paracentesis on 4/26  - s/p lasix  - cardiology eval appreciated, diuresis as tolerated  - discussed with Dr Knight, feeds adjusted, Creon currently on hold as cannot be given through NGT  - resume if patient more awake/able to tolerate PO  - Ammonia noted to be elevated, on lactulose, titrate to 2-3 bm daily, CTH negative   - surgery following  - Extensive conversation with patient's daughters and palliative - continue ngt feeds, hold blood work  - monitor LFT's    BIlateral UE edema, from infitrated IVs and contrast extravasation  -stable; continue to monitor     Chronic A.Fib on Eliquis  - rate control with diltiazem   - continue lovenox    Poor PO intake  Severe malnutrition  History of eating disorder  started marinol 4/20, now off  NGT placed, tolerating feeds, encourage PO intake as well   Dr Syed following  psychiatry following, added Zyprexa to current regimen    GOC - DNR  overall grave prognosis    Progress Note Handoff  Pending Consults: palliative follow up   Pending Tests: none  Pending Results: none  Family Discussion: palliative discussed goc and ongoing medical treatment plan with pt's family - the decision was made to stop blood draws and continue NGT feeds until Saturday and may proceed to CMO at that point   Disposition: Home_____/SNF______/Other_____/Unknown at this time__x__  Spent over 35 min reviewing chart and on coordinating patient care during interdisciplinary rounds

## 2022-04-27 NOTE — PROGRESS NOTE ADULT - ASSESSMENT
77 YO F with PMHx of HTN, pancreatic CA s/p Whipple 8/2021, A.Fib on Eliquis, pleural effusion was BIBEMS due to worsening SOB here for AHRF    #Acute Hypoxemic/hypercapnic Respiratory Failure  #B/l pleural effusion; R>L  #AMS likely toxic metabolic improved   #Ascites   #Pancreatic Ca  - Xray Chest: Interval increase in bilateral effusions and opacities  - BNP 2151, Procalcitonin .08, Ammonia level 33  - Echo (4/8/22): EF 54%, severely enlarged LA, RA. Mod-severe TR  - CTAP after thoracentesis and paracentesis  -BCx-NGF, UCx-NGF  - Palliative rec appreciated: family meeting 4/20 at 11am. Remains fulls code for now   - s/p Thoracentesis 4/19 drained 750ml  -s/p paracentesis 4/26 4.7L removed--> pt tolerating NC from 4/26/22  - Pleural fluid Cytology: reactive mesothelial, histiocytes, acute/chronic infl cells  - Pleural fluid analysis shows transudative fluid  -holding diuresis  -repeat ammonia slightly elevated but unlikely hepatic encephalopathy given chronic lactulose use at this point    #Decrease PO intake   - started marinol 4/20  - NGT placed on 4/21, after discussing with Fam   - Peptamen 1.5 start 125 ml over 30 min, increase to 250 ml  after paracentesis   - DO NOT try to put Creon down the NG tube  - Dr De Luna following  - psychiatry following, added Zyprexa to current regimen    #Transaminitis, likely from hepatic congestion  - TPro  5.6<L>  /  Alb  2.9<L>  /  TBili  0.7  /  DBili  x   /  AST  64<H>  /  ALT  47<H>  /  AlkPhos  550<H>  04-19  - TPro  5.7<L>  /  Alb  2.7<L>  /  TBili  0.5  /  DBili  x   /  AST  141<H>  /  ALT  86<H>  /  AlkPhos  790<H>  04-21  - Monitor LFTs   - RUQ US Pneumobilia with perihepatic ascites.  -INR trending down with feedings, likely due to vit K deficiency from chronic malnutrition    #Chronic A.Fib on Eliquis  - Continue Verapamil PRN and diltiazem CD 120mg daily  - 4/22 hold Eliquis, started on Lovenox   - Cardio recs recs for medication optimization     #Hx Pancreatic Ca   - s/p whipple 8/1/21 (in remission)   - c/w Creon 12K TID before meals   - Evaluated by surgery team on April 7. No further intervention   - Heme/onc recs appreciated   - CT A/P noted above was done to evaluate for pancreatic cancer, contrast was extravasated in the right upper extremity, thus study was noncon. Frequent RUE checks for compartment syndrome  - Ca-19-9 : 42    #Misc  - DVT Prophylaxis: Lovenox, ON HOLD FOR possible paracentesis tomorrow 4/25. Restart after Para.   - Diet: Feeds through NGT, but encourage PO intake   - GI Prophylaxis: Pantoprazole  - Activity: AAT  - Code Status: Full Code  - Disposition: acute   Pending:  Further GO 4/27 @ 1200       77 YO F with PMHx of HTN, pancreatic CA s/p Whipple 8/2021, A.Fib on Eliquis, pleural effusion was BIBEMS due to worsening SOB here for AHRF    #Acute Hypoxemic/hypercapnic Respiratory Failure  #B/l pleural effusion; R>L  #AMS likely toxic metabolic improved   #Ascites   #Pancreatic Ca  - Xray Chest: Interval increase in bilateral effusions and opacities  - BNP 2151, Procalcitonin .08, Ammonia level 33  - Echo (4/8/22): EF 54%, severely enlarged LA, RA. Mod-severe TR  - CTAP after thoracentesis and paracentesis  -BCx-NGF, UCx-NGF  - Palliative rec appreciated: family meeting 4/20 at 11am. Remains fulls code for now   - s/p Thoracentesis 4/19 drained 750ml  -s/p paracentesis 4/26 4.7L removed--> pt tolerating NC from 4/26/22  - Pleural fluid Cytology: reactive mesothelial, histiocytes, acute/chronic infl cells  - Pleural fluid analysis shows transudative fluid  -holding diuresis  -repeat ammonia slightly elevated but unlikely hepatic encephalopathy given chronic lactulose use at this point    #Decrease PO intake   - started marinol 4/20  - NGT placed on 4/21, after discussing with Fam   - Peptamen 1.5 start 125 ml over 30 min, increase to 250 ml  after paracentesis   - DO NOT try to put Creon down the NG tube  - Dr De Luna following  - psychiatry following, added Zyprexa to current regimen    #Transaminitis, likely from hepatic congestion  - TPro  5.6<L>  /  Alb  2.9<L>  /  TBili  0.7  /  DBili  x   /  AST  64<H>  /  ALT  47<H>  /  AlkPhos  550<H>  04-19  - TPro  5.7<L>  /  Alb  2.7<L>  /  TBili  0.5  /  DBili  x   /  AST  141<H>  /  ALT  86<H>  /  AlkPhos  790<H>  04-21  - Monitor LFTs   - RUQ US Pneumobilia with perihepatic ascites.  -INR trending down with feedings, likely due to vit K deficiency from chronic malnutrition    #Chronic A.Fib on Eliquis  - Continue Verapamil PRN and diltiazem CD 120mg daily  - 4/22 hold Eliquis, started on Lovenox   - Cardio recs recs for medication optimization     #Hx Pancreatic Ca   - s/p whipple 8/1/21 (in remission)   - c/w Creon 12K TID before meals   - Evaluated by surgery team on April 7. No further intervention   - Heme/onc recs appreciated   - CT A/P noted above was done to evaluate for pancreatic cancer, contrast was extravasated in the right upper extremity, thus study was noncon. Frequent RUE checks for compartment syndrome  - Ca-19-9 : 42    #Misc  - DVT Prophylaxis: Lovenox  - Diet: Feeds through NGT, but encourage PO intake   - GI Prophylaxis: Pantoprazole  - Activity: AAT  - Code Status: Full Code  - Disposition: acute   Pending:  Further [facility] 4/27 @ 1200

## 2022-04-27 NOTE — PROGRESS NOTE ADULT - ASSESSMENT
75 YO F with PMHx of HTN, pancreatic CA s/p Whipple 8/2021, A.Fib on Eliquis, pleural effusion was BIBEMS from [facility] due to worsening SOB; Patient had recent admission from [facility] and had thoracentesis done admitted has been overall clinically declining. Had had NG feeding for several days with family reporting periods of lucidity- but not enough to have confidence that she can engage in complex decision-making    being followed for support with GOC       MEDD (morphine equivalent daily dose): na      See Recs below. d/w Primary and Palliative teams       Please call [phone] with questions or concerns 24/7.   We will continue to follow.

## 2022-04-27 NOTE — PROGRESS NOTE ADULT - PROBLEM SELECTOR PLAN 5
improved s/p paracentesis  continue morphine 1mg IV as written (last dose 4/26~10am) continue morphine 1mg IV as written (last dose 4/26~10am)as per primary team and family - has episodic pain that seems multifactorial (incidental-procedural; and generalized)

## 2022-04-27 NOTE — PROGRESS NOTE ADULT - CONVERSATION DETAILS
Detailed medical update provided by Dr. Crespo - see other [facility] note    family able to teach back that overall patient's prognosis is poor, but they are hoping that trial of enteral feeds will provide both quality and quantity of life.   Educated about CMO-EOL:   - no labs  - no vitals  - no FS  - no IVF  - no injections  - no pulse ox  - no artificial feeds  - no oxygen supplementation (NC is OK)  - comfort feeding as desired.     Educated about futility of CPR and other invasive DDT   All questions answered in detail Detailed medical update provided by Dr. Crespo - see other [facility] note    family able to teach back that overall patient's prognosis is poor, but they are hoping that trial of enteral feeds will provide both quality and quantity of life.   Educated about CMO-EOL:   - no labs  - no vitals  - no FS  - no IVF  - no injections  - no pulse ox  - no artificial feeds  - no oxygen supplementation (NC is OK)  - comfort feeding as desired.     Educated about futility of CPR and other invasive DDT   Agreed to DNR  MOLST completed  All questions answered in detail

## 2022-04-27 NOTE — PROGRESS NOTE ADULT - NS ATTEND AMEND GEN_ALL_CORE FT
Agree with above, patient looks clinically worse, primary team to d/w family today, will be available for further GOC and would benefit from interdisciplinary family meeting    ______________  Bipin Madrigal MD  Palliative Medicine  [facility]   of Geriatric and Palliative Medicine  [phone]
Agree with above, patient comfortable but not alert enough to make decisions, d/w daughter Jerrod and . They would like a trial of NGT if patient amenable. WOuld benefit from family meeting. d/w team    ______________  Bipin Madrigal MD  Palliative Medicine  [facility]   of Geriatric and Palliative Medicine  [phone]
Agree with above, patient more stable after thoracentesis, unable to do paracentesis however. On HFNC, more tired, daughter states she did not sleep well last night. Discussed GOC as above, will plan to discuss if patient is more alert as well. Goals are for continued disease-directed therapy if offered.    ______________  Bipin Madrigal MD  Palliative Medicine  [facility]   of Geriatric and Palliative Medicine  [phone]
Agree with above, family meeting as above, family would like to pursue NGT feeds until Saturday, limited blood draws, limited imaging. DNR now, family to decide on DNI. Will continue to follow for GOC    ______________  Bipin Madrigal MD  Palliative Medicine  [facility]   of Geriatric and Palliative Medicine  [phone]

## 2022-04-27 NOTE — CHART NOTE - NSCHARTNOTEFT_GEN_A_CORE
Family meeting today with Palliative Team and Medical Resident to discuss GOC.  Family made patient DNR.  Family want to continue with NG Tube for the next few days to see if patient's condition improves.

## 2022-04-27 NOTE — PROGRESS NOTE ADULT - ASSESSMENT
IMPRESSION:    Acute Hypoxemic Respiratory Failure on NC  Acute hypercapnic respiratory failure  AMS, toxic metabolic  B/l effusion R>L,  SP right thoracentesis x2 transudative effusion   Large Volume Ascites SP para  HO eating disorder  Pancreatic Cancer advanced s/p whipple 8/21  Afib on Eliquis    PLAN:    CNS: Avoid CNS depressants.     HEENT: Oral care    PULMONARY:  HOB @ 45 degrees.  Aspiration precautions. Wean HHFNC. Target SPO2 88-94%. Titrate oxygen as tolerated.     CARDIOVASCULAR: Keep negative balance. Rate control.     GI: GI prophylaxis. fup cuto, NGT    RENAL: Follow up lytes. Correct as needed  HEMATOLOGICAL:  ac    ENDOCRINE:  Follow up FS.      MUSCULOSKELETAL: bedrest    Very poor prognosis    GOC    SDU

## 2022-04-27 NOTE — PROGRESS NOTE ADULT - SUBJECTIVE AND OBJECTIVE BOX
VIKASH LI          MRN-[id number]              HPI:  77 YO F with PMHx of HTN, pancreatic CA s/p Whipple 8/2021, A.Fib on Eliquis, pleural effusion was BIBEMS due to worsening SOB that started earlier on the morning of presentation  As per EMS, patient was saturating in the 70s on room air and was placed on NRB w/ sats improving to high 80s. Patient reports that she lives with her son and started noticing shortness of breath.   Patient had recent admission from [facility] and had thoracentesis done.  Patient denies fevers, chills, headache, chest pain, palpitations, constipation, melena, hematochezia, dysuria, urinary frequency or urgency, numbness, tingling, recent travel or any known sick contact.   In the ED patient was placed on non rebreather with SpO2 of 94%. .  X-ray chest showed Interval increase in bilateral effusions and opacities. (18 Apr 2022 11:47)    ROS:	    4/21: Unable to full attain due to:  patient is lethargic and not able to sustain interaction; after encounter JesústMaddie at bedside with her  - reinforced offer to have family meeting  4/25: interim: at [facility] noted with subsequent vascular consult for R arm and disimpaction; no family at bedside; patient non-responsive; hypotensive; now has NG tube  4/26: patient declining, did not appear in pain during my evaluation  4/27: patient had episodes of rest with agitation - soothed with non-pharmacological intervention; not making meaningful verbal discussion                     Dyspnea (Salima 0-10): 0                       N/V (Y/N): No                             Secretions (Y/N) : No                                          Agitation(Y/N): No                              Pain (Y/N): No (PainAD 1)                                -Provocation/Palliation: N/A  -Quality/Quantity: N/A  -Radiating: N/A  -Severity: No pain  -Timing/Frequency: N/A  -Impact on ADLs: N/A    General:   see above  HEENT:   no nonverbal indications    Neck:   no nonverbal indications  CVS:   no nonverbal indications  Resp:   no nonverbal indications  GI:   no nonverbal indications  :   no nonverbal indications  Musc:   no nonverbal indications  Neuro:   no nonverbal indications  Psych:  no nonverbal indications  Skin:   no nonverbal indications  Lymph:   no nonverbal indications    Last BM: 4/22 vs 21?      Allergies    Augmentin (Rash)  chocolate (Headache)  digoxin (Hives)  Metoprolol Succinate ER (Hives)  Novocain (Angioedema)  Steroids: Excessive swelling (Flushing; Other (Mild to Mod))  sulfa drugs (Fever)  Xarelto (Hives)    Intolerances      Opiate Naive (Y/N): Y  -iStop reviewed (Y/N): Y   Ref#:     This report was requested by: Yuki Nolan | Reference #: [id number]             Labs:	  reviewed                      LABS:                          10.4   7.56  )-----------( 158      ( 27 Apr 2022 11:00 )             34.4     04-27    145  |  105  |  45<H>  ----------------------------<  73  4.5   |  27  |  0.7    Ca    8.6      27 Apr 2022 11:00  Mg     2.4     04-27    TPro  6.0  /  Alb  3.0<L>  /  TBili  0.6  /  DBili  x   /  AST  76<H>  /  ALT  81<H>  /  AlkPhos  771<H>  04-27    LIVER FUNCTIONS - ( 27 Apr 2022 11:00 )  Alb: 3.0 g/dL / Pro: 6.0 g/dL / ALK PHOS: 771 U/L / ALT: 81 U/L / AST: 76 U/L / GGT: x           PT/INR - ( 26 Apr 2022 11:00 )   PT: 17.90 sec;   INR: 1.56 ratio         PTT - ( 26 Apr 2022 02:00 )  PTT:38.3 sec        Radiology:	         < from: Xray Chest 1 View- PORTABLE-Urgent (Xray Chest 1 View- PORTABLE-Urgent .) (04.19.22 @ 15:55) >  ACC: [id number] EXAM:  XR CHEST PORTABLE URGENT 1V                          < from: Xray Chest 1 View- PORTABLE-Urgent (Xray Chest 1 View- PORTABLE-Urgent .) (04.26.22 @ 17:02) >  Impression:    Cardiomegaly with bilateral effusions. Support devices as described.    Follow-up as needed.    --- End of Report ---            SERA GANT MD; Attending Interventional Radiologist  This document has been electronically signed. Apr 27 2022  7:50AM    < end of copied text >  PROCEDURE DATE:  04/19/2022          INTERPRETATION:  Clinical History / Reason for exam: Follow-up.    Comparison : Chest radiograph April 18, 2022.    Technique/Positioning: Adequate.    Findings:    Support devices: Right-sided Port-A-Cath with its tip overlying the SVC.    Cardiac/mediastinum/hilum: Cardiomegaly, unchanged.    Lung parenchyma/Pleura: Improved right lung opacity and stable left lung   opacity. No pneumothorax is seen.    Skeleton/soft tissues: Stable.                          9.9    6.25  )-----------( 153      ( 26 Apr 2022 02:00 )             32.6   04-26    144  |  104  |  43<H>  ----------------------------<  71  4.3   |  27  |  0.7    Ca    8.4<L>      26 Apr 2022 02:00  Mg     2.2     04-26    Impression:    Cardiomegaly, unchanged.    Improved right lung opacity and stable left lung opacity.    Right-sided Port-A-Cath.    --- End of Report ---            GRUPO MALDONADO MD; Attending Radiologist  This document has been electronically signed. Apr 19 2022  5:04PM    < end of copied text >          EKG:	  < from: 12 Lead ECG (04.05.22 @ 17:56) >    Ventricular Rate 91 BPM    Atrial Rate 37 BPM    QRS Duration 82 ms    Q-T Interval 360 ms    QTC Calculation(Bazett) 442 ms    R Axis 117 degrees    T Axis -62 degrees    Diagnosis Line Atrial fibrillation  Left posterior fascicular block  T wave abnormality, consider lateral ischemia  Abnormal ECG    Confirmed by AMADEO CUNHA, JOSEFA ([id number]) on 4/6/2022 10:08:26 AM    < end of copied text >    < from: 12 Lead ECG (04.18.22 @ 10:08) >    Ventricular Rate 117 BPM    Atrial Rate 115 BPM    QRS Duration 82 ms    Q-T Interval 356 ms    QTC Calculation(Bazett) 496 ms    R Axis 79 degrees    T Axis 219 degrees    Diagnosis Line Atrial fibrillation with rapid ventricular response  Low voltage QRS  ST & T wave abnormality, consider lateral ischemia  Abnormal ECG    Confirmed by Torri Collins MD ([id number]) on 4/18/2022 4:40:35 PM    < end of copied text >      Imaging Personally Reviewed:  [ ] xYES  [ ] NO    Consultant(s) Notes Reviewed:  [x ] YES  [ ] NO  Care Discussed with Consultants/Other Providers [x ] YES  [ ] NO    PEx:	  T(C): 36.6 (04-27-22 @ 11:30), Max: 36.6 (04-27-22 @ 11:30)  T(F): 97.8 (04-27-22 @ 11:30), Max: 97.8 (04-27-22 @ 11:30)  HR: 89 (04-27-22 @ 11:30) (89 - 113)  BP: 104/59 (04-27-22 @ 11:30) (96/76 - 130/72)  RR: 20 (04-27-22 @ 11:30) (20 - 20)  SpO2: 100% (04-27-22 @ 11:30) (98% - 100%)  Wt(kg): --    General:  NAD; frail + write retraints; + NG   Eyes: clear conjunctiva  ENMT: no ulcers externally   Resp: Unlabored Non tachypneic nc O2  Neuro: slight moan when stimulated then returned to rest;   Psych: periods of agitation - see above   Skin: nonjaundiced; pale/mottled - and per nursing    Preadmit Karnofsky:  %           Current Karnofsky:  20   %  http://www.npcrc.org/files/news/karnofsky_performance_scale.pdf   http://www.npcrc.org/files/news/palliative_performance_scale_PPSv2.pdf  Cachexia (Y/N): Y  BMI: BMI (kg/m2): 12.2 (04-18-22 @ 08:41)      Medications:	      MEDICATIONS  (STANDING):  chlorhexidine 4% Liquid 1 Application(s) Topical <User Schedule>  diltiazem    milliGRAM(s) Oral daily  enoxaparin Injectable 40 milliGRAM(s) SubCutaneous every 12 hours  lactulose Syrup 20 Gram(s) Oral every 6 hours  magnesium oxide 400 milliGRAM(s) Oral two times a day with meals  mirtazapine 15 milliGRAM(s) Oral at bedtime  OLANZapine Disintegrating Tablet 2.5 milliGRAM(s) Oral at bedtime  pantoprazole  Injectable 40 milliGRAM(s) IV Push daily  polyethylene glycol 3350 17 Gram(s) Oral daily  senna 2 Tablet(s) Oral at bedtime    MEDICATIONS  (PRN):  acetaminophen     Tablet .. 650 milliGRAM(s) Oral every 6 hours PRN Temp greater or equal to 38C (100.4F), Mild Pain (1 - 3)  morphine  - Injectable 1 milliGRAM(s) IV Push every 6 hours PRN Moderate Pain (4 - 6)  simethicone 80 milliGRAM(s) Chew three times a day PRN Gas  verapamil 40 milliGRAM(s) Oral two times a day PRN tachycardia faster tahn 110 beats/ minute      Advanced Directives:	     Full Code      Decision maker: The patient is unable to participate in complex medical decision making conversations    Legal surrogate: daughters    GOALS OF CARE DISCUSSION	       Palliative care info/counseling provided	           Documentation of GOC/Advanced Care Planning see prior       PSYCHOSOCIAL-SPIRITUAL ASSESSMENT:            See Palliative Care SW/ documentation        	    REFERRALS       Palliative Med        Unit SW/Case Mgmt

## 2022-04-27 NOTE — PROGRESS NOTE ADULT - SUBJECTIVE AND OBJECTIVE BOX
LI, VIKASH  76y Female    CHIEF COMPLAINT:    Patient is a 76y old  Female who presents with a chief complaint of Shortness of breath (26 Apr 2022 07:36)    INTERVAL HPI/OVERNIGHT EVENTS:    Patient seen and examined. No acute events overnight. Overall unchanged  patient lethargic   s/p family meeting today with hospice     ROS: All other systems are negative.    Vital Signs:  Vital Signs Last 24 Hrs  T(C): 36.6 (27 Apr 2022 11:30), Max: 36.6 (27 Apr 2022 11:30)  T(F): 97.8 (27 Apr 2022 11:30), Max: 97.8 (27 Apr 2022 11:30)  HR: 89 (27 Apr 2022 11:30) (89 - 113)  BP: 104/59 (27 Apr 2022 11:30) (96/76 - 130/72)  BP(mean): 78 (27 Apr 2022 11:30) (78 - 88)  RR: 20 (27 Apr 2022 11:30) (20 - 20)  SpO2: 100% (27 Apr 2022 11:30) (98% - 100%)    PHYSICAL EXAM:    GENERAL:  NAD cachectic, chronically ill appearing   SKIN: No rashes or lesions  HEENT: Atraumatic. Normocephalic.   NECK: Supple, No JVD.   PULMONARY: coarse breath sounds  B/L. No wheezing. No rales  CVS: Normal S1, S2. Rate and Rhythm are regular.   ABDOMEN/GI: Soft, Nontender, distended   MSK:  No edema B/L LE. b/l finger cyanosis, no clubbing   NEUROLOGIC: does not follow commands   PSYCH: lethargic, arousable     Consultant(s) Notes Reviewed:  [x ] YES  [ ] NO  Care Discussed with Consultants/Other Providers [ x] YES  [ ] NO    LABS:                        9.9    6.25  )-----------( 153      ( 26 Apr 2022 02:00 )             32.6     144  |  104  |  43<H>  ----------------------------<  71  4.3   |  27  |  0.7    Ca    8.4<L>      26 Apr 2022 02:00  Mg     2.2     04-26    TPro  5.6<L>  /  Alb  2.7<L>  /  TBili  0.5  /  DBili  x   /  AST  77<H>  /  ALT  87<H>  /  AlkPhos  725<H>  04-26    PT/INR - ( 26 Apr 2022 11:00 )   PT: 17.90 sec;   INR: 1.56 ratio      PTT - ( 26 Apr 2022 02:00 )  PTT:38.3 sec    RADIOLOGY & ADDITIONAL TESTS:  Imaging or report Personally Reviewed:  [x] YES  [ ] NO  EKG reviewed: [x] YES  [ ] NO    Medications:  MEDICATIONS  (STANDING):  chlorhexidine 4% Liquid 1 Application(s) Topical <User Schedule>  diltiazem    milliGRAM(s) Oral daily  enoxaparin Injectable 40 milliGRAM(s) SubCutaneous every 12 hours  lactulose Syrup 20 Gram(s) Oral every 6 hours  magnesium oxide 400 milliGRAM(s) Oral two times a day with meals  mirtazapine 15 milliGRAM(s) Oral at bedtime  OLANZapine Disintegrating Tablet 2.5 milliGRAM(s) Oral at bedtime  pantoprazole  Injectable 40 milliGRAM(s) IV Push daily  polyethylene glycol 3350 17 Gram(s) Oral daily  senna 2 Tablet(s) Oral at bedtime    MEDICATIONS  (PRN):  acetaminophen     Tablet .. 650 milliGRAM(s) Oral every 6 hours PRN Temp greater or equal to 38C (100.4F), Mild Pain (1 - 3)  morphine  - Injectable 1 milliGRAM(s) IV Push every 6 hours PRN Moderate Pain (4 - 6)  simethicone 80 milliGRAM(s) Chew three times a day PRN Gas  verapamil 40 milliGRAM(s) Oral two times a day PRN tachycardia faster tahn 110 beats/ minute

## 2022-04-27 NOTE — PROGRESS NOTE ADULT - NS ATTEND OPT1 GEN_ALL_CORE
I attest my time as attending is greater than 50% of the total combined time spent on qualifying patient care activities by the PA/NP and attending.
I independently performed the documented:
I attest my time as attending is greater than 50% of the total combined time spent on qualifying patient care activities by the PA/NP and attending.
I attest my time as attending is greater than 50% of the total combined time spent on qualifying patient care activities by the PA/NP and attending.

## 2022-04-27 NOTE — PROGRESS NOTE ADULT - PROBLEM SELECTOR PLAN 4
- will follow  - now DNR only    ______________  Bipin Madrigal MD  Palliative Medicine  [facility]   of Geriatric and Palliative Medicine  [phone] improved s/p paracentesis  continue morphine 1mg IV as written (last dose 4/26~10am)

## 2022-04-27 NOTE — PROGRESS NOTE ADULT - SUBJECTIVE AND OBJECTIVE BOX
VIKASH LI 76y Female  MRN#: [id number]   CODE STATUS:________    Hospital Day: 9d    Pt is currently admitted with the primary diagnosis of AHRF    SUBJECTIVE  Overnight events   -No major overnight events  Subjective complaints   -non-verbal, did not appear in acute distress                                            ----------------------------------------------------------  OBJECTIVE  PAST MEDICAL & SURGICAL HISTORY  SOB (shortness of breath)    Pain  knee    Varicose veins of both lower extremities, unspecified whether complicated    Atrial fibrillation, unspecified type  2019 on Eliquis    Jaundice  March 5, 2021    Malignant neoplasm of pancreas, unspecified location of malignancy  Pancreatic Cancer    Hypertension, unspecified type  2019    Pancreatic cancer    Kidney stones    History of surgery  Whipple procedure 8/2/2021 with Dr. Nino at [facility]    Status post phlebectomy  10/2018    History of ovarian cystectomy  left    History of tonsillectomy  1959    Kidney stone  2017                                              -----------------------------------------------------------  ALLERGIES:  Augmentin (Rash)  chocolate (Headache)  digoxin (Hives)  Metoprolol Succinate ER (Hives)  Novocain (Angioedema)  Steroids: Excessive swelling (Flushing; Other (Mild to Mod))  sulfa drugs (Fever)  Xarelto (Hives)                                            ------------------------------------------------------------    HOME MEDICATIONS  Home Medications:  Cardizem  mg/24 hours oral capsule, extended release: 1 cap(s) orally once a day (22 Nov 2021 23:59)  Creon 12,000 units oral delayed release capsule: 2 cap(s) orally 3 times a day (with meals) (03 Dec 2021 10:54)  Eliquis 5 mg oral tablet: 1 tab(s) orally 2 times a day (03 Sep 2021 23:41)                           MEDICATIONS:  STANDING MEDICATIONS  chlorhexidine 2% Cloths 1 Application(s) Topical <User Schedule>  diltiazem    milliGRAM(s) Oral daily  lactulose Syrup 20 Gram(s) Oral every 6 hours  magnesium oxide 400 milliGRAM(s) Oral two times a day with meals  mirtazapine 15 milliGRAM(s) Oral at bedtime  OLANZapine Disintegrating Tablet 2.5 milliGRAM(s) Oral at bedtime  pantoprazole  Injectable 40 milliGRAM(s) IV Push daily  polyethylene glycol 3350 17 Gram(s) Oral daily  senna 2 Tablet(s) Oral at bedtime    PRN MEDICATIONS  acetaminophen     Tablet .. 650 milliGRAM(s) Oral every 6 hours PRN  morphine  - Injectable 1 milliGRAM(s) IV Push every 6 hours PRN  simethicone 80 milliGRAM(s) Chew three times a day PRN  verapamil 40 milliGRAM(s) Oral two times a day PRN                                            ------------------------------------------------------------  VITAL SIGNS: Last 24 Hours  T(C): 36.1 (27 Apr 2022 04:27), Max: 36.7 (26 Apr 2022 11:20)  T(F): 97 (27 Apr 2022 04:27), Max: 98 (26 Apr 2022 11:20)  HR: 104 (27 Apr 2022 04:27) (90 - 113)  BP: 130/72 (27 Apr 2022 04:27) (96/76 - 130/72)  BP(mean): 90 (26 Apr 2022 11:20) (83 - 90)  RR: 20 (27 Apr 2022 04:27) (20 - 20)  SpO2: 98% (27 Apr 2022 04:27) (98% - 100%)      04-26-22 @ 07:01  -  04-27-22 @ 07:00  --------------------------------------------------------  IN: 920 mL / OUT: 100 mL / NET: 820 mL                                             --------------------------------------------------------------  LABS:                        9.9    6.25  )-----------( 153      ( 26 Apr 2022 02:00 )             32.6     04-26    144  |  104  |  43<H>  ----------------------------<  71  4.3   |  27  |  0.7    Ca    8.4<L>      26 Apr 2022 02:00  Mg     2.2     04-26    TPro  5.6<L>  /  Alb  2.7<L>  /  TBili  0.5  /  DBili  x   /  AST  77<H>  /  ALT  87<H>  /  AlkPhos  725<H>  04-26    PT/INR - ( 26 Apr 2022 11:00 )   PT: 17.90 sec;   INR: 1.56 ratio         PTT - ( 26 Apr 2022 02:00 )  PTT:38.3 sec                                                            --------------------------------------------------------------    PHYSICAL EXAM:  GENERAL: AAOx0. Cachectic, laying in bed appearing in no acute distress  HEENT: atraumatic, normocephalic  LUNGS: Clear to auscultation bilaterally  HEART: S1/S2. No heaves or thrills  ABD: Soft, non-tender, non-distended.  EXT/NEURO: ROm grossly intact  SKIN: RUE swelling + bruising/echymosis, improving                                           --------------------------------------------------------------  75 YO F with PMHx of HTN, pancreatic CA s/p Whipple 8/2021, A.Fib on Eliquis, pleural effusion was BIBEMS due to worsening SOB here for AHRF    #Acute Hypoxemic/hypercapnic Respiratory Failure  #B/l pleural effusion; R>L  #AMS likely toxic metabolic improved   #Ascites   #Pancreatic Ca  - Xray Chest: Interval increase in bilateral effusions and opacities  - BNP 2151, Procalcitonin .08, Ammonia level 33  - Echo (4/8/22): EF 54%, severely enlarged LA, RA. Mod-severe TR  - CTAP after thoracentesis and paracentesis  -BCx-NGF, UCx-NGF  - Palliative rec appreciated: family meeting 4/20 at 11am. Remains fulls code for now   - s/p Thoracentesis 4/19 drained 750ml   - Pleural fluid Cytology: reactive mesothelial, histiocytes, acute/chronic infl cells  - Pleural fluid analysis shows transudative fluid  -holding diuresis  -f/u therapeutic para following INR improvement  -f/u repeat ammonia + INR    #Decrease PO intake   - started marinol 4/20  - NGT placed on 4/21, after discussing with Fam   - Peptamen 1.5 start 125 ml over 30 min, increase to 250 ml  after paracentesis   - DO NOT try to put Creon down the NG tube  - Dr De Luna following  - psychiatry following, added Zyprexa to current regimen    #Transaminitis, likely from hepatic congestion  - TPro  5.6<L>  /  Alb  2.9<L>  /  TBili  0.7  /  DBili  x   /  AST  64<H>  /  ALT  47<H>  /  AlkPhos  550<H>  04-19  - TPro  5.7<L>  /  Alb  2.7<L>  /  TBili  0.5  /  DBili  x   /  AST  141<H>  /  ALT  86<H>  /  AlkPhos  790<H>  04-21  - Monitor LFTs   - RUQ US Pneumobilia with perihepatic ascites.  -INR trending down with feedings, likely due to vit K deficiency from chronic malnutrition    #Chronic A.Fib on Eliquis  - Continue Verapamil PRN and diltiazem CD 120mg daily  - 4/22 hold Eliquis, started on Lovenox   - Cardio recs recs for medication optimization     #Hx Pancreatic Ca   - s/p whipple 8/1/21 (in remission)   - c/w Creon 12K TID before meals   - Evaluated by surgery team on April 7. No further intervention   - Heme/onc recs appreciated   - CT A/P noted above was done to evaluate for pancreatic cancer, contrast was extravasated in the right upper extremity, thus study was noncon. Frequent RUE checks for compartment syndrome  - Ca-19-9 : 42    #Misc  - DVT Prophylaxis: Lovenox, ON HOLD FOR possible paracentesis tomorrow 4/25. Restart after Para.   - Diet: Feeds through NGT, but encourage PO intake   - GI Prophylaxis: Pantoprazole  - Activity: AAT  - Code Status: Full Code  - Disposition: acute   Pending:  Para + further GOC       VIKASH LI 76y Female  MRN#: [id number]   CODE STATUS:________    Hospital Day: 9d    Pt is currently admitted with the primary diagnosis of AHRF    SUBJECTIVE  Overnight events   -No major overnight events  Subjective complaints   -non-verbal, did not appear in acute distress                                            ----------------------------------------------------------  OBJECTIVE  PAST MEDICAL & SURGICAL HISTORY  SOB (shortness of breath)    Pain  knee    Varicose veins of both lower extremities, unspecified whether complicated    Atrial fibrillation, unspecified type  2019 on Eliquis    Jaundice  March 5, 2021    Malignant neoplasm of pancreas, unspecified location of malignancy  Pancreatic Cancer    Hypertension, unspecified type  2019    Pancreatic cancer    Kidney stones    History of surgery  Whipple procedure 8/2/2021 with Dr. Nino at [facility]    Status post phlebectomy  10/2018    History of ovarian cystectomy  left    History of tonsillectomy  1959    Kidney stone  2017                                              -----------------------------------------------------------  ALLERGIES:  Augmentin (Rash)  chocolate (Headache)  digoxin (Hives)  Metoprolol Succinate ER (Hives)  Novocain (Angioedema)  Steroids: Excessive swelling (Flushing; Other (Mild to Mod))  sulfa drugs (Fever)  Xarelto (Hives)                                            ------------------------------------------------------------    HOME MEDICATIONS  Home Medications:  Cardizem  mg/24 hours oral capsule, extended release: 1 cap(s) orally once a day (22 Nov 2021 23:59)  Creon 12,000 units oral delayed release capsule: 2 cap(s) orally 3 times a day (with meals) (03 Dec 2021 10:54)  Eliquis 5 mg oral tablet: 1 tab(s) orally 2 times a day (03 Sep 2021 23:41)                           MEDICATIONS:  STANDING MEDICATIONS  chlorhexidine 2% Cloths 1 Application(s) Topical <User Schedule>  diltiazem    milliGRAM(s) Oral daily  lactulose Syrup 20 Gram(s) Oral every 6 hours  magnesium oxide 400 milliGRAM(s) Oral two times a day with meals  mirtazapine 15 milliGRAM(s) Oral at bedtime  OLANZapine Disintegrating Tablet 2.5 milliGRAM(s) Oral at bedtime  pantoprazole  Injectable 40 milliGRAM(s) IV Push daily  polyethylene glycol 3350 17 Gram(s) Oral daily  senna 2 Tablet(s) Oral at bedtime    PRN MEDICATIONS  acetaminophen     Tablet .. 650 milliGRAM(s) Oral every 6 hours PRN  morphine  - Injectable 1 milliGRAM(s) IV Push every 6 hours PRN  simethicone 80 milliGRAM(s) Chew three times a day PRN  verapamil 40 milliGRAM(s) Oral two times a day PRN                                            ------------------------------------------------------------  VITAL SIGNS: Last 24 Hours  T(C): 36.1 (27 Apr 2022 04:27), Max: 36.7 (26 Apr 2022 11:20)  T(F): 97 (27 Apr 2022 04:27), Max: 98 (26 Apr 2022 11:20)  HR: 104 (27 Apr 2022 04:27) (90 - 113)  BP: 130/72 (27 Apr 2022 04:27) (96/76 - 130/72)  BP(mean): 90 (26 Apr 2022 11:20) (83 - 90)  RR: 20 (27 Apr 2022 04:27) (20 - 20)  SpO2: 98% (27 Apr 2022 04:27) (98% - 100%)      04-26-22 @ 07:01  -  04-27-22 @ 07:00  --------------------------------------------------------  IN: 920 mL / OUT: 100 mL / NET: 820 mL                                             --------------------------------------------------------------  LABS:                        9.9    6.25  )-----------( 153      ( 26 Apr 2022 02:00 )             32.6     04-26    144  |  104  |  43<H>  ----------------------------<  71  4.3   |  27  |  0.7    Ca    8.4<L>      26 Apr 2022 02:00  Mg     2.2     04-26    TPro  5.6<L>  /  Alb  2.7<L>  /  TBili  0.5  /  DBili  x   /  AST  77<H>  /  ALT  87<H>  /  AlkPhos  725<H>  04-26    PT/INR - ( 26 Apr 2022 11:00 )   PT: 17.90 sec;   INR: 1.56 ratio         PTT - ( 26 Apr 2022 02:00 )  PTT:38.3 sec                                                            --------------------------------------------------------------    PHYSICAL EXAM:  GENERAL: AAOx0. Cachectic, laying in bed appearing in no acute distress  HEENT: atraumatic, normocephalic  LUNGS: Clear to auscultation bilaterally  HEART: S1/S2. No heaves or thrills  ABD: Soft, minimal ascites, mainly overlying area of paracentesis  EXT/NEURO: ROm grossly intact  SKIN: RUE swelling + bruising/echymosis, improving                                           --------------------------------------------------------------

## 2022-04-27 NOTE — CHART NOTE - NSCHARTNOTEFT_GEN_A_CORE
Spoke extensively with family members including pt sister, daughter, and HCP. Explained current health and what further work up would include. Family understood and believes that further medical work up would lead to treatment plans that the pt could not endure (i.e. chemotherapy, surgical intervention, etc.). Family continue to want trial of feeding through NGT as pt making mild improvement in strength and mental status. PT FAMILY AGREED TO TRIAL NGT FEEDING AT THIS TIME AND RE-ASSESS SATURDAY FOR GENERAL EFFECTIVENESS OF FEEDING REGIMEN. Family agrees further bloodwork is too invasive, pt to receive blood work only if significant change in health status occurs. Pt to continue to receive medications through NGT and IV means, will re-assess medical management saturday. Family should be notified if NGT or IV is removed and re-assess for need. Pt family agrees that pt would not want to be DNR but are uncertain of DNI status. Pt made DNR with trial of intubation and will reconvene for further DNI recommendations. Spoke extensively with family members including pt sister, daughter, and HCP. Explained current health and what further work up would include. Family understood and believes that further medical work up would lead to treatment plans that the pt could not endure (i.e. chemotherapy, surgical intervention, etc.). Family continue to want trial of feeding through NGT as pt making mild improvement in strength and mental status. Family requesting swallow eval for oral recs. If strict NPO, call family to discuss possibility of palliative feeds. PT FAMILY AGREED TO TRIAL NGT FEEDING AT THIS TIME AND RE-ASSESS SATURDAY FOR GENERAL EFFECTIVENESS OF FEEDING REGIMEN. Family agrees further bloodwork is too invasive, pt to receive blood work only if significant change in health status occurs. Pt to continue to receive medications through NGT and IV means, will re-assess medical management saturday. Family should be notified if NGT or IV is removed and re-assess for need. Pt family agrees that pt would not want to be DNR but are uncertain of DNI status. Pt made DNR with trial of intubation and will reconvene for further DNI recommendations. Spoke extensively with family members including pt sister, daughter, and HCP. Explained current health and what further work up would include. Family understood and believes that further medical work up would lead to treatment plans that the pt could not endure (i.e. chemotherapy, surgical intervention, etc.). Family continue to want trial of feeding through NGT as pt making mild improvement in strength and mental status. Family requesting swallow eval for oral recs. If strict NPO, call family to discuss possibility of palliative feeds. PT FAMILY AGREED TO TRIAL NGT FEEDING AT THIS TIME AND RE-ASSESS SATURDAY FOR GENERAL EFFECTIVENESS OF FEEDING REGIMEN. Family agrees further bloodwork is too invasive, pt to receive blood work only if significant change in health status occurs. Pt to continue to receive medications through NGT and IV means, will re-assess medical management saturday. Family should be notified if NGT or IV is removed and re-assess for need. Pt family agrees that pt would want to be DNR but are uncertain of DNI status. Pt made DNR with trial of intubation and will reconvene for further DNI recommendations.

## 2022-04-27 NOTE — PROGRESS NOTE ADULT - PROBLEM SELECTOR PLAN 1
- not on recent DDT  - discussed today and that - oncology consult not offering and rec focus on symptom/comfort; they stated she would not want chemo/radiation  - ongoing supportive care

## 2022-04-27 NOTE — PROGRESS NOTE ADULT - PROBLEM SELECTOR PLAN 6
continue morphine 1mg IV as written (last dose 4/26~10am)as per primary team and family - has episodic pain that seems multifactorial (incidental-procedural; and generalized) See above for details.  In sum, now DNR with trial of enteral feeds until Saturday; limit amount of labs/xray; have S& S evaluate; get information about hospice  family need time to continue private discussions  revisit prn.    > 46 mins spent

## 2022-04-27 NOTE — PROGRESS NOTE ADULT - SUBJECTIVE AND OBJECTIVE BOX
Over Night Events: events noted, on NC, NGT, Sp paracentesis    PHYSICAL EXAM    ICU Vital Signs Last 24 Hrs  T(C): 36.1 (27 Apr 2022 04:27), Max: 36.7 (26 Apr 2022 11:20)  T(F): 97 (27 Apr 2022 04:27), Max: 98 (26 Apr 2022 11:20)  HR: 104 (27 Apr 2022 04:27) (90 - 113)  BP: 130/72 (27 Apr 2022 04:27) (96/76 - 130/72)  BP(mean): 90 (26 Apr 2022 11:20) (83 - 90)  RR: 20 (27 Apr 2022 04:27) (20 - 20)  SpO2: 98% (27 Apr 2022 04:27) (98% - 100%)      General: chronically ill looking, Cachestic  HEENT: JEFFRY             Lymph Nodes: No cervical LN   Lungs: dec bs both bases  Cardiovascular: COLLIN 2/6  Abdomen: Soft, Positive BS  Extremities: No clubbing   non focal        04-26-22 @ 07:01  -  04-27-22 @ 07:00  --------------------------------------------------------  IN:    Enteral Tube Flush: 200 mL    Jevity 1.2: 720 mL  Total IN: 920 mL    OUT:    Indwelling Catheter - Urethral (mL): 100 mL  Total OUT: 100 mL    Total NET: 820 mL          LABS:                          9.9    6.25  )-----------( 153      ( 26 Apr 2022 02:00 )             32.6                                               04-26    144  |  104  |  43<H>  ----------------------------<  71  4.3   |  27  |  0.7    Ca    8.4<L>      26 Apr 2022 02:00  Mg     2.2     04-26    TPro  5.6<L>  /  Alb  2.7<L>  /  TBili  0.5  /  DBili  x   /  AST  77<H>  /  ALT  87<H>  /  AlkPhos  725<H>  04-26      PT/INR - ( 26 Apr 2022 11:00 )   PT: 17.90 sec;   INR: 1.56 ratio         PTT - ( 26 Apr 2022 02:00 )  PTT:38.3 sec                                                                                     LIVER FUNCTIONS - ( 26 Apr 2022 02:00 )  Alb: 2.7 g/dL / Pro: 5.6 g/dL / ALK PHOS: 725 U/L / ALT: 87 U/L / AST: 77 U/L / GGT: x                                                                                                                                       MEDICATIONS  (STANDING):  chlorhexidine 2% Cloths 1 Application(s) Topical <User Schedule>  diltiazem    milliGRAM(s) Oral daily  lactulose Syrup 20 Gram(s) Oral every 6 hours  magnesium oxide 400 milliGRAM(s) Oral two times a day with meals  mirtazapine 15 milliGRAM(s) Oral at bedtime  OLANZapine Disintegrating Tablet 2.5 milliGRAM(s) Oral at bedtime  pantoprazole  Injectable 40 milliGRAM(s) IV Push daily  polyethylene glycol 3350 17 Gram(s) Oral daily  senna 2 Tablet(s) Oral at bedtime    MEDICATIONS  (PRN):  acetaminophen     Tablet .. 650 milliGRAM(s) Oral every 6 hours PRN Temp greater or equal to 38C (100.4F), Mild Pain (1 - 3)  morphine  - Injectable 1 milliGRAM(s) IV Push every 6 hours PRN Moderate Pain (4 - 6)  simethicone 80 milliGRAM(s) Chew three times a day PRN Gas  verapamil 40 milliGRAM(s) Oral two times a day PRN tachycardia faster tahn 110 beats/ minute

## 2022-04-28 NOTE — PROGRESS NOTE ADULT - PROBLEM SELECTOR PROBLEM 7
Chief Complaint: Weakness, AMS    Interval Events: No events overnight.    Review of Systems:  General: No fevers, chills, weight loss or gain  Skin: No rashes, color changes  Cardiovascular: No chest pain, orthopnea  Respiratory: No shortness of breath, cough  Gastrointestinal: No nausea, abdominal pain  Genitourinary: No incontinence, pain with urination  Musculoskeletal: No pain, swelling, decreased range of motion  Neurological: No headache, weakness  Psychiatric: No depression, anxiety  Endocrine: No weight loss or gain, increased thirst  All other systems are comprehensively negative.    Physical Exam:  Vital Signs Last 24 Hrs  T(C): 36.9 (21 May 2020 07:49), Max: 36.9 (21 May 2020 07:49)  T(F): 98.4 (21 May 2020 07:49), Max: 98.4 (21 May 2020 07:49)  HR: 67 (21 May 2020 07:49) (67 - 70)  BP: 103/58 (21 May 2020 07:49) (103/58 - 137/68)  BP(mean): --  RR: 19 (21 May 2020 07:49) (19 - 19)  SpO2: 98% (21 May 2020 07:49) (94% - 99%)  General: NAD  HEENT: MMM  Neck: No JVD, no carotid bruit  Extremities: No LE edema, no cyanosis  Neuro: Non-focal  Skin: No rash    Labs:    05-21    139  |  102  |  25<H>  ----------------------------<  88  3.9   |  30  |  0.70    Ca    8.9      21 May 2020 07:20  Mg     2.3     05-20    TPro  x   /  Alb  2.2<L>  /  TBili  x   /  DBili  x   /  AST  x   /  ALT  x   /  AlkPhos  x   05-21                        10.4   7.06  )-----------( 255      ( 21 May 2020 07:20 )             33.8 Palliative care by specialist

## 2022-04-28 NOTE — PROGRESS NOTE ADULT - PROBLEM SELECTOR PLAN 7
- will follow  - now DNR only    ______________  Bipin Madrigal MD  Palliative Medicine  [facility]   [facility]  [phone]

## 2022-04-28 NOTE — PROGRESS NOTE ADULT - PROBLEM SELECTOR PLAN 1
- not on recent DDT  - oncology consult not offering and recommend  focus on symptom/comfort; family stated she would not want chemo/radiation  - ongoing supportive care

## 2022-04-28 NOTE — CHART NOTE - NSCHARTNOTEFT_GEN_A_CORE
Patient was observed to be deteriorating.  Patient appeared comfortable.  Writer informed both daughters that patient was not a candidate for Speech and Swallow.

## 2022-04-28 NOTE — PROGRESS NOTE ADULT - ASSESSMENT
IMPRESSION:    Acute Hypoxemic Respiratory Failure on NC  Acute hypercapnic respiratory failure  AMS, toxic metabolic  B/l effusion R>L,  SP right thoracentesis x2 transudative effusion   Large Volume Ascites SP para  HO eating disorder  Pancreatic Cancer advanced s/p whipple 8/21  Afib was on Eliquis    PLAN:    CNS: Avoid CNS depressants.     HEENT: Oral care    PULMONARY:  HOB @ 45 degrees.  Aspiration precautions. Wean HHFNC. Target SPO2 88-94%. Titrate oxygen as tolerated.     CARDIOVASCULAR: avoid overload    GI: GI prophylaxis. fup cyto, NGT    RENAL: Follow up lytes. Correct as needed  HEMATOLOGICAL:  ac    ENDOCRINE:  Follow up FS.      MUSCULOSKELETAL: bedrest    Very poor prognosis    GOC

## 2022-04-28 NOTE — PROGRESS NOTE ADULT - ASSESSMENT
77 YO F with PMHx of HTN, pancreatic CA s/p Whipple 8/2021, A.Fib on Eliquis, pleural effusion was BIBEMS due to worsening SOB    Acute Hypoxemic Respiratory Failure likely due to volume overload  b/l Pleural effusions with ascites  Pancreatic Ca s/p Whipple 8/21  Transaminitis  - now on nasal cannula   - s/p Thoracentesis 4/19 with 750 cc fluid removed, transudative  - repeat CT noted, massive ascites--> IR for therapeutic paracentesis on 4/26  - s/p lasix  - cardiology eval appreciated, diuresis as tolerated  - discussed with Dr. Knight, feeds adjusted, Creon currently on hold as cannot be given through NGT  - resume if patient more awake/able to tolerate PO  - Ammonia noted to be elevated, on lactulose, titrate to 2-3 bm daily, CTH negative   - surgery following  - Extensive conversation with patient's daughter, lian and her  today - continue ngt feeds, resume blood work  - monitor LFT's    BIlateral UE edema, from infitrated IVs and contrast extravasation  -stable; continue to monitor     Chronic A.Fib on Eliquis  - rate control with diltiazem   - continue lovenox    Poor PO intake  Severe malnutrition  History of eating disorder  started marinol 4/20, now off  NGT placed, tolerating feeds, encourage PO intake as well if awake and appropriate to eat as per speech and swallow  Dr Syed following  psychiatry following, added Zyprexa to current regimen  pt's family requested to stop remeron - done  leaning towards PEG placement next week  failed speech and swallow today - family asking for follow up    GOC - DNR  overall grave prognosis    Progress Note Handoff  Pending Consults: none  Pending Tests: labs  Pending Results: labs  Family Discussion: extensive discussion with pt's daughter, Lian and her .  All questions answered.  Not ready for CMO. Wish to continue blood work and possible PEG placement.   Disposition: Home_____/SNF______/Other_____/Unknown at this time__x__  Spent over 35 min reviewing chart and on coordinating patient care during interdisciplinary rounds

## 2022-04-28 NOTE — PROGRESS NOTE ADULT - ASSESSMENT
75 YO F with PMHx of HTN, pancreatic CA s/p Whipple 8/2021, A.Fib on Eliquis, pleural effusion was BIBEMS from [facility] due to worsening SOB; Patient had recent admission from [facility] and had thoracentesis done admitted has been overall clinically declining. Had had NG feeding for several days with family reporting periods of lucidity- but not enough to have confidence that she can engage in complex decision-making    being followed for support with GOC       MEDD (morphine equivalent daily dose): na      See Recs below. d/w Primary and Palliative teams       Please call [phone] with questions or concerns 24/7.   We will continue to follow.

## 2022-04-28 NOTE — PROGRESS NOTE ADULT - SUBJECTIVE AND OBJECTIVE BOX
VIKASH LI 76y Female  MRN#: [id number]   CODE STATUS:________    Hospital Day: 10d    Pt is currently admitted with the primary diagnosis of AHRF    SUBJECTIVE  Overnight events   -No major overnight events  Subjective complaints   -non-verbal, did not appear in acute distress                                            ----------------------------------------------------------  OBJECTIVE  PAST MEDICAL & SURGICAL HISTORY  SOB (shortness of breath)    Pain  knee    Varicose veins of both lower extremities, unspecified whether complicated    Atrial fibrillation, unspecified type  2019 on Eliquis    Jaundice  March 5, 2021    Malignant neoplasm of pancreas, unspecified location of malignancy  Pancreatic Cancer    Hypertension, unspecified type  2019    Pancreatic cancer    Kidney stones    History of surgery  Whipple procedure 8/2/2021 with Dr. Nino at [facility]    Status post phlebectomy  10/2018    History of ovarian cystectomy  left    History of tonsillectomy  1959    Kidney stone  2017                                              -----------------------------------------------------------  ALLERGIES:  Augmentin (Rash)  chocolate (Headache)  digoxin (Hives)  Metoprolol Succinate ER (Hives)  Novocain (Angioedema)  Steroids: Excessive swelling (Flushing; Other (Mild to Mod))  sulfa drugs (Fever)  Xarelto (Hives)                                            ------------------------------------------------------------    HOME MEDICATIONS  Home Medications:  Cardizem  mg/24 hours oral capsule, extended release: 1 cap(s) orally once a day (22 Nov 2021 23:59)  Creon 12,000 units oral delayed release capsule: 2 cap(s) orally 3 times a day (with meals) (03 Dec 2021 10:54)  Eliquis 5 mg oral tablet: 1 tab(s) orally 2 times a day (03 Sep 2021 23:41)                           MEDICATIONS:  STANDING MEDICATIONS  chlorhexidine 4% Liquid 1 Application(s) Topical <User Schedule>  diltiazem    milliGRAM(s) Oral daily  enoxaparin Injectable 40 milliGRAM(s) SubCutaneous every 12 hours  lactulose Syrup 20 Gram(s) Oral every 6 hours  magnesium oxide 400 milliGRAM(s) Oral two times a day with meals  mirtazapine 15 milliGRAM(s) Oral at bedtime  OLANZapine Disintegrating Tablet 2.5 milliGRAM(s) Oral at bedtime  pantoprazole  Injectable 40 milliGRAM(s) IV Push daily  polyethylene glycol 3350 17 Gram(s) Oral daily  senna 2 Tablet(s) Oral at bedtime    PRN MEDICATIONS  acetaminophen     Tablet .. 650 milliGRAM(s) Oral every 6 hours PRN  morphine  - Injectable 1 milliGRAM(s) IV Push every 6 hours PRN  simethicone 80 milliGRAM(s) Chew three times a day PRN  verapamil 40 milliGRAM(s) Oral two times a day PRN                                            ------------------------------------------------------------  VITAL SIGNS: Last 24 Hours  T(C): 36.1 (28 Apr 2022 03:49), Max: 36.6 (27 Apr 2022 11:30)  T(F): 96.9 (28 Apr 2022 03:49), Max: 97.8 (27 Apr 2022 11:30)  HR: 111 (28 Apr 2022 03:49) (86 - 111)  BP: 120/71 (28 Apr 2022 03:49) (100/69 - 120/71)  BP(mean): 78 (27 Apr 2022 11:30) (78 - 88)  RR: 20 (28 Apr 2022 03:49) (20 - 20)  SpO2: 100% (28 Apr 2022 03:49) (100% - 100%)      04-27-22 @ 07:01  -  04-28-22 @ 07:00  --------------------------------------------------------  IN: 1080 mL / OUT: 300 mL / NET: 780 mL                                             --------------------------------------------------------------  LABS:                        10.4   7.56  )-----------( 158      ( 27 Apr 2022 11:00 )             34.4     04-27    145  |  105  |  45<H>  ----------------------------<  73  4.5   |  27  |  0.7    Ca    8.6      27 Apr 2022 11:00  Mg     2.4     04-27    TPro  6.0  /  Alb  3.0<L>  /  TBili  0.6  /  DBili  x   /  AST  76<H>  /  ALT  81<H>  /  AlkPhos  771<H>  04-27    PT/INR - ( 26 Apr 2022 11:00 )   PT: 17.90 sec;   INR: 1.56 ratio                                                                     --------------------------------------------------------------    PHYSICAL EXAM:  GENERAL: AAOx0. Cachectic, laying in bed appearing in no acute distress  HEENT: atraumatic, normocephalic  LUNGS: Clear to auscultation bilaterally  HEART: S1/S2. No heaves or thrills  ABD: Soft, minimal ascites, mainly overlying area of paracentesis  EXT/NEURO: ROm grossly intact  SKIN: RUE swelling + bruising/echymosis, improving                                           --------------------------------------------------------------  75 YO F with PMHx of HTN, pancreatic CA s/p Whipple 8/2021, A.Fib on Eliquis, pleural effusion was BIBEMS due to worsening SOB here for AHRF    #Acute Hypoxemic/hypercapnic Respiratory Failure  #B/l pleural effusion; R>L  #AMS likely toxic metabolic improved   #Ascites   #Pancreatic Ca  - Xray Chest: Interval increase in bilateral effusions and opacities  - BNP 2151, Procalcitonin .08, Ammonia level 33  - Echo (4/8/22): EF 54%, severely enlarged LA, RA. Mod-severe TR  - CTAP after thoracentesis and paracentesis  -BCx-NGF, UCx-NGF  - Palliative rec appreciated: family meeting 4/20 at 11am. Remains fulls code for now   - s/p Thoracentesis 4/19 drained 750ml  -s/p paracentesis 4/26 4.7L removed--> pt tolerating NC from 4/26/22  - Pleural fluid Cytology: reactive mesothelial, histiocytes, acute/chronic infl cells  - Pleural fluid analysis shows transudative fluid  -holding diuresis  -repeat ammonia slightly elevated but unlikely hepatic encephalopathy given chronic lactulose use at this point    #Decrease PO intake   - started marinol 4/20  - NGT placed on 4/21, after discussing with Fam   - Peptamen 1.5 start 125 ml over 30 min, increase to 250 ml  after paracentesis   - DO NOT try to put Creon down the NG tube  - Dr De Luna following  - psychiatry following, added Zyprexa to current regimen    #Transaminitis, likely from hepatic congestion  - TPro  5.6<L>  /  Alb  2.9<L>  /  TBili  0.7  /  DBili  x   /  AST  64<H>  /  ALT  47<H>  /  AlkPhos  550<H>  04-19  - TPro  5.7<L>  /  Alb  2.7<L>  /  TBili  0.5  /  DBili  x   /  AST  141<H>  /  ALT  86<H>  /  AlkPhos  790<H>  04-21  - Monitor LFTs   - RUQ US Pneumobilia with perihepatic ascites.  -INR trending down with feedings, likely due to vit K deficiency from chronic malnutrition    #Chronic A.Fib on Eliquis  - Continue Verapamil PRN and diltiazem CD 120mg daily  - 4/22 hold Eliquis, started on Lovenox   - Cardio recs recs for medication optimization     #Hx Pancreatic Ca   - s/p whipple 8/1/21 (in remission)   - c/w Creon 12K TID before meals   - Evaluated by surgery team on April 7. No further intervention   - Heme/onc recs appreciated   - CT A/P noted above was done to evaluate for pancreatic cancer, contrast was extravasated in the right upper extremity, thus study was noncon. Frequent RUE checks for compartment syndrome  - Ca-19-9 : 42    #Misc  - DVT Prophylaxis: Lovenox  - Diet: Feeds through NGT, but encourage PO intake   - GI Prophylaxis: Pantoprazole  - Activity: AAT  - Code Status: Full Code  - Disposition: acute   Pending:  Further [facility] 4/27 @ 1200   VIKASH LI 76y Female  MRN#: [id number]   CODE STATUS:________    Hospital Day: 10d    Pt is currently admitted with the primary diagnosis of AHRF    SUBJECTIVE  Overnight events   -No major overnight events  Subjective complaints   -non-verbal, did not appear in acute distress                                            ----------------------------------------------------------  OBJECTIVE  PAST MEDICAL & SURGICAL HISTORY  SOB (shortness of breath)    Pain  knee    Varicose veins of both lower extremities, unspecified whether complicated    Atrial fibrillation, unspecified type  2019 on Eliquis    Jaundice  March 5, 2021    Malignant neoplasm of pancreas, unspecified location of malignancy  Pancreatic Cancer    Hypertension, unspecified type  2019    Pancreatic cancer    Kidney stones    History of surgery  Whipple procedure 8/2/2021 with Dr. Nino at [facility]    Status post phlebectomy  10/2018    History of ovarian cystectomy  left    History of tonsillectomy  1959    Kidney stone  2017                                              -----------------------------------------------------------  ALLERGIES:  Augmentin (Rash)  chocolate (Headache)  digoxin (Hives)  Metoprolol Succinate ER (Hives)  Novocain (Angioedema)  Steroids: Excessive swelling (Flushing; Other (Mild to Mod))  sulfa drugs (Fever)  Xarelto (Hives)                                            ------------------------------------------------------------    HOME MEDICATIONS  Home Medications:  Cardizem  mg/24 hours oral capsule, extended release: 1 cap(s) orally once a day (22 Nov 2021 23:59)  Creon 12,000 units oral delayed release capsule: 2 cap(s) orally 3 times a day (with meals) (03 Dec 2021 10:54)  Eliquis 5 mg oral tablet: 1 tab(s) orally 2 times a day (03 Sep 2021 23:41)                           MEDICATIONS:  STANDING MEDICATIONS  chlorhexidine 4% Liquid 1 Application(s) Topical <User Schedule>  diltiazem    milliGRAM(s) Oral daily  enoxaparin Injectable 40 milliGRAM(s) SubCutaneous every 12 hours  lactulose Syrup 20 Gram(s) Oral every 6 hours  magnesium oxide 400 milliGRAM(s) Oral two times a day with meals  mirtazapine 15 milliGRAM(s) Oral at bedtime  OLANZapine Disintegrating Tablet 2.5 milliGRAM(s) Oral at bedtime  pantoprazole  Injectable 40 milliGRAM(s) IV Push daily  polyethylene glycol 3350 17 Gram(s) Oral daily  senna 2 Tablet(s) Oral at bedtime    PRN MEDICATIONS  acetaminophen     Tablet .. 650 milliGRAM(s) Oral every 6 hours PRN  morphine  - Injectable 1 milliGRAM(s) IV Push every 6 hours PRN  simethicone 80 milliGRAM(s) Chew three times a day PRN  verapamil 40 milliGRAM(s) Oral two times a day PRN                                            ------------------------------------------------------------  VITAL SIGNS: Last 24 Hours  T(C): 36.1 (28 Apr 2022 03:49), Max: 36.6 (27 Apr 2022 11:30)  T(F): 96.9 (28 Apr 2022 03:49), Max: 97.8 (27 Apr 2022 11:30)  HR: 111 (28 Apr 2022 03:49) (86 - 111)  BP: 120/71 (28 Apr 2022 03:49) (100/69 - 120/71)  BP(mean): 78 (27 Apr 2022 11:30) (78 - 88)  RR: 20 (28 Apr 2022 03:49) (20 - 20)  SpO2: 100% (28 Apr 2022 03:49) (100% - 100%)      04-27-22 @ 07:01  -  04-28-22 @ 07:00  --------------------------------------------------------  IN: 1080 mL / OUT: 300 mL / NET: 780 mL                                             --------------------------------------------------------------  LABS:                        10.4   7.56  )-----------( 158      ( 27 Apr 2022 11:00 )             34.4     04-27    145  |  105  |  45<H>  ----------------------------<  73  4.5   |  27  |  0.7    Ca    8.6      27 Apr 2022 11:00  Mg     2.4     04-27    TPro  6.0  /  Alb  3.0<L>  /  TBili  0.6  /  DBili  x   /  AST  76<H>  /  ALT  81<H>  /  AlkPhos  771<H>  04-27    PT/INR - ( 26 Apr 2022 11:00 )   PT: 17.90 sec;   INR: 1.56 ratio                                                                     --------------------------------------------------------------    PHYSICAL EXAM:  GENERAL: Awake, not alert or oriented. Cachectic, laying in bed appearing in no acute distress  HEENT: atraumatic, normocephalic  LUNGS: Clear to auscultation bilaterally  HEART: S1/S2. No heaves or thrills  ABD: Soft, minimal ascites, mainly overlying area of paracentesis  EXT/NEURO: ROm grossly intact  SKIN: RUE swelling + bruising/echymosis, improving                                           --------------------------------------------------------------

## 2022-04-28 NOTE — PROGRESS NOTE ADULT - SUBJECTIVE AND OBJECTIVE BOX
LI, VIKASH  76y Female    CHIEF COMPLAINT:    Patient is a 76y old female who presents with a chief complaint of Shortness of breath (26 Apr 2022 07:36)    INTERVAL HPI/OVERNIGHT EVENTS:    Patient seen and examined. No acute events overnight. Overall unchanged  patient lethargic and does not follow commands    ROS: unable to assess due to mental status     Vital Signs:  Vital Signs Last 24 Hrs  T(C): 36.3 (28 Apr 2022 12:20), Max: 36.3 (27 Apr 2022 16:24)  T(F): 97.3 (28 Apr 2022 12:20), Max: 97.3 (27 Apr 2022 16:24)  HR: 98 (28 Apr 2022 12:20) (86 - 111)  BP: 110/73 (28 Apr 2022 12:20) (100/69 - 131/80)  BP(mean): 87 (28 Apr 2022 12:20) (87 - 98)  RR: 20 (28 Apr 2022 12:20) (20 - 20)  SpO2: 100% (28 Apr 2022 12:20) (100% - 100%)    PHYSICAL EXAM:    GENERAL:  NAD cachectic, chronically ill appearing   SKIN: No rashes or lesions  HEENT: Atraumatic. Normocephalic.   NECK: Supple, No JVD.   PULMONARY: coarse breath sounds  B/L. No wheezing. No rales  CVS: Normal S1, S2. Rate and Rhythm are regular.   ABDOMEN/GI: Soft, Nontender, distended   MSK:  No edema B/L LE. b/l finger cyanosis, no clubbing   NEUROLOGIC: does not follow commands   PSYCH: lethargic, arousable     Consultant(s) Notes Reviewed:  [x ] YES  [ ] NO  Care Discussed with Consultants/Other Providers [ x] YES  [ ] NO    LABS:                        9.9    6.25  )-----------( 153      ( 26 Apr 2022 02:00 )             32.6     144  |  104  |  43<H>  ----------------------------<  71  4.3   |  27  |  0.7    Ca    8.4<L>      26 Apr 2022 02:00  Mg     2.2     04-26    TPro  5.6<L>  /  Alb  2.7<L>  /  TBili  0.5  /  DBili  x   /  AST  77<H>  /  ALT  87<H>  /  AlkPhos  725<H>  04-26    PT/INR - ( 26 Apr 2022 11:00 )   PT: 17.90 sec;   INR: 1.56 ratio      PTT - ( 26 Apr 2022 02:00 )  PTT:38.3 sec    RADIOLOGY & ADDITIONAL TESTS:  Imaging or report Personally Reviewed:  [x] YES  [ ] NO  EKG reviewed: [x] YES  [ ] NO      Medications:  MEDICATIONS  (STANDING):  chlorhexidine 4% Liquid 1 Application(s) Topical <User Schedule>  diltiazem    milliGRAM(s) Oral daily  enoxaparin Injectable 40 milliGRAM(s) SubCutaneous every 12 hours  lactulose Syrup 20 Gram(s) Oral every 12 hours  magnesium oxide 400 milliGRAM(s) Oral two times a day with meals  mirtazapine 15 milliGRAM(s) Oral at bedtime  OLANZapine Disintegrating Tablet 2.5 milliGRAM(s) Oral at bedtime  pantoprazole  Injectable 40 milliGRAM(s) IV Push daily  polyethylene glycol 3350 17 Gram(s) Oral daily  senna 2 Tablet(s) Oral at bedtime    MEDICATIONS  (PRN):  acetaminophen     Tablet .. 650 milliGRAM(s) Oral every 6 hours PRN Temp greater or equal to 38C (100.4F), Mild Pain (1 - 3)  morphine  - Injectable 1 milliGRAM(s) IV Push every 6 hours PRN Moderate Pain (4 - 6)  simethicone 80 milliGRAM(s) Chew three times a day PRN Gas  verapamil 40 milliGRAM(s) Oral two times a day PRN tachycardia faster tahn 110 beats/ minute

## 2022-04-28 NOTE — PROGRESS NOTE ADULT - PROBLEM SELECTOR PLAN 6
See above for details.  In sum, now DNR with trial of enteral feeds until Saturday; limit amount of labs/xray; have S& S evaluate; get information about hospice  family need time to continue private discussions  revisit prn.

## 2022-04-28 NOTE — PROGRESS NOTE ADULT - SUBJECTIVE AND OBJECTIVE BOX
Over Night Events: events noted, on NC palliative reviewed, afebrile    PHYSICAL EXAM    ICU Vital Signs Last 24 Hrs  T(C): 36.1 (28 Apr 2022 03:49), Max: 36.6 (27 Apr 2022 11:30)  T(F): 96.9 (28 Apr 2022 03:49), Max: 97.8 (27 Apr 2022 11:30)  HR: 111 (28 Apr 2022 03:49) (86 - 111)  BP: 120/71 (28 Apr 2022 03:49) (100/69 - 120/71)  BP(mean): 78 (27 Apr 2022 11:30) (78 - 88)  RR: 20 (28 Apr 2022 03:49) (20 - 20)  SpO2: 100% (28 Apr 2022 03:49) (100% - 100%)      General: ill looking, cachectic  HEENT: JEFFRY             Lungs: dec bs both bases  Cardiovascular: COLLIN 2.6  Abdomen: Soft, Positive BS  Extremities: No clubbing         04-26-22 @ 07:01  -  04-27-22 @ 07:00  --------------------------------------------------------  IN:    Enteral Tube Flush: 200 mL    Jevity 1.2: 720 mL  Total IN: 920 mL    OUT:    Indwelling Catheter - Urethral (mL): 100 mL  Total OUT: 100 mL    Total NET: 820 mL      04-27-22 @ 07:01  -  04-28-22 @ 06:32  --------------------------------------------------------  IN:    Jevity 1.2: 1080 mL  Total IN: 1080 mL    OUT:    Indwelling Catheter - Urethral (mL): 300 mL  Total OUT: 300 mL    Total NET: 780 mL          LABS:                          10.4   7.56  )-----------( 158      ( 27 Apr 2022 11:00 )             34.4                                               04-27    145  |  105  |  45<H>  ----------------------------<  73  4.5   |  27  |  0.7    Ca    8.6      27 Apr 2022 11:00  Mg     2.4     04-27    TPro  6.0  /  Alb  3.0<L>  /  TBili  0.6  /  DBili  x   /  AST  76<H>  /  ALT  81<H>  /  AlkPhos  771<H>  04-27      PT/INR - ( 26 Apr 2022 11:00 )   PT: 17.90 sec;   INR: 1.56 ratio                                                                                              LIVER FUNCTIONS - ( 27 Apr 2022 11:00 )  Alb: 3.0 g/dL / Pro: 6.0 g/dL / ALK PHOS: 771 U/L / ALT: 81 U/L / AST: 76 U/L / GGT: x                                                                                                                                       MEDICATIONS  (STANDING):  chlorhexidine 4% Liquid 1 Application(s) Topical <User Schedule>  diltiazem    milliGRAM(s) Oral daily  enoxaparin Injectable 40 milliGRAM(s) SubCutaneous every 12 hours  lactulose Syrup 20 Gram(s) Oral every 6 hours  magnesium oxide 400 milliGRAM(s) Oral two times a day with meals  mirtazapine 15 milliGRAM(s) Oral at bedtime  OLANZapine Disintegrating Tablet 2.5 milliGRAM(s) Oral at bedtime  pantoprazole  Injectable 40 milliGRAM(s) IV Push daily  polyethylene glycol 3350 17 Gram(s) Oral daily  senna 2 Tablet(s) Oral at bedtime    MEDICATIONS  (PRN):  acetaminophen     Tablet .. 650 milliGRAM(s) Oral every 6 hours PRN Temp greater or equal to 38C (100.4F), Mild Pain (1 - 3)  morphine  - Injectable 1 milliGRAM(s) IV Push every 6 hours PRN Moderate Pain (4 - 6)  simethicone 80 milliGRAM(s) Chew three times a day PRN Gas  verapamil 40 milliGRAM(s) Oral two times a day PRN tachycardia faster tahn 110 beats/ minute      Xrays:                                                                                     ECHO

## 2022-04-28 NOTE — PROGRESS NOTE ADULT - SUBJECTIVE AND OBJECTIVE BOX
VIKASH LI          MRN-[id number]              HPI:  75 YO F with PMHx of HTN, pancreatic CA s/p Whipple 8/2021, A.Fib on Eliquis, pleural effusion was BIBEMS due to worsening SOB that started earlier on the morning of presentation  As per EMS, patient was saturating in the 70s on room air and was placed on NRB w/ sats improving to high 80s. Patient reports that she lives with her son and started noticing shortness of breath.   Patient had recent admission from [facility] and had thoracentesis done.  Patient denies fevers, chills, headache, chest pain, palpitations, constipation, melena, hematochezia, dysuria, urinary frequency or urgency, numbness, tingling, recent travel or any known sick contact.   In the ED patient was placed on non rebreather with SpO2 of 94%. .  X-ray chest showed Interval increase in bilateral effusions and opacities. (18 Apr 2022 11:47)    ROS:	    4/21: Unable to full attain due to:  patient is lethargic and not able to sustain interaction; after encounter Maddie Silva at bedside with her  - reinforced offer to have family meeting  4/25: interim: at [facility] noted with subsequent vascular consult for R arm and disimpaction; no family at bedside; patient non-responsive; hypotensive; now has NG tube  4/26: patient declining, did not appear in pain during my evaluation  4/27: patient had episodes of rest with agitation - soothed with non-pharmacological intervention; not making meaningful verbal discussion   4/28: patient not agitated, doesn't appear in pain                    Dyspnea (Salima 0-10): 0                       N/V (Y/N): No                             Secretions (Y/N) : No                                          Agitation(Y/N): No                              Pain (Y/N): No (PainAD 0)                                -Provocation/Palliation: N/A  -Quality/Quantity: N/A  -Radiating: N/A  -Severity: No pain  -Timing/Frequency: N/A  -Impact on ADLs: N/A    General:   see above  HEENT:   no nonverbal indications    Neck:   no nonverbal indications  CVS:   no nonverbal indications  Resp:   no nonverbal indications  GI:   no nonverbal indications  :   no nonverbal indications  Musc:   no nonverbal indications  Neuro:   no nonverbal indications  Psych:  no nonverbal indications  Skin:   no nonverbal indications  Lymph:   no nonverbal indications    Last BM:       Allergies    Augmentin (Rash)  chocolate (Headache)  digoxin (Hives)  Metoprolol Succinate ER (Hives)  Novocain (Angioedema)  Steroids: Excessive swelling (Flushing; Other (Mild to Mod))  sulfa drugs (Fever)  Xarelto (Hives)    Intolerances      Opiate Naive (Y/N): Y  -iStop reviewed (Y/N): Y   Ref#:     This report was requested by: Yuki Nolan | Reference #: [id number]             Labs:	  reviewed                      LABS:                          10.4   7.56  )-----------( 158      ( 27 Apr 2022 11:00 )             34.4     04-27    145  |  105  |  45<H>  ----------------------------<  73  4.5   |  27  |  0.7    Ca    8.6      27 Apr 2022 11:00  Mg     2.4     04-27          Radiology:	         < from: Xray Chest 1 View- PORTABLE-Urgent (Xray Chest 1 View- PORTABLE-Urgent .) (04.19.22 @ 15:55) >  ACC: [id number] EXAM:  XR CHEST PORTABLE URGENT 1V                          < from: Xray Chest 1 View- PORTABLE-Urgent (Xray Chest 1 View- PORTABLE-Urgent .) (04.26.22 @ 17:02) >  Impression:    Cardiomegaly with bilateral effusions. Support devices as described.    Follow-up as needed.    --- End of Report ---            SERA GANT MD; Attending Interventional Radiologist  This document has been electronically signed. Apr 27 2022  7:50AM    < end of copied text >  PROCEDURE DATE:  04/19/2022          INTERPRETATION:  Clinical History / Reason for exam: Follow-up.    Comparison : Chest radiograph April 18, 2022.    Technique/Positioning: Adequate.    Findings:    Support devices: Right-sided Port-A-Cath with its tip overlying the SVC.    Cardiac/mediastinum/hilum: Cardiomegaly, unchanged.    Lung parenchyma/Pleura: Improved right lung opacity and stable left lung   opacity. No pneumothorax is seen.    Skeleton/soft tissues: Stable.               Impression:    Cardiomegaly, unchanged.    Improved right lung opacity and stable left lung opacity.    Right-sided Port-A-Cath.    --- End of Report ---            GRUPO MALDONADO MD; Attending Radiologist  This document has been electronically signed. Apr 19 2022  5:04PM    < end of copied text >          EKG:	  < from: 12 Lead ECG (04.05.22 @ 17:56) >    Ventricular Rate 91 BPM    Atrial Rate 37 BPM    QRS Duration 82 ms    Q-T Interval 360 ms    QTC Calculation(Bazett) 442 ms    R Axis 117 degrees    T Axis -62 degrees    Diagnosis Line Atrial fibrillation  Left posterior fascicular block  T wave abnormality, consider lateral ischemia  Abnormal ECG    Confirmed by AMADEO CUNHA [facility] ([id number]) on 4/6/2022 10:08:26 AM    < end of copied text >    < from: 12 Lead ECG (04.18.22 @ 10:08) >    Ventricular Rate 117 BPM    Atrial Rate 115 BPM    QRS Duration 82 ms    Q-T Interval 356 ms    QTC Calculation(Bazett) 496 ms    R Axis 79 degrees    T Axis 219 degrees    Diagnosis Line Atrial fibrillation with rapid ventricular response  Low voltage QRS  ST & T wave abnormality, consider lateral ischemia  Abnormal ECG    Confirmed by Torri Collins MD ([id number]) on 4/18/2022 4:40:35 PM    < end of copied text >      Imaging Personally Reviewed:  [ ] xYES  [ ] NO    Consultant(s) Notes Reviewed:  [x ] YES  [ ] NO  Care Discussed with Consultants/Other Providers [x ] YES  [ ] NO    PEx:	  Vital Signs Last 24 Hrs  T(C): 36.3 (28 Apr 2022 12:20), Max: 36.3 (27 Apr 2022 16:24)  T(F): 97.3 (28 Apr 2022 12:20), Max: 97.3 (27 Apr 2022 16:24)  HR: 98 (28 Apr 2022 12:20) (86 - 111)  BP: 110/73 (28 Apr 2022 12:20) (100/69 - 131/80)  BP(mean): 87 (28 Apr 2022 12:20) (87 - 98)  RR: 20 (28 Apr 2022 12:20) (20 - 20)  SpO2: 100% (28 Apr 2022 12:20) (100% - 100%)    General:  NAD; frail + write retraints; + NG   Eyes: clear conjunctiva  ENMT: no ulcers externally   Resp: Unlabored Non tachypneic nc O2  Neuro: slight moan when stimulated then returned to rest;   Psych: periods of agitation - see above   Skin: nonjaundiced; pale/mottled - and per nursing    Preadmit Karnofsky:  %           Current Karnofsky:  20   %  http://[email or web address]   [email or web address]  Cachexia (Y/N): Y  BMI: BMI (kg/m2): 12.2 (04-18-22 @ 08:41)      Medications:	      MEDICATIONS  (STANDING):  chlorhexidine 4% Liquid 1 Application(s) Topical <User Schedule>  diltiazem    milliGRAM(s) Oral daily  enoxaparin Injectable 40 milliGRAM(s) SubCutaneous every 12 hours  lactulose Syrup 20 Gram(s) Oral every 12 hours  magnesium oxide 400 milliGRAM(s) Oral two times a day with meals  OLANZapine Disintegrating Tablet 2.5 milliGRAM(s) Oral at bedtime  pantoprazole  Injectable 40 milliGRAM(s) IV Push daily  polyethylene glycol 3350 17 Gram(s) Oral daily  senna 2 Tablet(s) Oral at bedtime    MEDICATIONS  (PRN):  acetaminophen     Tablet .. 650 milliGRAM(s) Oral every 6 hours PRN Temp greater or equal to 38C (100.4F), Mild Pain (1 - 3)  morphine  - Injectable 1 milliGRAM(s) IV Push every 6 hours PRN Moderate Pain (4 - 6)  simethicone 80 milliGRAM(s) Chew three times a day PRN Gas  verapamil 40 milliGRAM(s) Oral two times a day PRN tachycardia faster tahn 110 beats/ minute    Advanced Directives:	     Full Code      Decision maker: The patient is unable to participate in complex medical decision making conversations    Legal surrogate: daughters    GOALS OF CARE DISCUSSION	       Palliative care info/counseling provided	           Documentation of GOC/Advanced Care Planning see prior       PSYCHOSOCIAL-SPIRITUAL ASSESSMENT:            See Palliative Care SW/ documentation        	    REFERRALS       Palliative Med        Unit SW/Case Mgmt

## 2022-04-28 NOTE — PROGRESS NOTE ADULT - ASSESSMENT
75 YO F with PMHx of HTN, pancreatic CA s/p Whipple 8/2021, A.Fib on Eliquis, pleural effusion was BIBEMS due to worsening SOB here for AHRF    Per family, pt currently partial comfort, no further aggressive medical management. Pt to receive NGT feeds and re-evaluated 4/30/22 for further assessment and possible full CMO. See GOC 4/27/22    #Acute Hypoxemic/hypercapnic Respiratory Failure  #B/l pleural effusion; R>L  #AMS likely toxic metabolic improved   #Ascites   #Pancreatic Ca  - Xray Chest: Interval increase in bilateral effusions and opacities  - BNP 2151, Procalcitonin .08, Ammonia level 33  - Echo (4/8/22): EF 54%, severely enlarged LA, RA. Mod-severe TR  -BCx-NGF, UCx-NGF  - Palliative rec appreciated: family meeting 4/20 at 11am. Remains fulls code for now--> DNR as of 4/27/22  - s/p Thoracentesis 4/19 drained 750ml  -s/p paracentesis 4/26 4.7L removed--> pt tolerating NC from 4/26/22  - Pleural fluid Cytology: reactive mesothelial, histiocytes, acute/chronic infl cells  - Pleural fluid analysis shows transudative fluid  -holding diuresis  -repeat ammonia slightly elevated but unlikely hepatic encephalopathy given chronic lactulose use at this point    #Decrease PO intake   - started marinol 4/20  - NGT placed on 4/21, after discussing with Fam   - Peptamen 1.5 start 125 ml over 30 min, increase to 250 ml  after paracentesis   - DO NOT try to put Creon down the NG tube  - Dr De Luna following  - psychiatry following, added Zyprexa to current regimen    #Transaminitis, likely from hepatic congestion  - TPro  5.6<L>  /  Alb  2.9<L>  /  TBili  0.7  /  DBili  x   /  AST  64<H>  /  ALT  47<H>  /  AlkPhos  550<H>  04-19  - TPro  5.7<L>  /  Alb  2.7<L>  /  TBili  0.5  /  DBili  x   /  AST  141<H>  /  ALT  86<H>  /  AlkPhos  790<H>  04-21  - Monitor LFTs   - RUQ US Pneumobilia with perihepatic ascites.  -INR trending down with feedings, likely due to vit K deficiency from chronic malnutrition    #Chronic A.Fib on Eliquis  - Continue Verapamil PRN and diltiazem CD 120mg daily  - 4/22 hold Eliquis, started on Lovenox   - Cardio recs recs for medication optimization     #Hx Pancreatic Ca   - s/p whipple 8/1/21 (in remission)   - c/w Creon 12K TID before meals   - Evaluated by surgery team on April 7. No further intervention   - Heme/onc recs appreciated   - CT A/P noted above was done to evaluate for pancreatic cancer, contrast was extravasated in the right upper extremity, thus study was noncon. Frequent RUE checks for compartment syndrome  - Ca-19-9 : 42    #Misc  - DVT Prophylaxis: Lovenox  - Diet: Feeds through NGT, SLP eval  - GI Prophylaxis: Pantoprazole  - Activity: AAT  - Code Status: DNR, trial of intubation  - Disposition: acute   Pending:  strength/mental status assessment 4/30 for further GOC directive

## 2022-04-29 NOTE — PROGRESS NOTE ADULT - ASSESSMENT
77 YO F with PMHx of HTN, pancreatic CA s/p Whipple 8/2021, A.Fib on Eliquis, pleural effusion was BIBEMS from [facility]hab due to worsening SOB; Patient had recent admission from [facility] and had thoracentesis done admitted has been overall clinically declining. Had had NG feeding for several days with family reporting periods of lucidity- but not enough to have confidence that she can engage in complex decision-making    being followed for support with GOC       MEDD (morphine equivalent daily dose): na      See Recs below. d/w Primary and Palliative teams       Please call [phone] with questions or concerns 24/7.   We will continue to follow.

## 2022-04-29 NOTE — PROGRESS NOTE ADULT - SUBJECTIVE AND OBJECTIVE BOX
VIKASH LI 76y Female  MRN#: [id number]   CODE STATUS:________    Hospital Day: 11d    Pt is currently admitted with the primary diagnosis of AHRF    SUBJECTIVE  Overnight events   -No major overnight events  Subjective complaints   -non-verbal, did not appear in acute distress                                            ----------------------------------------------------------  OBJECTIVE  PAST MEDICAL & SURGICAL HISTORY  SOB (shortness of breath)    Pain  knee    Varicose veins of both lower extremities, unspecified whether complicated    Atrial fibrillation, unspecified type  2019 on Eliquis    Jaundice  March 5, 2021    Malignant neoplasm of pancreas, unspecified location of malignancy  Pancreatic Cancer    Hypertension, unspecified type  2019    Pancreatic cancer    Kidney stones    History of surgery  Whipple procedure 8/2/2021 with Dr. Nino at [facility]    Status post phlebectomy  10/2018    History of ovarian cystectomy  left    History of tonsillectomy  1959    Kidney stone  2017                                              -----------------------------------------------------------  ALLERGIES:  Augmentin (Rash)  chocolate (Headache)  digoxin (Hives)  Metoprolol Succinate ER (Hives)  Novocain (Angioedema)  Steroids: Excessive swelling (Flushing; Other (Mild to Mod))  sulfa drugs (Fever)  Xarelto (Hives)                                            ------------------------------------------------------------    HOME MEDICATIONS  Home Medications:  Cardizem  mg/24 hours oral capsule, extended release: 1 cap(s) orally once a day (22 Nov 2021 23:59)  Creon 12,000 units oral delayed release capsule: 2 cap(s) orally 3 times a day (with meals) (03 Dec 2021 10:54)  Eliquis 5 mg oral tablet: 1 tab(s) orally 2 times a day (03 Sep 2021 23:41)                           MEDICATIONS:  STANDING MEDICATIONS  chlorhexidine 4% Liquid 1 Application(s) Topical <User Schedule>  diltiazem    milliGRAM(s) Oral daily  enoxaparin Injectable 40 milliGRAM(s) SubCutaneous every 12 hours  lactulose Syrup 20 Gram(s) Oral every 12 hours  magnesium oxide 400 milliGRAM(s) Oral two times a day with meals  OLANZapine Disintegrating Tablet 2.5 milliGRAM(s) Oral at bedtime  pantoprazole  Injectable 40 milliGRAM(s) IV Push daily  polyethylene glycol 3350 17 Gram(s) Oral daily  senna 2 Tablet(s) Oral at bedtime    PRN MEDICATIONS  acetaminophen     Tablet .. 650 milliGRAM(s) Oral every 6 hours PRN  morphine  - Injectable 1 milliGRAM(s) IV Push every 6 hours PRN  simethicone 80 milliGRAM(s) Chew three times a day PRN  verapamil 40 milliGRAM(s) Oral two times a day PRN                                            ------------------------------------------------------------  VITAL SIGNS: Last 24 Hours  T(C): 37 (29 Apr 2022 04:30), Max: 37 (29 Apr 2022 04:30)  T(F): 98.6 (29 Apr 2022 04:30), Max: 98.6 (29 Apr 2022 04:30)  HR: 103 (29 Apr 2022 04:30) (97 - 107)  BP: 106/61 (29 Apr 2022 04:30) (106/61 - 131/80)  BP(mean): 87 (28 Apr 2022 12:20) (87 - 98)  RR: 20 (29 Apr 2022 04:30) (18 - 20)  SpO2: 100% (29 Apr 2022 04:30) (100% - 100%)      04-27-22 @ 07:01  -  04-28-22 @ 07:00  --------------------------------------------------------  IN: 1080 mL / OUT: 300 mL / NET: 780 mL    04-28-22 @ 07:01  -  04-29-22 @ 06:33  --------------------------------------------------------  IN: 460 mL / OUT: 750 mL / NET: -290 mL                                             --------------------------------------------------------------  LABS:                        11.2   6.58  )-----------( 140      ( 29 Apr 2022 01:50 )             35.4     04-29    146  |  107  |  45<H>  ----------------------------<  79  3.8   |  28  |  0.5<L>    Ca    8.5      29 Apr 2022 01:50  Mg     2.3     04-29    TPro  5.6<L>  /  Alb  2.8<L>  /  TBili  0.5  /  DBili  x   /  AST  129<H>  /  ALT  108<H>  /  AlkPhos  941<H>  04-29                                                                --------------------------------------------------------------    PHYSICAL EXAM:  GENERAL: Awake, not alert or oriented. Cachectic, laying in bed appearing in no acute distress  HEENT: atraumatic, normocephalic  LUNGS: Clear to auscultation bilaterally  HEART: S1/S2. No heaves or thrills  ABD: Soft, minimal ascites, mainly overlying area of paracentesis  EXT/NEURO: ROm grossly intact  SKIN: RUE swelling + bruising/echymosis, improving                                           --------------------------------------------------------------  75 YO F with PMHx of HTN, pancreatic CA s/p Whipple 8/2021, A.Fib on Eliquis, pleural effusion was BIBEMS due to worsening SOB here for AHRF    Per family, pt currently partial comfort, no further aggressive medical management. Pt to receive NGT feeds and re-evaluated 4/30/22 for further assessment and possible full CMO. See GOC 4/27/22    #Acute Hypoxemic/hypercapnic Respiratory Failure  #B/l pleural effusion; R>L  #AMS likely toxic metabolic improved   #Ascites   #Pancreatic Ca  - Xray Chest: Interval increase in bilateral effusions and opacities  - BNP 2151, Procalcitonin .08, Ammonia level 33  - Echo (4/8/22): EF 54%, severely enlarged LA, RA. Mod-severe TR  -BCx-NGF, UCx-NGF  - Palliative rec appreciated: family meeting 4/20 at 11am. Remains fulls code for now--> DNR as of 4/27/22  - s/p Thoracentesis 4/19 drained 750ml  -s/p paracentesis 4/26 4.7L removed--> pt tolerating NC from 4/26/22  - Pleural fluid Cytology: reactive mesothelial, histiocytes, acute/chronic infl cells  - Pleural fluid analysis shows transudative fluid  -holding diuresis  -repeat ammonia slightly elevated but unlikely hepatic encephalopathy given chronic lactulose use at this point    #Decrease PO intake   - started marinol 4/20  - NGT placed on 4/21, after discussing with Fam   - Peptamen 1.5 start 125 ml over 30 min, increase to 250 ml  after paracentesis   - DO NOT try to put Creon down the NG tube  - Dr De Luna following  - psychiatry following, added Zyprexa to current regimen    #Transaminitis, likely from hepatic congestion  - TPro  5.6<L>  /  Alb  2.9<L>  /  TBili  0.7  /  DBili  x   /  AST  64<H>  /  ALT  47<H>  /  AlkPhos  550<H>  04-19  - TPro  5.7<L>  /  Alb  2.7<L>  /  TBili  0.5  /  DBili  x   /  AST  141<H>  /  ALT  86<H>  /  AlkPhos  790<H>  04-21  - Monitor LFTs   - RUQ US Pneumobilia with perihepatic ascites.  -INR trending down with feedings, likely due to vit K deficiency from chronic malnutrition    #Chronic A.Fib on Eliquis  - Continue Verapamil PRN and diltiazem CD 120mg daily  - 4/22 hold Eliquis, started on Lovenox   - Cardio recs recs for medication optimization     #Hx Pancreatic Ca   - s/p whipple 8/1/21 (in remission)   - c/w Creon 12K TID before meals   - Evaluated by surgery team on April 7. No further intervention   - Heme/onc recs appreciated   - CT A/P noted above was done to evaluate for pancreatic cancer, contrast was extravasated in the right upper extremity, thus study was noncon. Frequent RUE checks for compartment syndrome  - Ca-19-9 : 42    #Misc  - DVT Prophylaxis: Lovenox  - Diet: Feeds through NGT, SLP eval  - GI Prophylaxis: Pantoprazole  - Activity: AAT  - Code Status: DNR, trial of intubation  - Disposition: acute   Pending:  strength/mental status assessment 4/30 for further GOC directive   VIKASH LI 76y Female  MRN#: [id number]   CODE STATUS:________    Hospital Day: 11d    Pt is currently admitted with the primary diagnosis of AHRF    SUBJECTIVE  Overnight events   -No major overnight events  Subjective complaints   -non-verbal, did not appear in acute distress                                            ----------------------------------------------------------  OBJECTIVE  PAST MEDICAL & SURGICAL HISTORY  SOB (shortness of breath)    Pain  knee    Varicose veins of both lower extremities, unspecified whether complicated    Atrial fibrillation, unspecified type  2019 on Eliquis    Jaundice  March 5, 2021    Malignant neoplasm of pancreas, unspecified location of malignancy  Pancreatic Cancer    Hypertension, unspecified type  2019    Pancreatic cancer    Kidney stones    History of surgery  Whipple procedure 8/2/2021 with Dr. Nino at [facility]    Status post phlebectomy  10/2018    History of ovarian cystectomy  left    History of tonsillectomy  1959    Kidney stone  2017                                              -----------------------------------------------------------  ALLERGIES:  Augmentin (Rash)  chocolate (Headache)  digoxin (Hives)  Metoprolol Succinate ER (Hives)  Novocain (Angioedema)  Steroids: Excessive swelling (Flushing; Other (Mild to Mod))  sulfa drugs (Fever)  Xarelto (Hives)                                            ------------------------------------------------------------    HOME MEDICATIONS  Home Medications:  Cardizem  mg/24 hours oral capsule, extended release: 1 cap(s) orally once a day (22 Nov 2021 23:59)  Creon 12,000 units oral delayed release capsule: 2 cap(s) orally 3 times a day (with meals) (03 Dec 2021 10:54)  Eliquis 5 mg oral tablet: 1 tab(s) orally 2 times a day (03 Sep 2021 23:41)                           MEDICATIONS:  STANDING MEDICATIONS  chlorhexidine 4% Liquid 1 Application(s) Topical <User Schedule>  diltiazem    milliGRAM(s) Oral daily  enoxaparin Injectable 40 milliGRAM(s) SubCutaneous every 12 hours  lactulose Syrup 20 Gram(s) Oral every 12 hours  magnesium oxide 400 milliGRAM(s) Oral two times a day with meals  OLANZapine Disintegrating Tablet 2.5 milliGRAM(s) Oral at bedtime  pantoprazole  Injectable 40 milliGRAM(s) IV Push daily  polyethylene glycol 3350 17 Gram(s) Oral daily  senna 2 Tablet(s) Oral at bedtime    PRN MEDICATIONS  acetaminophen     Tablet .. 650 milliGRAM(s) Oral every 6 hours PRN  morphine  - Injectable 1 milliGRAM(s) IV Push every 6 hours PRN  simethicone 80 milliGRAM(s) Chew three times a day PRN  verapamil 40 milliGRAM(s) Oral two times a day PRN                                            ------------------------------------------------------------  VITAL SIGNS: Last 24 Hours  T(C): 37 (29 Apr 2022 04:30), Max: 37 (29 Apr 2022 04:30)  T(F): 98.6 (29 Apr 2022 04:30), Max: 98.6 (29 Apr 2022 04:30)  HR: 103 (29 Apr 2022 04:30) (97 - 107)  BP: 106/61 (29 Apr 2022 04:30) (106/61 - 131/80)  BP(mean): 87 (28 Apr 2022 12:20) (87 - 98)  RR: 20 (29 Apr 2022 04:30) (18 - 20)  SpO2: 100% (29 Apr 2022 04:30) (100% - 100%)      04-27-22 @ 07:01  -  04-28-22 @ 07:00  --------------------------------------------------------  IN: 1080 mL / OUT: 300 mL / NET: 780 mL    04-28-22 @ 07:01  -  04-29-22 @ 06:33  --------------------------------------------------------  IN: 460 mL / OUT: 750 mL / NET: -290 mL                                             --------------------------------------------------------------  LABS:                        11.2   6.58  )-----------( 140      ( 29 Apr 2022 01:50 )             35.4     04-29    146  |  107  |  45<H>  ----------------------------<  79  3.8   |  28  |  0.5<L>    Ca    8.5      29 Apr 2022 01:50  Mg     2.3     04-29    TPro  5.6<L>  /  Alb  2.8<L>  /  TBili  0.5  /  DBili  x   /  AST  129<H>  /  ALT  108<H>  /  AlkPhos  941<H>  04-29                                                                --------------------------------------------------------------    PHYSICAL EXAM:  GENERAL: Awake, not alert or oriented. Cachectic, laying in bed appearing in no acute distress  HEENT: atraumatic, normocephalic  LUNGS: Clear to auscultation bilaterally  HEART: S1/S2. No heaves or thrills  ABD: Soft, building ascites, non-tender  EXT/NEURO: ROM grossly intact  SKIN: RUE swelling + bruising/echymosis, improving                                           --------------------------------------------------------------

## 2022-04-29 NOTE — PROGRESS NOTE ADULT - SUBJECTIVE AND OBJECTIVE BOX
Over Night Events: events noted, on NC, palliative reviewed, afebrile    PHYSICAL EXAM    ICU Vital Signs Last 24 Hrs  T(C): 37 (29 Apr 2022 04:30), Max: 37 (29 Apr 2022 04:30)  T(F): 98.6 (29 Apr 2022 04:30), Max: 98.6 (29 Apr 2022 04:30)  HR: 103 (29 Apr 2022 04:30) (97 - 107)  BP: 106/61 (29 Apr 2022 04:30) (106/61 - 131/80)  BP(mean): 87 (28 Apr 2022 12:20) (87 - 98)  RR: 20 (29 Apr 2022 04:30) (18 - 20)  SpO2: 100% (29 Apr 2022 04:30) (100% - 100%)      General: ill looking/ cachectic  HEENT: JEFFRY             Lymph Nodes: No cervical LN   Lungs: dec bs both bases  Cardiovascular: COLLIN 2.6  Abdomen: Soft, Positive BS  non focal    04-28-22 @ 07:01  -  04-29-22 @ 07:00  --------------------------------------------------------  IN:    Enteral Tube Flush: 100 mL    Jevity 1.2: 360 mL  Total IN: 460 mL    OUT:    Indwelling Catheter - Urethral (mL): 750 mL  Total OUT: 750 mL    Total NET: -290 mL          LABS:                          11.2   6.58  )-----------( 140      ( 29 Apr 2022 01:50 )             35.4                                               04-29    146  |  107  |  45<H>  ----------------------------<  79  3.8   |  28  |  0.5<L>    Ca    8.5      29 Apr 2022 01:50  Mg     2.3     04-29    TPro  5.6<L>  /  Alb  2.8<L>  /  TBili  0.5  /  DBili  x   /  AST  129<H>  /  ALT  108<H>  /  AlkPhos  941<H>  04-29                                                                                           LIVER FUNCTIONS - ( 29 Apr 2022 01:50 )  Alb: 2.8 g/dL / Pro: 5.6 g/dL / ALK PHOS: 941 U/L / ALT: 108 U/L / AST: 129 U/L / GGT: x                                                                                                                                       MEDICATIONS  (STANDING):  chlorhexidine 4% Liquid 1 Application(s) Topical <User Schedule>  diltiazem    milliGRAM(s) Oral daily  enoxaparin Injectable 40 milliGRAM(s) SubCutaneous every 12 hours  lactulose Syrup 20 Gram(s) Oral every 12 hours  magnesium oxide 400 milliGRAM(s) Oral two times a day with meals  OLANZapine Disintegrating Tablet 2.5 milliGRAM(s) Oral at bedtime  pantoprazole  Injectable 40 milliGRAM(s) IV Push daily  polyethylene glycol 3350 17 Gram(s) Oral daily  senna 2 Tablet(s) Oral at bedtime    MEDICATIONS  (PRN):  acetaminophen     Tablet .. 650 milliGRAM(s) Oral every 6 hours PRN Temp greater or equal to 38C (100.4F), Mild Pain (1 - 3)  morphine  - Injectable 1 milliGRAM(s) IV Push every 6 hours PRN Moderate Pain (4 - 6)  simethicone 80 milliGRAM(s) Chew three times a day PRN Gas  verapamil 40 milliGRAM(s) Oral two times a day PRN tachycardia faster tahn 110 beats/ minute

## 2022-04-29 NOTE — PROGRESS NOTE ADULT - PROBLEM SELECTOR PLAN 1
- not on recent DDT  - oncology consult not offering and recommend  focus on symptom/comfort; family stated she would not want chemo/radiation  - ongoing supportive care DISPLAY PLAN FREE TEXT

## 2022-04-29 NOTE — PROGRESS NOTE ADULT - SUBJECTIVE AND OBJECTIVE BOX
VIKASH LI          MRN-[id number]              HPI:  75 YO F with PMHx of HTN, pancreatic CA s/p Whipple 8/2021, A.Fib on Eliquis, pleural effusion was BIBEMS due to worsening SOB that started earlier on the morning of presentation  As per EMS, patient was saturating in the 70s on room air and was placed on NRB w/ sats improving to high 80s. Patient reports that she lives with her son and started noticing shortness of breath.   Patient had recent admission from [facility] and had thoracentesis done.  Patient denies fevers, chills, headache, chest pain, palpitations, constipation, melena, hematochezia, dysuria, urinary frequency or urgency, numbness, tingling, recent travel or any known sick contact.   In the ED patient was placed on non rebreather with SpO2 of 94%. .  X-ray chest showed Interval increase in bilateral effusions and opacities. (18 Apr 2022 11:47)    ROS:	    4/21: Unable to full attain due to:  patient is lethargic and not able to sustain interaction; after encounter Maddie Silva at bedside with her  - reinforced offer to have family meeting  4/25: interim: at [facility] noted with subsequent vascular consult for R arm and disimpaction; no family at bedside; patient non-responsive; hypotensive; now has NG tube  4/26: patient declining, did not appear in pain during my evaluation  4/27: patient had episodes of rest with agitation - soothed with non-pharmacological intervention; not making meaningful verbal discussion   4/28: patient not agitated, doesn't appear in pain  4/29: patient confused but calm, not in any apparentl pain                    Dyspnea (Salima 0-10): 0                       N/V (Y/N): No                             Secretions (Y/N) : No                                          Agitation(Y/N): No                              Pain (Y/N): No (PainAD 0)                                -Provocation/Palliation: N/A  -Quality/Quantity: N/A  -Radiating: N/A  -Severity: No pain  -Timing/Frequency: N/A  -Impact on ADLs: N/A    General:   see above  HEENT:   no nonverbal indications    Neck:   no nonverbal indications  CVS:   no nonverbal indications  Resp:   no nonverbal indications  GI:   no nonverbal indications  :   no nonverbal indications  Musc:   no nonverbal indications  Neuro:   no nonverbal indications  Psych:  no nonverbal indications  Skin:   no nonverbal indications  Lymph:   no nonverbal indications    Last BM:       Allergies    Augmentin (Rash)  chocolate (Headache)  digoxin (Hives)  Metoprolol Succinate ER (Hives)  Novocain (Angioedema)  Steroids: Excessive swelling (Flushing; Other (Mild to Mod))  sulfa drugs (Fever)  Xarelto (Hives)    Intolerances      Opiate Naive (Y/N): Y  -iStop reviewed (Y/N): Y   Ref#:     This report was requested by: Yuki Nolan | Reference #: [id number]             Labs:	  reviewed                                          11.2   6.58  )-----------( 140      ( 29 Apr 2022 01:50 )             35.4     04-29    146  |  107  |  45<H>  ----------------------------<  79  3.8   |  28  |  0.5<L>    Ca    8.5      29 Apr 2022 01:50  Mg     2.3     04-29            Radiology:	         < from: Xray Chest 1 View- PORTABLE-Urgent (Xray Chest 1 View- PORTABLE-Urgent .) (04.19.22 @ 15:55) >  ACC: [id number] EXAM:  XR CHEST PORTABLE URGENT 1V                          < from: Xray Chest 1 View- PORTABLE-Urgent (Xray Chest 1 View- PORTABLE-Urgent .) (04.26.22 @ 17:02) >  Impression:    Cardiomegaly with bilateral effusions. Support devices as described.    Follow-up as needed.    --- End of Report ---            SERA GANT MD; Attending Interventional Radiologist  This document has been electronically signed. Apr 27 2022  7:50AM    < end of copied text >  PROCEDURE DATE:  04/19/2022          INTERPRETATION:  Clinical History / Reason for exam: Follow-up.    Comparison : Chest radiograph April 18, 2022.    Technique/Positioning: Adequate.    Findings:    Support devices: Right-sided Port-A-Cath with its tip overlying the SVC.    Cardiac/mediastinum/hilum: Cardiomegaly, unchanged.    Lung parenchyma/Pleura: Improved right lung opacity and stable left lung   opacity. No pneumothorax is seen.    Skeleton/soft tissues: Stable.               Impression:    Cardiomegaly, unchanged.    Improved right lung opacity and stable left lung opacity.    Right-sided Port-A-Cath.    --- End of Report ---            GRUPO MALDONADO MD; Attending Radiologist  This document has been electronically signed. Apr 19 2022  5:04PM    < end of copied text >          EKG:	  < from: 12 Lead ECG (04.05.22 @ 17:56) >    Ventricular Rate 91 BPM    Atrial Rate 37 BPM    QRS Duration 82 ms    Q-T Interval 360 ms    QTC Calculation(Bazett) 442 ms    R Axis 117 degrees    T Axis -62 degrees    Diagnosis Line Atrial fibrillation  Left posterior fascicular block  T wave abnormality, consider lateral ischemia  Abnormal ECG    Confirmed by JOSEFA CHILDS MD ([id number]) on 4/6/2022 10:08:26 AM    < end of copied text >    < from: 12 Lead ECG (04.18.22 @ 10:08) >    Ventricular Rate 117 BPM    Atrial Rate 115 BPM    QRS Duration 82 ms    Q-T Interval 356 ms    QTC Calculation(Bazett) 496 ms    R Axis 79 degrees    T Axis 219 degrees    Diagnosis Line Atrial fibrillation with rapid ventricular response  Low voltage QRS  ST & T wave abnormality, consider lateral ischemia  Abnormal ECG    Confirmed by Torri Collins MD ([id number]) on 4/18/2022 4:40:35 PM    < end of copied text >      Imaging Personally Reviewed:  [ ] xYES  [ ] NO    Consultant(s) Notes Reviewed:  [x ] YES  [ ] NO  Care Discussed with Consultants/Other Providers [x ] YES  [ ] NO    PEx:	  Vital Signs Last 24 Hrs  T(C): 35.9 (29 Apr 2022 15:12), Max: 37 (29 Apr 2022 04:30)  T(F): 96.7 (29 Apr 2022 15:12), Max: 98.6 (29 Apr 2022 04:30)  HR: 101 (29 Apr 2022 15:12) (89 - 103)  BP: 130/59 (29 Apr 2022 15:12) (86/58 - 130/59)  BP(mean): 67 (29 Apr 2022 11:43) (67 - 73)  RR: 19 (29 Apr 2022 15:12) (18 - 20)  SpO2: 100% (29 Apr 2022 15:12) (100% - 100%)    General:  NAD; frail   + NG   Eyes: clear conjunctiva  ENMT: no ulcers externally   Resp: Unlabored Non tachypneic nc O2  Neuro: calm   Psych: currently calm  Skin: nonjaundiced; pale     Preadmit Karnofsky:  %           Current Karnofsky:  20   %  [email or web address]   [email or web address]  Cachexia (Y/N): Y  BMI: BMI (kg/m2): 12.2 (04-18-22 @ 08:41)      Medications:	      MEDICATIONS  (STANDING):  chlorhexidine 4% Liquid 1 Application(s) Topical <User Schedule>  diltiazem    milliGRAM(s) Oral daily  enoxaparin Injectable 40 milliGRAM(s) SubCutaneous every 12 hours  lactulose Syrup 20 Gram(s) Oral every 12 hours  magnesium oxide 400 milliGRAM(s) Oral two times a day with meals  mirtazapine 15 milliGRAM(s) Oral at bedtime  pantoprazole  Injectable 40 milliGRAM(s) IV Push daily  polyethylene glycol 3350 17 Gram(s) Oral daily  senna 2 Tablet(s) Oral at bedtime    MEDICATIONS  (PRN):  acetaminophen     Tablet .. 650 milliGRAM(s) Oral every 6 hours PRN Temp greater or equal to 38C (100.4F), Mild Pain (1 - 3)  simethicone 80 milliGRAM(s) Chew three times a day PRN Gas  verapamil 40 milliGRAM(s) Oral two times a day PRN tachycardia faster tahn 110 beats/ minute      Advanced Directives:	     Full Code      Decision maker: The patient is unable to participate in complex medical decision making conversations    Legal surrogate: daughters    GOALS OF CARE DISCUSSION	       Palliative care info/counseling provided	           Documentation of GOC/Advanced Care Planning see prior       PSYCHOSOCIAL-SPIRITUAL ASSESSMENT:            See Palliative Care SW/ documentation        	    REFERRALS       Palliative Med        Unit SW/Case Mgmt

## 2022-04-29 NOTE — CHART NOTE - NSCHARTNOTEFT_GEN_A_CORE
HPI:  77 YO F with PMHx of HTN, pancreatic CA s/p Whipple 8/2021, A.Fib on Eliquis, pleural effusion was BIBEMS due to worsening SOB that started earlier on the morning of presentation  As per EMS, patient was saturating in the 70s on room air and was placed on NRB w/ sats improving to high 80s. Patient reports that she lives with her son and started noticing shortness of breath.   Patient had recent admission from [facility] and had thoracentesis done  Patient denies fevers, chills, headache, chest pain, palpitations, constipation, melena, hematochezia, dysuria, urinary frequency or urgency, numbness, tingling, recent travel or any known sick contact.   In the ED patient was placed on non rebreather with SpO2 of 94%. .  X-ray chest showed Interval increase in bilateral effusions and opacities.    Hospital Course:  Pt admitted for respiratory failure, hospital stay complicated by AMS, ascites with transaminitis, failure to thrive. Pt found with mild volume overload s/p thoracentesis with findings of infl cells (no malignant, no microbials). Pt noted to have tense ascites s/p therapeutic paracentesis. Pt with AMS, no baseline dementia per family but unclear etiology. Assumed hepatic encephalopathy given mildly elevated ammonia + new onset ascites (cirrhosis?). Differential for ascites includes whipple complications, intra-hepatic primary/metastatic cancer, carcinomatosis. Pt received therapeutic paracentesis, fluid not sent as family moving towards CMO for patient. In general, pt has been declining for years. Pt has minimal PO intake at baseline for past years and requires NGT feedings due initially to lack of ability to intake caloric sufficiency per PO, now requiring due to lack of mental status. Pt family believes sedating medications are cause +/- ammonia level and that NGT feeds are making her stronger. After much conversation, family moving towards re-eval 4/30/22 for possible CMO. Pt made DNR per her wishes, intubation wishes unknown but pt will receive trial of intubation if necessary. Family initial held bloodwork to allow her to be comfortable, now want daily blood draws for further ammonia monitoring. Family aware ammonia level not significant enough to cause this extent of mental status alteration but still wish to continue. Family understands further aggressive medical management is unlikely to give her quality of life but wish to continue monitoring with medical management.    **FOLLOW UP**  1. ascites- no fluid sent, pt begins to regain fluid quickly. If family wishes pt to receive another paracentesis for diagnostic/therapeutic reasons, consider pleurX as ascites appears chronic at this point  2. Further GOC- Pt daughter (Maddie) is HCP. Maddie recently had to extubate her sister for terminal illness, is very well aware of comfort measures and the lack of usefulness of aggressive medical measures. She understands the situation pt is in well. Following extensive GOC conversation 4/27/22 with 5 family members, likely course of illness explained and risks vs benefits of further work up explained and understood. Family initially moving towards more palliative measure, but are now interested in daily blood draws for further monitoring.  3. repeat SLP eval- swallow eval 4/29/22 with family at bedside if pt more awake  4. GOC meeting 4/30/22. Family concerned about feeds. Stated to family on 4/27/22 that we will continue NGT feeds for an entire week (until 4/30/22) and re-assess her strength. ***DO NOT GIVE SEDATING MEDICATIONS***. Family believes her lack of ability to eat is due to sedation.

## 2022-04-29 NOTE — CHART NOTE - NSCHARTNOTEFT_GEN_A_CORE
Awake, nonverbal. Appears comfortable  NGT in place, receiving rec feeds and tolerating well  Palliative noted, family considering CMO. Currently wish to continue feeds and re-eval on   BM's X 4 yesterday    T(F): 98.1 (22 @ 08:07), Max: 98.6 (22 @ 04:30)  HR: 90 (22 @ 08:07) (90 - 103)  BP: 95/60 (22 @ 08:07) (95/60 - 116/64)  RR: 20 (22 @ 08:07) (18 - 20)  SpO2: 100% (22 @ 04:30) (100% - 100%)    MEDICATIONS  (STANDING):  chlorhexidine 4% Liquid 1 Application(s) Topical <User Schedule>  diltiazem    milliGRAM(s) Oral daily  enoxaparin Injectable 40 milliGRAM(s) SubCutaneous every 12 hours  lactulose Syrup 20 Gram(s) Oral every 12 hours  magnesium oxide 400 milliGRAM(s) Oral two times a day with meals  OLANZapine Disintegrating Tablet 2.5 milliGRAM(s) Oral at bedtime  pantoprazole  Injectable 40 milliGRAM(s) IV Push daily  polyethylene glycol 3350 17 Gram(s) Oral daily  senna 2 Tablet(s) Oral at bedtime    MEDICATIONS  (PRN):  acetaminophen     Tablet .. 650 milliGRAM(s) Oral every 6 hours PRN Temp greater or equal to 38C (100.4F), Mild Pain (1 - 3)  morphine  - Injectable 1 milliGRAM(s) IV Push every 6 hours PRN Moderate Pain (4 - 6)  simethicone 80 milliGRAM(s) Chew three times a day PRN Gas  verapamil 40 milliGRAM(s) Oral two times a day PRN tachycardia faster tahn 110 beats/ minute    I&O's Detail    2022 07:01  -  2022 07:00  --------------------------------------------------------  IN:    Enteral Tube Flush: 100 mL    Jevity 1.2: 360 mL  Total IN: 460 mL    OUT:    Indwelling Catheter - Urethral (mL): 750 mL  Total OUT: 750 mL    Total NET: -290 mL        146  |  107  |  45<H>  ----------------------------<  79  3.8   |  28  |  0.5<L>    Ca    8.5      2022 01:50  Mg     2.3         TPro  5.6<L>  /  Alb  2.8<L>  /  TBili  0.5  /  DBili  x   /  AST  129<H>  /  ALT  108<H>  /  AlkPhos  941<H>                        11.2   6.58  )-----------( 140      ( 2022 01:50 )             35.4      Daily Weight in k.7 (2022 08:07)    Diet, NPO with Tube Feed:   Tube Feeding Modality: Nasogastric  Jevity 1.2 Amos  Total Volume for 24 Hours (mL): 1080  Bolus  Total Volume of Bolus (mL):  360  Tube Feed Frequency: Every 8 hours   Tube Feed Start Time: 15:00  Bolus Feed Rate (mL per Hour): 500   Bolus Feed Duration (in Hours): 0.75  Free Water Flush Instructions:  50 ml water syringe push before and after each feed (22 @ 14:38)    IMP:  - pancreatic cancer  - severe cancer cachexia  - malabsorption on Creon  - acute hypoxic resp failure r/t fluid overload  - s/p thoracentesis of pleural effusion, and paracentesis of ascites  - constipation    PLAN:   - d/c PO Mg oxide  - cont Jevity 1.2 360ml X 3 feeds/day given at meal times   - monitor/ document BMs (& BM appearance)  - monitor BMP, phos, and Mg    - re-eval bowel regimen- lactulose, miralax, senna given daily  - need to ascertain Onc plans (reportedly last chemo was in Nov?), c/w GOC discussion, possible PEG tube? Whether or not further or continued SNS or other aggressive interventions are continued depend entirely on treatment options for the primary cancer Awake, nonverbal. Appears comfortable  NGT in place, receiving rec feeds and tolerating well, abd soft NT  Palliative noted, family considering CMO. Currently wish to continue feeds and re-eval on   BM's X 4 yesterday    T(F): 98.1 (22 @ 08:07), Max: 98.6 (22 @ 04:30)  HR: 90 (22 @ 08:07) (90 - 103)  BP: 95/60 (22 @ 08:07) (95/60 - 116/64)  RR: 20 (22 @ 08:07) (18 - 20)  SpO2: 100% (22 @ 04:30) (100% - 100%)    MEDICATIONS  (STANDING):  chlorhexidine 4% Liquid 1 Application(s) Topical <User Schedule>  diltiazem    milliGRAM(s) Oral daily  enoxaparin Injectable 40 milliGRAM(s) SubCutaneous every 12 hours  lactulose Syrup 20 Gram(s) Oral every 12 hours  magnesium oxide 400 milliGRAM(s) Oral two times a day with meals  OLANZapine Disintegrating Tablet 2.5 milliGRAM(s) Oral at bedtime  pantoprazole  Injectable 40 milliGRAM(s) IV Push daily  polyethylene glycol 3350 17 Gram(s) Oral daily  senna 2 Tablet(s) Oral at bedtime    MEDICATIONS  (PRN):  acetaminophen     Tablet .. 650 milliGRAM(s) Oral every 6 hours PRN Temp greater or equal to 38C (100.4F), Mild Pain (1 - 3)  morphine  - Injectable 1 milliGRAM(s) IV Push every 6 hours PRN Moderate Pain (4 - 6)  simethicone 80 milliGRAM(s) Chew three times a day PRN Gas  verapamil 40 milliGRAM(s) Oral two times a day PRN tachycardia faster tahn 110 beats/ minute    I&O's Detail    2022 07:01  -  2022 07:00  --------------------------------------------------------  IN:    Enteral Tube Flush: 100 mL    Jevity 1.2: 360 mL  Total IN: 460 mL    OUT:    Indwelling Catheter - Urethral (mL): 750 mL  Total OUT: 750 mL    Total NET: -290 mL        146  |  107  |  45<H>  ----------------------------<  79  3.8   |  28  |  0.5<L>    Ca    8.5      2022 01:50  Mg     2.3         TPro  5.6<L>  /  Alb  2.8<L>  /  TBili  0.5  /  DBili  x   /  AST  129<H>  /  ALT  108<H>  /  AlkPhos  941<H>                        11.2   6.58  )-----------( 140      ( 2022 01:50 )             35.4      Daily Weight in k.7 (2022 08:07)    Diet, NPO with Tube Feed:   Tube Feeding Modality: Nasogastric  Jevity 1.2 Amos  Total Volume for 24 Hours (mL): 1080  Bolus  Total Volume of Bolus (mL):  360  Tube Feed Frequency: Every 8 hours   Tube Feed Start Time: 15:00  Bolus Feed Rate (mL per Hour): 500   Bolus Feed Duration (in Hours): 0.75  Free Water Flush Instructions:  50 ml water syringe push before and after each feed (22 @ 14:38)    IMP:  - pancreatic cancer  - severe cancer cachexia  - malabsorption on Creon  - acute hypoxic resp failure r/t fluid overload  - s/p thoracentesis of pleural effusion, and paracentesis of ascites  - constipation    PLAN:   - d/c PO Mg oxide  - cont Jevity 1.2 360ml X 3 feeds/day given at meal times   - monitor/ document BMs (& BM appearance)  - monitor BMP, phos, and Mg    - re-eval bowel regimen- lactulose, miralax, senna given daily - document BMs  - need to ascertain Onc plans (reportedly last chemo was in Nov?), c/w GOC discussion, possible PEG tube? Whether or not further or continued SNS or other aggressive interventions are continued depend entirely on treatment options for the primary cancer.

## 2022-04-29 NOTE — PROGRESS NOTE ADULT - PROBLEM SELECTOR PLAN 7
- will follow  - now DNR only    ______________  Bipin Madrigal MD  Palliative Medicine  [facility]  [phone]

## 2022-04-29 NOTE — CHART NOTE - NSCHARTNOTESELECT_GEN_ALL_CORE
Transfer Note
Event Note
Family update/Event Note
GOC/Event Note
Nutrition Support/Nutrition Services
Nutrition Support/Nutrition Services
Palliative Care SW Initial Assessment/Event Note
Palliative Care SW Note/Event Note

## 2022-04-29 NOTE — PROGRESS NOTE ADULT - SUBJECTIVE AND OBJECTIVE BOX
LI, VIKASH  76y Female    CHIEF COMPLAINT:    Patient is a 76y old female who presents with a chief complaint of Shortness of breath (26 Apr 2022 07:36)    INTERVAL HPI/OVERNIGHT EVENTS:    Patient seen and examined. No acute events overnight. Overall unchanged  patient lethargic and does not follow commands    ROS: unable to assess due to mental status     Vital Signs:  Vital Signs Last 24 Hrs  T(C): 35.8 (29 Apr 2022 11:43), Max: 37 (29 Apr 2022 04:30)  T(F): 96.4 (29 Apr 2022 11:43), Max: 98.6 (29 Apr 2022 04:30)  HR: 89 (29 Apr 2022 11:43) (89 - 103)  BP: 86/58 (29 Apr 2022 11:43) (86/58 - 116/64)  BP(mean): 67 (29 Apr 2022 11:43) (67 - 73)  RR: 20 (29 Apr 2022 11:43) (18 - 20)  SpO2: 100% (29 Apr 2022 04:30) (100% - 100%)      PHYSICAL EXAM:    GENERAL:  NAD, cachectic, chronically ill appearing   SKIN: No rashes or lesions  HEENT: Atraumatic. Normocephalic.   NECK: Supple, No JVD.   PULMONARY: coarse breath sounds  B/L. No wheezing. No rales  CVS: Normal S1, S2. Rate and Rhythm are regular.   ABDOMEN/GI: Soft, Nontender, distended   MSK:  No edema B/L LE. b/l finger cyanosis, no clubbing   NEUROLOGIC: does not follow commands   PSYCH: lethargic, arousable     Consultant(s) Notes Reviewed:  [x ] YES  [ ] NO  Care Discussed with Consultants/Other Providers [ x] YES  [ ] NO    LABS:                        11.2   6.58  )-----------( 140      ( 29 Apr 2022 01:50 )             35.4     04-29    146  |  107  |  45<H>  ----------------------------<  79  3.8   |  28  |  0.5<L>    Ca    8.5      29 Apr 2022 01:50  Mg     2.3     04-29    TPro  5.6<L>  /  Alb  2.8<L>  /  TBili  0.5  /  DBili  x   /  AST  129<H>  /  ALT  108<H>  /  AlkPhos  941<H>  04-29      RADIOLOGY & ADDITIONAL TESTS:  Imaging or report Personally Reviewed:  [x] YES  [ ] NO  EKG reviewed: [x] YES  [ ] NO      Medications:  MEDICATIONS  (STANDING):  chlorhexidine 4% Liquid 1 Application(s) Topical <User Schedule>  diltiazem    milliGRAM(s) Oral daily  enoxaparin Injectable 40 milliGRAM(s) SubCutaneous every 12 hours  lactulose Syrup 20 Gram(s) Oral every 12 hours  magnesium oxide 400 milliGRAM(s) Oral two times a day with meals  mirtazapine 15 milliGRAM(s) Oral at bedtime  pantoprazole  Injectable 40 milliGRAM(s) IV Push daily  polyethylene glycol 3350 17 Gram(s) Oral daily  senna 2 Tablet(s) Oral at bedtime    MEDICATIONS  (PRN):  acetaminophen     Tablet .. 650 milliGRAM(s) Oral every 6 hours PRN Temp greater or equal to 38C (100.4F), Mild Pain (1 - 3)  simethicone 80 milliGRAM(s) Chew three times a day PRN Gas  verapamil 40 milliGRAM(s) Oral two times a day PRN tachycardia faster tahn 110 beats/ minute

## 2022-04-29 NOTE — PROGRESS NOTE ADULT - ASSESSMENT
75 YO F with PMHx of HTN, pancreatic CA s/p Whipple 8/2021, A.Fib on Eliquis, pleural effusion was BIBEMS due to worsening SOB    Acute Hypoxemic Respiratory Failure likely due to volume overload  b/l Pleural effusions with ascites  Pancreatic Ca s/p Whipple 8/21  Transaminitis  - now on nasal cannula   - s/p Thoracentesis 4/19 with 750 cc fluid removed, transudative  - repeat CT noted, massive ascites--> IR for therapeutic paracentesis on 4/26  - s/p lasix  - cardiology eval appreciated, diuresis as tolerated  - discussed with Dr. Knight, feeds adjusted, Creon currently on hold as cannot be given through NGT  - resume if patient more awake/able to tolerate PO  - Ammonia noted to be elevated, on lactulose, titrate to 2-3 bm daily, CTH negative   - Extensive conversation with patient's daughter, lian and her  on 4/29- want to continue ngt feeds, resume blood work and have speech and swallow follow up for oral intake  - monitor LFT's    BIlateral UE edema, from infitrated IVs and contrast extravasation  -stable; continue to monitor     Chronic A.Fib on Eliquis  - rate control with diltiazem   - continue lovenox    Poor PO intake  Severe malnutrition  History of eating disorder  started marinol 4/20, now off  NGT placed, tolerating feeds, encourage PO intake as well if awake and appropriate to eat as per speech and swallow  Dr. Syed following  psychiatry following, added Zyprexa to current regimen - pt's daughter asked to stop it   pt's family requested to stop remeron on 4/29 and to resume today  leaning towards PEG placement next week  failed speech and swallow on 4/28- family asking for follow up    GOC - DNR  overall grave prognosis    Progress Note Handoff  Pending Consults: none  Pending Tests: labs  Pending Results: labs  Family Discussion: extensive discussion with pt's daughter, Lian and her  on 4/29.  All questions answered.  Not ready for CMO. Wish to continue blood work and possible PEG placement.   Disposition: Home_____/SNF______/Other_____/Unknown at this time__x__  Spent over 35 min reviewing chart and on coordinating patient care during interdisciplinary rounds

## 2022-04-29 NOTE — PROGRESS NOTE ADULT - ASSESSMENT
IMPRESSION:    Acute Hypoxemic Respiratory Failure on NC  Acute hypercapnic respiratory failure  AMS, toxic metabolic  B/l effusion R>L,  SP right thoracentesis x2 transudative effusion   Large Volume Ascites SP para  HO eating disorder  Pancreatic Cancer advanced s/p whipple 8/21  Afib was on Eliquis    PLAN:    CNS: Avoid CNS depressants.     HEENT: Oral care    PULMONARY:  HOB @ 45 degrees.  Aspiration precautions. Wean HHFNC. Target SPO2 88-94%. Titrate oxygen as tolerated.     CARDIOVASCULAR: avoid overload    GI: GI prophylaxis.     RENAL: Follow up lytes. Correct as needed  HEMATOLOGICAL:  ac    ENDOCRINE:  Follow up FS.      MUSCULOSKELETAL: bedrest    Very poor prognosis    GOC  floor

## 2022-04-29 NOTE — PROGRESS NOTE ADULT - ASSESSMENT
75 YO F with PMHx of HTN, pancreatic CA s/p Whipple 8/2021, A.Fib on Eliquis, pleural effusion was BIBEMS due to worsening SOB here for AHRF    #Acute Hypoxemic/hypercapnic Respiratory Failure  #B/l pleural effusion; R>L  #AMS likely toxic metabolic improved   #Ascites   #Pancreatic Ca  - Xray Chest: Interval increase in bilateral effusions and opacities  - BNP 2151, Procalcitonin .08, Ammonia level 33  - Echo (4/8/22): EF 54%, severely enlarged LA, RA. Mod-severe TR  -BCx-NGF, UCx-NGF  - Palliative rec appreciated: family meeting 4/20 at 11am. Remains fulls code for now--> DNR as of 4/27/22  - s/p Thoracentesis 4/19 drained 750ml  -s/p paracentesis 4/26 4.7L removed--> pt tolerating NC from 4/26/22  - Pleural fluid Cytology: reactive mesothelial, histiocytes, acute/chronic infl cells  - Pleural fluid analysis shows transudative fluid  -holding diuresis  -c/w lactulose 2-3BM/day    #Decrease PO intake   - started marinol 4/20  - NGT placed on 4/21, after discussing with Fam   - Peptamen 1.5 start 125 ml over 30 min, increase to 250 ml  after paracentesis   - DO NOT try to put Creon down the NG tube  - Dr De Luna following  - psychiatry following, added Zyprexa to current regimen    #Transaminitis, likely from hepatic congestion  - TPro  5.6<L>  /  Alb  2.9<L>  /  TBili  0.7  /  DBili  x   /  AST  64<H>  /  ALT  47<H>  /  AlkPhos  550<H>  04-19  - TPro  5.7<L>  /  Alb  2.7<L>  /  TBili  0.5  /  DBili  x   /  AST  141<H>  /  ALT  86<H>  /  AlkPhos  790<H>  04-21  - Monitor LFTs   - RUQ US Pneumobilia with perihepatic ascites.  -INR trending down with feedings, likely due to vit K deficiency from chronic malnutrition    #Chronic A.Fib on Eliquis  - Continue Verapamil PRN and diltiazem CD 120mg daily  - 4/22 hold Eliquis, started on Lovenox   - Cardio recs recs for medication optimization     #Hx Pancreatic Ca   - s/p whipple 8/1/21 (in remission)   - c/w Creon 12K TID before meals   - Evaluated by surgery team on April 7. No further intervention   - Heme/onc recs appreciated   - CT A/P noted above was done to evaluate for pancreatic cancer, contrast was extravasated in the right upper extremity, thus study was noncon. Frequent RUE checks for compartment syndrome  - Ca-19-9 : 42    #Misc  - DVT Prophylaxis: Lovenox  - Diet: Feeds through NGT, SLP re-eval  - GI Prophylaxis: Pantoprazole  - Activity: AAT  - Code Status: DNR, trial of intubation  - Disposition: acute   Pending:  strength/mental status assessment 4/30 for further GOC directive  Per family, pt currently partial comfort, no further aggressive medical management. Pt to receive NGT feeds and re-evaluated 4/30/22 for further assessment and possible full CMO. See GOC 4/27/22  Family currently wants as little sedating medications as possible ( no IV morphine, want remeron over zyprexa for now)

## 2022-04-30 NOTE — PROGRESS NOTE ADULT - SUBJECTIVE AND OBJECTIVE BOX
VIKASH LI  76y  Female      Patient is a 76y old  Female who presents with a chief complaint of Shortness of breath.      INTERVAL HPI/OVERNIGHT EVENTS:      ******************************* REVIEW OF SYSTEMS:**********************************************    All other review of systems negative    *********************** VITALS ******************************************    T(F): 96.8 (04-30-22 @ 13:13)  HR: 110 (04-30-22 @ 13:13) (89 - 110)  BP: 151/64 (04-30-22 @ 13:13) (115/68 - 151/64)  RR: 18 (04-30-22 @ 13:13) (18 - 20)  SpO2: 100% (04-29-22 @ 20:15) (100% - 100%)    04-29-22 @ 07:01  -  04-30-22 @ 07:00  --------------------------------------------------------  IN: 0 mL / OUT: 300 mL / NET: -300 mL    04-30-22 @ 07:01  -  04-30-22 @ 14:45  --------------------------------------------------------  IN: 0 mL / OUT: 200 mL / NET: -200 mL            04-29-22 @ 07:01  -  04-30-22 @ 07:00  --------------------------------------------------------  IN: 0 mL / OUT: 300 mL / NET: -300 mL    04-30-22 @ 07:01  -  04-30-22 @ 14:45  --------------------------------------------------------  IN: 0 mL / OUT: 200 mL / NET: -200 mL        ******************************** PHYSICAL EXAM:**************************************************  GENERAL: NAD    PSYCH: no agitation,   HEENT:     NERVOUS SYSTEM:  Alert & Oriented X0  PULMONARY: KIRK, CTA    CARDIOVASCULAR: S1S2 RRR    GI: Soft, NT, ND; BS present.    EXTREMITIES:  2+ Peripheral Pulses, No clubbing, cyanosis, or edema    LYMPH: No lymphadenopathy noted    SKIN: No rashes or lesions      **************************** LABS *******************************************************                          10.9   8.10  )-----------( 121      ( 30 Apr 2022 04:30 )             34.8     04-30    150<H>  |  110  |  56<H>  ----------------------------<  150<H>  5.3<H>   |  28  |  0.6<L>    Ca    8.2<L>      30 Apr 2022 04:30  Mg     2.4     04-30    TPro  5.8<L>  /  Alb  2.7<L>  /  TBili  0.6  /  DBili  x   /  AST  368<H>  /  ALT  234<H>  /  AlkPhos  1198<H>  04-30          Lactate Trend        CAPILLARY BLOOD GLUCOSE              **************************Active Medications *******************************************  Augmentin (Rash)  chocolate (Headache)  digoxin (Hives)  Metoprolol Succinate ER (Hives)  Novocain (Angioedema)  Steroids: Excessive swelling (Flushing; Other (Mild to Mod))  sulfa drugs (Fever)  Xarelto (Hives)      acetaminophen     Tablet .. 650 milliGRAM(s) Oral every 6 hours PRN  chlorhexidine 4% Liquid 1 Application(s) Topical <User Schedule>  diltiazem    milliGRAM(s) Oral daily  enoxaparin Injectable 40 milliGRAM(s) SubCutaneous every 12 hours  lactulose Syrup 20 Gram(s) Oral every 12 hours  mirtazapine 15 milliGRAM(s) Oral at bedtime  pantoprazole  Injectable 40 milliGRAM(s) IV Push daily  polyethylene glycol 3350 17 Gram(s) Oral daily  senna 2 Tablet(s) Oral at bedtime  simethicone 80 milliGRAM(s) Chew three times a day PRN  verapamil 40 milliGRAM(s) Oral two times a day PRN      ***************************************************  RADIOLOGY & ADDITIONAL TESTS:    Imaging Personally Reviewed:  [ ] YES  [ ] NO    HEALTH ISSUES - PROBLEM Dx:  Pancreatic cancer    Pleural effusion    Ascites    Counseling regarding goals of care    Palliative care by specialist    Advanced care planning/counseling discussion    Dyspnea    Pain    Counseling regarding advance care planning and goals of care

## 2022-04-30 NOTE — PROGRESS NOTE ADULT - ASSESSMENT
75 YO F with PMHx of HTN, pancreatic CA s/p Whipple 8/2021, A.Fib on Eliquis, pleural effusion was BIBEMS due to worsening SOB    Acute Hypoxemic Respiratory Failure.   b/l Pleural effusions with ascites  Pancreatic Ca s/p Whipple 8/21  Transaminitis  - s/p NRB >> now on 6L nasal cannula >> Titrate down as tolerated.   - s/p Thoracentesis 4/19 with 750 cc fluid removed, transudative  - repeat CT noted, massive ascites--> IR for therapeutic paracentesis on 4/26  - s/p lasix  - cardiology eval appreciated, diuresis as tolerated  - discussed with Dr. Knight, feeds adjusted, Creon currently on hold as cannot be given through NGT  - resume if patient more awake/able to tolerate PO  - Ammonia noted to be elevated, on lactulose, titrate to 2-3 bm daily, CTH negative   - Extensive conversation with patient's daughterlian  on 4/30  >>>> - want to continue ngt feeds, resume blood work and have speech and swallow follow up for oral intake    and also wants to trial of PPN if feasible.   - monitor LFT's    BIlateral UE edema, from infiltrated IVs and contrast extravasation  -stable; continue to monitor     Chronic A.Fib on Eliquis  - rate control with diltiazem   - continue lovenox    # Hypernatremia >> Add more free water, consider Gentle IV hydration with D5w    Poor PO intake  Severe malnutrition  History of eating disorder  started marinol 4/20, now off  NGT placed, tolerating feeds, encourage PO intake as well if awake and appropriate to eat as per speech and swallow  Dr. Syed following  psychiatry following, added Zyprexa to current regimen - pt's daughter asked to stop it   pt's family requested to stop remeron on 4/29 and to resume.  leaning towards PEG placement next week  failed speech and swallow on 4/28- family asking for follow up    GOC - DNR  overall grave prognosis    Progress Note Handoff    Family Discussion: extensive discussion with pt's daughterLian   Not ready for CMO.   Wish to continue blood work and possible PPN vs PEG placement.   Disposition: Unknown at this time__x__    Plan d/w the Surgical Oncology Dr Nino.

## 2022-04-30 NOTE — PROGRESS NOTE ADULT - SUBJECTIVE AND OBJECTIVE BOX
VIKASH LI 76y Female  MRN#: [id number]   CODE STATUS: DNR DNI     Hospital Day: 12d    Pt is currently admitted with the primary diagnosis of shortness of breath     SUBJECTIVE  75 YO F with PMHx of HTN, pancreatic CA s/p Whipple 8/2021, A.Fib on Eliquis, pleural effusion was BIBEMS due to worsening SOB that started earlier on the morning of presentation  As per EMS, patient was saturating in the 70s on room air and was placed on NRB w/ sats improving to high 80s. Patient reports that she lives with her son and started noticing shortness of breath.   Patient had recent admission from [facility] and had thoracentesis done  Patient denies fevers, chills, headache, chest pain, palpitations, constipation, melena, hematochezia, dysuria, urinary frequency or urgency, numbness, tingling, recent travel or any known sick contact.   In the ED patient was placed on non rebreather with SpO2 of 94%. .  X-ray chest showed Interval increase in bilateral effusions and opacities.    Hospital Course:  Pt admitted for respiratory failure, hospital stay complicated by AMS, ascites with transaminitis, failure to thrive. Pt found with mild volume overload s/p thoracentesis with findings of infl cells (no malignant, no microbials). Pt noted to have tense ascites s/p therapeutic paracentesis. Pt with AMS, no baseline dementia per family but unclear etiology. Assumed hepatic encephalopathy given mildly elevated ammonia + new onset ascites (cirrhosis?). Differential for ascites includes whipple complications, intra-hepatic primary/metastatic cancer, carcinomatosis. Pt received therapeutic paracentesis, fluid not sent as family moving towards CMO for patient. In general, pt has been declining for years. Pt has minimal PO intake at baseline for past years and requires NGT feedings due initially to lack of ability to intake caloric sufficiency per PO, now requiring due to lack of mental status. Pt family believes sedating medications are cause +/- ammonia level and that NGT feeds are making her stronger. After much conversation, family moving towards re-eval 4/30/22 for possible CMO. Pt made DNR per her wishes, intubation wishes unknown but pt will receive trial of intubation if necessary. Family initial held bloodwork to allow her to be comfortable, now want daily blood draws for further ammonia monitoring. Family aware ammonia level not significant enough to cause this extent of mental status alteration but still wish to continue. Family understands further aggressive medical management is unlikely to give her quality of life but wish to continue monitoring with medical management.    **FOLLOW UP**  1. ascites- no fluid sent, pt begins to regain fluid quickly. If family wishes pt to receive another paracentesis for diagnostic/therapeutic reasons, consider pleurX as ascites appears chronic at this point  2. Further GOC- Pt daughter (Maddie) is HCP. Maddie recently had to extubate her sister for terminal illness, is very well aware of comfort measures and the lack of usefulness of aggressive medical measures. She understands the situation pt is in well. Following extensive GOC conversation 4/27/22 with 5 family members, likely course of illness explained and risks vs benefits of further work up explained and understood. Family initially moving towards more palliative measure, but are now interested in daily blood draws for further monitoring.  3. repeat SLP eval- swallow eval 4/29/22 with family at bedside if pt more awake  4. GOC meeting 4/30/22. Family concerned about feeds. Stated to family on 4/27/22 that we will continue NGT feeds for an entire week (until 4/30/22) and re-assess her strength. ***DO NOT GIVE SEDATING MEDICATIONS***. Family believes her lack of ability to eat is due to sedation.    Overnight events: Patient was hypoxic this am on NC and placed on nonrebreather. Chest Xray with no change. Patient is a mouth breather and likely needs venturi mask instead of nasal cannula. Saturating 100% on venturi mask.     Subjective complaints: Unable to assess ROS. She is ill appearing. Per daughter, she is more awake today. However, she remains AOxO. Avoiding any sedating medications per family request. Speech and swallow to follow up.                                               ----------------------------------------------------------  OBJECTIVE  PAST MEDICAL & SURGICAL HISTORY  SOB (shortness of breath)    Pain  knee    Varicose veins of both lower extremities, unspecified whether complicated    Atrial fibrillation, unspecified type  2019 on Eliquis    Jaundice  March 5, 2021    Malignant neoplasm of pancreas, unspecified location of malignancy  Pancreatic Cancer    Hypertension, unspecified type  2019    Pancreatic cancer    Kidney stones    History of surgery  Whipple procedure 8/2/2021 with Dr. Nino at [facility]    Status post phlebectomy  10/2018    History of ovarian cystectomy  left    History of tonsillectomy  1959    Kidney stone  2017                                              -----------------------------------------------------------  ALLERGIES:  Augmentin (Rash)  chocolate (Headache)  digoxin (Hives)  Metoprolol Succinate ER (Hives)  Novocain (Angioedema)  Steroids: Excessive swelling (Flushing; Other (Mild to Mod))  sulfa drugs (Fever)  Xarelto (Hives)                                            ------------------------------------------------------------    HOME MEDICATIONS  Home Medications:  Cardizem  mg/24 hours oral capsule, extended release: 1 cap(s) orally once a day (22 Nov 2021 23:59)  Creon 12,000 units oral delayed release capsule: 2 cap(s) orally 3 times a day (with meals) (03 Dec 2021 10:54)  Eliquis 5 mg oral tablet: 1 tab(s) orally 2 times a day (03 Sep 2021 23:41)                           MEDICATIONS:  STANDING MEDICATIONS  chlorhexidine 4% Liquid 1 Application(s) Topical <User Schedule>  diltiazem    milliGRAM(s) Oral daily  enoxaparin Injectable 40 milliGRAM(s) SubCutaneous every 12 hours  lactulose Syrup 20 Gram(s) Oral every 12 hours  mirtazapine 15 milliGRAM(s) Oral at bedtime  pantoprazole  Injectable 40 milliGRAM(s) IV Push daily  polyethylene glycol 3350 17 Gram(s) Oral daily  senna 2 Tablet(s) Oral at bedtime    PRN MEDICATIONS  acetaminophen     Tablet .. 650 milliGRAM(s) Oral every 6 hours PRN  simethicone 80 milliGRAM(s) Chew three times a day PRN  verapamil 40 milliGRAM(s) Oral two times a day PRN                                            ------------------------------------------------------------  VITAL SIGNS: Last 24 Hours  T(C): 36 (30 Apr 2022 13:13), Max: 36 (30 Apr 2022 13:13)  T(F): 96.8 (30 Apr 2022 13:13), Max: 96.8 (30 Apr 2022 13:13)  HR: 110 (30 Apr 2022 13:13) (89 - 110)  BP: 151/64 (30 Apr 2022 13:13) (115/68 - 151/64)  BP(mean): --  RR: 18 (30 Apr 2022 13:13) (18 - 20)  SpO2: 100% (29 Apr 2022 20:15) (100% - 100%)      04-29-22 @ 07:01  -  04-30-22 @ 07:00  --------------------------------------------------------  IN: 0 mL / OUT: 300 mL / NET: -300 mL    04-30-22 @ 07:01  -  04-30-22 @ 18:11  --------------------------------------------------------  IN: 460 mL / OUT: 200 mL / NET: 260 mL                                             --------------------------------------------------------------  LABS:                        10.9   8.10  )-----------( 121      ( 30 Apr 2022 04:30 )             34.8     04-30    150<H>  |  110  |  56<H>  ----------------------------<  150<H>  5.3<H>   |  28  |  0.6<L>    Ca    8.2<L>      30 Apr 2022 04:30  Mg     2.4     04-30    TPro  5.8<L>  /  Alb  2.7<L>  /  TBili  0.6  /  DBili  x   /  AST  368<H>  /  ALT  234<H>  /  AlkPhos  1198<H>  04-30                                              -------------------------------------------------------------  RADIOLOGY:  KUB:  INTERPRETATION:  Clinical History / Reason for exam: Abdominal distention.    TECHNIQUE: Single abdominal x-ray obtained.    Comparison made with abdominal x-ray August 10, 2021, CT abdomen and   pelvis April 22, 2022.    FINDINGS:    Enteric tube extends below diaphragm. Moderate colonic stool burden.   Biliary stent noted. Stable osseous structures.    IMPRESSION:  Moderate colonic stool burden.    --- End of Report ---    Chest Xray:      INTERPRETATION:  CLINICAL HISTORY: Shortness of Breath.    COMPARISON: 4/26/2022.    TECHNIQUE: Portable frontal chest radiograph. Adequate positioning.    FINDINGS:    Support devices: Stable right subclavian Port-A-Cath and enteric tube   coursing below the diaphragm and off the field-of-view.    Cardiac/mediastinum/hilum: Stable enlarged cardiac silhouette.    Lung parenchyma/Pleura: Hazy bilateral lung density right greater than   left suggests layering effusions, not significantly changed.    Skeleton/soft tissues: Stable.      IMPRESSION:    No significant interval change since 4/26/2022.    --- End of Report ---                                            --------------------------------------------------------------    PHYSICAL EXAM:  GENERAL: Awake, not alert or oriented. Cachectic, laying in bed appearing in no acute distress  HEENT: atraumatic, normocephalic  LUNGS: Clear to auscultation bilaterally  HEART: S1/S2. No heaves or thrills  ABD: Soft, building ascites, non-tender  EXT/NEURO: ROM grossly intact  SKIN: RUE swelling + bruising/echymosis, improving                                           --------------------------------------------------------------    ASSESSMENT & PLAN  75 YO F with PMHx of HTN, pancreatic CA s/p Whipple 8/2021, A.Fib on Eliquis, pleural effusion was BIBEMS due to worsening SOB here for AHRF    #Acute Hypoxemic/hypercapnic Respiratory Failure  #B/l pleural effusion; R>L  #AMS likely toxic metabolic improved   #Ascites   #Pancreatic Ca  - Xray Chest: Interval increase in bilateral effusions and opacities (stable on repeat xray)   - BNP 2151, Procalcitonin .08, Ammonia level 33  - Echo (4/8/22): EF 54%, severely enlarged LA, RA. Mod-severe TR  - BCx-NGF, UCx-NGF  - Palliative rec appreciated: family meeting 4/20 at 11am. Remains fulls code for now--> DNR as of 4/27/22  - s/p Thoracentesis 4/19 drained 750ml  -s/p paracentesis 4/26 4.7L removed--> pt tolerating NC from 4/26/22  - Pleural fluid Cytology: reactive mesothelial, histiocytes, acute/chronic infl cells  - Pleural fluid analysis shows transudative fluid  -holding diuresis  -c/w lactulose 2-3BM/day     #Hypernatremia   #Decrease PO intake   - started marinol 4/20  - NGT placed on 4/21, after discussing with Fam   - Peptamen 1.5 start 125 ml over 30 min, increase to 250 ml  after paracentesis   - DO NOT try to put Creon down the NG tube  - Dr De Luna following  - psychiatry following, added Zyprexa to current regimen > d/c per family not wanting sedative meds.   - increase free water with NG feeds. Per speech re-eval can have mild thick liquids PO.     #Transaminitis, likely from hepatic congestion  - TPro  5.6<L>  /  Alb  2.9<L>  /  TBili  0.7  /  DBili  x   /  AST  64<H>  /  ALT  47<H>  /  AlkPhos  550<H>  04-19  - TPro  5.7<L>  /  Alb  2.7<L>  /  TBili  0.5  /  DBili  x   /  AST  141<H>  /  ALT  86<H>  /  AlkPhos  790<H>  04-21  - Monitor LFTs   - RUQ US Pneumobilia with perihepatic ascites.  -INR trending down with feedings, likely due to vit K deficiency from chronic malnutrition  - Consider repeat CT A/P since now s/p paracentesis     #Chronic A.Fib on Eliquis  - Continue Verapamil PRN and diltiazem CD 120mg daily  - 4/22 hold Eliquis, started on Lovenox   - Cardio recs for medication optimization     #Hx Pancreatic Ca   - s/p whipple 8/1/21 (in remission)   - c/w Creon 12K TID before meals   - Evaluated by surgery team on April 7. No further intervention   - Heme/onc recs appreciated   - CT A/P noted above was done to evaluate for pancreatic cancer, contrast was extravasated in the right upper extremity, thus study was noncon. Frequent RUE checks for compartment syndrome  - Ca-19-9 : 42  - per Dr. Nino: (surgical onc) not a recurrence of the cancer    #Misc  - DVT Prophylaxis: Lovenox  - Diet: Feeds through NGT, SLP re-eval  - GI Prophylaxis: Pantoprazole  - Activity: AAT  - Code Status: DNR, trial of intubation  - Disposition: acute   Pending:  strength/mental status assessment 4/30 for further GOC directive (no meeting with palliative occured on 4.30. F/U monday.     Per family, pt currently partial comfort, no further aggressive medical management. Pt to receive NGT feeds and re-evaluated 4/30/22 for further assessment and possible full CMO. See GOC 4/27/22  Family currently wants as little sedating medications as possible ( no IV morphine, want remeron over zyprexa for now)

## 2022-05-01 NOTE — PROGRESS NOTE ADULT - SUBJECTIVE AND OBJECTIVE BOX
GUILLERMO, VIKASH  76y  Female      Patient is a 76y old  Female who presents with a chief complaint of Shortness of breath.      INTERVAL HPI/OVERNIGHT EVENTS:      ******************************* REVIEW OF SYSTEMS:**********************************************    All other review of systems negative    *********************** VITALS ******************************************    T(F): 96.4 (05-01-22 @ 05:21)  HR: 110 (05-01-22 @ 05:21) (110 - 111)  BP: 130/75 (05-01-22 @ 05:21) (130/75 - 135/73)  RR: 19 (05-01-22 @ 05:21) (19 - 19)  SpO2: 100% (05-01-22 @ 05:21) (100% - 100%)    04-30-22 @ 07:01  -  05-01-22 @ 07:00  --------------------------------------------------------  IN: 460 mL / OUT: 500 mL / NET: -40 mL            04-30-22 @ 07:01  -  05-01-22 @ 07:00  --------------------------------------------------------  IN: 460 mL / OUT: 500 mL / NET: -40 mL        ******************************** PHYSICAL EXAM:**************************************************  GENERAL: NAD    PSYCH: no agitation, baseline mentation  HEENT:     NERVOUS SYSTEM:  Alert & Oriented X0-1    PULMONARY: KIRK, CTA    CARDIOVASCULAR: S1S2 RRR    GI: Soft, NT, ND; BS present.    EXTREMITIES:  2+ Peripheral Pulses, No clubbing, cyanosis, or edema    LYMPH: No lymphadenopathy noted    SKIN: No rashes or lesions      **************************** LABS *******************************************************                          10.9   8.55  )-----------( 117      ( 01 May 2022 04:30 )             36.5     05-01    150<H>  |  110  |  58<H>  ----------------------------<  119<H>  4.4   |  28  |  0.8    Ca    8.4<L>      01 May 2022 04:30  Phos  3.0     05-01  Mg     2.4     05-01    TPro  5.6<L>  /  Alb  2.7<L>  /  TBili  0.6  /  DBili  x   /  AST  256<H>  /  ALT  227<H>  /  AlkPhos  1425<H>  05-01          Lactate Trend        CAPILLARY BLOOD GLUCOSE      POCT Blood Glucose.: 124 mg/dL (30 Apr 2022 21:14)          **************************Active Medications *******************************************  Augmentin (Rash)  chocolate (Headache)  digoxin (Hives)  Metoprolol Succinate ER (Hives)  Novocain (Angioedema)  Steroids: Excessive swelling (Flushing; Other (Mild to Mod))  sulfa drugs (Fever)  Xarelto (Hives)      acetaminophen     Tablet .. 650 milliGRAM(s) Oral every 6 hours PRN  artificial tears (preservative free) Ophthalmic Solution 1 Drop(s) Both EYES three times a day  chlorhexidine 4% Liquid 1 Application(s) Topical <User Schedule>  dextrose 5%. 1000 milliLiter(s) IV Continuous <Continuous>  diltiazem    milliGRAM(s) Oral daily  enoxaparin Injectable 40 milliGRAM(s) SubCutaneous every 12 hours  lactulose Syrup 20 Gram(s) Oral every 12 hours  mirtazapine 15 milliGRAM(s) Oral at bedtime  pantoprazole  Injectable 40 milliGRAM(s) IV Push daily  polyethylene glycol 3350 17 Gram(s) Oral daily  senna 2 Tablet(s) Oral at bedtime  simethicone 80 milliGRAM(s) Chew three times a day PRN  verapamil 40 milliGRAM(s) Oral two times a day PRN      ***************************************************  RADIOLOGY & ADDITIONAL TESTS:    Imaging Personally Reviewed:  [ ] YES  [ ] NO    HEALTH ISSUES - PROBLEM Dx:  Pancreatic cancer    Pleural effusion    Ascites    Counseling regarding goals of care    Palliative care by specialist    Advanced care planning/counseling discussion    Dyspnea    Pain    Counseling regarding advance care planning and goals of care         Alert and oriented to person, place and time

## 2022-05-01 NOTE — PROGRESS NOTE ADULT - ASSESSMENT
77 YO F with PMHx of HTN, pancreatic CA s/p Whipple 8/2021, A.Fib on Eliquis, pleural effusion was BIBEMS due to worsening SOB    Acute Hypoxemic Respiratory Failure.   b/l Pleural effusions with ascites  Pancreatic Ca s/p Whipple 8/21  Transaminitis  - s/p NRB >> now on 6L nasal cannula >> Titrate down as tolerated.   - s/p Thoracentesis 4/19 with 750 cc fluid removed, transudative  - repeat CT noted, massive ascites--> IR for therapeutic paracentesis on 4/26  - s/p lasix  - cardiology eval appreciated, diuresis as tolerated  - discussed with Dr. Knight, feeds adjusted, Creon currently on hold as cannot be given through NGT  - resume if patient more awake/able to tolerate PO  - Ammonia noted to be elevated, on lactulose, titrate to 2-3 bm daily, CTH negative   - Extensive conversation with patient's daughterlian  on 4/30  >>>> - want to continue ngt feeds, resume blood work.    and also wants  brief trial of IVF.  - monitor LFT's    BIlateral UE edema, from infiltrated IVs and contrast extravasation  -stable; continue to monitor     Chronic A.Fib on Eliquis  - rate control with diltiazem   - continue lovenox    # Hypernatremia >> Add more free water, Added IVF  D5w @75 x 1day.     Poor PO intake  Severe malnutrition  History of eating disorder  started marinol 4/20, now off  NGT placed, tolerating feeds, encourage PO intake as well if awake and appropriate to eat as per speech and swallow  Dr. Syed following  psychiatry following, added Zyprexa to current regimen - pt's daughter asked to stop it   pt's family requested to stop remeron on 4/29 and to resume.  leaning towards PEG placement next week  failed speech and swallow on 4/28- family asking for follow up    GOC - DNR  overall grave prognosis    Progress Note Handoff  Pending:  Clinical stability / ? PEG /   Family Discussion: extensive discussion with pt's daughterLian   Not ready for CMO.   Wish to continue blood work and possible PEG placement.   Disposition:  Unknown at this time.    Plan d/w the Surgical Oncology Dr Nino.

## 2022-05-02 NOTE — PROGRESS NOTE ADULT - ASSESSMENT
75 YO F with PMHx of HTN, pancreatic CA s/p Whipple 8/2021, A.Fib on Eliquis, pleural effusion was BIBEMS from [facility] due to worsening SOB; Patient had recent admission from [facility] and had thoracentesis done admitted has been overall clinically declining. Had had NG feeding for several days with family reporting periods of lucidity- but not enough to have confidence that she can engage in complex decision-making/     Last week had extensive GOC and plan was to revisit plan on 4/30;     being followed for support with GOC       MEDD (morphine equivalent daily dose): na      See Recs below. d/w Primary and Palliative teams       Please call [phone] with questions or concerns 24/7.   We will continue to follow.

## 2022-05-02 NOTE — PROGRESS NOTE ADULT - SUBJECTIVE AND OBJECTIVE BOX
VIKASH LI          MRN-[id number]              HPI:  75 YO F with PMHx of HTN, pancreatic CA s/p Whipple 8/2021, A.Fib on Eliquis, pleural effusion was BIBEMS due to worsening SOB that started earlier on the morning of presentation  As per EMS, patient was saturating in the 70s on room air and was placed on NRB w/ sats improving to high 80s. Patient reports that she lives with her son and started noticing shortness of breath.   Patient had recent admission from [facility] and had thoracentesis done.  Patient denies fevers, chills, headache, chest pain, palpitations, constipation, melena, hematochezia, dysuria, urinary frequency or urgency, numbness, tingling, recent travel or any known sick contact.   In the ED patient was placed on non rebreather with SpO2 of 94%. .  X-ray chest showed Interval increase in bilateral effusions and opacities. (18 Apr 2022 11:47)    ROS:	    4/21: Unable to full attain due to:  patient is lethargic and not able to sustain interaction; after encounter DgtMaddie at bedside with her  - reinforced offer to have family meeting  4/25: interim: at [facility] noted with subsequent vascular consult for R arm and disimpaction; no family at bedside; patient non-responsive; hypotensive; now has NG tube  4/26: patient declining, did not appear in pain during my evaluation  4/27: patient had episodes of rest with agitation - soothed with non-pharmacological intervention; not making meaningful verbal discussion   4/28: patient not agitated, doesn't appear in pain  4/29: patient confused but calm, not in any apparentl pain  4/30: seen x 2 -1) son Gabe at bedside: introduced Pallative care and said would be back when other family present; he had no questions; I asked RN to verify NG placement: patient moaned, soothed with forhead stroke; NAD 2) sitting upright in bed - NAD did not stimulated; dgt Maddie and her  in hallway - verbalized with loud voice and pointing finger - not wanting Palliative Care to continue following at this time as wanted to continue life-prolonging treatments                    Dyspnea (Salima 0-10): 0                       N/V (Y/N): No                             Secretions (Y/N) : No                                          Agitation(Y/N): above                             Pain (Y/N): No (PainAD 0)                                -Provocation/Palliation: N/A  -Quality/Quantity: N/A  -Radiating: N/A  -Severity: No pain  -Timing/Frequency: N/A  -Impact on ADLs: N/A    General:   see above  HEENT:   no nonverbal indications    Neck:   no nonverbal indications  CVS:   no nonverbal indications  Resp:   no nonverbal indications  GI:   no nonverbal indications  :   no nonverbal indications  Musc:   no nonverbal indications  Neuro:   no nonverbal indications  Psych:  no nonverbal indications  Skin:   no nonverbal indications  Lymph:   no nonverbal indications    Last BM: 5/1 emr      Allergies    Augmentin (Rash)  chocolate (Headache)  digoxin (Hives)  Metoprolol Succinate ER (Hives)  Novocain (Angioedema)  Steroids: Excessive swelling (Flushing; Other (Mild to Mod))  sulfa drugs (Fever)  Xarelto (Hives)    Intolerances      Opiate Naive (Y/N): Y  -iStop reviewed (Y/N): Y   Ref#:     This report was requested by: Yuki Nolan | Reference #: [id number]             Labs:	  reviewed                      Vital Signs Last 24 Hrs  T(C): 35.8 (02 May 2022 14:00), Max: 36.2 (02 May 2022 05:10)  T(F): 96.4 (02 May 2022 14:00), Max: 97.1 (02 May 2022 05:10)  HR: 102 (02 May 2022 14:00) (102 - 117)  BP: 126/88 (02 May 2022 14:00) (125/93 - 126/88)  BP(mean): --  RR: 18 (02 May 2022 14:00) (18 - 19)  SpO2: 99% (02 May 2022 14:00) (99% - 100%)    Labs    05-01    150<H>  |  110  |  58<H>  ----------------------------<  119<H>  4.4   |  28  |  0.8    Ca    8.4<L>      01 May 2022 04:30  Phos  3.0     05-01  Mg     2.4     05-01    TPro  5.6<L>  /  Alb  2.7<L>  /  TBili  0.6  /  DBili  x   /  AST  256<H>  /  ALT  227<H>  /  AlkPhos  1425<H>  05-01                            10.9   8.55  )-----------( 117      ( 01 May 2022 04:30 )             36.5                   Radiology:	     < from: Xray Kidney Ureter Bladder (04.30.22 @ 13:47) >    ACC: [id number] EXAM:  XR KUB 1 VIEW                          PROCEDURE DATE:  04/30/2022          INTERPRETATION:  Clinical History / Reason for exam: Abdominal distention.    TECHNIQUE: Single abdominal x-ray obtained.    Comparison made with abdominal x-ray August 10, 2021, CT abdomen and   pelvis April 22, 2022.    FINDINGS:    Enteric tube extends below diaphragm. Moderate colonic stool burden.   Biliary stent noted. Stable osseous structures.    IMPRESSION:  Moderate colonic stool burden.    --- End of Report ---            BRISA ZAMORA MD; Attending Radiologist  This document has been electronically signed. Apr 30 2022  4:50PM    < end of copied text >      < from: Xray Chest 1 View- PORTABLE-Urgent (Xray Chest 1 View- PORTABLE-Urgent .) (04.19.22 @ 15:55) >  ACC: [id number] EXAM:  XR CHEST PORTABLE URGENT 1V                          < from: Xray Chest 1 View- PORTABLE-Urgent (Xray Chest 1 View- PORTABLE-Urgent .) (04.26.22 @ 17:02) >  Impression:    Cardiomegaly with bilateral effusions. Support devices as described.    Follow-up as needed.    --- End of Report ---            SERA GANT MD; Attending Interventional Radiologist  This document has been electronically signed. Apr 27 2022  7:50AM    < end of copied text >  PROCEDURE DATE:  04/19/2022          INTERPRETATION:  Clinical History / Reason for exam: Follow-up.    Comparison : Chest radiograph April 18, 2022.    Technique/Positioning: Adequate.    Findings:    Support devices: Right-sided Port-A-Cath with its tip overlying the SVC.    Cardiac/mediastinum/hilum: Cardiomegaly, unchanged.    Lung parenchyma/Pleura: Improved right lung opacity and stable left lung   opacity. No pneumothorax is seen.    Skeleton/soft tissues: Stable.               Impression:    Cardiomegaly, unchanged.    Improved right lung opacity and stable left lung opacity.    Right-sided Port-A-Cath.    --- End of Report ---            GRUPO MALDONADO MD; Attending Radiologist  This document has been electronically signed. Apr 19 2022  5:04PM    < end of copied text >          EKG:	    < from: 12 Lead ECG (04.23.22 @ 07:54) >  Ventricular Rate 96 BPM    Atrial Rate 277 BPM    QRS Duration 86 ms    Q-T Interval 400 ms    QTC Calculation(Bazett) 505 ms    R Axis 98 degrees    T Axis 253 degrees    Diagnosis Line Atrial fibrillation  Rightward axis  ST & T wave abnormality,consider inferior ischemia  ST & T wave abnormality, consider anterolateral ischemia  Abnormal ECG    Confirmed by JOSEFA CHILDS MD ([id number]) on 4/23/2022 10:38:13 PM    < end of copied text >    < from: 12 Lead ECG (04.05.22 @ 17:56) >    Ventricular Rate 91 BPM    Atrial Rate 37 BPM    QRS Duration 82 ms    Q-T Interval 360 ms    QTC Calculation(Bazett) 442 ms    R Axis 117 degrees    T Axis -62 degrees    Diagnosis Line Atrial fibrillation  Left posterior fascicular block  T wave abnormality, consider lateral ischemia  Abnormal ECG    Confirmed by JOSEFA CHILDS MD [id number]) on 4/6/2022 10:08:26 AM    < end of copied text >    < from: 12 Lead ECG (04.18.22 @ 10:08) >    Ventricular Rate 117 BPM    Atrial Rate 115 BPM    QRS Duration 82 ms    Q-T Interval 356 ms    QTC Calculation(Bazett) 496 ms    R Axis 79 degrees    T Axis 219 degrees    Diagnosis Line Atrial fibrillation with rapid ventricular response  Low voltage QRS  ST & T wave abnormality, consider lateral ischemia  Abnormal ECG    Confirmed by Torri Collins MD ([id number]) on 4/18/2022 4:40:35 PM    < end of copied text >      Imaging Personally Reviewed:  [ ] xYES  [ ] NO    Consultant(s) Notes Reviewed:  [x ] YES  [ ] NO  Care Discussed with Consultants/Other Providers [x ] YES  [ ] NO    PEx:	  T(C): 35.8 (05-02-22 @ 14:00), Max: 36.2 (05-02-22 @ 05:10)  T(F): 96.4 (05-02-22 @ 14:00), Max: 97.1 (05-02-22 @ 05:10)  HR: 102 (05-02-22 @ 14:00) (102 - 117)  BP: 126/88 (05-02-22 @ 14:00) (125/93 - 126/88)  RR: 18 (05-02-22 @ 14:00) (18 - 19)  SpO2: 99% (05-02-22 @ 14:00) (99% - 100%)  Wt(kg): --    General:  NAD; frail   + NG ;   Eyes: clear conjunctiva  ENMT: oral mucosa dry   Resp: Unlabored Non tachypneic nc O2  Neuro: calm (moaning at times - see above)   Psych: currently calm  Skin: nonjaundiced; pale     Preadmit Karnofsky:  %           Current Karnofsky:  20   %  http://www.npcrc.org/files/news/karnofsky_performance_scale.pdf   http://[email or web address].org/files/news/palliative_performance_scale_PPSv2.pdf  Cachexia (Y/N): Y - severe  BMI: BMI (kg/m2): 12.2 (04-18-22 @ 08:41)      Medications:	      MEDICATIONS  (STANDING):  artificial tears (preservative free) Ophthalmic Solution 1 Drop(s) Both EYES three times a day  chlorhexidine 4% Liquid 1 Application(s) Topical <User Schedule>  dextrose 5%. 1000 milliLiter(s) (75 mL/Hr) IV Continuous <Continuous>  diltiazem    milliGRAM(s) Oral daily  enoxaparin Injectable 40 milliGRAM(s) SubCutaneous every 12 hours  lactulose Syrup 20 Gram(s) Oral every 12 hours  mirtazapine 15 milliGRAM(s) Oral at bedtime  pantoprazole  Injectable 40 milliGRAM(s) IV Push daily  polyethylene glycol 3350 17 Gram(s) Oral daily  senna 2 Tablet(s) Oral at bedtime    MEDICATIONS  (PRN):  acetaminophen     Tablet .. 650 milliGRAM(s) Oral every 6 hours PRN Temp greater or equal to 38C (100.4F), Mild Pain (1 - 3)  simethicone 80 milliGRAM(s) Chew three times a day PRN Gas  verapamil 40 milliGRAM(s) Oral two times a day PRN tachycardia faster tahn 110 beats/ minute      Advanced Directives:	     Full Code      Decision maker: The patient is unable to participate in complex medical decision making conversations    Legal surrogate: daughters    GOALS OF CARE DISCUSSION	  	           Documentation of GOC/Advanced Care Planning see prior       PSYCHOSOCIAL-SPIRITUAL ASSESSMENT:            See Palliative Care SW/ documentation        	    REFERRALS       Palliative Med        Unit SW/Case Mgmt

## 2022-05-02 NOTE — PROGRESS NOTE ADULT - SUBJECTIVE AND OBJECTIVE BOX
Patient seen and examined at bedside. Pt reports to be feeling *** overall. Pt denies *** n/v, fever, abd pain      PHYSICAL EXAM:  GENERAL: NAD, well-groomed, well-developed  HEENT: Moist mucous membranes, Good dentition, No lesions  NECK: Supple, No JVD  ABDOMEN: Soft, Nontender, Nondistended  EXTREMITIES:  No clubbing, cyanosis, or edema  SKIN: well perfused; No obvious rashes or lesions  Vitals:T(C): 36.2 (05-02-22 @ 05:10), Max: 36.2 (05-02-22 @ 05:10)  HR: 104 (05-02-22 @ 07:55) (100 - 117)  BP: 125/93 (05-02-22 @ 05:10) (125/93 - 132/78)  RR: 19 (05-02-22 @ 05:10) (14 - 19)  SpO2: 99% (05-02-22 @ 05:10) (99% - 100%)  I&Os:  05-01-22 @ 07:01  -  05-02-22 @ 07:00  --------------------------------------------------------  IN: 1135 mL / OUT: 700 mL / NET: 435 mL       Labs:                          10.9   8.55  )-----------( 117      ( 01 May 2022 04:30 )             36.5       150<H>  |  110  |  58<H>  ----------------------------<  119<H>  4.4   |  28  |  0.8    Ca    8.4<L>      01 May 2022 04:30  Phos  3.0     05-01  Mg     2.4     05-01    TPro  5.6<L>  /  Alb  2.7<L>  /  TBili  0.6  /  DBili  x   /  AST  256<H>  /  ALT  227<H>  /  AlkPhos  1425<H>  05-01        Phosphorus: T(C): 36.2 (05-02-22 @ 05:10), Max: 36.2 (05-02-22 @ 05:10)  HR: 104 (05-02-22 @ 07:55) (100 - 117)  BP: 125/93 (05-02-22 @ 05:10) (125/93 - 132/78)  RR: 19 (05-02-22 @ 05:10) (14 - 19)  SpO2: 99% (05-02-22 @ 05:10) (99% - 100%)    Meds:  acetaminophen     Tablet .. 650 milliGRAM(s) Oral every 6 hours PRN  artificial tears (preservative free) Ophthalmic Solution 1 Drop(s) Both EYES three times a day  chlorhexidine 4% Liquid 1 Application(s) Topical <User Schedule>  dextrose 5%. 1000 milliLiter(s) IV Continuous <Continuous>  diltiazem    milliGRAM(s) Oral daily  enoxaparin Injectable 40 milliGRAM(s) SubCutaneous every 12 hours  lactulose Syrup 20 Gram(s) Oral every 12 hours  mirtazapine 15 milliGRAM(s) Oral at bedtime  pantoprazole  Injectable 40 milliGRAM(s) IV Push daily  polyethylene glycol 3350 17 Gram(s) Oral daily  senna 2 Tablet(s) Oral at bedtime  simethicone 80 milliGRAM(s) Chew three times a day PRN  verapamil 40 milliGRAM(s) Oral two times a day PRN      Diet:  Diet, NPO with Tube Feed:   Tube Feeding Modality: Nasogastric  Jevity 1.2 Amos  Total Volume for 24 Hours (mL): 1080  Bolus  Total Volume of Bolus (mL):  360  Tube Feed Frequency: Every 8 hours   Tube Feed Start Time: 15:00  Bolus Feed Rate (mL per Hour): 500   Bolus Feed Duration (in Hours): 0.75  Free Water Flush Instructions:  50 ml water syringe push before and after each feed (04-25-22 @ 14:38)       Patient seen and examined at bedside. Pt's son at bedside. Last BM: this AM. Awaiting discussion this week b/w palliative and pt's family regarding GOC     PHYSICAL EXAM:  GENERAL: Sleeping in bed, but arousable when awakened; nonverbal; appears comfortable and in no obvious acute distress. (+) NG tube in place; (+) Cachexic-appearing  HEENT:Dry  NECK: Supple  ABDOMEN: Soft, Nontender, Nondistended  EXTREMITIES:  No obvious clubbing or cyanosis,  SKIN: warm  Vitals:T(C): 36.2 (05-02-22 @ 05:10), Max: 36.2 (05-02-22 @ 05:10)  HR: 104 (05-02-22 @ 07:55) (100 - 117)  BP: 125/93 (05-02-22 @ 05:10) (125/93 - 132/78)  RR: 19 (05-02-22 @ 05:10) (14 - 19)  SpO2: 99% (05-02-22 @ 05:10) (99% - 100%)       Labs:                          10.9   8.55  )-----------( 117      ( 01 May 2022 04:30 )             36.5       150<H>  |  110  |  58<H>  ----------------------------<  119<H>  4.4   |  28  |  0.8    Ca    8.4<L>      01 May 2022 04:30  Phos  3.0     05-01  Mg     2.4     05-01    Meds:  acetaminophen     Tablet .. 650 milliGRAM(s) Oral every 6 hours PRN  artificial tears (preservative free) Ophthalmic Solution 1 Drop(s) Both EYES three times a day  chlorhexidine 4% Liquid 1 Application(s) Topical <User Schedule>  dextrose 5%. 1000 milliLiter(s) IV Continuous <Continuous>  diltiazem    milliGRAM(s) Oral daily  enoxaparin Injectable 40 milliGRAM(s) SubCutaneous every 12 hours  lactulose Syrup 20 Gram(s) Oral every 12 hours  mirtazapine 15 milliGRAM(s) Oral at bedtime  pantoprazole  Injectable 40 milliGRAM(s) IV Push daily  polyethylene glycol 3350 17 Gram(s) Oral daily  senna 2 Tablet(s) Oral at bedtime  simethicone 80 milliGRAM(s) Chew three times a day PRN  verapamil 40 milliGRAM(s) Oral two times a day PRN      Diet:  Diet, NPO with Tube Feed:   Tube Feeding Modality: Nasogastric  Jevity 1.2 Amos  Total Volume for 24 Hours (mL): 1080  Bolus  Total Volume of Bolus (mL):  360  Tube Feed Frequency: Every 8 hours   Tube Feed Start Time: 15:00  Bolus Feed Rate (mL per Hour): 500   Bolus Feed Duration (in Hours): 0.75  Free Water Flush Instructions:  50 ml water syringe push before and after each feed (04-25-22 @ 14:38)       Patient seen and examined at bedside. Pt's son at bedside. Last BM: this AM. Awaiting discussion this week b/w palliative and pt's family regarding GOC     PHYSICAL EXAM:  GENERAL: Sleeping in bed, but arousable when awakened; nonverbal; appears comfortable and in no obvious acute distress. (+) NG tube in place; (+) Cachexic-appearing  HEENT:Dry mucous membranes  NECK: Supple  ABDOMEN: Soft, Nontender, Nondistended  EXTREMITIES:  No obvious clubbing  SKIN: warm  Vitals:T(C): 36.2 (05-02-22 @ 05:10), Max: 36.2 (05-02-22 @ 05:10)  HR: 104 (05-02-22 @ 07:55) (100 - 117)  BP: 125/93 (05-02-22 @ 05:10) (125/93 - 132/78)  RR: 19 (05-02-22 @ 05:10) (14 - 19)  SpO2: 99% (05-02-22 @ 05:10) (99% - 100%)       Labs:                          10.9   8.55  )-----------( 117      ( 01 May 2022 04:30 )             36.5     150<H>  |  110  |  58<H>  ----------------------------<  119<H>  4.4   |  28  |  0.8    Ca    8.4<L>      01 May 2022 04:30  Phos  3.0     05-01  Mg     2.4     05-01    Meds:  acetaminophen     Tablet .. 650 milliGRAM(s) Oral every 6 hours PRN  artificial tears (preservative free) Ophthalmic Solution 1 Drop(s) Both EYES three times a day  chlorhexidine 4% Liquid 1 Application(s) Topical <User Schedule>  dextrose 5%. 1000 milliLiter(s) IV Continuous <Continuous>  diltiazem    milliGRAM(s) Oral daily  enoxaparin Injectable 40 milliGRAM(s) SubCutaneous every 12 hours  lactulose Syrup 20 Gram(s) Oral every 12 hours  mirtazapine 15 milliGRAM(s) Oral at bedtime  pantoprazole  Injectable 40 milliGRAM(s) IV Push daily  polyethylene glycol 3350 17 Gram(s) Oral daily  senna 2 Tablet(s) Oral at bedtime  simethicone 80 milliGRAM(s) Chew three times a day PRN  verapamil 40 milliGRAM(s) Oral two times a day PRN      Diet:  Diet, NPO with Tube Feed:   Tube Feeding Modality: Nasogastric  Jevity 1.2 Amos  Total Volume for 24 Hours (mL): 1080  Bolus  Total Volume of Bolus (mL):  360  Tube Feed Frequency: Every 8 hours   Tube Feed Start Time: 15:00  Bolus Feed Rate (mL per Hour): 500   Bolus Feed Duration (in Hours): 0.75  Free Water Flush Instructions:  50 ml water syringe push before and after each feed (04-25-22 @ 14:38)

## 2022-05-02 NOTE — PROGRESS NOTE ADULT - PROBLEM SELECTOR PLAN 6
attempted to revisit GOC - family did not want palliative involvement to continue at this time  signing off

## 2022-05-02 NOTE — PROGRESS NOTE ADULT - PROBLEM SELECTOR PLAN 1
- not on recent DDT  - oncology consult not offering and recommend  focus on symptom/comfort; family stated she would not want chemo/radiation  - ongoing supportive care  - with constipation       consider adding dulcoloax supp daily if no BM/24H

## 2022-05-02 NOTE — PROGRESS NOTE ADULT - PROBLEM SELECTOR PLAN 4
improved s/p paracentesis  prior morphine 1mg IV last 4/29; currently not ordered  none noted during encounter

## 2022-05-02 NOTE — PROGRESS NOTE ADULT - SUBJECTIVE AND OBJECTIVE BOX
VIKASH LI 76y Female  MRN#: [id number]   CODE STATUS:    Hospital Day: 14d    SUBJECTIVE  Hospital Course  75 YO F      PMHx of HTN, pancreatic CA s/p Whipple 8/2021, A.Fib on Eliquis, pleural effusion     was BIBEMS due to worsening SOB      started earlier on the morning of presentation  As per EMS, patient was saturating in the 70s on room air and was placed on NRB w/ sats improving to high 80s. Patient reports that she lives with her son and started noticing shortness of breath.   Patient had recent admission from [facility] and had thoracentesis done  Patient denies fevers, chills, headache, chest pain, palpitations, constipation, melena, hematochezia, dysuria, urinary frequency or urgency, numbness, tingling, recent travel or any known sick contact.   In the ED patient was placed on non rebreather with SpO2 of 94%. .  X-ray chest showed Interval increase in bilateral effusions and opacities.    Hospital Course:  Pt admitted for respiratory failure, hospital stay complicated by AMS, ascites with transaminitis, failure to thrive. Pt found with mild volume overload s/p thoracentesis with findings of infl cells (no malignant, no microbials). Pt noted to have tense ascites s/p therapeutic paracentesis. Pt with AMS, no baseline dementia per family but unclear etiology. Assumed hepatic encephalopathy given mildly elevated ammonia + new onset ascites (cirrhosis?). Differential for ascites includes whipple complications, intra-hepatic primary/metastatic cancer, carcinomatosis. Pt received therapeutic paracentesis, fluid not sent as family moving towards CMO for patient. In general, pt has been declining for years. Pt has minimal PO intake at baseline for past years and requires NGT feedings due initially to lack of ability to intake caloric sufficiency per PO, now requiring due to lack of mental status. Pt family believes sedating medications are cause +/- ammonia level and that NGT feeds are making her stronger. After much conversation, family moving towards re-eval 4/30/22 for possible CMO. Pt made DNR per her wishes, intubation wishes unknown but pt will receive trial of intubation if necessary. Family initial held bloodwork to allow her to be comfortable, now want daily blood draws for further ammonia monitoring. Family aware ammonia level not significant enough to cause this extent of mental status alteration but still wish to continue. Family understands further aggressive medical management is unlikely to give her quality of life but wish to continue monitoring with medical management.    **FOLLOW UP**  1. ascites- no fluid sent, pt begins to regain fluid quickly. If family wishes pt to receive another paracentesis for diagnostic/therapeutic reasons, consider pleurX as ascites appears chronic at this point  2. Further GOC- Pt daughter (aMddie) is HCP. Maddie recently had to extubate her sister for terminal illness, is very well aware of comfort measures and the lack of usefulness of aggressive medical measures. She understands the situation pt is in well. Following extensive GOC conversation 4/27/22 with 5 family members, likely course of illness explained and risks vs benefits of further work up explained and understood. Family initially moving towards more palliative measure, but are now interested in daily blood draws for further monitoring.  3. repeat SLP eval- swallow eval 4/29/22 with family at bedside if pt more awake  4. GOC meeting 4/30/22. Family concerned about feeds. Stated to family on 4/27/22 that we will continue NGT feeds for an entire week (until 4/30/22) and re-assess her strength. ***DO NOT GIVE SEDATING MEDICATIONS***. Family believes her lack of ability to eat is due to sedation.                                                ----------------------------------------------------------  OBJECTIVE  PAST MEDICAL & SURGICAL HISTORY  SOB (shortness of breath)    Pain  knee    Varicose veins of both lower extremities, unspecified whether complicated    Atrial fibrillation, unspecified type  2019 on Eliquis    Jaundice  March 5, 2021    Malignant neoplasm of pancreas, unspecified location of malignancy  Pancreatic Cancer    Hypertension, unspecified type  2019    Pancreatic cancer    Kidney stones    History of surgery  Whipple procedure 8/2/2021 with Dr. Nino at [facility]    Status post phlebectomy  10/2018    History of ovarian cystectomy  left    History of tonsillectomy  1959    Kidney stone  2017                                              -----------------------------------------------------------  ALLERGIES:  Augmentin (Rash)  chocolate (Headache)  digoxin (Hives)  Metoprolol Succinate ER (Hives)  Novocain (Angioedema)  Steroids: Excessive swelling (Flushing; Other (Mild to Mod))  sulfa drugs (Fever)  Xarelto (Hives)                                            ------------------------------------------------------------    HOME MEDICATIONS  Home Medications:  Cardizem  mg/24 hours oral capsule, extended release: 1 cap(s) orally once a day (22 Nov 2021 23:59)  Creon 12,000 units oral delayed release capsule: 2 cap(s) orally 3 times a day (with meals) (03 Dec 2021 10:54)  Eliquis 5 mg oral tablet: 1 tab(s) orally 2 times a day (03 Sep 2021 23:41)                           MEDICATIONS:  STANDING MEDICATIONS  artificial tears (preservative free) Ophthalmic Solution 1 Drop(s) Both EYES three times a day  chlorhexidine 4% Liquid 1 Application(s) Topical <User Schedule>  dextrose 5%. 1000 milliLiter(s) IV Continuous <Continuous>  diltiazem    milliGRAM(s) Oral daily  enoxaparin Injectable 40 milliGRAM(s) SubCutaneous every 12 hours  lactulose Syrup 20 Gram(s) Oral every 12 hours  mirtazapine 15 milliGRAM(s) Oral at bedtime  pantoprazole  Injectable 40 milliGRAM(s) IV Push daily  polyethylene glycol 3350 17 Gram(s) Oral daily  senna 2 Tablet(s) Oral at bedtime    PRN MEDICATIONS  acetaminophen     Tablet .. 650 milliGRAM(s) Oral every 6 hours PRN  simethicone 80 milliGRAM(s) Chew three times a day PRN  verapamil 40 milliGRAM(s) Oral two times a day PRN                                            ------------------------------------------------------------  VITAL SIGNS: Last 24 Hours  T(C): 35.9 (01 May 2022 19:56), Max: 35.9 (01 May 2022 19:56)  T(F): 96.6 (01 May 2022 19:56), Max: 96.6 (01 May 2022 19:56)  HR: 105 (01 May 2022 19:56) (100 - 110)  BP: 126/86 (01 May 2022 19:56) (126/86 - 132/78)  BP(mean): --  RR: 18 (01 May 2022 19:56) (14 - 19)  SpO2: 100% (01 May 2022 19:56) (99% - 100%)      04-30-22 @ 07:01  -  05-01-22 @ 07:00  --------------------------------------------------------  IN: 460 mL / OUT: 500 mL / NET: -40 mL    05-01-22 @ 07:01  -  05-02-22 @ 05:12  --------------------------------------------------------  IN: 1135 mL / OUT: 700 mL / NET: 435 mL                                             --------------------------------------------------------------  LABS:                        10.9   8.55  )-----------( 117      ( 01 May 2022 04:30 )             36.5     05-01    150<H>  |  110  |  58<H>  ----------------------------<  119<H>  4.4   |  28  |  0.8    Ca    8.4<L>      01 May 2022 04:30  Phos  3.0     05-01  Mg     2.4     05-01    TPro  5.6<L>  /  Alb  2.7<L>  /  TBili  0.6  /  DBili  x   /  AST  256<H>  /  ALT  227<H>  /  AlkPhos  1425<H>  05-01      PHYSICAL EXAM:  General:   HEENT:  LUNGS:  HEART:  ABDOMEN:  EXT:  NEURO:  SKIN:    Subjective complaints :                                                                                     VIKASH LI 76y Female  MRN#: [id number]   CODE STATUS:    Hospital Day: 14d    SUBJECTIVE  Hospital Course  75 YO F      PMHx of HTN, pancreatic CA s/p Whipple 8/2021, A.Fib on Eliquis, pleural effusion     was BIBEMS due to worsening SOB      started earlier on the morning of presentation  As per EMS, patient was saturating in the 70s on room air and was placed on NRB w/ sats improving to high 80s. Patient reports that she lives with her son and started noticing shortness of breath.   Patient had recent admission from [facility] and had thoracentesis done  Patient denies fevers, chills, headache, chest pain, palpitations, constipation, melena, hematochezia, dysuria, urinary frequency or urgency, numbness, tingling, recent travel or any known sick contact.   In the ED patient was placed on non rebreather with SpO2 of 94%. .  X-ray chest showed Interval increase in bilateral effusions and opacities.    Hospital Course:  Pt admitted for respiratory failure, hospital stay complicated by AMS, ascites with transaminitis, failure to thrive. Pt found with mild volume overload s/p thoracentesis with findings of infl cells (no malignant, no microbials). Pt noted to have tense ascites s/p therapeutic paracentesis. Pt with AMS, no baseline dementia per family but unclear etiology. Assumed hepatic encephalopathy given mildly elevated ammonia + new onset ascites (cirrhosis?). Differential for ascites includes whipple complications, intra-hepatic primary/metastatic cancer, carcinomatosis. Pt received therapeutic paracentesis, fluid not sent as family moving towards CMO for patient. In general, pt has been declining for years. Pt has minimal PO intake at baseline for past years and requires NGT feedings due initially to lack of ability to intake caloric sufficiency per PO, now requiring due to lack of mental status. Pt family believes sedating medications are cause +/- ammonia level and that NGT feeds are making her stronger. After much conversation, family moving towards re-eval 4/30/22 for possible CMO. Pt made DNR per her wishes, intubation wishes unknown but pt will receive trial of intubation if necessary. Family initial held bloodwork to allow her to be comfortable, now want daily blood draws for further ammonia monitoring. Family aware ammonia level not significant enough to cause this extent of mental status alteration but still wish to continue. Family understands further aggressive medical management is unlikely to give her quality of life but wish to continue monitoring with medical management.    **FOLLOW UP**  1. ascites- no fluid sent, pt begins to regain fluid quickly. If family wishes pt to receive another paracentesis for diagnostic/therapeutic reasons, consider pleurX as ascites appears chronic at this point  2. Further GOC- Pt daughter (Maddie) is HCP. Maddie recently had to extubate her sister for terminal illness, is very well aware of comfort measures and the lack of usefulness of aggressive medical measures. She understands the situation pt is in well. Following extensive GOC conversation 4/27/22 with 5 family members, likely course of illness explained and risks vs benefits of further work up explained and understood. Family initially moving towards more palliative measure, but are now interested in daily blood draws for further monitoring.  3. repeat SLP eval- swallow eval 4/29/22 with family at bedside if pt more awake  4. GOC meeting 4/30/22. Family concerned about feeds. Stated to family on 4/27/22 that we will continue NGT feeds for an entire week (until 4/30/22) and re-assess her strength. ***DO NOT GIVE SEDATING MEDICATIONS***. Family believes her lack of ability to eat is due to sedation.                                                ----------------------------------------------------------  OBJECTIVE  PAST MEDICAL & SURGICAL HISTORY  SOB (shortness of breath)    Pain  knee    Varicose veins of both lower extremities, unspecified whether complicated    Atrial fibrillation, unspecified type  2019 on Eliquis    Jaundice  March 5, 2021    Malignant neoplasm of pancreas, unspecified location of malignancy  Pancreatic Cancer    Hypertension, unspecified type  2019    Pancreatic cancer    Kidney stones    History of surgery  Whipple procedure 8/2/2021 with Dr. Nino at [facility]    Status post phlebectomy  10/2018    History of ovarian cystectomy  left    History of tonsillectomy  1959    Kidney stone  2017                                              -----------------------------------------------------------  ALLERGIES:  Augmentin (Rash)  chocolate (Headache)  digoxin (Hives)  Metoprolol Succinate ER (Hives)  Novocain (Angioedema)  Steroids: Excessive swelling (Flushing; Other (Mild to Mod))  sulfa drugs (Fever)  Xarelto (Hives)                                            ------------------------------------------------------------    HOME MEDICATIONS  Home Medications:  Cardizem  mg/24 hours oral capsule, extended release: 1 cap(s) orally once a day (22 Nov 2021 23:59)  Creon 12,000 units oral delayed release capsule: 2 cap(s) orally 3 times a day (with meals) (03 Dec 2021 10:54)  Eliquis 5 mg oral tablet: 1 tab(s) orally 2 times a day (03 Sep 2021 23:41)                           MEDICATIONS:  STANDING MEDICATIONS  artificial tears (preservative free) Ophthalmic Solution 1 Drop(s) Both EYES three times a day  chlorhexidine 4% Liquid 1 Application(s) Topical <User Schedule>  dextrose 5%. 1000 milliLiter(s) IV Continuous <Continuous>  diltiazem    milliGRAM(s) Oral daily  enoxaparin Injectable 40 milliGRAM(s) SubCutaneous every 12 hours  lactulose Syrup 20 Gram(s) Oral every 12 hours  mirtazapine 15 milliGRAM(s) Oral at bedtime  pantoprazole  Injectable 40 milliGRAM(s) IV Push daily  polyethylene glycol 3350 17 Gram(s) Oral daily  senna 2 Tablet(s) Oral at bedtime    PRN MEDICATIONS  acetaminophen     Tablet .. 650 milliGRAM(s) Oral every 6 hours PRN  simethicone 80 milliGRAM(s) Chew three times a day PRN  verapamil 40 milliGRAM(s) Oral two times a day PRN                                            ------------------------------------------------------------  VITAL SIGNS: Last 24 Hours  T(C): 35.9 (01 May 2022 19:56), Max: 35.9 (01 May 2022 19:56)  T(F): 96.6 (01 May 2022 19:56), Max: 96.6 (01 May 2022 19:56)  HR: 105 (01 May 2022 19:56) (100 - 110)  BP: 126/86 (01 May 2022 19:56) (126/86 - 132/78)  BP(mean): --  RR: 18 (01 May 2022 19:56) (14 - 19)  SpO2: 100% (01 May 2022 19:56) (99% - 100%)      04-30-22 @ 07:01  -  05-01-22 @ 07:00  --------------------------------------------------------  IN: 460 mL / OUT: 500 mL / NET: -40 mL    05-01-22 @ 07:01  -  05-02-22 @ 05:12  --------------------------------------------------------  IN: 1135 mL / OUT: 700 mL / NET: 435 mL                                             --------------------------------------------------------------  LABS:                        10.9   8.55  )-----------( 117      ( 01 May 2022 04:30 )             36.5     05-01    150<H>  |  110  |  58<H>  ----------------------------<  119<H>  4.4   |  28  |  0.8    Ca    8.4<L>      01 May 2022 04:30  Phos  3.0     05-01  Mg     2.4     05-01    TPro  5.6<L>  /  Alb  2.7<L>  /  TBili  0.6  /  DBili  x   /  AST  256<H>  /  ALT  227<H>  /  AlkPhos  1425<H>  05-01      PHYSICAL EXAM:  GENERAL: NAD  PSYCH: no agitation, baseline mentation  NERVOUS SYSTEM:  Alert & Oriented X0-1  PULMONARY: KIRK, CTA  CARDIOVASCULAR: S1S2 RRR  GI: Soft, NT, ND; BS present.  EXTREMITIES:  2+ Peripheral Pulses, No clubbing, cyanosis, or edema  LYMPH: No lymphadenopathy noted  SKIN: No rashes or lesions

## 2022-05-02 NOTE — PROGRESS NOTE ADULT - ASSESSMENT
75 YO F with PMHx of HTN, pancreatic CA s/p Whipple 8/2021, A.Fib on Eliquis, pleural effusion was BIBEMS due to worsening SOB    #Acute Hypoxemic Respiratory Failure.   #b/l Pleural effusions with ascites  #Pancreatic Ca s/p Whipple 8/21  #Transaminitis  - s/p NRB >> now on 6L nasal cannula >> Titrate down as tolerated.   - s/p Thoracentesis 4/19 with 750 cc fluid removed, transudative  - repeat CT noted, massive ascites--> IR for therapeutic paracentesis on 4/26  - s/p lasix  - cardiology eval appreciated, diuresis as tolerated  - discussed with Dr. Knight, feeds adjusted, Creon currently on hold as cannot be given through NGT  - resume if patient more awake/able to tolerate PO  - Ammonia noted to be elevated, on lactulose, titrate to 2-3 bm daily, CTH negative   - Extensive conversation with patient's daughterlian  on 4/30  >>>> - want to continue ngt feeds, resume blood work.    and also wants  brief trial of IVF.  - monitor LFT's    #BIlateral UE edema, from infiltrated IVs and contrast extravasation  -stable; continue to monitor     #Chronic A.Fib on Eliquis  - rate control with diltiazem   - continue lovenox    # Hypernatremia >> Add more free water, Added IVF  D5w @75 x 1day.     #Poor PO intake  #Severe malnutrition  #History of eating disorder  - started marinol 4/20, now off  - NGT placed, tolerating feeds, encourage PO intake as well if awake and appropriate to eat as per speech and swallow  - Dr. Syed following  - psychiatry following, added Zyprexa to current regimen - pt's daughter asked to stop it   - pt's family requested to stop remeron on 4/29 and to resume.  - leaning towards PEG placement next week  - failed speech and swallow on 4/28- family asking for follow up    #GOC - DNR  - overall grave prognosis    Progress Note Handoff  Pending:  Clinical stability / ? PEG /   Family Discussion: extensive discussion with pt's daughterLian   Not ready for CMO.  Wish to continue blood work and possible PEG placement.   Disposition:  Unknown at this time.    Plan d/w the Surgical Oncology Dr Nino. 77 YO F with PMHx of HTN, pancreatic CA s/p Whipple 8/2021, A.Fib on Eliquis, pleural effusion was BIBEMS due to worsening SOB    #Acute Hypoxemic Respiratory Failure.   #b/l Pleural effusions with ascites  #Pancreatic Ca s/p Whipple 8/21  #Transaminitis  - s/p NRB >> now on 6L nasal cannula >> Titrate down as tolerated.   - s/p Thoracentesis 4/19 with 750 cc fluid removed, transudative  - repeat CT noted, massive ascites--> IR for therapeutic paracentesis on 4/26  - s/p lasix  - cardiology eval appreciated, diuresis as tolerated  - discussed with Dr. Knight, feeds adjusted, Creon currently on hold as cannot be given through NGT  - resume if patient more awake/able to tolerate PO  - Ammonia noted to be elevated, on lactulose, titrate to 2-3 bm daily, CTH negative   - Extensive conversation with patient's daughterlian  on 4/30  >>>> - want to continue ngt feeds, resume blood work.    and also wants  brief trial of IVF.  - monitor LFT's    #BIlateral UE edema, from infiltrated IVs and contrast extravasation  -stable; continue to monitor     #Chronic A.Fib on Eliquis  - rate control with diltiazem   - continue lovenox    # Hypernatremia >> Add more free water, Added IVF  D5w @75 x 1day.     #Poor PO intake  #Severe malnutrition  #History of eating disorder  - started marinol 4/20, now off  - NGT placed, tolerating feeds, encourage PO intake as well if awake and appropriate to eat as per speech and swallow  - Dr. Syed following  - psychiatry following, added Zyprexa to current regimen - pt's daughter asked to stop it   - pt's family requested to stop remeron on 4/29 and to resume.  - leaning towards PEG placement next week  - failed speech and swallow on 4/28- family asking for follow up  - monitor BMP, phos, and Mg    - re-eval bowel regimen- lactulose, miralax, senna given daily - document BMs    #GOC - DNR  - overall grave prognosis  -c/w GOC discussion today to be arranged by hospice, possible PEG tube?    Progress Note Handoff  Pending:  Clinical stability / ? PEG /   Family Discussion: extensive discussion with pt's daughter, Lian   Not ready for CMO.  Wish to continue blood work and possible PEG placement.   Disposition:  Unknown at this time.    Plan d/w the Surgical Oncology Dr Nino. 77 YO F with PMHx of HTN, pancreatic CA s/p Whipple 8/2021, A.Fib on Eliquis, pleural effusion was BIBEMS due to worsening SOB    #Acute Hypoxemic Respiratory Failure.   #b/l Pleural effusions with ascites  #Pancreatic Ca s/p Whipple 8/21  #Transaminitis  - s/p NRB >> now on 6L nasal cannula >> Titrate down as tolerated.   - s/p Thoracentesis 4/19 with 750 cc fluid removed, transudative  - repeat CT noted, massive ascites--> IR for therapeutic paracentesis on 4/26  - s/p lasix  - cardiology eval appreciated, diuresis as tolerated  - discussed with Dr. Knight, feeds adjusted, Creon currently on hold as cannot be given through NGT  - resume if patient more awake/able to tolerate PO  - Ammonia noted to be elevated, on lactulose, titrate to 2-3 bm daily, CTH negative   - Extensive conversation with patient's daughterlian  on 4/30>>>> want to continue ngt feeds, resume blood work. and also wants  brief trial of IVF. repeat ,meeting today   - monitor LFT's    #BIlateral UE edema, from infiltrated IVs and contrast extravasation  -stable; continue to monitor     #Chronic A.Fib on Eliquis  - rate control with diltiazem   - continue lovenox    # Hypernatremia >> Add more free water, Added IVF  D5w @75 x 1day.     #Poor PO intake  #Severe malnutrition  #History of eating disorder  - started marinol 4/20, now off  - NGT placed, tolerating feeds, encourage PO intake as well if awake and appropriate to eat as per speech and swallow  - Dr. Syed following  - psychiatry following, added Zyprexa to current regimen - pt's daughter asked to stop it   - pt's family requested to stop remeron on 4/29 and to resume.  - leaning towards PEG placement next week  - failed speech and swallow on 4/28- family asking for follow up  - monitor BMP, phos, and Mg    - re-eval bowel regimen- lactulose, miralax, senna given daily - document BMs    # Hypernatremia   - free water flushed 300ml TID for 2d     #GOC - DNR  - overall grave prognosis  -c /w GOC discussion today to be arranged by hospice, possible PEG tube?    Progress Note Handoff  Pending:  Clinical stability / ? PEG /   Family Discussion: extensive discussion with pt's daughter, Lian   Not ready for CMO.  Wish to continue blood work and possible PEG placement.   Disposition:  Unknown at this time.    Plan d/w the Surgical Oncology Dr Nino. 75 YO F with PMHx of HTN, pancreatic CA s/p Whipple 8/2021, A.Fib on Eliquis, pleural effusion was BIBEMS due to worsening SOB    #Acute Hypoxemic Respiratory Failure.   #b/l Pleural effusions with ascites  #Pancreatic Ca s/p Whipple 8/21  #Transaminitis  - s/p NRB >> now on 6L nasal cannula >> Titrate down as tolerated.   - s/p Thoracentesis 4/19 with 750 cc fluid removed, transudative  - repeat CT noted, massive ascites--> IR for therapeutic paracentesis on 4/26  - s/p lasix  - cardiology eval appreciated, diuresis as tolerated  - discussed with Dr. Knight, feeds adjusted, Creon currently on hold as cannot be given through NGT  - resume if patient more awake/able to tolerate PO  - Ammonia noted to be elevated, on lactulose, titrate to 2-3 bm daily, CTH negative   - Extensive conversation with patient's daughterlian  on 4/30>>>> want to continue ngt feeds, resume blood work. and also wants  brief trial of IVF. repeat ,meeting today   - monitor LFT's    #BIlateral UE edema, from infiltrated IVs and contrast extravasation  -stable; continue to monitor     #Chronic A.Fib on Eliquis  - rate control with diltiazem   - continue lovenox    # Hypernatremia >> Add more free water, Added IVF  D5w @75 x 1day.     #Poor PO intake  #Severe malnutrition  #History of eating disorder  - started marinol 4/20, now off  - NGT placed, tolerating feeds, encourage PO intake as well if awake and appropriate to eat as per speech and swallow  - Dr. Syed following  - psychiatry following, added Zyprexa to current regimen - pt's daughter asked to stop it   - pt's family requested to stop remeron on 4/29 and to resume.  - leaning towards PEG placement next week  - failed speech and swallow on 4/28- family asking for follow up  - monitor BMP, phos, and Mg    - re-eval bowel regimen- lactulose, miralax, senna given daily - document BMs    # Hypernatremia   - free water flushed 300ml TID for 2d     #GOC - DNR  - overall grave prognosis  -c /w GOC discussion today to be arranged by hospice, possible PEG tube?    Progress Note Handoff  Pending:  Clinical stability / ? PEG /   Family Discussion: extensive discussion with pt's daughter, Lian   Not ready for CMO.  Wish to continue blood work and possible PEG placement.   Disposition:  Unknown at this time.   75 YO F with PMHx of HTN, pancreatic CA s/p Whipple 8/2021, A.Fib on Eliquis, pleural effusion was BIBEMS due to worsening SOB    #Acute Hypoxemic Respiratory Failure.   #b/l Pleural effusions with ascites  #Pancreatic Ca s/p Whipple 8/21  #Transaminitis  - s/p NRB >> now on 6L nasal cannula >> Titrate down as tolerated.   - s/p Thoracentesis 4/19 with 750 cc fluid removed, transudative  - repeat CT noted, massive ascites--> IR for therapeutic paracentesis on 4/26  - s/p lasix  - cardiology eval appreciated, diuresis as tolerated  - discussed with Dr. Knight, feeds adjusted, Creon currently on hold as cannot be given through NGT  - resume if patient more awake/able to tolerate PO  - Ammonia noted to be elevated, on lactulose, titrate to 2-3 bm daily, CTH negative   - Extensive conversation with patient's daughterlian  on 4/30>>>> want to continue ngt feeds, resume blood work. and also wants  brief trial of IVF. repeat meeting today   - monitor LFT's    #BIlateral UE edema, from infiltrated IVs and contrast extravasation  -stable; continue to monitor     #Chronic A.Fib on Eliquis  - rate control with diltiazem   - continue lovenox    #Poor PO intake  #Severe malnutrition  #History of eating disorder  - started marinol 4/20, now off  - NGT placed, tolerating feeds, encourage PO intake as well if awake and appropriate to eat as per speech and swallow  - Dr. Syed following  - psychiatry following, added Zyprexa to current regimen - pt's daughter asked to stop it   - pt's family requested to stop remeron on 4/29 and to resume.  - leaning towards PEG placement next week  - failed speech and swallow on 4/28- family asking for follow up  - monitor BMP, phos, and Mg    - re-eval bowel regimen- lactulose, miralax, senna given daily - document BMs    # Hypernatremia   - free water flushed 300ml TID for 2d     #GOC - DNR  - overall grave prognosis  -c /w GOC discussion today to be arranged by hospice, possible PEG tube?    Progress Note Handoff  Pending:  Clinical stability / ? PEG /   Family Discussion: extensive discussion with pt's daughter, Lian   Not ready for CMO.  Wish to continue blood work and possible PEG placement.   Disposition:  Unknown at this time.

## 2022-05-02 NOTE — PROGRESS NOTE ADULT - ASSESSMENT
IMP:  - pancreatic cancer  - severe cancer cachexia  - malabsorption on Creon  - acute hypoxic resp failure r/t fluid overload  - s/p thoracentesis of pleural effusion, and paracentesis of ascites  - constipation    PLAN:   - cont Jevity 1.2 360ml X 3 feeds/day via NGT given at meal times   - monitor/ document BMs (& BM appearance)  - monitor BMP, phos, and Mg    - re-eval bowel regimen- lactulose, miralax, senna given daily - document BMs  - need to ascertain Onc plans (reportedly last chemo was in Nov?), c/w GOC discussion, possible PEG tube? Whether or not further or continued SNS or other aggressive interventions are continued depend entirely on treatment options for the primary cancer. IMP:  - pancreatic cancer  - severe cancer cachexia  - malabsorption on Creon  - acute hypoxic resp failure r/t fluid overload  - s/p thoracentesis of pleural effusion, and paracentesis of ascites  - constipation    PLAN:   - cont Jevity 1.2 360ml X 3 feeds/day via NGT given at meal times   - monitor/ document BMs (& BM appearance)  - monitor BMP, phos, and Mg    - re-eval bowel regimen- lactulose, miralax, senna given daily - document BMs  - need to ascertain Onc plans (reportedly last chemo was in Nov?) and f/u w/ GOC discussion, possible PEG tube? Whether or not further or continued SNS or other aggressive interventions are continued depend entirely on treatment options for the primary cancer.

## 2022-05-03 NOTE — PROGRESS NOTE ADULT - SUBJECTIVE AND OBJECTIVE BOX
VIKASH LI 76y Female  MRN#: [id number]   CODE STATUS: DNR DNI    Hospital Day: 15d    Pt is currently admitted with the primary diagnosis of     SUBJECTIVE  Hospital Course     PMHx of HTN, pancreatic CA s/p Whipple 8/2021, A.Fib on Eliquis, pleural effusion     was BIBEMS due to worsening SOB      started earlier on the morning of presentation  As per EMS, patient was saturating in the 70s on room air and was placed on NRB w/ sats improving to high 80s. Patient reports that she lives with her son and started noticing shortness of breath.   Patient had recent admission from [facility] and had thoracentesis done  Patient denies fevers, chills, headache, chest pain, palpitations, constipation, melena, hematochezia, dysuria, urinary frequency or urgency, numbness, tingling, recent travel or any known sick contact.   In the ED patient was placed on non rebreather with SpO2 of 94%. .  X-ray chest showed Interval increase in bilateral effusions and opacities.    Hospital Course:  Pt admitted for respiratory failure, hospital stay complicated by AMS, ascites with transaminitis, failure to thrive. Pt found with mild volume overload s/p thoracentesis with findings of infl cells (no malignant, no microbials). Pt noted to have tense ascites s/p therapeutic paracentesis. Pt with AMS, no baseline dementia per family but unclear etiology. Assumed hepatic encephalopathy given mildly elevated ammonia + new onset ascites (cirrhosis?). Differential for ascites includes whipple complications, intra-hepatic primary/metastatic cancer, carcinomatosis. Pt received therapeutic paracentesis, fluid not sent as family moving towards CMO for patient. In general, pt has been declining for years. Pt has minimal PO intake at baseline for past years and requires NGT feedings due initially to lack of ability to intake caloric sufficiency per PO, now requiring due to lack of mental status. Pt family believes sedating medications are cause +/- ammonia level and that NGT feeds are making her stronger. After much conversation, family moving towards re-eval 4/30/22 for possible CMO. Pt made DNR per her wishes, intubation wishes unknown but pt will receive trial of intubation if necessary. Family initial held bloodwork to allow her to be comfortable, now want daily blood draws for further ammonia monitoring. Family aware ammonia level not significant enough to cause this extent of mental status alteration but still wish to continue. Family understands further aggressive medical management is unlikely to give her quality of life but wish to continue monitoring with medical management.    **FOLLOW UP**  1. ascites- no fluid sent, pt begins to regain fluid quickly. If family wishes pt to receive another paracentesis for diagnostic/therapeutic reasons, consider pleurX as ascites appears chronic at this point  2. Further GOC- Pt daughter (Lian) is HCP. Lian recently had to extubate her sister for terminal illness, is very well aware of comfort measures and the lack of usefulness of aggressive medical measures. She understands the situation pt is in well. Following extensive GOC conversation 4/27/22 with 5 family members, likely course of illness explained and risks vs benefits of further work up explained and understood. Family initially moving towards more palliative measure, but are now interested in daily blood draws for further monitoring.  3. repeat SLP eval- swallow eval 4/29/22 with family at bedside if pt more awake  4. GOC meeting 4/30/22. Family concerned about feeds. Stated to family on 4/27/22 that we will continue NGT feeds for an entire week (until 4/30/22) and re-assess her strength. ***DO NOT GIVE SEDATING MEDICATIONS***. Family believes her lack of ability to eat is due to sedation.    Overnight events:     Subjective complaints     Present Today:   - Ibarra:  No [  ], Yes [   ] : Indication:     - Type of IV Access:       .. CVC/Piccline:  No [  ], Yes [   ] : Indication:       .. Midline: No [  ], Yes [   ] : Indication:                                             ----------------------------------------------------------  OBJECTIVE  PAST MEDICAL & SURGICAL HISTORY  SOB (shortness of breath)    Pain  knee    Varicose veins of both lower extremities, unspecified whether complicated    Atrial fibrillation, unspecified type  2019 on Eliquis    Jaundice  March 5, 2021    Malignant neoplasm of pancreas, unspecified location of malignancy  Pancreatic Cancer    Hypertension, unspecified type  2019    Pancreatic cancer    Kidney stones    History of surgery  Whipple procedure 8/2/2021 with Dr. Nino at [facility]    Status post phlebectomy  10/2018    History of ovarian cystectomy  left    History of tonsillectomy  1959    Kidney stone  2017                                              -----------------------------------------------------------  ALLERGIES:  Augmentin (Rash)  chocolate (Headache)  digoxin (Hives)  Metoprolol Succinate ER (Hives)  Novocain (Angioedema)  Steroids: Excessive swelling (Flushing; Other (Mild to Mod))  sulfa drugs (Fever)  Xarelto (Hives)                                            ------------------------------------------------------------    HOME MEDICATIONS  Home Medications:  Cardizem  mg/24 hours oral capsule, extended release: 1 cap(s) orally once a day (22 Nov 2021 23:59)  Creon 12,000 units oral delayed release capsule: 2 cap(s) orally 3 times a day (with meals) (03 Dec 2021 10:54)  Eliquis 5 mg oral tablet: 1 tab(s) orally 2 times a day (03 Sep 2021 23:41)                           MEDICATIONS:  STANDING MEDICATIONS  artificial tears (preservative free) Ophthalmic Solution 1 Drop(s) Both EYES three times a day  chlorhexidine 4% Liquid 1 Application(s) Topical <User Schedule>  diltiazem    milliGRAM(s) Oral daily  enoxaparin Injectable 40 milliGRAM(s) SubCutaneous every 12 hours  lactulose Syrup 20 Gram(s) Oral every 12 hours  mirtazapine 15 milliGRAM(s) Oral at bedtime  pantoprazole  Injectable 40 milliGRAM(s) IV Push daily  polyethylene glycol 3350 17 Gram(s) Oral daily  senna 2 Tablet(s) Oral at bedtime    PRN MEDICATIONS  acetaminophen     Tablet .. 650 milliGRAM(s) Oral every 6 hours PRN  simethicone 80 milliGRAM(s) Chew three times a day PRN                                            ------------------------------------------------------------  VITAL SIGNS: Last 24 Hours  T(C): 35.2 (03 May 2022 12:23), Max: 36.7 (03 May 2022 06:02)  T(F): 95.4 (03 May 2022 12:23), Max: 98 (03 May 2022 06:02)  HR: 101 (03 May 2022 12:23) (101 - 120)  BP: 125/77 (03 May 2022 12:23) (125/77 - 137/73)  BP(mean): --  RR: 14 (03 May 2022 12:23) (14 - 20)  SpO2: 100% (03 May 2022 06:02) (97% - 100%)      05-02-22 @ 07:01  -  05-03-22 @ 07:00  --------------------------------------------------------  IN: 0 mL / OUT: 325 mL / NET: -325 mL    05-03-22 @ 07:01  -  05-03-22 @ 16:19  --------------------------------------------------------  IN: 0 mL / OUT: 160 mL / NET: -160 mL                                             --------------------------------------------------------------  LABS:                        10.8   6.95  )-----------( 100      ( 03 May 2022 08:11 )             35.2     05-03    142  |  105  |  41<H>  ----------------------------<  168<H>  3.9   |  25  |  0.7    Ca    7.9<L>      03 May 2022 08:11  Phos  2.0     05-03  Mg     2.2     05-03    TPro  5.4<L>  /  Alb  2.6<L>  /  TBili  0.6  /  DBili  x   /  AST  61<H>  /  ALT  129<H>  /  AlkPhos  957<H>  05-03                                              -------------------------------------------------------------  RADIOLOGY:                                            --------------------------------------------------------------    PHYSICAL EXAM:  GENERAL: NAD  PSYCH: no agitation, baseline mentation  NERVOUS SYSTEM:  Alert & Oriented X0-1  PULMONARY: KIRK, CTA  CARDIOVASCULAR: S1S2 RRR  GI: Soft, NT, ND; BS present.  EXTREMITIES:  2+ Peripheral Pulses, No clubbing, cyanosis, or edema  LYMPH: No lymphadenopathy noted  SKIN: No rashes or lesions                                         --------------------------------------------------------------    ASSESSMENT & PLAN  75 YO F with PMHx of HTN, pancreatic CA s/p Whipple 8/2021, A.Fib on Eliquis, pleural effusion was BIBEMS due to worsening SOB    #Acute Hypoxemic Respiratory Failure.   #b/l Pleural effusions with ascites  #Pancreatic Ca s/p Whipple 8/21  #Transaminitis  - s/p NRB >> now on 6L nasal cannula >> Titrate down as tolerated.   - s/p Thoracentesis 4/19 with 750 cc fluid removed, transudative  - repeat CT noted, massive ascites--> IR for therapeutic paracentesis on 4/26  - s/p lasix  - cardiology eval appreciated, diuresis as tolerated  - discussed with Dr. Knight, feeds adjusted, Creon currently on hold as cannot be given through NGT  - resume if patient more awake/able to tolerate PO  - Ammonia noted to be elevated, on lactulose, titrate to 2-3 bm daily, CTH negative   - Extensive conversation with patient's daughterlian  on 4/30>>>> want to continue ngt feeds, resume blood work. and also wants  brief trial of IVF. repeat meeting today   - monitor LFT's    #BIlateral UE edema, from infiltrated IVs and contrast extravasation  -stable; continue to monitor     #Chronic A.Fib on Eliquis  - rate control with diltiazem   - continue lovenox    #Poor PO intake  #Severe malnutrition  #History of eating disorder  - started marinol 4/20, now off  - NGT placed, tolerating feeds, encourage PO intake as well if awake and appropriate to eat as per speech and swallow  - Dr. Syed following  - psychiatry following, added Zyprexa to current regimen - pt's daughter asked to stop it   - pt's family requested to stop remeron on 4/29 and to resume.  - leaning towards PEG placement next week  - failed speech and swallow on 4/28- family asking for follow up  - monitor BMP, phos, and Mg    - re-eval bowel regimen- lactulose, miralax, senna given daily - document BMs    # Hypernatremia   - free water flushed 300ml TID for 2d     #GOC - DNR  - overall grave prognosis  -c /w GOC discussion today to be arranged by hospice, possible PEG tube?    Progress Note Handoff  Pending:  Clinical stability / ? PEG /   Family Discussion: extensive discussion with pt's daughterLian   Not ready for CMO.  Wish to continue blood work and possible PEG placement.   Disposition:  Unknown at this time.     VIKASH LI 76y Female  MRN#: [id number]   CODE STATUS: DNR DNI    Hospital Day: 15d    Pt is currently admitted with the primary diagnosis of     SUBJECTIVE  Hospital Course     PMHx of HTN, pancreatic CA s/p Whipple 8/2021, A.Fib on Eliquis, pleural effusion     was BIBEMS due to worsening SOB      started earlier on the morning of presentation  As per EMS, patient was saturating in the 70s on room air and was placed on NRB w/ sats improving to high 80s. Patient reports that she lives with her son and started noticing shortness of breath.   Patient had recent admission from [facility] and had thoracentesis done  Patient denies fevers, chills, headache, chest pain, palpitations, constipation, melena, hematochezia, dysuria, urinary frequency or urgency, numbness, tingling, recent travel or any known sick contact.   In the ED patient was placed on non rebreather with SpO2 of 94%. .  X-ray chest showed Interval increase in bilateral effusions and opacities.    Hospital Course:  Pt admitted for respiratory failure, hospital stay complicated by AMS, ascites with transaminitis, failure to thrive. Pt found with mild volume overload s/p thoracentesis with findings of infl cells (no malignant, no microbials). Pt noted to have tense ascites s/p therapeutic paracentesis. Pt with AMS, no baseline dementia per family but unclear etiology. Assumed hepatic encephalopathy given mildly elevated ammonia + new onset ascites (cirrhosis?). Differential for ascites includes whipple complications, intra-hepatic primary/metastatic cancer, carcinomatosis. Pt received therapeutic paracentesis, fluid not sent as family moving towards CMO for patient. In general, pt has been declining for years. Pt has minimal PO intake at baseline for past years and requires NGT feedings due initially to lack of ability to intake caloric sufficiency per PO, now requiring due to lack of mental status. Pt family believes sedating medications are cause +/- ammonia level and that NGT feeds are making her stronger. After much conversation, family moving towards re-eval 4/30/22 for possible CMO. Pt made DNR per her wishes, intubation wishes unknown but pt will receive trial of intubation if necessary. Family initial held bloodwork to allow her to be comfortable, now want daily blood draws for further ammonia monitoring. Family aware ammonia level not significant enough to cause this extent of mental status alteration but still wish to continue. Family understands further aggressive medical management is unlikely to give her quality of life but wish to continue monitoring with medical management.    **FOLLOW UP**  1. ascites- no fluid sent, pt begins to regain fluid quickly. If family wishes pt to receive another paracentesis for diagnostic/therapeutic reasons, consider pleurX as ascites appears chronic at this point  2. Further GOC- Pt daughter (Lian) is HCP. Lian recently had to extubate her sister for terminal illness, is very well aware of comfort measures and the lack of usefulness of aggressive medical measures. She understands the situation pt is in well. Following extensive GOC conversation 4/27/22 with 5 family members, likely course of illness explained and risks vs benefits of further work up explained and understood. Family initially moving towards more palliative measure, but are now interested in daily blood draws for further monitoring.  3. repeat SLP eval- swallow eval 4/29/22 with family at bedside if pt more awake  4. GOC meeting 4/30/22. Family concerned about feeds. Stated to family on 4/27/22 that we will continue NGT feeds for an entire week (until 4/30/22) and re-assess her strength. ***DO NOT GIVE SEDATING MEDICATIONS***. Family believes her lack of ability to eat is due to sedation.  5. Palliative care has signed off.   6. patient now DNR and DNI.     Overnight events:     Subjective complaints     Present Today:   - Ibarra:  No [  ], Yes [   ] : Indication:     - Type of IV Access:       .. CVC/Piccline:  No [  ], Yes [   ] : Indication:       .. Midline: No [  ], Yes [   ] : Indication:                                             ----------------------------------------------------------  OBJECTIVE  PAST MEDICAL & SURGICAL HISTORY  SOB (shortness of breath)    Pain  knee    Varicose veins of both lower extremities, unspecified whether complicated    Atrial fibrillation, unspecified type  2019 on Eliquis    Jaundice  March 5, 2021    Malignant neoplasm of pancreas, unspecified location of malignancy  Pancreatic Cancer    Hypertension, unspecified type  2019    Pancreatic cancer    Kidney stones    History of surgery  Whipple procedure 8/2/2021 with Dr. Nino at [facility]    Status post phlebectomy  10/2018    History of ovarian cystectomy  left    History of tonsillectomy  1959    Kidney stone  2017                                              -----------------------------------------------------------  ALLERGIES:  Augmentin (Rash)  chocolate (Headache)  digoxin (Hives)  Metoprolol Succinate ER (Hives)  Novocain (Angioedema)  Steroids: Excessive swelling (Flushing; Other (Mild to Mod))  sulfa drugs (Fever)  Xarelto (Hives)                                            ------------------------------------------------------------    HOME MEDICATIONS  Home Medications:  Cardizem  mg/24 hours oral capsule, extended release: 1 cap(s) orally once a day (22 Nov 2021 23:59)  Creon 12,000 units oral delayed release capsule: 2 cap(s) orally 3 times a day (with meals) (03 Dec 2021 10:54)  Eliquis 5 mg oral tablet: 1 tab(s) orally 2 times a day (03 Sep 2021 23:41)                           MEDICATIONS:  STANDING MEDICATIONS  artificial tears (preservative free) Ophthalmic Solution 1 Drop(s) Both EYES three times a day  chlorhexidine 4% Liquid 1 Application(s) Topical <User Schedule>  diltiazem    milliGRAM(s) Oral daily  enoxaparin Injectable 40 milliGRAM(s) SubCutaneous every 12 hours  lactulose Syrup 20 Gram(s) Oral every 12 hours  mirtazapine 15 milliGRAM(s) Oral at bedtime  pantoprazole  Injectable 40 milliGRAM(s) IV Push daily  polyethylene glycol 3350 17 Gram(s) Oral daily  senna 2 Tablet(s) Oral at bedtime    PRN MEDICATIONS  acetaminophen     Tablet .. 650 milliGRAM(s) Oral every 6 hours PRN  simethicone 80 milliGRAM(s) Chew three times a day PRN                                            ------------------------------------------------------------  VITAL SIGNS: Last 24 Hours  T(C): 35.2 (03 May 2022 12:23), Max: 36.7 (03 May 2022 06:02)  T(F): 95.4 (03 May 2022 12:23), Max: 98 (03 May 2022 06:02)  HR: 101 (03 May 2022 12:23) (101 - 120)  BP: 125/77 (03 May 2022 12:23) (125/77 - 137/73)  BP(mean): --  RR: 14 (03 May 2022 12:23) (14 - 20)  SpO2: 100% (03 May 2022 06:02) (97% - 100%)      05-02-22 @ 07:01  -  05-03-22 @ 07:00  --------------------------------------------------------  IN: 0 mL / OUT: 325 mL / NET: -325 mL    05-03-22 @ 07:01  -  05-03-22 @ 16:19  --------------------------------------------------------  IN: 0 mL / OUT: 160 mL / NET: -160 mL                                             --------------------------------------------------------------  LABS:                        10.8   6.95  )-----------( 100      ( 03 May 2022 08:11 )             35.2     05-03    142  |  105  |  41<H>  ----------------------------<  168<H>  3.9   |  25  |  0.7    Ca    7.9<L>      03 May 2022 08:11  Phos  2.0     05-03  Mg     2.2     05-03    TPro  5.4<L>  /  Alb  2.6<L>  /  TBili  0.6  /  DBili  x   /  AST  61<H>  /  ALT  129<H>  /  AlkPhos  957<H>  05-03                                              -------------------------------------------------------------  RADIOLOGY:                                            --------------------------------------------------------------    PHYSICAL EXAM:  GENERAL: NAD  PSYCH: no agitation, baseline mentation  NERVOUS SYSTEM:  Alert & Oriented X0-1  PULMONARY: KIRK, CTA  CARDIOVASCULAR: S1S2 RRR  GI: Soft, NT, ND; BS present.  EXTREMITIES:  2+ Peripheral Pulses, No clubbing, cyanosis, or edema  LYMPH: No lymphadenopathy noted  SKIN: No rashes or lesions                                         --------------------------------------------------------------    ASSESSMENT & PLAN  77 YO F with PMHx of HTN, pancreatic CA s/p Whipple 8/2021, A.Fib on Eliquis, pleural effusion was BIBEMS due to worsening SOB    #Acute Hypoxemic Respiratory Failure.   #b/l Pleural effusions with ascites  #Pancreatic Ca s/p Whipple 8/21  #Transaminitis  - s/p NRB >> now on 6L nasal cannula >> Titrate down as tolerated.   - s/p Thoracentesis 4/19 with 750 cc fluid removed, transudative  - repeat CT noted, massive ascites--> IR for therapeutic paracentesis on 4/26  - s/p lasix  - cardiology eval appreciated, diuresis as tolerated  - discussed with Dr. Knight, feeds adjusted, Creon currently on hold as cannot be given through NGT  - resume if patient more awake/able to tolerate PO  - Ammonia noted to be elevated, on lactulose, titrate to 2-3 bm daily, CTH negative   - Extensive conversation with patient's daughterlian  on 4/30>>>> want to continue ngt feeds, resume blood work. and also wants  brief trial of IVF. repeat meeting today   - monitor LFT's    #BIlateral UE edema, from infiltrated IVs and contrast extravasation  -stable; continue to monitor     #Chronic A.Fib on Eliquis  - rate control with diltiazem   - continue lovenox    #Poor PO intake  #Severe malnutrition  #History of eating disorder  - started marinol 4/20, now off  - NGT placed, tolerating feeds, encourage PO intake as well if awake and appropriate to eat as per speech and swallow  - Dr. Syed following  - psychiatry following, added Zyprexa to current regimen - pt's daughter asked to stop it   - pt's family requested to stop remeron on 4/29 and to resume.  - leaning towards PEG placement next week  - failed speech and swallow on 4/28- family asking for follow up  - monitor BMP, phos, and Mg    - re-eval bowel regimen- lactulose, miralax, senna given daily - document BMs    # Hypernatremia   - free water flushed 300ml TID for 2d     #GOC - DNR  - overall grave prognosis  -c /w GOC discussion today to be arranged by hospice, possible PEG tube?    Progress Note Handoff  Pending:  Clinical stability / ? PEG /   Family Discussion: extensive discussion with pt's daughter, Lian   Not ready for CMO.  Wish to continue blood work and possible PEG placement.   Disposition:  Unknown at this time.     VIKASH LI 76y Female  MRN#: [id number]   CODE STATUS: DNR DNI    Hospital Day: 15d    Pt is currently admitted with the primary diagnosis of shortness of breath     SUBJECTIVE  Hospital Course  PMHx of HTN, pancreatic CA s/p Whipple 8/2021, A.Fib on Eliquis, pleural effusionwas BIBEMS due to worsening SOB started earlier on the morning of presentation.   As per EMS, patient was saturating in the 70s on room air and was placed on NRB w/ sats improving to high 80s. Patient reports that she lives with her son and started noticing shortness of breath.   Patient had recent admission from [facility] and had thoracentesis done  Patient denies fevers, chills, headache, chest pain, palpitations, constipation, melena, hematochezia, dysuria, urinary frequency or urgency, numbness, tingling, recent travel or any known sick contact.   In the ED patient was placed on non rebreather with SpO2 of 94%. .  X-ray chest showed Interval increase in bilateral effusions and opacities.    Hospital Course:  Pt admitted for respiratory failure, hospital stay complicated by AMS, ascites with transaminitis, failure to thrive. Pt found with mild volume overload s/p thoracentesis with findings of infl cells (no malignant, no microbials). Pt noted to have tense ascites s/p therapeutic paracentesis. Pt with AMS, no baseline dementia per family but unclear etiology. Assumed hepatic encephalopathy given mildly elevated ammonia + new onset ascites (cirrhosis?). Differential for ascites includes whipple complications, intra-hepatic primary/metastatic cancer, carcinomatosis. Pt received therapeutic paracentesis, fluid not sent as family moving towards CMO for patient. In general, pt has been declining for years. Pt has minimal PO intake at baseline for past years and requires NGT feedings due initially to lack of ability to intake caloric sufficiency per PO, now requiring due to lack of mental status. Pt family believes sedating medications are cause +/- ammonia level and that NGT feeds are making her stronger. After much conversation, family moving towards re-eval 4/30/22 for possible CMO. Pt made DNR per her wishes, intubation wishes unknown but pt will receive trial of intubation if necessary. Family initial held bloodwork to allow her to be comfortable, now want daily blood draws for further ammonia monitoring. Family aware ammonia level not significant enough to cause this extent of mental status alteration but still wish to continue. Family understands further aggressive medical management is unlikely to give her quality of life but wish to continue monitoring with medical management.    **FOLLOW UP**  1. ascites- no fluid sent, pt begins to regain fluid quickly. If family wishes pt to receive another paracentesis for diagnostic/therapeutic reasons, consider pleurX as ascites appears chronic at this point  2. Further GOC- Pt daughter (Lian) is HCP. Lian recently had to extubate her sister for terminal illness, is very well aware of comfort measures and the lack of usefulness of aggressive medical measures. She understands the situation pt is in well. Following extensive GOC conversation 4/27/22 with 5 family members, likely course of illness explained and risks vs benefits of further work up explained and understood. Family initially moving towards more palliative measure, but are now interested in daily blood draws for further monitoring.  3. repeat SLP eval- swallow eval 4/29/22 with family at bedside if pt more awake  4. GOC meeting 4/30/22. Family concerned about feeds. Stated to family on 4/27/22 that we will continue NGT feeds for an entire week (until 4/30/22) and re-assess her strength. ***DO NOT GIVE SEDATING MEDICATIONS***. Family believes her lack of ability to eat is due to sedation.  5. Palliative care has signed off.   6. Patient now DNR and DNI.     Overnight events: Made DNI overnight. Molst updated.,     Subjective complaints: She is tachycardic and restless this am. However, family still does not want additional pain medications. Was given toradol overnight.     Present Today:   - Ibarra:  No [  ], Yes [   ] : Indication:     - Type of IV Access:       .. CVC/Piccline:  No [  ], Yes [   ] : Indication:       .. Midline: No [  ], Yes [   ] : Indication:                                             ----------------------------------------------------------  OBJECTIVE  PAST MEDICAL & SURGICAL HISTORY  SOB (shortness of breath)    Pain  knee    Varicose veins of both lower extremities, unspecified whether complicated    Atrial fibrillation, unspecified type  2019 on Eliquis    Jaundice  March 5, 2021    Malignant neoplasm of pancreas, unspecified location of malignancy  Pancreatic Cancer    Hypertension, unspecified type  2019    Pancreatic cancer    Kidney stones    History of surgery  Whipple procedure 8/2/2021 with Dr. Nino at [facility]    Status post phlebectomy  10/2018    History of ovarian cystectomy  left    History of tonsillectomy  1959    Kidney stone  2017                                              -----------------------------------------------------------  ALLERGIES:  Augmentin (Rash)  chocolate (Headache)  digoxin (Hives)  Metoprolol Succinate ER (Hives)  Novocain (Angioedema)  Steroids: Excessive swelling (Flushing; Other (Mild to Mod))  sulfa drugs (Fever)  Xarelto (Hives)                                            ------------------------------------------------------------    HOME MEDICATIONS  Home Medications:  Cardizem  mg/24 hours oral capsule, extended release: 1 cap(s) orally once a day (22 Nov 2021 23:59)  Creon 12,000 units oral delayed release capsule: 2 cap(s) orally 3 times a day (with meals) (03 Dec 2021 10:54)  Eliquis 5 mg oral tablet: 1 tab(s) orally 2 times a day (03 Sep 2021 23:41)                           MEDICATIONS:  STANDING MEDICATIONS  artificial tears (preservative free) Ophthalmic Solution 1 Drop(s) Both EYES three times a day  chlorhexidine 4% Liquid 1 Application(s) Topical <User Schedule>  diltiazem    milliGRAM(s) Oral daily  enoxaparin Injectable 40 milliGRAM(s) SubCutaneous every 12 hours  lactulose Syrup 20 Gram(s) Oral every 12 hours  mirtazapine 15 milliGRAM(s) Oral at bedtime  pantoprazole  Injectable 40 milliGRAM(s) IV Push daily  polyethylene glycol 3350 17 Gram(s) Oral daily  senna 2 Tablet(s) Oral at bedtime    PRN MEDICATIONS  acetaminophen     Tablet .. 650 milliGRAM(s) Oral every 6 hours PRN  simethicone 80 milliGRAM(s) Chew three times a day PRN                                            ------------------------------------------------------------  VITAL SIGNS: Last 24 Hours  T(C): 35.2 (03 May 2022 12:23), Max: 36.7 (03 May 2022 06:02)  T(F): 95.4 (03 May 2022 12:23), Max: 98 (03 May 2022 06:02)  HR: 101 (03 May 2022 12:23) (101 - 120)  BP: 125/77 (03 May 2022 12:23) (125/77 - 137/73)  BP(mean): --  RR: 14 (03 May 2022 12:23) (14 - 20)  SpO2: 100% (03 May 2022 06:02) (97% - 100%)      05-02-22 @ 07:01  -  05-03-22 @ 07:00  --------------------------------------------------------  IN: 0 mL / OUT: 325 mL / NET: -325 mL    05-03-22 @ 07:01  -  05-03-22 @ 16:19  --------------------------------------------------------  IN: 0 mL / OUT: 160 mL / NET: -160 mL                                             --------------------------------------------------------------  LABS:                        10.8   6.95  )-----------( 100      ( 03 May 2022 08:11 )             35.2     05-03    142  |  105  |  41<H>  ----------------------------<  168<H>  3.9   |  25  |  0.7    Ca    7.9<L>      03 May 2022 08:11  Phos  2.0     05-03  Mg     2.2     05-03    TPro  5.4<L>  /  Alb  2.6<L>  /  TBili  0.6  /  DBili  x   /  AST  61<H>  /  ALT  129<H>  /  AlkPhos  957<H>  05-03                                              -------------------------------------------------------------  RADIOLOGY:  Previous reviewed                                           --------------------------------------------------------------    PHYSICAL EXAM:  GENERAL: Ill appearing. Agitated.   NERVOUS SYSTEM:  Alert & Oriented X0   PULMONARY: KIRK, CTA  CARDIOVASCULAR: S1S2 RRR  GI: Soft, NT, ND; BS present.  EXTREMITIES:  2+ Peripheral Pulses, No clubbing, cyanosis, or edema  LYMPH: No lymphadenopathy noted  SKIN: No rashes or lesions                                         --------------------------------------------------------------    ASSESSMENT & PLAN  77 YO F with PMHx of HTN, pancreatic CA s/p Whipple 8/2021, A.Fib on Eliquis, pleural effusion was BIBEMS due to worsening SOB    #Acute Hypoxemic Respiratory Failure.   #b/l Pleural effusions with ascites  #Pancreatic Ca s/p Whipple 8/21  #Transaminitis  - s/p NRB >> now on 6L nasal cannula >> Titrate down as tolerated.   - s/p Thoracentesis 4/19 with 750 cc fluid removed, transudative  - repeat CT noted, massive ascites--> IR for therapeutic paracentesis on 4/26  - s/p lasix  - cardiology eval appreciated, diuresis as tolerated  - discussed with Dr. Knight, feeds adjusted, Creon currently on hold as cannot be given through NGT  - resume if patient more awake/able to tolerate PO  - Ammonia noted to be elevated, on lactulose, titrate to 2-3 bm daily, CTH negative   - Extensive conversation with patient's daughter, lian  on 4/30>>>> want to continue ngt feeds, resume blood work. and also wants  brief trial of IVF.   - 5/3: Palliative has signed off. Patient now DNR DNI. Family at this time still wants continued current management.   - monitor LFT's    #BIlateral UE edema, from infiltrated IVs and contrast extravasation  -stable; continue to monitor     #Chronic A.Fib on Eliquis  - rate control with diltiazem   - continue lovenox    #Poor PO intake  #Severe malnutrition  #History of eating disorder  - started marinol 4/20, now off  - NGT placed, tolerating feeds, encourage PO intake as well if awake and appropriate to eat as per speech and swallow  - Dr. Syed following  - DO NOT try to put Creon down the NG tube as per Dr. Syed   - psychiatry following, added Zyprexa to current regimen - pt's daughter asked to stop it   - pt's family requested to stop remeron on 4/29 and to resume.  - failed speech and swallow on 4/28- family asking for follow up  - monitor BMP, phos, and Mg    - re-eval bowel regimen- lactulose, miralax, senna given daily - document BMs  - C/w NG feeds. PEG tube?     # Hypernatremia   - free water flushed 300ml TID for 2d     #Transaminitis  - Monitor LFTs   - RUQ US Pneumobilia with perihepatic ascites.  - CT A/P with possible cirrhosis   - Downtrending     #GOC - DNR DNI  - overall grave prognosis  -- 5/3: Palliative has signed off. Patient now DNR DNI. Family at this time still wants continued current management.   - monitor LFT's    Progress Note Handoff  Pending:  Clinical stability / ? PEG /   Family Discussion: extensive discussion with pt's daughter, Lian   Not ready for CMO.  Wish to continue blood work and possible PEG placement??   Disposition:  Unknown at this time.

## 2022-05-04 NOTE — PROGRESS NOTE ADULT - SUBJECTIVE AND OBJECTIVE BOX
pt lethargic, moans when moved or examined  markedly cachectic  respirations slow, + tachycardia  abd distended, NG feeding tube in place  +BMs on lactulose, but also on Miralax and senna (not described as malabsorptive)  Vital Signs Last 24 Hrs  T(C): 35.8 (04 May 2022 05:48), Max: 35.8 (04 May 2022 05:48)  T(F): 96.5 (04 May 2022 05:48), Max: 96.5 (04 May 2022 05:48)  HR: 117 (04 May 2022 05:48) (114 - 117)  BP: 111/75 (04 May 2022 05:48) (111/75 - 165/74)  BP(mean): --  RR: 17 (04 May 2022 05:48) (16 - 17)  SpO2: 100% (04 May 2022 05:48) (100% - 100%)    MEDICATIONS  (STANDING):  artificial tears (preservative free) Ophthalmic Solution 1 Drop(s) Both EYES three times a day  chlorhexidine 4% Liquid 1 Application(s) Topical <User Schedule>  diltiazem    milliGRAM(s) Oral daily  enoxaparin Injectable 40 milliGRAM(s) SubCutaneous every 12 hours  lactulose Syrup 20 Gram(s) Oral every 12 hours  mirtazapine 15 milliGRAM(s) Oral at bedtime  pantoprazole  Injectable 40 milliGRAM(s) IV Push daily  polyethylene glycol 3350 17 Gram(s) Oral daily  senna 2 Tablet(s) Oral at bedtime                        10.8   6.95  )-----------( 100      ( 03 May 2022 08:11 )             35.2   05-03    142  |  105  |  41<H>  ----------------------------<  168<H>  3.9   |  25  |  0.7    Ca    7.9<L>      03 May 2022 08:11  Phos  2.0     05-03  Mg     2.2     05-03    TPro  5.4<L>  /  Alb  2.6<L>  /  TBili  0.6  /  DBili  x   /  AST  61<H>  /  ALT  129<H>  /  AlkPhos  957<H>  05-03

## 2022-05-04 NOTE — PROGRESS NOTE ADULT - ASSESSMENT
IMP:  - pancreatic cancer  - severe cancer cachexia  - malabsorption on Creon  - acute hypoxic resp failure r/t fluid overload  - s/p thoracentesis of pleural effusion, and paracentesis of ascites  - constipation    - long d/w family :      -they are most concerned about her having been on Creon and now not getting it - explained that she has shown no evidence of malabsorption and may not have needed the Creon more recently anyway       - they have nicolas told that PET was negative in Jan, and they seem to think pt is cancer-free    SUGGEST:  - monitor/ document BMs (& BM appearance)  - monitor BMP, phos, and Mg  - replete the phos  - f/u ammonia level (surgeon had apparently rec giving lactulose enema rather than via NG)  - Onc note on 4/21 reviewed  - need to ascertain Onc plans (reportedly last chemo was in Nov?) and f/u w/ GOC discussion. Whether or not further or continued SNS or other aggressive interventions are continued depend entirely on treatment options for the primary cancer.   Apparently there is no Onc plan for further treatment, yet family has not decided regarding other aspects of care, and seems to think that if pt gains weight she will be "eligible" for further Onc treatments.   - although it may not be necessary, switching to hydrolyzed enteral formula may mollify, and will be nutritionally comparable.  d/w residents. d/w hospitalist

## 2022-05-04 NOTE — PROGRESS NOTE ADULT - SUBJECTIVE AND OBJECTIVE BOX
VIKASH LI 76y Female  MRN#: [id number]   CODE STATUS:DNR DNI    Hospital Day: 16d    Pt is currently admitted with the primary diagnosis of     SUBJECTIVE  Hospital Course  Pt admitted for respiratory failure, hospital stay complicated by AMS, ascites with transaminitis, failure to thrive. Pt found with mild volume overload s/p thoracentesis with findings of infl cells (no malignant, no microbials). Pt noted to have tense ascites s/p therapeutic paracentesis. Pt with AMS, no baseline dementia per family but unclear etiology. Assumed hepatic encephalopathy given mildly elevated ammonia + new onset ascites (cirrhosis?). Differential for ascites includes whipple complications, intra-hepatic primary/metastatic cancer, carcinomatosis. Pt received therapeutic paracentesis, fluid not sent as family moving towards CMO for patient. In general, pt has been declining for years. Pt has minimal PO intake at baseline for past years and requires NGT feedings due initially to lack of ability to intake caloric sufficiency per PO, now requiring due to lack of mental status. Pt family believes sedating medications are cause +/- ammonia level and that NGT feeds are making her stronger. After much conversation, family moving towards re-eval 4/30/22 for possible CMO. Pt made DNR per her wishes, intubation wishes unknown but pt will receive trial of intubation if necessary. Family initial held bloodwork to allow her to be comfortable, now want daily blood draws for further ammonia monitoring. Family aware ammonia level not significant enough to cause this extent of mental status alteration but still wish to continue. Family understands further aggressive medical management is unlikely to give her quality of life but wish to continue monitoring with medical management.    **FOLLOW UP**  1. ascites- no fluid sent, pt begins to regain fluid quickly. If family wishes pt to receive another paracentesis for diagnostic/therapeutic reasons, consider pleurX as ascites appears chronic at this point  2. Further GOC- Pt daughter (Lian) is HCP. Lian recently had to extubate her sister for terminal illness, is very well aware of comfort measures and the lack of usefulness of aggressive medical measures. She understands the situation pt is in well. Following extensive GOC conversation 4/27/22 with 5 family members, likely course of illness explained and risks vs benefits of further work up explained and understood. Family initially moving towards more palliative measure, but are now interested in daily blood draws for further monitoring.  3. repeat SLP eval- swallow eval 4/29/22 with family at bedside if pt more awake  4. GOC meeting 4/30/22. Family concerned about feeds. Stated to family on 4/27/22 that we will continue NGT feeds for an entire week (until 4/30/22) and re-assess her strength. ***DO NOT GIVE SEDATING MEDICATIONS***. Family believes her lack of ability to eat is due to sedation.  5. Palliative care has signed off.   6. Patient now DNR and DNI.     Overnight events: No over night events    Subjective complaints: She is AOxO. Comfortable     Present Today:   - Ibarra:  No [  ], Yes [   ] : Indication:     - Type of IV Access:       .. CVC/Piccline:  No [  ], Yes [   ] : Indication:       .. Midline: No [  ], Yes [   ] : Indication:                                             ----------------------------------------------------------  OBJECTIVE  PAST MEDICAL & SURGICAL HISTORY  SOB (shortness of breath)    Pain  knee    Varicose veins of both lower extremities, unspecified whether complicated    Atrial fibrillation, unspecified type  2019 on Eliquis    Jaundice  March 5, 2021    Malignant neoplasm of pancreas, unspecified location of malignancy  Pancreatic Cancer    Hypertension, unspecified type  2019    Pancreatic cancer    Kidney stones    History of surgery  Whipple procedure 8/2/2021 with Dr. Nino at [facility]    Status post phlebectomy  10/2018    History of ovarian cystectomy  left    History of tonsillectomy  1959    Kidney stone  2017                                              -----------------------------------------------------------  ALLERGIES:  Augmentin (Rash)  chocolate (Headache)  digoxin (Hives)  Metoprolol Succinate ER (Hives)  Novocain (Angioedema)  Steroids: Excessive swelling (Flushing; Other (Mild to Mod))  sulfa drugs (Fever)  Xarelto (Hives)                                            ------------------------------------------------------------    HOME MEDICATIONS  Home Medications:  Cardizem  mg/24 hours oral capsule, extended release: 1 cap(s) orally once a day (22 Nov 2021 23:59)  Creon 12,000 units oral delayed release capsule: 2 cap(s) orally 3 times a day (with meals) (03 Dec 2021 10:54)  Eliquis 5 mg oral tablet: 1 tab(s) orally 2 times a day (03 Sep 2021 23:41)                           MEDICATIONS:  STANDING MEDICATIONS  artificial tears (preservative free) Ophthalmic Solution 1 Drop(s) Both EYES three times a day  chlorhexidine 4% Liquid 1 Application(s) Topical <User Schedule>  diltiazem    milliGRAM(s) Oral daily  enoxaparin Injectable 40 milliGRAM(s) SubCutaneous every 12 hours  lactulose Syrup 20 Gram(s) Oral every 12 hours  mirtazapine 15 milliGRAM(s) Oral at bedtime  pantoprazole  Injectable 40 milliGRAM(s) IV Push daily  polyethylene glycol 3350 17 Gram(s) Oral daily  senna 2 Tablet(s) Oral at bedtime    PRN MEDICATIONS  acetaminophen     Tablet .. 650 milliGRAM(s) Oral every 6 hours PRN  simethicone 80 milliGRAM(s) Chew three times a day PRN                                            ------------------------------------------------------------  VITAL SIGNS: Last 24 Hours  T(C): 35.8 (04 May 2022 14:50), Max: 35.8 (04 May 2022 05:48)  T(F): 96.4 (04 May 2022 14:50), Max: 96.5 (04 May 2022 05:48)  HR: 112 (04 May 2022 14:50) (112 - 117)  BP: 126/88 (04 May 2022 14:50) (111/75 - 165/74)  BP(mean): --  RR: 16 (04 May 2022 14:50) (16 - 17)  SpO2: 100% (04 May 2022 14:50) (100% - 100%)      05-03-22 @ 07:01  -  05-04-22 @ 07:00  --------------------------------------------------------  IN: 0 mL / OUT: 510 mL / NET: -510 mL                                             --------------------------------------------------------------  LABS:                        10.8   8.37  )-----------( 114      ( 04 May 2022 12:44 )             34.1     05-04    142  |  105  |  48<H>  ----------------------------<  120<H>  4.6   |  24  |  0.7    Ca    8.0<L>      04 May 2022 12:44  Phos  4.0     05-04  Mg     2.3     05-04    TPro  5.4<L>  /  Alb  2.6<L>  /  TBili  0.6  /  DBili  x   /  AST  61<H>  /  ALT  129<H>  /  AlkPhos  957<H>  05-03                                              -------------------------------------------------------------  RADIOLOGY:                                            --------------------------------------------------------------    PHYSICAL EXAM:  GENERAL: Ill appearing. Agitated.   NERVOUS SYSTEM:  Alert & Oriented X0   PULMONARY: KIRK, CTA  CARDIOVASCULAR: S1S2 RRR  GI: Soft, NT, ND; BS present.  EXTREMITIES:  2+ Peripheral Pulses, No clubbing, cyanosis, or edema  LYMPH: No lymphadenopathy noted  SKIN: No rashes or lesions                                         --------------------------------------------------------------    ASSESSMENT & PLAN  75 YO F with PMHx of HTN, pancreatic CA s/p Whipple 8/2021, A.Fib on Eliquis, pleural effusion was BIBEMS due to worsening SOB    #Acute Hypoxemic Respiratory Failure.   #b/l Pleural effusions with ascites  #Pancreatic Ca s/p Whipple 8/21  #Transaminitis  - s/p NRB >> now on 6L nasal cannula >> Titrate down as tolerated.   - s/p Thoracentesis 4/19 with 750 cc fluid removed, transudative  - repeat CT noted, massive ascites--> IR for therapeutic paracentesis on 4/26  - s/p lasix  - cardiology eval appreciated, diuresis as tolerated  - discussed with Dr. Knight, feeds adjusted, Creon currently on hold as cannot be given through NGT  - resume if patient more awake/able to tolerate PO  - Ammonia noted to be elevated, on lactulose, titrate to 2-3 bm daily, CTH negative   - Extensive conversation with patient's daughter, lian  on 4/30>>>> want to continue ngt feeds, resume blood work. and also wants  brief trial of IVF.   - 5/3: Palliative has signed off. Patient now DNR DNI. Family at this time still wants continued current management.   - monitor LFT's  - F/U CA 19-9 and CTA/P with IV contrast     #BIlateral UE edema, from infiltrated IVs and contrast extravasation  -stable; continue to monitor     #Chronic A.Fib on Eliquis  - rate control with diltiazem   - continue lovenox    #Poor PO intake  #Severe malnutrition  #History of eating disorder  - started marinol 4/20, now off  - NGT placed, tolerating feeds, encourage PO intake as well if awake and appropriate to eat as per speech and swallow  - Dr. Syed following  - DO NOT try to put Creon down the NG tube as per Dr. Syed   - psychiatry following, added Zyprexa to current regimen - pt's daughter asked to stop it   - pt's family requested to stop remeron on 4/29 and to resume.  - failed speech and swallow on 4/28- family asking for follow up  - monitor BMP, phos, and Mg    - re-eval bowel regimen- lactulose, miralax, senna given daily - document BMs  - C/w NG feeds. PEG tube?     # Hypernatremia   - free water flushed 300ml TID for 2d     #Transaminitis  - Monitor LFTs   - RUQ US Pneumobilia with perihepatic ascites.  - CT A/P with possible cirrhosis   - Downtrending     #GOC - DNR DNI  - overall grave prognosis  -- 5/3: Palliative has signed off. Patient now DNR DNI. Family at this time still wants continued current management.   - monitor LFT's    Progress Note Handoff  Pending:  Clinical stability / ? PEG /   Family Discussion: extensive discussion with pt's daughter, Lian   Not ready for CMO.  Wish to continue blood work and possible PEG placement??   Disposition:  Unknown at this time.

## 2022-05-05 NOTE — PROGRESS NOTE ADULT - SUBJECTIVE AND OBJECTIVE BOX
VIKASH LI 76y Female  MRN#: [id number]   CODE STATUS: DNR DNI    Hospital Day: 17d    Pt is currently admitted with the primary diagnosis of shortness of breath     SUBJECTIVE  Hospital Course  Pt admitted for respiratory failure, hospital stay complicated by AMS, ascites with transaminitis, failure to thrive. Pt found with mild volume overload s/p thoracentesis with findings of infl cells (no malignant, no microbials). Pt noted to have tense ascites s/p therapeutic paracentesis. Pt with AMS, no baseline dementia per family but unclear etiology. Assumed hepatic encephalopathy given mildly elevated ammonia + new onset ascites (cirrhosis?). Differential for ascites includes whipple complications, intra-hepatic primary/metastatic cancer, carcinomatosis. Pt received therapeutic paracentesis, fluid not sent as family moving towards CMO for patient. In general, pt has been declining for years. Pt has minimal PO intake at baseline for past years and requires NGT feedings due initially to lack of ability to intake caloric sufficiency per PO, now requiring due to lack of mental status. Pt family believes sedating medications are cause +/- ammonia level and that NGT feeds are making her stronger. After much conversation, family moving towards re-eval 4/30/22 for possible CMO. Pt made DNR per her wishes, intubation wishes unknown but pt will receive trial of intubation if necessary. Family initial held bloodwork to allow her to be comfortable, now want daily blood draws for further ammonia monitoring. Family aware ammonia level not significant enough to cause this extent of mental status alteration but still wish to continue. Family understands further aggressive medical management is unlikely to give her quality of life but wish to continue monitoring with medical management.    Further GOC- Pt daughter (Maddie) is HCP. Maddie recently had to extubate her sister for terminal illness, is very well aware of comfort measures and the lack of usefulness of aggressive medical measures. She understands the situation pt is in well. Following extensive GOC conversation 4/27/22 with 5 family members, likely course of illness explained and risks vs benefits of further work up explained and understood. Family initially moving towards more palliative measure, but are now interested in daily blood draws for further monitoring.  GOC meeting 4/30/22. Family concerned about feeds. Stated to family on 4/27/22 that we will continue NGT feeds   for an entire week (until 4/30/22) and re-assess her strength. ***DO NOT GIVE SEDATING MEDICATIONS***. Family believes her lack of ability to eat is due to sedation.  5. Palliative care has signed off.   6. Patient now DNR and DNI.   7. CT A/P performed showing spread of cancer.     Overnight events: De-sat overnight, placed back on Nonrebreather. Likely poor pulse ox wave form. Patient back on nasal cannula this am 6L. Chest Xray with stable bilateral opacities.     Subjective complaints: She appears uncomfortable. tachycardia. Family still do not want further pain medications aside from tylenol.                                             ----------------------------------------------------------  OBJECTIVE  PAST MEDICAL & SURGICAL HISTORY  SOB (shortness of breath)    Pain  knee    Varicose veins of both lower extremities, unspecified whether complicated    Atrial fibrillation, unspecified type  2019 on Eliquis    Jaundice  March 5, 2021    Malignant neoplasm of pancreas, unspecified location of malignancy  Pancreatic Cancer    Hypertension, unspecified type  2019    Pancreatic cancer    Kidney stones    History of surgery  Whipple procedure 8/2/2021 with Dr. Nino at [facility]    Status post phlebectomy  10/2018    History of ovarian cystectomy  left    History of tonsillectomy  1959    Kidney stone  2017                                              -----------------------------------------------------------  ALLERGIES:  Augmentin (Rash)  chocolate (Headache)  digoxin (Hives)  Metoprolol Succinate ER (Hives)  Novocain (Angioedema)  Steroids: Excessive swelling (Flushing; Other (Mild to Mod))  sulfa drugs (Fever)  Xarelto (Hives)                                            ------------------------------------------------------------    HOME MEDICATIONS  Home Medications:  Cardizem  mg/24 hours oral capsule, extended release: 1 cap(s) orally once a day (22 Nov 2021 23:59)  Creon 12,000 units oral delayed release capsule: 2 cap(s) orally 3 times a day (with meals) (03 Dec 2021 10:54)  Eliquis 5 mg oral tablet: 1 tab(s) orally 2 times a day (03 Sep 2021 23:41)                           MEDICATIONS:  STANDING MEDICATIONS  artificial tears (preservative free) Ophthalmic Solution 1 Drop(s) Both EYES three times a day  chlorhexidine 4% Liquid 1 Application(s) Topical <User Schedule>  diltiazem    milliGRAM(s) Oral daily  enoxaparin Injectable 40 milliGRAM(s) SubCutaneous every 12 hours  lactulose Syrup 20 Gram(s) Oral every 12 hours  mirtazapine 15 milliGRAM(s) Oral at bedtime  pantoprazole  Injectable 40 milliGRAM(s) IV Push daily  polyethylene glycol 3350 17 Gram(s) Oral daily  senna 2 Tablet(s) Oral at bedtime    PRN MEDICATIONS  acetaminophen     Tablet .. 650 milliGRAM(s) Oral every 6 hours PRN  simethicone 80 milliGRAM(s) Chew three times a day PRN                                            ------------------------------------------------------------  VITAL SIGNS: Last 24 Hours  T(C): 35.4 (05 May 2022 05:31), Max: 35.8 (04 May 2022 14:50)  T(F): 95.7 (05 May 2022 05:31), Max: 96.4 (04 May 2022 14:50)  HR: 116 (05 May 2022 05:31) (110 - 116)  BP: 132/93 (05 May 2022 05:31) (123/67 - 132/93)  BP(mean): --  RR: 17 (05 May 2022 05:31) (16 - 17)  SpO2: 100% (05 May 2022 05:31) (100% - 100%)      05-04-22 @ 07:01  -  05-05-22 @ 07:00  --------------------------------------------------------  IN: 460 mL / OUT: 300 mL / NET: 160 mL                                             --------------------------------------------------------------  LABS:                        10.8   8.39  )-----------( 129      ( 05 May 2022 05:51 )             34.4     05-05    138  |  101  |  52<H>  ----------------------------<  84  4.8   |  24  |  0.7    Ca    8.1<L>      05 May 2022 05:51  Phos  3.8     05-05  Mg     2.4     05-05    TPro  5.6<L>  /  Alb  2.8<L>  /  TBili  0.7  /  DBili  x   /  AST  42<H>  /  ALT  93<H>  /  AlkPhos  867<H>  05-05                                                              -------------------------------------------------------------  RADIOLOGY:                                            --------------------------------------------------------------    PHYSICAL EXAM:                                           --------------------------------------------------------------    ASSESSMENT & PLAN   VIKASH LI 76y Female  MRN#: [id number]   CODE STATUS: DNR DNI    Hospital Day: 17d    Pt is currently admitted with the primary diagnosis of shortness of breath     SUBJECTIVE  Hospital Course  Pt admitted for respiratory failure, hospital stay complicated by AMS, ascites with transaminitis, failure to thrive. Pt found with mild volume overload s/p thoracentesis with findings of infl cells (no malignant, no microbials). Pt noted to have tense ascites s/p therapeutic paracentesis. Pt with AMS, no baseline dementia per family but unclear etiology. Assumed hepatic encephalopathy given mildly elevated ammonia + new onset ascites (cirrhosis?). Differential for ascites includes whipple complications, intra-hepatic primary/metastatic cancer, carcinomatosis. Pt received therapeutic paracentesis, fluid not sent as family moving towards CMO for patient. In general, pt has been declining for years. Pt has minimal PO intake at baseline for past years and requires NGT feedings due initially to lack of ability to intake caloric sufficiency per PO, now requiring due to lack of mental status. Pt family believes sedating medications are cause +/- ammonia level and that NGT feeds are making her stronger. After much conversation, family moving towards re-eval 4/30/22 for possible CMO. Pt made DNR per her wishes, intubation wishes unknown but pt will receive trial of intubation if necessary. Family initial held bloodwork to allow her to be comfortable, now want daily blood draws for further ammonia monitoring. Family aware ammonia level not significant enough to cause this extent of mental status alteration but still wish to continue. Family understands further aggressive medical management is unlikely to give her quality of life but wish to continue monitoring with medical management.    Further GOC- Pt daughter (Lian) is HCP. Lian recently had to extubate her sister for terminal illness, is very well aware of comfort measures and the lack of usefulness of aggressive medical measures. She understands the situation pt is in well. Following extensive GOC conversation 4/27/22 with 5 family members, likely course of illness explained and risks vs benefits of further work up explained and understood. Family initially moving towards more palliative measure, but are now interested in daily blood draws for further monitoring.  GOC meeting 4/30/22. Family concerned about feeds. Stated to family on 4/27/22 that we will continue NGT feeds   for an entire week (until 4/30/22) and re-assess her strength. ***DO NOT GIVE SEDATING MEDICATIONS***. Family believes her lack of ability to eat is due to sedation.  Palliative care has signed off.   Patient now DNR and DNI   CT A/P performed showing spread of cancer. Family meeting planned for today 3pm.     Overnight events: De-sat overnight, placed back on Nonrebreather. Likely poor pulse ox wave form. Patient back on nasal cannula this am 6L. Chest Xray with stable bilateral opacities.     Subjective complaints: She appears uncomfortable. tachycardia. Family still do not want further pain medications aside from Tylenol.                                             ----------------------------------------------------------  OBJECTIVE  PAST MEDICAL & SURGICAL HISTORY  SOB (shortness of breath)    Pain  knee    Varicose veins of both lower extremities, unspecified whether complicated    Atrial fibrillation, unspecified type  2019 on Eliquis    Jaundice  March 5, 2021    Malignant neoplasm of pancreas, unspecified location of malignancy  Pancreatic Cancer    Hypertension, unspecified type  2019    Pancreatic cancer    Kidney stones    History of surgery  Whipple procedure 8/2/2021 with Dr. Nino at [facility]    Status post phlebectomy  10/2018    History of ovarian cystectomy  left    History of tonsillectomy  1959    Kidney stone  2017                                              -----------------------------------------------------------  ALLERGIES:  Augmentin (Rash)  chocolate (Headache)  digoxin (Hives)  Metoprolol Succinate ER (Hives)  Novocain (Angioedema)  Steroids: Excessive swelling (Flushing; Other (Mild to Mod))  sulfa drugs (Fever)  Xarelto (Hives)                                            ------------------------------------------------------------    HOME MEDICATIONS  Home Medications:  Cardizem  mg/24 hours oral capsule, extended release: 1 cap(s) orally once a day (22 Nov 2021 23:59)  Creon 12,000 units oral delayed release capsule: 2 cap(s) orally 3 times a day (with meals) (03 Dec 2021 10:54)  Eliquis 5 mg oral tablet: 1 tab(s) orally 2 times a day (03 Sep 2021 23:41)                           MEDICATIONS:  STANDING MEDICATIONS  artificial tears (preservative free) Ophthalmic Solution 1 Drop(s) Both EYES three times a day  chlorhexidine 4% Liquid 1 Application(s) Topical <User Schedule>  diltiazem    milliGRAM(s) Oral daily  enoxaparin Injectable 40 milliGRAM(s) SubCutaneous every 12 hours  lactulose Syrup 20 Gram(s) Oral every 12 hours  mirtazapine 15 milliGRAM(s) Oral at bedtime  pantoprazole  Injectable 40 milliGRAM(s) IV Push daily  polyethylene glycol 3350 17 Gram(s) Oral daily  senna 2 Tablet(s) Oral at bedtime    PRN MEDICATIONS  acetaminophen     Tablet .. 650 milliGRAM(s) Oral every 6 hours PRN  simethicone 80 milliGRAM(s) Chew three times a day PRN                                            ------------------------------------------------------------  VITAL SIGNS: Last 24 Hours  T(C): 35.4 (05 May 2022 05:31), Max: 35.8 (04 May 2022 14:50)  T(F): 95.7 (05 May 2022 05:31), Max: 96.4 (04 May 2022 14:50)  HR: 116 (05 May 2022 05:31) (110 - 116)  BP: 132/93 (05 May 2022 05:31) (123/67 - 132/93)  BP(mean): --  RR: 17 (05 May 2022 05:31) (16 - 17)  SpO2: 100% (05 May 2022 05:31) (100% - 100%)      05-04-22 @ 07:01  -  05-05-22 @ 07:00  --------------------------------------------------------  IN: 460 mL / OUT: 300 mL / NET: 160 mL                                             --------------------------------------------------------------  LABS:                        10.8   8.39  )-----------( 129      ( 05 May 2022 05:51 )             34.4     05-05    138  |  101  |  52<H>  ----------------------------<  84  4.8   |  24  |  0.7    Ca    8.1<L>      05 May 2022 05:51  Phos  3.8     05-05  Mg     2.4     05-05    TPro  5.6<L>  /  Alb  2.8<L>  /  TBili  0.7  /  DBili  x   /  AST  42<H>  /  ALT  93<H>  /  AlkPhos  867<H>  05-05                                          -------------------------------------------------------------  RADIOLOGY:    ACC: [id number] EXAM:  CT ABDOMEN AND PELVIS IC                          PROCEDURE DATE:  05/04/2022          INTERPRETATION:  CLINICAL STATEMENT: Pancreatic cancer    TECHNIQUE: Contiguous axial CT images were obtained from the lower chest   to the pubic symphysis following administration of 100cc Omnipaque 350   intravenous contrast.  Oral contrast was not administered.  Reformatted   images in the coronal and sagittal planes were acquired.    COMPARISON CT: April 22, 2022    OTHER STUDIES USEDFOR CORRELATION: Correlation also made with PET/CT   dated January 20, 2022      FINDINGS:  Limited evaluation secondary to paucity of intra-abdominal fat and large   volume ascites.    LOWER CHEST: Cardiomegaly. Bilateral moderate to large pleural effusions,   right greater than left. Bibasilar compressive atelectasis. Nonspecific   metallic density in the left lower hemithorax.    HEPATOBILIARY: Shrunken liver with heterogeneous parenchyma and nodular   contour. Stable pneumobilia with a CBD stent in place. Unchanged hepatic   cysts and subcentimeter hypodensities, too small to characterize.   Nonvisualization of the portal vein on this arterial phase exam..    SPLEEN: Unremarkable.    PANCREAS: Post Whipple procedure, per history. Limited evaluation of the   pancreatic parenchyma. Ill-defined soft tissue encasing the celiac and   superior mesenteric artery in the region of the pancreatectomy bed   measuring approximately 3.7 x 3.3 cm; finding corresponds to site of   abnormal FDG uptake seen on prior PET/CT.    ADRENAL GLANDS: Unremarkable.    KIDNEYS: Symmetric renal enhancement bilaterally. No hydronephrosis.    ABDOMINOPELVIC NODES: Limited assessment.    PELVIC ORGANS: Decompressed with a Ibarra catheter in place.    PERITONEUM/MESENTERY/BOWEL: Large volume abdominopelvic ascites. No bowel   obstruction or pneumoperitoneum. Moderate size hiatal hernia.    BONES/SOFT TISSUES: Stable degenerative changes of the spine with   endplate irregularity and sclerosis at L2-L3 disc space and also L4-L5   disc space. Sacral decubitus ulcer. Soft tissue anasarca.    OTHER: Scattered vascular calcifications.      IMPRESSION:  1.  Limited evaluation secondary to paucity of intra-abdominal fat and   large volume ascites.  2.  Ill-defined soft tissue seen in the pancreatectomy bed encasing the   celiac and superior mesenteric arteries, corresponding to site of   abnormal FDG uptake seen on prior PET/CT. Findings likely represent sites   of tumor recurrence.  3.  Large volume abdominopelvic ascites.  4.  Shrunken nodular liver, maybe secondary to cirrhosis or   pseudocirrhosis.  5.  Moderate to large bilateral pleural effusions, right greater than   left with associated bibasilar atelectasis.  6.  Cardiomegaly.    --- End of Report ---    JIMMIE ALLEN MD; Attending Radiologist  This document has been electronically signed. May  5 2022  8:28AM                                            --------------------------------------------------------------    PHYSICAL EXAM:  GENERAL: Ill appearing. Agitated.   NERVOUS SYSTEM:  Alert & Oriented X0   PULMONARY: KIRK, CTA  CARDIOVASCULAR: S1S2 RRR  GI: Soft, NT, ND; BS present.  EXTREMITIES:  2+ Peripheral Pulses, No clubbing, cyanosis, or edema  LYMPH: No lymphadenopathy noted  SKIN: No rashes or lesions                                         --------------------------------------------------------------    ASSESSMENT & PLAN  75 YO F with PMHx of HTN, pancreatic CA s/p Whipple 8/2021, A.Fib on Eliquis, pleural effusion was BIBEMS due to worsening SOB    #Acute Hypoxemic Respiratory Failure.   #b/l Pleural effusions with ascites  #Pancreatic Ca s/p Whipple 8/21 with recurrence on Ct/AP  #Transaminitis  - s/p NRB >> now on 6L nasal cannula >> Titrate down as tolerated.   - s/p Thoracentesis 4/19 with 750 cc fluid removed, transudative  - repeat CT noted, massive ascites--> IR for therapeutic paracentesis on 4/26  - s/p lasix  - cardiology eval appreciated, diuresis as tolerated  - discussed with Dr. Knight, feeds adjusted, Creon currently on hold as cannot be given through NGT  - resume if patient more awake/able to tolerate PO  - Ammonia noted to be elevated, on lactulose, titrate to 2-3 bm daily, CTH negative   - Extensive conversation with patient's daughter, lian  on 4/30>>>> want to continue ngt feeds, resume blood work. and also wants  brief trial of IVF.   - 5/3: Palliative has signed off. Patient now DNR DNI. Family at this time still wants continued current management.   - monitor LFT's  - CA 19-9 38  - CTA/P with IV contrast 5/4: LIkely recurrance of pancreatic cancer. Soft tissue encasing superior mesentaric artery and celiac artery. Large volume ascites. Mod-large pleural effusion. Cirrhosis/ Pseudocirrhosis. Cardiomegaly.   - Family meeting [facility] at 3pm today     #BIlateral UE edema, from infiltrated IVs and contrast extravasation  -stable; continue to monitor     #Chronic A.Fib on Eliquis  - rate control with diltiazem   - continue lovenox    #Poor PO intake  #Severe malnutrition  #History of eating disorder  - started marinol 4/20, now off  - NGT placed, tolerating feeds, encourage PO intake as well if awake and appropriate to eat as per speech and swallow  - Dr. Syed following  - DO NOT try to put Creon down the NG tube as per Dr. Syed   - psychiatry following, added Zyprexa to current regimen - pt's daughter asked to stop it   - pt's family requested to stop remeron on 4/29 and to resume.  - failed speech and swallow on 4/28- family asking for follow up  - monitor BMP, phos, and Mg    - re-eval bowel regimen- lactulose, miralax, senna given daily - document BMs  - C/w NG feeds. PEG tube?     # Hypernatremia   - free water flushed 300ml TID for 2d     #Transaminitis  - Monitor LFTs   - RUQ US Pneumobilia with perihepatic ascites.  - CT A/P with possible cirrhosis   - Downtrending     #GOC - DNR DNI  - overall grave prognosis  -- 5/3: Palliative has signed off. Patient now DNR DNI. Family at this time still wants continued current management.   - monitor LFT's  - Family meetign at 3pm for GOC today    Progress Note Handoff  Pending:  Clinical stability / ? PEG /   Family Discussion: extensive discussion with pt's daughter, Lian   Not ready for CMO.  Wish to continue blood work and possible PEG placement??   Disposition:  Unknown at this time.

## 2022-05-05 NOTE — SWALLOW BEDSIDE ASSESSMENT ADULT - SWALLOW EVAL: PATIENT/FAMILY GOALS STATEMENT
SLP educated Pt's son Gabe on giving liquids only when requested by Pt. SLP educated Pt's son on upright positioning, tsp presentation of liquids, maintaining upright positioning, and to make sure if liquids are presented Pt strips spoon for intake. He expressed understanding
agreed w/ plan for pleasure sips of mildly thick water at this time

## 2022-05-06 NOTE — SWALLOW BEDSIDE ASSESSMENT ADULT - COMMENTS
overnight pt w/ desaturation and placed on O2 nonrebreather FM, Saturating at 100% and switched to 6L O 2NC for b/s f/u
Last ST note states allow mildly thick liquids if requested by Pt. Family aware only if requested.
Last ST note states allow mildly thick liquids if requested by Pt. Family aware only if requested.  RN reports Pt's family considering palliative vs hospice. However last palliative care note (5/2) indicates Pt's family want to continue life-prolonging treatments.

## 2022-05-06 NOTE — PROGRESS NOTE ADULT - PROBLEM SELECTOR PROBLEM 1
Pancreatic cancer

## 2022-05-06 NOTE — SWALLOW BEDSIDE ASSESSMENT ADULT - SLP PERTINENT HISTORY OF CURRENT PROBLEM
77 y/o F w/ PMHx: HTN, pancreatic CA s/p Whipple 8/2021, A.Fib, pleural effusion was BIBEMS due to worsening SOB here for AHRF, AMS- likely toxic metabolic, ascites, and decreased PO intake. Family deciding on GOC and POC at this time. CXR 5/5 Bilateral opacities unchanged.
pt is a 77 y/o F w/ PMHx: HTN, pancreatic CA s/p Whipple 8/2021, A.Fib, pleural effusion was BIBEMS due to worsening SOB here for AHRF, AMS- likely toxic metabolic, ascites, and decreased PO intake. Family deciding on GOC and POC at this time
pt is a 77 y/o F w/ PMHx: HTN, pancreatic CA s/p Whipple 8/2021, A.Fib, pleural effusion was BIBEMS due to worsening SOB here for AHRF, AMS- likely toxic metabolic, ascites, and decreased PO intake. Per family, pt currently partial comfort, no further aggressive medical management. Pt to receive NGT feeds and to be re-evaluated 4/30 for further assessment and possible full CMO.
75 y/o F w/ PMHx: HTN, pancreatic CA s/p Whipple 8/2021, A.Fib, pleural effusion was BIBEMS due to worsening SOB here for AHRF, AMS- likely toxic metabolic, ascites, and decreased PO intake. Family deciding on GOC and POC at this time. CXR 5/5 Bilateral opacities unchanged.
pt is a 75 y/o F w/ PMHx: HTN, pancreatic CA s/p Whipple 8/2021, A.Fib, pleural effusion was BIBEMS due to worsening SOB here for AHRF, AMS- likely toxic metabolic, ascites, and decreased PO intake. Family deciding on GOC and POC at this time
pt is a 77 y/o F w/ PMHx: HTN, pancreatic CA s/p Whipple 8/2021, A.Fib, pleural effusion was BIBEMS due to worsening SOB here for AHRF, AMS- likely toxic metabolic, ascites, and decreased PO intake. Family deciding on GOC and POC at this time

## 2022-05-06 NOTE — PROGRESS NOTE ADULT - PROBLEM SELECTOR PROBLEM 2
Pleural effusion

## 2022-05-06 NOTE — PROGRESS NOTE ADULT - PROBLEM SELECTOR PLAN 2
- plan per primary/other consulting teams  - morphine concentrate PRN for symptoms
- monitor s/s; at present family agreed to not do daily CXRs
- monitor s/s; at present family agreed to not do daily CXRs
- with dyspnea requiring BiPAP/HFNC  - c/w morphine IV used 3 doses/24 hours
- with dyspnea requiring BiPAP/HFNC  - s/p thoracentesis  - monitor symptoms s/p thoracentesis
- with dyspnea requiring BiPAP  - s/p thoracentesis  - monitor symptoms s/p thoracentesis
- monitor s/s; at present family agreed to not do daily CXRs
- with dyspnea requiring BiPAP/HFNC  - s/p thoracentesis and being evaluated for repeat - IR consult appreicated  - thoracentesis will be palliative in nature; during encounter no evidence of breathingdifficulty
- plan per primary/other consulting teams
- with dyspnea requiring BiPAP  - s/p thoracentesis  - monitor symptoms s/p thoracentesis

## 2022-05-06 NOTE — SWALLOW BEDSIDE ASSESSMENT ADULT - SWALLOW EVAL: RECOMMENDED DIET
NPO w/ NGT, allow single sips of mildly thick liquids via tsp if requested by Pt.
continue NPO w/ NGT
NPO X for pleasure sips of mildly thick liquids when pt is alert and requesting po
NPO with NGT. Allow mildly thick liquids via tsp if requested by patient.
NPO, only provide pleasure sips of mildly thick liquid if pt is fully awake and alert and requesting po
continue NGT feeds, allow pleasure sips of mildly thick water via tsp when awake and alert and requesting water

## 2022-05-06 NOTE — SWALLOW BEDSIDE ASSESSMENT ADULT - NS SPL SWALLOW CLINIC TRIAL FT
no appropriate for po trials.
+mild oral dysphagia w/ min overt s/s of penetration/aspiration w/ mildly thick liquid trials, no further po trials provided 2' generalized weakness
+ tolerance for small amounts of mildly thick liquids via tsp, unable to exclude silent aspiration at bedside 2' multiple swallows across PO trials.

## 2022-05-06 NOTE — PROGRESS NOTE ADULT - PROBLEM SELECTOR PLAN 7
- DNR only  will follow    ______________  Bipin Madrigal MD  Palliative Medicine  [facility]   of Geriatric and Palliative Medicine  [phone]

## 2022-05-06 NOTE — SWALLOW BEDSIDE ASSESSMENT ADULT - SLP GENERAL OBSERVATIONS
Pt received in bed awake, moaning, pds of decreased responsiveness, on O2NC, NGT in situ
Pt. received lethargic, ill-appearing & markedly cachectic, NGT in situ, room air.
pt received in bed asleep minimally arousable in no apparent pain. +5L NC +NGT in place +open mouth posture, +cachectic
Pt received in bed w/ son at b/s, initially moaning, pts son feeds mildly thick liquids by mouth w/ HOB at 30'. HOB elevated attempted to arouse pt, pt w/o verbal responses, no further po trials were attempted and pts son educated on aspiration risks
Pt received in bed, more awake, more responsive, +desaturation from 99 to 88 1X w/ spontaneous recovery on 6L O2 FM
Pt lethargic in bed, NGT in situ, Pt's son received at bedside.

## 2022-05-06 NOTE — SWALLOW BEDSIDE ASSESSMENT ADULT - SWALLOW EVAL: RECOMMENDED FEEDING/EATING TECHNIQUES
oral hygiene/position upright (90 degrees)
oral hygiene/position upright (90 degrees)
via tsp (ONLY)/small sips/bites

## 2022-05-06 NOTE — SWALLOW BEDSIDE ASSESSMENT ADULT - SWALLOW EVAL: DIAGNOSIS
+ tolerance for small amounts of mildly thick liquids via tsp, unable to exclude silent aspiration at bedside 2' multiple swallows across PO trials.
Pt lethargic but responsive in bed, NGT in situ. Pt asked if she wanted water, Pt initially said yea, when SLP elevated HOB, Pt requested to lie down and no longer wanted water. No trials given as result. Pt's son Gabe was at bedside for interaction.
PO trials unsafe 2' poor arousability, lethargy.
+pt is lethargic, moans, responds "no" when asked if in pain responded "no" when asked if she wanted water.
Today pt too lethargic for pleasure sips of mildly thick liquids, Pts son at b/s educated on aspiration risks for attempting po intake when pt is lethargic and w/ decreased responsiveness.
Pt more awake and requesting water +mild oral dysphagia w/ min overt s/s of penetration/aspiration w/ mildly thick liquid trials, no further po trials provided 2' generalized weakness

## 2022-05-06 NOTE — PROGRESS NOTE ADULT - ASSESSMENT
77 YO F with PMHx of HTN, pancreatic CA s/p Whipple 8/2021, A.Fib on Eliquis, pleural effusion was BIBEMS from [facility]hab due to worsening SOB; Patient had recent admission from [facility] and had thoracentesis done admitted has been overall clinically declining. Had had NG feeding for several days with family reporting periods of lucidity- but not enough to have confidence that she can engage in complex decision-making/        being followed for support with GOC       MEDD (morphine equivalent daily dose): na      See Recs below. d/w Primary and Palliative teams       Please call [phone] with questions or concerns 24/7.   We will continue to follow.

## 2022-05-06 NOTE — SWALLOW BEDSIDE ASSESSMENT ADULT - NS ASR SWALLOW FINDINGS DISCUS
Nursing/Patient/Family
SHARMILA Pulido/Nursing/Family
Physician/Nursing
Nursing/Patient/Family
RN MD Marilu made aware./Physician/Nursing
Physician/Nursing/Patient/Family

## 2022-05-06 NOTE — PROGRESS NOTE ADULT - PROBLEM SELECTOR PLAN 1
- not on recent DDT  - oncology consult not offering and recommend  focus on symptom/comfort; family stated she would not want chemo/radiation  - ongoing supportive care  - with constipation, last BM 5/5/22

## 2022-05-06 NOTE — SWALLOW BEDSIDE ASSESSMENT ADULT - ADDITIONAL RECOMMENDATIONS
Even if pt more alert and we are able to assess for toleration of po intake from an aspiration risk standpoint she will not likely take enough to meet nutritional needs
Only feed patient when alert/awake. Allow mildly thick liquids via tsp if requested by patient.

## 2022-05-06 NOTE — SWALLOW BEDSIDE ASSESSMENT ADULT - SWALLOW EVAL: FUNCTIONAL LEVEL AT TIME OF EVAL
Lethargic, ill-appearing & markedly cachectic, NGT in situ, received moaning.
Pt appeared to be responding to questions. Therefore ST asked pt "if you can no longer eat food by mouth so you want a feeding tube in your stomach" Pt replied "no". To confirm her response the ST asked "no to what" pt replied "no feeding tube". Pts speech was garbled yet intelligible, PCA was present at the time. Despite pts response of "No" her insight and overall cognition is not at baseline and pt is unable to make her own decisions at this time. This information was shared with the patients daughter Yuli who was not in the room during this interaction.

## 2022-05-06 NOTE — PROGRESS NOTE ADULT - SUBJECTIVE AND OBJECTIVE BOX
Patient seen and examined at bedside. Pt's daughter at bedside.  Last noted BM: 5/5/22    PHYSICAL EXAM:  GENERAL: laying in bed and sleeping; (+) ill-appearing & markedly cachectic (+) NGT in place; moaning during physical examination and/or when moved (+) ariza   HEENT: Dry mucous membranes  ABDOMEN: Soft,(+) distended; no obvious indications of abd TTP  EXTREMITIES:  No obvious clubbing or cyanosis  SKIN: poor skin turgor; No obvious rashes or lesions    Vitals:T(C): 35.9 (05-05-22 @ 13:29), Max: 35.9 (05-05-22 @ 13:29)  HR: 100 (05-06-22 @ 08:22) (100 - 114)  BP: 127/71 (05-05-22 @ 20:08) (127/71 - 128/82)  RR: 16 (05-06-22 @ 08:22) (16 - 18)  SpO2: 100% (05-06-22 @ 08:22) (100% - 100%)  I&Os:  05-05-22 @ 07:01  -  05-06-22 @ 07:00  --------------------------------------------------------  IN: 360 mL / OUT: 775 mL / NET: -415 mL     Labs:                          10.2   8.63  )-----------( 132      ( 06 May 2022 06:33 )             32.5     141  |  105  |  67<HH>  ----------------------------<  87  5.0   |  24  |  0.7    Ca    7.9<L>      06 May 2022 06:33  Phos  3.3     05-06  Mg     2.5     05-06    Meds:  acetaminophen     Tablet .. 650 milliGRAM(s) Oral every 6 hours PRN  artificial tears (preservative free) Ophthalmic Solution 1 Drop(s) Both EYES three times a day  chlorhexidine 4% Liquid 1 Application(s) Topical <User Schedule>  diltiazem    milliGRAM(s) Oral daily  enoxaparin Injectable 40 milliGRAM(s) SubCutaneous every 12 hours  lactulose Syrup 20 Gram(s) Oral every 12 hours  mirtazapine 15 milliGRAM(s) Oral at bedtime  morphine  - Injectable 0.5 milliGRAM(s) IV Push every 4 hours PRN  pantoprazole  Injectable 40 milliGRAM(s) IV Push daily  polyethylene glycol 3350 17 Gram(s) Oral daily  senna 2 Tablet(s) Oral at bedtime  simethicone 80 milliGRAM(s) Chew three times a day PRN    Diet:  Diet, NPO with Tube Feed:   Tube Feeding Modality: Nasogastric  Peptamen A.F. Formula  Total Volume for 24 Hours (mL): 1125  Bolus  Total Volume of Bolus (mL):  375  Tube Feed Frequency: Every 8 hours   Tube Feed Start Time: 15:00  Bolus Feed Rate (mL per Hour): 500   Bolus Feed Duration (in Hours): 0.75  Free Water Flush Instructions:  30 ml water syringe push before and after each feed (05-04-22 @ 12:37)    EXAM:  CT ABDOMEN AND PELVIS IC                      PROCEDURE DATE:  05/04/2022    INTERPRETATION:  CLINICAL STATEMENT: Pancreatic cancer  COMPARISON CT: April 22, 2022  FINDINGS:  Limited evaluation secondary to paucity of intra-abdominal fat and large volume ascites.  LOWER CHEST: Cardiomegaly. Bilateral moderate to large pleural effusions,   right greater than left. Bibasilar compressive atelectasis. Nonspecific metallic density in the left lower hemithorax.  HEPATOBILIARY: Shrunken liver with heterogeneous parenchyma and nodular   contour. Stable pneumobilia with a CBD stent in place. Unchanged hepatic   cysts and subcentimeter hypodensities, too small to characterize.   Nonvisualization of the portal vein on this arterial phase exam..  SPLEEN: Unremarkable.  PANCREAS: Post Whipple procedure, per history. Limited evaluation of the   pancreatic parenchyma. Ill-defined soft tissue encasing the celiac and   superior mesenteric artery in the region of the pancreatectomy bed   measuring approximately 3.7 x 3.3 cm; finding corresponds to site of   abnormal FDG uptake seen on prior PET/CT.  ADRENAL GLANDS: Unremarkable.  KIDNEYS: Symmetric renal enhancement bilaterally. No hydronephrosis.  ABDOMINOPELVIC NODES: Limited assessment.  PELVIC ORGANS: Decompressed with a Ariza catheter in place.  PERITONEUM/MESENTERY/BOWEL: Large volume abdominopelvic ascites. No bowel   obstruction or pneumoperitoneum. Moderate size hiatal hernia.  BONES/SOFT TISSUES: Stable degenerative changes of the spine with endplate irregularity and sclerosis at L2-L3 disc space and also L4-L5 disc space. Sacral decubitus ulcer. Soft tissue anasarca.  OTHER: Scattered vascular calcifications.    IMPRESSION:  1.  Limited evaluation secondary to paucity of intra-abdominal fat and large volume ascites.  2.  Ill-defined soft tissue seen in the pancreatectomy bed encasing the celiac and superior mesenteric arteries, corresponding to site of abnormal FDG uptake seen on prior PET/CT. Findings likely represent sites of tumor recurrence.  3.  Large volume abdominopelvic ascites.  4.  Shrunken nodular liver, maybe secondary to cirrhosis or pseudocirrhosis.  5.  Moderate to large bilateral pleural effusions, right greater than left with associated bibasilar atelectasis.  6.  Cardiomegaly.    --- End of Report ---  JIMMIE ALLEN MD; Attending Radiologist  This document has been electronically signed. May  5 2022  8:28AM     Patient seen and examined at bedside. Pt's daughter at bedside.  Last BM: 1 small BM yesterday afternoon     PHYSICAL EXAM:  GENERAL: laying in bed and sleeping; (+) ill-appearing & markedly cachectic (+) NGT in place; moaning during physical examination and/or when moved (+) ariza   HEENT: Dry mucous membranes  ABDOMEN: Soft,(+) distended; no obvious indications of abd TTP  EXTREMITIES:  (+) b/l upper limbs contractured; No obvious clubbing or cyanosis  SKIN: poor skin turgor; No obvious rashes    Vitals:T(C): 35.9 (05-05-22 @ 13:29), Max: 35.9 (05-05-22 @ 13:29)  HR: 100 (05-06-22 @ 08:22) (100 - 114)  BP: 127/71 (05-05-22 @ 20:08) (127/71 - 128/82)  RR: 16 (05-06-22 @ 08:22) (16 - 18)  SpO2: 100% (05-06-22 @ 08:22) (100% - 100%)  I&Os:  05-05-22 @ 07:01  -  05-06-22 @ 07:00  --------------------------------------------------------  IN: 360 mL / OUT: 775 mL / NET: -415 mL     Labs:                          10.2   8.63  )-----------( 132      ( 06 May 2022 06:33 )             32.5     141  |  105  |  67<HH>  ----------------------------<  87  5.0   |  24  |  0.7    Ca    7.9<L>      06 May 2022 06:33  Phos  3.3     05-06  Mg     2.5     05-06    Meds:  acetaminophen     Tablet .. 650 milliGRAM(s) Oral every 6 hours PRN  artificial tears (preservative free) Ophthalmic Solution 1 Drop(s) Both EYES three times a day  chlorhexidine 4% Liquid 1 Application(s) Topical <User Schedule>  diltiazem    milliGRAM(s) Oral daily  enoxaparin Injectable 40 milliGRAM(s) SubCutaneous every 12 hours  lactulose Syrup 20 Gram(s) Oral every 12 hours  mirtazapine 15 milliGRAM(s) Oral at bedtime  morphine  - Injectable 0.5 milliGRAM(s) IV Push every 4 hours PRN  pantoprazole  Injectable 40 milliGRAM(s) IV Push daily  polyethylene glycol 3350 17 Gram(s) Oral daily  senna 2 Tablet(s) Oral at bedtime  simethicone 80 milliGRAM(s) Chew three times a day PRN    Diet:  Diet, NPO with Tube Feed:   Tube Feeding Modality: Nasogastric  Peptamen A.F. Formula  Total Volume for 24 Hours (mL): 1125  Bolus  Total Volume of Bolus (mL):  375  Tube Feed Frequency: Every 8 hours   Tube Feed Start Time: 15:00  Bolus Feed Rate (mL per Hour): 500   Bolus Feed Duration (in Hours): 0.75  Free Water Flush Instructions:  30 ml water syringe push before and after each feed (05-04-22 @ 12:37)      EXAM:  XR CHEST PORTABLE    PROCEDURE DATE:  05/05/2022      Comparison : Chest radiograph May 5, 2022 at 1:00 AM.  Findings:  Support devices: NGT with its tip overlying the stomach. Right-sided Port-A-Cath with its tip overlying the SVC.  Cardiac/mediastinum/hilum: Magnified, unchanged.  Lung parenchyma/Pleura: Bilateral opacities, unchanged. No pneumothorax is seen.  Skeleton/soft tissues: Stable.    Impression:  Bilateral opacities, unchanged.  Feeding tube as above  --- End of Report ---      EXAM:  CT ABDOMEN AND PELVIS IC     PROCEDURE DATE:  05/04/2022    COMPARISON CT: April 22, 2022  FINDINGS: Limited evaluation secondary to paucity of intra-abdominal fat and large volume ascites.  LOWER CHEST: Cardiomegaly. Bilateral moderate to large pleural effusions, right greater than left. Bibasilar compressive atelectasis. Nonspecific metallic density in the left lower hemithorax.  HEPATOBILIARY: Shrunken liver with heterogeneous parenchyma and nodular   contour. Stable pneumobilia with a CBD stent in place. Unchanged hepatic cysts and subcentimeter hypodensities, too small to characterize. Nonvisualization of the portal vein on this arterial phase exam.  SPLEEN: Unremarkable.  PANCREAS: Post Whipple procedure, per history. Limited evaluation of the pancreatic parenchyma. Ill-defined soft tissue encasing the celiac and superior mesenteric artery in the region of the pancreatectomy bed measuring approximately 3.7 x 3.3 cm; finding corresponds to site of abnormal FDG uptake seen on prior PET/CT.  ADRENAL GLANDS: Unremarkable.  KIDNEYS: Symmetric renal enhancement bilaterally. No hydronephrosis.  ABDOMINOPELVIC NODES: Limited assessment.  PELVIC ORGANS: Decompressed with a Ariza catheter in place.  PERITONEUM/MESENTERY/BOWEL: Large volume abdominopelvic ascites. No bowel obstruction or pneumoperitoneum. Moderate size hiatal hernia.  BONES/SOFT TISSUES: Stable degenerative changes of the spine with endplate irregularity and sclerosis at L2-L3 disc space and also L4-L5 disc space. Sacral decubitus ulcer. Soft tissue anasarca.  OTHER: Scattered vascular calcifications.    IMPRESSION:  1.  Limited evaluation secondary to paucity of intra-abdominal fat and large volume ascites.  2.  Ill-defined soft tissue seen in the pancreatectomy bed encasing the celiac and superior mesenteric arteries, corresponding to site of abnormal FDG uptake seen on prior PET/CT. Findings likely represent sites of tumor recurrence.  3.  Large volume abdominopelvic ascites.  4.  Shrunken nodular liver, maybe secondary to cirrhosis or pseudocirrhosis.  5.  Moderate to large bilateral pleural effusions, right greater than left with associated bibasilar atelectasis.  6.  Cardiomegaly.    --- End of Report ---  JIMMIE ALLEN MD; Attending Radiologist  This document has been electronically signed. May  5 2022  8:28AM     Patient seen and examined at bedside. Pt's daughter at bedside.  Last BM: 1 small BM yesterday afternoon   pt minimally interactive, laying in bed and sleeping; (+) ill-appearing & markedly cachectic (+) NGT in place; moaning during physical examination and/or when moved (+) ariza   Dry mucous membranes  ABDOMEN: Soft,(+) distended; no obvious indications of abd TTP  EXTREMITIES:  (+) b/l upper limbs contractured; No obvious clubbing or cyanosis  SKIN: poor skin turgor; No obvious rashes  ** d/w hospitalist - episode of abdominal pain last evening after a feeding - family agreeable to hold feeding at that time    Vitals:T(C): 35.9 (05-05-22 @ 13:29), Max: 35.9 (05-05-22 @ 13:29)  HR: 100 (05-06-22 @ 08:22) (100 - 114)  BP: 127/71 (05-05-22 @ 20:08) (127/71 - 128/82)  RR: 16 (05-06-22 @ 08:22) (16 - 18)  SpO2: 100% (05-06-22 @ 08:22) (100% - 100%)  I&Os:  05-05-22 @ 07:01  -  05-06-22 @ 07:00  --------------------------------------------------------  IN: 360 mL / OUT: 775 mL / NET: -415 mL     Labs:                          10.2   8.63  )-----------( 132      ( 06 May 2022 06:33 )             32.5     141  |  105  |  67<HH>  ----------------------------<  87  5.0   |  24  |  0.7    Ca    7.9<L>      06 May 2022 06:33  Phos  3.3     05-06  Mg     2.5     05-06    Meds:  acetaminophen     Tablet .. 650 milliGRAM(s) Oral every 6 hours PRN  artificial tears (preservative free) Ophthalmic Solution 1 Drop(s) Both EYES three times a day  chlorhexidine 4% Liquid 1 Application(s) Topical <User Schedule>  diltiazem    milliGRAM(s) Oral daily  enoxaparin Injectable 40 milliGRAM(s) SubCutaneous every 12 hours  lactulose Syrup 20 Gram(s) Oral every 12 hours  mirtazapine 15 milliGRAM(s) Oral at bedtime  morphine  - Injectable 0.5 milliGRAM(s) IV Push every 4 hours PRN  pantoprazole  Injectable 40 milliGRAM(s) IV Push daily  polyethylene glycol 3350 17 Gram(s) Oral daily  senna 2 Tablet(s) Oral at bedtime  simethicone 80 milliGRAM(s) Chew three times a day PRN    Diet:  Diet, NPO with Tube Feed:   Tube Feeding Modality: Nasogastric  Peptamen A.F. Formula  Total Volume for 24 Hours (mL): 1125  Bolus  Total Volume of Bolus (mL):  375  Tube Feed Frequency: Every 8 hours   Tube Feed Start Time: 15:00  Bolus Feed Rate (mL per Hour): 500   Bolus Feed Duration (in Hours): 0.75  Free Water Flush Instructions:  30 ml water syringe push before and after each feed (05-04-22 @ 12:37)      EXAM:  XR CHEST PORTABLE    PROCEDURE DATE:  05/05/2022      Comparison : Chest radiograph May 5, 2022 at 1:00 AM.  Findings:  Support devices: NGT with its tip overlying the stomach. Right-sided Port-A-Cath with its tip overlying the SVC.  Cardiac/mediastinum/hilum: Magnified, unchanged.  Lung parenchyma/Pleura: Bilateral opacities, unchanged. No pneumothorax is seen.  Skeleton/soft tissues: Stable.    Impression:  Bilateral opacities, unchanged.  Feeding tube as above  --- End of Report ---      EXAM:  CT ABDOMEN AND PELVIS IC     PROCEDURE DATE:  05/04/2022    COMPARISON CT: April 22, 2022  FINDINGS: Limited evaluation secondary to paucity of intra-abdominal fat and large volume ascites.  LOWER CHEST: Cardiomegaly. Bilateral moderate to large pleural effusions, right greater than left. Bibasilar compressive atelectasis. Nonspecific metallic density in the left lower hemithorax.  HEPATOBILIARY: Shrunken liver with heterogeneous parenchyma and nodular   contour. Stable pneumobilia with a CBD stent in place. Unchanged hepatic cysts and subcentimeter hypodensities, too small to characterize. Nonvisualization of the portal vein on this arterial phase exam.  SPLEEN: Unremarkable.  PANCREAS: Post Whipple procedure, per history. Limited evaluation of the pancreatic parenchyma. Ill-defined soft tissue encasing the celiac and superior mesenteric artery in the region of the pancreatectomy bed measuring approximately 3.7 x 3.3 cm; finding corresponds to site of abnormal FDG uptake seen on prior PET/CT.  ADRENAL GLANDS: Unremarkable.  KIDNEYS: Symmetric renal enhancement bilaterally. No hydronephrosis.  ABDOMINOPELVIC NODES: Limited assessment.  PELVIC ORGANS: Decompressed with a Ariza catheter in place.  PERITONEUM/MESENTERY/BOWEL: Large volume abdominopelvic ascites. No bowel obstruction or pneumoperitoneum. Moderate size hiatal hernia.  BONES/SOFT TISSUES: Stable degenerative changes of the spine with endplate irregularity and sclerosis at L2-L3 disc space and also L4-L5 disc space. Sacral decubitus ulcer. Soft tissue anasarca.  OTHER: Scattered vascular calcifications.    IMPRESSION:  1.  Limited evaluation secondary to paucity of intra-abdominal fat and large volume ascites.  2.  Ill-defined soft tissue seen in the pancreatectomy bed encasing the celiac and superior mesenteric arteries, corresponding to site of abnormal FDG uptake seen on prior PET/CT. Findings likely represent sites of tumor recurrence.  3.  Large volume abdominopelvic ascites.  4.  Shrunken nodular liver, maybe secondary to cirrhosis or pseudocirrhosis.  5.  Moderate to large bilateral pleural effusions, right greater than left with associated bibasilar atelectasis.  6.  Cardiomegaly.    --- End of Report ---  JIMMIE ALLEN MD; Attending Radiologist  This document has been electronically signed. May  5 2022  8:28AM

## 2022-05-06 NOTE — PROGRESS NOTE ADULT - SUBJECTIVE AND OBJECTIVE BOX
VIKASH LI          MRN-[id number]              HPI:  77 YO F with PMHx of HTN, pancreatic CA s/p Whipple 8/2021, A.Fib on Eliquis, pleural effusion was BIBEMS due to worsening SOB that started earlier on the morning of presentation  As per EMS, patient was saturating in the 70s on room air and was placed on NRB w/ sats improving to high 80s. Patient reports that she lives with her son and started noticing shortness of breath.   Patient had recent admission from [facility] and had thoracentesis done.  Patient denies fevers, chills, headache, chest pain, palpitations, constipation, melena, hematochezia, dysuria, urinary frequency or urgency, numbness, tingling, recent travel or any known sick contact.   In the ED patient was placed on non rebreather with SpO2 of 94%. .  X-ray chest showed Interval increase in bilateral effusions and opacities. (18 Apr 2022 11:47)    ROS:	    4/21: Unable to full attain due to:  patient is lethargic and not able to sustain interaction; after encounter DgtMaddie at bedside with her  - reinforced offer to have family meeting  4/25: interim: at [facility] noted with subsequent vascular consult for R arm and disimpaction; no family at bedside; patient non-responsive; hypotensive; now has NG tube  4/26: patient declining, did not appear in pain during my evaluation  4/27: patient had episodes of rest with agitation - soothed with non-pharmacological intervention; not making meaningful verbal discussion   4/28: patient not agitated, doesn't appear in pain  4/29: patient confused but calm, not in any apparentl pain  4/30: seen x 2 -1) son Gabe at bedside: introduced Pallative care and said would be back when other family present; he had no questions; I asked RN to verify NG placement: patient moaned, soothed with forhead stroke; NAD 2) sitting upright in bed - NAD did not stimulated; dgt Maddie and her  in hallway - verbalized with loud voice and pointing finger - not wanting Palliative Care to continue following at this time as wanted to continue life-prolonging treatments  5/6: patient lethargic, more cachectic, not very responsive verbally                    Dyspnea (Salima 0-10): 0                       N/V (Y/N): No                             Secretions (Y/N) : No                                          Agitation(Y/N): above                             Pain (Y/N): No (PainAD 0)                                -Provocation/Palliation: N/A  -Quality/Quantity: N/A  -Radiating: N/A  -Severity: No pain  -Timing/Frequency: N/A  -Impact on ADLs: N/A    General:   see above  HEENT:   no nonverbal indications    Neck:   no nonverbal indications  CVS:   no nonverbal indications  Resp:   no nonverbal indications  GI:   no nonverbal indications  :   no nonverbal indications  Musc:   no nonverbal indications  Neuro:   no nonverbal indications  Psych:  no nonverbal indications  Skin:   no nonverbal indications  Lymph:   no nonverbal indications    Last BM: 5/1 emr      Allergies    Augmentin (Rash)  chocolate (Headache)  digoxin (Hives)  Metoprolol Succinate ER (Hives)  Novocain (Angioedema)  Steroids: Excessive swelling (Flushing; Other (Mild to Mod))  sulfa drugs (Fever)  Xarelto (Hives)    Intolerances      Opiate Naive (Y/N): Y  -iStop reviewed (Y/N): Y   Ref#:     This report was requested by: Yuki Nolan | Reference #: [id number]         Labs:	  reviewed                                   10.2   8.63  )-----------( 132      ( 06 May 2022 06:33 )             32.5     05-06    141  |  105  |  67<HH>  ----------------------------<  87  5.0   |  24  |  0.7    Ca    7.9<L>      06 May 2022 06:33  Phos  3.3     05-06  Mg     2.5     05-06          Radiology:	     < from: Xray Kidney Ureter Bladder (04.30.22 @ 13:47) >    ACC: [id number] EXAM:  XR KUB 1 VIEW                          PROCEDURE DATE:  04/30/2022          INTERPRETATION:  Clinical History / Reason for exam: Abdominal distention.    TECHNIQUE: Single abdominal x-ray obtained.    Comparison made with abdominal x-ray August 10, 2021, CT abdomen and   pelvis April 22, 2022.    FINDINGS:    Enteric tube extends below diaphragm. Moderate colonic stool burden.   Biliary stent noted. Stable osseous structures.    IMPRESSION:  Moderate colonic stool burden.    --- End of Report ---            BRISA ZAMORA MD; Attending Radiologist  This document has been electronically signed. Apr 30 2022  4:50PM    < end of copied text >      < from: Xray Chest 1 View- PORTABLE-Urgent (Xray Chest 1 View- PORTABLE-Urgent .) (04.19.22 @ 15:55) >  ACC: [id number] EXAM:  XR CHEST PORTABLE URGENT 1V                          < from: Xray Chest 1 View- PORTABLE-Urgent (Xray Chest 1 View- PORTABLE-Urgent .) (04.26.22 @ 17:02) >  Impression:    Cardiomegaly with bilateral effusions. Support devices as described.    Follow-up as needed.    --- End of Report ---            SERA GANT MD; Attending Interventional Radiologist  This document has been electronically signed. Apr 27 2022  7:50AM    < end of copied text >  PROCEDURE DATE:  04/19/2022          INTERPRETATION:  Clinical History / Reason for exam: Follow-up.    Comparison : Chest radiograph April 18, 2022.    Technique/Positioning: Adequate.    Findings:    Support devices: Right-sided Port-A-Cath with its tip overlying the SVC.    Cardiac/mediastinum/hilum: Cardiomegaly, unchanged.    Lung parenchyma/Pleura: Improved right lung opacity and stable left lung   opacity. No pneumothorax is seen.    Skeleton/soft tissues: Stable.               Impression:    Cardiomegaly, unchanged.    Improved right lung opacity and stable left lung opacity.    Right-sided Port-A-Cath.    --- End of Report ---            GRUPO MALDONADO MD; Attending Radiologist  This document has been electronically signed. Apr 19 2022  5:04PM    < end of copied text >          EKG:	    < from: 12 Lead ECG (04.23.22 @ 07:54) >  Ventricular Rate 96 BPM    Atrial Rate 277 BPM    QRS Duration 86 ms    Q-T Interval 400 ms    QTC Calculation(Bazett) 505 ms    R Axis 98 degrees    T Axis 253 degrees    Diagnosis Line Atrial fibrillation  Rightward axis  ST & T wave abnormality,consider inferior ischemia  ST & T wave abnormality, consider anterolateral ischemia  Abnormal ECG    Confirmed by AMADEO CUNHA, [facility] ([id number]) on 4/23/2022 10:38:13 PM    < end of copied text >    < from: 12 Lead ECG (04.05.22 @ 17:56) >    Ventricular Rate 91 BPM    Atrial Rate 37 BPM    QRS Duration 82 ms    Q-T Interval 360 ms    QTC Calculation(Bazett) 442 ms    R Axis 117 degrees    T Axis -62 degrees    Diagnosis Line Atrial fibrillation  Left posterior fascicular block  T wave abnormality, consider lateral ischemia  Abnormal ECG    Confirmed by JOSEFA CHILDS MD ([id number]) on 4/6/2022 10:08:26 AM    < end of copied text >    < from: 12 Lead ECG (04.18.22 @ 10:08) >    Ventricular Rate 117 BPM    Atrial Rate 115 BPM    QRS Duration 82 ms    Q-T Interval 356 ms    QTC Calculation(Bazett) 496 ms    R Axis 79 degrees    T Axis 219 degrees    Diagnosis Line Atrial fibrillation with rapid ventricular response  Low voltage QRS  ST & T wave abnormality, consider lateral ischemia  Abnormal ECG    Confirmed by Torri Collins MD ([id number]) on 4/18/2022 4:40:35 PM    < end of copied text >      Imaging Personally Reviewed:  [ ] xYES  [ ] NO    Consultant(s) Notes Reviewed:  [x ] YES  [ ] NO  Care Discussed with Consultants/Other Providers [x ] YES  [ ] NO    PEx:	  T(C): 35.8 (05-02-22 @ 14:00), Max: 36.2 (05-02-22 @ 05:10)  T(F): 96.4 (05-02-22 @ 14:00), Max: 97.1 (05-02-22 @ 05:10)  HR: 102 (05-02-22 @ 14:00) (102 - 117)  BP: 126/88 (05-02-22 @ 14:00) (125/93 - 126/88)  RR: 18 (05-02-22 @ 14:00) (18 - 19)  SpO2: 99% (05-02-22 @ 14:00) (99% - 100%)  Wt(kg): --    General:  NAD; frail   + NG ;   Eyes: clear conjunctiva  ENMT: oral mucosa dry   Resp: Unlabored Non tachypneic nc O2  Neuro: calm (moaning at times - see above)   Psych: currently calm  Skin: nonjaundiced; pale     Preadmit Karnofsky:  %           Current Karnofsky:  20   %  http://www.npcrc.org/files/news/karnofsky_performance_scale.pdf   http://www.npcrc.org/files/news/palliative_performance_scale_PPSv2.pdf  Cachexia (Y/N): Y - severe  BMI: BMI (kg/m2): 12.2 (04-18-22 @ 08:41)      Medications:	      MEDICATIONS  (STANDING):  artificial tears (preservative free) Ophthalmic Solution 1 Drop(s) Both EYES three times a day  chlorhexidine 4% Liquid 1 Application(s) Topical <User Schedule>  diltiazem    milliGRAM(s) Oral daily  enoxaparin Injectable 40 milliGRAM(s) SubCutaneous every 12 hours  lactulose Syrup 20 Gram(s) Oral every 12 hours  mirtazapine 15 milliGRAM(s) Oral at bedtime  pantoprazole  Injectable 40 milliGRAM(s) IV Push daily  polyethylene glycol 3350 17 Gram(s) Oral daily  senna 2 Tablet(s) Oral at bedtime    MEDICATIONS  (PRN):  acetaminophen     Tablet .. 650 milliGRAM(s) Oral every 6 hours PRN Temp greater or equal to 38C (100.4F), Mild Pain (1 - 3)  morphine Concentrate 5 milliGRAM(s) SubLingual every 6 hours PRN Moderate Pain (4 - 6)  simethicone 80 milliGRAM(s) Chew three times a day PRN Gas      Advanced Directives:	     Full Code      Decision maker: The patient is unable to participate in complex medical decision making conversations    Legal surrogate: daughters    GOALS OF CARE DISCUSSION	  	           Documentation of GOC/Advanced Care Planning see prior       PSYCHOSOCIAL-SPIRITUAL ASSESSMENT:            See Palliative Care SW/ documentation        	    REFERRALS       Palliative Med        Unit SW/Case Mgmt

## 2022-05-06 NOTE — SWALLOW BEDSIDE ASSESSMENT ADULT - ORAL PHASE
Within functional limits
Decreased anterior-posterior movement of the bolus/Delayed oral transit time

## 2022-05-06 NOTE — PROGRESS NOTE ADULT - PROBLEM SELECTOR PLAN 6
reconsulted to discuss GOC with family, reportedly more open to discussion of GOC. d/w daughter Maddie, described CMO, which would include stopping feeds, blood draws, etc. She stated being unsure about stopping feeding, and needing more time to think about it.

## 2022-05-06 NOTE — PROGRESS NOTE ADULT - ASSESSMENT
IMP:  - pancreatic cancer  - severe cancer cachexia  - malabsorption on Creon  - acute hypoxic resp failure r/t fluid overload  - s/p thoracentesis of pleural effusion, and paracentesis of ascites  - constipation    PLAN:   Continue Peptamen A.F 375ml X 3 feeds/day via NGT given at meal times   - monitor/ document BMs (& BM appearance)  - monitor BMP, phos, and Mg    - re-eval bowel regimen- lactulose, miralax, senna given daily - document BMs  - need to ascertain Onc plans (reportedly last chemo was in Nov?) and f/u w/ GOC discussion, possible PEG tube? Whether or not further or continued SNS or other aggressive interventions are continued depend entirely on treatment options for the primary can IMP:  - pancreatic cancer  - severe cancer cachexia  - malabsorption on Creon  - acute hypoxic resp failure r/t fluid overload  - s/p thoracentesis of pleural effusion, and paracentesis of ascites  - constipation    PLAN:   - Continue Peptamen A.F 375ml X 3 feeds/day via NGT given at meal times   - monitor/ document BMs (& BM appearance)  - monitor BMP, phos, and Mg    - re-eval bowel regimen- lactulose, miralax, senna given daily - document BMs  - need to ascertain Onc plans (reportedly last chemo was in Nov?) and f/u w/ GOC discussion. Whether or not further or continued SNS or other aggressive interventions are continued depend entirely on treatment options for the primary cancer. Apparently there is no Onc plan for further treatment, yet family has not decided regarding other aspects of care.  - although it may not be necessary, switching to hydrolyzed enteral formula may mollify, and will be nutritionally comparable IMP:  - pancreatic cancer  - severe cancer cachexia  - malabsorption on Creon  - acute hypoxic resp failure r/t fluid overload  - s/p thoracentesis of pleural effusion, and paracentesis of ascites  - constipation    PLAN:   - Continue Peptamen A.F 375ml X 3 feeds/day via NGT given at meal times, IF TOLERATED  - monitor/ document BMs (& BM appearance)  - monitor BMP, phos, and Mg    - re-eval bowel regimen- lactulose, miralax, senna given daily - document BMs  - CT noted - d/w hospitalist and surgeon, who spoke with family - discussing CMO

## 2022-05-06 NOTE — PROGRESS NOTE ADULT - SUBJECTIVE AND OBJECTIVE BOX
VIKASH LI 76y Female  MRN#: [id number]   CODE STATUS: DNR DNI    Hospital Day: 18d    Pt is currently admitted with the primary diagnosis of SOB    SUBJECTIVE  Hospital Course    77yo with pancreatic cancer, recurrent, presented with SOB, now failure to thrive.      Overnight events: Feeds held overnight due to pain     Subjective complaints: She appears in pain. tachycardic, groaining. Does not follow commands, nonverbal. Cachectic appearing. Sister at bedside agreeable to pain medication now.                                               ----------------------------------------------------------  OBJECTIVE  PAST MEDICAL & SURGICAL HISTORY  SOB (shortness of breath)    Pain  knee    Varicose veins of both lower extremities, unspecified whether complicated    Atrial fibrillation, unspecified type  2019 on Eliquis    Jaundice  March 5, 2021    Malignant neoplasm of pancreas, unspecified location of malignancy  Pancreatic Cancer    Hypertension, unspecified type  2019    Pancreatic cancer    Kidney stones    History of surgery  Whipple procedure 8/2/2021 with Dr. Nino at [facility]    Status post phlebectomy  10/2018    History of ovarian cystectomy  left    History of tonsillectomy  1959    Kidney stone  2017                                              -----------------------------------------------------------  ALLERGIES:  Augmentin (Rash)  chocolate (Headache)  digoxin (Hives)  Metoprolol Succinate ER (Hives)  Novocain (Angioedema)  Steroids: Excessive swelling (Flushing; Other (Mild to Mod))  sulfa drugs (Fever)  Xarelto (Hives)                                            ------------------------------------------------------------    HOME MEDICATIONS  Home Medications:  Cardizem  mg/24 hours oral capsule, extended release: 1 cap(s) orally once a day (22 Nov 2021 23:59)  Creon 12,000 units oral delayed release capsule: 2 cap(s) orally 3 times a day (with meals) (03 Dec 2021 10:54)  Eliquis 5 mg oral tablet: 1 tab(s) orally 2 times a day (03 Sep 2021 23:41)                           MEDICATIONS:  STANDING MEDICATIONS  artificial tears (preservative free) Ophthalmic Solution 1 Drop(s) Both EYES three times a day  chlorhexidine 4% Liquid 1 Application(s) Topical <User Schedule>  diltiazem    milliGRAM(s) Oral daily  enoxaparin Injectable 40 milliGRAM(s) SubCutaneous every 12 hours  lactulose Syrup 20 Gram(s) Oral every 12 hours  mirtazapine 15 milliGRAM(s) Oral at bedtime  pantoprazole  Injectable 40 milliGRAM(s) IV Push daily  polyethylene glycol 3350 17 Gram(s) Oral daily  senna 2 Tablet(s) Oral at bedtime    PRN MEDICATIONS  acetaminophen     Tablet .. 650 milliGRAM(s) Oral every 6 hours PRN  morphine Concentrate 5 milliGRAM(s) SubLingual every 6 hours PRN  simethicone 80 milliGRAM(s) Chew three times a day PRN                                            ------------------------------------------------------------  VITAL SIGNS: Last 24 Hours  T(C): 35.9 (05 May 2022 13:29), Max: 35.9 (05 May 2022 13:29)  T(F): 96.6 (05 May 2022 13:29), Max: 96.6 (05 May 2022 13:29)  HR: 100 (06 May 2022 08:22) (100 - 114)  BP: 127/71 (05 May 2022 20:08) (127/71 - 128/82)  BP(mean): --  RR: 16 (06 May 2022 08:22) (16 - 18)  SpO2: 100% (06 May 2022 08:22) (100% - 100%)      05-05-22 @ 07:01  -  05-06-22 @ 07:00  --------------------------------------------------------  IN: 360 mL / OUT: 775 mL / NET: -415 mL                                             --------------------------------------------------------------  LABS:                        10.2   8.63  )-----------( 132      ( 06 May 2022 06:33 )             32.5     05-06    141  |  105  |  67<HH>  ----------------------------<  87  5.0   |  24  |  0.7    Ca    7.9<L>      06 May 2022 06:33  Phos  3.3     05-06  Mg     2.5     05-06    TPro  5.4<L>  /  Alb  2.4<L>  /  TBili  0.5  /  DBili  x   /  AST  37  /  ALT  82<H>  /  AlkPhos  798<H>  05-06                                            -------------------------------------------------------------  RADIOLOGY:  CT A/P with con:  IMPRESSION:  1.  Limited evaluation secondary to paucity of intra-abdominal fat and   large volume ascites.  2.  Ill-defined soft tissue seen in the pancreatectomy bed encasing the   celiac and superior mesenteric arteries, corresponding to site of   abnormal FDG uptake seen on prior PET/CT. Findings likely represent sites   of tumor recurrence.  3.  Large volume abdominopelvic ascites.  4.  Shrunken nodular liver, maybe secondary to cirrhosis or   pseudocirrhosis.  5.  Moderate to large bilateral pleural effusions, right greater than   left with associated bibasilar atelectasis.  6.  Cardiomegaly.    --- End of Report ---                                          --------------------------------------------------------------    PHYSICAL EXAM:  GENERAL: Ill appearing. Agitated.   NERVOUS SYSTEM:  Alert & Oriented X0   PULMONARY: KIRK, CTA  CARDIOVASCULAR: S1S2 RRR  GI: Soft, NT, ND; BS present.  EXTREMITIES:  2+ Peripheral Pulses, No clubbing, cyanosis, or edema  LYMPH: No lymphadenopathy noted  SKIN: No rashes or lesions                                         --------------------------------------------------------------    ASSESSMENT & PLAN  75 YO F with PMHx of HTN, pancreatic CA s/p Whipple 8/2021, A.Fib on Eliquis, pleural effusion was BIBEMS due to worsening SOB    #Acute Hypoxemic Respiratory Failure.   #b/l Pleural effusions with ascites  #Pancreatic Ca s/p Whipple 8/21 with recurrence on Ct/AP  #Transaminitis  - s/p NRB >> now on 6L nasal cannula >> Titrate down as tolerated.   - s/p Thoracentesis 4/19 with 750 cc fluid removed, transudative  - repeat CT noted, massive ascites--> IR for therapeutic paracentesis on 4/26  - s/p lasix  - cardiology eval appreciated, diuresis as tolerated  - discussed with Dr. Knight, feeds adjusted, Creon currently on hold as cannot be given through NGT  - resume if patient more awake/able to tolerate PO  - Ammonia noted to be elevated, on lactulose, titrate to 2-3 bm daily, CTH negative   - Extensive conversation with patient's daughter, lian  on 4/30>>>> want to continue ngt feeds, resume blood work. and also wants  brief trial of IVF.   - 5/3: Palliative has signed off. Patient now DNR DNI. Family at this time still wants continued current management.   - monitor LFT's  - CA 19-9 38  - CTA/P with IV contrast 5/4: Likely recurrence of pancreatic cancer. Soft tissue encasing superior mesentaric artery and celiac artery. Large volume ascites. Mod-large pleural effusion. Cirrhosis/ Pseudocirrhosis. Cardiomegaly.   - Family to discuss possible comfort measures today/ Hospice     #BIlateral UE edema, from infiltrated IVs and contrast extravasation  -stable; continue to monitor     #Chronic A.Fib on Eliquis  - rate control with diltiazem   - continue lovenox    #Poor PO intake  #Severe malnutrition  #History of eating disorder  - started marinol 4/20, now off  - NGT placed, tolerating feeds, encourage PO intake as well if awake and appropriate to eat as per speech and swallow  - Dr. Syed following  - DO NOT try to put Creon down the NG tube as per Dr. Syed   - psychiatry following, added Zyprexa to current regimen - pt's daughter asked to stop it   - pt's family requested to stop remeron on 4/29 and to resume.  - failed speech and swallow on 4/28  - monitor BMP, phos, and Mg    - re-eval bowel regimen- lactulose, miralax, senna given daily - document BMs  - Family to discuss comfort measures today/ Hospice     # Hypernatremia   - free water flushed 300ml TID for 2d     #Transaminitis  - Monitor LFTs   - RUQ US Pneumobilia with perihepatic ascites.  - CT A/P with possible cirrhosis   - Downtrending     #GOC - DNR DNI  - overall grave prognosis  -- 5/3: Palliative has signed off. Patient now DNR DNI. Family at this time still wants continued current management.   - monitor LFT's  - Family to discuss comfort measures today/ Hospice     Progress Note Handoff  Pending:  Clinical stability / ? PEG /   Family Discussion: extensive discussion with pt's daughter, Lian   Not ready for CMO.  Wish to continue blood work and possible PEG placement??   Disposition:  Unknown at this time.

## 2022-05-06 NOTE — PROGRESS NOTE ADULT - PROBLEM SELECTOR PLAN 3
- planning for paracentesis today  - did not endorse discomfort  - monitor symptoms  - c/w IV ceftriaxone, monitor counts
- s/p paracentesis 4/26 for 4.7 L; improved symptoms  - monitor symptoms
- s/p paracentesis 4/26 for 4.7 L; improved symptoms  - monitor symptoms
- prior paracentesis not able to identify area to drain  - did not endorse discomfort  - monitor symptoms  - c/w abx, monitor counts
- prior paracentesis not able to identify area to draint  - monitor symptoms
- paracentesis not able to identify area to drain  - did not endorse discomfort  - monitor symptoms  - c/w IV ceftriaxone, monitor counts
- paracentesis not able to identify area to drain  - did not endorse discomfort  - monitor symptoms  - c/w IV ceftriaxone, monitor counts
- s/p paracentesis 4/26 for 4.7 L; improved symptoms  - monitor symptoms
- s/p paracentesis 4/26 for 4.7 L; improved symptoms  - plan per primary/other consulting teams
- s/p paracentesis 4/26 for 4.7 L; improved symptoms  - plan per primary/other consulting teams

## 2022-05-07 NOTE — PROGRESS NOTE ADULT - PROVIDER SPECIALTY LIST ADULT
Internal Medicine
Pulmonology
Critical Care
Internal Medicine
Pulmonology
Surgery
Vascular Surgery
Hospitalist
Hospitalist
Internal Medicine
Intervent Radiology
Nutrition Support
Pulmonology
Surgery
Critical Care
Hospitalist
Hospitalist
Internal Medicine
Nutrition Support
Pulmonology
Surgery
Pulmonology
Palliative Care

## 2022-05-07 NOTE — PROGRESS NOTE ADULT - SUBJECTIVE AND OBJECTIVE BOX
not verbalizing but more comfortable appearing s/p opiate    intermittently tachycardic, normotensive, afebrile   severe cachexia  more comfortable appearing  bibasilar crackles  NGT feeding, marginally distended abdomen without rebound  extremity contractures

## 2022-05-07 NOTE — PROGRESS NOTE ADULT - ASSESSMENT
Patient seen and examined independently of resident. My addendum supersedes resident notation. Case discussed with housestaff, nursing and patient    75 YO F with PMHx of HTN, pancreatic CA s/p Whipple 8/2021, A.Fib on Eliquis, pleural effusion was BIBEMS due to worsening SOB. here c ahrf 2/2 pleural effusions, ascites, worsening severe cancer cachexia, worsening functional debility, metabolic/hepatic encephalopathy, cancer related pain, failure to thrive, problem with social disposition    feeds intermittently held when resulting in notable discomfort  prn subligual roxanol  supportive care  not a candidate for cancer tx  dnr  high risk/ terminal prognosis    Provider Hand off  Pending- further family f/u's if changes in approach, c/w recently started symptom oriented treatments, will f/u regarding feeds/pain  Disposition- guarded

## 2022-05-07 NOTE — PROGRESS NOTE ADULT - REASON FOR ADMISSION

## 2022-05-08 NOTE — DISCHARGE NOTE FOR THE EXPIRED PATIENT - HOSPITAL COURSE
HPI:  75 YO F with PMHx of HTN, pancreatic CA s/p Whipple 8/2021, A.Fib on Eliquis, pleural effusion was BIBEMS due to worsening SOB that started earlier on the morning of presentation  As per EMS, patient was saturating in the 70s on room air and was placed on NRB w/ sats improving to high 80s. Patient reports that she lives with her son and started noticing shortness of breath.   Patient had recent admission from [facility] and had thoracentesis done  Patient denies fevers, chills, headache, chest pain, palpitations, constipation, melena, hematochezia, dysuria, urinary frequency or urgency, numbness, tingling, recent travel or any known sick contact.   In the ED patient was placed on non rebreather with SpO2 of 94%. .  X-ray chest showed Interval increase in bilateral effusions and opacities.    Hospital course:  Pt. was admitted for acute hypoxemic respiratory failure 2/2 pleural effusion, s/p thoracentesis, s/p lasix. CT A&P showed recurrence of pancreatic cancer. Pt. had very poor PO intake. Prognosis was very poor, so pt. was made DNR/DNI by the family.     Called by RN for gurgling sound, on exam no heart sound auscultated and vitals were not obtainaible and pupils were fixed and dilated. Pt. pronounced dead on 5/8/2022, 0258.

## 2022-05-13 NOTE — ED PROVIDER NOTE - IV ALTEPLASE INCLUSION HIDDEN
Alert-The patient is alert, awake and responds to voice. The patient is oriented to time, place, and person. The triage nurse is able to obtain subjective information. show

## 2022-05-16 DIAGNOSIS — Z79.01 LONG TERM (CURRENT) USE OF ANTICOAGULANTS: ICD-10-CM

## 2022-05-16 DIAGNOSIS — R74.01 ELEVATION OF LEVELS OF LIVER TRANSAMINASE LEVELS: ICD-10-CM

## 2022-05-16 DIAGNOSIS — Z66 DO NOT RESUSCITATE: ICD-10-CM

## 2022-05-16 DIAGNOSIS — R64 CACHEXIA: ICD-10-CM

## 2022-05-16 DIAGNOSIS — T80.818A EXTRAVASATION OF OTHER VESICANT AGENT, INITIAL ENCOUNTER: ICD-10-CM

## 2022-05-16 DIAGNOSIS — I96 GANGRENE, NOT ELSEWHERE CLASSIFIED: ICD-10-CM

## 2022-05-16 DIAGNOSIS — J96.01 ACUTE RESPIRATORY FAILURE WITH HYPOXIA: ICD-10-CM

## 2022-05-16 DIAGNOSIS — F05 DELIRIUM DUE TO KNOWN PHYSIOLOGICAL CONDITION: ICD-10-CM

## 2022-05-16 DIAGNOSIS — Z88.4 ALLERGY STATUS TO ANESTHETIC AGENT: ICD-10-CM

## 2022-05-16 DIAGNOSIS — Z88.8 ALLERGY STATUS TO OTHER DRUGS, MEDICAMENTS AND BIOLOGICAL SUBSTANCES STATUS: ICD-10-CM

## 2022-05-16 DIAGNOSIS — J90 PLEURAL EFFUSION, NOT ELSEWHERE CLASSIFIED: ICD-10-CM

## 2022-05-16 DIAGNOSIS — J96.02 ACUTE RESPIRATORY FAILURE WITH HYPERCAPNIA: ICD-10-CM

## 2022-05-16 DIAGNOSIS — E87.0 HYPEROSMOLALITY AND HYPERNATREMIA: ICD-10-CM

## 2022-05-16 DIAGNOSIS — Z78.1 PHYSICAL RESTRAINT STATUS: ICD-10-CM

## 2022-05-16 DIAGNOSIS — R62.7 ADULT FAILURE TO THRIVE: ICD-10-CM

## 2022-05-16 DIAGNOSIS — R18.8 OTHER ASCITES: ICD-10-CM

## 2022-05-16 DIAGNOSIS — N39.0 URINARY TRACT INFECTION, SITE NOT SPECIFIED: ICD-10-CM

## 2022-05-16 DIAGNOSIS — I48.20 CHRONIC ATRIAL FIBRILLATION, UNSPECIFIED: ICD-10-CM

## 2022-05-16 DIAGNOSIS — Z91.018 ALLERGY TO OTHER FOODS: ICD-10-CM

## 2022-05-16 DIAGNOSIS — L97.429 NON-PRESSURE CHRONIC ULCER OF LEFT HEEL AND MIDFOOT WITH UNSPECIFIED SEVERITY: ICD-10-CM

## 2022-05-16 DIAGNOSIS — E87.70 FLUID OVERLOAD, UNSPECIFIED: ICD-10-CM

## 2022-05-16 DIAGNOSIS — G93.40 ENCEPHALOPATHY, UNSPECIFIED: ICD-10-CM

## 2022-05-16 DIAGNOSIS — I10 ESSENTIAL (PRIMARY) HYPERTENSION: ICD-10-CM

## 2022-05-16 DIAGNOSIS — E43 UNSPECIFIED SEVERE PROTEIN-CALORIE MALNUTRITION: ICD-10-CM

## 2022-05-16 DIAGNOSIS — L97.419 NON-PRESSURE CHRONIC ULCER OF RIGHT HEEL AND MIDFOOT WITH UNSPECIFIED SEVERITY: ICD-10-CM

## 2022-05-16 DIAGNOSIS — K72.90 HEPATIC FAILURE, UNSPECIFIED WITHOUT COMA: ICD-10-CM

## 2022-05-16 DIAGNOSIS — E88.09 OTHER DISORDERS OF PLASMA-PROTEIN METABOLISM, NOT ELSEWHERE CLASSIFIED: ICD-10-CM

## 2022-05-16 DIAGNOSIS — Z88.2 ALLERGY STATUS TO SULFONAMIDES: ICD-10-CM

## 2022-05-16 DIAGNOSIS — C25.9 MALIGNANT NEOPLASM OF PANCREAS, UNSPECIFIED: ICD-10-CM

## 2022-05-18 LAB
CULTURE RESULTS: SIGNIFICANT CHANGE UP
SPECIMEN SOURCE: SIGNIFICANT CHANGE UP

## 2022-07-21 NOTE — PROGRESS NOTE ADULT - ATTENDING COMMENTS
b/l ulcerations on the lateral aspect of the left foot/ankle and medial aspect of the right foot/ankle  fibrotic base. does not appear to be acutely infected  pt relates ulcerations started when she began chemotherapy treatment  local wound care w/ santyl and gauze dressing daily  follow up outpatient for wound care.     Hao Tovar DPM
feeling better, contrast study showed traveling of contrast , no delayed gastric emptying, will remove NGT, she had several BMs, will resume liquid diet and advance to soft if tolerated, may DC to inpatient rehab
76y Female  s/p pancreaticoduodenectomy, 1 ISABELL in place    doing well post op   downgraded from ICU   tolerating soft /liquid diet  Isabell drain -serous     hx of Afib-   - Hr >110 post op, now under 90 since restarting home PO Cardizem 60    impression : continue all home med  continue soft diet  conitnue oob/ incentive spirometer  continue   DVT prophylaxis- lovenox    discharge planning
Patient seen and examined with surgery PA  on rounds and discussed management plans.   Patient uncomfortable no BM felt nausea today. Abd soft mild distension and tymanic Byron drain serous drainage. will get obstructive series clear fluids only for now. wound clean abd binder in place voiding well now
76y Female  s/p pancreaticoduodenectomy, 1 ISABELL in place    NEURO:    Acute pain-controlled with dilaudid PCA--> dc , change to prn, start Tylenol PO  , Dilaudid prn        Oxygenation-wean off NC to RA as tolerate    Activity-increase as tolerated, was OOB to chair yesterday        hx of Afib-   - Hr >110 post op, now under 90 since restarting home PO Cardizem 60  - 20 lasix po restart         s/p whipple-strict NPO    Diet, NPO-NG tube in place    GI Prophylaxis-PPI    Bowel regimen-no     1JP in place          Monitor UO- Ibarra in place--> DC Ibarra   Labs:          BUN/Cr- 10/0.6  -->,  11/0.6            Electrolytes-Na 139 // K 4.0 // Mg 1.8 //  Phos 4.4 (08-03 @ 04:50)        DVT prophylaxis- lovenox  , SCDs    Labs: Hb/Hct:  13.1/41.3  -->,  11.6/37.0  -->      11.5                Plts:  241  -->,  269  -->                 PTT/INR:  29.7/1.29  (08-02 @ 17:15) --->                       WBC- 21.93  --->>,  28.70  --->> 23  afebrile   Antibiotics-cefepime and flagyl, f/u or cultures          FSG q6 while strict NPO      LINES/DRAINS:  PIV, Juliette, Ibarra, right subclavian chemo port    Advanced Directives: full code      dispo- downgrade
76y Female  s/p pancreaticoduodenectomy, 1 ISABELL in place    NEURO:    Acute pain-controlled with dilaudid PCA--> dc , change to prn, start Tylenol PO via NG tube if primary team advances diet       Oxygenation-wean off NC to RA as tolerate    Activity-increase as tolerated, was oob to chair yesterday        hx of Afib-cardizem gtt DCed --> Cardizem 5mg q8 IV push due to metoprolol allergy and strict NPO (home cardizem 120mg ER)    8/2- Bc to 40's called cards-> vasovagal response, DC cardizem gtt start IV push 5mg q8, will start home PO meds when able , now afib 110s to 120  CARDS consult called 48hrs with no recs, a/w recs    hx of HTN-  lasix 10mg IV (PO 20mg at home)      Imaging: EKG afib    CE neg x3         s/p whipple-strict NPO    Diet, NPO-NG tube in place    GI Prophylaxis-PPI    Bowel regimen-no     1JP in place          Monitor UO- ariza in place--> DC ariza   Labs:          BUN/Cr- 10/0.6  -->,  11/0.6            Electrolytes-Na 139 // K 4.0 // Mg 1.8 //  Phos 4.4 (08-03 @ 04:50)        DVT prophylaxis- lovenox  , SCDs    Labs: Hb/Hct:  13.1/41.3  -->,  11.6/37.0  -->      11.5                Plts:  241  -->,  269  -->                 PTT/INR:  29.7/1.29  (08-02 @ 17:15) --->                       WBC- 21.93  --->>,  28.70  --->> 23  afebrile   Antibiotics-cefepime and flagyl, f/u or cultures          FSG q6 while strict NPO      LINES/DRAINS:  PIV, Juliette, Ariza, right subclavian chemo port    Advanced Directives: full code      dispo- sicu management
76y Female  s/p pancreaticoduodenectomy, 1 ISABELL in place    NEURO:    Acute pain-controlled with dilaudid PCA, start Tylenol PO via NG tube if primary team advances diet    RESP:     Oxygenation-wean off NC to RA as tolerate    Activity-increase as tolerated           hx of Afib-cardizem gtt DCed --> Cardizem 5mg q8 IV push due to metoprolol allergy and strict NPO (home cardizem 120mg ER)    8/2- Bc to 40's called cards-> vasovagal response, DC cardizem gtt start IV push 5mg q8    hx of HTN-  lasix 10mg IV (PO 20mg at home)      Imaging: EKG afib    CE neg x3         s/p whipple-strict NPO    Diet, NPO-NG tube in place    GI Prophylaxis-PPI    Bowel regimen-no     1JP in place          Monitor UO-ariza in place  Labs:          BUN/Cr- 10/0.6  -->,  11/0.6            Electrolytes-Na 139 // K 4.0 // Mg 1.8 //  Phos 4.4 (08-03 @ 04:50)        DVT prophylaxis-heparin   Injectable  , SCDs    Labs: Hb/Hct:  13.1/41.3  -->,  11.6/37.0  -->                      Plts:  241  -->,  269  -->                 PTT/INR:  29.7/1.29  (08-02 @ 17:15) --->                       WBC- 21.93  --->>,  28.70  --->>  Temp trend- 24hrs T(F): 96.3 (08-03 @ 08:00), Max: 97.5 (08-02 @ 16:35)  Antibiotics-cefoTEtan  IVPB 2 every 12 hours         FSG q6 while strict NPO      LINES/DRAINS:  PIV, Juliette, Ariza, right subclavian chemo port    Advanced Directives: full code      dispo- sicu management
Donor Site Anesthesia Type: same as repair anesthesia

## 2022-09-15 NOTE — H&P ADULT - NSHPPHYSICALEXAM_GEN_ALL_CORE
200 CONSTITUTIONAL: No acute distress, thin, borderline cachectic, AAOx3, chemoport in right chest wall without sign of infection  HEAD: Atraumatic, normocephalic  EYES: EOM intact, PERRLA, conjunctiva and sclera clear  ENT: Supple, no masses, no thyromegaly, no bruits, no JVD; moist mucous membranes  PULMONARY: Clear to auscultation bilaterally; no wheezes, rales, or rhonchi  CARDIOVASCULAR: Irregularly irregular rate and rhythm; no murmurs, rubs, or gallops  GASTROINTESTINAL: Soft, non-tender, non-distended; bowel sounds present, multiple stitches present on abdominal wall with well healing surgical incision  MUSCULOSKELETAL: 2+ peripheral pulses; no clubbing, no cyanosis, no edema  NEUROLOGY: non-focal  SKIN: No rashes or lesions; warm and dry

## 2022-11-04 NOTE — H&P ADULT - NSICDXPASTMEDICALHX_GEN_ALL_CORE_FT
[FreeTextEntry2] : Right shoulder.
PAST MEDICAL HISTORY:  Atrial fibrillation, unspecified type 2019 on Eliquis    Hypertension, unspecified type 2019    Jaundice March 5, 2021    Kidney stones     Malignant neoplasm of pancreas, unspecified location of malignancy Pancreatic Cancer    Pain knee    Pancreatic cancer     SOB (shortness of breath)     Varicose veins of both lower extremities, unspecified whether complicated
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
## 2022-12-20 NOTE — ED PROVIDER NOTE - PHYSICAL EXAMINATION
pt states she is 9 weeks pregnant and has lost 29 lbs over past 3 weeks c/o dizziness - n/v  - nicole/sob at times. Physical Exam    Vital Signs: I have reviewed the initial vital signs.  Constitutional: well-nourished, appears stated age, no acute distress  Eyes: Conjunctiva pink, Sclera clear,   Cardiovascular: S1 and S2, regular rate, regular rhythm, well-perfused extremities, radial pulses equal and 2+, calves nonttp, equal in size  Respiratory: unlabored respiratory effort, speaking in full sentences, handling oral secretions, clear to auscultation bilaterally no wheezing, rales and rhonchi  Gastrointestinal: soft, non-tender abdomen, no cvat  Musculoskeletal: supple neck, no lower extremity edema,  Integumentary: warm, dry, no rashes, lacerations,  Neurologic: awake, alert x3

## 2023-03-03 NOTE — PROCEDURE NOTE - NSPOSTCAREGUIDE_GEN_A_CORE
Verbal/written post procedure instructions were given to patient/caregiver/Instructed patient/caregiver to follow-up with primary care physician/Instructed patient/caregiver regarding signs and symptoms of infection/Keep the cast/splint/dressing clean and dry
patient

## 2023-04-15 NOTE — DISCHARGE NOTE NURSING/CASE MANAGEMENT/SOCIAL WORK - NSDCPEELIQUISDIET_GEN_ALL_CORE
Eat healthy foods you enjoy. Apixaban/Eliquis DOES NOT have a special diet. Limit your alcohol intake. 15-Apr-2023 19:25

## 2023-05-11 NOTE — H&P PST ADULT - OCCUPATION
JEFFY c/w stated LMP, TRI updated  Taking PNV  Advised to start aspirin   MFM referral placed - she likely declines aneuploidy screening  Will return for OB intake within next week  Precautions reviewed RETIRED

## 2023-05-12 NOTE — PROGRESS NOTE ADULT - NUTRITIONAL ASSESSMENT
- Required HD overnight for hyperkalemia  - Improved now  - Will continue IV lasix   - Continue renal diet and cardiac monitoring     
This patient has been assessed with a concern for Malnutrition and has been determined to have a diagnosis/diagnoses of Moderate protein-calorie malnutrition and Underweight (BMI < 19).    This patient is being managed with:   Diet DASH/TLC-  Sodium & Cholesterol Restricted  [employer] Vegetarian (Accepts Fish)     Qty per Day:  Vanilla Ensure Only  Supplement Feeding Modality:  Oral  Ensure Compact Cans or Servings Per Day:  1       Frequency:  Two Times a day  Entered: Mar  8 2021  3:01PM    Diet NPO after Midnight-     NPO Start Date: 07-Mar-2021   NPO Start Time: 23:59  Entered: Mar  7 2021  2:05PM    Diet DASH/TLC-  Sodium & Cholesterol Restricted  Entered: Mar  5 2021  5:32PM    The following pending diet order is being considered for treatment of Moderate protein-calorie malnutrition and Underweight (BMI < 19):null

## 2023-08-09 NOTE — H&P ADULT - BIRTH SEX
Female Detail Level: Zone Comments: Continue Taltz 80mg SC at weeks 6, 8, 10 and 12 then 80mg SC every four weeks Add High Risk Medication Management Associated Diagnosis?: Yes Length Of Therapy: 1 month

## 2023-08-24 NOTE — OCCUPATIONAL THERAPY INITIAL EVALUATION ADULT - HOME MANAGEMENT SKILLS, PREVIOUS LEVEL OF FUNCTION, OT EVAL
Called pt. Advised that at this time, our Medicaid panel is closed. Medicaid Escalation line was provided to pt at previous phone call. Pt declined to take it again. Thanks, Milli   independent

## 2024-01-19 NOTE — DISCHARGE NOTE NURSING/CASE MANAGEMENT/SOCIAL WORK - NSTRANSFERBELONGINGSRESP_GEN_A_NUR
Dr Jeannine Jasso is not in clinic today.  I did forward your earlier message to her this morning.  She may not respond until Monday when she is back in clinic.  Rod Cedeno CMA  
yes

## 2024-04-24 NOTE — ED ADULT NURSE NOTE - NSFALLRSKHRMRISKTYPE_ED_ALL_ED
1. Caller Name: Sarah Royal and Daughter                       Call Back Number: There are no phone numbers on file.      How would the patient prefer to be contacted with a response: Phone call OK to leave a detailed message    Daughter and patient called asking for a refill on Xanax or they were requesting the alternative of the medication the patient was taking that her doctor in [place] prescribed.   Per last office notes, patient was to take Xanax for a 30 day period and is to change to a [place] approved medication.  She only see a psychiatrist in [place].     Please advise?   other

## 2024-12-12 NOTE — DISCHARGE NOTE NURSING/CASE MANAGEMENT/SOCIAL WORK - NURSING SECTION COMPLETE
-- DO NOT REPLY / DO NOT REPLY ALL --  -- This inbox is not monitored. If this was sent to the wrong provider or department, reroute message to P [employer] pool. --  -- Message is from Engagement Center Operations (ECO) --    General Patient Message: Patient states she missed a call to schedule an appt for pink eye,  Please call back after 3:45   Caller Information       Contact Date/Time Type Contact Phone/Fax    12/12/2024 12:44 PM CST Phone (Incoming) Jossy Knight [phone]            Alternative phone number:     Can a detailed message be left? Yes - LiveWell Message   Patient has been advised the message will be addressed within 2-3 business days.              Copied from CRM #[id number]. Topic: MW Schedule Appointment -  Schedule Primary Care  >> Dec 12, 2024 12:44 PM Jimena CARTWRIGHT wrote:  Jossy Knight called requesting to schedule a primary care visit with a Clinician. Checked insurance.  Looked for any active requests, recalls, service to orders, scheduling tickets prior to scheduling. The decision tree DT PC Was Not used for scheduling (an)     Acute need. Not scheduled.  Patient opted to seek care via . Selected 'Wrap Up CRM' and chose appropriate 'Resolve' reason.  
RN reviewed with patient, sx reported itchy red eye. Concerned for pink eye.   Informed no appointments today, recommend UC. [facility].   
Patient/Caregiver provided printed discharge information.

## 2025-01-07 NOTE — ED PROVIDER NOTE - TEMPLATE, MLM
[address] [place] [address].    If you are unable to attend your pre-scheduled appointment, please contact the office directly at [phone] to reschedule.     5. STAR Education Packet Provided   Abdominal Pain, N/V/D Allergic Rx

## 2025-05-24 NOTE — DISCHARGE NOTE PROVIDER - NSDCCONDITION_GEN_ALL_CORE
Deb Jimenezda was seen and evaluated today in the emergency department over your concern of left eye foreign body.  The workup that we performed showed L eye metal foreign body.  Please return to the emergency department if you experience blindness, severe pain or any other signs and symptoms that may be concerning to you.  Please follow-up with your primary care doctor within 1 week.  All questions were answered prior to discharge.  Thank you for choosing [facility] for your care.    Take 3 eye drops 4 times per day. Call Ophtho office in morning. Be seen ASAP.     
Stable
